# Patient Record
Sex: MALE | Race: WHITE | Employment: OTHER | URBAN - METROPOLITAN AREA
[De-identification: names, ages, dates, MRNs, and addresses within clinical notes are randomized per-mention and may not be internally consistent; named-entity substitution may affect disease eponyms.]

---

## 2017-01-13 DIAGNOSIS — C43.9 MALIGNANT MELANOMA OF SKIN (H): ICD-10-CM

## 2017-01-13 DIAGNOSIS — C22.1 CHOLANGIOCARCINOMA (H): ICD-10-CM

## 2017-01-13 LAB
ALBUMIN SERPL-MCNC: 4 G/DL (ref 3.4–5)
ALP SERPL-CCNC: 95 U/L (ref 40–150)
ALT SERPL W P-5'-P-CCNC: 40 U/L (ref 0–70)
ANION GAP SERPL CALCULATED.3IONS-SCNC: 6 MMOL/L (ref 3–14)
AST SERPL W P-5'-P-CCNC: 27 U/L (ref 0–45)
BASOPHILS # BLD AUTO: 0 10E9/L (ref 0–0.2)
BASOPHILS NFR BLD AUTO: 0.7 %
BILIRUB SERPL-MCNC: 0.8 MG/DL (ref 0.2–1.3)
BUN SERPL-MCNC: 16 MG/DL (ref 7–30)
CALCIUM SERPL-MCNC: 8.9 MG/DL (ref 8.5–10.1)
CHLORIDE SERPL-SCNC: 104 MMOL/L (ref 94–109)
CO2 SERPL-SCNC: 28 MMOL/L (ref 20–32)
CREAT SERPL-MCNC: 0.89 MG/DL (ref 0.66–1.25)
DIFFERENTIAL METHOD BLD: NORMAL
EOSINOPHIL # BLD AUTO: 0.2 10E9/L (ref 0–0.7)
EOSINOPHIL NFR BLD AUTO: 2.7 %
ERYTHROCYTE [DISTWIDTH] IN BLOOD BY AUTOMATED COUNT: 12.7 % (ref 10–15)
GFR SERPL CREATININE-BSD FRML MDRD: 87 ML/MIN/1.7M2
GLUCOSE SERPL-MCNC: 101 MG/DL (ref 70–99)
HCT VFR BLD AUTO: 43.1 % (ref 40–53)
HGB BLD-MCNC: 14.4 G/DL (ref 13.3–17.7)
IMM GRANULOCYTES # BLD: 0 10E9/L (ref 0–0.4)
IMM GRANULOCYTES NFR BLD: 0.4 %
LYMPHOCYTES # BLD AUTO: 1.9 10E9/L (ref 0.8–5.3)
LYMPHOCYTES NFR BLD AUTO: 33 %
MCH RBC QN AUTO: 29.4 PG (ref 26.5–33)
MCHC RBC AUTO-ENTMCNC: 33.4 G/DL (ref 31.5–36.5)
MCV RBC AUTO: 88 FL (ref 78–100)
MONOCYTES # BLD AUTO: 0.8 10E9/L (ref 0–1.3)
MONOCYTES NFR BLD AUTO: 13.8 %
NEUTROPHILS # BLD AUTO: 2.8 10E9/L (ref 1.6–8.3)
NEUTROPHILS NFR BLD AUTO: 49.4 %
NRBC # BLD AUTO: 0 10*3/UL
NRBC BLD AUTO-RTO: 0 /100
PLATELET # BLD AUTO: 183 10E9/L (ref 150–450)
POTASSIUM SERPL-SCNC: 5.4 MMOL/L (ref 3.4–5.3)
PROT SERPL-MCNC: 7.5 G/DL (ref 6.8–8.8)
RBC # BLD AUTO: 4.89 10E12/L (ref 4.4–5.9)
SODIUM SERPL-SCNC: 139 MMOL/L (ref 133–144)
WBC # BLD AUTO: 5.6 10E9/L (ref 4–11)

## 2017-01-14 LAB — CANCER AG19-9 SERPL-ACNC: 34

## 2017-01-16 ENCOUNTER — ONCOLOGY VISIT (OUTPATIENT)
Dept: ONCOLOGY | Facility: CLINIC | Age: 62
End: 2017-01-16
Attending: INTERNAL MEDICINE
Payer: COMMERCIAL

## 2017-01-16 VITALS
TEMPERATURE: 96.2 F | WEIGHT: 224.8 LBS | DIASTOLIC BLOOD PRESSURE: 84 MMHG | SYSTOLIC BLOOD PRESSURE: 146 MMHG | OXYGEN SATURATION: 98 % | HEIGHT: 71 IN | HEART RATE: 46 BPM | BODY MASS INDEX: 31.47 KG/M2 | RESPIRATION RATE: 12 BRPM

## 2017-01-16 DIAGNOSIS — C22.1 CHOLANGIOCARCINOMA (H): Primary | ICD-10-CM

## 2017-01-16 DIAGNOSIS — C43.9 MALIGNANT MELANOMA OF SKIN (H): ICD-10-CM

## 2017-01-16 PROCEDURE — 99212 OFFICE O/P EST SF 10 MIN: CPT

## 2017-01-16 PROCEDURE — 99213 OFFICE O/P EST LOW 20 MIN: CPT | Mod: ZP | Performed by: INTERNAL MEDICINE

## 2017-01-16 RX ORDER — VALACYCLOVIR HYDROCHLORIDE 1 G/1
TABLET, FILM COATED ORAL
COMMUNITY
Start: 2015-12-30 | End: 2017-01-16

## 2017-01-16 RX ORDER — METOPROLOL TARTRATE 50 MG
150 TABLET ORAL 2 TIMES DAILY
COMMUNITY
Start: 2006-10-03 | End: 2019-06-24

## 2017-01-16 ASSESSMENT — PAIN SCALES - GENERAL: PAINLEVEL: NO PAIN (0)

## 2017-01-16 NOTE — Clinical Note
1/16/2017      RE: Girish Chauhan  [address] [facility] [address]       HISTORY OF PRESENT ILLNESS:  Girish Chauhan is here today in followup of resected cholangiocarcinoma.  He is now about 9 months out from a left hepatectomy and is here for planned surveillance for recurrence.  He tells me since his last visit he has been doing great.  His appetite is excellent.  His energy level is good.  He is working full-time and does not really have any limitations in his activities.  A 10-point complete review of systems which is documented on the health questionnaire is completely negative.  He denies other new medical problems since our last visit.       PHYSICAL EXAMINATION:   GENERAL:  Mr. Chauhan is alert.  He appears well.  He appears his stated age.   VITAL SIGNS:  His vitals are noted in the chart and are unremarkable.   HEENT:  He has no icterus.  He has no visible lesion in the oropharynx.   NECK:  He has no palpable adenopathy in the neck or supraclavicular spaces.   LUNGS:  His lungs are clear to auscultation without dullness to percussion.   HEART:  His heart rate and rhythm are regular with a grade 2-3/6 systolic ejection murmur.   ABDOMEN:  His abdomen is soft and nontender without palpable mass or organomegaly.   EXTREMITIES:  He has no peripheral edema.  There is no tenderness in the calves or thighs.   NEUROLOGIC:  Speech is fluent.  Cranial nerves are grossly intact.  He has an unremarkable gait and station.       RESULTS:  I reviewed with the patient and his wife his CT scan and lab work.  He has normal electrolytes, renal function, liver enzymes and blood counts.  His CT scan shows no evidence of recurrence.      ASSESSMENT:  Resected cholangiocarcinoma.  The patient is currently free of evidence of disease.  I would like to see him back in about 3 months with repeat CT scan and lab work.       Naresh De Los Santos MD

## 2017-01-16 NOTE — PROGRESS NOTES
HISTORY OF PRESENT ILLNESS:  Girish Chauhan is here today in followup of resected cholangiocarcinoma.  He is now about 9 months out from a left hepatectomy and is here for planned surveillance for recurrence.  He tells me since his last visit he has been doing great.  His appetite is excellent.  His energy level is good.  He is working full-time and does not really have any limitations in his activities.  A 10-point complete review of systems which is documented on the health questionnaire is completely negative.  He denies other new medical problems since our last visit.       PHYSICAL EXAMINATION:   GENERAL:  Mr. Chauhan is alert.  He appears well.  He appears his stated age.   VITAL SIGNS:  His vitals are noted in the chart and are unremarkable.   HEENT:  He has no icterus.  He has no visible lesion in the oropharynx.   NECK:  He has no palpable adenopathy in the neck or supraclavicular spaces.   LUNGS:  His lungs are clear to auscultation without dullness to percussion.   HEART:  His heart rate and rhythm are regular with a grade 2-3/6 systolic ejection murmur.   ABDOMEN:  His abdomen is soft and nontender without palpable mass or organomegaly.   EXTREMITIES:  He has no peripheral edema.  There is no tenderness in the calves or thighs.   NEUROLOGIC:  Speech is fluent.  Cranial nerves are grossly intact.  He has an unremarkable gait and station.       RESULTS:  I reviewed with the patient and his wife his CT scan and lab work.  He has normal electrolytes, renal function, liver enzymes and blood counts.  His CT scan shows no evidence of recurrence.      ASSESSMENT:  Resected cholangiocarcinoma.  The patient is currently free of evidence of disease.  I would like to see him back in about 3 months with repeat CT scan and lab work.

## 2017-01-16 NOTE — Clinical Note
1/16/2017       RE: Girish Chauhan  [address] [facility] [address]     Dear Colleague,    Thank you for referring your patient, Girish Chauhan, to the [facility]. Please see a copy of my visit note below.    No notes on file    Again, thank you for allowing me to participate in the care of your patient.      Sincerely,    Naresh De Los Santos MD

## 2017-01-16 NOTE — MR AVS SNAPSHOT
After Visit Summary   1/16/2017    Girish Chauhan    MRN: [id number]           Patient Information     Date Of Birth          1955        Visit Information        Provider Department      1/16/2017 9:45 AM Naresh De Los Santos MD [facility]        Today's Diagnoses     Cholangiocarcinoma (H)    -  1     Malignant melanoma of skin (H)            Follow-ups after your visit        Follow-up notes from your care team     Return in about 3 months (around 4/16/2017) for NP Visit with CT and labs.      Your next 10 appointments already scheduled     Apr 07, 2017  8:15 AM   [facility] with  [facility]   [facility] ([facility])    [address] [place] [id number] [phone]            Apr 07, 2017  8:40 AM   (Arrive by 8:25 AM)   CT CHEST ABDOMEN PELVIS W/O & W CONTRAST with [address] [place] [address] [address] [place] [facility] ([facility])    [address] [place] [id number] [phone]           Please bring any scans or X-rays taken at other hospitals, if similar tests were done. Also bring a list of your medicines, including vitamins, minerals and over-the-counter drugs. It is safest to leave personal items at home.  Be sure to tell your doctor:   If you have any allergies.   If there s any chance you are pregnant.   If you are breastfeeding.   If you have any special needs.  You may have contrast for this exam. To prepare:   Do not eat or drink for 2 hours before your exam. If you need to take medicine, you may take it with small sips of water. (We may ask you to take liquid medicine as well.)   The day before your exam, drink extra fluids at least six 8-ounce glasses (unless your doctor tells you to restrict your fluids).  Patients over 70 or patients with diabetes or kidney problems:   If you haven t had a blood test (creatinine test) within the last 30  days, go to your clinic or Diagnostic Imaging Department for this test.  If you have diabetes:   If your kidney function is normal, continue taking your metformin (Avandamet, Glucophage, Glucovance, Metaglip) on the day of your exam.   If your kidney function is abnormal, wait 48 hours before restarting this medicine.  You will have oral contrast for this exam:   You will drink the contrast at home. Get this from your clinic or Diagnostic Imaging Department. Please follow the directions given.  Please wear loose clothing, such as a sweat suit or jogging clothes. Avoid snaps, zippers and other metal. We may ask you to undress and put on a hospital gown.  If you have any questions, please call the Imaging Department where you will have your exam.            Apr 10, 2017  8:40 AM   (Arrive by 8:25 AM)   Return Visit with YO Jasso CNP   [facility] ([facility])    [address] [place] [id number] [phone]              Future tests that were ordered for you today     Open Future Orders        Priority Expected Expires Ordered    CT Chest Abdomen Pelvis w/o & w Contrast Routine 4/16/2017 5/31/2017 1/16/2017    Comprehensive metabolic panel Routine 4/16/2017 5/31/2017 1/16/2017    CBC with platelets differential Routine 4/16/2017 5/31/2017 1/16/2017    Cancer antigen 19-9 Routine 4/16/2017 5/31/2017 1/16/2017            Who to contact     If you have questions or need follow up information about today's clinic visit or your schedule please contact [facility] directly at [phone].  Normal or non-critical lab and imaging results will be communicated to you by MyChart, letter or phone within 4 business days after the clinic has received the results. If you do not hear from us within 7 days, please contact the clinic through MyChart or phone. If you have a critical or abnormal lab result, we will notify you by phone  "as soon as possible.  Submit refill requests through [employer] or call your pharmacy and they will forward the refill request to us. Please allow 3 business days for your refill to be completed.          Additional Information About Your Visit        [employer] Information     [employer] gives you secure access to your electronic health record. If you see a primary care provider, you can also send messages to your care team and make appointments. If you have questions, please call your primary care clinic.  If you do not have a primary care provider, please call [phone] and they will assist you.        Care EveryWhere ID     This is your Care EveryWhere ID. This could be used by other organizations to access your [facility] medical records  [id number]        Your Vitals Were     Pulse Temperature Respirations Height BMI (Body Mass Index) Pulse Oximetry    46 96.2  F (35.7  C) (Tympanic) 12 1.803 m (5' 11\") 31.37 kg/m2 98%       Blood Pressure from Last 3 Encounters:   01/16/17 146/84   09/27/16 152/72   04/22/16 107/75    Weight from Last 3 Encounters:   01/16/17 101.969 kg (224 lb 12.8 oz)   09/27/16 98.793 kg (217 lb 12.8 oz)   04/22/16 84.278 kg (185 lb 12.8 oz)               Primary Care Provider Office Phone # Fax #    Jim Kaplan [phone]       [facility] MARGIE [address] [place] [facility] [address]        Thank you!     Thank you for choosing [facility]  for your care. Our goal is always to provide you with excellent care. Hearing back from our patients is one way we can continue to improve our services. Please take a few minutes to complete the written survey that you may receive in the mail after your visit with us. Thank you!             Your Updated Medication List - Protect others around you: Learn how to safely use, store and throw away your medicines at www.disposemymeds.org.          This list is accurate as of: 1/16/17 11:08 AM.  Always use " your most recent med list.                   Brand Name Dispense Instructions for use    aspirin 81 MG tablet          atorvastatin 40 MG tablet    LIPITOR     TAKE 1 TABLET BY MOUTH DAILY       COLCRYS PO      Take 0.6 mg by mouth as needed for moderate pain       metoprolol 50 MG tablet    LOPRESSOR     50 mg 2 times daily       multivitamin, therapeutic with minerals Tabs tablet     30 tablet    Take 1 tablet by mouth daily       NITROSTAT SL      Place 0.4 mg under the tongue every 5 minutes as needed for chest pain (Carries medication - has never used)       OMEGA-3 FISH OIL PO      Take 1,000 mg by mouth daily

## 2017-01-16 NOTE — NURSING NOTE
"Girish Chauhan is a 61 year old male who presents for:  Chief Complaint   Patient presents with     Oncology Clinic Visit     choliangiocarcinoma- 3 month follow up        Initial Vitals:  /84 mmHg  Pulse 46  Temp(Src) 96.2  F (35.7  C) (Tympanic)  Resp 12  Ht 1.803 m (5' 11\")  Wt 101.969 kg (224 lb 12.8 oz)  BMI 31.37 kg/m2  SpO2 98% Estimated body mass index is 31.37 kg/(m^2) as calculated from the following:    Height as of this encounter: 1.803 m (5' 11\").    Weight as of this encounter: 101.969 kg (224 lb 12.8 oz).. Body surface area is 2.26 meters squared. BP completed using cuff size: large  No Pain (0) No LMP for male patient. Allergies and medications reviewed.     Medications: Medication refills not needed today.  Pharmacy name entered into [employer]: [facility] [address] [place] [address] [phone] AT [facility]    Comments: per patient, medication list is correct    7 minutes for nursing intake (face to face time)   Lily Delcid CMA          "

## 2017-04-07 DIAGNOSIS — C43.9 MALIGNANT MELANOMA OF SKIN (H): ICD-10-CM

## 2017-04-07 DIAGNOSIS — C22.1 CHOLANGIOCARCINOMA (H): ICD-10-CM

## 2017-04-07 LAB
ALBUMIN SERPL-MCNC: 3.9 G/DL (ref 3.4–5)
ALP SERPL-CCNC: 106 U/L (ref 40–150)
ALT SERPL W P-5'-P-CCNC: 39 U/L (ref 0–70)
ANION GAP SERPL CALCULATED.3IONS-SCNC: 5 MMOL/L (ref 3–14)
AST SERPL W P-5'-P-CCNC: 26 U/L (ref 0–45)
BASOPHILS # BLD AUTO: 0 10E9/L (ref 0–0.2)
BASOPHILS NFR BLD AUTO: 0.5 %
BILIRUB SERPL-MCNC: 0.5 MG/DL (ref 0.2–1.3)
BUN SERPL-MCNC: 24 MG/DL (ref 7–30)
CALCIUM SERPL-MCNC: 8.9 MG/DL (ref 8.5–10.1)
CHLORIDE SERPL-SCNC: 106 MMOL/L (ref 94–109)
CO2 SERPL-SCNC: 30 MMOL/L (ref 20–32)
CREAT SERPL-MCNC: 0.97 MG/DL (ref 0.66–1.25)
DIFFERENTIAL METHOD BLD: NORMAL
EOSINOPHIL # BLD AUTO: 0.2 10E9/L (ref 0–0.7)
EOSINOPHIL NFR BLD AUTO: 3 %
ERYTHROCYTE [DISTWIDTH] IN BLOOD BY AUTOMATED COUNT: 12.4 % (ref 10–15)
GFR SERPL CREATININE-BSD FRML MDRD: 79 ML/MIN/1.7M2
GLUCOSE SERPL-MCNC: 110 MG/DL (ref 70–99)
HCT VFR BLD AUTO: 46.6 % (ref 40–53)
HGB BLD-MCNC: 15.2 G/DL (ref 13.3–17.7)
IMM GRANULOCYTES # BLD: 0 10E9/L (ref 0–0.4)
IMM GRANULOCYTES NFR BLD: 0.5 %
LYMPHOCYTES # BLD AUTO: 2.2 10E9/L (ref 0.8–5.3)
LYMPHOCYTES NFR BLD AUTO: 36 %
MCH RBC QN AUTO: 29 PG (ref 26.5–33)
MCHC RBC AUTO-ENTMCNC: 32.6 G/DL (ref 31.5–36.5)
MCV RBC AUTO: 89 FL (ref 78–100)
MONOCYTES # BLD AUTO: 0.5 10E9/L (ref 0–1.3)
MONOCYTES NFR BLD AUTO: 8.7 %
NEUTROPHILS # BLD AUTO: 3.1 10E9/L (ref 1.6–8.3)
NEUTROPHILS NFR BLD AUTO: 51.3 %
NRBC # BLD AUTO: 0 10*3/UL
NRBC BLD AUTO-RTO: 0 /100
PLATELET # BLD AUTO: 216 10E9/L (ref 150–450)
POTASSIUM SERPL-SCNC: 4.9 MMOL/L (ref 3.4–5.3)
PROT SERPL-MCNC: 7.7 G/DL (ref 6.8–8.8)
RBC # BLD AUTO: 5.25 10E12/L (ref 4.4–5.9)
SODIUM SERPL-SCNC: 141 MMOL/L (ref 133–144)
WBC # BLD AUTO: 6 10E9/L (ref 4–11)

## 2017-04-07 PROCEDURE — 86301 IMMUNOASSAY TUMOR CA 19-9: CPT | Performed by: INTERNAL MEDICINE

## 2017-04-07 PROCEDURE — 85025 COMPLETE CBC W/AUTO DIFF WBC: CPT | Performed by: INTERNAL MEDICINE

## 2017-04-07 PROCEDURE — 80053 COMPREHEN METABOLIC PANEL: CPT | Performed by: INTERNAL MEDICINE

## 2017-04-08 LAB — CANCER AG19-9 SERPL-ACNC: 32

## 2017-04-10 ENCOUNTER — ONCOLOGY VISIT (OUTPATIENT)
Dept: ONCOLOGY | Facility: CLINIC | Age: 62
End: 2017-04-10
Attending: NURSE PRACTITIONER
Payer: COMMERCIAL

## 2017-04-10 VITALS
TEMPERATURE: 96.7 F | RESPIRATION RATE: 16 BRPM | DIASTOLIC BLOOD PRESSURE: 88 MMHG | WEIGHT: 227.3 LBS | BODY MASS INDEX: 31.7 KG/M2 | OXYGEN SATURATION: 98 % | HEART RATE: 64 BPM | SYSTOLIC BLOOD PRESSURE: 134 MMHG

## 2017-04-10 DIAGNOSIS — C22.1 CHOLANGIOCARCINOMA (H): Primary | ICD-10-CM

## 2017-04-10 PROCEDURE — 99214 OFFICE O/P EST MOD 30 MIN: CPT | Mod: ZP | Performed by: NURSE PRACTITIONER

## 2017-04-10 PROCEDURE — 99212 OFFICE O/P EST SF 10 MIN: CPT | Mod: ZF

## 2017-04-10 ASSESSMENT — PAIN SCALES - GENERAL: PAINLEVEL: NO PAIN (0)

## 2017-04-10 NOTE — LETTER
4/10/2017       RE: Girish Chauhan  [address] [facility] [address]     Dear Colleague,    Thank you for referring your patient, Girish Chauhan, to the [facility]. Please see a copy of my visit note below.    Girish Chauhan is here today to follow up resected cholangiocarcinoma.  He had a successful left hepatectomy for resection of his stage PADMINI cancer in March 2016.    Interval history:    Girish has been feeling well. Appetite and energy are good. Bowels regular. No changes to urination. No abdominal pain, bloating or nausea. Has a history of a-fib and recently has noted increased palpitations and exertional dyspnea. Has occasional dizziness with position changes. No headaches/vision changes. No bone aches/pains. No new skin rashes, lumps or bumps. Is followed closely by dermatology, last was seen in Dec.    Patient Active Problem List   Diagnosis     Cholangiocarcinoma (H)     Aortic root dilatation (H)     Disorder of aorta (H)     Chronic coronary artery disease     Gout     Pure hypercholesterolemia     Malignant melanoma of skin (H)     Paroxysmal atrial fibrillation (H)         Current Outpatient Prescriptions   Medication Sig Dispense Refill     metoprolol (LOPRESSOR) 50 MG tablet 50 mg 2 times daily       aspirin 81 MG tablet        atorvastatin (LIPITOR) 40 MG tablet TAKE 1 TABLET BY MOUTH DAILY       multivitamin, therapeutic with minerals (MULTI-VITAMIN) TABS Take 1 tablet by mouth daily 30 tablet 0     Omega-3 Fatty Acids (OMEGA-3 FISH OIL PO) Take 1,000 mg by mouth daily        Nitroglycerin (NITROSTAT SL) Place 0.4 mg under the tongue every 5 minutes as needed for chest pain (Carries medication - has never used)        Colchicine (COLCRYS PO) Take 0.6 mg by mouth as needed for moderate pain       Exam:  Alert, appears well. Blood pressure 134/88, pulse 64, temperature 96.7  F (35.9  C), temperature source Oral, resp. rate 16, weight 103.1 kg (227 lb 4.8 oz), SpO2 98  %.  Oropharynx is moist, no focal lesion. No icterus. Neck supple and without adenopathy. Lungs:CTA. Heart:RRR, no murmur or rub. Abdomen: soft, non-tender, BS active. No masses or organomegaly. RUQ incision without hernia or mass. Extremities: warm, no edema. Speech clear. CN wnl. Gait/station wnl. No nodes palpable in the neck, axilla or supraclavicular areas.    Imaging/labs:  EXAMINATION: CT CHEST/ABDOMEN/PELVIS W CONTRAST, 4/7/2017 8:55 AM     TECHNIQUE: Helical CT images from the thoracic inlet through the  symphysis pubis were obtained with contrast. Contrast dose: Isovue  370 135cc     COMPARISON: CT dated 1/13/2017     HISTORY: Intrahepatic bile duct carcinoma, Malignant melanoma of skin,  unspecified     FINDINGS:     Chest: Stable small nonenlarged mediastinal lymph nodes. No  mediastinal, hilar or axillary lymphadenopathy. Heart is not enlarged.  Great vessels are normal in caliber. Central tracheobronchial tree is  patent. There is no pleural or pericardial effusion. No pneumothorax.  Visualized thyroid gland appears unremarkable. Stents in the left  anterior descending artery.     Stable few scattered 2 to 3 mm nodules in the lungs. For example  stable 2 mm nodule in the right middle lobe (image 81, series 10).  Stable 3 mm nodule along the left major fissure (image 77, series 10).  No new suspicious pulmonary nodules.     Abdomen and pelvis: Status post partial resection of the liver. No  focal liver lesions identified. No evidence of residual tumor. No  intrahepatic biliary ductal dilation. Spleen, pancreas, both adrenal  glands have a normal appearance. Gallbladder is surgically absent.  Large exophytic parapelvic cyst is again identified measuring 5.6 x  8.1 cm, unchanged since prior study. The cyst abuts the right renal  pelvis. Mild right renal pelvocaliectasis is noted, unchanged. Left  kidney has a normal appearance. No focal mass or hydronephrosis.  Urinary bladder is well distended without  wall thickening. Mild median  lobe hypertrophy of the prostate gland.  Colonic diverticulosis without diverticulitis. No focal bowel wall  thickening or bowel obstruction.  No lymphadenopathy within the abdomen or pelvis. No free fluid.  Atherosclerotic calcification of the abdominal aorta without aneurysm.     Bones and soft tissues: Old right posterior thoracic rib fractures are  noted. Multilevel mild degenerative changes are noted in the spine. No  suspicious osseous lesions.         IMPRESSION: In this patient with history of cholangiocarcinoma,   1. Postsurgical changes of left lobe and caudate liver resection. No  new liver lesion.  2. No evidence of metastatic disease of the chest, abdomen or pelvis.  3. Stable large exophytic right renal parapelvic cyst with mild right  pelvocaliectasis.  4. Stable scattered sub-4 mm pulmonary nodules, most of which likely  represent intrafissural lymph nodes.     I have personally reviewed the examination and initial interpretation  and I agree with the findings.     DAGO BEAL  Results for DAGO MCGARRY (MRN [id number]) as of 4/10/2017 08:23   Ref. Range 4/7/2017 08:21   Sodium Latest Ref Range: 133 - 144 mmol/L 141   Potassium Latest Ref Range: 3.4 - 5.3 mmol/L 4.9   Chloride Latest Ref Range: 94 - 109 mmol/L 106   Carbon Dioxide Latest Ref Range: 20 - 32 mmol/L 30   Urea Nitrogen Latest Ref Range: 7 - 30 mg/dL 24   Creatinine Latest Ref Range: 0.66 - 1.25 mg/dL 0.97   GFR Estimate Latest Ref Range: >60 mL/min/1.7m2 79   GFR Estimate If Black Latest Ref Range: >60 mL/min/1.7m2 >90...   Calcium Latest Ref Range: 8.5 - 10.1 mg/dL 8.9   Anion Gap Latest Ref Range: 3 - 14 mmol/L 5   Albumin Latest Ref Range: 3.4 - 5.0 g/dL 3.9   Protein Total Latest Ref Range: 6.8 - 8.8 g/dL 7.7   Bilirubin Total Latest Ref Range: 0.2 - 1.3 mg/dL 0.5   Alkaline Phosphatase Latest Ref Range: 40 - 150 U/L 106   ALT Latest Ref Range: 0 - 70 U/L 39   AST Latest Ref Range: 0 - 45 U/L 26    Cancer Antigen 19-9 Unknown 32   Glucose Latest Ref Range: 70 - 99 mg/dL 110 (H)   WBC Latest Ref Range: 4.0 - 11.0 10e9/L 6.0   Hemoglobin Latest Ref Range: 13.3 - 17.7 g/dL 15.2   Hematocrit Latest Ref Range: 40.0 - 53.0 % 46.6   Platelet Count Latest Ref Range: 150 - 450 10e9/L 216   RBC Count Latest Ref Range: 4.4 - 5.9 10e12/L 5.25   MCV Latest Ref Range: 78 - 100 fl 89   MCH Latest Ref Range: 26.5 - 33.0 pg 29.0   MCHC Latest Ref Range: 31.5 - 36.5 g/dL 32.6   RDW Latest Ref Range: 10.0 - 15.0 % 12.4   Diff Method Unknown Automated Method   % Neutrophils Latest Units: % 51.3   % Lymphocytes Latest Units: % 36.0   % Monocytes Latest Units: % 8.7   % Eosinophils Latest Units: % 3.0   % Basophils Latest Units: % 0.5   % Immature Granulocytes Latest Units: % 0.5   Nucleated RBCs Latest Ref Range: 0 /100 0   Absolute Neutrophil Latest Ref Range: 1.6 - 8.3 10e9/L 3.1   Absolute Lymphocytes Latest Ref Range: 0.8 - 5.3 10e9/L 2.2   Absolute Monocytes Latest Ref Range: 0.0 - 1.3 10e9/L 0.5   Absolute Eosinophils Latest Ref Range: 0.0 - 0.7 10e9/L 0.2   Absolute Basophils Latest Ref Range: 0.0 - 0.2 10e9/L 0.0   Abs Immature Granulocytes Latest Ref Range: 0 - 0.4 10e9/L 0.0   Absolute Nucleated RBC Unknown 0.0       Impression/plan:  1. Resected cholangiocarcinoma, no evidence of recurrence. He is now a year out from resection and doing well. Will continue every 3 month surveillance visits with a CT and labs including tumor marker for the next year, then extend our visits to every 6 months if doing well.  -Return in 3 months to see Dr. De Los Santos, CT and labs prior to visit    2. Resected melanoma/SCC: follows with dermatology every 6 months, next in Jordyn    3. Cards: A-fib and aortic aneurysm  -Has a message out to cardiology to discuss management of both of these issues.  -on Metoprolol and ASA. Will be discussing anticoagulation with them.    Answers for HPI/ROS submitted by the patient on 3/27/2017   General Symptoms:  No  Skin Symptoms: No  HENT Symptoms: No  EYE SYMPTOMS: No  HEART SYMPTOMS: No  LUNG SYMPTOMS: No  INTESTINAL SYMPTOMS: No  URINARY SYMPTOMS: No  REPRODUCTIVE SYMPTOMS: No  SKELETAL SYMPTOMS: No  BLOOD SYMPTOMS: No  NERVOUS SYSTEM SYMPTOMS: No  MENTAL HEALTH SYMPTOMS: No      Again, thank you for allowing me to participate in the care of your patient.      Sincerely,    YO Grubbs CNP

## 2017-04-10 NOTE — MR AVS SNAPSHOT
After Visit Summary   4/10/2017    Girish Chauhan    MRN: [id number]           Patient Information     Date Of Birth          1955        Visit Information        Provider Department      4/10/2017 8:40 AM Jennifer Jalloh APRN [facility]        Today's Diagnoses     Cholangiocarcinoma (H)    -  1       Follow-ups after your visit        Your next 10 appointments already scheduled     Jul 07, 2017  7:30 AM CDT   LAB with  LAB   [facility] ([facility])    [address] [place] [id number] [phone]           Patient must bring picture ID.  Patient should be prepared to give a urine specimen  Please do not eat 10-12 hours before your appointment if you are coming in fasting for labs on lipids, cholesterol, or glucose (sugar).  Pregnant women should follow their Care Team instructions. Water with medications is okay. Do not drink coffee or other fluids.   If you have concerns about taking  your medications, please ask at office or if scheduling via FFWD, send a message by clicking on Secure Messaging, Message Your Care Team.            Jul 07, 2017  8:00 AM CDT   (Arrive by 7:45 AM)   CT CHEST/ABDOMEN/PELVIS W CONTRAST with UCCT2   [facility] [place] [facility] ([facility])    [address] [place] [id number] [phone]           Please bring any scans or X-rays taken at other hospitals, if similar tests were done. Also bring a list of your medicines, including vitamins, minerals and over-the-counter drugs. It is safest to leave personal items at home.  Be sure to tell your doctor:   If you have any allergies.   If there s any chance you are pregnant.   If you are breastfeeding.   If you have any special needs.  You may have contrast for this exam. To prepare:   Do not eat or drink for 2 hours before your exam. If you need to take medicine, you may take it with  small sips of water. (We may ask you to take liquid medicine as well.)   The day before your exam, drink extra fluids at least six 8-ounce glasses (unless your doctor tells you to restrict your fluids).  Patients over 70 or patients with diabetes or kidney problems:   If you haven t had a blood test (creatinine test) within the last 30 days, go to your clinic or Diagnostic Imaging Department for this test.  If you have diabetes:   If your kidney function is normal, continue taking your metformin (Avandamet, Glucophage, Glucovance, Metaglip) on the day of your exam.   If your kidney function is abnormal, wait 48 hours before restarting this medicine.  You will have oral contrast for this exam:   You will drink the contrast at home. Get this from your clinic or Diagnostic Imaging Department. Please follow the directions given.  Please wear loose clothing, such as a sweat suit or jogging clothes. Avoid snaps, zippers and other metal. We may ask you to undress and put on a hospital gown.  If you have any questions, please call the Imaging Department where you will have your exam.            Jul 10, 2017  8:15 AM CDT   (Arrive by 8:00 AM)   Return Visit with Naresh De Los Santos MD   [facility] ([facility])    [address] [place] [id number] [phone]              Future tests that were ordered for you today     Open Future Orders        Priority Expected Expires Ordered    Cancer antigen 19-9 Routine 7/10/2017 10/10/2017 4/10/2017    CT Chest/Abdomen/Pelvis w Contrast Routine 7/10/2017 10/10/2017 4/10/2017    CBC with platelets differential Routine 7/10/2017 10/10/2017 4/10/2017    Comprehensive metabolic panel Routine 7/10/2017 10/10/2017 4/10/2017            Who to contact     If you have questions or need follow up information about today's clinic visit or your schedule please contact [facility] directly at  [phone].  Normal or non-critical lab and imaging results will be communicated to you by MyChart, letter or phone within 4 business days after the clinic has received the results. If you do not hear from us within 7 days, please contact the clinic through [employer] or phone. If you have a critical or abnormal lab result, we will notify you by phone as soon as possible.  Submit refill requests through [employer] or call your pharmacy and they will forward the refill request to us. Please allow 3 business days for your refill to be completed.          Additional Information About Your Visit        [employer] Information     [employer] gives you secure access to your electronic health record. If you see a primary care provider, you can also send messages to your care team and make appointments. If you have questions, please call your primary care clinic.  If you do not have a primary care provider, please call [phone] and they will assist you.        Care EveryWhere ID     This is your Care EveryWhere ID. This could be used by other organizations to access your [facility] medical records  [id number]        Your Vitals Were     Pulse Temperature Respirations Pulse Oximetry BMI (Body Mass Index)       64 96.7  F (35.9  C) (Oral) 16 98% 31.7 kg/m2        Blood Pressure from Last 3 Encounters:   04/10/17 134/88   01/16/17 146/84   09/27/16 152/72    Weight from Last 3 Encounters:   04/10/17 103.1 kg (227 lb 4.8 oz)   01/16/17 102 kg (224 lb 12.8 oz)   09/27/16 98.8 kg (217 lb 12.8 oz)               Primary Care Provider Office Phone # Fax #    Jim Kaplan [phone]       [facility] [address]   [place] [facility] [address]        Thank you!     Thank you for choosing [facility]  for your care. Our goal is always to provide you with excellent care. Hearing back from our patients is one way we can continue to improve our services. Please take a few minutes to  complete the written survey that you may receive in the mail after your visit with us. Thank you!             Your Updated Medication List - Protect others around you: Learn how to safely use, store and throw away your medicines at www.disposemymeds.org.          This list is accurate as of: 4/10/17  9:10 AM.  Always use your most recent med list.                   Brand Name Dispense Instructions for use    aspirin 81 MG tablet          atorvastatin 40 MG tablet    LIPITOR     TAKE 1 TABLET BY MOUTH DAILY       COLCRYS PO      Take 0.6 mg by mouth as needed for moderate pain       metoprolol 50 MG tablet    LOPRESSOR     50 mg 2 times daily       multivitamin, therapeutic with minerals Tabs tablet     30 tablet    Take 1 tablet by mouth daily       NITROSTAT SL      Place 0.4 mg under the tongue every 5 minutes as needed for chest pain (Carries medication - has never used)       OMEGA-3 FISH OIL PO      Take 1,000 mg by mouth daily

## 2017-04-10 NOTE — NURSING NOTE
"Girish Chauhan is a 62 year old male who presents for:  Chief Complaint   Patient presents with     Oncology Clinic Visit     Hilar Cholangiocarcinoma        Initial Vitals:  /88  Pulse 64  Temp 96.7  F (35.9  C) (Oral)  Resp 16  Wt 103.1 kg (227 lb 4.8 oz)  SpO2 98%  BMI 31.7 kg/m2 Estimated body mass index is 31.7 kg/(m^2) as calculated from the following:    Height as of 1/16/17: 1.803 m (5' 11\").    Weight as of this encounter: 103.1 kg (227 lb 4.8 oz).. Body surface area is 2.27 meters squared. BP completed using cuff size: large  No Pain (0) No LMP for male patient. Allergies and medications reviewed.     Medications: Medication refills not needed today.  Pharmacy name entered into [facility]: [facility] [address] [place] [address] [place] AT [facility] OF [facility]    Comments: Patient is not having any pain today.     6 minutes for nursing intake (face to face time)   Tatum Frye CMA       "

## 2017-04-10 NOTE — PROGRESS NOTES
Girish Chauhan is here today to follow up resected cholangiocarcinoma.  He had a successful left hepatectomy for resection of his stage PADMINI cancer in March 2016.    Interval history:    Girish has been feeling well. Appetite and energy are good. Bowels regular. No changes to urination. No abdominal pain, bloating or nausea. Has a history of a-fib and recently has noted increased palpitations and exertional dyspnea. Has occasional dizziness with position changes. No headaches/vision changes. No bone aches/pains. No new skin rashes, lumps or bumps. Is followed closely by dermatology, last was seen in Dec.    Patient Active Problem List   Diagnosis     Cholangiocarcinoma (H)     Aortic root dilatation (H)     Disorder of aorta (H)     Chronic coronary artery disease     Gout     Pure hypercholesterolemia     Malignant melanoma of skin (H)     Paroxysmal atrial fibrillation (H)         Current Outpatient Prescriptions   Medication Sig Dispense Refill     metoprolol (LOPRESSOR) 50 MG tablet 50 mg 2 times daily       aspirin 81 MG tablet        atorvastatin (LIPITOR) 40 MG tablet TAKE 1 TABLET BY MOUTH DAILY       multivitamin, therapeutic with minerals (MULTI-VITAMIN) TABS Take 1 tablet by mouth daily 30 tablet 0     Omega-3 Fatty Acids (OMEGA-3 FISH OIL PO) Take 1,000 mg by mouth daily        Nitroglycerin (NITROSTAT SL) Place 0.4 mg under the tongue every 5 minutes as needed for chest pain (Carries medication - has never used)        Colchicine (COLCRYS PO) Take 0.6 mg by mouth as needed for moderate pain       Exam:  Alert, appears well. Blood pressure 134/88, pulse 64, temperature 96.7  F (35.9  C), temperature source Oral, resp. rate 16, weight 103.1 kg (227 lb 4.8 oz), SpO2 98 %.  Oropharynx is moist, no focal lesion. No icterus. Neck supple and without adenopathy. Lungs:CTA. Heart:RRR, no murmur or rub. Abdomen: soft, non-tender, BS active. No masses or organomegaly. RUQ incision without hernia or mass.  Extremities: warm, no edema. Speech clear. CN wnl. Gait/station wnl. No nodes palpable in the neck, axilla or supraclavicular areas.    Imaging/labs:  EXAMINATION: CT CHEST/ABDOMEN/PELVIS W CONTRAST, 4/7/2017 8:55 AM     TECHNIQUE: Helical CT images from the thoracic inlet through the  symphysis pubis were obtained with contrast. Contrast dose: Isovue  370 135cc     COMPARISON: CT dated 1/13/2017     HISTORY: Intrahepatic bile duct carcinoma, Malignant melanoma of skin,  unspecified     FINDINGS:     Chest: Stable small nonenlarged mediastinal lymph nodes. No  mediastinal, hilar or axillary lymphadenopathy. Heart is not enlarged.  Great vessels are normal in caliber. Central tracheobronchial tree is  patent. There is no pleural or pericardial effusion. No pneumothorax.  Visualized thyroid gland appears unremarkable. Stents in the left  anterior descending artery.     Stable few scattered 2 to 3 mm nodules in the lungs. For example  stable 2 mm nodule in the right middle lobe (image 81, series 10).  Stable 3 mm nodule along the left major fissure (image 77, series 10).  No new suspicious pulmonary nodules.     Abdomen and pelvis: Status post partial resection of the liver. No  focal liver lesions identified. No evidence of residual tumor. No  intrahepatic biliary ductal dilation. Spleen, pancreas, both adrenal  glands have a normal appearance. Gallbladder is surgically absent.  Large exophytic parapelvic cyst is again identified measuring 5.6 x  8.1 cm, unchanged since prior study. The cyst abuts the right renal  pelvis. Mild right renal pelvocaliectasis is noted, unchanged. Left  kidney has a normal appearance. No focal mass or hydronephrosis.  Urinary bladder is well distended without wall thickening. Mild median  lobe hypertrophy of the prostate gland.  Colonic diverticulosis without diverticulitis. No focal bowel wall  thickening or bowel obstruction.  No lymphadenopathy within the abdomen or pelvis. No free  fluid.  Atherosclerotic calcification of the abdominal aorta without aneurysm.     Bones and soft tissues: Old right posterior thoracic rib fractures are  noted. Multilevel mild degenerative changes are noted in the spine. No  suspicious osseous lesions.         IMPRESSION: In this patient with history of cholangiocarcinoma,   1. Postsurgical changes of left lobe and caudate liver resection. No  new liver lesion.  2. No evidence of metastatic disease of the chest, abdomen or pelvis.  3. Stable large exophytic right renal parapelvic cyst with mild right  pelvocaliectasis.  4. Stable scattered sub-4 mm pulmonary nodules, most of which likely  represent intrafissural lymph nodes.     I have personally reviewed the examination and initial interpretation  and I agree with the findings.     DAGO BEAL  Results for DAGO MCGARRY (MRN [id number]) as of 4/10/2017 08:23   Ref. Range 4/7/2017 08:21   Sodium Latest Ref Range: 133 - 144 mmol/L 141   Potassium Latest Ref Range: 3.4 - 5.3 mmol/L 4.9   Chloride Latest Ref Range: 94 - 109 mmol/L 106   Carbon Dioxide Latest Ref Range: 20 - 32 mmol/L 30   Urea Nitrogen Latest Ref Range: 7 - 30 mg/dL 24   Creatinine Latest Ref Range: 0.66 - 1.25 mg/dL 0.97   GFR Estimate Latest Ref Range: >60 mL/min/1.7m2 79   GFR Estimate If Black Latest Ref Range: >60 mL/min/1.7m2 >90...   Calcium Latest Ref Range: 8.5 - 10.1 mg/dL 8.9   Anion Gap Latest Ref Range: 3 - 14 mmol/L 5   Albumin Latest Ref Range: 3.4 - 5.0 g/dL 3.9   Protein Total Latest Ref Range: 6.8 - 8.8 g/dL 7.7   Bilirubin Total Latest Ref Range: 0.2 - 1.3 mg/dL 0.5   Alkaline Phosphatase Latest Ref Range: 40 - 150 U/L 106   ALT Latest Ref Range: 0 - 70 U/L 39   AST Latest Ref Range: 0 - 45 U/L 26   Cancer Antigen 19-9 Unknown 32   Glucose Latest Ref Range: 70 - 99 mg/dL 110 (H)   WBC Latest Ref Range: 4.0 - 11.0 10e9/L 6.0   Hemoglobin Latest Ref Range: 13.3 - 17.7 g/dL 15.2   Hematocrit Latest Ref Range: 40.0 - 53.0 % 46.6    Platelet Count Latest Ref Range: 150 - 450 10e9/L 216   RBC Count Latest Ref Range: 4.4 - 5.9 10e12/L 5.25   MCV Latest Ref Range: 78 - 100 fl 89   MCH Latest Ref Range: 26.5 - 33.0 pg 29.0   MCHC Latest Ref Range: 31.5 - 36.5 g/dL 32.6   RDW Latest Ref Range: 10.0 - 15.0 % 12.4   Diff Method Unknown Automated Method   % Neutrophils Latest Units: % 51.3   % Lymphocytes Latest Units: % 36.0   % Monocytes Latest Units: % 8.7   % Eosinophils Latest Units: % 3.0   % Basophils Latest Units: % 0.5   % Immature Granulocytes Latest Units: % 0.5   Nucleated RBCs Latest Ref Range: 0 /100 0   Absolute Neutrophil Latest Ref Range: 1.6 - 8.3 10e9/L 3.1   Absolute Lymphocytes Latest Ref Range: 0.8 - 5.3 10e9/L 2.2   Absolute Monocytes Latest Ref Range: 0.0 - 1.3 10e9/L 0.5   Absolute Eosinophils Latest Ref Range: 0.0 - 0.7 10e9/L 0.2   Absolute Basophils Latest Ref Range: 0.0 - 0.2 10e9/L 0.0   Abs Immature Granulocytes Latest Ref Range: 0 - 0.4 10e9/L 0.0   Absolute Nucleated RBC Unknown 0.0       Impression/plan:  1. Resected cholangiocarcinoma, no evidence of recurrence. He is now a year out from resection and doing well. Will continue every 3 month surveillance visits with a CT and labs including tumor marker for the next year, then extend our visits to every 6 months if doing well.  -Return in 3 months to see Dr. De Los Santos, CT and labs prior to visit    2. Resected melanoma/SCC: follows with dermatology every 6 months, next in [place]    3. Cards: A-fib and aortic aneurysm  -Has a message out to cardiology to discuss management of both of these issues.  -on Metoprolol and ASA. Will be discussing anticoagulation with them.    Answers for HPI/ROS submitted by the patient on 3/27/2017   General Symptoms: No  Skin Symptoms: No  HENT Symptoms: No  EYE SYMPTOMS: No  HEART SYMPTOMS: No  LUNG SYMPTOMS: No  INTESTINAL SYMPTOMS: No  URINARY SYMPTOMS: No  REPRODUCTIVE SYMPTOMS: No  SKELETAL SYMPTOMS: No  BLOOD SYMPTOMS: No  NERVOUS SYSTEM  SYMPTOMS: No  MENTAL HEALTH SYMPTOMS: No

## 2017-07-07 DIAGNOSIS — C22.1 CHOLANGIOCARCINOMA (H): ICD-10-CM

## 2017-07-07 LAB
ALBUMIN SERPL-MCNC: 3.7 G/DL (ref 3.4–5)
ALP SERPL-CCNC: 99 U/L (ref 40–150)
ALT SERPL W P-5'-P-CCNC: 40 U/L (ref 0–70)
ANION GAP SERPL CALCULATED.3IONS-SCNC: 5 MMOL/L (ref 3–14)
AST SERPL W P-5'-P-CCNC: 24 U/L (ref 0–45)
BASOPHILS # BLD AUTO: 0 10E9/L (ref 0–0.2)
BASOPHILS NFR BLD AUTO: 0.4 %
BILIRUB SERPL-MCNC: 0.4 MG/DL (ref 0.2–1.3)
BUN SERPL-MCNC: 13 MG/DL (ref 7–30)
CALCIUM SERPL-MCNC: 8.8 MG/DL (ref 8.5–10.1)
CHLORIDE SERPL-SCNC: 109 MMOL/L (ref 94–109)
CO2 SERPL-SCNC: 28 MMOL/L (ref 20–32)
CREAT SERPL-MCNC: 0.82 MG/DL (ref 0.66–1.25)
DIFFERENTIAL METHOD BLD: NORMAL
EOSINOPHIL # BLD AUTO: 0.2 10E9/L (ref 0–0.7)
EOSINOPHIL NFR BLD AUTO: 2.9 %
ERYTHROCYTE [DISTWIDTH] IN BLOOD BY AUTOMATED COUNT: 13 % (ref 10–15)
GFR SERPL CREATININE-BSD FRML MDRD: ABNORMAL ML/MIN/1.7M2
GLUCOSE SERPL-MCNC: 116 MG/DL (ref 70–99)
HCT VFR BLD AUTO: 44.4 % (ref 40–53)
HGB BLD-MCNC: 14.4 G/DL (ref 13.3–17.7)
IMM GRANULOCYTES # BLD: 0 10E9/L (ref 0–0.4)
IMM GRANULOCYTES NFR BLD: 0.2 %
LYMPHOCYTES # BLD AUTO: 1.8 10E9/L (ref 0.8–5.3)
LYMPHOCYTES NFR BLD AUTO: 35 %
MCH RBC QN AUTO: 28.9 PG (ref 26.5–33)
MCHC RBC AUTO-ENTMCNC: 32.4 G/DL (ref 31.5–36.5)
MCV RBC AUTO: 89 FL (ref 78–100)
MONOCYTES # BLD AUTO: 0.7 10E9/L (ref 0–1.3)
MONOCYTES NFR BLD AUTO: 13 %
NEUTROPHILS # BLD AUTO: 2.5 10E9/L (ref 1.6–8.3)
NEUTROPHILS NFR BLD AUTO: 48.5 %
NRBC # BLD AUTO: 0 10*3/UL
NRBC BLD AUTO-RTO: 0 /100
PLATELET # BLD AUTO: 194 10E9/L (ref 150–450)
POTASSIUM SERPL-SCNC: 4.9 MMOL/L (ref 3.4–5.3)
PROT SERPL-MCNC: 7.3 G/DL (ref 6.8–8.8)
RBC # BLD AUTO: 4.99 10E12/L (ref 4.4–5.9)
SODIUM SERPL-SCNC: 142 MMOL/L (ref 133–144)
WBC # BLD AUTO: 5.1 10E9/L (ref 4–11)

## 2017-07-08 LAB — CANCER AG19-9 SERPL-ACNC: 33

## 2017-07-10 ENCOUNTER — ONCOLOGY VISIT (OUTPATIENT)
Dept: ONCOLOGY | Facility: CLINIC | Age: 62
End: 2017-07-10
Attending: INTERNAL MEDICINE

## 2017-07-10 VITALS
WEIGHT: 228.5 LBS | BODY MASS INDEX: 31.99 KG/M2 | DIASTOLIC BLOOD PRESSURE: 78 MMHG | HEART RATE: 52 BPM | SYSTOLIC BLOOD PRESSURE: 131 MMHG | HEIGHT: 71 IN | OXYGEN SATURATION: 95 % | RESPIRATION RATE: 16 BRPM | TEMPERATURE: 97.2 F

## 2017-07-10 DIAGNOSIS — N32.89 BLADDER MASS: ICD-10-CM

## 2017-07-10 DIAGNOSIS — C22.1 CHOLANGIOCARCINOMA (H): Primary | ICD-10-CM

## 2017-07-10 PROCEDURE — 99214 OFFICE O/P EST MOD 30 MIN: CPT | Mod: ZP | Performed by: INTERNAL MEDICINE

## 2017-07-10 PROCEDURE — 99212 OFFICE O/P EST SF 10 MIN: CPT | Mod: ZF

## 2017-07-10 ASSESSMENT — ENCOUNTER SYMPTOMS
WEIGHT GAIN: 0
RESPIRATORY PAIN: 0
COUGH: 0
NECK MASS: 0
POLYPHAGIA: 0
CONSTIPATION: 0
EXTREMITY NUMBNESS: 0
INCREASED ENERGY: 0
DECREASED CONCENTRATION: 0
ORTHOPNEA: 0
DYSPNEA ON EXERTION: 0
MUSCLE CRAMPS: 0
DISTURBANCES IN COORDINATION: 0
INSOMNIA: 0
DECREASED APPETITE: 0
LOSS OF CONSCIOUSNESS: 0
EXERCISE INTOLERANCE: 0
HOARSE VOICE: 0
SKIN CHANGES: 0
FLANK PAIN: 0
FATIGUE: 0
DOUBLE VISION: 0
ARTHRALGIAS: 0
NUMBNESS: 0
LEG SWELLING: 0
DIFFICULTY URINATING: 0
SORE THROAT: 0
MEMORY LOSS: 0
TREMORS: 0
SHORTNESS OF BREATH: 0
JOINT SWELLING: 0
NECK PAIN: 0
SNORES LOUDLY: 0
BLOOD IN STOOL: 0
FEVER: 0
EYE REDNESS: 0
DIZZINESS: 0
TROUBLE SWALLOWING: 0
WEAKNESS: 0
DEPRESSION: 0
MYALGIAS: 0
PANIC: 0
SWOLLEN GLANDS: 0
COUGH DISTURBING SLEEP: 0
PALPITATIONS: 0
TASTE DISTURBANCE: 0
DIARRHEA: 0
EYE IRRITATION: 0
SYNCOPE: 0
BLOATING: 0
LIGHT-HEADEDNESS: 0
SPEECH CHANGE: 0
JAUNDICE: 0
RECTAL PAIN: 0
POOR WOUND HEALING: 0
DYSURIA: 0
BOWEL INCONTINENCE: 0
STIFFNESS: 0
SPUTUM PRODUCTION: 0
RECTAL BLEEDING: 0
MUSCLE WEAKNESS: 0
WEIGHT LOSS: 0
HYPERTENSION: 0
POSTURAL DYSPNEA: 0
NAUSEA: 0
SMELL DISTURBANCE: 0
SLEEP DISTURBANCES DUE TO BREATHING: 0
EYE WATERING: 0
EYE PAIN: 0
CLAUDICATION: 0
VOMITING: 0
ABDOMINAL PAIN: 0
SINUS PAIN: 0
HEARTBURN: 0
POLYDIPSIA: 0
WHEEZING: 0
HEMATURIA: 0
CHILLS: 0
SEIZURES: 0
BRUISES/BLEEDS EASILY: 0
HYPOTENSION: 0
HEADACHES: 0
NIGHT SWEATS: 0
HEMOPTYSIS: 0
ALTERED TEMPERATURE REGULATION: 0
BACK PAIN: 0
TACHYCARDIA: 0
LEG PAIN: 0
NAIL CHANGES: 0
PARALYSIS: 0
NERVOUS/ANXIOUS: 0
SINUS CONGESTION: 0
TINGLING: 0
HALLUCINATIONS: 0

## 2017-07-10 ASSESSMENT — PAIN SCALES - GENERAL: PAINLEVEL: NO PAIN (0)

## 2017-07-10 NOTE — PROGRESS NOTES
Girish Chauhan is here today in follow-up of clinical carcinoma.    Mr. Chauhan had resection of stage IV a disease in March 2016 with a left hepatectomy. He is here today for planned follow-up for surveillance of recurrence. He tells me since his last visit he has been feeling well with good appetite and energy. He's had no unusual pains. He's had no fevers chills or sweats. He's noted no adenopathy. He denies any urinary tract symptoms.    Review of Systems     Constitutional:  Negative for fever, chills, weight loss, weight gain, fatigue, decreased appetite, night sweats, recent stressors, height gain, height loss, post-operative complications, incisional pain, hallucinations, increased energy, hyperactivity and confused.   HENT:  Negative for ear pain, hearing loss, tinnitus, nosebleeds, trouble swallowing, hoarse voice, mouth sores, sore throat, ear discharge, tooth pain, gum tenderness, taste disturbance, smell disturbance, hearing aid, bleeding gums, dry mouth, sinus pain, sinus congestion and neck mass.    Eyes:  Negative for double vision, pain, redness, eye pain, decreased vision, eye watering, eye bulging, eye dryness, flashing lights, spots, floaters, strabismus, tunnel vision, jaundice and eye irritation.   Respiratory:   Negative for cough, hemoptysis, sputum production, shortness of breath, wheezing, sleep disturbances due to breathing, snores loudly, respiratory pain, dyspnea on exertion, cough disturbing sleep and postural dyspnea.    Cardiovascular:  Negative for chest pain, dyspnea on exertion, palpitations, orthopnea, claudication, leg swelling, fingers/toes turn blue, hypertension, hypotension, syncope, history of heart murmur, chest pain on exertion, chest pain at rest, pacemaker, few scattered varicosities, leg pain, sleep disturbances due to breathing, tachycardia, light-headedness, exercise intolerance and edema.   Gastrointestinal:  Negative for heartburn, nausea, vomiting, abdominal pain,  diarrhea, constipation, blood in stool, melena, rectal pain, bloating, hemorrhoids, bowel incontinence, jaundice, rectal bleeding, coffee ground emesis and change in stool.   Genitourinary:  Negative for bladder incontinence, dysuria, urgency, hematuria, flank pain, difficulty urinating, nocturia, voiding less frequently, scrotal pain, ulcerations, penile discharge, male genitourinary complaint and reduced libido.   Musculoskeletal:  Negative for myalgias, back pain, joint swelling, arthralgias, stiffness, muscle cramps, neck pain, bone pain, muscle weakness and fracture.   Skin:  Negative for nail changes, itching, poor wound healing, rash, hair changes, skin changes, acne, warts, poor wound healing, scarring, flaky skin, Raynaud's phenomenon, sensitivity to sunlight and skin thickening.   Neurological:  Negative for dizziness, tingling, tremors, speech change, seizures, loss of consciousness, weakness, light-headedness, numbness, headaches, disturbances in coordination, extremity numbness, memory loss, difficulty walking and paralysis.   Endo/Heme:  Negative for anemia, swollen glands and bruises/bleeds easily.   Psychiatric/Behavioral:  Negative for depression, hallucinations, memory loss, decreased concentration, mood swings and panic attacks.    Endocrine:  Negative for altered temperature regulation, polyphagia, polydipsia, unwanted hair growth and change in facial hair.    On physical exam today Mr. Chauhan has unremarkable vital signs and appears well.  He has no icterus and no visible lesion in the oropharynx.  His lungs are clear to auscultation without dullness to percussion.  His heart rate and rhythm are regular without audible murmur or gallop.  His abdomen is soft and nontender without palpable mass organomegaly or demonstrable ascites.  He has no peripheral edema. There's no cyanosis or clubbing of his digits.   His speech is fluent his cranial nerves are grossly intact and he is in unremarkable gait  and station.    I reviewed with Mr. Chauhan and his wife his CT scan and lab work he has normal electrolytes renal function liver enzymes blood counts and CEA-19 9 level. His CT scan shows no evidence of recurrence of his disease with changes of his left hepatectomy. An incidental finding on the CT is an area of nodular enhancement in the superior posterior bladder as well as in the right seminal vesicle.    Assessment/plan plan.    #1 resected stage IV a cholangiocarcinoma the patient is now a little bit over a year out without evidence of recurrence we will follow-up with him in another 3 months with repeat CT scan and labs.    #2 incidental bladder mass on CT scan. I reviewed this with the patient and his wife we will make a referral to urology within the next couple of weeks for further evaluation.     #3 history of melanoma.

## 2017-07-10 NOTE — LETTER
7/10/2017      RE: Girish Chauhan  [address] [facility] [address]       Girish Chauhan is here today in follow-up of clinical carcinoma.    Mr. Chauhan had resection of stage IV a disease in March 2016 with a left hepatectomy. He is here today for planned follow-up for surveillance of recurrence. He tells me since his last visit he has been feeling well with good appetite and energy. He's had no unusual pains. He's had no fevers chills or sweats. He's noted no adenopathy. He denies any urinary tract symptoms.    Review of Systems     Constitutional:  Negative for fever, chills, weight loss, weight gain, fatigue, decreased appetite, night sweats, recent stressors, height gain, height loss, post-operative complications, incisional pain, hallucinations, increased energy, hyperactivity and confused.   HENT:  Negative for ear pain, hearing loss, tinnitus, nosebleeds, trouble swallowing, hoarse voice, mouth sores, sore throat, ear discharge, tooth pain, gum tenderness, taste disturbance, smell disturbance, hearing aid, bleeding gums, dry mouth, sinus pain, sinus congestion and neck mass.    Eyes:  Negative for double vision, pain, redness, eye pain, decreased vision, eye watering, eye bulging, eye dryness, flashing lights, spots, floaters, strabismus, tunnel vision, jaundice and eye irritation.   Respiratory:   Negative for cough, hemoptysis, sputum production, shortness of breath, wheezing, sleep disturbances due to breathing, snores loudly, respiratory pain, dyspnea on exertion, cough disturbing sleep and postural dyspnea.    Cardiovascular:  Negative for chest pain, dyspnea on exertion, palpitations, orthopnea, claudication, leg swelling, fingers/toes turn blue, hypertension, hypotension, syncope, history of heart murmur, chest pain on exertion, chest pain at rest, pacemaker, few scattered varicosities, leg pain, sleep disturbances due to breathing, tachycardia, light-headedness, exercise intolerance and  edema.   Gastrointestinal:  Negative for heartburn, nausea, vomiting, abdominal pain, diarrhea, constipation, blood in stool, melena, rectal pain, bloating, hemorrhoids, bowel incontinence, jaundice, rectal bleeding, coffee ground emesis and change in stool.   Genitourinary:  Negative for bladder incontinence, dysuria, urgency, hematuria, flank pain, difficulty urinating, nocturia, voiding less frequently, scrotal pain, ulcerations, penile discharge, male genitourinary complaint and reduced libido.   Musculoskeletal:  Negative for myalgias, back pain, joint swelling, arthralgias, stiffness, muscle cramps, neck pain, bone pain, muscle weakness and fracture.   Skin:  Negative for nail changes, itching, poor wound healing, rash, hair changes, skin changes, acne, warts, poor wound healing, scarring, flaky skin, Raynaud's phenomenon, sensitivity to sunlight and skin thickening.   Neurological:  Negative for dizziness, tingling, tremors, speech change, seizures, loss of consciousness, weakness, light-headedness, numbness, headaches, disturbances in coordination, extremity numbness, memory loss, difficulty walking and paralysis.   Endo/Heme:  Negative for anemia, swollen glands and bruises/bleeds easily.   Psychiatric/Behavioral:  Negative for depression, hallucinations, memory loss, decreased concentration, mood swings and panic attacks.    Endocrine:  Negative for altered temperature regulation, polyphagia, polydipsia, unwanted hair growth and change in facial hair.    On physical exam today Mr. Chauhan has unremarkable vital signs and appears well.  He has no icterus and no visible lesion in the oropharynx.  His lungs are clear to auscultation without dullness to percussion.  His heart rate and rhythm are regular without audible murmur or gallop.  His abdomen is soft and nontender without palpable mass organomegaly or demonstrable ascites.  He has no peripheral edema. There's no cyanosis or clubbing of his digits.   His  speech is fluent his cranial nerves are grossly intact and he is in unremarkable gait and station.    I reviewed with Mr. Chauhan and his wife his CT scan and lab work he has normal electrolytes renal function liver enzymes blood counts and CEA-19 9 level. His CT scan shows no evidence of recurrence of his disease with changes of his left hepatectomy. An incidental finding on the CT is an area of nodular enhancement in the superior posterior bladder as well as in the right seminal vesicle.    Assessment/plan plan.    #1 resected stage IV a cholangiocarcinoma the patient is now a little bit over a year out without evidence of recurrence we will follow-up with him in another 3 months with repeat CT scan and labs.    #2 incidental bladder mass on CT scan. I reviewed this with the patient and his wife we will make a referral to urology within the next couple of weeks for further evaluation.     #3 history of melanoma.    Naresh De Los Santos MD

## 2017-07-10 NOTE — MR AVS SNAPSHOT
After Visit Summary   7/10/2017    Girish Chauhan    MRN: [id number]           Patient Information     Date Of Birth          1955        Visit Information        Provider Department      7/10/2017 8:15 AM Naresh De Los Santos MD [facility]        Today's Diagnoses     Cholangiocarcinoma (H)    -  1    Bladder mass           Follow-ups after your visit        Additional Services     UROLOGY ADULT REFERRAL       Your provider has referred you to: [facility]: [place] [facility] [place] [phone]   [email or web address]    Please be aware that coverage of these services is subject to the terms and limitations of your health insurance plan.  Call member services at your health plan with any benefit or coverage questions.      Please bring the following with you to your appointment:    (1) Any X-Rays, CTs or MRIs which have been performed.  Contact the facility where they were done to arrange for  prior to your scheduled appointment.    (2) List of current medications  (3) This referral request   (4) Any documents/labs given to you for this referral                  Follow-up notes from your care team     Return in about 3 months (around 10/10/2017) for NP Visit with CT and labs.      Your next 10 appointments already scheduled     Jul 26, 2017  1:40 PM CDT   (Arrive by 1:25 PM)   New Patient Visit with Lyric Frey MD   [facility] ([facility])    [address] [place] [address]  4th [facility] [address] [phone]            Oct 06, 2017  7:30 AM CDT   LAB with  LAB   [facility] ([facility])    [address] [place] [id number] [phone]           Patient must bring picture ID.  Patient should be prepared to give a urine specimen  Please do  not eat 10-12 hours before your appointment if you are coming in fasting for labs on lipids, cholesterol, or glucose (sugar).  Pregnant women should follow their Care Team instructions. Water with medications is okay. Do not drink coffee or other fluids.   If you have concerns about taking  your medications, please ask at office or if scheduling via ActX, send a message by clicking on Secure Messaging, Message Your Care Team.            Oct 06, 2017  8:00 AM CDT   (Arrive by 7:45 AM)   CT CHEST ABDOMEN PELVIS W/O & W CONTRAST with UCCT1   [facility] ([facility])    [address] [facility]  1st Floor  [facility] [address] [phone]           Please bring any scans or X-rays taken at other hospitals, if similar tests were done. Also bring a list of your medicines, including vitamins, minerals and over-the-counter drugs. It is safest to leave personal items at home.  Be sure to tell your doctor:   If you have any allergies.   If there s any chance you are pregnant.   If you are breastfeeding.   If you have any special needs.  You may have contrast for this exam. To prepare:   Do not eat or drink for 2 hours before your exam. If you need to take medicine, you may take it with small sips of water. (We may ask you to take liquid medicine as well.)   The day before your exam, drink extra fluids at least six 8-ounce glasses (unless your doctor tells you to restrict your fluids).  Patients over 70 or patients with diabetes or kidney problems:   If you haven t had a blood test (creatinine test) within the last 30 days, go to your clinic or Diagnostic Imaging Department for this test.  If you have diabetes:   If your kidney function is normal, continue taking your metformin (Avandamet, Glucophage, Glucovance, Metaglip) on the day of your exam.   If your kidney function is abnormal, wait 48 hours before restarting this medicine.  You will have oral contrast for this exam:   You  will drink the contrast at home. Get this from your clinic or Diagnostic Imaging Department. Please follow the directions given.  Please wear loose clothing, such as a sweat suit or jogging clothes. Avoid snaps, zippers and other metal. We may ask you to undress and put on a hospital gown.  If you have any questions, please call the Imaging Department where you will have your exam.            Oct 10, 2017  8:00 AM CDT   (Arrive by 7:45 AM)   Return Visit with YO Jasso CNP   [facility] ([facility])    [address] [address] [phone]              Future tests that were ordered for you today     Open Future Orders        Priority Expected Expires Ordered    CT Chest Abdomen Pelvis w/o & w Contrast Routine 10/10/2017 11/10/2017 7/10/2017    Comprehensive metabolic panel Routine 10/10/2017 11/10/2017 7/10/2017    CBC with platelets differential Routine 10/10/2017 11/10/2017 7/10/2017    Cancer antigen 19-9 Routine 10/10/2017 11/10/2017 7/10/2017            Who to contact     If you have questions or need follow up information about today's clinic visit or your schedule please contact [facility] directly at [phone].  Normal or non-critical lab and imaging results will be communicated to you by Crow[employer], letter or phone within 4 business days after the clinic has received the results. If you do not hear from us within 7 days, please contact the clinic through [employer] or phone. If you have a critical or abnormal lab result, we will notify you by phone as soon as possible.  Submit refill requests through [employer] or call your pharmacy and they will forward the refill request to us. Please allow 3 business days for your refill to be completed.          Additional Information About Your Visit        [employer] Information     [employer] gives you secure access to your electronic health record. If you see a  "primary care provider, you can also send messages to your care team and make appointments. If you have questions, please call your primary care clinic.  If you do not have a primary care provider, please call [phone] and they will assist you.        Care EveryWhere ID     This is your Care EveryWhere ID. This could be used by other organizations to access your [place] medical records  [id number]        Your Vitals Were     Pulse Temperature Respirations Height Pulse Oximetry BMI (Body Mass Index)    52 97.2  F (36.2  C) (Oral) 16 1.803 m (5' 10.98\") 95% 31.88 kg/m2       Blood Pressure from Last 3 Encounters:   07/10/17 131/78   04/10/17 134/88   01/16/17 146/84    Weight from Last 3 Encounters:   07/10/17 103.6 kg (228 lb 8 oz)   04/10/17 103.1 kg (227 lb 4.8 oz)   01/16/17 102 kg (224 lb 12.8 oz)              We Performed the Following     UROLOGY ADULT REFERRAL        Primary Care Provider Office Phone # Fax #    Jim Kaplan [phone]       [address]   [facility] [address]        Equal Access to Services     MARIUSZ MADRIGAL : Hadii aad ku hadasho Soomaali, waaxda luqadaha, qaybta kaalmada adeegyada, waxay idiin hayaan adeeg khalex labertha ah. So [facility] [phone].    ATENCIÓN: Si habla español, tiene a flores disposición servicios gratuitos de asistencia lingüística. Llame al [phone].    We comply with applicable federal civil rights laws and [place] laws. We do not discriminate on the basis of race, color, national origin, age, disability sex, sexual orientation or gender identity.            Thank you!     Thank you for choosing [facility]  for your care. Our goal is always to provide you with excellent care. Hearing back from our patients is one way we can continue to improve our services. Please take a few minutes to complete the written survey that you may receive in the mail after your visit with us. Thank you!      "        Your Updated Medication List - Protect others around you: Learn how to safely use, store and throw away your medicines at www.disposemymeds.org.          This list is accurate as of: 7/10/17  9:17 AM.  Always use your most recent med list.                   Brand Name Dispense Instructions for use Diagnosis    aspirin 81 MG tablet           atorvastatin 40 MG tablet    LIPITOR     TAKE 1 TABLET BY MOUTH DAILY        COLCRYS PO      Take 0.6 mg by mouth as needed for moderate pain        ELIQUIS PO      Take 5 mg by mouth 2 times daily        metoprolol 50 MG tablet    LOPRESSOR     50 mg 2 times daily        multivitamin, therapeutic with minerals Tabs tablet     30 tablet    Take 1 tablet by mouth daily    Mass of bile duct       NITROSTAT SL      Place 0.4 mg under the tongue every 5 minutes as needed for chest pain (Carries medication - has never used)        OMEGA-3 FISH OIL PO      Take 1,000 mg by mouth daily

## 2017-07-10 NOTE — NURSING NOTE
"Oncology Rooming Note    July 10, 2017 8:23 AM   Girihs Chauhan is a 62 year old male who presents for:    Chief Complaint   Patient presents with     Oncology Clinic Visit     Return visit related to Cholangiocarcinoma     Initial Vitals: /78 (BP Location: Left arm, Patient Position: Sitting, Cuff Size: Adult Large)  Pulse 52  Temp 97.2  F (36.2  C) (Oral)  Resp 16  Ht 1.803 m (5' 10.98\")  Wt 103.6 kg (228 lb 8 oz)  SpO2 95%  BMI 31.88 kg/m2 Estimated body mass index is 31.88 kg/(m^2) as calculated from the following:    Height as of this encounter: 1.803 m (5' 10.98\").    Weight as of this encounter: 103.6 kg (228 lb 8 oz). Body surface area is 2.28 meters squared.  No Pain (0) Comment: Data Unavailable   No LMP for male patient.  Allergies reviewed: Yes  Medications reviewed: Yes    Medications: Medication refills not needed today.  Pharmacy name entered into [employer] labs: [facility] [address] [place] [address] [address] AT [facility]    Clinical concerns: No new concerns.     10 minutes for nursing intake (face to face time)     Ashwini Bafrield LPN            "

## 2017-07-13 ENCOUNTER — TRANSFERRED RECORDS (OUTPATIENT)
Dept: HEALTH INFORMATION MANAGEMENT | Facility: CLINIC | Age: 62
End: 2017-07-13

## 2017-10-06 DIAGNOSIS — C22.1 CHOLANGIOCARCINOMA (H): ICD-10-CM

## 2017-10-06 LAB
ALBUMIN SERPL-MCNC: 3.8 G/DL (ref 3.4–5)
ALP SERPL-CCNC: 96 U/L (ref 40–150)
ALT SERPL W P-5'-P-CCNC: 34 U/L (ref 0–70)
ANION GAP SERPL CALCULATED.3IONS-SCNC: 4 MMOL/L (ref 3–14)
AST SERPL W P-5'-P-CCNC: 21 U/L (ref 0–45)
BASOPHILS # BLD AUTO: 0 10E9/L (ref 0–0.2)
BASOPHILS NFR BLD AUTO: 0.3 %
BILIRUB SERPL-MCNC: 1 MG/DL (ref 0.2–1.3)
BUN SERPL-MCNC: 16 MG/DL (ref 7–30)
CALCIUM SERPL-MCNC: 8.8 MG/DL (ref 8.5–10.1)
CHLORIDE SERPL-SCNC: 107 MMOL/L (ref 94–109)
CO2 SERPL-SCNC: 28 MMOL/L (ref 20–32)
CREAT SERPL-MCNC: 0.87 MG/DL (ref 0.66–1.25)
DIFFERENTIAL METHOD BLD: NORMAL
EOSINOPHIL # BLD AUTO: 0.1 10E9/L (ref 0–0.7)
EOSINOPHIL NFR BLD AUTO: 1.4 %
ERYTHROCYTE [DISTWIDTH] IN BLOOD BY AUTOMATED COUNT: 13.5 % (ref 10–15)
GFR SERPL CREATININE-BSD FRML MDRD: 89 ML/MIN/1.7M2
GLUCOSE SERPL-MCNC: 121 MG/DL (ref 70–99)
HCT VFR BLD AUTO: 45 % (ref 40–53)
HGB BLD-MCNC: 14.9 G/DL (ref 13.3–17.7)
IMM GRANULOCYTES # BLD: 0 10E9/L (ref 0–0.4)
IMM GRANULOCYTES NFR BLD: 0.2 %
LYMPHOCYTES # BLD AUTO: 1.9 10E9/L (ref 0.8–5.3)
LYMPHOCYTES NFR BLD AUTO: 28.9 %
MCH RBC QN AUTO: 29.5 PG (ref 26.5–33)
MCHC RBC AUTO-ENTMCNC: 33.1 G/DL (ref 31.5–36.5)
MCV RBC AUTO: 89 FL (ref 78–100)
MONOCYTES # BLD AUTO: 0.8 10E9/L (ref 0–1.3)
MONOCYTES NFR BLD AUTO: 11.7 %
NEUTROPHILS # BLD AUTO: 3.7 10E9/L (ref 1.6–8.3)
NEUTROPHILS NFR BLD AUTO: 57.5 %
NRBC # BLD AUTO: 0 10*3/UL
NRBC BLD AUTO-RTO: 0 /100
PLATELET # BLD AUTO: 186 10E9/L (ref 150–450)
POTASSIUM SERPL-SCNC: 4.7 MMOL/L (ref 3.4–5.3)
PROT SERPL-MCNC: 7.6 G/DL (ref 6.8–8.8)
RBC # BLD AUTO: 5.05 10E12/L (ref 4.4–5.9)
SODIUM SERPL-SCNC: 139 MMOL/L (ref 133–144)
WBC # BLD AUTO: 6.4 10E9/L (ref 4–11)

## 2017-10-07 LAB — CANCER AG19-9 SERPL-ACNC: 53 U/ML (ref 0–37)

## 2017-10-10 ENCOUNTER — ONCOLOGY VISIT (OUTPATIENT)
Dept: ONCOLOGY | Facility: CLINIC | Age: 62
End: 2017-10-10
Attending: NURSE PRACTITIONER

## 2017-10-10 VITALS
DIASTOLIC BLOOD PRESSURE: 87 MMHG | OXYGEN SATURATION: 96 % | TEMPERATURE: 97.5 F | RESPIRATION RATE: 18 BRPM | HEART RATE: 83 BPM | BODY MASS INDEX: 31.96 KG/M2 | HEIGHT: 71 IN | SYSTOLIC BLOOD PRESSURE: 129 MMHG | WEIGHT: 228.3 LBS

## 2017-10-10 DIAGNOSIS — C22.1 CHOLANGIOCARCINOMA (H): Primary | ICD-10-CM

## 2017-10-10 DIAGNOSIS — N32.89 BLADDER WALL THICKENING: ICD-10-CM

## 2017-10-10 PROCEDURE — 99212 OFFICE O/P EST SF 10 MIN: CPT | Mod: ZF

## 2017-10-10 PROCEDURE — 99213 OFFICE O/P EST LOW 20 MIN: CPT | Mod: ZP | Performed by: NURSE PRACTITIONER

## 2017-10-10 RX ORDER — VALACYCLOVIR HYDROCHLORIDE 1 G/1
1 TABLET, FILM COATED ORAL 2 TIMES DAILY
COMMUNITY
Start: 2017-10-06 | End: 2017-12-07

## 2017-10-10 ASSESSMENT — PAIN SCALES - GENERAL: PAINLEVEL: NO PAIN (0)

## 2017-10-10 NOTE — PROGRESS NOTES
Reason for Visit: seen in follow-up of resected stage IV cholangiocarcinoma.    Oncology HPI: Girish Chauhan is a 62 year old man who developed RUQ pain in Dec 2015. He had progressive pain and weight loss. He had RUQ US imaging showing steatosis, then MRCP imaging which showed an ill defined mass at the confluence of the right and left hepatic ducts. He underwent resection in March 2016. Margins were negative and no lymph nodes were involved. Perineural and lymphovascular invasion was noted. He opted not to pursue adjuvant chemotherapy and has been observed without recurrence.    Interval history: Girish is here with his wife for routine surveillance today. He has been feeling well. Has intermittent a-fib palpitations, usually if eating too late at night. Had a cardioversion this spring, but again has a-fib. Is on Eliquis and will have f/u with cardiology. Saw urology at [facility] in July and had a cystoscopy that was unrevealing. Plans to have f/u again in a couple weeks. No hematuria, dysuria, frequency, urgency. No cough, mild SOB when having palpitations. No CP. No dizziness. No headaches, vision or hearing changes. Recently had dermatology f/u and was noted to have shingles over the left eyebrow. This is healing now.    Current Outpatient Prescriptions   Medication Sig Dispense Refill     valACYclovir (VALTREX) 1000 mg tablet Take 1 g by mouth 2 times daily       Apixaban (ELIQUIS PO) Take 5 mg by mouth 2 times daily       metoprolol (LOPRESSOR) 50 MG tablet 50 mg 2 times daily       atorvastatin (LIPITOR) 40 MG tablet TAKE 1 TABLET BY MOUTH DAILY       multivitamin, therapeutic with minerals (MULTI-VITAMIN) TABS Take 1 tablet by mouth daily 30 tablet 0     Omega-3 Fatty Acids (OMEGA-3 FISH OIL PO) Take 1,000 mg by mouth daily        Nitroglycerin (NITROSTAT SL) Place 0.4 mg under the tongue every 5 minutes as needed for chest pain (Carries medication - has never used)        Colchicine (COLCRYS PO) Take  "0.6 mg by mouth as needed for moderate pain       aspirin 81 MG tablet           No Known Allergies      Exam: alert, appears well.  Blood pressure 129/87, pulse 83, temperature 97.5  F (36.4  C), temperature source Oral, resp. rate 18, height 1.803 m (5' 10.98\"), weight 103.6 kg (228 lb 4.8 oz), SpO2 96 %.  Wt Readings from Last 4 Encounters:   10/10/17 103.6 kg (228 lb 4.8 oz)   07/10/17 103.6 kg (228 lb 8 oz)   04/10/17 103.1 kg (227 lb 4.8 oz)   01/16/17 102 kg (224 lb 12.8 oz)     Oropharynx is moist, no focal lesion. No icterus. Neck supple and without adenopathy. Lungs:CTA. Heart: irregular. Abdomen: soft, nontender, BS active. No masses or organomegaly. Extremities: warm, no edema. Speech clear. CN wnl. Gait/station wnl. No neck, supraclavicular, axillae nodes.    Labs: Results for DAGO MCGARRY (MRN [id number]) as of 10/10/2017 08:04   Ref. Range 10/6/2017 07:45   Sodium Latest Ref Range: 133 - 144 mmol/L 139   Potassium Latest Ref Range: 3.4 - 5.3 mmol/L 4.7   Chloride Latest Ref Range: 94 - 109 mmol/L 107   Carbon Dioxide Latest Ref Range: 20 - 32 mmol/L 28   Urea Nitrogen Latest Ref Range: 7 - 30 mg/dL 16   Creatinine Latest Ref Range: 0.66 - 1.25 mg/dL 0.87   GFR Estimate Latest Ref Range: >60 mL/min/1.7m2 89   GFR Estimate If Black Latest Ref Range: >60 mL/min/1.7m2 >90   Calcium Latest Ref Range: 8.5 - 10.1 mg/dL 8.8   Anion Gap Latest Ref Range: 3 - 14 mmol/L 4   Albumin Latest Ref Range: 3.4 - 5.0 g/dL 3.8   Protein Total Latest Ref Range: 6.8 - 8.8 g/dL 7.6   Bilirubin Total Latest Ref Range: 0.2 - 1.3 mg/dL 1.0   Alkaline Phosphatase Latest Ref Range: 40 - 150 U/L 96   ALT Latest Ref Range: 0 - 70 U/L 34   AST Latest Ref Range: 0 - 45 U/L 21   Cancer Antigen 19-9 Latest Ref Range: 0 - 37 U/mL 53 (H)   Glucose Latest Ref Range: 70 - 99 mg/dL 121 (H)   WBC Latest Ref Range: 4.0 - 11.0 10e9/L 6.4   Hemoglobin Latest Ref Range: 13.3 - 17.7 g/dL 14.9   Hematocrit Latest Ref Range: 40.0 - 53.0 % 45.0 "   Platelet Count Latest Ref Range: 150 - 450 10e9/L 186   RBC Count Latest Ref Range: 4.4 - 5.9 10e12/L 5.05   MCV Latest Ref Range: 78 - 100 fl 89   MCH Latest Ref Range: 26.5 - 33.0 pg 29.5   MCHC Latest Ref Range: 31.5 - 36.5 g/dL 33.1   RDW Latest Ref Range: 10.0 - 15.0 % 13.5   Diff Method Unknown Automated Method   % Neutrophils Latest Units: % 57.5   % Lymphocytes Latest Units: % 28.9   % Monocytes Latest Units: % 11.7   % Eosinophils Latest Units: % 1.4   % Basophils Latest Units: % 0.3   % Immature Granulocytes Latest Units: % 0.2   Nucleated RBCs Latest Ref Range: 0 /100 0   Absolute Neutrophil Latest Ref Range: 1.6 - 8.3 10e9/L 3.7   Absolute Lymphocytes Latest Ref Range: 0.8 - 5.3 10e9/L 1.9   Absolute Monocytes Latest Ref Range: 0.0 - 1.3 10e9/L 0.8   Absolute Eosinophils Latest Ref Range: 0.0 - 0.7 10e9/L 0.1   Absolute Basophils Latest Ref Range: 0.0 - 0.2 10e9/L 0.0   Abs Immature Granulocytes Latest Ref Range: 0 - 0.4 10e9/L 0.0   Absolute Nucleated RBC Unknown 0.0       Imaging: Examination:  CT CHEST/ABDOMEN/PELVIS W CONTRAST, 10/6/2017 8:31 AM      Comparison: 7/7/2017     History: Intrahepatic bile duct carcinoma     Technique: Volumetric helical acquisition of CT images from the  thoracic inlet to the symphysis pubis after the uncomplicated  administration of Isovue 370 135cc. Late arterial and delayed phase  images are obtained. Coronal and sagittal images and axial MIP images  were reconstructed from the source data.     Findings:  Chest:  The visualized thyroid is unremarkable. Heart size is normal. The  great vessels are normal in caliber and appearance. Coronary  calcifications and/or stent. There is no pericardial effusion. No  mediastinal, hilar, or axillary lymphadenopathy.  The central  tracheobronchial tree is patent. There is no pulmonary consolidation,  pleural effusion, or pneumothorax.      Two perifissural/intrafissural nodules measuring 2 and 3 mm, along the  left major fissure  likely lymph nodes are unchanged from prior.  Similar nodule along the right minor fissure measures less than 4 mm,  stable. No suspicious lung nodules. Unchanged right pericardial  phrenic lymph node, not enlarged by size criteria.     Abdomen/pelvis:  Surgical changes of left hepatectomy with hepaticojejunostomy.  Subcentimeter nonenhancing cyst inferiorly in the right hepatic lobe.  No focal liver lesion or abnormal enhancement.     The bladder is partially distended with increased superior and  posterolateral enhancing thickening from prior. Hyperenhancing mural  based nodularity posteriorly and superiorly in the urinary bladder,  measuring up to 2.7 cm. These have significantly increased in size  since CT 7/7/2017.     Tiny subcapsular presumed splenic cyst or hemangioma, stable from  prior. Otherwise, the spleen, adrenal glands, and pancreas are  unremarkable. Cholecystectomy. Large partially exophytic right renal  cortical and renal sinus cysts measuring up to 6 x 9 cm are stable  from prior. No nephrolithiasis, hydronephrosis, or hydroureter. Normal  caliber large and small bowel. Scattered colonic diverticulosis  without evidence of diverticulitis.  Mild adjacent fat stranding is  unchanged. The prostate is not enlarged. No pelvic masses. Major  abdominal vasculature is patent and within normal limits for caliber.  A few prominent scattered intra-abdominal lymph nodes are not enlarged  by short axis criteria and stable from prior.     Bones:  No acute osseus abnormality or suspicious bony lesion. Stable  degenerative changes in the spine. Prior right rib fractures.  Subcutaneous soft tissue nodule with internal fluid attenuation and no  worrisome enhancement along the superior posterior left chest wall  (series 7, image 50), likely represents a sebaceous cyst or epidermal  inclusion cyst. This is stable from prior.         Impression:   1. When compared to 7/7/2017, there is increase in size of  several  enhancing nodules along the posterior superior urinary bladder wall.  This is now more concerning for possible urothelial malignancy. Review  of the electronic medical record reveals that this finding has been  discussed with the patient and that recommended urologic consultation  is forthcoming.  2. Otherwise, no convincing evidence of locally recurrent or  metastatic cholangiocarcinoma in the chest, abdomen, or pelvis.     [Recommend Follow Up: Increased bladder wall enhancement concerning  for malignancy]     This report will be copied to the [facility] to ensure a  provider acknowledges the finding.      I have personally reviewed the examination and initial interpretation  and I agree with the findings.     JAMA HDEZ MD    Impression/plan:   1. Resected stage IV cholangiocarcinoma  -no evidence of recurrence. The CA 19-9 is rising, but that could be related to the bladder findings as well. Recommended urology f/u as below  -will return in 3 months for ongoing surveillance of the cholangiocarcinoma with a CT CAP and labs.     2. Increasing bladder nodules concerning for a urothelial malignancy  -has been followed by Dr. Nicholson at [facility]. Had a cystoscopy and is planned to be evaluated again in 2 weeks    3. Resected melanoma/SCC  -sees dermatology every 6 months     4. A-fib, s/p cardioversion in May 2017, hx of aortic aneurysm  -a-fib has recurred, now on eliquis. Has ongoing f/u with cardiology.    Answers for HPI/ROS submitted by the patient on 10/4/2017   General Symptoms: No  Skin Symptoms: No  HENT Symptoms: No  EYE SYMPTOMS: No  HEART SYMPTOMS: No  LUNG SYMPTOMS: No  INTESTINAL SYMPTOMS: No  URINARY SYMPTOMS: No  REPRODUCTIVE SYMPTOMS: No  SKELETAL SYMPTOMS: No  BLOOD SYMPTOMS: No  NERVOUS SYSTEM SYMPTOMS: No  MENTAL HEALTH SYMPTOMS: No

## 2017-10-10 NOTE — LETTER
10/10/2017       RE: Girish Chauhan  [address] [id number]     Dear Colleague,    Thank you for referring your patient, Girish Chauhan, to the [facility]. Please see a copy of my visit note below.    Reason for Visit: seen in follow-up of resected stage IV cholangiocarcinoma.    Oncology HPI: Girish Chauhan is a 62 year old man who developed RUQ pain in Dec 2015. He had progressive pain and weight loss. He had RUQ US imaging showing steatosis, then MRCP imaging which showed an ill defined mass at the confluence of the right and left hepatic ducts. He underwent resection in March 2016. Margins were negative and no lymph nodes were involved. Perineural and lymphovascular invasion was noted. He opted not to pursue adjuvant chemotherapy and has been observed without recurrence.    Interval history: Girish is here with his wife for routine surveillance today. He has been feeling well. Has intermittent a-fib palpitations, usually if eating too late at night. Had a cardioversion this spring, but again has a-fib. Is on Eliquis and will have f/u with cardiology. Saw urology at [facility] in July and had a cystoscopy that was unrevealing. Plans to have f/u again in a couple weeks. No hematuria, dysuria, frequency, urgency. No cough, mild SOB when having palpitations. No CP. No dizziness. No headaches, vision or hearing changes. Recently had dermatology f/u and was noted to have shingles over the left eyebrow. This is healing now.    Current Outpatient Prescriptions   Medication Sig Dispense Refill     valACYclovir (VALTREX) 1000 mg tablet Take 1 g by mouth 2 times daily       Apixaban (ELIQUIS PO) Take 5 mg by mouth 2 times daily       metoprolol (LOPRESSOR) 50 MG tablet 50 mg 2 times daily       atorvastatin (LIPITOR) 40 MG tablet TAKE 1 TABLET BY MOUTH DAILY       multivitamin, therapeutic with minerals (MULTI-VITAMIN) TABS Take 1 tablet by mouth daily 30 tablet 0     Omega-3  "Fatty Acids (OMEGA-3 FISH OIL PO) Take 1,000 mg by mouth daily        Nitroglycerin (NITROSTAT SL) Place 0.4 mg under the tongue every 5 minutes as needed for chest pain (Carries medication - has never used)        Colchicine (COLCRYS PO) Take 0.6 mg by mouth as needed for moderate pain       aspirin 81 MG tablet           No Known Allergies      Exam: alert, appears well.  Blood pressure 129/87, pulse 83, temperature 97.5  F (36.4  C), temperature source Oral, resp. rate 18, height 1.803 m (5' 10.98\"), weight 103.6 kg (228 lb 4.8 oz), SpO2 96 %.  Wt Readings from Last 4 Encounters:   10/10/17 103.6 kg (228 lb 4.8 oz)   07/10/17 103.6 kg (228 lb 8 oz)   04/10/17 103.1 kg (227 lb 4.8 oz)   01/16/17 102 kg (224 lb 12.8 oz)     Oropharynx is moist, no focal lesion. No icterus. Neck supple and without adenopathy. Lungs:CTA. Heart: irregular. Abdomen: soft, nontender, BS active. No masses or organomegaly. Extremities: warm, no edema. Speech clear. CN wnl. Gait/station wnl. No neck, supraclavicular, axillae nodes.    Labs: Results for DAGO MCGARRY (MRN [id number]) as of 10/10/2017 08:04   Ref. Range 10/6/2017 07:45   Sodium Latest Ref Range: 133 - 144 mmol/L 139   Potassium Latest Ref Range: 3.4 - 5.3 mmol/L 4.7   Chloride Latest Ref Range: 94 - 109 mmol/L 107   Carbon Dioxide Latest Ref Range: 20 - 32 mmol/L 28   Urea Nitrogen Latest Ref Range: 7 - 30 mg/dL 16   Creatinine Latest Ref Range: 0.66 - 1.25 mg/dL 0.87   GFR Estimate Latest Ref Range: >60 mL/min/1.7m2 89   GFR Estimate If Black Latest Ref Range: >60 mL/min/1.7m2 >90   Calcium Latest Ref Range: 8.5 - 10.1 mg/dL 8.8   Anion Gap Latest Ref Range: 3 - 14 mmol/L 4   Albumin Latest Ref Range: 3.4 - 5.0 g/dL 3.8   Protein Total Latest Ref Range: 6.8 - 8.8 g/dL 7.6   Bilirubin Total Latest Ref Range: 0.2 - 1.3 mg/dL 1.0   Alkaline Phosphatase Latest Ref Range: 40 - 150 U/L 96   ALT Latest Ref Range: 0 - 70 U/L 34   AST Latest Ref Range: 0 - 45 U/L 21   Cancer " Antigen 19-9 Latest Ref Range: 0 - 37 U/mL 53 (H)   Glucose Latest Ref Range: 70 - 99 mg/dL 121 (H)   WBC Latest Ref Range: 4.0 - 11.0 10e9/L 6.4   Hemoglobin Latest Ref Range: 13.3 - 17.7 g/dL 14.9   Hematocrit Latest Ref Range: 40.0 - 53.0 % 45.0   Platelet Count Latest Ref Range: 150 - 450 10e9/L 186   RBC Count Latest Ref Range: 4.4 - 5.9 10e12/L 5.05   MCV Latest Ref Range: 78 - 100 fl 89   MCH Latest Ref Range: 26.5 - 33.0 pg 29.5   MCHC Latest Ref Range: 31.5 - 36.5 g/dL 33.1   RDW Latest Ref Range: 10.0 - 15.0 % 13.5   Diff Method Unknown Automated Method   % Neutrophils Latest Units: % 57.5   % Lymphocytes Latest Units: % 28.9   % Monocytes Latest Units: % 11.7   % Eosinophils Latest Units: % 1.4   % Basophils Latest Units: % 0.3   % Immature Granulocytes Latest Units: % 0.2   Nucleated RBCs Latest Ref Range: 0 /100 0   Absolute Neutrophil Latest Ref Range: 1.6 - 8.3 10e9/L 3.7   Absolute Lymphocytes Latest Ref Range: 0.8 - 5.3 10e9/L 1.9   Absolute Monocytes Latest Ref Range: 0.0 - 1.3 10e9/L 0.8   Absolute Eosinophils Latest Ref Range: 0.0 - 0.7 10e9/L 0.1   Absolute Basophils Latest Ref Range: 0.0 - 0.2 10e9/L 0.0   Abs Immature Granulocytes Latest Ref Range: 0 - 0.4 10e9/L 0.0   Absolute Nucleated RBC Unknown 0.0       Imaging: Examination:  CT CHEST/ABDOMEN/PELVIS W CONTRAST, 10/6/2017 8:31 AM      Comparison: 7/7/2017     History: Intrahepatic bile duct carcinoma     Technique: Volumetric helical acquisition of CT images from the  thoracic inlet to the symphysis pubis after the uncomplicated  administration of Isovue 370 135cc. Late arterial and delayed phase  images are obtained. Coronal and sagittal images and axial MIP images  were reconstructed from the source data.     Findings:  Chest:  The visualized thyroid is unremarkable. Heart size is normal. The  great vessels are normal in caliber and appearance. Coronary  calcifications and/or stent. There is no pericardial effusion. No  mediastinal,  hilar, or axillary lymphadenopathy.  The central  tracheobronchial tree is patent. There is no pulmonary consolidation,  pleural effusion, or pneumothorax.      Two perifissural/intrafissural nodules measuring 2 and 3 mm, along the  left major fissure likely lymph nodes are unchanged from prior.  Similar nodule along the right minor fissure measures less than 4 mm,  stable. No suspicious lung nodules. Unchanged right pericardial  phrenic lymph node, not enlarged by size criteria.     Abdomen/pelvis:  Surgical changes of left hepatectomy with hepaticojejunostomy.  Subcentimeter nonenhancing cyst inferiorly in the right hepatic lobe.  No focal liver lesion or abnormal enhancement.     The bladder is partially distended with increased superior and  posterolateral enhancing thickening from prior. Hyperenhancing mural  based nodularity posteriorly and superiorly in the urinary bladder,  measuring up to 2.7 cm. These have significantly increased in size  since CT 7/7/2017.     Tiny subcapsular presumed splenic cyst or hemangioma, stable from  prior. Otherwise, the spleen, adrenal glands, and pancreas are  unremarkable. Cholecystectomy. Large partially exophytic right renal  cortical and renal sinus cysts measuring up to 6 x 9 cm are stable  from prior. No nephrolithiasis, hydronephrosis, or hydroureter. Normal  caliber large and small bowel. Scattered colonic diverticulosis  without evidence of diverticulitis.  Mild adjacent fat stranding is  unchanged. The prostate is not enlarged. No pelvic masses. Major  abdominal vasculature is patent and within normal limits for caliber.  A few prominent scattered intra-abdominal lymph nodes are not enlarged  by short axis criteria and stable from prior.     Bones:  No acute osseus abnormality or suspicious bony lesion. Stable  degenerative changes in the spine. Prior right rib fractures.  Subcutaneous soft tissue nodule with internal fluid attenuation and no  worrisome enhancement  along the superior posterior left chest wall  (series 7, image 50), likely represents a sebaceous cyst or epidermal  inclusion cyst. This is stable from prior.         Impression:   1. When compared to 7/7/2017, there is increase in size of several  enhancing nodules along the posterior superior urinary bladder wall.  This is now more concerning for possible urothelial malignancy. Review  of the electronic medical record reveals that this finding has been  discussed with the patient and that recommended urologic consultation  is forthcoming.  2. Otherwise, no convincing evidence of locally recurrent or  metastatic cholangiocarcinoma in the chest, abdomen, or pelvis.     [Recommend Follow Up: Increased bladder wall enhancement concerning  for malignancy]     This report will be copied to the [facility] to ensure a  provider acknowledges the finding.      I have personally reviewed the examination and initial interpretation  and I agree with the findings.     JAMA HDZE MD    Impression/plan:   1. Resected stage IV cholangiocarcinoma  -no evidence of recurrence. The CA 19-9 is rising, but that could be related to the bladder findings as well. Recommended urology f/u as below  -will return in 3 months for ongoing surveillance of the cholangiocarcinoma with a CT CAP and labs.     2. Increasing bladder nodules concerning for a urothelial malignancy  -has been followed by Dr. Nicholson at [facility]. Had a cystoscopy and is planned to be evaluated again in 2 weeks    3. Resected melanoma/SCC  -sees dermatology every 6 months     4. A-fib, s/p cardioversion in May 2017, hx of aortic aneurysm  -a-fib has recurred, now on eliquis. Has ongoing f/u with cardiology.      Again, thank you for allowing me to participate in the care of your patient.      Sincerely,    YO Grubbs CNP

## 2017-10-10 NOTE — NURSING NOTE
"Oncology Rooming Note    October 10, 2017 8:08 AM   Girish Chauhan is a 62 year old male who presents for:    Chief Complaint   Patient presents with     Oncology Clinic Visit     Hilar Cholangiocarcinoma, 3 Mo F/U.     Initial Vitals: /87  Pulse 83  Temp 97.5  F (36.4  C) (Oral)  Resp 18  Ht 1.803 m (5' 10.98\")  Wt 103.6 kg (228 lb 4.8 oz)  SpO2 96%  BMI 31.86 kg/m2 Estimated body mass index is 31.86 kg/(m^2) as calculated from the following:    Height as of this encounter: 1.803 m (5' 10.98\").    Weight as of this encounter: 103.6 kg (228 lb 4.8 oz). Body surface area is 2.28 meters squared.  No Pain (0) Comment: Data Unavailable   No LMP for male patient.  Allergies reviewed: Yes  Medications reviewed: Yes    Medications: Medication refills not needed today.  Pharmacy name entered into [employer]: [facility] [address] [place] [address] [id number] AT [facility]    Clinical concerns: None. Jennifer Jalloh was NOT notified.    7 minutes for nursing intake (face to face time)     Nathaly Haines LPN              "

## 2017-11-02 ENCOUNTER — CARE COORDINATION (OUTPATIENT)
Dept: ONCOLOGY | Facility: CLINIC | Age: 62
End: 2017-11-02

## 2017-11-02 NOTE — PROGRESS NOTES
Called and LMVM for Krishna to see when his bladder biopsy at  will be.  Asked that he sign an ALLISON when there so we can have it sent to our pathologists for review.  Asked for a callback.

## 2017-11-06 ENCOUNTER — TRANSFERRED RECORDS (OUTPATIENT)
Dept: HEALTH INFORMATION MANAGEMENT | Facility: CLINIC | Age: 62
End: 2017-11-06

## 2017-11-09 ENCOUNTER — CARE COORDINATION (OUTPATIENT)
Dept: ONCOLOGY | Facility: CLINIC | Age: 62
End: 2017-11-09

## 2017-11-09 NOTE — PROGRESS NOTES
Spoke to Girish who stated that he had his bladder biopsy done Monday 11/6/17 at [facility].  He stated that he signed ALLISON for the pathology to be sent here for review per .  Let him know I would contact [facility] and see when it will be ready.  Per , he would like further work up to rule out peritoneal mets if the bladder doesn't show anything.  Let him know I would be in touch in terms of follow up with a plan of care.

## 2017-11-16 ENCOUNTER — DOCUMENTATION ONLY (OUTPATIENT)
Dept: ONCOLOGY | Facility: CLINIC | Age: 62
End: 2017-11-16

## 2017-11-17 ENCOUNTER — ONCOLOGY VISIT (OUTPATIENT)
Dept: ONCOLOGY | Facility: CLINIC | Age: 62
End: 2017-11-17
Attending: INTERNAL MEDICINE
Payer: COMMERCIAL

## 2017-11-17 VITALS
HEIGHT: 70 IN | TEMPERATURE: 97.4 F | OXYGEN SATURATION: 96 % | RESPIRATION RATE: 16 BRPM | SYSTOLIC BLOOD PRESSURE: 128 MMHG | DIASTOLIC BLOOD PRESSURE: 92 MMHG | HEART RATE: 68 BPM | WEIGHT: 227.9 LBS | BODY MASS INDEX: 32.63 KG/M2

## 2017-11-17 DIAGNOSIS — C22.1 CHOLANGIOCARCINOMA (H): Primary | ICD-10-CM

## 2017-11-17 PROCEDURE — 99212 OFFICE O/P EST SF 10 MIN: CPT | Mod: ZF

## 2017-11-17 PROCEDURE — 99215 OFFICE O/P EST HI 40 MIN: CPT | Mod: ZP | Performed by: INTERNAL MEDICINE

## 2017-11-17 ASSESSMENT — PAIN SCALES - GENERAL: PAINLEVEL: NO PAIN (0)

## 2017-11-17 NOTE — PROGRESS NOTES
Mr. Girish Chauhan is here today in follow-up of cholangiocarcinoma.    Since our last evaluation he was noted to have a mild increase of his tumor markers in the development of what appeared to be tumors within his bladder. He has now had cystoscopy and biopsy which has come back consistent with metastatic cholangiocarcinoma. He tells me he is feeling great at this point with no real symptoms aside from the anxiety associated with his recurrence. He had a modest amount of hematuria following his bladder biopsies but that is resolved and he doesn't have any other urinary tract symptoms at the moment.    He appears quite well, but I did not formally otherwise examine him today.    I reviewed with the patient and his wife his recent imaging studies and lab work. Other than this small amount of tumor visible within his bladder there is nothing else apparent on his CT scan. His CA-19-9 level has gone up just barely above 50. The rest of his lab work is unremarkable.    Assessment/plan adenocarcinoma within the bladder probably representing recurrence of his cholangiocarcinoma. 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 I'm waiting for his outside biopsy specimens to be sent here for direct comparison to his prior cholangiocarcinoma to confirm his best were able to this in fact represents the same tumor. I explained to him in this setting that this would be considered noncurable disease. I reviewed that standard treatment would be with gemcitabine plus cisplatin, which is usually associated with relatively mild toxicities and would be expected to provide modest survival benefit and a delay the onset of symptoms from his cancer. If we think he truly has only peritoneal disease and might be a consideration to pursue HIPEC and I will be reviewing that at our next tumor conference. We discussed what research studies might be available, and we don't have anything  available here at other institutions might have something available which would generally be the above chemotherapy regimen plus an experimental agent. I explained that the overall strategy would be to treat him for a couple of months and his lungs have good disease control continue with every 2 month evaluations for response. Second line therapy might be appropriate depending on the circumstance, although usually in that setting we will be looking exclusively at research studies.    As he thought he would probably be pursuing the gemcitabine and cisplatin I went over the details of the treatment regimen expected toxicities and rationale for therapy. He first is understanding and would like to proceed with that in a couple weeks once he gets through the Thanksgiving holiday. By then we will had a chance to have his pathology reviewed to confirm the diagnosis and reviewed his case at tumor conference to decide about HIPEC.    He doesn't have any symptomatic management needs at this point, and felt like he was coping quite well. His wife is quite tearful throughout the discussion today did not feel the need for additional support.    Total visit time today was greater than 40 minutes spent on the above discussion.

## 2017-11-17 NOTE — NURSING NOTE
"Oncology Rooming Note    November 17, 2017 10:46 AM   Girish Chauhan is a 62 year old male who presents for:    Chief Complaint   Patient presents with     Oncology Clinic Visit     Return: Cholangiocarcinoma     Initial Vitals: BP (!) 128/92  Pulse 68  Temp 97.4  F (36.3  C) (Oral)  Resp 16  Ht 1.778 m (5' 10\")  Wt 103.4 kg (227 lb 14.4 oz)  SpO2 96%  BMI 32.7 kg/m2 Estimated body mass index is 32.7 kg/(m^2) as calculated from the following:    Height as of this encounter: 1.778 m (5' 10\").    Weight as of this encounter: 103.4 kg (227 lb 14.4 oz). Body surface area is 2.26 meters squared.  No Pain (0) Comment: Data Unavailable   No LMP for male patient.  Allergies reviewed: Yes  Medications reviewed: Yes    Medications: Medication refills not needed today.  Pharm[facility] entered into [facility] [address] [place] [address] [facility] AT [facility]    Clinical concerns: Pt. received flu shot on 10/04/2017.       5 minutes for nursing intake (face to face time)     BRYANT Ferreira      "

## 2017-11-17 NOTE — LETTER
11/17/2017      RE: Girish Chauhan  [address] [facility] [address]       Mr. Girish Chauhan is here today in follow-up of cholangiocarcinoma.    Since our last evaluation he was noted to have a mild increase of his tumor markers in the development of what appeared to be tumors within his bladder. He has now had cystoscopy and biopsy which has come back consistent with metastatic cholangiocarcinoma. He tells me he is feeling great at this point with no real symptoms aside from the anxiety associated with his recurrence. He had a modest amount of hematuria following his bladder biopsies but that is resolved and he doesn't have any other urinary tract symptoms at the moment.    He appears quite well, but I did not formally otherwise examine him today.    I reviewed with the patient and his wife his recent imaging studies and lab work. Other than this small amount of tumor visible within his bladder there is nothing else apparent on his CT scan. His CA-19-9 level has gone up just barely above 50. The rest of his lab work is unremarkable.    Assessment/plan adenocarcinoma within the bladder probably representing recurrence of his cholangiocarcinoma. 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 I'm waiting for his outside biopsy specimens to be sent here for direct comparison to his prior cholangiocarcinoma to confirm his best were able to this in fact represents the same tumor. I explained to him in this setting that this would be considered noncurable disease. I reviewed that standard treatment would be with gemcitabine plus cisplatin, which is usually associated with relatively mild toxicities and would be expected to provide modest survival benefit and a delay the onset of symptoms from his cancer. If we think he truly has only peritoneal disease and might be a consideration to pursue HIPEC and I will be reviewing that at our next tumor conference. We  discussed what research studies might be available, and we don't have anything available here at other institutions might have something available which would generally be the above chemotherapy regimen plus an experimental agent. I explained that the overall strategy would be to treat him for a couple of months and his lungs have good disease control continue with every 2 month evaluations for response. Second line therapy might be appropriate depending on the circumstance, although usually in that setting we will be looking exclusively at research studies.    As he thought he would probably be pursuing the gemcitabine and cisplatin I went over the details of the treatment regimen expected toxicities and rationale for therapy. He first is understanding and would like to proceed with that in a couple weeks once he gets through the Thanksgiving holiday. By then we will had a chance to have his pathology reviewed to confirm the diagnosis and reviewed his case at tumor conference to decide about HIPEC.    He doesn't have any symptomatic management needs at this point, and felt like he was coping quite well. His wife is quite tearful throughout the discussion today did not feel the need for additional support.    Total visit time today was greater than 40 minutes spent on the above discussion    Naresh De Los Santos MD

## 2017-11-17 NOTE — MR AVS SNAPSHOT
After Visit Summary   11/17/2017    Girish Chauhan    MRN: [id number]           Patient Information     Date Of Birth          1955        Visit Information        Provider Department      11/17/2017 10:30 AM Naresh De Los Santos MD [facility]        Today's Diagnoses     Cholangiocarcinoma (H)    -  1       Follow-ups after your visit        Follow-up notes from your care team     Return in about 3 weeks (around 12/5/2017) for NP Visit with CT and labs.      Your next 10 appointments already scheduled     Dec 11, 2017  7:20 AM CST   (Arrive by 7:05 AM)   CT CHEST/ABDOMEN/PELVIS W CONTRAST with UCCT1   [facility] [place] [facility] ([facility])    [address]  1st Floor  [facility] [address] [phone]           Please bring any scans or X-rays taken at other hospitals, if similar tests were done. Also bring a list of your medicines, including vitamins, minerals and over-the-counter drugs. It is safest to leave personal items at home.  Be sure to tell your doctor:   If you have any allergies.   If there s any chance you are pregnant.   If you are breastfeeding.   If you have any special needs.  You may have contrast for this exam. To prepare:   Do not eat or drink for 2 hours before your exam. If you need to take medicine, you may take it with small sips of water. (We may ask you to take liquid medicine as well.)   The day before your exam, drink extra fluids at least six 8-ounce glasses (unless your doctor tells you to restrict your fluids).  Patients over 70 or patients with diabetes or kidney problems:   If you haven t had a blood test (creatinine test) within the last 30 days, go to your clinic or Diagnostic Imaging Department for this test.  If you have diabetes:   If your kidney function is normal, continue taking your metformin (Avandamet, Glucophage, Glucovance, Metaglip) on the day of your exam.   If your kidney function is  abnormal, wait 48 hours before restarting this medicine.  You will have oral contrast for this exam:   You will drink the contrast at home. Get this from your clinic or Diagnostic Imaging Department. Please follow the directions given.  Please wear loose clothing, such as a sweat suit or jogging clothes. Avoid snaps, zippers and other metal. We may ask you to undress and put on a hospital gown.  If you have any questions, please call the Imaging Department where you will have your exam.            Dec 13, 2017  6:30 AM CST   LAB with  LAB   [facility])    [address] [place] [address]   [phone]           Please do not eat 10-12 hours before your appointment if you are coming in fasting for labs on lipids, cholesterol, or glucose (sugar). This does not apply to pregnant women. Water, hot tea and black coffee (with nothing added) are okay. Do not drink other fluids, diet soda or chew gum.            Dec 13, 2017  7:00 AM CST   (Arrive by 6:45 AM)   Return Visit with TRUDY Velazco   [facility] ([facility])    [address] [place] [address]   [phone]            Dec 13, 2017  8:00 AM CST   Infusion 360 with UC ONCOLOGY INFUSION, UC 15 ATC   [facility] ([facility])    [address] [place] [address]   [phone]            Dec 20, 2017  6:30 AM CST   [facility] Lab Draw with  [facility] LAB DRAW   [facility] Lab Draw ([facility])    [address] [place] [address]   [phone]            Dec 20, 2017  7:00 AM CST   Infusion 360 with UC ONCOLOGY INFUSION, UC 11 ATC   [facility])    [address] [place] [address]   [phone]            Jan 03, 2018   6:30 AM CST   [facility] Lab Draw with  [facility] LAB DRAW   [facility] Lab Draw ([facility])    [address]  2nd [facility] [id number] [phone]            Jan 03, 2018  7:00 AM CST   Infusion 360 with UC ONCOLOGY INFUSION   [facility] ([facility])    [address]  [address] [facility] [id number] [phone]              Future tests that were ordered for you today     Open Future Orders        Priority Expected Expires Ordered    CT Chest Abdomen Pelvis w/o & w Contrast Routine 1/29/2018 3/17/2018 11/17/2017    Comprehensive metabolic panel Routine 1/29/2018 3/17/2018 11/17/2017    CBC with platelets differential Routine 1/29/2018 3/17/2018 11/17/2017    Cancer antigen 19-9 Routine 1/29/2018 3/17/2018 11/17/2017    CT Chest Abdomen Pelvis w/o & w Contrast Routine 12/4/2017 12/29/2017 11/17/2017    Comprehensive metabolic panel Routine 12/4/2017 12/29/2017 11/17/2017    CBC with platelets differential Routine 12/4/2017 12/29/2017 11/17/2017    Cancer antigen 19-9 Routine 12/4/2017 12/29/2017 11/17/2017            Who to contact     If you have questions or need follow up information about today's clinic visit or your schedule please contact [facility] directly at [phone].  Normal or non-critical lab and imaging results will be communicated to you by MyChart, letter or phone within 4 business days after the clinic has received the results. If you do not hear from us within 7 days, please contact the clinic through [employer] or phone. If you have a critical or abnormal lab result, we will notify you by phone as soon as possible.  Submit refill requests through [email or web address] or call your pharmacy and they will forward the refill request to us. Please allow 3 business days for your refill to be completed.          Additional Information About Your Visit        [employer] Information      "[employer] gives you secure access to your electronic health record. If you see a primary care provider, you can also send messages to your care team and make appointments. If you have questions, please call your primary care clinic.  If you do not have a primary care provider, please call [phone] and they will assist you.        Care EveryWhere ID     This is your Care EveryWhere ID. This could be used by other organizations to access your [place] medical records  [id number]        Your Vitals Were     Pulse Temperature Respirations Height Pulse Oximetry BMI (Body Mass Index)    68 97.4  F (36.3  C) (Oral) 16 1.778 m (5' 10\") 96% 32.7 kg/m2       Blood Pressure from Last 3 Encounters:   11/17/17 (!) 128/92   10/10/17 129/87   07/10/17 131/78    Weight from Last 3 Encounters:   11/17/17 103.4 kg (227 lb 14.4 oz)   10/10/17 103.6 kg (228 lb 4.8 oz)   07/10/17 103.6 kg (228 lb 8 oz)               Primary Care Provider Office Phone # Fax #    Jim Kaplan [phone]       [address]   [facility] [address]        Equal Access to Services     MARIUSZ MADRIGAL AH: Hadii adina ku hadasho Soomaali, waaxda luqadaha, qaybta kaalmada adeegyada, waxay idiin hayaylan frida gross. So [facility] [phone].    ATENCIÓN: Si habla español, tiene a flores disposición servicios gratuitos de asistencia lingüística. ame al [phone].    We comply with applicable federal civil rights laws and [place] laws. We do not discriminate on the basis of race, color, national origin, age, disability, sex, sexual orientation, or gender identity.            Thank you!     Thank you for choosing [facility]  for your care. Our goal is always to provide you with excellent care. Hearing back from our patients is one way we can continue to improve our services. Please take a few minutes to complete the written survey that you may receive in the mail after your visit with " us. Thank you!             Your Updated Medication List - Protect others around you: Learn how to safely use, store and throw away your medicines at www.disposemymeds.org.          This list is accurate as of: 11/17/17  2:55 PM.  Always use your most recent med list.                   Brand Name Dispense Instructions for use Diagnosis    aspirin 81 MG tablet           atorvastatin 40 MG tablet    LIPITOR     TAKE 1 TABLET BY MOUTH DAILY        COLCRYS PO      Take 0.6 mg by mouth as needed for moderate pain        ELIQUIS PO      Take 5 mg by mouth 2 times daily        metoprolol 50 MG tablet    LOPRESSOR     50 mg 2 times daily        multivitamin, therapeutic with minerals Tabs tablet     30 tablet    Take 1 tablet by mouth daily    Mass of bile duct       NITROSTAT SL      Place 0.4 mg under the tongue every 5 minutes as needed for chest pain (Carries medication - has never used)        OMEGA-3 FISH OIL PO      Take 1,000 mg by mouth daily        valACYclovir 1000 mg tablet    VALTREX     Take 1 g by mouth 2 times daily

## 2017-11-22 ENCOUNTER — CARE COORDINATION (OUTPATIENT)
Dept: ONCOLOGY | Facility: CLINIC | Age: 62
End: 2017-11-22

## 2017-11-22 NOTE — PROGRESS NOTES
Spoke to Krishna who stated that he would like his information from the [facility] sent to [place].  Let him know that the paper records will be faxed, the pathology and scans will be sent.  Let him know that he will need to request the same information from [facility].

## 2017-11-24 LAB — COPATH REPORT: NORMAL

## 2017-11-24 PROCEDURE — 00000346 ZZHCL STATISTIC REVIEW OUTSIDE SLIDES TC 88321: Performed by: INTERNAL MEDICINE

## 2017-12-01 DIAGNOSIS — C22.1 CHOLANGIOCARCINOMA (H): Primary | ICD-10-CM

## 2017-12-07 ENCOUNTER — ANESTHESIA EVENT (OUTPATIENT)
Dept: SURGERY | Facility: AMBULATORY SURGERY CENTER | Age: 62
End: 2017-12-07

## 2017-12-08 ENCOUNTER — HOSPITAL ENCOUNTER (OUTPATIENT)
Facility: AMBULATORY SURGERY CENTER | Age: 62
End: 2017-12-08
Attending: PHYSICIAN ASSISTANT

## 2017-12-08 ENCOUNTER — ANESTHESIA (OUTPATIENT)
Dept: SURGERY | Facility: AMBULATORY SURGERY CENTER | Age: 62
End: 2017-12-08

## 2017-12-08 ENCOUNTER — SURGERY (OUTPATIENT)
Age: 62
End: 2017-12-08

## 2017-12-08 VITALS
DIASTOLIC BLOOD PRESSURE: 70 MMHG | BODY MASS INDEX: 32.5 KG/M2 | WEIGHT: 227 LBS | RESPIRATION RATE: 16 BRPM | SYSTOLIC BLOOD PRESSURE: 100 MMHG | HEIGHT: 70 IN | OXYGEN SATURATION: 98 % | TEMPERATURE: 97.1 F

## 2017-12-08 LAB — INR PPP: 1.18 (ref 0.86–1.14)

## 2017-12-08 RX ORDER — SODIUM CHLORIDE, SODIUM LACTATE, POTASSIUM CHLORIDE, CALCIUM CHLORIDE 600; 310; 30; 20 MG/100ML; MG/100ML; MG/100ML; MG/100ML
INJECTION, SOLUTION INTRAVENOUS CONTINUOUS
Status: DISCONTINUED | OUTPATIENT
Start: 2017-12-08 | End: 2017-12-09 | Stop reason: HOSPADM

## 2017-12-08 RX ORDER — FENTANYL CITRATE 50 UG/ML
25-50 INJECTION, SOLUTION INTRAMUSCULAR; INTRAVENOUS
Status: DISCONTINUED | OUTPATIENT
Start: 2017-12-08 | End: 2017-12-09 | Stop reason: HOSPADM

## 2017-12-08 RX ORDER — HEPARIN SODIUM,PORCINE 10 UNIT/ML
5 VIAL (ML) INTRAVENOUS EVERY 24 HOURS
Status: DISCONTINUED | OUTPATIENT
Start: 2017-12-08 | End: 2017-12-09 | Stop reason: HOSPADM

## 2017-12-08 RX ORDER — SODIUM CHLORIDE, SODIUM LACTATE, POTASSIUM CHLORIDE, CALCIUM CHLORIDE 600; 310; 30; 20 MG/100ML; MG/100ML; MG/100ML; MG/100ML
INJECTION, SOLUTION INTRAVENOUS CONTINUOUS PRN
Status: DISCONTINUED | OUTPATIENT
Start: 2017-12-08 | End: 2017-12-08

## 2017-12-08 RX ORDER — HEPARIN SODIUM (PORCINE) LOCK FLUSH IV SOLN 100 UNIT/ML 100 UNIT/ML
5 SOLUTION INTRAVENOUS
Status: DISCONTINUED | OUTPATIENT
Start: 2017-12-08 | End: 2017-12-09 | Stop reason: HOSPADM

## 2017-12-08 RX ORDER — PROPOFOL 10 MG/ML
INJECTION, EMULSION INTRAVENOUS CONTINUOUS PRN
Status: DISCONTINUED | OUTPATIENT
Start: 2017-12-08 | End: 2017-12-08

## 2017-12-08 RX ORDER — LIDOCAINE 40 MG/G
CREAM TOPICAL
Status: DISCONTINUED | OUTPATIENT
Start: 2017-12-08 | End: 2017-12-09 | Stop reason: HOSPADM

## 2017-12-08 RX ORDER — ONDANSETRON 4 MG/1
4 TABLET, ORALLY DISINTEGRATING ORAL EVERY 30 MIN PRN
Status: DISCONTINUED | OUTPATIENT
Start: 2017-12-08 | End: 2017-12-09 | Stop reason: HOSPADM

## 2017-12-08 RX ORDER — NALOXONE HYDROCHLORIDE 0.4 MG/ML
.1-.4 INJECTION, SOLUTION INTRAMUSCULAR; INTRAVENOUS; SUBCUTANEOUS
Status: DISCONTINUED | OUTPATIENT
Start: 2017-12-08 | End: 2017-12-09 | Stop reason: HOSPADM

## 2017-12-08 RX ORDER — ONDANSETRON 2 MG/ML
4 INJECTION INTRAMUSCULAR; INTRAVENOUS EVERY 30 MIN PRN
Status: DISCONTINUED | OUTPATIENT
Start: 2017-12-08 | End: 2017-12-09 | Stop reason: HOSPADM

## 2017-12-08 RX ORDER — LIDOCAINE HYDROCHLORIDE 20 MG/ML
INJECTION, SOLUTION INFILTRATION; PERINEURAL PRN
Status: DISCONTINUED | OUTPATIENT
Start: 2017-12-08 | End: 2017-12-08

## 2017-12-08 RX ORDER — GABAPENTIN 300 MG/1
300 CAPSULE ORAL ONCE
Status: COMPLETED | OUTPATIENT
Start: 2017-12-08 | End: 2017-12-08

## 2017-12-08 RX ORDER — ACETAMINOPHEN 325 MG/1
975 TABLET ORAL ONCE
Status: COMPLETED | OUTPATIENT
Start: 2017-12-08 | End: 2017-12-08

## 2017-12-08 RX ORDER — MEPERIDINE HYDROCHLORIDE 25 MG/ML
12.5 INJECTION INTRAMUSCULAR; INTRAVENOUS; SUBCUTANEOUS
Status: DISCONTINUED | OUTPATIENT
Start: 2017-12-08 | End: 2017-12-09 | Stop reason: HOSPADM

## 2017-12-08 RX ORDER — SODIUM CHLORIDE 9 MG/ML
INJECTION, SOLUTION INTRAVENOUS CONTINUOUS
Status: DISCONTINUED | OUTPATIENT
Start: 2017-12-08 | End: 2017-12-09 | Stop reason: HOSPADM

## 2017-12-08 RX ADMIN — Medication 20 ML: at 08:28

## 2017-12-08 RX ADMIN — LIDOCAINE HYDROCHLORIDE 60 MG: 20 INJECTION, SOLUTION INFILTRATION; PERINEURAL at 07:52

## 2017-12-08 RX ADMIN — SODIUM CHLORIDE, SODIUM LACTATE, POTASSIUM CHLORIDE, CALCIUM CHLORIDE: 600; 310; 30; 20 INJECTION, SOLUTION INTRAVENOUS at 07:50

## 2017-12-08 RX ADMIN — GABAPENTIN 300 MG: 300 CAPSULE ORAL at 06:55

## 2017-12-08 RX ADMIN — ACETAMINOPHEN 975 MG: 325 TABLET ORAL at 06:55

## 2017-12-08 RX ADMIN — PROPOFOL 150 MCG/KG/MIN: 10 INJECTION, EMULSION INTRAVENOUS at 07:52

## 2017-12-08 NOTE — IP AVS SNAPSHOT
MRN:[id number]                      After Visit Summary   12/8/2017    Girish Chauhan    MRN: [id number]           Thank you!     Thank you for choosing [facility] for your care. Our goal is always to provide you with excellent care. Hearing back from our patients is one way we can continue to improve our services. Please take a few minutes to complete the written survey that you may receive in the mail after you visit with us. Thank you!        Patient Information     Date Of Birth          1955        About your hospital stay     You were admitted on:  December 8, 2017 You last received care in the:  [facility]    You were discharged on:  December 8, 2017       Who to Call     For medical emergencies, please call 911.  For non-urgent questions about your medical care, please call your primary care provider or clinic, [phone]  For questions related to your surgery, please call your surgery clinic        Attending Provider     Provider Leanne Gonsales PA-C Physician Assistant       Primary Care Provider Office Phone # Fax #    Jim Kaplan [phone]      Your next 10 appointments already scheduled     Dec 11, 2017  7:20 AM CST   (Arrive by 7:05 AM)   CT CHEST/ABDOMEN/PELVIS W CONTRAST with UCCT1   [facility] ([facility])    [address] [address] [phone]           Please bring any scans or X-rays taken at other hospitals, if similar tests were done. Also bring a list of your medicines, including vitamins, minerals and over-the-counter drugs. It is safest to leave personal items at home.  Be sure to tell your doctor:   If you have any allergies.   If there s any chance you are pregnant.   If you are breastfeeding.   If you have any special needs.  You may have contrast for this exam. To prepare:   Do not eat or drink for 2 hours before your  exam. If you need to take medicine, you may take it with small sips of water. (We may ask you to take liquid medicine as well.)   The day before your exam, drink extra fluids at least six 8-ounce glasses (unless your doctor tells you to restrict your fluids).  Patients over 70 or patients with diabetes or kidney problems:   If you haven t had a blood test (creatinine test) within the last 30 days, go to your clinic or Diagnostic Imaging Department for this test.  If you have diabetes:   If your kidney function is normal, continue taking your metformin (Avandamet, Glucophage, Glucovance, Metaglip) on the day of your exam.   If your kidney function is abnormal, wait 48 hours before restarting this medicine.  You will have oral contrast for this exam:   You will drink the contrast at home. Get this from your clinic or Diagnostic Imaging Department. Please follow the directions given.  Please wear loose clothing, such as a sweat suit or jogging clothes. Avoid snaps, zippers and other metal. We may ask you to undress and put on a hospital gown.  If you have any questions, please call the Imaging Department where you will have your exam.            Dec 13, 2017  6:30 AM CST   LAB with  LAB   [facility] ([facility])    [address] [place] [address] [phone]           Please do not eat 10-12 hours before your appointment if you are coming in fasting for labs on lipids, cholesterol, or glucose (sugar). This does not apply to pregnant women. Water, hot tea and black coffee (with nothing added) are okay. Do not drink other fluids, diet soda or chew gum.            Dec 13, 2017  7:00 AM CST   (Arrive by 6:45 AM)   Return Visit with TRUDY Velazco   [facility] ([facility])    [address] [place] [address] [phone]            Dec 13, 2017  8:00 AM CST   Infusion 360 with   ONCOLOGY INFUSION, UC 15 ATC   [facility] ([facility])    [address] [address]   [phone]            Dec 20, 2017  6:30 AM CST   [facility] Lab Draw with UC MASONIC LAB DRAW   [facility] ([facility])    [address] [address]   [phone]            Dec 20, 2017  7:00 AM CST   Infusion 360 with UC ONCOLOGY INFUSION, UC 11 ATC   [facility] ([facility])    [address] [address]   [phone]            Jan 03, 2018  6:30 AM CST   [facility] Lab Draw with UC MASONIC LAB DRAW   [facility] ([facility])    [address] [place] [address]   [phone]            Jan 03, 2018  7:00 AM CST   Infusion 360 with UC ONCOLOGY INFUSION   [facility] ([facility])    [address] [place] [address]   [phone]              Further instructions from your care team         A collaboration between [facility] and [facility]  Experts in minimally invasive, targeted treatments performed using imaging guidance    Venous Access Device,  Port Catheter or Tunneled Central Line Placement    Today you had a procedure today to install a venous access device; either a tunneled central vein catheter or a subcutaneous port catheter.    One of our Radiology PAs performed this procedure for you today:  ? Thompson Rene PA-C  ? COLLEEN Fernandes PA-C  ? Harvey Quintero PA-C  ? Leanne De La Rosa PA-C   ? Med Moreno PA-C    After you go home:  - Drink plenty of fluids.  Generally 6-8 (8 ounce) glasses a day is recommended.  - Resume your regular diet unless otherwise ordered by a medical provider.  - Keep any applied  tape/gauze dressings clean and dry.  Change tape/gauze dressings if they get wet or soiled.  - You may shower the following day after procedure, however cover and protect from moisture any tape/gauze dressings.  You may let water hit and run over dried skin glue, but do not scrub.  Pat the area dry after showering.  - Port placement incisions are closed with absorbable suture, meaning they do not need to be removed at a later date, and a topical skin adhesive (skin glue).  This glue will wear off in 7-14 days.  Do not remove before this time.  If 14 days have passed and residual glue is present, you may gently remove it.  - Do not apply gels, lotions, or ointments to the glue site for the first 10 days as this may cause the glue to prematurely soften and fail.  - Do not perform strenuous activities or lift greater than 10 pounds for the next three days.  - If there is bleeding or oozing from the procedure site, apply firm pressure to the area for 5-10 minutes.  If the bleeding continues seek medical advice at the numbers below.  - Mild procedure site discomfort can be treated with an ice pack and over-the-counter pain relievers.        For 24 hours after any sedation used:  - Relax and take it easy.  No strenuous activities.  - Do not drive or operate machines at home or at work.  - No alcohol consumption.  - Do not make any important or legal decisions.    Call our Interventional Radiology (IR) service if:  - If you start bleeding from the procedure site.  If you do start to bleed from the site, lie down and hold some pressure on the site.  Our radiology provider can help you decide if you need to return to the hospital.  - If you have new or worsening pain related to the procedure.  - If you have concerning swelling at the procedure site.  - If you develop persistent nausea or vomiting.  - If you develop hives or a rash or any unexplained itching.  - If you have a fever of greater than 100.5  F and chills in the  "first 5 days after procedure.  - Any other concerns related to your procedure.      [facility]  Interventional Radiology (IR)  [address] [facility]  2nd Floor, [facility] [place] [address]    Contact Number:  [phone]  (IR control desk)  - Monday - Friday 8:00 am - 4:30 pm    After hours for urgent concerns:  [phone]  - After 4:30 pm Monday - Friday, Weekends and Holidays.   - Ask for Interventional Radiology on-call.  Someone is available 24 hours a day.  - [facility] toll free number:  [phone]        Pending Results     Date and Time Order Name Status Description    12/8/2017 0659 IR CHEST PORT PLACEMENT > 5 YRS OF AGE In process             Admission Information     Date & Time Provider Department Dept. Phone    12/8/2017 Leanne De La Rosa PA-C [facility] [phone]      Your Vitals Were     Blood Pressure Temperature Respirations Height Weight Pulse Oximetry    117/86 97.6  F (36.4  C) (Oral) 16 1.778 m (5' 10\") 103 kg (227 lb) 96%    BMI (Body Mass Index)                   32.57 kg/m2           [email or web address] Information     [email or web address] gives you secure access to your electronic health record. If you see a primary care provider, you can also send messages to your care team and make appointments. If you have questions, please call your primary care clinic.  If you do not have a primary care provider, please call [phone] and they will assist you.      [email or web address] is an electronic gateway that provides easy, online access to your medical records. With [email or web address], you can request a clinic appointment, read your test results, renew a prescription or communicate with your care team.     To access your existing account, please contact your [facility] or call [phone] for assistance.        Care EveryWhere ID     This is your Care EveryWhere ID. This could be used by other organizations to access your [facility] medical " records  [id number]        Equal Access to Services     [facility] HospitalKHANG : Hadii adina stearns juan david Sokarina, waaxda luqadaha, qaybta kasofiaadenike galiciamauraadenike, ankit silversinanissa gross. So [facility] [phone].    ATENCIÓN: Si habla español, tiene a flores disposición servicios gratuitos de asistencia lingüística. Rudyame al [phone].    We comply with applicable federal civil rights laws and [place] laws. We do not discriminate on the basis of race, color, national origin, age, disability, sex, sexual orientation, or gender identity.               Review of your medicines      UNREVIEWED medicines. Ask your doctor about these medicines        Dose / Directions    atorvastatin 40 MG tablet   Commonly known as:  LIPITOR        TAKE 1 TABLET BY MOUTH DAILY   Refills:  0       COLCRYS PO        Dose:  0.6 mg   Take 0.6 mg by mouth as needed for moderate pain   Refills:  0       ELIQUIS PO        Dose:  5 mg   Take 5 mg by mouth 2 times daily   Refills:  0       metoprolol 50 MG tablet   Commonly known as:  LOPRESSOR        Dose:  50 mg   50 mg 2 times daily   Refills:  0       multivitamin, therapeutic with minerals Tabs tablet   Used for:  Mass of bile duct        Dose:  1 tablet   Take 1 tablet by mouth daily   Quantity:  30 tablet   Refills:  0       NITROSTAT SL        Dose:  0.4 mg   Place 0.4 mg under the tongue every 5 minutes as needed for chest pain (Carries medication - has never used)   Refills:  0       OMEGA-3 FISH OIL PO        Dose:  1000 mg   Take 1,000 mg by mouth daily   Refills:  0                Protect others around you: Learn how to safely use, store and throw away your medicines at www.disposemymeds.org.             Medication List: This is a list of all your medications and when to take them. Check marks below indicate your daily home schedule. Keep this list as a reference.      Medications           Morning Afternoon Evening Bedtime As Needed    atorvastatin 40 MG tablet   Commonly known as:   LIPITOR   TAKE 1 TABLET BY MOUTH DAILY                                COLCRYS PO   Take 0.6 mg by mouth as needed for moderate pain                                ELIQUIS PO   Take 5 mg by mouth 2 times daily                                metoprolol 50 MG tablet   Commonly known as:  LOPRESSOR   50 mg 2 times daily                                multivitamin, therapeutic with minerals Tabs tablet   Take 1 tablet by mouth daily                                NITROSTAT SL   Place 0.4 mg under the tongue every 5 minutes as needed for chest pain (Carries medication - has never used)                                OMEGA-3 FISH OIL PO   Take 1,000 mg by mouth daily

## 2017-12-08 NOTE — ANESTHESIA POSTPROCEDURE EVALUATION
Patient: Girish Chauhan    Procedure(s):  Place single lumen venous chest port - right - Wound Class: I-Clean    Diagnosis:Crangeal Carcinoma  Diagnosis Additional Information: No value filed.    Anesthesia Type:  MAC    Note:  Anesthesia Post Evaluation    Patient location during evaluation: Phase 2  Patient participation: Able to fully participate in evaluation  Level of consciousness: awake and alert  Pain management: adequate  Airway patency: patent  Cardiovascular status: acceptable and hemodynamically stable  Respiratory status: acceptable  Hydration status: acceptable  PONV: none     Anesthetic complications: None          Last vitals:  Vitals:    12/08/17 0633 12/08/17 0848   BP: 117/86 107/73   Resp: 16 14   Temp: 36.4  C (97.6  F) 36.2  C (97.1  F)   SpO2: 96% 97%         Electronically Signed By: Juan King MD  December 8, 2017  9:07 AM

## 2017-12-08 NOTE — ANESTHESIA CARE TRANSFER NOTE
Patient: Girish Chauhan    Procedure(s):  Place single lumen venous chest port - right - Wound Class: I-Clean    Diagnosis: Crangeal Carcinoma  Diagnosis Additional Information: No value filed.    Anesthesia Type:   MAC     Note:  Airway :Room Air  Patient transferred to:Phase II  Comments: Patient awake and breathing spont. VSS. No complaints of pain or nausea. Report to RNHandoff Report: Identifed the Patient, Identified the Reponsible Provider, Reviewed the pertinent medical history, Discussed the surgical course, Reviewed Intra-OP anesthesia mangement and issues during anesthesia, Set expectations for post-procedure period and Allowed opportunity for questions and acknowledgement of understanding      Vitals: (Last set prior to Anesthesia Care Transfer)    CRNA VITALS  12/8/2017 0815 - 12/8/2017 0850      12/8/2017             Pulse: (!)  45    SpO2: 97 %    Resp Rate (observed): (!)  1    Resp Rate (set): 10                Electronically Signed By: YO Saul CRNA  December 8, 2017  8:50 AM

## 2017-12-08 NOTE — ANESTHESIA PREPROCEDURE EVALUATION
Anesthesia Evaluation     . Pt has had prior anesthetic. Type: General    No history of anesthetic complications          ROS/MED HX    ENT/Pulmonary:  - neg pulmonary ROS     Neurologic:  - neg neurologic ROS     Cardiovascular:     (+) hypertension--CAD, --stent,2011 4 Drug Eluting Stent .. : . . . :. . Previous cardiac testing date:results:date: results:ECG reviewed date:03/08/2016 results:Atrial fibrillation with RVR. date: results:          METS/Exercise Tolerance:  >4 METS   Hematologic:  - neg hematologic  ROS       Musculoskeletal:  - neg musculoskeletal ROS       GI/Hepatic:     (+) hepatitis liver disease,       Renal/Genitourinary:  - ROS Renal section negative       Endo:     (+) Obesity, .      Psychiatric:  - neg psychiatric ROS       Infectious Disease:  - neg infectious disease ROS       Malignancy:   (+) Malignancy History of Other  Other CA Active status post Surgery Diagnosis of cholangiocarcinoma status post open extended left hepatectomy in March 2016.          Other:    - neg other ROS                 Physical Exam  Normal systems: pulmonary and dental    Airway   Mallampati: II  TM distance: >3 FB  Neck ROM: full    Dental     Cardiovascular   Rhythm and rate: regular and normal      Pulmonary                        Lab / Radiology Results:   Reviewed current labs when avail, see EMR for details.      BMP:  Recent Labs   Lab Test  10/06/17   0745   NA  139   POTASSIUM  4.7   CHLORIDE  107   CO2  28   BUN  16   CR  0.87   GLC  121*   GARY  8.8       LFTs:   Recent Labs   Lab Test  10/06/17   0745   PROTTOTAL  7.6   ALBUMIN  3.8   BILITOTAL  1.0   ALKPHOS  96   AST  21   ALT  34       CBC:   Recent Labs   Lab Test  10/06/17   0745   WBC  6.4   HGB  14.9   PLT  186       Coags:  Recent Labs   Lab Test  03/15/16   0631   03/08/16 2055   INR  1.25*   < >  1.58*   PTT   --    --   33   FIBR   --    --   174*    < > = values in this interval not displayed.       Blood Bank:  Lab Results    Component Value Date    ABO O 03/15/2016    RH  Pos 03/15/2016    AS Neg 03/15/2016       Studies:  See EMR for current studies, reviewed when available.      Anesthesia Plan      History & Physical Review  History and physical reviewed and following examination; no interval change.    ASA Status:  3 .    NPO Status:  > 2 hours (Water at 05:45.  )    Plan for MAC with Intravenous induction. Maintenance will be TIVA.  Reason for MAC:  Deep or markedly invasive procedure (G8)  PONV prophylaxis:  Ondansetron (or other 5HT-3)       Postoperative Care  Postoperative pain management:  Multi-modal analgesia.      Consents  Anesthetic plan, risks, benefits and alternatives discussed with:  Patient.  Use of blood products discussed: No .   .      Juan King MD  Anesthesiologist  6:46 AM  December 8, 2017                        .

## 2017-12-08 NOTE — DISCHARGE INSTRUCTIONS
A collaboration between [facility] and [facility]  Experts in minimally invasive, targeted treatments performed using imaging guidance    Venous Access Device,  Port Catheter or Tunneled Central Line Placement    Today you had a procedure today to install a venous access device; either a tunneled central vein catheter or a subcutaneous port catheter.    One of our Radiology PAs performed this procedure for you today:  ? Thompson Rene PA-C  ? COLLEEN Fernandes PA-C  ? Harvey Quintero PA-C  ? Leanne De La Rosa PA-C   ? Med Moreno PA-C    After you go home:  - Drink plenty of fluids.  Generally 6-8 (8 ounce) glasses a day is recommended.  - Resume your regular diet unless otherwise ordered by a medical provider.  - Keep any applied tape/gauze dressings clean and dry.  Change tape/gauze dressings if they get wet or soiled.  - You may shower the following day after procedure, however cover and protect from moisture any tape/gauze dressings.  You may let water hit and run over dried skin glue, but do not scrub.  Pat the area dry after showering.  - Port placement incisions are closed with absorbable suture, meaning they do not need to be removed at a later date, and a topical skin adhesive (skin glue).  This glue will wear off in 7-14 days.  Do not remove before this time.  If 14 days have passed and residual glue is present, you may gently remove it.  - Do not apply gels, lotions, or ointments to the glue site for the first 10 days as this may cause the glue to prematurely soften and fail.  - Do not perform strenuous activities or lift greater than 10 pounds for the next three days.  - If there is bleeding or oozing from the procedure site, apply firm pressure to the area for 5-10 minutes.  If the bleeding continues seek medical advice at the numbers below.  - Mild procedure site discomfort can be treated with an ice pack and over-the-counter pain  relievers.        For 24 hours after any sedation used:  - Relax and take it easy.  No strenuous activities.  - Do not drive or operate machines at home or at work.  - No alcohol consumption.  - Do not make any important or legal decisions.    Call our Interventional Radiology (IR) service if:  - If you start bleeding from the procedure site.  If you do start to bleed from the site, lie down and hold some pressure on the site.  Our radiology provider can help you decide if you need to return to the hospital.  - If you have new or worsening pain related to the procedure.  - If you have concerning swelling at the procedure site.  - If you develop persistent nausea or vomiting.  - If you develop hives or a rash or any unexplained itching.  - If you have a fever of greater than 100.5  F and chills in the first 5 days after procedure.  - Any other concerns related to your procedure.      [facility]  Interventional Radiology (IR)  [address] [place] [address]    Contact Number:  [phone]  (IR control desk)  - Monday - Friday 8:00 am - 4:30 pm    After hours for urgent concerns:  [phone]  - After 4:30 pm Monday - Friday, Weekends and Holidays.   - Ask for Interventional Radiology on-call.  Someone is available 24 hours a day.  - [facility] toll free number:  [phone]

## 2017-12-08 NOTE — IP AVS SNAPSHOT
[facility]    [address] [place] [address]    Phone:  [phone]    Fax:  [phone]                                       After Visit Summary   12/8/2017    Girish Chauhan    MRN: [id number]           After Visit Summary Signature Page     I have received my discharge instructions, and my questions have been answered. I have discussed any challenges I see with this plan with the nurse or doctor.    ..........................................................................................................................................  Patient/Patient Representative Signature      ..........................................................................................................................................  Patient Representative Print Name and Relationship to Patient    ..................................................               ................................................  Date                                            Time    ..........................................................................................................................................  Reviewed by Signature/Title    ...................................................              ..............................................  Date                                                            Time

## 2017-12-10 RX ORDER — ALBUTEROL SULFATE 90 UG/1
1-2 AEROSOL, METERED RESPIRATORY (INHALATION)
Status: CANCELLED
Start: 2017-12-13

## 2017-12-10 RX ORDER — LORAZEPAM 2 MG/ML
0.5 INJECTION INTRAMUSCULAR EVERY 4 HOURS PRN
Status: CANCELLED
Start: 2017-12-13

## 2017-12-10 RX ORDER — DIPHENHYDRAMINE HYDROCHLORIDE 50 MG/ML
50 INJECTION INTRAMUSCULAR; INTRAVENOUS
Status: CANCELLED
Start: 2017-12-13

## 2017-12-10 RX ORDER — SODIUM CHLORIDE 9 MG/ML
1000 INJECTION, SOLUTION INTRAVENOUS CONTINUOUS PRN
Status: CANCELLED
Start: 2017-12-13

## 2017-12-10 RX ORDER — LORAZEPAM 2 MG/ML
0.5 INJECTION INTRAMUSCULAR EVERY 4 HOURS PRN
Status: CANCELLED
Start: 2017-12-20

## 2017-12-10 RX ORDER — ALBUTEROL SULFATE 0.83 MG/ML
2.5 SOLUTION RESPIRATORY (INHALATION)
Status: CANCELLED | OUTPATIENT
Start: 2017-12-13

## 2017-12-10 RX ORDER — ALBUTEROL SULFATE 0.83 MG/ML
2.5 SOLUTION RESPIRATORY (INHALATION)
Status: CANCELLED | OUTPATIENT
Start: 2017-12-20

## 2017-12-10 RX ORDER — EPINEPHRINE 1 MG/ML
0.3 INJECTION, SOLUTION, CONCENTRATE INTRAVENOUS EVERY 5 MIN PRN
Status: CANCELLED | OUTPATIENT
Start: 2017-12-20

## 2017-12-10 RX ORDER — DIPHENHYDRAMINE HYDROCHLORIDE 50 MG/ML
50 INJECTION INTRAMUSCULAR; INTRAVENOUS
Status: CANCELLED
Start: 2017-12-20

## 2017-12-10 RX ORDER — METHYLPREDNISOLONE SODIUM SUCCINATE 125 MG/2ML
125 INJECTION, POWDER, LYOPHILIZED, FOR SOLUTION INTRAMUSCULAR; INTRAVENOUS
Status: CANCELLED
Start: 2017-12-20

## 2017-12-10 RX ORDER — EPINEPHRINE 1 MG/ML
0.3 INJECTION, SOLUTION, CONCENTRATE INTRAVENOUS EVERY 5 MIN PRN
Status: CANCELLED | OUTPATIENT
Start: 2017-12-13

## 2017-12-10 RX ORDER — MEPERIDINE HYDROCHLORIDE 25 MG/ML
25 INJECTION INTRAMUSCULAR; INTRAVENOUS; SUBCUTANEOUS EVERY 30 MIN PRN
Status: CANCELLED | OUTPATIENT
Start: 2017-12-20

## 2017-12-10 RX ORDER — MEPERIDINE HYDROCHLORIDE 25 MG/ML
25 INJECTION INTRAMUSCULAR; INTRAVENOUS; SUBCUTANEOUS EVERY 30 MIN PRN
Status: CANCELLED | OUTPATIENT
Start: 2017-12-13

## 2017-12-10 RX ORDER — ALBUTEROL SULFATE 90 UG/1
1-2 AEROSOL, METERED RESPIRATORY (INHALATION)
Status: CANCELLED
Start: 2017-12-20

## 2017-12-10 RX ORDER — EPINEPHRINE 0.3 MG/.3ML
0.3 INJECTION SUBCUTANEOUS EVERY 5 MIN PRN
Status: CANCELLED | OUTPATIENT
Start: 2017-12-20

## 2017-12-10 RX ORDER — SODIUM CHLORIDE 9 MG/ML
1000 INJECTION, SOLUTION INTRAVENOUS CONTINUOUS PRN
Status: CANCELLED
Start: 2017-12-20

## 2017-12-10 RX ORDER — EPINEPHRINE 0.3 MG/.3ML
0.3 INJECTION SUBCUTANEOUS EVERY 5 MIN PRN
Status: CANCELLED | OUTPATIENT
Start: 2017-12-13

## 2017-12-10 RX ORDER — METHYLPREDNISOLONE SODIUM SUCCINATE 125 MG/2ML
125 INJECTION, POWDER, LYOPHILIZED, FOR SOLUTION INTRAMUSCULAR; INTRAVENOUS
Status: CANCELLED
Start: 2017-12-13

## 2017-12-12 RX ORDER — PROCHLORPERAZINE MALEATE 10 MG
10 TABLET ORAL EVERY 6 HOURS PRN
Qty: 30 TABLET | Refills: 5 | Status: SHIPPED | OUTPATIENT
Start: 2017-12-12 | End: 2018-11-12

## 2017-12-12 RX ORDER — LORAZEPAM 0.5 MG/1
0.5 TABLET ORAL EVERY 4 HOURS PRN
Qty: 30 TABLET | Refills: 5 | Status: SHIPPED | OUTPATIENT
Start: 2017-12-12 | End: 2018-11-12

## 2017-12-12 RX ORDER — ONDANSETRON 8 MG/1
8 TABLET, FILM COATED ORAL EVERY 8 HOURS PRN
Qty: 10 TABLET | Refills: 5 | Status: SHIPPED | OUTPATIENT
Start: 2017-12-12 | End: 2018-11-12

## 2017-12-13 ENCOUNTER — APPOINTMENT (OUTPATIENT)
Dept: LAB | Facility: CLINIC | Age: 62
End: 2017-12-13
Attending: INTERNAL MEDICINE
Payer: COMMERCIAL

## 2017-12-13 ENCOUNTER — ONCOLOGY VISIT (OUTPATIENT)
Dept: ONCOLOGY | Facility: CLINIC | Age: 62
End: 2017-12-13
Attending: INTERNAL MEDICINE
Payer: COMMERCIAL

## 2017-12-13 ENCOUNTER — INFUSION THERAPY VISIT (OUTPATIENT)
Dept: ONCOLOGY | Facility: CLINIC | Age: 62
End: 2017-12-13
Attending: PHYSICIAN ASSISTANT
Payer: COMMERCIAL

## 2017-12-13 VITALS
BODY MASS INDEX: 32.9 KG/M2 | HEIGHT: 70 IN | HEART RATE: 64 BPM | RESPIRATION RATE: 16 BRPM | DIASTOLIC BLOOD PRESSURE: 74 MMHG | WEIGHT: 229.8 LBS | TEMPERATURE: 97.6 F | SYSTOLIC BLOOD PRESSURE: 114 MMHG | OXYGEN SATURATION: 100 %

## 2017-12-13 DIAGNOSIS — C22.1 CHOLANGIOCARCINOMA (H): Primary | ICD-10-CM

## 2017-12-13 LAB
ALBUMIN SERPL-MCNC: 3.8 G/DL (ref 3.4–5)
ALP SERPL-CCNC: 98 U/L (ref 40–150)
ALT SERPL W P-5'-P-CCNC: 34 U/L (ref 0–70)
ANION GAP SERPL CALCULATED.3IONS-SCNC: 8 MMOL/L (ref 3–14)
AST SERPL W P-5'-P-CCNC: 22 U/L (ref 0–45)
BASOPHILS # BLD AUTO: 0 10E9/L (ref 0–0.2)
BASOPHILS NFR BLD AUTO: 0.4 %
BILIRUB SERPL-MCNC: 1 MG/DL (ref 0.2–1.3)
BUN SERPL-MCNC: 19 MG/DL (ref 7–30)
CALCIUM SERPL-MCNC: 8.5 MG/DL (ref 8.5–10.1)
CHLORIDE SERPL-SCNC: 107 MMOL/L (ref 94–109)
CO2 SERPL-SCNC: 25 MMOL/L (ref 20–32)
CREAT SERPL-MCNC: 0.95 MG/DL (ref 0.66–1.25)
DIFFERENTIAL METHOD BLD: NORMAL
EOSINOPHIL # BLD AUTO: 0.2 10E9/L (ref 0–0.7)
EOSINOPHIL NFR BLD AUTO: 2.3 %
ERYTHROCYTE [DISTWIDTH] IN BLOOD BY AUTOMATED COUNT: 12.8 % (ref 10–15)
GFR SERPL CREATININE-BSD FRML MDRD: 81 ML/MIN/1.7M2
GLUCOSE SERPL-MCNC: 103 MG/DL (ref 70–99)
HCT VFR BLD AUTO: 43.9 % (ref 40–53)
HGB BLD-MCNC: 14.4 G/DL (ref 13.3–17.7)
IMM GRANULOCYTES # BLD: 0 10E9/L (ref 0–0.4)
IMM GRANULOCYTES NFR BLD: 0.1 %
LYMPHOCYTES # BLD AUTO: 1.8 10E9/L (ref 0.8–5.3)
LYMPHOCYTES NFR BLD AUTO: 25.3 %
MAGNESIUM SERPL-MCNC: 2.2 MG/DL (ref 1.6–2.3)
MCH RBC QN AUTO: 29.8 PG (ref 26.5–33)
MCHC RBC AUTO-ENTMCNC: 32.8 G/DL (ref 31.5–36.5)
MCV RBC AUTO: 91 FL (ref 78–100)
MONOCYTES # BLD AUTO: 0.8 10E9/L (ref 0–1.3)
MONOCYTES NFR BLD AUTO: 11.3 %
NEUTROPHILS # BLD AUTO: 4.2 10E9/L (ref 1.6–8.3)
NEUTROPHILS NFR BLD AUTO: 60.6 %
NRBC # BLD AUTO: 0 10*3/UL
NRBC BLD AUTO-RTO: 0 /100
PLATELET # BLD AUTO: 198 10E9/L (ref 150–450)
POTASSIUM SERPL-SCNC: 4.2 MMOL/L (ref 3.4–5.3)
PROT SERPL-MCNC: 7.3 G/DL (ref 6.8–8.8)
RBC # BLD AUTO: 4.84 10E12/L (ref 4.4–5.9)
SODIUM SERPL-SCNC: 140 MMOL/L (ref 133–144)
WBC # BLD AUTO: 6.9 10E9/L (ref 4–11)

## 2017-12-13 PROCEDURE — 25000128 H RX IP 250 OP 636: Mod: ZF | Performed by: PHYSICIAN ASSISTANT

## 2017-12-13 PROCEDURE — 96413 CHEMO IV INFUSION 1 HR: CPT

## 2017-12-13 PROCEDURE — 86301 IMMUNOASSAY TUMOR CA 19-9: CPT | Performed by: INTERNAL MEDICINE

## 2017-12-13 PROCEDURE — 99214 OFFICE O/P EST MOD 30 MIN: CPT | Mod: ZP | Performed by: PHYSICIAN ASSISTANT

## 2017-12-13 PROCEDURE — 80053 COMPREHEN METABOLIC PANEL: CPT | Performed by: INTERNAL MEDICINE

## 2017-12-13 PROCEDURE — 96375 TX/PRO/DX INJ NEW DRUG ADDON: CPT

## 2017-12-13 PROCEDURE — 96367 TX/PROPH/DG ADDL SEQ IV INF: CPT

## 2017-12-13 PROCEDURE — 83735 ASSAY OF MAGNESIUM: CPT | Performed by: INTERNAL MEDICINE

## 2017-12-13 PROCEDURE — 99213 OFFICE O/P EST LOW 20 MIN: CPT | Mod: ZF

## 2017-12-13 PROCEDURE — 96411 CHEMO IV PUSH ADDL DRUG: CPT

## 2017-12-13 PROCEDURE — 85025 COMPLETE CBC W/AUTO DIFF WBC: CPT | Performed by: INTERNAL MEDICINE

## 2017-12-13 PROCEDURE — 25000128 H RX IP 250 OP 636: Mod: ZF | Performed by: INTERNAL MEDICINE

## 2017-12-13 RX ORDER — EPINEPHRINE 0.3 MG/.3ML
INJECTION SUBCUTANEOUS
Status: DISCONTINUED
Start: 2017-12-13 | End: 2017-12-13 | Stop reason: WASHOUT

## 2017-12-13 RX ORDER — HEPARIN SODIUM (PORCINE) LOCK FLUSH IV SOLN 100 UNIT/ML 100 UNIT/ML
5 SOLUTION INTRAVENOUS
Status: COMPLETED | OUTPATIENT
Start: 2017-12-13 | End: 2017-12-13

## 2017-12-13 RX ORDER — HEPARIN SODIUM (PORCINE) LOCK FLUSH IV SOLN 100 UNIT/ML 100 UNIT/ML
5 SOLUTION INTRAVENOUS ONCE
Status: COMPLETED | OUTPATIENT
Start: 2017-12-13 | End: 2017-12-13

## 2017-12-13 RX ORDER — ONDANSETRON 2 MG/ML
8 INJECTION INTRAMUSCULAR; INTRAVENOUS ONCE
Status: COMPLETED | OUTPATIENT
Start: 2017-12-13 | End: 2017-12-13

## 2017-12-13 RX ADMIN — MAGNESIUM SULFATE HEPTAHYDRATE: 500 INJECTION, SOLUTION INTRAMUSCULAR; INTRAVENOUS at 08:28

## 2017-12-13 RX ADMIN — GEMCITABINE 2200 MG: 38 INJECTION, SOLUTION INTRAVENOUS at 09:57

## 2017-12-13 RX ADMIN — ONDANSETRON 8 MG: 2 INJECTION INTRAMUSCULAR; INTRAVENOUS at 08:17

## 2017-12-13 RX ADMIN — SODIUM CHLORIDE 250 ML: 9 INJECTION, SOLUTION INTRAVENOUS at 08:14

## 2017-12-13 RX ADMIN — SODIUM CHLORIDE, PRESERVATIVE FREE 5 ML: 5 INJECTION INTRAVENOUS at 11:04

## 2017-12-13 RX ADMIN — DEXAMETHASONE SODIUM PHOSPHATE: 10 INJECTION, SOLUTION INTRAMUSCULAR; INTRAVENOUS at 08:14

## 2017-12-13 RX ADMIN — CISPLATIN 60 MG: 1 INJECTION, SOLUTION INTRAVENOUS at 08:55

## 2017-12-13 RX ADMIN — SODIUM CHLORIDE, PRESERVATIVE FREE 5 ML: 5 INJECTION INTRAVENOUS at 06:39

## 2017-12-13 ASSESSMENT — PAIN SCALES - GENERAL: PAINLEVEL: NO PAIN (0)

## 2017-12-13 NOTE — MR AVS SNAPSHOT
After Visit Summary   12/13/2017    Girish Chauhan    MRN: [id number]           Patient Information     Date Of Birth          1955        Visit Information        Provider Department      12/13/2017 7:00 AM Leigh Vega PA [facility]        Today's Diagnoses     Cholangiocarcinoma (H)    -  1       Follow-ups after your visit        Your next 10 appointments already scheduled     Dec 20, 2017  6:30 AM CST   [facility] with [facility] DRAW   [facility] ([facility])    [address] [address]   [phone]            Dec 20, 2017  7:00 AM CST   Infusion 360 with UC ONCOLOGY INFUSION, UC 11 ATC   [facility] ([facility])    [address] [place] [address]   [phone]            Jan 03, 2018  6:30 AM CST   [facility] Draw with [facility] DRAW   [facility])    [address] [address]   [phone]            Jan 03, 2018  7:00 AM CST   (Arrive by 6:45 AM)   Return Visit with Julia Smith PA-C   [facility] ([facility])    [address] [place] [address]   [phone]            Jan 03, 2018  7:30 AM CST   Infusion 360 with UC ONCOLOGY INFUSION, UC 13 ATC   [facility] ([facility])    [address] [address]   [phone]            Bernabe 10, 2018  6:30 AM CST   [facility] Draw with [facility] DRAW   [facility] ([facility])    [address] [address]   [phone]            Bernabe 10, 2018  7:00 AM CST   Infusion 360 with UC ONCOLOGY INFUSION, UC 11 ATC   [facility] (  "[facility])    [address] [facility]  2nd Floor  [facility] [address] [phone]              Who to contact     If you have questions or need follow up information about today's clinic visit or your schedule please contact [facility] directly at [phone].  Normal or non-critical lab and imaging results will be communicated to you by MyChart, letter or phone within 4 business days after the clinic has received the results. If you do not hear from us within 7 days, please contact the clinic through [employer] or phone. If you have a critical or abnormal lab result, we will notify you by phone as soon as possible.  Submit refill requests through [employer] or call your pharmacy and they will forward the refill request to us. Please allow 3 business days for your refill to be completed.          Additional Information About Your Visit        [employer] Information     [employer] gives you secure access to your electronic health record. If you see a primary care provider, you can also send messages to your care team and make appointments. If you have questions, please call your primary care clinic.  If you do not have a primary care provider, please call [phone] and they will assist you.        Care EveryWhere ID     This is your Care EveryWhere ID. This could be used by other organizations to access your [place] medical records  [id number]        Your Vitals Were     Pulse Temperature Respirations Height Pulse Oximetry BMI (Body Mass Index)    64 97.6  F (36.4  C) (Oral) 16 1.778 m (5' 10\") 100% 32.97 kg/m2       Blood Pressure from Last 3 Encounters:   12/13/17 114/74   12/08/17 100/70   11/17/17 (!) 128/92    Weight from Last 3 Encounters:   12/13/17 104.2 kg (229 lb 12.8 oz)   12/08/17 103 kg (227 lb)   11/17/17 103.4 kg (227 lb 14.4 oz)              We Performed the Following     Cancer antigen 19-9          Today's Medication Changes          These changes are " accurate as of: 12/13/17 11:59 PM.  If you have any questions, ask your nurse or doctor.               Start taking these medicines.        Dose/Directions    LORazepam 0.5 MG tablet   Commonly known as:  ATIVAN   Used for:  Cholangiocarcinoma (H)        Dose:  0.5 mg   Take 1 tablet (0.5 mg) by mouth every 4 hours as needed (Anxiety, Nausea/Vomiting or Sleep)   Quantity:  30 tablet   Refills:  5       ondansetron 8 MG tablet   Commonly known as:  ZOFRAN   Used for:  Cholangiocarcinoma (H)        Dose:  8 mg   Take 1 tablet (8 mg) by mouth every 8 hours as needed (Nausea/Vomiting)   Quantity:  10 tablet   Refills:  5       prochlorperazine 10 MG tablet   Commonly known as:  COMPAZINE   Used for:  Cholangiocarcinoma (H)        Dose:  10 mg   Take 1 tablet (10 mg) by mouth every 6 hours as needed (Nausea/Vomiting)   Quantity:  30 tablet   Refills:  5            Where to get your medicines      These medications were sent to [address] [place] [address] [address] [place] [id number]    Hours:  TRANSPLANT PHONE NUMBER [phone] Phone:  [phone]     ondansetron 8 MG tablet    prochlorperazine 10 MG tablet         Some of these will need a paper prescription and others can be bought over the counter.  Ask your nurse if you have questions.     Bring a paper prescription for each of these medications     LORazepam 0.5 MG tablet                Primary Care Provider Office Phone # Fax #    Jim Kaplan [phone]       [address]   [facility] [address]        Equal Access to Services     [facility] AH: Hadii aad ku hadashnaina Sokarina, waaxda luqadaha, qaybta kaalmada adeegyada, ankit gross. So [facility] [phone].    ATENCIÓN: Si habla español, tiene a flores disposición servicios gratuitos de asistencia lingüística. Llame al [phone].    We comply with  applicable federal civil rights laws and [place] laws. We do not discriminate on the basis of race, color, national origin, age, disability, sex, sexual orientation, or gender identity.            Thank you!     Thank you for choosing [facility]  for your care. Our goal is always to provide you with excellent care. Hearing back from our patients is one way we can continue to improve our services. Please take a few minutes to complete the written survey that you may receive in the mail after your visit with us. Thank you!             Your Updated Medication List - Protect others around you: Learn how to safely use, store and throw away your medicines at www.disposemymeds.org.          This list is accurate as of: 12/13/17 11:59 PM.  Always use your most recent med list.                   Brand Name Dispense Instructions for use Diagnosis    atorvastatin 40 MG tablet    LIPITOR     TAKE 1 TABLET BY MOUTH DAILY        COLCRYS PO      Take 0.6 mg by mouth as needed for moderate pain        ELIQUIS PO      Take 5 mg by mouth 2 times daily        LORazepam 0.5 MG tablet    ATIVAN    30 tablet    Take 1 tablet (0.5 mg) by mouth every 4 hours as needed (Anxiety, Nausea/Vomiting or Sleep)    Cholangiocarcinoma (H)       metoprolol 50 MG tablet    LOPRESSOR     50 mg 2 times daily        multivitamin, therapeutic with minerals Tabs tablet     30 tablet    Take 1 tablet by mouth daily    Mass of bile duct       NITROSTAT SL      Place 0.4 mg under the tongue every 5 minutes as needed for chest pain (Carries medication - has never used)        OMEGA-3 FISH OIL PO      Take 1,000 mg by mouth daily        ondansetron 8 MG tablet    ZOFRAN    10 tablet    Take 1 tablet (8 mg) by mouth every 8 hours as needed (Nausea/Vomiting)    Cholangiocarcinoma (H)       prochlorperazine 10 MG tablet    COMPAZINE    30 tablet    Take 1 tablet (10 mg) by mouth every 6 hours as needed (Nausea/Vomiting)    Cholangiocarcinoma  (H)

## 2017-12-13 NOTE — NURSING NOTE
"Chief Complaint   Patient presents with     Port Draw     port accessed and labs drawn by rn.  vs taken.     Port accessed with 20g 3/4\" gripper needle, labs drawn, port flushed with saline and heparin, vitals checked, pt checked in for next appointment.  Carey Larson RN    "

## 2017-12-13 NOTE — PROGRESS NOTES
Infusion Nursing Note:  Girish Chauhan presents today for D12 C1 Cisplatin, Gemzar.    Patient seen by provider today: Yes: TRUDY Nicholson    Intravenous Access:  Implanted Port.    Treatment Conditions:  Lab Results   Component Value Date    HGB 14.4 12/13/2017     Lab Results   Component Value Date    WBC 6.9 12/13/2017      Lab Results   Component Value Date    ANEU 4.2 12/13/2017     Lab Results   Component Value Date     12/13/2017      Lab Results   Component Value Date     12/13/2017                   Lab Results   Component Value Date    POTASSIUM 4.2 12/13/2017           Lab Results   Component Value Date    MAG 2.2 12/13/2017            Lab Results   Component Value Date    CR 0.95 12/13/2017                   Lab Results   Component Value Date    GARY 8.5 12/13/2017                Lab Results   Component Value Date    BILITOTAL 1.0 12/13/2017           Lab Results   Component Value Date    ALBUMIN 3.8 12/13/2017                    Lab Results   Component Value Date    ALT 34 12/13/2017           Lab Results   Component Value Date    AST 22 12/13/2017     Results reviewed, labs MET treatment parameters, ok to proceed with treatment.    Note: Patient is new to the infusion room today and is receiving Cisplatin and Gemzar for the first time.  Patient oriented to infusion room, location of bathrooms and nutrition stations, and call light.  Received written chemotherapy information and new patient folder previously.  Verbally reviewed chemotherapy teaching, side effects, take-home medications, and follow-up schedule with patient. Patient instructed to call [facility] triage RN line with any questions or if patient experiences a temperature >100.5, shaking chills, uncontrolled nausea/vomiting/diarrhea, dizziness, shortness of breath, bleeding not relieved with pressure, or with any other concerns.  Instructed patient to call [facility] RN triage after hours line after hours.    Post  Infusion Assessment:  Patient tolerated infusion without incident.  Blood return noted pre and post infusion.  Site patent and intact, free from redness, edema or discomfort.  No evidence of extravasations.  Access discontinued per protocol.    Discharge Plan:   Prescription refills given for ativan, compazine, and zofran.  Discharge instructions reviewed with: Patient and Family.  Patient and/or family verbalized understanding of discharge instructions and all questions answered.  Copy of AVS reviewed with patient and/or family.  Patient will return 12/20/17 for next appointment.  Patient discharged in stable condition accompanied by: self and wife.  Departure Mode: Ambulatory.    Ericka Ferrara RN

## 2017-12-13 NOTE — LETTER
"12/13/2017      RE: Girish Chauhan  [address] [facility] [address]       Reason for Visit: seen in follow-up of resected stage IV cholangiocarcinoma.    Oncology HPI: Girish Chauhan is a 62 year old man who developed RUQ pain in Dec 2015. He had progressive pain and weight loss. He had RUQ US imaging showing steatosis, then MRCP imaging which showed an ill defined mass at the confluence of the right and left hepatic ducts. He underwent resection in March 2016. Margins were negative and no lymph nodes were involved. Perineural and lymphovascular invasion was noted. He opted not to pursue adjuvant chemotherapy and had been observed without recurrence. CA 19-9 marker heydi from 33 on 7/7/17 to 53 on 10/6/17. Patient also noted to have increasing bladder nodules concerning for a urothelial malignancy on imaging. He then had cystoscopy and biopsy on 11/6/17 at [facility] which has come back consistent with metastatic cholangiocarcinoma. Pathology reviewed slides and felt morphologically similar to patient's known cholangiocarcinoma. Patient presents today for C1D1 Gemcitabine + Cisplatin.    Interval history: Girish is here alone, although wife is on the way. He has been feeling well. No recent colds/flu or other infections. He has not noticed any episodes of Afib recently. He has had 4 cardioversions. Is on Eliquis and will follows with a cardiologist at [facility]. He had some hematuria for about 1 week after the bladder biopsy, but none since then. Occasionally he feels a \"twinge\" in the bladder area, but no dysuria, frequency or urgency. No cough, mild SOB when having palpitations. No CP. No dizziness. No headaches, vision or hearing changes. Occasionally he has episodes of mild tinnitus in both ears but no hearing loss. He has history of melanoma and sees dermatologist every 6 months for surveillance. He has history of gout affecting his left big toe and left lateral side of foot. He has about 1-2 episodes per " "year, but only 1 this year. Usually occurs in spring or fall. He takes colchicine and episodes last about 1 day on this medication. He drinks about 4-6 pints beer/week. He estimates he drinks about 64 oz of water daily. Denies any smoking.    Denies fever, chills, nausea, vomiting, abdominal pain, diarrhea, constipation, rash, edema.    Current Outpatient Prescriptions   Medication Sig Dispense Refill     LORazepam (ATIVAN) 0.5 MG tablet Take 1 tablet (0.5 mg) by mouth every 4 hours as needed (Anxiety, Nausea/Vomiting or Sleep) 30 tablet 5     prochlorperazine (COMPAZINE) 10 MG tablet Take 1 tablet (10 mg) by mouth every 6 hours as needed (Nausea/Vomiting) 30 tablet 5     ondansetron (ZOFRAN) 8 MG tablet Take 1 tablet (8 mg) by mouth every 8 hours as needed (Nausea/Vomiting) 10 tablet 5     Apixaban (ELIQUIS PO) Take 5 mg by mouth 2 times daily       metoprolol (LOPRESSOR) 50 MG tablet 50 mg 2 times daily       atorvastatin (LIPITOR) 40 MG tablet TAKE 1 TABLET BY MOUTH DAILY       multivitamin, therapeutic with minerals (MULTI-VITAMIN) TABS Take 1 tablet by mouth daily 30 tablet 0     Omega-3 Fatty Acids (OMEGA-3 FISH OIL PO) Take 1,000 mg by mouth daily        Nitroglycerin (NITROSTAT SL) Place 0.4 mg under the tongue every 5 minutes as needed for chest pain (Carries medication - has never used)        Colchicine (COLCRYS PO) Take 0.6 mg by mouth as needed for moderate pain          No Known Allergies      Exam: alert, appears well.  Blood pressure 114/74, pulse 64, temperature 97.6  F (36.4  C), temperature source Oral, resp. rate 16, height 1.778 m (5' 10\"), weight 104.2 kg (229 lb 12.8 oz), SpO2 100 %.  Wt Readings from Last 4 Encounters:   12/08/17 103 kg (227 lb)   11/17/17 103.4 kg (227 lb 14.4 oz)   10/10/17 103.6 kg (228 lb 4.8 oz)   07/10/17 103.6 kg (228 lb 8 oz)     Gen: Alert, cooperative, pleasant, in NAD  HEENT: Sclera anicteric, PERRL, EOMI. MMM, no lesions or thrush  Neck: supple and without " adenopathy.   Lungs:CTA.   Heart: RRR  Abdomen: soft, nontender, BS active. No masses or organomegaly.   Extremities: warm, no edema.   Neuro: Speech clear. CN wnl. Gait/station wnl. Grossly non-focal    Labs:    12/13/2017 06:48   Sodium 140   Potassium 4.2   Chloride 107   Carbon Dioxide 25   Urea Nitrogen 19   Creatinine 0.95   GFR Estimate 81   GFR Estimate If Black >90   Calcium 8.5   Anion Gap 8   Magnesium 2.2   Albumin 3.8   Protein Total 7.3   Bilirubin Total 1.0   Alkaline Phosphatase 98   ALT 34   AST 22   Glucose 103 (H)   WBC 6.9   Hemoglobin 14.4   Hematocrit 43.9   Platelet Count 198   RBC Count 4.84   MCV 91   MCH 29.8   MCHC 32.8   RDW 12.8   Diff Method Automated Method   % Neutrophils 60.6   % Lymphocytes 25.3   % Monocytes 11.3   % Eosinophils 2.3   % Basophils 0.4   % Immature Granulocytes 0.1   Nucleated RBCs 0   Absolute Neutrophil 4.2   Absolute Lymphocytes 1.8   Absolute Monocytes 0.8   Absolute Eosinophils 0.2   Absolute Basophils 0.0   Abs Immature Granulocytes 0.0   Absolute Nucleated RBC 0.0       Imaging:   CT CAP 12/12/17:  IMPRESSION:   1. Stable to mildly increased size of enhancing lesion in the  posterior urinary bladder wall, biopsy-proven metastatic  cholangiocarcinoma.   2. Stable changes of left hepatectomy without evidence of recurrence.  3. No lymphadenopathy in the chest, abdomen, or pelvis.   4. Scattered sub-5 mm pulmonary nodules unchanged since 2/27/2016.  5. Stable large parapelvic right renal cyst with associated lower pole  caliectasis.     I have personally reviewed the examination and initial interpretation  and I agree with the findings.     GRAZYNA EASON MD    Impression/plan:   1. Resected stage IV cholangiocarcinoma with recurrence in bladder. Port placed 12/8. CT CAP on 12/12/17 with stable to mildly increased size of enhancing lesion in the posterior urinary bladder wall. CA 19-9 53 on 10/6/17. Labs reviewed. Proceed with C1D1 Gemcitabine + Cisplatin.  --CA  19-9 pending  --Scheduled for D8 on 12/20 with labs, infusion  --Will request [facility] visit prior to C2 on 1/3/18 with already scheduled labs, infusion  --CT CAP scheduled for 1/26 and [facility] on 1/29    2. Resected melanoma/SCC  -sees dermatology every 6 months     4. A-fib, s/p cardioversion in May 2017, hx of aortic aneurysm  -a-fib has recurred, now on eliquis. Has ongoing f/u with cardiology.    5. Gout: No symptoms currently. Followed by PCP. Colchicine prn    Wen Rodriguez PA-C    The patient was seen in conjunction with Wen Rodriguez PA-C who served as a scribe for today's visit. I have reviewed and edited the above note, and agree with the above findings and plan.      Leigh Vega PA-C    [facility] [address] [address]  [phone]            [facility] [address] [address]  [phone]

## 2017-12-13 NOTE — NURSING NOTE
"Oncology Rooming Note    December 13, 2017 6:57 AM   Girish Chauhan is a 62 year old male who presents for:    Chief Complaint   Patient presents with     Port Draw     port accessed and labs drawn by rn.  vs taken.     Oncology Clinic Visit     Return visit related to [facility] Cholangiocarcinoma     Initial Vitals: /74 (BP Location: Right arm, Patient Position: Sitting, Cuff Size: Adult Regular)  Pulse 64  Temp 97.6  F (36.4  C) (Oral)  Resp 16  Ht 1.778 m (5' 10\")  Wt 104.2 kg (229 lb 12.8 oz)  SpO2 100%  BMI 32.97 kg/m2 Estimated body mass index is 32.97 kg/(m^2) as calculated from the following:    Height as of this encounter: 1.778 m (5' 10\").    Weight as of this encounter: 104.2 kg (229 lb 12.8 oz). Body surface area is 2.27 meters squared.  No Pain (0) Comment: Data Unavailable   No LMP for male patient.  Allergies reviewed: Yes  Medications reviewed: Yes    Medications: Medication refills not needed today.  Pharmacy name entered into [employer]: [facility] [address] [place] [address] [address] AT [facility]    Clinical concerns: No new concerns. Provider was notified.    10 minutes for nursing intake (face to face time)     Ashwini Barfield LPN            "

## 2017-12-13 NOTE — PATIENT INSTRUCTIONS
Contact Numbers  [facility] Nurse Triage: [phone]  After Hours Nurse Line:  [phone]    Please call the [facility] Triage line if you experience a temperature greater than or equal to 100.5, shaking chills, have uncontrolled nausea, vomiting and/or diarrhea, dizziness, shortness of breath, chest pain, bleeding, unexplained bruising, or if you have any other new/concerning symptoms, questions or concerns.     If it is after hours, weekends, or holidays, please call either the after hours nurse line listed above.    If you are having any concerning symptoms or wish to speak to a provider before your next infusion visit, please call your care coordinator or triage to notify them so we can adequately serve you.     If you need a refill on a narcotic prescription or other medication, please call triage before your infusion appointment.         December 2017 Sunday Monday Tuesday Wednesday Thursday Friday Saturday                            1     2       3     4     5     6     7     8     Outpatient Visit    6:22 AM   [facility]     IR CHEST PORT PLACEMENT >5 YRS    6:30 AM   (75 min.)   [id number]   [facility]     INSERT PORT VASCULAR ACCESS    8:00 AM   Drew Powell PA-C    OR 9       10     11     CT CHEST/ABDOMEN/PELVIS W    7:05 AM   (20 min.)   UCCT1   [facility] CT 12     13     [facility] DRAW    6:30 AM   (15 min.)   [facility]   [facility] Draw     P RETURN    6:45 AM   (50 min.)   Leigh Vega PA   [facility] ONC INFUSION 360    8:00 AM   (360 min.)    ONCOLOGY INFUSION   [facility] 14     15     16       17     18     19     20     [facility] DRAW    6:30 AM   (15 min.)    [facility] DRAW   [facility] Draw     [facility] ONC INFUSION 360    7:00 AM   (360 min.)    ONCOLOGY INFUSION   [facility] 21     22      23       24     25     26     27     28     29     30       31                                              January 2018 Sunday Monday Tuesday Wednesday Thursday Friday Saturday        1     2     3     [facility]    6:30 AM   (15 min.)    [facility]     [facility] RETURN    6:45 AM   (50 min.)   Julia Smith PA-C   [facility]     [facility] ONC INFUSION 360    7:00 AM   (360 min.)   [facility] ONCOLOGY INFUSION   [facility] 4     5     6       7     8     9     10     [facility] DRAW    6:30 AM   (15 min.)    [facility] DRAW   [facility] Lab Draw     [facility] ONC INFUSION 360    7:00 AM   (360 min.)    ONCOLOGY INFUSION   [facility] 11     12     13       14     15     16     17     18     19     20       21     22     23     24     [facility] DRAW    6:30 AM   (15 min.)    [facility] DRAW   [facility] Lab Draw     [facility] ONC INFUSION 360    7:00 AM   (360 min.)    ONCOLOGY INFUSION   [facility] 25     26     LAB    7:30 AM   (15 min.)    LAB   [facility]     CT CHEST ABDOMEN PELVIS WWO    7:45 AM   (20 min.)   UCCT2   [facility] CT 27       28     29     [facility] RETURN    3:45 PM   (30 min.)   Naresh De Los Santos MD   [facility] 30     31     [facility] ONC INFUSION 360    7:00 AM   (360 min.)    ONCOLOGY INFUSION   [facility]                             Lab Results:  Recent Results (from the past 12 hour(s))   CBC with platelets differential    Collection Time: 12/13/17  6:48 AM   Result Value Ref Range    WBC 6.9 4.0 - 11.0 10e9/L    RBC Count 4.84 4.4 - 5.9 10e12/L    Hemoglobin 14.4 13.3 - 17.7 g/dL    Hematocrit 43.9 40.0 - 53.0 %    MCV 91 78 - 100 fl    MCH 29.8 26.5 - 33.0 pg    MCHC 32.8 31.5 - 36.5 g/dL    RDW 12.8 10.0 - 15.0 %    Platelet Count 198 150 - 450 10e9/L    Diff Method Automated Method     % Neutrophils  60.6 %    % Lymphocytes 25.3 %    % Monocytes 11.3 %    % Eosinophils 2.3 %    % Basophils 0.4 %    % Immature Granulocytes 0.1 %    Nucleated RBCs 0 0 /100    Absolute Neutrophil 4.2 1.6 - 8.3 10e9/L    Absolute Lymphocytes 1.8 0.8 - 5.3 10e9/L    Absolute Monocytes 0.8 0.0 - 1.3 10e9/L    Absolute Eosinophils 0.2 0.0 - 0.7 10e9/L    Absolute Basophils 0.0 0.0 - 0.2 10e9/L    Abs Immature Granulocytes 0.0 0 - 0.4 10e9/L    Absolute Nucleated RBC 0.0    Comprehensive metabolic panel    Collection Time: 12/13/17  6:48 AM   Result Value Ref Range    Sodium 140 133 - 144 mmol/L    Potassium 4.2 3.4 - 5.3 mmol/L    Chloride 107 94 - 109 mmol/L    Carbon Dioxide 25 20 - 32 mmol/L    Anion Gap 8 3 - 14 mmol/L    Glucose 103 (H) 70 - 99 mg/dL    Urea Nitrogen 19 7 - 30 mg/dL    Creatinine 0.95 0.66 - 1.25 mg/dL    GFR Estimate 81 >60 mL/min/1.7m2    GFR Estimate If Black >90 >60 mL/min/1.7m2    Calcium 8.5 8.5 - 10.1 mg/dL    Bilirubin Total 1.0 0.2 - 1.3 mg/dL    Albumin 3.8 3.4 - 5.0 g/dL    Protein Total 7.3 6.8 - 8.8 g/dL    Alkaline Phosphatase 98 40 - 150 U/L    ALT 34 0 - 70 U/L    AST 22 0 - 45 U/L   Magnesium    Collection Time: 12/13/17  6:48 AM   Result Value Ref Range    Magnesium 2.2 1.6 - 2.3 mg/dL

## 2017-12-13 NOTE — PROGRESS NOTES
"Reason for Visit: seen in follow-up of resected stage IV cholangiocarcinoma.    Oncology HPI: Girish Chauhan is a 62 year old man who developed RUQ pain in Dec 2015. He had progressive pain and weight loss. He had RUQ US imaging showing steatosis, then MRCP imaging which showed an ill defined mass at the confluence of the right and left hepatic ducts. He underwent resection in March 2016. Margins were negative and no lymph nodes were involved. Perineural and lymphovascular invasion was noted. He opted not to pursue adjuvant chemotherapy and had been observed without recurrence. CA 19-9 marker heydi from 33 on 7/7/17 to 53 on 10/6/17. Patient also noted to have increasing bladder nodules concerning for a urothelial malignancy on imaging. He then had cystoscopy and biopsy on 11/6/17 at [facility] which has come back consistent with metastatic cholangiocarcinoma. Pathology reviewed slides and felt morphologically similar to patient's known cholangiocarcinoma. Patient presents today for C1D1 Gemcitabine + Cisplatin.    Interval history: Girish is here alone, although wife is on the way. He has been feeling well. No recent colds/flu or other infections. He has not noticed any episodes of Afib recently. He has had 4 cardioversions. Is on Eliquis and will follows with a cardiologist at [facility]. He had some hematuria for about 1 week after the bladder biopsy, but none since then. Occasionally he feels a \"twinge\" in the bladder area, but no dysuria, frequency or urgency. No cough, mild SOB when having palpitations. No CP. No dizziness. No headaches, vision or hearing changes. Occasionally he has episodes of mild tinnitus in both ears but no hearing loss. He has history of melanoma and sees dermatologist every 6 months for surveillance. He has history of gout affecting his left big toe and left lateral side of foot. He has about 1-2 episodes per year, but only 1 this year. Usually occurs in spring or fall. He takes colchicine and " "episodes last about 1 day on this medication. He drinks about 4-6 pints beer/week. He estimates he drinks about 64 oz of water daily. Denies any smoking.    Denies fever, chills, nausea, vomiting, abdominal pain, diarrhea, constipation, rash, edema.    Current Outpatient Prescriptions   Medication Sig Dispense Refill     LORazepam (ATIVAN) 0.5 MG tablet Take 1 tablet (0.5 mg) by mouth every 4 hours as needed (Anxiety, Nausea/Vomiting or Sleep) 30 tablet 5     prochlorperazine (COMPAZINE) 10 MG tablet Take 1 tablet (10 mg) by mouth every 6 hours as needed (Nausea/Vomiting) 30 tablet 5     ondansetron (ZOFRAN) 8 MG tablet Take 1 tablet (8 mg) by mouth every 8 hours as needed (Nausea/Vomiting) 10 tablet 5     Apixaban (ELIQUIS PO) Take 5 mg by mouth 2 times daily       metoprolol (LOPRESSOR) 50 MG tablet 50 mg 2 times daily       atorvastatin (LIPITOR) 40 MG tablet TAKE 1 TABLET BY MOUTH DAILY       multivitamin, therapeutic with minerals (MULTI-VITAMIN) TABS Take 1 tablet by mouth daily 30 tablet 0     Omega-3 Fatty Acids (OMEGA-3 FISH OIL PO) Take 1,000 mg by mouth daily        Nitroglycerin (NITROSTAT SL) Place 0.4 mg under the tongue every 5 minutes as needed for chest pain (Carries medication - has never used)        Colchicine (COLCRYS PO) Take 0.6 mg by mouth as needed for moderate pain          No Known Allergies      Exam: alert, appears well.  Blood pressure 114/74, pulse 64, temperature 97.6  F (36.4  C), temperature source Oral, resp. rate 16, height 1.778 m (5' 10\"), weight 104.2 kg (229 lb 12.8 oz), SpO2 100 %.  Wt Readings from Last 4 Encounters:   12/08/17 103 kg (227 lb)   11/17/17 103.4 kg (227 lb 14.4 oz)   10/10/17 103.6 kg (228 lb 4.8 oz)   07/10/17 103.6 kg (228 lb 8 oz)     Gen: Alert, cooperative, pleasant, in NAD  HEENT: Sclera anicteric, PERRL, EOMI. MMM, no lesions or thrush  Neck: supple and without adenopathy.   Lungs:CTA.   Heart: RRR  Abdomen: soft, nontender, BS active. No masses or " organomegaly.   Extremities: warm, no edema.   Neuro: Speech clear. CN wnl. Gait/station wnl. Grossly non-focal    Labs:    12/13/2017 06:48   Sodium 140   Potassium 4.2   Chloride 107   Carbon Dioxide 25   Urea Nitrogen 19   Creatinine 0.95   GFR Estimate 81   GFR Estimate If Black >90   Calcium 8.5   Anion Gap 8   Magnesium 2.2   Albumin 3.8   Protein Total 7.3   Bilirubin Total 1.0   Alkaline Phosphatase 98   ALT 34   AST 22   Glucose 103 (H)   WBC 6.9   Hemoglobin 14.4   Hematocrit 43.9   Platelet Count 198   RBC Count 4.84   MCV 91   MCH 29.8   MCHC 32.8   RDW 12.8   Diff Method Automated Method   % Neutrophils 60.6   % Lymphocytes 25.3   % Monocytes 11.3   % Eosinophils 2.3   % Basophils 0.4   % Immature Granulocytes 0.1   Nucleated RBCs 0   Absolute Neutrophil 4.2   Absolute Lymphocytes 1.8   Absolute Monocytes 0.8   Absolute Eosinophils 0.2   Absolute Basophils 0.0   Abs Immature Granulocytes 0.0   Absolute Nucleated RBC 0.0       Imaging:   CT CAP 12/12/17:  IMPRESSION:   1. Stable to mildly increased size of enhancing lesion in the  posterior urinary bladder wall, biopsy-proven metastatic  cholangiocarcinoma.   2. Stable changes of left hepatectomy without evidence of recurrence.  3. No lymphadenopathy in the chest, abdomen, or pelvis.   4. Scattered sub-5 mm pulmonary nodules unchanged since 2/27/2016.  5. Stable large parapelvic right renal cyst with associated lower pole  caliectasis.     I have personally reviewed the examination and initial interpretation  and I agree with the findings.     GRAZYNA EASON MD    Impression/plan:   1. Resected stage IV cholangiocarcinoma with recurrence in bladder. Port placed 12/8. CT CAP on 12/12/17 with stable to mildly increased size of enhancing lesion in the posterior urinary bladder wall. CA 19-9 53 on 10/6/17. Labs reviewed. Proceed with C1D1 Gemcitabine + Cisplatin.  --CA 19-9 pending  --Scheduled for D8 on 12/20 with labs, infusion  --Will request [facility] visit  prior to C2 on 1/3/18 with already scheduled labs, infusion  --CT CAP scheduled for 1/26 and [facility] on 1/29    2. Resected melanoma/SCC  -sees dermatology every 6 months     4. A-fib, s/p cardioversion in May 2017, hx of aortic aneurysm  -a-fib has recurred, now on eliquis. Has ongoing f/u with cardiology.    5. Gout: No symptoms currently. Followed by PCP. Colchicine prn    Wen Rodriguez PA-C    The patient was seen in conjunction with Wen Rodriguez PA-C who served as a scribe for today's visit. I have reviewed and edited the above note, and agree with the above findings and plan.      Leigh Vega PA-C    [facility]  [address] [place] [id number]  [phone]            [facility] [address] [address]  [phone]

## 2017-12-13 NOTE — MR AVS SNAPSHOT
After Visit Summary   12/13/2017    Girish Chauhan    MRN: [id number]           Patient Information     Date Of Birth          1955        Visit Information        Provider Department      12/13/2017 8:00 AM  15 ATC;  ONCOLOGY INFUSION [facility]        Today's Diagnoses     Cholangiocarcinoma (H)    -  1      Care Instructions    Contact Numbers  [facility] Emergency Nurse Triage: [phone]  After Hours Nurse Line:  [phone]    Please call the [facility] Triage line if you experience a temperature greater than or equal to 100.5, shaking chills, have uncontrolled nausea, vomiting and/or diarrhea, dizziness, shortness of breath, chest pain, bleeding, unexplained bruising, or if you have any other new/concerning symptoms, questions or concerns.     If it is after hours, weekends, or holidays, please call either the after hours nurse line listed above.    If you are having any concerning symptoms or wish to speak to a provider before your next infusion visit, please call your care coordinator or triage to notify them so we can adequately serve you.     If you need a refill on a narcotic prescription or other medication, please call triage before your infusion appointment.         December 2017 Sunday Monday Tuesday Wednesday Thursday Friday Saturday                            1     2       3     4     5     6     7     8     Outpatient Visit    6:22 AM   [facility]     IR CHEST PORT PLACEMENT >5 YRS    6:30 AM   (75 min.)   ASCCARM6   [facility] Imaging     INSERT PORT VASCULAR ACCESS    8:00 AM   Drew Powell PA-C    OR 9       10     11     CT CHEST/ABDOMEN/PELVIS W    7:05 AM   (20 min.)   UCCT1   [facility] CT 12     13     [facility]    6:30 AM   (15 min.)   [facility]   [facility] Draw     UMP RETURN    6:45 AM   (50 min.)   Leigh Vega PA   [facility]  [facility]     UMP ONC INFUSION 360    8:00 AM   (360 min.)    ONCOLOGY INFUSION   [facility] 14     15     16       17     18     19     20     [facility] DRAW    6:30 AM   (15 min.)    [facility] LAB DRAW   [facility] Mason[facility] Lab Draw     [facility] ONC INFUSION 360    7:00 AM   (360 min.)    ONCOLOGY INFUSION   [facility] 21     22     23       24     25     26     27     28     29     30       31 January 2018 Sunday Monday Tuesday Wednesday Thursday Friday Saturday        1     2     3     [facility] DRAW    6:30 AM   (15 min.)    [facility] LAB DRAW   [facility] Lab Draw     UMP RETURN    6:45 AM   (50 min.)   Julia Smith PA-C   [facility]     [facility] ONC INFUSION 360    7:00 AM   (360 min.)    ONCOLOGY INFUSION   [facility] 4     5     6       7     8     9     10     [facility] DRAW    6:30 AM   (15 min.)    [facility] LAB DRAW   [facility] Lab Draw     [facility] ONC INFUSION 360    7:00 AM   (360 min.)    ONCOLOGY INFUSION   [facility] 11     12     13       14     15     16     17     18     19     20       21     22     23     24     [facility] DRAW    6:30 AM   (15 min.)    [facility] LAB DRAW   [facility] Lab Draw     UMP ONC INFUSION 360    7:00 AM   (360 min.)    ONCOLOGY INFUSION   [facility] 25     26     LAB    7:30 AM   (15 min.)    LAB   [facility] Lab     CT CHEST ABDOMEN PELVIS WWO    7:45 AM   (20 min.)   UCCT2   [facility] CT 27       28     29     UMP RETURN    3:45 PM   (30 min.)   Naresh De Los Santos MD   [facility] 30     31     [facility] ONC INFUSION 360    7:00 AM   (360 min.)    ONCOLOGY INFUSION   [facility]                             Lab Results:  Recent Results (from the past 12 hour(s))   CBC with platelets differential    Collection  Time: 12/13/17  6:48 AM   Result Value Ref Range    WBC 6.9 4.0 - 11.0 10e9/L    RBC Count 4.84 4.4 - 5.9 10e12/L    Hemoglobin 14.4 13.3 - 17.7 g/dL    Hematocrit 43.9 40.0 - 53.0 %    MCV 91 78 - 100 fl    MCH 29.8 26.5 - 33.0 pg    MCHC 32.8 31.5 - 36.5 g/dL    RDW 12.8 10.0 - 15.0 %    Platelet Count 198 150 - 450 10e9/L    Diff Method Automated Method     % Neutrophils 60.6 %    % Lymphocytes 25.3 %    % Monocytes 11.3 %    % Eosinophils 2.3 %    % Basophils 0.4 %    % Immature Granulocytes 0.1 %    Nucleated RBCs 0 0 /100    Absolute Neutrophil 4.2 1.6 - 8.3 10e9/L    Absolute Lymphocytes 1.8 0.8 - 5.3 10e9/L    Absolute Monocytes 0.8 0.0 - 1.3 10e9/L    Absolute Eosinophils 0.2 0.0 - 0.7 10e9/L    Absolute Basophils 0.0 0.0 - 0.2 10e9/L    Abs Immature Granulocytes 0.0 0 - 0.4 10e9/L    Absolute Nucleated RBC 0.0    Comprehensive metabolic panel    Collection Time: 12/13/17  6:48 AM   Result Value Ref Range    Sodium 140 133 - 144 mmol/L    Potassium 4.2 3.4 - 5.3 mmol/L    Chloride 107 94 - 109 mmol/L    Carbon Dioxide 25 20 - 32 mmol/L    Anion Gap 8 3 - 14 mmol/L    Glucose 103 (H) 70 - 99 mg/dL    Urea Nitrogen 19 7 - 30 mg/dL    Creatinine 0.95 0.66 - 1.25 mg/dL    GFR Estimate 81 >60 mL/min/1.7m2    GFR Estimate If Black >90 >60 mL/min/1.7m2    Calcium 8.5 8.5 - 10.1 mg/dL    Bilirubin Total 1.0 0.2 - 1.3 mg/dL    Albumin 3.8 3.4 - 5.0 g/dL    Protein Total 7.3 6.8 - 8.8 g/dL    Alkaline Phosphatase 98 40 - 150 U/L    ALT 34 0 - 70 U/L    AST 22 0 - 45 U/L   Magnesium    Collection Time: 12/13/17  6:48 AM   Result Value Ref Range    Magnesium 2.2 1.6 - 2.3 mg/dL               Follow-ups after your visit        Your next 10 appointments already scheduled     Dec 20, 2017  6:30 AM CST   [facility] Lab Draw with  [facility] LAB DRAW   [facility] Lab Draw ([facility] [place])    [address] [address]   [phone]            Dec 20, 2017  7:00 AM CST    Infusion 360 with UC ONCOLOGY INFUSION, UC 11 ATC   [facility] ([facility])    [address] [facility]  [address] [facility] [address]   [phone]            Jan 03, 2018  6:30 AM CST   Masonic Lab Draw with UC MASONIC LAB DRAW   [facility] Masonic Lab Draw ([facility])    [address] [address]   [phone]            Jan 03, 2018  7:00 AM CST   Infusion 360 with UC ONCOLOGY INFUSION, UC 11 ATC   [facility] ([facility])    [address] [facility]  [address] [facility] [address]   [phone]            Jan 03, 2018  7:00 AM CST   (Arrive by 6:45 AM)   Return Visit with Julia Smith PA-C   [facility] ([facility])    [address] [place] [id number] [phone]            Bernabe 10, 2018  6:30 AM CST   Masonic Lab Draw with UC MASONIC LAB DRAW   [facility] Draw ([facility])    [address] [address] [phone]            Bernabe 10, 2018  7:00 AM CST   Infusion 360 with UC ONCOLOGY INFUSION, UC 11 ATC   [facility] ([facility])    [address] [address] [phone]              Who to contact     If you have questions or need follow up information about today's clinic visit or your schedule please contact [facility] directly at [phone].  Normal or non-critical lab and imaging results will be communicated to you by MyChart, letter or phone within 4 business days after the clinic has received the results. If you do not hear from us within 7 days, please contact the clinic through MyChart or phone. If you have a critical or abnormal lab result, we will notify you by phone as soon as possible.  Submit refill  requests through [employer] or call your pharmacy and they will forward the refill request to us. Please allow 3 business days for your refill to be completed.          Additional Information About Your Visit        [employer] Information     [employer] gives you secure access to your electronic health record. If you see a primary care provider, you can also send messages to your care team and make appointments. If you have questions, please call your primary care clinic.  If you do not have a primary care provider, please call [phone] and they will assist you.        Care EveryWhere ID     This is your Care EveryWhere ID. This could be used by other organizations to access your [place] medical records  [id number]         Blood Pressure from Last 3 Encounters:   12/13/17 114/74   12/08/17 100/70   11/17/17 (!) 128/92    Weight from Last 3 Encounters:   12/13/17 104.2 kg (229 lb 12.8 oz)   12/08/17 103 kg (227 lb)   11/17/17 103.4 kg (227 lb 14.4 oz)              We Performed the Following     Cancer antigen 19-9     CBC with platelets differential     Comprehensive metabolic panel     Magnesium          Today's Medication Changes          These changes are accurate as of: 12/13/17  8:25 AM.  If you have any questions, ask your nurse or doctor.               Start taking these medicines.        Dose/Directions    LORazepam 0.5 MG tablet   Commonly known as:  ATIVAN   Used for:  Cholangiocarcinoma (H)        Dose:  0.5 mg   Take 1 tablet (0.5 mg) by mouth every 4 hours as needed (Anxiety, Nausea/Vomiting or Sleep)   Quantity:  30 tablet   Refills:  5       ondansetron 8 MG tablet   Commonly known as:  ZOFRAN   Used for:  Cholangiocarcinoma (H)        Dose:  8 mg   Take 1 tablet (8 mg) by mouth every 8 hours as needed (Nausea/Vomiting)   Quantity:  10 tablet   Refills:  5       prochlorperazine 10 MG tablet   Commonly known as:  COMPAZINE   Used for:  Cholangiocarcinoma (H)        Dose:  10 mg   Take 1 tablet (10 mg) by  mouth every 6 hours as needed (Nausea/Vomiting)   Quantity:  30 tablet   Refills:  5            Where to get your medicines      These medications were sent to [place], [place] - [address] [facility] [address]  [address] [facility] [address], [facility] [address]    Hours:  TRANSPLANT PHONE NUMBER [phone] Phone:  [phone]     ondansetron 8 MG tablet    prochlorperazine 10 MG tablet         Some of these will need a paper prescription and others can be bought over the counter.  Ask your nurse if you have questions.     Bring a paper prescription for each of these medications     LORazepam 0.5 MG tablet                Primary Care Provider Office Phone # Fax #    Jim Kaplan [phone]       [address]   [facility] [address]        Equal Access to Services     MARIUSZ MADRIGAL : Hadii adina stearns hadasho Soomaali, waaxda luqadaha, qaybta kaalmada adeegyada, ankit yuan . So [facility] [phone].    ATENCIÓN: Si habla español, tiene a flores disposición servicios gratuitos de asistencia lingüística. Angela al [phone].    We comply with applicable federal civil rights laws and [place] laws. We do not discriminate on the basis of race, color, national origin, age, disability, sex, sexual orientation, or gender identity.            Thank you!     Thank you for choosing [facility]  for your care. Our goal is always to provide you with excellent care. Hearing back from our patients is one way we can continue to improve our services. Please take a few minutes to complete the written survey that you may receive in the mail after your visit with us. Thank you!             Your Updated Medication List - Protect others around you: Learn how to safely use, store and throw away your medicines at www.disposemymeds.org.          This list is accurate as of: 12/13/17  8:25 AM.  Always use your most  recent med list.                   Brand Name Dispense Instructions for use Diagnosis    atorvastatin 40 MG tablet    LIPITOR     TAKE 1 TABLET BY MOUTH DAILY        COLCRYS PO      Take 0.6 mg by mouth as needed for moderate pain        ELIQUIS PO      Take 5 mg by mouth 2 times daily        LORazepam 0.5 MG tablet    ATIVAN    30 tablet    Take 1 tablet (0.5 mg) by mouth every 4 hours as needed (Anxiety, Nausea/Vomiting or Sleep)    Cholangiocarcinoma (H)       metoprolol 50 MG tablet    LOPRESSOR     50 mg 2 times daily        multivitamin, therapeutic with minerals Tabs tablet     30 tablet    Take 1 tablet by mouth daily    Mass of bile duct       NITROSTAT SL      Place 0.4 mg under the tongue every 5 minutes as needed for chest pain (Carries medication - has never used)        OMEGA-3 FISH OIL PO      Take 1,000 mg by mouth daily        ondansetron 8 MG tablet    ZOFRAN    10 tablet    Take 1 tablet (8 mg) by mouth every 8 hours as needed (Nausea/Vomiting)    Cholangiocarcinoma (H)       prochlorperazine 10 MG tablet    COMPAZINE    30 tablet    Take 1 tablet (10 mg) by mouth every 6 hours as needed (Nausea/Vomiting)    Cholangiocarcinoma (H)

## 2017-12-14 LAB — CANCER AG19-9 SERPL-ACNC: 69 U/ML (ref 0–37)

## 2017-12-16 ENCOUNTER — NURSE TRIAGE (OUTPATIENT)
Dept: NURSING | Facility: CLINIC | Age: 62
End: 2017-12-16

## 2017-12-16 ENCOUNTER — TELEPHONE (OUTPATIENT)
Dept: OTHER | Facility: CLINIC | Age: 62
End: 2017-12-16

## 2017-12-16 NOTE — TELEPHONE ENCOUNTER
Regarding: Report symptoms  ----- Message from Toya Marni sent at 12/16/2017 12:15 PM CST -----  Reason for call:  Other   Patient called regarding (reason for call): call back  Additional comments: Per patient wanted to report symptoms. Per patient had a gout flair up. Per patient does not currently have the got flair up.    Phone number to reach patient:  Cell number on file:  Telephone Information:  Mobile          [phone]      Best Time:  anytime    Can we leave a detailed message on this number?  YES

## 2017-12-16 NOTE — TELEPHONE ENCOUNTER
"Patient states that he is a new chemo Patient.  States that he was woken up around midnight last night with severe pain, redness and swelling in the left little toe, \" I know it was gout.\"  Patient went back to sleep and the symptoms resolved.   Has no pain in the toe, no redness, no swelling.   Denies fever.    Patient has Mitigare on hand, prescribed by his [facility] PCP, Jim Kaplan, patient wants to know if he can take that medication if he needs it, while he's on chemo.    Will page the on call oncologist to call the Patient regarding his medication question.     Patient's primary oncologist is Dr. De Los Santos at [facility]. Dr Contreras is on call for this clinic, he was paged via the page  at 1:13pm to call the Patient directly at [phone].  Patient was advised to call back if he has not heard from the provider within 20 minutes.    Reason for Disposition    Caller has URGENT medication question about med that PCP prescribed and triager unable to answer question    Protocols used: MEDICATION QUESTION CALL-ADULT-    "

## 2017-12-16 NOTE — TELEPHONE ENCOUNTER
Pt called to discuss gout flare last night that has resolved spontaneously. He has just started chemo with [place]/Cisplatin, first dose 12/13/17. He has a prescription to take Colchicine with gout flares that occur a couple times a year. He wants to know if it would be safe to take given recent chemotherapy.     Discussed this with Pharmacist. Additional of Colchicine may exacerbate GI side effects but if he is tolerating well then there should be no toxicities. He will only use as needed should gout flare recur.    I recommended that he let Dr. De Los Santos know if he notices more frequent gout attacks (is not on allopurinol) while on chemo.      Ricardo Contreras MD   Hematology / Oncology Fellow  P: [phone]      CC: Dr. De Los Santos

## 2017-12-20 ENCOUNTER — APPOINTMENT (OUTPATIENT)
Dept: LAB | Facility: CLINIC | Age: 62
End: 2017-12-20
Attending: INTERNAL MEDICINE
Payer: COMMERCIAL

## 2017-12-20 ENCOUNTER — INFUSION THERAPY VISIT (OUTPATIENT)
Dept: ONCOLOGY | Facility: CLINIC | Age: 62
End: 2017-12-20
Attending: INTERNAL MEDICINE
Payer: COMMERCIAL

## 2017-12-20 VITALS
DIASTOLIC BLOOD PRESSURE: 82 MMHG | TEMPERATURE: 97.7 F | WEIGHT: 224 LBS | OXYGEN SATURATION: 100 % | SYSTOLIC BLOOD PRESSURE: 116 MMHG | HEART RATE: 76 BPM | BODY MASS INDEX: 32.14 KG/M2 | RESPIRATION RATE: 18 BRPM

## 2017-12-20 DIAGNOSIS — C22.1 CHOLANGIOCARCINOMA (H): Primary | ICD-10-CM

## 2017-12-20 LAB
ALBUMIN SERPL-MCNC: 3.5 G/DL (ref 3.4–5)
ALP SERPL-CCNC: 82 U/L (ref 40–150)
ALT SERPL W P-5'-P-CCNC: 89 U/L (ref 0–70)
ANION GAP SERPL CALCULATED.3IONS-SCNC: 7 MMOL/L (ref 3–14)
AST SERPL W P-5'-P-CCNC: 33 U/L (ref 0–45)
BASOPHILS # BLD AUTO: 0 10E9/L (ref 0–0.2)
BASOPHILS NFR BLD AUTO: 0.6 %
BILIRUB SERPL-MCNC: 0.6 MG/DL (ref 0.2–1.3)
BUN SERPL-MCNC: 17 MG/DL (ref 7–30)
CALCIUM SERPL-MCNC: 8.2 MG/DL (ref 8.5–10.1)
CHLORIDE SERPL-SCNC: 108 MMOL/L (ref 94–109)
CO2 SERPL-SCNC: 26 MMOL/L (ref 20–32)
CREAT SERPL-MCNC: 0.83 MG/DL (ref 0.66–1.25)
DIFFERENTIAL METHOD BLD: ABNORMAL
EOSINOPHIL # BLD AUTO: 0.1 10E9/L (ref 0–0.7)
EOSINOPHIL NFR BLD AUTO: 1.5 %
ERYTHROCYTE [DISTWIDTH] IN BLOOD BY AUTOMATED COUNT: 12 % (ref 10–15)
GFR SERPL CREATININE-BSD FRML MDRD: >90 ML/MIN/1.7M2
GLUCOSE SERPL-MCNC: 96 MG/DL (ref 70–99)
HCT VFR BLD AUTO: 40 % (ref 40–53)
HGB BLD-MCNC: 13.2 G/DL (ref 13.3–17.7)
IMM GRANULOCYTES # BLD: 0 10E9/L (ref 0–0.4)
IMM GRANULOCYTES NFR BLD: 0 %
LYMPHOCYTES # BLD AUTO: 1.7 10E9/L (ref 0.8–5.3)
LYMPHOCYTES NFR BLD AUTO: 49.4 %
MAGNESIUM SERPL-MCNC: 2.1 MG/DL (ref 1.6–2.3)
MCH RBC QN AUTO: 29.5 PG (ref 26.5–33)
MCHC RBC AUTO-ENTMCNC: 33 G/DL (ref 31.5–36.5)
MCV RBC AUTO: 90 FL (ref 78–100)
MONOCYTES # BLD AUTO: 0.2 10E9/L (ref 0–1.3)
MONOCYTES NFR BLD AUTO: 6.7 %
NEUTROPHILS # BLD AUTO: 1.4 10E9/L (ref 1.6–8.3)
NEUTROPHILS NFR BLD AUTO: 41.8 %
NRBC # BLD AUTO: 0 10*3/UL
NRBC BLD AUTO-RTO: 0 /100
PLATELET # BLD AUTO: 145 10E9/L (ref 150–450)
POTASSIUM SERPL-SCNC: 3.8 MMOL/L (ref 3.4–5.3)
PROT SERPL-MCNC: 6.9 G/DL (ref 6.8–8.8)
RBC # BLD AUTO: 4.47 10E12/L (ref 4.4–5.9)
SODIUM SERPL-SCNC: 141 MMOL/L (ref 133–144)
WBC # BLD AUTO: 3.4 10E9/L (ref 4–11)

## 2017-12-20 PROCEDURE — 85025 COMPLETE CBC W/AUTO DIFF WBC: CPT | Performed by: INTERNAL MEDICINE

## 2017-12-20 PROCEDURE — 80053 COMPREHEN METABOLIC PANEL: CPT | Performed by: INTERNAL MEDICINE

## 2017-12-20 PROCEDURE — 96375 TX/PRO/DX INJ NEW DRUG ADDON: CPT

## 2017-12-20 PROCEDURE — 96413 CHEMO IV INFUSION 1 HR: CPT

## 2017-12-20 PROCEDURE — 25000128 H RX IP 250 OP 636: Mod: ZF | Performed by: INTERNAL MEDICINE

## 2017-12-20 PROCEDURE — 83735 ASSAY OF MAGNESIUM: CPT | Performed by: INTERNAL MEDICINE

## 2017-12-20 PROCEDURE — 96367 TX/PROPH/DG ADDL SEQ IV INF: CPT

## 2017-12-20 PROCEDURE — 96417 CHEMO IV INFUS EACH ADDL SEQ: CPT

## 2017-12-20 RX ORDER — HEPARIN SODIUM (PORCINE) LOCK FLUSH IV SOLN 100 UNIT/ML 100 UNIT/ML
5 SOLUTION INTRAVENOUS EVERY 8 HOURS PRN
Status: DISCONTINUED | OUTPATIENT
Start: 2017-12-20 | End: 2017-12-20 | Stop reason: HOSPADM

## 2017-12-20 RX ORDER — COLCHICINE 0.6 MG/1
0.6 CAPSULE ORAL 3 TIMES DAILY PRN
Status: ON HOLD | COMMUNITY
End: 2019-07-30

## 2017-12-20 RX ORDER — ONDANSETRON 2 MG/ML
8 INJECTION INTRAMUSCULAR; INTRAVENOUS ONCE
Status: COMPLETED | OUTPATIENT
Start: 2017-12-20 | End: 2017-12-20

## 2017-12-20 RX ADMIN — MAGNESIUM SULFATE HEPTAHYDRATE: 500 INJECTION, SOLUTION INTRAMUSCULAR; INTRAVENOUS at 07:55

## 2017-12-20 RX ADMIN — SODIUM CHLORIDE, PRESERVATIVE FREE 5 ML: 5 INJECTION INTRAVENOUS at 09:55

## 2017-12-20 RX ADMIN — DEXAMETHASONE SODIUM PHOSPHATE: 10 INJECTION, SOLUTION INTRAMUSCULAR; INTRAVENOUS at 07:31

## 2017-12-20 RX ADMIN — GEMCITABINE 2200 MG: 38 INJECTION, SOLUTION INTRAVENOUS at 09:18

## 2017-12-20 RX ADMIN — SODIUM CHLORIDE, PRESERVATIVE FREE 5 ML: 5 INJECTION INTRAVENOUS at 06:45

## 2017-12-20 RX ADMIN — SODIUM CHLORIDE 250 ML: 9 INJECTION, SOLUTION INTRAVENOUS at 07:31

## 2017-12-20 RX ADMIN — ONDANSETRON 8 MG: 2 INJECTION INTRAMUSCULAR; INTRAVENOUS at 07:42

## 2017-12-20 RX ADMIN — CISPLATIN 60 MG: 1 INJECTION, SOLUTION INTRAVENOUS at 08:16

## 2017-12-20 ASSESSMENT — PAIN SCALES - GENERAL: PAINLEVEL: NO PAIN (0)

## 2017-12-20 NOTE — PATIENT INSTRUCTIONS
Oncology: Preventing Infections  Chemotherapy can make your body less able to fight off infection. This happens because treatment reduces the number of white blood cells. White blood cells fight infection in your body. To help prevent infections, follow the tips below.     Scrub your hands with soap and warm water for at least 15 seconds.   Know your emigdio  The emigdio is the time during your chemotherapy cycle when you have the fewest white blood cells. The length of your emigdio and when it occurs depend on the medicines you are taking. Each medicine has its own emigdio. Talk with your doctor or nurse about your emigdio period. Then take extra precautions to prevent infection at that time.  Protect yourself    Keep your hands clean. To reduce your risk of infection, bathe every day and wash your hands often throughout the day. For best results, lather them with soap for at least 15 seconds. Wash your hands before eating, after spending time in public places, and after using the bathroom.    Stay away from some foods. Limit your risk. Don t eat uncooked or undercooked meat or fish. You may also be told not to eat raw vegetables or thin-skinned fruits during your emigdio.    Reduce your risk for illness. During this time your body is less able to fight off colds, measles, and other illnesses. Stay away from anyone who has a fever or an infection. Also stay away from large crowds during your emigdio.    Wear gloves. Make it harder for infection to enter your body. Wear gloves when you work around germs and dirt. Have someone else clean a pet s tank, cage, or litter box.    Try not to accidentally cut yourself. Protect your feet from injury and germs by not walking barefoot.  How medicines can help    Prevent and treat infection. Antibiotics work in this way by attacking and killing the germs that cause infection.    Trigger new cell growth. These medicines cause your body to make new white blood cells. Neupogen is an example  of such a medicine.  Talk with your doctor about the best medicines for you. In some cases, your doctor may tell you to not take acetaminophen or NSAIDs for pain relief because these medicines can  mask  a fever if you have neutropenia. Talk with your doctor about medicines for pain control if you need it during your emigdio period.  When to seek medical advice  Contact your doctor right away if you have any of the following:    Fever of 100.4 F (38 C) or higher, or as directed by your healthcare provider    Burning when you urinate    Severe coughing or sore throat    Shortness of breath, sweating, or chills    Pain, especially near an open wound or catheter site   Date Last Reviewed: 1/3/2016    4447-9523 [employer]. [address] [place] [address], [place] [address]. All rights reserved. This information is not intended as a substitute for professional medical care. Always follow your healthcare professional's instructions.    Contact Numbers  [facility]: [phone]    After Hours:  [phone]  Triage: [phone]    Please call the [facility] Triage line if you experience a temperature greater than or equal to 100.5, shaking chills, have uncontrolled nausea, vomiting and/or diarrhea, dizziness, shortness of breath, chest pain, bleeding, unexplained bruising, or if you have any other new/concerning symptoms, questions or concerns.     If it is after hours, weekends, or holidays, please call the main hospital  at  [phone] and ask to speak to the Oncology doctor on call.     If you are having any concerning symptoms or wish to speak to a provider before your next infusion visit, please call your care coordinator or triage to notify them so we can adequately serve you.     If you need a refill on a narcotic prescription or other medication, please call triage before your infusion appointment.         December 2017 Sunday Monday Tuesday Wednesday Thursday Friday Saturday                             1     2       3     4     5     6     7     8     Outpatient Visit    6:22 AM   [facility]     IR CHEST PORT PLACEMENT >5 YRS    6:30 AM   (75 min.)   UCASCCARM6   [facility] ASC Imaging     INSERT PORT VASCULAR ACCESS    8:00 AM   Drew Powell PA-C    OR 9       10     11     CT CHEST/ABDOMEN/PELVIS W    7:05 AM   (20 min.)   UCCT1   [facility] CT 12     13     [facility] DRAW    6:30 AM   (15 min.)    MASONIC LAB DRAW   [facility] Mason[facility] Lab Draw     UMP RETURN    6:45 AM   (50 min.)   Leigh Vega PA   [facility]     UMP ONC INFUSION 360    8:00 AM   (360 min.)    ONCOLOGY INFUSION   [facility] 14     15     16       17     18     19     20     [facility] LAB DRAW    6:30 AM   (15 min.)    MASONIC LAB DRAW   [facility] Mason[facility] Lab Draw     P ONC INFUSION 360    7:00 AM   (360 min.)    ONCOLOGY INFUSION   [facility] 21     22     23       24     25     26     27     28     29     30       31 January 2018 Sunday Monday Tuesday Wednesday Thursday Friday Saturday        1     2     3     [facility] DRAW    6:30 AM   (15 min.)    M[facility]ONIC LAB DRAW   [facility] Mason[facility] Lab Draw     UMP RETURN    6:45 AM   (50 min.)   [facility]    7:30 AM   (360 min.)    ONCOLOGY INFUSION   [facility] 4     5     6       7     8     9     10     [facility] DRAW    6:30 AM   (15 min.)    MASONIC LAB DRAW   [facility] Mason[facility] Lab Draw     UMP ONC INFUSION 360    7:00 AM   (360 min.)    ONCOLOGY INFUSION   [facility] 11     12     13       14     15     16     17     18     19     20       21     22     23     24     [facility] DRAW    6:30 AM   (15 min.)    MASONIC LAB DRAW   [facility] Masonic Lab Draw     UMP  ONC INFUSION 360    7:00 AM   (360 min.)    ONCOLOGY INFUSION   [facility] 25     26     LAB    7:30 AM   (15 min.)    LAB   [facility] Lab     CT CHEST ABDOMEN PELVIS WWO    7:45 AM   (20 min.)   UCCT2   [facility] CT 27       28     29     UMP RETURN    3:45 PM   (30 min.)   Naresh De Los Santos MD   [facility] 30     31     UMP ONC INFUSION 360    7:00 AM   (360 min.)    ONCOLOGY INFUSION   [facility]                            Recent Results (from the past 24 hour(s))   CBC with platelets differential    Collection Time: 12/20/17  6:51 AM   Result Value Ref Range    WBC 3.4 (L) 4.0 - 11.0 10e9/L    RBC Count 4.47 4.4 - 5.9 10e12/L    Hemoglobin 13.2 (L) 13.3 - 17.7 g/dL    Hematocrit 40.0 40.0 - 53.0 %    MCV 90 78 - 100 fl    MCH 29.5 26.5 - 33.0 pg    MCHC 33.0 31.5 - 36.5 g/dL    RDW 12.0 10.0 - 15.0 %    Platelet Count 145 (L) 150 - 450 10e9/L    Diff Method Automated Method     % Neutrophils 41.8 %    % Lymphocytes 49.4 %    % Monocytes 6.7 %    % Eosinophils 1.5 %    % Basophils 0.6 %    % Immature Granulocytes 0.0 %    Nucleated RBCs 0 0 /100    Absolute Neutrophil 1.4 (L) 1.6 - 8.3 10e9/L    Absolute Lymphocytes 1.7 0.8 - 5.3 10e9/L    Absolute Monocytes 0.2 0.0 - 1.3 10e9/L    Absolute Eosinophils 0.1 0.0 - 0.7 10e9/L    Absolute Basophils 0.0 0.0 - 0.2 10e9/L    Abs Immature Granulocytes 0.0 0 - 0.4 10e9/L    Absolute Nucleated RBC 0.0    Comprehensive metabolic panel    Collection Time: 12/20/17  6:51 AM   Result Value Ref Range    Sodium 141 133 - 144 mmol/L    Potassium 3.8 3.4 - 5.3 mmol/L    Chloride 108 94 - 109 mmol/L    Carbon Dioxide 26 20 - 32 mmol/L    Anion Gap 7 3 - 14 mmol/L    Glucose 96 70 - 99 mg/dL    Urea Nitrogen 17 7 - 30 mg/dL    Creatinine 0.83 0.66 - 1.25 mg/dL    GFR Estimate >90 >60 mL/min/1.7m2    GFR Estimate If Black >90 >60 mL/min/1.7m2    Calcium 8.2 (L) 8.5 - 10.1 mg/dL    Bilirubin Total 0.6  0.2 - 1.3 mg/dL    Albumin 3.5 3.4 - 5.0 g/dL    Protein Total 6.9 6.8 - 8.8 g/dL    Alkaline Phosphatase 82 40 - 150 U/L    ALT 89 (H) 0 - 70 U/L    AST 33 0 - 45 U/L   Magnesium    Collection Time: 12/20/17  6:51 AM   Result Value Ref Range    Magnesium 2.1 1.6 - 2.3 mg/dL

## 2017-12-20 NOTE — NURSING NOTE
Chief Complaint   Patient presents with     Port Draw     Labs drawn via port by RN. Line flushed and hep locked. VS taken.     Lilliam Jackson RN

## 2017-12-20 NOTE — PROGRESS NOTES
Infusion Nursing Note:  Girish Chauhan presents today for C1D8 Cisplatin- Gemzar.    Patient seen by provider today: No    Treatment Conditions:  Lab Results   Component Value Date    HGB 13.2 12/20/2017     Lab Results   Component Value Date    WBC 3.4 12/20/2017      Lab Results   Component Value Date    ANEU 1.4 12/20/2017     Lab Results   Component Value Date     12/20/2017      Lab Results   Component Value Date     12/20/2017                   Lab Results   Component Value Date    POTASSIUM 3.8 12/20/2017           Lab Results   Component Value Date    MAG 2.1 12/20/2017            Lab Results   Component Value Date    CR 0.83 12/20/2017                   Lab Results   Component Value Date    GARY 8.2 12/20/2017                Lab Results   Component Value Date    BILITOTAL 0.6 12/20/2017           Lab Results   Component Value Date    ALBUMIN 3.5 12/20/2017                    Lab Results   Component Value Date    ALT 89 12/20/2017           Lab Results   Component Value Date    AST 33 12/20/2017     Results reviewed, labs MET treatment parameters, ok to proceed with treatment.        Intravenous Access:  Implanted Port.  Access dc'd at time of discharge.      Note:   Results reviewed, copy given to patient.  Proceed with treatment.    Copy of AVS given to patient. + Blood return from PORT pre and post infusion.  Tolerated infusion without incident. No Prescriptions filled today.   D/C in care of self.  Pt will return 1/3 for next appointment.       Akiko Nicole RN

## 2017-12-20 NOTE — MR AVS SNAPSHOT
After Visit Summary   12/20/2017    Girish Chauhan    MRN: [id number]           Patient Information     Date Of Birth          1955        Visit Information        Provider Department      12/20/2017 7:00 AM  11 ATC;  ONCOLOGY [facility]        Today's Diagnoses     Cholangiocarcinoma (H)    -  1      Care Instructions      Oncology: Preventing Infections  Chemotherapy can make your body less able to fight off infection. This happens because treatment reduces the number of white blood cells. White blood cells fight infection in your body. To help prevent infections, follow the tips below.     Scrub your hands with soap and warm water for at least 15 seconds.   Know your emigdio  The emigdio is the time during your chemotherapy cycle when you have the fewest white blood cells. The length of your emigdio and when it occurs depend on the medicines you are taking. Each medicine has its own emigdio. Talk with your doctor or nurse about your emigdio period. Then take extra precautions to prevent infection at that time.  Protect yourself    Keep your hands clean. To reduce your risk of infection, bathe every day and wash your hands often throughout the day. For best results, lather them with soap for at least 15 seconds. Wash your hands before eating, after spending time in public places, and after using the bathroom.    Stay away from some foods. Limit your risk. Don t eat uncooked or undercooked meat or fish. You may also be told not to eat raw vegetables or thin-skinned fruits during your emigdio.    Reduce your risk for illness. During this time your body is less able to fight off colds, measles, and other illnesses. Stay away from anyone who has a fever or an infection. Also stay away from large crowds during your emigdio.    Wear gloves. Make it harder for infection to enter your body. Wear gloves when you work around germs and dirt. Have someone else clean a pet s tank, cage,  or litter box.    Try not to accidentally cut yourself. Protect your feet from injury and germs by not walking barefoot.  How medicines can help    Prevent and treat infection. Antibiotics work in this way by attacking and killing the germs that cause infection.    Trigger new cell growth. These medicines cause your body to make new white blood cells. Neupogen is an example of such a medicine.  Talk with your doctor about the best medicines for you. In some cases, your doctor may tell you to not take acetaminophen or NSAIDs for pain relief because these medicines can  mask  a fever if you have neutropenia. Talk with your doctor about medicines for pain control if you need it during your emigdio period.  When to seek medical advice  Contact your doctor right away if you have any of the following:    Fever of 100.4 F (38 C) or higher, or as directed by your healthcare provider    Burning when you urinate    Severe coughing or sore throat    Shortness of breath, sweating, or chills    Pain, especially near an open wound or catheter site   Date Last Reviewed: 1/3/2016    6318-6654 [employer]. [address] [place] [address]. All rights reserved. This information is not intended as a substitute for professional medical care. Always follow your healthcare professional's instructions.    Contact Numbers  [facility]: [phone]    After Hours:  [phone]  Triage: [phone]    Please call the [facility] Triage line if you experience a temperature greater than or equal to 100.5, shaking chills, have uncontrolled nausea, vomiting and/or diarrhea, dizziness, shortness of breath, chest pain, bleeding, unexplained bruising, or if you have any other new/concerning symptoms, questions or concerns.     If it is after hours, weekends, or holidays, please call the main hospital  at  [phone] and ask to speak to the Oncology doctor on call.     If you are having any concerning symptoms  or wish to speak to a provider before your next infusion visit, please call your care coordinator or triage to notify them so we can adequately serve you.     If you need a refill on a narcotic prescription or other medication, please call triage before your infusion appointment.         December 2017 Sunday Monday Tuesday Wednesday Thursday Friday Saturday                            1     2       3     4     5     6     7     8     Outpatient Visit    6:22 AM   [facility]     IR CHEST PORT PLACEMENT >5 YRS    6:30 AM   (75 min.)   [id number]   [facility]     INSERT PORT VASCULAR ACCESS    8:00 AM   Drew Powell PA-C    OR 9       10     11     CT CHEST/ABDOMEN/PELVIS W    7:05 AM   (20 min.)   UCCT1   [facility] CT 12     13     [facility] DRAW    6:30 AM   (15 min.)    [facility] LAB DRAW   [facility] Lab Draw     [facility] RETURN    6:45 AM   (50 min.)   Leigh Vega [facility]    8:00 AM   (360 min.)    ONCOLOGY INFUSION   [facility] 14     15     16       17     18     19     20     [facility] DRAW    6:30 AM   (15 min.)    [facility] LAB DRAW   [facility] Draw     P ONC INFUSION 360    7:00 AM   (360 min.)    ONCOLOGY INFUSION   [facility] 21     22     23       24     25     26     27     28     29     30       31 January 2018 Sunday Monday Tuesday Wednesday Thursday Friday Saturday        1     2     3     [facility] DRAW    6:30 AM   (15 min.)    [facility] LAB DRAW   [facility] Draw     [facility] RETURN    6:45 AM   (50 min.)   uJlia Smith PA-C   [facility]     [facility] ONC INFUSION 360    7:30 AM   (360 min.)    ONCOLOGY INFUSION   [facility] 4     5     6       7     8     9     10     [facility] DRAW    6:30  AM   (15 min.)   [facility] DRAW   [facility] Lab Draw     [facility] ONC INFUSION 360    7:00 AM   (360 min.)    ONCOLOGY INFUSION   [facility] 11     12     13       14     15     16     17     18     19     20       21     22     23     24     [facility] DRAW    6:30 AM   (15 min.)    [facility] LAB DRAW   [facility] Lab Draw     [facility] ONC INFUSION 360    7:00 AM   (360 min.)    ONCOLOGY INFUSION   [facility] 25     26     LAB    7:30 AM   (15 min.)    LAB   [facility]     CT CHEST ABDOMEN PELVIS WWO    7:45 AM   (20 min.)   UCCT2   [facility] CT 27       28     29     [facility] RETURN    3:45 PM   (30 min.)   Naresh De Los Santos MD   [facility] 30     31     [facility] ONC INFUSION 360    7:00 AM   (360 min.)    ONCOLOGY INFUSION   [facility]                            Recent Results (from the past 24 hour(s))   CBC with platelets differential    Collection Time: 12/20/17  6:51 AM   Result Value Ref Range    WBC 3.4 (L) 4.0 - 11.0 10e9/L    RBC Count 4.47 4.4 - 5.9 10e12/L    Hemoglobin 13.2 (L) 13.3 - 17.7 g/dL    Hematocrit 40.0 40.0 - 53.0 %    MCV 90 78 - 100 fl    MCH 29.5 26.5 - 33.0 pg    MCHC 33.0 31.5 - 36.5 g/dL    RDW 12.0 10.0 - 15.0 %    Platelet Count 145 (L) 150 - 450 10e9/L    Diff Method Automated Method     % Neutrophils 41.8 %    % Lymphocytes 49.4 %    % Monocytes 6.7 %    % Eosinophils 1.5 %    % Basophils 0.6 %    % Immature Granulocytes 0.0 %    Nucleated RBCs 0 0 /100    Absolute Neutrophil 1.4 (L) 1.6 - 8.3 10e9/L    Absolute Lymphocytes 1.7 0.8 - 5.3 10e9/L    Absolute Monocytes 0.2 0.0 - 1.3 10e9/L    Absolute Eosinophils 0.1 0.0 - 0.7 10e9/L    Absolute Basophils 0.0 0.0 - 0.2 10e9/L    Abs Immature Granulocytes 0.0 0 - 0.4 10e9/L    Absolute Nucleated RBC 0.0    Comprehensive metabolic panel    Collection Time: 12/20/17  6:51 AM   Result Value Ref Range    Sodium 141 133 - 144  mmol/L    Potassium 3.8 3.4 - 5.3 mmol/L    Chloride 108 94 - 109 mmol/L    Carbon Dioxide 26 20 - 32 mmol/L    Anion Gap 7 3 - 14 mmol/L    Glucose 96 70 - 99 mg/dL    Urea Nitrogen 17 7 - 30 mg/dL    Creatinine 0.83 0.66 - 1.25 mg/dL    GFR Estimate >90 >60 mL/min/1.7m2    GFR Estimate If Black >90 >60 mL/min/1.7m2    Calcium 8.2 (L) 8.5 - 10.1 mg/dL    Bilirubin Total 0.6 0.2 - 1.3 mg/dL    Albumin 3.5 3.4 - 5.0 g/dL    Protein Total 6.9 6.8 - 8.8 g/dL    Alkaline Phosphatase 82 40 - 150 U/L    ALT 89 (H) 0 - 70 U/L    AST 33 0 - 45 U/L   Magnesium    Collection Time: 12/20/17  6:51 AM   Result Value Ref Range    Magnesium 2.1 1.6 - 2.3 mg/dL                     Follow-ups after your visit        Your next 10 appointments already scheduled     Jan 03, 2018  6:30 AM CST   [facility] Lab Draw with [facility] DRAW   [facility]    [address] [place] [address] [phone]            Jan 03, 2018  7:00 AM CST   (Arrive by 6:45 AM)   Return Visit with Julia Smith PA-C   [facility] ([facility])    [address]  [address] [facility] [address]   [phone]            Jan 03, 2018  7:30 AM CST   Infusion 360 with UC ONCOLOGY INFUSION, UC 13 ATC   [facility] ([facility])    [address]  2nd Floor  [facility] [address]   [phone]            Bernabe 10, 2018  6:30 AM CST   Masonic Lab Draw with [facility] LAB DRAW   [facility]    [address] [place] [address]  [address] [facility] [address]   [phone]            Bernabe 10, 2018  7:00 AM CST   Infusion 360 with UC ONCOLOGY INFUSION, UC 11 ATC   [facility] ([facility])    [address]  2nd Floor  [facility] [address]   [phone]            Jan 24, 2018  6:30  AM CST   Masonic Lab Draw with  MASONIC LAB DRAW   [facility] ([facility])    [address]  2nd Floor  [facility] [address] [phone]            Jan 24, 2018  7:00 AM CST   Infusion 360 with UC ONCOLOGY INFUSION, UC 11 ATC   [facility] ([facility])    [address]  [address] [facility] [address] [phone]              Who to contact     If you have questions or need follow up information about today's clinic visit or your schedule please contact [facility] directly at [phone].  Normal or non-critical lab and imaging results will be communicated to you by [facility], letter or phone within 4 business days after the clinic has received the results. If you do not hear from us within 7 days, please contact the clinic through [employer] or phone. If you have a critical or abnormal lab result, we will notify you by phone as soon as possible.  Submit refill requests through [employer] or call your pharmacy and they will forward the refill request to us. Please allow 3 business days for your refill to be completed.          Additional Information About Your Visit        [facility] Information     [employer] gives you secure access to your electronic health record. If you see a primary care provider, you can also send messages to your care team and make appointments. If you have questions, please call your primary care clinic.  If you do not have a primary care provider, please call [phone] and they will assist you.        Care EveryWhere ID     This is your Care EveryWhere ID. This could be used by other organizations to access your [place] medical records  [id number]        Your Vitals Were     Pulse Temperature Respirations Pulse Oximetry BMI (Body Mass Index)       76 97.7  F (36.5  C) (Oral) 18 100% 32.14 kg/m2        Blood Pressure from Last 3 Encounters:   12/20/17 116/82    12/13/17 114/74   12/08/17 100/70    Weight from Last 3 Encounters:   12/20/17 101.6 kg (224 lb)   12/13/17 104.2 kg (229 lb 12.8 oz)   12/08/17 103 kg (227 lb)              We Performed the Following     CBC with platelets differential     Comprehensive metabolic panel     Magnesium        Primary Care Provider Office Phone # Fax #    Jim Kaplan [phone]       [address]   [facility] [address]        Equal Access to Services     [facility]: Hadii aad ku hadasho Soomaali, waaxda luqadaha, qaybta kaalmada adeegyada, waxalla yuan . So [facility] [phone].    ATENCIÓN: Si habla español, tiene a flores disposición servicios gratuitos de asistencia lingüística. [facility] [phone].    We comply with applicable federal civil rights laws and [place] laws. We do not discriminate on the basis of race, color, national origin, age, disability, sex, sexual orientation, or gender identity.            Thank you!     Thank you for choosing [facility]  for your care. Our goal is always to provide you with excellent care. Hearing back from our patients is one way we can continue to improve our services. Please take a few minutes to complete the written survey that you may receive in the mail after your visit with us. Thank you!             Your Updated Medication List - Protect others around you: Learn how to safely use, store and throw away your medicines at www.disposemymeds.org.          This list is accurate as of: 12/20/17  7:49 AM.  Always use your most recent med list.                   Brand Name Dispense Instructions for use Diagnosis    atorvastatin 40 MG tablet    LIPITOR     TAKE 1 TABLET BY MOUTH DAILY        ELIQUIS PO      Take 5 mg by mouth 2 times daily        LORazepam 0.5 MG tablet    ATIVAN    30 tablet    Take 1 tablet (0.5 mg) by mouth every 4 hours as needed (Anxiety, Nausea/Vomiting or Sleep)     Cholangiocarcinoma (H)       metoprolol 50 MG tablet    LOPRESSOR     50 mg 2 times daily        MITIGARE 0.6 MG Caps   Generic drug:  Colchicine      Take 0.6 mg by mouth 3 times daily as needed        multivitamin, therapeutic with minerals Tabs tablet     30 tablet    Take 1 tablet by mouth daily    Mass of bile duct       NITROSTAT SL      Place 0.4 mg under the tongue every 5 minutes as needed for chest pain (Carries medication - has never used)        OMEGA-3 FISH OIL PO      Take 1,000 mg by mouth daily        ondansetron 8 MG tablet    ZOFRAN    10 tablet    Take 1 tablet (8 mg) by mouth every 8 hours as needed (Nausea/Vomiting)    Cholangiocarcinoma (H)       prochlorperazine 10 MG tablet    COMPAZINE    30 tablet    Take 1 tablet (10 mg) by mouth every 6 hours as needed (Nausea/Vomiting)    Cholangiocarcinoma (H)

## 2018-01-03 ENCOUNTER — INFUSION THERAPY VISIT (OUTPATIENT)
Dept: ONCOLOGY | Facility: CLINIC | Age: 63
End: 2018-01-03
Attending: INTERNAL MEDICINE
Payer: COMMERCIAL

## 2018-01-03 ENCOUNTER — APPOINTMENT (OUTPATIENT)
Dept: LAB | Facility: CLINIC | Age: 63
End: 2018-01-03
Attending: INTERNAL MEDICINE
Payer: COMMERCIAL

## 2018-01-03 VITALS
WEIGHT: 229.7 LBS | OXYGEN SATURATION: 97 % | SYSTOLIC BLOOD PRESSURE: 114 MMHG | DIASTOLIC BLOOD PRESSURE: 77 MMHG | RESPIRATION RATE: 16 BRPM | HEIGHT: 70 IN | TEMPERATURE: 97.8 F | HEART RATE: 80 BPM | BODY MASS INDEX: 32.88 KG/M2

## 2018-01-03 DIAGNOSIS — C22.1 CHOLANGIOCARCINOMA (H): Primary | ICD-10-CM

## 2018-01-03 LAB
ALBUMIN SERPL-MCNC: 3.5 G/DL (ref 3.4–5)
ALP SERPL-CCNC: 76 U/L (ref 40–150)
ALT SERPL W P-5'-P-CCNC: 37 U/L (ref 0–70)
ANION GAP SERPL CALCULATED.3IONS-SCNC: 6 MMOL/L (ref 3–14)
AST SERPL W P-5'-P-CCNC: 20 U/L (ref 0–45)
BASOPHILS # BLD AUTO: 0 10E9/L (ref 0–0.2)
BASOPHILS NFR BLD AUTO: 0.3 %
BILIRUB SERPL-MCNC: 0.4 MG/DL (ref 0.2–1.3)
BUN SERPL-MCNC: 12 MG/DL (ref 7–30)
CALCIUM SERPL-MCNC: 8.1 MG/DL (ref 8.5–10.1)
CHLORIDE SERPL-SCNC: 110 MMOL/L (ref 94–109)
CO2 SERPL-SCNC: 26 MMOL/L (ref 20–32)
CREAT SERPL-MCNC: 0.8 MG/DL (ref 0.66–1.25)
DIFFERENTIAL METHOD BLD: ABNORMAL
EOSINOPHIL # BLD AUTO: 0 10E9/L (ref 0–0.7)
EOSINOPHIL NFR BLD AUTO: 1.2 %
ERYTHROCYTE [DISTWIDTH] IN BLOOD BY AUTOMATED COUNT: 13.2 % (ref 10–15)
GFR SERPL CREATININE-BSD FRML MDRD: >90 ML/MIN/1.7M2
GLUCOSE SERPL-MCNC: 116 MG/DL (ref 70–99)
HCT VFR BLD AUTO: 37.3 % (ref 40–53)
HGB BLD-MCNC: 12.3 G/DL (ref 13.3–17.7)
IMM GRANULOCYTES # BLD: 0 10E9/L (ref 0–0.4)
IMM GRANULOCYTES NFR BLD: 0.3 %
LYMPHOCYTES # BLD AUTO: 1.6 10E9/L (ref 0.8–5.3)
LYMPHOCYTES NFR BLD AUTO: 48.8 %
MAGNESIUM SERPL-MCNC: 2.1 MG/DL (ref 1.6–2.3)
MCH RBC QN AUTO: 30.4 PG (ref 26.5–33)
MCHC RBC AUTO-ENTMCNC: 33 G/DL (ref 31.5–36.5)
MCV RBC AUTO: 92 FL (ref 78–100)
MONOCYTES # BLD AUTO: 0.5 10E9/L (ref 0–1.3)
MONOCYTES NFR BLD AUTO: 15.1 %
NEUTROPHILS # BLD AUTO: 1.1 10E9/L (ref 1.6–8.3)
NEUTROPHILS NFR BLD AUTO: 34.3 %
NRBC # BLD AUTO: 0 10*3/UL
NRBC BLD AUTO-RTO: 0 /100
PLATELET # BLD AUTO: 177 10E9/L (ref 150–450)
POTASSIUM SERPL-SCNC: 4.2 MMOL/L (ref 3.4–5.3)
PROT SERPL-MCNC: 6.8 G/DL (ref 6.8–8.8)
RBC # BLD AUTO: 4.04 10E12/L (ref 4.4–5.9)
SODIUM SERPL-SCNC: 143 MMOL/L (ref 133–144)
WBC # BLD AUTO: 3.2 10E9/L (ref 4–11)

## 2018-01-03 PROCEDURE — 83735 ASSAY OF MAGNESIUM: CPT | Performed by: INTERNAL MEDICINE

## 2018-01-03 PROCEDURE — 25000128 H RX IP 250 OP 636: Mod: ZF | Performed by: INTERNAL MEDICINE

## 2018-01-03 PROCEDURE — 80053 COMPREHEN METABOLIC PANEL: CPT | Performed by: INTERNAL MEDICINE

## 2018-01-03 PROCEDURE — 25000128 H RX IP 250 OP 636: Mod: ZF | Performed by: PHYSICIAN ASSISTANT

## 2018-01-03 PROCEDURE — 96417 CHEMO IV INFUS EACH ADDL SEQ: CPT

## 2018-01-03 PROCEDURE — 96413 CHEMO IV INFUSION 1 HR: CPT

## 2018-01-03 PROCEDURE — 96375 TX/PRO/DX INJ NEW DRUG ADDON: CPT

## 2018-01-03 PROCEDURE — 85025 COMPLETE CBC W/AUTO DIFF WBC: CPT | Performed by: INTERNAL MEDICINE

## 2018-01-03 PROCEDURE — 96367 TX/PROPH/DG ADDL SEQ IV INF: CPT

## 2018-01-03 PROCEDURE — 99214 OFFICE O/P EST MOD 30 MIN: CPT | Mod: ZP | Performed by: PHYSICIAN ASSISTANT

## 2018-01-03 PROCEDURE — G0463 HOSPITAL OUTPT CLINIC VISIT: HCPCS | Mod: ZF

## 2018-01-03 RX ORDER — HEPARIN SODIUM (PORCINE) LOCK FLUSH IV SOLN 100 UNIT/ML 100 UNIT/ML
500 SOLUTION INTRAVENOUS ONCE
Status: COMPLETED | OUTPATIENT
Start: 2018-01-03 | End: 2018-01-03

## 2018-01-03 RX ORDER — MEPERIDINE HYDROCHLORIDE 25 MG/ML
25 INJECTION INTRAMUSCULAR; INTRAVENOUS; SUBCUTANEOUS EVERY 30 MIN PRN
Status: CANCELLED | OUTPATIENT
Start: 2018-01-03

## 2018-01-03 RX ORDER — ALBUTEROL SULFATE 90 UG/1
1-2 AEROSOL, METERED RESPIRATORY (INHALATION)
Status: CANCELLED
Start: 2018-01-10

## 2018-01-03 RX ORDER — EPINEPHRINE 1 MG/ML
0.3 INJECTION, SOLUTION, CONCENTRATE INTRAVENOUS EVERY 5 MIN PRN
Status: CANCELLED | OUTPATIENT
Start: 2018-01-03

## 2018-01-03 RX ORDER — DIPHENHYDRAMINE HYDROCHLORIDE 50 MG/ML
50 INJECTION INTRAMUSCULAR; INTRAVENOUS
Status: CANCELLED
Start: 2018-01-10

## 2018-01-03 RX ORDER — ALBUTEROL SULFATE 0.83 MG/ML
2.5 SOLUTION RESPIRATORY (INHALATION)
Status: CANCELLED | OUTPATIENT
Start: 2018-01-10

## 2018-01-03 RX ORDER — HEPARIN SODIUM (PORCINE) LOCK FLUSH IV SOLN 100 UNIT/ML 100 UNIT/ML
5 SOLUTION INTRAVENOUS
Status: COMPLETED | OUTPATIENT
Start: 2018-01-03 | End: 2018-01-03

## 2018-01-03 RX ORDER — SODIUM CHLORIDE 9 MG/ML
1000 INJECTION, SOLUTION INTRAVENOUS CONTINUOUS PRN
Status: CANCELLED
Start: 2018-01-10

## 2018-01-03 RX ORDER — METHYLPREDNISOLONE SODIUM SUCCINATE 125 MG/2ML
125 INJECTION, POWDER, LYOPHILIZED, FOR SOLUTION INTRAMUSCULAR; INTRAVENOUS
Status: CANCELLED
Start: 2018-01-03

## 2018-01-03 RX ORDER — EPINEPHRINE 0.3 MG/.3ML
0.3 INJECTION SUBCUTANEOUS EVERY 5 MIN PRN
Status: CANCELLED | OUTPATIENT
Start: 2018-01-03

## 2018-01-03 RX ORDER — SODIUM CHLORIDE 9 MG/ML
1000 INJECTION, SOLUTION INTRAVENOUS CONTINUOUS PRN
Status: CANCELLED
Start: 2018-01-03

## 2018-01-03 RX ORDER — ALBUTEROL SULFATE 90 UG/1
1-2 AEROSOL, METERED RESPIRATORY (INHALATION)
Status: CANCELLED
Start: 2018-01-03

## 2018-01-03 RX ORDER — EPINEPHRINE 0.3 MG/.3ML
0.3 INJECTION SUBCUTANEOUS EVERY 5 MIN PRN
Status: CANCELLED | OUTPATIENT
Start: 2018-01-10

## 2018-01-03 RX ORDER — ALBUTEROL SULFATE 0.83 MG/ML
2.5 SOLUTION RESPIRATORY (INHALATION)
Status: CANCELLED | OUTPATIENT
Start: 2018-01-03

## 2018-01-03 RX ORDER — METHYLPREDNISOLONE SODIUM SUCCINATE 125 MG/2ML
125 INJECTION, POWDER, LYOPHILIZED, FOR SOLUTION INTRAMUSCULAR; INTRAVENOUS
Status: CANCELLED
Start: 2018-01-10

## 2018-01-03 RX ORDER — ONDANSETRON 2 MG/ML
8 INJECTION INTRAMUSCULAR; INTRAVENOUS ONCE
Status: COMPLETED | OUTPATIENT
Start: 2018-01-03 | End: 2018-01-03

## 2018-01-03 RX ORDER — MEPERIDINE HYDROCHLORIDE 25 MG/ML
25 INJECTION INTRAMUSCULAR; INTRAVENOUS; SUBCUTANEOUS EVERY 30 MIN PRN
Status: CANCELLED | OUTPATIENT
Start: 2018-01-10

## 2018-01-03 RX ORDER — LORAZEPAM 2 MG/ML
0.5 INJECTION INTRAMUSCULAR EVERY 4 HOURS PRN
Status: CANCELLED
Start: 2018-01-10

## 2018-01-03 RX ORDER — LORAZEPAM 2 MG/ML
0.5 INJECTION INTRAMUSCULAR EVERY 4 HOURS PRN
Status: CANCELLED
Start: 2018-01-03

## 2018-01-03 RX ORDER — DIPHENHYDRAMINE HYDROCHLORIDE 50 MG/ML
50 INJECTION INTRAMUSCULAR; INTRAVENOUS
Status: CANCELLED
Start: 2018-01-03

## 2018-01-03 RX ORDER — EPINEPHRINE 1 MG/ML
0.3 INJECTION, SOLUTION, CONCENTRATE INTRAVENOUS EVERY 5 MIN PRN
Status: CANCELLED | OUTPATIENT
Start: 2018-01-10

## 2018-01-03 RX ADMIN — GEMCITABINE 2200 MG: 38 INJECTION, SOLUTION INTRAVENOUS at 09:44

## 2018-01-03 RX ADMIN — DEXAMETHASONE SODIUM PHOSPHATE: 10 INJECTION, SOLUTION INTRAMUSCULAR; INTRAVENOUS at 08:00

## 2018-01-03 RX ADMIN — CISPLATIN 60 MG: 1 INJECTION, SOLUTION INTRAVENOUS at 08:40

## 2018-01-03 RX ADMIN — SODIUM CHLORIDE, PRESERVATIVE FREE 5 ML: 5 INJECTION INTRAVENOUS at 06:33

## 2018-01-03 RX ADMIN — SODIUM CHLORIDE, PRESERVATIVE FREE 500 UNITS: 5 INJECTION INTRAVENOUS at 10:19

## 2018-01-03 RX ADMIN — SODIUM CHLORIDE 250 ML: 9 INJECTION, SOLUTION INTRAVENOUS at 07:43

## 2018-01-03 RX ADMIN — ONDANSETRON 8 MG: 2 INJECTION INTRAMUSCULAR; INTRAVENOUS at 07:59

## 2018-01-03 RX ADMIN — MAGNESIUM SULFATE HEPTAHYDRATE: 500 INJECTION, SOLUTION INTRAMUSCULAR; INTRAVENOUS at 08:05

## 2018-01-03 ASSESSMENT — PAIN SCALES - GENERAL: PAINLEVEL: NO PAIN (0)

## 2018-01-03 NOTE — PROGRESS NOTES
Infusion Nursing Note:  Girish Chauhan presents today for Cycle 2 Day 1 Cisplatin, Gemzar.    Patient seen by provider today: Yes: TRUDY Clarke    Intravenous Access:  Implanted Port.    Treatment Conditions:  Lab Results   Component Value Date    HGB 12.3 01/03/2018     Lab Results   Component Value Date    WBC 3.2 01/03/2018      Lab Results   Component Value Date    ANEU 1.1 01/03/2018     Lab Results   Component Value Date     01/03/2018      Lab Results   Component Value Date     01/03/2018                   Lab Results   Component Value Date    POTASSIUM 4.2 01/03/2018           Lab Results   Component Value Date    MAG 2.1 01/03/2018            Lab Results   Component Value Date    CR 0.80 01/03/2018                   Lab Results   Component Value Date    GARY 8.1 01/03/2018                Lab Results   Component Value Date    BILITOTAL 0.4 01/03/2018           Lab Results   Component Value Date    ALBUMIN 3.5 01/03/2018                    Lab Results   Component Value Date    ALT 37 01/03/2018           Lab Results   Component Value Date    AST 20 01/03/2018     Results reviewed, labs MET treatment parameters, ok to proceed with treatment.    Post Infusion Assessment:  Patient tolerated infusion without incident.  Blood return noted pre and post infusion.  Adequate urine output noted pre, during and post infusion.  Port flushed, heparin locked and discontinued intact.    Discharge Plan:   Patient declined prescription refills.  Copy of AVS reviewed with patient and/or family.  Patient will return 1/10 for next appointment.  Patient discharged in stable condition accompanied by: self.  Departure Mode: Ambulatory.    Mily Ennis RN

## 2018-01-03 NOTE — NURSING NOTE
"Oncology Rooming Note    January 3, 2018 6:59 AM   Girish Chauhan is a 62 year old male who presents for:    Chief Complaint   Patient presents with     Port Draw     labs drawn from port by rn.  vs taken     Oncology Clinic Visit     return patient visit for pre-infusion follow up related to cisplat/gemzar      Initial Vitals: /77 (BP Location: Right arm, Patient Position: Sitting, Cuff Size: Adult Large)  Pulse 80  Temp 97.8  F (36.6  C) (Oral)  Resp 16  Ht 1.778 m (5' 10\")  Wt 104.2 kg (229 lb 11.2 oz)  SpO2 97%  BMI 32.96 kg/m2 Estimated body mass index is 32.96 kg/(m^2) as calculated from the following:    Height as of this encounter: 1.778 m (5' 10\").    Weight as of this encounter: 104.2 kg (229 lb 11.2 oz). Body surface area is 2.27 meters squared.  No Pain (0) Comment: Data Unavailable   No LMP for male patient.  Allergies reviewed: Yes  Medications reviewed: Yes    Medications: Medication refills not needed today.  Ph[facility] into [employer] [address] [place] [address] [address] AT [facility]    Clinical concerns: no concerns aubrey was notified.    6 minutes for nursing intake (face to face time)     Lucy Carroll CMA              "

## 2018-01-03 NOTE — Clinical Note
"1/3/2018       RE: Girish Chauhan  [address] [facility] [address]     Dear Colleague,    Thank you for referring your patient, Girish Chauhan, to the [facility]. Please see a copy of my visit note below.    Reason for Visit: seen in follow-up of resected stage IV cholangiocarcinoma.    Oncology HPI: Girish Chauhan is a 62 year old man who developed RUQ pain in Dec 2015. He had progressive pain and weight loss. He had RUQ US imaging showing steatosis, then MRCP imaging which showed an ill defined mass at the confluence of the right and left hepatic ducts. He underwent resection in March 2016. Margins were negative and no lymph nodes were involved. Perineural and lymphovascular invasion was noted. He opted not to pursue adjuvant chemotherapy and had been observed without recurrence. CA 19-9 marker heydi from 33 on 7/7/17 to 53 on 10/6/17. Patient also noted to have increasing bladder nodules concerning for a urothelial malignancy on imaging. He then had cystoscopy and biopsy on 11/6/17 at [facility] which has come back consistent with metastatic cholangiocarcinoma. Pathology reviewed slides and felt morphologically similar to patient's known cholangiocarcinoma. Patient presents today for C1D1 Gemcitabine + Cisplatin.    Interval history: Girish is here alone, although wife is on the way. He has been feeling well. No recent colds/flu or other infections. He has not noticed any episodes of Afib recently. He has had 4 cardioversions. Is on Eliquis and will follows with a cardiologist at [facility]. He had some hematuria for about 1 week after the bladder biopsy, but none since then. Occasionally he feels a \"twinge\" in the bladder area, but no dysuria, frequency or urgency. No cough, mild SOB when having palpitations. No CP. No dizziness. No headaches, vision or hearing changes. Occasionally he has episodes of mild tinnitus in both ears but no hearing loss. He has history of melanoma and sees " dermatologist every 6 months for surveillance. He has history of gout affecting his left big toe and left lateral side of foot. He has about 1-2 episodes per year, but only 1 this year. Usually occurs in spring or fall. He takes colchicine and episodes last about 1 day on this medication. He drinks about 4-6 pints beer/week. He estimates he drinks about 64 oz of water daily. Denies any smoking.    Denies fever, chills, nausea, vomiting, abdominal pain, diarrhea, constipation, rash, edema.    Current Outpatient Prescriptions   Medication Sig Dispense Refill     Colchicine (MITIGARE) 0.6 MG CAPS Take 0.6 mg by mouth 3 times daily as needed       LORazepam (ATIVAN) 0.5 MG tablet Take 1 tablet (0.5 mg) by mouth every 4 hours as needed (Anxiety, Nausea/Vomiting or Sleep) 30 tablet 5     prochlorperazine (COMPAZINE) 10 MG tablet Take 1 tablet (10 mg) by mouth every 6 hours as needed (Nausea/Vomiting) 30 tablet 5     ondansetron (ZOFRAN) 8 MG tablet Take 1 tablet (8 mg) by mouth every 8 hours as needed (Nausea/Vomiting) 10 tablet 5     Apixaban (ELIQUIS PO) Take 5 mg by mouth 2 times daily       metoprolol (LOPRESSOR) 50 MG tablet 50 mg 2 times daily       atorvastatin (LIPITOR) 40 MG tablet TAKE 1 TABLET BY MOUTH DAILY       multivitamin, therapeutic with minerals (MULTI-VITAMIN) TABS Take 1 tablet by mouth daily 30 tablet 0     Omega-3 Fatty Acids (OMEGA-3 FISH OIL PO) Take 1,000 mg by mouth daily        Nitroglycerin (NITROSTAT SL) Place 0.4 mg under the tongue every 5 minutes as needed for chest pain (Carries medication - has never used)           No Known Allergies      Exam: alert, appears well.  Blood pressure 114/77, pulse 80, temperature 97.8  F (36.6  C), temperature source Oral, resp. rate 16, weight 104.2 kg (229 lb 11.2 oz), SpO2 97 %.  Wt Readings from Last 4 Encounters:   01/03/18 104.2 kg (229 lb 11.2 oz)   12/20/17 101.6 kg (224 lb)   12/13/17 104.2 kg (229 lb 12.8 oz)   12/08/17 103 kg (227 lb)     Gen:  Alert, cooperative, pleasant, in NAD  HEENT: Sclera anicteric, PERRL, EOMI. MMM, no lesions or thrush  Neck: supple and without adenopathy.   Lungs:CTA.   Heart: RRR  Abdomen: soft, nontender, BS active. No masses or organomegaly.   Extremities: warm, no edema.   Neuro: Speech clear. CN wnl. Gait/station wnl. Grossly non-focal    Labs:       Imaging:   CT CAP 12/12/17:  IMPRESSION:   1. Stable to mildly increased size of enhancing lesion in the  posterior urinary bladder wall, biopsy-proven metastatic  cholangiocarcinoma.   2. Stable changes of left hepatectomy without evidence of recurrence.  3. No lymphadenopathy in the chest, abdomen, or pelvis.   4. Scattered sub-5 mm pulmonary nodules unchanged since 2/27/2016.  5. Stable large parapelvic right renal cyst with associated lower pole  caliectasis.     I have personally reviewed the examination and initial interpretation  and I agree with the findings.     GRAZYNA EASON MD    Impression/plan:   1. Resected stage IV cholangiocarcinoma with recurrence in bladder. Port placed 12/8. CT CAP on 12/12/17 with stable to mildly increased size of enhancing lesion in the posterior urinary bladder wall. CA 19-9 53 on 10/6/17. Labs reviewed. Proceed with C1D1 Gemcitabine + Cisplatin.  --CA 19-9 pending  --Scheduled for D8 on 12/20 with labs, infusion  --Will request [facility] visit prior to C2 on 1/3/18 with already scheduled labs, infusion  --CT CAP scheduled for 1/26 and Greeno on 1/29    2. Resected melanoma/SCC  -sees dermatology every 6 months--Due in May    4. A-fib, s/p cardioversion in May 2017, hx of aortic aneurysm  -a-fib has recurred, now on eliquis. Has ongoing f/u with cardiology.    5. Gout: No symptoms currently. Followed by PCP. Colchicine prn    Wen Rodriguez PA-C        Answers for HPI/ROS submitted by the patient on 12/27/2017   General Symptoms: No  Skin Symptoms: No  HENT Symptoms: No  EYE SYMPTOMS: No  HEART SYMPTOMS: No  LUNG SYMPTOMS: No  INTESTINAL  SYMPTOMS: No  URINARY SYMPTOMS: No  REPRODUCTIVE SYMPTOMS: No  SKELETAL SYMPTOMS: No  BLOOD SYMPTOMS: No  NERVOUS SYSTEM SYMPTOMS: No  MENTAL HEALTH SYMPTOMS: No      Again, thank you for allowing me to participate in the care of your patient.      Sincerely,    Julia Smith PA-C

## 2018-01-03 NOTE — MR AVS SNAPSHOT
After Visit Summary   1/3/2018    Girish Chauhan    MRN: [id number]           Patient Information     Date Of Birth          1955        Visit Information        Provider Department      1/3/2018 7:30 AM UC 13 ATC; UC ONCOLOGY INFUSION [facility]        Today's Diagnoses     Cholangiocarcinoma (H)    -  1       Follow-ups after your visit        Your next 10 appointments already scheduled     Bernabe 10, 2018  6:30 AM CST   [facility] Lab Draw with UC MASONIC LAB DRAW   [facility] Lab Draw ([facility])    [address] [place] [address]  [address] [facility] [address]   [phone]            Bernabe 10, 2018  7:00 AM CST   Infusion 360 with UC ONCOLOGY INFUSION, UC 11 ATC   [facility] ([facility])    [address] [place] [address]   [phone]            Jan 24, 2018  6:30 AM CST   M[facility]ic Lab Draw with UC MASONIC LAB DRAW   [facility] Lab Draw ([facility])    [address] [place] [address]   [phone]            Jan 24, 2018  7:00 AM CST   Infusion 360 with UC ONCOLOGY INFUSION, UC 11 ATC   [facility] ([facility])    [address] [place] [address]   [phone]            Jan 26, 2018  7:30 AM CST   LAB with UC LAB   [facility] Lab [facility])    [address] [place] [address]   [phone]           Please do not eat 10-12 hours before your appointment if you are coming in fasting for labs on lipids, cholesterol, or glucose (sugar). This does not apply to pregnant women. Water, hot tea and black coffee (with nothing added) are okay. Do not drink other fluids, diet soda or chew gum.            Jan 26, 2018  8:00 AM CST   (Arrive by 7:45 AM)   CT CHEST ABDOMEN PELVIS W/O & W CONTRAST with UCCT2   M  [facility] CT ([facility])    [address] [facility]  [address] [facility] [address] [phone]           Please bring any scans or X-rays taken at other hospitals, if similar tests were done. Also bring a list of your medicines, including vitamins, minerals and over-the-counter drugs. It is safest to leave personal items at home.  Be sure to tell your doctor:   If you have any allergies.   If there s any chance you are pregnant.   If you are breastfeeding.   If you have any special needs.  You may have contrast for this exam. To prepare:   Do not eat or drink for 2 hours before your exam. If you need to take medicine, you may take it with small sips of water. (We may ask you to take liquid medicine as well.)   The day before your exam, drink extra fluids at least six 8-ounce glasses (unless your doctor tells you to restrict your fluids).  Patients over 70 or patients with diabetes or kidney problems:   If you haven t had a blood test (creatinine test) within the last 30 days, go to your clinic or Diagnostic Imaging Department for this test.  If you have diabetes:   If your kidney function is normal, continue taking your metformin (Avandamet, Glucophage, Glucovance, Metaglip) on the day of your exam.   If your kidney function is abnormal, wait 48 hours before restarting this medicine.  You will have oral contrast for this exam:   You will drink the contrast at home. Get this from your clinic or Diagnostic Imaging Department. Please follow the directions given.  Please wear loose clothing, such as a sweat suit or jogging clothes. Avoid snaps, zippers and other metal. We may ask you to undress and put on a hospital gown.  If you have any questions, please call the Imaging Department where you will have your exam.            Jan 29, 2018  4:00 PM CST   (Arrive by 3:45 PM)   Return Visit with Naresh De Los Santos MD   [facility] ([facility])    [address] [facility]  2nd Floor  [facility] [id number] [phone]            Jan 31, 2018  7:00 AM CST   Infusion 360 with UC ONCOLOGY INFUSION   [facility] ([facility])    [address]  2nd Floor  [facility] [id number] [phone]              Who to contact     If you have questions or need follow up information about today's clinic visit or your schedule please contact [facility] directly at [phone].  Normal or non-critical lab and imaging results will be communicated to you by [employer] Inchart, letter or phone within 4 business days after the clinic has received the results. If you do not hear from us within 7 days, please contact the clinic through imbookin (Pogby)t or phone. If you have a critical or abnormal lab result, we will notify you by phone as soon as possible.  Submit refill requests through [employer] or call your pharmacy and they will forward the refill request to us. Please allow 3 business days for your refill to be completed.          Additional Information About Your Visit        [employer] Inchart Information     [employer] gives you secure access to your electronic health record. If you see a primary care provider, you can also send messages to your care team and make appointments. If you have questions, please call your primary care clinic.  If you do not have a primary care provider, please call [phone] and they will assist you.        Care EveryWhere ID     This is your Care EveryWhere ID. This could be used by other organizations to access your [place] medical records  [id number]         Blood Pressure from Last 3 Encounters:   01/03/18 114/77   12/20/17 116/82   12/13/17 114/74    Weight from Last 3 Encounters:   01/03/18 104.2 kg (229 lb 11.2 oz)   12/20/17 101.6 kg (224 lb)   12/13/17 104.2 kg (229 lb 12.8 oz)              We Performed the Following     CBC with platelets differential     Comprehensive  metabolic panel     Magnesium        Primary Care Provider Office Phone # Fax #    Jim Kaplan [phone]       [address]   [facility] [address]        Equal Access to Services     MARIUSZ MADRIGAL : Robyn adina stearns meryo Soomaali, waaxda luqadaha, qaybta kaalmada adeegyada, ankit weinbergn eduardaevan srivastava kwabena gross. So [facility] [phone].    ATENCIÓN: Si habla español, tiene a flores disposición servicios gratuitos de asistencia lingüística. Llame al [phone].    We comply with applicable federal civil rights laws and [place] laws. We do not discriminate on the basis of race, color, national origin, age, disability, sex, sexual orientation, or gender identity.            Thank you!     Thank you for choosing [facility]  for your care. Our goal is always to provide you with excellent care. Hearing back from our patients is one way we can continue to improve our services. Please take a few minutes to complete the written survey that you may receive in the mail after your visit with us. Thank you!             Your Updated Medication List - Protect others around you: Learn how to safely use, store and throw away your medicines at www.disposemymeds.org.          This list is accurate as of: 1/3/18 10:42 AM.  Always use your most recent med list.                   Brand Name Dispense Instructions for use Diagnosis    atorvastatin 40 MG tablet    LIPITOR     TAKE 1 TABLET BY MOUTH DAILY        ELIQUIS PO      Take 5 mg by mouth 2 times daily        LORazepam 0.5 MG tablet    ATIVAN    30 tablet    Take 1 tablet (0.5 mg) by mouth every 4 hours as needed (Anxiety, Nausea/Vomiting or Sleep)    Cholangiocarcinoma (H)       metoprolol 50 MG tablet    LOPRESSOR     50 mg 2 times daily        MITIGARE 0.6 MG Caps   Generic drug:  Colchicine      Take 0.6 mg by mouth 3 times daily as needed        multivitamin, therapeutic with minerals Tabs tablet      30 tablet    Take 1 tablet by mouth daily    Mass of bile duct       NITROSTAT SL      Place 0.4 mg under the tongue every 5 minutes as needed for chest pain (Carries medication - has never used)        OMEGA-3 FISH OIL PO      Take 1,000 mg by mouth daily        ondansetron 8 MG tablet    ZOFRAN    10 tablet    Take 1 tablet (8 mg) by mouth every 8 hours as needed (Nausea/Vomiting)    Cholangiocarcinoma (H)       prochlorperazine 10 MG tablet    COMPAZINE    30 tablet    Take 1 tablet (10 mg) by mouth every 6 hours as needed (Nausea/Vomiting)    Cholangiocarcinoma (H)

## 2018-01-03 NOTE — PROGRESS NOTES
Oncology/Hematology Visit Note  Bernabe 3, 2018    Reason for Visit: seen in follow-up of resected stage IV cholangiocarcinoma.    Oncology HPI: Girish Chauhan is a 62 year old man who developed RUQ pain in Dec 2015. He had progressive pain and weight loss. He had RUQ US imaging showing steatosis, then MRCP imaging which showed an ill defined mass at the confluence of the right and left hepatic ducts. He underwent resection in March 2016. Margins were negative and no lymph nodes were involved. Perineural and lymphovascular invasion was noted. He opted not to pursue adjuvant chemotherapy and had been observed without recurrence. CA 19-9 marker heydi from 33 on 7/7/17 to 53 on 10/6/17. Patient also noted to have increasing bladder nodules concerning for a urothelial malignancy on imaging. He then had cystoscopy and biopsy on 11/6/17 at [facility] which has come back consistent with metastatic cholangiocarcinoma. Pathology reviewed slides and felt morphologically similar to patient's known cholangiocarcinoma. He started on treatment with cisplatin and Gemzar on 12/13/17. Patient presents today for C2D1 Gemcitabine + Cisplatin.    Interval history: Girish is here alone. He states chemo has been going well. He noticed some mild fatigue. Appetite has been good. Occasionally feels some mild palpitations in the morning, but otherwise denies any dizziness, chest pain, SOB. No episodes of gout. Girish is very interested in clinical trials and has been researching on clinicaltrials.gov.    Denies fever, chills, nausea, vomiting, abdominal pain, diarrhea, constipation, tinnitus, hearing loss, peripheral neuropathy, rash, edema.    Current Outpatient Prescriptions   Medication Sig Dispense Refill     Colchicine (MITIGARE) 0.6 MG CAPS Take 0.6 mg by mouth 3 times daily as needed       LORazepam (ATIVAN) 0.5 MG tablet Take 1 tablet (0.5 mg) by mouth every 4 hours as needed (Anxiety, Nausea/Vomiting or Sleep) 30 tablet 5      prochlorperazine (COMPAZINE) 10 MG tablet Take 1 tablet (10 mg) by mouth every 6 hours as needed (Nausea/Vomiting) 30 tablet 5     ondansetron (ZOFRAN) 8 MG tablet Take 1 tablet (8 mg) by mouth every 8 hours as needed (Nausea/Vomiting) 10 tablet 5     Apixaban (ELIQUIS PO) Take 5 mg by mouth 2 times daily       metoprolol (LOPRESSOR) 50 MG tablet 50 mg 2 times daily       atorvastatin (LIPITOR) 40 MG tablet TAKE 1 TABLET BY MOUTH DAILY       multivitamin, therapeutic with minerals (MULTI-VITAMIN) TABS Take 1 tablet by mouth daily 30 tablet 0     Omega-3 Fatty Acids (OMEGA-3 FISH OIL PO) Take 1,000 mg by mouth daily        Nitroglycerin (NITROSTAT SL) Place 0.4 mg under the tongue every 5 minutes as needed for chest pain (Carries medication - has never used)           No Known Allergies      Exam: alert, appears well.  Blood pressure 114/77, pulse 80, temperature 97.8  F (36.6  C), temperature source Oral, resp. rate 16, weight 104.2 kg (229 lb 11.2 oz), SpO2 97 %.  Wt Readings from Last 4 Encounters:   01/03/18 104.2 kg (229 lb 11.2 oz)   12/20/17 101.6 kg (224 lb)   12/13/17 104.2 kg (229 lb 12.8 oz)   12/08/17 103 kg (227 lb)   Gen: Alert, cooperative, pleasant, in NAD  HEENT: Sclera anicteric, PERRL, EOMI. MMM, no lesions or thrush  Neck: supple and without adenopathy.   Lungs:CTAB   Heart: RRR, no m/r/g  Abdomen: soft, nontender, nondistended, BS active.    Extremities: warm, no edema.   Neuro: Speech clear. CN wnl. Gait/station wnl. Grossly non-focal    Labs:    1/3/2018 06:42   Sodium 143   Potassium 4.2   Chloride 110 (H)   Carbon Dioxide 26   Urea Nitrogen 12   Creatinine 0.80   GFR Estimate >90   GFR Estimate If Black >90   Calcium 8.1 (L)   Anion Gap 6   Magnesium 2.1   Albumin 3.5   Protein Total 6.8   Bilirubin Total 0.4   Alkaline Phosphatase 76   ALT 37   AST 20   Glucose 116 (H)   WBC 3.2 (L)   Hemoglobin 12.3 (L)   Hematocrit 37.3 (L)   Platelet Count 177   RBC Count 4.04 (L)   MCV 92   MCH 30.4    MCHC 33.0   RDW 13.2   Diff Method Automated Method   % Neutrophils 34.3   % Lymphocytes 48.8   % Monocytes 15.1   % Eosinophils 1.2   % Basophils 0.3   % Immature Granulocytes 0.3   Nucleated RBCs 0   Absolute Neutrophil 1.1 (L)   Absolute Lymphocytes 1.6   Absolute Monocytes 0.5   Absolute Eosinophils 0.0   Absolute Basophils 0.0   Abs Immature Granulocytes 0.0   Absolute Nucleated RBC 0.0     Impression/plan:   1. Resected stage IV cholangiocarcinoma with recurrence in bladder. Port placed 12/8. CT CAP on 12/12/17 with stable to mildly increased size of enhancing lesion in the posterior urinary bladder wall. CA 19-9 69 on 10/6/17. Labs reviewed. ANC 1.1 today, but will proceed. Day 8 may need to be deferred, pending counts. Proceed with C2D1 Gemcitabine + Cisplatin.   --Palliative care services offered and explained to patients. He declined at this time.  --Discussed his interest in clinical trials.   --Discussed precautions for neutropenic fever, and that he should call if temp >100.4  --CA 19-9 ordered for 1/10  --Scheduled for D8 on 1/10 with labs, infusion. Scheduled for C3D1 on 1/24/18 and C3D8 on 1/31  --CT CAP scheduled for 1/26 and [facility] on 1/29    2. Resected melanoma/SCC  -sees dermatology every 6 months--Due in May    4. A-fib, s/p cardioversion in May 2017, hx of aortic aneurysm  -a-fib has recurred, now on eliquis, as well as metoprolol. Has ongoing f/u with cardiology.    5. Gout: No symptoms currently. Followed by PCP. Colchicine prn    Wen Rodriguez PA-C    Answers for HPI/ROS submitted by the patient on 12/27/2017   General Symptoms: No  Skin Symptoms: No  HENT Symptoms: No  EYE SYMPTOMS: No  HEART SYMPTOMS: No  LUNG SYMPTOMS: No  INTESTINAL SYMPTOMS: No  URINARY SYMPTOMS: No  REPRODUCTIVE SYMPTOMS: No  SKELETAL SYMPTOMS: No  BLOOD SYMPTOMS: No  NERVOUS SYSTEM SYMPTOMS: No  MENTAL HEALTH SYMPTOMS: No    The patient was seen in conjunction with Wen Rodriguez PA-C who served as a scribe for  today's visit. I have reviewed and edited the note and agree with the above findings and plan.  Julia Smith PA-C

## 2018-01-03 NOTE — MR AVS SNAPSHOT
After Visit Summary   1/3/2018    Girish Chauhan    MRN: [id number]           Patient Information     Date Of Birth          1955        Visit Information        Provider Department      1/3/2018 7:00 AM Julia Smith PA-C [facility]        Today's Diagnoses     Cholangiocarcinoma (H)    -  1       Follow-ups after your visit        Your next 10 appointments already scheduled     Bernabe 10, 2018  6:30 AM CST   [facility] Lab Draw with [facility] LAB DRAW   [facility] ([facility])    [address]  [address]  [facility] [address]   [phone]            Bernabe 10, 2018  7:00 AM CST   Infusion 360 with UC ONCOLOGY INFUSION, UC 11 ATC   [facility] ([facility])    [address] [place] [address]  [address]  [facility] [address]   [phone]            Jan 24, 2018  6:30 AM CST   [facility] Lab Draw with [facility] LAB DRAW   [facility] ([facility])    [address] [place] [address]  [address]  [facility] [address]   [phone]            Jan 24, 2018  7:00 AM CST   Infusion 360 with UC ONCOLOGY INFUSION, UC 11 ATC   [facility] ([facility])    [address]  [address]  [facility] [address]   [phone]            Jan 26, 2018  7:30 AM CST   LAB with UC LAB   [facility])    [address] [place] [address]  1st Floor  [facility] [address]   [phone]           Please do not eat 10-12 hours before your appointment if you are coming in fasting for labs on lipids, cholesterol, or glucose (sugar). This does not apply to pregnant women. Water, hot tea and black coffee (with nothing added) are okay. Do not drink other fluids, diet soda or chew gum.            Jan 26, 2018  8:00 AM CST   (Arrive by 7:45 AM)   CT CHEST ABDOMEN PELVIS W/O & W CONTRAST with UCCT2   [facility] CT ([facility])    [address]  [address] [facility] [address] [phone]           Please bring any scans or X-rays taken at other hospitals, if similar tests were done. Also bring a list of your medicines, including vitamins, minerals and over-the-counter drugs. It is safest to leave personal items at home.  Be sure to tell your doctor:   If you have any allergies.   If there s any chance you are pregnant.   If you are breastfeeding.   If you have any special needs.  You may have contrast for this exam. To prepare:   Do not eat or drink for 2 hours before your exam. If you need to take medicine, you may take it with small sips of water. (We may ask you to take liquid medicine as well.)   The day before your exam, drink extra fluids at least six 8-ounce glasses (unless your doctor tells you to restrict your fluids).  Patients over 70 or patients with diabetes or kidney problems:   If you haven t had a blood test (creatinine test) within the last 30 days, go to your clinic or Diagnostic Imaging Department for this test.  If you have diabetes:   If your kidney function is normal, continue taking your metformin (Avandamet, Glucophage, Glucovance, Metaglip) on the day of your exam.   If your kidney function is abnormal, wait 48 hours before restarting this medicine.  You will have oral contrast for this exam:   You will drink the contrast at home. Get this from your clinic or Diagnostic Imaging Department. Please follow the directions given.  Please wear loose clothing, such as a sweat suit or jogging clothes. Avoid snaps, zippers and other metal. We may ask you to undress and put on a hospital gown.  If you have any questions, please call the Imaging Department where you will have your exam.            Jan 29, 2018  4:00 PM CST   (Arrive by 3:45 PM)   Return Visit with Naresh De Los Santos MD   [facility] ([facility]  "[place])    [address]  [address]  [facility] [address] [phone]            Jan 31, 2018  7:00 AM CST   Infusion 360 with UC ONCOLOGY INFUSION   [facility] ([facility] [place])    [address]  [address]  [facility] [id number] [phone]              Who to contact     If you have questions or need follow up information about today's clinic visit or your schedule please contact [facility] directly at [phone].  Normal or non-critical lab and imaging results will be communicated to you by disco volantehart, letter or phone within 4 business days after the clinic has received the results. If you do not hear from us within 7 days, please contact the clinic through [employer] or phone. If you have a critical or abnormal lab result, we will notify you by phone as soon as possible.  Submit refill requests through [employer] or call your pharmacy and they will forward the refill request to us. Please allow 3 business days for your refill to be completed.          Additional Information About Your Visit        disco volantehart Information     [employer] gives you secure access to your electronic health record. If you see a primary care provider, you can also send messages to your care team and make appointments. If you have questions, please call your primary care clinic.  If you do not have a primary care provider, please call [phone] and they will assist you.        Care EveryWhere ID     This is your Care EveryWhere ID. This could be used by other organizations to access your [facility] medical records  [id number]        Your Vitals Were     Pulse Temperature Respirations Height Pulse Oximetry BMI (Body Mass Index)    80 97.8  F (36.6  C) (Oral) 16 1.778 m (5' 10\") 97% 32.96 kg/m2       Blood Pressure from Last 3 Encounters:   01/03/18 114/77   12/20/17 116/82   12/13/17 114/74    Weight from Last 3 Encounters:   01/03/18 104.2 kg (229 lb 11.2 oz) "   12/20/17 101.6 kg (224 lb)   12/13/17 104.2 kg (229 lb 12.8 oz)              Today, you had the following     No orders found for display       Primary Care Provider Office Phone # Fax #    Jim Kaplan [phone]       [address]   [facility] [address]        Equal Access to Services     [facility] Center[facility] : Hadii aad ku hadasho Soomaali, waaxda luqadaha, qaybta kaalmada adeegyada, waxay idiin hayaan adeeg kharash la'aan ah. So [facility] [phone].    ATENCIÓN: Si habla espchrissy, tiene a flores disposición servicios gratuitos de asistencia lingüística. Llame al [phone].    We comply with applicable federal civil rights laws and [place] laws. We do not discriminate on the basis of race, color, national origin, age, disability, sex, sexual orientation, or gender identity.            Thank you!     Thank you for choosing [facility]  for your care. Our goal is always to provide you with excellent care. Hearing back from our patients is one way we can continue to improve our services. Please take a few minutes to complete the written survey that you may receive in the mail after your visit with us. Thank you!             Your Updated Medication List - Protect others around you: Learn how to safely use, store and throw away your medicines at www.disposemymeds.org.          This list is accurate as of: 1/3/18 11:59 PM.  Always use your most recent med list.                   Brand Name Dispense Instructions for use Diagnosis    atorvastatin 40 MG tablet    LIPITOR     TAKE 1 TABLET BY MOUTH DAILY        ELIQUIS PO      Take 5 mg by mouth 2 times daily        LORazepam 0.5 MG tablet    ATIVAN    30 tablet    Take 1 tablet (0.5 mg) by mouth every 4 hours as needed (Anxiety, Nausea/Vomiting or Sleep)    Cholangiocarcinoma (H)       metoprolol 50 MG tablet    LOPRESSOR     50 mg 2 times daily        MITIGARE 0.6 MG Caps   Generic drug:  Colchicine       Take 0.6 mg by mouth 3 times daily as needed        multivitamin, therapeutic with minerals Tabs tablet     30 tablet    Take 1 tablet by mouth daily    Mass of bile duct       NITROSTAT SL      Place 0.4 mg under the tongue every 5 minutes as needed for chest pain (Carries medication - has never used)        OMEGA-3 FISH OIL PO      Take 1,000 mg by mouth daily        ondansetron 8 MG tablet    ZOFRAN    10 tablet    Take 1 tablet (8 mg) by mouth every 8 hours as needed (Nausea/Vomiting)    Cholangiocarcinoma (H)       prochlorperazine 10 MG tablet    COMPAZINE    30 tablet    Take 1 tablet (10 mg) by mouth every 6 hours as needed (Nausea/Vomiting)    Cholangiocarcinoma (H)

## 2018-01-03 NOTE — NURSING NOTE
"Chief Complaint   Patient presents with     Port Draw     labs drawn from port by rn.  vs taken     Port accessed with 20 gauge 3/4\" gripper needle and labs drawn by rn.  Port flushed with NS and heparin.  Pt tolerated well.  VS taken.  Pt checked in for next appt.  Lashawn Gill RN      "

## 2018-01-03 NOTE — LETTER
1/3/2018      RE: Girish Chauhan  [address] [address]       Oncology/Hematology Visit Note  Bernabe 3, 2018    Reason for Visit: seen in follow-up of resected stage IV cholangiocarcinoma.    Oncology HPI: Girish Chauhan is a 62 year old man who developed RUQ pain in Dec 2015. He had progressive pain and weight loss. He had RUQ US imaging showing steatosis, then MRCP imaging which showed an ill defined mass at the confluence of the right and left hepatic ducts. He underwent resection in March 2016. Margins were negative and no lymph nodes were involved. Perineural and lymphovascular invasion was noted. He opted not to pursue adjuvant chemotherapy and had been observed without recurrence. CA 19-9 marker heydi from 33 on 7/7/17 to 53 on 10/6/17. Patient also noted to have increasing bladder nodules concerning for a urothelial malignancy on imaging. He then had cystoscopy and biopsy on 11/6/17 at [facility] which has come back consistent with metastatic cholangiocarcinoma. Pathology reviewed slides and felt morphologically similar to patient's known cholangiocarcinoma. He started on treatment with cisplatin and Gemzar on 12/13/17. Patient presents today for C2D1 Gemcitabine + Cisplatin.    Interval history: Girish is here alone. He states chemo has been going well. He noticed some mild fatigue. Appetite has been good. Occasionally feels some mild palpitations in the morning, but otherwise denies any dizziness, chest pain, SOB. No episodes of gout. Girish is very interested in clinical trials and has been researching on clinicaltrials.gov.    Denies fever, chills, nausea, vomiting, abdominal pain, diarrhea, constipation, tinnitus, hearing loss, peripheral neuropathy, rash, edema.    Current Outpatient Prescriptions   Medication Sig Dispense Refill     Colchicine (MITIGARE) 0.6 MG CAPS Take 0.6 mg by mouth 3 times daily as needed       LORazepam (ATIVAN) 0.5 MG tablet Take 1 tablet (0.5 mg) by mouth every 4  hours as needed (Anxiety, Nausea/Vomiting or Sleep) 30 tablet 5     prochlorperazine (COMPAZINE) 10 MG tablet Take 1 tablet (10 mg) by mouth every 6 hours as needed (Nausea/Vomiting) 30 tablet 5     ondansetron (ZOFRAN) 8 MG tablet Take 1 tablet (8 mg) by mouth every 8 hours as needed (Nausea/Vomiting) 10 tablet 5     Apixaban (ELIQUIS PO) Take 5 mg by mouth 2 times daily       metoprolol (LOPRESSOR) 50 MG tablet 50 mg 2 times daily       atorvastatin (LIPITOR) 40 MG tablet TAKE 1 TABLET BY MOUTH DAILY       multivitamin, therapeutic with minerals (MULTI-VITAMIN) TABS Take 1 tablet by mouth daily 30 tablet 0     Omega-3 Fatty Acids (OMEGA-3 FISH OIL PO) Take 1,000 mg by mouth daily        Nitroglycerin (NITROSTAT SL) Place 0.4 mg under the tongue every 5 minutes as needed for chest pain (Carries medication - has never used)           No Known Allergies      Exam: alert, appears well.  Blood pressure 114/77, pulse 80, temperature 97.8  F (36.6  C), temperature source Oral, resp. rate 16, weight 104.2 kg (229 lb 11.2 oz), SpO2 97 %.  Wt Readings from Last 4 Encounters:   01/03/18 104.2 kg (229 lb 11.2 oz)   12/20/17 101.6 kg (224 lb)   12/13/17 104.2 kg (229 lb 12.8 oz)   12/08/17 103 kg (227 lb)   Gen: Alert, cooperative, pleasant, in NAD  HEENT: Sclera anicteric, PERRL, EOMI. MMM, no lesions or thrush  Neck: supple and without adenopathy.   Lungs:CTAB   Heart: RRR, no m/r/g  Abdomen: soft, nontender, nondistended, BS active.    Extremities: warm, no edema.   Neuro: Speech clear. CN wnl. Gait/station wnl. Grossly non-focal    Labs:    1/3/2018 06:42   Sodium 143   Potassium 4.2   Chloride 110 (H)   Carbon Dioxide 26   Urea Nitrogen 12   Creatinine 0.80   GFR Estimate >90   GFR Estimate If Black >90   Calcium 8.1 (L)   Anion Gap 6   Magnesium 2.1   Albumin 3.5   Protein Total 6.8   Bilirubin Total 0.4   Alkaline Phosphatase 76   ALT 37   AST 20   Glucose 116 (H)   WBC 3.2 (L)   Hemoglobin 12.3 (L)   Hematocrit 37.3  (L)   Platelet Count 177   RBC Count 4.04 (L)   MCV 92   MCH 30.4   MCHC 33.0   RDW 13.2   Diff Method Automated Method   % Neutrophils 34.3   % Lymphocytes 48.8   % Monocytes 15.1   % Eosinophils 1.2   % Basophils 0.3   % Immature Granulocytes 0.3   Nucleated RBCs 0   Absolute Neutrophil 1.1 (L)   Absolute Lymphocytes 1.6   Absolute Monocytes 0.5   Absolute Eosinophils 0.0   Absolute Basophils 0.0   Abs Immature Granulocytes 0.0   Absolute Nucleated RBC 0.0     Impression/plan:   1. Resected stage IV cholangiocarcinoma with recurrence in bladder. Port placed 12/8. CT CAP on 12/12/17 with stable to mildly increased size of enhancing lesion in the posterior urinary bladder wall. CA 19-9 69 on 10/6/17. Labs reviewed. ANC 1.1 today, but will proceed. Day 8 may need to be deferred, pending counts. Proceed with C2D1 Gemcitabine + Cisplatin.   --Palliative care services offered and explained to patients. He declined at this time.  --Discussed his interest in clinical trials.   --Discussed precautions for neutropenic fever, and that he should call if temp >100.4  --CA 19-9 ordered for 1/10  --Scheduled for D8 on 1/10 with labs, infusion. Scheduled for C3D1 on 1/24/18 and C3D8 on 1/31  --CT CAP scheduled for 1/26 and [facility] on 1/29    2. Resected melanoma/SCC  -sees dermatology every 6 months--Due in May    4. A-fib, s/p cardioversion in May 2017, hx of aortic aneurysm  -a-fib has recurred, now on eliquis, as well as metoprolol. Has ongoing f/u with cardiology.    5. Gout: No symptoms currently. Followed by PCP. Colchicine prn    Wen Rodriguez PA-C          The patient was seen in conjunction with Wen Rodriguez PA-C who served as a scribe for today's visit. I have reviewed and edited the note and agree with the above findings and plan.  BUD Clarke PA-C

## 2018-01-10 ENCOUNTER — APPOINTMENT (OUTPATIENT)
Dept: LAB | Facility: CLINIC | Age: 63
End: 2018-01-10
Attending: INTERNAL MEDICINE
Payer: COMMERCIAL

## 2018-01-10 ENCOUNTER — INFUSION THERAPY VISIT (OUTPATIENT)
Dept: ONCOLOGY | Facility: CLINIC | Age: 63
End: 2018-01-10
Attending: INTERNAL MEDICINE
Payer: COMMERCIAL

## 2018-01-10 VITALS
RESPIRATION RATE: 16 BRPM | BODY MASS INDEX: 32.26 KG/M2 | TEMPERATURE: 97.9 F | DIASTOLIC BLOOD PRESSURE: 76 MMHG | OXYGEN SATURATION: 95 % | SYSTOLIC BLOOD PRESSURE: 110 MMHG | HEART RATE: 78 BPM | WEIGHT: 224.8 LBS

## 2018-01-10 DIAGNOSIS — C22.1 CHOLANGIOCARCINOMA (H): Primary | ICD-10-CM

## 2018-01-10 LAB
ALBUMIN SERPL-MCNC: 3.6 G/DL (ref 3.4–5)
ALP SERPL-CCNC: 78 U/L (ref 40–150)
ALT SERPL W P-5'-P-CCNC: 50 U/L (ref 0–70)
ANION GAP SERPL CALCULATED.3IONS-SCNC: 10 MMOL/L (ref 3–14)
AST SERPL W P-5'-P-CCNC: 24 U/L (ref 0–45)
BASOPHILS # BLD AUTO: 0 10E9/L (ref 0–0.2)
BASOPHILS NFR BLD AUTO: 0.6 %
BILIRUB SERPL-MCNC: 0.5 MG/DL (ref 0.2–1.3)
BUN SERPL-MCNC: 20 MG/DL (ref 7–30)
CALCIUM SERPL-MCNC: 8.5 MG/DL (ref 8.5–10.1)
CHLORIDE SERPL-SCNC: 106 MMOL/L (ref 94–109)
CO2 SERPL-SCNC: 22 MMOL/L (ref 20–32)
CREAT SERPL-MCNC: 0.88 MG/DL (ref 0.66–1.25)
DIFFERENTIAL METHOD BLD: ABNORMAL
EOSINOPHIL # BLD AUTO: 0 10E9/L (ref 0–0.7)
EOSINOPHIL NFR BLD AUTO: 0.3 %
ERYTHROCYTE [DISTWIDTH] IN BLOOD BY AUTOMATED COUNT: 13 % (ref 10–15)
GFR SERPL CREATININE-BSD FRML MDRD: 88 ML/MIN/1.7M2
GLUCOSE SERPL-MCNC: 162 MG/DL (ref 70–99)
HCT VFR BLD AUTO: 37.6 % (ref 40–53)
HGB BLD-MCNC: 12.8 G/DL (ref 13.3–17.7)
IMM GRANULOCYTES # BLD: 0.2 10E9/L (ref 0–0.4)
IMM GRANULOCYTES NFR BLD: 5 %
LYMPHOCYTES # BLD AUTO: 2.1 10E9/L (ref 0.8–5.3)
LYMPHOCYTES NFR BLD AUTO: 62.3 %
MAGNESIUM SERPL-MCNC: 1.9 MG/DL (ref 1.6–2.3)
MCH RBC QN AUTO: 31 PG (ref 26.5–33)
MCHC RBC AUTO-ENTMCNC: 34 G/DL (ref 31.5–36.5)
MCV RBC AUTO: 91 FL (ref 78–100)
MONOCYTES # BLD AUTO: 0.3 10E9/L (ref 0–1.3)
MONOCYTES NFR BLD AUTO: 8.6 %
NEUTROPHILS # BLD AUTO: 0.8 10E9/L (ref 1.6–8.3)
NEUTROPHILS NFR BLD AUTO: 23.2 %
NRBC # BLD AUTO: 0.1 10*3/UL
NRBC BLD AUTO-RTO: 2 /100
PLATELET # BLD AUTO: 356 10E9/L (ref 150–450)
POTASSIUM SERPL-SCNC: 4.2 MMOL/L (ref 3.4–5.3)
PROT SERPL-MCNC: 7.1 G/DL (ref 6.8–8.8)
RBC # BLD AUTO: 4.13 10E12/L (ref 4.4–5.9)
SODIUM SERPL-SCNC: 139 MMOL/L (ref 133–144)
WBC # BLD AUTO: 3.4 10E9/L (ref 4–11)

## 2018-01-10 PROCEDURE — 25000128 H RX IP 250 OP 636: Mod: ZF | Performed by: INTERNAL MEDICINE

## 2018-01-10 PROCEDURE — 86301 IMMUNOASSAY TUMOR CA 19-9: CPT | Performed by: PHYSICIAN ASSISTANT

## 2018-01-10 PROCEDURE — 83735 ASSAY OF MAGNESIUM: CPT | Performed by: PHYSICIAN ASSISTANT

## 2018-01-10 PROCEDURE — 85025 COMPLETE CBC W/AUTO DIFF WBC: CPT | Performed by: PHYSICIAN ASSISTANT

## 2018-01-10 PROCEDURE — G0463 HOSPITAL OUTPT CLINIC VISIT: HCPCS

## 2018-01-10 PROCEDURE — 36591 DRAW BLOOD OFF VENOUS DEVICE: CPT

## 2018-01-10 PROCEDURE — 80053 COMPREHEN METABOLIC PANEL: CPT | Performed by: PHYSICIAN ASSISTANT

## 2018-01-10 RX ORDER — HEPARIN SODIUM (PORCINE) LOCK FLUSH IV SOLN 100 UNIT/ML 100 UNIT/ML
5 SOLUTION INTRAVENOUS EVERY 8 HOURS
Status: DISCONTINUED | OUTPATIENT
Start: 2018-01-10 | End: 2018-01-10 | Stop reason: HOSPADM

## 2018-01-10 RX ADMIN — SODIUM CHLORIDE, PRESERVATIVE FREE 5 ML: 5 INJECTION INTRAVENOUS at 06:26

## 2018-01-10 ASSESSMENT — PAIN SCALES - GENERAL: PAINLEVEL: NO PAIN (0)

## 2018-01-10 NOTE — PATIENT INSTRUCTIONS
Contact Numbers    [facility] Main Line: [phone]  [facility] Triage:  [phone]    Call triage with chills and/or temperature greater than or equal to 100.5, uncontrolled nausea/vomiting, diarrhea, constipation, dizziness, shortness of breath, chest pain, bleeding, unexplained bruising, or any new/concerning symptoms, questions/concerns.     If you are having any concerning symptoms or wish to speak to a provider before your next infusion visit, please call your care coordinator or triage to notify them so we can adequately serve you.       After Hours: [phone]    If after hours, weekends, or holidays, call main hospital  and ask for Oncology doctor on call.           January 2018 Sunday Monday Tuesday Wednesday Thursday Friday Saturday        1     2     3     [facility] DRAW    6:30 AM   (15 min.)    M[facility]IC LAB DRAW   [facility] Lab Draw     [facility] RETURN    6:45 AM   (50 min.)   Julia Smith PA-C   [facility]     [facility] ONC INFUSION 360    7:30 AM   (360 min.)    ONCOLOGY INFUSION   [facility] 4     5     6       7     8     9     10     [facility] LAB DRAW    6:30 AM   (15 min.)    M[facility] LAB DRAW   [facility] Lab Draw     [facility] ONC INFUSION 360    7:00 AM   (360 min.)    ONCOLOGY INFUSION   [facility] 11     12     13       14     15     16     17     [facility] LAB DRAW    6:30 AM   (15 min.)    MAS[facility] LAB DRAW   [facility] Lab Draw     [facility] ONC INFUSION 360    7:00 AM   (360 min.)    ONCOLOGY INFUSION   [facility] 18     19     20       21     22     23     24     25     26     LAB    7:30 AM   (15 min.)    LAB   [facility] Lab     CT CHEST ABDOMEN PELVIS WWO    7:45 AM   (20 min.)   UCCT2   [facility] Imaging [place] CT 27       28     29     [facility] RETURN    3:45 PM   (30 min.)   Naresh De Los Santos MD   [facility] 30     31     [facility] ONC INFUSION 360    7:00 AM    (360 min.)    ONCOLOGY INFUSION   [facility]                           February 2018 Sunday Monday Tuesday Wednesday Thursday Friday Saturday                       1     2     3       4     5     6     7     8     9     10       11     12     13     14     15     16     17       18     19     20     21     22     23  Happy Birthday!     24       25     26     27     28                                Recent Results (from the past 24 hour(s))   CBC with platelets differential    Collection Time: 01/10/18  6:35 AM   Result Value Ref Range    WBC 3.4 (L) 4.0 - 11.0 10e9/L    RBC Count 4.13 (L) 4.4 - 5.9 10e12/L    Hemoglobin 12.8 (L) 13.3 - 17.7 g/dL    Hematocrit 37.6 (L) 40.0 - 53.0 %    MCV 91 78 - 100 fl    MCH 31.0 26.5 - 33.0 pg    MCHC 34.0 31.5 - 36.5 g/dL    RDW 13.0 10.0 - 15.0 %    Platelet Count 356 150 - 450 10e9/L    Diff Method Automated Method     % Neutrophils 23.2 %    % Lymphocytes 62.3 %    % Monocytes 8.6 %    % Eosinophils 0.3 %    % Basophils 0.6 %    % Immature Granulocytes 5.0 %    Nucleated RBCs 2 (H) 0 /100    Absolute Neutrophil 0.8 (L) 1.6 - 8.3 10e9/L    Absolute Lymphocytes 2.1 0.8 - 5.3 10e9/L    Absolute Monocytes 0.3 0.0 - 1.3 10e9/L    Absolute Eosinophils 0.0 0.0 - 0.7 10e9/L    Absolute Basophils 0.0 0.0 - 0.2 10e9/L    Abs Immature Granulocytes 0.2 0 - 0.4 10e9/L    Absolute Nucleated RBC 0.1    Comprehensive metabolic panel    Collection Time: 01/10/18  6:35 AM   Result Value Ref Range    Sodium 139 133 - 144 mmol/L    Potassium 4.2 3.4 - 5.3 mmol/L    Chloride 106 94 - 109 mmol/L    Carbon Dioxide 22 20 - 32 mmol/L    Anion Gap 10 3 - 14 mmol/L    Glucose 162 (H) 70 - 99 mg/dL    Urea Nitrogen 20 7 - 30 mg/dL    Creatinine 0.88 0.66 - 1.25 mg/dL    GFR Estimate 88 >60 mL/min/1.7m2    GFR Estimate If Black >90 >60 mL/min/1.7m2    Calcium 8.5 8.5 - 10.1 mg/dL    Bilirubin Total 0.5 0.2 - 1.3 mg/dL    Albumin 3.6 3.4 - 5.0 g/dL    Protein Total 7.1 6.8 - 8.8  g/dL    Alkaline Phosphatase 78 40 - 150 U/L    ALT 50 0 - 70 U/L    AST 24 0 - 45 U/L   Magnesium    Collection Time: 01/10/18  6:35 AM   Result Value Ref Range    Magnesium 1.9 1.6 - 2.3 mg/dL

## 2018-01-10 NOTE — PROGRESS NOTES
Infusion Nursing Note:  Girish Chauhan presents today for Cycle 2 day 8 Cisplatin, Gemzar.    Patient seen by provider today: No   present during visit today: Not Applicable.    Note: Pts ANC below treatment requirements. Dr. De Los Santos paged at 0706, awaiting call back.  TORB 1/10/18 0737 Dr. De Los Santos/Anneliese Estrada RN:  Hold chemo today r/t anc 0.8. Defer treatment one week.    Intravenous Access:  Implanted Port.    Treatment Conditions:  Lab Results   Component Value Date    HGB 12.8 01/10/2018     Lab Results   Component Value Date    WBC 3.4 01/10/2018      Lab Results   Component Value Date    ANEU 0.8 01/10/2018     Lab Results   Component Value Date     01/10/2018      Lab Results   Component Value Date     01/10/2018                   Lab Results   Component Value Date    POTASSIUM 4.2 01/10/2018           Lab Results   Component Value Date    MAG 1.9 01/10/2018            Lab Results   Component Value Date    CR 0.88 01/10/2018                   Lab Results   Component Value Date    GARY 8.5 01/10/2018                Lab Results   Component Value Date    BILITOTAL 0.5 01/10/2018           Lab Results   Component Value Date    ALBUMIN 3.6 01/10/2018                    Lab Results   Component Value Date    ALT 50 01/10/2018           Lab Results   Component Value Date    AST 24 01/10/2018     Results reviewed, labs did NOT meet treatment parameters: ANC 0.8.    Pt educated on neutropenic precautions and states understanding of information.    Post Infusion Assessment:  Access discontinued per protocol.    Discharge Plan:   Patient declined prescription refills.  Discharge instructions reviewed with: Patient.  AVS to patient via [employer].  Patient will return 1/17/18 for next appointment- Schedulers and care coordinators aware of change. Pt will watch [facility] for new appointment times.   Patient discharged in stable condition accompanied by: self.  Departure Mode: Ambulatory.    ANNELIESE  PEDRITO VILLATORO

## 2018-01-10 NOTE — MR AVS SNAPSHOT
After Visit Summary   1/10/2018    Girish Chauhan    MRN: [id number]           Patient Information     Date Of Birth          1955        Visit Information        Provider Department      1/10/2018 7:00 AM  11 ATC;  ONCOLOGY INFUSION [facility]        Today's Diagnoses     Cholangiocarcinoma (H)    -  1      Care Instructions    Contact Numbers    [facility] Main Line: [phone]  [facility] Triage:  [phone]    Call triage with chills and/or temperature greater than or equal to 100.5, uncontrolled nausea/vomiting, diarrhea, constipation, dizziness, shortness of breath, chest pain, bleeding, unexplained bruising, or any new/concerning symptoms, questions/concerns.     If you are having any concerning symptoms or wish to speak to a provider before your next infusion visit, please call your care coordinator or triage to notify them so we can adequately serve you.       After Hours: [phone]    If after hours, weekends, or holidays, call main hospital  and ask for Oncology doctor on call.           January 2018 Sunday Monday Tuesday Wednesday Thursday Friday Saturday        1     2     3     [facility] D[facility]    6:30 AM   (15 min.)    [facility] DRAW   [facility] Lab Draw     [facility] RETURN    6:45 AM   (50 min.)   Julia Smith PA-C   [facility]     [facility] ONC INFUSION 360    7:30 AM   (360 min.)    ONCOLOGY INFUSION   [facility] 4     5     6       7     8     9     10     [facility] DRAW    6:30 AM   (15 min.)    [facility] DRAW   [facility] Lab Draw     [facility] ONC INFUSION 360    7:00 AM   (360 min.)    ONCOLOGY INFUSION   [facility] 11     12     13       14     15     16     17     [facility] DRAW    6:30 AM   (15 min.)    [facility] DRAW   [facility] Draw     [facility] ONC INFUSION 360    7:00 AM   (360 min.)    ONCOLOGY INFUSION   [facility]  18     19     20       21     22     23     24     25     26     LAB    7:30 AM   (15 min.)    LAB   [facility] Lab     CT CHEST ABDOMEN PELVIS WWO    7:45 AM   (20 min.)   UCCT2   [facility] Imaging Center CT 27       28     29     [facility] RETURN    3:45 PM   (30 min.)   Naresh De Los Santos MD   [facility] 30     31     [facility] ONC INFUSION 360    7:00 AM   (360 min.)    ONCOLOGY INFUSION   [facility]                           February 2018 Sunday Monday Tuesday Wednesday Thursday Friday Saturday                       1     2     3       4     5     6     7     8     9     10       11     12     13     14     15     16     17       18     19     20     21     22     23  Happy Birthday!     24       25     26     27     28                                Recent Results (from the past 24 hour(s))   CBC with platelets differential    Collection Time: 01/10/18  6:35 AM   Result Value Ref Range    WBC 3.4 (L) 4.0 - 11.0 10e9/L    RBC Count 4.13 (L) 4.4 - 5.9 10e12/L    Hemoglobin 12.8 (L) 13.3 - 17.7 g/dL    Hematocrit 37.6 (L) 40.0 - 53.0 %    MCV 91 78 - 100 fl    MCH 31.0 26.5 - 33.0 pg    MCHC 34.0 31.5 - 36.5 g/dL    RDW 13.0 10.0 - 15.0 %    Platelet Count 356 150 - 450 10e9/L    Diff Method Automated Method     % Neutrophils 23.2 %    % Lymphocytes 62.3 %    % Monocytes 8.6 %    % Eosinophils 0.3 %    % Basophils 0.6 %    % Immature Granulocytes 5.0 %    Nucleated RBCs 2 (H) 0 /100    Absolute Neutrophil 0.8 (L) 1.6 - 8.3 10e9/L    Absolute Lymphocytes 2.1 0.8 - 5.3 10e9/L    Absolute Monocytes 0.3 0.0 - 1.3 10e9/L    Absolute Eosinophils 0.0 0.0 - 0.7 10e9/L    Absolute Basophils 0.0 0.0 - 0.2 10e9/L    Abs Immature Granulocytes 0.2 0 - 0.4 10e9/L    Absolute Nucleated RBC 0.1    Comprehensive metabolic panel    Collection Time: 01/10/18  6:35 AM   Result Value Ref Range    Sodium 139 133 - 144 mmol/L    Potassium 4.2 3.4 - 5.3 mmol/L    Chloride 106 94 - 109 mmol/L    Carbon  Dioxide 22 20 - 32 mmol/L    Anion Gap 10 3 - 14 mmol/L    Glucose 162 (H) 70 - 99 mg/dL    Urea Nitrogen 20 7 - 30 mg/dL    Creatinine 0.88 0.66 - 1.25 mg/dL    GFR Estimate 88 >60 mL/min/1.7m2    GFR Estimate If Black >90 >60 mL/min/1.7m2    Calcium 8.5 8.5 - 10.1 mg/dL    Bilirubin Total 0.5 0.2 - 1.3 mg/dL    Albumin 3.6 3.4 - 5.0 g/dL    Protein Total 7.1 6.8 - 8.8 g/dL    Alkaline Phosphatase 78 40 - 150 U/L    ALT 50 0 - 70 U/L    AST 24 0 - 45 U/L   Magnesium    Collection Time: 01/10/18  6:35 AM   Result Value Ref Range    Magnesium 1.9 1.6 - 2.3 mg/dL                 Follow-ups after your visit        Your next 10 appointments already scheduled     Jan 17, 2018  6:30 AM CST   [facility] Draw with [facility]   [facility] ([facility])    [address] [place] [address]  [address] [place] [id number] [phone]            Jan 17, 2018  7:00 AM CST   Infusion 360 with  ONCOLOGY INFUSION, UC 10 ATC   [facility] ([facility])    [address] [place] [address]  [address] [place] [id number] [phone]            Jan 26, 2018  7:30 AM CST   LAB with  LAB   [facility])    [address] [place] [address]  1st Floor  [facility] [id number] [phone]           Please do not eat 10-12 hours before your appointment if you are coming in fasting for labs on lipids, cholesterol, or glucose (sugar). This does not apply to pregnant women. Water, hot tea and black coffee (with nothing added) are okay. Do not drink other fluids, diet soda or chew gum.            Jan 26, 2018  8:00 AM CST   (Arrive by 7:45 AM)   CT CHEST ABDOMEN PELVIS W/O & W CONTRAST with UCCT2   [facility] CT ([facility])    [address] [place] [address]  1st Floor  [facility] [id number] [phone]           Please bring any scans or X-rays taken at other hospitals, if similar tests were  done. Also bring a list of your medicines, including vitamins, minerals and over-the-counter drugs. It is safest to leave personal items at home.  Be sure to tell your doctor:   If you have any allergies.   If there s any chance you are pregnant.   If you are breastfeeding.   If you have any special needs.  You may have contrast for this exam. To prepare:   Do not eat or drink for 2 hours before your exam. If you need to take medicine, you may take it with small sips of water. (We may ask you to take liquid medicine as well.)   The day before your exam, drink extra fluids at least six 8-ounce glasses (unless your doctor tells you to restrict your fluids).  Patients over 70 or patients with diabetes or kidney problems:   If you haven t had a blood test (creatinine test) within the last 30 days, go to your clinic or Diagnostic Imaging Department for this test.  If you have diabetes:   If your kidney function is normal, continue taking your metformin (Avandamet, Glucophage, Glucovance, Metaglip) on the day of your exam.   If your kidney function is abnormal, wait 48 hours before restarting this medicine.  You will have oral contrast for this exam:   You will drink the contrast at home. Get this from your clinic or Diagnostic Imaging Department. Please follow the directions given.  Please wear loose clothing, such as a sweat suit or jogging clothes. Avoid snaps, zippers and other metal. We may ask you to undress and put on a hospital gown.  If you have any questions, please call the Imaging Department where you will have your exam.            Jan 29, 2018  4:00 PM CST   (Arrive by 3:45 PM)   Return Visit with Naresh De Los Santos MD   [facility] ([facility])    [address]  [address]  [facility] [address] [phone]            Jan 31, 2018  7:00 AM CST   Infusion 360 with UC ONCOLOGY INFUSION, UC 10 ATC   [facility] ([facility])    [address]  [address]  [facility] [address] [phone]              Who to contact     If you have questions or need follow up information about today's clinic visit or your schedule please contact [facility] directly at [phone].  Normal or non-critical lab and imaging results will be communicated to you by MyChart, letter or phone within 4 business days after the clinic has received the results. If you do not hear from us within 7 days, please contact the clinic through [employer] or phone. If you have a critical or abnormal lab result, we will notify you by phone as soon as possible.  Submit refill requests through [employer] or call your pharmacy and they will forward the refill request to us. Please allow 3 business days for your refill to be completed.          Additional Information About Your Visit        MyChart Information     [employer] gives you secure access to your electronic health record. If you see a primary care provider, you can also send messages to your care team and make appointments. If you have questions, please call your primary care clinic.  If you do not have a primary care provider, please call [phone] and they will assist you.        Care EveryWhere ID     This is your Care EveryWhere ID. This could be used by other organizations to access your [place] medical records  [id number]        Your Vitals Were     Pulse Temperature Respirations Pulse Oximetry BMI (Body Mass Index)       78 97.9  F (36.6  C) 16 95% 32.26 kg/m2        Blood Pressure from Last 3 Encounters:   01/10/18 110/76   01/03/18 114/77   12/20/17 116/82    Weight from Last 3 Encounters:   01/10/18 102 kg (224 lb 12.8 oz)   01/03/18 104.2 kg (229 lb 11.2 oz)   12/20/17 101.6 kg (224 lb)              We Performed the Following     Cancer antigen 19-9     CBC with platelets differential     Comprehensive metabolic panel     Magnesium        Primary Care Provider  Office Phone # Fax #    Jim Kaplan [phone]       [facility] [address]   [facility] [address]        Equal Access to Services     MARIUSZ MADRIGAL : Hadii aad ku hadyessicao Sogabrielali, waaxda luqadaha, qaybta kaalmada adeericada, ankit weinbergcarmen lernerevan srivastava kwabena gross. So [facility] [phone].    ATENCIÓN: Si habla español, tiene a flores disposición servicios gratuitos de asistencia lingüística. [facility] [phone].    We comply with applicable federal civil rights laws and [place] laws. We do not discriminate on the basis of race, color, national origin, age, disability, sex, sexual orientation, or gender identity.            Thank you!     Thank you for choosing [facility]  for your care. Our goal is always to provide you with excellent care. Hearing back from our patients is one way we can continue to improve our services. Please take a few minutes to complete the written survey that you may receive in the mail after your visit with us. Thank you!             Your Updated Medication List - Protect others around you: Learn how to safely use, store and throw away your medicines at www.disposemymeds.org.          This list is accurate as of: 1/10/18  8:02 AM.  Always use your most recent med list.                   Brand Name Dispense Instructions for use Diagnosis    atorvastatin 40 MG tablet    LIPITOR     TAKE 1 TABLET BY MOUTH DAILY        ELIQUIS PO      Take 5 mg by mouth 2 times daily        LORazepam 0.5 MG tablet    ATIVAN    30 tablet    Take 1 tablet (0.5 mg) by mouth every 4 hours as needed (Anxiety, Nausea/Vomiting or Sleep)    Cholangiocarcinoma (H)       metoprolol 50 MG tablet    LOPRESSOR     50 mg 2 times daily        MITIGARE 0.6 MG Caps   Generic drug:  Colchicine      Take 0.6 mg by mouth 3 times daily as needed        multivitamin, therapeutic with minerals Tabs tablet     30 tablet    Take 1 tablet by mouth daily    Mass of bile  duct       NITROSTAT SL      Place 0.4 mg under the tongue every 5 minutes as needed for chest pain (Carries medication - has never used)        OMEGA-3 FISH OIL PO      Take 1,000 mg by mouth daily        ondansetron 8 MG tablet    ZOFRAN    10 tablet    Take 1 tablet (8 mg) by mouth every 8 hours as needed (Nausea/Vomiting)    Cholangiocarcinoma (H)       prochlorperazine 10 MG tablet    COMPAZINE    30 tablet    Take 1 tablet (10 mg) by mouth every 6 hours as needed (Nausea/Vomiting)    Cholangiocarcinoma (H)

## 2018-01-11 LAB — CANCER AG19-9 SERPL-ACNC: 72 U/ML (ref 0–37)

## 2018-01-16 RX ORDER — METHYLPREDNISOLONE SODIUM SUCCINATE 125 MG/2ML
125 INJECTION, POWDER, LYOPHILIZED, FOR SOLUTION INTRAMUSCULAR; INTRAVENOUS
Status: CANCELLED
Start: 2018-01-17

## 2018-01-16 RX ORDER — MEPERIDINE HYDROCHLORIDE 25 MG/ML
25 INJECTION INTRAMUSCULAR; INTRAVENOUS; SUBCUTANEOUS EVERY 30 MIN PRN
Status: CANCELLED | OUTPATIENT
Start: 2018-01-17

## 2018-01-16 RX ORDER — DIPHENHYDRAMINE HYDROCHLORIDE 50 MG/ML
50 INJECTION INTRAMUSCULAR; INTRAVENOUS
Status: CANCELLED
Start: 2018-01-17

## 2018-01-16 RX ORDER — EPINEPHRINE 0.3 MG/.3ML
0.3 INJECTION SUBCUTANEOUS EVERY 5 MIN PRN
Status: CANCELLED | OUTPATIENT
Start: 2018-01-17

## 2018-01-16 RX ORDER — ONDANSETRON 2 MG/ML
8 INJECTION INTRAMUSCULAR; INTRAVENOUS ONCE
Status: CANCELLED
Start: 2018-01-17 | End: 2018-01-17

## 2018-01-16 RX ORDER — ALBUTEROL SULFATE 0.83 MG/ML
2.5 SOLUTION RESPIRATORY (INHALATION)
Status: CANCELLED | OUTPATIENT
Start: 2018-01-17

## 2018-01-16 RX ORDER — SODIUM CHLORIDE 9 MG/ML
1000 INJECTION, SOLUTION INTRAVENOUS CONTINUOUS PRN
Status: CANCELLED
Start: 2018-01-17

## 2018-01-16 RX ORDER — ALBUTEROL SULFATE 90 UG/1
1-2 AEROSOL, METERED RESPIRATORY (INHALATION)
Status: CANCELLED
Start: 2018-01-17

## 2018-01-16 RX ORDER — LORAZEPAM 2 MG/ML
0.5 INJECTION INTRAMUSCULAR EVERY 4 HOURS PRN
Status: CANCELLED
Start: 2018-01-17

## 2018-01-16 RX ORDER — EPINEPHRINE 1 MG/ML
0.3 INJECTION, SOLUTION, CONCENTRATE INTRAVENOUS EVERY 5 MIN PRN
Status: CANCELLED | OUTPATIENT
Start: 2018-01-17

## 2018-01-17 ENCOUNTER — INFUSION THERAPY VISIT (OUTPATIENT)
Dept: ONCOLOGY | Facility: CLINIC | Age: 63
End: 2018-01-17
Attending: INTERNAL MEDICINE
Payer: COMMERCIAL

## 2018-01-17 VITALS
TEMPERATURE: 97.6 F | WEIGHT: 227.1 LBS | BODY MASS INDEX: 32.59 KG/M2 | RESPIRATION RATE: 18 BRPM | HEART RATE: 64 BPM | OXYGEN SATURATION: 97 % | SYSTOLIC BLOOD PRESSURE: 113 MMHG | DIASTOLIC BLOOD PRESSURE: 73 MMHG

## 2018-01-17 DIAGNOSIS — C22.1 CHOLANGIOCARCINOMA (H): Primary | ICD-10-CM

## 2018-01-17 LAB
ALBUMIN SERPL-MCNC: 3.8 G/DL (ref 3.4–5)
ALP SERPL-CCNC: 69 U/L (ref 40–150)
ALT SERPL W P-5'-P-CCNC: 35 U/L (ref 0–70)
ANION GAP SERPL CALCULATED.3IONS-SCNC: 8 MMOL/L (ref 3–14)
AST SERPL W P-5'-P-CCNC: 21 U/L (ref 0–45)
BASOPHILS # BLD AUTO: 0 10E9/L (ref 0–0.2)
BASOPHILS NFR BLD AUTO: 0.3 %
BILIRUB SERPL-MCNC: 0.6 MG/DL (ref 0.2–1.3)
BUN SERPL-MCNC: 20 MG/DL (ref 7–30)
CALCIUM SERPL-MCNC: 8.3 MG/DL (ref 8.5–10.1)
CHLORIDE SERPL-SCNC: 108 MMOL/L (ref 94–109)
CO2 SERPL-SCNC: 25 MMOL/L (ref 20–32)
CREAT SERPL-MCNC: 0.91 MG/DL (ref 0.66–1.25)
DIFFERENTIAL METHOD BLD: ABNORMAL
EOSINOPHIL # BLD AUTO: 0 10E9/L (ref 0–0.7)
EOSINOPHIL NFR BLD AUTO: 0.6 %
ERYTHROCYTE [DISTWIDTH] IN BLOOD BY AUTOMATED COUNT: 14.6 % (ref 10–15)
GFR SERPL CREATININE-BSD FRML MDRD: 85 ML/MIN/1.7M2
GLUCOSE SERPL-MCNC: 100 MG/DL (ref 70–99)
HCT VFR BLD AUTO: 39.3 % (ref 40–53)
HGB BLD-MCNC: 13.2 G/DL (ref 13.3–17.7)
IMM GRANULOCYTES # BLD: 0 10E9/L (ref 0–0.4)
IMM GRANULOCYTES NFR BLD: 0.2 %
LYMPHOCYTES # BLD AUTO: 2.1 10E9/L (ref 0.8–5.3)
LYMPHOCYTES NFR BLD AUTO: 33.6 %
MAGNESIUM SERPL-MCNC: 2.2 MG/DL (ref 1.6–2.3)
MCH RBC QN AUTO: 30.9 PG (ref 26.5–33)
MCHC RBC AUTO-ENTMCNC: 33.6 G/DL (ref 31.5–36.5)
MCV RBC AUTO: 92 FL (ref 78–100)
MONOCYTES # BLD AUTO: 0.9 10E9/L (ref 0–1.3)
MONOCYTES NFR BLD AUTO: 14.2 %
NEUTROPHILS # BLD AUTO: 3.2 10E9/L (ref 1.6–8.3)
NEUTROPHILS NFR BLD AUTO: 51.1 %
NRBC # BLD AUTO: 0 10*3/UL
NRBC BLD AUTO-RTO: 0 /100
PLATELET # BLD AUTO: 168 10E9/L (ref 150–450)
POTASSIUM SERPL-SCNC: 4.5 MMOL/L (ref 3.4–5.3)
PROT SERPL-MCNC: 7 G/DL (ref 6.8–8.8)
RBC # BLD AUTO: 4.27 10E12/L (ref 4.4–5.9)
SODIUM SERPL-SCNC: 141 MMOL/L (ref 133–144)
WBC # BLD AUTO: 6.2 10E9/L (ref 4–11)

## 2018-01-17 PROCEDURE — 83735 ASSAY OF MAGNESIUM: CPT | Performed by: INTERNAL MEDICINE

## 2018-01-17 PROCEDURE — 96367 TX/PROPH/DG ADDL SEQ IV INF: CPT

## 2018-01-17 PROCEDURE — 96375 TX/PRO/DX INJ NEW DRUG ADDON: CPT

## 2018-01-17 PROCEDURE — 96413 CHEMO IV INFUSION 1 HR: CPT

## 2018-01-17 PROCEDURE — 25000128 H RX IP 250 OP 636: Mod: ZF | Performed by: INTERNAL MEDICINE

## 2018-01-17 PROCEDURE — 80053 COMPREHEN METABOLIC PANEL: CPT | Performed by: INTERNAL MEDICINE

## 2018-01-17 PROCEDURE — 96417 CHEMO IV INFUS EACH ADDL SEQ: CPT

## 2018-01-17 PROCEDURE — 85025 COMPLETE CBC W/AUTO DIFF WBC: CPT | Performed by: INTERNAL MEDICINE

## 2018-01-17 RX ORDER — HEPARIN SODIUM (PORCINE) LOCK FLUSH IV SOLN 100 UNIT/ML 100 UNIT/ML
5 SOLUTION INTRAVENOUS EVERY 8 HOURS PRN
Status: DISCONTINUED | OUTPATIENT
Start: 2018-01-17 | End: 2018-01-17 | Stop reason: HOSPADM

## 2018-01-17 RX ORDER — ONDANSETRON 2 MG/ML
8 INJECTION INTRAMUSCULAR; INTRAVENOUS ONCE
Status: COMPLETED | OUTPATIENT
Start: 2018-01-17 | End: 2018-01-17

## 2018-01-17 RX ADMIN — CISPLATIN 60 MG: 1 INJECTION, SOLUTION INTRAVENOUS at 08:41

## 2018-01-17 RX ADMIN — ONDANSETRON 8 MG: 2 INJECTION INTRAMUSCULAR; INTRAVENOUS at 07:27

## 2018-01-17 RX ADMIN — MAGNESIUM SULFATE HEPTAHYDRATE: 500 INJECTION, SOLUTION INTRAMUSCULAR; INTRAVENOUS at 07:33

## 2018-01-17 RX ADMIN — DEXAMETHASONE SODIUM PHOSPHATE: 10 INJECTION, SOLUTION INTRAMUSCULAR; INTRAVENOUS at 07:29

## 2018-01-17 RX ADMIN — SODIUM CHLORIDE, PRESERVATIVE FREE 5 ML: 5 INJECTION INTRAVENOUS at 06:34

## 2018-01-17 RX ADMIN — SODIUM CHLORIDE, PRESERVATIVE FREE 5 ML: 5 INJECTION INTRAVENOUS at 10:23

## 2018-01-17 RX ADMIN — SODIUM CHLORIDE 250 ML: 9 INJECTION, SOLUTION INTRAVENOUS at 07:27

## 2018-01-17 RX ADMIN — GEMCITABINE 2200 MG: 38 INJECTION, SOLUTION INTRAVENOUS at 09:46

## 2018-01-17 ASSESSMENT — PAIN SCALES - GENERAL: PAINLEVEL: NO PAIN (0)

## 2018-01-17 NOTE — MR AVS SNAPSHOT
After Visit Summary   1/17/2018    Girish Chauhan    MRN: [id number]           Patient Information     Date Of Birth          1955        Visit Information        Provider Department      1/17/2018 7:00 AM UC 10 ATC;  ONCOLOGY INFUSION [facility]        Today's Diagnoses     Cholangiocarcinoma (H)    -  1      Care Instructions    Contact Numbers    [facility] [place]: [phone]  [facility] [place]:  [phone]    Call triage with chills and/or temperature greater than or equal to 100.5, uncontrolled nausea/vomiting, diarrhea, constipation, dizziness, shortness of breath, chest pain, bleeding, unexplained bruising, or any new/concerning symptoms, questions/concerns.     If you are having any concerning symptoms or wish to speak to a provider before your next infusion visit, please call your care coordinator or triage to notify them so we can adequately serve you.       After Hours: [phone]    If after hours, weekends, or holidays, call main hospital  and ask for Oncology doctor on call.           January 2018 Sunday Monday Tuesday Wednesday Thursday Friday Saturday        1     2     3     [facility] DRAW    6:30 AM   (15 min.)    [facility] LAB DRAW   [facility] Lab Draw     [facility] RETURN    6:45 AM   (50 min.)   Julia Smith PA-C   [facility]     [facility] ONC INFUSION 360    7:30 AM   (360 min.)    ONCOLOGY INFUSION   [facility] 4     5     6       7     8     9     10     [facility] DRAW    6:30 AM   (15 min.)    [facility]   [facility]     [facility] ONC INFUSION 360    7:00 AM   (360 min.)    ONCOLOGY INFUSION   [facility] 11     12     13       14     15     16     17     [facility] DRAW    6:30 AM   (15 min.)    [facility] LAB DRAW   [facility] Lab Draw     [facility] ONC INFUSION 360    7:00 AM   (360 min.)    ONCOLOGY INFUSION   [facility]  18     19     20       21     22     23     24     25     26     LAB    7:30 AM   (15 min.)    LAB   [facility]     CT CHEST ABDOMEN PELVIS WWO    7:45 AM   (20 min.)   UCCT2   [facility] CT 27       28     29     [facility] RETURN    3:45 PM   (30 min.)   Naresh De Los Santos MD   [facility] 30     31     [facility] LAB DRAW    6:30 AM   (15 min.)    [facility] LAB DRAW   [facility] Lab Draw     P ONC INFUSION 360    7:00 AM   (360 min.)    ONCOLOGY INFUSION   [facility]                           February 2018 Sunday Monday Tuesday Wednesday Thursday Friday Saturday                       1     2     3       4     5     6     7     8     9     10       11     12     13     14     15     16     17       18     19     20     21     22     23  Happy Birthday!     24       25     26     27     28                                Recent Results (from the past 24 hour(s))   CBC with platelets differential    Collection Time: 01/17/18  6:42 AM   Result Value Ref Range    WBC 6.2 4.0 - 11.0 10e9/L    RBC Count 4.27 (L) 4.4 - 5.9 10e12/L    Hemoglobin 13.2 (L) 13.3 - 17.7 g/dL    Hematocrit 39.3 (L) 40.0 - 53.0 %    MCV 92 78 - 100 fl    MCH 30.9 26.5 - 33.0 pg    MCHC 33.6 31.5 - 36.5 g/dL    RDW 14.6 10.0 - 15.0 %    Platelet Count 168 150 - 450 10e9/L    Diff Method Automated Method     % Neutrophils 51.1 %    % Lymphocytes 33.6 %    % Monocytes 14.2 %    % Eosinophils 0.6 %    % Basophils 0.3 %    % Immature Granulocytes 0.2 %    Nucleated RBCs 0 0 /100    Absolute Neutrophil 3.2 1.6 - 8.3 10e9/L    Absolute Lymphocytes 2.1 0.8 - 5.3 10e9/L    Absolute Monocytes 0.9 0.0 - 1.3 10e9/L    Absolute Eosinophils 0.0 0.0 - 0.7 10e9/L    Absolute Basophils 0.0 0.0 - 0.2 10e9/L    Abs Immature Granulocytes 0.0 0 - 0.4 10e9/L    Absolute Nucleated RBC 0.0    Comprehensive metabolic panel    Collection Time: 01/17/18  6:42 AM   Result Value Ref Range    Sodium 141 133 -  144 mmol/L    Potassium 4.5 3.4 - 5.3 mmol/L    Chloride 108 94 - 109 mmol/L    Carbon Dioxide 25 20 - 32 mmol/L    Anion Gap 8 3 - 14 mmol/L    Glucose 100 (H) 70 - 99 mg/dL    Urea Nitrogen 20 7 - 30 mg/dL    Creatinine 0.91 0.66 - 1.25 mg/dL    GFR Estimate 85 >60 mL/min/1.7m2    GFR Estimate If Black >90 >60 mL/min/1.7m2    Calcium 8.3 (L) 8.5 - 10.1 mg/dL    Bilirubin Total 0.6 0.2 - 1.3 mg/dL    Albumin 3.8 3.4 - 5.0 g/dL    Protein Total 7.0 6.8 - 8.8 g/dL    Alkaline Phosphatase 69 40 - 150 U/L    ALT 35 0 - 70 U/L    AST 21 0 - 45 U/L   Magnesium    Collection Time: 01/17/18  6:42 AM   Result Value Ref Range    Magnesium 2.2 1.6 - 2.3 mg/dL               Follow-ups after your visit        Your next 10 appointments already scheduled     Jan 26, 2018  7:30 AM CST   LAB with  LAB   [facility] ([facility])    [address] [place] [id number] [phone]           Please do not eat 10-12 hours before your appointment if you are coming in fasting for labs on lipids, cholesterol, or glucose (sugar). This does not apply to pregnant women. Water, hot tea and black coffee (with nothing added) are okay. Do not drink other fluids, diet soda or chew gum.            Jan 26, 2018  8:00 AM CST   (Arrive by 7:45 AM)   CT CHEST ABDOMEN PELVIS W/O & W CONTRAST with CT[address] [place] [address] Imaging Center [address] ([facility])    [address] [place] [id number] [phone]           Please bring any scans or X-rays taken at other hospitals, if similar tests were done. Also bring a list of your medicines, including vitamins, minerals and over-the-counter drugs. It is safest to leave personal items at home.  Be sure to tell your doctor:   If you have any allergies.   If there s any chance you are pregnant.   If you are breastfeeding.   If you have any special needs.  You may have contrast for this exam. To prepare:   Do not eat or  drink for 2 hours before your exam. If you need to take medicine, you may take it with small sips of water. (We may ask you to take liquid medicine as well.)   The day before your exam, drink extra fluids at least six 8-ounce glasses (unless your doctor tells you to restrict your fluids).  Patients over 70 or patients with diabetes or kidney problems:   If you haven t had a blood test (creatinine test) within the last 30 days, go to your clinic or Diagnostic Imaging Department for this test.  If you have diabetes:   If your kidney function is normal, continue taking your metformin (Avandamet, Glucophage, Glucovance, Metaglip) on the day of your exam.   If your kidney function is abnormal, wait 48 hours before restarting this medicine.  You will have oral contrast for this exam:   You will drink the contrast at home. Get this from your clinic or Diagnostic Imaging Department. Please follow the directions given.  Please wear loose clothing, such as a sweat suit or jogging clothes. Avoid snaps, zippers and other metal. We may ask you to undress and put on a hospital gown.  If you have any questions, please call the Imaging Department where you will have your exam.            Jan 29, 2018  4:00 PM CST   (Arrive by 3:45 PM)   Return Visit with Naresh De Los Santos MD   [facility])    [address] [place] [address]  [address] [place] [id number] [phone]            Jan 31, 2018  6:30 AM CST   [facility] Lab Draw with  [facility] LAB DRAW   [facility] Lab Draw ([facility])    [address] [place] [address]  [address] [place] [id number] [phone]            Jan 31, 2018  7:00 AM CST   Infusion 360 with  ONCOLOGY INFUSION, UC 10 ATC   [facility] ([facility])    [address] [place] [address]  [address] [place] [id number] [phone]              Who to contact     If you have questions  or need follow up information about today's clinic visit or your schedule please contact [facility] directly at [phone].  Normal or non-critical lab and imaging results will be communicated to you by MyChart, letter or phone within 4 business days after the clinic has received the results. If you do not hear from us within 7 days, please contact the clinic through [employer] or phone. If you have a critical or abnormal lab result, we will notify you by phone as soon as possible.  Submit refill requests through [employer] or call your pharmacy and they will forward the refill request to us. Please allow 3 business days for your refill to be completed.          Additional Information About Your Visit        [email or web address] Information     [employer] gives you secure access to your electronic health record. If you see a primary care provider, you can also send messages to your care team and make appointments. If you have questions, please call your primary care clinic.  If you do not have a primary care provider, please call [phone] and they will assist you.        Care EveryWhere ID     This is your Care EveryWhere ID. This could be used by other organizations to access your [facility] medical records  [id number]        Your Vitals Were     Pulse Temperature Respirations Pulse Oximetry BMI (Body Mass Index)       64 97.6  F (36.4  C) (Oral) 18 97% 32.59 kg/m2        Blood Pressure from Last 3 Encounters:   01/17/18 113/73   01/10/18 110/76   01/03/18 114/77    Weight from Last 3 Encounters:   01/17/18 103 kg (227 lb 1.6 oz)   01/10/18 102 kg (224 lb 12.8 oz)   01/03/18 104.2 kg (229 lb 11.2 oz)              We Performed the Following     CBC with platelets differential     Comprehensive metabolic panel     Magnesium        Primary Care Provider Office Phone # Fax #    Jim Kaplan [phone]       [facility] [place] [address] [place] [address]        Equal  Access to Services     [facility] : Hadii aad ku hadyessicanaina Kingaali, wayinkada luqcarey, qagavino kasofiaankit luna. So [facility] [phone].    ATENCIÓN: Si habla daniel, tiene a flores disposición servicios gratuitos de asistencia lingüística. Llame al [phone].    We comply with applicable federal civil rights laws and [place] laws. We do not discriminate on the basis of race, color, national origin, age, disability, sex, sexual orientation, or gender identity.            Thank you!     Thank you for choosing [facility]  for your care. Our goal is always to provide you with excellent care. Hearing back from our patients is one way we can continue to improve our services. Please take a few minutes to complete the written survey that you may receive in the mail after your visit with us. Thank you!             Your Updated Medication List - Protect others around you: Learn how to safely use, store and throw away your medicines at www.disposemymeds.org.          This list is accurate as of: 1/17/18  9:07 AM.  Always use your most recent med list.                   Brand Name Dispense Instructions for use Diagnosis    atorvastatin 40 MG tablet    LIPITOR     TAKE 1 TABLET BY MOUTH DAILY        ELIQUIS PO      Take 5 mg by mouth 2 times daily        LORazepam 0.5 MG tablet    ATIVAN    30 tablet    Take 1 tablet (0.5 mg) by mouth every 4 hours as needed (Anxiety, Nausea/Vomiting or Sleep)    Cholangiocarcinoma (H)       metoprolol tartrate 50 MG tablet    LOPRESSOR     50 mg 2 times daily        MITIGARE 0.6 MG Caps   Generic drug:  Colchicine      Take 0.6 mg by mouth 3 times daily as needed        multivitamin, therapeutic with minerals Tabs tablet     30 tablet    Take 1 tablet by mouth daily    Mass of bile duct       NITROSTAT SL      Place 0.4 mg under the tongue every 5 minutes as needed for chest pain (Carries medication - has never used)        OMEGA-3  FISH OIL PO      Take 1,000 mg by mouth daily        ondansetron 8 MG tablet    ZOFRAN    10 tablet    Take 1 tablet (8 mg) by mouth every 8 hours as needed (Nausea/Vomiting)    Cholangiocarcinoma (H)       prochlorperazine 10 MG tablet    COMPAZINE    30 tablet    Take 1 tablet (10 mg) by mouth every 6 hours as needed (Nausea/Vomiting)    Cholangiocarcinoma (H)

## 2018-01-17 NOTE — PATIENT INSTRUCTIONS
Contact Numbers    [facility] [place]: [phone]  [facility] Triage:  [phone]    Call triage with chills and/or temperature greater than or equal to 100.5, uncontrolled nausea/vomiting, diarrhea, constipation, dizziness, shortness of breath, chest pain, bleeding, unexplained bruising, or any new/concerning symptoms, questions/concerns.     If you are having any concerning symptoms or wish to speak to a provider before your next infusion visit, please call your care coordinator or triage to notify them so we can adequately serve you.       After Hours: [phone]    If after hours, weekends, or holidays, call main hospital  and ask for Oncology doctor on call.           January 2018 Sunday Monday Tuesday Wednesday Thursday Friday Saturday        1     2     3     [facility] DRAW    6:30 AM   (15 min.)    [facility] LAB DRAW   [facility] Lab Draw     [facility] RETURN    6:45 AM   (50 min.)   Julia Smith PA-C   [facility]     [facility] ONC INFUSION 360    7:30 AM   (360 min.)    ONCOLOGY INFUSION   [facility] 4     5     6       7     8     9     10     [facility] DRAW    6:30 AM   (15 min.)    [facility] LAB DRAW   [facility] Lab Draw     [facility] ONC INFUSION 360    7:00 AM   (360 min.)    ONCOLOGY INFUSION   [facility] 11     12     13       14     15     16     17     [facility] DRAW    6:30 AM   (15 min.)    [facility] LAB DRAW   [facility] Lab Draw     [facility] ONC INFUSION 360    7:00 AM   (360 min.)    ONCOLOGY INFUSION   [facility] 18     19     20       21     22     23     24     25     26     LAB    7:30 AM   (15 min.)    LAB   [facility] Lab     CT CHEST ABDOMEN PELVIS WWO    7:45 AM   (20 min.)   UCCT2   [facility] Imaging Center CT 27       28     29     [facility] RETURN    3:45 PM   (30 min.)   Naresh De Los Santos MD   [facility] 30     31     [facility] LAB DRAW    6:30 AM    (15 min.)   [facility] LAB DRAW   [facility] Lab Draw     [facility] ONC INFUSION 360    7:00 AM   (360 min.)    ONCOLOGY INFUSION   [facility]                           February 2018 Sunday Monday Tuesday Wednesday Thursday Friday Saturday                       1     2     3       4     5     6     7     8     9     10       11     12     13     14     15     16     17       18     19     20     21     22     23  Happy Birthday!     24       25     26     27     28                                Recent Results (from the past 24 hour(s))   CBC with platelets differential    Collection Time: 01/17/18  6:42 AM   Result Value Ref Range    WBC 6.2 4.0 - 11.0 10e9/L    RBC Count 4.27 (L) 4.4 - 5.9 10e12/L    Hemoglobin 13.2 (L) 13.3 - 17.7 g/dL    Hematocrit 39.3 (L) 40.0 - 53.0 %    MCV 92 78 - 100 fl    MCH 30.9 26.5 - 33.0 pg    MCHC 33.6 31.5 - 36.5 g/dL    RDW 14.6 10.0 - 15.0 %    Platelet Count 168 150 - 450 10e9/L    Diff Method Automated Method     % Neutrophils 51.1 %    % Lymphocytes 33.6 %    % Monocytes 14.2 %    % Eosinophils 0.6 %    % Basophils 0.3 %    % Immature Granulocytes 0.2 %    Nucleated RBCs 0 0 /100    Absolute Neutrophil 3.2 1.6 - 8.3 10e9/L    Absolute Lymphocytes 2.1 0.8 - 5.3 10e9/L    Absolute Monocytes 0.9 0.0 - 1.3 10e9/L    Absolute Eosinophils 0.0 0.0 - 0.7 10e9/L    Absolute Basophils 0.0 0.0 - 0.2 10e9/L    Abs Immature Granulocytes 0.0 0 - 0.4 10e9/L    Absolute Nucleated RBC 0.0    Comprehensive metabolic panel    Collection Time: 01/17/18  6:42 AM   Result Value Ref Range    Sodium 141 133 - 144 mmol/L    Potassium 4.5 3.4 - 5.3 mmol/L    Chloride 108 94 - 109 mmol/L    Carbon Dioxide 25 20 - 32 mmol/L    Anion Gap 8 3 - 14 mmol/L    Glucose 100 (H) 70 - 99 mg/dL    Urea Nitrogen 20 7 - 30 mg/dL    Creatinine 0.91 0.66 - 1.25 mg/dL    GFR Estimate 85 >60 mL/min/1.7m2    GFR Estimate If Black >90 >60 mL/min/1.7m2    Calcium 8.3 (L) 8.5 - 10.1 mg/dL     Bilirubin Total 0.6 0.2 - 1.3 mg/dL    Albumin 3.8 3.4 - 5.0 g/dL    Protein Total 7.0 6.8 - 8.8 g/dL    Alkaline Phosphatase 69 40 - 150 U/L    ALT 35 0 - 70 U/L    AST 21 0 - 45 U/L   Magnesium    Collection Time: 01/17/18  6:42 AM   Result Value Ref Range    Magnesium 2.2 1.6 - 2.3 mg/dL

## 2018-01-17 NOTE — PROGRESS NOTES
Infusion Nursing Note:    Patient presents today for Delayed Cycle 2 Day 8 Cisplatin, Gemzar.     Lab Results   Component Value Date    HGB 13.2 01/17/2018     Lab Results   Component Value Date    WBC 6.2 01/17/2018      Lab Results   Component Value Date    ANEU 3.2 01/17/2018     Lab Results   Component Value Date     01/17/2018      Lab Results   Component Value Date     01/17/2018                   Lab Results   Component Value Date    POTASSIUM 4.5 01/17/2018           Lab Results   Component Value Date    MAG 2.2 01/17/2018            Lab Results   Component Value Date    CR 0.91 01/17/2018                   Lab Results   Component Value Date    GARY 8.3 01/17/2018                Lab Results   Component Value Date    BILITOTAL 0.6 01/17/2018           Lab Results   Component Value Date    ALBUMIN 3.8 01/17/2018                    Lab Results   Component Value Date    ALT 35 01/17/2018           Lab Results   Component Value Date    AST 21 01/17/2018     Results reviewed, labs MET treatment parameters, ok to proceed with treatment.        Note: Pt reports feeling well this week. Denies fevers.     1/17/18 0845 TORB: Ok to keep CT scan and provider appts as scheduled. Next cycle to start 1/31. Dr. Magali FOSTER/Genesis Cedeno RN    Intravenous Access:  Implanted Port.    Post Infusion Assessment:  Patient tolerated infusion without incident. Voiding pre during and post cisplatin.   Blood return noted pre and post infusion.  Access discontinued per protocol.    Discharge Plan:   Patient declined prescription refills.  Copy of AVS reviewed with patient and/or family.  Patient will return 1/31 for next infusion appointment.  Patient discharged in stable condition accompanied by: self.  Departure Mode: Ambulatory.    Actual decadron stop time 0735, MAR cannot be edited.

## 2018-01-26 ENCOUNTER — RADIANT APPOINTMENT (OUTPATIENT)
Dept: CT IMAGING | Facility: CLINIC | Age: 63
End: 2018-01-26
Attending: INTERNAL MEDICINE
Payer: COMMERCIAL

## 2018-01-26 DIAGNOSIS — C22.1 CHOLANGIOCARCINOMA (H): ICD-10-CM

## 2018-01-26 LAB
ALBUMIN SERPL-MCNC: 3.2 G/DL (ref 3.4–5)
ALP SERPL-CCNC: 62 U/L (ref 40–150)
ALT SERPL W P-5'-P-CCNC: 36 U/L (ref 0–70)
ANION GAP SERPL CALCULATED.3IONS-SCNC: 7 MMOL/L (ref 3–14)
AST SERPL W P-5'-P-CCNC: 27 U/L (ref 0–45)
BASOPHILS # BLD AUTO: 0 10E9/L (ref 0–0.2)
BASOPHILS NFR BLD AUTO: 0.4 %
BILIRUB SERPL-MCNC: 0.5 MG/DL (ref 0.2–1.3)
BUN SERPL-MCNC: 15 MG/DL (ref 7–30)
CALCIUM SERPL-MCNC: 7.8 MG/DL (ref 8.5–10.1)
CHLORIDE SERPL-SCNC: 106 MMOL/L (ref 94–109)
CO2 SERPL-SCNC: 25 MMOL/L (ref 20–32)
CREAT SERPL-MCNC: 0.92 MG/DL (ref 0.66–1.25)
DIFFERENTIAL METHOD BLD: ABNORMAL
EOSINOPHIL # BLD AUTO: 0.1 10E9/L (ref 0–0.7)
EOSINOPHIL NFR BLD AUTO: 1.4 %
ERYTHROCYTE [DISTWIDTH] IN BLOOD BY AUTOMATED COUNT: 14.3 % (ref 10–15)
GFR SERPL CREATININE-BSD FRML MDRD: 83 ML/MIN/1.7M2
GLUCOSE SERPL-MCNC: 94 MG/DL (ref 70–99)
HCT VFR BLD AUTO: 33.8 % (ref 40–53)
HGB BLD-MCNC: 11.3 G/DL (ref 13.3–17.7)
IMM GRANULOCYTES # BLD: 0.1 10E9/L (ref 0–0.4)
IMM GRANULOCYTES NFR BLD: 2.3 %
LYMPHOCYTES # BLD AUTO: 2 10E9/L (ref 0.8–5.3)
LYMPHOCYTES NFR BLD AUTO: 37.7 %
MCH RBC QN AUTO: 31 PG (ref 26.5–33)
MCHC RBC AUTO-ENTMCNC: 33.4 G/DL (ref 31.5–36.5)
MCV RBC AUTO: 93 FL (ref 78–100)
MONOCYTES # BLD AUTO: 0.7 10E9/L (ref 0–1.3)
MONOCYTES NFR BLD AUTO: 12.6 %
NEUTROPHILS # BLD AUTO: 2.4 10E9/L (ref 1.6–8.3)
NEUTROPHILS NFR BLD AUTO: 45.6 %
NRBC # BLD AUTO: 0.1 10*3/UL
NRBC BLD AUTO-RTO: 1 /100
PLATELET # BLD AUTO: 104 10E9/L (ref 150–450)
POTASSIUM SERPL-SCNC: 3.9 MMOL/L (ref 3.4–5.3)
PROT SERPL-MCNC: 6.1 G/DL (ref 6.8–8.8)
RBC # BLD AUTO: 3.64 10E12/L (ref 4.4–5.9)
SODIUM SERPL-SCNC: 137 MMOL/L (ref 133–144)
WBC # BLD AUTO: 5.2 10E9/L (ref 4–11)

## 2018-01-26 RX ORDER — HEPARIN SODIUM (PORCINE) LOCK FLUSH IV SOLN 100 UNIT/ML 100 UNIT/ML
5 SOLUTION INTRAVENOUS ONCE
Status: COMPLETED | OUTPATIENT
Start: 2018-01-26 | End: 2018-01-26

## 2018-01-26 RX ORDER — IOPAMIDOL 755 MG/ML
135 INJECTION, SOLUTION INTRAVASCULAR ONCE
Status: COMPLETED | OUTPATIENT
Start: 2018-01-26 | End: 2018-01-26

## 2018-01-26 RX ADMIN — IOPAMIDOL 135 ML: 755 INJECTION, SOLUTION INTRAVASCULAR at 07:53

## 2018-01-26 RX ADMIN — HEPARIN SODIUM (PORCINE) LOCK FLUSH IV SOLN 100 UNIT/ML 5 ML: 100 SOLUTION at 08:12

## 2018-01-26 NOTE — DISCHARGE INSTRUCTIONS

## 2018-01-27 LAB — CANCER AG19-9 SERPL-ACNC: 48 U/ML (ref 0–37)

## 2018-01-29 ENCOUNTER — ONCOLOGY VISIT (OUTPATIENT)
Dept: ONCOLOGY | Facility: CLINIC | Age: 63
End: 2018-01-29
Attending: INTERNAL MEDICINE
Payer: COMMERCIAL

## 2018-01-29 VITALS
TEMPERATURE: 94.1 F | OXYGEN SATURATION: 96 % | HEIGHT: 70 IN | DIASTOLIC BLOOD PRESSURE: 91 MMHG | SYSTOLIC BLOOD PRESSURE: 140 MMHG | HEART RATE: 98 BPM | RESPIRATION RATE: 18 BRPM | BODY MASS INDEX: 32.5 KG/M2 | WEIGHT: 227 LBS

## 2018-01-29 DIAGNOSIS — C22.1 CHOLANGIOCARCINOMA (H): Primary | ICD-10-CM

## 2018-01-29 PROCEDURE — 99215 OFFICE O/P EST HI 40 MIN: CPT | Mod: ZP | Performed by: INTERNAL MEDICINE

## 2018-01-29 PROCEDURE — G0463 HOSPITAL OUTPT CLINIC VISIT: HCPCS | Mod: ZF

## 2018-01-29 RX ORDER — ONDANSETRON 2 MG/ML
8 INJECTION INTRAMUSCULAR; INTRAVENOUS ONCE
Status: CANCELLED
Start: 2018-02-14 | End: 2018-02-07

## 2018-01-29 RX ORDER — LORAZEPAM 2 MG/ML
0.5 INJECTION INTRAMUSCULAR EVERY 4 HOURS PRN
Status: CANCELLED
Start: 2018-02-14

## 2018-01-29 RX ORDER — ALBUTEROL SULFATE 90 UG/1
1-2 AEROSOL, METERED RESPIRATORY (INHALATION)
Status: CANCELLED
Start: 2018-01-31

## 2018-01-29 RX ORDER — EPINEPHRINE 1 MG/ML
0.3 INJECTION, SOLUTION, CONCENTRATE INTRAVENOUS EVERY 5 MIN PRN
Status: CANCELLED | OUTPATIENT
Start: 2018-01-31

## 2018-01-29 RX ORDER — ALBUTEROL SULFATE 0.83 MG/ML
2.5 SOLUTION RESPIRATORY (INHALATION)
Status: CANCELLED | OUTPATIENT
Start: 2018-02-14

## 2018-01-29 RX ORDER — EPINEPHRINE 0.3 MG/.3ML
0.3 INJECTION SUBCUTANEOUS EVERY 5 MIN PRN
Status: CANCELLED | OUTPATIENT
Start: 2018-02-14

## 2018-01-29 RX ORDER — DIPHENHYDRAMINE HYDROCHLORIDE 50 MG/ML
50 INJECTION INTRAMUSCULAR; INTRAVENOUS
Status: CANCELLED
Start: 2018-02-14

## 2018-01-29 RX ORDER — EPINEPHRINE 1 MG/ML
0.3 INJECTION, SOLUTION, CONCENTRATE INTRAVENOUS EVERY 5 MIN PRN
Status: CANCELLED | OUTPATIENT
Start: 2018-02-14

## 2018-01-29 RX ORDER — MEPERIDINE HYDROCHLORIDE 25 MG/ML
25 INJECTION INTRAMUSCULAR; INTRAVENOUS; SUBCUTANEOUS EVERY 30 MIN PRN
Status: CANCELLED | OUTPATIENT
Start: 2018-01-31

## 2018-01-29 RX ORDER — ALBUTEROL SULFATE 0.83 MG/ML
2.5 SOLUTION RESPIRATORY (INHALATION)
Status: CANCELLED | OUTPATIENT
Start: 2018-01-31

## 2018-01-29 RX ORDER — EPINEPHRINE 0.3 MG/.3ML
0.3 INJECTION SUBCUTANEOUS EVERY 5 MIN PRN
Status: CANCELLED | OUTPATIENT
Start: 2018-01-31

## 2018-01-29 RX ORDER — ALBUTEROL SULFATE 90 UG/1
1-2 AEROSOL, METERED RESPIRATORY (INHALATION)
Status: CANCELLED
Start: 2018-02-14

## 2018-01-29 RX ORDER — METHYLPREDNISOLONE SODIUM SUCCINATE 125 MG/2ML
125 INJECTION, POWDER, LYOPHILIZED, FOR SOLUTION INTRAMUSCULAR; INTRAVENOUS
Status: CANCELLED
Start: 2018-02-14

## 2018-01-29 RX ORDER — SODIUM CHLORIDE 9 MG/ML
1000 INJECTION, SOLUTION INTRAVENOUS CONTINUOUS PRN
Status: CANCELLED
Start: 2018-02-14

## 2018-01-29 RX ORDER — METHYLPREDNISOLONE SODIUM SUCCINATE 125 MG/2ML
125 INJECTION, POWDER, LYOPHILIZED, FOR SOLUTION INTRAMUSCULAR; INTRAVENOUS
Status: CANCELLED
Start: 2018-01-31

## 2018-01-29 RX ORDER — SODIUM CHLORIDE 9 MG/ML
1000 INJECTION, SOLUTION INTRAVENOUS CONTINUOUS PRN
Status: CANCELLED
Start: 2018-01-31

## 2018-01-29 RX ORDER — LORAZEPAM 2 MG/ML
0.5 INJECTION INTRAMUSCULAR EVERY 4 HOURS PRN
Status: CANCELLED
Start: 2018-01-31

## 2018-01-29 RX ORDER — ONDANSETRON 2 MG/ML
8 INJECTION INTRAMUSCULAR; INTRAVENOUS ONCE
Status: CANCELLED
Start: 2018-01-31 | End: 2018-01-31

## 2018-01-29 RX ORDER — MEPERIDINE HYDROCHLORIDE 25 MG/ML
25 INJECTION INTRAMUSCULAR; INTRAVENOUS; SUBCUTANEOUS EVERY 30 MIN PRN
Status: CANCELLED | OUTPATIENT
Start: 2018-02-14

## 2018-01-29 RX ORDER — DIPHENHYDRAMINE HYDROCHLORIDE 50 MG/ML
50 INJECTION INTRAMUSCULAR; INTRAVENOUS
Status: CANCELLED
Start: 2018-01-31

## 2018-01-29 ASSESSMENT — PAIN SCALES - GENERAL: PAINLEVEL: NO PAIN (0)

## 2018-01-29 NOTE — PROGRESS NOTES
Girish Chauhan is here today in follow-up of metastatic cholangiocarcinoma.    He had resected disease, which recurred in the bladder wall. He is now had 2 cycles of gemcitabine plus cisplatin and is here for his first response assessment. He is tolerated chemotherapy quite well. He has a few days of malaise/fatigue immediately following his chemotherapy. He required a dose delay this last cycle due to neutropenia. He is been eating well and maintaining his weight. The remainder of a complete review of systems is documented on the patient questionnaire and is otherwise negative.    On physical exam Mr. Chauhan appears well and his vital signs are unremarkable.  He has no scleral icterus and no visible lesion in the oropharynx.  He has no palpable adenopathy in the neck or supraclavicular spaces.  His lungs are clear to auscultation at almost percussion.  His heart rate and rhythm are regular without audible murmur or gallop in his jugular venous pressure appears normal.  His abdomen is soft and nontender without palpable mass, organomegaly or ascites.  He has no peripheral edema and no tenderness in his calves or thighs.  His speech is fluent and his cranial nerves are grossly intact. His gait and station are unremarkable.    Review the patient and his wife his lab work and CT scan. He has normal electrolytes, renal function, liver enzymes and blood counts. His CT scan shows the tumor in his posterior bladder wall to be stable. His CA-19-9 level is come down a little bit to 48.    Assessment/plan: Metastatic cholangiocarcinoma with good tolerance of treatment, and improving tumor marker and radiographically stable disease. We'll continue on with the same therapy but I will change his schedule to every other week because of his cytopenias. We'll plan on seeing him back for another response assessment after 2 more months of therapy.

## 2018-01-29 NOTE — PATIENT INSTRUCTIONS
Preventive Care:     Colorectal Cancer Screening: During our visit today, we discussed that it is recommended you receive colorectal cancer screening. Please call or make an appointment with your primary care provider to discuss this. You may also call the [facility] scheduling line ([phone]) to set up a colonoscopy appointment. Ashwini Barfield LPN

## 2018-01-29 NOTE — NURSING NOTE
"Oncology Rooming Note    January 29, 2018 3:34 PM   Girish Chauhan is a 62 year old male who presents for:    Chief Complaint   Patient presents with     Oncology Clinic Visit     Return visit related to Cholangiocarcinoma     Initial Vitals: BP (!) 140/91  Pulse 98  Resp 18  Ht 1.778 m (5' 10\")  Wt 103 kg (227 lb)  SpO2 96%  BMI 32.57 kg/m2 Estimated body mass index is 32.57 kg/(m^2) as calculated from the following:    Height as of this encounter: 1.778 m (5' 10\").    Weight as of this encounter: 103 kg (227 lb). Body surface area is 2.26 meters squared.  No Pain (0) Comment: Data Unavailable   No LMP for male patient.  Allergies reviewed: Yes  Medications reviewed: Yes    Medications: Medication refills not needed today.  Pharm[facility] into [employer] [address] [place] [address] [facility]    Clinical concerns: No new concerns. Provider was notified.    10 minutes for nursing intake (face to face time)     Ashwini Barfield LPN            "

## 2018-01-29 NOTE — MR AVS SNAPSHOT
After Visit Summary   1/29/2018    Girish Chauhan    MRN: [id number]           Patient Information     Date Of Birth          1955        Visit Information        Provider Department      1/29/2018 4:00 PM Naresh De Los Santos MD [facility]        Today's Diagnoses     Cholangiocarcinoma (H)    -  1      Care Instructions    Preventive Care:     Colorectal Cancer Screening: During our visit today, we discussed that it is recommended you receive colorectal cancer screening. Please call or make an appointment with your primary care provider to discuss this. You may also call the [facility] scheduling line ([phone]) to set up a colonoscopy appointment. Ashwini Barfield LPN             Follow-ups after your visit        Follow-up notes from your care team     Return in about 8 weeks (around 3/26/2018) for MD visit with CT and labs.      Your next 10 appointments already scheduled     Jan 31, 2018  6:30 AM CST   Masonic Lab Draw with UC MASONIC LAB DRAW   [facility])    [address] [place] [address]  [address]  [employer] [address]   [phone]            Jan 31, 2018  7:00 AM CST   Infusion 360 with UC ONCOLOGY INFUSION, UC 10 ATC   [facility] ([facility])    [address]  [address]  [employer] [address]   [phone]            Feb 07, 2018  8:30 AM CST   Masonic Lab Draw with UC MASONIC LAB DRAW   [facility] ([facility])    [address] [place] [address]  [address]  [employer] [place] [address]   [phone]            Feb 07, 2018  9:00 AM CST   Infusion 360 with UC ONCOLOGY INFUSION, UC 26 ATC   [facility] ([facility])    [address] [place] [address]  [address]  [employer] [place] [address]   [phone]              Future tests that were ordered for you today     Open Future Orders        Priority Expected  "Expires Ordered    CT Chest Abdomen Pelvis w/o & w Contrast Routine 3/26/2018 5/29/2018 1/29/2018    CBC with platelets differential Routine 3/26/2018 5/29/2018 1/29/2018    Comprehensive metabolic panel Routine 3/26/2018 5/29/2018 1/29/2018    Cancer antigen 19-9 Routine 3/26/2018 5/29/2018 1/29/2018            Who to contact     If you have questions or need follow up information about today's clinic visit or your schedule please contact [facility] directly at [phone].  Normal or non-critical lab and imaging results will be communicated to you by Extra Lifehart, letter or phone within 4 business days after the clinic has received the results. If you do not hear from us within 7 days, please contact the clinic through [employer] or phone. If you have a critical or abnormal lab result, we will notify you by phone as soon as possible.  Submit refill requests through en-Gauge or call your pharmacy and they will forward the refill request to us. Please allow 3 business days for your refill to be completed.          Additional Information About Your Visit        Extra Lifehart Information     en-Gauge gives you secure access to your electronic health record. If you see a primary care provider, you can also send messages to your care team and make appointments. If you have questions, please call your primary care clinic.  If you do not have a primary care provider, please call [phone] and they will assist you.        Care EveryWhere ID     This is your Care EveryWhere ID. This could be used by other organizations to access your [place] medical records  [id number]        Your Vitals Were     Pulse Temperature Respirations Height Pulse Oximetry BMI (Body Mass Index)    98 94.1  F (34.5  C) (Tympanic) 18 1.778 m (5' 10\") 96% 32.57 kg/m2       Blood Pressure from Last 3 Encounters:   01/29/18 (!) 140/91   01/17/18 113/73   01/10/18 110/76    Weight from Last 3 Encounters:   01/29/18 103 kg (227 lb)   01/17/18 " 103 kg (227 lb 1.6 oz)   01/10/18 102 kg (224 lb 12.8 oz)               Primary Care Provider Office Phone # Fax #    Jim Kaplan [phone]       [address]   [facility] [address]        Equal Access to Services     MARIUSZ MADRIGAL : Hadii aad ku hadasho Soomaali, waaxda luqadaha, qaybta kaalmada adeegyada, waxay idiin hayaan adeeg kharash la'aan ah. So [facility] [phone].    ATENCIÓN: Si habla español, tiene a flores disposición servicios gratuitos de asistencia lingüística. Llame al [phone].    We comply with applicable federal civil rights laws and [place] laws. We do not discriminate on the basis of race, color, national origin, age, disability, sex, sexual orientation, or gender identity.            Thank you!     Thank you for choosing [facility]  for your care. Our goal is always to provide you with excellent care. Hearing back from our patients is one way we can continue to improve our services. Please take a few minutes to complete the written survey that you may receive in the mail after your visit with us. Thank you!             Your Updated Medication List - Protect others around you: Learn how to safely use, store and throw away your medicines at www.disposemymeds.org.          This list is accurate as of 1/29/18  4:08 PM.  Always use your most recent med list.                   Brand Name Dispense Instructions for use Diagnosis    atorvastatin 40 MG tablet    LIPITOR     TAKE 1 TABLET BY MOUTH DAILY        ELIQUIS PO      Take 5 mg by mouth 2 times daily        LORazepam 0.5 MG tablet    ATIVAN    30 tablet    Take 1 tablet (0.5 mg) by mouth every 4 hours as needed (Anxiety, Nausea/Vomiting or Sleep)    Cholangiocarcinoma (H)       metoprolol tartrate 50 MG tablet    LOPRESSOR     50 mg 2 times daily        MITIGARE 0.6 MG Caps   Generic drug:  Colchicine      Take 0.6 mg by mouth 3 times daily as needed         multivitamin, therapeutic with minerals Tabs tablet     30 tablet    Take 1 tablet by mouth daily    Mass of bile duct       NITROSTAT SL      Place 0.4 mg under the tongue every 5 minutes as needed for chest pain (Carries medication - has never used)        OMEGA-3 FISH OIL PO      Take 1,000 mg by mouth daily        ondansetron 8 MG tablet    ZOFRAN    10 tablet    Take 1 tablet (8 mg) by mouth every 8 hours as needed (Nausea/Vomiting)    Cholangiocarcinoma (H)       prochlorperazine 10 MG tablet    COMPAZINE    30 tablet    Take 1 tablet (10 mg) by mouth every 6 hours as needed (Nausea/Vomiting)    Cholangiocarcinoma (H)

## 2018-01-29 NOTE — LETTER
1/29/2018      RE: Girish Chauhan  [address] [address]       Girish Chauhan is here today in follow-up of metastatic cholangiocarcinoma.    He had resected disease, which recurred in the bladder wall. He is now had 2 cycles of gemcitabine plus cisplatin and is here for his first response assessment. He is tolerated chemotherapy quite well. He has a few days of malaise/fatigue immediately following his chemotherapy. He required a dose delay this last cycle due to neutropenia. He is been eating well and maintaining his weight. The remainder of a complete review of systems is documented on the patient questionnaire and is otherwise negative.    On physical exam Mr. Chauhan appears well and his vital signs are unremarkable.  He has no scleral icterus and no visible lesion in the oropharynx.  He has no palpable adenopathy in the neck or supraclavicular spaces.  His lungs are clear to auscultation at almost percussion.  His heart rate and rhythm are regular without audible murmur or gallop in his jugular venous pressure appears normal.  His abdomen is soft and nontender without palpable mass, organomegaly or ascites.  He has no peripheral edema and no tenderness in his calves or thighs.  His speech is fluent and his cranial nerves are grossly intact. His gait and station are unremarkable.    Review the patient and his wife his lab work and CT scan. He has normal electrolytes, renal function, liver enzymes and blood counts. His CT scan shows the tumor in his posterior bladder wall to be stable. His CA-19-9 level is come down a little bit to 48.    Assessment/plan: Metastatic cholangiocarcinoma with good tolerance of treatment, and improving tumor marker and radiographically stable disease. We'll continue on with the same therapy but I will change his schedule to every other week because of his cytopenias. We'll plan on seeing him back for another response assessment after 2 more months of  therapy.    Naresh De Los Santos MD

## 2018-01-31 ENCOUNTER — INFUSION THERAPY VISIT (OUTPATIENT)
Dept: ONCOLOGY | Facility: CLINIC | Age: 63
End: 2018-01-31
Attending: INTERNAL MEDICINE
Payer: COMMERCIAL

## 2018-01-31 VITALS
OXYGEN SATURATION: 100 % | TEMPERATURE: 97.6 F | BODY MASS INDEX: 32.87 KG/M2 | SYSTOLIC BLOOD PRESSURE: 121 MMHG | DIASTOLIC BLOOD PRESSURE: 83 MMHG | WEIGHT: 229.1 LBS | HEART RATE: 92 BPM | RESPIRATION RATE: 18 BRPM

## 2018-01-31 DIAGNOSIS — C22.1 CHOLANGIOCARCINOMA (H): Primary | ICD-10-CM

## 2018-01-31 LAB
ALBUMIN SERPL-MCNC: 3.6 G/DL (ref 3.4–5)
ALP SERPL-CCNC: 68 U/L (ref 40–150)
ALT SERPL W P-5'-P-CCNC: 34 U/L (ref 0–70)
ANION GAP SERPL CALCULATED.3IONS-SCNC: 6 MMOL/L (ref 3–14)
AST SERPL W P-5'-P-CCNC: 21 U/L (ref 0–45)
BASOPHILS # BLD AUTO: 0 10E9/L (ref 0–0.2)
BASOPHILS NFR BLD AUTO: 0.5 %
BILIRUB SERPL-MCNC: 0.7 MG/DL (ref 0.2–1.3)
BUN SERPL-MCNC: 18 MG/DL (ref 7–30)
CALCIUM SERPL-MCNC: 8.3 MG/DL (ref 8.5–10.1)
CHLORIDE SERPL-SCNC: 108 MMOL/L (ref 94–109)
CO2 SERPL-SCNC: 26 MMOL/L (ref 20–32)
CREAT SERPL-MCNC: 0.91 MG/DL (ref 0.66–1.25)
DIFFERENTIAL METHOD BLD: ABNORMAL
EOSINOPHIL # BLD AUTO: 0.1 10E9/L (ref 0–0.7)
EOSINOPHIL NFR BLD AUTO: 1.8 %
ERYTHROCYTE [DISTWIDTH] IN BLOOD BY AUTOMATED COUNT: 15.8 % (ref 10–15)
GFR SERPL CREATININE-BSD FRML MDRD: 84 ML/MIN/1.7M2
GLUCOSE SERPL-MCNC: 99 MG/DL (ref 70–99)
HCT VFR BLD AUTO: 38 % (ref 40–53)
HGB BLD-MCNC: 12.8 G/DL (ref 13.3–17.7)
IMM GRANULOCYTES # BLD: 0 10E9/L (ref 0–0.4)
IMM GRANULOCYTES NFR BLD: 0.2 %
LYMPHOCYTES # BLD AUTO: 2.1 10E9/L (ref 0.8–5.3)
LYMPHOCYTES NFR BLD AUTO: 33.1 %
MAGNESIUM SERPL-MCNC: 2 MG/DL (ref 1.6–2.3)
MCH RBC QN AUTO: 31.2 PG (ref 26.5–33)
MCHC RBC AUTO-ENTMCNC: 33.7 G/DL (ref 31.5–36.5)
MCV RBC AUTO: 93 FL (ref 78–100)
MONOCYTES # BLD AUTO: 0.9 10E9/L (ref 0–1.3)
MONOCYTES NFR BLD AUTO: 14.4 %
NEUTROPHILS # BLD AUTO: 3.1 10E9/L (ref 1.6–8.3)
NEUTROPHILS NFR BLD AUTO: 50 %
NRBC # BLD AUTO: 0 10*3/UL
NRBC BLD AUTO-RTO: 0 /100
PLATELET # BLD AUTO: 85 10E9/L (ref 150–450)
POTASSIUM SERPL-SCNC: 4.3 MMOL/L (ref 3.4–5.3)
PROT SERPL-MCNC: 6.9 G/DL (ref 6.8–8.8)
RBC # BLD AUTO: 4.1 10E12/L (ref 4.4–5.9)
SODIUM SERPL-SCNC: 141 MMOL/L (ref 133–144)
WBC # BLD AUTO: 6.2 10E9/L (ref 4–11)

## 2018-01-31 PROCEDURE — 96375 TX/PRO/DX INJ NEW DRUG ADDON: CPT

## 2018-01-31 PROCEDURE — 83735 ASSAY OF MAGNESIUM: CPT | Performed by: INTERNAL MEDICINE

## 2018-01-31 PROCEDURE — 96413 CHEMO IV INFUSION 1 HR: CPT

## 2018-01-31 PROCEDURE — 96367 TX/PROPH/DG ADDL SEQ IV INF: CPT

## 2018-01-31 PROCEDURE — 80053 COMPREHEN METABOLIC PANEL: CPT | Performed by: INTERNAL MEDICINE

## 2018-01-31 PROCEDURE — 85025 COMPLETE CBC W/AUTO DIFF WBC: CPT | Performed by: INTERNAL MEDICINE

## 2018-01-31 PROCEDURE — 25000128 H RX IP 250 OP 636: Mod: ZF | Performed by: INTERNAL MEDICINE

## 2018-01-31 PROCEDURE — 96417 CHEMO IV INFUS EACH ADDL SEQ: CPT

## 2018-01-31 RX ORDER — HEPARIN SODIUM (PORCINE) LOCK FLUSH IV SOLN 100 UNIT/ML 100 UNIT/ML
5 SOLUTION INTRAVENOUS EVERY 8 HOURS
Status: DISCONTINUED | OUTPATIENT
Start: 2018-01-31 | End: 2018-01-31 | Stop reason: HOSPADM

## 2018-01-31 RX ORDER — HEPARIN SODIUM (PORCINE) LOCK FLUSH IV SOLN 100 UNIT/ML 100 UNIT/ML
5 SOLUTION INTRAVENOUS EVERY 8 HOURS PRN
Status: DISCONTINUED | OUTPATIENT
Start: 2018-01-31 | End: 2018-01-31 | Stop reason: HOSPADM

## 2018-01-31 RX ORDER — ONDANSETRON 2 MG/ML
8 INJECTION INTRAMUSCULAR; INTRAVENOUS ONCE
Status: COMPLETED | OUTPATIENT
Start: 2018-01-31 | End: 2018-01-31

## 2018-01-31 RX ADMIN — DEXAMETHASONE SODIUM PHOSPHATE: 10 INJECTION, SOLUTION INTRAMUSCULAR; INTRAVENOUS at 07:28

## 2018-01-31 RX ADMIN — SODIUM CHLORIDE 250 ML: 9 INJECTION, SOLUTION INTRAVENOUS at 07:15

## 2018-01-31 RX ADMIN — SODIUM CHLORIDE, PRESERVATIVE FREE 5 ML: 5 INJECTION INTRAVENOUS at 06:37

## 2018-01-31 RX ADMIN — MAGNESIUM SULFATE HEPTAHYDRATE: 500 INJECTION, SOLUTION INTRAMUSCULAR; INTRAVENOUS at 07:34

## 2018-01-31 RX ADMIN — CISPLATIN 60 MG: 1 INJECTION, SOLUTION INTRAVENOUS at 08:43

## 2018-01-31 RX ADMIN — SODIUM CHLORIDE, PRESERVATIVE FREE 5 ML: 5 INJECTION INTRAVENOUS at 10:17

## 2018-01-31 RX ADMIN — ONDANSETRON 8 MG: 2 INJECTION INTRAMUSCULAR; INTRAVENOUS at 07:15

## 2018-01-31 RX ADMIN — GEMCITABINE 2200 MG: 38 INJECTION, SOLUTION INTRAVENOUS at 09:46

## 2018-01-31 ASSESSMENT — PAIN SCALES - GENERAL: PAINLEVEL: NO PAIN (0)

## 2018-01-31 NOTE — PROGRESS NOTES
Infusion Nursing Note:  Girish Chauhan presents today for cycle 3 day 1 cisplatin and gemzar.    Patient seen by provider today: No  Patient saw Dr De Los Santos on 1/29 and denies any changes   present during visit today: Not Applicable.    Note: N/A.    Intravenous Access:  Implanted Port.    Treatment Conditions:  Lab Results   Component Value Date    HGB 12.8 01/31/2018     Lab Results   Component Value Date    WBC 6.2 01/31/2018      Lab Results   Component Value Date    ANEU 3.1 01/31/2018     Lab Results   Component Value Date    PLT 85 01/31/2018      Lab Results   Component Value Date     01/31/2018                   Lab Results   Component Value Date    POTASSIUM 4.3 01/31/2018           Lab Results   Component Value Date    MAG 2.0 01/31/2018            Lab Results   Component Value Date    CR 0.91 01/31/2018                   Lab Results   Component Value Date    GARY 8.3 01/31/2018                Lab Results   Component Value Date    BILITOTAL 0.7 01/31/2018           Lab Results   Component Value Date    ALBUMIN 3.6 01/31/2018                    Lab Results   Component Value Date    ALT 34 01/31/2018           Lab Results   Component Value Date    AST 21 01/31/2018       Results reviewed, labs MET treatment parameters, ok to proceed with treatment.      Post Infusion Assessment:  Patient tolerated infusion without incident. Patient voided prior to starting cisplatin  Blood return noted pre and post infusion.  Site patent and intact, free from redness, edema or discomfort.  No evidence of extravasations.  Access discontinued per protocol.    Discharge Plan:   Patient declined prescription refills.  Discharge instructions reviewed with: Patient.  Patient and/or family verbalized understanding of discharge instructions and all questions answered.  Copy of AVS reviewed with patient and/or family.  Patient will return 2/14 for next appointment.  Per Dr De Los Santos's last note patient is now doing  chemotherapy on days 1 and 15.   Patient discharged in stable condition accompanied by: self.  Departure Mode: Ambulatory.  Face to Face time: 0.    Mily Louie RN

## 2018-01-31 NOTE — MR AVS SNAPSHOT
After Visit Summary   1/31/2018    Girish Chauhan    MRN: [id number]           Patient Information     Date Of Birth          1955        Visit Information        Provider Department      1/31/2018 7:00 AM UC 10 ATC;  ONCOLOGY INFUSION [facility]        Today's Diagnoses     Cholangiocarcinoma (H)    -  1      Care Instructions    Contact Numbers    [facility] Main Line: [phone]  [facility] Triage:  [phone]    Call triage with chills and/or temperature greater than or equal to 100.5, uncontrolled nausea/vomiting, diarrhea, constipation, dizziness, shortness of breath, chest pain, bleeding, unexplained bruising, or any new/concerning symptoms, questions/concerns.     If you are having any concerning symptoms or wish to speak to a provider before your next infusion visit, please call your care coordinator or triage to notify them so we can adequately serve you.       After Hours: [phone]    If after hours, weekends, or holidays, call main hospital  and ask for Oncology doctor on call.           January 2018 Sunday Monday Tuesday Wednesday Thursday Friday Saturday        1     2     3     [facility] MASONIC LAB DRAW    6:30 AM   (15 min.)    MASONIC LAB DRAW   [facility] Lab Draw     [facility] RETURN    6:45 AM   (50 min.)   Julia Smith PA-C   [facility]     [facility] ONC INFUSION 360    7:30 AM   (360 min.)    ONCOLOGY INFUSION   [facility] 4     5     6       7     8     9     10     [facility] MASONIC LAB DRAW    6:30 AM   (15 min.)    [facility] LAB [facility]RAW   [facility]     [facility] ONC INFUSION 360    7:00 AM   (360 min.)    ONCOLOGY INFUSION   [facility] 11     12     13       14     15     16     17     [facility] MASONIC LAB DRAW    6:30 AM   (15 min.)    MASONIC LAB DRAW   [facility] Masonic Lab Draw     [facility] ONC INFUSION 360    7:00 AM   (360 min.)    ONCOLOGY INFUSION   [facility]  18     19     20       21     22     23     24     25     26     LAB    7:30 AM   (15 min.)    LAB   [facility] Lab     CT CHEST ABDOMEN PELVIS WWO    7:45 AM   (20 min.)   UCCT2   [facility] [place] [facility] 27       28     29     [facility] RETURN    3:45 PM   (30 min.)   Naresh De Los Santos MD   [facility] 30     31     [facility] MASONIC LAB DRAW    6:30 AM   (15 min.)    MASONIC LAB DRAW   [facility] Masonic Lab Draw     [facility] ONC INFUSION 360    7:00 AM   (360 min.)    ONCOLOGY INFUSION   [facility]                           February 2018 Sunday Monday Tuesday Wednesday Thursday Friday Saturday                       1     2     3       4     5     6     7     8     9     10       11     12     13     14     [facility] MASONIC LAB DRAW    7:30 AM   (15 min.)    MASONIC LAB DRAW   [facility] Masonic Lab Draw     [facility] ONC INFUSION 360    8:00 AM   (360 min.)    ONCOLOGY INFUSION   [facility] 15     16     17       18     19     20     21     22     23  Happy Birthday!     24       25     26     27     28     [facility] MASONIC LAB DRAW    6:30 AM   (15 min.)    MASONIC LAB DRAW   [facility] Masonic Lab Draw     [facility] ONC INFUSION 360    7:00 AM   (360 min.)    ONCOLOGY INFUSION   [facility]                            Recent Results (from the past 24 hour(s))   CBC with platelets differential    Collection Time: 01/31/18  6:42 AM   Result Value Ref Range    WBC 6.2 4.0 - 11.0 10e9/L    RBC Count 4.10 (L) 4.4 - 5.9 10e12/L    Hemoglobin 12.8 (L) 13.3 - 17.7 g/dL    Hematocrit 38.0 (L) 40.0 - 53.0 %    MCV 93 78 - 100 fl    MCH 31.2 26.5 - 33.0 pg    MCHC 33.7 31.5 - 36.5 g/dL    RDW 15.8 (H) 10.0 - 15.0 %    Platelet Count 85 (L) 150 - 450 10e9/L    Diff Method Automated Method     % Neutrophils 50.0 %    % Lymphocytes 33.1 %    % Monocytes 14.4 %    % Eosinophils 1.8 %    % Basophils 0.5 %    % Immature Granulocytes 0.2 %    Nucleated RBCs 0 0 /100     Absolute Neutrophil 3.1 1.6 - 8.3 10e9/L    Absolute Lymphocytes 2.1 0.8 - 5.3 10e9/L    Absolute Monocytes 0.9 0.0 - 1.3 10e9/L    Absolute Eosinophils 0.1 0.0 - 0.7 10e9/L    Absolute Basophils 0.0 0.0 - 0.2 10e9/L    Abs Immature Granulocytes 0.0 0 - 0.4 10e9/L    Absolute Nucleated RBC 0.0    Comprehensive metabolic panel    Collection Time: 01/31/18  6:42 AM   Result Value Ref Range    Sodium 141 133 - 144 mmol/L    Potassium 4.3 3.4 - 5.3 mmol/L    Chloride 108 94 - 109 mmol/L    Carbon Dioxide 26 20 - 32 mmol/L    Anion Gap 6 3 - 14 mmol/L    Glucose 99 70 - 99 mg/dL    Urea Nitrogen 18 7 - 30 mg/dL    Creatinine 0.91 0.66 - 1.25 mg/dL    GFR Estimate 84 >60 mL/min/1.7m2    GFR Estimate If Black >90 >60 mL/min/1.7m2    Calcium 8.3 (L) 8.5 - 10.1 mg/dL    Bilirubin Total 0.7 0.2 - 1.3 mg/dL    Albumin 3.6 3.4 - 5.0 g/dL    Protein Total 6.9 6.8 - 8.8 g/dL    Alkaline Phosphatase 68 40 - 150 U/L    ALT 34 0 - 70 U/L    AST 21 0 - 45 U/L   Magnesium    Collection Time: 01/31/18  6:42 AM   Result Value Ref Range    Magnesium 2.0 1.6 - 2.3 mg/dL                 Follow-ups after your visit        Your next 10 appointments already scheduled     Feb 14, 2018  7:30 AM CST   Masonic Lab Draw with  MASONIC LAB DRAW   [facility]    [address] [place] [address]  [address]  [facility] [id number] [phone]            Feb 14, 2018  8:00 AM CST   Infusion 360 with UC ONCOLOGY INFUSION, UC 14 ATC   [facility] ([facility])    [address] [place] [address] Se  [address]  [facility] [id number] [phone]            Feb 28, 2018  6:30 AM CST   Masonic Lab Draw with  MASONIC LAB DRAW   [facility] Lab Draw ([facility])    [address]  [address]  [facility] [address]   [phone]            Feb 28, 2018  7:00 AM CST   Infusion 360 with UC ONCOLOGY INFUSION, UC 11 ATC   [facility] Clinic ([facility])    [address]  [address]  [facility] [address]   [phone]            Mar 14, 2018  6:30 AM CDT   Masonic Lab Draw with UC MASONIC LAB DRAW   [facility] Lab Draw ([facility])    [address]  [address]  [facility] [address]   [phone]            Mar 14, 2018  7:00 AM CDT   Infusion 360 with UC ONCOLOGY INFUSION, UC 10 ATC   [facility] ([facility])    [address] [place] [address]  [address]  [facility] [address]   [phone]            Mar 23, 2018  6:30 AM CDT   Masonic Lab Draw with UC MASONIC LAB DRAW   [facility]    [address] [place] [address]  [address]  [facility] [address]   [phone]              Who to contact     If you have questions or need follow up information about today's clinic visit or your schedule please contact [facility] directly at [phone].  Normal or non-critical lab and imaging results will be communicated to you by Picfairhart, letter or phone within 4 business days after the clinic has received the results. If you do not hear from us within 7 days, please contact the clinic through [employer] or phone. If you have a critical or abnormal lab result, we will notify you by phone as soon as possible.  Submit refill requests through [employer] or call your pharmacy and they will forward the refill request to us. Please allow 3 business days for your refill to be completed.          Additional Information About Your Visit        Picfairhart Information     [employer] gives you secure access to your electronic health record. If you see a primary care provider, you can also send messages to your care team and make appointments. If you have questions, please call your primary care clinic.  If you do not have a primary care provider, please call [phone] and they will assist  you.        Care EveryWhere ID     This is your Care EveryWhere ID. This could be used by other organizations to access your [place] medical records  [id number]        Your Vitals Were     Pulse Temperature Respirations Pulse Oximetry BMI (Body Mass Index)       92 97.6  F (36.4  C) (Oral) 18 100% 32.87 kg/m2        Blood Pressure from Last 3 Encounters:   01/31/18 121/83   01/29/18 (!) 140/91   01/17/18 113/73    Weight from Last 3 Encounters:   01/31/18 103.9 kg (229 lb 1.6 oz)   01/29/18 103 kg (227 lb)   01/17/18 103 kg (227 lb 1.6 oz)              We Performed the Following     CBC with platelets differential     Comprehensive metabolic panel     Magnesium        Primary Care Provider Office Phone # Fax #    Jim Kaplan [phone]       [address]   [facility] [address]        Equal Access to Services     ALICIA MADRIGAL : Hadii aad ku hadasho Soomaali, waaxda luqadaha, qaybta kaalmada adeegyada, waxay rodríguezin hayaylan frida yuan . So [facility] [phone].    ATENCIÓN: Si josefinala daniel, tiene a flores disposición servicios gratuitos de asistencia lingüística. Llame al [phone].    We comply with applicable federal civil rights laws and [place] laws. We do not discriminate on the basis of race, color, national origin, age, disability, sex, sexual orientation, or gender identity.            Thank you!     Thank you for choosing [facility]  for your care. Our goal is always to provide you with excellent care. Hearing back from our patients is one way we can continue to improve our services. Please take a few minutes to complete the written survey that you may receive in the mail after your visit with us. Thank you!             Your Updated Medication List - Protect others around you: Learn how to safely use, store and throw away your medicines at www.disposemymeds.org.          This list is accurate as of 1/31/18 10:03 AM.   Always use your most recent med list.                   Brand Name Dispense Instructions for use Diagnosis    atorvastatin 40 MG tablet    LIPITOR     TAKE 1 TABLET BY MOUTH DAILY        ELIQUIS PO      Take 5 mg by mouth 2 times daily        LORazepam 0.5 MG tablet    ATIVAN    30 tablet    Take 1 tablet (0.5 mg) by mouth every 4 hours as needed (Anxiety, Nausea/Vomiting or Sleep)    Cholangiocarcinoma (H)       metoprolol tartrate 50 MG tablet    LOPRESSOR     50 mg 2 times daily        MITIGARE 0.6 MG Caps   Generic drug:  Colchicine      Take 0.6 mg by mouth 3 times daily as needed        multivitamin, therapeutic with minerals Tabs tablet     30 tablet    Take 1 tablet by mouth daily    Mass of bile duct       NITROSTAT SL      Place 0.4 mg under the tongue every 5 minutes as needed for chest pain (Carries medication - has never used)        OMEGA-3 FISH OIL PO      Take 1,000 mg by mouth daily        ondansetron 8 MG tablet    ZOFRAN    10 tablet    Take 1 tablet (8 mg) by mouth every 8 hours as needed (Nausea/Vomiting)    Cholangiocarcinoma (H)       prochlorperazine 10 MG tablet    COMPAZINE    30 tablet    Take 1 tablet (10 mg) by mouth every 6 hours as needed (Nausea/Vomiting)    Cholangiocarcinoma (H)

## 2018-01-31 NOTE — PATIENT INSTRUCTIONS
Contact Numbers    [facility] Main Line: [phone]  [facility] Triage:  [phone]    Call triage with chills and/or temperature greater than or equal to 100.5, uncontrolled nausea/vomiting, diarrhea, constipation, dizziness, shortness of breath, chest pain, bleeding, unexplained bruising, or any new/concerning symptoms, questions/concerns.     If you are having any concerning symptoms or wish to speak to a provider before your next infusion visit, please call your care coordinator or triage to notify them so we can adequately serve you.       After Hours: [phone]    If after hours, weekends, or holidays, call main hospital  and ask for Oncology doctor on call.           January 2018 Sunday Monday Tuesday Wednesday Thursday Friday Saturday        1     2     3     [facility] LAB DRAW    6:30 AM   (15 min.)    MASONIC LAB DRAW   [facility] Lab Draw     UMP RETURN    6:45 AM   (50 min.)   Julia Smith PA-C   [facility]     UMP ONC INFUSION 360    7:30 AM   (360 min.)    ONCOLOGY INFUSION   [facility] 4     5     6       7     8     9     10     [facility] LAB DRAW    6:30 AM   (15 min.)    MASONIC LAB DRAW   [facility] Masonic Lab Draw     UMP ONC INFUSION 360    7:00 AM   (360 min.)    ONCOLOGY INFUSION   [facility] 11     12     13       14     15     16     17     [facility] LAB DRAW    6:30 AM   (15 min.)    MASONIC LAB DRAW   [facility] Masonic Lab Draw     UMP ONC INFUSION 360    7:00 AM   (360 min.)    ONCOLOGY INFUSION   [facility] 18     19     20       21     22     23     24     25     26     LAB    7:30 AM   (15 min.)    LAB   [facility] Lab     CT CHEST ABDOMEN PELVIS WWO    7:45 AM   (20 min.)   UCCT2   [facility] CT 27       28     29     UMP RETURN    3:45 PM   (30 min.)   Naresh De Los Santos MD   [facility] 30     31     [facility] LAB DRAW    6:30 AM    (15 min.)    MASONIC LAB DRAW   [facility] Masonic Lab Draw     UMP ONC INFUSION 360    7:00 AM   (360 min.)    ONCOLOGY INFUSION   [facility]                           February 2018 Sunday Monday Tuesday Wednesday Thursday Friday Saturday                       1     2     3       4     5     6     7     8     9     10       11     12     13     14     [facility] LAB DRAW    7:30 AM   (15 min.)    MASONIC LAB DRAW   [facility] Masonic Lab Draw     UMP ONC INFUSION 360    8:00 AM   (360 min.)    ONCOLOGY INFUSION   [facility] 15     16     17       18     19     20     21     22     23  Happy Birthday!     24       25     26     27     28     [facility] LAB DRAW    6:30 AM   (15 min.)    [facility] LAB [facility]RAW   [facility]     [facility] ONC INFUSION 360    7:00 AM   (360 min.)    ONCOLOGY INFUSION   [facility]                            Recent Results (from the past 24 hour(s))   CBC with platelets differential    Collection Time: 01/31/18  6:42 AM   Result Value Ref Range    WBC 6.2 4.0 - 11.0 10e9/L    RBC Count 4.10 (L) 4.4 - 5.9 10e12/L    Hemoglobin 12.8 (L) 13.3 - 17.7 g/dL    Hematocrit 38.0 (L) 40.0 - 53.0 %    MCV 93 78 - 100 fl    MCH 31.2 26.5 - 33.0 pg    MCHC 33.7 31.5 - 36.5 g/dL    RDW 15.8 (H) 10.0 - 15.0 %    Platelet Count 85 (L) 150 - 450 10e9/L    Diff Method Automated Method     % Neutrophils 50.0 %    % Lymphocytes 33.1 %    % Monocytes 14.4 %    % Eosinophils 1.8 %    % Basophils 0.5 %    % Immature Granulocytes 0.2 %    Nucleated RBCs 0 0 /100    Absolute Neutrophil 3.1 1.6 - 8.3 10e9/L    Absolute Lymphocytes 2.1 0.8 - 5.3 10e9/L    Absolute Monocytes 0.9 0.0 - 1.3 10e9/L    Absolute Eosinophils 0.1 0.0 - 0.7 10e9/L    Absolute Basophils 0.0 0.0 - 0.2 10e9/L    Abs Immature Granulocytes 0.0 0 - 0.4 10e9/L    Absolute Nucleated RBC 0.0    Comprehensive metabolic panel    Collection Time: 01/31/18  6:42 AM   Result Value  Ref Range    Sodium 141 133 - 144 mmol/L    Potassium 4.3 3.4 - 5.3 mmol/L    Chloride 108 94 - 109 mmol/L    Carbon Dioxide 26 20 - 32 mmol/L    Anion Gap 6 3 - 14 mmol/L    Glucose 99 70 - 99 mg/dL    Urea Nitrogen 18 7 - 30 mg/dL    Creatinine 0.91 0.66 - 1.25 mg/dL    GFR Estimate 84 >60 mL/min/1.7m2    GFR Estimate If Black >90 >60 mL/min/1.7m2    Calcium 8.3 (L) 8.5 - 10.1 mg/dL    Bilirubin Total 0.7 0.2 - 1.3 mg/dL    Albumin 3.6 3.4 - 5.0 g/dL    Protein Total 6.9 6.8 - 8.8 g/dL    Alkaline Phosphatase 68 40 - 150 U/L    ALT 34 0 - 70 U/L    AST 21 0 - 45 U/L   Magnesium    Collection Time: 01/31/18  6:42 AM   Result Value Ref Range    Magnesium 2.0 1.6 - 2.3 mg/dL

## 2018-02-14 ENCOUNTER — INFUSION THERAPY VISIT (OUTPATIENT)
Dept: ONCOLOGY | Facility: CLINIC | Age: 63
End: 2018-02-14
Attending: INTERNAL MEDICINE
Payer: COMMERCIAL

## 2018-02-14 ENCOUNTER — APPOINTMENT (OUTPATIENT)
Dept: LAB | Facility: CLINIC | Age: 63
End: 2018-02-14
Attending: INTERNAL MEDICINE
Payer: COMMERCIAL

## 2018-02-14 VITALS
TEMPERATURE: 97.7 F | BODY MASS INDEX: 33.15 KG/M2 | HEART RATE: 98 BPM | SYSTOLIC BLOOD PRESSURE: 128 MMHG | RESPIRATION RATE: 16 BRPM | WEIGHT: 231 LBS | DIASTOLIC BLOOD PRESSURE: 84 MMHG | OXYGEN SATURATION: 98 %

## 2018-02-14 DIAGNOSIS — C22.1 CHOLANGIOCARCINOMA (H): Primary | ICD-10-CM

## 2018-02-14 LAB
ALBUMIN SERPL-MCNC: 3.6 G/DL (ref 3.4–5)
ALP SERPL-CCNC: 71 U/L (ref 40–150)
ALT SERPL W P-5'-P-CCNC: 30 U/L (ref 0–70)
ANION GAP SERPL CALCULATED.3IONS-SCNC: 5 MMOL/L (ref 3–14)
AST SERPL W P-5'-P-CCNC: 17 U/L (ref 0–45)
BASOPHILS # BLD AUTO: 0 10E9/L (ref 0–0.2)
BASOPHILS NFR BLD AUTO: 0.4 %
BILIRUB SERPL-MCNC: 0.6 MG/DL (ref 0.2–1.3)
BUN SERPL-MCNC: 14 MG/DL (ref 7–30)
CALCIUM SERPL-MCNC: 8.4 MG/DL (ref 8.5–10.1)
CHLORIDE SERPL-SCNC: 109 MMOL/L (ref 94–109)
CO2 SERPL-SCNC: 25 MMOL/L (ref 20–32)
CREAT SERPL-MCNC: 0.84 MG/DL (ref 0.66–1.25)
DIFFERENTIAL METHOD BLD: ABNORMAL
EOSINOPHIL # BLD AUTO: 0.1 10E9/L (ref 0–0.7)
EOSINOPHIL NFR BLD AUTO: 1.7 %
ERYTHROCYTE [DISTWIDTH] IN BLOOD BY AUTOMATED COUNT: 17.2 % (ref 10–15)
GFR SERPL CREATININE-BSD FRML MDRD: >90 ML/MIN/1.7M2
GLUCOSE SERPL-MCNC: 95 MG/DL (ref 70–99)
HCT VFR BLD AUTO: 35.7 % (ref 40–53)
HGB BLD-MCNC: 12 G/DL (ref 13.3–17.7)
IMM GRANULOCYTES # BLD: 0 10E9/L (ref 0–0.4)
IMM GRANULOCYTES NFR BLD: 0.2 %
LYMPHOCYTES # BLD AUTO: 1.3 10E9/L (ref 0.8–5.3)
LYMPHOCYTES NFR BLD AUTO: 28.1 %
MAGNESIUM SERPL-MCNC: 1.8 MG/DL (ref 1.6–2.3)
MCH RBC QN AUTO: 31.7 PG (ref 26.5–33)
MCHC RBC AUTO-ENTMCNC: 33.6 G/DL (ref 31.5–36.5)
MCV RBC AUTO: 94 FL (ref 78–100)
MONOCYTES # BLD AUTO: 0.8 10E9/L (ref 0–1.3)
MONOCYTES NFR BLD AUTO: 16 %
NEUTROPHILS # BLD AUTO: 2.5 10E9/L (ref 1.6–8.3)
NEUTROPHILS NFR BLD AUTO: 53.6 %
NRBC # BLD AUTO: 0 10*3/UL
NRBC BLD AUTO-RTO: 0 /100
PLATELET # BLD AUTO: 102 10E9/L (ref 150–450)
POTASSIUM SERPL-SCNC: 4.3 MMOL/L (ref 3.4–5.3)
PROT SERPL-MCNC: 6.7 G/DL (ref 6.8–8.8)
RBC # BLD AUTO: 3.78 10E12/L (ref 4.4–5.9)
SODIUM SERPL-SCNC: 140 MMOL/L (ref 133–144)
WBC # BLD AUTO: 4.7 10E9/L (ref 4–11)

## 2018-02-14 PROCEDURE — 96413 CHEMO IV INFUSION 1 HR: CPT

## 2018-02-14 PROCEDURE — 96375 TX/PRO/DX INJ NEW DRUG ADDON: CPT

## 2018-02-14 PROCEDURE — 80053 COMPREHEN METABOLIC PANEL: CPT | Performed by: INTERNAL MEDICINE

## 2018-02-14 PROCEDURE — 83735 ASSAY OF MAGNESIUM: CPT | Performed by: INTERNAL MEDICINE

## 2018-02-14 PROCEDURE — 96417 CHEMO IV INFUS EACH ADDL SEQ: CPT

## 2018-02-14 PROCEDURE — 85025 COMPLETE CBC W/AUTO DIFF WBC: CPT | Performed by: INTERNAL MEDICINE

## 2018-02-14 PROCEDURE — 25000128 H RX IP 250 OP 636: Mod: ZF | Performed by: INTERNAL MEDICINE

## 2018-02-14 PROCEDURE — 96367 TX/PROPH/DG ADDL SEQ IV INF: CPT

## 2018-02-14 RX ORDER — ONDANSETRON 2 MG/ML
8 INJECTION INTRAMUSCULAR; INTRAVENOUS ONCE
Status: COMPLETED | OUTPATIENT
Start: 2018-02-14 | End: 2018-02-14

## 2018-02-14 RX ORDER — HEPARIN SODIUM (PORCINE) LOCK FLUSH IV SOLN 100 UNIT/ML 100 UNIT/ML
5 SOLUTION INTRAVENOUS
Status: COMPLETED | OUTPATIENT
Start: 2018-02-14 | End: 2018-02-14

## 2018-02-14 RX ORDER — HEPARIN SODIUM (PORCINE) LOCK FLUSH IV SOLN 100 UNIT/ML 100 UNIT/ML
5 SOLUTION INTRAVENOUS EVERY 8 HOURS
Status: DISCONTINUED | OUTPATIENT
Start: 2018-02-14 | End: 2018-02-14 | Stop reason: HOSPADM

## 2018-02-14 RX ADMIN — DEXAMETHASONE SODIUM PHOSPHATE: 10 INJECTION, SOLUTION INTRAMUSCULAR; INTRAVENOUS at 08:28

## 2018-02-14 RX ADMIN — MAGNESIUM SULFATE HEPTAHYDRATE: 500 INJECTION, SOLUTION INTRAMUSCULAR; INTRAVENOUS at 08:31

## 2018-02-14 RX ADMIN — GEMCITABINE 2200 MG: 38 INJECTION, SOLUTION INTRAVENOUS at 09:42

## 2018-02-14 RX ADMIN — SODIUM CHLORIDE 250 ML: 9 INJECTION, SOLUTION INTRAVENOUS at 08:20

## 2018-02-14 RX ADMIN — ONDANSETRON 8 MG: 2 INJECTION INTRAMUSCULAR; INTRAVENOUS at 08:20

## 2018-02-14 RX ADMIN — SODIUM CHLORIDE, PRESERVATIVE FREE 5 ML: 5 INJECTION INTRAVENOUS at 11:22

## 2018-02-14 RX ADMIN — SODIUM CHLORIDE, PRESERVATIVE FREE 5 ML: 5 INJECTION INTRAVENOUS at 07:29

## 2018-02-14 RX ADMIN — CISPLATIN 60 MG: 1 INJECTION, SOLUTION INTRAVENOUS at 08:41

## 2018-02-14 ASSESSMENT — PAIN SCALES - GENERAL: PAINLEVEL: NO PAIN (0)

## 2018-02-14 NOTE — PATIENT INSTRUCTIONS
[facility] Main Line: [phone]    Call triage nurse with chills and/or temperature greater than or equal to 100.4, uncontrolled nausea/vomiting, diarrhea, constipation, dizziness, shortness of breath, chest pain, bleeding, unexplained bruising, or any new/concerning symptoms, questions/concerns.   If you are having any concerning symptoms or wish to speak to a provider before your next infusion visit, please call your care coordinator or triage to notify them so we can adequately serve you.   Triage Nurse Line: [phone]    If after hours, weekends, or holidays, call main hospital  and ask for Oncology doctor on call @ [phone]               February 2018 Sunday Monday Tuesday Wednesday Thursday Friday Saturday                       1     2     3       4     5     6     7     8     9     10       11     12     13     14     [facility] LAB DRAW    7:30 AM   (15 min.)    MASONIC LAB DRAW   [facility] Lab Draw     UMP ONC INFUSION 360    8:00 AM   (360 min.)    ONCOLOGY INFUSION   [facility] 15     16     17       18     19     20     21     22     23  Happy Birthday!     24       25     26     27     28     [facility] LAB DRAW    6:30 AM   (15 min.)    MASONIC LAB DRAW   [facility] Lab Draw     UMP ONC INFUSION 360    7:00 AM   (360 min.)    ONCOLOGY INFUSION   [facility]                           March 2018 Sunday Monday Tuesday Wednesday Thursday Friday Saturday                       1     2     3       4     5     6     7     8     9     10       11     12     13     14     [facility] LAB DRAW    6:30 AM   (15 min.)    [facility] LAB DRAW   [facility]     [facility] ONC INFUSION 360    7:00 AM   (360 min.)    ONCOLOGY INFUSION   [facility] 15     16     17       18     19     20     21     22     23     [facility] LAB DRAW    6:30 AM   (15 min.)    MASONIC LAB DRAW   [facility]  Lab Draw     CT CHEST ABDOMEN PELVIS WWO    6:45 AM   (20 min.)   UCCT1   [facility] CT 24       25     26     P RETURN    9:00 AM   (30 min.)   Naresh De Los Santos MD   [facility] 27     28     [facility] LAB DRAW    6:30 AM   (15 min.)    [facility]   [facility] Lab Draw     [facility] ONC INFUSION 360    7:00 AM   (360 min.)    ONCOLOGY INFUSION   [facility] 29     30     31                  Lab Results:  Recent Results (from the past 12 hour(s))   CBC with platelets differential    Collection Time: 02/14/18  7:36 AM   Result Value Ref Range    WBC 4.7 4.0 - 11.0 10e9/L    RBC Count 3.78 (L) 4.4 - 5.9 10e12/L    Hemoglobin 12.0 (L) 13.3 - 17.7 g/dL    Hematocrit 35.7 (L) 40.0 - 53.0 %    MCV 94 78 - 100 fl    MCH 31.7 26.5 - 33.0 pg    MCHC 33.6 31.5 - 36.5 g/dL    RDW 17.2 (H) 10.0 - 15.0 %    Platelet Count 102 (L) 150 - 450 10e9/L    Diff Method Automated Method     % Neutrophils 53.6 %    % Lymphocytes 28.1 %    % Monocytes 16.0 %    % Eosinophils 1.7 %    % Basophils 0.4 %    % Immature Granulocytes 0.2 %    Nucleated RBCs 0 0 /100    Absolute Neutrophil 2.5 1.6 - 8.3 10e9/L    Absolute Lymphocytes 1.3 0.8 - 5.3 10e9/L    Absolute Monocytes 0.8 0.0 - 1.3 10e9/L    Absolute Eosinophils 0.1 0.0 - 0.7 10e9/L    Absolute Basophils 0.0 0.0 - 0.2 10e9/L    Abs Immature Granulocytes 0.0 0 - 0.4 10e9/L    Absolute Nucleated RBC 0.0    Comprehensive metabolic panel    Collection Time: 02/14/18  7:36 AM   Result Value Ref Range    Sodium 140 133 - 144 mmol/L    Potassium 4.3 3.4 - 5.3 mmol/L    Chloride 109 94 - 109 mmol/L    Carbon Dioxide 25 20 - 32 mmol/L    Anion Gap 5 3 - 14 mmol/L    Glucose 95 70 - 99 mg/dL    Urea Nitrogen 14 7 - 30 mg/dL    Creatinine 0.84 0.66 - 1.25 mg/dL    GFR Estimate >90 >60 mL/min/1.7m2    GFR Estimate If Black >90 >60 mL/min/1.7m2    Calcium 8.4 (L) 8.5 - 10.1 mg/dL    Bilirubin Total 0.6 0.2 - 1.3 mg/dL    Albumin  3.6 3.4 - 5.0 g/dL    Protein Total 6.7 (L) 6.8 - 8.8 g/dL    Alkaline Phosphatase 71 40 - 150 U/L    ALT 30 0 - 70 U/L    AST 17 0 - 45 U/L   Magnesium    Collection Time: 02/14/18  7:36 AM   Result Value Ref Range    Magnesium 1.8 1.6 - 2.3 mg/dL

## 2018-02-14 NOTE — MR AVS SNAPSHOT
After Visit Summary   2/14/2018    Girish Chauhan    MRN: [id number]           Patient Information     Date Of Birth          1955        Visit Information        Provider Department      2/14/2018 8:00 AM  14 ATC;  ONCOLOGY INFUSION [facility]        Today's Diagnoses     Cholangiocarcinoma (H)    -  1      [facility] Main Line: [phone]    Call triage nurse with chills and/or temperature greater than or equal to 100.4, uncontrolled nausea/vomiting, diarrhea, constipation, dizziness, shortness of breath, chest pain, bleeding, unexplained bruising, or any new/concerning symptoms, questions/concerns.   If you are having any concerning symptoms or wish to speak to a provider before your next infusion visit, please call your care coordinator or triage to notify them so we can adequately serve you.   Triage Nurse Line: [phone]    If after hours, weekends, or holidays, call main hospital  and ask for Oncology doctor on call @ [phone]               February 2018 Sunday Monday Tuesday Wednesday Thursday Friday Saturday                       1     2     3       4     5     6     7     8     9     10       11     12     13     14     [facility] LAB DRAW    7:30 AM   (15 min.)    MASONIC LAB DRAW   [facility] Lab Draw     UMP ONC INFUSION 360    8:00 AM   (360 min.)    ONCOLOGY INFUSION   [facility] 15     16     17       18     19     20     21     22     23  Happy Birthday!     24       25     26     27     28     [facility] LAB DRAW    6:30 AM   (15 min.)    MASONIC LAB DRAW   [facility] Lab Draw     UMP ONC INFUSION 360    7:00 AM   (360 min.)    ONCOLOGY INFUSION   [facility]                           March 2018 Sunday Monday Tuesday Wednesday Thursday Friday Saturday                       1     2     3       4     5     6     7     8     9     10        11     12     13     14     [facility] LAB DRAW    6:30 AM   (15 min.)    MASONIC LAB DRAW   [facility] Mason[facility] Lab Draw     UMP ONC INFUSION 360    7:00 AM   (360 min.)    ONCOLOGY INFUSION   [facility] 15     16     17       18     19     20     21     22     23     [facility] LAB DRAW    6:30 AM   (15 min.)    MASONIC LAB DRAW   [facility] Lab Draw     CT CHEST ABDOMEN PELVIS WWO    6:45 AM   (20 min.)   UCCT1   [facility] CT 24       25     26     UMP RETURN    9:00 AM   (30 min.)   Naresh De Los Santos MD   [facility] 27     28     [facility] LAB DRAW    6:30 AM   (15 min.)    MASONIC LAB DRAW   [facility] Lab Draw     UMP ONC INFUSION 360    7:00 AM   (360 min.)    ONCOLOGY INFUSION   [facility] 29     30     31                  Lab Results:  Recent Results (from the past 12 hour(s))   CBC with platelets differential    Collection Time: 02/14/18  7:36 AM   Result Value Ref Range    WBC 4.7 4.0 - 11.0 10e9/L    RBC Count 3.78 (L) 4.4 - 5.9 10e12/L    Hemoglobin 12.0 (L) 13.3 - 17.7 g/dL    Hematocrit 35.7 (L) 40.0 - 53.0 %    MCV 94 78 - 100 fl    MCH 31.7 26.5 - 33.0 pg    MCHC 33.6 31.5 - 36.5 g/dL    RDW 17.2 (H) 10.0 - 15.0 %    Platelet Count 102 (L) 150 - 450 10e9/L    Diff Method Automated Method     % Neutrophils 53.6 %    % Lymphocytes 28.1 %    % Monocytes 16.0 %    % Eosinophils 1.7 %    % Basophils 0.4 %    % Immature Granulocytes 0.2 %    Nucleated RBCs 0 0 /100    Absolute Neutrophil 2.5 1.6 - 8.3 10e9/L    Absolute Lymphocytes 1.3 0.8 - 5.3 10e9/L    Absolute Monocytes 0.8 0.0 - 1.3 10e9/L    Absolute Eosinophils 0.1 0.0 - 0.7 10e9/L    Absolute Basophils 0.0 0.0 - 0.2 10e9/L    Abs Immature Granulocytes 0.0 0 - 0.4 10e9/L    Absolute Nucleated RBC 0.0    Comprehensive metabolic panel    Collection Time: 02/14/18  7:36 AM   Result Value Ref Range    Sodium 140 133 - 144 mmol/L    Potassium 4.3 3.4  - 5.3 mmol/L    Chloride 109 94 - 109 mmol/L    Carbon Dioxide 25 20 - 32 mmol/L    Anion Gap 5 3 - 14 mmol/L    Glucose 95 70 - 99 mg/dL    Urea Nitrogen 14 7 - 30 mg/dL    Creatinine 0.84 0.66 - 1.25 mg/dL    GFR Estimate >90 >60 mL/min/1.7m2    GFR Estimate If Black >90 >60 mL/min/1.7m2    Calcium 8.4 (L) 8.5 - 10.1 mg/dL    Bilirubin Total 0.6 0.2 - 1.3 mg/dL    Albumin 3.6 3.4 - 5.0 g/dL    Protein Total 6.7 (L) 6.8 - 8.8 g/dL    Alkaline Phosphatase 71 40 - 150 U/L    ALT 30 0 - 70 U/L    AST 17 0 - 45 U/L   Magnesium    Collection Time: 02/14/18  7:36 AM   Result Value Ref Range    Magnesium 1.8 1.6 - 2.3 mg/dL             Follow-ups after your visit        Your next 10 appointments already scheduled     Feb 28, 2018  6:30 AM CST   Masonic Lab Draw with UC MASONIC LAB DRAW   [facility] Masonic Lab Draw ([facility])    [address] [place] [address]  [address]  [facility] [address]   [phone]            Feb 28, 2018  7:00 AM CST   Infusion 360 with UC ONCOLOGY INFUSION, UC 11 ATC   [facility] ([facility])    [address] [place] [address]  [address]  [facility] [address]   [phone]            Mar 14, 2018  6:30 AM CDT   Masonic Lab Draw with UC MASONIC LAB DRAW   [facility] Mason[facility] Lab Draw ([facility])    [address] [place] [address]  [address]  [facility] [address]   [phone]            Mar 14, 2018  7:00 AM CDT   Infusion 360 with UC ONCOLOGY INFUSION, UC 10 ATC   [facility] ([facility])    [address] [place] [address]  [address]  [facility] [address]   [phone]            Mar 23, 2018  6:30 AM CDT   Masonic Lab Draw with UC MASONIC LAB DRAW   [facility] Masonic Lab Draw ([facility])    [address] [place] [address]  [address]  [facility] [address]   [phone]            Mar 23, 2018  7:00 AM CDT   (Arrive by 6:45 AM)   CT CHEST ABDOMEN PELVIS W/O & W  CONTRAST with UCCT1   [facility] [place] [facility] ([facility])    [address]  1st Floor  [facility] [id number] [phone]           Please bring any scans or X-rays taken at other hospitals, if similar tests were done. Also bring a list of your medicines, including vitamins, minerals and over-the-counter drugs. It is safest to leave personal items at home.  Be sure to tell your doctor:   If you have any allergies.   If there s any chance you are pregnant.   If you are breastfeeding.  You may have contrast for this exam. To prepare:   Do not eat or drink for 2 hours before your exam. If you need to take medicine, you may take it with small sips of water. (We may ask you to take liquid medicine as well.)   The day before your exam, drink extra fluids at least six 8-ounce glasses (unless your doctor tells you to restrict your fluids).   You will be given instructions on how to drink the contrast.  Patients over 70 or patients with diabetes or kidney problems:   If you haven t had a blood test (creatinine test) within the last 30 days, the Cardiologist/Radiologist may require you to get this test prior to your exam.  If you have diabetes:   Continue to take your metformin medication on the day of your exam  Please wear loose clothing, such as a sweat suit or jogging clothes. Avoid snaps, zippers and other metal. We may ask you to undress and put on a hospital gown.  If you have any questions, please call the Imaging Department where you will have your exam.            Mar 26, 2018  9:15 AM CDT   (Arrive by 9:00 AM)   Return Visit with Naresh De Los Santos MD   [facility] ([facility])    [address] [place] [address]  [address]  [facility] [address] [phone]            Mar 28, 2018  6:30 AM CDT   Masonic Lab Draw with  MASONIC LAB DRAW   [facility] Lab Draw ([facility])    [address] [place] [address]  [address]  [address]  [facility] [address] [phone]              Who to contact     If you have questions or need follow up information about today's clinic visit or your schedule please contact [facility] directly at [phone].  Normal or non-critical lab and imaging results will be communicated to you by MyChart, letter or phone within 4 business days after the clinic has received the results. If you do not hear from us within 7 days, please contact the clinic through [employer] or phone. If you have a critical or abnormal lab result, we will notify you by phone as soon as possible.  Submit refill requests through [employer] or call your pharmacy and they will forward the refill request to us. Please allow 3 business days for your refill to be completed.          Additional Information About Your Visit        [employer] Information     [employer] gives you secure access to your electronic health record. If you see a primary care provider, you can also send messages to your care team and make appointments. If you have questions, please call your primary care clinic.  If you do not have a primary care provider, please call [phone] and they will assist you.        Care EveryWhere ID     This is your Care EveryWhere ID. This could be used by other organizations to access your [place] medical records  [id number]        Your Vitals Were     Pulse Temperature Respirations Pulse Oximetry BMI (Body Mass Index)       98 97.7  F (36.5  C) (Oral) 16 98% 33.15 kg/m2        Blood Pressure from Last 3 Encounters:   02/14/18 128/84   01/31/18 121/83   01/29/18 (!) 140/91    Weight from Last 3 Encounters:   02/14/18 104.8 kg (231 lb)   01/31/18 103.9 kg (229 lb 1.6 oz)   01/29/18 103 kg (227 lb)              We Performed the Following     CBC with platelets differential     Comprehensive metabolic panel     Magnesium        Primary Care Provider Office Phone # Fax #    Jim Kaplan [phone]  [phone]       [address]   [facility] [address]        Equal Access to Services     MARIUSZ MADRIGAL : Hadii aad ku hadyessicanaina Kingaali, wayinkada luqjyotiha, qakerita kaalmada rubina, ankit rodríguezin hayaacarmen lernerevan srivastava kwabena gross. So [facility] [place] [phone].    ATENCIÓN: Si habla español, tiene a flores disposición servicios gratuitos de asistencia lingüística. Angela al [phone].    We comply with applicable federal civil rights laws and [place] laws. We do not discriminate on the basis of race, color, national origin, age, disability, sex, sexual orientation, or gender identity.            Thank you!     Thank you for choosing [facility]  for your care. Our goal is always to provide you with excellent care. Hearing back from our patients is one way we can continue to improve our services. Please take a few minutes to complete the written survey that you may receive in the mail after your visit with us. Thank you!             Your Updated Medication List - Protect others around you: Learn how to safely use, store and throw away your medicines at www.disposemymeds.org.          This list is accurate as of 2/14/18 11:52 AM.  Always use your most recent med list.                   Brand Name Dispense Instructions for use Diagnosis    atorvastatin 40 MG tablet    LIPITOR     TAKE 1 TABLET BY MOUTH DAILY        ELIQUIS PO      Take 5 mg by mouth 2 times daily        LORazepam 0.5 MG tablet    ATIVAN    30 tablet    Take 1 tablet (0.5 mg) by mouth every 4 hours as needed (Anxiety, Nausea/Vomiting or Sleep)    Cholangiocarcinoma (H)       metoprolol tartrate 50 MG tablet    LOPRESSOR     50 mg 2 times daily        MITIGARE 0.6 MG Caps   Generic drug:  Colchicine      Take 0.6 mg by mouth 3 times daily as needed        multivitamin, therapeutic with minerals Tabs tablet     30 tablet    Take 1 tablet by mouth daily    Mass of bile duct       NITROSTAT SL      Place 0.4 mg under the  tongue every 5 minutes as needed for chest pain (Carries medication - has never used)        OMEGA-3 FISH OIL PO      Take 1,000 mg by mouth daily        ondansetron 8 MG tablet    ZOFRAN    10 tablet    Take 1 tablet (8 mg) by mouth every 8 hours as needed (Nausea/Vomiting)    Cholangiocarcinoma (H)       prochlorperazine 10 MG tablet    COMPAZINE    30 tablet    Take 1 tablet (10 mg) by mouth every 6 hours as needed (Nausea/Vomiting)    Cholangiocarcinoma (H)

## 2018-02-14 NOTE — PROGRESS NOTES
Infusion Nursing Note:  Girish Chauhan presents today for Cycle 3 Day 15 Cisplat, [place].    Patient seen by provider today: No   present during visit today: Not Applicable.    Note: Has no new complaints.  See doc flow sheet for assessment.    Intravenous Access:  Implanted Port.    Treatment Conditions:  Lab Results   Component Value Date    HGB 12.0 02/14/2018     Lab Results   Component Value Date    WBC 4.7 02/14/2018      Lab Results   Component Value Date    ANEU 2.5 02/14/2018     Lab Results   Component Value Date     02/14/2018      Lab Results   Component Value Date     02/14/2018                   Lab Results   Component Value Date    POTASSIUM 4.3 02/14/2018           Lab Results   Component Value Date    MAG 1.8 02/14/2018            Lab Results   Component Value Date    CR 0.84 02/14/2018                   Lab Results   Component Value Date    GARY 8.4 02/14/2018                Lab Results   Component Value Date    BILITOTAL 0.6 02/14/2018           Lab Results   Component Value Date    ALBUMIN 3.6 02/14/2018                    Lab Results   Component Value Date    ALT 30 02/14/2018           Lab Results   Component Value Date    AST 17 02/14/2018       Results reviewed, labs MET treatment parameters, ok to proceed with treatment.    Up urinating pre/post Cisplat.      Post Infusion Assessment:  Patient tolerated infusion without incident.  Blood return noted pre and post infusion.  Site patent and intact, free from redness, edema or discomfort.  No evidence of extravasations.  Access discontinued per protocol.    Discharge Plan:   Patient declined prescription refills.  AVS to patient via [employer].  Patient will return 2/28/18 labs, chemo for next appointment.   Patient discharged in stable condition accompanied by: self.  Departure Mode: Ambulatory.  Face to Face time: 0.    Lily Hummel RN

## 2018-02-25 RX ORDER — LORAZEPAM 2 MG/ML
0.5 INJECTION INTRAMUSCULAR EVERY 4 HOURS PRN
Status: CANCELLED
Start: 2018-03-14

## 2018-02-25 RX ORDER — EPINEPHRINE 0.3 MG/.3ML
0.3 INJECTION SUBCUTANEOUS EVERY 5 MIN PRN
Status: CANCELLED | OUTPATIENT
Start: 2018-03-14

## 2018-02-25 RX ORDER — ONDANSETRON 2 MG/ML
8 INJECTION INTRAMUSCULAR; INTRAVENOUS ONCE
Status: CANCELLED
Start: 2018-02-28 | End: 2018-02-28

## 2018-02-25 RX ORDER — EPINEPHRINE 0.3 MG/.3ML
0.3 INJECTION SUBCUTANEOUS EVERY 5 MIN PRN
Status: CANCELLED | OUTPATIENT
Start: 2018-02-28

## 2018-02-25 RX ORDER — DIPHENHYDRAMINE HYDROCHLORIDE 50 MG/ML
50 INJECTION INTRAMUSCULAR; INTRAVENOUS
Status: CANCELLED
Start: 2018-02-28

## 2018-02-25 RX ORDER — ALBUTEROL SULFATE 0.83 MG/ML
2.5 SOLUTION RESPIRATORY (INHALATION)
Status: CANCELLED | OUTPATIENT
Start: 2018-02-28

## 2018-02-25 RX ORDER — EPINEPHRINE 1 MG/ML
0.3 INJECTION, SOLUTION, CONCENTRATE INTRAVENOUS EVERY 5 MIN PRN
Status: CANCELLED | OUTPATIENT
Start: 2018-03-14

## 2018-02-25 RX ORDER — ALBUTEROL SULFATE 90 UG/1
1-2 AEROSOL, METERED RESPIRATORY (INHALATION)
Status: CANCELLED
Start: 2018-02-28

## 2018-02-25 RX ORDER — ALBUTEROL SULFATE 0.83 MG/ML
2.5 SOLUTION RESPIRATORY (INHALATION)
Status: CANCELLED | OUTPATIENT
Start: 2018-03-14

## 2018-02-25 RX ORDER — LORAZEPAM 2 MG/ML
0.5 INJECTION INTRAMUSCULAR EVERY 4 HOURS PRN
Status: CANCELLED
Start: 2018-02-28

## 2018-02-25 RX ORDER — ALBUTEROL SULFATE 90 UG/1
1-2 AEROSOL, METERED RESPIRATORY (INHALATION)
Status: CANCELLED
Start: 2018-03-14

## 2018-02-25 RX ORDER — MEPERIDINE HYDROCHLORIDE 25 MG/ML
25 INJECTION INTRAMUSCULAR; INTRAVENOUS; SUBCUTANEOUS EVERY 30 MIN PRN
Status: CANCELLED | OUTPATIENT
Start: 2018-03-14

## 2018-02-25 RX ORDER — HEPARIN SODIUM (PORCINE) LOCK FLUSH IV SOLN 100 UNIT/ML 100 UNIT/ML
5 SOLUTION INTRAVENOUS EVERY 8 HOURS
Status: CANCELLED
Start: 2018-02-28

## 2018-02-25 RX ORDER — DIPHENHYDRAMINE HYDROCHLORIDE 50 MG/ML
50 INJECTION INTRAMUSCULAR; INTRAVENOUS
Status: CANCELLED
Start: 2018-03-14

## 2018-02-25 RX ORDER — HEPARIN SODIUM (PORCINE) LOCK FLUSH IV SOLN 100 UNIT/ML 100 UNIT/ML
5 SOLUTION INTRAVENOUS EVERY 8 HOURS
Status: CANCELLED
Start: 2018-03-14

## 2018-02-25 RX ORDER — SODIUM CHLORIDE 9 MG/ML
1000 INJECTION, SOLUTION INTRAVENOUS CONTINUOUS PRN
Status: CANCELLED
Start: 2018-02-28

## 2018-02-25 RX ORDER — MEPERIDINE HYDROCHLORIDE 25 MG/ML
25 INJECTION INTRAMUSCULAR; INTRAVENOUS; SUBCUTANEOUS EVERY 30 MIN PRN
Status: CANCELLED | OUTPATIENT
Start: 2018-02-28

## 2018-02-25 RX ORDER — SODIUM CHLORIDE 9 MG/ML
1000 INJECTION, SOLUTION INTRAVENOUS CONTINUOUS PRN
Status: CANCELLED
Start: 2018-03-14

## 2018-02-25 RX ORDER — EPINEPHRINE 1 MG/ML
0.3 INJECTION, SOLUTION, CONCENTRATE INTRAVENOUS EVERY 5 MIN PRN
Status: CANCELLED | OUTPATIENT
Start: 2018-02-28

## 2018-02-25 RX ORDER — METHYLPREDNISOLONE SODIUM SUCCINATE 125 MG/2ML
125 INJECTION, POWDER, LYOPHILIZED, FOR SOLUTION INTRAMUSCULAR; INTRAVENOUS
Status: CANCELLED
Start: 2018-02-28

## 2018-02-25 RX ORDER — ONDANSETRON 2 MG/ML
8 INJECTION INTRAMUSCULAR; INTRAVENOUS ONCE
Status: CANCELLED
Start: 2018-03-14 | End: 2018-03-14

## 2018-02-25 RX ORDER — METHYLPREDNISOLONE SODIUM SUCCINATE 125 MG/2ML
125 INJECTION, POWDER, LYOPHILIZED, FOR SOLUTION INTRAMUSCULAR; INTRAVENOUS
Status: CANCELLED
Start: 2018-03-14

## 2018-02-28 ENCOUNTER — APPOINTMENT (OUTPATIENT)
Dept: LAB | Facility: CLINIC | Age: 63
End: 2018-02-28
Attending: INTERNAL MEDICINE
Payer: COMMERCIAL

## 2018-02-28 ENCOUNTER — INFUSION THERAPY VISIT (OUTPATIENT)
Dept: ONCOLOGY | Facility: CLINIC | Age: 63
End: 2018-02-28
Attending: INTERNAL MEDICINE
Payer: COMMERCIAL

## 2018-02-28 VITALS
HEART RATE: 93 BPM | BODY MASS INDEX: 33.16 KG/M2 | DIASTOLIC BLOOD PRESSURE: 82 MMHG | OXYGEN SATURATION: 96 % | WEIGHT: 231.1 LBS | SYSTOLIC BLOOD PRESSURE: 131 MMHG | RESPIRATION RATE: 18 BRPM | TEMPERATURE: 98.3 F

## 2018-02-28 DIAGNOSIS — C22.1 CHOLANGIOCARCINOMA (H): Primary | ICD-10-CM

## 2018-02-28 LAB
ALBUMIN SERPL-MCNC: 3.3 G/DL (ref 3.4–5)
ALP SERPL-CCNC: 66 U/L (ref 40–150)
ALT SERPL W P-5'-P-CCNC: 28 U/L (ref 0–70)
ANION GAP SERPL CALCULATED.3IONS-SCNC: 9 MMOL/L (ref 3–14)
AST SERPL W P-5'-P-CCNC: 17 U/L (ref 0–45)
BASOPHILS # BLD AUTO: 0 10E9/L (ref 0–0.2)
BASOPHILS NFR BLD AUTO: 0.4 %
BILIRUB SERPL-MCNC: 0.5 MG/DL (ref 0.2–1.3)
BUN SERPL-MCNC: 13 MG/DL (ref 7–30)
CALCIUM SERPL-MCNC: 8.1 MG/DL (ref 8.5–10.1)
CHLORIDE SERPL-SCNC: 113 MMOL/L (ref 94–109)
CO2 SERPL-SCNC: 24 MMOL/L (ref 20–32)
CREAT SERPL-MCNC: 1 MG/DL (ref 0.66–1.25)
DIFFERENTIAL METHOD BLD: ABNORMAL
EOSINOPHIL # BLD AUTO: 0 10E9/L (ref 0–0.7)
EOSINOPHIL NFR BLD AUTO: 0.8 %
ERYTHROCYTE [DISTWIDTH] IN BLOOD BY AUTOMATED COUNT: 18.6 % (ref 10–15)
GFR SERPL CREATININE-BSD FRML MDRD: 76 ML/MIN/1.7M2
GLUCOSE SERPL-MCNC: 109 MG/DL (ref 70–99)
HCT VFR BLD AUTO: 33.2 % (ref 40–53)
HGB BLD-MCNC: 11.1 G/DL (ref 13.3–17.7)
IMM GRANULOCYTES # BLD: 0 10E9/L (ref 0–0.4)
IMM GRANULOCYTES NFR BLD: 0.2 %
LYMPHOCYTES # BLD AUTO: 1.2 10E9/L (ref 0.8–5.3)
LYMPHOCYTES NFR BLD AUTO: 23.7 %
MAGNESIUM SERPL-MCNC: 1.8 MG/DL (ref 1.6–2.3)
MCH RBC QN AUTO: 32.6 PG (ref 26.5–33)
MCHC RBC AUTO-ENTMCNC: 33.4 G/DL (ref 31.5–36.5)
MCV RBC AUTO: 98 FL (ref 78–100)
MONOCYTES # BLD AUTO: 0.7 10E9/L (ref 0–1.3)
MONOCYTES NFR BLD AUTO: 14 %
NEUTROPHILS # BLD AUTO: 3 10E9/L (ref 1.6–8.3)
NEUTROPHILS NFR BLD AUTO: 60.9 %
NRBC # BLD AUTO: 0 10*3/UL
NRBC BLD AUTO-RTO: 0 /100
PLATELET # BLD AUTO: 92 10E9/L (ref 150–450)
POTASSIUM SERPL-SCNC: 4.1 MMOL/L (ref 3.4–5.3)
PROT SERPL-MCNC: 6 G/DL (ref 6.8–8.8)
RBC # BLD AUTO: 3.4 10E12/L (ref 4.4–5.9)
SODIUM SERPL-SCNC: 146 MMOL/L (ref 133–144)
WBC # BLD AUTO: 4.9 10E9/L (ref 4–11)

## 2018-02-28 PROCEDURE — 85025 COMPLETE CBC W/AUTO DIFF WBC: CPT | Performed by: INTERNAL MEDICINE

## 2018-02-28 PROCEDURE — 96367 TX/PROPH/DG ADDL SEQ IV INF: CPT

## 2018-02-28 PROCEDURE — 83735 ASSAY OF MAGNESIUM: CPT | Performed by: INTERNAL MEDICINE

## 2018-02-28 PROCEDURE — 96375 TX/PRO/DX INJ NEW DRUG ADDON: CPT

## 2018-02-28 PROCEDURE — 25000128 H RX IP 250 OP 636: Mod: ZF | Performed by: INTERNAL MEDICINE

## 2018-02-28 PROCEDURE — 96417 CHEMO IV INFUS EACH ADDL SEQ: CPT

## 2018-02-28 PROCEDURE — 96413 CHEMO IV INFUSION 1 HR: CPT

## 2018-02-28 PROCEDURE — 80053 COMPREHEN METABOLIC PANEL: CPT | Performed by: INTERNAL MEDICINE

## 2018-02-28 RX ORDER — HEPARIN SODIUM (PORCINE) LOCK FLUSH IV SOLN 100 UNIT/ML 100 UNIT/ML
5 SOLUTION INTRAVENOUS EVERY 8 HOURS
Status: DISCONTINUED | OUTPATIENT
Start: 2018-02-28 | End: 2018-02-28 | Stop reason: HOSPADM

## 2018-02-28 RX ORDER — ONDANSETRON 2 MG/ML
8 INJECTION INTRAMUSCULAR; INTRAVENOUS ONCE
Status: COMPLETED | OUTPATIENT
Start: 2018-02-28 | End: 2018-02-28

## 2018-02-28 RX ADMIN — MAGNESIUM SULFATE HEPTAHYDRATE: 500 INJECTION, SOLUTION INTRAMUSCULAR; INTRAVENOUS at 07:45

## 2018-02-28 RX ADMIN — GEMCITABINE 2200 MG: 38 INJECTION, SOLUTION INTRAVENOUS at 09:26

## 2018-02-28 RX ADMIN — DEXAMETHASONE SODIUM PHOSPHATE: 10 INJECTION, SOLUTION INTRAMUSCULAR; INTRAVENOUS at 07:36

## 2018-02-28 RX ADMIN — CISPLATIN 60 MG: 1 INJECTION, SOLUTION INTRAVENOUS at 08:22

## 2018-02-28 RX ADMIN — ONDANSETRON 8 MG: 2 INJECTION INTRAMUSCULAR; INTRAVENOUS at 07:42

## 2018-02-28 RX ADMIN — SODIUM CHLORIDE 250 ML: 9 INJECTION, SOLUTION INTRAVENOUS at 07:34

## 2018-02-28 RX ADMIN — SODIUM CHLORIDE, PRESERVATIVE FREE 5 ML: 5 INJECTION INTRAVENOUS at 10:00

## 2018-02-28 ASSESSMENT — PAIN SCALES - GENERAL: PAINLEVEL: NO PAIN (0)

## 2018-02-28 NOTE — PATIENT INSTRUCTIONS
Contact Numbers  [facility]: [phone]  Triage: [phone] (Monday-Friday 8-4:30)  After Hours:  [phone]    Please call the [facility] Triage line if you experience a temperature greater than or equal to 100.4, shaking chills, have uncontrolled nausea, vomiting and/or diarrhea, dizziness, shortness of breath, chest pain, bleeding, unexplained bruising, or if you have any other new/concerning symptoms, questions or concerns.     If it is after hours, weekends, or holidays, please call [phone] to speak to someone regarding your questions or concerns.    If you are having any concerning symptoms or wish to speak to a provider before your next infusion visit, please call your care coordinator or triage to notify them so we can adequately serve you.     If you need a refill on a narcotic prescription or other medication, please call triage before your infusion appointment.             February 2018 Sunday Monday Tuesday Wednesday Thursday Friday Saturday                       1     2     3       4     5     6     7     8     9     10       11     12     13     14     [facility]    7:30 AM   (15 min.)   [facility]   [facility] Lab Draw     [facility] ONC INFUSION 360    8:00 AM   (360 min.)    ONCOLOGY INFUSION   [facility] 15     16     17       18     19     20     21     22     23  Happy Birthday!     24       25     26     27     28     [facility]    6:30 AM   (15 min.)    [facility]   [facility]     [facility] ONC INFUSION 360    7:00 AM   (360 min.)    ONCOLOGY INFUSION   [facility]                           March 2018 Sunday Monday Tuesday Wednesday Thursday Friday Saturday                       1     2     3       4     5     6     7     8     9     10       11     12     13     14     [facility]    6:30 AM   (15 min.)    [facility] LAB DRAW   [facility] Lab Draw     [facility] ONC INFUSION 360     7:00 AM   (360 min.)    ONCOLOGY INFUSION   [facility] 15     16     17       18     19     20     21     22     23     [facility] LAB DRAW    6:30 AM   (15 min.)   [facility] DRAW   [facility] Lab Draw     CT CHEST ABDOMEN PELVIS WWO    6:45 AM   (20 min.)   UCCT1   [facility] CT 24       25     26     [facility] RETURN    9:00 AM   (30 min.)   Naresh De Los Santos MD   [facility] 27     28     [facility] LAB DRAW    6:30 AM   (15 min.)   [facility]   [facility] Lab Draw     [facility] ONC INFUSION 360    7:00 AM   (360 min.)    ONCOLOGY INFUSION   [facility] 29     30     31                  Lab Results:  Recent Results (from the past 12 hour(s))   CBC with platelets differential    Collection Time: 02/28/18  6:42 AM   Result Value Ref Range    WBC 4.9 4.0 - 11.0 10e9/L    RBC Count 3.40 (L) 4.4 - 5.9 10e12/L    Hemoglobin 11.1 (L) 13.3 - 17.7 g/dL    Hematocrit 33.2 (L) 40.0 - 53.0 %    MCV 98 78 - 100 fl    MCH 32.6 26.5 - 33.0 pg    MCHC 33.4 31.5 - 36.5 g/dL    RDW 18.6 (H) 10.0 - 15.0 %    Platelet Count 92 (L) 150 - 450 10e9/L    Diff Method Automated Method     % Neutrophils 60.9 %    % Lymphocytes 23.7 %    % Monocytes 14.0 %    % Eosinophils 0.8 %    % Basophils 0.4 %    % Immature Granulocytes 0.2 %    Nucleated RBCs 0 0 /100    Absolute Neutrophil 3.0 1.6 - 8.3 10e9/L    Absolute Lymphocytes 1.2 0.8 - 5.3 10e9/L    Absolute Monocytes 0.7 0.0 - 1.3 10e9/L    Absolute Eosinophils 0.0 0.0 - 0.7 10e9/L    Absolute Basophils 0.0 0.0 - 0.2 10e9/L    Abs Immature Granulocytes 0.0 0 - 0.4 10e9/L    Absolute Nucleated RBC 0.0    Comprehensive metabolic panel    Collection Time: 02/28/18  6:42 AM   Result Value Ref Range    Sodium 146 (H) 133 - 144 mmol/L    Potassium 4.1 3.4 - 5.3 mmol/L    Chloride 113 (H) 94 - 109 mmol/L    Carbon Dioxide 24 20 - 32 mmol/L    Anion Gap 9 3 - 14 mmol/L    Glucose 109 (H) 70 - 99 mg/dL     Urea Nitrogen 13 7 - 30 mg/dL    Creatinine 1.00 0.66 - 1.25 mg/dL    GFR Estimate 76 >60 mL/min/1.7m2    GFR Estimate If Black >90 >60 mL/min/1.7m2    Calcium 8.1 (L) 8.5 - 10.1 mg/dL    Bilirubin Total 0.5 0.2 - 1.3 mg/dL    Albumin 3.3 (L) 3.4 - 5.0 g/dL    Protein Total 6.0 (L) 6.8 - 8.8 g/dL    Alkaline Phosphatase 66 40 - 150 U/L    ALT 28 0 - 70 U/L    AST 17 0 - 45 U/L   Magnesium    Collection Time: 02/28/18  6:42 AM   Result Value Ref Range    Magnesium 1.8 1.6 - 2.3 mg/dL

## 2018-02-28 NOTE — MR AVS SNAPSHOT
After Visit Summary   2/28/2018    Girish Chauhan    MRN: [id number]           Patient Information     Date Of Birth          1955        Visit Information        Provider Department      2/28/2018 7:00 AM  11 ATC;  ONCOLOGY INFUSION [facility]        Today's Diagnoses     Cholangiocarcinoma (H)    -  1      Care Instructions    Contact Numbers  [facility]: [phone]  Triage: [phone] (Monday-Friday 8-4:30)  After Hours:  [phone]    Please call the [facility] Triage line if you experience a temperature greater than or equal to 100.4, shaking chills, have uncontrolled nausea, vomiting and/or diarrhea, dizziness, shortness of breath, chest pain, bleeding, unexplained bruising, or if you have any other new/concerning symptoms, questions or concerns.     If it is after hours, weekends, or holidays, please call [phone] to speak to someone regarding your questions or concerns.    If you are having any concerning symptoms or wish to speak to a provider before your next infusion visit, please call your care coordinator or triage to notify them so we can adequately serve you.     If you need a refill on a narcotic prescription or other medication, please call triage before your infusion appointment.             February 2018 Sunday Monday Tuesday Wednesday Thursday Friday Saturday                       1     2     3       4     5     6     7     8     9     10       11     12     13     14     [facility] DRAW    7:30 AM   (15 min.)    [facility] LAB DRAW   [facility] Lab Draw     [facility] ONC INFUSION 360    8:00 AM   (360 min.)    ONCOLOGY INFUSION   [facility] 15     16     17       18     19     20     21     22     23  Happy Birthday!     24       25     26     27     28     [facility] DRAW    6:30 AM   (15 min.)    [facility]   [facility]     [facility] ONC INFUSION 360    7:00 AM   (360 min.)    ONCOLOGY  INFUSION   [facility]                           March 2018 Sunday Monday Tuesday Wednesday Thursday Friday Saturday                       1     2     3       4     5     6     7     8     9     10       11     12     13     14     [facility]    6:30 AM   (15 min.)    [facility] LAB DRAW   [facility] Lab Draw     [facility] ONC INFUSION 360    7:00 AM   (360 min.)    ONCOLOGY INFUSION   [facility] 15     16     17       18     19     20     21     22     23     [facility]    6:30 AM   (15 min.)    [facility]   [facility]     CT CHEST ABDOMEN PELVIS WWO    6:45 AM   (20 min.)   UCCT1   [facility] CT 24       25     26     [facility] RETURN    9:00 AM   (30 min.)   Naresh De Los Santos MD   [facility] 27     28     [facility] DRAW    6:30 AM   (15 min.)    [facility] LAB DRAW   [facility] Lab Draw     [facility] ONC INFUSION 360    7:00 AM   (360 min.)    ONCOLOGY INFUSION   [facility] 29     30     31                  Lab Results:  Recent Results (from the past 12 hour(s))   CBC with platelets differential    Collection Time: 02/28/18  6:42 AM   Result Value Ref Range    WBC 4.9 4.0 - 11.0 10e9/L    RBC Count 3.40 (L) 4.4 - 5.9 10e12/L    Hemoglobin 11.1 (L) 13.3 - 17.7 g/dL    Hematocrit 33.2 (L) 40.0 - 53.0 %    MCV 98 78 - 100 fl    MCH 32.6 26.5 - 33.0 pg    MCHC 33.4 31.5 - 36.5 g/dL    RDW 18.6 (H) 10.0 - 15.0 %    Platelet Count 92 (L) 150 - 450 10e9/L    Diff Method Automated Method     % Neutrophils 60.9 %    % Lymphocytes 23.7 %    % Monocytes 14.0 %    % Eosinophils 0.8 %    % Basophils 0.4 %    % Immature Granulocytes 0.2 %    Nucleated RBCs 0 0 /100    Absolute Neutrophil 3.0 1.6 - 8.3 10e9/L    Absolute Lymphocytes 1.2 0.8 - 5.3 10e9/L    Absolute Monocytes 0.7 0.0 - 1.3 10e9/L    Absolute Eosinophils 0.0 0.0 - 0.7 10e9/L    Absolute Basophils 0.0 0.0 - 0.2 10e9/L     Abs Immature Granulocytes 0.0 0 - 0.4 10e9/L    Absolute Nucleated RBC 0.0    Comprehensive metabolic panel    Collection Time: 02/28/18  6:42 AM   Result Value Ref Range    Sodium 146 (H) 133 - 144 mmol/L    Potassium 4.1 3.4 - 5.3 mmol/L    Chloride 113 (H) 94 - 109 mmol/L    Carbon Dioxide 24 20 - 32 mmol/L    Anion Gap 9 3 - 14 mmol/L    Glucose 109 (H) 70 - 99 mg/dL    Urea Nitrogen 13 7 - 30 mg/dL    Creatinine 1.00 0.66 - 1.25 mg/dL    GFR Estimate 76 >60 mL/min/1.7m2    GFR Estimate If Black >90 >60 mL/min/1.7m2    Calcium 8.1 (L) 8.5 - 10.1 mg/dL    Bilirubin Total 0.5 0.2 - 1.3 mg/dL    Albumin 3.3 (L) 3.4 - 5.0 g/dL    Protein Total 6.0 (L) 6.8 - 8.8 g/dL    Alkaline Phosphatase 66 40 - 150 U/L    ALT 28 0 - 70 U/L    AST 17 0 - 45 U/L   Magnesium    Collection Time: 02/28/18  6:42 AM   Result Value Ref Range    Magnesium 1.8 1.6 - 2.3 mg/dL              Follow-ups after your visit        Your next 10 appointments already scheduled     Mar 14, 2018  6:30 AM CDT   [facility] Lab Draw with  [facility] LAB DRAW   [facility] ([facility])    [address] [place] [address]  [address]  [facility] [address]   [phone]            Mar 14, 2018  7:00 AM CDT   Infusion 360 with  ONCOLOGY INFUSION, UC 10 ATC   [facility] ([facility])    [address] [place] [address]  [address]  [facility] [address]   [phone]            Mar 23, 2018  6:30 AM CDT   [facility] Lab Draw with  MASONIC LAB DRAW   [facility] Mason[facility] Lab Draw ([facility])    [address] [place] [address]  [address]  [facility] [address]   [phone]            Mar 23, 2018  7:00 AM CDT   (Arrive by 6:45 AM)   CT CHEST ABDOMEN PELVIS W/O & W CONTRAST with UCCT1   [facility] CT ([facility])    [address]  1st Floor  [facility] [address] [phone]           Please bring any scans or X-rays taken at other  [facility], if similar tests were done. Also bring a list of your medicines, including vitamins, minerals and over-the-counter drugs. It is safest to leave personal items at home.  Be sure to tell your doctor:   If you have any allergies.   If there s any chance you are pregnant.   If you are breastfeeding.  You may have contrast for this exam. To prepare:   Do not eat or drink for 2 hours before your exam. If you need to take medicine, you may take it with small sips of water. (We may ask you to take liquid medicine as well.)   The day before your exam, drink extra fluids at least six 8-ounce glasses (unless your doctor tells you to restrict your fluids).   You will be given instructions on how to drink the contrast.  Patients over 70 or patients with diabetes or kidney problems:   If you haven t had a blood test (creatinine test) within the last 30 days, the Cardiologist/Radiologist may require you to get this test prior to your exam.  If you have diabetes:   Continue to take your metformin medication on the day of your exam  Please wear loose clothing, such as a sweat suit or jogging clothes. Avoid snaps, zippers and other metal. We may ask you to undress and put on a hospital gown.  If you have any questions, please call the Imaging Department where you will have your exam.            Mar 26, 2018  9:15 AM CDT   (Arrive by 9:00 AM)   Return Visit with Naresh De Los Santos MD   [facility] ([facility])    [address] [place] [address]  [address]  [facility] [address]   [phone]            Mar 28, 2018  6:30 AM CDT   [facility] Lab Draw with [facility] LAB DRAW   [facility] Lab Draw ([facility])    [address] [place] [address]  [address]  [facility] [address]   [phone]            Mar 28, 2018  7:00 AM CDT   Infusion 360 with  ONCOLOGY INFUSION, UC 10 ATC   [facility] ([facility])    [address] [place] [address]  [address]  [address]  [facility] [address] [phone]              Who to contact     If you have questions or need follow up information about today's clinic visit or your schedule please contact [facility] directly at [phone].  Normal or non-critical lab and imaging results will be communicated to you by MyChart, letter or phone within 4 business days after the clinic has received the results. If you do not hear from us within 7 days, please contact the clinic through [employer]Bomb[employer] or phone. If you have a critical or abnormal lab result, we will notify you by phone as soon as possible.  Submit refill requests through [employer] or call your pharmacy and they will forward the refill request to us. Please allow 3 business days for your refill to be completed.          Additional Information About Your Visit        Bomb[employer]hart Information     [employer] gives you secure access to your electronic health record. If you see a primary care provider, you can also send messages to your care team and make appointments. If you have questions, please call your primary care clinic.  If you do not have a primary care provider, please call [phone] and they will assist you.        Care EveryWhere ID     This is your Care EveryWhere ID. This could be used by other organizations to access your [place] medical records  [id number]        Your Vitals Were     Pulse Temperature Respirations Pulse Oximetry BMI (Body Mass Index)       93 98.3  F (36.8  C) (Oral) 18 96% 33.16 kg/m2        Blood Pressure from Last 3 Encounters:   02/28/18 131/82   02/14/18 128/84   01/31/18 121/83    Weight from Last 3 Encounters:   02/28/18 104.8 kg (231 lb 1.6 oz)   02/14/18 104.8 kg (231 lb)   01/31/18 103.9 kg (229 lb 1.6 oz)              We Performed the Following     CBC with platelets differential     Comprehensive metabolic panel     Magnesium        Primary Care Provider Office Phone # Fax #    Jim Kaplan MD  [phone]       [facility] [address]   [facility] [address]        Equal Access to Services     MARIUSZ MADRIGAL : Hadii aad ku hadyessicanaina Brizuela, charleeadenike backjyotiha, lb kasofiada rubina, ankit rodríguezin hayaan eduardaevan srivastava kwabena gross. So [facility] [phone].    ATENCIÓN: Si habla español, tiene a flores disposición servicios gratuitos de asistencia lingüística. Llame al [phone].    We comply with applicable federal civil rights laws and [place] laws. We do not discriminate on the basis of race, color, national origin, age, disability, sex, sexual orientation, or gender identity.            Thank you!     Thank you for choosing [facility]  for your care. Our goal is always to provide you with excellent care. Hearing back from our patients is one way we can continue to improve our services. Please take a few minutes to complete the written survey that you may receive in the mail after your visit with us. Thank you!             Your Updated Medication List - Protect others around you: Learn how to safely use, store and throw away your medicines at www.disposemymeds.org.          This list is accurate as of 2/28/18 10:08 AM.  Always use your most recent med list.                   Brand Name Dispense Instructions for use Diagnosis    atorvastatin 40 MG tablet    LIPITOR     TAKE 1 TABLET BY MOUTH DAILY        ELIQUIS PO      Take 5 mg by mouth 2 times daily        LORazepam 0.5 MG tablet    ATIVAN    30 tablet    Take 1 tablet (0.5 mg) by mouth every 4 hours as needed (Anxiety, Nausea/Vomiting or Sleep)    Cholangiocarcinoma (H)       metoprolol tartrate 50 MG tablet    LOPRESSOR     50 mg 2 times daily        MITIGARE 0.6 MG Caps   Generic drug:  Colchicine      Take 0.6 mg by mouth 3 times daily as needed        multivitamin, therapeutic with minerals Tabs tablet     30 tablet    Take 1 tablet by mouth daily    Mass of bile duct       NITROSTAT SL      Place 0.4  mg under the tongue every 5 minutes as needed for chest pain (Carries medication - has never used)        OMEGA-3 FISH OIL PO      Take 1,000 mg by mouth daily        ondansetron 8 MG tablet    ZOFRAN    10 tablet    Take 1 tablet (8 mg) by mouth every 8 hours as needed (Nausea/Vomiting)    Cholangiocarcinoma (H)       prochlorperazine 10 MG tablet    COMPAZINE    30 tablet    Take 1 tablet (10 mg) by mouth every 6 hours as needed (Nausea/Vomiting)    Cholangiocarcinoma (H)

## 2018-02-28 NOTE — PROGRESS NOTES
Infusion Nursing Note:  Girish Chauhan presents today for Day 1 Cycle 4 Cisplatin and Gemzar.    Patient seen by provider today: No   present during visit today: Not Applicable.    Note: Patient recently returned from vacation in [place].  He is feeling well today.    Intravenous Access:  Implanted Port.    Treatment Conditions:  Lab Results   Component Value Date    HGB 11.1 02/28/2018     Lab Results   Component Value Date    WBC 4.9 02/28/2018      Lab Results   Component Value Date    ANEU 3.0 02/28/2018     Lab Results   Component Value Date    PLT 92 02/28/2018      Lab Results   Component Value Date     02/28/2018                   Lab Results   Component Value Date    POTASSIUM 4.1 02/28/2018           Lab Results   Component Value Date    MAG 1.8 02/28/2018            Lab Results   Component Value Date    CR 1.00 02/28/2018                   Lab Results   Component Value Date    GARY 8.1 02/28/2018                Lab Results   Component Value Date    BILITOTAL 0.5 02/28/2018           Lab Results   Component Value Date    ALBUMIN 3.3 02/28/2018                    Lab Results   Component Value Date    ALT 28 02/28/2018           Lab Results   Component Value Date    AST 17 02/28/2018       Results reviewed, labs MET treatment parameters, ok to proceed with treatment.      Post Infusion Assessment:  Patient tolerated infusion without incident.  Blood return noted pre and post infusion.  Site patent and intact, free from redness, edema or discomfort.  No evidence of extravasations.  Access discontinued per protocol.  Patient voided throughout infusion.    Discharge Plan:   Patient declined prescription refills.  Discharge instructions reviewed with: Patient.  Patient and/or family verbalized understanding of discharge instructions and all questions answered.  AVS to patient via In[employer] Sent[employer].  Patient will return 3/14/18 for next appointment.   Patient discharged in stable condition accompanied by:  self.  Departure Mode: Ambulatory.    Sarah Mcgee RN

## 2018-03-14 ENCOUNTER — APPOINTMENT (OUTPATIENT)
Dept: LAB | Facility: CLINIC | Age: 63
End: 2018-03-14
Attending: INTERNAL MEDICINE
Payer: COMMERCIAL

## 2018-03-14 ENCOUNTER — INFUSION THERAPY VISIT (OUTPATIENT)
Dept: ONCOLOGY | Facility: CLINIC | Age: 63
End: 2018-03-14
Attending: INTERNAL MEDICINE
Payer: COMMERCIAL

## 2018-03-14 VITALS
SYSTOLIC BLOOD PRESSURE: 109 MMHG | RESPIRATION RATE: 20 BRPM | TEMPERATURE: 97.5 F | OXYGEN SATURATION: 95 % | WEIGHT: 228.7 LBS | DIASTOLIC BLOOD PRESSURE: 68 MMHG | BODY MASS INDEX: 32.82 KG/M2 | HEART RATE: 95 BPM

## 2018-03-14 DIAGNOSIS — C22.1 CHOLANGIOCARCINOMA (H): Primary | ICD-10-CM

## 2018-03-14 LAB
ALBUMIN SERPL-MCNC: 3.6 G/DL (ref 3.4–5)
ALP SERPL-CCNC: 64 U/L (ref 40–150)
ALT SERPL W P-5'-P-CCNC: 30 U/L (ref 0–70)
ANION GAP SERPL CALCULATED.3IONS-SCNC: 8 MMOL/L (ref 3–14)
AST SERPL W P-5'-P-CCNC: 18 U/L (ref 0–45)
BASOPHILS # BLD AUTO: 0 10E9/L (ref 0–0.2)
BASOPHILS NFR BLD AUTO: 0.4 %
BILIRUB SERPL-MCNC: 0.6 MG/DL (ref 0.2–1.3)
BUN SERPL-MCNC: 16 MG/DL (ref 7–30)
CALCIUM SERPL-MCNC: 8.6 MG/DL (ref 8.5–10.1)
CHLORIDE SERPL-SCNC: 109 MMOL/L (ref 94–109)
CO2 SERPL-SCNC: 24 MMOL/L (ref 20–32)
CREAT SERPL-MCNC: 0.87 MG/DL (ref 0.66–1.25)
DIFFERENTIAL METHOD BLD: ABNORMAL
EOSINOPHIL # BLD AUTO: 0.1 10E9/L (ref 0–0.7)
EOSINOPHIL NFR BLD AUTO: 1.5 %
ERYTHROCYTE [DISTWIDTH] IN BLOOD BY AUTOMATED COUNT: 18.8 % (ref 10–15)
GFR SERPL CREATININE-BSD FRML MDRD: 89 ML/MIN/1.7M2
GLUCOSE SERPL-MCNC: 117 MG/DL (ref 70–99)
HCT VFR BLD AUTO: 34 % (ref 40–53)
HGB BLD-MCNC: 11.3 G/DL (ref 13.3–17.7)
IMM GRANULOCYTES # BLD: 0 10E9/L (ref 0–0.4)
IMM GRANULOCYTES NFR BLD: 0.2 %
LYMPHOCYTES # BLD AUTO: 1.6 10E9/L (ref 0.8–5.3)
LYMPHOCYTES NFR BLD AUTO: 34.2 %
MAGNESIUM SERPL-MCNC: 1.8 MG/DL (ref 1.6–2.3)
MCH RBC QN AUTO: 33.1 PG (ref 26.5–33)
MCHC RBC AUTO-ENTMCNC: 33.2 G/DL (ref 31.5–36.5)
MCV RBC AUTO: 100 FL (ref 78–100)
MONOCYTES # BLD AUTO: 0.7 10E9/L (ref 0–1.3)
MONOCYTES NFR BLD AUTO: 15.5 %
NEUTROPHILS # BLD AUTO: 2.2 10E9/L (ref 1.6–8.3)
NEUTROPHILS NFR BLD AUTO: 48.2 %
NRBC # BLD AUTO: 0 10*3/UL
NRBC BLD AUTO-RTO: 0 /100
PLATELET # BLD AUTO: 101 10E9/L (ref 150–450)
POTASSIUM SERPL-SCNC: 4.7 MMOL/L (ref 3.4–5.3)
PROT SERPL-MCNC: 6.5 G/DL (ref 6.8–8.8)
RBC # BLD AUTO: 3.41 10E12/L (ref 4.4–5.9)
SODIUM SERPL-SCNC: 140 MMOL/L (ref 133–144)
WBC # BLD AUTO: 4.6 10E9/L (ref 4–11)

## 2018-03-14 PROCEDURE — 96413 CHEMO IV INFUSION 1 HR: CPT

## 2018-03-14 PROCEDURE — 83735 ASSAY OF MAGNESIUM: CPT | Performed by: INTERNAL MEDICINE

## 2018-03-14 PROCEDURE — 80053 COMPREHEN METABOLIC PANEL: CPT | Performed by: INTERNAL MEDICINE

## 2018-03-14 PROCEDURE — 96367 TX/PROPH/DG ADDL SEQ IV INF: CPT

## 2018-03-14 PROCEDURE — 96375 TX/PRO/DX INJ NEW DRUG ADDON: CPT

## 2018-03-14 PROCEDURE — 96417 CHEMO IV INFUS EACH ADDL SEQ: CPT

## 2018-03-14 PROCEDURE — 85025 COMPLETE CBC W/AUTO DIFF WBC: CPT | Performed by: INTERNAL MEDICINE

## 2018-03-14 PROCEDURE — 96366 THER/PROPH/DIAG IV INF ADDON: CPT

## 2018-03-14 PROCEDURE — 25000128 H RX IP 250 OP 636: Mod: ZF | Performed by: INTERNAL MEDICINE

## 2018-03-14 RX ORDER — ONDANSETRON 2 MG/ML
8 INJECTION INTRAMUSCULAR; INTRAVENOUS ONCE
Status: COMPLETED | OUTPATIENT
Start: 2018-03-14 | End: 2018-03-14

## 2018-03-14 RX ORDER — HEPARIN SODIUM (PORCINE) LOCK FLUSH IV SOLN 100 UNIT/ML 100 UNIT/ML
5 SOLUTION INTRAVENOUS EVERY 8 HOURS
Status: DISCONTINUED | OUTPATIENT
Start: 2018-03-14 | End: 2018-03-14 | Stop reason: HOSPADM

## 2018-03-14 RX ADMIN — DEXAMETHASONE SODIUM PHOSPHATE: 10 INJECTION, SOLUTION INTRAMUSCULAR; INTRAVENOUS at 08:24

## 2018-03-14 RX ADMIN — GEMCITABINE 2200 MG: 38 INJECTION, SOLUTION INTRAVENOUS at 09:55

## 2018-03-14 RX ADMIN — ONDANSETRON 8 MG: 2 INJECTION INTRAMUSCULAR; INTRAVENOUS at 08:21

## 2018-03-14 RX ADMIN — SODIUM CHLORIDE, PRESERVATIVE FREE 5 ML: 5 INJECTION INTRAVENOUS at 06:40

## 2018-03-14 RX ADMIN — MAGNESIUM SULFATE HEPTAHYDRATE: 500 INJECTION, SOLUTION INTRAMUSCULAR; INTRAVENOUS at 07:36

## 2018-03-14 RX ADMIN — SODIUM CHLORIDE, PRESERVATIVE FREE 5 ML: 5 INJECTION INTRAVENOUS at 10:49

## 2018-03-14 RX ADMIN — CISPLATIN 60 MG: 1 INJECTION, SOLUTION INTRAVENOUS at 08:54

## 2018-03-14 ASSESSMENT — PAIN SCALES - GENERAL: PAINLEVEL: NO PAIN (0)

## 2018-03-14 NOTE — PROGRESS NOTES
Infusion Nursing Note:  Girish Chauhan presents today for cycle 4, day 15 Cisplatin and Gemzar.    Patient seen by provider today: No   present during visit today: Not Applicable.    Note: Patient arrives to infusion center with complaints of mild fatigue and weakness. Denies complaints of chest pain, palpitations, dyspnea, nausea, constipation, diarrhea, numbness and tingling, and mouth sores.    Intravenous Access:  Implanted Port.    Treatment Conditions:  Lab Results   Component Value Date    HGB 11.3 03/14/2018     Lab Results   Component Value Date    WBC 4.6 03/14/2018      Lab Results   Component Value Date    ANEU 2.2 03/14/2018     Lab Results   Component Value Date     03/14/2018      Lab Results   Component Value Date     03/14/2018                   Lab Results   Component Value Date    POTASSIUM 4.7 03/14/2018           Lab Results   Component Value Date    MAG 1.8 03/14/2018            Lab Results   Component Value Date    CR 0.87 03/14/2018                   Lab Results   Component Value Date    GARY 8.6 03/14/2018                Lab Results   Component Value Date    BILITOTAL 0.6 03/14/2018           Lab Results   Component Value Date    ALBUMIN 3.6 03/14/2018                    Lab Results   Component Value Date    ALT 30 03/14/2018           Lab Results   Component Value Date    AST 18 03/14/2018     Results reviewed, labs MET treatment parameters, ok to proceed with treatment.    Post Infusion Assessment:  Patient tolerated infusion without incident.  Blood return noted pre and post infusion.  Site patent and intact, free from redness, edema or discomfort.  No evidence of extravasations.  Access discontinued per protocol.    Discharge Plan:   Patient declined prescription refills.  Discharge instructions reviewed with: Patient.  Patient and/or family verbalized understanding of discharge instructions and all questions answered.  Copy of AVS reviewed with patient and/or  family.  Patient will return 3/28/2018 for next appointment.  Patient discharged in stable condition accompanied by: self.  Departure Mode: Ambulatory.    Justus Morel RN

## 2018-03-14 NOTE — PATIENT INSTRUCTIONS
Contact Numbers    [facility] Main Line: [phone]  [facility] Triage:  [phone]    Call triage with chills and/or temperature greater than or equal to 100.5, uncontrolled nausea/vomiting, diarrhea, constipation, dizziness, shortness of breath, chest pain, bleeding, unexplained bruising, or any new/concerning symptoms, questions/concerns.     If you are having any concerning symptoms or wish to speak to a provider before your next infusion visit, please call your care coordinator or triage to notify them so we can adequately serve you.       After Hours: [phone]    If after hours, weekends, or holidays, call main hospital  and ask for Oncology doctor on call.           March 2018 Sunday Monday Tuesday Wednesday Thursday Friday Saturday                       1     2     3       4     5     6     7     8     9     10       11     12     13     14     [facility]    6:30 AM   (15 min.)    [facility]   [facility]     [facility] ONC INFUSION 360    7:00 AM   (360 min.)    ONCOLOGY INFUSION   [facility] 15     16     17       18     19     20     21     22     23     [facility]    6:30 AM   (15 min.)    [facility]   [facility]     CT CHEST ABDOMEN PELVIS WWO    6:45 AM   (20 min.)   UCCT1   [facility] CT 24       25     26     [facility] RETURN    9:00 AM   (30 min.)   Naresh De Los Santos MD   [facility] 27     28     [facility]    6:30 AM   (15 min.)    [facility] DRAW   [facility] Draw     [facility] ONC INFUSION 360    7:00 AM   (360 min.)    ONCOLOGY INFUSION   [facility] 29     30     31 April 2018 Sunday Monday Tuesday Wednesday Thursday Friday Saturday   1     2     3     4     5     6     7       8     9     10     11     12     13     14       15     16     17     18     19     20     21       22     23     24     25     26     27     28        29     30                                           Lab Results:  Recent Results (from the past 12 hour(s))   CBC with platelets differential    Collection Time: 03/14/18  6:42 AM   Result Value Ref Range    WBC 4.6 4.0 - 11.0 10e9/L    RBC Count 3.41 (L) 4.4 - 5.9 10e12/L    Hemoglobin 11.3 (L) 13.3 - 17.7 g/dL    Hematocrit 34.0 (L) 40.0 - 53.0 %     78 - 100 fl    MCH 33.1 (H) 26.5 - 33.0 pg    MCHC 33.2 31.5 - 36.5 g/dL    RDW 18.8 (H) 10.0 - 15.0 %    Platelet Count 101 (L) 150 - 450 10e9/L    Diff Method Automated Method     % Neutrophils 48.2 %    % Lymphocytes 34.2 %    % Monocytes 15.5 %    % Eosinophils 1.5 %    % Basophils 0.4 %    % Immature Granulocytes 0.2 %    Nucleated RBCs 0 0 /100    Absolute Neutrophil 2.2 1.6 - 8.3 10e9/L    Absolute Lymphocytes 1.6 0.8 - 5.3 10e9/L    Absolute Monocytes 0.7 0.0 - 1.3 10e9/L    Absolute Eosinophils 0.1 0.0 - 0.7 10e9/L    Absolute Basophils 0.0 0.0 - 0.2 10e9/L    Abs Immature Granulocytes 0.0 0 - 0.4 10e9/L    Absolute Nucleated RBC 0.0    Comprehensive metabolic panel    Collection Time: 03/14/18  6:42 AM   Result Value Ref Range    Sodium 140 133 - 144 mmol/L    Potassium 4.7 3.4 - 5.3 mmol/L    Chloride 109 94 - 109 mmol/L    Carbon Dioxide 24 20 - 32 mmol/L    Anion Gap 8 3 - 14 mmol/L    Glucose 117 (H) 70 - 99 mg/dL    Urea Nitrogen 16 7 - 30 mg/dL    Creatinine 0.87 0.66 - 1.25 mg/dL    GFR Estimate 89 >60 mL/min/1.7m2    GFR Estimate If Black >90 >60 mL/min/1.7m2    Calcium 8.6 8.5 - 10.1 mg/dL    Bilirubin Total 0.6 0.2 - 1.3 mg/dL    Albumin 3.6 3.4 - 5.0 g/dL    Protein Total 6.5 (L) 6.8 - 8.8 g/dL    Alkaline Phosphatase 64 40 - 150 U/L    ALT 30 0 - 70 U/L    AST 18 0 - 45 U/L   Magnesium    Collection Time: 03/14/18  6:42 AM   Result Value Ref Range    Magnesium 1.8 1.6 - 2.3 mg/dL

## 2018-03-14 NOTE — MR AVS SNAPSHOT
After Visit Summary   3/14/2018    Girish Chauhan    MRN: [id number]           Patient Information     Date Of Birth          1955        Visit Information        Provider Department      3/14/2018 7:00 AM UC 10 ATC;  ONCOLOGY INFUSION [facility]        Today's Diagnoses     Cholangiocarcinoma (H)    -  1      Care Instructions    Contact Numbers    [facility] [place] Main Line: [phone]  [facility] Triage:  [phone]    Call triage with chills and/or temperature greater than or equal to 100.5, uncontrolled nausea/vomiting, diarrhea, constipation, dizziness, shortness of breath, chest pain, bleeding, unexplained bruising, or any new/concerning symptoms, questions/concerns.     If you are having any concerning symptoms or wish to speak to a provider before your next infusion visit, please call your care coordinator or triage to notify them so we can adequately serve you.       After Hours: [phone]    If after hours, weekends, or holidays, call main hospital  and ask for Oncology doctor on call.           March 2018 Sunday Monday Tuesday Wednesday Thursday Friday Saturday                       1     2     3       4     5     6     7     8     9     10       11     12     13     14     [facility]    6:30 AM   (15 min.)    [facility] DRAW   [facility] Lab Draw     P ONC INFUSION 360    7:00 AM   (360 min.)    ONCOLOGY INFUSION   [facility] 15     16     17       18     19     20     21     22     23     [facility]    6:30 AM   (15 min.)    [facility] DRAW   [facility] Lab Draw     CT CHEST ABDOMEN PELVIS WWO    6:45 AM   (20 min.)   UCCT1   [facility] CT 24       25     26     [facility] RETURN    9:00 AM   (30 min.)   Naresh De Los Santos MD   [facility] 27     28     [facility]    6:30 AM   (15 min.)    [facility] DRAW   [facility] Draw     [facility] ONC INFUSION 360     7:00 AM   (360 min.)    ONCOLOGY [facility] 29 30 31 April 2018 Sunday Monday Tuesday Wednesday Thursday Friday Saturday   1     2     3     4     5     6     7       8     9     10     11     12     13     14       15     16     17     18     19     20     21       22     23     24     25     26     27     28       29     30                                           Lab Results:  Recent Results (from the past 12 hour(s))   CBC with platelets differential    Collection Time: 03/14/18  6:42 AM   Result Value Ref Range    WBC 4.6 4.0 - 11.0 10e9/L    RBC Count 3.41 (L) 4.4 - 5.9 10e12/L    Hemoglobin 11.3 (L) 13.3 - 17.7 g/dL    Hematocrit 34.0 (L) 40.0 - 53.0 %     78 - 100 fl    MCH 33.1 (H) 26.5 - 33.0 pg    MCHC 33.2 31.5 - 36.5 g/dL    RDW 18.8 (H) 10.0 - 15.0 %    Platelet Count 101 (L) 150 - 450 10e9/L    Diff Method Automated Method     % Neutrophils 48.2 %    % Lymphocytes 34.2 %    % Monocytes 15.5 %    % Eosinophils 1.5 %    % Basophils 0.4 %    % Immature Granulocytes 0.2 %    Nucleated RBCs 0 0 /100    Absolute Neutrophil 2.2 1.6 - 8.3 10e9/L    Absolute Lymphocytes 1.6 0.8 - 5.3 10e9/L    Absolute Monocytes 0.7 0.0 - 1.3 10e9/L    Absolute Eosinophils 0.1 0.0 - 0.7 10e9/L    Absolute Basophils 0.0 0.0 - 0.2 10e9/L    Abs Immature Granulocytes 0.0 0 - 0.4 10e9/L    Absolute Nucleated RBC 0.0    Comprehensive metabolic panel    Collection Time: 03/14/18  6:42 AM   Result Value Ref Range    Sodium 140 133 - 144 mmol/L    Potassium 4.7 3.4 - 5.3 mmol/L    Chloride 109 94 - 109 mmol/L    Carbon Dioxide 24 20 - 32 mmol/L    Anion Gap 8 3 - 14 mmol/L    Glucose 117 (H) 70 - 99 mg/dL    Urea Nitrogen 16 7 - 30 mg/dL    Creatinine 0.87 0.66 - 1.25 mg/dL    GFR Estimate 89 >60 mL/min/1.7m2    GFR Estimate If Black >90 >60 mL/min/1.7m2    Calcium 8.6 8.5 - 10.1 mg/dL    Bilirubin Total 0.6 0.2 - 1.3 mg/dL    Albumin 3.6 3.4 - 5.0 g/dL    Protein Total  6.5 (L) 6.8 - 8.8 g/dL    Alkaline Phosphatase 64 40 - 150 U/L    ALT 30 0 - 70 U/L    AST 18 0 - 45 U/L   Magnesium    Collection Time: 03/14/18  6:42 AM   Result Value Ref Range    Magnesium 1.8 1.6 - 2.3 mg/dL               Follow-ups after your visit        Your next 10 appointments already scheduled     Mar 23, 2018  6:30 AM CDT   [facility] Draw with  [facility] DRAW   [facility] Draw ([facility])    [address]  [address]  [place] [facility] [id number] [phone]            Mar 23, 2018  7:00 AM CDT   (Arrive by 6:45 AM)   CT CHEST ABDOMEN PELVIS W/O & W CONTRAST with UCCT1   [facility] CT ([facility])    [address]  1st Floor  [place] [id number] [phone]           Please bring any scans or X-rays taken at other hospitals, if similar tests were done. Also bring a list of your medicines, including vitamins, minerals and over-the-counter drugs. It is safest to leave personal items at home.  Be sure to tell your doctor:   If you have any allergies.   If there s any chance you are pregnant.   If you are breastfeeding.  You may have contrast for this exam. To prepare:   Do not eat or drink for 2 hours before your exam. If you need to take medicine, you may take it with small sips of water. (We may ask you to take liquid medicine as well.)   The day before your exam, drink extra fluids at least six 8-ounce glasses (unless your doctor tells you to restrict your fluids).   You will be given instructions on how to drink the contrast.  Patients over 70 or patients with diabetes or kidney problems:   If you haven t had a blood test (creatinine test) within the last 30 days, the Cardiologist/Radiologist may require you to get this test prior to your exam.  If you have diabetes:   Continue to take your metformin medication on the day of your exam  Please wear loose clothing, such as a sweat suit or jogging clothes.  Avoid snaps, zippers and other metal. We may ask you to undress and put on a hospital gown.  If you have any questions, please call the Imaging Department where you will have your exam.            Mar 26, 2018  9:15 AM CDT   (Arrive by 9:00 AM)   Return Visit with Naresh De Los Santos MD   [facility] ([facility])    [address]  [address]  [facility] [address] [phone]            Mar 28, 2018  6:30 AM CDT   [facility] Lab Draw with  [facility] LAB DRAW   [facility] Lab Draw ([facility])    [address]  [address]  [facility] [address] [phone]            Mar 28, 2018  7:00 AM CDT   Infusion 360 with  ONCOLOGY INFUSION, UC 10 ATC   [facility] ([facility])    [address] [place] [address]  [address]  [facility] [id number] [phone]              Who to contact     If you have questions or need follow up information about today's clinic visit or your schedule please contact [facility] directly at [phone].  Normal or non-critical lab and imaging results will be communicated to you by MyChart, letter or phone within 4 business days after the clinic has received the results. If you do not hear from us within 7 days, please contact the clinic through MyChart or phone. If you have a critical or abnormal lab result, we will notify you by phone as soon as possible.  Submit refill requests through [employer] or call your pharmacy and they will forward the refill request to us. Please allow 3 business days for your refill to be completed.          Additional Information About Your Visit        [employer] Information     [employer] gives you secure access to your electronic health record. If you see a primary care provider, you can also send messages to your care team and make appointments. If you have questions, please call your primary care clinic.  If  you do not have a primary care provider, please call [phone] and they will assist you.        Care EveryWhere ID     This is your Care EveryWhere ID. This could be used by other organizations to access your [facility] medical records  [id number]        Your Vitals Were     Pulse Temperature Respirations Pulse Oximetry BMI (Body Mass Index)       95 97.5  F (36.4  C) (Oral) 20 95% 32.82 kg/m2        Blood Pressure from Last 3 Encounters:   03/14/18 109/68   02/28/18 131/82   02/14/18 128/84    Weight from Last 3 Encounters:   03/14/18 103.7 kg (228 lb 11.2 oz)   02/28/18 104.8 kg (231 lb 1.6 oz)   02/14/18 104.8 kg (231 lb)              We Performed the Following     CBC with platelets differential     Comprehensive metabolic panel     Magnesium        Primary Care Provider Office Phone # Fax #    Jim Kaplan [phone]       [address]   [facility] [address]        Equal Access to Services     [facility]: Hadii aad ku hadasho Soomaali, waaxda luqadaha, qaybta kaalmada adeegyaadenike, ankit yuan . So [facility] [phone].    ATENCIÓN: Si habla español, tiene a flores disposición servicios gratuitos de asistencia lingüística. Llame al [phone].    We comply with applicable federal civil rights laws and [place] laws. We do not discriminate on the basis of race, color, national origin, age, disability, sex, sexual orientation, or gender identity.            Thank you!     Thank you for choosing [facility]  for your care. Our goal is always to provide you with excellent care. Hearing back from our patients is one way we can continue to improve our services. Please take a few minutes to complete the written survey that you may receive in the mail after your visit with us. Thank you!             Your Updated Medication List - Protect others around you: Learn how to safely use, store and throw away your medicines  at www.disposemymeds.org.          This list is accurate as of 3/14/18  7:31 AM.  Always use your most recent med list.                   Brand Name Dispense Instructions for use Diagnosis    atorvastatin 40 MG tablet    LIPITOR     TAKE 1 TABLET BY MOUTH DAILY        ELIQUIS PO      Take 5 mg by mouth 2 times daily        LORazepam 0.5 MG tablet    ATIVAN    30 tablet    Take 1 tablet (0.5 mg) by mouth every 4 hours as needed (Anxiety, Nausea/Vomiting or Sleep)    Cholangiocarcinoma (H)       metoprolol tartrate 50 MG tablet    LOPRESSOR     50 mg 2 times daily        MITIGARE 0.6 MG Caps   Generic drug:  Colchicine      Take 0.6 mg by mouth 3 times daily as needed        multivitamin, therapeutic with minerals Tabs tablet     30 tablet    Take 1 tablet by mouth daily    Mass of bile duct       NITROSTAT SL      Place 0.4 mg under the tongue every 5 minutes as needed for chest pain (Carries medication - has never used)        OMEGA-3 FISH OIL PO      Take 1,000 mg by mouth daily        ondansetron 8 MG tablet    ZOFRAN    10 tablet    Take 1 tablet (8 mg) by mouth every 8 hours as needed (Nausea/Vomiting)    Cholangiocarcinoma (H)       prochlorperazine 10 MG tablet    COMPAZINE    30 tablet    Take 1 tablet (10 mg) by mouth every 6 hours as needed (Nausea/Vomiting)    Cholangiocarcinoma (H)

## 2018-03-23 ENCOUNTER — RADIANT APPOINTMENT (OUTPATIENT)
Dept: CT IMAGING | Facility: CLINIC | Age: 63
End: 2018-03-23
Attending: INTERNAL MEDICINE
Payer: COMMERCIAL

## 2018-03-23 DIAGNOSIS — C22.1 CHOLANGIOCARCINOMA (H): ICD-10-CM

## 2018-03-23 LAB
ALBUMIN SERPL-MCNC: 3.7 G/DL (ref 3.4–5)
ALP SERPL-CCNC: 66 U/L (ref 40–150)
ALT SERPL W P-5'-P-CCNC: 36 U/L (ref 0–70)
ANION GAP SERPL CALCULATED.3IONS-SCNC: 7 MMOL/L (ref 3–14)
AST SERPL W P-5'-P-CCNC: 24 U/L (ref 0–45)
BASOPHILS # BLD AUTO: 0 10E9/L (ref 0–0.2)
BASOPHILS NFR BLD AUTO: 0.5 %
BILIRUB SERPL-MCNC: 0.5 MG/DL (ref 0.2–1.3)
BUN SERPL-MCNC: 20 MG/DL (ref 7–30)
CALCIUM SERPL-MCNC: 9.1 MG/DL (ref 8.5–10.1)
CHLORIDE SERPL-SCNC: 109 MMOL/L (ref 94–109)
CO2 SERPL-SCNC: 25 MMOL/L (ref 20–32)
CREAT SERPL-MCNC: 0.88 MG/DL (ref 0.66–1.25)
DIFFERENTIAL METHOD BLD: ABNORMAL
EOSINOPHIL # BLD AUTO: 0.1 10E9/L (ref 0–0.7)
EOSINOPHIL NFR BLD AUTO: 1.6 %
ERYTHROCYTE [DISTWIDTH] IN BLOOD BY AUTOMATED COUNT: 17.2 % (ref 10–15)
GFR SERPL CREATININE-BSD FRML MDRD: 89 ML/MIN/1.7M2
GLUCOSE SERPL-MCNC: 115 MG/DL (ref 70–99)
HCT VFR BLD AUTO: 32.9 % (ref 40–53)
HGB BLD-MCNC: 10.9 G/DL (ref 13.3–17.7)
IMM GRANULOCYTES # BLD: 0.1 10E9/L (ref 0–0.4)
IMM GRANULOCYTES NFR BLD: 1.3 %
LYMPHOCYTES # BLD AUTO: 1.6 10E9/L (ref 0.8–5.3)
LYMPHOCYTES NFR BLD AUTO: 40.1 %
MCH RBC QN AUTO: 32.6 PG (ref 26.5–33)
MCHC RBC AUTO-ENTMCNC: 33.1 G/DL (ref 31.5–36.5)
MCV RBC AUTO: 99 FL (ref 78–100)
MONOCYTES # BLD AUTO: 0.5 10E9/L (ref 0–1.3)
MONOCYTES NFR BLD AUTO: 13.4 %
NEUTROPHILS # BLD AUTO: 1.7 10E9/L (ref 1.6–8.3)
NEUTROPHILS NFR BLD AUTO: 43.1 %
NRBC # BLD AUTO: 0.1 10*3/UL
NRBC BLD AUTO-RTO: 3 /100
PLATELET # BLD AUTO: 156 10E9/L (ref 150–450)
POTASSIUM SERPL-SCNC: 4.1 MMOL/L (ref 3.4–5.3)
PROT SERPL-MCNC: 6.5 G/DL (ref 6.8–8.8)
RBC # BLD AUTO: 3.34 10E12/L (ref 4.4–5.9)
SODIUM SERPL-SCNC: 141 MMOL/L (ref 133–144)
WBC # BLD AUTO: 3.9 10E9/L (ref 4–11)

## 2018-03-23 PROCEDURE — 25000128 H RX IP 250 OP 636: Performed by: INTERNAL MEDICINE

## 2018-03-23 PROCEDURE — 85025 COMPLETE CBC W/AUTO DIFF WBC: CPT | Performed by: INTERNAL MEDICINE

## 2018-03-23 PROCEDURE — 86301 IMMUNOASSAY TUMOR CA 19-9: CPT | Performed by: INTERNAL MEDICINE

## 2018-03-23 PROCEDURE — 80053 COMPREHEN METABOLIC PANEL: CPT | Performed by: INTERNAL MEDICINE

## 2018-03-23 RX ORDER — HEPARIN SODIUM (PORCINE) LOCK FLUSH IV SOLN 100 UNIT/ML 100 UNIT/ML
500 SOLUTION INTRAVENOUS ONCE
Status: COMPLETED | OUTPATIENT
Start: 2018-03-23 | End: 2018-03-23

## 2018-03-23 RX ORDER — HEPARIN SODIUM (PORCINE) LOCK FLUSH IV SOLN 100 UNIT/ML 100 UNIT/ML
5 SOLUTION INTRAVENOUS
Status: COMPLETED | OUTPATIENT
Start: 2018-03-23 | End: 2018-03-23

## 2018-03-23 RX ORDER — IOPAMIDOL 755 MG/ML
135 INJECTION, SOLUTION INTRAVASCULAR ONCE
Status: COMPLETED | OUTPATIENT
Start: 2018-03-23 | End: 2018-03-23

## 2018-03-23 RX ADMIN — HEPARIN SODIUM (PORCINE) LOCK FLUSH IV SOLN 100 UNIT/ML 500 UNITS: 100 SOLUTION at 07:48

## 2018-03-23 RX ADMIN — SODIUM CHLORIDE, PRESERVATIVE FREE 5 ML: 5 INJECTION INTRAVENOUS at 06:36

## 2018-03-23 RX ADMIN — IOPAMIDOL 135 ML: 755 INJECTION, SOLUTION INTRAVASCULAR at 07:13

## 2018-03-23 NOTE — NURSING NOTE
"Chief Complaint   Patient presents with     Lab Only     labs drawn from port by rn.       Port accessed with 20 gauge 3/4\" Power needle and labs drawn by rn.  Port flushed with NS and heparin.  Pt tolerated well.   Lashawn Gill RN      "

## 2018-03-23 NOTE — DISCHARGE INSTRUCTIONS

## 2018-03-24 LAB — CANCER AG19-9 SERPL-ACNC: 29 U/ML (ref 0–37)

## 2018-03-26 ENCOUNTER — ONCOLOGY VISIT (OUTPATIENT)
Dept: ONCOLOGY | Facility: CLINIC | Age: 63
End: 2018-03-26
Attending: INTERNAL MEDICINE
Payer: COMMERCIAL

## 2018-03-26 VITALS
WEIGHT: 228 LBS | HEIGHT: 70 IN | RESPIRATION RATE: 16 BRPM | SYSTOLIC BLOOD PRESSURE: 113 MMHG | HEART RATE: 70 BPM | BODY MASS INDEX: 32.64 KG/M2 | TEMPERATURE: 97.5 F | DIASTOLIC BLOOD PRESSURE: 77 MMHG | OXYGEN SATURATION: 99 %

## 2018-03-26 DIAGNOSIS — C22.1 CHOLANGIOCARCINOMA (H): Primary | ICD-10-CM

## 2018-03-26 PROCEDURE — G0463 HOSPITAL OUTPT CLINIC VISIT: HCPCS | Mod: ZF

## 2018-03-26 PROCEDURE — 99215 OFFICE O/P EST HI 40 MIN: CPT | Mod: ZP | Performed by: INTERNAL MEDICINE

## 2018-03-26 RX ORDER — ALBUTEROL SULFATE 90 UG/1
1-2 AEROSOL, METERED RESPIRATORY (INHALATION)
Status: CANCELLED
Start: 2018-03-28

## 2018-03-26 RX ORDER — METHYLPREDNISOLONE SODIUM SUCCINATE 125 MG/2ML
125 INJECTION, POWDER, LYOPHILIZED, FOR SOLUTION INTRAMUSCULAR; INTRAVENOUS
Status: CANCELLED
Start: 2018-03-28

## 2018-03-26 RX ORDER — METHYLPREDNISOLONE SODIUM SUCCINATE 125 MG/2ML
125 INJECTION, POWDER, LYOPHILIZED, FOR SOLUTION INTRAMUSCULAR; INTRAVENOUS
Status: CANCELLED
Start: 2018-04-11

## 2018-03-26 RX ORDER — ONDANSETRON 2 MG/ML
8 INJECTION INTRAMUSCULAR; INTRAVENOUS ONCE
Status: CANCELLED
Start: 2018-03-28 | End: 2018-03-28

## 2018-03-26 RX ORDER — EPINEPHRINE 1 MG/ML
0.3 INJECTION, SOLUTION, CONCENTRATE INTRAVENOUS EVERY 5 MIN PRN
Status: CANCELLED | OUTPATIENT
Start: 2018-03-28

## 2018-03-26 RX ORDER — ONDANSETRON 2 MG/ML
8 INJECTION INTRAMUSCULAR; INTRAVENOUS ONCE
Status: CANCELLED
Start: 2018-04-11 | End: 2018-04-11

## 2018-03-26 RX ORDER — HEPARIN SODIUM (PORCINE) LOCK FLUSH IV SOLN 100 UNIT/ML 100 UNIT/ML
5 SOLUTION INTRAVENOUS EVERY 8 HOURS
Status: CANCELLED
Start: 2018-04-11

## 2018-03-26 RX ORDER — GEMCITABINE HYDROCHLORIDE 1 G/1
INJECTION, POWDER, LYOPHILIZED, FOR SOLUTION INTRAVENOUS
COMMUNITY
Start: 2018-02-19 | End: 2018-10-15 | Stop reason: SINTOL

## 2018-03-26 RX ORDER — MEPERIDINE HYDROCHLORIDE 25 MG/ML
25 INJECTION INTRAMUSCULAR; INTRAVENOUS; SUBCUTANEOUS EVERY 30 MIN PRN
Status: CANCELLED | OUTPATIENT
Start: 2018-03-28

## 2018-03-26 RX ORDER — EPINEPHRINE 0.3 MG/.3ML
0.3 INJECTION SUBCUTANEOUS EVERY 5 MIN PRN
Status: CANCELLED | OUTPATIENT
Start: 2018-04-11

## 2018-03-26 RX ORDER — ALBUTEROL SULFATE 0.83 MG/ML
2.5 SOLUTION RESPIRATORY (INHALATION)
Status: CANCELLED | OUTPATIENT
Start: 2018-04-11

## 2018-03-26 RX ORDER — ALBUTEROL SULFATE 0.83 MG/ML
2.5 SOLUTION RESPIRATORY (INHALATION)
Status: CANCELLED | OUTPATIENT
Start: 2018-03-28

## 2018-03-26 RX ORDER — HEPARIN SODIUM (PORCINE) LOCK FLUSH IV SOLN 100 UNIT/ML 100 UNIT/ML
5 SOLUTION INTRAVENOUS EVERY 8 HOURS
Status: CANCELLED
Start: 2018-03-28

## 2018-03-26 RX ORDER — SODIUM CHLORIDE 9 MG/ML
1000 INJECTION, SOLUTION INTRAVENOUS CONTINUOUS PRN
Status: CANCELLED
Start: 2018-04-11

## 2018-03-26 RX ORDER — DIPHENHYDRAMINE HYDROCHLORIDE 50 MG/ML
50 INJECTION INTRAMUSCULAR; INTRAVENOUS
Status: CANCELLED
Start: 2018-03-28

## 2018-03-26 RX ORDER — SODIUM CHLORIDE 9 MG/ML
1000 INJECTION, SOLUTION INTRAVENOUS CONTINUOUS PRN
Status: CANCELLED
Start: 2018-03-28

## 2018-03-26 RX ORDER — LORAZEPAM 2 MG/ML
0.5 INJECTION INTRAMUSCULAR EVERY 4 HOURS PRN
Status: CANCELLED
Start: 2018-03-28

## 2018-03-26 RX ORDER — MEPERIDINE HYDROCHLORIDE 25 MG/ML
25 INJECTION INTRAMUSCULAR; INTRAVENOUS; SUBCUTANEOUS EVERY 30 MIN PRN
Status: CANCELLED | OUTPATIENT
Start: 2018-04-11

## 2018-03-26 RX ORDER — DIPHENHYDRAMINE HYDROCHLORIDE 50 MG/ML
50 INJECTION INTRAMUSCULAR; INTRAVENOUS
Status: CANCELLED
Start: 2018-04-11

## 2018-03-26 RX ORDER — CISPLATIN 1 MG/ML
INJECTION INTRAVENOUS
COMMUNITY
Start: 2018-02-19 | End: 2018-06-18

## 2018-03-26 RX ORDER — EPINEPHRINE 0.3 MG/.3ML
0.3 INJECTION SUBCUTANEOUS EVERY 5 MIN PRN
Status: CANCELLED | OUTPATIENT
Start: 2018-03-28

## 2018-03-26 RX ORDER — LORAZEPAM 2 MG/ML
0.5 INJECTION INTRAMUSCULAR EVERY 4 HOURS PRN
Status: CANCELLED
Start: 2018-04-11

## 2018-03-26 RX ORDER — ALBUTEROL SULFATE 90 UG/1
1-2 AEROSOL, METERED RESPIRATORY (INHALATION)
Status: CANCELLED
Start: 2018-04-11

## 2018-03-26 RX ORDER — EPINEPHRINE 1 MG/ML
0.3 INJECTION, SOLUTION, CONCENTRATE INTRAVENOUS EVERY 5 MIN PRN
Status: CANCELLED | OUTPATIENT
Start: 2018-04-11

## 2018-03-26 ASSESSMENT — PAIN SCALES - GENERAL: PAINLEVEL: NO PAIN (0)

## 2018-03-26 NOTE — PROGRESS NOTES
Girish Chauhan is here today in follow-up of metastatic cholangiocarcinoma.    Mr. Chauhan has disease that recurred in the pelvis growing into the bladder wall and is here today for a planned response assessment. He is currently on active treatment with gemcitabine and cisplatin. We've had to change his schedule to every other week to accommodate cytopenias, but otherwise chemotherapy has been going quite well for him. He is eating well and has a good energy level. He has very minor malaise after his chemotherapy days and otherwise is unaware of any significant toxicity. He's not had any peripheral numbness or tingling and notices no change in his hearing. A complete review of systems as documented patient questionnaire and is negative.    On exam today Mr. Chauhan appears robustly healthy. His vital signs are noted in the chart and are unremarkable.  He has no scleral icterus and no visible lesion in the oropharynx.  He has no palpable adenopathy in his neck or supra jennifer spaces.  His lungs are clear to auscultation without dullness to percussion.  His heart rate and rhythm are regular with a grade 1 to 2/6 systolic ejection murmur. Jugular venous pressure appears normal.  His abdomen is soft and nontender without palpable mass, organomegaly or ascites.  He has no peripheral edema and no tenderness or thighs. He has no cyanosis or clubbing of his digits.  His speech is fluent and his cranial nerves are grossly intact. His gait and station are unremarkable.    Reviewed with patient and his wife his CT scan and lab work. On his CT scan there is no change in the mass in the wall of his bladder. No new sites of disease are apparent.his CA-19-9 level has improved now down to the normal range at 29. He has normal echo lites, renal function, liver tests, and modest cytopenias consistent with his chemotherapy.    Assessment/plan: Metastatic cholangiocarcinoma with radiographically stable disease and in improving tumor  marker. Patient maintains a normal performance status and is having relatively little toxicity from his chemotherapy. We will continue on with the same dose and schedule and reevaluate his disease status again in another 3 months.   He had questions about the end point for his chemotherapy and I reviewed with him there is not a clear standard in this setting. My expectation would be that we feel like he's achieved a plateau we will drop the platinum and then have a discussion about the pros and cons of maintenance gemcitabine versus discontinuing therapy until we see evidence of progression.

## 2018-03-26 NOTE — MR AVS SNAPSHOT
After Visit Summary   3/26/2018    Girish Chauhan    MRN: [id number]           Patient Information     Date Of Birth          1955        Visit Information        Provider Department      3/26/2018 9:15 AM Naresh De Los Santos MD [facility]        Today's Diagnoses     Cholangiocarcinoma (H)    -  1       Follow-ups after your visit        Follow-up notes from your care team     Return in about 3 months (around 6/18/2018) for MD visit with CT and labs.      Your next 10 appointments already scheduled     Mar 28, 2018  6:30 AM CDT   M[facility] Lab Draw with UC MASONIC LAB DRAW   [facility]    [address]  [address]  [facility] [address]   [phone]            Mar 28, 2018  7:00 AM CDT   Infusion 360 with UC ONCOLOGY INFUSION, UC 10 ATC   [facility] ([facility])    [address] [place] [address]  [address]  [facility] [address]   [phone]            Apr 11, 2018  6:30 AM CDT   Masonic Lab Draw with UC MASONIC LAB DRAW   [facility]    [address]  [address]  [place] [address]   [phone]            Apr 11, 2018  7:00 AM CDT   Infusion 360 with UC ONCOLOGY INFUSION, UC 12 ATC   [facility] ([facility])    [address]  [address]  [place] [facility] [place] [address]   [phone]            Apr 25, 2018  6:30 AM CDT   Masonic Lab Draw with UC MASONIC LAB DRAW   [facility] Lab Draw ([facility])    [address] [place] [address] [address]  [address]  [facility] [address]   [phone]            Apr 25, 2018  7:00 AM CDT   Infusion 360 with UC ONCOLOGY INFUSION, UC 10 ATC   [facility] ([facility])    [address]  [address]  [facility] [address]   [phone]            May 09, 2018   6:30 AM [facility]   Masonic Lab Draw with  MASONIC LAB DRAW   [facility] Lab Draw ([facility])    [address]  [address]  [facility] [address] [phone]              Future tests that were ordered for you today     Open Future Orders        Priority Expected Expires Ordered    CT Chest Abdomen Pelvis w/o & w Contrast Routine 6/18/2018 7/26/2018 3/26/2018    Comprehensive metabolic panel Routine 6/18/2018 7/26/2018 3/26/2018    CBC with platelets differential Routine 6/18/2018 7/26/2018 3/26/2018    Cancer antigen 19-9 Routine 6/18/2018 7/26/2018 3/26/2018            Who to contact     If you have questions or need follow up information about today's clinic visit or your schedule please contact [facility] directly at [phone].  Normal or non-critical lab and imaging results will be communicated to you by MyChart, letter or phone within 4 business days after the clinic has received the results. If you do not hear from us within 7 days, please contact the clinic through Courserat or phone. If you have a critical or abnormal lab result, we will notify you by phone as soon as possible.  Submit refill requests through [employer] or call your pharmacy and they will forward the refill request to us. Please allow 3 business days for your refill to be completed.          Additional Information About Your Visit        Lybratehart Information     [employer] gives you secure access to your electronic health record. If you see a primary care provider, you can also send messages to your care team and make appointments. If you have questions, please call your primary care clinic.  If you do not have a primary care provider, please call [phone] and they will assist you.        Care EveryWhere ID     This is your Care EveryWhere ID. This could be used by other organizations to access your [place] medical records  [id number]        Your Vitals Were     Pulse Temperature  "Respirations Height Pulse Oximetry BMI (Body Mass Index)    70 97.5  F (36.4  C) (Oral) 16 1.778 m (5' 10\") 99% 32.71 kg/m2       Blood Pressure from Last 3 Encounters:   03/26/18 113/77   03/14/18 109/68   02/28/18 131/82    Weight from Last 3 Encounters:   03/26/18 103.4 kg (228 lb)   03/14/18 103.7 kg (228 lb 11.2 oz)   02/28/18 104.8 kg (231 lb 1.6 oz)               Primary Care Provider Office Phone # Fax #    Jim Kaplan [phone]       [address]   [facility] [address]        Equal Access to Services     MARIUSZ MADRIGAL : Hadii aad ku hadashnaina Sokarina, waaxda luqadaha, qaybta kaalmada adeegyada, ankit yuan . So [facility] [phone].    ATENCIÓN: Si habla español, tiene a flores disposición servicios gratuitos de asistencia lingüística. Angela al [phone].    We comply with applicable federal civil rights laws and [place] laws. We do not discriminate on the basis of race, color, national origin, age, disability, sex, sexual orientation, or gender identity.            Thank you!     Thank you for choosing [facility]  for your care. Our goal is always to provide you with excellent care. Hearing back from our patients is one way we can continue to improve our services. Please take a few minutes to complete the written survey that you may receive in the mail after your visit with us. Thank you!             Your Updated Medication List - Protect others around you: Learn how to safely use, store and throw away your medicines at www.disposemymeds.org.          This list is accurate as of 3/26/18 10:47 AM.  Always use your most recent med list.                   Brand Name Dispense Instructions for use Diagnosis    atorvastatin 40 MG tablet    LIPITOR     TAKE 1 TABLET BY MOUTH DAILY        CISplatin 100 MG/100ML Soln injection CHEMO    PLATINOL          ELIQUIS PO      Take 5 mg by mouth 2 times daily        " gemcitabine 1 GM injection    GEMZAR          LORazepam 0.5 MG tablet    ATIVAN    30 tablet    Take 1 tablet (0.5 mg) by mouth every 4 hours as needed (Anxiety, Nausea/Vomiting or Sleep)    Cholangiocarcinoma (H)       metoprolol tartrate 50 MG tablet    LOPRESSOR     50 mg 2 times daily        MITIGARE 0.6 MG Caps   Generic drug:  Colchicine      Take 0.6 mg by mouth 3 times daily as needed        multivitamin, therapeutic with minerals Tabs tablet     30 tablet    Take 1 tablet by mouth daily    Mass of bile duct       NITROSTAT SL      Place 0.4 mg under the tongue every 5 minutes as needed for chest pain (Carries medication - has never used)        OMEGA-3 FISH OIL PO      Take 1,000 mg by mouth daily        ondansetron 8 MG tablet    ZOFRAN    10 tablet    Take 1 tablet (8 mg) by mouth every 8 hours as needed (Nausea/Vomiting)    Cholangiocarcinoma (H)       prochlorperazine 10 MG tablet    COMPAZINE    30 tablet    Take 1 tablet (10 mg) by mouth every 6 hours as needed (Nausea/Vomiting)    Cholangiocarcinoma (H)

## 2018-03-26 NOTE — NURSING NOTE
"Oncology Rooming Note    March 26, 2018 9:04 AM   Girish Chauhan is a 63 year old male who presents for:    Chief Complaint   Patient presents with     Oncology Clinic Visit     Return:Cholangiocarcinoma     Initial Vitals: Ht 1.778 m (5' 10\") Estimated body mass index is 32.82 kg/(m^2) as calculated from the following:    Height as of 1/29/18: 1.778 m (5' 10\").    Weight as of 3/14/18: 103.7 kg (228 lb 11.2 oz). There is no height or weight on file to calculate BSA.  No Pain (0) Comment: Data Unavailable   No LMP for male patient.  Allergies reviewed: Yes  Medications reviewed: Yes    Medications: Medication refills not needed today.  Pharmacy name entered into [employer]: [facility] [address] [place] [address] [address] AT [facility]    Clinical concerns: No New Concerns    5 minutes for nursing intake (face to face time)     BRYANT Ferreira      "

## 2018-03-26 NOTE — LETTER
3/26/2018      RE: Girish Chauhan  [address] [address]       Girish Chauhan is here today in follow-up of metastatic cholangiocarcinoma.    Mr. Chauhan has disease that recurred in the pelvis growing into the bladder wall and is here today for a planned response assessment. He is currently on active treatment with gemcitabine and cisplatin. We've had to change his schedule to every other week to accommodate cytopenias, but otherwise chemotherapy has been going quite well for him. He is eating well and has a good energy level. He has very minor malaise after his chemotherapy days and otherwise is unaware of any significant toxicity. He's not had any peripheral numbness or tingling and notices no change in his hearing. A complete review of systems as documented patient questionnaire and is negative.    On exam today Mr. Chauhan appears robustly healthy. His vital signs are noted in the chart and are unremarkable.  He has no scleral icterus and no visible lesion in the oropharynx.  He has no palpable adenopathy in his neck or supra jennifer spaces.  His lungs are clear to auscultation without dullness to percussion.  His heart rate and rhythm are regular with a grade 1 to 2/6 systolic ejection murmur. Jugular venous pressure appears normal.  His abdomen is soft and nontender without palpable mass, organomegaly or ascites.  He has no peripheral edema and no tenderness or thighs. He has no cyanosis or clubbing of his digits.  His speech is fluent and his cranial nerves are grossly intact. His gait and station are unremarkable.    Reviewed with patient and his wife his CT scan and lab work. On his CT scan there is no change in the mass in the wall of his bladder. No new sites of disease are apparent.his CA-19-9 level has improved now down to the normal range at 29. He has normal echo lites, renal function, liver tests, and modest cytopenias consistent with his chemotherapy.    Assessment/plan: Metastatic  cholangiocarcinoma with radiographically stable disease and in improving tumor marker. Patient maintains a normal performance status and is having relatively little toxicity from his chemotherapy. We will continue on with the same dose and schedule and reevaluate his disease status again in another 3 months.   He had questions about the end point for his chemotherapy and I reviewed with him there is not a clear standard in this setting. My expectation would be that we feel like he's achieved a plateau we will drop the platinum and then have a discussion about the pros and cons of maintenance gemcitabine versus discontinuing therapy until we see evidence of progression.    Naresh De Los Santos MD

## 2018-03-28 ENCOUNTER — APPOINTMENT (OUTPATIENT)
Dept: LAB | Facility: CLINIC | Age: 63
End: 2018-03-28
Attending: INTERNAL MEDICINE
Payer: COMMERCIAL

## 2018-03-28 ENCOUNTER — INFUSION THERAPY VISIT (OUTPATIENT)
Dept: ONCOLOGY | Facility: CLINIC | Age: 63
End: 2018-03-28
Attending: INTERNAL MEDICINE
Payer: COMMERCIAL

## 2018-03-28 VITALS
BODY MASS INDEX: 32.67 KG/M2 | SYSTOLIC BLOOD PRESSURE: 101 MMHG | HEART RATE: 97 BPM | OXYGEN SATURATION: 95 % | WEIGHT: 227.7 LBS | TEMPERATURE: 97.6 F | RESPIRATION RATE: 16 BRPM | DIASTOLIC BLOOD PRESSURE: 70 MMHG

## 2018-03-28 DIAGNOSIS — C22.1 CHOLANGIOCARCINOMA (H): Primary | ICD-10-CM

## 2018-03-28 LAB
ALBUMIN SERPL-MCNC: 3.5 G/DL (ref 3.4–5)
ALP SERPL-CCNC: 61 U/L (ref 40–150)
ALT SERPL W P-5'-P-CCNC: 30 U/L (ref 0–70)
ANION GAP SERPL CALCULATED.3IONS-SCNC: 8 MMOL/L (ref 3–14)
AST SERPL W P-5'-P-CCNC: 23 U/L (ref 0–45)
BASOPHILS # BLD AUTO: 0 10E9/L (ref 0–0.2)
BASOPHILS NFR BLD AUTO: 0.4 %
BILIRUB SERPL-MCNC: 0.6 MG/DL (ref 0.2–1.3)
BUN SERPL-MCNC: 17 MG/DL (ref 7–30)
CALCIUM SERPL-MCNC: 8.4 MG/DL (ref 8.5–10.1)
CHLORIDE SERPL-SCNC: 109 MMOL/L (ref 94–109)
CO2 SERPL-SCNC: 23 MMOL/L (ref 20–32)
CREAT SERPL-MCNC: 0.95 MG/DL (ref 0.66–1.25)
DIFFERENTIAL METHOD BLD: ABNORMAL
EOSINOPHIL # BLD AUTO: 0.1 10E9/L (ref 0–0.7)
EOSINOPHIL NFR BLD AUTO: 1.6 %
ERYTHROCYTE [DISTWIDTH] IN BLOOD BY AUTOMATED COUNT: 18.6 % (ref 10–15)
GFR SERPL CREATININE-BSD FRML MDRD: 80 ML/MIN/1.7M2
GLUCOSE SERPL-MCNC: 143 MG/DL (ref 70–99)
HCT VFR BLD AUTO: 33.1 % (ref 40–53)
HGB BLD-MCNC: 11.1 G/DL (ref 13.3–17.7)
IMM GRANULOCYTES # BLD: 0 10E9/L (ref 0–0.4)
IMM GRANULOCYTES NFR BLD: 0.2 %
LYMPHOCYTES # BLD AUTO: 1.5 10E9/L (ref 0.8–5.3)
LYMPHOCYTES NFR BLD AUTO: 32.2 %
MAGNESIUM SERPL-MCNC: 1.8 MG/DL (ref 1.6–2.3)
MCH RBC QN AUTO: 33.3 PG (ref 26.5–33)
MCHC RBC AUTO-ENTMCNC: 33.5 G/DL (ref 31.5–36.5)
MCV RBC AUTO: 99 FL (ref 78–100)
MONOCYTES # BLD AUTO: 0.7 10E9/L (ref 0–1.3)
MONOCYTES NFR BLD AUTO: 15.5 %
NEUTROPHILS # BLD AUTO: 2.3 10E9/L (ref 1.6–8.3)
NEUTROPHILS NFR BLD AUTO: 50.1 %
NRBC # BLD AUTO: 0 10*3/UL
NRBC BLD AUTO-RTO: 0 /100
PLATELET # BLD AUTO: 89 10E9/L (ref 150–450)
POTASSIUM SERPL-SCNC: 4.4 MMOL/L (ref 3.4–5.3)
PROT SERPL-MCNC: 6.3 G/DL (ref 6.8–8.8)
RBC # BLD AUTO: 3.33 10E12/L (ref 4.4–5.9)
SODIUM SERPL-SCNC: 141 MMOL/L (ref 133–144)
WBC # BLD AUTO: 4.5 10E9/L (ref 4–11)

## 2018-03-28 PROCEDURE — 96413 CHEMO IV INFUSION 1 HR: CPT

## 2018-03-28 PROCEDURE — 96375 TX/PRO/DX INJ NEW DRUG ADDON: CPT

## 2018-03-28 PROCEDURE — 96417 CHEMO IV INFUS EACH ADDL SEQ: CPT

## 2018-03-28 PROCEDURE — 85025 COMPLETE CBC W/AUTO DIFF WBC: CPT | Performed by: INTERNAL MEDICINE

## 2018-03-28 PROCEDURE — 96367 TX/PROPH/DG ADDL SEQ IV INF: CPT

## 2018-03-28 PROCEDURE — 80053 COMPREHEN METABOLIC PANEL: CPT | Performed by: INTERNAL MEDICINE

## 2018-03-28 PROCEDURE — 83735 ASSAY OF MAGNESIUM: CPT | Performed by: INTERNAL MEDICINE

## 2018-03-28 PROCEDURE — 25000128 H RX IP 250 OP 636: Mod: ZF | Performed by: INTERNAL MEDICINE

## 2018-03-28 RX ORDER — HEPARIN SODIUM (PORCINE) LOCK FLUSH IV SOLN 100 UNIT/ML 100 UNIT/ML
5 SOLUTION INTRAVENOUS EVERY 8 HOURS
Status: DISCONTINUED | OUTPATIENT
Start: 2018-03-28 | End: 2018-03-28 | Stop reason: HOSPADM

## 2018-03-28 RX ORDER — ONDANSETRON 2 MG/ML
8 INJECTION INTRAMUSCULAR; INTRAVENOUS ONCE
Status: COMPLETED | OUTPATIENT
Start: 2018-03-28 | End: 2018-03-28

## 2018-03-28 RX ADMIN — CISPLATIN 60 MG: 1 INJECTION, SOLUTION INTRAVENOUS at 07:48

## 2018-03-28 RX ADMIN — SODIUM CHLORIDE 250 ML: 9 INJECTION, SOLUTION INTRAVENOUS at 07:23

## 2018-03-28 RX ADMIN — SODIUM CHLORIDE, PRESERVATIVE FREE 5 ML: 5 INJECTION INTRAVENOUS at 09:43

## 2018-03-28 RX ADMIN — GEMCITABINE 2200 MG: 38 INJECTION, SOLUTION INTRAVENOUS at 08:50

## 2018-03-28 RX ADMIN — DEXAMETHASONE SODIUM PHOSPHATE: 10 INJECTION, SOLUTION INTRAMUSCULAR; INTRAVENOUS at 07:23

## 2018-03-28 RX ADMIN — SODIUM CHLORIDE, PRESERVATIVE FREE 5 ML: 5 INJECTION INTRAVENOUS at 06:28

## 2018-03-28 RX ADMIN — ONDANSETRON 8 MG: 2 INJECTION INTRAMUSCULAR; INTRAVENOUS at 07:28

## 2018-03-28 RX ADMIN — MAGNESIUM SULFATE HEPTAHYDRATE: 500 INJECTION, SOLUTION INTRAMUSCULAR; INTRAVENOUS at 07:36

## 2018-03-28 ASSESSMENT — PAIN SCALES - GENERAL: PAINLEVEL: NO PAIN (0)

## 2018-03-28 NOTE — PROGRESS NOTES
Infusion Nursing Note:  Girish Chauhan presents today for Cycle 5, day 1 Cisplatin and Gemzar.    Patient seen by provider today: No    Note: Patient presents to clinic today feeling well with no questions.  Pt denies any changes to allergies or medications.    Intravenous Access:  Implanted Port.    Treatment Conditions:  Lab Results   Component Value Date    HGB 11.1 03/28/2018     Lab Results   Component Value Date    WBC 4.5 03/28/2018      Lab Results   Component Value Date    ANEU 2.3 03/28/2018     Lab Results   Component Value Date    PLT 89 03/28/2018      Lab Results   Component Value Date     03/28/2018                   Lab Results   Component Value Date    POTASSIUM 4.4 03/28/2018           Lab Results   Component Value Date    MAG 1.8 03/28/2018            Lab Results   Component Value Date    CR 0.95 03/28/2018                   Lab Results   Component Value Date    GARY 8.4 03/28/2018                Lab Results   Component Value Date    BILITOTAL 0.6 03/28/2018           Lab Results   Component Value Date    ALBUMIN 3.5 03/28/2018                    Lab Results   Component Value Date    ALT 30 03/28/2018           Lab Results   Component Value Date    AST 23 03/28/2018     Results reviewed, labs MET treatment parameters, ok to proceed with treatment.    Post Infusion Assessment:  Patient tolerated infusion without incident.  Blood return noted pre and post infusion.  Site patent and intact, free from redness, edema or discomfort.  No evidence of extravasations.  Access discontinued per protocol.  Patient voided prior to/throughout and after Cisplatin appropriately.    Discharge Plan:   Patient declined prescription refills.  Discharge instructions reviewed with: Patient.  Patient and/or family verbalized understanding of discharge instructions and all questions answered.  AVS to patient via [employer].  Patient will return 4/11/2018 for next appointment.   Departure Mode: Ambulatory.    Milagro  PEDRITO Malki

## 2018-03-28 NOTE — MR AVS SNAPSHOT
After Visit Summary   3/28/2018    Girish Chauhan    MRN: [id number]           Patient Information     Date Of Birth          1955        Visit Information        Provider Department      3/28/2018 7:00 AM UC 10 ATC;  ONCOLOGY INFUSION [facility]        Today's Diagnoses     Cholangiocarcinoma (H)    -  1      Care Instructions    Contact Numbers    [facility] Main Line: [phone]  [facility] Triage:  [phone]    Call triage with chills and/or temperature greater than or equal to 100.5, uncontrolled nausea/vomiting, diarrhea, constipation, dizziness, shortness of breath, chest pain, bleeding, unexplained bruising, or any new/concerning symptoms, questions/concerns.     If you are having any concerning symptoms or wish to speak to a provider before your next infusion visit, please call your care coordinator or triage to notify them so we can adequately serve you.       After Hours: [phone]    If after hours, weekends, or holidays, call main hospital  and ask for Oncology doctor on call.         March 2018 Sunday Monday Tuesday Wednesday Thursday Friday Saturday                       1     2     3       4     5     6     7     8     9     10       11     12     13     14     [facility]    6:30 AM   (15 min.)    [facility] DRAW   [facility] Lab Draw     P ONC INFUSION 360    7:00 AM   (360 min.)    ONCOLOGY INFUSION   [facility] 15     16     17       18     19     20     21     22     23     [facility]    6:30 AM   (15 min.)    [facility] DRAW   [facility] Lab Draw     CT CHEST ABDOMEN PELVIS WWO    6:45 AM   (20 min.)   UCCT1   [facility] CT 24       25     26     [facility] RETURN    9:00 AM   (30 min.)   Naresh De Los Santos MD   [facility] 27     28     [facility] D[facility]    6:30 AM   (15 min.)    [facility] LAB DRAW   [facility] Lab Draw     [facility] ONC INFUSION 360     7:00 AM   (360 min.)    ONCOLOGY INFUSION   [facility] 29 30 31 April 2018 Sunday Monday Tuesday Wednesday Thursday Friday Saturday   1     2     3     4     5     6     7       8     9     10     11     [facility] LAB DRAW    6:30 AM   (15 min.)    [facility] LAB DRAW   [facility] Lab Draw     [facility] ONC INFUSION 360    7:00 AM   (360 min.)    ONCOLOGY INFUSION   [facility] 12     13     14       15     16     17     18     19     20     21       22     23     24     25     [facility] DRAW    6:30 AM   (15 min.)    [facility] LAB DRAW   [facility] Lab Draw     [facility] ONC INFUSION 360    7:00 AM   (360 min.)    ONCOLOGY INFUSION   [facility] 26     27     28       29     30                                           Lab Results:  Recent Results (from the past 12 hour(s))   CBC with platelets differential    Collection Time: 03/28/18  6:35 AM   Result Value Ref Range    WBC 4.5 4.0 - 11.0 10e9/L    RBC Count 3.33 (L) 4.4 - 5.9 10e12/L    Hemoglobin 11.1 (L) 13.3 - 17.7 g/dL    Hematocrit 33.1 (L) 40.0 - 53.0 %    MCV 99 78 - 100 fl    MCH 33.3 (H) 26.5 - 33.0 pg    MCHC 33.5 31.5 - 36.5 g/dL    RDW 18.6 (H) 10.0 - 15.0 %    Platelet Count 89 (L) 150 - 450 10e9/L    Diff Method Automated Method     % Neutrophils 50.1 %    % Lymphocytes 32.2 %    % Monocytes 15.5 %    % Eosinophils 1.6 %    % Basophils 0.4 %    % Immature Granulocytes 0.2 %    Nucleated RBCs 0 0 /100    Absolute Neutrophil 2.3 1.6 - 8.3 10e9/L    Absolute Lymphocytes 1.5 0.8 - 5.3 10e9/L    Absolute Monocytes 0.7 0.0 - 1.3 10e9/L    Absolute Eosinophils 0.1 0.0 - 0.7 10e9/L    Absolute Basophils 0.0 0.0 - 0.2 10e9/L    Abs Immature Granulocytes 0.0 0 - 0.4 10e9/L    Absolute Nucleated RBC 0.0    Comprehensive metabolic panel    Collection Time: 03/28/18  6:35 AM   Result Value Ref Range    Sodium 141 133 - 144 mmol/L    Potassium 4.4 3.4 - 5.3 mmol/L     Chloride 109 94 - 109 mmol/L    Carbon Dioxide 23 20 - 32 mmol/L    Anion Gap 8 3 - 14 mmol/L    Glucose 143 (H) 70 - 99 mg/dL    Urea Nitrogen 17 7 - 30 mg/dL    Creatinine 0.95 0.66 - 1.25 mg/dL    GFR Estimate 80 >60 mL/min/1.7m2    GFR Estimate If Black >90 >60 mL/min/1.7m2    Calcium 8.4 (L) 8.5 - 10.1 mg/dL    Bilirubin Total 0.6 0.2 - 1.3 mg/dL    Albumin 3.5 3.4 - 5.0 g/dL    Protein Total 6.3 (L) 6.8 - 8.8 g/dL    Alkaline Phosphatase 61 40 - 150 U/L    ALT 30 0 - 70 U/L    AST 23 0 - 45 U/L   Magnesium    Collection Time: 03/28/18  6:35 AM   Result Value Ref Range    Magnesium 1.8 1.6 - 2.3 mg/dL               Follow-ups after your visit        Your next 10 appointments already scheduled     Apr 11, 2018  6:30 AM CDT   [facility] Draw with [facility] DRAW   [facility] Lab Draw ([facility])    [address] [place] [address]  [address]  [facility] [address]   [phone]            Apr 11, 2018  7:00 AM CDT   Infusion 360 with UC ONCOLOGY INFUSION, UC 12 ATC   [facility] ([facility])    [address] [place] [address]  [address]  [facility] [address]   [phone]            Apr 25, 2018  6:30 AM CDT   [facility] Draw with [facility] DRAW   [facility] Draw ([facility])    [address] [place] [address]  [address]  [facility] [address]   [phone]            Apr 25, 2018  7:00 AM CDT   Infusion 360 with UC ONCOLOGY INFUSION, UC 10 ATC   [facility] ([facility])    [address] [place] [address]  [address]  [facility] [address]   [phone]            May 09, 2018  6:30 AM CDT   [facility] Draw with [facility] DRAW   [facility] Draw ([facility])    [address] [place] [address]  [address]  [facility] [address]   [phone]            May 09, 2018  7:00 AM CDT   Infusion 360 with  ONCOLOGY INFUSION,  10 ATC   [facility]  [facility] ([facility])    [address]  [address]  [facility] [id number] [phone]            May 23, 2018  6:30 AM CDT   [facility] Lab Draw with [facility] LAB DRAW   [facility] Lab Draw ([facility])    [address]  [address]  [facility] [id number] [phone]              Who to contact     If you have questions or need follow up information about today's clinic visit or your schedule please contact [facility] directly at [phone].  Normal or non-critical lab and imaging results will be communicated to you by [employer], letter or phone within 4 business days after the clinic has received the results. If you do not hear from us within 7 days, please contact the clinic through [employer]ape[employer] or phone. If you have a critical or abnormal lab result, we will notify you by phone as soon as possible.  Submit refill requests through [employer] or call your pharmacy and they will forward the refill request to us. Please allow 3 business days for your refill to be completed.          Additional Information About Your Visit        [facility] Information     [employer] gives you secure access to your electronic health record. If you see a primary care provider, you can also send messages to your care team and make appointments. If you have questions, please call your primary care clinic.  If you do not have a primary care provider, please call [phone] and they will assist you.        Care EveryWhere ID     This is your Care EveryWhere ID. This could be used by other organizations to access your [facility] medical records  [id number]        Your Vitals Were     Pulse Temperature Respirations Pulse Oximetry BMI (Body Mass Index)       97 97.6  F (36.4  C) 16 95% 32.67 kg/m2        Blood Pressure from Last 3 Encounters:   03/28/18 101/70   03/26/18 113/77   03/14/18 109/68    Weight from Last 3 Encounters:   03/28/18  103.3 kg (227 lb 11.2 oz)   03/26/18 103.4 kg (228 lb)   03/14/18 103.7 kg (228 lb 11.2 oz)              We Performed the Following     CBC with platelets differential     Comprehensive metabolic panel     Magnesium        Primary Care Provider Office Phone # Fax #    Jim Kaplan [phone]       [facility] [address]   [facility] [address]        Equal Access to Services     MARIUSZ MADRIGAL : Hadii aad ku hadasho Soomaali, waaxda luqadaha, qaybta kaalmada adeegyada, waxay idiin hayaan adeeg kharash la'aylan . So [facility] [phone].    ATENCIÓN: Si habla español, tiene a flores disposición servicios gratuitos de asistencia lingüística. [facility] [phone].    We comply with applicable federal civil rights laws and [place] laws. We do not discriminate on the basis of race, color, national origin, age, disability, sex, sexual orientation, or gender identity.            Thank you!     Thank you for choosing [facility]  for your care. Our goal is always to provide you with excellent care. Hearing back from our patients is one way we can continue to improve our services. Please take a few minutes to complete the written survey that you may receive in the mail after your visit with us. Thank you!             Your Updated Medication List - Protect others around you: Learn how to safely use, store and throw away your medicines at www.disposemymeds.org.          This list is accurate as of 3/28/18 11:13 AM.  Always use your most recent med list.                   Brand Name Dispense Instructions for use Diagnosis    atorvastatin 40 MG tablet    LIPITOR     TAKE 1 TABLET BY MOUTH DAILY        CISplatin 100 MG/100ML Soln injection CHEMO    PLATINOL          ELIQUIS PO      Take 5 mg by mouth 2 times daily        gemcitabine 1 GM injection    GEMZAR          LORazepam 0.5 MG tablet    ATIVAN    30 tablet    Take 1 tablet (0.5 mg) by mouth every 4 hours as needed  (Anxiety, Nausea/Vomiting or Sleep)    Cholangiocarcinoma (H)       metoprolol tartrate 50 MG tablet    LOPRESSOR     50 mg 2 times daily        MITIGARE 0.6 MG Caps   Generic drug:  Colchicine      Take 0.6 mg by mouth 3 times daily as needed        multivitamin, therapeutic with minerals Tabs tablet     30 tablet    Take 1 tablet by mouth daily    Mass of bile duct       NITROSTAT SL      Place 0.4 mg under the tongue every 5 minutes as needed for chest pain (Carries medication - has never used)        OMEGA-3 FISH OIL PO      Take 1,000 mg by mouth daily        ondansetron 8 MG tablet    ZOFRAN    10 tablet    Take 1 tablet (8 mg) by mouth every 8 hours as needed (Nausea/Vomiting)    Cholangiocarcinoma (H)       prochlorperazine 10 MG tablet    COMPAZINE    30 tablet    Take 1 tablet (10 mg) by mouth every 6 hours as needed (Nausea/Vomiting)    Cholangiocarcinoma (H)

## 2018-03-28 NOTE — PATIENT INSTRUCTIONS
Contact Numbers    [facility] Main Line: [phone]  [facility] Triage:  [phone]    Call triage with chills and/or temperature greater than or equal to 100.5, uncontrolled nausea/vomiting, diarrhea, constipation, dizziness, shortness of breath, chest pain, bleeding, unexplained bruising, or any new/concerning symptoms, questions/concerns.     If you are having any concerning symptoms or wish to speak to a provider before your next infusion visit, please call your care coordinator or triage to notify them so we can adequately serve you.       After Hours: [phone]    If after hours, weekends, or holidays, call main hospital  and ask for Oncology doctor on call.         March 2018 Sunday Monday Tuesday Wednesday Thursday Friday Saturday                       1     2     3       4     5     6     7     8     9     10       11     12     13     14     [facility] D[facility]    6:30 AM   (15 min.)    [facility] LAB DRAW   [facility] Lab Draw     [facility] ONC INFUSION 360    7:00 AM   (360 min.)    ONCOLOGY INFUSION   [facility] 15     16     17       18     19     20     21     22     23     [facility] DRAW    6:30 AM   (15 min.)    [facility] LAB DRAW   [facility] Lab Draw     CT CHEST ABDOMEN PELVIS WWO    6:45 AM   (20 min.)   UCCT1   [facility] CT 24       25     26     [facility] RETURN    9:00 AM   (30 min.)   Naresh De Los Santos MD   [facility] 27     28     [facility] DRAW    6:30 AM   (15 min.)    [facility] LAB DRAW   [facility] Lab Draw     [facility] ONC INFUSION 360    7:00 AM   (360 min.)    ONCOLOGY INFUSION   [facility] 29 30 31 April 2018 Sunday Monday Tuesday Wednesday Thursday Friday Saturday   1     2     3     4     5     6     7       8     9     10     11     [facility] D[facility]    6:30 AM   (15 min.)    [facility] LAB DRAW   [facility] Lab Draw     [facility] ONC INFUSION 360    7:00  AM   (360 min.)    ONCOLOGY INFUSION   [facility] 12     13     14       15     16     17     18     19     20     21       22     23     24     25     [facility] LAB DRAW    6:30 AM   (15 min.)   [facility] LAB DRAW   [facility] Lab Draw     [facility] ONC INFUSION 360    7:00 AM   (360 min.)    ONCOLOGY INFUSION   [facility] 26     27     28       29     30                                           Lab Results:  Recent Results (from the past 12 hour(s))   CBC with platelets differential    Collection Time: 03/28/18  6:35 AM   Result Value Ref Range    WBC 4.5 4.0 - 11.0 10e9/L    RBC Count 3.33 (L) 4.4 - 5.9 10e12/L    Hemoglobin 11.1 (L) 13.3 - 17.7 g/dL    Hematocrit 33.1 (L) 40.0 - 53.0 %    MCV 99 78 - 100 fl    MCH 33.3 (H) 26.5 - 33.0 pg    MCHC 33.5 31.5 - 36.5 g/dL    RDW 18.6 (H) 10.0 - 15.0 %    Platelet Count 89 (L) 150 - 450 10e9/L    Diff Method Automated Method     % Neutrophils 50.1 %    % Lymphocytes 32.2 %    % Monocytes 15.5 %    % Eosinophils 1.6 %    % Basophils 0.4 %    % Immature Granulocytes 0.2 %    Nucleated RBCs 0 0 /100    Absolute Neutrophil 2.3 1.6 - 8.3 10e9/L    Absolute Lymphocytes 1.5 0.8 - 5.3 10e9/L    Absolute Monocytes 0.7 0.0 - 1.3 10e9/L    Absolute Eosinophils 0.1 0.0 - 0.7 10e9/L    Absolute Basophils 0.0 0.0 - 0.2 10e9/L    Abs Immature Granulocytes 0.0 0 - 0.4 10e9/L    Absolute Nucleated RBC 0.0    Comprehensive metabolic panel    Collection Time: 03/28/18  6:35 AM   Result Value Ref Range    Sodium 141 133 - 144 mmol/L    Potassium 4.4 3.4 - 5.3 mmol/L    Chloride 109 94 - 109 mmol/L    Carbon Dioxide 23 20 - 32 mmol/L    Anion Gap 8 3 - 14 mmol/L    Glucose 143 (H) 70 - 99 mg/dL    Urea Nitrogen 17 7 - 30 mg/dL    Creatinine 0.95 0.66 - 1.25 mg/dL    GFR Estimate 80 >60 mL/min/1.7m2    GFR Estimate If Black >90 >60 mL/min/1.7m2    Calcium 8.4 (L) 8.5 - 10.1 mg/dL    Bilirubin Total 0.6 0.2 - 1.3 mg/dL    Albumin 3.5 3.4 - 5.0  g/dL    Protein Total 6.3 (L) 6.8 - 8.8 g/dL    Alkaline Phosphatase 61 40 - 150 U/L    ALT 30 0 - 70 U/L    AST 23 0 - 45 U/L   Magnesium    Collection Time: 03/28/18  6:35 AM   Result Value Ref Range    Magnesium 1.8 1.6 - 2.3 mg/dL

## 2018-04-11 ENCOUNTER — APPOINTMENT (OUTPATIENT)
Dept: LAB | Facility: CLINIC | Age: 63
End: 2018-04-11
Attending: INTERNAL MEDICINE
Payer: COMMERCIAL

## 2018-04-11 ENCOUNTER — INFUSION THERAPY VISIT (OUTPATIENT)
Dept: ONCOLOGY | Facility: CLINIC | Age: 63
End: 2018-04-11
Attending: INTERNAL MEDICINE
Payer: COMMERCIAL

## 2018-04-11 VITALS
SYSTOLIC BLOOD PRESSURE: 106 MMHG | DIASTOLIC BLOOD PRESSURE: 65 MMHG | OXYGEN SATURATION: 95 % | BODY MASS INDEX: 32.14 KG/M2 | WEIGHT: 224 LBS | TEMPERATURE: 97.9 F | HEART RATE: 57 BPM | RESPIRATION RATE: 18 BRPM

## 2018-04-11 DIAGNOSIS — C22.1 CHOLANGIOCARCINOMA (H): Primary | ICD-10-CM

## 2018-04-11 LAB
ALBUMIN SERPL-MCNC: 3.6 G/DL (ref 3.4–5)
ALP SERPL-CCNC: 64 U/L (ref 40–150)
ALT SERPL W P-5'-P-CCNC: 29 U/L (ref 0–70)
ANION GAP SERPL CALCULATED.3IONS-SCNC: 6 MMOL/L (ref 3–14)
AST SERPL W P-5'-P-CCNC: 17 U/L (ref 0–45)
BASOPHILS # BLD AUTO: 0 10E9/L (ref 0–0.2)
BASOPHILS NFR BLD AUTO: 0.4 %
BILIRUB SERPL-MCNC: 0.6 MG/DL (ref 0.2–1.3)
BUN SERPL-MCNC: 23 MG/DL (ref 7–30)
CALCIUM SERPL-MCNC: 8.2 MG/DL (ref 8.5–10.1)
CHLORIDE SERPL-SCNC: 109 MMOL/L (ref 94–109)
CO2 SERPL-SCNC: 25 MMOL/L (ref 20–32)
CREAT SERPL-MCNC: 1.04 MG/DL (ref 0.66–1.25)
DIFFERENTIAL METHOD BLD: ABNORMAL
EOSINOPHIL # BLD AUTO: 0.1 10E9/L (ref 0–0.7)
EOSINOPHIL NFR BLD AUTO: 2 %
ERYTHROCYTE [DISTWIDTH] IN BLOOD BY AUTOMATED COUNT: 16.8 % (ref 10–15)
GFR SERPL CREATININE-BSD FRML MDRD: 73 ML/MIN/1.7M2
GLUCOSE SERPL-MCNC: 114 MG/DL (ref 70–99)
HCT VFR BLD AUTO: 32 % (ref 40–53)
HGB BLD-MCNC: 10.9 G/DL (ref 13.3–17.7)
IMM GRANULOCYTES # BLD: 0 10E9/L (ref 0–0.4)
IMM GRANULOCYTES NFR BLD: 0.2 %
LYMPHOCYTES # BLD AUTO: 1.6 10E9/L (ref 0.8–5.3)
LYMPHOCYTES NFR BLD AUTO: 34.9 %
MAGNESIUM SERPL-MCNC: 1.6 MG/DL (ref 1.6–2.3)
MCH RBC QN AUTO: 34.5 PG (ref 26.5–33)
MCHC RBC AUTO-ENTMCNC: 34.1 G/DL (ref 31.5–36.5)
MCV RBC AUTO: 101 FL (ref 78–100)
MONOCYTES # BLD AUTO: 0.9 10E9/L (ref 0–1.3)
MONOCYTES NFR BLD AUTO: 18.5 %
NEUTROPHILS # BLD AUTO: 2 10E9/L (ref 1.6–8.3)
NEUTROPHILS NFR BLD AUTO: 44 %
NRBC # BLD AUTO: 0 10*3/UL
NRBC BLD AUTO-RTO: 0 /100
PLATELET # BLD AUTO: 104 10E9/L (ref 150–450)
POTASSIUM SERPL-SCNC: 4.3 MMOL/L (ref 3.4–5.3)
PROT SERPL-MCNC: 6.4 G/DL (ref 6.8–8.8)
RBC # BLD AUTO: 3.16 10E12/L (ref 4.4–5.9)
SODIUM SERPL-SCNC: 140 MMOL/L (ref 133–144)
WBC # BLD AUTO: 4.6 10E9/L (ref 4–11)

## 2018-04-11 PROCEDURE — 83735 ASSAY OF MAGNESIUM: CPT | Performed by: INTERNAL MEDICINE

## 2018-04-11 PROCEDURE — 25000128 H RX IP 250 OP 636: Mod: ZF | Performed by: INTERNAL MEDICINE

## 2018-04-11 PROCEDURE — 96417 CHEMO IV INFUS EACH ADDL SEQ: CPT

## 2018-04-11 PROCEDURE — 85025 COMPLETE CBC W/AUTO DIFF WBC: CPT | Performed by: INTERNAL MEDICINE

## 2018-04-11 PROCEDURE — 96413 CHEMO IV INFUSION 1 HR: CPT

## 2018-04-11 PROCEDURE — 96367 TX/PROPH/DG ADDL SEQ IV INF: CPT

## 2018-04-11 PROCEDURE — 96375 TX/PRO/DX INJ NEW DRUG ADDON: CPT

## 2018-04-11 PROCEDURE — 80053 COMPREHEN METABOLIC PANEL: CPT | Performed by: INTERNAL MEDICINE

## 2018-04-11 RX ORDER — HEPARIN SODIUM (PORCINE) LOCK FLUSH IV SOLN 100 UNIT/ML 100 UNIT/ML
5 SOLUTION INTRAVENOUS DAILY PRN
Status: DISCONTINUED | OUTPATIENT
Start: 2018-04-11 | End: 2018-04-11 | Stop reason: HOSPADM

## 2018-04-11 RX ORDER — HEPARIN SODIUM (PORCINE) LOCK FLUSH IV SOLN 100 UNIT/ML 100 UNIT/ML
5 SOLUTION INTRAVENOUS EVERY 8 HOURS
Status: DISCONTINUED | OUTPATIENT
Start: 2018-04-11 | End: 2018-04-11 | Stop reason: HOSPADM

## 2018-04-11 RX ORDER — ONDANSETRON 2 MG/ML
8 INJECTION INTRAMUSCULAR; INTRAVENOUS ONCE
Status: COMPLETED | OUTPATIENT
Start: 2018-04-11 | End: 2018-04-11

## 2018-04-11 RX ADMIN — DEXAMETHASONE SODIUM PHOSPHATE: 10 INJECTION, SOLUTION INTRAMUSCULAR; INTRAVENOUS at 07:32

## 2018-04-11 RX ADMIN — MAGNESIUM SULFATE HEPTAHYDRATE: 500 INJECTION, SOLUTION INTRAMUSCULAR; INTRAVENOUS at 07:41

## 2018-04-11 RX ADMIN — SODIUM CHLORIDE, PRESERVATIVE FREE 5 ML: 5 INJECTION INTRAVENOUS at 06:53

## 2018-04-11 RX ADMIN — GEMCITABINE 2200 MG: 38 INJECTION, SOLUTION INTRAVENOUS at 09:35

## 2018-04-11 RX ADMIN — SODIUM CHLORIDE 250 ML: 9 INJECTION, SOLUTION INTRAVENOUS at 07:19

## 2018-04-11 RX ADMIN — ONDANSETRON 8 MG: 2 INJECTION INTRAMUSCULAR; INTRAVENOUS at 07:32

## 2018-04-11 RX ADMIN — CISPLATIN 60 MG: 1 INJECTION, SOLUTION INTRAVENOUS at 08:32

## 2018-04-11 RX ADMIN — SODIUM CHLORIDE, PRESERVATIVE FREE 5 ML: 5 INJECTION INTRAVENOUS at 10:06

## 2018-04-11 ASSESSMENT — PAIN SCALES - GENERAL: PAINLEVEL: NO PAIN (0)

## 2018-04-11 NOTE — NURSING NOTE
Port accessed by RN, pt tolerated well. Labs collected and sent, line flushed with NS and heparin. Vitals taken and pt checked in for next appt.   Ana Luisa Plaza

## 2018-04-11 NOTE — MR AVS SNAPSHOT
After Visit Summary   4/11/2018    Girish Chauhan    MRN: [id number]           Patient Information     Date Of Birth          1955        Visit Information        Provider Department      4/11/2018 7:00 AM  12 ATC;  ONCOLOGY INFUSION [facility]        Today's Diagnoses     Cholangiocarcinoma (H)    -  1      Care Instructions    Contact Numbers    [facility] [place] [phone]  [facility] [place]:  [phone]    Call triage with chills and/or temperature greater than or equal to 100.5, uncontrolled nausea/vomiting, diarrhea, constipation, dizziness, shortness of breath, chest pain, bleeding, unexplained bruising, or any new/concerning symptoms, questions/concerns.     If you are having any concerning symptoms or wish to speak to a provider before your next infusion visit, please call your care coordinator or triage to notify them so we can adequately serve you.       After Hours: [phone]    If after hours, weekends, or holidays, call main hospital  and ask for Oncology doctor on call.         April 2018 Sunday Monday Tuesday Wednesday Thursday Friday Saturday   1     2     3     4     5     6     7       8     9     10     11     [facility]    6:30 AM   (15 min.)    [facility]   [facility] Lab Draw     [facility] ONC INFUSION 360    7:00 AM   (360 min.)    ONCOLOGY INFUSION   [facility] 12     13     14       15     16     17     18     19     20     21       22     23     24     25     [facility]    6:30 AM   (15 min.)    [facility]   [facility] Lab Draw     [facility] ON[facility] INFUSION 360    7:00 AM   (360 min.)    ONCOLOGY INFUSION   [facility] 26     27     28       29     30                                         May 2018   Anurag Monday Tuesday Wednesday Thursday Friday Saturday             1     2     3     4     5       6     7     8     9     [facility]    6:30 AM   (15  min.)   [facility] LAB DRAW   [facility] Lab Draw     [facility] ONC INFUSION 360    7:00 AM   (360 min.)    ONCOLOGY INFUSION   [facility] 10     11     12       13     14     15     16     17     18     19       20     21     22     23     [facility] LAB DRAW    6:30 AM   (15 min.)   [facility]   [facility] Lab Draw     [facility] ONC INFUSION 360    7:00 AM   (360 min.)    ONCOLOGY INFUSION   [facility] 24     25     26       27     28     29     30     31                            Lab Results:  Recent Results (from the past 12 hour(s))   CBC with platelets differential    Collection Time: 04/11/18  6:56 AM   Result Value Ref Range    WBC 4.6 4.0 - 11.0 10e9/L    RBC Count 3.16 (L) 4.4 - 5.9 10e12/L    Hemoglobin 10.9 (L) 13.3 - 17.7 g/dL    Hematocrit 32.0 (L) 40.0 - 53.0 %     (H) 78 - 100 fl    MCH 34.5 (H) 26.5 - 33.0 pg    MCHC 34.1 31.5 - 36.5 g/dL    RDW 16.8 (H) 10.0 - 15.0 %    Platelet Count 104 (L) 150 - 450 10e9/L    Diff Method Automated Method     % Neutrophils 44.0 %    % Lymphocytes 34.9 %    % Monocytes 18.5 %    % Eosinophils 2.0 %    % Basophils 0.4 %    % Immature Granulocytes 0.2 %    Nucleated RBCs 0 0 /100    Absolute Neutrophil 2.0 1.6 - 8.3 10e9/L    Absolute Lymphocytes 1.6 0.8 - 5.3 10e9/L    Absolute Monocytes 0.9 0.0 - 1.3 10e9/L    Absolute Eosinophils 0.1 0.0 - 0.7 10e9/L    Absolute Basophils 0.0 0.0 - 0.2 10e9/L    Abs Immature Granulocytes 0.0 0 - 0.4 10e9/L    Absolute Nucleated RBC 0.0    Comprehensive metabolic panel    Collection Time: 04/11/18  6:56 AM   Result Value Ref Range    Sodium 140 133 - 144 mmol/L    Potassium 4.3 3.4 - 5.3 mmol/L    Chloride 109 94 - 109 mmol/L    Carbon Dioxide 25 20 - 32 mmol/L    Anion Gap 6 3 - 14 mmol/L    Glucose 114 (H) 70 - 99 mg/dL    Urea Nitrogen 23 7 - 30 mg/dL    Creatinine 1.04 0.66 - 1.25 mg/dL    GFR Estimate 73 >60 mL/min/1.7m2    GFR Estimate If Black 88 >60 mL/min/1.7m2     Calcium 8.2 (L) 8.5 - 10.1 mg/dL    Bilirubin Total 0.6 0.2 - 1.3 mg/dL    Albumin 3.6 3.4 - 5.0 g/dL    Protein Total 6.4 (L) 6.8 - 8.8 g/dL    Alkaline Phosphatase 64 40 - 150 U/L    ALT 29 0 - 70 U/L    AST 17 0 - 45 U/L   Magnesium    Collection Time: 04/11/18  6:56 AM   Result Value Ref Range    Magnesium 1.6 1.6 - 2.3 mg/dL               Follow-ups after your visit        Your next 10 appointments already scheduled     Apr 25, 2018  6:30 AM CDT   [facility] with [facility]   [facility] ([facility])    [address]  [address]  [place] [address]   [phone]            Apr 25, 2018  7:00 AM CDT   Infusion 360 with [facility] ONCOLOGY INFUSION, UC 10 ATC   [facility] ([facility])    [address]  [address]  [place] [address]   [phone]            May 09, 2018  6:30 AM CDT   [facility]   [facility] ([facility])    [address]  [address]  [place] [address]   [phone]            May 09, 2018  7:00 AM CDT   Infusion 360 with UC ONCOLOGY INFUSION, UC 10 ATC   [facility] ([facility])    [address]  [address]  [place] [address]   [phone]            May 23, 2018  6:30 AM CDT   [facility] with [facility]   [facility] ([facility])    [address]  [address]  [place] [address]   [phone]            May 23, 2018  7:00 AM CDT   Infusion 360 with UC ONCOLOGY INFUSION, UC 11 ATC   [facility] ([facility])    [address]  [address]  [place] [address]   [phone]            Jun 06, 2018  6:30 AM CDT   [facility]   [facility] ([facility]    [address]  [address]  [place] [address] [phone]              Who to contact     If you have questions or need follow up information about today's clinic visit or your schedule please contact [facility] directly at [phone].  Normal or non-critical lab and imaging results will be communicated to you by MyChart, letter or phone within 4 business days after the clinic has received the results. If you do not hear from us within 7 days, please contact the clinic through MyChart or phone. If you have a critical or abnormal lab result, we will notify you by phone as soon as possible.  Submit refill requests through "[employer] or call your pharmacy and they will forward the refill request to us. Please allow 3 business days for your refill to be completed.          Additional Information About Your Visit        MyChart Information     "[employer] gives you secure access to your electronic health record. If you see a primary care provider, you can also send messages to your care team and make appointments. If you have questions, please call your primary care clinic.  If you do not have a primary care provider, please call [phone] and they will assist you.        Care EveryWhere ID     This is your Care EveryWhere ID. This could be used by other organizations to access your [place] medical records  [id number]        Your Vitals Were     Pulse Temperature Respirations Pulse Oximetry BMI (Body Mass Index)       57 97.9  F (36.6  C) (Oral) 18 95% 32.14 kg/m2        Blood Pressure from Last 3 Encounters:   04/11/18 106/65   03/28/18 101/70   03/26/18 113/77    Weight from Last 3 Encounters:   04/11/18 101.6 kg (224 lb)   03/28/18 103.3 kg (227 lb 11.2 oz)   03/26/18 103.4 kg (228 lb)              We Performed the Following     CBC with platelets differential     Comprehensive metabolic panel     Magnesium        Primary Care Provider Office Phone # Fax #    Jim CHAMBERLAIN  MD Eusebio [phone]       [facility] [address]   [place] [address]        Equal Access to Services     MARIUSZ MADRIGAL : Robyn Brizuela, charleeadenike buregr, lb kasofiada eduardakeith, waxalla rodríguezin hayaacarmen lernerevan nadeemalex kwabena gross. So [facility] [phone].    ATENCIÓN: Si habla español, tiene a flores disposición servicios gratuitos de asistencia lingüística. Llame al [phone].    We comply with applicable federal civil rights laws and [place] laws. We do not discriminate on the basis of race, color, national origin, age, disability, sex, sexual orientation, or gender identity.            Thank you!     Thank you for choosing [facility]  for your care. Our goal is always to provide you with excellent care. Hearing back from our patients is one way we can continue to improve our services. Please take a few minutes to complete the written survey that you may receive in the mail after your visit with us. Thank you!             Your Updated Medication List - Protect others around you: Learn how to safely use, store and throw away your medicines at www.disposemymeds.org.          This list is accurate as of 4/11/18 10:18 AM.  Always use your most recent med list.                   Brand Name Dispense Instructions for use Diagnosis    atorvastatin 40 MG tablet    LIPITOR     TAKE 1 TABLET BY MOUTH DAILY        CISplatin 100 MG/100ML Soln injection CHEMO    PLATINOL          ELIQUIS PO      Take 5 mg by mouth 2 times daily        gemcitabine 1 GM injection    GEMZAR          LORazepam 0.5 MG tablet    ATIVAN    30 tablet    Take 1 tablet (0.5 mg) by mouth every 4 hours as needed (Anxiety, Nausea/Vomiting or Sleep)    Cholangiocarcinoma (H)       metoprolol tartrate 50 MG tablet    LOPRESSOR     50 mg 2 times daily        MITIGARE 0.6 MG Caps   Generic drug:  Colchicine      Take 0.6 mg by mouth 3 times daily as needed        multivitamin, therapeutic with  minerals Tabs tablet     30 tablet    Take 1 tablet by mouth daily    Mass of bile duct       NITROSTAT SL      Place 0.4 mg under the tongue every 5 minutes as needed for chest pain (Carries medication - has never used)        OMEGA-3 FISH OIL PO      Take 1,000 mg by mouth daily        ondansetron 8 MG tablet    ZOFRAN    10 tablet    Take 1 tablet (8 mg) by mouth every 8 hours as needed (Nausea/Vomiting)    Cholangiocarcinoma (H)       prochlorperazine 10 MG tablet    COMPAZINE    30 tablet    Take 1 tablet (10 mg) by mouth every 6 hours as needed (Nausea/Vomiting)    Cholangiocarcinoma (H)

## 2018-04-11 NOTE — PROGRESS NOTES
Infusion Nursing Note:  Girish Chauhan presents today for Day 15 Cycle 5 Cisplatin/Gemzar.    Patient seen by provider today: No   present during visit today: Not Applicable.    Note: Patient states he feels well overall with no new complaints.    Intravenous Access:  Implanted Port.    Treatment Conditions:  Lab Results   Component Value Date    HGB 10.9 04/11/2018     Lab Results   Component Value Date    WBC 4.6 04/11/2018      Lab Results   Component Value Date    ANEU 2.0 04/11/2018     Lab Results   Component Value Date     04/11/2018      Lab Results   Component Value Date     04/11/2018                   Lab Results   Component Value Date    POTASSIUM 4.3 04/11/2018           Lab Results   Component Value Date    MAG 1.6 04/11/2018            Lab Results   Component Value Date    CR 1.04 04/11/2018                   Lab Results   Component Value Date    GARY 8.2 04/11/2018                Lab Results   Component Value Date    BILITOTAL 0.6 04/11/2018           Lab Results   Component Value Date    ALBUMIN 3.6 04/11/2018                    Lab Results   Component Value Date    ALT 29 04/11/2018           Lab Results   Component Value Date    AST 17 04/11/2018       Results reviewed, labs MET treatment parameters, ok to proceed with treatment.      Post Infusion Assessment:  Patient tolerated infusion without incident.  Blood return noted pre and post infusion.  Site patent and intact, free from redness, edema or discomfort.  No evidence of extravasations.  Access discontinued per protocol.    Discharge Plan:   Patient declined prescription refills.  Patient was able to void before, during, and after Cisplatin infusion.  Discharge instructions reviewed with: Patient.  Patient and/or family verbalized understanding of discharge instructions and all questions answered.  AVS to patient via [employer].  Patient will return 4/25/18 for next appointment.   Patient discharged in stable condition  accompanied by: self.  Departure Mode: Ambulatory.  Face to Face time: 0 minutes.    Lowell Robert RN

## 2018-04-11 NOTE — PATIENT INSTRUCTIONS
Contact Numbers    [facility] [place] [phone]  [facility] [place]:  [phone]    Call triage with chills and/or temperature greater than or equal to 100.5, uncontrolled nausea/vomiting, diarrhea, constipation, dizziness, shortness of breath, chest pain, bleeding, unexplained bruising, or any new/concerning symptoms, questions/concerns.     If you are having any concerning symptoms or wish to speak to a provider before your next infusion visit, please call your care coordinator or triage to notify them so we can adequately serve you.       After Hours: [phone]    If after hours, weekends, or holidays, call main hospital  and ask for Oncology doctor on call.         April 2018 Sunday Monday Tuesday Wednesday Thursday Friday Saturday   1     2     3     4     5     6     7       8     9     10     11     [facility]    6:30 AM   (15 min.)    [facility] DRAW   [facility] Draw     [facility] ONC INFUSION 360    7:00 AM   (360 min.)    ONCOLOGY INFUSION   [facility] 12     13     14       15     16     17     18     19     20     21       22     23     24     25     [facility]    6:30 AM   (15 min.)    [facility] DRAW   [facility] Draw     [facility] ONC INFUSION 360    7:00 AM   (360 min.)    ONCOLOGY INFUSION   [facility] 26     27     28       29     30                                         May 2018   Anurag Monday Tuesday Wednesday Thursday Friday Saturday             1     2     3     4     5       6     7     8     9     [facility]    6:30 AM   (15 min.)    [facility] DRAW   [facility] Draw     [facility] ONC INFUSION 360    7:00 AM   (360 min.)    ONCOLOGY INFUSION   [facility] 10     11     12       13     14     15     16     17     18     19       20     21     22     23     [facility]    6:30 AM   (15 min.)    [facility] DRAW   [facility] Draw     [facility] INFUSION  360    7:00 AM   (360 min.)    ONCOLOGY [facility] 24     25     26       27     28     29     30     31                            Lab Results:  Recent Results (from the past 12 hour(s))   CBC with platelets differential    Collection Time: 04/11/18  6:56 AM   Result Value Ref Range    WBC 4.6 4.0 - 11.0 10e9/L    RBC Count 3.16 (L) 4.4 - 5.9 10e12/L    Hemoglobin 10.9 (L) 13.3 - 17.7 g/dL    Hematocrit 32.0 (L) 40.0 - 53.0 %     (H) 78 - 100 fl    MCH 34.5 (H) 26.5 - 33.0 pg    MCHC 34.1 31.5 - 36.5 g/dL    RDW 16.8 (H) 10.0 - 15.0 %    Platelet Count 104 (L) 150 - 450 10e9/L    Diff Method Automated Method     % Neutrophils 44.0 %    % Lymphocytes 34.9 %    % Monocytes 18.5 %    % Eosinophils 2.0 %    % Basophils 0.4 %    % Immature Granulocytes 0.2 %    Nucleated RBCs 0 0 /100    Absolute Neutrophil 2.0 1.6 - 8.3 10e9/L    Absolute Lymphocytes 1.6 0.8 - 5.3 10e9/L    Absolute Monocytes 0.9 0.0 - 1.3 10e9/L    Absolute Eosinophils 0.1 0.0 - 0.7 10e9/L    Absolute Basophils 0.0 0.0 - 0.2 10e9/L    Abs Immature Granulocytes 0.0 0 - 0.4 10e9/L    Absolute Nucleated RBC 0.0    Comprehensive metabolic panel    Collection Time: 04/11/18  6:56 AM   Result Value Ref Range    Sodium 140 133 - 144 mmol/L    Potassium 4.3 3.4 - 5.3 mmol/L    Chloride 109 94 - 109 mmol/L    Carbon Dioxide 25 20 - 32 mmol/L    Anion Gap 6 3 - 14 mmol/L    Glucose 114 (H) 70 - 99 mg/dL    Urea Nitrogen 23 7 - 30 mg/dL    Creatinine 1.04 0.66 - 1.25 mg/dL    GFR Estimate 73 >60 mL/min/1.7m2    GFR Estimate If Black 88 >60 mL/min/1.7m2    Calcium 8.2 (L) 8.5 - 10.1 mg/dL    Bilirubin Total 0.6 0.2 - 1.3 mg/dL    Albumin 3.6 3.4 - 5.0 g/dL    Protein Total 6.4 (L) 6.8 - 8.8 g/dL    Alkaline Phosphatase 64 40 - 150 U/L    ALT 29 0 - 70 U/L    AST 17 0 - 45 U/L   Magnesium    Collection Time: 04/11/18  6:56 AM   Result Value Ref Range    Magnesium 1.6 1.6 - 2.3 mg/dL

## 2018-04-24 RX ORDER — ALBUTEROL SULFATE 90 UG/1
1-2 AEROSOL, METERED RESPIRATORY (INHALATION)
Status: CANCELLED
Start: 2018-04-25

## 2018-04-24 RX ORDER — LORAZEPAM 2 MG/ML
0.5 INJECTION INTRAMUSCULAR EVERY 4 HOURS PRN
Status: CANCELLED
Start: 2018-05-09

## 2018-04-24 RX ORDER — MEPERIDINE HYDROCHLORIDE 25 MG/ML
25 INJECTION INTRAMUSCULAR; INTRAVENOUS; SUBCUTANEOUS EVERY 30 MIN PRN
Status: CANCELLED | OUTPATIENT
Start: 2018-05-09

## 2018-04-24 RX ORDER — LORAZEPAM 2 MG/ML
0.5 INJECTION INTRAMUSCULAR EVERY 4 HOURS PRN
Status: CANCELLED
Start: 2018-04-25

## 2018-04-24 RX ORDER — SODIUM CHLORIDE 9 MG/ML
1000 INJECTION, SOLUTION INTRAVENOUS CONTINUOUS PRN
Status: CANCELLED
Start: 2018-04-25

## 2018-04-24 RX ORDER — EPINEPHRINE 1 MG/ML
0.3 INJECTION, SOLUTION, CONCENTRATE INTRAVENOUS EVERY 5 MIN PRN
Status: CANCELLED | OUTPATIENT
Start: 2018-05-09

## 2018-04-24 RX ORDER — ALBUTEROL SULFATE 90 UG/1
1-2 AEROSOL, METERED RESPIRATORY (INHALATION)
Status: CANCELLED
Start: 2018-05-09

## 2018-04-24 RX ORDER — HEPARIN SODIUM (PORCINE) LOCK FLUSH IV SOLN 100 UNIT/ML 100 UNIT/ML
5 SOLUTION INTRAVENOUS EVERY 8 HOURS
Status: CANCELLED
Start: 2018-04-25

## 2018-04-24 RX ORDER — EPINEPHRINE 0.3 MG/.3ML
0.3 INJECTION SUBCUTANEOUS EVERY 5 MIN PRN
Status: CANCELLED | OUTPATIENT
Start: 2018-04-25

## 2018-04-24 RX ORDER — ALBUTEROL SULFATE 0.83 MG/ML
2.5 SOLUTION RESPIRATORY (INHALATION)
Status: CANCELLED | OUTPATIENT
Start: 2018-04-25

## 2018-04-24 RX ORDER — HEPARIN SODIUM (PORCINE) LOCK FLUSH IV SOLN 100 UNIT/ML 100 UNIT/ML
5 SOLUTION INTRAVENOUS EVERY 8 HOURS
Status: CANCELLED
Start: 2018-05-09

## 2018-04-24 RX ORDER — ALBUTEROL SULFATE 0.83 MG/ML
2.5 SOLUTION RESPIRATORY (INHALATION)
Status: CANCELLED | OUTPATIENT
Start: 2018-05-09

## 2018-04-24 RX ORDER — METHYLPREDNISOLONE SODIUM SUCCINATE 125 MG/2ML
125 INJECTION, POWDER, LYOPHILIZED, FOR SOLUTION INTRAMUSCULAR; INTRAVENOUS
Status: CANCELLED
Start: 2018-04-25

## 2018-04-24 RX ORDER — EPINEPHRINE 1 MG/ML
0.3 INJECTION, SOLUTION, CONCENTRATE INTRAVENOUS EVERY 5 MIN PRN
Status: CANCELLED | OUTPATIENT
Start: 2018-04-25

## 2018-04-24 RX ORDER — EPINEPHRINE 0.3 MG/.3ML
0.3 INJECTION SUBCUTANEOUS EVERY 5 MIN PRN
Status: CANCELLED | OUTPATIENT
Start: 2018-05-09

## 2018-04-24 RX ORDER — METHYLPREDNISOLONE SODIUM SUCCINATE 125 MG/2ML
125 INJECTION, POWDER, LYOPHILIZED, FOR SOLUTION INTRAMUSCULAR; INTRAVENOUS
Status: CANCELLED
Start: 2018-05-09

## 2018-04-24 RX ORDER — SODIUM CHLORIDE 9 MG/ML
1000 INJECTION, SOLUTION INTRAVENOUS CONTINUOUS PRN
Status: CANCELLED
Start: 2018-05-09

## 2018-04-24 RX ORDER — MEPERIDINE HYDROCHLORIDE 25 MG/ML
25 INJECTION INTRAMUSCULAR; INTRAVENOUS; SUBCUTANEOUS EVERY 30 MIN PRN
Status: CANCELLED | OUTPATIENT
Start: 2018-04-25

## 2018-04-24 RX ORDER — DIPHENHYDRAMINE HYDROCHLORIDE 50 MG/ML
50 INJECTION INTRAMUSCULAR; INTRAVENOUS
Status: CANCELLED
Start: 2018-04-25

## 2018-04-24 RX ORDER — DIPHENHYDRAMINE HYDROCHLORIDE 50 MG/ML
50 INJECTION INTRAMUSCULAR; INTRAVENOUS
Status: CANCELLED
Start: 2018-05-09

## 2018-04-25 ENCOUNTER — INFUSION THERAPY VISIT (OUTPATIENT)
Dept: ONCOLOGY | Facility: CLINIC | Age: 63
End: 2018-04-25
Attending: INTERNAL MEDICINE
Payer: COMMERCIAL

## 2018-04-25 ENCOUNTER — APPOINTMENT (OUTPATIENT)
Dept: LAB | Facility: CLINIC | Age: 63
End: 2018-04-25
Attending: INTERNAL MEDICINE
Payer: COMMERCIAL

## 2018-04-25 VITALS
BODY MASS INDEX: 32.33 KG/M2 | DIASTOLIC BLOOD PRESSURE: 77 MMHG | HEART RATE: 66 BPM | OXYGEN SATURATION: 98 % | WEIGHT: 225.3 LBS | TEMPERATURE: 97.9 F | RESPIRATION RATE: 16 BRPM | SYSTOLIC BLOOD PRESSURE: 116 MMHG

## 2018-04-25 DIAGNOSIS — C22.1 CHOLANGIOCARCINOMA (H): Primary | ICD-10-CM

## 2018-04-25 LAB
ALBUMIN SERPL-MCNC: 3.7 G/DL (ref 3.4–5)
ALP SERPL-CCNC: 62 U/L (ref 40–150)
ALT SERPL W P-5'-P-CCNC: 28 U/L (ref 0–70)
ANION GAP SERPL CALCULATED.3IONS-SCNC: 9 MMOL/L (ref 3–14)
AST SERPL W P-5'-P-CCNC: 21 U/L (ref 0–45)
BASOPHILS # BLD AUTO: 0 10E9/L (ref 0–0.2)
BASOPHILS NFR BLD AUTO: 0.2 %
BILIRUB SERPL-MCNC: 0.7 MG/DL (ref 0.2–1.3)
BUN SERPL-MCNC: 16 MG/DL (ref 7–30)
CALCIUM SERPL-MCNC: 8.2 MG/DL (ref 8.5–10.1)
CHLORIDE SERPL-SCNC: 110 MMOL/L (ref 94–109)
CO2 SERPL-SCNC: 22 MMOL/L (ref 20–32)
CREAT SERPL-MCNC: 1.07 MG/DL (ref 0.66–1.25)
DIFFERENTIAL METHOD BLD: ABNORMAL
EOSINOPHIL # BLD AUTO: 0.1 10E9/L (ref 0–0.7)
EOSINOPHIL NFR BLD AUTO: 1.4 %
ERYTHROCYTE [DISTWIDTH] IN BLOOD BY AUTOMATED COUNT: 16.3 % (ref 10–15)
GFR SERPL CREATININE-BSD FRML MDRD: 70 ML/MIN/1.7M2
GLUCOSE SERPL-MCNC: 105 MG/DL (ref 70–99)
HCT VFR BLD AUTO: 31.5 % (ref 40–53)
HGB BLD-MCNC: 10.5 G/DL (ref 13.3–17.7)
IMM GRANULOCYTES # BLD: 0 10E9/L (ref 0–0.4)
IMM GRANULOCYTES NFR BLD: 0.2 %
LYMPHOCYTES # BLD AUTO: 1.3 10E9/L (ref 0.8–5.3)
LYMPHOCYTES NFR BLD AUTO: 20.1 %
MAGNESIUM SERPL-MCNC: 1.8 MG/DL (ref 1.6–2.3)
MCH RBC QN AUTO: 34.3 PG (ref 26.5–33)
MCHC RBC AUTO-ENTMCNC: 33.3 G/DL (ref 31.5–36.5)
MCV RBC AUTO: 103 FL (ref 78–100)
MONOCYTES # BLD AUTO: 1.1 10E9/L (ref 0–1.3)
MONOCYTES NFR BLD AUTO: 17.5 %
NEUTROPHILS # BLD AUTO: 3.8 10E9/L (ref 1.6–8.3)
NEUTROPHILS NFR BLD AUTO: 60.6 %
NRBC # BLD AUTO: 0 10*3/UL
NRBC BLD AUTO-RTO: 0 /100
PLATELET # BLD AUTO: 112 10E9/L (ref 150–450)
POTASSIUM SERPL-SCNC: 4.3 MMOL/L (ref 3.4–5.3)
PROT SERPL-MCNC: 6.3 G/DL (ref 6.8–8.8)
RBC # BLD AUTO: 3.06 10E12/L (ref 4.4–5.9)
SODIUM SERPL-SCNC: 141 MMOL/L (ref 133–144)
WBC # BLD AUTO: 6.2 10E9/L (ref 4–11)

## 2018-04-25 PROCEDURE — 83735 ASSAY OF MAGNESIUM: CPT | Performed by: INTERNAL MEDICINE

## 2018-04-25 PROCEDURE — 25000128 H RX IP 250 OP 636: Mod: ZF | Performed by: INTERNAL MEDICINE

## 2018-04-25 PROCEDURE — 80053 COMPREHEN METABOLIC PANEL: CPT | Performed by: INTERNAL MEDICINE

## 2018-04-25 PROCEDURE — 96413 CHEMO IV INFUSION 1 HR: CPT

## 2018-04-25 PROCEDURE — 96367 TX/PROPH/DG ADDL SEQ IV INF: CPT

## 2018-04-25 PROCEDURE — 85025 COMPLETE CBC W/AUTO DIFF WBC: CPT | Performed by: INTERNAL MEDICINE

## 2018-04-25 PROCEDURE — 96417 CHEMO IV INFUS EACH ADDL SEQ: CPT

## 2018-04-25 RX ORDER — HEPARIN SODIUM (PORCINE) LOCK FLUSH IV SOLN 100 UNIT/ML 100 UNIT/ML
5 SOLUTION INTRAVENOUS EVERY 8 HOURS
Status: DISCONTINUED | OUTPATIENT
Start: 2018-04-25 | End: 2018-04-25 | Stop reason: HOSPADM

## 2018-04-25 RX ADMIN — DEXAMETHASONE SODIUM PHOSPHATE: 10 INJECTION, SOLUTION INTRAMUSCULAR; INTRAVENOUS at 08:32

## 2018-04-25 RX ADMIN — MAGNESIUM SULFATE HEPTAHYDRATE: 500 INJECTION, SOLUTION INTRAMUSCULAR; INTRAVENOUS at 07:37

## 2018-04-25 RX ADMIN — CISPLATIN 60 MG: 1 INJECTION, SOLUTION INTRAVENOUS at 09:03

## 2018-04-25 RX ADMIN — GEMCITABINE 2200 MG: 38 INJECTION, SOLUTION INTRAVENOUS at 10:06

## 2018-04-25 RX ADMIN — Medication 5 ML: at 10:42

## 2018-04-25 RX ADMIN — SODIUM CHLORIDE 250 ML: 9 INJECTION, SOLUTION INTRAVENOUS at 08:32

## 2018-04-25 ASSESSMENT — PAIN SCALES - GENERAL: PAINLEVEL: EXTREME PAIN (8)

## 2018-04-25 NOTE — PATIENT INSTRUCTIONS
Contact Numbers    [facility] [place]: [phone]  [facility] [place]:  [phone]    Call triage with chills and/or temperature greater than or equal to 100.5, uncontrolled nausea/vomiting, diarrhea, constipation, dizziness, shortness of breath, chest pain, bleeding, unexplained bruising, or any new/concerning symptoms, questions/concerns.     If you are having any concerning symptoms or wish to speak to a provider before your next infusion visit, please call your care coordinator or triage to notify them so we can adequately serve you.       After Hours: [phone]    If after hours, weekends, or holidays, call main hospital  and ask for Oncology doctor on call.         April 2018 Sunday Monday Tuesday Wednesday Thursday Friday Saturday   1     2     3     4     5     6     7       8     9     10     11     [facility]    6:30 AM   (15 min.)    [facility] LAB DRAW   [facility] Draw     [facility] ON[facility] INFUSION 360    7:00 AM   (360 min.)    ONCOLOGY INFUSION   [facility] 12     13     14       15     16     17     18     19     20     21       22     23     24     25     [facility]    6:30 AM   (15 min.)    [facility] DRAW   [facility] Draw     [facility] ON[facility] INFUSION 360    7:00 AM   (360 min.)    ONCOLOGY INFUSION   [facility] 26     27     28       29     30                                         May 2018   Anurag Monday Tuesday Wednesday Thursday Friday Saturday             1     2     3     4     5       6     7     8     9     [facility]    6:30 AM   (15 min.)    [facility] DRAW   [facility] Draw     [facility] INFUSION 360    7:00 AM   (360 min.)    ONCOLOGY INFUSION   [facility] 10     11     12       13     14     15     16     17     18     19       20     21     22     23     [facility]    6:30 AM   (15 min.)    [facility] DRAW   [facility] Draw     [facility] INFUSION  360    7:00 AM   (360 min.)    ONCOLOGY [facility] 24     25     26       27     28     29     30     31                            Lab Results:  Recent Results (from the past 12 hour(s))   CBC with platelets differential    Collection Time: 04/25/18  6:41 AM   Result Value Ref Range    WBC 6.2 4.0 - 11.0 10e9/L    RBC Count 3.06 (L) 4.4 - 5.9 10e12/L    Hemoglobin 10.5 (L) 13.3 - 17.7 g/dL    Hematocrit 31.5 (L) 40.0 - 53.0 %     (H) 78 - 100 fl    MCH 34.3 (H) 26.5 - 33.0 pg    MCHC 33.3 31.5 - 36.5 g/dL    RDW 16.3 (H) 10.0 - 15.0 %    Platelet Count 112 (L) 150 - 450 10e9/L    Diff Method Automated Method     % Neutrophils 60.6 %    % Lymphocytes 20.1 %    % Monocytes 17.5 %    % Eosinophils 1.4 %    % Basophils 0.2 %    % Immature Granulocytes 0.2 %    Nucleated RBCs 0 0 /100    Absolute Neutrophil 3.8 1.6 - 8.3 10e9/L    Absolute Lymphocytes 1.3 0.8 - 5.3 10e9/L    Absolute Monocytes 1.1 0.0 - 1.3 10e9/L    Absolute Eosinophils 0.1 0.0 - 0.7 10e9/L    Absolute Basophils 0.0 0.0 - 0.2 10e9/L    Abs Immature Granulocytes 0.0 0 - 0.4 10e9/L    Absolute Nucleated RBC 0.0

## 2018-04-25 NOTE — PROGRESS NOTES
Infusion Nursing Note:  Girish Chauhan presents today for Day 1 Cycle 6 Cisplatin, Gemzar.    Patient seen by provider today: No   present during visit today: Not Applicable.    Note: States he is feeling well overall; however, last evening he reports having a gout flare-up in his left big toe; taking Mitigare. Reported 8/10 pain in the lab; reported 6/10 pain in infusion. No other complaints.    Intravenous Access:  Implanted Port.    Treatment Conditions:  4/25/2018  Platelets: 112; ANC: 3.8; Creatinine: 1.1   Results reviewed are within treatment parameters - OK to proceed with treatment      Post Infusion Assessment:  Patient was able to void before, during, and after Cisplatin infusion.  Patient tolerated infusion without incident.  Blood return noted pre and post infusion.  Site patent and intact, free from redness, edema or discomfort.  No evidence of extravasations.  Access discontinued per protocol.    Discharge Plan:   Patient declined prescription refills.  Discharge instructions reviewed with: Patient.  Patient and/or family verbalized understanding of discharge instructions and all questions answered.  AVS to patient via [employer].  Patient will return 5/9/18 for next appointment.   Patient discharged in stable condition accompanied by: self.  Departure Mode: Ambulatory.  Face to Face time: 0 minutes.    Lowell Robert RN

## 2018-04-25 NOTE — MR AVS SNAPSHOT
After Visit Summary   4/25/2018    Girish Chauhan    MRN: [id number]           Patient Information     Date Of Birth          1955        Visit Information        Provider Department      4/25/2018 7:00 AM [place] 10 ATC;  ONCOLOGY INFUSION [facility]        Today's Diagnoses     Cholangiocarcinoma (H)    -  1      Care Instructions    Contact Numbers    [facility] Main Line: [phone]  [facility] Triage:  [phone]    Call triage with chills and/or temperature greater than or equal to 100.5, uncontrolled nausea/vomiting, diarrhea, constipation, dizziness, shortness of breath, chest pain, bleeding, unexplained bruising, or any new/concerning symptoms, questions/concerns.     If you are having any concerning symptoms or wish to speak to a provider before your next infusion visit, please call your care coordinator or triage to notify them so we can adequately serve you.       After Hours: [phone]    If after hours, weekends, or holidays, call main hospital  and ask for Oncology doctor on call.         April 2018 Sunday Monday Tuesday Wednesday Thursday Friday Saturday   1     2     3     4     5     6     7       8     9     10     11     [facility]    6:30 AM   (15 min.)    [facility]   [facility] Lab Draw     [facility] ONC INFUSION 360    7:00 AM   (360 min.)    ONCOLOGY INFUSION   [facility] 12     13     14       15     16     17     18     19     20     21       22     23     24     25     [facility]    6:30 AM   (15 min.)    [facility]   [facility] Lab Draw     [facility] ONC INFUSION 360    7:00 AM   (360 min.)    ONCOLOGY INFUSION   [facility] 26     27     28       29     30                                         May 2018   Anurag Monday Tuesday Wednesday Thursday Friday Saturday             1     2     3     4     5       6     7     8     9     [facility]    6:30 AM   (15  min.)    [facility] DRAW   [facility] Draw     [facility] ONC INFUSION 360    7:00 AM   (360 min.)    ONCOLOGY INFUSION   [facility] 10     11     12       13     14     15     16     17     18     19       20     21     22     23     [facility] DRAW    6:30 AM   (15 min.)    [facility] DRAW   [facility] Lab Draw     [facility] ONC INFUSION 360    7:00 AM   (360 min.)    ONCOLOGY INFUSION   [facility] 24     25     26       27     28     29     30     31                            Lab Results:  Recent Results (from the past 12 hour(s))   CBC with platelets differential    Collection Time: 04/25/18  6:41 AM   Result Value Ref Range    WBC 6.2 4.0 - 11.0 10e9/L    RBC Count 3.06 (L) 4.4 - 5.9 10e12/L    Hemoglobin 10.5 (L) 13.3 - 17.7 g/dL    Hematocrit 31.5 (L) 40.0 - 53.0 %     (H) 78 - 100 fl    MCH 34.3 (H) 26.5 - 33.0 pg    MCHC 33.3 31.5 - 36.5 g/dL    RDW 16.3 (H) 10.0 - 15.0 %    Platelet Count 112 (L) 150 - 450 10e9/L    Diff Method Automated Method     % Neutrophils 60.6 %    % Lymphocytes 20.1 %    % Monocytes 17.5 %    % Eosinophils 1.4 %    % Basophils 0.2 %    % Immature Granulocytes 0.2 %    Nucleated RBCs 0 0 /100    Absolute Neutrophil 3.8 1.6 - 8.3 10e9/L    Absolute Lymphocytes 1.3 0.8 - 5.3 10e9/L    Absolute Monocytes 1.1 0.0 - 1.3 10e9/L    Absolute Eosinophils 0.1 0.0 - 0.7 10e9/L    Absolute Basophils 0.0 0.0 - 0.2 10e9/L    Abs Immature Granulocytes 0.0 0 - 0.4 10e9/L    Absolute Nucleated RBC 0.0                Follow-ups after your visit        Your next 10 appointments already scheduled     May 09, 2018  6:30 AM [facility]   [facility] Draw with  [facility] DRAW   [facility] Draw ([facility])    [address] [address] [phone]            May 09, 2018  7:00 AM CDT   Infusion 360 with UC ONCOLOGY INFUSION,  10 ATC   [facility] ([facility]  [facility])    [address] [facility]  [address]  [facility] [address]   [phone]            May 23, 2018  6:30 AM CDT   [facility] Draw with [facility] DRAW   [facility] Draw ([facility])    [address] [place] [address]  [address]  [facility] [address]   [phone]            May 23, 2018  7:00 AM CDT   Infusion 360 with UC ONCOLOGY INFUSION, UC 11 ATC   [facility] ([facility])    [address] [place] [address]  [address]  [facility] [address]   [phone]            Jun 06, 2018  6:30 AM CDT   [facility] Draw with [facility] DRAW   [facility] ([facility])    [address] [place] [address]  [address]  [facility] [address]   [phone]            Jun 06, 2018  7:00 AM CDT   Infusion 360 with UC ONCOLOGY INFUSION, UC 10 ATC   [facility] ([facility])    [address] [place] [address]  [address]  [facility] [address]   [phone]            Abhilash 15, 2018  6:30 AM CDT   [facility] Draw with [facility] DRAW   [facility] Draw ([facility])    [address] [place] [address]  [address]  [facility] [address]   [phone]              Who to contact     If you have questions or need follow up information about today's clinic visit or your schedule please contact [facility] directly at [phone].  Normal or non-critical lab and imaging results will be communicated to you by MyChart, letter or phone within 4 business days after the clinic has received the results. If you do not hear from us within 7 days, please contact the clinic through MyChart or phone. If you have a critical or abnormal lab result, we will notify you by phone as soon as possible.  Submit refill requests through [employer] or call your pharmacy and they will forward the refill request to us. Please allow 3  business days for your refill to be completed.          Additional Information About Your Visit        MyChart Information     [employer] gives you secure access to your electronic health record. If you see a primary care provider, you can also send messages to your care team and make appointments. If you have questions, please call your primary care clinic.  If you do not have a primary care provider, please call [phone] and they will assist you.        Care EveryWhere ID     This is your Care EveryWhere ID. This could be used by other organizations to access your [facility] medical records  [id number]        Your Vitals Were     Pulse Temperature Respirations Pulse Oximetry BMI (Body Mass Index)       66 97.9  F (36.6  C) (Oral) 16 98% 32.33 kg/m2        Blood Pressure from Last 3 Encounters:   04/25/18 116/77   04/11/18 106/65   03/28/18 101/70    Weight from Last 3 Encounters:   04/25/18 102.2 kg (225 lb 4.8 oz)   04/11/18 101.6 kg (224 lb)   03/28/18 103.3 kg (227 lb 11.2 oz)              We Performed the Following     CBC with platelets differential     Comprehensive metabolic panel     Magnesium        Primary Care Provider Office Phone # Fax #    Jim Kaplan [phone]       [address]   [facility] [address]        Equal Access to Services     MARIUSZ MADRIGAL : Hadii aad ku hadasho Soomaali, waaxda luqadaha, qaybta kaalmada adeegyada, ankit gross. So [facility] [phone].    ATENCIÓN: Si habla español, tiene a flores disposición servicios gratuitos de asistencia lingüística. Llame al [phone].    We comply with applicable federal civil rights laws and [place] laws. We do not discriminate on the basis of race, color, national origin, age, disability, sex, sexual orientation, or gender identity.            Thank you!     Thank you for choosing [facility]  for your care. Our goal is always to provide you  with excellent care. Hearing back from our patients is one way we can continue to improve our services. Please take a few minutes to complete the written survey that you may receive in the mail after your visit with us. Thank you!             Your Updated Medication List - Protect others around you: Learn how to safely use, store and throw away your medicines at www.disposemymeds.org.          This list is accurate as of 4/25/18 11:26 AM.  Always use your most recent med list.                   Brand Name Dispense Instructions for use Diagnosis    atorvastatin 40 MG tablet    LIPITOR     TAKE 1 TABLET BY MOUTH DAILY        CISplatin 100 MG/100ML Soln injection CHEMO    PLATINOL          ELIQUIS PO      Take 5 mg by mouth 2 times daily        gemcitabine 1 GM injection    GEMZAR          LORazepam 0.5 MG tablet    ATIVAN    30 tablet    Take 1 tablet (0.5 mg) by mouth every 4 hours as needed (Anxiety, Nausea/Vomiting or Sleep)    Cholangiocarcinoma (H)       metoprolol tartrate 50 MG tablet    LOPRESSOR     50 mg 2 times daily        MITIGARE 0.6 MG Caps   Generic drug:  Colchicine      Take 0.6 mg by mouth 3 times daily as needed        multivitamin, therapeutic with minerals Tabs tablet     30 tablet    Take 1 tablet by mouth daily    Mass of bile duct       NITROSTAT SL      Place 0.4 mg under the tongue every 5 minutes as needed for chest pain (Carries medication - has never used)        OMEGA-3 FISH OIL PO      Take 1,000 mg by mouth daily        ondansetron 8 MG tablet    ZOFRAN    10 tablet    Take 1 tablet (8 mg) by mouth every 8 hours as needed (Nausea/Vomiting)    Cholangiocarcinoma (H)       prochlorperazine 10 MG tablet    COMPAZINE    30 tablet    Take 1 tablet (10 mg) by mouth every 6 hours as needed (Nausea/Vomiting)    Cholangiocarcinoma (H)

## 2018-04-26 ENCOUNTER — MYC MEDICAL ADVICE (OUTPATIENT)
Dept: ONCOLOGY | Facility: CLINIC | Age: 63
End: 2018-04-26

## 2018-05-09 ENCOUNTER — INFUSION THERAPY VISIT (OUTPATIENT)
Dept: ONCOLOGY | Facility: CLINIC | Age: 63
End: 2018-05-09
Attending: INTERNAL MEDICINE
Payer: COMMERCIAL

## 2018-05-09 VITALS
HEART RATE: 83 BPM | SYSTOLIC BLOOD PRESSURE: 110 MMHG | TEMPERATURE: 98.2 F | WEIGHT: 224 LBS | BODY MASS INDEX: 32.14 KG/M2 | RESPIRATION RATE: 18 BRPM | OXYGEN SATURATION: 98 % | DIASTOLIC BLOOD PRESSURE: 81 MMHG

## 2018-05-09 DIAGNOSIS — C22.1 CHOLANGIOCARCINOMA (H): Primary | ICD-10-CM

## 2018-05-09 LAB
ALBUMIN SERPL-MCNC: 3.6 G/DL (ref 3.4–5)
ALP SERPL-CCNC: 61 U/L (ref 40–150)
ALT SERPL W P-5'-P-CCNC: 32 U/L (ref 0–70)
ANION GAP SERPL CALCULATED.3IONS-SCNC: 9 MMOL/L (ref 3–14)
AST SERPL W P-5'-P-CCNC: 24 U/L (ref 0–45)
BASOPHILS # BLD AUTO: 0 10E9/L (ref 0–0.2)
BASOPHILS NFR BLD AUTO: 0.3 %
BILIRUB SERPL-MCNC: 0.8 MG/DL (ref 0.2–1.3)
BUN SERPL-MCNC: 26 MG/DL (ref 7–30)
CALCIUM SERPL-MCNC: 8.5 MG/DL (ref 8.5–10.1)
CHLORIDE SERPL-SCNC: 110 MMOL/L (ref 94–109)
CO2 SERPL-SCNC: 22 MMOL/L (ref 20–32)
CREAT SERPL-MCNC: 1.16 MG/DL (ref 0.66–1.25)
DIFFERENTIAL METHOD BLD: ABNORMAL
EOSINOPHIL # BLD AUTO: 0.1 10E9/L (ref 0–0.7)
EOSINOPHIL NFR BLD AUTO: 1.3 %
ERYTHROCYTE [DISTWIDTH] IN BLOOD BY AUTOMATED COUNT: 16.2 % (ref 10–15)
GFR SERPL CREATININE-BSD FRML MDRD: 64 ML/MIN/1.7M2
GLUCOSE SERPL-MCNC: 111 MG/DL (ref 70–99)
HCT VFR BLD AUTO: 31 % (ref 40–53)
HGB BLD-MCNC: 10.5 G/DL (ref 13.3–17.7)
IMM GRANULOCYTES # BLD: 0 10E9/L (ref 0–0.4)
IMM GRANULOCYTES NFR BLD: 0.3 %
LYMPHOCYTES # BLD AUTO: 1.2 10E9/L (ref 0.8–5.3)
LYMPHOCYTES NFR BLD AUTO: 29.8 %
MAGNESIUM SERPL-MCNC: 1.6 MG/DL (ref 1.6–2.3)
MCH RBC QN AUTO: 35 PG (ref 26.5–33)
MCHC RBC AUTO-ENTMCNC: 33.9 G/DL (ref 31.5–36.5)
MCV RBC AUTO: 103 FL (ref 78–100)
MONOCYTES # BLD AUTO: 0.8 10E9/L (ref 0–1.3)
MONOCYTES NFR BLD AUTO: 19.8 %
NEUTROPHILS # BLD AUTO: 2 10E9/L (ref 1.6–8.3)
NEUTROPHILS NFR BLD AUTO: 48.5 %
NRBC # BLD AUTO: 0 10*3/UL
NRBC BLD AUTO-RTO: 0 /100
PLATELET # BLD AUTO: 98 10E9/L (ref 150–450)
POTASSIUM SERPL-SCNC: 4.4 MMOL/L (ref 3.4–5.3)
PROT SERPL-MCNC: 6.3 G/DL (ref 6.8–8.8)
RBC # BLD AUTO: 3 10E12/L (ref 4.4–5.9)
SODIUM SERPL-SCNC: 141 MMOL/L (ref 133–144)
WBC # BLD AUTO: 4 10E9/L (ref 4–11)

## 2018-05-09 PROCEDURE — 83735 ASSAY OF MAGNESIUM: CPT | Performed by: INTERNAL MEDICINE

## 2018-05-09 PROCEDURE — 80053 COMPREHEN METABOLIC PANEL: CPT | Performed by: INTERNAL MEDICINE

## 2018-05-09 PROCEDURE — 85025 COMPLETE CBC W/AUTO DIFF WBC: CPT | Performed by: INTERNAL MEDICINE

## 2018-05-09 PROCEDURE — 25000128 H RX IP 250 OP 636: Mod: ZF | Performed by: INTERNAL MEDICINE

## 2018-05-09 PROCEDURE — 96367 TX/PROPH/DG ADDL SEQ IV INF: CPT

## 2018-05-09 PROCEDURE — 96375 TX/PRO/DX INJ NEW DRUG ADDON: CPT

## 2018-05-09 PROCEDURE — 96413 CHEMO IV INFUSION 1 HR: CPT

## 2018-05-09 PROCEDURE — 96417 CHEMO IV INFUS EACH ADDL SEQ: CPT

## 2018-05-09 RX ORDER — HEPARIN SODIUM (PORCINE) LOCK FLUSH IV SOLN 100 UNIT/ML 100 UNIT/ML
5 SOLUTION INTRAVENOUS EVERY 8 HOURS PRN
Status: DISCONTINUED | OUTPATIENT
Start: 2018-05-09 | End: 2018-05-09 | Stop reason: HOSPADM

## 2018-05-09 RX ORDER — HEPARIN SODIUM (PORCINE) LOCK FLUSH IV SOLN 100 UNIT/ML 100 UNIT/ML
5 SOLUTION INTRAVENOUS EVERY 8 HOURS
Status: DISCONTINUED | OUTPATIENT
Start: 2018-05-09 | End: 2018-05-09 | Stop reason: HOSPADM

## 2018-05-09 RX ADMIN — GEMCITABINE 2200 MG: 38 INJECTION, SOLUTION INTRAVENOUS at 10:15

## 2018-05-09 RX ADMIN — CISPLATIN 60 MG: 1 INJECTION, SOLUTION INTRAVENOUS at 09:12

## 2018-05-09 RX ADMIN — SODIUM CHLORIDE 250 ML: 9 INJECTION, SOLUTION INTRAVENOUS at 07:36

## 2018-05-09 RX ADMIN — MAGNESIUM SULFATE HEPTAHYDRATE: 500 INJECTION, SOLUTION INTRAMUSCULAR; INTRAVENOUS at 07:52

## 2018-05-09 RX ADMIN — Medication 5 ML: at 10:48

## 2018-05-09 RX ADMIN — DEXAMETHASONE SODIUM PHOSPHATE: 10 INJECTION, SOLUTION INTRAMUSCULAR; INTRAVENOUS at 07:36

## 2018-05-09 RX ADMIN — Medication 5 ML: at 06:30

## 2018-05-09 ASSESSMENT — PAIN SCALES - GENERAL: PAINLEVEL: NO PAIN (0)

## 2018-05-09 NOTE — PROGRESS NOTES
Infusion Nursing Note:    Patient presents today for Cycle 6 Day 15 Cisplatin, Gemzar.      Lab Results   Component Value Date    HGB 10.5 05/09/2018     Lab Results   Component Value Date    WBC 4.0 05/09/2018      Lab Results   Component Value Date    ANEU 2.0 05/09/2018     Lab Results   Component Value Date    PLT 98 05/09/2018      Lab Results   Component Value Date     05/09/2018                   Lab Results   Component Value Date    POTASSIUM 4.4 05/09/2018           Lab Results   Component Value Date    MAG 1.6 05/09/2018            Lab Results   Component Value Date    CR 1.16 05/09/2018                   Lab Results   Component Value Date    GARY 8.5 05/09/2018                Lab Results   Component Value Date    BILITOTAL 0.8 05/09/2018           Lab Results   Component Value Date    ALBUMIN 3.6 05/09/2018                    Lab Results   Component Value Date    ALT 32 05/09/2018           Lab Results   Component Value Date    AST 24 05/09/2018       Results reviewed, labs MET treatment parameters, ok to proceed with treatment.    Note: Pt reports some taste changes since last cycle, other than that he's been feeling well and denies complaints.     Intravenous Access:  Implanted Port.    Post Infusion Assessment:  Patient tolerated infusion without incident.  Voiding pre during and post cisplatin.   Blood return noted pre and post infusion.  Access discontinued per protocol.    Discharge Plan:   Patient declined prescription refills.  AVS to patient via [employer].  Patient will return 5/23 for next appointment.   Patient discharged in stable condition accompanied by: self.  Departure Mode: Ambulatory.

## 2018-05-09 NOTE — MR AVS SNAPSHOT
After Visit Summary   5/9/2018    Girish Chauhan    MRN: [id number]           Patient Information     Date Of Birth          1955        Visit Information        Provider Department      5/9/2018 7:00 AM  10 ATC;  ONCOLOGY INFUSION [facility]        Today's Diagnoses     Cholangiocarcinoma (H)    -  1      Care Instructions    Contact Numbers    [facility] Main Line: [phone]  [facility] Triage:  [phone]    Call triage with chills and/or temperature greater than or equal to 100.5, uncontrolled nausea/vomiting, diarrhea, constipation, dizziness, shortness of breath, chest pain, bleeding, unexplained bruising, or any new/concerning symptoms, questions/concerns.     If you are having any concerning symptoms or wish to speak to a provider before your next infusion visit, please call your care coordinator or triage to notify them so we can adequately serve you.       After Hours: [phone]    If after hours, weekends, or holidays, call main hospital  and ask for Oncology doctor on call.           May 2018   Anurag Monday Tuesday Wednesday Thursday Friday Saturday             1     2     3     4     5       6     7     8     9     [facility]    6:30 AM   (15 min.)    [facility] LAB DRAW   [facility] Draw     [facility] ONC INFUSION 360    7:00 AM   (360 min.)    ONCOLOGY INFUSION   [facility] 10     11     12       13     14     15     16     17     18     19       20     21     22     23     [facility] DRAW    6:30 AM   (15 min.)    [facility] DRAW   [facility] Draw     [facility] ONC INFUSION 360    7:00 AM   (360 min.)    ONCOLOGY INFUSION   [facility] 24     25     26       27     28     29     30     31 June 2018 Sunday Monday Tuesday Wednesday Thursday Friday Saturday                            1     2       3     4     5     6     [facility]    6:30 AM   (15  min.)    [facility] DRAW   [facility]     [facility] ONC INFUSION 360    7:00 AM   (360 min.)    ONCOLOGY INFUSION   [facility] 7     8     9       10     11     12     13     14     15     [facility] DRAW    6:30 AM   (15 min.)    [facility] LAB DRAW   [facility] Lab Draw     CT CHEST ABDOMEN PELVIS WWO    7:00 AM   (20 min.)   UCCT1   [facility] CT 16       17     18     UMP RETURN    7:00 AM   (30 min.)   Naresh De Los Santos MD   [facility] 19     20     [facility] DRAW    6:30 AM   (15 min.)    [facility] LAB DRAW   [facility] Lab Draw     [facility] ONC INFUSION 360    7:00 AM   (360 min.)    ONCOLOGY INFUSION   [facility] 21     22     23       24     25     26     27     28     29     30                 Recent Results (from the past 24 hour(s))   CBC with platelets differential    Collection Time: 05/09/18  6:36 AM   Result Value Ref Range    WBC 4.0 4.0 - 11.0 10e9/L    RBC Count 3.00 (L) 4.4 - 5.9 10e12/L    Hemoglobin 10.5 (L) 13.3 - 17.7 g/dL    Hematocrit 31.0 (L) 40.0 - 53.0 %     (H) 78 - 100 fl    MCH 35.0 (H) 26.5 - 33.0 pg    MCHC 33.9 31.5 - 36.5 g/dL    RDW 16.2 (H) 10.0 - 15.0 %    Platelet Count 98 (L) 150 - 450 10e9/L    Diff Method Automated Method     % Neutrophils 48.5 %    % Lymphocytes 29.8 %    % Monocytes 19.8 %    % Eosinophils 1.3 %    % Basophils 0.3 %    % Immature Granulocytes 0.3 %    Nucleated RBCs 0 0 /100    Absolute Neutrophil 2.0 1.6 - 8.3 10e9/L    Absolute Lymphocytes 1.2 0.8 - 5.3 10e9/L    Absolute Monocytes 0.8 0.0 - 1.3 10e9/L    Absolute Eosinophils 0.1 0.0 - 0.7 10e9/L    Absolute Basophils 0.0 0.0 - 0.2 10e9/L    Abs Immature Granulocytes 0.0 0 - 0.4 10e9/L    Absolute Nucleated RBC 0.0    Comprehensive metabolic panel    Collection Time: 05/09/18  6:36 AM   Result Value Ref Range    Sodium 141 133 - 144 mmol/L    Potassium 4.4 3.4 - 5.3 mmol/L    Chloride  110 (H) 94 - 109 mmol/L    Carbon Dioxide 22 20 - 32 mmol/L    Anion Gap 9 3 - 14 mmol/L    Glucose 111 (H) 70 - 99 mg/dL    Urea Nitrogen 26 7 - 30 mg/dL    Creatinine 1.16 0.66 - 1.25 mg/dL    GFR Estimate 64 >60 mL/min/1.7m2    GFR Estimate If Black 77 >60 mL/min/1.7m2    Calcium 8.5 8.5 - 10.1 mg/dL    Bilirubin Total 0.8 0.2 - 1.3 mg/dL    Albumin 3.6 3.4 - 5.0 g/dL    Protein Total 6.3 (L) 6.8 - 8.8 g/dL    Alkaline Phosphatase 61 40 - 150 U/L    ALT 32 0 - 70 U/L    AST 24 0 - 45 U/L   Magnesium    Collection Time: 05/09/18  6:36 AM   Result Value Ref Range    Magnesium 1.6 1.6 - 2.3 mg/dL               Follow-ups after your visit        Your next 10 appointments already scheduled     May 23, 2018  6:30 AM CDT   [facility] Draw with [facility] DRAW   [facility] Draw ([facility])    [address] [place] [address]  [address]  [facility] [address]   [phone]            May 23, 2018  7:00 AM CDT   Infusion 360 with UC ONCOLOGY INFUSION, UC 11 ATC   [facility] ([facility])    [address] [place] [address]  [address]  [facility] [address]   [phone]            Jun 06, 2018  6:30 AM CDT   [facility] Draw with [facility] DRAW   [facility] Draw ([facility])    [address] [place] [address]  [address]  [facility] [address]   [phone]            Jun 06, 2018  7:00 AM CDT   Infusion 360 with UC ONCOLOGY INFUSION, UC 10 ATC   [facility] ([facility])    [address] [place] [address]  [address]  [facility] [address]   [phone]            Abhilash 15, 2018  6:30 AM CDT   [facility] Lab Draw with [facility] LAB DRAW   [facility] Draw ([facility])    [address] [place] [address]  [address]  [facility] [address]   [phone]            Abhilash 15, 2018  7:00 AM CDT   CT CHEST ABDOMEN PELVIS W/O & W CONTRAST with UCCT1   [facility] Imaging  Center CT ([facility])    [address]  1st Floor  [facility] [id number] [phone]           Please bring any scans or X-rays taken at other hospitals, if similar tests were done. Also bring a list of your medicines, including vitamins, minerals and over-the-counter drugs. It is safest to leave personal items at home.  Be sure to tell your doctor:   If you have any allergies.   If there s any chance you are pregnant.   If you are breastfeeding.  How to prepare:   Do not eat or drink for 2 hours before your exam. If you need to take medicine, you may take it with small sips of water. (We may ask you to take liquid medicine as well.)   Please wear loose clothing, such as a sweat suit or jogging clothes. Avoid snaps, zippers and other metal. We may ask you to undress and put on a hospital gown.  Please arrive 30 minutes early for your CT. Once in the department you might be asked to drink water 15-20 minutes prior to your exam.  If indicated you may be asked to drink an oral contrast in advance of your CT.  If this is the case, the imaging team will let you know or be in contact with you prior to your appointment  Patients over 70 or patients with diabetes or kidney problems:   If you haven t had a blood test (creatinine test) within the last 30 days, the Cardiologist/Radiologist may require you to get this test prior to your exam.  If you have diabetes:   Continue to take your metformin medication on the day of your exam  If you have any questions, please call the Imaging Department where you will have your exam.            Jun 18, 2018  7:15 AM CDT   (Arrive by 7:00 AM)   Return Visit with Naresh De Los Santos MD   [facility] ([facility])    [address]  [address]  [facility] [id number] [phone]            Jun 20, 2018  6:30 AM CDT   [facility] Lab Draw with  BOO LAB DRAW   [facility] Draw ([facility])    [address]  [address]  [facility] [address] [phone]              Who to contact     If you have questions or need follow up information about today's clinic visit or your schedule please contact [facility] directly at [phone].  Normal or non-critical lab and imaging results will be communicated to you by MyChart, letter or phone within 4 business days after the clinic has received the results. If you do not hear from us within 7 days, please contact the clinic through [employer] or phone. If you have a critical or abnormal lab result, we will notify you by phone as soon as possible.  Submit refill requests through Forter or call your pharmacy and they will forward the refill request to us. Please allow 3 business days for your refill to be completed.          Additional Information About Your Visit        [employer] Information     [employer]ter gives you secure access to your electronic health record. If you see a primary care provider, you can also send messages to your care team and make appointments. If you have questions, please call your primary care clinic.  If you do not have a primary care provider, please call [phone] and they will assist you.        Care EveryWhere ID     This is your Care EveryWhere ID. This could be used by other organizations to access your [facility] medical records  [id number]        Your Vitals Were     Pulse Temperature Respirations Pulse Oximetry BMI (Body Mass Index)       83 98.2  F (36.8  C) (Oral) 18 98% 32.14 kg/m2        Blood Pressure from Last 3 Encounters:   05/09/18 110/81   04/25/18 116/77   04/11/18 106/65    Weight from Last 3 Encounters:   05/09/18 101.6 kg (224 lb)   04/25/18 102.2 kg (225 lb 4.8 oz)   04/11/18 101.6 kg (224 lb)              We Performed the Following     CBC with platelets differential     Comprehensive metabolic panel     Magnesium        Primary Care Provider Office  Phone # Fax #    Jim Kaplan [phone]       [facility] [address]   [facility] [address]        Equal Access to Services     MARIUSZ MADRIGAL : Hadii aad ku hadyessicao Sogabrielali, waaxda luqadaha, qaybta kaalmada adekeith, ankit weinbergcarmen lernerevan srivastava kwabena gross. So [facility] [phone].    ATENCIÓN: Si habla español, tiene a flores disposición servicios gratuitos de asistencia lingüística. [facility] [phone].    We comply with applicable federal civil rights laws and [place] laws. We do not discriminate on the basis of race, color, national origin, age, disability, sex, sexual orientation, or gender identity.            Thank you!     Thank you for choosing [facility]  for your care. Our goal is always to provide you with excellent care. Hearing back from our patients is one way we can continue to improve our services. Please take a few minutes to complete the written survey that you may receive in the mail after your visit with us. Thank you!             Your Updated Medication List - Protect others around you: Learn how to safely use, store and throw away your medicines at www.disposemymeds.org.          This list is accurate as of 5/9/18  8:38 AM.  Always use your most recent med list.                   Brand Name Dispense Instructions for use Diagnosis    atorvastatin 40 MG tablet    LIPITOR     TAKE 1 TABLET BY MOUTH DAILY        CISplatin 100 MG/100ML Soln injection CHEMO    PLATINOL          ELIQUIS PO      Take 5 mg by mouth 2 times daily        gemcitabine 1 GM injection    GEMZAR          LORazepam 0.5 MG tablet    ATIVAN    30 tablet    Take 1 tablet (0.5 mg) by mouth every 4 hours as needed (Anxiety, Nausea/Vomiting or Sleep)    Cholangiocarcinoma (H)       metoprolol tartrate 50 MG tablet    LOPRESSOR     50 mg 2 times daily        MITIGARE 0.6 MG Caps   Generic drug:  Colchicine      Take 0.6 mg by mouth 3 times daily as needed         multivitamin, therapeutic with minerals Tabs tablet     30 tablet    Take 1 tablet by mouth daily    Mass of bile duct       NITROSTAT SL      Place 0.4 mg under the tongue every 5 minutes as needed for chest pain (Carries medication - has never used)        OMEGA-3 FISH OIL PO      Take 1,000 mg by mouth daily        ondansetron 8 MG tablet    ZOFRAN    10 tablet    Take 1 tablet (8 mg) by mouth every 8 hours as needed (Nausea/Vomiting)    Cholangiocarcinoma (H)       prochlorperazine 10 MG tablet    COMPAZINE    30 tablet    Take 1 tablet (10 mg) by mouth every 6 hours as needed (Nausea/Vomiting)    Cholangiocarcinoma (H)

## 2018-05-09 NOTE — PATIENT INSTRUCTIONS
Contact Numbers    [facility] [place]: [phone]  [facility] [place]:  [phone]    Call triage with chills and/or temperature greater than or equal to 100.5, uncontrolled nausea/vomiting, diarrhea, constipation, dizziness, shortness of breath, chest pain, bleeding, unexplained bruising, or any new/concerning symptoms, questions/concerns.     If you are having any concerning symptoms or wish to speak to a provider before your next infusion visit, please call your care coordinator or triage to notify them so we can adequately serve you.       After Hours: [phone]    If after hours, weekends, or holidays, call main hospital  and ask for Oncology doctor on call.           May 2018   Anurag Monday Tuesday Wednesday Thursday Friday Saturday             1     2     3     4     5       6     7     8     9     [facility] D[facility]    6:30 AM   (15 min.)    [facility] LAB DRAW   [facility] Draw     UMP ONC INFUSION 360    7:00 AM   (360 min.)    ONCOLOGY INFUSION   [facility] 10     11     12       13     14     15     16     17     18     19       20     21     22     23     [facility] DRAW    6:30 AM   (15 min.)    [facility] LAB DRAW   [facility] Draw     [facility] ONC INFUSION 360    7:00 AM   (360 min.)    ONCOLOGY INFUSION   [facility] 24     25     26       27     28     29     30     31 June 2018 Sunday Monday Tuesday Wednesday Thursday Friday Saturday                            1     2       3     4     5     6     [facility] DRAW    6:30 AM   (15 min.)    [facility] LAB DRAW   [facility] Draw     [facility] ONC INFUSION 360    7:00 AM   (360 min.)    ONCOLOGY INFUSION   [facility] 7     8     9       10     11     12     13     14     15     [facility] DRAW    6:30 AM   (15 min.)    [facility] LAB DRAW   [facility] Draw     CT CHEST ABDOMEN PELVIS WWO    7:00 AM   (20 min.)    UCCT1   [facility] CT 16       17     18     P RETURN    7:00 AM   (30 min.)   Naresh De Los Santos MD   [facility] 19     20     [facility] DRAW    6:30 AM   (15 min.)    M[facility] LAB DRAW   [facility] Lab Draw     [facility] ONC INFUSION 360    7:00 AM   (360 min.)    ONCOLOGY INFUSION   [facility] 21     22     23       24     25     26     27     28     29     30                 Recent Results (from the past 24 hour(s))   CBC with platelets differential    Collection Time: 05/09/18  6:36 AM   Result Value Ref Range    WBC 4.0 4.0 - 11.0 10e9/L    RBC Count 3.00 (L) 4.4 - 5.9 10e12/L    Hemoglobin 10.5 (L) 13.3 - 17.7 g/dL    Hematocrit 31.0 (L) 40.0 - 53.0 %     (H) 78 - 100 fl    MCH 35.0 (H) 26.5 - 33.0 pg    MCHC 33.9 31.5 - 36.5 g/dL    RDW 16.2 (H) 10.0 - 15.0 %    Platelet Count 98 (L) 150 - 450 10e9/L    Diff Method Automated Method     % Neutrophils 48.5 %    % Lymphocytes 29.8 %    % Monocytes 19.8 %    % Eosinophils 1.3 %    % Basophils 0.3 %    % Immature Granulocytes 0.3 %    Nucleated RBCs 0 0 /100    Absolute Neutrophil 2.0 1.6 - 8.3 10e9/L    Absolute Lymphocytes 1.2 0.8 - 5.3 10e9/L    Absolute Monocytes 0.8 0.0 - 1.3 10e9/L    Absolute Eosinophils 0.1 0.0 - 0.7 10e9/L    Absolute Basophils 0.0 0.0 - 0.2 10e9/L    Abs Immature Granulocytes 0.0 0 - 0.4 10e9/L    Absolute Nucleated RBC 0.0    Comprehensive metabolic panel    Collection Time: 05/09/18  6:36 AM   Result Value Ref Range    Sodium 141 133 - 144 mmol/L    Potassium 4.4 3.4 - 5.3 mmol/L    Chloride 110 (H) 94 - 109 mmol/L    Carbon Dioxide 22 20 - 32 mmol/L    Anion Gap 9 3 - 14 mmol/L    Glucose 111 (H) 70 - 99 mg/dL    Urea Nitrogen 26 7 - 30 mg/dL    Creatinine 1.16 0.66 - 1.25 mg/dL    GFR Estimate 64 >60 mL/min/1.7m2    GFR Estimate If Black 77 >60 mL/min/1.7m2    Calcium 8.5 8.5 - 10.1 mg/dL    Bilirubin Total 0.8 0.2 - 1.3 mg/dL    Albumin 3.6 3.4 - 5.0 g/dL     Protein Total 6.3 (L) 6.8 - 8.8 g/dL    Alkaline Phosphatase 61 40 - 150 U/L    ALT 32 0 - 70 U/L    AST 24 0 - 45 U/L   Magnesium    Collection Time: 05/09/18  6:36 AM   Result Value Ref Range    Magnesium 1.6 1.6 - 2.3 mg/dL

## 2018-05-10 ENCOUNTER — TELEPHONE (OUTPATIENT)
Dept: ONCOLOGY | Facility: CLINIC | Age: 63
End: 2018-05-10

## 2018-05-10 NOTE — TELEPHONE ENCOUNTER
[facility] Telephone Triage Note    Assessment: Patient called in to triage reporting the following symptoms: fever of 100.5, cough  slightly productive, spasmodic and nausea.  Received  Cycle 6 Day 15 Cisplatin, Gemzar yesterday.  Denied any fevers/ cough/ nasal congestion yesterday.  Today reports starting around 11 am noted temperature to begin rising from 98.4 to max temp taken at 2:30 pm of 100.5.  Noted to be nauseated with a few dry heaves, however had not taken any anti-emetics today.  Also noted to have nasal congestion and a cough that was noted today.  Denies diarrhea/ SOB/ Chest pain    Recommendations: Discussed with Jennifer Jalloh.      Follow-Up: Patient voiced understanding of advice and/or instructions given per Jennifer Jalloh.  Should not be Neutropenic with infusion just yesterday.  OK to watch over night, ok to use Tylenol and Mucinex, continue to monitor temp, call or report to ED if continues to rise with the addition of Tylenol.  Increase oral fluid intake.  Utilize Anti emetics such as ondansetron.  Call if worse.  If not better or worse tomorrow call triage.

## 2018-05-21 RX ORDER — METHYLPREDNISOLONE SODIUM SUCCINATE 125 MG/2ML
125 INJECTION, POWDER, LYOPHILIZED, FOR SOLUTION INTRAMUSCULAR; INTRAVENOUS
Status: CANCELLED
Start: 2018-06-06

## 2018-05-21 RX ORDER — ALBUTEROL SULFATE 90 UG/1
1-2 AEROSOL, METERED RESPIRATORY (INHALATION)
Status: CANCELLED
Start: 2018-06-06

## 2018-05-21 RX ORDER — EPINEPHRINE 0.3 MG/.3ML
0.3 INJECTION SUBCUTANEOUS EVERY 5 MIN PRN
Status: CANCELLED | OUTPATIENT
Start: 2018-05-23

## 2018-05-21 RX ORDER — ALBUTEROL SULFATE 90 UG/1
1-2 AEROSOL, METERED RESPIRATORY (INHALATION)
Status: CANCELLED
Start: 2018-05-23

## 2018-05-21 RX ORDER — ALBUTEROL SULFATE 0.83 MG/ML
2.5 SOLUTION RESPIRATORY (INHALATION)
Status: CANCELLED | OUTPATIENT
Start: 2018-06-06

## 2018-05-21 RX ORDER — MEPERIDINE HYDROCHLORIDE 25 MG/ML
25 INJECTION INTRAMUSCULAR; INTRAVENOUS; SUBCUTANEOUS EVERY 30 MIN PRN
Status: CANCELLED | OUTPATIENT
Start: 2018-06-06

## 2018-05-21 RX ORDER — EPINEPHRINE 0.3 MG/.3ML
0.3 INJECTION SUBCUTANEOUS EVERY 5 MIN PRN
Status: CANCELLED | OUTPATIENT
Start: 2018-06-06

## 2018-05-21 RX ORDER — MEPERIDINE HYDROCHLORIDE 25 MG/ML
25 INJECTION INTRAMUSCULAR; INTRAVENOUS; SUBCUTANEOUS EVERY 30 MIN PRN
Status: CANCELLED | OUTPATIENT
Start: 2018-05-23

## 2018-05-21 RX ORDER — LORAZEPAM 2 MG/ML
0.5 INJECTION INTRAMUSCULAR EVERY 4 HOURS PRN
Status: CANCELLED
Start: 2018-05-23

## 2018-05-21 RX ORDER — HEPARIN SODIUM (PORCINE) LOCK FLUSH IV SOLN 100 UNIT/ML 100 UNIT/ML
5 SOLUTION INTRAVENOUS EVERY 8 HOURS
Status: CANCELLED
Start: 2018-05-23

## 2018-05-21 RX ORDER — SODIUM CHLORIDE 9 MG/ML
1000 INJECTION, SOLUTION INTRAVENOUS CONTINUOUS PRN
Status: CANCELLED
Start: 2018-05-23

## 2018-05-21 RX ORDER — EPINEPHRINE 1 MG/ML
0.3 INJECTION, SOLUTION INTRAMUSCULAR; SUBCUTANEOUS EVERY 5 MIN PRN
Status: CANCELLED | OUTPATIENT
Start: 2018-05-23

## 2018-05-21 RX ORDER — LORAZEPAM 2 MG/ML
0.5 INJECTION INTRAMUSCULAR EVERY 4 HOURS PRN
Status: CANCELLED
Start: 2018-06-06

## 2018-05-21 RX ORDER — HEPARIN SODIUM (PORCINE) LOCK FLUSH IV SOLN 100 UNIT/ML 100 UNIT/ML
5 SOLUTION INTRAVENOUS EVERY 8 HOURS
Status: CANCELLED
Start: 2018-06-06

## 2018-05-21 RX ORDER — METHYLPREDNISOLONE SODIUM SUCCINATE 125 MG/2ML
125 INJECTION, POWDER, LYOPHILIZED, FOR SOLUTION INTRAMUSCULAR; INTRAVENOUS
Status: CANCELLED
Start: 2018-05-23

## 2018-05-21 RX ORDER — SODIUM CHLORIDE 9 MG/ML
1000 INJECTION, SOLUTION INTRAVENOUS CONTINUOUS PRN
Status: CANCELLED
Start: 2018-06-06

## 2018-05-21 RX ORDER — ALBUTEROL SULFATE 0.83 MG/ML
2.5 SOLUTION RESPIRATORY (INHALATION)
Status: CANCELLED | OUTPATIENT
Start: 2018-05-23

## 2018-05-21 RX ORDER — DIPHENHYDRAMINE HYDROCHLORIDE 50 MG/ML
50 INJECTION INTRAMUSCULAR; INTRAVENOUS
Status: CANCELLED
Start: 2018-05-23

## 2018-05-21 RX ORDER — EPINEPHRINE 1 MG/ML
0.3 INJECTION, SOLUTION INTRAMUSCULAR; SUBCUTANEOUS EVERY 5 MIN PRN
Status: CANCELLED | OUTPATIENT
Start: 2018-06-06

## 2018-05-21 RX ORDER — DIPHENHYDRAMINE HYDROCHLORIDE 50 MG/ML
50 INJECTION INTRAMUSCULAR; INTRAVENOUS
Status: CANCELLED
Start: 2018-06-06

## 2018-05-23 ENCOUNTER — INFUSION THERAPY VISIT (OUTPATIENT)
Dept: ONCOLOGY | Facility: CLINIC | Age: 63
End: 2018-05-23
Attending: INTERNAL MEDICINE
Payer: COMMERCIAL

## 2018-05-23 VITALS
RESPIRATION RATE: 16 BRPM | HEART RATE: 73 BPM | OXYGEN SATURATION: 99 % | BODY MASS INDEX: 30.94 KG/M2 | SYSTOLIC BLOOD PRESSURE: 108 MMHG | WEIGHT: 215.6 LBS | TEMPERATURE: 98.1 F | DIASTOLIC BLOOD PRESSURE: 75 MMHG

## 2018-05-23 DIAGNOSIS — C22.1 CHOLANGIOCARCINOMA (H): Primary | ICD-10-CM

## 2018-05-23 LAB
ALBUMIN SERPL-MCNC: 3.7 G/DL (ref 3.4–5)
ALP SERPL-CCNC: 61 U/L (ref 40–150)
ALT SERPL W P-5'-P-CCNC: 30 U/L (ref 0–70)
ANION GAP SERPL CALCULATED.3IONS-SCNC: 6 MMOL/L (ref 3–14)
AST SERPL W P-5'-P-CCNC: 22 U/L (ref 0–45)
BASOPHILS # BLD AUTO: 0 10E9/L (ref 0–0.2)
BASOPHILS NFR BLD AUTO: 0.2 %
BILIRUB SERPL-MCNC: 0.8 MG/DL (ref 0.2–1.3)
BUN SERPL-MCNC: 23 MG/DL (ref 7–30)
CALCIUM SERPL-MCNC: 8.4 MG/DL (ref 8.5–10.1)
CHLORIDE SERPL-SCNC: 112 MMOL/L (ref 94–109)
CO2 SERPL-SCNC: 23 MMOL/L (ref 20–32)
CREAT SERPL-MCNC: 1.35 MG/DL (ref 0.66–1.25)
DIFFERENTIAL METHOD BLD: ABNORMAL
EOSINOPHIL # BLD AUTO: 0 10E9/L (ref 0–0.7)
EOSINOPHIL NFR BLD AUTO: 0.9 %
ERYTHROCYTE [DISTWIDTH] IN BLOOD BY AUTOMATED COUNT: 15.9 % (ref 10–15)
GFR SERPL CREATININE-BSD FRML MDRD: 54 ML/MIN/1.7M2
GLUCOSE SERPL-MCNC: 99 MG/DL (ref 70–99)
HCT VFR BLD AUTO: 31.3 % (ref 40–53)
HGB BLD-MCNC: 10.4 G/DL (ref 13.3–17.7)
IMM GRANULOCYTES # BLD: 0 10E9/L (ref 0–0.4)
IMM GRANULOCYTES NFR BLD: 0.2 %
LYMPHOCYTES # BLD AUTO: 1.4 10E9/L (ref 0.8–5.3)
LYMPHOCYTES NFR BLD AUTO: 33.4 %
MAGNESIUM SERPL-MCNC: 1.4 MG/DL (ref 1.6–2.3)
MCH RBC QN AUTO: 34.6 PG (ref 26.5–33)
MCHC RBC AUTO-ENTMCNC: 33.2 G/DL (ref 31.5–36.5)
MCV RBC AUTO: 104 FL (ref 78–100)
MONOCYTES # BLD AUTO: 0.9 10E9/L (ref 0–1.3)
MONOCYTES NFR BLD AUTO: 21.8 %
NEUTROPHILS # BLD AUTO: 1.8 10E9/L (ref 1.6–8.3)
NEUTROPHILS NFR BLD AUTO: 43.5 %
NRBC # BLD AUTO: 0 10*3/UL
NRBC BLD AUTO-RTO: 0 /100
PLATELET # BLD AUTO: 122 10E9/L (ref 150–450)
PLATELET # BLD EST: ABNORMAL 10*3/UL
POTASSIUM SERPL-SCNC: 4.4 MMOL/L (ref 3.4–5.3)
PROT SERPL-MCNC: 6.2 G/DL (ref 6.8–8.8)
RBC # BLD AUTO: 3.01 10E12/L (ref 4.4–5.9)
SODIUM SERPL-SCNC: 140 MMOL/L (ref 133–144)
WBC # BLD AUTO: 4.2 10E9/L (ref 4–11)

## 2018-05-23 PROCEDURE — 96375 TX/PRO/DX INJ NEW DRUG ADDON: CPT

## 2018-05-23 PROCEDURE — 85025 COMPLETE CBC W/AUTO DIFF WBC: CPT | Performed by: INTERNAL MEDICINE

## 2018-05-23 PROCEDURE — 96413 CHEMO IV INFUSION 1 HR: CPT

## 2018-05-23 PROCEDURE — 96417 CHEMO IV INFUS EACH ADDL SEQ: CPT

## 2018-05-23 PROCEDURE — 25000128 H RX IP 250 OP 636: Mod: ZF | Performed by: INTERNAL MEDICINE

## 2018-05-23 PROCEDURE — 96367 TX/PROPH/DG ADDL SEQ IV INF: CPT

## 2018-05-23 PROCEDURE — 83735 ASSAY OF MAGNESIUM: CPT | Performed by: INTERNAL MEDICINE

## 2018-05-23 PROCEDURE — 80053 COMPREHEN METABOLIC PANEL: CPT | Performed by: INTERNAL MEDICINE

## 2018-05-23 RX ORDER — HEPARIN SODIUM (PORCINE) LOCK FLUSH IV SOLN 100 UNIT/ML 100 UNIT/ML
5 SOLUTION INTRAVENOUS
Status: COMPLETED | OUTPATIENT
Start: 2018-05-23 | End: 2018-05-23

## 2018-05-23 RX ORDER — HEPARIN SODIUM (PORCINE) LOCK FLUSH IV SOLN 100 UNIT/ML 100 UNIT/ML
5 SOLUTION INTRAVENOUS EVERY 8 HOURS
Status: DISCONTINUED | OUTPATIENT
Start: 2018-05-23 | End: 2018-05-23 | Stop reason: HOSPADM

## 2018-05-23 RX ADMIN — SODIUM CHLORIDE 250 ML: 9 INJECTION, SOLUTION INTRAVENOUS at 07:28

## 2018-05-23 RX ADMIN — MAGNESIUM SULFATE HEPTAHYDRATE: 500 INJECTION, SOLUTION INTRAMUSCULAR; INTRAVENOUS at 07:48

## 2018-05-23 RX ADMIN — GEMCITABINE 2200 MG: 38 INJECTION, SOLUTION INTRAVENOUS at 10:13

## 2018-05-23 RX ADMIN — SODIUM CHLORIDE, PRESERVATIVE FREE 5 ML: 5 INJECTION INTRAVENOUS at 06:31

## 2018-05-23 RX ADMIN — Medication 5 ML: at 10:44

## 2018-05-23 RX ADMIN — CISPLATIN 60 MG: 1 INJECTION, SOLUTION INTRAVENOUS at 09:13

## 2018-05-23 RX ADMIN — DEXAMETHASONE SODIUM PHOSPHATE: 10 INJECTION, SOLUTION INTRAMUSCULAR; INTRAVENOUS at 07:30

## 2018-05-23 ASSESSMENT — PAIN SCALES - GENERAL: PAINLEVEL: NO PAIN (0)

## 2018-05-23 NOTE — PROGRESS NOTES
Infusion Nursing Note:  Girish Chauhan presents today for Day 1 Cycle 7 Cisplatin and Gemzar    Patient seen by provider today: No   present during visit today: Not Applicable.    Note: N/A.    Intravenous Access:  Implanted Port.    Treatment Conditions:  Lab Results   Component Value Date    HGB 10.4 05/23/2018     Lab Results   Component Value Date    WBC 4.2 05/23/2018      Lab Results   Component Value Date    ANEU 1.8 05/23/2018     Lab Results   Component Value Date     05/23/2018      Lab Results   Component Value Date     05/23/2018                   Lab Results   Component Value Date    POTASSIUM 4.4 05/23/2018           Lab Results   Component Value Date    MAG 1.4 05/23/2018            Lab Results   Component Value Date    CR 1.35 05/23/2018                   Lab Results   Component Value Date    GARY 8.4 05/23/2018                Lab Results   Component Value Date    BILITOTAL 0.8 05/23/2018           Lab Results   Component Value Date    ALBUMIN 3.7 05/23/2018                    Lab Results   Component Value Date    ALT 30 05/23/2018           Lab Results   Component Value Date    AST 22 05/23/2018       Results reviewed, labs MET treatment parameters, ok to proceed with treatment.  Cr 1.35 and continues to rise but is within parameters. Mg is 1.4 and will receive 2gm Mag in IVF today.  Dr De Los Santos called and informed of these lab values-no new orders   Pt educated on drinking 2-3 quarts of fluids per day.  TORRITESH De Los Santos / Paradise Merchant RN: Proceed with treatment today as ordered, no extra Mg required      Post Infusion Assessment:  Pt able to void before, during and after Cisplatin  Patient tolerated infusion without incident.  Blood return noted pre and post infusion.  Site patent and intact, free from redness, edema or discomfort.  No evidence of extravasations.  Access discontinued per protocol.    Discharge Plan:   Patient declined prescription refills.  Discharge instructions  reviewed with: Patient.  Patient and/or family verbalized understanding of discharge instructions and all questions answered.  Copy of AVS reviewed with patient and/or family.  Patient will return 6/6 for next appointment.  Patient discharged in stable condition accompanied by: self.  Departure Mode: Ambulatory.    Ana Luisa Merchant RN

## 2018-05-23 NOTE — MR AVS SNAPSHOT
After Visit Summary   5/23/2018    Girish Chauhan    MRN: [id number]           Patient Information     Date Of Birth          1955        Visit Information        Provider Department      5/23/2018 7:00 AM  11 ATC;  ONCOLOGY INFUSION [facility]        Today's Diagnoses     Cholangiocarcinoma (H)    -  1      Care Instructions    Contact Numbers    [facility] Main Line: [phone]  [facility] Triage and after hours / weekends / holidays:  [phone]      Please call the triage or after hours line if you experience a temperature greater than or equal to 100.5, shaking chills, have uncontrolled nausea, vomiting and/or diarrhea, dizziness, shortness of breath, chest pain, bleeding, unexplained bruising, or if you have any other new/concerning symptoms, questions or concerns.      If you are having any concerning symptoms or wish to speak to a provider before your next infusion visit, please call your care coordinator or triage to notify them so we can adequately serve you.     If you need a refill on a narcotic prescription or other medication, please call before your infusion appointment.                 May 2018   Anurag Monday Tuesday Wednesday Thursday Friday Saturday             1     2     3     4     5       6     7     8     9     [facility]    6:30 AM   (15 min.)    MASON[facility] LAB DRAW   [facility] Lab Draw     UM ONC INFUSION 360    7:00 AM   (360 min.)    ONCOLOGY INFUSION   [facility] 10     11     12       13     14     15     16     17     18     19       20     21     22     23     [facility]    6:30 AM   (15 min.)    [facility] LAB DRAW   [facility] Lab Draw     [facility] ONC INFUSION 360    7:00 AM   (360 min.)    ONCOLOGY INFUSION   [facility] 24     25     26       27     28     29     30     31 June 2018 Sunday Monday Tuesday Wednesday Thursday Friday Saturday                             1     2       3     4     5     6     [facility] DRAW    6:30 AM   (15 min.)    [facility] LAB DRAW   [facility] Mason[facility] Lab Draw     [facility] ONC INFUSION 360    7:00 AM   (360 min.)    ONCOLOGY INFUSION   [facility] 7     8     9       10     11     12     13     14     15     [facility] DRAW    6:30 AM   (15 min.)    [facility] LAB DRAW   [facility] Lab Draw     CT CHEST ABDOMEN PELVIS WWO    7:00 AM   (20 min.)   UCCT1   [facility] CT 16       17     18     [facility] RETURN    7:00 AM   (30 min.)   Naresh De Los Santos MD   [facility] 19     20     [facility] DRAW    6:30 AM   (15 min.)    [facility] LAB DRAW   [facility] Lab Draw     [facility] ONC INFUSION 360    7:00 AM   (360 min.)    ONCOLOGY INFUSION   [facility] 21     22     23       24     25     26     27     28     29     30                  Lab Results:  Recent Results (from the past 12 hour(s))   CBC with platelets differential    Collection Time: 05/23/18  6:36 AM   Result Value Ref Range    WBC 4.2 4.0 - 11.0 10e9/L    RBC Count 3.01 (L) 4.4 - 5.9 10e12/L    Hemoglobin 10.4 (L) 13.3 - 17.7 g/dL    Hematocrit 31.3 (L) 40.0 - 53.0 %     (H) 78 - 100 fl    MCH 34.6 (H) 26.5 - 33.0 pg    MCHC 33.2 31.5 - 36.5 g/dL    RDW 15.9 (H) 10.0 - 15.0 %    Platelet Count 122 (L) 150 - 450 10e9/L    Diff Method Automated Method     % Neutrophils 43.5 %    % Lymphocytes 33.4 %    % Monocytes 21.8 %    % Eosinophils 0.9 %    % Basophils 0.2 %    % Immature Granulocytes 0.2 %    Nucleated RBCs 0 0 /100    Absolute Neutrophil 1.8 1.6 - 8.3 10e9/L    Absolute Lymphocytes 1.4 0.8 - 5.3 10e9/L    Absolute Monocytes 0.9 0.0 - 1.3 10e9/L    Absolute Eosinophils 0.0 0.0 - 0.7 10e9/L    Absolute Basophils 0.0 0.0 - 0.2 10e9/L    Abs Immature Granulocytes 0.0 0 - 0.4 10e9/L    Absolute Nucleated RBC 0.0     Platelet Estimate Confirming automated cell count     Comprehensive metabolic panel    Collection Time: 05/23/18  6:36 AM   Result Value Ref Range    Sodium 140 133 - 144 mmol/L    Potassium 4.4 3.4 - 5.3 mmol/L    Chloride 112 (H) 94 - 109 mmol/L    Carbon Dioxide 23 20 - 32 mmol/L    Anion Gap 6 3 - 14 mmol/L    Glucose 99 70 - 99 mg/dL    Urea Nitrogen 23 7 - 30 mg/dL    Creatinine 1.35 (H) 0.66 - 1.25 mg/dL    GFR Estimate 54 (L) >60 mL/min/1.7m2    GFR Estimate If Black 65 >60 mL/min/1.7m2    Calcium 8.4 (L) 8.5 - 10.1 mg/dL    Bilirubin Total 0.8 0.2 - 1.3 mg/dL    Albumin 3.7 3.4 - 5.0 g/dL    Protein Total 6.2 (L) 6.8 - 8.8 g/dL    Alkaline Phosphatase 61 40 - 150 U/L    ALT 30 0 - 70 U/L    AST 22 0 - 45 U/L   Magnesium    Collection Time: 05/23/18  6:36 AM   Result Value Ref Range    Magnesium 1.4 (L) 1.6 - 2.3 mg/dL             Follow-ups after your visit        Your next 10 appointments already scheduled     Jun 06, 2018  6:30 AM CDT   [facility] Lab Draw with  [facility] LAB DRAW   [facility])    [address] [place] [address]  [address]  [facility] [id number] [phone]            Jun 06, 2018  7:00 AM CDT   Infusion 360 with  ONCOLOGY INFUSION, UC 10 ATC   [facility] ([facility])    [address]  [address]  [facility] [address] [phone]            Abhilash 15, 2018  6:30 AM CDT   Masonic Lab Draw with  MASONIC LAB DRAW   [facility] Mason[facility] Lab Draw ([facility])    [address] [place] [address]  [address]  [facility] [address] [phone]            Abhilash 15, 2018  7:00 AM CDT   CT CHEST ABDOMEN PELVIS W/O & W CONTRAST with UCCT1   [facility] [place] [facility] ([facility])    [address]  1st Floor  [facility] [address] [phone]           Please bring any scans or X-rays taken at other hospitals, if similar tests were done. Also bring a list of your medicines, including vitamins,  minerals and over-the-counter drugs. It is safest to leave personal items at home.  Be sure to tell your doctor:   If you have any allergies.   If there s any chance you are pregnant.   If you are breastfeeding.  How to prepare:   Do not eat or drink for 2 hours before your exam. If you need to take medicine, you may take it with small sips of water. (We may ask you to take liquid medicine as well.)   Please wear loose clothing, such as a sweat suit or jogging clothes. Avoid snaps, zippers and other metal. We may ask you to undress and put on a hospital gown.  Please arrive 30 minutes early for your CT. Once in the department you might be asked to drink water 15-20 minutes prior to your exam.  If indicated you may be asked to drink an oral contrast in advance of your CT.  If this is the case, the imaging team will let you know or be in contact with you prior to your appointment  Patients over 70 or patients with diabetes or kidney problems:   If you haven t had a blood test (creatinine test) within the last 30 days, the Cardiologist/Radiologist may require you to get this test prior to your exam.  If you have diabetes:   Continue to take your metformin medication on the day of your exam  If you have any questions, please call the Imaging Department where you will have your exam.            Jun 18, 2018  7:15 AM CDT   (Arrive by 7:00 AM)   Return Visit with Naresh De Los Santos MD   [facility] ([facility])    [address] [place] [address]  [address]  [employer] [address]   [phone]            Jun 20, 2018  6:30 AM CDT   Masonic Lab Draw with UC MASONIC LAB DRAW   [facility] Lab Draw ([facility])    [address] [place] [address]  [address]  [employer] [address]   [phone]            Jun 20, 2018  7:00 AM CDT   Infusion 360 with  ONCOLOGY INFUSION, UC 10 ATC   [facility] ([facility])    [address]  [address]  [facility] [id number] [phone]            Jul 05, 2018  6:30 AM CDT   Masonic Lab Draw with  MASONIC LAB DRAW   [facility] Lab Draw ([facility])    [address]  [address]  [facility] [id number] [phone]              Who to contact     If you have questions or need follow up information about today's clinic visit or your schedule please contact [facility] directly at [phone].  Normal or non-critical lab and imaging results will be communicated to you by [employer], letter or phone within 4 business days after the clinic has received the results. If you do not hear from us within 7 days, please contact the clinic through [employer] or phone. If you have a critical or abnormal lab result, we will notify you by phone as soon as possible.  Submit refill requests through [employer] or call your pharmacy and they will forward the refill request to us. Please allow 3 business days for your refill to be completed.          Additional Information About Your Visit        [employer] Information     [employer] gives you secure access to your electronic health record. If you see a primary care provider, you can also send messages to your care team and make appointments. If you have questions, please call your primary care clinic.  If you do not have a primary care provider, please call [phone] and they will assist you.        Care EveryWhere ID     This is your Care EveryWhere ID. This could be used by other organizations to access your [facility] medical records  [id number]        Your Vitals Were     Pulse Temperature Respirations Pulse Oximetry BMI (Body Mass Index)       73 98.1  F (36.7  C) (Oral) 16 99% 30.94 kg/m2        Blood Pressure from Last 3 Encounters:   05/23/18 108/75   05/09/18 110/81   04/25/18 116/77    Weight from Last 3 Encounters:   05/23/18 97.8 kg (215 lb 9.6 oz)   05/09/18 101.6 kg (224 lb)   04/25/18  102.2 kg (225 lb 4.8 oz)              We Performed the Following     CBC with platelets differential     Comprehensive metabolic panel     Magnesium        Primary Care Provider Office Phone # Fax #    Jim Kaplan [phone]       [address]   [place] [facility] [address]        Equal Access to Services     [facility]: Hadii aad ku hadasho Soomaali, waaxda luqadaha, qaybta kaalmada adeegyada, waxay idiin hayaan adeeg kharash la'aan . So [facility] [phone].    ATENCIÓN: Si habla español, tiene a flores disposición servicios gratuitos de asistencia lingüística. Llame al [phone].    We comply with applicable federal civil rights laws and [place] laws. We do not discriminate on the basis of race, color, national origin, age, disability, sex, sexual orientation, or gender identity.            Thank you!     Thank you for choosing [facility]  for your care. Our goal is always to provide you with excellent care. Hearing back from our patients is one way we can continue to improve our services. Please take a few minutes to complete the written survey that you may receive in the mail after your visit with us. Thank you!             Your Updated Medication List - Protect others around you: Learn how to safely use, store and throw away your medicines at www.disposemymeds.org.          This list is accurate as of 5/23/18  9:26 AM.  Always use your most recent med list.                   Brand Name Dispense Instructions for use Diagnosis    atorvastatin 40 MG tablet    LIPITOR     TAKE 1 TABLET BY MOUTH DAILY        CISplatin 100 MG/100ML Soln injection CHEMO    PLATINOL          ELIQUIS PO      Take 5 mg by mouth 2 times daily        gemcitabine 1 GM injection    GEMZAR          LORazepam 0.5 MG tablet    ATIVAN    30 tablet    Take 1 tablet (0.5 mg) by mouth every 4 hours as needed (Anxiety, Nausea/Vomiting or Sleep)    Cholangiocarcinoma (H)        metoprolol tartrate 50 MG tablet    LOPRESSOR     50 mg 2 times daily        MITIGARE 0.6 MG tablet   Generic drug:  colchicine      Take 0.6 mg by mouth 3 times daily as needed        multivitamin, therapeutic with minerals Tabs tablet     30 tablet    Take 1 tablet by mouth daily    Mass of bile duct       NITROSTAT SL      Place 0.4 mg under the tongue every 5 minutes as needed for chest pain (Carries medication - has never used)        OMEGA-3 FISH OIL PO      Take 1,000 mg by mouth daily        ondansetron 8 MG tablet    ZOFRAN    10 tablet    Take 1 tablet (8 mg) by mouth every 8 hours as needed (Nausea/Vomiting)    Cholangiocarcinoma (H)       prochlorperazine 10 MG tablet    COMPAZINE    30 tablet    Take 1 tablet (10 mg) by mouth every 6 hours as needed (Nausea/Vomiting)    Cholangiocarcinoma (H)

## 2018-05-23 NOTE — PATIENT INSTRUCTIONS
Contact Numbers    [facility] Main Line: [phone]  [facility] Triage and after hours / weekends / holidays:  [phone]      Please call the triage or after hours line if you experience a temperature greater than or equal to 100.5, shaking chills, have uncontrolled nausea, vomiting and/or diarrhea, dizziness, shortness of breath, chest pain, bleeding, unexplained bruising, or if you have any other new/concerning symptoms, questions or concerns.      If you are having any concerning symptoms or wish to speak to a provider before your next infusion visit, please call your care coordinator or triage to notify them so we can adequately serve you.     If you need a refill on a narcotic prescription or other medication, please call before your infusion appointment.                 May 2018   Anurag Monday Tuesday Wednesday Thursday Friday Saturday             1     2     3     4     5       6     7     8     9     [facility]    6:30 AM   (15 min.)    [facility]     [facility] ONC INFUSION 360    7:00 AM   (360 min.)    ONCOLOGY INFUSION   [facility] 10     11     12       13     14     15     16     17     18     19       20     21     22     23     [facility] DRAW    6:30 AM   (15 min.)    MASONIC LAB DRAW   [facility] Mason[facility] Lab Draw     [facility] ONC INFUSION 360    7:00 AM   (360 min.)    ONCOLOGY INFUSION   [facility] 24     25     26       27     28     29     30     31                          June 2018 Sunday Monday Tuesday Wednesday Thursday Friday Saturday                            1     2       3     4     5     6     [facility] DRAW    6:30 AM   (15 min.)    MASONIC LAB DRAW   [facility] Masonic Lab Draw     UMP ONC INFUSION 360    7:00 AM   (360 min.)    ONCOLOGY INFUSION   [facility] 7     8     9       10     11     12     13     14     15     [facility] DRAW    6:30 AM   (15 min.)    MASONIC LAB  DRAW   [facility] Lab Draw     CT CHEST ABDOMEN PELVIS WWO    7:00 AM   (20 min.)   UCCT1   [facility] CT 16       17     18     [facility] RETURN    7:00 AM   (30 min.)   Naresh De Los Santos MD   [facility] 19     20     [facility]ON[facility] LAB DRAW    6:30 AM   (15 min.)   [facility] LAB DRAW   [facility] Lab Draw     UM ONC INFUSION 360    7:00 AM   (360 min.)    ONCOLOGY INFUSION   [facility] 21     22     23       24     25     26     27     28     29     30                  Lab Results:  Recent Results (from the past 12 hour(s))   CBC with platelets differential    Collection Time: 05/23/18  6:36 AM   Result Value Ref Range    WBC 4.2 4.0 - 11.0 10e9/L    RBC Count 3.01 (L) 4.4 - 5.9 10e12/L    Hemoglobin 10.4 (L) 13.3 - 17.7 g/dL    Hematocrit 31.3 (L) 40.0 - 53.0 %     (H) 78 - 100 fl    MCH 34.6 (H) 26.5 - 33.0 pg    MCHC 33.2 31.5 - 36.5 g/dL    RDW 15.9 (H) 10.0 - 15.0 %    Platelet Count 122 (L) 150 - 450 10e9/L    Diff Method Automated Method     % Neutrophils 43.5 %    % Lymphocytes 33.4 %    % Monocytes 21.8 %    % Eosinophils 0.9 %    % Basophils 0.2 %    % Immature Granulocytes 0.2 %    Nucleated RBCs 0 0 /100    Absolute Neutrophil 1.8 1.6 - 8.3 10e9/L    Absolute Lymphocytes 1.4 0.8 - 5.3 10e9/L    Absolute Monocytes 0.9 0.0 - 1.3 10e9/L    Absolute Eosinophils 0.0 0.0 - 0.7 10e9/L    Absolute Basophils 0.0 0.0 - 0.2 10e9/L    Abs Immature Granulocytes 0.0 0 - 0.4 10e9/L    Absolute Nucleated RBC 0.0     Platelet Estimate Confirming automated cell count    Comprehensive metabolic panel    Collection Time: 05/23/18  6:36 AM   Result Value Ref Range    Sodium 140 133 - 144 mmol/L    Potassium 4.4 3.4 - 5.3 mmol/L    Chloride 112 (H) 94 - 109 mmol/L    Carbon Dioxide 23 20 - 32 mmol/L    Anion Gap 6 3 - 14 mmol/L    Glucose 99 70 - 99 mg/dL    Urea Nitrogen 23 7 - 30 mg/dL    Creatinine 1.35 (H) 0.66 - 1.25 mg/dL    GFR Estimate 54 (L)  >60 mL/min/1.7m2    GFR Estimate If Black 65 >60 mL/min/1.7m2    Calcium 8.4 (L) 8.5 - 10.1 mg/dL    Bilirubin Total 0.8 0.2 - 1.3 mg/dL    Albumin 3.7 3.4 - 5.0 g/dL    Protein Total 6.2 (L) 6.8 - 8.8 g/dL    Alkaline Phosphatase 61 40 - 150 U/L    ALT 30 0 - 70 U/L    AST 22 0 - 45 U/L   Magnesium    Collection Time: 05/23/18  6:36 AM   Result Value Ref Range    Magnesium 1.4 (L) 1.6 - 2.3 mg/dL

## 2018-06-06 ENCOUNTER — INFUSION THERAPY VISIT (OUTPATIENT)
Dept: ONCOLOGY | Facility: CLINIC | Age: 63
End: 2018-06-06
Attending: INTERNAL MEDICINE
Payer: COMMERCIAL

## 2018-06-06 ENCOUNTER — APPOINTMENT (OUTPATIENT)
Dept: LAB | Facility: CLINIC | Age: 63
End: 2018-06-06
Attending: INTERNAL MEDICINE
Payer: COMMERCIAL

## 2018-06-06 VITALS
RESPIRATION RATE: 18 BRPM | TEMPERATURE: 98.5 F | HEART RATE: 84 BPM | OXYGEN SATURATION: 95 % | DIASTOLIC BLOOD PRESSURE: 87 MMHG | SYSTOLIC BLOOD PRESSURE: 131 MMHG | BODY MASS INDEX: 31.14 KG/M2 | WEIGHT: 217 LBS

## 2018-06-06 DIAGNOSIS — C22.1 CHOLANGIOCARCINOMA (H): Primary | ICD-10-CM

## 2018-06-06 LAB
ALBUMIN SERPL-MCNC: 3.4 G/DL (ref 3.4–5)
ALP SERPL-CCNC: 63 U/L (ref 40–150)
ALT SERPL W P-5'-P-CCNC: 25 U/L (ref 0–70)
ANION GAP SERPL CALCULATED.3IONS-SCNC: 9 MMOL/L (ref 3–14)
AST SERPL W P-5'-P-CCNC: 19 U/L (ref 0–45)
BASOPHILS # BLD AUTO: 0 10E9/L (ref 0–0.2)
BASOPHILS NFR BLD AUTO: 0.3 %
BILIRUB SERPL-MCNC: 1 MG/DL (ref 0.2–1.3)
BUN SERPL-MCNC: 14 MG/DL (ref 7–30)
CALCIUM SERPL-MCNC: 7.8 MG/DL (ref 8.5–10.1)
CHLORIDE SERPL-SCNC: 108 MMOL/L (ref 94–109)
CO2 SERPL-SCNC: 22 MMOL/L (ref 20–32)
CREAT SERPL-MCNC: 1.46 MG/DL (ref 0.66–1.25)
DIFFERENTIAL METHOD BLD: ABNORMAL
EOSINOPHIL # BLD AUTO: 0.1 10E9/L (ref 0–0.7)
EOSINOPHIL NFR BLD AUTO: 1.9 %
ERYTHROCYTE [DISTWIDTH] IN BLOOD BY AUTOMATED COUNT: 15.7 % (ref 10–15)
GFR SERPL CREATININE-BSD FRML MDRD: 49 ML/MIN/1.7M2
GLUCOSE SERPL-MCNC: 98 MG/DL (ref 70–99)
HCT VFR BLD AUTO: 27.3 % (ref 40–53)
HGB BLD-MCNC: 9.3 G/DL (ref 13.3–17.7)
IMM GRANULOCYTES # BLD: 0 10E9/L (ref 0–0.4)
IMM GRANULOCYTES NFR BLD: 0 %
LYMPHOCYTES # BLD AUTO: 1.2 10E9/L (ref 0.8–5.3)
LYMPHOCYTES NFR BLD AUTO: 33.1 %
MAGNESIUM SERPL-MCNC: 1.1 MG/DL (ref 1.6–2.3)
MCH RBC QN AUTO: 34.8 PG (ref 26.5–33)
MCHC RBC AUTO-ENTMCNC: 34.1 G/DL (ref 31.5–36.5)
MCV RBC AUTO: 102 FL (ref 78–100)
MONOCYTES # BLD AUTO: 0.9 10E9/L (ref 0–1.3)
MONOCYTES NFR BLD AUTO: 24 %
NEUTROPHILS # BLD AUTO: 1.5 10E9/L (ref 1.6–8.3)
NEUTROPHILS NFR BLD AUTO: 40.7 %
NRBC # BLD AUTO: 0 10*3/UL
NRBC BLD AUTO-RTO: 0 /100
PLATELET # BLD AUTO: 76 10E9/L (ref 150–450)
PLATELET # BLD EST: ABNORMAL 10*3/UL
POTASSIUM SERPL-SCNC: 4.2 MMOL/L (ref 3.4–5.3)
PROT SERPL-MCNC: 6 G/DL (ref 6.8–8.8)
RBC # BLD AUTO: 2.67 10E12/L (ref 4.4–5.9)
SODIUM SERPL-SCNC: 140 MMOL/L (ref 133–144)
WBC # BLD AUTO: 3.6 10E9/L (ref 4–11)

## 2018-06-06 PROCEDURE — 25000128 H RX IP 250 OP 636: Mod: ZF | Performed by: INTERNAL MEDICINE

## 2018-06-06 PROCEDURE — 83735 ASSAY OF MAGNESIUM: CPT | Performed by: INTERNAL MEDICINE

## 2018-06-06 PROCEDURE — 96417 CHEMO IV INFUS EACH ADDL SEQ: CPT

## 2018-06-06 PROCEDURE — 96375 TX/PRO/DX INJ NEW DRUG ADDON: CPT

## 2018-06-06 PROCEDURE — 80053 COMPREHEN METABOLIC PANEL: CPT | Performed by: INTERNAL MEDICINE

## 2018-06-06 PROCEDURE — 96413 CHEMO IV INFUSION 1 HR: CPT

## 2018-06-06 PROCEDURE — 96367 TX/PROPH/DG ADDL SEQ IV INF: CPT

## 2018-06-06 PROCEDURE — 96366 THER/PROPH/DIAG IV INF ADDON: CPT

## 2018-06-06 PROCEDURE — 85025 COMPLETE CBC W/AUTO DIFF WBC: CPT | Performed by: INTERNAL MEDICINE

## 2018-06-06 PROCEDURE — G0463 HOSPITAL OUTPT CLINIC VISIT: HCPCS | Mod: 25

## 2018-06-06 RX ORDER — HEPARIN SODIUM (PORCINE) LOCK FLUSH IV SOLN 100 UNIT/ML 100 UNIT/ML
5 SOLUTION INTRAVENOUS EVERY 8 HOURS PRN
Status: DISCONTINUED | OUTPATIENT
Start: 2018-06-06 | End: 2018-06-06 | Stop reason: HOSPADM

## 2018-06-06 RX ORDER — HEPARIN SODIUM (PORCINE) LOCK FLUSH IV SOLN 100 UNIT/ML 100 UNIT/ML
5 SOLUTION INTRAVENOUS EVERY 8 HOURS
Status: DISCONTINUED | OUTPATIENT
Start: 2018-06-06 | End: 2018-06-06 | Stop reason: HOSPADM

## 2018-06-06 RX ADMIN — SODIUM CHLORIDE, PRESERVATIVE FREE 5 ML: 5 INJECTION INTRAVENOUS at 11:14

## 2018-06-06 RX ADMIN — GEMCITABINE 2200 MG: 38 INJECTION, SOLUTION INTRAVENOUS at 10:34

## 2018-06-06 RX ADMIN — MAGNESIUM SULFATE HEPTAHYDRATE: 500 INJECTION, SOLUTION INTRAMUSCULAR; INTRAVENOUS at 07:34

## 2018-06-06 RX ADMIN — DEXAMETHASONE SODIUM PHOSPHATE: 10 INJECTION, SOLUTION INTRAMUSCULAR; INTRAVENOUS at 07:15

## 2018-06-06 RX ADMIN — CISPLATIN 60 MG: 1 INJECTION, SOLUTION INTRAVENOUS at 09:34

## 2018-06-06 RX ADMIN — SODIUM CHLORIDE 250 ML: 9 INJECTION, SOLUTION INTRAVENOUS at 07:15

## 2018-06-06 RX ADMIN — SODIUM CHLORIDE, PRESERVATIVE FREE 5 ML: 5 INJECTION INTRAVENOUS at 06:37

## 2018-06-06 RX ADMIN — MAGNESIUM SULFATE HEPTAHYDRATE: 500 INJECTION, SOLUTION INTRAMUSCULAR; INTRAVENOUS at 08:40

## 2018-06-06 ASSESSMENT — PAIN SCALES - GENERAL: PAINLEVEL: NO PAIN (0)

## 2018-06-06 NOTE — PROGRESS NOTES
Infusion Nursing Note:  Girish Chauhan presents today for cycle 7, day 15 cisplatin, gemzar.    Patient seen by provider today: No   present during visit today: Not Applicable.    Note: Patient reports feeling okay since last infusion.  On 2 separate occasions in the past 2 weeks he has had low grade fevers as high as 99.5 associated with 2 different bouts of diarrhea (2-3 episodes per bout).  He denies any shaking chills, abdominal pain, or nausea associated with these episodes. He didn't need to take any medications and the diarrhea resolved on it's own.  He has had some taste changes which have affected his appetite but he is forcing himself to eat and drink fluids.  Dr De Los Santos updated on symptoms and today's lab results including increasing creatinine and decreased magnesium     6/6/2018 0730 TORB Naresh De Los Santos MD/Mily Louie RN  -ok to proceed with chemo today  -give patient an extra 1 liter NS with 2 grams magnesium sulfate IV (total of 4 grams magnesium sulfate IV today for magnesium level 1.1)  -encourage patient to push oral fluids at home     Intravenous Access:  Implanted Port.    Treatment Conditions:  Lab Results   Component Value Date    HGB 9.3 06/06/2018     Lab Results   Component Value Date    WBC 3.6 06/06/2018      Lab Results   Component Value Date    ANEU 1.5 06/06/2018     Lab Results   Component Value Date    PLT 76 06/06/2018      Lab Results   Component Value Date     06/06/2018                   Lab Results   Component Value Date    POTASSIUM 4.2 06/06/2018           Lab Results   Component Value Date    MAG 1.1 06/06/2018            Lab Results   Component Value Date    CR 1.46 06/06/2018                   Lab Results   Component Value Date    GARY 7.8 06/06/2018                Lab Results   Component Value Date    BILITOTAL 1.0 06/06/2018           Lab Results   Component Value Date    ALBUMIN 3.4 06/06/2018                    Lab Results   Component Value Date    ALT 25  06/06/2018           Lab Results   Component Value Date    AST 19 06/06/2018       Results reviewed, labs MET treatment parameters, ok to proceed with treatment.      Post Infusion Assessment:  Patient tolerated infusion without incident.  Patient voided prior to starting cisplatin infusion   Blood return noted pre and post infusion.  Site patent and intact, free from redness, edema or discomfort.  No evidence of extravasations.  Access discontinued per protocol.    Discharge Plan:   Patient declined prescription refills.  Discharge instructions reviewed with: Patient.  Patient and/or family verbalized understanding of discharge instructions and all questions answered.  Patient verbalized understanding to push oral fluids  Copy of AVS given by Nan.  Patient will return 6/20 for next appointment, 6/18 for MD appoitment  Patient discharged in stable condition accompanied by: self.  Departure Mode: Ambulatory.  Face to Face time: 3 minutes.    Mily Louie RN

## 2018-06-06 NOTE — PATIENT INSTRUCTIONS
Contact Numbers    [facility] Main Line: [phone]  [facility] Triage and after hours / weekends / holidays:  [phone]      Please call the triage or after hours line if you experience a temperature greater than or equal to 100.5, shaking chills, have uncontrolled nausea, vomiting and/or diarrhea, dizziness, shortness of breath, chest pain, bleeding, unexplained bruising, or if you have any other new/concerning symptoms, questions or concerns.      If you are having any concerning symptoms or wish to speak to a provider before your next infusion visit, please call your care coordinator or triage to notify them so we can adequately serve you.     If you need a refill on a narcotic prescription or other medication, please call before your infusion appointment.             June 2018 Sunday Monday Tuesday Wednesday Thursday Friday Saturday                            1     2       3     4     5     6     [facility]    6:30 AM   (15 min.)    [facility] DRAW   [facility] Lab Draw     [facility] ONC INFUSION 360    7:00 AM   (360 min.)    ONCOLOGY INFUSION   [facility] 7     8     9       10     11     12     13     14     15     [facility]    6:30 AM   (15 min.)    [facility]   [facility]     CT CHEST ABDOMEN PELVIS WWO    7:00 AM   (20 min.)   CT1   [facility] CT 16       17     18     [facility] RETURN    7:00 AM   (30 min.)   Naresh De Los Santos MD   [facility] 19     20     [facility] D[facility]    6:30 AM   (15 min.)    [facility] DRAW   [facility] Draw     [facility] ONC INFUSION 360    7:00 AM   (360 min.)    ONCOLOGY INFUSION   [facility] 21     22     23       24     25     26     27     28     29     30                July 2018 Sunday Monday Tuesday Wednesday Thursday Friday Saturday   1     2     3     4     5     [facility]    6:30 AM   (15 min.)    [facility] DRAW   [facility]  [facility] Lab Draw     [facility] ONC INFUSION 360    7:00 AM   (360 min.)    ONCOLOGY INFUSION   [facility] 6     7       8     9     10     11     12     13     14       15     16     17     18     19     20     21       22     23     24     25     26     27     28       29     30     31                                     Recent Results (from the past 24 hour(s))   CBC with platelets differential    Collection Time: 06/06/18  6:43 AM   Result Value Ref Range    WBC 3.6 (L) 4.0 - 11.0 10e9/L    RBC Count 2.67 (L) 4.4 - 5.9 10e12/L    Hemoglobin 9.3 (L) 13.3 - 17.7 g/dL    Hematocrit 27.3 (L) 40.0 - 53.0 %     (H) 78 - 100 fl    MCH 34.8 (H) 26.5 - 33.0 pg    MCHC 34.1 31.5 - 36.5 g/dL    RDW 15.7 (H) 10.0 - 15.0 %    Platelet Count 76 (L) 150 - 450 10e9/L    Diff Method Automated Method     % Neutrophils 40.7 %    % Lymphocytes 33.1 %    % Monocytes 24.0 %    % Eosinophils 1.9 %    % Basophils 0.3 %    % Immature Granulocytes 0.0 %    Nucleated RBCs 0 0 /100    Absolute Neutrophil 1.5 (L) 1.6 - 8.3 10e9/L    Absolute Lymphocytes 1.2 0.8 - 5.3 10e9/L    Absolute Monocytes 0.9 0.0 - 1.3 10e9/L    Absolute Eosinophils 0.1 0.0 - 0.7 10e9/L    Absolute Basophils 0.0 0.0 - 0.2 10e9/L    Abs Immature Granulocytes 0.0 0 - 0.4 10e9/L    Absolute Nucleated RBC 0.0     Platelet Estimate Confirming automated cell count    Comprehensive metabolic panel    Collection Time: 06/06/18  6:43 AM   Result Value Ref Range    Sodium 140 133 - 144 mmol/L    Potassium 4.2 3.4 - 5.3 mmol/L    Chloride 108 94 - 109 mmol/L    Carbon Dioxide 22 20 - 32 mmol/L    Anion Gap 9 3 - 14 mmol/L    Glucose 98 70 - 99 mg/dL    Urea Nitrogen 14 7 - 30 mg/dL    Creatinine 1.46 (H) 0.66 - 1.25 mg/dL    GFR Estimate 49 (L) >60 mL/min/1.7m2    GFR Estimate If Black 59 (L) >60 mL/min/1.7m2    Calcium 7.8 (L) 8.5 - 10.1 mg/dL    Bilirubin Total 1.0 0.2 - 1.3 mg/dL    Albumin 3.4 3.4 - 5.0 g/dL    Protein Total 6.0 (L) 6.8 - 8.8 g/dL     Alkaline Phosphatase 63 40 - 150 U/L    ALT 25 0 - 70 U/L    AST 19 0 - 45 U/L   Magnesium    Collection Time: 06/06/18  6:43 AM   Result Value Ref Range    Magnesium 1.1 (L) 1.6 - 2.3 mg/dL

## 2018-06-06 NOTE — MR AVS SNAPSHOT
After Visit Summary   6/6/2018    Girish Chauhan    MRN: [id number]           Patient Information     Date Of Birth          1955        Visit Information        Provider Department      6/6/2018 7:00 AM UC 10 ATC;  ONCOLOGY INFUSION [facility]        Today's Diagnoses     Cholangiocarcinoma (H)    -  1      Care Instructions    Contact Numbers    [facility] Main Line: [phone]  [facility] Triage and after hours / weekends / holidays:  [phone]      Please call the triage or after hours line if you experience a temperature greater than or equal to 100.5, shaking chills, have uncontrolled nausea, vomiting and/or diarrhea, dizziness, shortness of breath, chest pain, bleeding, unexplained bruising, or if you have any other new/concerning symptoms, questions or concerns.      If you are having any concerning symptoms or wish to speak to a provider before your next infusion visit, please call your care coordinator or triage to notify them so we can adequately serve you.     If you need a refill on a narcotic prescription or other medication, please call before your infusion appointment.             June 2018 Sunday Monday Tuesday Wednesday Thursday Friday Saturday                            1     2       3     4     5     6     [facility]    6:30 AM   (15 min.)    [facility] DRAW   [facility] Lab Draw     P ONC INFUSION 360    7:00 AM   (360 min.)    ONCOLOGY INFUSION   [facility] 7     8     9       10     11     12     13     14     15     [facility] DRAW    6:30 AM   (15 min.)    [facility] DRAW   [facility] Lab Draw     CT CHEST ABDOMEN PELVIS WWO    7:00 AM   (20 min.)   UCCT1   [facility] [place] CT 16       17     18     [facility] RETURN    7:00 AM   (30 min.)   Naresh De Los Santos MD   [facility] 19     20     [facility] DRAW    6:30 AM   (15 min.)    [facility] DRAW   [facility] Lab  Draw     [facility] ONC INFUSION 360    7:00 AM   (360 min.)    ONCOLOGY INFUSION   [facility] 21     22     23       24     25     26     27     28     29     30 July 2018 Sunday Monday Tuesday Wednesday Thursday Friday Saturday   1     2     3     4     5     [facility]    6:30 AM   (15 min.)    [facility] DRAW   [facility] Lab Draw     [facility] ONC INFUSION 360    7:00 AM   (360 min.)    ONCOLOGY INFUSION   [facility] 6     7       8     9     10     11     12     13     14       15     16     17     18     19     20     21       22     23     24     25     26     27     28       29     30     31                                     Recent Results (from the past 24 hour(s))   CBC with platelets differential    Collection Time: 06/06/18  6:43 AM   Result Value Ref Range    WBC 3.6 (L) 4.0 - 11.0 10e9/L    RBC Count 2.67 (L) 4.4 - 5.9 10e12/L    Hemoglobin 9.3 (L) 13.3 - 17.7 g/dL    Hematocrit 27.3 (L) 40.0 - 53.0 %     (H) 78 - 100 fl    MCH 34.8 (H) 26.5 - 33.0 pg    MCHC 34.1 31.5 - 36.5 g/dL    RDW 15.7 (H) 10.0 - 15.0 %    Platelet Count 76 (L) 150 - 450 10e9/L    Diff Method Automated Method     % Neutrophils 40.7 %    % Lymphocytes 33.1 %    % Monocytes 24.0 %    % Eosinophils 1.9 %    % Basophils 0.3 %    % Immature Granulocytes 0.0 %    Nucleated RBCs 0 0 /100    Absolute Neutrophil 1.5 (L) 1.6 - 8.3 10e9/L    Absolute Lymphocytes 1.2 0.8 - 5.3 10e9/L    Absolute Monocytes 0.9 0.0 - 1.3 10e9/L    Absolute Eosinophils 0.1 0.0 - 0.7 10e9/L    Absolute Basophils 0.0 0.0 - 0.2 10e9/L    Abs Immature Granulocytes 0.0 0 - 0.4 10e9/L    Absolute Nucleated RBC 0.0     Platelet Estimate Confirming automated cell count    Comprehensive metabolic panel    Collection Time: 06/06/18  6:43 AM   Result Value Ref Range    Sodium 140 133 - 144 mmol/L    Potassium 4.2 3.4 - 5.3 mmol/L    Chloride 108 94 - 109 mmol/L    Carbon Dioxide 22 20 - 32  mmol/L    Anion Gap 9 3 - 14 mmol/L    Glucose 98 70 - 99 mg/dL    Urea Nitrogen 14 7 - 30 mg/dL    Creatinine 1.46 (H) 0.66 - 1.25 mg/dL    GFR Estimate 49 (L) >60 mL/min/1.7m2    GFR Estimate If Black 59 (L) >60 mL/min/1.7m2    Calcium 7.8 (L) 8.5 - 10.1 mg/dL    Bilirubin Total 1.0 0.2 - 1.3 mg/dL    Albumin 3.4 3.4 - 5.0 g/dL    Protein Total 6.0 (L) 6.8 - 8.8 g/dL    Alkaline Phosphatase 63 40 - 150 U/L    ALT 25 0 - 70 U/L    AST 19 0 - 45 U/L   Magnesium    Collection Time: 06/06/18  6:43 AM   Result Value Ref Range    Magnesium 1.1 (L) 1.6 - 2.3 mg/dL                 Follow-ups after your visit        Your next 10 appointments already scheduled     Abhilash 15, 2018  6:30 AM CDT   [facility] Lab Draw with  [facility] DRAW   [facility] Draw ([facility] [place])    [address]  [address]  [facility] [id number] [phone]            Abhilash 15, 2018  7:00 AM CDT   CT CHEST ABDOMEN PELVIS W/O & W CONTRAST with CT1   [facility] [place] CT ([facility] [place])    [address]  1st Floor  [facility] [id number] [phone]           Please bring any scans or X-rays taken at other hospitals, if similar tests were done. Also bring a list of your medicines, including vitamins, minerals and over-the-counter drugs. It is safest to leave personal items at home.  Be sure to tell your doctor:   If you have any allergies.   If there s any chance you are pregnant.   If you are breastfeeding.  How to prepare:   Do not eat or drink for 2 hours before your exam. If you need to take medicine, you may take it with small sips of water. (We may ask you to take liquid medicine as well.)   Please wear loose clothing, such as a sweat suit or jogging clothes. Avoid snaps, zippers and other metal. We may ask you to undress and put on a hospital gown.  Please arrive 30 minutes early for your CT. Once in the department you might be asked to drink water 15-20 minutes  prior to your exam.  If indicated you may be asked to drink an oral contrast in advance of your CT.  If this is the case, the imaging team will let you know or be in contact with you prior to your appointment  Patients over 70 or patients with diabetes or kidney problems:   If you haven t had a blood test (creatinine test) within the last 30 days, the Cardiologist/Radiologist may require you to get this test prior to your exam.  If you have diabetes:   Continue to take your metformin medication on the day of your exam  If you have any questions, please call the Imaging Department where you will have your exam.            Jun 18, 2018  7:15 AM CDT   (Arrive by 7:00 AM)   Return Visit with Naresh De Los Santos MD   [facility] ([facility])    [address] [place] [address]  [address]  [facility] [address]   [phone]            Jun 20, 2018  6:30 AM CDT   [facility] Draw with [facility]   [facility])    [address] [place] [address]  [address]  [facility] [address]   [phone]            Jun 20, 2018  7:00 AM CDT   Infusion 360 with UC ONCOLOGY INFUSION, UC 10 ATC   [facility] ([facility])    [address] [place] [address]  [address]  [facility] [address]   [phone]            Jul 05, 2018  6:30 AM CDT   [facility] Draw with [facility] DRAW   [facility])    [address] [place] [address]  [address]  [facility] [address]   [phone]            Jul 05, 2018  7:00 AM CDT   Infusion 360 with UC ONCOLOGY INFUSION, UC 10 ATC   [facility] ([facility])    [address] [place] [address]  [address]  [facility] [address]   [phone]              Who to contact     If you have questions or need follow up information about today's clinic visit or your schedule please contact [facility] directly at [phone].  Normal or non-critical lab and imaging results will be communicated to you by MyChart, letter or phone within 4 business days after the clinic has received the results. If you do not hear from us within 7 days, please contact the clinic through [employer] or phone. If you have a critical or abnormal lab result, we will notify you by phone as soon as possible.  Submit refill requests through [employer] or call your pharmacy and they will forward the refill request to us. Please allow 3 business days for your refill to be completed.          Additional Information About Your Visit        [employer] Information     [employer] gives you secure access to your electronic health record. If you see a primary care provider, you can also send messages to your care team and make appointments. If you have questions, please call your primary care clinic.  If you do not have a primary care provider, please call [phone] and they will assist you.        Care EveryWhere ID     This is your Care EveryWhere ID. This could be used by other organizations to access your [place] medical records  [id number]        Your Vitals Were     Pulse Temperature Respirations Pulse Oximetry BMI (Body Mass Index)       84 98.5  F (36.9  C) (Oral) 18 95% 31.14 kg/m2        Blood Pressure from Last 3 Encounters:   06/06/18 131/87   05/23/18 108/75   05/09/18 110/81    Weight from Last 3 Encounters:   06/06/18 98.4 kg (217 lb)   05/23/18 97.8 kg (215 lb 9.6 oz)   05/09/18 101.6 kg (224 lb)              We Performed the Following     CBC with platelets differential     Comprehensive metabolic panel     Magnesium        Primary Care Provider Office Phone # Fax #    Jim Kaplan [phone]       [address] [place] [facility] [address]        Equal Access to Services     MARIUSZ MADRIGAL : Robyn Brizuela, nancy burger, lb cespedes,  ankit weinbergcarmen alvarez'aan ah. So [facility] [phone].    ATENCIÓN: Si josefinala daniel, tiene a flores disposición servicios gratuitos de asistencia lingüística. Angela chung [phone].    We comply with applicable federal civil rights laws and [place] laws. We do not discriminate on the basis of race, color, national origin, age, disability, sex, sexual orientation, or gender identity.            Thank you!     Thank you for choosing [facility] CLIN[facility]  for your care. Our goal is always to provide you with excellent care. Hearing back from our patients is one way we can continue to improve our services. Please take a few minutes to complete the written survey that you may receive in the mail after your visit with us. Thank you!             Your Updated Medication List - Protect others around you: Learn how to safely use, store and throw away your medicines at www.disposemymeds.org.          This list is accurate as of 6/6/18 11:44 AM.  Always use your most recent med list.                   Brand Name Dispense Instructions for use Diagnosis    atorvastatin 40 MG tablet    LIPITOR     TAKE 1 TABLET BY MOUTH DAILY        CISplatin 100 MG/100ML Soln injection CHEMO    PLATINOL          ELIQUIS PO      Take 5 mg by mouth 2 times daily        gemcitabine 1 GM injection    GEMZAR          LORazepam 0.5 MG tablet    ATIVAN    30 tablet    Take 1 tablet (0.5 mg) by mouth every 4 hours as needed (Anxiety, Nausea/Vomiting or Sleep)    Cholangiocarcinoma (H)       metoprolol tartrate 50 MG tablet    LOPRESSOR     50 mg 2 times daily        MITIGARE 0.6 MG tablet   Generic drug:  colchicine      Take 0.6 mg by mouth 3 times daily as needed        multivitamin, therapeutic with minerals Tabs tablet     30 tablet    Take 1 tablet by mouth daily    Mass of bile duct       NITROSTAT SL      Place 0.4 mg under the tongue every 5 minutes as needed for chest pain (Carries medication - has never used)        OMEGA-3  FISH OIL PO      Take 1,000 mg by mouth daily        ondansetron 8 MG tablet    ZOFRAN    10 tablet    Take 1 tablet (8 mg) by mouth every 8 hours as needed (Nausea/Vomiting)    Cholangiocarcinoma (H)       prochlorperazine 10 MG tablet    COMPAZINE    30 tablet    Take 1 tablet (10 mg) by mouth every 6 hours as needed (Nausea/Vomiting)    Cholangiocarcinoma (H)

## 2018-06-15 ENCOUNTER — RADIANT APPOINTMENT (OUTPATIENT)
Dept: CT IMAGING | Facility: CLINIC | Age: 63
End: 2018-06-15
Attending: INTERNAL MEDICINE
Payer: COMMERCIAL

## 2018-06-15 DIAGNOSIS — C22.1 CHOLANGIOCARCINOMA (H): ICD-10-CM

## 2018-06-15 LAB
ALBUMIN SERPL-MCNC: 3.6 G/DL (ref 3.4–5)
ALP SERPL-CCNC: 66 U/L (ref 40–150)
ALT SERPL W P-5'-P-CCNC: 32 U/L (ref 0–70)
ANION GAP SERPL CALCULATED.3IONS-SCNC: 10 MMOL/L (ref 3–14)
AST SERPL W P-5'-P-CCNC: 28 U/L (ref 0–45)
BASOPHILS # BLD AUTO: 0 10E9/L (ref 0–0.2)
BASOPHILS NFR BLD AUTO: 0.3 %
BILIRUB SERPL-MCNC: 1.2 MG/DL (ref 0.2–1.3)
BUN SERPL-MCNC: 21 MG/DL (ref 7–30)
CALCIUM SERPL-MCNC: 7.6 MG/DL (ref 8.5–10.1)
CHLORIDE SERPL-SCNC: 102 MMOL/L (ref 94–109)
CO2 SERPL-SCNC: 23 MMOL/L (ref 20–32)
CREAT SERPL-MCNC: 1.42 MG/DL (ref 0.66–1.25)
DIFFERENTIAL METHOD BLD: ABNORMAL
EOSINOPHIL # BLD AUTO: 0.1 10E9/L (ref 0–0.7)
EOSINOPHIL NFR BLD AUTO: 2.3 %
ERYTHROCYTE [DISTWIDTH] IN BLOOD BY AUTOMATED COUNT: 14.6 % (ref 10–15)
GFR SERPL CREATININE-BSD FRML MDRD: 51 ML/MIN/1.7M2
GLUCOSE SERPL-MCNC: 98 MG/DL (ref 70–99)
HCT VFR BLD AUTO: 25.1 % (ref 40–53)
HGB BLD-MCNC: 8.7 G/DL (ref 13.3–17.7)
IMM GRANULOCYTES # BLD: 0.1 10E9/L (ref 0–0.4)
IMM GRANULOCYTES NFR BLD: 1.3 %
LYMPHOCYTES # BLD AUTO: 1.5 10E9/L (ref 0.8–5.3)
LYMPHOCYTES NFR BLD AUTO: 38.9 %
MCH RBC QN AUTO: 34.7 PG (ref 26.5–33)
MCHC RBC AUTO-ENTMCNC: 34.7 G/DL (ref 31.5–36.5)
MCV RBC AUTO: 100 FL (ref 78–100)
MONOCYTES # BLD AUTO: 0.4 10E9/L (ref 0–1.3)
MONOCYTES NFR BLD AUTO: 11.3 %
NEUTROPHILS # BLD AUTO: 1.8 10E9/L (ref 1.6–8.3)
NEUTROPHILS NFR BLD AUTO: 45.9 %
NRBC # BLD AUTO: 0.1 10*3/UL
NRBC BLD AUTO-RTO: 2 /100
PLATELET # BLD AUTO: 71 10E9/L (ref 150–450)
POTASSIUM SERPL-SCNC: 3.9 MMOL/L (ref 3.4–5.3)
PROT SERPL-MCNC: 6.1 G/DL (ref 6.8–8.8)
RBC # BLD AUTO: 2.51 10E12/L (ref 4.4–5.9)
SODIUM SERPL-SCNC: 135 MMOL/L (ref 133–144)
WBC # BLD AUTO: 3.9 10E9/L (ref 4–11)

## 2018-06-15 PROCEDURE — 80053 COMPREHEN METABOLIC PANEL: CPT | Performed by: INTERNAL MEDICINE

## 2018-06-15 PROCEDURE — 25000128 H RX IP 250 OP 636: Performed by: INTERNAL MEDICINE

## 2018-06-15 PROCEDURE — 85025 COMPLETE CBC W/AUTO DIFF WBC: CPT | Performed by: INTERNAL MEDICINE

## 2018-06-15 PROCEDURE — 86301 IMMUNOASSAY TUMOR CA 19-9: CPT | Performed by: INTERNAL MEDICINE

## 2018-06-15 RX ORDER — HEPARIN SODIUM (PORCINE) LOCK FLUSH IV SOLN 100 UNIT/ML 100 UNIT/ML
500 SOLUTION INTRAVENOUS DAILY PRN
Status: DISCONTINUED | OUTPATIENT
Start: 2018-06-15 | End: 2018-06-23 | Stop reason: HOSPADM

## 2018-06-15 RX ORDER — HEPARIN SODIUM (PORCINE) LOCK FLUSH IV SOLN 100 UNIT/ML 100 UNIT/ML
5 SOLUTION INTRAVENOUS ONCE
Status: COMPLETED | OUTPATIENT
Start: 2018-06-15 | End: 2018-06-15

## 2018-06-15 RX ORDER — IOPAMIDOL 755 MG/ML
132 INJECTION, SOLUTION INTRAVASCULAR ONCE
Status: COMPLETED | OUTPATIENT
Start: 2018-06-15 | End: 2018-06-15

## 2018-06-15 RX ADMIN — HEPARIN SODIUM (PORCINE) LOCK FLUSH IV SOLN 100 UNIT/ML 5 ML: 100 SOLUTION at 07:41

## 2018-06-15 RX ADMIN — SODIUM CHLORIDE, PRESERVATIVE FREE 500 UNITS: 5 INJECTION INTRAVENOUS at 06:46

## 2018-06-15 RX ADMIN — IOPAMIDOL 132 ML: 755 INJECTION, SOLUTION INTRAVASCULAR at 07:12

## 2018-06-15 NOTE — NURSING NOTE
Chief Complaint   Patient presents with     Port Draw       Port accessed.  Labs collected from port.  Line flushed with NS and Heparin.  Pt tolerated procedure.      Monserrat Hernandez RN

## 2018-06-15 NOTE — DISCHARGE INSTRUCTIONS

## 2018-06-16 LAB — CANCER AG19-9 SERPL-ACNC: 32 U/ML (ref 0–37)

## 2018-06-18 ENCOUNTER — ONCOLOGY VISIT (OUTPATIENT)
Dept: ONCOLOGY | Facility: CLINIC | Age: 63
End: 2018-06-18
Attending: INTERNAL MEDICINE
Payer: COMMERCIAL

## 2018-06-18 VITALS
RESPIRATION RATE: 18 BRPM | SYSTOLIC BLOOD PRESSURE: 130 MMHG | DIASTOLIC BLOOD PRESSURE: 93 MMHG | HEART RATE: 88 BPM | WEIGHT: 210.98 LBS | OXYGEN SATURATION: 98 % | TEMPERATURE: 97.5 F | BODY MASS INDEX: 30.27 KG/M2

## 2018-06-18 DIAGNOSIS — C22.1 CHOLANGIOCARCINOMA (H): Primary | ICD-10-CM

## 2018-06-18 DIAGNOSIS — I48.0 PAROXYSMAL ATRIAL FIBRILLATION (H): ICD-10-CM

## 2018-06-18 DIAGNOSIS — T45.1X5A ANEMIA ASSOCIATED WITH CHEMOTHERAPY: ICD-10-CM

## 2018-06-18 DIAGNOSIS — D64.81 ANEMIA ASSOCIATED WITH CHEMOTHERAPY: ICD-10-CM

## 2018-06-18 DIAGNOSIS — C79.11 SECONDARY MALIGNANT NEOPLASM OF BLADDER (H): ICD-10-CM

## 2018-06-18 PROCEDURE — 99215 OFFICE O/P EST HI 40 MIN: CPT | Mod: ZP | Performed by: INTERNAL MEDICINE

## 2018-06-18 PROCEDURE — G0463 HOSPITAL OUTPT CLINIC VISIT: HCPCS | Mod: ZF

## 2018-06-18 RX ORDER — SODIUM CHLORIDE 9 MG/ML
1000 INJECTION, SOLUTION INTRAVENOUS CONTINUOUS PRN
Status: CANCELLED
Start: 2018-07-05

## 2018-06-18 RX ORDER — HEPARIN SODIUM (PORCINE) LOCK FLUSH IV SOLN 100 UNIT/ML 100 UNIT/ML
5 SOLUTION INTRAVENOUS EVERY 8 HOURS
Status: CANCELLED
Start: 2018-06-20

## 2018-06-18 RX ORDER — LORAZEPAM 2 MG/ML
0.5 INJECTION INTRAMUSCULAR EVERY 4 HOURS PRN
Status: CANCELLED
Start: 2018-06-20

## 2018-06-18 RX ORDER — HEPARIN SODIUM (PORCINE) LOCK FLUSH IV SOLN 100 UNIT/ML 100 UNIT/ML
5 SOLUTION INTRAVENOUS EVERY 8 HOURS
Status: CANCELLED
Start: 2018-07-05

## 2018-06-18 RX ORDER — LORAZEPAM 2 MG/ML
0.5 INJECTION INTRAMUSCULAR EVERY 4 HOURS PRN
Status: CANCELLED
Start: 2018-07-05

## 2018-06-18 RX ORDER — EPINEPHRINE 1 MG/ML
0.3 INJECTION, SOLUTION INTRAMUSCULAR; SUBCUTANEOUS EVERY 5 MIN PRN
Status: CANCELLED | OUTPATIENT
Start: 2018-07-05

## 2018-06-18 RX ORDER — EPINEPHRINE 0.3 MG/.3ML
0.3 INJECTION SUBCUTANEOUS EVERY 5 MIN PRN
Status: CANCELLED | OUTPATIENT
Start: 2018-07-05

## 2018-06-18 RX ORDER — ALBUTEROL SULFATE 0.83 MG/ML
2.5 SOLUTION RESPIRATORY (INHALATION)
Status: CANCELLED | OUTPATIENT
Start: 2018-06-20

## 2018-06-18 RX ORDER — ALBUTEROL SULFATE 90 UG/1
1-2 AEROSOL, METERED RESPIRATORY (INHALATION)
Status: CANCELLED
Start: 2018-07-05

## 2018-06-18 RX ORDER — MEPERIDINE HYDROCHLORIDE 25 MG/ML
25 INJECTION INTRAMUSCULAR; INTRAVENOUS; SUBCUTANEOUS EVERY 30 MIN PRN
Status: CANCELLED | OUTPATIENT
Start: 2018-06-20

## 2018-06-18 RX ORDER — METHYLPREDNISOLONE SODIUM SUCCINATE 125 MG/2ML
125 INJECTION, POWDER, LYOPHILIZED, FOR SOLUTION INTRAMUSCULAR; INTRAVENOUS
Status: CANCELLED
Start: 2018-06-20

## 2018-06-18 RX ORDER — EPINEPHRINE 1 MG/ML
0.3 INJECTION, SOLUTION INTRAMUSCULAR; SUBCUTANEOUS EVERY 5 MIN PRN
Status: CANCELLED | OUTPATIENT
Start: 2018-06-20

## 2018-06-18 RX ORDER — EPINEPHRINE 0.3 MG/.3ML
0.3 INJECTION SUBCUTANEOUS EVERY 5 MIN PRN
Status: CANCELLED | OUTPATIENT
Start: 2018-06-20

## 2018-06-18 RX ORDER — ALBUTEROL SULFATE 90 UG/1
1-2 AEROSOL, METERED RESPIRATORY (INHALATION)
Status: CANCELLED
Start: 2018-06-20

## 2018-06-18 RX ORDER — DIPHENHYDRAMINE HYDROCHLORIDE 50 MG/ML
50 INJECTION INTRAMUSCULAR; INTRAVENOUS
Status: CANCELLED
Start: 2018-07-05

## 2018-06-18 RX ORDER — MEPERIDINE HYDROCHLORIDE 25 MG/ML
25 INJECTION INTRAMUSCULAR; INTRAVENOUS; SUBCUTANEOUS EVERY 30 MIN PRN
Status: CANCELLED | OUTPATIENT
Start: 2018-07-05

## 2018-06-18 RX ORDER — METHYLPREDNISOLONE SODIUM SUCCINATE 125 MG/2ML
125 INJECTION, POWDER, LYOPHILIZED, FOR SOLUTION INTRAMUSCULAR; INTRAVENOUS
Status: CANCELLED
Start: 2018-07-05

## 2018-06-18 RX ORDER — ALBUTEROL SULFATE 0.83 MG/ML
2.5 SOLUTION RESPIRATORY (INHALATION)
Status: CANCELLED | OUTPATIENT
Start: 2018-07-05

## 2018-06-18 RX ORDER — DIPHENHYDRAMINE HYDROCHLORIDE 50 MG/ML
50 INJECTION INTRAMUSCULAR; INTRAVENOUS
Status: CANCELLED
Start: 2018-06-20

## 2018-06-18 RX ORDER — SODIUM CHLORIDE 9 MG/ML
1000 INJECTION, SOLUTION INTRAVENOUS CONTINUOUS PRN
Status: CANCELLED
Start: 2018-06-20

## 2018-06-18 ASSESSMENT — PAIN SCALES - GENERAL: PAINLEVEL: NO PAIN (0)

## 2018-06-18 NOTE — MR AVS SNAPSHOT
After Visit Summary   6/18/2018    Girish Chauhan    MRN: [id number]           Patient Information     Date Of Birth          1955        Visit Information        Provider Department      6/18/2018 7:15 AM Naresh De Los Santos MD [facility]        Today's Diagnoses     Cholangiocarcinoma (H)    -  1    Paroxysmal atrial fibrillation (H)           Follow-ups after your visit        Follow-up notes from your care team     Return in about 1 month (around 7/18/2018) for MD visit with CT and labs.      Your next 10 appointments already scheduled     Jun 20, 2018  6:30 AM CDT   Masonic Lab Draw with UC MASONIC LAB DRAW   [facility] Masonic Lab Draw ([facility])    [address]  [address]  [place] [address]   [phone]            Jun 20, 2018  7:00 AM CDT   Infusion 60 with UC ONCOLOGY INFUSION, UC 10 ATC   [facility] ([facility])    [address]  [address]  [place] [address]   [phone]            Jul 05, 2018  6:30 AM CDT   Masonic Lab Draw with UC MASONIC LAB DRAW   [facility] Masonic Lab Draw ([facility])    [address]  [address]  [place] [address]   [phone]            Jul 05, 2018  7:00 AM CDT   Infusion 62 with UC ONCOLOGY INFUSION, UC 10 ATC   [facility] ([facility])    [address]  [address]  [place] [address]   [phone]            Jul 12, 2018  6:30 AM CDT   Masonic Lab Draw with UC MASONIC LAB DRAW   [facility] Masonic Lab Draw ([facility])    [address]  [address]  [place] [address]   [phone]            Jul 12, 2018  7:40 AM CDT   (Arrive by 7:10 AM)   CT CHEST ABDOMEN PELVIS W/O & W CONTRAST with UCCT2   [facility] Imaging [place] [facility] ([facility])    [address] [place] [address]  Floor  [facility] [id number] [phone]           Please bring any scans or X-rays taken at other hospitals, if similar tests were done. Also bring a list of your medicines, including vitamins, minerals and over-the-counter drugs. It is safest to leave personal items at home.  Be sure to tell your doctor:   If you have any allergies.   If there s any chance you are pregnant.   If you are breastfeeding.  How to prepare:   Do not eat or drink for 2 hours before your exam. If you need to take medicine, you may take it with small sips of water. (We may ask you to take liquid medicine as well.)   Please wear loose clothing, such as a sweat suit or jogging clothes. Avoid snaps, zippers and other metal. We may ask you to undress and put on a hospital gown.  Please arrive 30 minutes early for your CT. Once in the department you might be asked to drink water 15-20 minutes prior to your exam.  If indicated you may be asked to drink an oral contrast in advance of your CT.  If this is the case, the imaging team will let you know or be in contact with you prior to your appointment  Patients over 70 or patients with diabetes or kidney problems:   If you haven t had a blood test (creatinine test) within the last 30 days, the Cardiologist/Radiologist may require you to get this test prior to your exam.  If you have diabetes:   Continue to take your metformin medication on the day of your exam  If you have any questions, please call the Imaging Department where you will have your exam.            Jul 16, 2018  7:15 AM CDT   (Arrive by 7:00 AM)   Return Visit with Naresh De Los Santos MD   [facility] ([facility])    [address]  [address]  [facility] [id number] [phone]              Future tests that were ordered for you today     Open Future Orders        Priority Expected Expires Ordered    CT Chest Abdomen Pelvis w/o & w Contrast Routine 7/16/2018 8/18/2018  6/18/2018    Comprehensive metabolic panel Routine 7/16/2018 8/18/2018 6/18/2018    CBC with platelets differential Routine 7/16/2018 8/18/2018 6/18/2018    Cancer antigen 19-9 Routine 7/16/2018 8/18/2018 6/18/2018            Who to contact     If you have questions or need follow up information about today's clinic visit or your schedule please contact [facility] directly at [phone].  Normal or non-critical lab and imaging results will be communicated to you by Viewsyhart, letter or phone within 4 business days after the clinic has received the results. If you do not hear from us within 7 days, please contact the clinic through ContextWebt or phone. If you have a critical or abnormal lab result, we will notify you by phone as soon as possible.  Submit refill requests through Automated Insights or call your pharmacy and they will forward the refill request to us. Please allow 3 business days for your refill to be completed.          Additional Information About Your Visit        Viewsyhart Information     [employer] Insights gives you secure access to your electronic health record. If you see a primary care provider, you can also send messages to your care team and make appointments. If you have questions, please call your primary care clinic.  If you do not have a primary care provider, please call [phone] and they will assist you.        Care EveryWhere ID     This is your Care EveryWhere ID. This could be used by other organizations to access your [facility] medical records  [id number]        Your Vitals Were     Pulse Temperature Respirations Pulse Oximetry BMI (Body Mass Index)       88 97.5  F (36.4  C) (Oral) 18 98% 30.27 kg/m2        Blood Pressure from Last 3 Encounters:   06/18/18 (!) 130/93   06/06/18 131/87   05/23/18 108/75    Weight from Last 3 Encounters:   06/18/18 95.7 kg (210 lb 15.7 oz)   06/06/18 98.4 kg (217 lb)   05/23/18 97.8 kg (215 lb 9.6 oz)               Primary Care Provider Office Phone #  Fax #    Jim Kaplan [phone]       [facility] [address]   [facility] [address]        Equal Access to Services     MARIUSZ MADRIGAL : Robyn stearns meryo Sokarina, waaxda luqadaha, qaybta kaalmada adekeith, ankit srivastava laJo Annjewel gross. So [facility] [phone].    ATENCIÓN: Si habla español, tiene a flores disposición servicios gratuitos de asistencia lingüística. Llame al [phone].    We comply with applicable federal civil rights laws and [place] laws. We do not discriminate on the basis of race, color, national origin, age, disability, sex, sexual orientation, or gender identity.            Thank you!     Thank you for choosing [facility]  for your care. Our goal is always to provide you with excellent care. Hearing back from our patients is one way we can continue to improve our services. Please take a few minutes to complete the written survey that you may receive in the mail after your visit with us. Thank you!             Your Updated Medication List - Protect others around you: Learn how to safely use, store and throw away your medicines at www.disposemymeds.org.          This list is accurate as of 6/18/18  8:13 AM.  Always use your most recent med list.                   Brand Name Dispense Instructions for use Diagnosis    atorvastatin 40 MG tablet    LIPITOR     TAKE 1 TABLET BY MOUTH DAILY        CISplatin 100 MG/100ML Soln injection CHEMO    PLATINOL          ELIQUIS PO      Take 5 mg by mouth 2 times daily        gemcitabine 1 GM injection    GEMZAR          LORazepam 0.5 MG tablet    ATIVAN    30 tablet    Take 1 tablet (0.5 mg) by mouth every 4 hours as needed (Anxiety, Nausea/Vomiting or Sleep)    Cholangiocarcinoma (H)       metoprolol tartrate 50 MG tablet    LOPRESSOR     50 mg 2 times daily        MITIGARE 0.6 MG tablet   Generic drug:  colchicine      Take 0.6 mg by mouth 3 times daily as needed         multivitamin, therapeutic with minerals Tabs tablet     30 tablet    Take 1 tablet by mouth daily    Mass of bile duct       NITROSTAT SL      Place 0.4 mg under the tongue every 5 minutes as needed for chest pain (Carries medication - has never used)        OMEGA-3 FISH OIL PO      Take 1,000 mg by mouth daily        ondansetron 8 MG tablet    ZOFRAN    10 tablet    Take 1 tablet (8 mg) by mouth every 8 hours as needed (Nausea/Vomiting)    Cholangiocarcinoma (H)       prochlorperazine 10 MG tablet    COMPAZINE    30 tablet    Take 1 tablet (10 mg) by mouth every 6 hours as needed (Nausea/Vomiting)    Cholangiocarcinoma (H)

## 2018-06-18 NOTE — LETTER
6/18/2018      RE: Girish Chauhan  [address] [address]       Girish Chauhan is here today in follow-up of metastatic cholangiocarcinoma.    He had stage IV disease originally resected a couple years ago and then recurred within his bladder wall. He has been on treatment now for several months with gemcitabine and cisplatin and is here for a planned response assessment. At our last evaluation he had good disease control with relatively little toxicity other than some cytopenias which have required schedule adjustment. He tells me that since I saw him last his overall condition as declined a little bit--he notes that he is more fatigued and has reduced his activity level as a consequence. He still gets out on walks quite a bit and is able to do his yard work and the like, but has to stop and take breaks frequently. He notes he's had some episodes of palpitations which seem a little bit different than his usual A. fib. When he has these he notes he is a little bit short of breath with exertion. He never has chest pain or rest dyspnea. He's had no problems with edema. He notes that his sense of taste has gotten progressively worse and he is now finding it very difficult to eat a lot of things because of the altered taste. His appetite is still good, but he is losing weight. He had one episode of a moderate fever without any other associated symptoms which resolved spontaneously. The remainder of his 10 point complete review of systems is otherwise negative.    On physical exam Mr. Chauhan is pale but otherwise well-appearing. His vital signs are relatively unremarkable with mild hypertension.  He has no scleral icterus and no visible lesion in the oropharynx.  He has no adenopathy palpable in the neck or supraclavicular spaces.  His lungs are clear to auscultation without dullness to percussion.  His heart rate and rhythm are irregular with a grade 2/6 systolic ejection murmur. His jugular venous  pressure appears normal.  His abdomen is soft and nontender without palpable mass, organomegaly or ascites.  He has no peripheral edema and no tenderness in the calves or thighs. He has no cyanosis or clubbing of his digits.  His speech is fluent and his cranial nerves are grossly intact. His gait and station are unremarkable.    I reviewed with the patient and his wife his CT scan and lab work. His CA-19-9 level is stable. His CT shows some mild, but definite, increase in his mediastinal lymph nodes but none are very big yet. The known site of tumor in his bladder wall that is difficult to assess accurately but does not appear grossly changed. I see no other sites of new disease. His electrolytes are normal but his creatinine has worsened a little bit. His liver enzymes are minimally abnormal. His blood counts are notable for his hemoglobin being down to 8.7.    Assessment/plan: Metastatic cholangiocarcinoma. His tumor marker is stable, his CT scan has equivocal evidence of progression, and his overall clinical condition has worsened a little bit. Some of his symptom progression may be related to his chemotherapy, certainly the altered taste and associated weight loss. His fatigue may be partially due to his gradually worsening anemia. I don't think this is of primary cardiac origin, but will keep that possibility in the back of our minds. However, all this may also indicate disease progression. We had a long talk about that uncertainty, and I don't think trying to get a biopsy or doing a PET scan is going to be clinically helpful in that regard. I recommended we do one more cycle of treatment and then reevaluate his disease status to see if it does not become more clear-cut. We are going to drop the platinum from his treatment regimen which should reduce the toxicity (his worsening renal function and altered taste.) I will add erythropoietin to his treatment regimen as well to help with the anemia.    Naresh  Aaron De Los Santos MD

## 2018-06-18 NOTE — PROGRESS NOTES
Girish Chauhan is here today in follow-up of metastatic cholangiocarcinoma.    He had stage IV disease originally resected a couple years ago and then recurred within his bladder wall. He has been on treatment now for several months with gemcitabine and cisplatin and is here for a planned response assessment. At our last evaluation he had good disease control with relatively little toxicity other than some cytopenias which have required schedule adjustment. He tells me that since I saw him last his overall condition as declined a little bit--he notes that he is more fatigued and has reduced his activity level as a consequence. He still gets out on walks quite a bit and is able to do his yard work and the like, but has to stop and take breaks frequently. He notes he's had some episodes of palpitations which seem a little bit different than his usual A. fib. When he has these he notes he is a little bit short of breath with exertion. He never has chest pain or rest dyspnea. He's had no problems with edema. He notes that his sense of taste has gotten progressively worse and he is now finding it very difficult to eat a lot of things because of the altered taste. His appetite is still good, but he is losing weight. He had one episode of a moderate fever without any other associated symptoms which resolved spontaneously. The remainder of his 10 point complete review of systems is otherwise negative.    On physical exam Mr. Chauhan is pale but otherwise well-appearing. His vital signs are relatively unremarkable with mild hypertension.  He has no scleral icterus and no visible lesion in the oropharynx.  He has no adenopathy palpable in the neck or supraclavicular spaces.  His lungs are clear to auscultation without dullness to percussion.  His heart rate and rhythm are irregular with a grade 2/6 systolic ejection murmur. His jugular venous pressure appears normal.  His abdomen is soft and nontender without palpable mass,  organomegaly or ascites.  He has no peripheral edema and no tenderness in the calves or thighs. He has no cyanosis or clubbing of his digits.  His speech is fluent and his cranial nerves are grossly intact. His gait and station are unremarkable.    I reviewed with the patient and his wife his CT scan and lab work. His CA-19-9 level is stable. His CT shows some mild, but definite, increase in his mediastinal lymph nodes but none are very big yet. The known site of tumor in his bladder wall that is difficult to assess accurately but does not appear grossly changed. I see no other sites of new disease. His electrolytes are normal but his creatinine has worsened a little bit. His liver enzymes are minimally abnormal. His blood counts are notable for his hemoglobin being down to 8.7.    Assessment/plan: Metastatic cholangiocarcinoma. His tumor marker is stable, his CT scan has equivocal evidence of progression, and his overall clinical condition has worsened a little bit. Some of his symptom progression may be related to his chemotherapy, certainly the altered taste and associated weight loss. His fatigue may be partially due to his gradually worsening anemia. I don't think this is of primary cardiac origin, but will keep that possibility in the back of our minds. However, all this may also indicate disease progression. We had a long talk about that uncertainty, and I don't think trying to get a biopsy or doing a PET scan is going to be clinically helpful in that regard. I recommended we do one more cycle of treatment and then reevaluate his disease status to see if it does not become more clear-cut. We are going to drop the platinum from his treatment regimen which should reduce the toxicity (his worsening renal function and altered taste.) I will add erythropoietin to his treatment regimen as well to help with the anemia.

## 2018-06-18 NOTE — NURSING NOTE
"Oncology Rooming Note    June 18, 2018 7:27 AM   Girish Chauhan is a 63 year old male who presents for:    Chief Complaint   Patient presents with     Oncology Clinic Visit     [facility] , CT, Lab results      Initial Vitals: BP (!) 130/93  Pulse 88  Temp 97.5  F (36.4  C) (Oral)  Resp 18  Wt 95.7 kg (210 lb 15.7 oz)  SpO2 98%  BMI 30.27 kg/m2 Estimated body mass index is 30.27 kg/(m^2) as calculated from the following:    Height as of 3/26/18: 1.778 m (5' 10\").    Weight as of this encounter: 95.7 kg (210 lb 15.7 oz). Body surface area is 2.17 meters squared.  No Pain (0) Comment: Data Unavailable   No LMP for male patient.  Allergies reviewed: Yes  Medications reviewed: Yes    Medications: Medication refills not needed today.  Pharmacy name entered into [facility]: [facility] [address] [place] [address] [address] AT [facility]    Clinical concerns: results  Magali  was notified.    10 minutes for nursing intake (face to face time)     Jessica Reese MA              "

## 2018-06-20 ENCOUNTER — APPOINTMENT (OUTPATIENT)
Dept: LAB | Facility: CLINIC | Age: 63
End: 2018-06-20
Attending: INTERNAL MEDICINE
Payer: COMMERCIAL

## 2018-06-20 ENCOUNTER — INFUSION THERAPY VISIT (OUTPATIENT)
Dept: ONCOLOGY | Facility: CLINIC | Age: 63
End: 2018-06-20
Attending: INTERNAL MEDICINE
Payer: COMMERCIAL

## 2018-06-20 VITALS
RESPIRATION RATE: 16 BRPM | WEIGHT: 215.4 LBS | SYSTOLIC BLOOD PRESSURE: 120 MMHG | OXYGEN SATURATION: 95 % | BODY MASS INDEX: 30.91 KG/M2 | HEART RATE: 87 BPM | TEMPERATURE: 98.3 F | DIASTOLIC BLOOD PRESSURE: 82 MMHG

## 2018-06-20 DIAGNOSIS — C22.1 CHOLANGIOCARCINOMA (H): Primary | ICD-10-CM

## 2018-06-20 LAB
ALBUMIN SERPL-MCNC: 3.6 G/DL (ref 3.4–5)
ALP SERPL-CCNC: 62 U/L (ref 40–150)
ALT SERPL W P-5'-P-CCNC: 26 U/L (ref 0–70)
ANION GAP SERPL CALCULATED.3IONS-SCNC: 8 MMOL/L (ref 3–14)
ANISOCYTOSIS BLD QL SMEAR: SLIGHT
AST SERPL W P-5'-P-CCNC: 22 U/L (ref 0–45)
BASO STIPL BLD QL SMEAR: PRESENT
BASOPHILS # BLD AUTO: 0 10E9/L (ref 0–0.2)
BASOPHILS NFR BLD AUTO: 0 %
BILIRUB SERPL-MCNC: 1 MG/DL (ref 0.2–1.3)
BUN SERPL-MCNC: 20 MG/DL (ref 7–30)
CALCIUM SERPL-MCNC: 7.5 MG/DL (ref 8.5–10.1)
CHLORIDE SERPL-SCNC: 105 MMOL/L (ref 94–109)
CO2 SERPL-SCNC: 23 MMOL/L (ref 20–32)
CREAT SERPL-MCNC: 1.44 MG/DL (ref 0.66–1.25)
DIFFERENTIAL METHOD BLD: ABNORMAL
EOSINOPHIL # BLD AUTO: 0 10E9/L (ref 0–0.7)
EOSINOPHIL NFR BLD AUTO: 0.9 %
ERYTHROCYTE [DISTWIDTH] IN BLOOD BY AUTOMATED COUNT: 17.2 % (ref 10–15)
GFR SERPL CREATININE-BSD FRML MDRD: 50 ML/MIN/1.7M2
GLUCOSE SERPL-MCNC: 102 MG/DL (ref 70–99)
HCT VFR BLD AUTO: 24.3 % (ref 40–53)
HGB BLD-MCNC: 8.5 G/DL (ref 13.3–17.7)
LYMPHOCYTES # BLD AUTO: 1.2 10E9/L (ref 0.8–5.3)
LYMPHOCYTES NFR BLD AUTO: 29.2 %
MACROCYTES BLD QL SMEAR: PRESENT
MAGNESIUM SERPL-MCNC: 1.1 MG/DL (ref 1.6–2.3)
MCH RBC QN AUTO: 35.9 PG (ref 26.5–33)
MCHC RBC AUTO-ENTMCNC: 35 G/DL (ref 31.5–36.5)
MCV RBC AUTO: 103 FL (ref 78–100)
MONOCYTES # BLD AUTO: 0.5 10E9/L (ref 0–1.3)
MONOCYTES NFR BLD AUTO: 13.3 %
MYELOCYTES # BLD: 0 10E9/L
MYELOCYTES NFR BLD MANUAL: 0.9 %
NEUTROPHILS # BLD AUTO: 2.2 10E9/L (ref 1.6–8.3)
NEUTROPHILS NFR BLD AUTO: 55.7 %
PLATELET # BLD AUTO: 101 10E9/L (ref 150–450)
PLATELET # BLD EST: ABNORMAL 10*3/UL
POIKILOCYTOSIS BLD QL SMEAR: SLIGHT
POLYCHROMASIA BLD QL SMEAR: ABNORMAL
POTASSIUM SERPL-SCNC: 4.2 MMOL/L (ref 3.4–5.3)
PROT SERPL-MCNC: 5.8 G/DL (ref 6.8–8.8)
RBC # BLD AUTO: 2.37 10E12/L (ref 4.4–5.9)
SODIUM SERPL-SCNC: 136 MMOL/L (ref 133–144)
WBC # BLD AUTO: 4 10E9/L (ref 4–11)

## 2018-06-20 PROCEDURE — 80053 COMPREHEN METABOLIC PANEL: CPT | Performed by: INTERNAL MEDICINE

## 2018-06-20 PROCEDURE — 85025 COMPLETE CBC W/AUTO DIFF WBC: CPT | Performed by: INTERNAL MEDICINE

## 2018-06-20 PROCEDURE — 96413 CHEMO IV INFUSION 1 HR: CPT

## 2018-06-20 PROCEDURE — 83735 ASSAY OF MAGNESIUM: CPT | Performed by: INTERNAL MEDICINE

## 2018-06-20 PROCEDURE — 25000128 H RX IP 250 OP 636: Mod: ZF | Performed by: INTERNAL MEDICINE

## 2018-06-20 PROCEDURE — 96375 TX/PRO/DX INJ NEW DRUG ADDON: CPT

## 2018-06-20 PROCEDURE — 96372 THER/PROPH/DIAG INJ SC/IM: CPT | Mod: 59

## 2018-06-20 RX ORDER — HEPARIN SODIUM (PORCINE) LOCK FLUSH IV SOLN 100 UNIT/ML 100 UNIT/ML
5 SOLUTION INTRAVENOUS ONCE
Status: COMPLETED | OUTPATIENT
Start: 2018-06-20 | End: 2018-06-20

## 2018-06-20 RX ORDER — HEPARIN SODIUM (PORCINE) LOCK FLUSH IV SOLN 100 UNIT/ML 100 UNIT/ML
5 SOLUTION INTRAVENOUS EVERY 8 HOURS
Status: DISCONTINUED | OUTPATIENT
Start: 2018-06-20 | End: 2018-06-20 | Stop reason: HOSPADM

## 2018-06-20 RX ADMIN — DEXAMETHASONE SODIUM PHOSPHATE: 10 INJECTION, SOLUTION INTRAMUSCULAR; INTRAVENOUS at 07:47

## 2018-06-20 RX ADMIN — SODIUM CHLORIDE, PRESERVATIVE FREE 5 ML: 5 INJECTION INTRAVENOUS at 08:51

## 2018-06-20 RX ADMIN — DARBEPOETIN ALFA 300 MCG: 300 INJECTION, SOLUTION INTRAVENOUS; SUBCUTANEOUS at 08:26

## 2018-06-20 RX ADMIN — GEMCITABINE 2200 MG: 38 INJECTION, SOLUTION INTRAVENOUS at 08:18

## 2018-06-20 RX ADMIN — SODIUM CHLORIDE, PRESERVATIVE FREE 5 ML: 5 INJECTION INTRAVENOUS at 06:45

## 2018-06-20 RX ADMIN — SODIUM CHLORIDE 250 ML: 9 INJECTION, SOLUTION INTRAVENOUS at 07:47

## 2018-06-20 ASSESSMENT — PAIN SCALES - GENERAL: PAINLEVEL: NO PAIN (0)

## 2018-06-20 NOTE — MR AVS SNAPSHOT
After Visit Summary   6/20/2018    Girish Chauhan    MRN: [id number]           Patient Information     Date Of Birth          1955        Visit Information        Provider Department      6/20/2018 7:00 AM UC 10 ATC; UC ONCOLOGY INFUSION [facility]        Today's Diagnoses     Cholangiocarcinoma (H)    -  1       Follow-ups after your visit        Your next 10 appointments already scheduled     Jul 05, 2018  6:30 AM CDT   Masonic Lab Draw with UC MASONIC LAB DRAW   [facility] ([facility])    [address]  [address]  [facility] [address]   [phone]            Jul 05, 2018  7:00 AM CDT   Infusion 60 with UC ONCOLOGY INFUSION, UC 10 ATC   [facility] ([facility])    [address]  [address]  [facility] [address]   [phone]            Jul 12, 2018  6:30 AM CDT   Masonic Lab Draw with UC MASONIC LAB DRAW   [facility] ([facility])    [address]  [address]  [facility] [address]   [phone]            Jul 12, 2018  7:40 AM CDT   (Arrive by 7:10 AM)   CT CHEST ABDOMEN PELVIS W/O & W CONTRAST with UCCT2   [facility] [place] [facility] ([facility])    [address]  1st Floor  [facility] [address]   [phone]           Please bring any scans or X-rays taken at other hospitals, if similar tests were done. Also bring a list of your medicines, including vitamins, minerals and over-the-counter drugs. It is safest to leave personal items at home.  Be sure to tell your doctor:   If you have any allergies.   If there s any chance you are pregnant.   If you are breastfeeding.  How to prepare:   Do not eat or drink for 2 hours before your exam. If you need to take medicine, you may take it with small sips of water. (We may ask you to take liquid medicine as well.)   Please wear loose  clothing, such as a sweat suit or jogging clothes. Avoid snaps, zippers and other metal. We may ask you to undress and put on a hospital gown.  Please arrive 30 minutes early for your CT. Once in the department you might be asked to drink water 15-20 minutes prior to your exam.  If indicated you may be asked to drink an oral contrast in advance of your CT.  If this is the case, the imaging team will let you know or be in contact with you prior to your appointment  Patients over 70 or patients with diabetes or kidney problems:   If you haven t had a blood test (creatinine test) within the last 30 days, the Cardiologist/Radiologist may require you to get this test prior to your exam.  If you have diabetes:   Continue to take your metformin medication on the day of your exam  If you have any questions, please call the Imaging Department where you will have your exam.            Jul 16, 2018  7:15 AM CDT   (Arrive by 7:00 AM)   Return Visit with Naresh De Los Santos MD   [facility] ([facility])    [address] [place] [address]  [address] [place] [address] [phone]              Who to contact     If you have questions or need follow up information about today's clinic visit or your schedule please contact [facility] directly at [phone].  Normal or non-critical lab and imaging results will be communicated to you by MyChart, letter or phone within 4 business days after the clinic has received the results. If you do not hear from us within 7 days, please contact the clinic through MyChart or phone. If you have a critical or abnormal lab result, we will notify you by phone as soon as possible.  Submit refill requests through [employer] or call your pharmacy and they will forward the refill request to us. Please allow 3 business days for your refill to be completed.          Additional Information About Your Visit        MyChart Information     [employer]  gives you secure access to your electronic health record. If you see a primary care provider, you can also send messages to your care team and make appointments. If you have questions, please call your primary care clinic.  If you do not have a primary care provider, please call [phone] and they will assist you.        Care EveryWhere ID     This is your Care EveryWhere ID. This could be used by other organizations to access your [place] medical records  [id number]        Your Vitals Were     Pulse Temperature Respirations Pulse Oximetry BMI (Body Mass Index)       87 98.3  F (36.8  C) (Oral) 16 95% 30.91 kg/m2        Blood Pressure from Last 3 Encounters:   06/20/18 120/82   06/18/18 (!) 130/93   06/06/18 131/87    Weight from Last 3 Encounters:   06/20/18 97.7 kg (215 lb 6.4 oz)   06/18/18 95.7 kg (210 lb 15.7 oz)   06/06/18 98.4 kg (217 lb)              We Performed the Following     CBC with platelets differential     Comprehensive metabolic panel     Magnesium        Primary Care Provider Office Phone # Fax #    Jim Kaplan [phone]       [address]   [facility] [address]        Equal Access to Services     MARIUSZ MADRIGAL : Hadii aad ku hadasho Soomaali, waaxda luqadaha, qaybta kaalmada adeegyada, waxay idiin hayjewel gross. So [facility] [phone].    ATENCIÓN: Si habla español, tiene a flores disposición servicios gratuitos de asistencia lingüística. Llame al [phone].    We comply with applicable federal civil rights laws and [place] laws. We do not discriminate on the basis of race, color, national origin, age, disability, sex, sexual orientation, or gender identity.            Thank you!     Thank you for choosing [facility]  for your care. Our goal is always to provide you with excellent care. Hearing back from our patients is one way we can continue to improve our services. Please take a few minutes to  complete the written survey that you may receive in the mail after your visit with us. Thank you!             Your Updated Medication List - Protect others around you: Learn how to safely use, store and throw away your medicines at www.disposemymeds.org.          This list is accurate as of 6/20/18  9:07 AM.  Always use your most recent med list.                   Brand Name Dispense Instructions for use Diagnosis    atorvastatin 40 MG tablet    LIPITOR     TAKE 1 TABLET BY MOUTH DAILY        ELIQUIS PO      Take 5 mg by mouth 2 times daily        gemcitabine 1 GM injection    GEMZAR          LORazepam 0.5 MG tablet    ATIVAN    30 tablet    Take 1 tablet (0.5 mg) by mouth every 4 hours as needed (Anxiety, Nausea/Vomiting or Sleep)    Cholangiocarcinoma (H)       metoprolol tartrate 50 MG tablet    LOPRESSOR     50 mg 2 times daily        MITIGARE 0.6 MG tablet   Generic drug:  colchicine      Take 0.6 mg by mouth 3 times daily as needed        multivitamin, therapeutic with minerals Tabs tablet     30 tablet    Take 1 tablet by mouth daily    Mass of bile duct       NITROSTAT SL      Place 0.4 mg under the tongue every 5 minutes as needed for chest pain (Carries medication - has never used)        OMEGA-3 FISH OIL PO      Take 1,000 mg by mouth daily        ondansetron 8 MG tablet    ZOFRAN    10 tablet    Take 1 tablet (8 mg) by mouth every 8 hours as needed (Nausea/Vomiting)    Cholangiocarcinoma (H)       prochlorperazine 10 MG tablet    COMPAZINE    30 tablet    Take 1 tablet (10 mg) by mouth every 6 hours as needed (Nausea/Vomiting)    Cholangiocarcinoma (H)

## 2018-06-20 NOTE — PROGRESS NOTES
Infusion Nursing Note:  Girish Chauhan presents today for D1 C8 [place].    Patient seen by provider today: No  Was seen in clinic by  on 6/18/18    Intravenous Access:  Implanted Port.    Treatment Conditions:  Lab Results   Component Value Date    HGB 8.5 06/20/2018     Lab Results   Component Value Date    WBC 4.0 06/20/2018      Lab Results   Component Value Date    ANEU 2.2 06/20/2018     Lab Results   Component Value Date     06/20/2018      Lab Results   Component Value Date     06/20/2018                   Lab Results   Component Value Date    POTASSIUM 4.2 06/20/2018           Lab Results   Component Value Date    MAG 1.1 06/20/2018            Lab Results   Component Value Date    CR 1.44 06/20/2018                   Lab Results   Component Value Date    GARY 7.5 06/20/2018                Lab Results   Component Value Date    BILITOTAL 1.0 06/20/2018           Lab Results   Component Value Date    ALBUMIN 3.6 06/20/2018                    Lab Results   Component Value Date    ALT 26 06/20/2018           Lab Results   Component Value Date    AST 22 06/20/2018       Results reviewed, labs MET treatment parameters, ok to proceed with treatment.    Note: Patient reports no new issues or concerns since his clinic appt with Dr. De Los Santos on 6/18/18.    Post Infusion Assessment:  Patient tolerated infusion without incident.  Patient tolerated injection without incident.  Blood return noted pre and post infusion.  Site patent and intact, free from redness, edema or discomfort.  No evidence of extravasations.  Access discontinued per protocol.    Discharge Plan:   Patient declined prescription refills.  Discharge instructions reviewed with: Patient.  Patient and/or family verbalized understanding of discharge instructions and all questions answered.  AVS to patient via [employer].  Patient will return 7/5/18 for next appointment.   Patient discharged in stable condition accompanied by:  self.  Departure Mode: Ambulatory.    Ericka Ferrara RN

## 2018-06-20 NOTE — NURSING NOTE
"Chief Complaint   Patient presents with     Port Draw     Labs drawn from port by RN. Line flushed with saline and heparin. Vs taken and pt checked in for appt     Port accessed with 20g 3/4\" gripper needle by RN, labs collected, line flushed with saline and heparin.  Vitals taken. Pt checked in for appointment(s).    Melody Snell RN  "

## 2018-07-05 ENCOUNTER — APPOINTMENT (OUTPATIENT)
Dept: LAB | Facility: CLINIC | Age: 63
End: 2018-07-05
Attending: INTERNAL MEDICINE
Payer: COMMERCIAL

## 2018-07-05 ENCOUNTER — INFUSION THERAPY VISIT (OUTPATIENT)
Dept: ONCOLOGY | Facility: CLINIC | Age: 63
End: 2018-07-05
Attending: INTERNAL MEDICINE
Payer: COMMERCIAL

## 2018-07-05 VITALS
TEMPERATURE: 98 F | HEART RATE: 81 BPM | OXYGEN SATURATION: 96 % | WEIGHT: 210 LBS | BODY MASS INDEX: 30.13 KG/M2 | SYSTOLIC BLOOD PRESSURE: 125 MMHG | RESPIRATION RATE: 16 BRPM | DIASTOLIC BLOOD PRESSURE: 86 MMHG

## 2018-07-05 DIAGNOSIS — C22.1 CHOLANGIOCARCINOMA (H): ICD-10-CM

## 2018-07-05 DIAGNOSIS — D64.81 ANEMIA ASSOCIATED WITH CHEMOTHERAPY: Primary | ICD-10-CM

## 2018-07-05 DIAGNOSIS — T45.1X5A ANEMIA ASSOCIATED WITH CHEMOTHERAPY: Primary | ICD-10-CM

## 2018-07-05 LAB
ALBUMIN SERPL-MCNC: 3.5 G/DL (ref 3.4–5)
ALP SERPL-CCNC: 70 U/L (ref 40–150)
ALT SERPL W P-5'-P-CCNC: 22 U/L (ref 0–70)
ANION GAP SERPL CALCULATED.3IONS-SCNC: 8 MMOL/L (ref 3–14)
ANISOCYTOSIS BLD QL SMEAR: SLIGHT
AST SERPL W P-5'-P-CCNC: 23 U/L (ref 0–45)
BASOPHILS # BLD AUTO: 0 10E9/L (ref 0–0.2)
BASOPHILS NFR BLD AUTO: 0.9 %
BILIRUB SERPL-MCNC: 1.1 MG/DL (ref 0.2–1.3)
BUN SERPL-MCNC: 25 MG/DL (ref 7–30)
CALCIUM SERPL-MCNC: 8.4 MG/DL (ref 8.5–10.1)
CHLORIDE SERPL-SCNC: 110 MMOL/L (ref 94–109)
CO2 SERPL-SCNC: 22 MMOL/L (ref 20–32)
CREAT SERPL-MCNC: 1.42 MG/DL (ref 0.66–1.25)
DACRYOCYTES BLD QL SMEAR: SLIGHT
DIFFERENTIAL METHOD BLD: ABNORMAL
EOSINOPHIL # BLD AUTO: 0 10E9/L (ref 0–0.7)
EOSINOPHIL NFR BLD AUTO: 0.9 %
ERYTHROCYTE [DISTWIDTH] IN BLOOD BY AUTOMATED COUNT: 17.7 % (ref 10–15)
GFR SERPL CREATININE-BSD FRML MDRD: 51 ML/MIN/1.7M2
GLUCOSE SERPL-MCNC: 103 MG/DL (ref 70–99)
HCT VFR BLD AUTO: 27.6 % (ref 40–53)
HGB BLD-MCNC: 9.2 G/DL (ref 13.3–17.7)
LYMPHOCYTES # BLD AUTO: 1 10E9/L (ref 0.8–5.3)
LYMPHOCYTES NFR BLD AUTO: 18.3 %
MACROCYTES BLD QL SMEAR: PRESENT
MAGNESIUM SERPL-MCNC: 1.4 MG/DL (ref 1.6–2.3)
MCH RBC QN AUTO: 35.4 PG (ref 26.5–33)
MCHC RBC AUTO-ENTMCNC: 33.3 G/DL (ref 31.5–36.5)
MCV RBC AUTO: 106 FL (ref 78–100)
MONOCYTES # BLD AUTO: 0.5 10E9/L (ref 0–1.3)
MONOCYTES NFR BLD AUTO: 10.4 %
NEUTROPHILS # BLD AUTO: 3.6 10E9/L (ref 1.6–8.3)
NEUTROPHILS NFR BLD AUTO: 69.5 %
NRBC # BLD AUTO: 0 10*3/UL
NRBC BLD AUTO-RTO: 1 /100
OVALOCYTES BLD QL SMEAR: SLIGHT
PLATELET # BLD AUTO: 134 10E9/L (ref 150–450)
PLATELET # BLD EST: ABNORMAL 10*3/UL
POTASSIUM SERPL-SCNC: 4.6 MMOL/L (ref 3.4–5.3)
PROT SERPL-MCNC: 6 G/DL (ref 6.8–8.8)
RBC # BLD AUTO: 2.6 10E12/L (ref 4.4–5.9)
SODIUM SERPL-SCNC: 140 MMOL/L (ref 133–144)
WBC # BLD AUTO: 5.2 10E9/L (ref 4–11)

## 2018-07-05 PROCEDURE — 85025 COMPLETE CBC W/AUTO DIFF WBC: CPT | Performed by: INTERNAL MEDICINE

## 2018-07-05 PROCEDURE — 25000128 H RX IP 250 OP 636: Mod: ZF | Performed by: INTERNAL MEDICINE

## 2018-07-05 PROCEDURE — 96375 TX/PRO/DX INJ NEW DRUG ADDON: CPT

## 2018-07-05 PROCEDURE — 80053 COMPREHEN METABOLIC PANEL: CPT | Performed by: INTERNAL MEDICINE

## 2018-07-05 PROCEDURE — 96372 THER/PROPH/DIAG INJ SC/IM: CPT | Mod: 59

## 2018-07-05 PROCEDURE — 83735 ASSAY OF MAGNESIUM: CPT | Performed by: INTERNAL MEDICINE

## 2018-07-05 PROCEDURE — 25000128 H RX IP 250 OP 636: Mod: ZF | Performed by: PHYSICIAN ASSISTANT

## 2018-07-05 PROCEDURE — 96413 CHEMO IV INFUSION 1 HR: CPT

## 2018-07-05 PROCEDURE — 96361 HYDRATE IV INFUSION ADD-ON: CPT

## 2018-07-05 PROCEDURE — 96367 TX/PROPH/DG ADDL SEQ IV INF: CPT

## 2018-07-05 RX ORDER — HEPARIN SODIUM (PORCINE) LOCK FLUSH IV SOLN 100 UNIT/ML 100 UNIT/ML
5 SOLUTION INTRAVENOUS EVERY 8 HOURS
Status: DISCONTINUED | OUTPATIENT
Start: 2018-07-05 | End: 2018-07-05 | Stop reason: HOSPADM

## 2018-07-05 RX ORDER — MAGNESIUM SULFATE HEPTAHYDRATE 40 MG/ML
2 INJECTION, SOLUTION INTRAVENOUS ONCE
Status: COMPLETED | OUTPATIENT
Start: 2018-07-05 | End: 2018-07-05

## 2018-07-05 RX ADMIN — GEMCITABINE 2200 MG: 38 INJECTION INTRAVENOUS at 08:13

## 2018-07-05 RX ADMIN — SODIUM CHLORIDE, PRESERVATIVE FREE 5 ML: 5 INJECTION INTRAVENOUS at 06:48

## 2018-07-05 RX ADMIN — DEXAMETHASONE SODIUM PHOSPHATE: 10 INJECTION, SOLUTION INTRAMUSCULAR; INTRAVENOUS at 07:38

## 2018-07-05 RX ADMIN — SODIUM CHLORIDE 1000 ML: 9 INJECTION, SOLUTION INTRAVENOUS at 08:50

## 2018-07-05 RX ADMIN — SODIUM CHLORIDE 250 ML: 9 INJECTION, SOLUTION INTRAVENOUS at 07:31

## 2018-07-05 RX ADMIN — MAGNESIUM SULFATE HEPTAHYDRATE 2 G: 40 INJECTION, SOLUTION INTRAVENOUS at 08:50

## 2018-07-05 RX ADMIN — SODIUM CHLORIDE, PRESERVATIVE FREE 5 ML: 5 INJECTION INTRAVENOUS at 10:00

## 2018-07-05 RX ADMIN — DARBEPOETIN ALFA 300 MCG: 300 INJECTION, SOLUTION INTRAVENOUS; SUBCUTANEOUS at 07:32

## 2018-07-05 ASSESSMENT — PAIN SCALES - GENERAL: PAINLEVEL: NO PAIN (0)

## 2018-07-05 NOTE — MR AVS SNAPSHOT
After Visit Summary   7/5/2018    Girish Chauhan    MRN: [id number]           Patient Information     Date Of Birth          1955        Visit Information        Provider Department      7/5/2018 7:00 AM  10 ATC;  ONCOLOGY INFUSION [facility]        Today's Diagnoses     Anemia associated with chemotherapy    -  1    Cholangiocarcinoma (H)           Follow-ups after your visit        Your next 10 appointments already scheduled     Jul 12, 2018  6:30 AM CDT   Masonic Lab Draw with UC MASONIC LAB DRAW   [facility] ([facility])    [address]  [address]  [facility] [id number] [phone]            Jul 12, 2018  7:40 AM CDT   (Arrive by 7:10 AM)   CT CHEST ABDOMEN PELVIS W/O & W CONTRAST with UCCT2   [facility] CT ([facility])    [address]  1st Floor  [facility] [address] [phone]           Please bring any scans or X-rays taken at other hospitals, if similar tests were done. Also bring a list of your medicines, including vitamins, minerals and over-the-counter drugs. It is safest to leave personal items at home.  Be sure to tell your doctor:   If you have any allergies.   If there s any chance you are pregnant.   If you are breastfeeding.  How to prepare:   Do not eat or drink for 2 hours before your exam. If you need to take medicine, you may take it with small sips of water. (We may ask you to take liquid medicine as well.)   Please wear loose clothing, such as a sweat suit or jogging clothes. Avoid snaps, zippers and other metal. We may ask you to undress and put on a hospital gown.  Please arrive 30 minutes early for your CT. Once in the department you might be asked to drink water 15-20 minutes prior to your exam.  If indicated you may be asked to drink an oral contrast in advance of your CT.  If this is the case, the imaging team will let you know or be  in contact with you prior to your appointment  Patients over 70 or patients with diabetes or kidney problems:   If you haven t had a blood test (creatinine test) within the last 30 days, the Cardiologist/Radiologist may require you to get this test prior to your exam.  If you have diabetes:   Continue to take your metformin medication on the day of your exam  If you have any questions, please call the Imaging Department where you will have your exam.            Jul 16, 2018  7:15 AM CDT   (Arrive by 7:00 AM)   Return Visit with Naresh De Los Santos MD   [facility] ([facility])    [address] [place] [address]  [address]  [facility] [id number] [phone]            Jul 16, 2018  8:00 AM CDT   Infusion 60 with UC ONCOLOGY INFUSION, UC 28 ATC   [facility] ([facility])    [address] [place] [address]  [address]  [facility] [address] [phone]              Who to contact     If you have questions or need follow up information about today's clinic visit or your schedule please contact [facility] directly at [phone].  Normal or non-critical lab and imaging results will be communicated to you by MyChart, letter or phone within 4 business days after the clinic has received the results. If you do not hear from us within 7 days, please contact the clinic through MyChart or phone. If you have a critical or abnormal lab result, we will notify you by phone as soon as possible.  Submit refill requests through [employer] or call your pharmacy and they will forward the refill request to us. Please allow 3 business days for your refill to be completed.          Additional Information About Your Visit        Directworkshart Information     [employer] gives you secure access to your electronic health record. If you see a primary care provider, you can also send messages to your care team and make appointments. If you have questions,  please call your primary care clinic.  If you do not have a primary care provider, please call [phone] and they will assist you.        Care EveryWhere ID     This is your Care EveryWhere ID. This could be used by other organizations to access your [place] medical records  [id number]        Your Vitals Were     Pulse Temperature Respirations Pulse Oximetry BMI (Body Mass Index)       81 98  F (36.7  C) 16 96% 30.13 kg/m2        Blood Pressure from Last 3 Encounters:   07/05/18 125/86   06/20/18 120/82   06/18/18 (!) 130/93    Weight from Last 3 Encounters:   07/05/18 95.3 kg (210 lb)   06/20/18 97.7 kg (215 lb 6.4 oz)   06/18/18 95.7 kg (210 lb 15.7 oz)              We Performed the Following     CBC with platelets differential     Comprehensive metabolic panel     Magnesium        Primary Care Provider Office Phone # Fax #    Jim Kaplan [phone]       [address]   [facility] [address]        Equal Access to Services     [facility]: Hadii aad ku hadasho Soomaali, waaxda luqadaha, qaybta kaalmada adekeith, ankit yuan . So [facility] [phone].    ATENCIÓN: Si habla español, tiene a flores disposición servicios gratuitos de asistencia lingüística. [facility] [phone].    We comply with applicable federal civil rights laws and [place] laws. We do not discriminate on the basis of race, color, national origin, age, disability, sex, sexual orientation, or gender identity.            Thank you!     Thank you for choosing [facility]  for your care. Our goal is always to provide you with excellent care. Hearing back from our patients is one way we can continue to improve our services. Please take a few minutes to complete the written survey that you may receive in the mail after your visit with us. Thank you!             Your Updated Medication List - Protect others around you: Learn how to safely use,  store and throw away your medicines at www.disposemymeds.org.          This list is accurate as of 7/5/18 10:26 AM.  Always use your most recent med list.                   Brand Name Dispense Instructions for use Diagnosis    atorvastatin 40 MG tablet    LIPITOR     TAKE 1 TABLET BY MOUTH DAILY        ELIQUIS PO      Take 5 mg by mouth 2 times daily        gemcitabine 1 GM injection    GEMZAR          LORazepam 0.5 MG tablet    ATIVAN    30 tablet    Take 1 tablet (0.5 mg) by mouth every 4 hours as needed (Anxiety, Nausea/Vomiting or Sleep)    Cholangiocarcinoma (H)       metoprolol tartrate 50 MG tablet    LOPRESSOR     50 mg 2 times daily        MITIGARE 0.6 MG capsule   Generic drug:  colchicine      Take 0.6 mg by mouth 3 times daily as needed        multivitamin, therapeutic with minerals Tabs tablet     30 tablet    Take 1 tablet by mouth daily    Mass of bile duct       NITROSTAT SL      Place 0.4 mg under the tongue every 5 minutes as needed for chest pain (Carries medication - has never used)        OMEGA-3 FISH OIL PO      Take 1,000 mg by mouth daily        ondansetron 8 MG tablet    ZOFRAN    10 tablet    Take 1 tablet (8 mg) by mouth every 8 hours as needed (Nausea/Vomiting)    Cholangiocarcinoma (H)       prochlorperazine 10 MG tablet    COMPAZINE    30 tablet    Take 1 tablet (10 mg) by mouth every 6 hours as needed (Nausea/Vomiting)    Cholangiocarcinoma (H)

## 2018-07-05 NOTE — PROGRESS NOTES
"Infusion Nursing Note:  Girish Chauhan presents today for Day 15 Cycle 8 Gemzar/Aranesp.    Patient seen by provider today: No   present during visit today: Not Applicable.    Note: Patient states he feels well overall. Reports feeling more \"\" over the past week. Reports that metallic taste in mouth seems to be slightly improving. Denies any recent fevers or chills. Denies nausea/vomiting, constipation/diarrhea, neuropathy, rashes, or mouth sores. Denies pain today.    Patient states he feels as if he is drinking enough fluids. States he has a 30-ounce water bottle that he consumes 2-3 times per day on top of additional fluids with meals.      Intravenous Access:  Implanted Port.    Treatment Conditions:  Lab Results   Component Value Date    HGB 9.2 07/05/2018     Lab Results   Component Value Date    WBC 5.2 07/05/2018      Lab Results   Component Value Date    ANEU 3.6 07/05/2018     Lab Results   Component Value Date     07/05/2018      Lab Results   Component Value Date     07/05/2018                   Lab Results   Component Value Date    POTASSIUM 4.6 07/05/2018           Lab Results   Component Value Date    MAG 1.4 07/05/2018            Lab Results   Component Value Date    CR 1.42 07/05/2018                   Lab Results   Component Value Date    GARY 8.4 07/05/2018                Lab Results   Component Value Date    BILITOTAL 1.1 07/05/2018           Lab Results   Component Value Date    ALBUMIN 3.5 07/05/2018                    Lab Results   Component Value Date    ALT 22 07/05/2018           Lab Results   Component Value Date    AST 23 07/05/2018       Results reviewed, labs MET treatment parameters, ok to proceed with treatment.    TORB 7/5/2018 8:30 AM TRUDY Aburto/Jonathan Robert RN:  -Mg 1.4; replace per protocol  -Cr 1.42; give additional 1 L NS       Post Infusion Assessment:  Patient tolerated infusion without incident.  Patient tolerated injection without incident " into the subcutaneous tissue of the right arm.  Blood return noted pre and post infusion.  Site patent and intact, free from redness, edema or discomfort.  No evidence of extravasations.  Access discontinued per protocol.    Discharge Plan:   Patient declined prescription refills.  Discharge instructions reviewed with: Patient.  Patient and/or family verbalized understanding of discharge instructions and all questions answered.  AVS to patient via [employer].  Patient will return 7/14 for labs/CT scan and 7/16 for provider appointment with Dr. De Los Santos. No additional infusion appointments scheduled - IB left for infusion scheduling pool.   Patient discharged in stable condition accompanied by: self.  Departure Mode: Ambulatory.  Face to Face time: 0 minutes.    Lowell Robert RN

## 2018-07-12 ENCOUNTER — RADIANT APPOINTMENT (OUTPATIENT)
Dept: CT IMAGING | Facility: CLINIC | Age: 63
End: 2018-07-12
Attending: INTERNAL MEDICINE
Payer: COMMERCIAL

## 2018-07-12 DIAGNOSIS — I48.0 PAROXYSMAL ATRIAL FIBRILLATION (H): ICD-10-CM

## 2018-07-12 DIAGNOSIS — C22.1 CHOLANGIOCARCINOMA (H): ICD-10-CM

## 2018-07-12 LAB
ALBUMIN SERPL-MCNC: 3.4 G/DL (ref 3.4–5)
ALP SERPL-CCNC: 83 U/L (ref 40–150)
ALT SERPL W P-5'-P-CCNC: 36 U/L (ref 0–70)
ANION GAP SERPL CALCULATED.3IONS-SCNC: 9 MMOL/L (ref 3–14)
ANISOCYTOSIS BLD QL SMEAR: SLIGHT
AST SERPL W P-5'-P-CCNC: 46 U/L (ref 0–45)
BASOPHILS # BLD AUTO: 0 10E9/L (ref 0–0.2)
BASOPHILS NFR BLD AUTO: 0.9 %
BILIRUB SERPL-MCNC: 1.7 MG/DL (ref 0.2–1.3)
BUN SERPL-MCNC: 26 MG/DL (ref 7–30)
CALCIUM SERPL-MCNC: 8.3 MG/DL (ref 8.5–10.1)
CHLORIDE SERPL-SCNC: 107 MMOL/L (ref 94–109)
CO2 SERPL-SCNC: 24 MMOL/L (ref 20–32)
CREAT SERPL-MCNC: 1.55 MG/DL (ref 0.66–1.25)
DIFFERENTIAL METHOD BLD: ABNORMAL
EOSINOPHIL # BLD AUTO: 0 10E9/L (ref 0–0.7)
EOSINOPHIL NFR BLD AUTO: 0 %
ERYTHROCYTE [DISTWIDTH] IN BLOOD BY AUTOMATED COUNT: 15.6 % (ref 10–15)
GFR SERPL CREATININE-BSD FRML MDRD: 46 ML/MIN/1.7M2
GLUCOSE SERPL-MCNC: 111 MG/DL (ref 70–99)
HCT VFR BLD AUTO: 26.7 % (ref 40–53)
HGB BLD-MCNC: 9.1 G/DL (ref 13.3–17.7)
LYMPHOCYTES # BLD AUTO: 1 10E9/L (ref 0.8–5.3)
LYMPHOCYTES NFR BLD AUTO: 50.1 %
MACROCYTES BLD QL SMEAR: PRESENT
MCH RBC QN AUTO: 35.7 PG (ref 26.5–33)
MCHC RBC AUTO-ENTMCNC: 34.1 G/DL (ref 31.5–36.5)
MCV RBC AUTO: 105 FL (ref 78–100)
METAMYELOCYTES # BLD: 0 10E9/L
METAMYELOCYTES NFR BLD MANUAL: 0.9 %
MONOCYTES # BLD AUTO: 0.4 10E9/L (ref 0–1.3)
MONOCYTES NFR BLD AUTO: 18.5 %
NEUTROPHILS # BLD AUTO: 0.6 10E9/L (ref 1.6–8.3)
NEUTROPHILS NFR BLD AUTO: 29.6 %
NRBC # BLD AUTO: 0.4 10*3/UL
NRBC BLD AUTO-RTO: 20 /100
PLATELET # BLD AUTO: 123 10E9/L (ref 150–450)
PLATELET # BLD EST: ABNORMAL 10*3/UL
POLYCHROMASIA BLD QL SMEAR: SLIGHT
POTASSIUM SERPL-SCNC: 3.6 MMOL/L (ref 3.4–5.3)
PROT SERPL-MCNC: 6 G/DL (ref 6.8–8.8)
RBC # BLD AUTO: 2.55 10E12/L (ref 4.4–5.9)
SODIUM SERPL-SCNC: 140 MMOL/L (ref 133–144)
WBC # BLD AUTO: 1.9 10E9/L (ref 4–11)

## 2018-07-12 PROCEDURE — 25000128 H RX IP 250 OP 636: Performed by: INTERNAL MEDICINE

## 2018-07-12 PROCEDURE — 85025 COMPLETE CBC W/AUTO DIFF WBC: CPT | Performed by: INTERNAL MEDICINE

## 2018-07-12 PROCEDURE — 80053 COMPREHEN METABOLIC PANEL: CPT | Performed by: INTERNAL MEDICINE

## 2018-07-12 PROCEDURE — 86301 IMMUNOASSAY TUMOR CA 19-9: CPT | Performed by: INTERNAL MEDICINE

## 2018-07-12 RX ORDER — HEPARIN SODIUM (PORCINE) LOCK FLUSH IV SOLN 100 UNIT/ML 100 UNIT/ML
5 SOLUTION INTRAVENOUS ONCE
Status: COMPLETED | OUTPATIENT
Start: 2018-07-12 | End: 2018-07-12

## 2018-07-12 RX ORDER — IOPAMIDOL 755 MG/ML
128 INJECTION, SOLUTION INTRAVASCULAR ONCE
Status: COMPLETED | OUTPATIENT
Start: 2018-07-12 | End: 2018-07-12

## 2018-07-12 RX ADMIN — HEPARIN SODIUM (PORCINE) LOCK FLUSH IV SOLN 100 UNIT/ML 5 ML: 100 SOLUTION at 07:47

## 2018-07-12 RX ADMIN — IOPAMIDOL 128 ML: 755 INJECTION, SOLUTION INTRAVASCULAR at 07:41

## 2018-07-12 RX ADMIN — HEPARIN 5 ML: 100 SYRINGE at 06:38

## 2018-07-12 NOTE — DISCHARGE INSTRUCTIONS

## 2018-07-12 NOTE — NURSING NOTE
Chief Complaint   Patient presents with     Port Draw     labs drawn via port by RN     There were no vitals taken for this visit.     Port accessed by RN. Labs collected and sent. Line flushed with NS & Heparin. Pt tolerated well.    Arielle Burkett RN

## 2018-07-13 LAB — CANCER AG19-9 SERPL-ACNC: 25 U/ML (ref 0–37)

## 2018-07-16 ENCOUNTER — ONCOLOGY VISIT (OUTPATIENT)
Dept: ONCOLOGY | Facility: CLINIC | Age: 63
End: 2018-07-16
Attending: INTERNAL MEDICINE
Payer: COMMERCIAL

## 2018-07-16 VITALS
OXYGEN SATURATION: 93 % | SYSTOLIC BLOOD PRESSURE: 145 MMHG | WEIGHT: 206.6 LBS | DIASTOLIC BLOOD PRESSURE: 94 MMHG | TEMPERATURE: 98.1 F | HEART RATE: 87 BPM | RESPIRATION RATE: 20 BRPM | HEIGHT: 70 IN | BODY MASS INDEX: 29.58 KG/M2

## 2018-07-16 DIAGNOSIS — C22.1 CHOLANGIOCARCINOMA (H): Primary | ICD-10-CM

## 2018-07-16 DIAGNOSIS — C79.11 SECONDARY MALIGNANT NEOPLASM OF BLADDER (H): ICD-10-CM

## 2018-07-16 DIAGNOSIS — T45.1X5A ANEMIA ASSOCIATED WITH CHEMOTHERAPY: Primary | ICD-10-CM

## 2018-07-16 DIAGNOSIS — D64.81 ANEMIA ASSOCIATED WITH CHEMOTHERAPY: Primary | ICD-10-CM

## 2018-07-16 DIAGNOSIS — C22.1 CHOLANGIOCARCINOMA (H): ICD-10-CM

## 2018-07-16 LAB
ALBUMIN SERPL-MCNC: 3.5 G/DL (ref 3.4–5)
ALP SERPL-CCNC: 87 U/L (ref 40–150)
ALT SERPL W P-5'-P-CCNC: 37 U/L (ref 0–70)
ANION GAP SERPL CALCULATED.3IONS-SCNC: 9 MMOL/L (ref 3–14)
AST SERPL W P-5'-P-CCNC: 37 U/L (ref 0–45)
BASOPHILS # BLD AUTO: 0 10E9/L (ref 0–0.2)
BASOPHILS NFR BLD AUTO: 0.3 %
BILIRUB SERPL-MCNC: 1.8 MG/DL (ref 0.2–1.3)
BUN SERPL-MCNC: 24 MG/DL (ref 7–30)
CALCIUM SERPL-MCNC: 8.2 MG/DL (ref 8.5–10.1)
CHLORIDE SERPL-SCNC: 104 MMOL/L (ref 94–109)
CO2 SERPL-SCNC: 24 MMOL/L (ref 20–32)
CREAT SERPL-MCNC: 1.52 MG/DL (ref 0.66–1.25)
DIFFERENTIAL METHOD BLD: ABNORMAL
EOSINOPHIL # BLD AUTO: 0 10E9/L (ref 0–0.7)
EOSINOPHIL NFR BLD AUTO: 1.1 %
ERYTHROCYTE [DISTWIDTH] IN BLOOD BY AUTOMATED COUNT: 16.7 % (ref 10–15)
GFR SERPL CREATININE-BSD FRML MDRD: 47 ML/MIN/1.7M2
GLUCOSE SERPL-MCNC: 104 MG/DL (ref 70–99)
HCT VFR BLD AUTO: 28.1 % (ref 40–53)
HGB BLD-MCNC: 9.3 G/DL (ref 13.3–17.7)
IMM GRANULOCYTES # BLD: 0 10E9/L (ref 0–0.4)
IMM GRANULOCYTES NFR BLD: 0.8 %
LYMPHOCYTES # BLD AUTO: 0.7 10E9/L (ref 0.8–5.3)
LYMPHOCYTES NFR BLD AUTO: 18.2 %
MAGNESIUM SERPL-MCNC: 1.5 MG/DL (ref 1.6–2.3)
MCH RBC QN AUTO: 35.1 PG (ref 26.5–33)
MCHC RBC AUTO-ENTMCNC: 33.1 G/DL (ref 31.5–36.5)
MCV RBC AUTO: 106 FL (ref 78–100)
MONOCYTES # BLD AUTO: 0.8 10E9/L (ref 0–1.3)
MONOCYTES NFR BLD AUTO: 22.3 %
NEUTROPHILS # BLD AUTO: 2.1 10E9/L (ref 1.6–8.3)
NEUTROPHILS NFR BLD AUTO: 57.3 %
NRBC # BLD AUTO: 0 10*3/UL
NRBC BLD AUTO-RTO: 0 /100
PLATELET # BLD AUTO: 86 10E9/L (ref 150–450)
PLATELET # BLD EST: ABNORMAL 10*3/UL
POTASSIUM SERPL-SCNC: 3.8 MMOL/L (ref 3.4–5.3)
PROT SERPL-MCNC: 5.8 G/DL (ref 6.8–8.8)
RBC # BLD AUTO: 2.65 10E12/L (ref 4.4–5.9)
SODIUM SERPL-SCNC: 137 MMOL/L (ref 133–144)
WBC # BLD AUTO: 3.7 10E9/L (ref 4–11)

## 2018-07-16 PROCEDURE — 83735 ASSAY OF MAGNESIUM: CPT | Performed by: INTERNAL MEDICINE

## 2018-07-16 PROCEDURE — 25000128 H RX IP 250 OP 636: Mod: ZF | Performed by: INTERNAL MEDICINE

## 2018-07-16 PROCEDURE — 96413 CHEMO IV INFUSION 1 HR: CPT

## 2018-07-16 PROCEDURE — 99213 OFFICE O/P EST LOW 20 MIN: CPT | Mod: ZP | Performed by: INTERNAL MEDICINE

## 2018-07-16 PROCEDURE — 85025 COMPLETE CBC W/AUTO DIFF WBC: CPT | Performed by: INTERNAL MEDICINE

## 2018-07-16 PROCEDURE — 80053 COMPREHEN METABOLIC PANEL: CPT | Performed by: INTERNAL MEDICINE

## 2018-07-16 PROCEDURE — 96372 THER/PROPH/DIAG INJ SC/IM: CPT | Mod: XS

## 2018-07-16 PROCEDURE — 96375 TX/PRO/DX INJ NEW DRUG ADDON: CPT

## 2018-07-16 PROCEDURE — G0463 HOSPITAL OUTPT CLINIC VISIT: HCPCS | Mod: ZF

## 2018-07-16 RX ORDER — SODIUM CHLORIDE 9 MG/ML
1000 INJECTION, SOLUTION INTRAVENOUS CONTINUOUS PRN
Status: CANCELLED
Start: 2018-08-01

## 2018-07-16 RX ORDER — MEPERIDINE HYDROCHLORIDE 25 MG/ML
25 INJECTION INTRAMUSCULAR; INTRAVENOUS; SUBCUTANEOUS EVERY 30 MIN PRN
Status: CANCELLED | OUTPATIENT
Start: 2018-08-01

## 2018-07-16 RX ORDER — HEPARIN SODIUM (PORCINE) LOCK FLUSH IV SOLN 100 UNIT/ML 100 UNIT/ML
5 SOLUTION INTRAVENOUS EVERY 8 HOURS
Status: DISCONTINUED | OUTPATIENT
Start: 2018-07-16 | End: 2018-07-16 | Stop reason: HOSPADM

## 2018-07-16 RX ORDER — MAGNESIUM SULFATE HEPTAHYDRATE 40 MG/ML
2 INJECTION, SOLUTION INTRAVENOUS ONCE
Status: COMPLETED | OUTPATIENT
Start: 2018-07-16 | End: 2018-07-16

## 2018-07-16 RX ORDER — EPINEPHRINE 1 MG/ML
0.3 INJECTION, SOLUTION INTRAMUSCULAR; SUBCUTANEOUS EVERY 5 MIN PRN
Status: CANCELLED | OUTPATIENT
Start: 2018-08-01

## 2018-07-16 RX ORDER — ALBUTEROL SULFATE 90 UG/1
1-2 AEROSOL, METERED RESPIRATORY (INHALATION)
Status: CANCELLED
Start: 2018-07-16

## 2018-07-16 RX ORDER — ALBUTEROL SULFATE 90 UG/1
1-2 AEROSOL, METERED RESPIRATORY (INHALATION)
Status: CANCELLED
Start: 2018-08-01

## 2018-07-16 RX ORDER — METHYLPREDNISOLONE SODIUM SUCCINATE 125 MG/2ML
125 INJECTION, POWDER, LYOPHILIZED, FOR SOLUTION INTRAMUSCULAR; INTRAVENOUS
Status: CANCELLED
Start: 2018-08-01

## 2018-07-16 RX ORDER — METHYLPREDNISOLONE SODIUM SUCCINATE 125 MG/2ML
125 INJECTION, POWDER, LYOPHILIZED, FOR SOLUTION INTRAMUSCULAR; INTRAVENOUS
Status: CANCELLED
Start: 2018-07-16

## 2018-07-16 RX ORDER — EPINEPHRINE 0.3 MG/.3ML
0.3 INJECTION SUBCUTANEOUS EVERY 5 MIN PRN
Status: CANCELLED | OUTPATIENT
Start: 2018-08-01

## 2018-07-16 RX ORDER — MEPERIDINE HYDROCHLORIDE 25 MG/ML
25 INJECTION INTRAMUSCULAR; INTRAVENOUS; SUBCUTANEOUS EVERY 30 MIN PRN
Status: CANCELLED | OUTPATIENT
Start: 2018-07-16

## 2018-07-16 RX ORDER — LORAZEPAM 2 MG/ML
0.5 INJECTION INTRAMUSCULAR EVERY 4 HOURS PRN
Status: CANCELLED
Start: 2018-07-16

## 2018-07-16 RX ORDER — EPINEPHRINE 1 MG/ML
0.3 INJECTION, SOLUTION INTRAMUSCULAR; SUBCUTANEOUS EVERY 5 MIN PRN
Status: CANCELLED | OUTPATIENT
Start: 2018-07-16

## 2018-07-16 RX ORDER — SODIUM CHLORIDE 9 MG/ML
1000 INJECTION, SOLUTION INTRAVENOUS CONTINUOUS PRN
Status: CANCELLED
Start: 2018-07-16

## 2018-07-16 RX ORDER — HEPARIN SODIUM (PORCINE) LOCK FLUSH IV SOLN 100 UNIT/ML 100 UNIT/ML
5 SOLUTION INTRAVENOUS EVERY 8 HOURS
Status: CANCELLED
Start: 2018-07-16

## 2018-07-16 RX ORDER — ALBUTEROL SULFATE 0.83 MG/ML
2.5 SOLUTION RESPIRATORY (INHALATION)
Status: CANCELLED | OUTPATIENT
Start: 2018-07-16

## 2018-07-16 RX ORDER — LORAZEPAM 2 MG/ML
0.5 INJECTION INTRAMUSCULAR EVERY 4 HOURS PRN
Status: CANCELLED
Start: 2018-08-01

## 2018-07-16 RX ORDER — DIPHENHYDRAMINE HYDROCHLORIDE 50 MG/ML
50 INJECTION INTRAMUSCULAR; INTRAVENOUS
Status: CANCELLED
Start: 2018-07-16

## 2018-07-16 RX ORDER — DIPHENHYDRAMINE HYDROCHLORIDE 50 MG/ML
50 INJECTION INTRAMUSCULAR; INTRAVENOUS
Status: CANCELLED
Start: 2018-08-01

## 2018-07-16 RX ORDER — ALBUTEROL SULFATE 0.83 MG/ML
2.5 SOLUTION RESPIRATORY (INHALATION)
Status: CANCELLED | OUTPATIENT
Start: 2018-08-01

## 2018-07-16 RX ORDER — HEPARIN SODIUM (PORCINE) LOCK FLUSH IV SOLN 100 UNIT/ML 100 UNIT/ML
5 SOLUTION INTRAVENOUS EVERY 8 HOURS
Status: CANCELLED
Start: 2018-08-01

## 2018-07-16 RX ORDER — EPINEPHRINE 0.3 MG/.3ML
0.3 INJECTION SUBCUTANEOUS EVERY 5 MIN PRN
Status: CANCELLED | OUTPATIENT
Start: 2018-07-16

## 2018-07-16 RX ADMIN — SODIUM CHLORIDE, PRESERVATIVE FREE 5 ML: 5 INJECTION INTRAVENOUS at 06:50

## 2018-07-16 RX ADMIN — MAGNESIUM SULFATE HEPTAHYDRATE 2 G: 40 INJECTION, SOLUTION INTRAVENOUS at 08:30

## 2018-07-16 RX ADMIN — DEXAMETHASONE SODIUM PHOSPHATE: 10 INJECTION, SOLUTION INTRAMUSCULAR; INTRAVENOUS at 08:14

## 2018-07-16 RX ADMIN — DARBEPOETIN ALFA 300 MCG: 300 INJECTION, SOLUTION INTRAVENOUS; SUBCUTANEOUS at 08:15

## 2018-07-16 RX ADMIN — SODIUM CHLORIDE, PRESERVATIVE FREE 5 ML: 5 INJECTION INTRAVENOUS at 09:26

## 2018-07-16 RX ADMIN — SODIUM CHLORIDE 250 ML: 9 INJECTION, SOLUTION INTRAVENOUS at 08:14

## 2018-07-16 RX ADMIN — GEMCITABINE HYDROCHLORIDE 2200 MG: 200 INJECTION, SOLUTION INTRAVENOUS at 08:32

## 2018-07-16 ASSESSMENT — PAIN SCALES - GENERAL: PAINLEVEL: NO PAIN (0)

## 2018-07-16 NOTE — LETTER
7/16/2018      RE: Girish Chauhan  [address] [facility] [address]       Girish Chauhan is here today in follow-up of metastatic cholangiocarcinoma.    Mr. Chauhan returns today for a planned response assessment now on single agent gemcitabine. At our last evaluation he had been receiving gemcitabine and cisplatin with equivocal evidence of progression and a slight decline in his overall performance status which seem to be more related to his chemotherapy. For the last couple of months now he's been receiving chemotherapy without the platinum and he thinks his turned a corner in a positive way in terms of how he feels. His most significant problem continues to be his fatigue which limits his activity, but he is still out and [place], [place] and carrying on with most of his desired activities. He notes that he is having more trouble with a cough which is for the most part nonproductive and occurs predominantly when he is recumbent. He's not having any other respiratory symptoms. He notes his heart pounding at times but doesn't really notice any other symptoms associated with that and mostly that's when he is laying down and doesn't have any other sensory inputs. He is not really aware of an irregularity to his heartbeat. The remainder of a 10 point complete review of systems is otherwise unremarkable.    On physical exam Mr. Chauhan appears pale but quite healthy. His vital signs as noted on chart remarkable only for a mild increase in his blood pressure and another couple pounds of weight loss.  He has no scleral icterus and no visible lesion in the oropharynx. He has no adenopathy palpable in the neck or supraclavicular spaces. His port site is unremarkable.  His lungs are clear to auscultation without dullness to percussion.  His heart rate and rhythm are regular without audible murmur or gallop in the jugular venous pressure appears normal.  His abdomen is soft and  nontender without palpable mass or organomegaly.  He has no peripheral edema and no tenderness in his calves or thighs. He has no cyanosis or clubbing of his digits.  His speech is fluent, his cranial nerves are grossly intact and he has an unremarkable gait and station.    I's patient and his wife his lab work and imaging. His CA-19-9 level remains low. He has mild renal failure consistent with prior values. His liver enzymes are unremarkable. He has mild cytopenias as expected with his point his chemotherapy cycle, and his hemoglobin is at 9.1 on his current erythropoietin dose. On his CT scan the known tumor in his bladder wall is barely visible. He has some mildly increased small and scattered adenopathy which is not changed and not particular worrisome.    Assessment/plan: Metastatic cholangiocarcinoma. The patient's performance status is improved a little bit with the drop in the platinum from his chemotherapy regimen and his disease appears stable. We made a plan together today to continue on with single agent gemcitabine on the reduced schedule of every other week. We'll reevaluate his disease status again in another 3 months. I recommended a nonsedating over-the-counter antihistamine for his cough which I suspect is due to postnasal drip. We talked also about reflux precautions.    Naresh De Los Santos MD

## 2018-07-16 NOTE — Clinical Note
7/16/2018       RE: Girish Chauhan  [address] [facility] [address]     Dear Colleague,    Thank you for referring your patient, Girish Chauhan, to the [facility]. Please see a copy of my visit note below.    No notes on file    Again, thank you for allowing me to participate in the care of your patient.      Sincerely,    Naresh De Los Santos MD

## 2018-07-16 NOTE — PROGRESS NOTES
Infusion Nursing Note:  Girish Chauhan presents today for C9D1 Gemzar-Aranesp-Magnesium Replacement.    Patient seen by provider today: Yes: Dr De Los Santos    Treatment Conditions:  Lab Results   Component Value Date    HGB 9.3 07/16/2018     Lab Results   Component Value Date    WBC 3.7 07/16/2018      Lab Results   Component Value Date    ANEU 2.1 07/16/2018     Lab Results   Component Value Date    PLT 86 07/16/2018      Lab Results   Component Value Date     07/16/2018                   Lab Results   Component Value Date    POTASSIUM 3.8 07/16/2018           Lab Results   Component Value Date    MAG 1.5 07/16/2018            Lab Results   Component Value Date    CR 1.52 07/16/2018                   Lab Results   Component Value Date    GARY 8.2 07/16/2018                Lab Results   Component Value Date    BILITOTAL 1.8 07/16/2018           Lab Results   Component Value Date    ALBUMIN 3.5 07/16/2018                    Lab Results   Component Value Date    ALT 37 07/16/2018           Lab Results   Component Value Date    AST 37 07/16/2018       Results reviewed, labs MET treatment parameters, ok to proceed with treatment.    Intravenous Access:  Implanted Port.  Access dc'd at time of discharge.      Note:   TORB 7/16/18@0745 Dr De Los Santos-Akiko Nicole RN  --OK to administer chemotherapy with today's labs  --replace Magnesium per protocol  Results reviewed, copy given to patient.  Proceed with treatment.    Copy of AVS given to patient. + Blood return from PORT pre and post infusion.  Tolerated infusion without incident. No Prescriptions filled today.   D/C in care of self.  Pt will return 7/30 for next appointment.       Akiko Nicole RN

## 2018-07-16 NOTE — MR AVS SNAPSHOT
After Visit Summary   7/16/2018    Girish Chauhan    MRN: [id number]           Patient Information     Date Of Birth          1955        Visit Information        Provider Department      7/16/2018 7:15 AM Naresh De Los Santos MD [facility]        Today's Diagnoses     Cholangiocarcinoma (H)    -  1    Secondary malignant neoplasm of bladder (H)           Follow-ups after your visit        Follow-up notes from your care team     Return in about 3 months (around 10/16/2018) for MD visit with CT and labs.      Your next 10 appointments already scheduled     Jul 16, 2018  8:00 AM CDT   Infusion 60 with UC ONCOLOGY INFUSION, UC 28 ATC   [facility] ([facility])    [address]  [address]  [facility] [address] [phone]              Future tests that were ordered for you today     Open Future Orders        Priority Expected Expires Ordered    CT Chest Abdomen Pelvis w/o & w Contrast Routine 10/16/2018 11/16/2018 7/16/2018    Comprehensive metabolic panel Routine 10/16/2018 11/16/2018 7/16/2018    CBC with platelets differential Routine 10/16/2018 11/16/2018 7/16/2018    Cancer antigen 19-9 Routine 10/16/2018 11/16/2018 7/16/2018            Who to contact     If you have questions or need follow up information about today's clinic visit or your schedule please contact [facility] directly at [phone].  Normal or non-critical lab and imaging results will be communicated to you by MyChart, letter or phone within 4 business days after the clinic has received the results. If you do not hear from us within 7 days, please contact the clinic through MyChart or phone. If you have a critical or abnormal lab result, we will notify you by phone as soon as possible.  Submit refill requests through [employer] or call your pharmacy and they will forward the refill request to us. Please allow 3 business days for  "your refill to be completed.          Additional Information About Your Visit        MyChart Information     [facility] gives you secure access to your electronic health record. If you see a primary care provider, you can also send messages to your care team and make appointments. If you have questions, please call your primary care clinic.  If you do not have a primary care provider, please call [phone] and they will assist you.        Care EveryWhere ID     This is your Care EveryWhere ID. This could be used by other organizations to access your [place] medical records  [id number]        Your Vitals Were     Pulse Temperature Respirations Height Pulse Oximetry BMI (Body Mass Index)    87 98.1  F (36.7  C) (Oral) 20 1.778 m (5' 10\") 93% 29.64 kg/m2       Blood Pressure from Last 3 Encounters:   07/16/18 (!) 145/94   07/05/18 125/86   06/20/18 120/82    Weight from Last 3 Encounters:   07/16/18 93.7 kg (206 lb 9.6 oz)   07/05/18 95.3 kg (210 lb)   06/20/18 97.7 kg (215 lb 6.4 oz)               Primary Care Provider Office Phone # Fax #    Jim Kaplan [phone]       [address]   [facility] [address]        Equal Access to Services     MARIUSZ MADRIGAL : Hadii aad ku hadasho Soomaali, waaxda luqadaha, qaybta kaalmada adeegyada, ankit gross. So [facility] [phone].    ATENCIÓN: Si habla español, tiene a flores disposición servicios gratuitos de asistencia lingüística. Llame al [phone].    We comply with applicable federal civil rights laws and [place] laws. We do not discriminate on the basis of race, color, national origin, age, disability, sex, sexual orientation, or gender identity.            Thank you!     Thank you for choosing [facility]  for your care. Our goal is always to provide you with excellent care. Hearing back from our patients is one way we can continue to improve our services. Please " take a few minutes to complete the written survey that you may receive in the mail after your visit with us. Thank you!             Your Updated Medication List - Protect others around you: Learn how to safely use, store and throw away your medicines at www.disposemymeds.org.          This list is accurate as of 7/16/18  7:52 AM.  Always use your most recent med list.                   Brand Name Dispense Instructions for use Diagnosis    atorvastatin 40 MG tablet    LIPITOR     TAKE 1 TABLET BY MOUTH DAILY        ELIQUIS PO      Take 5 mg by mouth 2 times daily        gemcitabine 1 GM injection    GEMZAR          LORazepam 0.5 MG tablet    ATIVAN    30 tablet    Take 1 tablet (0.5 mg) by mouth every 4 hours as needed (Anxiety, Nausea/Vomiting or Sleep)    Cholangiocarcinoma (H)       metoprolol tartrate 50 MG tablet    LOPRESSOR     50 mg 2 times daily        MITIGARE 0.6 MG capsule   Generic drug:  colchicine      Take 0.6 mg by mouth 3 times daily as needed        multivitamin, therapeutic with minerals Tabs tablet     30 tablet    Take 1 tablet by mouth daily    Mass of bile duct       NITROSTAT SL      Place 0.4 mg under the tongue every 5 minutes as needed for chest pain (Carries medication - has never used)        OMEGA-3 FISH OIL PO      Take 1,000 mg by mouth daily        ondansetron 8 MG tablet    ZOFRAN    10 tablet    Take 1 tablet (8 mg) by mouth every 8 hours as needed (Nausea/Vomiting)    Cholangiocarcinoma (H)       prochlorperazine 10 MG tablet    COMPAZINE    30 tablet    Take 1 tablet (10 mg) by mouth every 6 hours as needed (Nausea/Vomiting)    Cholangiocarcinoma (H)

## 2018-07-16 NOTE — MR AVS SNAPSHOT
After Visit Summary   7/16/2018    Girish Chauhan    MRN: [id number]           Patient Information     Date Of Birth          1955        Visit Information        Provider Department      7/16/2018 8:00 AM UC 28 ATC; UC ONCOLOGY INFUSION [facility]        Today's Diagnoses     Anemia associated with chemotherapy    -  1    Cholangiocarcinoma (H)           Follow-ups after your visit        Your next 10 appointments already scheduled     Jul 30, 2018  6:30 AM CDT   Masonic Lab Draw with UC MASONIC LAB DRAW   [facility]    [address] [place] [address]  [address]  [facility] [address]   [phone]            Jul 30, 2018  7:00 AM CDT   Infusion 60 with UC ONCOLOGY INFUSION, UC 12 ATC   [facility] ([facility])    [address] [place] [address]  [address]  [facility] [address]   [phone]            Aug 13, 2018  6:30 AM CDT   Masonic Lab Draw with UC MASONIC LAB DRAW   [facility] Masonic Lab Draw ([facility])    [address] [place] [address]  [address]  [facility] [address]   [phone]            Aug 13, 2018  7:00 AM CDT   Infusion 60 with UC ONCOLOGY INFUSION, UC 12 ATC   [facility] ([facility])    [address] [place] [address]  [address]  [facility] [address]   [phone]            Aug 27, 2018  6:30 AM CDT   Masonic Lab Draw with UC MASONIC LAB DRAW   [facility] Masonic Lab Draw ([facility])    [address] [place] [address]  [address]  [facility] [address]   [phone]            Aug 27, 2018  7:00 AM CDT   Infusion 60 with UC ONCOLOGY INFUSION, UC 12 ATC   [facility] ([facility])    [address] [place] [address]  [address]  [facility] [address]   [phone]            Sep 10, 2018  6:30 AM CDT   Masonic Lab Draw with UC MASONIC LAB DRAW   [facility]    [address]  [address]  [facility] [address] [phone]              Future tests that were ordered for you today     Open Future Orders        Priority Expected Expires Ordered    CT Chest Abdomen Pelvis w/o & w Contrast Routine 10/16/2018 11/16/2018 7/16/2018    Comprehensive metabolic panel Routine 10/16/2018 11/16/2018 7/16/2018    CBC with platelets differential Routine 10/16/2018 11/16/2018 7/16/2018    Cancer antigen 19-9 Routine 10/16/2018 11/16/2018 7/16/2018            Who to contact     If you have questions or need follow up information about today's clinic visit or your schedule please contact [facility] directly at [phone].  Normal or non-critical lab and imaging results will be communicated to you by [employer], letter or phone within 4 business days after the clinic has received the results. If you do not hear from us within 7 days, please contact the clinic through [employer] or phone. If you have a critical or abnormal lab result, we will notify you by phone as soon as possible.  Submit refill requests through [employer] or call your pharmacy and they will forward the refill request to us. Please allow 3 business days for your refill to be completed.          Additional Information About Your Visit        Model [employer]ar[employer]ocket Tales Information     [employer] gives you secure access to your electronic health record. If you see a primary care provider, you can also send messages to your care team and make appointments. If you have questions, please call your primary care clinic.  If you do not have a primary care provider, please call [phone] and they will assist you.        Care EveryWhere ID     This is your Care EveryWhere ID. This could be used by other organizations to access your [place] medical records  [id number]         Blood Pressure from Last 3 Encounters:   07/16/18 (!) 145/94   07/05/18 125/86   06/20/18 120/82     Weight from Last 3 Encounters:   07/16/18 93.7 kg (206 lb 9.6 oz)   07/05/18 95.3 kg (210 lb)   06/20/18 97.7 kg (215 lb 6.4 oz)              We Performed the Following     CBC with platelets differential     Comprehensive metabolic panel     Magnesium        Primary Care Provider Office Phone # Fax #    Jim Kaplan [phone]       [address]   [facility] [address]        Equal Access to Services     MARIUSZ MADRIGAL : Hadii aad ku hadasho Soomaali, waaxda luqadaha, qaybta kaalmada adeegyada, waxay idiin hayaan adeeg khalex yuan . So [facility] [place] [phone].    ATENCIÓN: Si habla español, tiene a flores disposición servicios gratuitos de asistencia lingüística. [facility] [phone].    We comply with applicable federal civil rights laws and [place] laws. We do not discriminate on the basis of race, color, national origin, age, disability, sex, sexual orientation, or gender identity.            Thank you!     Thank you for choosing [facility]  for your care. Our goal is always to provide you with excellent care. Hearing back from our patients is one way we can continue to improve our services. Please take a few minutes to complete the written survey that you may receive in the mail after your visit with us. Thank you!             Your Updated Medication List - Protect others around you: Learn how to safely use, store and throw away your medicines at www.disposemymeds.org.          This list is accurate as of 7/16/18 10:37 AM.  Always use your most recent med list.                   Brand Name Dispense Instructions for use Diagnosis    atorvastatin 40 MG tablet    LIPITOR     TAKE 1 TABLET BY MOUTH DAILY        ELIQUIS PO      Take 5 mg by mouth 2 times daily        gemcitabine 1 GM injection    GEMZAR          LORazepam 0.5 MG tablet    ATIVAN    30 tablet    Take 1 tablet (0.5 mg) by mouth every 4 hours as needed (Anxiety, Nausea/Vomiting or  Sleep)    Cholangiocarcinoma (H)       metoprolol tartrate 50 MG tablet    LOPRESSOR     50 mg 2 times daily        MITIGARE 0.6 MG capsule   Generic drug:  colchicine      Take 0.6 mg by mouth 3 times daily as needed        multivitamin, therapeutic with minerals Tabs tablet     30 tablet    Take 1 tablet by mouth daily    Mass of bile duct       NITROSTAT SL      Place 0.4 mg under the tongue every 5 minutes as needed for chest pain (Carries medication - has never used)        OMEGA-3 FISH OIL PO      Take 1,000 mg by mouth daily        ondansetron 8 MG tablet    ZOFRAN    10 tablet    Take 1 tablet (8 mg) by mouth every 8 hours as needed (Nausea/Vomiting)    Cholangiocarcinoma (H)       prochlorperazine 10 MG tablet    COMPAZINE    30 tablet    Take 1 tablet (10 mg) by mouth every 6 hours as needed (Nausea/Vomiting)    Cholangiocarcinoma (H)

## 2018-07-16 NOTE — NURSING NOTE
"Oncology Rooming Note    July 16, 2018 6:55 AM   Girish Chauhan is a 63 year old male who presents for:    Chief Complaint   Patient presents with     Port Draw     Port labs collected by RN.      Oncology Clinic Visit     Return visit related to Cholangiocarcinoma     Initial Vitals: BP (!) 145/94 (BP Location: Right arm, Patient Position: Sitting, Cuff Size: Adult Large)  Pulse 87  Temp 98.1  F (36.7  C) (Oral)  Resp 20  Ht 1.778 m (5' 10\")  Wt 93.7 kg (206 lb 9.6 oz)  SpO2 93%  BMI 29.64 kg/m2 Estimated body mass index is 29.64 kg/(m^2) as calculated from the following:    Height as of this encounter: 1.778 m (5' 10\").    Weight as of this encounter: 93.7 kg (206 lb 9.6 oz). Body surface area is 2.15 meters squared.  No Pain (0) Comment: Data Unavailable   No LMP for male patient.  Allergies reviewed: Yes  Medications reviewed: Yes    Medications: Medication refills not needed today.  Pharmacy name entered into [employer]: [facility] [address] [place] [address] [address] AT [facility]    Clinical concerns: No new concerns.Patient roomed at 6.58 AM Provider was notified.    10 minutes for nursing intake (face to face time)     Ashwini Barfield LPN            "

## 2018-07-16 NOTE — PROGRESS NOTES
Girish Chauhan is here today in follow-up of metastatic cholangiocarcinoma.    Mr. Chauhan returns today for a planned response assessment now on single agent gemcitabine. At our last evaluation he had been receiving gemcitabine and cisplatin with equivocal evidence of progression and a slight decline in his overall performance status which seem to be more related to his chemotherapy. For the last couple of months now he's been receiving chemotherapy without the platinum and he thinks his turned a corner in a positive way in terms of how he feels. His most significant problem continues to be his fatigue which limits his activity, but he is still out and [place], [place] and carrying on with most of his desired activities. He notes that he is having more trouble with a cough which is for the most part nonproductive and occurs predominantly when he is recumbent. He's not having any other respiratory symptoms. He notes his heart pounding at times but doesn't really notice any other symptoms associated with that and mostly that's when he is laying down and doesn't have any other sensory inputs. He is not really aware of an irregularity to his heartbeat. The remainder of a 10 point complete review of systems is otherwise unremarkable.    On physical exam Mr. Chauhan appears pale but quite healthy. His vital signs as noted on chart remarkable only for a mild increase in his blood pressure and another couple pounds of weight loss.  He has no scleral icterus and no visible lesion in the oropharynx. He has no adenopathy palpable in the neck or supraclavicular spaces. His port site is unremarkable.  His lungs are clear to auscultation without dullness to percussion.  His heart rate and rhythm are regular without audible murmur or gallop in the jugular venous pressure appears normal.  His abdomen is soft and nontender without palpable mass or organomegaly.  He has no peripheral edema and no  tenderness in his calves or thighs. He has no cyanosis or clubbing of his digits.  His speech is fluent, his cranial nerves are grossly intact and he has an unremarkable gait and station.    I's patient and his wife his lab work and imaging. His CA-19-9 level remains low. He has mild renal failure consistent with prior values. His liver enzymes are unremarkable. He has mild cytopenias as expected with his point his chemotherapy cycle, and his hemoglobin is at 9.1 on his current erythropoietin dose. On his CT scan the known tumor in his bladder wall is barely visible. He has some mildly increased small and scattered adenopathy which is not changed and not particular worrisome.    Assessment/plan: Metastatic cholangiocarcinoma. The patient's performance status is improved a little bit with the drop in the platinum from his chemotherapy regimen and his disease appears stable. We made a plan together today to continue on with single agent gemcitabine on the reduced schedule of every other week. We'll reevaluate his disease status again in another 3 months. I recommended a nonsedating over-the-counter antihistamine for his cough which I suspect is due to postnasal drip. We talked also about reflux precautions.

## 2018-07-30 ENCOUNTER — INFUSION THERAPY VISIT (OUTPATIENT)
Dept: ONCOLOGY | Facility: CLINIC | Age: 63
End: 2018-07-30
Attending: INTERNAL MEDICINE
Payer: COMMERCIAL

## 2018-07-30 VITALS
WEIGHT: 208.6 LBS | SYSTOLIC BLOOD PRESSURE: 151 MMHG | BODY MASS INDEX: 29.93 KG/M2 | TEMPERATURE: 97.6 F | DIASTOLIC BLOOD PRESSURE: 95 MMHG | OXYGEN SATURATION: 96 % | HEART RATE: 56 BPM | RESPIRATION RATE: 16 BRPM

## 2018-07-30 DIAGNOSIS — C22.1 CHOLANGIOCARCINOMA (H): ICD-10-CM

## 2018-07-30 DIAGNOSIS — D64.81 ANEMIA ASSOCIATED WITH CHEMOTHERAPY: Primary | ICD-10-CM

## 2018-07-30 DIAGNOSIS — T45.1X5A ANEMIA ASSOCIATED WITH CHEMOTHERAPY: Primary | ICD-10-CM

## 2018-07-30 LAB
ALBUMIN SERPL-MCNC: 3.3 G/DL (ref 3.4–5)
ALP SERPL-CCNC: 78 U/L (ref 40–150)
ALT SERPL W P-5'-P-CCNC: 31 U/L (ref 0–70)
ANION GAP SERPL CALCULATED.3IONS-SCNC: 10 MMOL/L (ref 3–14)
AST SERPL W P-5'-P-CCNC: 32 U/L (ref 0–45)
BASOPHILS # BLD AUTO: 0 10E9/L (ref 0–0.2)
BASOPHILS NFR BLD AUTO: 0.2 %
BILIRUB SERPL-MCNC: 1.9 MG/DL (ref 0.2–1.3)
BUN SERPL-MCNC: 35 MG/DL (ref 7–30)
CALCIUM SERPL-MCNC: 8.4 MG/DL (ref 8.5–10.1)
CHLORIDE SERPL-SCNC: 106 MMOL/L (ref 94–109)
CO2 SERPL-SCNC: 22 MMOL/L (ref 20–32)
CREAT SERPL-MCNC: 1.59 MG/DL (ref 0.66–1.25)
DIFFERENTIAL METHOD BLD: ABNORMAL
EOSINOPHIL # BLD AUTO: 0.1 10E9/L (ref 0–0.7)
EOSINOPHIL NFR BLD AUTO: 2 %
ERYTHROCYTE [DISTWIDTH] IN BLOOD BY AUTOMATED COUNT: 17.2 % (ref 10–15)
GFR SERPL CREATININE-BSD FRML MDRD: 44 ML/MIN/1.7M2
GLUCOSE SERPL-MCNC: 95 MG/DL (ref 70–99)
HCT VFR BLD AUTO: 27.8 % (ref 40–53)
HGB BLD-MCNC: 9.1 G/DL (ref 13.3–17.7)
IMM GRANULOCYTES # BLD: 0 10E9/L (ref 0–0.4)
IMM GRANULOCYTES NFR BLD: 0.5 %
LYMPHOCYTES # BLD AUTO: 1 10E9/L (ref 0.8–5.3)
LYMPHOCYTES NFR BLD AUTO: 23.4 %
MAGNESIUM SERPL-MCNC: 1.6 MG/DL (ref 1.6–2.3)
MCH RBC QN AUTO: 35.8 PG (ref 26.5–33)
MCHC RBC AUTO-ENTMCNC: 32.7 G/DL (ref 31.5–36.5)
MCV RBC AUTO: 109 FL (ref 78–100)
MONOCYTES # BLD AUTO: 0.9 10E9/L (ref 0–1.3)
MONOCYTES NFR BLD AUTO: 19.5 %
NEUTROPHILS # BLD AUTO: 2.4 10E9/L (ref 1.6–8.3)
NEUTROPHILS NFR BLD AUTO: 54.4 %
NRBC # BLD AUTO: 0 10*3/UL
NRBC BLD AUTO-RTO: 0 /100
PLATELET # BLD AUTO: 103 10E9/L (ref 150–450)
POTASSIUM SERPL-SCNC: 4.1 MMOL/L (ref 3.4–5.3)
PROT SERPL-MCNC: 5.7 G/DL (ref 6.8–8.8)
RBC # BLD AUTO: 2.54 10E12/L (ref 4.4–5.9)
SODIUM SERPL-SCNC: 139 MMOL/L (ref 133–144)
WBC # BLD AUTO: 4.4 10E9/L (ref 4–11)

## 2018-07-30 PROCEDURE — 83735 ASSAY OF MAGNESIUM: CPT | Performed by: INTERNAL MEDICINE

## 2018-07-30 PROCEDURE — 96372 THER/PROPH/DIAG INJ SC/IM: CPT | Mod: 59

## 2018-07-30 PROCEDURE — 25000128 H RX IP 250 OP 636: Mod: EA | Performed by: INTERNAL MEDICINE

## 2018-07-30 PROCEDURE — G0463 HOSPITAL OUTPT CLINIC VISIT: HCPCS | Mod: 25

## 2018-07-30 PROCEDURE — 96413 CHEMO IV INFUSION 1 HR: CPT

## 2018-07-30 PROCEDURE — 96367 TX/PROPH/DG ADDL SEQ IV INF: CPT

## 2018-07-30 PROCEDURE — 25000128 H RX IP 250 OP 636: Mod: ZF | Performed by: INTERNAL MEDICINE

## 2018-07-30 PROCEDURE — 85025 COMPLETE CBC W/AUTO DIFF WBC: CPT | Performed by: INTERNAL MEDICINE

## 2018-07-30 PROCEDURE — 80053 COMPREHEN METABOLIC PANEL: CPT | Performed by: INTERNAL MEDICINE

## 2018-07-30 RX ORDER — HEPARIN SODIUM (PORCINE) LOCK FLUSH IV SOLN 100 UNIT/ML 100 UNIT/ML
5 SOLUTION INTRAVENOUS EVERY 8 HOURS
Status: DISCONTINUED | OUTPATIENT
Start: 2018-07-30 | End: 2018-07-30 | Stop reason: HOSPADM

## 2018-07-30 RX ORDER — HEPARIN SODIUM (PORCINE) LOCK FLUSH IV SOLN 100 UNIT/ML 100 UNIT/ML
5 SOLUTION INTRAVENOUS ONCE
Status: COMPLETED | OUTPATIENT
Start: 2018-07-30 | End: 2018-07-30

## 2018-07-30 RX ADMIN — DARBEPOETIN ALFA 300 MCG: 300 INJECTION, SOLUTION INTRAVENOUS; SUBCUTANEOUS at 08:07

## 2018-07-30 RX ADMIN — SODIUM CHLORIDE, PRESERVATIVE FREE 5 ML: 5 INJECTION INTRAVENOUS at 06:49

## 2018-07-30 RX ADMIN — SODIUM CHLORIDE 250 ML: 9 INJECTION, SOLUTION INTRAVENOUS at 08:05

## 2018-07-30 RX ADMIN — DEXAMETHASONE SODIUM PHOSPHATE: 10 INJECTION, SOLUTION INTRAMUSCULAR; INTRAVENOUS at 08:05

## 2018-07-30 RX ADMIN — GEMCITABINE 2200 MG: 38 INJECTION INTRAVENOUS at 08:27

## 2018-07-30 RX ADMIN — SODIUM CHLORIDE, PRESERVATIVE FREE 5 ML: 5 INJECTION INTRAVENOUS at 09:02

## 2018-07-30 ASSESSMENT — PAIN SCALES - GENERAL
PAINLEVEL: NO PAIN (0)
PAINLEVEL: NO PAIN (0)

## 2018-07-30 NOTE — PATIENT INSTRUCTIONS
Contact Numbers    [facility] Main Line: [phone]    Triage Nurse Line: [phone]      Please call the [facility] Nurse Triage line if you experience a temperature greater than or equal to 100.5, shaking chills, have uncontrolled nausea, vomiting and/or diarrhea, dizziness, shortness of breath, bleeding not relieved with pressure, or if you have any other questions or concerns.     If it is after hours, weekends, or holidays, please call the [phone] and a nurse will be able to triage your call.    If you are having any concerning symptoms or wish to speak to a provider before your next infusion visit, please call your care coordinator or triage them so we can adequately serve you.      If you need to refill your narcotic prescription or other medication, please call triage before your infusion appointment.        July 2018 Sunday Monday Tuesday Wednesday Thursday Friday Saturday   1     2     3     4     5     [facility]    6:30 AM   (15 min.)    [facility]   [facility]     P ONC INFUSION 60    7:00 AM   (60 min.)    ONCOLOGY INFUSION   [facility] 6     7       8     9     10     11     12     [facility]    6:30 AM   (15 min.)    [facility] LAB DRAW   [facility] Lab Draw     CT CHEST ABDOMEN PELVIS WWO    7:10 AM   (20 min.)   UCCT2   [facility] CT 13     14       15     16     [facility]    6:45 AM   (15 min.)    [facility] DRAW   [facility] Lab Draw     [facility] RETURN    7:00 AM   (30 min.)   Naresh De Los Santos MD   [facility]     [facility] ONC INFUSION 60    8:00 AM   (60 min.)    ONCOLOGY INFUSION   [facility] 17     18     19     20     21       22     23     24     25     26     27     28       29     30     [facility]    6:30 AM   (15 min.)   [facility]   [facility] Lab Draw     [facility] ONC INFUSION 60    7:00 AM   (60  min.)    ONCOLOGY INFUSION   [facility] 31 August 2018 Sunday Monday Tuesday Wednesday Thursday Friday Saturday                  1     2     3     4       5     6     7     8     9     10     11       12     13     [facility] LAB DRAW    6:30 AM   (15 min.)    M[facility] LAB DRAW   [facility] Lab Draw     [facility] ONC INFUSION 60    7:00 AM   (60 min.)    ONCOLOGY INFUSION   [facility] 14     15     16     17     18       19     20     21     22     23     24     25       26     27     [facility] LAB DRAW    6:30 AM   (15 min.)    [facility] LAB DRAW   [facility] Lab Draw     [facility] ONC INFUSION 60    7:00 AM   (60 min.)    ONCOLOGY INFUSION   [facility] 28     29     30     31                      Recent Results (from the past 24 hour(s))   CBC with platelets differential    Collection Time: 07/30/18  6:54 AM   Result Value Ref Range    WBC 4.4 4.0 - 11.0 10e9/L    RBC Count 2.54 (L) 4.4 - 5.9 10e12/L    Hemoglobin 9.1 (L) 13.3 - 17.7 g/dL    Hematocrit 27.8 (L) 40.0 - 53.0 %     (H) 78 - 100 fl    MCH 35.8 (H) 26.5 - 33.0 pg    MCHC 32.7 31.5 - 36.5 g/dL    RDW 17.2 (H) 10.0 - 15.0 %    Platelet Count 103 (L) 150 - 450 10e9/L    Diff Method Automated Method     % Neutrophils 54.4 %    % Lymphocytes 23.4 %    % Monocytes 19.5 %    % Eosinophils 2.0 %    % Basophils 0.2 %    % Immature Granulocytes 0.5 %    Nucleated RBCs 0 0 /100    Absolute Neutrophil 2.4 1.6 - 8.3 10e9/L    Absolute Lymphocytes 1.0 0.8 - 5.3 10e9/L    Absolute Monocytes 0.9 0.0 - 1.3 10e9/L    Absolute Eosinophils 0.1 0.0 - 0.7 10e9/L    Absolute Basophils 0.0 0.0 - 0.2 10e9/L    Abs Immature Granulocytes 0.0 0 - 0.4 10e9/L    Absolute Nucleated RBC 0.0    Comprehensive metabolic panel    Collection Time: 07/30/18  6:54 AM   Result Value Ref Range    Sodium 139 133 - 144 mmol/L    Potassium 4.1 3.4 - 5.3 mmol/L    Chloride 106 94 -  109 mmol/L    Carbon Dioxide 22 20 - 32 mmol/L    Anion Gap 10 3 - 14 mmol/L    Glucose 95 70 - 99 mg/dL    Urea Nitrogen 35 (H) 7 - 30 mg/dL    Creatinine 1.59 (H) 0.66 - 1.25 mg/dL    GFR Estimate 44 (L) >60 mL/min/1.7m2    GFR Estimate If Black 54 (L) >60 mL/min/1.7m2    Calcium 8.4 (L) 8.5 - 10.1 mg/dL    Bilirubin Total 1.9 (H) 0.2 - 1.3 mg/dL    Albumin 3.3 (L) 3.4 - 5.0 g/dL    Protein Total 5.7 (L) 6.8 - 8.8 g/dL    Alkaline Phosphatase 78 40 - 150 U/L    ALT 31 0 - 70 U/L    AST 32 0 - 45 U/L   Magnesium    Collection Time: 07/30/18  6:54 AM   Result Value Ref Range    Magnesium 1.6 1.6 - 2.3 mg/dL

## 2018-07-30 NOTE — MR AVS SNAPSHOT
After Visit Summary   7/30/2018    Girish Chauhan    MRN: [id number]           Patient Information     Date Of Birth          1955        Visit Information        Provider Department      7/30/2018 7:00 AM  12 ATC;  ONCOLOGY INFUSION [facility]        Today's Diagnoses     Anemia associated with chemotherapy    -  1    Cholangiocarcinoma (H)          Care Instructions    Contact Numbers    [facility] Main Line: [phone]    Triage Nurse Line: [phone]      Please call the [facility] Nurse Triage line if you experience a temperature greater than or equal to 100.5, shaking chills, have uncontrolled nausea, vomiting and/or diarrhea, dizziness, shortness of breath, bleeding not relieved with pressure, or if you have any other questions or concerns.     If it is after hours, weekends, or holidays, please call the [phone] and a nurse will be able to triage your call.    If you are having any concerning symptoms or wish to speak to a provider before your next infusion visit, please call your care coordinator or triage them so we can adequately serve you.      If you need to refill your narcotic prescription or other medication, please call triage before your infusion appointment.        July 2018 Sunday Monday Tuesday Wednesday Thursday Friday Saturday   1     2     3     4     5     [facility] LAB DRAW    6:30 AM   (15 min.)    MASONIC LAB DRAW   [facility] Lab Draw     [facility] ONC INFUSION 60    7:00 AM   (60 min.)    ONCOLOGY INFUSION   [facility] 6     7       8     9     10     11     12     [facility]    6:30 AM   (15 min.)    [facility]   [facility]     CT CHEST ABDOMEN PELVIS WWO    7:10 AM   (20 min.)   UCCT2   [facility] CT 13     14       15     16     [facility]RAW    6:45 AM   (15 min.)    [facility] LAB DRAW   [facility] Lab Draw     [facility] RETURN    7:00  AM   (30 min.)   Naresh De Los Santos MD   [facility]     UMP ONC INFUSION 60    8:00 AM   (60 min.)    ONCOLOGY INFUSION   [facility] 17     18     19     20     21       22     23     24     25     26     27     28       29     30     [facility] DRAW    6:30 AM   (15 min.)    [facility] LAB DRAW   [facility] Lab Draw     [facility] ONC INFUSION 60    7:00 AM   (60 min.)   UC ONCOLOGY INFUSION   [facility] 31 August 2018 Sunday Monday Tuesday Wednesday Thursday Friday Saturday                  1     2     3     4       5     6     7     8     9     10     11       12     13     [facility] DRAW    6:30 AM   (15 min.)    [facility] LAB DRAW   [facility] Lab Draw     UMP ONC INFUSION 60    7:00 AM   (60 min.)    ONCOLOGY INFUSION   [facility] 14     15     16     17     18       19     20     21     22     23     24     25       26     27     [facility]    6:30 AM   (15 min.)    [facility]   [facility]     [facility] ONC INFUSION 60    7:00 AM   (60 min.)    ONCOLOGY INFUSION   [facility] 28     29     30     31                      Recent Results (from the past 24 hour(s))   CBC with platelets differential    Collection Time: 07/30/18  6:54 AM   Result Value Ref Range    WBC 4.4 4.0 - 11.0 10e9/L    RBC Count 2.54 (L) 4.4 - 5.9 10e12/L    Hemoglobin 9.1 (L) 13.3 - 17.7 g/dL    Hematocrit 27.8 (L) 40.0 - 53.0 %     (H) 78 - 100 fl    MCH 35.8 (H) 26.5 - 33.0 pg    MCHC 32.7 31.5 - 36.5 g/dL    RDW 17.2 (H) 10.0 - 15.0 %    Platelet Count 103 (L) 150 - 450 10e9/L    Diff Method Automated Method     % Neutrophils 54.4 %    % Lymphocytes 23.4 %    % Monocytes 19.5 %    % Eosinophils 2.0 %    % Basophils 0.2 %    % Immature Granulocytes 0.5 %    Nucleated RBCs 0 0 /100    Absolute Neutrophil 2.4 1.6 - 8.3 10e9/L    Absolute  Lymphocytes 1.0 0.8 - 5.3 10e9/L    Absolute Monocytes 0.9 0.0 - 1.3 10e9/L    Absolute Eosinophils 0.1 0.0 - 0.7 10e9/L    Absolute Basophils 0.0 0.0 - 0.2 10e9/L    Abs Immature Granulocytes 0.0 0 - 0.4 10e9/L    Absolute Nucleated RBC 0.0    Comprehensive metabolic panel    Collection Time: 07/30/18  6:54 AM   Result Value Ref Range    Sodium 139 133 - 144 mmol/L    Potassium 4.1 3.4 - 5.3 mmol/L    Chloride 106 94 - 109 mmol/L    Carbon Dioxide 22 20 - 32 mmol/L    Anion Gap 10 3 - 14 mmol/L    Glucose 95 70 - 99 mg/dL    Urea Nitrogen 35 (H) 7 - 30 mg/dL    Creatinine 1.59 (H) 0.66 - 1.25 mg/dL    GFR Estimate 44 (L) >60 mL/min/1.7m2    GFR Estimate If Black 54 (L) >60 mL/min/1.7m2    Calcium 8.4 (L) 8.5 - 10.1 mg/dL    Bilirubin Total 1.9 (H) 0.2 - 1.3 mg/dL    Albumin 3.3 (L) 3.4 - 5.0 g/dL    Protein Total 5.7 (L) 6.8 - 8.8 g/dL    Alkaline Phosphatase 78 40 - 150 U/L    ALT 31 0 - 70 U/L    AST 32 0 - 45 U/L   Magnesium    Collection Time: 07/30/18  6:54 AM   Result Value Ref Range    Magnesium 1.6 1.6 - 2.3 mg/dL                 Follow-ups after your visit        Your next 10 appointments already scheduled     Aug 13, 2018  6:30 AM CDT   [facility] Lab Draw with  M[facility] LAB DRAW   [facility] Lab Draw ([facility])    [address] [place] [address]  [address]  [facility] [id number] [phone]            Aug 13, 2018  7:00 AM CDT   Infusion 60 with UC ONCOLOGY INFUSION, UC 12 ATC   [facility]    [address] [place] [address] [address]  [address]  [facility] [id number] [phone]            Aug 27, 2018  6:30 AM CDT   Masonic Lab Draw with  MASONIC LAB DRAW   [facility] Lab Draw ([facility])    [address]  [address]  [facility] [id number] [phone]            Aug 27, 2018  7:00 AM CDT   Infusion 60 with UC ONCOLOGY INFUSION, UC 12 ATC   [facility] ([facility]  [facility])    [address]  [address]  [facility] [address]   [phone]            Sep 10, 2018  6:30 AM CDT   Masonic Lab Draw with UC MASONIC LAB DRAW   [facility] Lab Draw ([facility])    [address] [place] [address]  [address]  [facility] [address]   [phone]            Sep 10, 2018  7:00 AM CDT   Infusion 60 with UC ONCOLOGY INFUSION, UC 12 ATC   [facility] ([facility])    [address] [place] [address]  [address]  [facility] [address]   [phone]            Sep 24, 2018  6:30 AM CDT   [facility] Lab Draw with UC [facility] LAB DRAW   [facility])    [address] [place] [address]  [address]  [facility] [address]   [phone]              Who to contact     If you have questions or need follow up information about today's clinic visit or your schedule please contact [facility] directly at [phone].  Normal or non-critical lab and imaging results will be communicated to you by [employer], letter or phone within 4 business days after the clinic has received the results. If you do not hear from us within 7 days, please contact the clinic through [employer] or phone. If you have a critical or abnormal lab result, we will notify you by phone as soon as possible.  Submit refill requests through H5 or call your pharmacy and they will forward the refill request to us. Please allow 3 business days for your refill to be completed.          Additional Information About Your Visit        H5 Information     H5 gives you secure access to your electronic health record. If you see a primary care provider, you can also send messages to your care team and make appointments. If you have questions, please call your primary care clinic.  If you do not have a primary care provider, please call [phone] and they will assist you.        Care EveryWhere ID      This is your Care EveryWhere ID. This could be used by other organizations to access your [place] medical records  [id number]        Your Vitals Were     Pulse Temperature Respirations Pulse Oximetry BMI (Body Mass Index)       56 97.6  F (36.4  C) (Oral) 16 96% 29.93 kg/m2        Blood Pressure from Last 3 Encounters:   07/30/18 (!) 151/95   07/16/18 (!) 145/94   07/05/18 125/86    Weight from Last 3 Encounters:   07/30/18 94.6 kg (208 lb 9.6 oz)   07/16/18 93.7 kg (206 lb 9.6 oz)   07/05/18 95.3 kg (210 lb)              We Performed the Following     CBC with platelets differential     Comprehensive metabolic panel     Magnesium        Primary Care Provider Office Phone # Fax #    Jim Kaplan [phone]       [address]   [facility] [address]        Equal Access to Services     ALICIA [facility]portKHANG : Hadii aad ku hadasho Soomaali, waaxda luqadaha, qaybta kaalmada adeegyada, ankit yuan . So [facility] [phone].    ATENCIÓN: Si habla español, tiene a flores disposición servicios gratuitos de asistencia lingüística. Llame al [phone].    We comply with applicable federal civil rights laws and [place] laws. We do not discriminate on the basis of race, color, national origin, age, disability, sex, sexual orientation, or gender identity.            Thank you!     Thank you for choosing [facility]  for your care. Our goal is always to provide you with excellent care. Hearing back from our patients is one way we can continue to improve our services. Please take a few minutes to complete the written survey that you may receive in the mail after your visit with us. Thank you!             Your Updated Medication List - Protect others around you: Learn how to safely use, store and throw away your medicines at www.disposemymeds.org.          This list is accurate as of 7/30/18  9:56 AM.  Always use your most recent med  list.                   Brand Name Dispense Instructions for use Diagnosis    atorvastatin 40 MG tablet    LIPITOR     TAKE 1 TABLET BY MOUTH DAILY        ELIQUIS PO      Take 5 mg by mouth 2 times daily        gemcitabine 1 GM injection    GEMZAR          LORazepam 0.5 MG tablet    ATIVAN    30 tablet    Take 1 tablet (0.5 mg) by mouth every 4 hours as needed (Anxiety, Nausea/Vomiting or Sleep)    Cholangiocarcinoma (H)       metoprolol tartrate 50 MG tablet    LOPRESSOR     50 mg 2 times daily        MITIGARE 0.6 MG capsule   Generic drug:  colchicine      Take 0.6 mg by mouth 3 times daily as needed        multivitamin, therapeutic with minerals Tabs tablet     30 tablet    Take 1 tablet by mouth daily    Mass of bile duct       NITROSTAT SL      Place 0.4 mg under the tongue every 5 minutes as needed for chest pain (Carries medication - has never used)        OMEGA-3 FISH OIL PO      Take 1,000 mg by mouth daily        ondansetron 8 MG tablet    ZOFRAN    10 tablet    Take 1 tablet (8 mg) by mouth every 8 hours as needed (Nausea/Vomiting)    Cholangiocarcinoma (H)       prochlorperazine 10 MG tablet    COMPAZINE    30 tablet    Take 1 tablet (10 mg) by mouth every 6 hours as needed (Nausea/Vomiting)    Cholangiocarcinoma (H)

## 2018-07-30 NOTE — PROGRESS NOTES
Infusion Nursing Note:  Girish Chauhan presents today for C9D15 Gemzar.    Patient seen by provider today: No    Note: Patient arrived to in the infusion center reporting some fatigue and anxiety regarding issues with his home air conditioner and basement.  Patient has an elevated BP with concerns of new onset bilateral ankle swelling.  Patient also reports that he has not taken his daily antihypertensives this morning yet which could be contributing to his elevated blood pressures.  Patient's serum creatinine also remains elevated at 1.59 today.  Dr. De Los Santos paged with patient updates.    TORB: Dr. De Los Santos/Zenobia Britton RN:  - OK to treat  today with creatinine of 1.59, with elevated BP and with new symptom of BLE swelling. Patient should take his blood pressure medication today after he gets home.    Patient encouraged to continue taking his antihypertensives as scheduled.  Patient also reminded to elevate his feet for the swelling and to notify this clinic if swelling increases or is accompanied by redness or pain.  Patient verbalizes understanding.      Intravenous Access:  Implanted Port.      Treatment Conditions:  Lab Results   Component Value Date    HGB 9.1 07/30/2018     Lab Results   Component Value Date    WBC 4.4 07/30/2018      Lab Results   Component Value Date    ANEU 2.4 07/30/2018     Lab Results   Component Value Date     07/30/2018      Lab Results   Component Value Date     07/30/2018                   Lab Results   Component Value Date    POTASSIUM 4.1 07/30/2018           Lab Results   Component Value Date    MAG 1.6 07/30/2018            Lab Results   Component Value Date    CR 1.59 07/30/2018                   Lab Results   Component Value Date    GARY 8.4 07/30/2018                Lab Results   Component Value Date    BILITOTAL 1.9 07/30/2018           Lab Results   Component Value Date    ALBUMIN 3.3 07/30/2018                    Lab Results   Component Value Date    ALT 31  07/30/2018           Lab Results   Component Value Date    AST 32 07/30/2018       Results reviewed, labs MET treatment parameters, ok to proceed with treatment.      Post Infusion Assessment:  Patient tolerated infusion without incident.  Patient tolerated injection without incident.  Blood return noted pre and post infusion.  Site patent and intact, free from redness, edema or discomfort.  No evidence of extravasations.  Access discontinued per protocol.    Discharge Plan:   Patient declined prescription refills.  Discharge instructions reviewed with: Patient.  Patient and/or family verbalized understanding of discharge instructions and all questions answered.  AVS to patient via [employer].  Patient will return 8/13/18 for next appointment.   Patient discharged in stable condition accompanied by: self.  Departure Mode: Ambulatory.    Zenobia Britton RN

## 2018-08-06 RX ORDER — DIPHENHYDRAMINE HYDROCHLORIDE 50 MG/ML
50 INJECTION INTRAMUSCULAR; INTRAVENOUS
Status: CANCELLED
Start: 2018-08-13

## 2018-08-06 RX ORDER — DIPHENHYDRAMINE HYDROCHLORIDE 50 MG/ML
50 INJECTION INTRAMUSCULAR; INTRAVENOUS
Status: CANCELLED
Start: 2018-08-27

## 2018-08-06 RX ORDER — MEPERIDINE HYDROCHLORIDE 25 MG/ML
25 INJECTION INTRAMUSCULAR; INTRAVENOUS; SUBCUTANEOUS EVERY 30 MIN PRN
Status: CANCELLED | OUTPATIENT
Start: 2018-08-13

## 2018-08-06 RX ORDER — EPINEPHRINE 1 MG/ML
0.3 INJECTION, SOLUTION INTRAMUSCULAR; SUBCUTANEOUS EVERY 5 MIN PRN
Status: CANCELLED | OUTPATIENT
Start: 2018-08-13

## 2018-08-06 RX ORDER — MEPERIDINE HYDROCHLORIDE 25 MG/ML
25 INJECTION INTRAMUSCULAR; INTRAVENOUS; SUBCUTANEOUS EVERY 30 MIN PRN
Status: CANCELLED | OUTPATIENT
Start: 2018-08-27

## 2018-08-06 RX ORDER — SODIUM CHLORIDE 9 MG/ML
1000 INJECTION, SOLUTION INTRAVENOUS CONTINUOUS PRN
Status: CANCELLED
Start: 2018-08-27

## 2018-08-06 RX ORDER — EPINEPHRINE 1 MG/ML
0.3 INJECTION, SOLUTION INTRAMUSCULAR; SUBCUTANEOUS EVERY 5 MIN PRN
Status: CANCELLED | OUTPATIENT
Start: 2018-08-27

## 2018-08-06 RX ORDER — ALBUTEROL SULFATE 90 UG/1
1-2 AEROSOL, METERED RESPIRATORY (INHALATION)
Status: CANCELLED
Start: 2018-08-13

## 2018-08-06 RX ORDER — HEPARIN SODIUM (PORCINE) LOCK FLUSH IV SOLN 100 UNIT/ML 100 UNIT/ML
5 SOLUTION INTRAVENOUS EVERY 8 HOURS
Status: CANCELLED
Start: 2018-08-13

## 2018-08-06 RX ORDER — ALBUTEROL SULFATE 90 UG/1
1-2 AEROSOL, METERED RESPIRATORY (INHALATION)
Status: CANCELLED
Start: 2018-08-27

## 2018-08-06 RX ORDER — SODIUM CHLORIDE 9 MG/ML
1000 INJECTION, SOLUTION INTRAVENOUS CONTINUOUS PRN
Status: CANCELLED
Start: 2018-08-13

## 2018-08-06 RX ORDER — LORAZEPAM 2 MG/ML
0.5 INJECTION INTRAMUSCULAR EVERY 4 HOURS PRN
Status: CANCELLED
Start: 2018-08-13

## 2018-08-06 RX ORDER — EPINEPHRINE 0.3 MG/.3ML
0.3 INJECTION SUBCUTANEOUS EVERY 5 MIN PRN
Status: CANCELLED | OUTPATIENT
Start: 2018-08-27

## 2018-08-06 RX ORDER — METHYLPREDNISOLONE SODIUM SUCCINATE 125 MG/2ML
125 INJECTION, POWDER, LYOPHILIZED, FOR SOLUTION INTRAMUSCULAR; INTRAVENOUS
Status: CANCELLED
Start: 2018-08-13

## 2018-08-06 RX ORDER — LORAZEPAM 2 MG/ML
0.5 INJECTION INTRAMUSCULAR EVERY 4 HOURS PRN
Status: CANCELLED
Start: 2018-08-27

## 2018-08-06 RX ORDER — ALBUTEROL SULFATE 0.83 MG/ML
2.5 SOLUTION RESPIRATORY (INHALATION)
Status: CANCELLED | OUTPATIENT
Start: 2018-08-27

## 2018-08-06 RX ORDER — HEPARIN SODIUM (PORCINE) LOCK FLUSH IV SOLN 100 UNIT/ML 100 UNIT/ML
5 SOLUTION INTRAVENOUS EVERY 8 HOURS
Status: CANCELLED
Start: 2018-08-27

## 2018-08-06 RX ORDER — METHYLPREDNISOLONE SODIUM SUCCINATE 125 MG/2ML
125 INJECTION, POWDER, LYOPHILIZED, FOR SOLUTION INTRAMUSCULAR; INTRAVENOUS
Status: CANCELLED
Start: 2018-08-27

## 2018-08-06 RX ORDER — EPINEPHRINE 0.3 MG/.3ML
0.3 INJECTION SUBCUTANEOUS EVERY 5 MIN PRN
Status: CANCELLED | OUTPATIENT
Start: 2018-08-13

## 2018-08-06 RX ORDER — ALBUTEROL SULFATE 0.83 MG/ML
2.5 SOLUTION RESPIRATORY (INHALATION)
Status: CANCELLED | OUTPATIENT
Start: 2018-08-13

## 2018-08-13 ENCOUNTER — INFUSION THERAPY VISIT (OUTPATIENT)
Dept: ONCOLOGY | Facility: CLINIC | Age: 63
End: 2018-08-13
Attending: INTERNAL MEDICINE
Payer: COMMERCIAL

## 2018-08-13 VITALS
TEMPERATURE: 98.1 F | OXYGEN SATURATION: 95 % | WEIGHT: 201 LBS | HEART RATE: 111 BPM | DIASTOLIC BLOOD PRESSURE: 78 MMHG | BODY MASS INDEX: 28.84 KG/M2 | SYSTOLIC BLOOD PRESSURE: 125 MMHG | RESPIRATION RATE: 16 BRPM

## 2018-08-13 DIAGNOSIS — C22.1 CHOLANGIOCARCINOMA (H): ICD-10-CM

## 2018-08-13 DIAGNOSIS — T45.1X5A ANEMIA ASSOCIATED WITH CHEMOTHERAPY: Primary | ICD-10-CM

## 2018-08-13 DIAGNOSIS — D64.81 ANEMIA ASSOCIATED WITH CHEMOTHERAPY: Primary | ICD-10-CM

## 2018-08-13 LAB
ALBUMIN SERPL-MCNC: 3.2 G/DL (ref 3.4–5)
ALP SERPL-CCNC: 82 U/L (ref 40–150)
ALT SERPL W P-5'-P-CCNC: 30 U/L (ref 0–70)
ANION GAP SERPL CALCULATED.3IONS-SCNC: 10 MMOL/L (ref 3–14)
AST SERPL W P-5'-P-CCNC: 31 U/L (ref 0–45)
BASOPHILS # BLD AUTO: 0 10E9/L (ref 0–0.2)
BASOPHILS NFR BLD AUTO: 0.2 %
BILIRUB SERPL-MCNC: 2.1 MG/DL (ref 0.2–1.3)
BUN SERPL-MCNC: 41 MG/DL (ref 7–30)
CALCIUM SERPL-MCNC: 8.3 MG/DL (ref 8.5–10.1)
CHLORIDE SERPL-SCNC: 110 MMOL/L (ref 94–109)
CO2 SERPL-SCNC: 22 MMOL/L (ref 20–32)
CREAT SERPL-MCNC: 1.7 MG/DL (ref 0.66–1.25)
DIFFERENTIAL METHOD BLD: ABNORMAL
EOSINOPHIL # BLD AUTO: 0.1 10E9/L (ref 0–0.7)
EOSINOPHIL NFR BLD AUTO: 1.5 %
ERYTHROCYTE [DISTWIDTH] IN BLOOD BY AUTOMATED COUNT: 19.6 % (ref 10–15)
GFR SERPL CREATININE-BSD FRML MDRD: 41 ML/MIN/1.7M2
GLUCOSE SERPL-MCNC: 104 MG/DL (ref 70–99)
HCT VFR BLD AUTO: 25.8 % (ref 40–53)
HGB BLD-MCNC: 8.3 G/DL (ref 13.3–17.7)
IMM GRANULOCYTES # BLD: 0 10E9/L (ref 0–0.4)
IMM GRANULOCYTES NFR BLD: 0.2 %
LYMPHOCYTES # BLD AUTO: 1 10E9/L (ref 0.8–5.3)
LYMPHOCYTES NFR BLD AUTO: 21.6 %
MAGNESIUM SERPL-MCNC: 1.5 MG/DL (ref 1.6–2.3)
MCH RBC QN AUTO: 35.5 PG (ref 26.5–33)
MCHC RBC AUTO-ENTMCNC: 32.2 G/DL (ref 31.5–36.5)
MCV RBC AUTO: 110 FL (ref 78–100)
MONOCYTES # BLD AUTO: 0.8 10E9/L (ref 0–1.3)
MONOCYTES NFR BLD AUTO: 17.8 %
NEUTROPHILS # BLD AUTO: 2.8 10E9/L (ref 1.6–8.3)
NEUTROPHILS NFR BLD AUTO: 58.7 %
NRBC # BLD AUTO: 0 10*3/UL
NRBC BLD AUTO-RTO: 0 /100
PLATELET # BLD AUTO: 90 10E9/L (ref 150–450)
POTASSIUM SERPL-SCNC: 3.4 MMOL/L (ref 3.4–5.3)
PROT SERPL-MCNC: 5.5 G/DL (ref 6.8–8.8)
RBC # BLD AUTO: 2.34 10E12/L (ref 4.4–5.9)
SODIUM SERPL-SCNC: 142 MMOL/L (ref 133–144)
WBC # BLD AUTO: 4.7 10E9/L (ref 4–11)

## 2018-08-13 PROCEDURE — 80053 COMPREHEN METABOLIC PANEL: CPT | Performed by: INTERNAL MEDICINE

## 2018-08-13 PROCEDURE — 83735 ASSAY OF MAGNESIUM: CPT | Performed by: INTERNAL MEDICINE

## 2018-08-13 PROCEDURE — 96375 TX/PRO/DX INJ NEW DRUG ADDON: CPT

## 2018-08-13 PROCEDURE — 96372 THER/PROPH/DIAG INJ SC/IM: CPT | Mod: 59

## 2018-08-13 PROCEDURE — 25000128 H RX IP 250 OP 636: Mod: ZF | Performed by: INTERNAL MEDICINE

## 2018-08-13 PROCEDURE — 96413 CHEMO IV INFUSION 1 HR: CPT

## 2018-08-13 PROCEDURE — 85025 COMPLETE CBC W/AUTO DIFF WBC: CPT | Performed by: INTERNAL MEDICINE

## 2018-08-13 PROCEDURE — 96361 HYDRATE IV INFUSION ADD-ON: CPT

## 2018-08-13 PROCEDURE — 25000128 H RX IP 250 OP 636: Mod: ZF,EA | Performed by: INTERNAL MEDICINE

## 2018-08-13 RX ORDER — FUROSEMIDE 20 MG
20 TABLET ORAL DAILY
Status: ON HOLD | COMMUNITY
End: 2019-07-30

## 2018-08-13 RX ORDER — HEPARIN SODIUM (PORCINE) LOCK FLUSH IV SOLN 100 UNIT/ML 100 UNIT/ML
5 SOLUTION INTRAVENOUS EVERY 8 HOURS
Status: DISCONTINUED | OUTPATIENT
Start: 2018-08-13 | End: 2018-08-13 | Stop reason: HOSPADM

## 2018-08-13 RX ORDER — MAGNESIUM SULFATE HEPTAHYDRATE 40 MG/ML
2 INJECTION, SOLUTION INTRAVENOUS ONCE
Status: COMPLETED | OUTPATIENT
Start: 2018-08-13 | End: 2018-08-13

## 2018-08-13 RX ADMIN — SODIUM CHLORIDE 250 ML: 9 INJECTION, SOLUTION INTRAVENOUS at 08:21

## 2018-08-13 RX ADMIN — GEMCITABINE 2200 MG: 38 INJECTION INTRAVENOUS at 08:56

## 2018-08-13 RX ADMIN — SODIUM CHLORIDE, PRESERVATIVE FREE 5 ML: 5 INJECTION INTRAVENOUS at 06:45

## 2018-08-13 RX ADMIN — SODIUM CHLORIDE, PRESERVATIVE FREE 5 ML: 5 INJECTION INTRAVENOUS at 10:12

## 2018-08-13 RX ADMIN — MAGNESIUM SULFATE HEPTAHYDRATE 2 G: 40 INJECTION, SOLUTION INTRAVENOUS at 08:55

## 2018-08-13 RX ADMIN — DEXAMETHASONE SODIUM PHOSPHATE: 10 INJECTION, SOLUTION INTRAMUSCULAR; INTRAVENOUS at 08:35

## 2018-08-13 RX ADMIN — DARBEPOETIN ALFA 300 MCG: 300 INJECTION, SOLUTION INTRAVENOUS; SUBCUTANEOUS at 09:00

## 2018-08-13 ASSESSMENT — PAIN SCALES - GENERAL: PAINLEVEL: NO PAIN (0)

## 2018-08-13 NOTE — MR AVS SNAPSHOT
After Visit Summary   8/13/2018    Girish Chauhan    MRN: [id number]           Patient Information     Date Of Birth          1955        Visit Information        Provider Department      8/13/2018 7:00 AM  12 ATC;  ONCOLOGY INFUSION [facility]        Today's Diagnoses     Anemia associated with chemotherapy    -  1    Cholangiocarcinoma (H)          Care Instructions    Contact Numbers    [facility] Main Line: [phone]    Triage Nurse Line: [phone]      Please call the [facility] Nurse Triage line if you experience a temperature greater than or equal to 100.5, shaking chills, have uncontrolled nausea, vomiting and/or diarrhea, dizziness, shortness of breath, bleeding not relieved with pressure, or if you have any other questions or concerns.     If it is after hours, weekends, or holidays, please call the [phone] and a nurse will be able to triage your call.    If you are having any concerning symptoms or wish to speak to a provider before your next infusion visit, please call your care coordinator or triage them so we can adequately serve you.      If you need to refill your narcotic prescription or other medication, please call triage before your infusion appointment.        August 2018 Sunday Monday Tuesday Wednesday Thursday Friday Saturday                  1     2     3     4       5     6     7     8     9     10     11       12     13     [facility] LAB DRAW    6:30 AM   (15 min.)    [facility] LAB DRAW   [facility] Lab Draw     [facility] ONC INFUSION 60    7:00 AM   (60 min.)    ONCOLOGY INFUSION   [facility] 14     15     PE EYE/ ONCOLOGY    8:15 AM   (45 min.)   UUPET1   [facility] PET CT 16     17     18       19     20     21     22     23     24     25       26     27     [facility] DRAW    6:30 AM   (15 min.)    [facility] LAB DRAW   [facility] Lab Draw     [facility] ONC INFUSION 60    7:00 AM    (60 min.)   UC ONCOLOGY INFUSION   [facility] 28     29     30     31 September 2018 Sunday Monday Tuesday Wednesday Thursday Friday Saturday                                 1       2     3     4     5     6     7     8       9     10     [facility] LAB DRAW    6:30 AM   (15 min.)    [facility] LAB DRAW   [facility] Lab Draw     P ONC INFUSION 60    7:00 AM   (60 min.)    ONCOLOGY INFUSION   [facility] 11     12     13     14     15       16     17     18     19     20     21     22       23     24     [facility] LAB DRAW    6:30 AM   (15 min.)    [facility] LAB DRAW   [facility] Lab Draw     [facility] ONC INFUSION 60    7:00 AM   (60 min.)    ONCOLOGY INFUSION   [facility] 25     26     27     28     29       30                                               Recent Results (from the past 24 hour(s))   CBC with platelets differential    Collection Time: 08/13/18  6:52 AM   Result Value Ref Range    WBC 4.7 4.0 - 11.0 10e9/L    RBC Count 2.34 (L) 4.4 - 5.9 10e12/L    Hemoglobin 8.3 (L) 13.3 - 17.7 g/dL    Hematocrit 25.8 (L) 40.0 - 53.0 %     (H) 78 - 100 fl    MCH 35.5 (H) 26.5 - 33.0 pg    MCHC 32.2 31.5 - 36.5 g/dL    RDW 19.6 (H) 10.0 - 15.0 %    Platelet Count 90 (L) 150 - 450 10e9/L    Diff Method Automated Method     % Neutrophils 58.7 %    % Lymphocytes 21.6 %    % Monocytes 17.8 %    % Eosinophils 1.5 %    % Basophils 0.2 %    % Immature Granulocytes 0.2 %    Nucleated RBCs 0 0 /100    Absolute Neutrophil 2.8 1.6 - 8.3 10e9/L    Absolute Lymphocytes 1.0 0.8 - 5.3 10e9/L    Absolute Monocytes 0.8 0.0 - 1.3 10e9/L    Absolute Eosinophils 0.1 0.0 - 0.7 10e9/L    Absolute Basophils 0.0 0.0 - 0.2 10e9/L    Abs Immature Granulocytes 0.0 0 - 0.4 10e9/L    Absolute Nucleated RBC 0.0    Comprehensive metabolic panel    Collection Time: 08/13/18  6:52 AM   Result Value Ref Range    Sodium 142 133 - 144 mmol/L     Potassium 3.4 3.4 - 5.3 mmol/L    Chloride 110 (H) 94 - 109 mmol/L    Carbon Dioxide 22 20 - 32 mmol/L    Anion Gap 10 3 - 14 mmol/L    Glucose 104 (H) 70 - 99 mg/dL    Urea Nitrogen 41 (H) 7 - 30 mg/dL    Creatinine 1.70 (H) 0.66 - 1.25 mg/dL    GFR Estimate 41 (L) >60 mL/min/1.7m2    GFR Estimate If Black 50 (L) >60 mL/min/1.7m2    Calcium 8.3 (L) 8.5 - 10.1 mg/dL    Bilirubin Total 2.1 (H) 0.2 - 1.3 mg/dL    Albumin 3.2 (L) 3.4 - 5.0 g/dL    Protein Total 5.5 (L) 6.8 - 8.8 g/dL    Alkaline Phosphatase 82 40 - 150 U/L    ALT 30 0 - 70 U/L    AST 31 0 - 45 U/L   Magnesium    Collection Time: 08/13/18  6:52 AM   Result Value Ref Range    Magnesium 1.5 (L) 1.6 - 2.3 mg/dL                 Follow-ups after your visit        Your next 10 appointments already scheduled     Aug 15, 2018  8:15 AM CDT   PE NPET ONCOLOGY (EYES TO THIGHS) with UUPET1   [facility] PET CT ([facility], [facility])    [address] [address] [phone]           Tell your doctor:   If there is any chance you may be pregnant or if you are breastfeeding.   If you have problems lying in small spaces (claustrophobia). If you do, your doctor may give you medicine to help you relax. If you have diabetes:   Have your exam early in the morning. Your blood glucose will go up as the day goes by.   Your glucose level must be 180 or less at the start of the exam. Please take any oral diabetic medication you need to ensure this blood glucose level is below 180, but no insulin 4 hours prior to the exam.   If you are taking insulin in the morning take with breakfast by 6 am and schedule procedure between 12 and 2:15 pm.    If you are taking insulin at night take nightly dose, fast overnight, schedule procedure before 10 am.    If you take insulin both morning and night take morning dose by 6am and schedule procedure between 12 and 2:15 pm.  24 hours before your scan: Don t do any heavy exercise.  (No jogging, aerobics or other workouts.) Exercise will make your pictures less accurate. 6 hours before your scan:   Stop all food and liquids (except water).   Do not chew gum or suck on mints.   If you need to take medicine with food, you may take it with a few crackers.  Please call your Imaging Department at your exam site with any questions.            Aug 27, 2018  6:30 AM CDT   [facility] Draw with UC [facility] LAB DRAW   [facility] ([facility])    [address] [place] [address]  [address]  [facility] [address]   [phone]            Aug 27, 2018  7:00 AM CDT   Infusion 60 with UC ONCOLOGY INFUSION, UC 12 ATC   [facility] ([facility])    [address] [place] [address]  [address]  [facility] [address]   [phone]            Sep 10, 2018  6:30 AM CDT   [facility] Lab Draw with UC [facility] LAB DRAW   [facility] Lab Draw ([facility])    [address] [place] [address]  [address]  [facility] [address] [phone]            Sep 10, 2018  7:00 AM CDT   Infusion 60 with UC ONCOLOGY INFUSION, UC 12 ATC   [facility] ([facility])    [address] [place] [address]  [address]  [facility] [address] [phone]            Sep 24, 2018  6:30 AM CDT   [facility] Lab Draw with UC [facility] LAB DRAW   [facility] Lab Draw ([facility])    [address] [place] [address]  [address]  [facility] [address] [phone]            Sep 24, 2018  7:00 AM CDT   Infusion 60 with UC ONCOLOGY INFUSION, UC 12 ATC   [facility] ([facility])    [address] [place] [address]  [address]  [facility] [address] [phone]              Who to contact     If you have questions or need follow up information about today's clinic visit or your schedule please contact [facility] directly at [phone].  Normal or  non-critical lab and imaging results will be communicated to you by MyChart, letter or phone within 4 business days after the clinic has received the results. If you do not hear from us within 7 days, please contact the clinic through [employer] or phone. If you have a critical or abnormal lab result, we will notify you by phone as soon as possible.  Submit refill requests through [employer] or call your pharmacy and they will forward the refill request to us. Please allow 3 business days for your refill to be completed.          Additional Information About Your Visit        [employer] Information     [employer] gives you secure access to your electronic health record. If you see a primary care provider, you can also send messages to your care team and make appointments. If you have questions, please call your primary care clinic.  If you do not have a primary care provider, please call [phone] and they will assist you.        Care EveryWhere ID     This is your Care EveryWhere ID. This could be used by other organizations to access your [facility] medical records  [id number]        Your Vitals Were     Pulse Temperature Respirations Pulse Oximetry BMI (Body Mass Index)       111 98.1  F (36.7  C) 16 95% 28.84 kg/m2        Blood Pressure from Last 3 Encounters:   08/13/18 125/78   07/30/18 (!) 151/95   07/16/18 (!) 145/94    Weight from Last 3 Encounters:   08/13/18 91.2 kg (201 lb)   07/30/18 94.6 kg (208 lb 9.6 oz)   07/16/18 93.7 kg (206 lb 9.6 oz)              We Performed the Following     CBC with platelets differential     Comprehensive metabolic panel     Magnesium        Primary Care Provider Office Phone # Fax #    Jim Kaplan [phone]       [address]   [place] [address]        Equal Access to Services     MARIUSZ MADRIGAL : Robyn Brizuela, nancy burger, lb cespedes, ankit yuan . So [facility]  [phone].    ATENCIÓN: Si gretel sanchez, tiene a flores disposición servicios gratuitos de asistencia lingüística. Angela chung [phone].    We comply with applicable federal civil rights laws and [place] laws. We do not discriminate on the basis of race, color, national origin, age, disability, sex, sexual orientation, or gender identity.            Thank you!     Thank you for choosing [facility]  for your care. Our goal is always to provide you with excellent care. Hearing back from our patients is one way we can continue to improve our services. Please take a few minutes to complete the written survey that you may receive in the mail after your visit with us. Thank you!             Your Updated Medication List - Protect others around you: Learn how to safely use, store and throw away your medicines at www.disposemymeds.org.          This list is accurate as of 8/13/18  1:55 PM.  Always use your most recent med list.                   Brand Name Dispense Instructions for use Diagnosis    atorvastatin 40 MG tablet    LIPITOR     TAKE 1 TABLET BY MOUTH DAILY        ELIQUIS PO      Take 5 mg by mouth 2 times daily        FUROSEMIDE PO      Take 20 mg by mouth        gemcitabine 1 GM injection    GEMZAR          LORazepam 0.5 MG tablet    ATIVAN    30 tablet    Take 1 tablet (0.5 mg) by mouth every 4 hours as needed (Anxiety, Nausea/Vomiting or Sleep)    Cholangiocarcinoma (H)       metoprolol tartrate 50 MG tablet    LOPRESSOR     50 mg 2 times daily        MITIGARE 0.6 MG capsule   Generic drug:  colchicine      Take 0.6 mg by mouth 3 times daily as needed        multivitamin, therapeutic with minerals Tabs tablet     30 tablet    Take 1 tablet by mouth daily    Mass of bile duct       NITROSTAT SL      Place 0.4 mg under the tongue every 5 minutes as needed for chest pain (Carries medication - has never used)        OMEGA-3 FISH OIL PO      Take 1,000 mg by mouth daily        ondansetron 8 MG  tablet    ZOFRAN    10 tablet    Take 1 tablet (8 mg) by mouth every 8 hours as needed (Nausea/Vomiting)    Cholangiocarcinoma (H)       prochlorperazine 10 MG tablet    COMPAZINE    30 tablet    Take 1 tablet (10 mg) by mouth every 6 hours as needed (Nausea/Vomiting)    Cholangiocarcinoma (H)

## 2018-08-13 NOTE — PATIENT INSTRUCTIONS
Contact Numbers    [facility] Main Line: [phone]    Triage Nurse Line: [phone]      Please call the [facility] Nurse Triage line if you experience a temperature greater than or equal to 100.5, shaking chills, have uncontrolled nausea, vomiting and/or diarrhea, dizziness, shortness of breath, bleeding not relieved with pressure, or if you have any other questions or concerns.     If it is after hours, weekends, or holidays, please call the [phone] and a nurse will be able to triage your call.    If you are having any concerning symptoms or wish to speak to a provider before your next infusion visit, please call your care coordinator or triage them so we can adequately serve you.      If you need to refill your narcotic prescription or other medication, please call triage before your infusion appointment.        August 2018 Sunday Monday Tuesday Wednesday Thursday Friday Saturday                  1     2     3     4       5     6     7     8     9     10     11       12     13     [facility]    6:30 AM   (15 min.)    [facility]   [facility]     [facility] ONC INFUSION 60    7:00 AM   (60 min.)    ONCOLOGY INFUSION   [facility] 14     15     PE EYE/ ONCOLOGY    8:15 AM   (45 min.)   UUPET1   [facility] PET CT 16     17     18       19     20     21     22     23     24     25       26     27     [facility] D[facility]    6:30 AM   (15 min.)    [facility]RAW   [facility]     [facility] ONC INFUSION 60    7:00 AM   (60 min.)    ONCOLOGY INFUSION   [facility] 28 29     30     31                     September 2018 Sunday Monday Tuesday Wednesday Thursday Friday Saturday                                 1       2     3     4     5     6     7     8       9     10     [facility] DRAW    6:30 AM   (15 min.)    [facility] LAB DRAW   [facility] Lab Draw     [facility] ONC INFUSION 60    7:00 AM   (60  min.)    ONCOLOGY INFUSION   [facility] 11     12     13     14     15       16     17     18     19     20     21     22       23     24     [facility] LAB DRAW    6:30 AM   (15 min.)   [facility] LAB DRAW   [facility] Lab Draw     [facility] ONC INFUSION 60    7:00 AM   (60 min.)    ONCOLOGY INFUSION   [facility] 25     26     27     28     29       30                                               Recent Results (from the past 24 hour(s))   CBC with platelets differential    Collection Time: 08/13/18  6:52 AM   Result Value Ref Range    WBC 4.7 4.0 - 11.0 10e9/L    RBC Count 2.34 (L) 4.4 - 5.9 10e12/L    Hemoglobin 8.3 (L) 13.3 - 17.7 g/dL    Hematocrit 25.8 (L) 40.0 - 53.0 %     (H) 78 - 100 fl    MCH 35.5 (H) 26.5 - 33.0 pg    MCHC 32.2 31.5 - 36.5 g/dL    RDW 19.6 (H) 10.0 - 15.0 %    Platelet Count 90 (L) 150 - 450 10e9/L    Diff Method Automated Method     % Neutrophils 58.7 %    % Lymphocytes 21.6 %    % Monocytes 17.8 %    % Eosinophils 1.5 %    % Basophils 0.2 %    % Immature Granulocytes 0.2 %    Nucleated RBCs 0 0 /100    Absolute Neutrophil 2.8 1.6 - 8.3 10e9/L    Absolute Lymphocytes 1.0 0.8 - 5.3 10e9/L    Absolute Monocytes 0.8 0.0 - 1.3 10e9/L    Absolute Eosinophils 0.1 0.0 - 0.7 10e9/L    Absolute Basophils 0.0 0.0 - 0.2 10e9/L    Abs Immature Granulocytes 0.0 0 - 0.4 10e9/L    Absolute Nucleated RBC 0.0    Comprehensive metabolic panel    Collection Time: 08/13/18  6:52 AM   Result Value Ref Range    Sodium 142 133 - 144 mmol/L    Potassium 3.4 3.4 - 5.3 mmol/L    Chloride 110 (H) 94 - 109 mmol/L    Carbon Dioxide 22 20 - 32 mmol/L    Anion Gap 10 3 - 14 mmol/L    Glucose 104 (H) 70 - 99 mg/dL    Urea Nitrogen 41 (H) 7 - 30 mg/dL    Creatinine 1.70 (H) 0.66 - 1.25 mg/dL    GFR Estimate 41 (L) >60 mL/min/1.7m2    GFR Estimate If Black 50 (L) >60 mL/min/1.7m2    Calcium 8.3 (L) 8.5 - 10.1 mg/dL    Bilirubin Total 2.1 (H) 0.2 - 1.3 mg/dL    Albumin  3.2 (L) 3.4 - 5.0 g/dL    Protein Total 5.5 (L) 6.8 - 8.8 g/dL    Alkaline Phosphatase 82 40 - 150 U/L    ALT 30 0 - 70 U/L    AST 31 0 - 45 U/L   Magnesium    Collection Time: 08/13/18  6:52 AM   Result Value Ref Range    Magnesium 1.5 (L) 1.6 - 2.3 mg/dL

## 2018-08-13 NOTE — PROGRESS NOTES
Infusion Nursing Note:  Girish Chauhan presents today for C10D1 Gemzar.    Patient seen by provider today: Yes    Note: Patient reports seeing his primary physician last week to discuss this cough and BLE edema.  Per patient, his primary physician started him on 20mg p.o. Lasix every other day.  Patient to be in contact with his primary provider regarding the edema and Lasix.  Patient's magnesium low and creatinine elevated today.  Dr. Beaver paged for orders in Dr. De Los Santos's absence.    TORB: Dr. Beaver/ Zenobia Britton RN:  - Replace magnesium per protocol  - OK to give Gemzar today with elevated creatinine    Patient updated on orders and tolerated Gemzar and Magnesium infusion well.    Intravenous Access:  Implanted Port.      Treatment Conditions:  Lab Results   Component Value Date    HGB 8.3 08/13/2018     Lab Results   Component Value Date    WBC 4.7 08/13/2018      Lab Results   Component Value Date    ANEU 2.8 08/13/2018     Lab Results   Component Value Date    PLT 90 08/13/2018      Lab Results   Component Value Date     08/13/2018                   Lab Results   Component Value Date    POTASSIUM 3.4 08/13/2018           Lab Results   Component Value Date    MAG 1.5 08/13/2018            Lab Results   Component Value Date    CR 1.70 08/13/2018                   Lab Results   Component Value Date    GARY 8.3 08/13/2018                Lab Results   Component Value Date    BILITOTAL 2.1 08/13/2018           Lab Results   Component Value Date    ALBUMIN 3.2 08/13/2018                    Lab Results   Component Value Date    ALT 30 08/13/2018           Lab Results   Component Value Date    AST 31 08/13/2018       Results reviewed, labs MET treatment parameters, ok to proceed with treatment.      Post Infusion Assessment:  Patient tolerated infusion without incident.  Patient tolerated one Aranesp injection to right arm without incident.  Blood return noted pre and post infusion.  Site patent and intact,  free from redness, edema or discomfort.  No evidence of extravasations.  Access discontinued per protocol.    Discharge Plan:   Patient declined prescription refills.  Discharge instructions reviewed with: Patient.  Patient and/or family verbalized understanding of discharge instructions and all questions answered.  AVS to patient via [employer].  Patient will return 8/27/18 for C10D15 Gemzar for next appointment.   Patient discharged in stable condition accompanied by: self.  Departure Mode: Ambulatory.    Zenobia Britton RN

## 2018-08-15 ENCOUNTER — HOSPITAL ENCOUNTER (OUTPATIENT)
Dept: PET IMAGING | Facility: CLINIC | Age: 63
Discharge: HOME | End: 2018-08-15
Attending: INTERNAL MEDICINE | Admitting: INTERNAL MEDICINE
Payer: COMMERCIAL

## 2018-08-15 DIAGNOSIS — C22.1 CHOLANGIOCARCINOMA (H): ICD-10-CM

## 2018-08-15 LAB — GLUCOSE BLDC GLUCOMTR-MCNC: 100 MG/DL (ref 70–99)

## 2018-08-15 PROCEDURE — 78816 PET IMAGE W/CT FULL BODY: CPT | Mod: PI

## 2018-08-15 PROCEDURE — 82962 GLUCOSE BLOOD TEST: CPT

## 2018-08-15 PROCEDURE — 34300033 ZZH RX 343: Performed by: INTERNAL MEDICINE

## 2018-08-15 PROCEDURE — 25000128 H RX IP 250 OP 636: Performed by: INTERNAL MEDICINE

## 2018-08-15 PROCEDURE — A9552 F18 FDG: HCPCS | Performed by: INTERNAL MEDICINE

## 2018-08-15 RX ADMIN — SODIUM CHLORIDE, PRESERVATIVE FREE 500 UNITS: 5 INJECTION INTRAVENOUS at 08:25

## 2018-08-15 RX ADMIN — FLUDEOXYGLUCOSE F-18 12.77 MCI.: 500 INJECTION, SOLUTION INTRAVENOUS at 08:25

## 2018-08-20 ENCOUNTER — TELEPHONE (OUTPATIENT)
Dept: ONCOLOGY | Facility: CLINIC | Age: 63
End: 2018-08-20

## 2018-08-20 NOTE — TELEPHONE ENCOUNTER
[facility] Telephone Triage Note    Assessment: Patient called in to triage reporting the following symptoms: patient reporting an intermittent cough for about 2 months, has been more severe the past 2 days, has triggered him to throw up. Patient has seen his PCP in the past and was treated with lasix which did help. Patient felt it should be reported to our clinic. Writer did recommend that if his PCP had been treating this, that he call them back to discuss but would also inform his cancer team here in clinic about his symptoms.    Action: Writer sent urgent IB to YO Smith CNP, with situation and requested any recommendations for visits if needed.    Recommendations: Discussed with Jennifer.  Patient to be worked into her schedule before his next infusion..    Follow-Up: Message sent to schedulers to add pt on to schedule, message marked as urgent.

## 2018-08-24 ENCOUNTER — CARE COORDINATION (OUTPATIENT)
Dept: ONCOLOGY | Facility: CLINIC | Age: 63
End: 2018-08-24

## 2018-08-24 NOTE — PROGRESS NOTES
"Placed call to patient to follow up on [employer] message. Patient states he is planning to continue with care by Dr. De Los Santos and [facility] team. States he was primarily looking to see what another opinion would be. He has no plans to change MDs at this time. He is hoping that the 2 doctors will talk to \"compare notes.\" In regards to chronic cough, patient has been following with PCP at [facility]. It is felt that the cough is related to ongoing AFib. He had an Echo yesterday and has plans to see his cardiologist next week. Patient states he does not feel as though he needs to be seen by [facility] prior to next chemo. States he does not have any acute problems at this time. Will plan to continue as scheduled. Patient knows to call clinic should any questions or concerns arise.   "

## 2018-08-27 ENCOUNTER — INFUSION THERAPY VISIT (OUTPATIENT)
Dept: ONCOLOGY | Facility: CLINIC | Age: 63
End: 2018-08-27
Attending: INTERNAL MEDICINE
Payer: COMMERCIAL

## 2018-08-27 VITALS
HEART RATE: 98 BPM | SYSTOLIC BLOOD PRESSURE: 127 MMHG | OXYGEN SATURATION: 94 % | DIASTOLIC BLOOD PRESSURE: 91 MMHG | TEMPERATURE: 97.9 F | BODY MASS INDEX: 28.52 KG/M2 | WEIGHT: 198.8 LBS

## 2018-08-27 DIAGNOSIS — T45.1X5A ANEMIA ASSOCIATED WITH CHEMOTHERAPY: Primary | ICD-10-CM

## 2018-08-27 DIAGNOSIS — C22.1 CHOLANGIOCARCINOMA (H): ICD-10-CM

## 2018-08-27 DIAGNOSIS — D64.81 ANEMIA ASSOCIATED WITH CHEMOTHERAPY: Primary | ICD-10-CM

## 2018-08-27 LAB
ABO + RH BLD: NORMAL
ABO + RH BLD: NORMAL
ALBUMIN SERPL-MCNC: 3.1 G/DL (ref 3.4–5)
ALP SERPL-CCNC: 74 U/L (ref 40–150)
ALT SERPL W P-5'-P-CCNC: 32 U/L (ref 0–70)
ANION GAP SERPL CALCULATED.3IONS-SCNC: 8 MMOL/L (ref 3–14)
AST SERPL W P-5'-P-CCNC: 31 U/L (ref 0–45)
BASOPHILS # BLD AUTO: 0 10E9/L (ref 0–0.2)
BASOPHILS NFR BLD AUTO: 0.2 %
BILIRUB SERPL-MCNC: 2.3 MG/DL (ref 0.2–1.3)
BLD GP AB SCN SERPL QL: NORMAL
BLD PROD TYP BPU: NORMAL
BLD PROD TYP BPU: NORMAL
BLD UNIT ID BPU: 0
BLOOD BANK CMNT PATIENT-IMP: NORMAL
BLOOD PRODUCT CODE: NORMAL
BPU ID: NORMAL
BUN SERPL-MCNC: 42 MG/DL (ref 7–30)
CALCIUM SERPL-MCNC: 8.2 MG/DL (ref 8.5–10.1)
CHLORIDE SERPL-SCNC: 113 MMOL/L (ref 94–109)
CO2 SERPL-SCNC: 23 MMOL/L (ref 20–32)
CREAT SERPL-MCNC: 1.81 MG/DL (ref 0.66–1.25)
DIFFERENTIAL METHOD BLD: ABNORMAL
EOSINOPHIL # BLD AUTO: 0 10E9/L (ref 0–0.7)
EOSINOPHIL NFR BLD AUTO: 0.7 %
ERYTHROCYTE [DISTWIDTH] IN BLOOD BY AUTOMATED COUNT: 21.2 % (ref 10–15)
FOLATE SERPL-MCNC: 41.5 NG/ML
GFR SERPL CREATININE-BSD FRML MDRD: 38 ML/MIN/1.7M2
GLUCOSE SERPL-MCNC: 119 MG/DL (ref 70–99)
HCT VFR BLD AUTO: 23.8 % (ref 40–53)
HGB BLD-MCNC: 7.8 G/DL (ref 13.3–17.7)
IMM GRANULOCYTES # BLD: 0 10E9/L (ref 0–0.4)
IMM GRANULOCYTES NFR BLD: 0.4 %
IRON SATN MFR SERPL: 21 % (ref 15–46)
IRON SERPL-MCNC: 56 UG/DL (ref 35–180)
LYMPHOCYTES # BLD AUTO: 0.7 10E9/L (ref 0.8–5.3)
LYMPHOCYTES NFR BLD AUTO: 12.1 %
MAGNESIUM SERPL-MCNC: 1.8 MG/DL (ref 1.6–2.3)
MCH RBC QN AUTO: 37.1 PG (ref 26.5–33)
MCHC RBC AUTO-ENTMCNC: 32.8 G/DL (ref 31.5–36.5)
MCV RBC AUTO: 113 FL (ref 78–100)
MONOCYTES # BLD AUTO: 0.9 10E9/L (ref 0–1.3)
MONOCYTES NFR BLD AUTO: 17.2 %
NEUTROPHILS # BLD AUTO: 3.7 10E9/L (ref 1.6–8.3)
NEUTROPHILS NFR BLD AUTO: 69.4 %
NRBC # BLD AUTO: 0 10*3/UL
NRBC BLD AUTO-RTO: 0 /100
NUM BPU REQUESTED: 1
PLATELET # BLD AUTO: 80 10E9/L (ref 150–450)
POTASSIUM SERPL-SCNC: 3.9 MMOL/L (ref 3.4–5.3)
PROT SERPL-MCNC: 5.5 G/DL (ref 6.8–8.8)
RBC # BLD AUTO: 2.1 10E12/L (ref 4.4–5.9)
RETICS # AUTO: 178.6 10E9/L (ref 25–95)
RETICS/RBC NFR AUTO: 8.7 % (ref 0.5–2)
SODIUM SERPL-SCNC: 144 MMOL/L (ref 133–144)
SPECIMEN EXP DATE BLD: NORMAL
TIBC SERPL-MCNC: 266 UG/DL (ref 240–430)
TRANSFUSION STATUS PATIENT QL: NORMAL
TRANSFUSION STATUS PATIENT QL: NORMAL
VIT B12 SERPL-MCNC: 733 PG/ML (ref 193–986)
WBC # BLD AUTO: 5.4 10E9/L (ref 4–11)

## 2018-08-27 PROCEDURE — 85045 AUTOMATED RETICULOCYTE COUNT: CPT | Performed by: INTERNAL MEDICINE

## 2018-08-27 PROCEDURE — 82607 VITAMIN B-12: CPT | Performed by: INTERNAL MEDICINE

## 2018-08-27 PROCEDURE — 86900 BLOOD TYPING SEROLOGIC ABO: CPT | Performed by: INTERNAL MEDICINE

## 2018-08-27 PROCEDURE — 96372 THER/PROPH/DIAG INJ SC/IM: CPT | Mod: 59

## 2018-08-27 PROCEDURE — 80053 COMPREHEN METABOLIC PANEL: CPT | Performed by: INTERNAL MEDICINE

## 2018-08-27 PROCEDURE — 83540 ASSAY OF IRON: CPT | Performed by: INTERNAL MEDICINE

## 2018-08-27 PROCEDURE — 82746 ASSAY OF FOLIC ACID SERUM: CPT | Performed by: INTERNAL MEDICINE

## 2018-08-27 PROCEDURE — 86850 RBC ANTIBODY SCREEN: CPT | Performed by: INTERNAL MEDICINE

## 2018-08-27 PROCEDURE — 96413 CHEMO IV INFUSION 1 HR: CPT

## 2018-08-27 PROCEDURE — 86901 BLOOD TYPING SEROLOGIC RH(D): CPT | Performed by: INTERNAL MEDICINE

## 2018-08-27 PROCEDURE — 85025 COMPLETE CBC W/AUTO DIFF WBC: CPT | Performed by: INTERNAL MEDICINE

## 2018-08-27 PROCEDURE — 83550 IRON BINDING TEST: CPT | Performed by: INTERNAL MEDICINE

## 2018-08-27 PROCEDURE — 25000128 H RX IP 250 OP 636: Mod: ZF | Performed by: INTERNAL MEDICINE

## 2018-08-27 PROCEDURE — P9016 RBC LEUKOCYTES REDUCED: HCPCS | Performed by: INTERNAL MEDICINE

## 2018-08-27 PROCEDURE — 86923 COMPATIBILITY TEST ELECTRIC: CPT | Performed by: INTERNAL MEDICINE

## 2018-08-27 PROCEDURE — 83735 ASSAY OF MAGNESIUM: CPT | Performed by: INTERNAL MEDICINE

## 2018-08-27 PROCEDURE — 96375 TX/PRO/DX INJ NEW DRUG ADDON: CPT

## 2018-08-27 PROCEDURE — 36430 TRANSFUSION BLD/BLD COMPNT: CPT

## 2018-08-27 RX ORDER — HEPARIN SODIUM (PORCINE) LOCK FLUSH IV SOLN 100 UNIT/ML 100 UNIT/ML
5 SOLUTION INTRAVENOUS EVERY 8 HOURS
Status: DISCONTINUED | OUTPATIENT
Start: 2018-08-27 | End: 2018-08-27 | Stop reason: HOSPADM

## 2018-08-27 RX ORDER — HEPARIN SODIUM (PORCINE) LOCK FLUSH IV SOLN 100 UNIT/ML 100 UNIT/ML
5 SOLUTION INTRAVENOUS ONCE
Status: COMPLETED | OUTPATIENT
Start: 2018-08-27 | End: 2018-08-27

## 2018-08-27 RX ADMIN — GEMCITABINE 2200 MG: 38 INJECTION INTRAVENOUS at 09:14

## 2018-08-27 RX ADMIN — DEXAMETHASONE SODIUM PHOSPHATE: 10 INJECTION, SOLUTION INTRAMUSCULAR; INTRAVENOUS at 08:48

## 2018-08-27 RX ADMIN — DARBEPOETIN ALFA 300 MCG: 300 INJECTION, SOLUTION INTRAVENOUS; SUBCUTANEOUS at 10:27

## 2018-08-27 RX ADMIN — SODIUM CHLORIDE, PRESERVATIVE FREE 5 ML: 5 INJECTION INTRAVENOUS at 06:44

## 2018-08-27 RX ADMIN — SODIUM CHLORIDE 250 ML: 9 INJECTION, SOLUTION INTRAVENOUS at 08:48

## 2018-08-27 RX ADMIN — SODIUM CHLORIDE, PRESERVATIVE FREE 5 ML: 5 INJECTION INTRAVENOUS at 12:41

## 2018-08-27 ASSESSMENT — PAIN SCALES - GENERAL: PAINLEVEL: NO PAIN (0)

## 2018-08-27 NOTE — MR AVS SNAPSHOT
After Visit Summary   8/27/2018    Girish Chauhan    MRN: [id number]           Patient Information     Date Of Birth          1955        Visit Information        Provider Department      8/27/2018 7:00 AM  12 ATC;  ONCOLOGY INFUSION [facility]        Today's Diagnoses     Anemia associated with chemotherapy    -  1    Cholangiocarcinoma (H)          Care Instructions    Contact Numbers    [facility] [place]: [phone]  [facility] T[place] and after hours / weekends / holidays:  [phone]      Please call the triage or after hours line if you experience a temperature greater than or equal to 100.5, shaking chills, have uncontrolled nausea, vomiting and/or diarrhea, dizziness, shortness of breath, chest pain, bleeding, unexplained bruising, or if you have any other new/concerning symptoms, questions or concerns.      If you are having any concerning symptoms or wish to speak to a provider before your next infusion visit, please call your care coordinator or triage to notify them so we can adequately serve you.     If you need a refill on a narcotic prescription or other medication, please call before your infusion appointment.               August 2018 Sunday Monday Tuesday Wednesday Thursday Friday Saturday                  1     2     3     4       5     6     7     8     9     10     11       12     13     [facility] LAB DRAW    6:30 AM   (15 min.)    MASONIC LAB DRAW   [facility] Lab Draw     P ONC INFUSION 60    7:00 AM   (60 min.)    ONCOLOGY INFUSION   [facility] 14     15     [facility]/ ONCOLOGY    8:15 AM   (45 min.)   UUPET1   [facility] PET CT 16     17     18       19     20     21     22     23     24     25       26     27     [facility] LAB DRAW    6:30 AM   (15 min.)    MAS[facility]IC LAB DRAW   [facility] Lab Draw     P ONC INFUSION 60    7:00 AM   (60 min.)    ONCOLOGY INFUSION   [facility] 28      29 30 31 September 2018 Sunday Monday Tuesday Wednesday Thursday Friday Saturday                                 1       2     3     4     5     6     7     8       9     10     [facility] MASONIC LAB DRAW    6:30 AM   (15 min.)    MASONIC LAB DRAW   [facility] Lab Draw     [facility] ONC INFUSION 60    7:00 AM   (60 min.)    ONCOLOGY INFUSION   [facility] 11     12     13     14     15       16     17     18     19     20     21     22       23     24     [facility] MASONIC LAB DRAW    6:30 AM   (15 min.)    MASONIC LAB DRAW   [facility] Lab Draw     [facility] ONC INFUSION 60    7:00 AM   (60 min.)    ONCOLOGY INFUSION   [facility] 25     26     27     28     29       30                                               Recent Results (from the past 24 hour(s))   CBC with platelets differential    Collection Time: 08/27/18  6:51 AM   Result Value Ref Range    WBC 5.4 4.0 - 11.0 10e9/L    RBC Count 2.10 (L) 4.4 - 5.9 10e12/L    Hemoglobin 7.8 (L) 13.3 - 17.7 g/dL    Hematocrit 23.8 (L) 40.0 - 53.0 %     (H) 78 - 100 fl    MCH 37.1 (H) 26.5 - 33.0 pg    MCHC 32.8 31.5 - 36.5 g/dL    RDW 21.2 (H) 10.0 - 15.0 %    Platelet Count 80 (L) 150 - 450 10e9/L    Diff Method Automated Method     % Neutrophils 69.4 %    % Lymphocytes 12.1 %    % Monocytes 17.2 %    % Eosinophils 0.7 %    % Basophils 0.2 %    % Immature Granulocytes 0.4 %    Nucleated RBCs 0 0 /100    Absolute Neutrophil 3.7 1.6 - 8.3 10e9/L    Absolute Lymphocytes 0.7 (L) 0.8 - 5.3 10e9/L    Absolute Monocytes 0.9 0.0 - 1.3 10e9/L    Absolute Eosinophils 0.0 0.0 - 0.7 10e9/L    Absolute Basophils 0.0 0.0 - 0.2 10e9/L    Abs Immature Granulocytes 0.0 0 - 0.4 10e9/L    Absolute Nucleated RBC 0.0    Comprehensive metabolic panel    Collection Time: 08/27/18  6:51 AM   Result Value Ref Range    Sodium 144 133 - 144 mmol/L    Potassium 3.9 3.4 - 5.3 mmol/L    Chloride 113 (H) 94 - 109 mmol/L    Carbon  Dioxide 23 20 - 32 mmol/L    Anion Gap 8 3 - 14 mmol/L    Glucose 119 (H) 70 - 99 mg/dL    Urea Nitrogen 42 (H) 7 - 30 mg/dL    Creatinine 1.81 (H) 0.66 - 1.25 mg/dL    GFR Estimate 38 (L) >60 mL/min/1.7m2    GFR Estimate If Black 46 (L) >60 mL/min/1.7m2    Calcium 8.2 (L) 8.5 - 10.1 mg/dL    Bilirubin Total 2.3 (H) 0.2 - 1.3 mg/dL    Albumin 3.1 (L) 3.4 - 5.0 g/dL    Protein Total 5.5 (L) 6.8 - 8.8 g/dL    Alkaline Phosphatase 74 40 - 150 U/L    ALT 32 0 - 70 U/L    AST 31 0 - 45 U/L   Magnesium    Collection Time: 08/27/18  6:51 AM   Result Value Ref Range    Magnesium 1.8 1.6 - 2.3 mg/dL   ABO/Rh type and screen    Collection Time: 08/27/18  6:51 AM   Result Value Ref Range    Units Ordered 1     ABO O     RH(D) Pos     Antibody Screen Neg     Test Valid Only At          [facility]    Specimen Expires 08/30/2018     Crossmatch Red Blood Cells    Blood component    Collection Time: 08/27/18  6:51 AM   Result Value Ref Range    Unit Number [id number]     Blood Component Type Red Blood Cells Leukocyte Reduced     Division Number 00     Status of Unit Released to care unit 08/27/2018 1043     Blood Product Code [id number]     Unit Status ISS    Vitamin B12    Collection Time: 08/27/18  8:45 AM   Result Value Ref Range    Vitamin B12 733 193 - 986 pg/mL   Iron and iron binding capacity    Collection Time: 08/27/18  8:45 AM   Result Value Ref Range    Iron 56 35 - 180 ug/dL    Iron Binding Cap 266 240 - 430 ug/dL    Iron Saturation Index 21 15 - 46 %   Reticulocyte count    Collection Time: 08/27/18  8:45 AM   Result Value Ref Range    % Retic 8.7 (H) 0.5 - 2.0 %    Absolute Retic 178.6 (H) 25 - 95 10e9/L                 Follow-ups after your visit        Your next 10 appointments already scheduled     Sep 10, 2018  6:30 AM [facility]   [facility] Lab Draw with  gate5 LAB DRAW   [facility] Lab Draw ([facility])    [address]  [address]  [facility] [address]   [phone]            Sep 10, 2018  7:00 AM CDT   Infusion 60 with UC ONCOLOGY INFUSION, UC 12 ATC   [facility] ([facility])    [address]  [address]  [facility] [address]   [phone]            Sep 24, 2018  6:30 AM CDT   [facility] Lab Draw with UC MASONIC LAB DRAW   [facility] Lab Draw ([facility])    [address]  [address]  [facility] [address]   [phone]            Sep 24, 2018  7:00 AM CDT   Infusion 60 with UC ONCOLOGY INFUSION, UC 12 ATC   [facility] ([facility])    [address]  [address]  [facility] [place] [facility] [address]   [phone]            Oct 04, 2018  6:30 AM CDT   [facility] Lab Draw with UC MASONIC LAB DRAW   [facility] Lab Draw ([facility])    [address]  [address]  [facility] [address]   [phone]            Oct 04, 2018  7:40 AM CDT   (Arrive by 7:00 AM)   CT CHEST ABDOMEN PELVIS W/O & W CONTRAST with UCCT1   [facility] [place] [facility] ([facility])    [address] [place] [address]  1st Floor  [facility] [address]   [phone]           Please bring any scans or X-rays taken at other hospitals, if similar tests were done. Also bring a list of your medicines, including vitamins, minerals and over-the-counter drugs. It is safest to leave personal items at home.  Be sure to tell your doctor:   If you have any allergies.   If there s any chance you are pregnant.   If you are breastfeeding.  How to prepare:   Do not eat or drink for 2 hours before your exam. If you need to take medicine, you may take it with small sips of water. (We may ask you to take liquid medicine as well.)   Please wear loose clothing, such as a sweat suit or jogging clothes. Avoid snaps, zippers and other metal. We may ask you to undress and put on a hospital gown.   Please arrive 30 minutes early for your CT. Once in the department you might be asked to drink water 15-20 minutes prior to your exam.  If indicated you may be asked to drink an oral contrast in advance of your CT.  If this is the case, the imaging team will let you know or be in contact with you prior to your appointment  Patients over 70 or patients with diabetes or kidney problems:   If you haven t had a blood test (creatinine test) within the last 30 days, the Cardiologist/Radiologist may require you to get this test prior to your exam.  If you have diabetes:   Continue to take your metformin medication on the day of your exam  If you have any questions, please call the Imaging Department where you will have your exam.            Oct 08, 2018  6:45 AM CDT   [facility] Lab Draw with  CC video LAB DRAW   [facility] Lab Draw ([facility])    [address] [place] [address]  [address]  [facility] [id number] [phone]            Oct 08, 2018  7:15 AM CDT   (Arrive by 7:00 AM)   Return Visit with Naresh De Los Santos MD   [facility] ([facility])    [address] [place] [address]  [address]  [facility] [id number] [phone]              Future tests that were ordered for you today     Open Standing Orders        Priority Remaining Interval Expires Ordered    Transfuse red blood cell unit Routine 99/100 TRANSFUSE 1 UNIT  8/27/2018    Red blood cell prepare order unit Routine 99/100 CONDITIONAL (SPECIFY) BLOOD  8/27/2018            Who to contact     If you have questions or need follow up information about today's clinic visit or your schedule please contact [facility] directly at [phone].  Normal or non-critical lab and imaging results will be communicated to you by MyChart, letter or phone within 4 business days after the clinic has received the results. If you do not hear from us within 7 days, please contact the clinic  through [email or web address] or phone. If you have a critical or abnormal lab result, we will notify you by phone as soon as possible.  Submit refill requests through [email or web address] or call your pharmacy and they will forward the refill request to us. Please allow 3 business days for your refill to be completed.          Additional Information About Your Visit        TraNet[employer]ehar[employer] Information     [email or web address] gives you secure access to your electronic health record. If you see a primary care provider, you can also send messages to your care team and make appointments. If you have questions, please call your primary care clinic.  If you do not have a primary care provider, please call [phone] and they will assist you.        Care EveryWhere ID     This is your Care EveryWhere ID. This could be used by other organizations to access your [place] medical records  [id number]        Your Vitals Were     Pulse Temperature Pulse Oximetry BMI (Body Mass Index)          98 97.9  F (36.6  C) (Oral) 94% 28.52 kg/m2         Blood Pressure from Last 3 Encounters:   08/27/18 (!) 127/91   08/13/18 125/78   07/30/18 (!) 151/95    Weight from Last 3 Encounters:   08/27/18 90.2 kg (198 lb 12.8 oz)   08/13/18 91.2 kg (201 lb)   07/30/18 94.6 kg (208 lb 9.6 oz)              We Performed the Following     ABO/Rh type and screen     Blood component     CBC with platelets differential     Comprehensive metabolic panel     Folate     Iron and iron binding capacity     Magnesium     Reticulocyte count     Transfuse red blood cell unit     Vitamin B12        Primary Care Provider Office Phone # Fax #    Jim Kapaln [phone]       [address]   [facility] [address]        Equal Access to Services     [facility] : Hadii adina Brizuela, nancy burger, qaankit claros. So [facility] [phone].    ATENCIÓN: Si habla español, tiene a flores disposición  servicios gratuitos de asistencia lingüística. Angela chung [phone].    We comply with applicable federal civil rights laws and [place] laws. We do not discriminate on the basis of race, color, national origin, age, disability, sex, sexual orientation, or gender identity.            Thank you!     Thank you for choosing [facility]  for your care. Our goal is always to provide you with excellent care. Hearing back from our patients is one way we can continue to improve our services. Please take a few minutes to complete the written survey that you may receive in the mail after your visit with us. Thank you!             Your Updated Medication List - Protect others around you: Learn how to safely use, store and throw away your medicines at www.disposemymeds.org.          This list is accurate as of 8/27/18  4:49 PM.  Always use your most recent med list.                   Brand Name Dispense Instructions for use Diagnosis    ALLEGRA PO      Take 180 mg by mouth daily        atorvastatin 40 MG tablet    LIPITOR     TAKE 1 TABLET BY MOUTH DAILY        ELIQUIS PO      Take 5 mg by mouth 2 times daily        FUROSEMIDE PO      Take 20 mg by mouth        gemcitabine 1 GM injection    GEMZAR          LORazepam 0.5 MG tablet    ATIVAN    30 tablet    Take 1 tablet (0.5 mg) by mouth every 4 hours as needed (Anxiety, Nausea/Vomiting or Sleep)    Cholangiocarcinoma (H)       metoprolol tartrate 50 MG tablet    LOPRESSOR     50 mg 2 times daily        MITIGARE 0.6 MG capsule   Generic drug:  colchicine      Take 0.6 mg by mouth 3 times daily as needed        multivitamin, therapeutic with minerals Tabs tablet     30 tablet    Take 1 tablet by mouth daily    Mass of bile duct       NITROSTAT SL      Place 0.4 mg under the tongue every 5 minutes as needed for chest pain (Carries medication - has never used)        OMEGA-3 FISH OIL PO      Take 1,000 mg by mouth daily        ondansetron 8 MG tablet    ZOFRAN     10 tablet    Take 1 tablet (8 mg) by mouth every 8 hours as needed (Nausea/Vomiting)    Cholangiocarcinoma (H)       prochlorperazine 10 MG tablet    COMPAZINE    30 tablet    Take 1 tablet (10 mg) by mouth every 6 hours as needed (Nausea/Vomiting)    Cholangiocarcinoma (H)

## 2018-08-27 NOTE — NURSING NOTE
Chief Complaint   Patient presents with     Port Draw     labs drawn via port by RN     /75 (BP Location: Right arm, Patient Position: Chair, Cuff Size: Adult Large)  Pulse 115  Temp 98.3  F (36.8  C) (Oral)  Wt 90.2 kg (198 lb 12.8 oz)  SpO2 94%  BMI 28.52 kg/m2    Vitals taken. Port accessed by RN. Labs collected and sent. Line flushed with NS & Heparin. Pt tolerated well. Pt checked in for next appointment.    Arielle Burkett RN

## 2018-08-27 NOTE — PATIENT INSTRUCTIONS
Contact Numbers    [facility] Main Line: [phone]  [facility] Triage and after hours / weekends / holidays:  [phone]      Please call the triage or after hours line if you experience a temperature greater than or equal to 100.5, shaking chills, have uncontrolled nausea, vomiting and/or diarrhea, dizziness, shortness of breath, chest pain, bleeding, unexplained bruising, or if you have any other new/concerning symptoms, questions or concerns.      If you are having any concerning symptoms or wish to speak to a provider before your next infusion visit, please call your care coordinator or triage to notify them so we can adequately serve you.     If you need a refill on a narcotic prescription or other medication, please call before your infusion appointment.               August 2018 Sunday Monday Tuesday Wednesday Thursday Friday Saturday                  1     2     3     4       5     6     7     8     9     10     11       12     13     [facility] LAB DRAW    6:30 AM   (15 min.)    MASONIC LAB DRAW   [facility] Lab Draw     [facility] ONC INFUSION 60    7:00 AM   (60 min.)    ONCOLOGY INFUSION   [facility] 14     15     PE EYE/ ONCOLOGY    8:15 AM   (45 min.)   UUPET1   [facility] [place] [facility] PET CT 16     17     18       19     20     21     22     23     24     25       26     27     [facility] LAB DRAW    6:30 AM   (15 min.)    [facility]IC LAB DRAW   [facility] Lab Draw     [facility] ONC INFUSION 60    7:00 AM   (60 min.)    ONCOLOGY INFUSION   [facility] 28     29     30     31 September 2018 Sunday Monday Tuesday Wednesday Thursday Friday Saturday                                 1       2     3     4     5     6     7     8       9     10     [facility] LAB DRAW    6:30 AM   (15 min.)    M[facility]ONIC LAB DRAW   [facility] Lab Draw     [facility] ONC INFUSION 60    7:00 AM   (60 min.)    ONCOLOGY INFUSION   [facility] 11     12      13     14     15       16     17     18     19     20     21     22       23     24     [facility] LAB DRAW    6:30 AM   (15 min.)   [facility] LAB DRAW   [facility] Lab Draw     [facility] ONC INFUSION 60    7:00 AM   (60 min.)    ONCOLOGY INFUSION   [facility] 25     26     27     28     29       30                                               Recent Results (from the past 24 hour(s))   CBC with platelets differential    Collection Time: 08/27/18  6:51 AM   Result Value Ref Range    WBC 5.4 4.0 - 11.0 10e9/L    RBC Count 2.10 (L) 4.4 - 5.9 10e12/L    Hemoglobin 7.8 (L) 13.3 - 17.7 g/dL    Hematocrit 23.8 (L) 40.0 - 53.0 %     (H) 78 - 100 fl    MCH 37.1 (H) 26.5 - 33.0 pg    MCHC 32.8 31.5 - 36.5 g/dL    RDW 21.2 (H) 10.0 - 15.0 %    Platelet Count 80 (L) 150 - 450 10e9/L    Diff Method Automated Method     % Neutrophils 69.4 %    % Lymphocytes 12.1 %    % Monocytes 17.2 %    % Eosinophils 0.7 %    % Basophils 0.2 %    % Immature Granulocytes 0.4 %    Nucleated RBCs 0 0 /100    Absolute Neutrophil 3.7 1.6 - 8.3 10e9/L    Absolute Lymphocytes 0.7 (L) 0.8 - 5.3 10e9/L    Absolute Monocytes 0.9 0.0 - 1.3 10e9/L    Absolute Eosinophils 0.0 0.0 - 0.7 10e9/L    Absolute Basophils 0.0 0.0 - 0.2 10e9/L    Abs Immature Granulocytes 0.0 0 - 0.4 10e9/L    Absolute Nucleated RBC 0.0    Comprehensive metabolic panel    Collection Time: 08/27/18  6:51 AM   Result Value Ref Range    Sodium 144 133 - 144 mmol/L    Potassium 3.9 3.4 - 5.3 mmol/L    Chloride 113 (H) 94 - 109 mmol/L    Carbon Dioxide 23 20 - 32 mmol/L    Anion Gap 8 3 - 14 mmol/L    Glucose 119 (H) 70 - 99 mg/dL    Urea Nitrogen 42 (H) 7 - 30 mg/dL    Creatinine 1.81 (H) 0.66 - 1.25 mg/dL    GFR Estimate 38 (L) >60 mL/min/1.7m2    GFR Estimate If Black 46 (L) >60 mL/min/1.7m2    Calcium 8.2 (L) 8.5 - 10.1 mg/dL    Bilirubin Total 2.3 (H) 0.2 - 1.3 mg/dL    Albumin 3.1 (L) 3.4 - 5.0 g/dL    Protein Total 5.5 (L) 6.8 - 8.8 g/dL     Alkaline Phosphatase 74 40 - 150 U/L    ALT 32 0 - 70 U/L    AST 31 0 - 45 U/L   Magnesium    Collection Time: 08/27/18  6:51 AM   Result Value Ref Range    Magnesium 1.8 1.6 - 2.3 mg/dL   ABO/Rh type and screen    Collection Time: 08/27/18  6:51 AM   Result Value Ref Range    Units Ordered 1     ABO O     RH(D) Pos     Antibody Screen Neg     Test Valid Only At          [facility]    Specimen Expires 08/30/2018     Crossmatch Red Blood Cells    Blood component    Collection Time: 08/27/18  6:51 AM   Result Value Ref Range    Unit Number [id number]     Blood Component Type Red Blood Cells Leukocyte Reduced     Division Number 00     Status of Unit Released to care unit 08/27/2018 1043     Blood Product Code [id number]     Unit Status ISS    Vitamin B12    Collection Time: 08/27/18  8:45 AM   Result Value Ref Range    Vitamin B12 733 193 - 986 pg/mL   Iron and iron binding capacity    Collection Time: 08/27/18  8:45 AM   Result Value Ref Range    Iron 56 35 - 180 ug/dL    Iron Binding Cap 266 240 - 430 ug/dL    Iron Saturation Index 21 15 - 46 %   Reticulocyte count    Collection Time: 08/27/18  8:45 AM   Result Value Ref Range    % Retic 8.7 (H) 0.5 - 2.0 %    Absolute Retic 178.6 (H) 25 - 95 10e9/L

## 2018-08-27 NOTE — PROGRESS NOTES
"Infusion Nursing Note:  Girish Chauhan presents today for cycle 10, day 15 gemzar, aranesp, 1 unit PRBC's.    Patient seen by provider today: No   present during visit today: Not Applicable.    Note: Patient continues to complain of a chronic cough, worse with lying flat.  Cough is generally non-productive but occasionally causes him to get dry heaves. Patient has a hx of a-fib.  Heart rate was irregular on arrival to infusion 90s-120s.  Patient has swelling in his bilateral lower extremities which is not new. He is taking lasix every other day.  He had an echocardiogram last week and is scheduled to see his cardiologist later this week.  He denies any dizziness.  He has some occasional heart \"thumping\" but denies palpitations. He has nitroglycerin to take prn for chest pain but has not needed it.  Hgb 7.8 today.  Creatinine elevated to 1.81.  Dr De Los Santos updated on today's labs and the assessment findings above:     [facility]Mily Louie RN  -ok to give chemo today with creatinine 1.81  -transfuse 1 unit PRBC's today for Hgb 7.8  -Dr De Los Santos to enter additional labs to be drawn today for anemia work-up    Intravenous Access:  Implanted Port.    Treatment Conditions:  Lab Results   Component Value Date    HGB 7.8 08/27/2018     Lab Results   Component Value Date    WBC 5.4 08/27/2018      Lab Results   Component Value Date    ANEU 3.7 08/27/2018     Lab Results   Component Value Date    PLT 80 08/27/2018      Lab Results   Component Value Date     08/27/2018                   Lab Results   Component Value Date    POTASSIUM 3.9 08/27/2018           Lab Results   Component Value Date    MAG 1.8 08/27/2018            Lab Results   Component Value Date    CR 1.81 08/27/2018                   Lab Results   Component Value Date    GARY 8.2 08/27/2018                Lab Results   Component Value Date    BILITOTAL 2.3 08/27/2018           Lab Results   Component Value Date    ALBUMIN " 3.1 08/27/2018                    Lab Results   Component Value Date    ALT 32 08/27/2018           Lab Results   Component Value Date    AST 31 08/27/2018       Results reviewed, labs did NOT meet treatment parameters: see TORB above with ok to proceed   Blood transfusion consent signed today.    Post Infusion Assessment:  Patient tolerated infusion without incident.  Patient tolerated injection without incident.  Aranesp given subcutaneously into left arm  Blood return noted pre and post infusion.  Site patent and intact, free from redness, edema or discomfort.  No evidence of extravasations.  Access discontinued per protocol.    Discharge Plan:   Patient declined prescription refills.  Discharge instructions reviewed with: Patient.  Patient and/or family verbalized understanding of discharge instructions and all questions answered.  Copy of AVS reviewed with patient and/or family.  Patient will return 9/10 for next appointment.  Patient discharged in stable condition accompanied by: self.  Departure Mode: Ambulatory.  Face to Face time: 5 minutes.    Mily Louie RN

## 2018-08-27 NOTE — PROGRESS NOTES
I saw Girish Chauhan in the infusion room today while he was here for planned chemotherapy.  He is currently receiving active treatment with gemcitabine for cholangiocarcinoma (he previously also is getting platinum which was dropped because of his renal function) his heart rate is up a little bit from typical with his A. fib and he has follow-up scheduled next week with his cardiologist.  He is also had a little bit more trouble with edema.  His renal function today is slightly worse than it has been.  He reports minimal to no dyspnea and no chest pain.    On exam today he is pale but otherwise appears well.  He has 1+ lower extremity edema.    His labs show his creatinine to be 1.8.  His albumin is a little depressed at 3.1.  His bilirubin is marginally elevated at 2.3.  His hemoglobin is now down to 7.8 with a platelet count of 80,000.    Assessment/plan: Cholangiocarcinoma with progressive treatment associated anemia.  He already is on darbepoetin and will check some additional lab work to be sure there is nothing else going on today.  We will give him 1 unit of blood for his symptomatic anemia.  I reviewed with him the risk and benefit of transfusion.  He will follow-up with cardiology for optimization of his A. fib.  I will make further decisions about how to proceed with a see the results of today's lab work.

## 2018-09-07 ENCOUNTER — CARE COORDINATION (OUTPATIENT)
Dept: ONCOLOGY | Facility: CLINIC | Age: 63
End: 2018-09-07

## 2018-09-07 NOTE — PROGRESS NOTES
Per Dr. De Los Santos cancel chemo treatment for Monday 9/10/18. Treatment will likely need to be changed from Gemzar. Dr. De Los Santos will be calling patient to further discuss. Unable to reach patient. LM with above information. Asked patient to turn call with any questions and/or concerns

## 2018-09-11 DIAGNOSIS — C22.1 CHOLANGIOCARCINOMA (H): Primary | ICD-10-CM

## 2018-09-12 ENCOUNTER — ONCOLOGY VISIT (OUTPATIENT)
Dept: ONCOLOGY | Facility: CLINIC | Age: 63
End: 2018-09-12
Attending: INTERNAL MEDICINE
Payer: COMMERCIAL

## 2018-09-12 ENCOUNTER — APPOINTMENT (OUTPATIENT)
Dept: LAB | Facility: CLINIC | Age: 63
End: 2018-09-12
Attending: INTERNAL MEDICINE
Payer: COMMERCIAL

## 2018-09-12 VITALS
RESPIRATION RATE: 16 BRPM | OXYGEN SATURATION: 93 % | TEMPERATURE: 97.4 F | BODY MASS INDEX: 28.44 KG/M2 | HEART RATE: 78 BPM | DIASTOLIC BLOOD PRESSURE: 84 MMHG | WEIGHT: 198.2 LBS | SYSTOLIC BLOOD PRESSURE: 132 MMHG

## 2018-09-12 DIAGNOSIS — C22.1 CHOLANGIOCARCINOMA (H): ICD-10-CM

## 2018-09-12 DIAGNOSIS — M31.10 THROMBOTIC MICROANGIOPATHY (H): ICD-10-CM

## 2018-09-12 LAB
ALBUMIN SERPL-MCNC: 3 G/DL (ref 3.4–5)
ALP SERPL-CCNC: 73 U/L (ref 40–150)
ALT SERPL W P-5'-P-CCNC: 37 U/L (ref 0–70)
ANION GAP SERPL CALCULATED.3IONS-SCNC: 8 MMOL/L (ref 3–14)
AST SERPL W P-5'-P-CCNC: 35 U/L (ref 0–45)
BASOPHILS # BLD AUTO: 0 10E9/L (ref 0–0.2)
BASOPHILS NFR BLD AUTO: 0.2 %
BILIRUB SERPL-MCNC: 1.7 MG/DL (ref 0.2–1.3)
BUN SERPL-MCNC: 46 MG/DL (ref 7–30)
CALCIUM SERPL-MCNC: 8.1 MG/DL (ref 8.5–10.1)
CHLORIDE SERPL-SCNC: 110 MMOL/L (ref 94–109)
CO2 SERPL-SCNC: 23 MMOL/L (ref 20–32)
CREAT SERPL-MCNC: 2.39 MG/DL (ref 0.66–1.25)
DIFFERENTIAL METHOD BLD: ABNORMAL
EOSINOPHIL # BLD AUTO: 0.1 10E9/L (ref 0–0.7)
EOSINOPHIL NFR BLD AUTO: 1 %
ERYTHROCYTE [DISTWIDTH] IN BLOOD BY AUTOMATED COUNT: 22.6 % (ref 10–15)
GFR SERPL CREATININE-BSD FRML MDRD: 28 ML/MIN/1.7M2
GLUCOSE SERPL-MCNC: 98 MG/DL (ref 70–99)
HCT VFR BLD AUTO: 24.5 % (ref 40–53)
HGB BLD-MCNC: 7.8 G/DL (ref 13.3–17.7)
IMM GRANULOCYTES # BLD: 0 10E9/L (ref 0–0.4)
IMM GRANULOCYTES NFR BLD: 0.2 %
LYMPHOCYTES # BLD AUTO: 0.8 10E9/L (ref 0.8–5.3)
LYMPHOCYTES NFR BLD AUTO: 15 %
MCH RBC QN AUTO: 38.2 PG (ref 26.5–33)
MCHC RBC AUTO-ENTMCNC: 31.8 G/DL (ref 31.5–36.5)
MCV RBC AUTO: 120 FL (ref 78–100)
MONOCYTES # BLD AUTO: 0.9 10E9/L (ref 0–1.3)
MONOCYTES NFR BLD AUTO: 17.1 %
NEUTROPHILS # BLD AUTO: 3.5 10E9/L (ref 1.6–8.3)
NEUTROPHILS NFR BLD AUTO: 66.5 %
NRBC # BLD AUTO: 0 10*3/UL
NRBC BLD AUTO-RTO: 0 /100
PLATELET # BLD AUTO: 137 10E9/L (ref 150–450)
POTASSIUM SERPL-SCNC: 4.4 MMOL/L (ref 3.4–5.3)
PROT SERPL-MCNC: 5.6 G/DL (ref 6.8–8.8)
RBC # BLD AUTO: 2.04 10E12/L (ref 4.4–5.9)
SODIUM SERPL-SCNC: 142 MMOL/L (ref 133–144)
WBC # BLD AUTO: 5.3 10E9/L (ref 4–11)

## 2018-09-12 PROCEDURE — 86301 IMMUNOASSAY TUMOR CA 19-9: CPT | Performed by: INTERNAL MEDICINE

## 2018-09-12 PROCEDURE — 36591 DRAW BLOOD OFF VENOUS DEVICE: CPT

## 2018-09-12 PROCEDURE — 99215 OFFICE O/P EST HI 40 MIN: CPT | Mod: ZP | Performed by: INTERNAL MEDICINE

## 2018-09-12 PROCEDURE — 25000128 H RX IP 250 OP 636: Mod: ZF | Performed by: INTERNAL MEDICINE

## 2018-09-12 PROCEDURE — 85025 COMPLETE CBC W/AUTO DIFF WBC: CPT | Performed by: INTERNAL MEDICINE

## 2018-09-12 PROCEDURE — G0463 HOSPITAL OUTPT CLINIC VISIT: HCPCS | Mod: ZF

## 2018-09-12 PROCEDURE — 80053 COMPREHEN METABOLIC PANEL: CPT | Performed by: INTERNAL MEDICINE

## 2018-09-12 RX ORDER — DIGOXIN 125 MCG
250 TABLET ORAL
COMMUNITY
Start: 2018-09-06 | End: 2019-04-22

## 2018-09-12 RX ORDER — HEPARIN SODIUM (PORCINE) LOCK FLUSH IV SOLN 100 UNIT/ML 100 UNIT/ML
5 SOLUTION INTRAVENOUS ONCE
Status: COMPLETED | OUTPATIENT
Start: 2018-09-12 | End: 2018-09-12

## 2018-09-12 RX ADMIN — Medication 5 ML: at 15:56

## 2018-09-12 ASSESSMENT — PAIN SCALES - GENERAL: PAINLEVEL: NO PAIN (0)

## 2018-09-12 NOTE — LETTER
9/12/2018      RE: Girish Chauhan  [address] [address]       Girish Chauhan comes in today in follow-up of recurrent cholangiocarcinoma.  I asked him to come in today to discuss her plan to discontinue his current chemotherapy so we could talk about next steps.    He originally had a resection more than 2 years ago and then recurred with the only site of disease being in his bladder.  He received treatment with gemcitabine plus cisplatin with a good response but we dropped the platinum a few months ago because of poor tolerance.  More recently he developed worsening anemia in the face of an elevated reticulocyte count and we have now decided to stop his gemcitabine because I believe he has developed TTP.  Over this time he is also had some increased problems with lower extremity edema and some pleural effusions.  He has been seeing his cardiologist and some of this seems may be due to increased cardiac demand with his anemia in the face of long-standing aortic stenosis.  His cardiology evaluation initially suggested the aortic stenosis was severe and the ejection fraction reduced, but a transesophageal echo later showed it not to be severe and the ejection fraction to be 60%.  He tells me presently his breathing is better and his edema is improving.    Assessment/plan: Metastatic cholangiocarcinoma, now with treatment associated TTP.     I can't tell how much of his edema and effusions are due to his cardiac issues and how much due to the capillary leak from his endovascular injury. In either case I expect that to get better with improvement in his TTP. I explained that for other forms of TTP we would often pursue aggressive measures to reverse it (such as plasma exchange) but this setting the disease is less fulminant and doesn't clearly respond to those interventions. Our approach will be to avoid the offending agent and let this resolve which is likely to happen slowly over the next few  weeks to months.    He had a recent PET scan done elsewhere that showed no evidence of disease although its sensitivity for identifying small volume disease in the wall of his bladder would be low.  Our last scan here about 2 months ago showed minimal residual thickening and nodularity in the bladder wall consistent with an excellent response.  I discussed with him that I cannot tell how much disease he has it present but it is quite low-- certainly his tumor markers have normalized and we can see very little on the scan.  I do not think looking in his bladder would necessarily change our plans.  I don't think leaving him untreated for the next couple months will result in a significant change in the course of his disease.    He has had a second opinion suggesting perhaps pursuing radiation to his bladder since that his only site of disease.  We had a very long discussion about the uncertainties of how extensive his disease is, and I explained our presumption would be that he has other sites of metastatic disease that have not been apparent to us and I would not expect aggressive local treatment of his bladder to be curative.  I acknowledged though for them that I cannot be certain of that and if they wanted to pursue radiation or other bladder directed therapies we could consider that.  We had discussed upfront the possibility of taking his bladder out, and I still do not think that would be a good idea as I think the chances of that curing of his disease are almost nil.    My recommendation to him is that we leave him off treatment for the next few months and reevaluate his disease status with a plan to resume therapy with a fluoropyrimidine only once we see evidence of disease progression.    After having had answered all of their questions during this very long discussion they expressed a good understanding and are comfortable with the plan of observation.  We will set him up to come back in about 2 months with  repeat CT and lab work.    Total visit time today was 45 minutes all spent on the above discussion.    Addendum: his labs came back after his visit with an increase in his creatinine to 2.39, but a stable hemoglobin and improved platelet count. I will set him up for weekly labs and if his creatinine worsens much more we may need to consider more direct intervention.        Naresh De Los Santos MD

## 2018-09-12 NOTE — MR AVS SNAPSHOT
After Visit Summary   9/12/2018    Girish Chauhan    MRN: [id number]           Patient Information     Date Of Birth          1955        Visit Information        Provider Department      9/12/2018 4:30 PM Naresh De Los Santos MD [facility]        Today's Diagnoses     Cholangiocarcinoma (H)           Follow-ups after your visit        Follow-up notes from your care team     Return in about 2 months (around 11/12/2018) for MD visit with CT and labs.      Your next 10 appointments already scheduled     Nov 09, 2018  6:15 AM CST   [facility] with  [facility]   [facility] ([facility])    [address]  [address]  [place] [id number] [phone]            Nov 09, 2018  7:00 AM CST   CT CHEST ABDOMEN PELVIS W/O & W CONTRAST with UCCT2   [facility] CT ([facility])    [address]  1st [address]  [place] [id number] [phone]           How do I prepare for my exam? (Food and drink instructions) To prepare: Do not eat or drink for 2 hours before your exam. If you need to take medicine, you may take it with small sips of water. (We may ask you to take liquid medicine as well.)  How do I prepare for my exam? (Other instructions) Please arrive 30 minutes early for your CT.  Once in the department you might be asked to drink water 15-20 minutes prior to your exam.  If indicated you may be asked to drink an oral contrast in advance of your CT.  If this is the case, the imaging team will let you know or be in contact with you prior to your appointment  Patients over 70 or patients with diabetes or kidney problems: If you haven t had a blood test (creatinine test) within the last 30 days, the Cardiologist/Radiologist may require you to get this test prior to your exam.  If you have diabetes:  Continue to take your metformin medication on the day of your exam  What  should I wear: Please wear loose clothing, such as a sweat suit or jogging clothes. Avoid snaps, zippers and other metal. We may ask you to undress and put on a hospital gown.  How long does the exam take: Most scans take less than 20 minutes.  What should I bring: Please bring any scans or X-rays taken at other hospitals, if similar tests were done. Also bring a list of your medicines, including vitamins, minerals and over-the-counter drugs. It is safest to leave personal items at home.  Do I need a : No  is needed.  What do I need to tell my doctor? Be sure to tell your doctor: * If you have any allergies. * If there s any chance you are pregnant. * If you are breastfeeding.  What should I do after the exam: No restrictions, You may resume normal activities.  What is this test: A CT (computed tomography) scan is a series of pictures that allows us to look inside your body. The scanner creates images of the body in cross sections, much like slices of bread. This helps us see any problems more clearly. You may receive contrast (X-ray dye) before or during your scan. You will be asked to drink the contrast.  Who should I call with questions: If you have any questions, please call the Imaging Department where you will have your exam. Directions, parking instructions, and other information is available on our website, [email or web address].            Nov 12, 2018  7:15 AM CST   (Arrive by 7:00 AM)   Return Visit with Naresh De Los Santos MD   [facility] ([facility])    [address] [place] [address]  [address] [place] [id number] [phone]              Who to contact     If you have questions or need follow up information about today's clinic visit or your schedule please contact [facility] directly at [phone].  Normal or non-critical lab and imaging results will be communicated to you by MyChart, letter or phone within 4 business  days after the clinic has received the results. If you do not hear from us within 7 days, please contact the clinic through [employer] or phone. If you have a critical or abnormal lab result, we will notify you by phone as soon as possible.  Submit refill requests through [employer] or call your pharmacy and they will forward the refill request to us. Please allow 3 business days for your refill to be completed.          Additional Information About Your Visit        "[employer] Information     [employer] gives you secure access to your electronic health record. If you see a primary care provider, you can also send messages to your care team and make appointments. If you have questions, please call your primary care clinic.  If you do not have a primary care provider, please call [phone] and they will assist you.        Care EveryWhere ID     This is your Care EveryWhere ID. This could be used by other organizations to access your [place] medical records  [id number]        Your Vitals Were     Pulse Temperature Respirations Pulse Oximetry BMI (Body Mass Index)       78 97.4  F (36.3  C) (Oral) 16 93% 28.44 kg/m2        Blood Pressure from Last 3 Encounters:   09/12/18 132/84   08/27/18 (!) 127/91   08/13/18 125/78    Weight from Last 3 Encounters:   09/12/18 89.9 kg (198 lb 3.2 oz)   08/27/18 90.2 kg (198 lb 12.8 oz)   08/13/18 91.2 kg (201 lb)              We Performed the Following     Cancer antigen 19-9     CBC with platelets differential     Comprehensive metabolic panel        Primary Care Provider Office Phone # Fax #    Jim Kaplan [phone]       [address]   [facility] [address]        Equal Access to Services     MARIUSZ MADRIGAL : Hadii adina fall Sokarina, waaxda luqadaha, qaybta kaalmada adekeith, ankit gross. So [facility] [place] [phone].    ATENCIÓN: Si habla español, tiene a flores disposición servicios gratuitos de  asistencia lingüística. Angela al [phone].    We comply with applicable federal civil rights laws and [place] laws. We do not discriminate on the basis of race, color, national origin, age, disability, sex, sexual orientation, or gender identity.            Thank you!     Thank you for choosing [facility]  for your care. Our goal is always to provide you with excellent care. Hearing back from our patients is one way we can continue to improve our services. Please take a few minutes to complete the written survey that you may receive in the mail after your visit with us. Thank you!             Your Updated Medication List - Protect others around you: Learn how to safely use, store and throw away your medicines at www.disposemymeds.org.          This list is accurate as of 9/12/18  5:19 PM.  Always use your most recent med list.                   Brand Name Dispense Instructions for use Diagnosis    ALLEGRA PO      Take 180 mg by mouth daily        atorvastatin 40 MG tablet    LIPITOR     TAKE 1 TABLET BY MOUTH DAILY        digoxin 125 MCG tablet    LANOXIN     Take 250 mcg by mouth        ELIQUIS PO      Take 5 mg by mouth 2 times daily        FUROSEMIDE PO      Take 20 mg by mouth        gemcitabine 1 GM injection    GEMZAR          LORazepam 0.5 MG tablet    ATIVAN    30 tablet    Take 1 tablet (0.5 mg) by mouth every 4 hours as needed (Anxiety, Nausea/Vomiting or Sleep)    Cholangiocarcinoma (H)       metoprolol tartrate 50 MG tablet    LOPRESSOR     50 mg 2 times daily        MITIGARE 0.6 MG capsule   Generic drug:  colchicine      Take 0.6 mg by mouth 3 times daily as needed        multivitamin, therapeutic with minerals Tabs tablet     30 tablet    Take 1 tablet by mouth daily    Mass of bile duct       NITROSTAT SL      Place 0.4 mg under the tongue every 5 minutes as needed for chest pain (Carries medication - has never used)        OMEGA-3 FISH OIL PO      Take 1,000 mg by mouth  daily        ondansetron 8 MG tablet    ZOFRAN    10 tablet    Take 1 tablet (8 mg) by mouth every 8 hours as needed (Nausea/Vomiting)    Cholangiocarcinoma (H)       prochlorperazine 10 MG tablet    COMPAZINE    30 tablet    Take 1 tablet (10 mg) by mouth every 6 hours as needed (Nausea/Vomiting)    Cholangiocarcinoma (H)

## 2018-09-12 NOTE — PROGRESS NOTES
Girish Chauhan comes in today in follow-up of recurrent cholangiocarcinoma.  I asked him to come in today to discuss her plan to discontinue his current chemotherapy so we could talk about next steps.    He originally had a resection more than 2 years ago and then recurred with the only site of disease being in his bladder.  He received treatment with gemcitabine plus cisplatin with a good response but we dropped the platinum a few months ago because of poor tolerance.  More recently he developed worsening anemia in the face of an elevated reticulocyte count and we have now decided to stop his gemcitabine because I believe he has developed TTP.  Over this time he is also had some increased problems with lower extremity edema and some pleural effusions.  He has been seeing his cardiologist and some of this seems may be due to increased cardiac demand with his anemia in the face of long-standing aortic stenosis.  His cardiology evaluation initially suggested the aortic stenosis was severe and the ejection fraction reduced, but a transesophageal echo later showed it not to be severe and the ejection fraction to be 60%.  He tells me presently his breathing is better and his edema is improving.    Assessment/plan: Metastatic cholangiocarcinoma, now with treatment associated TTP.     I can't tell how much of his edema and effusions are due to his cardiac issues and how much due to the capillary leak from his endovascular injury. In either case I expect that to get better with improvement in his TTP. I explained that for other forms of TTP we would often pursue aggressive measures to reverse it (such as plasma exchange) but this setting the disease is less fulminant and doesn't clearly respond to those interventions. Our approach will be to avoid the offending agent and let this resolve which is likely to happen slowly over the next few weeks to months.    He had a recent PET scan done elsewhere that showed no evidence of  disease although its sensitivity for identifying small volume disease in the wall of his bladder would be low.  Our last scan here about 2 months ago showed minimal residual thickening and nodularity in the bladder wall consistent with an excellent response.  I discussed with him that I cannot tell how much disease he has it present but it is quite low-- certainly his tumor markers have normalized and we can see very little on the scan.  I do not think looking in his bladder would necessarily change our plans.  I don't think leaving him untreated for the next couple months will result in a significant change in the course of his disease.    He has had a second opinion suggesting perhaps pursuing radiation to his bladder since that his only site of disease.  We had a very long discussion about the uncertainties of how extensive his disease is, and I explained our presumption would be that he has other sites of metastatic disease that have not been apparent to us and I would not expect aggressive local treatment of his bladder to be curative.  I acknowledged though for them that I cannot be certain of that and if they wanted to pursue radiation or other bladder directed therapies we could consider that.  We had discussed upfront the possibility of taking his bladder out, and I still do not think that would be a good idea as I think the chances of that curing of his disease are almost nil.    My recommendation to him is that we leave him off treatment for the next few months and reevaluate his disease status with a plan to resume therapy with a fluoropyrimidine only once we see evidence of disease progression.    After having had answered all of their questions during this very long discussion they expressed a good understanding and are comfortable with the plan of observation.  We will set him up to come back in about 2 months with repeat CT and lab work.    Total visit time today was 45 minutes all spent on the above  discussion.    Addendum: his labs came back after his visit with an increase in his creatinine to 2.39, but a stable hemoglobin and improved platelet count. I will set him up for weekly labs and if his creatinine worsens much more we may need to consider more direct intervention.

## 2018-09-12 NOTE — NURSING NOTE
"Chief Complaint   Patient presents with     Port Draw     port accessed and labs drawn by rn.  vs taken.     Port accessed with 20g 3/4\" gripper needle, labs drawn, port flushed with saline and heparin, port de-accessed.  vitals checked.  Carey Larson RN    "

## 2018-09-12 NOTE — NURSING NOTE
"Oncology Rooming Note    September 12, 2018 4:13 PM   Girish Chauhan is a 63 year old male who presents for:    Chief Complaint   Patient presents with     Port Draw     port accessed and labs drawn by rn.  vs taken.     Oncology Clinic Visit     [facility] RETURN- CHOLANGIOCARCINOMA     Initial Vitals: /84 (BP Location: Right arm, Patient Position: Sitting, Cuff Size: Adult Regular)  Pulse 78  Temp 97.4  F (36.3  C) (Oral)  Resp 16  Wt 89.9 kg (198 lb 3.2 oz)  SpO2 93%  BMI 28.44 kg/m2 Estimated body mass index is 28.44 kg/(m^2) as calculated from the following:    Height as of 7/16/18: 1.778 m (5' 10\").    Weight as of this encounter: 89.9 kg (198 lb 3.2 oz). Body surface area is 2.11 meters squared.  No Pain (0) Comment: Data Unavailable   No LMP for male patient.  Allergies reviewed: Yes  Medications reviewed: Yes    Medications: Medication refills not needed today.  Pharmacy name entered into [facility]: [facility] [address] [place] [address] [address] AT [facility]    Clinical concerns: No new concerns. Jimmienaina was NOT notified.    10 minutes for nursing intake (face to face time)     Junior Camacho LPN            "

## 2018-09-13 ENCOUNTER — TELEPHONE (OUTPATIENT)
Dept: ONCOLOGY | Facility: CLINIC | Age: 63
End: 2018-09-13

## 2018-09-13 NOTE — TELEPHONE ENCOUNTER
----- Message from Naresh De Los Santos MD sent at 9/13/2018 10:37 AM CDT -----  His labs after the visit came back with an increase in creatinine. I'd like him to get labs rechecked early next week and then weekly until we see a clear improvement. I put in the orders

## 2018-09-13 NOTE — TELEPHONE ENCOUNTER
Placed call to patient to inform of increased creatinine. Recommended patient drink approx 60oz water/day. Also informed of lab draw early next week and then weekly thereafter. Unable to reach patient. LM detailed message with above info. Asked patient to return call with any questions or concerns. Message sent to scheduling to assist with lab appointments.

## 2018-09-14 LAB — CANCER AG19-9 SERPL-ACNC: 29 U/ML (ref 0–37)

## 2018-09-17 DIAGNOSIS — M31.10 THROMBOTIC MICROANGIOPATHY (H): ICD-10-CM

## 2018-09-17 DIAGNOSIS — C22.1 CHOLANGIOCARCINOMA (H): ICD-10-CM

## 2018-09-17 LAB
ANION GAP SERPL CALCULATED.3IONS-SCNC: 10 MMOL/L (ref 3–14)
BASOPHILS # BLD AUTO: 0 10E9/L (ref 0–0.2)
BASOPHILS NFR BLD AUTO: 0.2 %
BUN SERPL-MCNC: 43 MG/DL (ref 7–30)
CALCIUM SERPL-MCNC: 8.5 MG/DL (ref 8.5–10.1)
CHLORIDE SERPL-SCNC: 110 MMOL/L (ref 94–109)
CO2 SERPL-SCNC: 21 MMOL/L (ref 20–32)
CREAT SERPL-MCNC: 2.03 MG/DL (ref 0.66–1.25)
DIFFERENTIAL METHOD BLD: ABNORMAL
EOSINOPHIL # BLD AUTO: 0.1 10E9/L (ref 0–0.7)
EOSINOPHIL NFR BLD AUTO: 1.5 %
ERYTHROCYTE [DISTWIDTH] IN BLOOD BY AUTOMATED COUNT: 17.5 % (ref 10–15)
GFR SERPL CREATININE-BSD FRML MDRD: 33 ML/MIN/1.7M2
GLUCOSE SERPL-MCNC: 136 MG/DL (ref 70–99)
HCT VFR BLD AUTO: 25.4 % (ref 40–53)
HGB BLD-MCNC: 8.2 G/DL (ref 13.3–17.7)
IMM GRANULOCYTES # BLD: 0 10E9/L (ref 0–0.4)
IMM GRANULOCYTES NFR BLD: 0.2 %
LYMPHOCYTES # BLD AUTO: 0.4 10E9/L (ref 0.8–5.3)
LYMPHOCYTES NFR BLD AUTO: 10.6 %
MCH RBC QN AUTO: 36.3 PG (ref 26.5–33)
MCHC RBC AUTO-ENTMCNC: 32.3 G/DL (ref 31.5–36.5)
MCV RBC AUTO: 112 FL (ref 78–100)
MONOCYTES # BLD AUTO: 0.9 10E9/L (ref 0–1.3)
MONOCYTES NFR BLD AUTO: 21.9 %
NEUTROPHILS # BLD AUTO: 2.7 10E9/L (ref 1.6–8.3)
NEUTROPHILS NFR BLD AUTO: 65.6 %
NRBC # BLD AUTO: 0 10*3/UL
NRBC BLD AUTO-RTO: 0 /100
PLATELET # BLD AUTO: 122 10E9/L (ref 150–450)
POTASSIUM SERPL-SCNC: 4 MMOL/L (ref 3.4–5.3)
RBC # BLD AUTO: 2.26 10E12/L (ref 4.4–5.9)
RETICS # AUTO: 87.7 10E9/L (ref 25–95)
RETICS/RBC NFR AUTO: 3.9 % (ref 0.5–2)
SODIUM SERPL-SCNC: 141 MMOL/L (ref 133–144)
WBC # BLD AUTO: 4.1 10E9/L (ref 4–11)

## 2018-09-17 PROCEDURE — 85025 COMPLETE CBC W/AUTO DIFF WBC: CPT | Performed by: INTERNAL MEDICINE

## 2018-09-17 PROCEDURE — 25000128 H RX IP 250 OP 636: Performed by: INTERNAL MEDICINE

## 2018-09-17 PROCEDURE — 80048 BASIC METABOLIC PNL TOTAL CA: CPT | Performed by: INTERNAL MEDICINE

## 2018-09-17 PROCEDURE — 85045 AUTOMATED RETICULOCYTE COUNT: CPT | Performed by: INTERNAL MEDICINE

## 2018-09-17 RX ORDER — HEPARIN SODIUM (PORCINE) LOCK FLUSH IV SOLN 100 UNIT/ML 100 UNIT/ML
5 SOLUTION INTRAVENOUS EVERY 8 HOURS
Status: DISCONTINUED | OUTPATIENT
Start: 2018-09-17 | End: 2018-09-25 | Stop reason: HOSPADM

## 2018-09-17 RX ADMIN — Medication 5 ML: at 08:47

## 2018-09-24 DIAGNOSIS — M31.10 THROMBOTIC MICROANGIOPATHY (H): ICD-10-CM

## 2018-09-24 DIAGNOSIS — C22.1 CHOLANGIOCARCINOMA (H): ICD-10-CM

## 2018-09-24 LAB
ANION GAP SERPL CALCULATED.3IONS-SCNC: 8 MMOL/L (ref 3–14)
BASOPHILS # BLD AUTO: 0 10E9/L (ref 0–0.2)
BASOPHILS NFR BLD AUTO: 0.2 %
BUN SERPL-MCNC: 54 MG/DL (ref 7–30)
CALCIUM SERPL-MCNC: 8.2 MG/DL (ref 8.5–10.1)
CHLORIDE SERPL-SCNC: 108 MMOL/L (ref 94–109)
CO2 SERPL-SCNC: 23 MMOL/L (ref 20–32)
CREAT SERPL-MCNC: 2.4 MG/DL (ref 0.66–1.25)
DIFFERENTIAL METHOD BLD: ABNORMAL
EOSINOPHIL # BLD AUTO: 0.1 10E9/L (ref 0–0.7)
EOSINOPHIL NFR BLD AUTO: 1.7 %
ERYTHROCYTE [DISTWIDTH] IN BLOOD BY AUTOMATED COUNT: 15.1 % (ref 10–15)
GFR SERPL CREATININE-BSD FRML MDRD: 28 ML/MIN/1.7M2
GLUCOSE SERPL-MCNC: 106 MG/DL (ref 70–99)
HCT VFR BLD AUTO: 24.2 % (ref 40–53)
HGB BLD-MCNC: 7.9 G/DL (ref 13.3–17.7)
IMM GRANULOCYTES # BLD: 0 10E9/L (ref 0–0.4)
IMM GRANULOCYTES NFR BLD: 0.2 %
LYMPHOCYTES # BLD AUTO: 0.6 10E9/L (ref 0.8–5.3)
LYMPHOCYTES NFR BLD AUTO: 11.2 %
MCH RBC QN AUTO: 34.8 PG (ref 26.5–33)
MCHC RBC AUTO-ENTMCNC: 32.6 G/DL (ref 31.5–36.5)
MCV RBC AUTO: 107 FL (ref 78–100)
MONOCYTES # BLD AUTO: 1 10E9/L (ref 0–1.3)
MONOCYTES NFR BLD AUTO: 18.2 %
NEUTROPHILS # BLD AUTO: 3.7 10E9/L (ref 1.6–8.3)
NEUTROPHILS NFR BLD AUTO: 68.5 %
NRBC # BLD AUTO: 0 10*3/UL
NRBC BLD AUTO-RTO: 0 /100
PLATELET # BLD AUTO: 103 10E9/L (ref 150–450)
POTASSIUM SERPL-SCNC: 3.9 MMOL/L (ref 3.4–5.3)
RBC # BLD AUTO: 2.27 10E12/L (ref 4.4–5.9)
RETICS # AUTO: 101.7 10E9/L (ref 25–95)
RETICS/RBC NFR AUTO: 4.5 % (ref 0.5–2)
SODIUM SERPL-SCNC: 139 MMOL/L (ref 133–144)
WBC # BLD AUTO: 5.4 10E9/L (ref 4–11)

## 2018-09-24 PROCEDURE — 80048 BASIC METABOLIC PNL TOTAL CA: CPT | Performed by: INTERNAL MEDICINE

## 2018-09-24 PROCEDURE — 25000128 H RX IP 250 OP 636: Performed by: INTERNAL MEDICINE

## 2018-09-24 PROCEDURE — 85025 COMPLETE CBC W/AUTO DIFF WBC: CPT | Performed by: INTERNAL MEDICINE

## 2018-09-24 PROCEDURE — 85045 AUTOMATED RETICULOCYTE COUNT: CPT | Performed by: INTERNAL MEDICINE

## 2018-09-24 RX ORDER — HEPARIN SODIUM (PORCINE) LOCK FLUSH IV SOLN 100 UNIT/ML 100 UNIT/ML
5 SOLUTION INTRAVENOUS EVERY 8 HOURS
Status: DISCONTINUED | OUTPATIENT
Start: 2018-09-24 | End: 2018-10-02 | Stop reason: HOSPADM

## 2018-09-24 RX ADMIN — Medication 5 ML: at 08:04

## 2018-09-24 NOTE — NURSING NOTE
Chief Complaint   Patient presents with     Port Draw     accessed withGripper needle, heparin locked,

## 2018-10-01 DIAGNOSIS — C22.1 CHOLANGIOCARCINOMA (H): ICD-10-CM

## 2018-10-01 DIAGNOSIS — M31.10 THROMBOTIC MICROANGIOPATHY (H): ICD-10-CM

## 2018-10-01 LAB
ANION GAP SERPL CALCULATED.3IONS-SCNC: 7 MMOL/L (ref 3–14)
BASOPHILS # BLD AUTO: 0 10E9/L (ref 0–0.2)
BASOPHILS NFR BLD AUTO: 0.2 %
BUN SERPL-MCNC: 48 MG/DL (ref 7–30)
CALCIUM SERPL-MCNC: 8.3 MG/DL (ref 8.5–10.1)
CHLORIDE SERPL-SCNC: 109 MMOL/L (ref 94–109)
CO2 SERPL-SCNC: 25 MMOL/L (ref 20–32)
CREAT SERPL-MCNC: 2.04 MG/DL (ref 0.66–1.25)
DIFFERENTIAL METHOD BLD: ABNORMAL
EOSINOPHIL # BLD AUTO: 0.2 10E9/L (ref 0–0.7)
EOSINOPHIL NFR BLD AUTO: 2.9 %
ERYTHROCYTE [DISTWIDTH] IN BLOOD BY AUTOMATED COUNT: 14.3 % (ref 10–15)
GFR SERPL CREATININE-BSD FRML MDRD: 33 ML/MIN/1.7M2
GLUCOSE SERPL-MCNC: 92 MG/DL (ref 70–99)
HCT VFR BLD AUTO: 23.5 % (ref 40–53)
HGB BLD-MCNC: 7.6 G/DL (ref 13.3–17.7)
IMM GRANULOCYTES # BLD: 0 10E9/L (ref 0–0.4)
IMM GRANULOCYTES NFR BLD: 0.2 %
LYMPHOCYTES # BLD AUTO: 0.8 10E9/L (ref 0.8–5.3)
LYMPHOCYTES NFR BLD AUTO: 15.1 %
MCH RBC QN AUTO: 33.5 PG (ref 26.5–33)
MCHC RBC AUTO-ENTMCNC: 32.3 G/DL (ref 31.5–36.5)
MCV RBC AUTO: 104 FL (ref 78–100)
MONOCYTES # BLD AUTO: 1.1 10E9/L (ref 0–1.3)
MONOCYTES NFR BLD AUTO: 18.9 %
NEUTROPHILS # BLD AUTO: 3.5 10E9/L (ref 1.6–8.3)
NEUTROPHILS NFR BLD AUTO: 62.7 %
NRBC # BLD AUTO: 0 10*3/UL
NRBC BLD AUTO-RTO: 0 /100
PLATELET # BLD AUTO: 114 10E9/L (ref 150–450)
POTASSIUM SERPL-SCNC: 4 MMOL/L (ref 3.4–5.3)
RBC # BLD AUTO: 2.27 10E12/L (ref 4.4–5.9)
RETICS # AUTO: 74.2 10E9/L (ref 25–95)
RETICS/RBC NFR AUTO: 3.3 % (ref 0.5–2)
SODIUM SERPL-SCNC: 141 MMOL/L (ref 133–144)
WBC # BLD AUTO: 5.6 10E9/L (ref 4–11)

## 2018-10-01 PROCEDURE — 85025 COMPLETE CBC W/AUTO DIFF WBC: CPT | Performed by: INTERNAL MEDICINE

## 2018-10-01 PROCEDURE — 80048 BASIC METABOLIC PNL TOTAL CA: CPT | Performed by: INTERNAL MEDICINE

## 2018-10-01 PROCEDURE — 25000128 H RX IP 250 OP 636: Performed by: INTERNAL MEDICINE

## 2018-10-01 PROCEDURE — 85045 AUTOMATED RETICULOCYTE COUNT: CPT | Performed by: INTERNAL MEDICINE

## 2018-10-01 RX ORDER — HEPARIN SODIUM (PORCINE) LOCK FLUSH IV SOLN 100 UNIT/ML 100 UNIT/ML
5 SOLUTION INTRAVENOUS ONCE
Status: COMPLETED | OUTPATIENT
Start: 2018-10-01 | End: 2018-10-01

## 2018-10-01 RX ADMIN — Medication 5 ML: at 08:36

## 2018-10-01 NOTE — NURSING NOTE
Chief Complaint   Patient presents with     Labs Only     labs drawn via port by RN     There were no vitals taken for this visit.    Port accessed by RN. Labs collected and sent. Line flushed with NS & Heparin. Pt tolerated well.    Arielle Burkett RN

## 2018-10-08 VITALS
HEART RATE: 62 BPM | RESPIRATION RATE: 18 BRPM | WEIGHT: 188.2 LBS | TEMPERATURE: 97.7 F | DIASTOLIC BLOOD PRESSURE: 98 MMHG | SYSTOLIC BLOOD PRESSURE: 167 MMHG | BODY MASS INDEX: 27 KG/M2 | OXYGEN SATURATION: 94 %

## 2018-10-08 DIAGNOSIS — M31.10 THROMBOTIC MICROANGIOPATHY (H): ICD-10-CM

## 2018-10-08 DIAGNOSIS — C22.1 CHOLANGIOCARCINOMA (H): ICD-10-CM

## 2018-10-08 LAB
ANION GAP SERPL CALCULATED.3IONS-SCNC: 8 MMOL/L (ref 3–14)
BASOPHILS # BLD AUTO: 0 10E9/L (ref 0–0.2)
BASOPHILS NFR BLD AUTO: 0.3 %
BUN SERPL-MCNC: 47 MG/DL (ref 7–30)
CALCIUM SERPL-MCNC: 8.2 MG/DL (ref 8.5–10.1)
CHLORIDE SERPL-SCNC: 108 MMOL/L (ref 94–109)
CO2 SERPL-SCNC: 24 MMOL/L (ref 20–32)
CREAT SERPL-MCNC: 2.06 MG/DL (ref 0.66–1.25)
DIFFERENTIAL METHOD BLD: ABNORMAL
EOSINOPHIL # BLD AUTO: 0.2 10E9/L (ref 0–0.7)
EOSINOPHIL NFR BLD AUTO: 2.9 %
ERYTHROCYTE [DISTWIDTH] IN BLOOD BY AUTOMATED COUNT: 13.7 % (ref 10–15)
GFR SERPL CREATININE-BSD FRML MDRD: 33 ML/MIN/1.7M2
GLUCOSE SERPL-MCNC: 97 MG/DL (ref 70–99)
HCT VFR BLD AUTO: 23.2 % (ref 40–53)
HGB BLD-MCNC: 7.6 G/DL (ref 13.3–17.7)
IMM GRANULOCYTES # BLD: 0 10E9/L (ref 0–0.4)
IMM GRANULOCYTES NFR BLD: 0.3 %
LYMPHOCYTES # BLD AUTO: 1.1 10E9/L (ref 0.8–5.3)
LYMPHOCYTES NFR BLD AUTO: 17.9 %
MCH RBC QN AUTO: 33.2 PG (ref 26.5–33)
MCHC RBC AUTO-ENTMCNC: 32.8 G/DL (ref 31.5–36.5)
MCV RBC AUTO: 101 FL (ref 78–100)
MONOCYTES # BLD AUTO: 0.9 10E9/L (ref 0–1.3)
MONOCYTES NFR BLD AUTO: 14.8 %
NEUTROPHILS # BLD AUTO: 4 10E9/L (ref 1.6–8.3)
NEUTROPHILS NFR BLD AUTO: 63.8 %
NRBC # BLD AUTO: 0 10*3/UL
NRBC BLD AUTO-RTO: 0 /100
PLATELET # BLD AUTO: 121 10E9/L (ref 150–450)
POTASSIUM SERPL-SCNC: 3.9 MMOL/L (ref 3.4–5.3)
RBC # BLD AUTO: 2.29 10E12/L (ref 4.4–5.9)
RETICS # AUTO: 65.3 10E9/L (ref 25–95)
RETICS/RBC NFR AUTO: 2.9 % (ref 0.5–2)
SODIUM SERPL-SCNC: 140 MMOL/L (ref 133–144)
WBC # BLD AUTO: 6.3 10E9/L (ref 4–11)

## 2018-10-08 PROCEDURE — 80048 BASIC METABOLIC PNL TOTAL CA: CPT | Performed by: INTERNAL MEDICINE

## 2018-10-08 PROCEDURE — 25000128 H RX IP 250 OP 636: Performed by: INTERNAL MEDICINE

## 2018-10-08 PROCEDURE — 85045 AUTOMATED RETICULOCYTE COUNT: CPT | Performed by: INTERNAL MEDICINE

## 2018-10-08 PROCEDURE — 85025 COMPLETE CBC W/AUTO DIFF WBC: CPT | Performed by: INTERNAL MEDICINE

## 2018-10-08 RX ORDER — HEPARIN SODIUM (PORCINE) LOCK FLUSH IV SOLN 100 UNIT/ML 100 UNIT/ML
5 SOLUTION INTRAVENOUS EVERY 8 HOURS
Status: DISCONTINUED | OUTPATIENT
Start: 2018-10-08 | End: 2018-10-16 | Stop reason: HOSPADM

## 2018-10-08 RX ADMIN — Medication 5 ML: at 08:15

## 2018-10-08 ASSESSMENT — PAIN SCALES - GENERAL: PAINLEVEL: NO PAIN (0)

## 2018-10-08 NOTE — NURSING NOTE
Chief Complaint   Patient presents with     Port Draw     Labs drawn via PORT by RN. Line flushed with saline and heparin. Deaccessed. Lab appt only.      Edd Rodrigues RN

## 2018-10-11 ENCOUNTER — MYC MEDICAL ADVICE (OUTPATIENT)
Dept: ONCOLOGY | Facility: CLINIC | Age: 63
End: 2018-10-11

## 2018-10-11 NOTE — TELEPHONE ENCOUNTER
Triage contacted patient regarding symptomatic MyChart message. Patient reports increased exhaustion and N/V since 10/8. States he has been taking Compazine for nausea. Denies fever or any other symptoms. States he is able to take in adequate fluids. Also wondering if he can have flu shot.    Writer discussed with Dr. De Los Santos: Recommends patient see NPP with lab appt Monday. Patient scheduled with Nadira Cedeno on Monday. Writer educated patient on multiple antiemetic options and patient will try using multiple antiemetics to help with N/V in the meantime until he is seen in clinic. Patient advised to proceed to ED if symptoms worsen significantly or if he develops a fever over the weekend.    Nathalie Patel RN   [facility] Triage

## 2018-10-15 ENCOUNTER — ONCOLOGY VISIT (OUTPATIENT)
Dept: ONCOLOGY | Facility: CLINIC | Age: 63
End: 2018-10-15
Attending: PHYSICIAN ASSISTANT
Payer: COMMERCIAL

## 2018-10-15 VITALS
OXYGEN SATURATION: 94 % | RESPIRATION RATE: 16 BRPM | HEART RATE: 80 BPM | TEMPERATURE: 97.6 F | DIASTOLIC BLOOD PRESSURE: 87 MMHG | WEIGHT: 188.6 LBS | BODY MASS INDEX: 27.06 KG/M2 | SYSTOLIC BLOOD PRESSURE: 183 MMHG

## 2018-10-15 DIAGNOSIS — T45.1X5A ANEMIA ASSOCIATED WITH CHEMOTHERAPY: ICD-10-CM

## 2018-10-15 DIAGNOSIS — M31.10 THROMBOTIC MICROANGIOPATHY (H): ICD-10-CM

## 2018-10-15 DIAGNOSIS — D64.81 ANEMIA ASSOCIATED WITH CHEMOTHERAPY: ICD-10-CM

## 2018-10-15 DIAGNOSIS — R53.83 FATIGUE, UNSPECIFIED TYPE: ICD-10-CM

## 2018-10-15 DIAGNOSIS — C22.1 CHOLANGIOCARCINOMA (H): Primary | ICD-10-CM

## 2018-10-15 DIAGNOSIS — D64.9 ANEMIA, UNSPECIFIED TYPE: ICD-10-CM

## 2018-10-15 DIAGNOSIS — K21.9 GASTROESOPHAGEAL REFLUX DISEASE, ESOPHAGITIS PRESENCE NOT SPECIFIED: ICD-10-CM

## 2018-10-15 LAB
ABO + RH BLD: NORMAL
ABO + RH BLD: NORMAL
ALBUMIN SERPL-MCNC: 2.9 G/DL (ref 3.4–5)
ALP SERPL-CCNC: 97 U/L (ref 40–150)
ALT SERPL W P-5'-P-CCNC: 21 U/L (ref 0–70)
ANION GAP SERPL CALCULATED.3IONS-SCNC: 9 MMOL/L (ref 3–14)
AST SERPL W P-5'-P-CCNC: 29 U/L (ref 0–45)
BASOPHILS # BLD AUTO: 0 10E9/L (ref 0–0.2)
BASOPHILS NFR BLD AUTO: 0.4 %
BILIRUB DIRECT SERPL-MCNC: 0.3 MG/DL (ref 0–0.2)
BILIRUB SERPL-MCNC: 0.8 MG/DL (ref 0.2–1.3)
BLD GP AB SCN SERPL QL: NORMAL
BLD PROD TYP BPU: NORMAL
BLOOD BANK CMNT PATIENT-IMP: NORMAL
BUN SERPL-MCNC: 52 MG/DL (ref 7–30)
CALCIUM SERPL-MCNC: 7.8 MG/DL (ref 8.5–10.1)
CHLORIDE SERPL-SCNC: 111 MMOL/L (ref 94–109)
CO2 SERPL-SCNC: 23 MMOL/L (ref 20–32)
CREAT SERPL-MCNC: 1.85 MG/DL (ref 0.66–1.25)
DIFFERENTIAL METHOD BLD: ABNORMAL
EOSINOPHIL # BLD AUTO: 0.2 10E9/L (ref 0–0.7)
EOSINOPHIL NFR BLD AUTO: 4.4 %
ERYTHROCYTE [DISTWIDTH] IN BLOOD BY AUTOMATED COUNT: 13.2 % (ref 10–15)
GFR SERPL CREATININE-BSD FRML MDRD: 37 ML/MIN/1.7M2
GLUCOSE SERPL-MCNC: 108 MG/DL (ref 70–99)
HCT VFR BLD AUTO: 24.7 % (ref 40–53)
HGB BLD-MCNC: 7.9 G/DL (ref 13.3–17.7)
IMM GRANULOCYTES # BLD: 0 10E9/L (ref 0–0.4)
IMM GRANULOCYTES NFR BLD: 0.2 %
LYMPHOCYTES # BLD AUTO: 0.8 10E9/L (ref 0.8–5.3)
LYMPHOCYTES NFR BLD AUTO: 14.4 %
MCH RBC QN AUTO: 32.4 PG (ref 26.5–33)
MCHC RBC AUTO-ENTMCNC: 32 G/DL (ref 31.5–36.5)
MCV RBC AUTO: 101 FL (ref 78–100)
MONOCYTES # BLD AUTO: 0.7 10E9/L (ref 0–1.3)
MONOCYTES NFR BLD AUTO: 12.4 %
NEUTROPHILS # BLD AUTO: 3.7 10E9/L (ref 1.6–8.3)
NEUTROPHILS NFR BLD AUTO: 68.2 %
NRBC # BLD AUTO: 0 10*3/UL
NRBC BLD AUTO-RTO: 0 /100
NUM BPU REQUESTED: 1
PLATELET # BLD AUTO: 131 10E9/L (ref 150–450)
POTASSIUM SERPL-SCNC: 3.9 MMOL/L (ref 3.4–5.3)
PROT SERPL-MCNC: 5.4 G/DL (ref 6.8–8.8)
RBC # BLD AUTO: 2.44 10E12/L (ref 4.4–5.9)
RETICS # AUTO: 56.6 10E9/L (ref 25–95)
RETICS/RBC NFR AUTO: 2.3 % (ref 0.5–2)
SODIUM SERPL-SCNC: 143 MMOL/L (ref 133–144)
SPECIMEN EXP DATE BLD: NORMAL
TSH SERPL DL<=0.005 MIU/L-ACNC: 3.88 MU/L (ref 0.4–4)
WBC # BLD AUTO: 5.5 10E9/L (ref 4–11)

## 2018-10-15 PROCEDURE — 25000128 H RX IP 250 OP 636: Mod: ZF | Performed by: PHYSICIAN ASSISTANT

## 2018-10-15 PROCEDURE — G0463 HOSPITAL OUTPT CLINIC VISIT: HCPCS | Mod: ZF

## 2018-10-15 PROCEDURE — 86923 COMPATIBILITY TEST ELECTRIC: CPT | Performed by: INTERNAL MEDICINE

## 2018-10-15 PROCEDURE — 36591 DRAW BLOOD OFF VENOUS DEVICE: CPT

## 2018-10-15 PROCEDURE — 80048 BASIC METABOLIC PNL TOTAL CA: CPT | Performed by: INTERNAL MEDICINE

## 2018-10-15 PROCEDURE — 85025 COMPLETE CBC W/AUTO DIFF WBC: CPT | Performed by: INTERNAL MEDICINE

## 2018-10-15 PROCEDURE — 85045 AUTOMATED RETICULOCYTE COUNT: CPT | Performed by: INTERNAL MEDICINE

## 2018-10-15 PROCEDURE — 86901 BLOOD TYPING SEROLOGIC RH(D): CPT | Performed by: INTERNAL MEDICINE

## 2018-10-15 PROCEDURE — 86850 RBC ANTIBODY SCREEN: CPT | Performed by: INTERNAL MEDICINE

## 2018-10-15 PROCEDURE — 80076 HEPATIC FUNCTION PANEL: CPT | Performed by: PHYSICIAN ASSISTANT

## 2018-10-15 PROCEDURE — 86301 IMMUNOASSAY TUMOR CA 19-9: CPT | Performed by: INTERNAL MEDICINE

## 2018-10-15 PROCEDURE — 84443 ASSAY THYROID STIM HORMONE: CPT | Performed by: INTERNAL MEDICINE

## 2018-10-15 PROCEDURE — 99214 OFFICE O/P EST MOD 30 MIN: CPT | Mod: ZP | Performed by: PHYSICIAN ASSISTANT

## 2018-10-15 PROCEDURE — 86900 BLOOD TYPING SEROLOGIC ABO: CPT | Performed by: INTERNAL MEDICINE

## 2018-10-15 PROCEDURE — 80076 HEPATIC FUNCTION PANEL: CPT | Performed by: INTERNAL MEDICINE

## 2018-10-15 PROCEDURE — 82668 ASSAY OF ERYTHROPOIETIN: CPT | Performed by: INTERNAL MEDICINE

## 2018-10-15 RX ORDER — HEPARIN SODIUM (PORCINE) LOCK FLUSH IV SOLN 100 UNIT/ML 100 UNIT/ML
5 SOLUTION INTRAVENOUS EVERY 8 HOURS
Status: DISCONTINUED | OUTPATIENT
Start: 2018-10-15 | End: 2018-10-15 | Stop reason: HOSPADM

## 2018-10-15 RX ADMIN — Medication 5 ML: at 09:45

## 2018-10-15 ASSESSMENT — PAIN SCALES - GENERAL: PAINLEVEL: NO PAIN (0)

## 2018-10-15 NOTE — LETTER
10/15/2018      RE: Girish Chauhan  [address] [facility] [id number]       Oncology/Hematology Visit Note  Oct 15, 2018    Reason for Visit: resected stage IV cholangiocarcinoma, add on    Oncology HPI: Girish Chauhan is a 62 year old man who developed RUQ pain in Dec 2015. He had progressive pain and weight loss. He had RUQ US imaging showing steatosis, then MRCP imaging which showed an ill defined mass at the confluence of the right and left hepatic ducts. He underwent resection in March 2016. Margins were negative and no lymph nodes were involved. Perineural and lymphovascular invasion was noted. He opted not to pursue adjuvant chemotherapy and had been observed without recurrence. CA 19-9 marker heydi from 33 on 7/7/17 to 53 on 10/6/17. Patient also noted to have increasing bladder nodules concerning for a urothelial malignancy on imaging. He then had cystoscopy and biopsy on 11/6/17 at [facility] which has come back consistent with metastatic cholangiocarcinoma. Pathology reviewed slides and felt morphologically similar to patient's known cholangiocarcinoma. He started on treatment with cisplatin and Gemzar on 12/13/17. Cisplatin was then held on 6/20/18 due to poor tolerance. Gemzar then held on 8/27 due to likelihood of causing TTP. Now on surveillance with stable disease.     He presents today as an add on for fatigue and vomiting.     Interval history:   Girish presents today for increasing fatigue and vomiting. He has been having increasing fatigue since the beginning of the summer. He now feels like he has no energy and that he has not been able to build up any stamina. He states that he will walk 100 ft and become tired and it doesn't matter how persistent he is, he still can only walk 100 ft. He admits to LYNCH though no SOB at rest. He denies CP. Has chronic cough, mainly at night. He will often nap during the day and states he can sleep for about 16 hours a day. He also has been having trouble with  appetite. Food does not taste the same and he has to force himself to eat. He is drinking 1 Ensure a day right now. He drinks a minimum of 60 oz of water a day. He says that his weight has been stable on his home scale. He denies any heartburn.    He has been having vomiting for the past 3 weeks. He does not notice any correlation to foods or eating. He vomits about once a day and it is usually small amounts. He does not have any nausea. He does not have much warning before he vomits. He has been having waxing and waning diarrhea which has been worse in the last month. He notices that his diarrhea is correlated to the foods he eats. He has chronic edema for which he take lasix every other day.     He has been checking his BP at home and it is usually 150-165 systolic with the diastolic in the 90s.    Denies fever, chills, nausea, abdominal pain, constipation, tinnitus, hearing loss, peripheral neuropathy, rash, SOB at rest, CP, change in urination    Current Outpatient Prescriptions   Medication Sig Dispense Refill     Apixaban (ELIQUIS PO) Take 5 mg by mouth 2 times daily       atorvastatin (LIPITOR) 40 MG tablet TAKE 1 TABLET BY MOUTH DAILY       digoxin (LANOXIN) 125 MCG tablet Take 250 mcg by mouth       Fexofenadine HCl (ALLEGRA PO) Take 180 mg by mouth daily       FUROSEMIDE PO Take 20 mg by mouth       metoprolol (LOPRESSOR) 50 MG tablet 50 mg 2 times daily       multivitamin, therapeutic with minerals (MULTI-VITAMIN) TABS Take 1 tablet by mouth daily 30 tablet 0     Nitroglycerin (NITROSTAT SL) Place 0.4 mg under the tongue every 5 minutes as needed for chest pain (Carries medication - has never used)        Omega-3 Fatty Acids (OMEGA-3 FISH OIL PO) Take 1,000 mg by mouth daily        Colchicine (MITIGARE) 0.6 MG CAPS Take 0.6 mg by mouth 3 times daily as needed       gemcitabine (GEMZAR) 1 GM injection        LORazepam (ATIVAN) 0.5 MG tablet Take 1 tablet (0.5 mg) by mouth every 4 hours as needed (Anxiety,  Nausea/Vomiting or Sleep) (Patient not taking: Reported on 7/30/2018) 30 tablet 5     ondansetron (ZOFRAN) 8 MG tablet Take 1 tablet (8 mg) by mouth every 8 hours as needed (Nausea/Vomiting) (Patient not taking: Reported on 6/20/2018) 10 tablet 5     prochlorperazine (COMPAZINE) 10 MG tablet Take 1 tablet (10 mg) by mouth every 6 hours as needed (Nausea/Vomiting) (Patient not taking: Reported on 6/20/2018) 30 tablet 5        No Known Allergies      Exam: alert, appears well.  Blood pressure 183/87, pulse 80, temperature 97.6  F (36.4  C), resp. rate 16, weight 85.5 kg (188 lb 9.6 oz), SpO2 94 %.  Wt Readings from Last 4 Encounters:   10/15/18 85.5 kg (188 lb 9.6 oz)   10/08/18 85.4 kg (188 lb 3.2 oz)   09/12/18 89.9 kg (198 lb 3.2 oz)   08/27/18 90.2 kg (198 lb 12.8 oz)   Gen: Alert, cooperative, pleasant, in NAD.   HEENT: Sclera anicteric, PERRL, EOMI. MMM, no lesions or thrush  Neck: supple and without adenopathy.   Lungs:CTAB   Heart: RRR, no m/r/g  Abdomen: soft, nontender, nondistended, BS active.  Had persistent belching while talking  Extremities: warm, no edema.   Neuro: Speech clear. CN wnl. Gait/station wnl. Grossly non-focal    Labs:    10/15/2018 09:45   Sodium 143   Potassium 3.9   Chloride 111 (H)   Carbon Dioxide 23   Urea Nitrogen 52 (H)   Creatinine 1.85 (H)   GFR Estimate 37 (L)   GFR Estimate If Black 45 (L)   Calcium 7.8 (L)   Anion Gap 9   Albumin 2.9 (L)   Protein Total 5.4 (L)   Bilirubin Total 0.8   Alkaline Phosphatase 97   ALT 21   AST 29   Bilirubin Direct 0.3 (H)   TSH 3.88   Glucose 108 (H)   WBC 5.5   Hemoglobin 7.9 (L)   Hematocrit 24.7 (L)   Platelet Count 131 (L)   RBC Count 2.44 (L)    (H)   MCH 32.4   MCHC 32.0   RDW 13.2   Diff Method Automated Method   % Neutrophils 68.2   % Lymphocytes 14.4   % Monocytes 12.4   % Eosinophils 4.4   % Basophils 0.4   % Immature Granulocytes 0.2   Nucleated RBCs 0   Absolute Neutrophil 3.7   Absolute Lymphocytes 0.8   Absolute Monocytes 0.7    Absolute Eosinophils 0.2   Absolute Basophils 0.0   Abs Immature Granulocytes 0.0   Absolute Nucleated RBC 0.0   % Retic 2.3 (H)   Absolute Retic 56.6       Impression/plan:     Resected stage IV cholangiocarcinoma with recurrence in bladder.  -CT CAP on 7/12/18 showed stable disease. PET on 8/15 showed stable disease with no suspicious FDG uptake.   -gemzar was held on 8/27 due to TTP. Now on survillence due to previously stable disease.   -LFTs normal today.  --Repeat CT CAP scheduled for 11/9 and [facility] on 11/12    Anemia and Thrombocytopenia; likely TTP 2/2 Gemzar  -gemzar last given on 8/27. Has persistent thrombocytopenia and anemia. Have checked iron studies, B12 and folate which were all WNL. Will continue to watch   -will get EPO level today as well as he has had decreasing kidney function since May 18  -transfuse if hgb <8 and plt <10. Hgb is 7.9 today which is fairly stable though he has increased fatigue so will give him 1 unit PRBC tomorrow  -continue weekly labs    Fatigue  -has chronic anemia 2/2 TTP. All other anemia workup neg.  -feels like his fatigue is increasing and has no stamina despite fairly stable hgb. Will check a TSH today to complete his fatigue workup. If normal, most likely due to anemia.     GI  -has been having vomiting for the past 3 weeks without warning. No correlation to intake. Has belching throughout the day and nocturnal coughing. Suspect he has underlying GERD. Will start him on omeprazole 20 mg daily. Instructed him to take 30 mins prior to eating. Can increase dose in the future if needed  -having waxing and waning diarrhea depending on what he eats. He is s/p cholecystectomy. Could consider adding cholestyramine    FEN  -has lost about 10 lbs in the last month according to our scale. He feels his home weight has been more stable. Discussed increasing his intake. Currently having 1 Ensure a day. Should either increase snacking or amount of ensure a day.   -Continue  drinking 64 oz of fluids a day    A-fib, s/p cardioversion in May 2017, hx of aortic aneurysm  -A-fib with RVR- Followed by Cards. Last saw Milagro Zimmerman PAC on 9/18. Controlled on metoprolol and digoxin. Cards ordered 24 hr Holter for monitoring with repeat ECHO to be done on 10/17.   -should call them about his increasing BPs. Today BP was 183/87. At home, BP has been 150-160/90s. Will likely need better control    Resected melanoma/SCC  -sees dermatology yearly. Last seen on 5/16. Several AKs treated with liquid nitrogen.    Ok to get the flu shot. Wants to do this at his clinic.    He knows to call if his symptoms persist or worsen.     Nadira Cedeno PA-C  [facility]  [address], [place] [id number] [phone]    Addendum: TSH normal. Most likely fatigue is due to anemia. Will continue to follow.

## 2018-10-15 NOTE — PROGRESS NOTES
Oncology/Hematology Visit Note  Oct 15, 2018    Reason for Visit: resected stage IV cholangiocarcinoma, add on    Oncology HPI: Girish Chauhan is a 62 year old man who developed RUQ pain in Dec 2015. He had progressive pain and weight loss. He had RUQ US imaging showing steatosis, then MRCP imaging which showed an ill defined mass at the confluence of the right and left hepatic ducts. He underwent resection in March 2016. Margins were negative and no lymph nodes were involved. Perineural and lymphovascular invasion was noted. He opted not to pursue adjuvant chemotherapy and had been observed without recurrence. CA 19-9 marker heydi from 33 on 7/7/17 to 53 on 10/6/17. Patient also noted to have increasing bladder nodules concerning for a urothelial malignancy on imaging. He then had cystoscopy and biopsy on 11/6/17 at [facility] which has come back consistent with metastatic cholangiocarcinoma. Pathology reviewed slides and felt morphologically similar to patient's known cholangiocarcinoma. He started on treatment with cisplatin and Gemzar on 12/13/17. Cisplatin was then held on 6/20/18 due to poor tolerance. Gemzar then held on 8/27 due to likelihood of causing TTP. Now on surveillance with stable disease.     He presents today as an add on for fatigue and vomiting.     Interval history:   Girish presents today for increasing fatigue and vomiting. He has been having increasing fatigue since the beginning of the summer. He now feels like he has no energy and that he has not been able to build up any stamina. He states that he will walk 100 ft and become tired and it doesn't matter how persistent he is, he still can only walk 100 ft. He admits to LYNCH though no SOB at rest. He denies CP. Has chronic cough, mainly at night. He will often nap during the day and states he can sleep for about 16 hours a day. He also has been having trouble with appetite. Food does not taste the same and he has to force himself to eat. He is  drinking 1 Ensure a day right now. He drinks a minimum of 60 oz of water a day. He says that his weight has been stable on his home scale. He denies any heartburn.    He has been having vomiting for the past 3 weeks. He does not notice any correlation to foods or eating. He vomits about once a day and it is usually small amounts. He does not have any nausea. He does not have much warning before he vomits. He has been having waxing and waning diarrhea which has been worse in the last month. He notices that his diarrhea is correlated to the foods he eats. He has chronic edema for which he take lasix every other day.     He has been checking his BP at home and it is usually 150-165 systolic with the diastolic in the 90s.    Denies fever, chills, nausea, abdominal pain, constipation, tinnitus, hearing loss, peripheral neuropathy, rash, SOB at rest, CP, change in urination    Current Outpatient Prescriptions   Medication Sig Dispense Refill     Apixaban (ELIQUIS PO) Take 5 mg by mouth 2 times daily       atorvastatin (LIPITOR) 40 MG tablet TAKE 1 TABLET BY MOUTH DAILY       digoxin (LANOXIN) 125 MCG tablet Take 250 mcg by mouth       Fexofenadine HCl (ALLEGRA PO) Take 180 mg by mouth daily       FUROSEMIDE PO Take 20 mg by mouth       metoprolol (LOPRESSOR) 50 MG tablet 50 mg 2 times daily       multivitamin, therapeutic with minerals (MULTI-VITAMIN) TABS Take 1 tablet by mouth daily 30 tablet 0     Nitroglycerin (NITROSTAT SL) Place 0.4 mg under the tongue every 5 minutes as needed for chest pain (Carries medication - has never used)        Omega-3 Fatty Acids (OMEGA-3 FISH OIL PO) Take 1,000 mg by mouth daily        Colchicine (MITIGARE) 0.6 MG CAPS Take 0.6 mg by mouth 3 times daily as needed       gemcitabine (GEMZAR) 1 GM injection        LORazepam (ATIVAN) 0.5 MG tablet Take 1 tablet (0.5 mg) by mouth every 4 hours as needed (Anxiety, Nausea/Vomiting or Sleep) (Patient not taking: Reported on 7/30/2018) 30 tablet 5      ondansetron (ZOFRAN) 8 MG tablet Take 1 tablet (8 mg) by mouth every 8 hours as needed (Nausea/Vomiting) (Patient not taking: Reported on 6/20/2018) 10 tablet 5     prochlorperazine (COMPAZINE) 10 MG tablet Take 1 tablet (10 mg) by mouth every 6 hours as needed (Nausea/Vomiting) (Patient not taking: Reported on 6/20/2018) 30 tablet 5        No Known Allergies      Exam: alert, appears well.  Blood pressure 183/87, pulse 80, temperature 97.6  F (36.4  C), resp. rate 16, weight 85.5 kg (188 lb 9.6 oz), SpO2 94 %.  Wt Readings from Last 4 Encounters:   10/15/18 85.5 kg (188 lb 9.6 oz)   10/08/18 85.4 kg (188 lb 3.2 oz)   09/12/18 89.9 kg (198 lb 3.2 oz)   08/27/18 90.2 kg (198 lb 12.8 oz)   Gen: Alert, cooperative, pleasant, in NAD.   HEENT: Sclera anicteric, PERRL, EOMI. MMM, no lesions or thrush  Neck: supple and without adenopathy.   Lungs:CTAB   Heart: RRR, no m/r/g  Abdomen: soft, nontender, nondistended, BS active.  Had persistent belching while talking  Extremities: warm, no edema.   Neuro: Speech clear. CN wnl. Gait/station wnl. Grossly non-focal    Labs:    10/15/2018 09:45   Sodium 143   Potassium 3.9   Chloride 111 (H)   Carbon Dioxide 23   Urea Nitrogen 52 (H)   Creatinine 1.85 (H)   GFR Estimate 37 (L)   GFR Estimate If Black 45 (L)   Calcium 7.8 (L)   Anion Gap 9   Albumin 2.9 (L)   Protein Total 5.4 (L)   Bilirubin Total 0.8   Alkaline Phosphatase 97   ALT 21   AST 29   Bilirubin Direct 0.3 (H)   TSH 3.88   Glucose 108 (H)   WBC 5.5   Hemoglobin 7.9 (L)   Hematocrit 24.7 (L)   Platelet Count 131 (L)   RBC Count 2.44 (L)    (H)   MCH 32.4   MCHC 32.0   RDW 13.2   Diff Method Automated Method   % Neutrophils 68.2   % Lymphocytes 14.4   % Monocytes 12.4   % Eosinophils 4.4   % Basophils 0.4   % Immature Granulocytes 0.2   Nucleated RBCs 0   Absolute Neutrophil 3.7   Absolute Lymphocytes 0.8   Absolute Monocytes 0.7   Absolute Eosinophils 0.2   Absolute Basophils 0.0   Abs Immature Granulocytes  0.0   Absolute Nucleated RBC 0.0   % Retic 2.3 (H)   Absolute Retic 56.6       Impression/plan:     Resected stage IV cholangiocarcinoma with recurrence in bladder.  -CT CAP on 7/12/18 showed stable disease. PET on 8/15 showed stable disease with no suspicious FDG uptake.   -gemzar was held on 8/27 due to TTP. Now on survillence due to previously stable disease.   -LFTs normal today.  --Repeat CT CAP scheduled for 11/9 and [facility] on 11/12    Anemia and Thrombocytopenia; likely TTP 2/2 Gemzar  -gemzar last given on 8/27. Has persistent thrombocytopenia and anemia. Have checked iron studies, B12 and folate which were all WNL. Will continue to watch   -will get EPO level today as well as he has had decreasing kidney function since May 18  -transfuse if hgb <8 and plt <10. Hgb is 7.9 today which is fairly stable though he has increased fatigue so will give him 1 unit PRBC tomorrow  -continue weekly labs    Fatigue  -has chronic anemia 2/2 TTP. All other anemia workup neg.  -feels like his fatigue is increasing and has no stamina despite fairly stable hgb. Will check a TSH today to complete his fatigue workup. If normal, most likely due to anemia.     GI  -has been having vomiting for the past 3 weeks without warning. No correlation to intake. Has belching throughout the day and nocturnal coughing. Suspect he has underlying GERD. Will start him on omeprazole 20 mg daily. Instructed him to take 30 mins prior to eating. Can increase dose in the future if needed  -having waxing and waning diarrhea depending on what he eats. He is s/p cholecystectomy. Could consider adding cholestyramine    FEN  -has lost about 10 lbs in the last month according to our scale. He feels his home weight has been more stable. Discussed increasing his intake. Currently having 1 Ensure a day. Should either increase snacking or amount of ensure a day.   -Continue drinking 64 oz of fluids a day    A-fib, s/p cardioversion in May 2017, hx of aortic  aneurysm  -A-fib with RVR- Followed by Cards. Last saw Milagro Zimmerman PAC on 9/18. Controlled on metoprolol and digoxin. Cards ordered 24 hr Holter for monitoring with repeat ECHO to be done on 10/17.   -should call them about his increasing BPs. Today BP was 183/87. At home, BP has been 150-160/90s. Will likely need better control    Resected melanoma/SCC  -sees dermatology yearly. Last seen on 5/16. Several AKs treated with liquid nitrogen.    Ok to get the flu shot. Wants to do this at his clinic.    He knows to call if his symptoms persist or worsen.     Nadira Cedeno PA-C  [facility]  [address] [place] [address] [phone]    Addendum: TSH normal. Most likely fatigue is due to anemia. Will continue to follow.

## 2018-10-15 NOTE — MR AVS SNAPSHOT
After Visit Summary   10/15/2018    Girish Chauhan    MRN: [id number]           Patient Information     Date Of Birth          1955        Visit Information        Provider Department      10/15/2018 9:30 AM Nadira Cedeno PA-C [facility]        Today's Diagnoses     Cholangiocarcinoma (H)    -  1    Thrombotic microangiopathy (H)        Fatigue, unspecified type        Gastroesophageal reflux disease, esophagitis presence not specified        Anemia associated with chemotherapy        Anemia, unspecified type           Follow-ups after your visit        Your next 10 appointments already scheduled     Oct 16, 2018  7:00 AM CDT   Infusion 240 with UC ONCOLOGY INFUSION, UC 11 ATC   [facility] ([facility])    [address]  [address]  [facility] [address]   [phone]            Oct 22, 2018  8:00 AM CDT   Masonic Lab Draw with UC MASONIC LAB DRAW   [facility] Masonic Lab Draw ([facility])    [address]  [address]  [place] [facility] [address]   [phone]            Nov 09, 2018  6:15 AM CST   Masonic Lab Draw with  MASONIC LAB DRAW   [facility] Masonic Lab Draw ([facility])    [address]  [address]  [place] [facility] [address]   [phone]            Nov 09, 2018  7:00 AM CST   CT CHEST ABDOMEN PELVIS W/O & W CONTRAST with UCCT2   [facility] [place] [facility] ([facility])    [address]  1st Floor  [place] [facility] [address]   [phone]           How do I prepare for my exam? (Food and drink instructions) To prepare: Do not eat or drink for 2 hours before your exam. If you need to take medicine, you may take it with small sips of water. (We may ask you to take liquid medicine as well.)  How do I prepare for my exam? (Other instructions) Please arrive 30 minutes early for your CT.  Once in the department you might be  asked to drink water 15-20 minutes prior to your exam.  If indicated you may be asked to drink an oral contrast in advance of your CT.  If this is the case, the imaging team will let you know or be in contact with you prior to your appointment  Patients over 70 or patients with diabetes or kidney problems: If you haven t had a blood test (creatinine test) within the last 30 days, the Cardiologist/Radiologist may require you to get this test prior to your exam.  If you have diabetes:  Continue to take your metformin medication on the day of your exam  What should I wear: Please wear loose clothing, such as a sweat suit or jogging clothes. Avoid snaps, zippers and other metal. We may ask you to undress and put on a hospital gown.  How long does the exam take: Most scans take less than 20 minutes.  What should I bring: Please bring any scans or X-rays taken at other hospitals, if similar tests were done. Also bring a list of your medicines, including vitamins, minerals and over-the-counter drugs. It is safest to leave personal items at home.  Do I need a : No  is needed.  What do I need to tell my doctor? Be sure to tell your doctor: * If you have any allergies. * If there s any chance you are pregnant. * If you are breastfeeding.  What should I do after the exam: No restrictions, You may resume normal activities.  What is this test: A CT (computed tomography) scan is a series of pictures that allows us to look inside your body. The scanner creates images of the body in cross sections, much like slices of bread. This helps us see any problems more clearly. You may receive contrast (X-ray dye) before or during your scan. You will be asked to drink the contrast.  Who should I call with questions: If you have any questions, please call the Imaging Department where you will have your exam. Directions, parking instructions, and other information is available on our website, [email or web address].            Nov  12, 2018  7:15 AM CST   (Arrive by 7:00 AM)   Return Visit with Naresh De Los Santos MD   [facility] [place]    [address]  [address]  [facility] [address] [phone]              Future tests that were ordered for you today     Open Standing Orders        Priority Remaining Interval Expires Ordered    Red blood cell prepare order unit Routine 99/100 CONDITIONAL (SPECIFY) BLOOD  10/15/2018            Who to contact     If you have questions or need follow up information about today's clinic visit or your schedule please contact [facility] directly at [phone].  Normal or non-critical lab and imaging results will be communicated to you by MyChart, letter or phone within 4 business days after the clinic has received the results. If you do not hear from us within 7 days, please contact the clinic through Octonotcot or phone. If you have a critical or abnormal lab result, we will notify you by phone as soon as possible.  Submit refill requests through Taykey or call your pharmacy and they will forward the refill request to us. Please allow 3 business days for your refill to be completed.          Additional Information About Your Visit        [employer] Information     Taykey gives you secure access to your electronic health record. If you see a primary care provider, you can also send messages to your care team and make appointments. If you have questions, please call your primary care clinic.  If you do not have a primary care provider, please call [phone] and they will assist you.        Care EveryWhere ID     This is your Care EveryWhere ID. This could be used by other organizations to access your [place] medical records  [id number]        Your Vitals Were     Pulse Temperature Respirations Pulse Oximetry BMI (Body Mass Index)       80 97.6  F (36.4  C) 16 94% 27.06 kg/m2        Blood Pressure from Last 3 Encounters:    10/15/18 183/87   10/08/18 (!) 167/98   09/12/18 132/84    Weight from Last 3 Encounters:   10/15/18 85.5 kg (188 lb 9.6 oz)   10/08/18 85.4 kg (188 lb 3.2 oz)   09/12/18 89.9 kg (198 lb 3.2 oz)              We Performed the Following     Basic metabolic panel     Cancer antigen 19-9     CBC with platelets differential     Erythropoietin     Hepatic panel     Reticulocyte count     TSH with free T4 reflex          Today's Medication Changes          These changes are accurate as of 10/15/18  4:28 PM.  If you have any questions, ask your nurse or doctor.               Start taking these medicines.        Dose/Directions    omeprazole 20 MG CR capsule   Commonly known as:  priLOSEC   Used for:  Gastroesophageal reflux disease, esophagitis presence not specified   Started by:  Nadira Cedeno PA-C        Dose:  20 mg   Take 1 capsule (20 mg) by mouth daily   Quantity:  30 capsule   Refills:  3         Stop taking these medicines if you haven't already. Please contact your care team if you have questions.     gemcitabine 1 GM injection   Commonly known as:  GEMZAR   Stopped by:  Nadira Cedeno PA-C                Where to get your medicines      These medications were sent to [facility] [address] [place] [address] [address] AT [place] [address] [address] [facility] [address]     Phone:  [phone]     omeprazole 20 MG CR capsule                Primary Care Provider Office Phone # Fax #    Jim Kaplan [phone]       [address]   [facility] [address]        Equal Access to Services     [facility] HospitalKHANG AH: Hadii aad ku hadasho Soomaali, waaxda luqadaha, qaybta kaalmada adeegyada, ankit gross. So [facility] [phone].    ATENCIÓN: Si habla español, tiene a flores disposición servicios gratuitos de asistencia lingüística. Angela chung [phone].    We comply with applicable federal civil rights laws and  [place] laws. We do not discriminate on the basis of race, color, national origin, age, disability, sex, sexual orientation, or gender identity.            Thank you!     Thank you for choosing [facility]  for your care. Our goal is always to provide you with excellent care. Hearing back from our patients is one way we can continue to improve our services. Please take a few minutes to complete the written survey that you may receive in the mail after your visit with us. Thank you!             Your Updated Medication List - Protect others around you: Learn how to safely use, store and throw away your medicines at www.disposemymeds.org.          This list is accurate as of 10/15/18  4:28 PM.  Always use your most recent med list.                   Brand Name Dispense Instructions for use Diagnosis    ALLEGRA PO      Take 180 mg by mouth daily        atorvastatin 40 MG tablet    LIPITOR     TAKE 1 TABLET BY MOUTH DAILY        digoxin 125 MCG tablet    LANOXIN     Take 250 mcg by mouth        ELIQUIS PO      Take 5 mg by mouth 2 times daily        FUROSEMIDE PO      Take 20 mg by mouth        LORazepam 0.5 MG tablet    ATIVAN    30 tablet    Take 1 tablet (0.5 mg) by mouth every 4 hours as needed (Anxiety, Nausea/Vomiting or Sleep)    Cholangiocarcinoma (H)       metoprolol tartrate 50 MG tablet    LOPRESSOR     50 mg 2 times daily        MITIGARE 0.6 MG capsule   Generic drug:  colchicine      Take 0.6 mg by mouth 3 times daily as needed        multivitamin, therapeutic with minerals Tabs tablet     30 tablet    Take 1 tablet by mouth daily    Mass of bile duct       NITROSTAT SL      Place 0.4 mg under the tongue every 5 minutes as needed for chest pain (Carries medication - has never used)        OMEGA-3 FISH OIL PO      Take 1,000 mg by mouth daily        omeprazole 20 MG CR capsule    priLOSEC    30 capsule    Take 1 capsule (20 mg) by mouth daily    Gastroesophageal reflux disease,  esophagitis presence not specified       ondansetron 8 MG tablet    ZOFRAN    10 tablet    Take 1 tablet (8 mg) by mouth every 8 hours as needed (Nausea/Vomiting)    Cholangiocarcinoma (H)       prochlorperazine 10 MG tablet    COMPAZINE    30 tablet    Take 1 tablet (10 mg) by mouth every 6 hours as needed (Nausea/Vomiting)    Cholangiocarcinoma (H)

## 2018-10-15 NOTE — NURSING NOTE
"Oncology Rooming Note    October 15, 2018 10:01 AM   Girish Chauhan is a 63 year old male who presents for:    Chief Complaint   Patient presents with     Port Draw     accessed with Gripper needle, heparin locked, vitals checked     Oncology Clinic Visit     Return; Bladder Cancer     Initial Vitals: /87  Pulse 80  Temp 97.6  F (36.4  C)  Resp 16  Wt 85.5 kg (188 lb 9.6 oz)  SpO2 94%  BMI 27.06 kg/m2 Estimated body mass index is 27.06 kg/(m^2) as calculated from the following:    Height as of 7/16/18: 1.778 m (5' 10\").    Weight as of this encounter: 85.5 kg (188 lb 9.6 oz). Body surface area is 2.05 meters squared.  No Pain (0) Comment: Data Unavailable   No LMP for male patient.  Allergies reviewed: Yes  Medications reviewed: Yes    Medications: Medication refills not needed today.  Pharmacy name entered into [employer]: [facility] [address] [place] [address] [address] AT [facility]    Clinical concerns: Patient has some concerns about his ability to eat/appetite, has had episodes of vomiting, sometimes right after a meal. Also has concerns about his afib, and that his heart seems to beat harder than normal. nilo was notified.    5 minutes for nursing intake (face to face time)     Annamarie Lara MA              "

## 2018-10-16 ENCOUNTER — INFUSION THERAPY VISIT (OUTPATIENT)
Dept: ONCOLOGY | Facility: CLINIC | Age: 63
End: 2018-10-16
Attending: INTERNAL MEDICINE
Payer: COMMERCIAL

## 2018-10-16 VITALS
HEART RATE: 50 BPM | TEMPERATURE: 97.6 F | RESPIRATION RATE: 16 BRPM | SYSTOLIC BLOOD PRESSURE: 180 MMHG | OXYGEN SATURATION: 100 % | DIASTOLIC BLOOD PRESSURE: 82 MMHG

## 2018-10-16 DIAGNOSIS — D64.81 ANEMIA ASSOCIATED WITH CHEMOTHERAPY: Primary | ICD-10-CM

## 2018-10-16 DIAGNOSIS — T45.1X5A ANEMIA ASSOCIATED WITH CHEMOTHERAPY: Primary | ICD-10-CM

## 2018-10-16 LAB
BLD PROD TYP BPU: NORMAL
BLD UNIT ID BPU: 0
BLOOD PRODUCT CODE: NORMAL
BPU ID: NORMAL
TRANSFUSION STATUS PATIENT QL: NORMAL
TRANSFUSION STATUS PATIENT QL: NORMAL

## 2018-10-16 PROCEDURE — 25000128 H RX IP 250 OP 636: Mod: ZF | Performed by: INTERNAL MEDICINE

## 2018-10-16 PROCEDURE — P9016 RBC LEUKOCYTES REDUCED: HCPCS | Performed by: INTERNAL MEDICINE

## 2018-10-16 PROCEDURE — 36430 TRANSFUSION BLD/BLD COMPNT: CPT

## 2018-10-16 RX ORDER — HEPARIN SODIUM (PORCINE) LOCK FLUSH IV SOLN 100 UNIT/ML 100 UNIT/ML
5 SOLUTION INTRAVENOUS ONCE
Status: COMPLETED | OUTPATIENT
Start: 2018-10-16 | End: 2018-10-16

## 2018-10-16 RX ADMIN — Medication 5 ML: at 09:29

## 2018-10-16 ASSESSMENT — PAIN SCALES - GENERAL: PAINLEVEL: NO PAIN (0)

## 2018-10-16 NOTE — PROGRESS NOTES
Infusion Nursing Note:  Girish Chauhan presents today for 1 unit PRBC.    Patient seen by provider today: Seen by Nadira Cedeno PA-C yesterday.    present during visit today: Not Applicable.    Note:   RN reviewed SATNAM Cedeno PAC's note from yesterday; known hypertension acknowledged. Plan to give 1 unit PRBCs for hgb of 7.9 today. Today pt's BP running 175/82- 180/82. Pulse irregular ranging from 50-70 (h/o afib). Krishna took metoprolol this am and denies any new or concerning complaints. He is seeing his cardiologist tomorrow. Discussed with Dr. De Los Santos; okay for patient to discharge to home. Pt has a BP cuff at home. RN advised him to check it if he is feeling unwell or develops headache, vision changes, etc. Pt verbalized understanding of this information.     Intravenous Access:  Implanted Port.    Treatment Conditions:  Lab Results   Component Value Date    HGB 7.9; orders to transfuse 1 unit to keep above 8.0 10/15/2018     Lab Results   Component Value Date    WBC 5.5 10/15/2018      Lab Results   Component Value Date    ANEU 3.7 10/15/2018     Lab Results   Component Value Date     10/15/2018      Blood transfusion consent signed 8/27/18.      Post Infusion Assessment:  Patient tolerated infusion without incident.  Blood return noted pre and post infusion.  No evidence of extravasations.  Access discontinued per protocol.    Discharge Plan:   Copy of AVS reviewed with patient and/or family.  Patient will return 10/22 for next appointment for labs and then early November for lab/scans/Dr. De Los Santos.  Patient discharged in stable condition accompanied by: self.  Departure Mode: Ambulatory.    Mini Pitts RN

## 2018-10-16 NOTE — MR AVS SNAPSHOT
After Visit Summary   10/16/2018    Girish Chauhan    MRN: [id number]           Patient Information     Date Of Birth          1955        Visit Information        Provider Department      10/16/2018 7:00 AM UC 11 ATC; UC ONCOLOGY INFUSION [facility]        Today's Diagnoses     Anemia associated with chemotherapy    -  1       Follow-ups after your visit        Your next 10 appointments already scheduled     Oct 22, 2018  8:00 AM CDT   Masonic Lab Draw with  MASONIC LAB DRAW   [facility] Masonic Lab Draw ([facility])    [address]  [address]  [facility] [address]   [phone]            Nov 09, 2018  6:15 AM CST   Masonic Lab Draw with  MASONIC LAB DRAW   [facility]    [address]  [address]  [facility] [address]   [phone]            Nov 09, 2018  7:00 AM CST   CT CHEST ABDOMEN PELVIS W/O & W CONTRAST with UCCT2   [facility] CT ([facility])    [address]  1st Floor  [facility] [address]   [phone]           How do I prepare for my exam? (Food and drink instructions) To prepare: Do not eat or drink for 2 hours before your exam. If you need to take medicine, you may take it with small sips of water. (We may ask you to take liquid medicine as well.)  How do I prepare for my exam? (Other instructions) Please arrive 30 minutes early for your CT.  Once in the department you might be asked to drink water 15-20 minutes prior to your exam.  If indicated you may be asked to drink an oral contrast in advance of your CT.  If this is the case, the imaging team will let you know or be in contact with you prior to your appointment  Patients over 70 or patients with diabetes or kidney problems: If you haven t had a blood test (creatinine test) within the last 30 days, the Cardiologist/Radiologist may require you to get  this test prior to your exam.  If you have diabetes:  Continue to take your metformin medication on the day of your exam  What should I wear: Please wear loose clothing, such as a sweat suit or jogging clothes. Avoid snaps, zippers and other metal. We may ask you to undress and put on a hospital gown.  How long does the exam take: Most scans take less than 20 minutes.  What should I bring: Please bring any scans or X-rays taken at other hospitals, if similar tests were done. Also bring a list of your medicines, including vitamins, minerals and over-the-counter drugs. It is safest to leave personal items at home.  Do I need a : No  is needed.  What do I need to tell my doctor? Be sure to tell your doctor: * If you have any allergies. * If there s any chance you are pregnant. * If you are breastfeeding.  What should I do after the exam: No restrictions, You may resume normal activities.  What is this test: A CT (computed tomography) scan is a series of pictures that allows us to look inside your body. The scanner creates images of the body in cross sections, much like slices of bread. This helps us see any problems more clearly. You may receive contrast (X-ray dye) before or during your scan. You will be asked to drink the contrast.  Who should I call with questions: If you have any questions, please call the Imaging Department where you will have your exam. Directions, parking instructions, and other information is available on our website, [email or web address].            Nov 12, 2018  7:15 AM CST   (Arrive by 7:00 AM)   Return Visit with Naresh De Los Santos MD   [facility] ([facility]    [address] [place] [address]  [address]  [facility] [address] [phone]              Future tests that were ordered for you today     Open Standing Orders        Priority Remaining Interval Expires Ordered    Transfuse red blood cell unit Routine 99/100 TRANSFUSE 1 UNIT   10/16/2018    Red blood cell prepare order unit Routine 99/100 CONDITIONAL (SPECIFY) BLOOD  10/15/2018            Who to contact     If you have questions or need follow up information about today's clinic visit or your schedule please contact [facility] directly at [phone].  Normal or non-critical lab and imaging results will be communicated to you by MyChart, letter or phone within 4 business days after the clinic has received the results. If you do not hear from us within 7 days, please contact the clinic through [email or web address] or phone. If you have a critical or abnormal lab result, we will notify you by phone as soon as possible.  Submit refill requests through [email or web address] or call your pharmacy and they will forward the refill request to us. Please allow 3 business days for your refill to be completed.          Additional Information About Your Visit        MyChart Information     [email or web address] gives you secure access to your electronic health record. If you see a primary care provider, you can also send messages to your care team and make appointments. If you have questions, please call your primary care clinic.  If you do not have a primary care provider, please call [phone] and they will assist you.        Care EveryWhere ID     This is your Care EveryWhere ID. This could be used by other organizations to access your [place] medical records  [id number]        Your Vitals Were     Pulse Temperature Respirations Pulse Oximetry          50 97.6  F (36.4  C) (Oral) 16 100%         Blood Pressure from Last 3 Encounters:   10/16/18 180/82   10/15/18 183/87   10/08/18 (!) 167/98    Weight from Last 3 Encounters:   10/15/18 85.5 kg (188 lb 9.6 oz)   10/08/18 85.4 kg (188 lb 3.2 oz)   09/12/18 89.9 kg (198 lb 3.2 oz)              We Performed the Following     Transfuse red blood cell unit        Primary Care Provider Office Phone # Fax #    Jim Kaplan [phone]        [facility] [address]   [facility] [place] [address]        Equal Access to Services     MARIUSZ MADRIGAL : Hadii aad ku hadyessicanaina Sokarina, waaxda lupascaleadaha, qaybta kasofiada rubina, ankit silversinanissa gross. So [facility] [phone].    ATENCIÓN: Si habla español, tiene a flores disposición servicios gratuitos de asistencia lingüística. Angela al [phone].    We comply with applicable federal civil rights laws and [place] laws. We do not discriminate on the basis of race, color, national origin, age, disability, sex, sexual orientation, or gender identity.            Thank you!     Thank you for choosing [facility] CLINIC  for your care. Our goal is always to provide you with excellent care. Hearing back from our patients is one way we can continue to improve our services. Please take a few minutes to complete the written survey that you may receive in the mail after your visit with us. Thank you!             Your Updated Medication List - Protect others around you: Learn how to safely use, store and throw away your medicines at www.disposemymeds.org.          This list is accurate as of 10/16/18 11:03 AM.  Always use your most recent med list.                   Brand Name Dispense Instructions for use Diagnosis    ALLEGRA PO      Take 180 mg by mouth daily        atorvastatin 40 MG tablet    LIPITOR     TAKE 1 TABLET BY MOUTH DAILY        digoxin 125 MCG tablet    LANOXIN     Take 250 mcg by mouth        ELIQUIS PO      Take 5 mg by mouth 2 times daily        FUROSEMIDE PO      Take 20 mg by mouth        LORazepam 0.5 MG tablet    ATIVAN    30 tablet    Take 1 tablet (0.5 mg) by mouth every 4 hours as needed (Anxiety, Nausea/Vomiting or Sleep)    Cholangiocarcinoma (H)       metoprolol tartrate 50 MG tablet    LOPRESSOR     50 mg 2 times daily        MITIGARE 0.6 MG capsule   Generic drug:  colchicine      Take 0.6 mg by mouth 3 times daily as needed        multivitamin,  therapeutic with minerals Tabs tablet     30 tablet    Take 1 tablet by mouth daily    Mass of bile duct       NITROSTAT SL      Place 0.4 mg under the tongue every 5 minutes as needed for chest pain (Carries medication - has never used)        OMEGA-3 FISH OIL PO      Take 1,000 mg by mouth daily        omeprazole 20 MG CR capsule    priLOSEC    30 capsule    Take 1 capsule (20 mg) by mouth daily    Gastroesophageal reflux disease, esophagitis presence not specified       ondansetron 8 MG tablet    ZOFRAN    10 tablet    Take 1 tablet (8 mg) by mouth every 8 hours as needed (Nausea/Vomiting)    Cholangiocarcinoma (H)       prochlorperazine 10 MG tablet    COMPAZINE    30 tablet    Take 1 tablet (10 mg) by mouth every 6 hours as needed (Nausea/Vomiting)    Cholangiocarcinoma (H)

## 2018-10-17 LAB
CANCER AG19-9 SERPL-ACNC: 29 U/ML (ref 0–37)
EPO SERPL-ACNC: 29 MU/ML (ref 4–27)

## 2018-10-22 DIAGNOSIS — M31.10 THROMBOTIC MICROANGIOPATHY (H): ICD-10-CM

## 2018-10-22 DIAGNOSIS — C22.1 CHOLANGIOCARCINOMA (H): ICD-10-CM

## 2018-10-22 LAB
ANION GAP SERPL CALCULATED.3IONS-SCNC: 7 MMOL/L (ref 3–14)
BASOPHILS # BLD AUTO: 0 10E9/L (ref 0–0.2)
BASOPHILS NFR BLD AUTO: 0.5 %
BUN SERPL-MCNC: 29 MG/DL (ref 7–30)
CALCIUM SERPL-MCNC: 8.3 MG/DL (ref 8.5–10.1)
CHLORIDE SERPL-SCNC: 112 MMOL/L (ref 94–109)
CO2 SERPL-SCNC: 24 MMOL/L (ref 20–32)
CREAT SERPL-MCNC: 1.64 MG/DL (ref 0.66–1.25)
DIFFERENTIAL METHOD BLD: ABNORMAL
EOSINOPHIL # BLD AUTO: 0.2 10E9/L (ref 0–0.7)
EOSINOPHIL NFR BLD AUTO: 3.5 %
ERYTHROCYTE [DISTWIDTH] IN BLOOD BY AUTOMATED COUNT: 13.2 % (ref 10–15)
GFR SERPL CREATININE-BSD FRML MDRD: 43 ML/MIN/1.7M2
GLUCOSE SERPL-MCNC: 92 MG/DL (ref 70–99)
HCT VFR BLD AUTO: 25.7 % (ref 40–53)
HGB BLD-MCNC: 8.4 G/DL (ref 13.3–17.7)
IMM GRANULOCYTES # BLD: 0 10E9/L (ref 0–0.4)
IMM GRANULOCYTES NFR BLD: 0.3 %
LYMPHOCYTES # BLD AUTO: 0.9 10E9/L (ref 0.8–5.3)
LYMPHOCYTES NFR BLD AUTO: 14.3 %
MCH RBC QN AUTO: 32.2 PG (ref 26.5–33)
MCHC RBC AUTO-ENTMCNC: 32.7 G/DL (ref 31.5–36.5)
MCV RBC AUTO: 99 FL (ref 78–100)
MONOCYTES # BLD AUTO: 0.8 10E9/L (ref 0–1.3)
MONOCYTES NFR BLD AUTO: 13.4 %
NEUTROPHILS # BLD AUTO: 4 10E9/L (ref 1.6–8.3)
NEUTROPHILS NFR BLD AUTO: 68 %
NRBC # BLD AUTO: 0 10*3/UL
NRBC BLD AUTO-RTO: 0 /100
PLATELET # BLD AUTO: 138 10E9/L (ref 150–450)
POTASSIUM SERPL-SCNC: 4.2 MMOL/L (ref 3.4–5.3)
RBC # BLD AUTO: 2.61 10E12/L (ref 4.4–5.9)
RETICS # AUTO: 43.3 10E9/L (ref 25–95)
RETICS/RBC NFR AUTO: 1.7 % (ref 0.5–2)
SODIUM SERPL-SCNC: 143 MMOL/L (ref 133–144)
WBC # BLD AUTO: 6 10E9/L (ref 4–11)

## 2018-10-22 PROCEDURE — 80048 BASIC METABOLIC PNL TOTAL CA: CPT | Performed by: INTERNAL MEDICINE

## 2018-10-22 PROCEDURE — 85025 COMPLETE CBC W/AUTO DIFF WBC: CPT | Performed by: INTERNAL MEDICINE

## 2018-10-22 PROCEDURE — 25000128 H RX IP 250 OP 636: Performed by: INTERNAL MEDICINE

## 2018-10-22 PROCEDURE — 85045 AUTOMATED RETICULOCYTE COUNT: CPT | Performed by: INTERNAL MEDICINE

## 2018-10-22 RX ORDER — HEPARIN SODIUM (PORCINE) LOCK FLUSH IV SOLN 100 UNIT/ML 100 UNIT/ML
5 SOLUTION INTRAVENOUS
Status: COMPLETED | OUTPATIENT
Start: 2018-10-22 | End: 2018-10-22

## 2018-10-22 RX ADMIN — Medication 5 ML: at 08:44

## 2018-10-22 NOTE — NURSING NOTE
"Chief Complaint   Patient presents with     Lab Only     labs drawn from port by rn.      Port accessed with 20 gauge 3/4\" gripper needle and labs drawn by rn.  Port flushed with NS and heparin then de-accessed.  Pt tolerated well.  Lashawn Gill RN\    "

## 2018-11-09 ENCOUNTER — RADIANT APPOINTMENT (OUTPATIENT)
Dept: CT IMAGING | Facility: CLINIC | Age: 63
End: 2018-11-09
Attending: INTERNAL MEDICINE
Payer: COMMERCIAL

## 2018-11-09 DIAGNOSIS — C22.1 CHOLANGIOCARCINOMA (H): ICD-10-CM

## 2018-11-09 DIAGNOSIS — C79.11 SECONDARY MALIGNANT NEOPLASM OF BLADDER (H): ICD-10-CM

## 2018-11-09 DIAGNOSIS — M31.10 THROMBOTIC MICROANGIOPATHY (H): ICD-10-CM

## 2018-11-09 LAB
ANION GAP SERPL CALCULATED.3IONS-SCNC: 8 MMOL/L (ref 3–14)
BASOPHILS # BLD AUTO: 0 10E9/L (ref 0–0.2)
BASOPHILS NFR BLD AUTO: 0.3 %
BUN SERPL-MCNC: 29 MG/DL (ref 7–30)
CALCIUM SERPL-MCNC: 8.2 MG/DL (ref 8.5–10.1)
CHLORIDE SERPL-SCNC: 113 MMOL/L (ref 94–109)
CO2 SERPL-SCNC: 24 MMOL/L (ref 20–32)
CREAT BLD-MCNC: 1.8 MG/DL (ref 0.66–1.25)
CREAT SERPL-MCNC: 1.77 MG/DL (ref 0.66–1.25)
DIFFERENTIAL METHOD BLD: ABNORMAL
EOSINOPHIL # BLD AUTO: 0.2 10E9/L (ref 0–0.7)
EOSINOPHIL NFR BLD AUTO: 3.1 %
ERYTHROCYTE [DISTWIDTH] IN BLOOD BY AUTOMATED COUNT: 13.8 % (ref 10–15)
GFR SERPL CREATININE-BSD FRML MDRD: 39 ML/MIN/1.7M2
GFR SERPL CREATININE-BSD FRML MDRD: 39 ML/MIN/1.7M2
GLUCOSE SERPL-MCNC: 99 MG/DL (ref 70–99)
HCT VFR BLD AUTO: 25.3 % (ref 40–53)
HGB BLD-MCNC: 7.8 G/DL (ref 13.3–17.7)
IMM GRANULOCYTES # BLD: 0 10E9/L (ref 0–0.4)
IMM GRANULOCYTES NFR BLD: 0.2 %
LYMPHOCYTES # BLD AUTO: 1.1 10E9/L (ref 0.8–5.3)
LYMPHOCYTES NFR BLD AUTO: 18.7 %
MCH RBC QN AUTO: 31 PG (ref 26.5–33)
MCHC RBC AUTO-ENTMCNC: 30.8 G/DL (ref 31.5–36.5)
MCV RBC AUTO: 100 FL (ref 78–100)
MONOCYTES # BLD AUTO: 0.8 10E9/L (ref 0–1.3)
MONOCYTES NFR BLD AUTO: 13.6 %
NEUTROPHILS # BLD AUTO: 3.8 10E9/L (ref 1.6–8.3)
NEUTROPHILS NFR BLD AUTO: 64.1 %
NRBC # BLD AUTO: 0 10*3/UL
NRBC BLD AUTO-RTO: 0 /100
PLATELET # BLD AUTO: 135 10E9/L (ref 150–450)
POTASSIUM SERPL-SCNC: 4.1 MMOL/L (ref 3.4–5.3)
RBC # BLD AUTO: 2.52 10E12/L (ref 4.4–5.9)
RETICS # AUTO: 71.1 10E9/L (ref 25–95)
RETICS/RBC NFR AUTO: 2.8 % (ref 0.5–2)
SODIUM SERPL-SCNC: 145 MMOL/L (ref 133–144)
WBC # BLD AUTO: 5.9 10E9/L (ref 4–11)

## 2018-11-09 PROCEDURE — 80048 BASIC METABOLIC PNL TOTAL CA: CPT | Performed by: INTERNAL MEDICINE

## 2018-11-09 PROCEDURE — 25000128 H RX IP 250 OP 636: Performed by: INTERNAL MEDICINE

## 2018-11-09 PROCEDURE — 85045 AUTOMATED RETICULOCYTE COUNT: CPT | Performed by: INTERNAL MEDICINE

## 2018-11-09 PROCEDURE — 85025 COMPLETE CBC W/AUTO DIFF WBC: CPT | Performed by: INTERNAL MEDICINE

## 2018-11-09 PROCEDURE — 86301 IMMUNOASSAY TUMOR CA 19-9: CPT | Performed by: INTERNAL MEDICINE

## 2018-11-09 RX ORDER — HEPARIN SODIUM (PORCINE) LOCK FLUSH IV SOLN 100 UNIT/ML 100 UNIT/ML
5 SOLUTION INTRAVENOUS ONCE
Status: COMPLETED | OUTPATIENT
Start: 2018-11-09 | End: 2018-11-09

## 2018-11-09 RX ORDER — IOPAMIDOL 755 MG/ML
115 INJECTION, SOLUTION INTRAVASCULAR ONCE
Status: COMPLETED | OUTPATIENT
Start: 2018-11-09 | End: 2018-11-09

## 2018-11-09 RX ADMIN — HEPARIN 5 ML: 100 SYRINGE at 06:36

## 2018-11-09 RX ADMIN — IOPAMIDOL 115 ML: 755 INJECTION, SOLUTION INTRAVASCULAR at 07:19

## 2018-11-09 NOTE — DISCHARGE INSTRUCTIONS

## 2018-11-09 NOTE — NURSING NOTE
Chief Complaint   Patient presents with     Port Draw     Labs drawn via port by RN.     Port accessed with 20g flat needle by RN, labs collected, line flushed with saline and heparin.  Vitals taken. Pt checked in for appointment(s).    Esther CHRISTIANSON RN PHN BSN  BMT/Oncology Lab

## 2018-11-10 LAB — CANCER AG19-9 SERPL-ACNC: 36 U/ML (ref 0–37)

## 2018-11-12 ENCOUNTER — ONCOLOGY VISIT (OUTPATIENT)
Dept: ONCOLOGY | Facility: CLINIC | Age: 63
End: 2018-11-12
Attending: INTERNAL MEDICINE
Payer: COMMERCIAL

## 2018-11-12 VITALS
HEIGHT: 70 IN | BODY MASS INDEX: 25.97 KG/M2 | RESPIRATION RATE: 18 BRPM | DIASTOLIC BLOOD PRESSURE: 105 MMHG | WEIGHT: 181.4 LBS | SYSTOLIC BLOOD PRESSURE: 182 MMHG | TEMPERATURE: 95.9 F | OXYGEN SATURATION: 95 % | HEART RATE: 79 BPM

## 2018-11-12 DIAGNOSIS — C79.11 SECONDARY MALIGNANT NEOPLASM OF BLADDER (H): ICD-10-CM

## 2018-11-12 DIAGNOSIS — M31.10 THROMBOTIC MICROANGIOPATHY (H): ICD-10-CM

## 2018-11-12 DIAGNOSIS — T45.1X5A ANEMIA ASSOCIATED WITH CHEMOTHERAPY: ICD-10-CM

## 2018-11-12 DIAGNOSIS — D64.81 ANEMIA ASSOCIATED WITH CHEMOTHERAPY: ICD-10-CM

## 2018-11-12 DIAGNOSIS — C22.1 CHOLANGIOCARCINOMA (H): Primary | ICD-10-CM

## 2018-11-12 PROCEDURE — G0463 HOSPITAL OUTPT CLINIC VISIT: HCPCS | Mod: ZF

## 2018-11-12 PROCEDURE — 99215 OFFICE O/P EST HI 40 MIN: CPT | Mod: ZP | Performed by: INTERNAL MEDICINE

## 2018-11-12 RX ORDER — LOSARTAN POTASSIUM 25 MG/1
25 TABLET ORAL DAILY
COMMUNITY
Start: 2018-10-29 | End: 2019-11-06 | Stop reason: ALTCHOICE

## 2018-11-12 ASSESSMENT — PAIN SCALES - GENERAL: PAINLEVEL: NO PAIN (0)

## 2018-11-12 NOTE — LETTER
11/12/2018      RE: Girish Chauhan  [address] [facility] [address]       Reason for visit: metastatic cholangiocarcinoma, tumor surveillance and follow-up    Subjective: Mr. Chauhan is a 63-year old man who presents with his wife today for planned tumor surveillance of metastatic cholangiocarcinoma. Previously, he had developed RUQ pain in Dec 2015. He had progressive pain and weight loss. He had RUQ US imaging showing steatosis, then MRCP imaging which showed an ill defined mass at the confluence of the right and left hepatic ducts. He underwent resection in March 2016. Margins were negative and no lymph nodes were involved. Perineural and lymphovascular invasion was noted. He opted not to pursue adjuvant chemotherapy and had been observed without recurrence. CA 19-9 marker heydi from 33 on 7/7/17 to 53 on 10/6/17. Patient also noted to have increasing bladder nodules concerning for a urothelial malignancy on imaging. He then had cystoscopy and biopsy on 11/6/17 at [facility] which has come back consistent with metastatic cholangiocarcinoma. Pathology reviewed slides and felt morphologically similar to patient's known cholangiocarcinoma. He started on treatment with cisplatin and Gemzar on 12/13/17. Cisplatin was then held on 6/20/18 due to poor tolerance. Gemzar then held on 8/27 due to likelihood of causing TTP. Now on surveillance with stable disease.     Today, Mr. Chauhan is feeling better than his visit a month before. The RBC infusion has helped his appetite, although his taste of food is still variable. His weight at home is stable. He has no pain. His nausea and vomiting have drastically improved to where he has no symptoms; he attributes this to his increased appetite. His energy level is still low, where he rates it a 3/10 where 10 is where he ideally wants to be. He is able to go grocery shopping and walk for 10 minutes without needing to take a break or sit down. However, he feels that he cannot  retain or improve stamina when he tries to walk around an indoor track. Otherwise, he takes his blood pressure at home a few times a week and has noticed a marked increase over the past couple weeks. He denies headache, angina, or other cardiovascular symptoms.    ROS:  General: Appetite improved but still issues with taste, no weight change, energy level better but not optimal, no fevers  Resp: No cough or sob  Cardio: No angina or palpitations  Neuro: No pain or headache  GI: no nausea or vomiting, no constipation or diarrhea  Otherwise 10-pt ROS within normal limits.    Objective:  Vitals:  /105  Pulse 79  RR 18  Temp 35.5  SpO2 95%  Weight 82.3 kg  Height 1.778 m  BMI 26.08    Exam:  General: Looks older than stated age, alert and attentive  Eyes: No scleral icterus, extraocular muscles intact  Cardio: RRR, no murmur  Resp: Upper lobes clear to auscultation and resonant to percussion, lower left lobe has distant breath sounds upon auscultation and dull to percussion  Lymph: No cervical or supraclavicular adenopathy  Skin: No new lumps or rashes  Neuro: all CN intact, gait normal  Musculoskeletal: 3+ pitting edema bilaterally in legs    Labs:  Elevated Na, Cl, Cr but stable from a month ago. Cr at 1.77. Anemic with Hemoglobin at 7.8 but at same level pre-infusion. Reticulocytes elevated at 2.8%. CA-19-9 within normal limits. Low calcium at 8.2.    Imaging:  Reviewed with patient, cancer remains stable in bladder but a little more pleural fluid bilaterally and fluid in pelvic region.    Assessment/Plan:  Mr. Chauhan is a 63-year old man with stable metastatic cholangiocarcinoma and marked hypertension. He has a little worsening of his kidney function and edema. Due to aortic stenosis, we would like him to visit his cardiologist in coming week to help us better manage blood pressure. We will transfuse 1 unit RBC with a little extra diuretics this week to manage symptoms better. Will restart monthly  darbepoeitin injections for anemia if his blood pressure gets better. He will see NP in one month with labs to monitor symptoms. Will f/u with in 3 months with labs and imaging for routine tumor surveillance.    Scribe Disclosure:   Efrain Malcolm is serving as a scribe; to document services personally performed byNaresh De Los Santos -based on data collection and the provider's statements to me.     Provider Disclosure:  I agree with above History, Review of Systems, Physical exam and Plan.  I have reviewed the content of the documentation and have edited it as needed. I have personally performed the services documented here and the documentation accurately represents those services and the decisions I have made.          Naresh De Los Santos MD

## 2018-11-12 NOTE — MR AVS SNAPSHOT
After Visit Summary   11/12/2018    Girish Chauhan    MRN: [id number]           Patient Information     Date Of Birth          1955        Visit Information        Provider Department      11/12/2018 7:15 AM Naresh De Los Santos MD [facility]        Today's Diagnoses     Cholangiocarcinoma (H)    -  1    Secondary malignant neoplasm of bladder (H)        Thrombotic microangiopathy (H)        Anemia associated with chemotherapy           Follow-ups after your visit        Follow-up notes from your care team     Return in about 3 months (around 2/11/2019) for MD visit with CT and labs.      Your next 10 appointments already scheduled     Nov 14, 2018  6:30 AM CST   M[facility] Lab Draw with [facility] MASONIC LAB DRAW   [facility]    [address] [place] [address]  [address]  [facility] [address]   [phone]            Nov 14, 2018  7:00 AM CST   Infusion 180 with UC ONCOLOGY INFUSION, UC 12 ATC   [facility] ([facility])    [address] [place] [address]  [address]  [facility] [address]   [phone]            Dec 13, 2018  6:30 AM CST   Masonic Lab Draw with  MASONIC LAB DRAW   [facility] Masonic Lab Draw ([facility])    [address] [place] [address]  [address]  [facility] [address]   [phone]            Dec 13, 2018  7:00 AM CST   (Arrive by 6:45 AM)   Return Visit with YO Jasso CNP   [facility] ([facility])    [address] [place] [address]  [address]  [facility] [address]   [phone]            Jan 09, 2019  7:00 AM CST   Nurse Visit with  Oncology Nurse   [facility] ([facility])    [address] [place] [address]  [address]  [facility] [address]   [phone]            Feb 08, 2019  6:30 AM CST   Masonic Lab Draw with UC MASONIC LAB DRAW   [facility] Masonic Lab Draw (  [facility])    [address]  [address]  [facility] [address]   [phone]            Feb 08, 2019  7:00 AM CST   CT CHEST ABDOMEN PELVIS W/O & W CONTRAST with UCCT1   [facility] CT ([facility])    [address]  1st Floor  [facility] [address]   [phone]           How do I prepare for my exam? (Food and drink instructions) To prepare: Do not eat or drink for 2 hours before your exam. If you need to take medicine, you may take it with small sips of water. (We may ask you to take liquid medicine as well.)  How do I prepare for my exam? (Other instructions) Please arrive 30 minutes early for your CT.  Once in the department you might be asked to drink water 15-20 minutes prior to your exam.  If indicated you may be asked to drink an oral contrast in advance of your CT.  If this is the case, the imaging team will let you know or be in contact with you prior to your appointment  Patients over 70 or patients with diabetes or kidney problems: If you haven t had a blood test (creatinine test) within the last 30 days, the Cardiologist/Radiologist may require you to get this test prior to your exam.  If you have diabetes:  Continue to take your metformin medication on the day of your exam  What should I wear: Please wear loose clothing, such as a sweat suit or jogging clothes. Avoid snaps, zippers and other metal. We may ask you to undress and put on a hospital gown.  How long does the exam take: Most scans take less than 20 minutes.  What should I bring: Please bring any scans or X-rays taken at other hospitals, if similar tests were done. Also bring a list of your medicines, including vitamins, minerals and over-the-counter drugs. It is safest to leave personal items at home.  Do I need a : No  is needed.  What do I need to tell my doctor? Be sure to tell your doctor: * If you have any allergies. * If there s any chance  you are pregnant. * If you are breastfeeding.  What should I do after the exam: No restrictions, You may resume normal activities.  What is this test: A CT (computed tomography) scan is a series of pictures that allows us to look inside your body. The scanner creates images of the body in cross sections, much like slices of bread. This helps us see any problems more clearly. You may receive contrast (X-ray dye) before or during your scan. You will be asked to drink the contrast.  Who should I call with questions: If you have any questions, please call the Imaging Department where you will have your exam. Directions, parking instructions, and other information is available on our website, [email or web address].            Feb 11, 2019  7:15 AM CST   (Arrive by 7:00 AM)   Return Visit with Naresh De Los Santos MD   [facility] ([facility])    [address] [place], [place] [address] [phone]              Future tests that were ordered for you today     Open Future Orders        Priority Expected Expires Ordered    CBC with platelets differential Routine 12/10/2018 12/25/2018 11/12/2018    Comprehensive metabolic panel Routine 12/10/2018 12/25/2018 11/12/2018    CT Chest Abdomen Pelvis w/o & w Contrast Routine 2/4/2019 3/12/2019 11/12/2018    Comprehensive metabolic panel Routine 2/4/2019 3/12/2019 11/12/2018    CBC with platelets differential Routine 2/4/2019 3/12/2019 11/12/2018    Cancer antigen 19-9 Routine 2/4/2019 3/12/2019 11/12/2018    Reticulocyte count Routine 2/4/2019 3/12/2019 11/12/2018            Who to contact     If you have questions or need follow up information about today's clinic visit or your schedule please contact [facility] directly at [phone].  Normal or non-critical lab and imaging results will be communicated to you by MyChart, letter or phone within 4 business days after the clinic has received the  "results. If you do not hear from us within 7 days, please contact the clinic through [facility] or phone. If you have a critical or abnormal lab result, we will notify you by phone as soon as possible.  Submit refill requests through Sc[employer] 5 or call your pharmacy and they will forward the refill request to us. Please allow 3 business days for your refill to be completed.          Additional Information About Your Visit        Scope 5 Information     Scope 5 gives you secure access to your electronic health record. If you see a primary care provider, you can also send messages to your care team and make appointments. If you have questions, please call your primary care clinic.  If you do not have a primary care provider, please call [phone] and they will assist you.        Care EveryWhere ID     This is your Care EveryWhere ID. This could be used by other organizations to access your [facility] medical records  [id number]        Your Vitals Were     Pulse Temperature Respirations Height Pulse Oximetry BMI (Body Mass Index)    79 95.9  F (35.5  C) (Tympanic) 18 1.778 m (5' 10\") 95% 26.03 kg/m2       Blood Pressure from Last 3 Encounters:   11/12/18 (!) 182/105   10/16/18 180/82   10/15/18 183/87    Weight from Last 3 Encounters:   11/12/18 82.3 kg (181 lb 6.4 oz)   10/15/18 85.5 kg (188 lb 9.6 oz)   10/08/18 85.4 kg (188 lb 3.2 oz)                 Today's Medication Changes          These changes are accurate as of 11/12/18  8:42 AM.  If you have any questions, ask your nurse or doctor.               Stop taking these medicines if you haven't already. Please contact your care team if you have questions.     LORazepam 0.5 MG tablet   Commonly known as:  ATIVAN   Stopped by:  Naresh De Los Santos MD           ondansetron 8 MG tablet   Commonly known as:  ZOFRAN   Stopped by:  Naresh De Los Santos MD           prochlorperazine 10 MG tablet   Commonly known as:  COMPAZINE   Stopped by:  Naresh De Los Santos MD "                    Primary Care Provider Office Phone # Fax #    Jim Kaplan [phone]       [address]   [facility] [address]        Equal Access to Services     MARIUSZ MADRIGAL : Hadlisset adina stearns juan david Sogabrielali, waaxda luqadaha, qaybta kaalmada adeegyada, ankit srivastava laJo Annjewel gross. So [facility] [phone].    ATENCIÓN: Si habla español, tiene a flores disposición servicios gratuitos de asistencia lingüística. Llame al [phone].    We comply with applicable federal civil rights laws and [place] laws. We do not discriminate on the basis of race, color, national origin, age, disability, sex, sexual orientation, or gender identity.            Thank you!     Thank you for choosing [facility]  for your care. Our goal is always to provide you with excellent care. Hearing back from our patients is one way we can continue to improve our services. Please take a few minutes to complete the written survey that you may receive in the mail after your visit with us. Thank you!             Your Updated Medication List - Protect others around you: Learn how to safely use, store and throw away your medicines at www.disposemymeds.org.          This list is accurate as of 11/12/18  8:42 AM.  Always use your most recent med list.                   Brand Name Dispense Instructions for use Diagnosis    ALLEGRA PO      Take 180 mg by mouth daily        atorvastatin 40 MG tablet    LIPITOR     TAKE 1 TABLET BY MOUTH DAILY        diclofenac 1 % Gel topical gel    VOLTAREN     Apply two grams to the affected area, up to four times daily.        digoxin 125 MCG tablet    LANOXIN     Take 250 mcg by mouth        ELIQUIS PO      Take 5 mg by mouth 2 times daily        FUROSEMIDE PO      Take 20 mg by mouth        losartan 25 MG tablet    COZAAR     Take 25 mg by mouth        metoprolol tartrate 50 MG tablet    LOPRESSOR     50 mg 2 times daily         MITIGARE 0.6 MG capsule   Generic drug:  colchicine      Take 0.6 mg by mouth 3 times daily as needed        multivitamin, therapeutic with minerals Tabs tablet     30 tablet    Take 1 tablet by mouth daily    Mass of bile duct       NITROSTAT SL      Place 0.4 mg under the tongue every 5 minutes as needed for chest pain (Carries medication - has never used)        OMEGA-3 FISH OIL PO      Take 1,000 mg by mouth daily        omeprazole 20 MG CR capsule    priLOSEC    30 capsule    Take 1 capsule (20 mg) by mouth daily    Gastroesophageal reflux disease, esophagitis presence not specified

## 2018-11-12 NOTE — Clinical Note
11/12/2018       RE: Girish Chauhan  [address] [facility] [address]     Dear Colleague,    Thank you for referring your patient, Girish Chauhan, to the [facility]. Please see a copy of my visit note below.    Reason for visit: metastatic cholangiocarcinoma, routine tumor surveillance and follow-up    Subjective: Mr. Chauhan is a 63-year old man who presents with his wife today for routine tumor surveillance of metastatic cholangiocarcinoma. Previously, he had developed RUQ pain in Dec 2015. He had progressive pain and weight loss. He had RUQ US imaging showing steatosis, then MRCP imaging which showed an ill defined mass at the confluence of the right and left hepatic ducts. He underwent resection in March 2016. Margins were negative and no lymph nodes were involved. Perineural and lymphovascular invasion was noted. He opted not to pursue adjuvant chemotherapy and had been observed without recurrence. CA 19-9 marker heydi from 33 on 7/7/17 to 53 on 10/6/17. Patient also noted to have increasing bladder nodules concerning for a urothelial malignancy on imaging. He then had cystoscopy and biopsy on 11/6/17 at [facility] which has come back consistent with metastatic cholangiocarcinoma. Pathology reviewed slides and felt morphologically similar to patient's known cholangiocarcinoma. He started on treatment with cisplatin and Gemzar on 12/13/17. Cisplatin was then held on 6/20/18 due to poor tolerance. Gemzar then held on 8/27 due to likelihood of causing TTP. Now on surveillance with stable disease.     Today, Mr. Chauhan is feeling better than his visit a month before. The RBC infusion has helped his appetite, although his taste of food is still variable. His weight at home is stable. He has no pain. His nausea and vomiting have drastically improved to where he has no symptoms; he attributes this to his increased appetite. His energy level is still low, where he rates it a 3/10 where 10 is  where he ideally wants to be. He is able to go grocery shopping and walk for 10 minutes without needing to take a break or sit down. However, he feels that he cannot retain or improve stamina when he tries to walk around an indoor track. Otherwise, he takes his blood pressure at home a few times a week and has noticed a marked increase over the past couple weeks. He denies headache, angina, or other cardiovascular symptoms.    ROS:  General: Appetite improved but still issues with taste, no weight change, energy level better but not optimal, no fevers  Resp: No cough or sob  Cardio: No angina or palpitations  Neuro: No pain or headache  GI: no nausea or vomiting, no constipation or diarrhea  Otherwise 10-pt ROS within normal limits.    Objective:  Vitals:  /105  Pulse 79  RR 18  Temp 35.5  SpO2 95%  Weight 82.3 kg  Height 1.778 m  BMI 26.08    Exam:  General: Looks older than stated age, alert and attentive  Eyes: No scleral icterus, extraocular muscles intact  Cardio: RRR, no murmur  Resp: Upper lobes clear to auscultation and resonant to percussion, lower left lobe has distant breath sounds upon auscultation and dull to percussion  Lymph: No cervical or supraclavicular adenopathy  Skin: No new lumps or rashes  Neuro: all CN intact, gait normal  Musculoskeletal: 3+ pitting edema bilaterally in legs    Labs:  Elevated Na, Cl, Cr but stable from a month ago. Cr at 1.77. Anemic with Hemoglobin at 7.8 but at same level pre-infusion. Reticulocytes elevated at 2.8%. CA-19-9 within normal limits. Low calcium at 8.2.    Imaging:  Per radiology, cancer remains stable in bladder but more pleural edema bilaterally and fluid in pelvic region.    Assessment/Plan:  Mr. Chauhan is a 63-year old man with stable metastatic cholangiocarcinoma and marked hypertension. This is likely causing him to feel more tired than otherwise and is likely worsening his kidney function and edema. Due to aortic stenosis, recommend visit to  cardiologist in coming week to better manage blood pressure. Will transfuse 1 unit RBC this week to manage symptoms better. Will restart monthly darbepoeitin injections for anemia. Will see NP in one month with labs to monitor symptoms. Will f/u with oncology in 3 months with labs and imaging for routine tumor surveillance.    Again, thank you for allowing me to participate in the care of your patient.      Sincerely,    Naresh De Los Santos MD

## 2018-11-12 NOTE — PROGRESS NOTES
Reason for visit: metastatic cholangiocarcinoma, tumor surveillance and follow-up    Subjective: Mr. Chauhan is a 63-year old man who presents with his wife today for planned tumor surveillance of metastatic cholangiocarcinoma. Previously, he had developed RUQ pain in Dec 2015. He had progressive pain and weight loss. He had RUQ US imaging showing steatosis, then MRCP imaging which showed an ill defined mass at the confluence of the right and left hepatic ducts. He underwent resection in March 2016. Margins were negative and no lymph nodes were involved. Perineural and lymphovascular invasion was noted. He opted not to pursue adjuvant chemotherapy and had been observed without recurrence. CA 19-9 marker heydi from 33 on 7/7/17 to 53 on 10/6/17. Patient also noted to have increasing bladder nodules concerning for a urothelial malignancy on imaging. He then had cystoscopy and biopsy on 11/6/17 at [facility] which has come back consistent with metastatic cholangiocarcinoma. Pathology reviewed slides and felt morphologically similar to patient's known cholangiocarcinoma. He started on treatment with cisplatin and Gemzar on 12/13/17. Cisplatin was then held on 6/20/18 due to poor tolerance. Gemzar then held on 8/27 due to likelihood of causing TTP. Now on surveillance with stable disease.     Today, Mr. Chauhan is feeling better than his visit a month before. The RBC infusion has helped his appetite, although his taste of food is still variable. His weight at home is stable. He has no pain. His nausea and vomiting have drastically improved to where he has no symptoms; he attributes this to his increased appetite. His energy level is still low, where he rates it a 3/10 where 10 is where he ideally wants to be. He is able to go grocery shopping and walk for 10 minutes without needing to take a break or sit down. However, he feels that he cannot retain or improve stamina when he tries to walk around an indoor track. Otherwise, he  takes his blood pressure at home a few times a week and has noticed a marked increase over the past couple weeks. He denies headache, angina, or other cardiovascular symptoms.    ROS:  General: Appetite improved but still issues with taste, no weight change, energy level better but not optimal, no fevers  Resp: No cough or sob  Cardio: No angina or palpitations  Neuro: No pain or headache  GI: no nausea or vomiting, no constipation or diarrhea  Otherwise 10-pt ROS within normal limits.    Objective:  Vitals:  /105  Pulse 79  RR 18  Temp 35.5  SpO2 95%  Weight 82.3 kg  Height 1.778 m  BMI 26.08    Exam:  General: Looks older than stated age, alert and attentive  Eyes: No scleral icterus, extraocular muscles intact  Cardio: RRR, no murmur  Resp: Upper lobes clear to auscultation and resonant to percussion, lower left lobe has distant breath sounds upon auscultation and dull to percussion  Lymph: No cervical or supraclavicular adenopathy  Skin: No new lumps or rashes  Neuro: all CN intact, gait normal  Musculoskeletal: 3+ pitting edema bilaterally in legs    Labs:  Elevated Na, Cl, Cr but stable from a month ago. Cr at 1.77. Anemic with Hemoglobin at 7.8 but at same level pre-infusion. Reticulocytes elevated at 2.8%. CA-19-9 within normal limits. Low calcium at 8.2.    Imaging:  Reviewed with patient, cancer remains stable in bladder but a little more pleural fluid bilaterally and fluid in pelvic region.    Assessment/Plan:  Mr. Chauhan is a 63-year old man with stable metastatic cholangiocarcinoma and marked hypertension. He has a little worsening of his kidney function and edema. Due to aortic stenosis, we would like him to visit his cardiologist in coming week to help us better manage blood pressure. We will transfuse 1 unit RBC with a little extra diuretics this week to manage symptoms better. Will restart monthly darbepoeitin injections for anemia if his blood pressure gets better. He will see NP in one month  with labs to monitor symptoms. Will f/u with in 3 months with labs and imaging for routine tumor surveillance.    Scribe Disclosure:   Efrain Malcolm is serving as a scribe; to document services personally performed byNaresh De Los Santos -based on data collection and the provider's statements to me.     Provider Disclosure:  I agree with above History, Review of Systems, Physical exam and Plan.  I have reviewed the content of the documentation and have edited it as needed. I have personally performed the services documented here and the documentation accurately represents those services and the decisions I have made.

## 2018-11-14 ENCOUNTER — APPOINTMENT (OUTPATIENT)
Dept: LAB | Facility: CLINIC | Age: 63
End: 2018-11-14
Attending: INTERNAL MEDICINE
Payer: COMMERCIAL

## 2018-11-14 ENCOUNTER — INFUSION THERAPY VISIT (OUTPATIENT)
Dept: ONCOLOGY | Facility: CLINIC | Age: 63
End: 2018-11-14
Attending: INTERNAL MEDICINE
Payer: COMMERCIAL

## 2018-11-14 VITALS
OXYGEN SATURATION: 97 % | SYSTOLIC BLOOD PRESSURE: 200 MMHG | TEMPERATURE: 97.5 F | HEART RATE: 75 BPM | WEIGHT: 180.1 LBS | BODY MASS INDEX: 25.84 KG/M2 | RESPIRATION RATE: 18 BRPM | DIASTOLIC BLOOD PRESSURE: 116 MMHG

## 2018-11-14 DIAGNOSIS — C22.1 CHOLANGIOCARCINOMA (H): Primary | ICD-10-CM

## 2018-11-14 DIAGNOSIS — D64.81 ANEMIA ASSOCIATED WITH CHEMOTHERAPY: ICD-10-CM

## 2018-11-14 DIAGNOSIS — C79.11 SECONDARY MALIGNANT NEOPLASM OF BLADDER (H): ICD-10-CM

## 2018-11-14 DIAGNOSIS — T45.1X5A ANEMIA ASSOCIATED WITH CHEMOTHERAPY: ICD-10-CM

## 2018-11-14 LAB
ABO + RH BLD: NORMAL
ABO + RH BLD: NORMAL
ALBUMIN SERPL-MCNC: 3.1 G/DL (ref 3.4–5)
ALP SERPL-CCNC: 81 U/L (ref 40–150)
ALT SERPL W P-5'-P-CCNC: 22 U/L (ref 0–70)
ANION GAP SERPL CALCULATED.3IONS-SCNC: 8 MMOL/L (ref 3–14)
AST SERPL W P-5'-P-CCNC: 22 U/L (ref 0–45)
BASOPHILS # BLD AUTO: 0 10E9/L (ref 0–0.2)
BASOPHILS NFR BLD AUTO: 0.5 %
BILIRUB SERPL-MCNC: 0.8 MG/DL (ref 0.2–1.3)
BLD GP AB SCN SERPL QL: NORMAL
BLD PROD TYP BPU: NORMAL
BLD PROD TYP BPU: NORMAL
BLD UNIT ID BPU: 0
BLOOD BANK CMNT PATIENT-IMP: NORMAL
BLOOD PRODUCT CODE: NORMAL
BPU ID: NORMAL
BUN SERPL-MCNC: 27 MG/DL (ref 7–30)
CALCIUM SERPL-MCNC: 8.4 MG/DL (ref 8.5–10.1)
CHLORIDE SERPL-SCNC: 111 MMOL/L (ref 94–109)
CO2 SERPL-SCNC: 23 MMOL/L (ref 20–32)
CREAT SERPL-MCNC: 1.7 MG/DL (ref 0.66–1.25)
DIFFERENTIAL METHOD BLD: ABNORMAL
EOSINOPHIL # BLD AUTO: 0.2 10E9/L (ref 0–0.7)
EOSINOPHIL NFR BLD AUTO: 2.8 %
ERYTHROCYTE [DISTWIDTH] IN BLOOD BY AUTOMATED COUNT: 13.6 % (ref 10–15)
GFR SERPL CREATININE-BSD FRML MDRD: 41 ML/MIN/1.7M2
GLUCOSE SERPL-MCNC: 104 MG/DL (ref 70–99)
HCT VFR BLD AUTO: 26.5 % (ref 40–53)
HGB BLD-MCNC: 8 G/DL (ref 13.3–17.7)
IMM GRANULOCYTES # BLD: 0 10E9/L (ref 0–0.4)
IMM GRANULOCYTES NFR BLD: 0.2 %
LYMPHOCYTES # BLD AUTO: 1.2 10E9/L (ref 0.8–5.3)
LYMPHOCYTES NFR BLD AUTO: 20.4 %
MCH RBC QN AUTO: 30.1 PG (ref 26.5–33)
MCHC RBC AUTO-ENTMCNC: 30.2 G/DL (ref 31.5–36.5)
MCV RBC AUTO: 100 FL (ref 78–100)
MONOCYTES # BLD AUTO: 0.7 10E9/L (ref 0–1.3)
MONOCYTES NFR BLD AUTO: 12.5 %
NEUTROPHILS # BLD AUTO: 3.7 10E9/L (ref 1.6–8.3)
NEUTROPHILS NFR BLD AUTO: 63.6 %
NRBC # BLD AUTO: 0 10*3/UL
NRBC BLD AUTO-RTO: 0 /100
NUM BPU REQUESTED: 1
PLATELET # BLD AUTO: 147 10E9/L (ref 150–450)
POTASSIUM SERPL-SCNC: 3.9 MMOL/L (ref 3.4–5.3)
PROT SERPL-MCNC: 5.9 G/DL (ref 6.8–8.8)
RBC # BLD AUTO: 2.66 10E12/L (ref 4.4–5.9)
SODIUM SERPL-SCNC: 142 MMOL/L (ref 133–144)
SPECIMEN EXP DATE BLD: NORMAL
TRANSFUSION STATUS PATIENT QL: NORMAL
TRANSFUSION STATUS PATIENT QL: NORMAL
WBC # BLD AUTO: 5.8 10E9/L (ref 4–11)

## 2018-11-14 PROCEDURE — 85025 COMPLETE CBC W/AUTO DIFF WBC: CPT | Performed by: INTERNAL MEDICINE

## 2018-11-14 PROCEDURE — 86901 BLOOD TYPING SEROLOGIC RH(D): CPT | Performed by: INTERNAL MEDICINE

## 2018-11-14 PROCEDURE — 96374 THER/PROPH/DIAG INJ IV PUSH: CPT

## 2018-11-14 PROCEDURE — 86850 RBC ANTIBODY SCREEN: CPT | Performed by: INTERNAL MEDICINE

## 2018-11-14 PROCEDURE — 86923 COMPATIBILITY TEST ELECTRIC: CPT | Performed by: INTERNAL MEDICINE

## 2018-11-14 PROCEDURE — G0463 HOSPITAL OUTPT CLINIC VISIT: HCPCS | Mod: 25

## 2018-11-14 PROCEDURE — 36430 TRANSFUSION BLD/BLD COMPNT: CPT

## 2018-11-14 PROCEDURE — 86900 BLOOD TYPING SEROLOGIC ABO: CPT | Performed by: INTERNAL MEDICINE

## 2018-11-14 PROCEDURE — P9016 RBC LEUKOCYTES REDUCED: HCPCS | Performed by: INTERNAL MEDICINE

## 2018-11-14 PROCEDURE — 80053 COMPREHEN METABOLIC PANEL: CPT | Performed by: INTERNAL MEDICINE

## 2018-11-14 PROCEDURE — 25000128 H RX IP 250 OP 636: Mod: ZF | Performed by: INTERNAL MEDICINE

## 2018-11-14 RX ORDER — HEPARIN SODIUM (PORCINE) LOCK FLUSH IV SOLN 100 UNIT/ML 100 UNIT/ML
500 SOLUTION INTRAVENOUS EVERY 8 HOURS
Status: DISCONTINUED | OUTPATIENT
Start: 2018-11-14 | End: 2018-11-14 | Stop reason: HOSPADM

## 2018-11-14 RX ORDER — HEPARIN SODIUM (PORCINE) LOCK FLUSH IV SOLN 100 UNIT/ML 100 UNIT/ML
5 SOLUTION INTRAVENOUS ONCE
Status: COMPLETED | OUTPATIENT
Start: 2018-11-14 | End: 2018-11-14

## 2018-11-14 RX ORDER — FUROSEMIDE 10 MG/ML
20 INJECTION INTRAMUSCULAR; INTRAVENOUS ONCE
Status: COMPLETED | OUTPATIENT
Start: 2018-11-14 | End: 2018-11-14

## 2018-11-14 RX ADMIN — SODIUM CHLORIDE, PRESERVATIVE FREE 500 UNITS: 5 INJECTION INTRAVENOUS at 12:26

## 2018-11-14 RX ADMIN — FUROSEMIDE 20 MG: 10 INJECTION, SOLUTION INTRAMUSCULAR; INTRAVENOUS at 11:19

## 2018-11-14 RX ADMIN — SODIUM CHLORIDE, PRESERVATIVE FREE 5 ML: 5 INJECTION INTRAVENOUS at 06:46

## 2018-11-14 ASSESSMENT — PAIN SCALES - GENERAL: PAINLEVEL: NO PAIN (0)

## 2018-11-14 NOTE — PATIENT INSTRUCTIONS
Contact numbers:  Triage Main/After hours Line: [phone]    Main (scheduling) line: [phone]    Call with chills and/or temperature greater than or equal to 100.5 and questions or concerns.    If after hours, weekends, or holidays, call main hospital  at  [phone] and ask for Oncology doctor on call.           November 2018 Sunday Monday Tuesday Wednesday Thursday Friday Saturday                       1     2     3       4     5     6     7     8     9     [facility]    6:15 AM   (15 min.)    [facility] LAB DRAW   [facility] Lab Draw     CT CHEST ABDOMEN PELVIS WWO    7:00 AM   (20 min.)   UCCT2   [facility] [place] CT 10       11     12     [facility] RETURN    7:00 AM   (30 min.)   Naresh De Los Santos MD   [facility] 13     14     [facility] DRAW    6:30 AM   (15 min.)    [facility] LAB DRAW   [facility] Lab Draw     [facility] ONC INFUSION 180    7:00 AM   (180 min.)   [facility] ONCOLOGY INFUSION   [facility] 15     16     17       18     19     20     21     22     23     24       25     26     27     28     29     30                     December 2018 Sunday Monday Tuesday Wednesday Thursday Friday Saturday                                 1       2     3     4     5     6     7     8       9     10     11     12     13     [facility] D[facility]    6:30 AM   (15 min.)    [facility] DRAW   [facility] Lab Draw     [facility] RETURN    6:45 AM   (50 min.)   Jennifer Jalloh, YO CNP   [facility] 14     15       16     17     18     19     20     21     22       23     24     25     26     27     28     29       30     31                                           Lab Results:  Recent Results (from the past 12 hour(s))   CBC with platelets differential    Collection Time: 11/14/18  6:49 AM   Result Value Ref Range    WBC 5.8 4.0 - 11.0 10e9/L    RBC Count 2.66 (L) 4.4 - 5.9 10e12/L    Hemoglobin 8.0 (L) 13.3 - 17.7 g/dL     Hematocrit 26.5 (L) 40.0 - 53.0 %     78 - 100 fl    MCH 30.1 26.5 - 33.0 pg    MCHC 30.2 (L) 31.5 - 36.5 g/dL    RDW 13.6 10.0 - 15.0 %    Platelet Count 147 (L) 150 - 450 10e9/L    Diff Method Automated Method     % Neutrophils 63.6 %    % Lymphocytes 20.4 %    % Monocytes 12.5 %    % Eosinophils 2.8 %    % Basophils 0.5 %    % Immature Granulocytes 0.2 %    Nucleated RBCs 0 0 /100    Absolute Neutrophil 3.7 1.6 - 8.3 10e9/L    Absolute Lymphocytes 1.2 0.8 - 5.3 10e9/L    Absolute Monocytes 0.7 0.0 - 1.3 10e9/L    Absolute Eosinophils 0.2 0.0 - 0.7 10e9/L    Absolute Basophils 0.0 0.0 - 0.2 10e9/L    Abs Immature Granulocytes 0.0 0 - 0.4 10e9/L    Absolute Nucleated RBC 0.0

## 2018-11-14 NOTE — PROGRESS NOTES
Infusion Nursing Note:  Girish Chauhan presents for 1 unit PRBC, aranesp  Met with Dr. De Los Santos 11/12, no changes since     Note: pt here for possible PRBC transfusion, hgb 8.0 today and orders are to transfuse if hgb LESS than 8. Pt symptomatic (fatigued and increased SOB with exertion) and would like the transfusion today if possible. Pt also scheduled to get aranesp today but BP has been very elevated (168/112 today in lab, provider aware and pt following up with cardiologist).    TORB- Dr. De Los Santos/Felicitas Gusman  --okay to give 1 unit PRBC today for hgb of 8.0  --hold aranesp today with elevated BP    Vital check prior to starting PRBC showed a BP of 210/110, pt asymptomatic. Recheck of /96. Dr. De Los Santos notified.    TORB- Dr. De Los Santos/Felicitas Gusman, 11/14/18 @10:05am  --okay to proceed with PRBC today with elevated BP as long as pt is asymptomatic  --give 20mg IV lasix CHCF through transfusion  --ensure pt sees cardiology this week    Pt tolerated transfusion without issue, reviewed s/s of stroke with pt and that he should go to the emergency room or call 911 with any new headaches, sudden onset of nausea, unusual numbness/tingling/weakness, or changes in vision. Pt verbalized understanding and confirmed he is seeing cardiologist on Friday.    Treatment Conditions:  Lab Results   Component Value Date    HGB 8.0 11/14/2018     Lab Results   Component Value Date    WBC 5.8 11/14/2018      Lab Results   Component Value Date    ANEU 3.7 11/14/2018     Lab Results   Component Value Date     11/14/2018      Lab Results   Component Value Date     11/09/2018                   Lab Results   Component Value Date    POTASSIUM 4.1 11/09/2018           Lab Results   Component Value Date    MAG 1.8 08/27/2018            Lab Results   Component Value Date    CR 1.77 11/09/2018                   Lab Results   Component Value Date    GARY 8.2 11/09/2018                Lab Results   Component Value Date     BILITOTAL 0.8 10/15/2018           Lab Results   Component Value Date    ALBUMIN 2.9 10/15/2018                    Lab Results   Component Value Date    ALT 21 10/15/2018           Lab Results   Component Value Date    AST 29 10/15/2018       Results reviewed, labs MET treatment parameters, ok to proceed with treatment.  Blood transfusion consent signed 8/27/18.    Intravenous Access:  Implanted Port.    Post Infusion Assessment:  Patient tolerated infusion without incident.  Blood return noted pre and post infusion.  No evidence of extravasations.  Access discontinued per protocol.    Discharge Plan:   Patient declined prescription refills.  Discharge instructions reviewed with: Patient.  Patient and/or family verbalized understanding of discharge instructions and all questions answered.  AVS to patient via [employer].  Patient will return 12/13 for next appointment.   Patient discharged in stable condition accompanied by: self.    Face time: 10 minutes    Felicitas Gusman RN

## 2018-11-14 NOTE — MR AVS SNAPSHOT
After Visit Summary   11/14/2018    Girish Chauhan    MRN: [id number]           Patient Information     Date Of Birth          1955        Visit Information        Provider Department      11/14/2018 7:00 AM UC 12 ATC; [facility] ONCOLOGY INFUSION [facility]        Today's Diagnoses     Cholangiocarcinoma (H)    -  1    Secondary malignant neoplasm of bladder (H)        Anemia associated with chemotherapy          Care Instructions    Contact numbers:  Triage Main/After hours Line: [phone]    Main (scheduling) line: [phone]    Call with chills and/or temperature greater than or equal to 100.5 and questions or concerns.    If after hours, weekends, or holidays, call main hospital  at  [phone] and ask for Oncology doctor on call.           November 2018 Sunday Monday Tuesday Wednesday Thursday Friday Saturday                       1     2     3       4     5     6     7     8     9     [facility]    6:15 AM   (15 min.)    [facility] LAB DRAW   [facility] Lab Draw     CT CHEST ABDOMEN PELVIS WWO    7:00 AM   (20 min.)   UCCT2   [facility] CT 10       11     12     [facility] RETURN    7:00 AM   (30 min.)   Naresh De Los Santos MD   [facility] 13     14     [facility] DRAW    6:30 AM   (15 min.)    [facility]RAW   [facility]     [facility] ONC INFUSION 180    7:00 AM   (180 min.)    ONCOLOGY INFUSION   [facility] 15     16     17       18     19     20     21     22     23     24       25     26     27     28     29     30 December 2018 Sunday Monday Tuesday Wednesday Thursday Friday Saturday                                 1       2     3     4     5     6     7     8       9     10     11     12     13     [facility] DRAW    6:30 AM   (15 min.)    [facility]RAW   [facility]     [facility] RETURN    6:45 AM   (50 min.)   Jennifer Jalloh,  YO MCFADDEN   [facility] 14     15       16     17     18     19     20     21     22       23     24     25     26     27     28     29       30     31                                           Lab Results:  Recent Results (from the past 12 hour(s))   CBC with platelets differential    Collection Time: 11/14/18  6:49 AM   Result Value Ref Range    WBC 5.8 4.0 - 11.0 10e9/L    RBC Count 2.66 (L) 4.4 - 5.9 10e12/L    Hemoglobin 8.0 (L) 13.3 - 17.7 g/dL    Hematocrit 26.5 (L) 40.0 - 53.0 %     78 - 100 fl    MCH 30.1 26.5 - 33.0 pg    MCHC 30.2 (L) 31.5 - 36.5 g/dL    RDW 13.6 10.0 - 15.0 %    Platelet Count 147 (L) 150 - 450 10e9/L    Diff Method Automated Method     % Neutrophils 63.6 %    % Lymphocytes 20.4 %    % Monocytes 12.5 %    % Eosinophils 2.8 %    % Basophils 0.5 %    % Immature Granulocytes 0.2 %    Nucleated RBCs 0 0 /100    Absolute Neutrophil 3.7 1.6 - 8.3 10e9/L    Absolute Lymphocytes 1.2 0.8 - 5.3 10e9/L    Absolute Monocytes 0.7 0.0 - 1.3 10e9/L    Absolute Eosinophils 0.2 0.0 - 0.7 10e9/L    Absolute Basophils 0.0 0.0 - 0.2 10e9/L    Abs Immature Granulocytes 0.0 0 - 0.4 10e9/L    Absolute Nucleated RBC 0.0                Follow-ups after your visit        Your next 10 appointments already scheduled     Dec 13, 2018  6:30 AM CST   [facility] Draw with [facility] LAB DRAW   [facility] Lab Draw ([facility])    [address] [place] [address]  [address]  [facility] [address] [phone]            Dec 13, 2018  7:00 AM CST   (Arrive by 6:45 AM)   Return Visit with YO Jasso CNP   [facility] ([facility])    [address] [place] [address] Se  [address]  [facility] [id number] [phone]            Jan 09, 2019  7:00 AM CST   Nurse Visit with  Oncology Nurse   [facility] ([facility])    [address]  [address]  [facility] [address]    [phone]            Feb 08, 2019  6:30 AM CST   [facility] Lab Draw with  [facility] LAB DRAW   [facility] Lab Draw ([facility])    [address]  [address]  [facility] [address]   [phone]            Feb 08, 2019  7:00 AM CST   CT CHEST ABDOMEN PELVIS W/O & W CONTRAST with UCCT1   [facility] CT ([facility])    [address]  [address]  [facility] [address] [phone]           How do I prepare for my exam? (Food and drink instructions) To prepare: Do not eat or drink for 2 hours before your exam. If you need to take medicine, you may take it with small sips of water. (We may ask you to take liquid medicine as well.)  How do I prepare for my exam? (Other instructions) Please arrive 30 minutes early for your CT.  Once in the department you might be asked to drink water 15-20 minutes prior to your exam.  If indicated you may be asked to drink an oral contrast in advance of your CT.  If this is the case, the imaging team will let you know or be in contact with you prior to your appointment  Patients over 70 or patients with diabetes or kidney problems: If you haven t had a blood test (creatinine test) within the last 30 days, the Cardiologist/Radiologist may require you to get this test prior to your exam.  If you have diabetes:  Continue to take your metformin medication on the day of your exam  What should I wear: Please wear loose clothing, such as a sweat suit or jogging clothes. Avoid snaps, zippers and other metal. We may ask you to undress and put on a hospital gown.  How long does the exam take: Most scans take less than 20 minutes.  What should I bring: Please bring any scans or X-rays taken at other hospitals, if similar tests were done. Also bring a list of your medicines, including vitamins, minerals and over-the-counter drugs. It is safest to leave personal items at home.  Do I need a : No  is  needed.  What do I need to tell my doctor? Be sure to tell your doctor: * If you have any allergies. * If there s any chance you are pregnant. * If you are breastfeeding.  What should I do after the exam: No restrictions, You may resume normal activities.  What is this test: A CT (computed tomography) scan is a series of pictures that allows us to look inside your body. The scanner creates images of the body in cross sections, much like slices of bread. This helps us see any problems more clearly. You may receive contrast (X-ray dye) before or during your scan. You will be asked to drink the contrast.  Who should I call with questions: If you have any questions, please call the Imaging Department where you will have your exam. Directions, parking instructions, and other information is available on our website, [email or web address].            Feb 11, 2019  7:15 AM CST   (Arrive by 7:00 AM)   Return Visit with Naresh De Los Santos MD   [facility] ([facility])    [address] [place] [address]  [address] [place] [address] [phone]              Future tests that were ordered for you today     Open Standing Orders        Priority Remaining Interval Expires Ordered    Transfuse red blood cell unit Routine 99/100 TRANSFUSE 1 UNIT  11/14/2018    Red blood cell prepare order unit Routine 99/100 CONDITIONAL (SPECIFY) BLOOD  11/13/2018            Who to contact     If you have questions or need follow up information about today's clinic visit or your schedule please contact [facility] directly at [phone].  Normal or non-critical lab and imaging results will be communicated to you by MyChart, letter or phone within 4 business days after the clinic has received the results. If you do not hear from us within 7 days, please contact the clinic through MyChart or phone. If you have a critical or abnormal lab result, we will notify you by phone as soon as  possible.  Submit refill requests through [employer] or call your pharmacy and they will forward the refill request to us. Please allow 3 business days for your refill to be completed.          Additional Information About Your Visit        [employer] Information     [employer] gives you secure access to your electronic health record. If you see a primary care provider, you can also send messages to your care team and make appointments. If you have questions, please call your primary care clinic.  If you do not have a primary care provider, please call [phone] and they will assist you.        Care EveryWhere ID     This is your Care EveryWhere ID. This could be used by other organizations to access your [facility] medical records  [id number]        Your Vitals Were     Pulse Temperature Respirations Pulse Oximetry BMI (Body Mass Index)       75 97.5  F (36.4  C) (Oral) 18 97% 25.84 kg/m2        Blood Pressure from Last 3 Encounters:   11/14/18 (!) 200/116   11/12/18 (!) 182/105   10/16/18 180/82    Weight from Last 3 Encounters:   11/14/18 81.7 kg (180 lb 1.6 oz)   11/12/18 82.3 kg (181 lb 6.4 oz)   10/15/18 85.5 kg (188 lb 9.6 oz)              We Performed the Following     ABO/Rh type and screen     Blood component     CBC with platelets differential     Comprehensive metabolic panel     Transfuse red blood cell unit        Primary Care Provider Office Phone # Fax #    Jim Kaplan [phone]       [address]   [facility] [address]        Equal Access to Services     MARIUSZ MADRIGAL : Hadii aad ku hadasho Soomaali, waaxda luqadaha, qaybta kaalmada adeegyada, ankit gross. So [facility] [phone].    ATENCIÓN: Si habla español, tiene a flores disposición servicios gratuitos de asistencia lingüística. Llame al [phone].    We comply with applicable federal civil rights laws and [place] laws. We do not discriminate on the basis of race,  color, national origin, age, disability, sex, sexual orientation, or gender identity.            Thank you!     Thank you for choosing [facility]  for your care. Our goal is always to provide you with excellent care. Hearing back from our patients is one way we can continue to improve our services. Please take a few minutes to complete the written survey that you may receive in the mail after your visit with us. Thank you!             Your Updated Medication List - Protect others around you: Learn how to safely use, store and throw away your medicines at www.disposemymeds.org.          This list is accurate as of 11/14/18 12:58 PM.  Always use your most recent med list.                   Brand Name Dispense Instructions for use Diagnosis    ALLEGRA PO      Take 180 mg by mouth daily        atorvastatin 40 MG tablet    LIPITOR     TAKE 1 TABLET BY MOUTH DAILY        diclofenac 1 % Gel topical gel    VOLTAREN     Apply two grams to the affected area, up to four times daily.        digoxin 125 MCG tablet    LANOXIN     Take 250 mcg by mouth        ELIQUIS PO      Take 5 mg by mouth 2 times daily        FUROSEMIDE PO      Take 20 mg by mouth every other day        losartan 25 MG tablet    COZAAR     Take 25 mg by mouth        metoprolol tartrate 50 MG tablet    LOPRESSOR     50 mg 2 times daily        MITIGARE 0.6 MG capsule   Generic drug:  colchicine      Take 0.6 mg by mouth 3 times daily as needed        multivitamin, therapeutic with minerals Tabs tablet     30 tablet    Take 1 tablet by mouth daily    Mass of bile duct       NITROSTAT SL      Place 0.4 mg under the tongue every 5 minutes as needed for chest pain (Carries medication - has never used)        OMEGA-3 FISH OIL PO      Take 1,000 mg by mouth daily

## 2018-11-14 NOTE — NURSING NOTE
"Chief Complaint   Patient presents with     Port Draw     Labs drawn from port by RN. Line flushed with saline and heparin. Vs taken and pt checked in for appt.     Port accessed with 20g 3/4\" gripper juane by RN, labs collected, line flushed with saline and heparin.  Vitals taken. Pt checked in for appointment(s).    Melody Ferrell RN  "

## 2018-12-11 NOTE — PROGRESS NOTES
Reason for Visit: seen in f/u of metastatic cholangiocarcinoma    Oncology HPI: Krishna Chauhan is a 63 year old man with a history of resected cholangiocarcihnoma in March 2016. Margins were negative and no lymph nodes were involved. Perineural and lymphovascular invasion was noted. He did not have adjuvant chemotherapy. He recurred in the bladder in November 2017. Biopsy was consistent with metastatic cholangiocarcinoma. He was treated with cisplatin/gemzar starting in Dec 2017. Cisplatin was help in June 2018 due to poor tolerance. Gemzar was discontinued in August 2018 due to possible TTP. He has been followed without active treatment with stable disease.    He is followed today for f/u of HTN, CKD, anemia, edema.    Interval history: Krishna has been making slow improvements in his stamina. Is walking on the treadmill about 3 times a day, walks for 3/10-7/10 of a mile before needing to stop. Muscle strength remains low. No chest pain, palpitation, dizziness. Mild orthopnea. Edema in the ankles and feet is lessening. Appetite is not as good as it was once. He has altered taste, but that is improving as he is getting further out from chemotherapy. Bowel/bladder function wnl. No abdominal pain. No bone aches/pains. Has chronic R foot drop post ERCP procedure in the past.    Current Outpatient Medications   Medication Sig Dispense Refill     Apixaban (ELIQUIS PO) Take 5 mg by mouth 2 times daily       atorvastatin (LIPITOR) 40 MG tablet TAKE 1 TABLET BY MOUTH DAILY       FUROSEMIDE PO Take 20 mg by mouth every other day        losartan (COZAAR) 25 MG tablet Take 25 mg by mouth 2 times daily        metoprolol (LOPRESSOR) 50 MG tablet 100 mg 2 times daily        multivitamin, therapeutic with minerals (MULTI-VITAMIN) TABS Take 1 tablet by mouth daily 30 tablet 0     Colchicine (MITIGARE) 0.6 MG CAPS Take 0.6 mg by mouth 3 times daily as needed       diclofenac (VOLTAREN) 1 % GEL topical gel Apply two grams to the affected  "area, up to four times daily.       digoxin (LANOXIN) 125 MCG tablet Take 250 mcg by mouth       Fexofenadine HCl (ALLEGRA PO) Take 180 mg by mouth daily       Nitroglycerin (NITROSTAT SL) Place 0.4 mg under the tongue every 5 minutes as needed for chest pain (Carries medication - has never used)        Omega-3 Fatty Acids (OMEGA-3 FISH OIL PO) Take 1,000 mg by mouth daily           No Known Allergies      Exam: alert, appears chronically ill. Blood pressure (!) 157/97, pulse 111, temperature 97.4  F (36.3  C), temperature source Oral, height 1.778 m (5' 10\"), weight 80.6 kg (177 lb 9.6 oz), SpO2 90 %.  Wt Readings from Last 4 Encounters:   12/13/18 80.6 kg (177 lb 9.6 oz)   11/14/18 81.7 kg (180 lb 1.6 oz)   11/12/18 82.3 kg (181 lb 6.4 oz)   10/15/18 85.5 kg (188 lb 9.6 oz)     Oropharynx is moist, no focal lesion. No icterus. Neck supple and without adenopathy. Lungs:CTA. Heart: irregular, 2/6 murmur consistent with aortic stenosis. Extremities: trace ankle edema bilaterally. No calf tenderness or pain.    Labs: Results for JENNYFER MCGARRY (MRN [id number]) as of 12/13/2018 07:39   Ref. Range 12/13/2018 06:45   Sodium Latest Ref Range: 133 - 144 mmol/L 144   Potassium Latest Ref Range: 3.4 - 5.3 mmol/L 4.0   Chloride Latest Ref Range: 94 - 109 mmol/L 111 (H)   Carbon Dioxide Latest Ref Range: 20 - 32 mmol/L 23   Urea Nitrogen Latest Ref Range: 7 - 30 mg/dL 40 (H)   Creatinine Latest Ref Range: 0.66 - 1.25 mg/dL 1.79 (H)   GFR Estimate Latest Ref Range: >60 mL/min/1.7m2 39 (L)   GFR Estimate If Black Latest Ref Range: >60 mL/min/1.7m2 47 (L)   Calcium Latest Ref Range: 8.5 - 10.1 mg/dL 8.2 (L)   Anion Gap Latest Ref Range: 3 - 14 mmol/L 10   Albumin Latest Ref Range: 3.4 - 5.0 g/dL 3.3 (L)   Protein Total Latest Ref Range: 6.8 - 8.8 g/dL 6.3 (L)   Bilirubin Total Latest Ref Range: 0.2 - 1.3 mg/dL 0.8   Alkaline Phosphatase Latest Ref Range: 40 - 150 U/L 95   ALT Latest Ref Range: 0 - 70 U/L 26   AST Latest Ref Range: " 0 - 45 U/L 16   Glucose Latest Ref Range: 70 - 99 mg/dL 118 (H)   WBC Latest Ref Range: 4.0 - 11.0 10e9/L 7.2   Hemoglobin Latest Ref Range: 13.3 - 17.7 g/dL 9.4 (L)   Hematocrit Latest Ref Range: 40.0 - 53.0 % 29.4 (L)   Platelet Count Latest Ref Range: 150 - 450 10e9/L 152   RBC Count Latest Ref Range: 4.4 - 5.9 10e12/L 3.05 (L)   MCV Latest Ref Range: 78 - 100 fl 96   MCH Latest Ref Range: 26.5 - 33.0 pg 30.8   MCHC Latest Ref Range: 31.5 - 36.5 g/dL 32.0   RDW Latest Ref Range: 10.0 - 15.0 % 13.6   Diff Method Unknown Automated Method   % Neutrophils Latest Units: % 70.5   % Lymphocytes Latest Units: % 15.0   % Monocytes Latest Units: % 11.5   % Eosinophils Latest Units: % 2.6   % Basophils Latest Units: % 0.3   % Immature Granulocytes Latest Units: % 0.1   Nucleated RBCs Latest Ref Range: 0 /100 0   Absolute Neutrophil Latest Ref Range: 1.6 - 8.3 10e9/L 5.1   Absolute Lymphocytes Latest Ref Range: 0.8 - 5.3 10e9/L 1.1   Absolute Monocytes Latest Ref Range: 0.0 - 1.3 10e9/L 0.8   Absolute Eosinophils Latest Ref Range: 0.0 - 0.7 10e9/L 0.2   Absolute Basophils Latest Ref Range: 0.0 - 0.2 10e9/L 0.0   Abs Immature Granulocytes Latest Ref Range: 0 - 0.4 10e9/L 0.0   Absolute Nucleated RBC Unknown 0.0       Imaging: n/a    Impression/plan:   1. Metastatic cholangiocarcinoma-had recurrence to the bladder, treated with cisplatin and gemzar. Cisplatin held in June 2018 due to poor tolerance. Gemzar stopped on 8/27/18 due to TTP  -on staging scan from last visit of 11/12/18 disease was stable. Still recovering from side effects of treatment. Plan to follow on surveillance, next visit in Feb 2. Cards: had f/u with cardiology on 12/6/18  # Heart failure/cardiomyopathy-lasix increased to 20 mg daily x 3 days, then decrease to every other day  # A. Fib with RVR--on metoprolol, increased to 100 mg bid and digoxin  # HTN-cozaar 25 mg bid   #Bicuspid aortic valve with aortic stenosis  -will f/u with cardiology in 2  weeks    3. Anemia-darbepoetin 300 mcg for hgb <10  -last given on 8/27/18  -hgb 9.4 today, improving. Due to ongoing HTN, will hold on dabepoetin    4. CKD: Cr within recent baseline values

## 2018-12-13 ENCOUNTER — APPOINTMENT (OUTPATIENT)
Dept: LAB | Facility: CLINIC | Age: 63
End: 2018-12-13
Attending: NURSE PRACTITIONER
Payer: COMMERCIAL

## 2018-12-13 ENCOUNTER — ONCOLOGY VISIT (OUTPATIENT)
Dept: ONCOLOGY | Facility: CLINIC | Age: 63
End: 2018-12-13
Attending: NURSE PRACTITIONER
Payer: COMMERCIAL

## 2018-12-13 VITALS
WEIGHT: 177.6 LBS | OXYGEN SATURATION: 90 % | DIASTOLIC BLOOD PRESSURE: 97 MMHG | BODY MASS INDEX: 25.43 KG/M2 | TEMPERATURE: 97.4 F | HEART RATE: 111 BPM | SYSTOLIC BLOOD PRESSURE: 157 MMHG | HEIGHT: 70 IN

## 2018-12-13 DIAGNOSIS — C22.1 CHOLANGIOCARCINOMA (H): ICD-10-CM

## 2018-12-13 DIAGNOSIS — D64.81 ANEMIA ASSOCIATED WITH CHEMOTHERAPY: ICD-10-CM

## 2018-12-13 DIAGNOSIS — T45.1X5A ANEMIA ASSOCIATED WITH CHEMOTHERAPY: ICD-10-CM

## 2018-12-13 DIAGNOSIS — M31.10 THROMBOTIC MICROANGIOPATHY (H): ICD-10-CM

## 2018-12-13 LAB
ALBUMIN SERPL-MCNC: 3.3 G/DL (ref 3.4–5)
ALP SERPL-CCNC: 95 U/L (ref 40–150)
ALT SERPL W P-5'-P-CCNC: 26 U/L (ref 0–70)
ANION GAP SERPL CALCULATED.3IONS-SCNC: 10 MMOL/L (ref 3–14)
AST SERPL W P-5'-P-CCNC: 16 U/L (ref 0–45)
BASOPHILS # BLD AUTO: 0 10E9/L (ref 0–0.2)
BASOPHILS NFR BLD AUTO: 0.3 %
BILIRUB SERPL-MCNC: 0.8 MG/DL (ref 0.2–1.3)
BUN SERPL-MCNC: 40 MG/DL (ref 7–30)
CALCIUM SERPL-MCNC: 8.2 MG/DL (ref 8.5–10.1)
CHLORIDE SERPL-SCNC: 111 MMOL/L (ref 94–109)
CO2 SERPL-SCNC: 23 MMOL/L (ref 20–32)
CREAT SERPL-MCNC: 1.79 MG/DL (ref 0.66–1.25)
DIFFERENTIAL METHOD BLD: ABNORMAL
EOSINOPHIL # BLD AUTO: 0.2 10E9/L (ref 0–0.7)
EOSINOPHIL NFR BLD AUTO: 2.6 %
ERYTHROCYTE [DISTWIDTH] IN BLOOD BY AUTOMATED COUNT: 13.6 % (ref 10–15)
GFR SERPL CREATININE-BSD FRML MDRD: 39 ML/MIN/1.7M2
GLUCOSE SERPL-MCNC: 118 MG/DL (ref 70–99)
HCT VFR BLD AUTO: 29.4 % (ref 40–53)
HGB BLD-MCNC: 9.4 G/DL (ref 13.3–17.7)
IMM GRANULOCYTES # BLD: 0 10E9/L (ref 0–0.4)
IMM GRANULOCYTES NFR BLD: 0.1 %
LYMPHOCYTES # BLD AUTO: 1.1 10E9/L (ref 0.8–5.3)
LYMPHOCYTES NFR BLD AUTO: 15 %
MCH RBC QN AUTO: 30.8 PG (ref 26.5–33)
MCHC RBC AUTO-ENTMCNC: 32 G/DL (ref 31.5–36.5)
MCV RBC AUTO: 96 FL (ref 78–100)
MONOCYTES # BLD AUTO: 0.8 10E9/L (ref 0–1.3)
MONOCYTES NFR BLD AUTO: 11.5 %
NEUTROPHILS # BLD AUTO: 5.1 10E9/L (ref 1.6–8.3)
NEUTROPHILS NFR BLD AUTO: 70.5 %
NRBC # BLD AUTO: 0 10*3/UL
NRBC BLD AUTO-RTO: 0 /100
PLATELET # BLD AUTO: 152 10E9/L (ref 150–450)
POTASSIUM SERPL-SCNC: 4 MMOL/L (ref 3.4–5.3)
PROT SERPL-MCNC: 6.3 G/DL (ref 6.8–8.8)
RBC # BLD AUTO: 3.05 10E12/L (ref 4.4–5.9)
SODIUM SERPL-SCNC: 144 MMOL/L (ref 133–144)
WBC # BLD AUTO: 7.2 10E9/L (ref 4–11)

## 2018-12-13 PROCEDURE — 80053 COMPREHEN METABOLIC PANEL: CPT | Performed by: INTERNAL MEDICINE

## 2018-12-13 PROCEDURE — 99214 OFFICE O/P EST MOD 30 MIN: CPT | Mod: ZP | Performed by: NURSE PRACTITIONER

## 2018-12-13 PROCEDURE — 85025 COMPLETE CBC W/AUTO DIFF WBC: CPT | Performed by: INTERNAL MEDICINE

## 2018-12-13 PROCEDURE — 36591 DRAW BLOOD OFF VENOUS DEVICE: CPT

## 2018-12-13 PROCEDURE — G0463 HOSPITAL OUTPT CLINIC VISIT: HCPCS | Mod: ZF

## 2018-12-13 PROCEDURE — 25000128 H RX IP 250 OP 636: Mod: ZF | Performed by: NURSE PRACTITIONER

## 2018-12-13 RX ORDER — HEPARIN SODIUM (PORCINE) LOCK FLUSH IV SOLN 100 UNIT/ML 100 UNIT/ML
5 SOLUTION INTRAVENOUS ONCE
Status: COMPLETED | OUTPATIENT
Start: 2018-12-13 | End: 2018-12-13

## 2018-12-13 RX ADMIN — SODIUM CHLORIDE, PRESERVATIVE FREE 5 ML: 5 INJECTION INTRAVENOUS at 06:37

## 2018-12-13 ASSESSMENT — PAIN SCALES - GENERAL: PAINLEVEL: NO PAIN (0)

## 2018-12-13 ASSESSMENT — MIFFLIN-ST. JEOR: SCORE: 1616.84

## 2018-12-13 NOTE — LETTER
12/13/2018       RE: Girish Chuahan  [address] [facility] [id number]     Dear Colleague,    Thank you for referring your patient, Girish Chauhan, to the [facility]. Please see a copy of my visit note below.    Reason for Visit: seen in f/u of metastatic cholangiocarcinoma    Oncology HPI: Krishna Chauhan is a 63 year old man with a history of resected cholangiocarcihnoma in March 2016. Margins were negative and no lymph nodes were involved. Perineural and lymphovascular invasion was noted. He did not have adjuvant chemotherapy. He recurred in the bladder in November 2017. Biopsy was consistent with metastatic cholangiocarcinoma. He was treated with cisplatin/gemzar starting in Dec 2017. Cisplatin was help in June 2018 due to poor tolerance. Gemzar was discontinued in August 2018 due to possible TTP. He has been followed without active treatment with stable disease.    He is followed today for f/u of HTN, CKD, anemia, edema.    Interval history: Krishna has been making slow improvements in his stamina. Is walking on the treadmill about 3 times a day, walks for 3/10-7/10 of a mile before needing to stop. Muscle strength remains low. No chest pain, palpitation, dizziness. Mild orthopnea. Edema in the ankles and feet is lessening. Appetite is not as good as it was once. He has altered taste, but that is improving as he is getting further out from chemotherapy. Bowel/bladder function wnl. No abdominal pain. No bone aches/pains. Has chronic R foot drop post ERCP procedure in the past.    Current Outpatient Medications   Medication Sig Dispense Refill     Apixaban (ELIQUIS PO) Take 5 mg by mouth 2 times daily       atorvastatin (LIPITOR) 40 MG tablet TAKE 1 TABLET BY MOUTH DAILY       FUROSEMIDE PO Take 20 mg by mouth every other day        losartan (COZAAR) 25 MG tablet Take 25 mg by mouth 2 times daily        metoprolol (LOPRESSOR) 50 MG tablet 100 mg 2 times daily        multivitamin, therapeutic  "with minerals (MULTI-VITAMIN) TABS Take 1 tablet by mouth daily 30 tablet 0     Colchicine (MITIGARE) 0.6 MG CAPS Take 0.6 mg by mouth 3 times daily as needed       diclofenac (VOLTAREN) 1 % GEL topical gel Apply two grams to the affected area, up to four times daily.       digoxin (LANOXIN) 125 MCG tablet Take 250 mcg by mouth       Fexofenadine HCl (ALLEGRA PO) Take 180 mg by mouth daily       Nitroglycerin (NITROSTAT SL) Place 0.4 mg under the tongue every 5 minutes as needed for chest pain (Carries medication - has never used)        Omega-3 Fatty Acids (OMEGA-3 FISH OIL PO) Take 1,000 mg by mouth daily           No Known Allergies      Exam: alert, appears chronically ill. Blood pressure (!) 157/97, pulse 111, temperature 97.4  F (36.3  C), temperature source Oral, height 1.778 m (5' 10\"), weight 80.6 kg (177 lb 9.6 oz), SpO2 90 %.  Wt Readings from Last 4 Encounters:   12/13/18 80.6 kg (177 lb 9.6 oz)   11/14/18 81.7 kg (180 lb 1.6 oz)   11/12/18 82.3 kg (181 lb 6.4 oz)   10/15/18 85.5 kg (188 lb 9.6 oz)     Oropharynx is moist, no focal lesion. No icterus. Neck supple and without adenopathy. Lungs:CTA. Heart: irregular, 2/6 murmur consistent with aortic stenosis. Extremities: trace ankle edema bilaterally. No calf tenderness or pain.    Labs: Results for JENNYFER MCGARRY (MRN [id number]) as of 12/13/2018 07:39   Ref. Range 12/13/2018 06:45   Sodium Latest Ref Range: 133 - 144 mmol/L 144   Potassium Latest Ref Range: 3.4 - 5.3 mmol/L 4.0   Chloride Latest Ref Range: 94 - 109 mmol/L 111 (H)   Carbon Dioxide Latest Ref Range: 20 - 32 mmol/L 23   Urea Nitrogen Latest Ref Range: 7 - 30 mg/dL 40 (H)   Creatinine Latest Ref Range: 0.66 - 1.25 mg/dL 1.79 (H)   GFR Estimate Latest Ref Range: >60 mL/min/1.7m2 39 (L)   GFR Estimate If Black Latest Ref Range: >60 mL/min/1.7m2 47 (L)   Calcium Latest Ref Range: 8.5 - 10.1 mg/dL 8.2 (L)   Anion Gap Latest Ref Range: 3 - 14 mmol/L 10   Albumin Latest Ref Range: 3.4 - 5.0 g/dL " 3.3 (L)   Protein Total Latest Ref Range: 6.8 - 8.8 g/dL 6.3 (L)   Bilirubin Total Latest Ref Range: 0.2 - 1.3 mg/dL 0.8   Alkaline Phosphatase Latest Ref Range: 40 - 150 U/L 95   ALT Latest Ref Range: 0 - 70 U/L 26   AST Latest Ref Range: 0 - 45 U/L 16   Glucose Latest Ref Range: 70 - 99 mg/dL 118 (H)   WBC Latest Ref Range: 4.0 - 11.0 10e9/L 7.2   Hemoglobin Latest Ref Range: 13.3 - 17.7 g/dL 9.4 (L)   Hematocrit Latest Ref Range: 40.0 - 53.0 % 29.4 (L)   Platelet Count Latest Ref Range: 150 - 450 10e9/L 152   RBC Count Latest Ref Range: 4.4 - 5.9 10e12/L 3.05 (L)   MCV Latest Ref Range: 78 - 100 fl 96   MCH Latest Ref Range: 26.5 - 33.0 pg 30.8   MCHC Latest Ref Range: 31.5 - 36.5 g/dL 32.0   RDW Latest Ref Range: 10.0 - 15.0 % 13.6   Diff Method Unknown Automated Method   % Neutrophils Latest Units: % 70.5   % Lymphocytes Latest Units: % 15.0   % Monocytes Latest Units: % 11.5   % Eosinophils Latest Units: % 2.6   % Basophils Latest Units: % 0.3   % Immature Granulocytes Latest Units: % 0.1   Nucleated RBCs Latest Ref Range: 0 /100 0   Absolute Neutrophil Latest Ref Range: 1.6 - 8.3 10e9/L 5.1   Absolute Lymphocytes Latest Ref Range: 0.8 - 5.3 10e9/L 1.1   Absolute Monocytes Latest Ref Range: 0.0 - 1.3 10e9/L 0.8   Absolute Eosinophils Latest Ref Range: 0.0 - 0.7 10e9/L 0.2   Absolute Basophils Latest Ref Range: 0.0 - 0.2 10e9/L 0.0   Abs Immature Granulocytes Latest Ref Range: 0 - 0.4 10e9/L 0.0   Absolute Nucleated RBC Unknown 0.0       Imaging: n/a    Impression/plan:   1. Metastatic cholangiocarcinoma-had recurrence to the bladder, treated with cisplatin and gemzar. Cisplatin held in June 2018 due to poor tolerance. Gemzar stopped on 8/27/18 due to TTP  -on staging scan from last visit of 11/12/18 disease was stable. Still recovering from side effects of treatment. Plan to follow on surveillance, next visit in Feb 2. Cards: had f/u with cardiology on 12/6/18  # Heart failure/cardiomyopathy-lasix  increased to 20 mg daily x 3 days, then decrease to every other day  # A. Fib with RVR--on metoprolol, increased to 100 mg bid and digoxin  # HTN-cozaar 25 mg bid   #Bicuspid aortic valve with aortic stenosis  -will f/u with cardiology in 2 weeks    3. Anemia-darbepoetin 300 mcg for hgb <10  -last given on 8/27/18  -hgb 9.4 today, improving. Due to ongoing HTN, will hold on dabepoetin    4. CKD: Cr within recent baseline values      Again, thank you for allowing me to participate in the care of your patient.      Sincerely,    YO Grubbs CNP

## 2018-12-13 NOTE — NURSING NOTE
Chief Complaint   Patient presents with     Port Draw     labs drawn via port by RN     BP (!) 157/97 (BP Location: Left arm, Patient Position: Chair, Cuff Size: Adult Regular)   Pulse 111   Temp 97.4  F (36.3  C) (Oral)   Wt 80.6 kg (177 lb 9.6 oz)   SpO2 90%   BMI 25.48 kg/m      Vitals taken. Port accessed by RN in lab. Labs collected and sent. Line flushed with NS & Heparin. Pt tolerated well. Pt checked in for next appointment.    Arielle Burkett, RN

## 2018-12-13 NOTE — NURSING NOTE
"Oncology Rooming Note    December 13, 2018 6:59 AM   Girish Chauhan is a 63 year old male who presents for:    Chief Complaint   Patient presents with     Port Draw     labs drawn via port by RN     Oncology Clinic Visit     F/U Cholsngiocarcinoma     Initial Vitals: BP (!) 157/97 (BP Location: Left arm, Patient Position: Chair, Cuff Size: Adult Regular)   Pulse 111   Temp 97.4  F (36.3  C) (Oral)   Ht 1.778 m (5' 10\")   Wt 80.6 kg (177 lb 9.6 oz)   SpO2 90%   BMI 25.48 kg/m   Estimated body mass index is 25.48 kg/m  as calculated from the following:    Height as of this encounter: 1.778 m (5' 10\").    Weight as of this encounter: 80.6 kg (177 lb 9.6 oz). Body surface area is 2 meters squared.  No Pain (0) Comment: Data Unavailable   No LMP for male patient.  Allergies reviewed: Yes  Medications reviewed: Yes    Medications: Medication refills not needed today.  Pharmacy name entered into [employer]: [facility] [address] [place] [address] [id number] AT [facility]    Clinical concerns: None, Jennifer was NOT notified.    10 minutes for nursing intake (face to face time)     NIA YING LPN            "

## 2019-01-09 ENCOUNTER — ALLIED HEALTH/NURSE VISIT (OUTPATIENT)
Dept: ONCOLOGY | Facility: CLINIC | Age: 64
End: 2019-01-09
Attending: INTERNAL MEDICINE
Payer: COMMERCIAL

## 2019-01-09 ENCOUNTER — APPOINTMENT (OUTPATIENT)
Dept: LAB | Facility: CLINIC | Age: 64
End: 2019-01-09
Attending: INTERNAL MEDICINE
Payer: COMMERCIAL

## 2019-01-09 VITALS
RESPIRATION RATE: 16 BRPM | TEMPERATURE: 95.2 F | DIASTOLIC BLOOD PRESSURE: 84 MMHG | BODY MASS INDEX: 25.91 KG/M2 | WEIGHT: 180.6 LBS | OXYGEN SATURATION: 98 % | SYSTOLIC BLOOD PRESSURE: 113 MMHG | HEART RATE: 94 BPM

## 2019-01-09 DIAGNOSIS — M31.10 THROMBOTIC MICROANGIOPATHY (H): Primary | ICD-10-CM

## 2019-01-09 DIAGNOSIS — C79.11 SECONDARY MALIGNANT NEOPLASM OF BLADDER (H): ICD-10-CM

## 2019-01-09 DIAGNOSIS — D64.81 ANEMIA ASSOCIATED WITH CHEMOTHERAPY: ICD-10-CM

## 2019-01-09 DIAGNOSIS — T45.1X5A ANEMIA ASSOCIATED WITH CHEMOTHERAPY: ICD-10-CM

## 2019-01-09 LAB
HCT VFR BLD AUTO: 30.3 % (ref 40–53)
HGB BLD-MCNC: 9.5 G/DL (ref 13.3–17.7)

## 2019-01-09 PROCEDURE — 25000128 H RX IP 250 OP 636: Mod: ZF | Performed by: INTERNAL MEDICINE

## 2019-01-09 PROCEDURE — 96372 THER/PROPH/DIAG INJ SC/IM: CPT

## 2019-01-09 PROCEDURE — 85018 HEMOGLOBIN: CPT | Performed by: INTERNAL MEDICINE

## 2019-01-09 PROCEDURE — 85014 HEMATOCRIT: CPT | Performed by: INTERNAL MEDICINE

## 2019-01-09 PROCEDURE — 36591 DRAW BLOOD OFF VENOUS DEVICE: CPT

## 2019-01-09 RX ORDER — HEPARIN SODIUM (PORCINE) LOCK FLUSH IV SOLN 100 UNIT/ML 100 UNIT/ML
5 SOLUTION INTRAVENOUS
Status: COMPLETED | OUTPATIENT
Start: 2019-01-09 | End: 2019-01-09

## 2019-01-09 RX ADMIN — HEPARIN 5 ML: 100 SYRINGE at 09:01

## 2019-01-09 RX ADMIN — DARBEPOETIN ALFA 300 MCG: 300 INJECTION, SOLUTION INTRAVENOUS; SUBCUTANEOUS at 09:40

## 2019-01-09 ASSESSMENT — PAIN SCALES - GENERAL: PAINLEVEL: NO PAIN (0)

## 2019-01-09 NOTE — NURSING NOTE
"Chief Complaint   Patient presents with     Port Draw     labs drawn from port by rn.  vs taken     Port accessed with 20 gauge 3/4\" gripper needle and labs drawn by rn.  Port flushed with NS and heparin then de-accessed.  Pt tolerated well.  VS taken.  Pt checked in for next appt.    Lashawn Gill RN      "

## 2019-01-09 NOTE — PROGRESS NOTES
Chief Complaint   Patient presents with     Port Draw     labs drawn from port by rn.  vs taken     Imm/Inj     patient with Anemia associated with chemotherapy - here for an Aranesp injection     Patient arrived to clinic for an Aranesp injection today.  Patient declined to speak with an RN today.   Results reviewed, labs MET treatment parameters, ok to proceed with treatment.  Aranesp injection given to abdominal tissue without incident.  Copy of AVS reviewed with patient and/or family.  Patient will return in February for next appointment.

## 2019-02-04 ENCOUNTER — OFFICE VISIT - HEALTHEAST (OUTPATIENT)
Dept: FAMILY MEDICINE | Facility: CLINIC | Age: 64
End: 2019-02-04

## 2019-02-04 DIAGNOSIS — C22.1 CHOLANGIOCARCINOMA (H): ICD-10-CM

## 2019-02-04 DIAGNOSIS — Z12.11 SCREEN FOR COLON CANCER: ICD-10-CM

## 2019-02-04 DIAGNOSIS — E78.2 MIXED HYPERLIPIDEMIA: ICD-10-CM

## 2019-02-04 DIAGNOSIS — M70.22 OLECRANON BURSITIS OF LEFT ELBOW: ICD-10-CM

## 2019-02-04 DIAGNOSIS — Z12.5 SCREENING FOR PROSTATE CANCER: ICD-10-CM

## 2019-02-04 DIAGNOSIS — I42.9 CARDIOMYOPATHY, UNSPECIFIED TYPE (H): ICD-10-CM

## 2019-02-04 DIAGNOSIS — N18.30 CKD (CHRONIC KIDNEY DISEASE) STAGE 3, GFR 30-59 ML/MIN (H): ICD-10-CM

## 2019-02-04 DIAGNOSIS — D64.9 ANEMIA, UNSPECIFIED TYPE: ICD-10-CM

## 2019-02-04 DIAGNOSIS — Z00.01 ENCOUNTER FOR ROUTINE ADULT HEALTH EXAMINATION WITH ABNORMAL FINDINGS: ICD-10-CM

## 2019-02-04 DIAGNOSIS — I35.0 NONRHEUMATIC AORTIC VALVE STENOSIS: ICD-10-CM

## 2019-02-04 DIAGNOSIS — I10 ESSENTIAL HYPERTENSION: ICD-10-CM

## 2019-02-04 DIAGNOSIS — Q23.81 BICUSPID AORTIC VALVE: ICD-10-CM

## 2019-02-04 ASSESSMENT — MIFFLIN-ST. JEOR: SCORE: 1602.86

## 2019-02-08 ENCOUNTER — ANCILLARY PROCEDURE (OUTPATIENT)
Dept: CT IMAGING | Facility: CLINIC | Age: 64
End: 2019-02-08
Attending: INTERNAL MEDICINE
Payer: COMMERCIAL

## 2019-02-08 DIAGNOSIS — C22.1 CHOLANGIOCARCINOMA (H): ICD-10-CM

## 2019-02-08 DIAGNOSIS — T45.1X5A ANEMIA ASSOCIATED WITH CHEMOTHERAPY: ICD-10-CM

## 2019-02-08 DIAGNOSIS — M31.10 THROMBOTIC MICROANGIOPATHY (H): ICD-10-CM

## 2019-02-08 DIAGNOSIS — D64.81 ANEMIA ASSOCIATED WITH CHEMOTHERAPY: ICD-10-CM

## 2019-02-08 DIAGNOSIS — C79.11 SECONDARY MALIGNANT NEOPLASM OF BLADDER (H): ICD-10-CM

## 2019-02-08 LAB
ALBUMIN SERPL-MCNC: 3.5 G/DL (ref 3.4–5)
ALP SERPL-CCNC: 94 U/L (ref 40–150)
ALT SERPL W P-5'-P-CCNC: 30 U/L (ref 0–70)
ANION GAP SERPL CALCULATED.3IONS-SCNC: 9 MMOL/L (ref 3–14)
AST SERPL W P-5'-P-CCNC: 25 U/L (ref 0–45)
BASOPHILS # BLD AUTO: 0 10E9/L (ref 0–0.2)
BASOPHILS NFR BLD AUTO: 0.4 %
BILIRUB SERPL-MCNC: 1.1 MG/DL (ref 0.2–1.3)
BUN SERPL-MCNC: 41 MG/DL (ref 7–30)
CALCIUM SERPL-MCNC: 8.5 MG/DL (ref 8.5–10.1)
CHLORIDE SERPL-SCNC: 112 MMOL/L (ref 94–109)
CO2 SERPL-SCNC: 22 MMOL/L (ref 20–32)
CREAT SERPL-MCNC: 1.72 MG/DL (ref 0.66–1.25)
DIFFERENTIAL METHOD BLD: ABNORMAL
EOSINOPHIL # BLD AUTO: 0.1 10E9/L (ref 0–0.7)
EOSINOPHIL NFR BLD AUTO: 1.5 %
ERYTHROCYTE [DISTWIDTH] IN BLOOD BY AUTOMATED COUNT: 15.4 % (ref 10–15)
GFR SERPL CREATININE-BSD FRML MDRD: 42 ML/MIN/{1.73_M2}
GLUCOSE SERPL-MCNC: 94 MG/DL (ref 70–99)
HCT VFR BLD AUTO: 34.9 % (ref 40–53)
HGB BLD-MCNC: 10.6 G/DL (ref 13.3–17.7)
IMM GRANULOCYTES # BLD: 0 10E9/L (ref 0–0.4)
IMM GRANULOCYTES NFR BLD: 0.2 %
LYMPHOCYTES # BLD AUTO: 1.1 10E9/L (ref 0.8–5.3)
LYMPHOCYTES NFR BLD AUTO: 20.9 %
MCH RBC QN AUTO: 29 PG (ref 26.5–33)
MCHC RBC AUTO-ENTMCNC: 30.4 G/DL (ref 31.5–36.5)
MCV RBC AUTO: 96 FL (ref 78–100)
MONOCYTES # BLD AUTO: 0.6 10E9/L (ref 0–1.3)
MONOCYTES NFR BLD AUTO: 11 %
NEUTROPHILS # BLD AUTO: 3.5 10E9/L (ref 1.6–8.3)
NEUTROPHILS NFR BLD AUTO: 66 %
NRBC # BLD AUTO: 0 10*3/UL
NRBC BLD AUTO-RTO: 0 /100
PLATELET # BLD AUTO: 147 10E9/L (ref 150–450)
POTASSIUM SERPL-SCNC: 4.1 MMOL/L (ref 3.4–5.3)
PROT SERPL-MCNC: 6.2 G/DL (ref 6.8–8.8)
RBC # BLD AUTO: 3.65 10E12/L (ref 4.4–5.9)
RETICS # AUTO: 19 10E9/L (ref 25–95)
RETICS/RBC NFR AUTO: 0.5 % (ref 0.5–2)
SODIUM SERPL-SCNC: 143 MMOL/L (ref 133–144)
WBC # BLD AUTO: 5.4 10E9/L (ref 4–11)

## 2019-02-08 PROCEDURE — 25000128 H RX IP 250 OP 636: Performed by: INTERNAL MEDICINE

## 2019-02-08 PROCEDURE — 85045 AUTOMATED RETICULOCYTE COUNT: CPT | Performed by: INTERNAL MEDICINE

## 2019-02-08 PROCEDURE — 80053 COMPREHEN METABOLIC PANEL: CPT | Performed by: INTERNAL MEDICINE

## 2019-02-08 PROCEDURE — 86301 IMMUNOASSAY TUMOR CA 19-9: CPT | Performed by: INTERNAL MEDICINE

## 2019-02-08 PROCEDURE — 85025 COMPLETE CBC W/AUTO DIFF WBC: CPT | Performed by: INTERNAL MEDICINE

## 2019-02-08 RX ORDER — IOPAMIDOL 755 MG/ML
111 INJECTION, SOLUTION INTRAVASCULAR ONCE
Status: COMPLETED | OUTPATIENT
Start: 2019-02-08 | End: 2019-02-08

## 2019-02-08 RX ORDER — HEPARIN SODIUM (PORCINE) LOCK FLUSH IV SOLN 100 UNIT/ML 100 UNIT/ML
5 SOLUTION INTRAVENOUS ONCE
Status: COMPLETED | OUTPATIENT
Start: 2019-02-08 | End: 2019-02-08

## 2019-02-08 RX ORDER — IOPAMIDOL 755 MG/ML
111 INJECTION, SOLUTION INTRAVASCULAR ONCE
Status: DISCONTINUED | OUTPATIENT
Start: 2019-02-08 | End: 2019-02-08 | Stop reason: CLARIF

## 2019-02-08 RX ADMIN — HEPARIN 5 ML: 100 SYRINGE at 06:43

## 2019-02-08 RX ADMIN — HEPARIN SODIUM (PORCINE) LOCK FLUSH IV SOLN 100 UNIT/ML 5 ML: 100 SOLUTION at 07:37

## 2019-02-08 RX ADMIN — IOPAMIDOL 111 ML: 755 INJECTION, SOLUTION INTRAVASCULAR at 07:28

## 2019-02-08 NOTE — DISCHARGE INSTRUCTIONS

## 2019-02-08 NOTE — NURSING NOTE
"Chief Complaint   Patient presents with     Port Draw     lab only appointment.  port accessed and labs drawn by rn.     Port accessed by RN in lab with 20g 3/4\" power needle, labs drawn, port flushed with saline and heparin.  Carey Larson RN    "

## 2019-02-09 LAB — CANCER AG19-9 SERPL-ACNC: 47 U/ML (ref 0–37)

## 2019-02-11 ENCOUNTER — ONCOLOGY VISIT (OUTPATIENT)
Dept: ONCOLOGY | Facility: CLINIC | Age: 64
End: 2019-02-11
Attending: INTERNAL MEDICINE
Payer: COMMERCIAL

## 2019-02-11 VITALS
SYSTOLIC BLOOD PRESSURE: 144 MMHG | DIASTOLIC BLOOD PRESSURE: 106 MMHG | RESPIRATION RATE: 16 BRPM | OXYGEN SATURATION: 95 % | WEIGHT: 177.56 LBS | TEMPERATURE: 94.6 F | HEIGHT: 70 IN | HEART RATE: 84 BPM | BODY MASS INDEX: 25.42 KG/M2

## 2019-02-11 DIAGNOSIS — D64.81 ANEMIA ASSOCIATED WITH CHEMOTHERAPY: ICD-10-CM

## 2019-02-11 DIAGNOSIS — C22.1 CHOLANGIOCARCINOMA (H): Primary | ICD-10-CM

## 2019-02-11 DIAGNOSIS — T45.1X5A ANEMIA ASSOCIATED WITH CHEMOTHERAPY: ICD-10-CM

## 2019-02-11 DIAGNOSIS — C79.11 SECONDARY MALIGNANT NEOPLASM OF BLADDER (H): ICD-10-CM

## 2019-02-11 PROCEDURE — G0463 HOSPITAL OUTPT CLINIC VISIT: HCPCS | Mod: ZF

## 2019-02-11 PROCEDURE — 99215 OFFICE O/P EST HI 40 MIN: CPT | Mod: ZP | Performed by: INTERNAL MEDICINE

## 2019-02-11 ASSESSMENT — MIFFLIN-ST. JEOR: SCORE: 1616.67

## 2019-02-11 ASSESSMENT — PAIN SCALES - GENERAL: PAINLEVEL: NO PAIN (0)

## 2019-02-11 NOTE — PROGRESS NOTES
Girish Chauhan is here today in follow-up of metastatic cholangiocarcinoma.    He originally had resection of an intrahepatic cholangiocarcinoma and eventually recurred with his sole site of recurrence being within the bladder wall as was documented on biopsy.  He was started on treatment about a year ago with gemcitabine and cisplatin with a good response both of the bladder wall thickening and of his CA-19-9 level.  We dropped the cisplatin due to some problems with renal function and then more recently dropped the gemcitabine due to drug associated TTP.  He has now been off treatment for several months and is here for an ongoing assessment of his disease status.  He says his appetite is improving and he believes he has gained a few pounds.  A lot of foods still do not taste right to him.  He still is fatigued and has dyspnea on exertion but that has been gradually improving.  He has chronic edema which he believes is unchanged.  He has had no chest pains or palpitations.  He has had no bleeding.  He checks his blood pressures at home and they generally run with diastolics of about 90.  The remainder of a 10 point complete review of systems is otherwise negative.  He has not had recent follow-up with his cardiologist as his insurance has forced him to change his healthcare system and he still waiting for follow-up appointments to be scheduled for that.    On physical exam Mr. Chauhan appears chronically ill and older than his stated age.  His vital signs are notable for hypertension.  His weight is stable over the last couple of months.    He has no scleral icterus and no visible lesion in the oropharynx.  He has no adenopathy palpable in his neck or supraclavicular spaces.  I cannot palpate his thyroid.  His Port-A-Cath site is unremarkable.  His lungs are clear to auscultation with dullness about a quarter of the way up at the bases.  His heart rate and rhythm are regular.  He has a course systolic murmur.  His  jugular venous pressure is modestly elevated and he has pathologic hepatojugular reflux.  His abdomen is soft and nontender without palpable mass, organomegaly or demonstrable ascites.  He has 2+ pitting edema to just below the knees bilaterally.  There is no tenderness in his calves or thighs.  His speech is fluent and his cranial nerves are grossly intact.  His gait and station are unremarkable.    I reviewed his CT scan with him and his wife.  Radiology reports no evidence of disease progression however to my review he has a modestly enlarging periaortic lymph nodes in the upper abdomen and I think we are starting to see some more thickening again in the bladder wall.  He continues to have significant pleural effusions.    His lab work shows a CA-19-9 level to have gone up just a little bit at 49.  His chronic renal failure is stable with a creatinine of 1.7 and his electrolytes are unremarkable.  His liver enzymes are unremarkable.  His hemoglobin is now over 10.6.    Assessment/plan:  1.  Metastatic cholangiocarcinoma.  He probably is starting to show some early signs of disease progression off treatment, but not enough that given his comorbidities I want to push early to start active therapy.  We will plan on reevaluating his disease status in less than 2 months and at that point if he has ongoing and more clear-cut evidence of progression we will probably start therapy with a fluoropyrimidine.  We talked a little bit today about the long-term prognosis and the uncertainty of how long he will respond to second line treatment and the fact that eventually that will also fail.  We discussed that after that we be looking at either research studies or a focus on symptomatic care.  2.  Anemia, multifactorial--TTP, chronic renal failure, marrow suppression.  As we get further out from his chemotherapy his hemoglobin has improved.  I do not think we need ongoing erythropoietin at this point and we will put that on  hold for now.  3.  Aortic stenosis and congestive heart failure.  He seems volume overloaded to me today. I think the very high blood pressure value today is related to his anxiety over the visit as he is checking this regularly at home and it is not quite so poorly controlled when he is at home. I think he would benefit from some diuresis and better control of his blood pressure but I am a little reluctant to do that myself in the face of his renal failure and aortic stenosis and I encouraged him to make sure he follows up sooner rather than later with cardiology.

## 2019-02-11 NOTE — LETTER
2/11/2019    RE: Girish Chauhan  [address] [facility] [address]     Girish Chauhan is here today in follow-up of metastatic cholangiocarcinoma.    He originally had resection of an intrahepatic cholangiocarcinoma and eventually recurred with his sole site of recurrence being within the bladder wall as was documented on biopsy.  He was started on treatment about a year ago with gemcitabine and cisplatin with a good response both of the bladder wall thickening and of his CA-19-9 level.  We dropped the cisplatin due to some problems with renal function and then more recently dropped the gemcitabine due to drug associated TTP.  He has now been off treatment for several months and is here for an ongoing assessment of his disease status.  He says his appetite is improving and he believes he has gained a few pounds.  A lot of foods still do not taste right to him.  He still is fatigued and has dyspnea on exertion but that has been gradually improving.  He has chronic edema which he believes is unchanged.  He has had no chest pains or palpitations.  He has had no bleeding.  He checks his blood pressures at home and they generally run with diastolics of about 90.  The remainder of a 10 point complete review of systems is otherwise negative.  He has not had recent follow-up with his cardiologist as his insurance has forced him to change his healthcare system and he still waiting for follow-up appointments to be scheduled for that.    On physical exam Mr. Chauhan appears chronically ill and older than his stated age.  His vital signs are notable for hypertension.  His weight is stable over the last couple of months.    He has no scleral icterus and no visible lesion in the oropharynx.  He has no adenopathy palpable in his neck or supraclavicular spaces.  I cannot palpate his thyroid.  His Port-A-Cath site is unremarkable.  His lungs are clear to auscultation with dullness about a quarter of the way up at the  bases.  His heart rate and rhythm are regular.  He has a course systolic murmur.  His jugular venous pressure is modestly elevated and he has pathologic hepatojugular reflux.  His abdomen is soft and nontender without palpable mass, organomegaly or demonstrable ascites.  He has 2+ pitting edema to just below the knees bilaterally.  There is no tenderness in his calves or thighs.  His speech is fluent and his cranial nerves are grossly intact.  His gait and station are unremarkable.    I reviewed his CT scan with him and his wife.  Radiology reports no evidence of disease progression however to my review he has a modestly enlarging periaortic lymph nodes in the upper abdomen and I think we are starting to see some more thickening again in the bladder wall.  He continues to have significant pleural effusions.    His lab work shows a CA-19-9 level to have gone up just a little bit at 49.  His chronic renal failure is stable with a creatinine of 1.7 and his electrolytes are unremarkable.  His liver enzymes are unremarkable.  His hemoglobin is now over 10.6.    Assessment/plan:  1.  Metastatic cholangiocarcinoma.  He probably is starting to show some early signs of disease progression off treatment, but not enough that given his comorbidities I want to push early to start active therapy.  We will plan on reevaluating his disease status in less than 2 months and at that point if he has ongoing and more clear-cut evidence of progression we will probably start therapy with a fluoropyrimidine.  We talked a little bit today about the long-term prognosis and the uncertainty of how long he will respond to second line treatment and the fact that eventually that will also fail.  We discussed that after that we be looking at either research studies or a focus on symptomatic care.  2.  Anemia, multifactorial--TTP, chronic renal failure, marrow suppression.  As we get further out from his chemotherapy his hemoglobin has improved.  I  do not think we need ongoing erythropoietin at this point and we will put that on hold for now.  3.  Aortic stenosis and congestive heart failure.  He seems volume overloaded to me today. I think the very high blood pressure value today is related to his anxiety over the visit as he is checking this regularly at home and it is not quite so poorly controlled when he is at home. I think he would benefit from some diuresis and better control of his blood pressure but I am a little reluctant to do that myself in the face of his renal failure and aortic stenosis and I encouraged him to make sure he follows up sooner rather than later with cardiology.      Naresh De Los Santos MD

## 2019-02-15 ENCOUNTER — AMBULATORY - HEALTHEAST (OUTPATIENT)
Dept: LAB | Facility: CLINIC | Age: 64
End: 2019-02-15

## 2019-02-15 DIAGNOSIS — E78.2 MIXED HYPERLIPIDEMIA: ICD-10-CM

## 2019-02-15 DIAGNOSIS — Z12.5 SCREENING FOR PROSTATE CANCER: ICD-10-CM

## 2019-02-15 LAB
ALBUMIN SERPL-MCNC: 3.8 G/DL (ref 3.5–5)
ALP SERPL-CCNC: 100 U/L (ref 45–120)
ALT SERPL W P-5'-P-CCNC: 21 U/L (ref 0–45)
ANION GAP SERPL CALCULATED.3IONS-SCNC: 9 MMOL/L (ref 5–18)
AST SERPL W P-5'-P-CCNC: 21 U/L (ref 0–40)
BILIRUB SERPL-MCNC: 1.3 MG/DL (ref 0–1)
BUN SERPL-MCNC: 40 MG/DL (ref 8–22)
CALCIUM SERPL-MCNC: 9.2 MG/DL (ref 8.5–10.5)
CHLORIDE BLD-SCNC: 113 MMOL/L (ref 98–107)
CHOLEST SERPL-MCNC: 99 MG/DL
CO2 SERPL-SCNC: 21 MMOL/L (ref 22–31)
CREAT SERPL-MCNC: 1.7 MG/DL (ref 0.7–1.3)
FASTING STATUS PATIENT QL REPORTED: YES
GFR SERPL CREATININE-BSD FRML MDRD: 41 ML/MIN/1.73M2
GLUCOSE BLD-MCNC: 92 MG/DL (ref 70–125)
HDLC SERPL-MCNC: 40 MG/DL
LDLC SERPL CALC-MCNC: 49 MG/DL
POTASSIUM BLD-SCNC: 4.8 MMOL/L (ref 3.5–5)
PROT SERPL-MCNC: 6.1 G/DL (ref 6–8)
PSA SERPL-MCNC: 0.4 NG/ML (ref 0–4.5)
SODIUM SERPL-SCNC: 143 MMOL/L (ref 136–145)
TRIGL SERPL-MCNC: 48 MG/DL

## 2019-02-18 ENCOUNTER — OFFICE VISIT - HEALTHEAST (OUTPATIENT)
Dept: CARDIOLOGY | Facility: CLINIC | Age: 64
End: 2019-02-18

## 2019-02-18 DIAGNOSIS — I50.33 ACUTE ON CHRONIC DIASTOLIC CONGESTIVE HEART FAILURE (H): ICD-10-CM

## 2019-02-18 DIAGNOSIS — I35.0 NONRHEUMATIC AORTIC VALVE STENOSIS: ICD-10-CM

## 2019-02-18 DIAGNOSIS — I10 ESSENTIAL HYPERTENSION: ICD-10-CM

## 2019-02-18 LAB
ANION GAP SERPL CALCULATED.3IONS-SCNC: 9 MMOL/L (ref 5–18)
BNP SERPL-MCNC: 3601 PG/ML (ref 0–53)
BUN SERPL-MCNC: 37 MG/DL (ref 8–22)
CALCIUM SERPL-MCNC: 9.8 MG/DL (ref 8.5–10.5)
CHLORIDE BLD-SCNC: 113 MMOL/L (ref 98–107)
CO2 SERPL-SCNC: 21 MMOL/L (ref 22–31)
CREAT SERPL-MCNC: 1.56 MG/DL (ref 0.7–1.3)
GFR SERPL CREATININE-BSD FRML MDRD: 45 ML/MIN/1.73M2
GLUCOSE BLD-MCNC: 105 MG/DL (ref 70–125)
POTASSIUM BLD-SCNC: 4.8 MMOL/L (ref 3.5–5)
SODIUM SERPL-SCNC: 143 MMOL/L (ref 136–145)

## 2019-02-18 ASSESSMENT — MIFFLIN-ST. JEOR: SCORE: 1598.78

## 2019-02-19 ENCOUNTER — COMMUNICATION - HEALTHEAST (OUTPATIENT)
Dept: CARDIOLOGY | Facility: CLINIC | Age: 64
End: 2019-02-19

## 2019-02-19 DIAGNOSIS — I50.9 CHF (CONGESTIVE HEART FAILURE) (H): ICD-10-CM

## 2019-02-25 ENCOUNTER — OFFICE VISIT - HEALTHEAST (OUTPATIENT)
Dept: CARDIOLOGY | Facility: CLINIC | Age: 64
End: 2019-02-25

## 2019-02-25 ENCOUNTER — AMBULATORY - HEALTHEAST (OUTPATIENT)
Dept: CARDIOLOGY | Facility: CLINIC | Age: 64
End: 2019-02-25

## 2019-02-25 DIAGNOSIS — I50.9 CHF (CONGESTIVE HEART FAILURE) (H): ICD-10-CM

## 2019-02-25 DIAGNOSIS — I10 ESSENTIAL HYPERTENSION: ICD-10-CM

## 2019-02-25 DIAGNOSIS — I42.9 CARDIOMYOPATHY, UNSPECIFIED TYPE (H): ICD-10-CM

## 2019-02-25 DIAGNOSIS — I50.30 HEART FAILURE WITH PRESERVED EJECTION FRACTION (H): ICD-10-CM

## 2019-02-25 LAB
ANION GAP SERPL CALCULATED.3IONS-SCNC: 9 MMOL/L (ref 5–18)
BUN SERPL-MCNC: 44 MG/DL (ref 8–22)
CALCIUM SERPL-MCNC: 9.7 MG/DL (ref 8.5–10.5)
CHLORIDE BLD-SCNC: 107 MMOL/L (ref 98–107)
CO2 SERPL-SCNC: 25 MMOL/L (ref 22–31)
CREAT SERPL-MCNC: 1.65 MG/DL (ref 0.7–1.3)
GFR SERPL CREATININE-BSD FRML MDRD: 42 ML/MIN/1.73M2
GLUCOSE BLD-MCNC: 83 MG/DL (ref 70–125)
MAGNESIUM SERPL-MCNC: 2.1 MG/DL (ref 1.8–2.6)
POTASSIUM BLD-SCNC: 5 MMOL/L (ref 3.5–5)
SODIUM SERPL-SCNC: 141 MMOL/L (ref 136–145)

## 2019-02-25 ASSESSMENT — MIFFLIN-ST. JEOR: SCORE: 1518.04

## 2019-02-26 ENCOUNTER — COMMUNICATION - HEALTHEAST (OUTPATIENT)
Dept: CARDIOLOGY | Facility: CLINIC | Age: 64
End: 2019-02-26

## 2019-02-26 DIAGNOSIS — I50.9 CHF (CONGESTIVE HEART FAILURE) (H): ICD-10-CM

## 2019-03-13 ENCOUNTER — RECORDS - HEALTHEAST (OUTPATIENT)
Dept: ADMINISTRATIVE | Facility: OTHER | Age: 64
End: 2019-03-13

## 2019-03-20 ENCOUNTER — OFFICE VISIT - HEALTHEAST (OUTPATIENT)
Dept: CARDIOLOGY | Facility: CLINIC | Age: 64
End: 2019-03-20

## 2019-03-20 DIAGNOSIS — I42.9 CARDIOMYOPATHY, UNSPECIFIED TYPE (H): ICD-10-CM

## 2019-03-20 DIAGNOSIS — I10 ESSENTIAL HYPERTENSION: ICD-10-CM

## 2019-03-20 DIAGNOSIS — I50.30 HEART FAILURE WITH PRESERVED EJECTION FRACTION (H): ICD-10-CM

## 2019-03-20 ASSESSMENT — MIFFLIN-ST. JEOR: SCORE: 1563.39

## 2019-04-19 ENCOUNTER — ANCILLARY PROCEDURE (OUTPATIENT)
Dept: CT IMAGING | Facility: CLINIC | Age: 64
End: 2019-04-19
Attending: INTERNAL MEDICINE
Payer: COMMERCIAL

## 2019-04-19 DIAGNOSIS — C79.11 SECONDARY MALIGNANT NEOPLASM OF BLADDER (H): ICD-10-CM

## 2019-04-19 DIAGNOSIS — T45.1X5A ANEMIA ASSOCIATED WITH CHEMOTHERAPY: ICD-10-CM

## 2019-04-19 DIAGNOSIS — D64.81 ANEMIA ASSOCIATED WITH CHEMOTHERAPY: ICD-10-CM

## 2019-04-19 DIAGNOSIS — C22.1 CHOLANGIOCARCINOMA (H): ICD-10-CM

## 2019-04-19 LAB
ALBUMIN SERPL-MCNC: 3.8 G/DL (ref 3.4–5)
ALP SERPL-CCNC: 90 U/L (ref 40–150)
ALT SERPL W P-5'-P-CCNC: 27 U/L (ref 0–70)
ANION GAP SERPL CALCULATED.3IONS-SCNC: 6 MMOL/L (ref 3–14)
AST SERPL W P-5'-P-CCNC: 19 U/L (ref 0–45)
BASOPHILS # BLD AUTO: 0 10E9/L (ref 0–0.2)
BASOPHILS NFR BLD AUTO: 0.4 %
BILIRUB SERPL-MCNC: 0.6 MG/DL (ref 0.2–1.3)
BUN SERPL-MCNC: 50 MG/DL (ref 7–30)
CALCIUM SERPL-MCNC: 9 MG/DL (ref 8.5–10.1)
CHLORIDE SERPL-SCNC: 110 MMOL/L (ref 94–109)
CO2 SERPL-SCNC: 24 MMOL/L (ref 20–32)
CREAT SERPL-MCNC: 1.56 MG/DL (ref 0.66–1.25)
DIFFERENTIAL METHOD BLD: ABNORMAL
EOSINOPHIL # BLD AUTO: 0.1 10E9/L (ref 0–0.7)
EOSINOPHIL NFR BLD AUTO: 2.1 %
ERYTHROCYTE [DISTWIDTH] IN BLOOD BY AUTOMATED COUNT: 15.6 % (ref 10–15)
GFR SERPL CREATININE-BSD FRML MDRD: 47 ML/MIN/{1.73_M2}
GLUCOSE SERPL-MCNC: 99 MG/DL (ref 70–99)
HCT VFR BLD AUTO: 32.7 % (ref 40–53)
HGB BLD-MCNC: 10.6 G/DL (ref 13.3–17.7)
IMM GRANULOCYTES # BLD: 0 10E9/L (ref 0–0.4)
IMM GRANULOCYTES NFR BLD: 0.2 %
LYMPHOCYTES # BLD AUTO: 1.5 10E9/L (ref 0.8–5.3)
LYMPHOCYTES NFR BLD AUTO: 25.4 %
MCH RBC QN AUTO: 29.9 PG (ref 26.5–33)
MCHC RBC AUTO-ENTMCNC: 32.4 G/DL (ref 31.5–36.5)
MCV RBC AUTO: 92 FL (ref 78–100)
MONOCYTES # BLD AUTO: 0.9 10E9/L (ref 0–1.3)
MONOCYTES NFR BLD AUTO: 15.6 %
NEUTROPHILS # BLD AUTO: 3.2 10E9/L (ref 1.6–8.3)
NEUTROPHILS NFR BLD AUTO: 56.3 %
NRBC # BLD AUTO: 0 10*3/UL
NRBC BLD AUTO-RTO: 0 /100
PLATELET # BLD AUTO: 146 10E9/L (ref 150–450)
POTASSIUM SERPL-SCNC: 4.5 MMOL/L (ref 3.4–5.3)
PROT SERPL-MCNC: 6.8 G/DL (ref 6.8–8.8)
RBC # BLD AUTO: 3.55 10E12/L (ref 4.4–5.9)
RETICS # AUTO: 43 10E9/L (ref 25–95)
RETICS/RBC NFR AUTO: 1.2 % (ref 0.5–2)
SODIUM SERPL-SCNC: 141 MMOL/L (ref 133–144)
WBC # BLD AUTO: 5.7 10E9/L (ref 4–11)

## 2019-04-19 PROCEDURE — 85025 COMPLETE CBC W/AUTO DIFF WBC: CPT | Performed by: INTERNAL MEDICINE

## 2019-04-19 PROCEDURE — 25000128 H RX IP 250 OP 636: Performed by: INTERNAL MEDICINE

## 2019-04-19 PROCEDURE — 86301 IMMUNOASSAY TUMOR CA 19-9: CPT | Performed by: INTERNAL MEDICINE

## 2019-04-19 PROCEDURE — 85045 AUTOMATED RETICULOCYTE COUNT: CPT | Performed by: INTERNAL MEDICINE

## 2019-04-19 PROCEDURE — 80053 COMPREHEN METABOLIC PANEL: CPT | Performed by: INTERNAL MEDICINE

## 2019-04-19 RX ORDER — HEPARIN SODIUM (PORCINE) LOCK FLUSH IV SOLN 100 UNIT/ML 100 UNIT/ML
5 SOLUTION INTRAVENOUS ONCE
Status: COMPLETED | OUTPATIENT
Start: 2019-04-19 | End: 2019-04-19

## 2019-04-19 RX ORDER — IOPAMIDOL 755 MG/ML
108 INJECTION, SOLUTION INTRAVASCULAR ONCE
Status: COMPLETED | OUTPATIENT
Start: 2019-04-19 | End: 2019-04-19

## 2019-04-19 RX ORDER — HEPARIN SODIUM (PORCINE) LOCK FLUSH IV SOLN 100 UNIT/ML 100 UNIT/ML
500 SOLUTION INTRAVENOUS ONCE
Status: COMPLETED | OUTPATIENT
Start: 2019-04-19 | End: 2019-04-19

## 2019-04-19 RX ADMIN — IOPAMIDOL 108 ML: 755 INJECTION, SOLUTION INTRAVASCULAR at 07:27

## 2019-04-19 RX ADMIN — HEPARIN SODIUM (PORCINE) LOCK FLUSH IV SOLN 100 UNIT/ML 500 UNITS: 100 SOLUTION at 08:03

## 2019-04-19 RX ADMIN — HEPARIN 5 ML: 100 SYRINGE at 06:29

## 2019-04-19 NOTE — NURSING NOTE
"Chief Complaint   Patient presents with     Port Draw     port accessed and labs drawn by rn.      Port accessed by RN in lab with 20g 3/4\" power needle, labs drawn, port flushed with saline and heparin.    "

## 2019-04-19 NOTE — DISCHARGE INSTRUCTIONS

## 2019-04-20 LAB — CANCER AG19-9 SERPL-ACNC: 126 U/ML (ref 0–37)

## 2019-04-22 ENCOUNTER — ONCOLOGY VISIT (OUTPATIENT)
Dept: ONCOLOGY | Facility: CLINIC | Age: 64
End: 2019-04-22
Attending: INTERNAL MEDICINE
Payer: COMMERCIAL

## 2019-04-22 ENCOUNTER — ONCOLOGY VISIT (OUTPATIENT)
Dept: ONCOLOGY | Facility: CLINIC | Age: 64
End: 2019-04-22

## 2019-04-22 VITALS
BODY MASS INDEX: 25.55 KG/M2 | OXYGEN SATURATION: 99 % | TEMPERATURE: 97.5 F | HEART RATE: 73 BPM | DIASTOLIC BLOOD PRESSURE: 82 MMHG | WEIGHT: 178.1 LBS | SYSTOLIC BLOOD PRESSURE: 123 MMHG

## 2019-04-22 DIAGNOSIS — T45.1X5A ANEMIA ASSOCIATED WITH CHEMOTHERAPY: ICD-10-CM

## 2019-04-22 DIAGNOSIS — C22.1 CHOLANGIOCARCINOMA (H): Primary | ICD-10-CM

## 2019-04-22 DIAGNOSIS — C78.6 PERITONEAL CARCINOMATOSIS (H): ICD-10-CM

## 2019-04-22 DIAGNOSIS — C79.11 SECONDARY MALIGNANT NEOPLASM OF BLADDER (H): ICD-10-CM

## 2019-04-22 DIAGNOSIS — D64.81 ANEMIA ASSOCIATED WITH CHEMOTHERAPY: ICD-10-CM

## 2019-04-22 PROCEDURE — 99215 OFFICE O/P EST HI 40 MIN: CPT | Mod: ZP | Performed by: INTERNAL MEDICINE

## 2019-04-22 PROCEDURE — G0463 HOSPITAL OUTPT CLINIC VISIT: HCPCS | Mod: ZF

## 2019-04-22 ASSESSMENT — PAIN SCALES - GENERAL: PAINLEVEL: NO PAIN (0)

## 2019-04-22 NOTE — LETTER
"4/22/2019       RE: Girish Chauhan  [address] [facility] [address]     Dear Colleague,    Thank you for referring your patient, Girish Chauhan, to the [facility]. Please see a copy of my visit note below.    Oral Chemotherapy Monitoring Program    Primary Oncologist: Dr Naresh De Los Santos  Primary Oncology Clinic: [facility]  Cancer Diagnosis: Cholangiocarcinoma    Drug: Xeloda 1500mg ([id number]) PO BID for 14 days on and 7 days off  Start Date: as soon as available  Dose is appropriate for patients: BSA and Renal Function   Expected duration of therapy: Until disease progression or unacceptable toxicity    Drug Interaction Assessment: There were no significant drug interactions identified upon review of medication list.    Lab Monitoring Plan: CBC/CMP Q3 weeks.     Subjective/Objective:  Girish Chauhan is a 64 year old male seen in clinic for an initial visit for oral chemotherapy education.      ORAL CHEMOTHERAPY 4/22/2019   Drug Name Xeloda (Capecitabine)   Current Dosage 1500mg   Current Schedule BID   Cycle Details 2 weeks on 1 week off     Vitals:  BP:   BP Readings from Last 1 Encounters:   04/22/19 123/82     Wt Readings from Last 1 Encounters:   04/22/19 80.8 kg (178 lb 1.6 oz)     Estimated body surface area is 2 meters squared as calculated from the following:    Height as of 2/11/19: 1.778 m (5' 10\").    Weight as of an earlier encounter on 4/22/19: 80.8 kg (178 lb 1.6 oz).      Labs:  _  Result Component Current Result Ref Range   Sodium 141 (4/19/2019) 133 - 144 mmol/L     _  Result Component Current Result Ref Range   Potassium 4.5 (4/19/2019) 3.4 - 5.3 mmol/L     _  Result Component Current Result Ref Range   Calcium 9.0 (4/19/2019) 8.5 - 10.1 mg/dL     No results found for Mag within last 30 days.     No results found for Phos within last 30 days.     _  Result Component Current Result Ref Range   Albumin 3.8 (4/19/2019) 3.4 - 5.0 g/dL     _  Result Component " Current Result Ref Range   Urea Nitrogen 50 (H) (4/19/2019) 7 - 30 mg/dL     _  Result Component Current Result Ref Range   Creatinine 1.56 (H) (4/19/2019) 0.66 - 1.25 mg/dL       _  Result Component Current Result Ref Range   AST 19 (4/19/2019) 0 - 45 U/L     _  Result Component Current Result Ref Range   ALT 27 (4/19/2019) 0 - 70 U/L     _  Result Component Current Result Ref Range   Bilirubin Total 0.6 (4/19/2019) 0.2 - 1.3 mg/dL       _  Result Component Current Result Ref Range   WBC 5.7 (4/19/2019) 4.0 - 11.0 10e9/L     _  Result Component Current Result Ref Range   Hemoglobin 10.6 (L) (4/19/2019) 13.3 - 17.7 g/dL     _  Result Component Current Result Ref Range   Platelet Count 146 (L) (4/19/2019) 150 - 450 10e9/L     _  Result Component Current Result Ref Range   Absolute Neutrophil 3.2 (4/19/2019) 1.6 - 8.3 10e9/L       Assessment:  Patient is appropriate to start therapy.    Plan:  Basic chemotherapy teaching was reviewed with the patient including indication, start date of therapy, dose, administration, adverse effects, missed doses, food and drug interactions, monitoring, side effect management, office contact information, and safe handling. Written materials were provided and all questions answered.    Follow-Up:  Will call patient about 1 week after starting the medication.    Polly West, Aylin, BCPS, BCOP  Oncology Clinical Pharmacy Specialist  [facility]/ [facility]  [phone]

## 2019-04-22 NOTE — PROGRESS NOTES
Girish Chauhan is here today in follow-up of metastatic cholangiocarcinoma.    He has disease that recurred after his original resection in the wall of his urinary bladder. He had an excellent response, but then had to discontinue his prior therapy, stopping cisplatin because of renal dysfunction and then subsequently stopping gemcitabine because of TTP.  He has been off treatment for several months now and at our last visit had a minor rise in his tumor marker without clear radiographic evidence of disease progression.  He returns today for another assessment of his disease status.    When I saw him last he was volume overloaded related to his chronic heart failure, atrial fibrillation, and aortic stenosis. He has since seen cardiology who diuresed him 17 pounds with resolution of a lot of his symptoms.    At present he is feeling better than he has in quite a while and with the improvement in the weather he is now getting outside and getting more regular exercise.  He has gained a few pounds in the last few weeks which he attributes to improved appetite and oral intake.  At present he has no pain.  He has no respiratory symptoms.  He has had no problems with edema.  The remainder of a 10 point review of systems is otherwise unremarkable.  He denies any other new medical problems since her last visit.    On physical exam he appears quite well and his vital signs are unremarkable.  He has no scleral icterus and no visible lesion in the oropharynx.  No adenopathy is palpable in his neck or supraclavicular spaces.  His thyroid is unremarkable.  His port site is unremarkable.  His lungs are clear to auscultation without dullness to percussion.  His heart rate and rhythm are regular with a course systolic murmur.  His jugular venous pressure appears normal.  His abdomen is soft and nontender without demonstrable ascites or palpable masses or organomegaly.  He has no peripheral edema and no tenderness in his calves or  thighs.  He has no cyanosis or clubbing of his digits.  His speech is fluent and his cranial nerves are grossly intact.  His gait and station are unremarkable.    I reviewed with the patient and his wife his lab work and CT scan.  His chronic renal failure is stable and his electrolytes are unremarkable.  His liver tests are unremarkable.  He has mild normocytic anemia with a hemoglobin of 10.9 consistent with prior values and otherwise unremarkable blood counts.  His tumor marker has risen a little bit now up to about 150.  His CT scan demonstrates growth of a previously unrecognized nodule within the abdominal cavity anterior to his liver just below the diaphragm.  I do not see any clear progression of his bladder wall thickening.  He has a small groundglass nodule in his lung that is probably infectious in origin.    Assessment/plan: Progressive metastatic cholangiocarcinoma in a patient with a performance status of 0.  We had a long discussion today about how to proceed. The evidence of his disease progression is clear-cut at this point, but the volume of disease is quite small and he has no symptoms related to it.  We talked about whether we should start active treatment at this point or not and I recommended we go ahead with therapy, an assessment with which the patient and hs wife agreed.  I do not believe we can go back to gemcitabine as he is likely to rapidly recur with the TTP.  I recommended starting with a fluoropyrimidine.  We talked about whether or not adding oxaliplatin would make sense, but I think given his low volume disease we can start with capecitabine alone and only at the oxaliplatin if he progresses on that.  I reviewed with him the expected toxicities--primarily mucocutaneous and low blood counts and how we would manage those.  I will plan on using a 20% dose reduction because of his minor renal dysfunction, but if he has no toxicity at reduced dose we may go back up to a higher dose after  his first cycle.  He has a good understanding of this is not curative intent therapy.    He had no other symptomatic or psychosocial needs today.  He will continue to follow-up with his cardiologist regarding his heart disease.  I encouraged him in increasing his exercise tolerance and maintaining a healthy diet.

## 2019-04-22 NOTE — PROGRESS NOTES
"Oral Chemotherapy Monitoring Program    Primary Oncologist: Dr Naresh De Los Santos  Primary Oncology Clinic: [facility]  Cancer Diagnosis: Cholangiocarcinoma    Drug: Xeloda 1500mg ([id number]) PO BID for 14 days on and 7 days off  Start Date: as soon as available  Dose is appropriate for patients: BSA and Renal Function   Expected duration of therapy: Until disease progression or unacceptable toxicity    Drug Interaction Assessment: There were no significant drug interactions identified upon review of medication list.    Lab Monitoring Plan: CBC/CMP Q3 weeks.     Subjective/Objective:  Girish Chauhan is a 64 year old male seen in clinic for an initial visit for oral chemotherapy education.      ORAL CHEMOTHERAPY 4/22/2019   Drug Name Xeloda (Capecitabine)   Current Dosage 1500mg   Current Schedule BID   Cycle Details 2 weeks on 1 week off     Vitals:  BP:   BP Readings from Last 1 Encounters:   04/22/19 123/82     Wt Readings from Last 1 Encounters:   04/22/19 80.8 kg (178 lb 1.6 oz)     Estimated body surface area is 2 meters squared as calculated from the following:    Height as of 2/11/19: 1.778 m (5' 10\").    Weight as of an earlier encounter on 4/22/19: 80.8 kg (178 lb 1.6 oz).      Labs:  _  Result Component Current Result Ref Range   Sodium 141 (4/19/2019) 133 - 144 mmol/L     _  Result Component Current Result Ref Range   Potassium 4.5 (4/19/2019) 3.4 - 5.3 mmol/L     _  Result Component Current Result Ref Range   Calcium 9.0 (4/19/2019) 8.5 - 10.1 mg/dL     No results found for Mag within last 30 days.     No results found for Phos within last 30 days.     _  Result Component Current Result Ref Range   Albumin 3.8 (4/19/2019) 3.4 - 5.0 g/dL     _  Result Component Current Result Ref Range   Urea Nitrogen 50 (H) (4/19/2019) 7 - 30 mg/dL     _  Result Component Current Result Ref Range   Creatinine 1.56 (H) (4/19/2019) 0.66 - 1.25 mg/dL       _  Result Component Current Result Ref Range   AST 19 " (4/19/2019) 0 - 45 U/L     _  Result Component Current Result Ref Range   ALT 27 (4/19/2019) 0 - 70 U/L     _  Result Component Current Result Ref Range   Bilirubin Total 0.6 (4/19/2019) 0.2 - 1.3 mg/dL       _  Result Component Current Result Ref Range   WBC 5.7 (4/19/2019) 4.0 - 11.0 10e9/L     _  Result Component Current Result Ref Range   Hemoglobin 10.6 (L) (4/19/2019) 13.3 - 17.7 g/dL     _  Result Component Current Result Ref Range   Platelet Count 146 (L) (4/19/2019) 150 - 450 10e9/L     _  Result Component Current Result Ref Range   Absolute Neutrophil 3.2 (4/19/2019) 1.6 - 8.3 10e9/L       Assessment:  Patient is appropriate to start therapy.    Plan:  Basic chemotherapy teaching was reviewed with the patient including indication, start date of therapy, dose, administration, adverse effects, missed doses, food and drug interactions, monitoring, side effect management, office contact information, and safe handling. Written materials were provided and all questions answered.    Follow-Up:  Will call patient about 1 week after starting the medication.    Polly eWst, PharmD, BCPS, BCOP  Oncology Clinical Pharmacy Specialist  [facility]/ [facility]  [phone]

## 2019-04-22 NOTE — NURSING NOTE
"Oncology Rooming Note    April 22, 2019 7:10 AM   Girish Chauhan is a 64 year old male who presents for:    Chief Complaint   Patient presents with     Oncology Clinic Visit     Return; Hilar Cholangiocarcinoma     Initial Vitals: /82   Pulse 73   Temp 97.5  F (36.4  C) (Oral)   Wt 80.8 kg (178 lb 1.6 oz)   SpO2 99%   BMI 25.55 kg/m   Estimated body mass index is 25.55 kg/m  as calculated from the following:    Height as of 2/11/19: 1.778 m (5' 10\").    Weight as of this encounter: 80.8 kg (178 lb 1.6 oz). Body surface area is 2 meters squared.  No Pain (0) Comment: Data Unavailable   No LMP for male patient.  Allergies reviewed: Yes  Medications reviewed: Yes    Medications: Medication refills not needed today.  Pharmacy name entered into "[employer]": [facility] [address] [place] [address] [address] [address] AT [facility]    Clinical concerns: Here for test results. No other concerns. Magali was notified.      Annamarie Lara CMA              "

## 2019-04-22 NOTE — LETTER
4/22/2019      RE: Girish Chauhan  [address] [facility] [address]       Girish Chauhan is here today in follow-up of metastatic cholangiocarcinoma.    He has disease that recurred after his original resection in the wall of his urinary bladder. He had an excellent response, but then had to discontinue his prior therapy, stopping cisplatin because of renal dysfunction and then subsequently stopping gemcitabine because of TTP.  He has been off treatment for several months now and at our last visit had a minor rise in his tumor marker without clear radiographic evidence of disease progression.  He returns today for another assessment of his disease status.    When I saw him last he was volume overloaded related to his chronic heart failure, atrial fibrillation, and aortic stenosis. He has since seen cardiology who diuresed him 17 pounds with resolution of a lot of his symptoms.    At present he is feeling better than he has in quite a while and with the improvement in the weather he is now getting outside and getting more regular exercise.  He has gained a few pounds in the last few weeks which he attributes to improved appetite and oral intake.  At present he has no pain.  He has no respiratory symptoms.  He has had no problems with edema.  The remainder of a 10 point review of systems is otherwise unremarkable.  He denies any other new medical problems since her last visit.    On physical exam he appears quite well and his vital signs are unremarkable.  He has no scleral icterus and no visible lesion in the oropharynx.  No adenopathy is palpable in his neck or supraclavicular spaces.  His thyroid is unremarkable.  His port site is unremarkable.  His lungs are clear to auscultation without dullness to percussion.  His heart rate and rhythm are regular with a course systolic murmur.  His jugular venous pressure appears normal.  His abdomen is soft and nontender without demonstrable ascites or palpable masses  or organomegaly.  He has no peripheral edema and no tenderness in his calves or thighs.  He has no cyanosis or clubbing of his digits.  His speech is fluent and his cranial nerves are grossly intact.  His gait and station are unremarkable.    I reviewed with the patient and his wife his lab work and CT scan.  His chronic renal failure is stable and his electrolytes are unremarkable.  His liver tests are unremarkable.  He has mild normocytic anemia with a hemoglobin of 10.9 consistent with prior values and otherwise unremarkable blood counts.  His tumor marker has risen a little bit now up to about 150.  His CT scan demonstrates growth of a previously unrecognized nodule within the abdominal cavity anterior to his liver just below the diaphragm.  I do not see any clear progression of his bladder wall thickening.  He has a small groundglass nodule in his lung that is probably infectious in origin.    Assessment/plan: Progressive metastatic cholangiocarcinoma in a patient with a performance status of 0.  We had a long discussion today about how to proceed. The evidence of his disease progression is clear-cut at this point, but the volume of disease is quite small and he has no symptoms related to it.  We talked about whether we should start active treatment at this point or not and I recommended we go ahead with therapy, an assessment with which the patient and hs wife agreed.  I do not believe we can go back to gemcitabine as he is likely to rapidly recur with the TTP.  I recommended starting with a fluoropyrimidine.  We talked about whether or not adding oxaliplatin would make sense, but I think given his low volume disease we can start with capecitabine alone and only at the oxaliplatin if he progresses on that.  I reviewed with him the expected toxicities--primarily mucocutaneous and low blood counts and how we would manage those.  I will plan on using a 20% dose reduction because of his minor renal dysfunction,  but if he has no toxicity at reduced dose we may go back up to a higher dose after his first cycle.  He has a good understanding of this is not curative intent therapy.    He had no other symptomatic or psychosocial needs today.  He will continue to follow-up with his cardiologist regarding his heart disease.  I encouraged him in increasing his exercise tolerance and maintaining a healthy diet.    Naresh De Los Santos MD

## 2019-04-23 ENCOUNTER — TELEPHONE (OUTPATIENT)
Dept: PHARMACY | Facility: CLINIC | Age: 64
End: 2019-04-23

## 2019-04-23 DIAGNOSIS — T45.1X5A ANEMIA ASSOCIATED WITH CHEMOTHERAPY: Primary | ICD-10-CM

## 2019-04-23 DIAGNOSIS — C22.1 CHOLANGIOCARCINOMA (H): ICD-10-CM

## 2019-04-23 DIAGNOSIS — D64.81 ANEMIA ASSOCIATED WITH CHEMOTHERAPY: Primary | ICD-10-CM

## 2019-04-23 RX ORDER — CAPECITABINE 500 MG/1
800 TABLET, FILM COATED ORAL 2 TIMES DAILY
Qty: 84 TABLET | Refills: 0 | Status: ON HOLD | OUTPATIENT
Start: 2019-04-23 | End: 2019-06-09

## 2019-04-23 NOTE — TELEPHONE ENCOUNTER
PA Initiation    Medication: capecitabine - Pending  Insurance Company: [employer] - Phone [phone] Fax [phone]  Pharmacy Filling the Rx: [facility] - [place], [place] - [address]  Filling Pharmacy Phone: [phone]  Filling Pharmacy Fax: [phone]  Start Date: 4/23/2019

## 2019-04-23 NOTE — TELEPHONE ENCOUNTER
Prior Authorization Approval    Authorization Effective Date: 4/23/2019  Authorization Expiration Date: 4/23/2020  Medication: capecitabine - approved  Approved Dose/Quantity: 500mg 84/21 days  Reference #: [id number]   Insurance Company: [employer] - Phone [phone] Fax [phone]  Expected CoPay:       CoPay Card Available: No    Foundation Assistance Needed: NA  Which Pharmacy is filling the prescription (Not needed for infusion/clinic administered): [facility] - [place], [place] - [address] [place] [address]  Pharmacy Notified: Yes  Patient Notified: Yes

## 2019-04-29 ENCOUNTER — TELEPHONE (OUTPATIENT)
Dept: ONCOLOGY | Facility: CLINIC | Age: 64
End: 2019-04-29

## 2019-04-29 NOTE — ORAL ONC MGMT
Oral Chemotherapy Monitoring Program     Received call from patient in follow up of Xeloda therapy. Krishna said he plans to start Xeloda tomorrow. All of his questions were answered to his stated satisfaction. He thanked me for the call and care.     Left message to please call back in follow-up of therapy. No patient or drug names were mentioned.     Girish Campbell PharmD  [facility]  [phone]  April 29, 2019

## 2019-05-03 ENCOUNTER — OFFICE VISIT - HEALTHEAST (OUTPATIENT)
Dept: CARDIOLOGY | Facility: CLINIC | Age: 64
End: 2019-05-03

## 2019-05-03 ENCOUNTER — RECORDS - HEALTHEAST (OUTPATIENT)
Dept: ADMINISTRATIVE | Facility: OTHER | Age: 64
End: 2019-05-03

## 2019-05-03 DIAGNOSIS — Q23.81 BICUSPID AORTIC VALVE: ICD-10-CM

## 2019-05-03 DIAGNOSIS — I35.0 NONRHEUMATIC AORTIC VALVE STENOSIS: ICD-10-CM

## 2019-05-03 ASSESSMENT — MIFFLIN-ST. JEOR: SCORE: 1593

## 2019-05-06 ENCOUNTER — RECORDS - HEALTHEAST (OUTPATIENT)
Dept: ADMINISTRATIVE | Facility: OTHER | Age: 64
End: 2019-05-06

## 2019-05-07 ENCOUNTER — RECORDS - HEALTHEAST (OUTPATIENT)
Dept: ADMINISTRATIVE | Facility: OTHER | Age: 64
End: 2019-05-07

## 2019-05-08 ENCOUNTER — COMMUNICATION - HEALTHEAST (OUTPATIENT)
Dept: CARDIOLOGY | Facility: CLINIC | Age: 64
End: 2019-05-08

## 2019-05-10 ENCOUNTER — TELEPHONE (OUTPATIENT)
Dept: ONCOLOGY | Facility: CLINIC | Age: 64
End: 2019-05-10

## 2019-05-10 NOTE — ORAL ONC MGMT
Oral Chemotherapy Monitoring Program     Primary Oncologist: Dr Naresh De Los Santos  Primary Oncology Clinic: [facility]  Cancer Diagnosis: Cholangiocarcinoma     Drug: Xeloda 1500mg ([id number]) PO BID for 14 days on and 7 days off  Start Date: 4/30/2019  Dose is appropriate for patients: BSA and Renal Function     Expected duration of therapy: Until disease progression or unacceptable toxicity     Drug Interaction Assessment: There were no significant drug interactions identified upon review of medication list.    Drugs checked include: Apixaban -AtorvaSTATin -Capecitabine -Colchicine -Diclofenac (Topical) -Fexofenadine -Furosemide -Losartan -Metoprolol -Multivitamins/Minerals -Nitroglycerin -Omega-3 Fatty Acids       Lab Monitoring Plan: CBC/CMP Q3 weeks.     Subjective/Objective:  Girish Chauhan is a 64 year old male contacted by phone for a follow-up visit for oral chemotherapy.  Krishna said he had a heart attack and was treated with angioplasty and discharged to home. The heart attack happened at around 7:30 PM on 5/3/2019. Despite this he has continued to take Xeloda as prescribed and has not missed any doses since he started. He denied any AE. He specifically denied any nausea, HFS, mouth sores or constipation/diarrhea. He thanked me for the call and care.    ORAL CHEMOTHERAPY 4/22/2019 5/10/2019   Drug Name Xeloda (Capecitabine) Xeloda (Capecitabine)   Current Dosage 1500mg 1500mg   Current Schedule BID BID   Cycle Details 2 weeks on 1 week off 2 weeks on 1 week off   Start Date of Last Cycle - 4/30/2019   Doses missed in last 2 weeks - 0   Adherence Assessment - Adherent   Adverse Effects - No AE identified during assessment   Any new drug interactions? - No       Last PHQ-2 Score on record:   PHQ-2 ( 1999 [employer]) 4/22/2016 4/18/2016   Q1: Little interest or pleasure in doing things 0 0   Q2: Feeling down, depressed or hopeless 0 0   PHQ-2 Score 0 0       Vitals:  BP:   BP Readings from Last 1 Encounters:  "  04/22/19 123/82     Wt Readings from Last 1 Encounters:   04/22/19 80.8 kg (178 lb 1.6 oz)     Estimated body surface area is 2 meters squared as calculated from the following:    Height as of 2/11/19: 1.778 m (5' 10\").    Weight as of 4/22/19: 80.8 kg (178 lb 1.6 oz).    Labs:  _  Result Component Current Result Ref Range   Sodium 141 (4/19/2019) 133 - 144 mmol/L     _  Result Component Current Result Ref Range   Potassium 4.5 (4/19/2019) 3.4 - 5.3 mmol/L     _  Result Component Current Result Ref Range   Calcium 9.0 (4/19/2019) 8.5 - 10.1 mg/dL     No results found for Mag within last 30 days.     No results found for Phos within last 30 days.     _  Result Component Current Result Ref Range   Albumin 3.8 (4/19/2019) 3.4 - 5.0 g/dL     _  Result Component Current Result Ref Range   Urea Nitrogen 50 (H) (4/19/2019) 7 - 30 mg/dL     _  Result Component Current Result Ref Range   Creatinine 1.56 (H) (4/19/2019) 0.66 - 1.25 mg/dL       _  Result Component Current Result Ref Range   AST 19 (4/19/2019) 0 - 45 U/L     _  Result Component Current Result Ref Range   ALT 27 (4/19/2019) 0 - 70 U/L     _  Result Component Current Result Ref Range   Bilirubin Total 0.6 (4/19/2019) 0.2 - 1.3 mg/dL       _  Result Component Current Result Ref Range   WBC 5.7 (4/19/2019) 4.0 - 11.0 10e9/L     _  Result Component Current Result Ref Range   Hemoglobin 10.6 (L) (4/19/2019) 13.3 - 17.7 g/dL     _  Result Component Current Result Ref Range   Platelet Count 146 (L) (4/19/2019) 150 - 450 10e9/L     _  Result Component Current Result Ref Range   Absolute Neutrophil 3.2 (4/19/2019) 1.6 - 8.3 10e9/L     Assessment:  Despite the heart attack, Krishna appears to be doing well in his first couple of weeks on Xeloda.    Plan:  Hold Xeloda till next office visit per Jennifer Jalloh. Follow up visit is scheduled for 5/23/2019. Krishna expressed understanding and agreement with this plan. He will call with any interim questions or concerns. He will have " about 7 doses of Xeloda remaining due to stopping Xeloda today.    Follow-Up:  Pending next office visit.    Girish CaD  [facility]  [phone]  May 10, 2019

## 2019-05-14 ENCOUNTER — OFFICE VISIT - HEALTHEAST (OUTPATIENT)
Dept: FAMILY MEDICINE | Facility: CLINIC | Age: 64
End: 2019-05-14

## 2019-05-14 ENCOUNTER — RECORDS - HEALTHEAST (OUTPATIENT)
Dept: GENERAL RADIOLOGY | Facility: CLINIC | Age: 64
End: 2019-05-14

## 2019-05-14 DIAGNOSIS — M1A.9XX1 CHRONIC GOUT WITH TOPHUS, UNSPECIFIED CAUSE, UNSPECIFIED SITE: ICD-10-CM

## 2019-05-14 DIAGNOSIS — Q23.81 BICUSPID AORTIC VALVE: ICD-10-CM

## 2019-05-14 DIAGNOSIS — C22.1 CHOLANGIOCARCINOMA (H): ICD-10-CM

## 2019-05-14 DIAGNOSIS — Z09 HOSPITAL DISCHARGE FOLLOW-UP: ICD-10-CM

## 2019-05-14 DIAGNOSIS — I10 ESSENTIAL HYPERTENSION: ICD-10-CM

## 2019-05-14 DIAGNOSIS — N18.30 CKD (CHRONIC KIDNEY DISEASE) STAGE 3, GFR 30-59 ML/MIN (H): ICD-10-CM

## 2019-05-14 DIAGNOSIS — I42.9 CARDIOMYOPATHY, UNSPECIFIED TYPE (H): ICD-10-CM

## 2019-05-14 DIAGNOSIS — I21.4 NSTEMI (NON-ST ELEVATED MYOCARDIAL INFARCTION) (H): ICD-10-CM

## 2019-05-14 DIAGNOSIS — I35.0 NONRHEUMATIC AORTIC VALVE STENOSIS: ICD-10-CM

## 2019-05-14 DIAGNOSIS — R21 RASH AND NONSPECIFIC SKIN ERUPTION: ICD-10-CM

## 2019-05-14 DIAGNOSIS — M1A.9XX1 CHRONIC GOUT, UNSPECIFIED, WITH TOPHUS (TOPHI): ICD-10-CM

## 2019-05-14 LAB — URATE SERPL-MCNC: 11.9 MG/DL (ref 3–8)

## 2019-05-14 ASSESSMENT — MIFFLIN-ST. JEOR: SCORE: 1546.1

## 2019-05-15 ENCOUNTER — AMBULATORY - HEALTHEAST (OUTPATIENT)
Dept: FAMILY MEDICINE | Facility: CLINIC | Age: 64
End: 2019-05-15

## 2019-05-15 ENCOUNTER — COMMUNICATION - HEALTHEAST (OUTPATIENT)
Dept: FAMILY MEDICINE | Facility: CLINIC | Age: 64
End: 2019-05-15

## 2019-05-15 DIAGNOSIS — M10.9 ACUTE GOUT, UNSPECIFIED CAUSE, UNSPECIFIED SITE: ICD-10-CM

## 2019-05-17 ENCOUNTER — HOSPITAL ENCOUNTER (OUTPATIENT)
Dept: CARDIOLOGY | Facility: HOSPITAL | Age: 64
Discharge: HOME | End: 2019-05-17
Attending: INTERNAL MEDICINE

## 2019-05-17 DIAGNOSIS — I35.0 NONRHEUMATIC AORTIC VALVE STENOSIS: ICD-10-CM

## 2019-05-17 DIAGNOSIS — Q23.81 BICUSPID AORTIC VALVE: ICD-10-CM

## 2019-05-17 ASSESSMENT — MIFFLIN-ST. JEOR: SCORE: 1544.69

## 2019-05-20 LAB
AORTIC ROOT: 4.3 CM
AORTIC VALVE MEAN VELOCITY: 187 CM/S
AV DIMENSIONLESS INDEX VTI: 0.3
AV MEAN GRADIENT: 15 MMHG
AV PEAK GRADIENT: 21.9 MMHG
AV VALVE AREA: 1.2 CM2
AV VELOCITY RATIO: 0.3
BSA FOR ECHO PROCEDURE: 1.92 M2
CV BLOOD PRESSURE: ABNORMAL MMHG
CV ECHO HEIGHT: 70 IN
CV ECHO WEIGHT: 165 LBS
DOP CALC AO PEAK VEL: 234 CM/S
DOP CALC AO VTI: 45.2 CM
DOP CALC LVOT AREA: 3.8 CM2
DOP CALC LVOT DIAMETER: 2.2 CM
DOP CALC LVOT PEAK VEL: 64.3 CM/S
DOP CALC LVOT STROKE VOLUME: 55.5 CM3
DOP CALCLVOT PEAK VEL VTI: 14.6 CM
ECHO EJECTION FRACTION ESTIMATED: 35 %
EJECTION FRACTION: 40 % (ref 55–75)
FRACTIONAL SHORTENING: 25.6 % (ref 28–44)
INTERVENTRICULAR SEPTUM IN END DIASTOLE: 1.32 CM (ref 0.6–1)
IVS/PW RATIO: 1
LA AREA 1: 21 CM2
LA AREA 2: 24 CM2
LEFT ATRIUM LENGTH: 5.9 CM
LEFT ATRIUM SIZE: 5.3 CM
LEFT ATRIUM VOLUME INDEX: 37.8 ML/M2
LEFT ATRIUM VOLUME: 72.6 ML
LEFT VENTRICLE CARDIAC INDEX: 2.6 L/MIN/M2
LEFT VENTRICLE CARDIAC OUTPUT: 5 L/MIN
LEFT VENTRICLE DIASTOLIC VOLUME INDEX: 79.7 CM3/M2 (ref 34–74)
LEFT VENTRICLE DIASTOLIC VOLUME: 153 CM3 (ref 62–150)
LEFT VENTRICLE HEART RATE: 90 BPM
LEFT VENTRICLE MASS INDEX: 129.6 G/M2
LEFT VENTRICLE SYSTOLIC VOLUME INDEX: 47.9 CM3/M2 (ref 11–31)
LEFT VENTRICLE SYSTOLIC VOLUME: 92 CM3 (ref 21–61)
LEFT VENTRICULAR INTERNAL DIMENSION IN DIASTOLE: 4.77 CM (ref 4.2–5.8)
LEFT VENTRICULAR INTERNAL DIMENSION IN SYSTOLE: 3.55 CM (ref 2.5–4)
LEFT VENTRICULAR MASS: 248.8 G
LEFT VENTRICULAR OUTFLOW TRACT MEAN GRADIENT: 1 MMHG
LEFT VENTRICULAR OUTFLOW TRACT MEAN VELOCITY: 51.6 CM/S
LEFT VENTRICULAR OUTFLOW TRACT PEAK GRADIENT: 2 MMHG
LEFT VENTRICULAR POSTERIOR WALL IN END DIASTOLE: 1.32 CM (ref 0.6–1)
LV STROKE VOLUME INDEX: 28.9 ML/M2
MV AVERAGE E/E' RATIO: 13.2 CM/S
MV DECELERATION TIME: 151 MS
MV E'TISSUE VEL-LAT: 6.67 CM/S
MV E'TISSUE VEL-MED: 6.29 CM/S
MV LATERAL E/E' RATIO: 12.8
MV MEDIAL E/E' RATIO: 13.6
MV PEAK E VELOCITY: 85.4 CM/S
NUC REST DIASTOLIC VOLUME INDEX: 2640 LBS
NUC REST SYSTOLIC VOLUME INDEX: 70 IN
PR MAX PG: 7 MMHG
PR PEAK VELOCITY: 129 CM/S
TRICUSPID REGURGITATION PEAK PRESSURE GRADIENT: 20.3 MMHG
TRICUSPID VALVE ANULAR PLANE SYSTOLIC EXCURSION: 1.8 CM
TRICUSPID VALVE PEAK REGURGITANT VELOCITY: 225 CM/S

## 2019-05-22 NOTE — PROGRESS NOTES
Reason for Visit: seen in follow-up of metastatic cholangiocarcinoma    Oncology HPI: Girish Chauhan is a 64 year old man with a history of CAD, HTN,  atrial fibrillation on anticoagulation with Eliquis, aortic stenosis and chronic diastolic heart failure and CKD,. He has a history of resected cholangiocarcihnoma in March 2016. Margins were negative and no lymph nodes were involved. Perineural and lymphovascular invasion was noted. He did not have adjuvant chemotherapy. He recurred in the bladder in November 2017. Biopsy was consistent with metastatic cholangiocarcinoma. He was treated with cisplatin/gemzar starting in Dec 2017. Cisplatin was help in June 2018 due to poor tolerance. Gemzar was discontinued in August 2018 due to possible TTP. He was off of treatment for several months, but in April 2019 was found to have disease progression in the abdominal cavity anterior to the liver, just below the diaphragm. He was initiated on capecitabine 1500 mg bid x 14 days of a 21 day cycle, starting therapy on 4/30/19.    He presented to the ED with chest pain and was sent to [facility]. His labs were positive for elevated troponins. He was admitted under cardiology and had an angiogram on 5/6/19. The C showed 99% obstruction with unsuccessful PCI of the OM2. There was moderate residual disease. He had developed A fib with RVR and his metorpolol dose was incrased to 150 mg bid and losartan dose was decreased. Plavix was started and he continued on Eliquis. There were plans for return of elective PCI in 2-4 weeks.     Interval history: Girish has been more fatigued in the past few weeks. Prior to starting xeloda, had been increasing activity and walking. Now can only walk a few hundred feet before stopping to rest. Denies recurrent chest pain. No shortness of breath or palpitation, just feels globally weak and tired. Requires rest with light house work. He stopped the xeloda on day 8 of the 14 day cycle as requested  "by us due to concerns regarding possibly association with coronary vasospasm. He notes a few days later the skin on his face an lower lip blistered and peeled. No mouth sores. No palmar/plantar sores or tenderness. Has noted some scabbing on the back of the hands. Blistering resolved over 3-4 days. Describes it looking like a sunburn. No N/V, diarrhea. No abdominal pain, bloating. No headache, vision change. No edema. Has noted increased pain in the L great toe, feels he is getting gout. Started on allopurinol a few days ago, started colchicine this morning.    Current Outpatient Medications   Medication Sig Dispense Refill     Apixaban (ELIQUIS PO) Take 5 mg by mouth 2 times daily       atorvastatin (LIPITOR) 40 MG tablet TAKE 1 TABLET BY MOUTH DAILY       capecitabine (XELODA) 500 MG tablet CHEMO Take 3 tablets (1,500 mg) by mouth 2 times daily for 14 days Days 1 through 14, then off for 7 days. Take within 30 mins after meal. 84 tablet 0     Colchicine (MITIGARE) 0.6 MG CAPS Take 0.6 mg by mouth 3 times daily as needed       diclofenac (VOLTAREN) 1 % GEL topical gel Apply two grams to the affected area, up to four times daily.       Fexofenadine HCl (ALLEGRA PO) Take 180 mg by mouth daily       FUROSEMIDE PO Take 40 mg by mouth daily        losartan (COZAAR) 25 MG tablet Take 25 mg by mouth 2 times daily        metoprolol (LOPRESSOR) 50 MG tablet 75 mg 2 times daily        multivitamin, therapeutic with minerals (MULTI-VITAMIN) TABS Take 1 tablet by mouth daily 30 tablet 0     Nitroglycerin (NITROSTAT SL) Place 0.4 mg under the tongue every 5 minutes as needed for chest pain (Carries medication - has never used)        Omega-3 Fatty Acids (OMEGA-3 FISH OIL PO) Take 1,000 mg by mouth daily           No Known Allergies      Exam: alert,appears well. Blood pressure 106/65, pulse 85, temperature 97.3  F (36.3  C), temperature source Oral, resp. rate 16, height 1.778 m (5' 10\"), weight 79.5 kg (175 lb 3.2 oz), SpO2 98 " %.  Wt Readings from Last 4 Encounters:   05/23/19 79.5 kg (175 lb 3.2 oz)   04/22/19 80.8 kg (178 lb 1.6 oz)   02/11/19 80.5 kg (177 lb 9 oz)   01/09/19 81.9 kg (180 lb 9.6 oz)   Oropharynx is moist and without lesion. Neck supple and without adenopathy. Lungs:CTA. Heart: irregular rhythm, moderate иван Abdomen: soft, nontender, BS active, no masses or organomegaly.  Extremities: warm, no edema. Speech is clear. CN wnl. Gait/station wnl. Skin: dry on the face, some flaking/scabbing on the back of the hands.       Labs: Results for JENNYFER MCGARRY (MRN [id number]) as of 5/23/2019 08:51   Ref. Range 5/23/2019 06:42   Sodium Latest Ref Range: 133 - 144 mmol/L 136   Potassium Latest Ref Range: 3.4 - 5.3 mmol/L 4.4   Chloride Latest Ref Range: 94 - 109 mmol/L 104   Carbon Dioxide Latest Ref Range: 20 - 32 mmol/L 25   Urea Nitrogen Latest Ref Range: 7 - 30 mg/dL 53 (H)   Creatinine Latest Ref Range: 0.66 - 1.25 mg/dL 1.76 (H)   GFR Estimate Latest Ref Range: >60 mL/min/1.73_m2 40 (L)   GFR Estimate If Black Latest Ref Range: >60 mL/min/1.73_m2 47 (L)   Calcium Latest Ref Range: 8.5 - 10.1 mg/dL 9.0   Anion Gap Latest Ref Range: 3 - 14 mmol/L 7   Albumin Latest Ref Range: 3.4 - 5.0 g/dL 3.6   Protein Total Latest Ref Range: 6.8 - 8.8 g/dL 6.8   Bilirubin Total Latest Ref Range: 0.2 - 1.3 mg/dL 0.6   Alkaline Phosphatase Latest Ref Range: 40 - 150 U/L 104   ALT Latest Ref Range: 0 - 70 U/L 36   AST Latest Ref Range: 0 - 45 U/L 26   Glucose Latest Ref Range: 70 - 99 mg/dL 125 (H)   WBC Latest Ref Range: 4.0 - 11.0 10e9/L 9.8   Hemoglobin Latest Ref Range: 13.3 - 17.7 g/dL 9.8 (L)   Hematocrit Latest Ref Range: 40.0 - 53.0 % 30.0 (L)   Platelet Count Latest Ref Range: 150 - 450 10e9/L 170   RBC Count Latest Ref Range: 4.4 - 5.9 10e12/L 3.14 (L)   MCV Latest Ref Range: 78 - 100 fl 96   MCH Latest Ref Range: 26.5 - 33.0 pg 31.2   MCHC Latest Ref Range: 31.5 - 36.5 g/dL 32.7   RDW Latest Ref Range: 10.0 - 15.0 % 15.7 (H)   Diff  Method Unknown Automated Method   % Neutrophils Latest Units: % 70.7   % Lymphocytes Latest Units: % 12.8   % Monocytes Latest Units: % 14.9   % Eosinophils Latest Units: % 1.0   % Basophils Latest Units: % 0.2   % Immature Granulocytes Latest Units: % 0.4   Nucleated RBCs Latest Ref Range: 0 /100 0   Absolute Neutrophil Latest Ref Range: 1.6 - 8.3 10e9/L 6.9   Absolute Lymphocytes Latest Ref Range: 0.8 - 5.3 10e9/L 1.3   Absolute Monocytes Latest Ref Range: 0.0 - 1.3 10e9/L 1.5 (H)   Absolute Eosinophils Latest Ref Range: 0.0 - 0.7 10e9/L 0.1   Absolute Basophils Latest Ref Range: 0.0 - 0.2 10e9/L 0.0   Abs Immature Granulocytes Latest Ref Range: 0 - 0.4 10e9/L 0.0   Absolute Nucleated RBC Unknown 0.0   % Retic Latest Ref Range: 0.5 - 2.0 % 1.2   Absolute Retic Latest Ref Range: 25 - 95 10e9/L 36.1       Imaging: n/a    Impression/plan:   1. Metastatic cholangiocarcinoma to the bladder, recent growth in the abdomen anterior to the liver. Previously on cisplatin and gemzar, discontinued due to intolerance (cisplatin) and TTP (gemcitabine)  -initiated on capecitabine on 4/30/19. He completed 8 of 14 days of the next cycle in spite of admission for NSTEMI  -he developed skin blistering and peeling a few days after stopping, but that resolved fairly quickly. Appears to be consistent with the cutaneous/photosensitivity reaction that can occur in previous sun damaged skin  -reviewed potential side effect of MI with patient and Dr. De Los Santos. Fluoropyrimidines can cause vasospasm. Unclear if this would have been the cause for the angina/NSTEMI.  -will need to be cautious about the cutaneous side effects again when we resume cycle 2. Would not dose adjust at this time, but discussed a need to stop the cycle if he develops blistering on the skin again.   -Will discuss timing of resumption after I review the cardiology plan with Dr. Rose    2. CAD with recent NSTEMI with unsuccessful stenting and moderate residual  obstruction, HTN, atrial fibrillation with recent RVR, chronic diastolic heart dysfunction (LVEF on 5/6/19 angiogram = EF 31%)  -discussed concerns of cardiotoxicity with fluoropyrimidines, potentially causing vasospasm. Called  his cardiologist, Dr. Rose to discuss a plan    Addendum: Discussed with Dr. Mendez-Cardiology will plan on attempting PCI in 2 weeks. Will hold xeloda through that time. Cardiology to contact the patient regarding the plan for stenting and will contact us after the stenting to discuss future plans for xeloda. TW    60 minutes spent in counseling and coordination of care. TW

## 2019-05-23 ENCOUNTER — APPOINTMENT (OUTPATIENT)
Dept: LAB | Facility: CLINIC | Age: 64
End: 2019-05-23
Attending: INTERNAL MEDICINE
Payer: COMMERCIAL

## 2019-05-23 ENCOUNTER — COMMUNICATION - HEALTHEAST (OUTPATIENT)
Dept: CARDIOLOGY | Facility: CLINIC | Age: 64
End: 2019-05-23

## 2019-05-23 ENCOUNTER — ONCOLOGY VISIT (OUTPATIENT)
Dept: ONCOLOGY | Facility: CLINIC | Age: 64
End: 2019-05-23
Attending: NURSE PRACTITIONER
Payer: COMMERCIAL

## 2019-05-23 VITALS
DIASTOLIC BLOOD PRESSURE: 65 MMHG | TEMPERATURE: 97.3 F | SYSTOLIC BLOOD PRESSURE: 106 MMHG | HEIGHT: 70 IN | HEART RATE: 85 BPM | OXYGEN SATURATION: 98 % | BODY MASS INDEX: 25.08 KG/M2 | RESPIRATION RATE: 16 BRPM | WEIGHT: 175.2 LBS

## 2019-05-23 DIAGNOSIS — C22.1 CHOLANGIOCARCINOMA (H): ICD-10-CM

## 2019-05-23 DIAGNOSIS — M31.10 THROMBOTIC MICROANGIOPATHY (H): ICD-10-CM

## 2019-05-23 DIAGNOSIS — R93.1 ABNORMAL CORONARY ANGIOGRAM: ICD-10-CM

## 2019-05-23 LAB
ALBUMIN SERPL-MCNC: 3.6 G/DL (ref 3.4–5)
ALP SERPL-CCNC: 104 U/L (ref 40–150)
ALT SERPL W P-5'-P-CCNC: 36 U/L (ref 0–70)
ANION GAP SERPL CALCULATED.3IONS-SCNC: 7 MMOL/L (ref 3–14)
AST SERPL W P-5'-P-CCNC: 26 U/L (ref 0–45)
BASOPHILS # BLD AUTO: 0 10E9/L (ref 0–0.2)
BASOPHILS NFR BLD AUTO: 0.2 %
BILIRUB SERPL-MCNC: 0.6 MG/DL (ref 0.2–1.3)
BUN SERPL-MCNC: 53 MG/DL (ref 7–30)
CALCIUM SERPL-MCNC: 9 MG/DL (ref 8.5–10.1)
CHLORIDE SERPL-SCNC: 104 MMOL/L (ref 94–109)
CO2 SERPL-SCNC: 25 MMOL/L (ref 20–32)
CREAT SERPL-MCNC: 1.76 MG/DL (ref 0.66–1.25)
DIFFERENTIAL METHOD BLD: ABNORMAL
EOSINOPHIL # BLD AUTO: 0.1 10E9/L (ref 0–0.7)
EOSINOPHIL NFR BLD AUTO: 1 %
ERYTHROCYTE [DISTWIDTH] IN BLOOD BY AUTOMATED COUNT: 15.7 % (ref 10–15)
GFR SERPL CREATININE-BSD FRML MDRD: 40 ML/MIN/{1.73_M2}
GLUCOSE SERPL-MCNC: 125 MG/DL (ref 70–99)
HCT VFR BLD AUTO: 30 % (ref 40–53)
HGB BLD-MCNC: 9.8 G/DL (ref 13.3–17.7)
IMM GRANULOCYTES # BLD: 0 10E9/L (ref 0–0.4)
IMM GRANULOCYTES NFR BLD: 0.4 %
LYMPHOCYTES # BLD AUTO: 1.3 10E9/L (ref 0.8–5.3)
LYMPHOCYTES NFR BLD AUTO: 12.8 %
MCH RBC QN AUTO: 31.2 PG (ref 26.5–33)
MCHC RBC AUTO-ENTMCNC: 32.7 G/DL (ref 31.5–36.5)
MCV RBC AUTO: 96 FL (ref 78–100)
MONOCYTES # BLD AUTO: 1.5 10E9/L (ref 0–1.3)
MONOCYTES NFR BLD AUTO: 14.9 %
NEUTROPHILS # BLD AUTO: 6.9 10E9/L (ref 1.6–8.3)
NEUTROPHILS NFR BLD AUTO: 70.7 %
NRBC # BLD AUTO: 0 10*3/UL
NRBC BLD AUTO-RTO: 0 /100
PLATELET # BLD AUTO: 170 10E9/L (ref 150–450)
POTASSIUM SERPL-SCNC: 4.4 MMOL/L (ref 3.4–5.3)
PROT SERPL-MCNC: 6.8 G/DL (ref 6.8–8.8)
RBC # BLD AUTO: 3.14 10E12/L (ref 4.4–5.9)
RETICS # AUTO: 36.1 10E9/L (ref 25–95)
RETICS/RBC NFR AUTO: 1.2 % (ref 0.5–2)
SODIUM SERPL-SCNC: 136 MMOL/L (ref 133–144)
WBC # BLD AUTO: 9.8 10E9/L (ref 4–11)

## 2019-05-23 PROCEDURE — 99215 OFFICE O/P EST HI 40 MIN: CPT | Mod: ZP | Performed by: NURSE PRACTITIONER

## 2019-05-23 PROCEDURE — G0463 HOSPITAL OUTPT CLINIC VISIT: HCPCS | Mod: ZF

## 2019-05-23 PROCEDURE — 85025 COMPLETE CBC W/AUTO DIFF WBC: CPT | Performed by: INTERNAL MEDICINE

## 2019-05-23 PROCEDURE — 25000128 H RX IP 250 OP 636: Mod: ZF | Performed by: NURSE PRACTITIONER

## 2019-05-23 PROCEDURE — 36591 DRAW BLOOD OFF VENOUS DEVICE: CPT

## 2019-05-23 PROCEDURE — 85045 AUTOMATED RETICULOCYTE COUNT: CPT | Performed by: INTERNAL MEDICINE

## 2019-05-23 PROCEDURE — 80053 COMPREHEN METABOLIC PANEL: CPT | Performed by: INTERNAL MEDICINE

## 2019-05-23 RX ORDER — CLOPIDOGREL BISULFATE 75 MG/1
75 TABLET ORAL DAILY
Status: ON HOLD | COMMUNITY
End: 2019-07-30

## 2019-05-23 RX ORDER — HEPARIN SODIUM (PORCINE) LOCK FLUSH IV SOLN 100 UNIT/ML 100 UNIT/ML
5 SOLUTION INTRAVENOUS DAILY PRN
Status: DISCONTINUED | OUTPATIENT
Start: 2019-05-23 | End: 2019-05-23 | Stop reason: HOSPADM

## 2019-05-23 RX ADMIN — HEPARIN 5 ML: 100 SYRINGE at 06:36

## 2019-05-23 ASSESSMENT — MIFFLIN-ST. JEOR: SCORE: 1600.95

## 2019-05-23 ASSESSMENT — PAIN SCALES - GENERAL: PAINLEVEL: EXTREME PAIN (8)

## 2019-05-23 NOTE — LETTER
5/23/2019       RE: Girish Chauhan  [address] [facility] [id number]     Dear Colleague,    Thank you for referring your patient, Girish Chauhan, to the [facility]. Please see a copy of my visit note below.    Reason for Visit: seen in follow-up of metastatic cholangiocarcinoma    Oncology HPI: Girish Chauhan is a 64 year old man with a history of CAD, HTN,  atrial fibrillation on anticoagulation with Eliquis, aortic stenosis and chronic diastolic heart failure and CKD,. He has a history of resected cholangiocarcihnoma in March 2016. Margins were negative and no lymph nodes were involved. Perineural and lymphovascular invasion was noted. He did not have adjuvant chemotherapy. He recurred in the bladder in November 2017. Biopsy was consistent with metastatic cholangiocarcinoma. He was treated with cisplatin/gemzar starting in Dec 2017. Cisplatin was help in June 2018 due to poor tolerance. Gemzar was discontinued in August 2018 due to possible TTP. He was off of treatment for several months, but in April 2019 was found to have disease progression in the abdominal cavity anterior to the liver, just below the diaphragm. He was initiated on capecitabine 1500 mg bid x 14 days of a 21 day cycle, starting therapy on 4/30/19.    He presented to the ED with chest pain and was sent to [facility]. His labs were positive for elevated troponins. He was admitted under cardiology and had an angiogram on 5/6/19. [facility] showed 99% obstruction with unsuccessful PCI of the OM2. There was moderate residual disease. He had developed A fib with RVR and his metorpolol dose was incrased to 150 mg bid and losartan dose was decreased. Plavix was started and he continued on Eliquis. There were plans for return of elective PCI in 2-4 weeks.     Interval history: Girish has been more fatigued in the past few weeks. Prior to starting xeloda, had been increasing activity and walking. Now can only walk a few  hundred feet before stopping to rest. Denies recurrent chest pain. No shortness of breath or palpitation, just feels globally weak and tired. Requires rest with light house work. He stopped the xeloda on day 8 of the 14 day cycle as requested by us due to concerns regarding possibly association with coronary vasospasm. He notes a few days later the skin on his face an lower lip blistered and peeled. No mouth sores. No palmar/plantar sores or tenderness. Has noted some scabbing on the back of the hands. Blistering resolved over 3-4 days. Describes it looking like a sunburn. No N/V, diarrhea. No abdominal pain, bloating. No headache, vision change. No edema. Has noted increased pain in the L great toe, feels he is getting gout. Started on allopurinol a few days ago, started colchicine this morning.    Current Outpatient Medications   Medication Sig Dispense Refill     Apixaban (ELIQUIS PO) Take 5 mg by mouth 2 times daily       atorvastatin (LIPITOR) 40 MG tablet TAKE 1 TABLET BY MOUTH DAILY       capecitabine (XELODA) 500 MG tablet CHEMO Take 3 tablets (1,500 mg) by mouth 2 times daily for 14 days Days 1 through 14, then off for 7 days. Take within 30 mins after meal. 84 tablet 0     Colchicine (MITIGARE) 0.6 MG CAPS Take 0.6 mg by mouth 3 times daily as needed       diclofenac (VOLTAREN) 1 % GEL topical gel Apply two grams to the affected area, up to four times daily.       Fexofenadine HCl (ALLEGRA PO) Take 180 mg by mouth daily       FUROSEMIDE PO Take 40 mg by mouth daily        losartan (COZAAR) 25 MG tablet Take 25 mg by mouth 2 times daily        metoprolol (LOPRESSOR) 50 MG tablet 75 mg 2 times daily        multivitamin, therapeutic with minerals (MULTI-VITAMIN) TABS Take 1 tablet by mouth daily 30 tablet 0     Nitroglycerin (NITROSTAT SL) Place 0.4 mg under the tongue every 5 minutes as needed for chest pain (Carries medication - has never used)        Omega-3 Fatty Acids (OMEGA-3 FISH OIL PO) Take 1,000 mg  "by mouth daily           No Known Allergies      Exam: alert,appears well. Blood pressure 106/65, pulse 85, temperature 97.3  F (36.3  C), temperature source Oral, resp. rate 16, height 1.778 m (5' 10\"), weight 79.5 kg (175 lb 3.2 oz), SpO2 98 %.  Wt Readings from Last 4 Encounters:   05/23/19 79.5 kg (175 lb 3.2 oz)   04/22/19 80.8 kg (178 lb 1.6 oz)   02/11/19 80.5 kg (177 lb 9 oz)   01/09/19 81.9 kg (180 lb 9.6 oz)   Oropharynx is moist and without lesion. Neck supple and without adenopathy. Lungs:CTA. Heart: irregular rhythm, moderate иван Abdomen: soft, nontender, BS active, no masses or organomegaly.  Extremities: warm, no edema. Speech is clear. CN wnl. Gait/station wnl. Skin: dry on the face, some flaking/scabbing on the back of the hands.       Labs: Results for JENNYFER MCGARRY (MRN [id number]) as of 5/23/2019 08:51   Ref. Range 5/23/2019 06:42   Sodium Latest Ref Range: 133 - 144 mmol/L 136   Potassium Latest Ref Range: 3.4 - 5.3 mmol/L 4.4   Chloride Latest Ref Range: 94 - 109 mmol/L 104   Carbon Dioxide Latest Ref Range: 20 - 32 mmol/L 25   Urea Nitrogen Latest Ref Range: 7 - 30 mg/dL 53 (H)   Creatinine Latest Ref Range: 0.66 - 1.25 mg/dL 1.76 (H)   GFR Estimate Latest Ref Range: >60 mL/min/1.73_m2 40 (L)   GFR Estimate If Black Latest Ref Range: >60 mL/min/1.73_m2 47 (L)   Calcium Latest Ref Range: 8.5 - 10.1 mg/dL 9.0   Anion Gap Latest Ref Range: 3 - 14 mmol/L 7   Albumin Latest Ref Range: 3.4 - 5.0 g/dL 3.6   Protein Total Latest Ref Range: 6.8 - 8.8 g/dL 6.8   Bilirubin Total Latest Ref Range: 0.2 - 1.3 mg/dL 0.6   Alkaline Phosphatase Latest Ref Range: 40 - 150 U/L 104   ALT Latest Ref Range: 0 - 70 U/L 36   AST Latest Ref Range: 0 - 45 U/L 26   Glucose Latest Ref Range: 70 - 99 mg/dL 125 (H)   WBC Latest Ref Range: 4.0 - 11.0 10e9/L 9.8   Hemoglobin Latest Ref Range: 13.3 - 17.7 g/dL 9.8 (L)   Hematocrit Latest Ref Range: 40.0 - 53.0 % 30.0 (L)   Platelet Count Latest Ref Range: 150 - 450 10e9/L 170 "   RBC Count Latest Ref Range: 4.4 - 5.9 10e12/L 3.14 (L)   MCV Latest Ref Range: 78 - 100 fl 96   MCH Latest Ref Range: 26.5 - 33.0 pg 31.2   MCHC Latest Ref Range: 31.5 - 36.5 g/dL 32.7   RDW Latest Ref Range: 10.0 - 15.0 % 15.7 (H)   Diff Method Unknown Automated Method   % Neutrophils Latest Units: % 70.7   % Lymphocytes Latest Units: % 12.8   % Monocytes Latest Units: % 14.9   % Eosinophils Latest Units: % 1.0   % Basophils Latest Units: % 0.2   % Immature Granulocytes Latest Units: % 0.4   Nucleated RBCs Latest Ref Range: 0 /100 0   Absolute Neutrophil Latest Ref Range: 1.6 - 8.3 10e9/L 6.9   Absolute Lymphocytes Latest Ref Range: 0.8 - 5.3 10e9/L 1.3   Absolute Monocytes Latest Ref Range: 0.0 - 1.3 10e9/L 1.5 (H)   Absolute Eosinophils Latest Ref Range: 0.0 - 0.7 10e9/L 0.1   Absolute Basophils Latest Ref Range: 0.0 - 0.2 10e9/L 0.0   Abs Immature Granulocytes Latest Ref Range: 0 - 0.4 10e9/L 0.0   Absolute Nucleated RBC Unknown 0.0   % Retic Latest Ref Range: 0.5 - 2.0 % 1.2   Absolute Retic Latest Ref Range: 25 - 95 10e9/L 36.1       Imaging: n/a    Impression/plan:   1. Metastatic cholangiocarcinoma to the bladder, recent growth in the abdomen anterior to the liver. Previously on cisplatin and gemzar, discontinued due to intolerance (cisplatin) and TTP (gemcitabine)  -initiated on capecitabine on 4/30/19. He completed 8 of 14 days of the next cycle in spite of admission for NSTEMI  -he developed skin blistering and peeling a few days after stopping, but that resolved fairly quickly. Appears to be consistent with the cutaneous/photosensitivity reaction that can occur in previous sun damaged skin  -reviewed potential side effect of MI with patient and Dr. De Los Santos. Fluoropyrimidines can cause vasospasm. Unclear if this would have been the cause for the angina/NSTEMI.  -will need to be cautious about the cutaneous side effects again when we resume cycle 2. Would not dose adjust at this time, but discussed a need  to stop the cycle if he develops blistering on the skin again.   -Will discuss timing of resumption after I review the cardiology plan with Dr. Rose    2. CAD with recent NSTEMI with unsuccessful stenting and moderate residual obstruction, HTN, atrial fibrillation with recent RVR, chronic diastolic heart dysfunction (LVEF on 5/6/19 angiogram = EF 31%)  -discussed concerns of cardiotoxicity with fluoropyrimidines, potentially causing vasospasm. Called  his cardiologist, Dr. Rose to discuss a plan    Addendum: Discussed with Dr. Mendez-Cardiology will plan on attempting PCI in 2 weeks. Will hold xeloda through that time. Cardiology to contact the patient regarding the plan for stenting and will contact us after the stenting to discuss future plans for xeloda. TW    60 minutes spent in counseling and coordination of care. TW    Again, thank you for allowing me to participate in the care of your patient.      Sincerely,    YO Grubbs CNP

## 2019-05-23 NOTE — NURSING NOTE
"Oncology Rooming Note    May 23, 2019 7:32 AM   Girish Chauhan is a 64 year old male who presents for:    Chief Complaint   Patient presents with     Port Draw     Labs drawn via port by RN in lab. VS taken.      Oncology Clinic Visit     RETURN VISIT HILAR CHOLANGIOCARCINOMA 3 WEEK FOLLOW UP      Initial Vitals: /65 (BP Location: Right arm, Patient Position: Sitting, Cuff Size: Adult Regular)   Pulse 85   Temp 97.3  F (36.3  C) (Oral)   Resp 16   Ht 1.778 m (5' 10\")   Wt 79.5 kg (175 lb 3.2 oz)   SpO2 98%   BMI 25.14 kg/m   Estimated body mass index is 25.14 kg/m  as calculated from the following:    Height as of this encounter: 1.778 m (5' 10\").    Weight as of this encounter: 79.5 kg (175 lb 3.2 oz). Body surface area is 1.98 meters squared.  Extreme Pain (8) Comment: Data Unavailable   No LMP for male patient.  Allergies reviewed: Yes  Medications reviewed: Yes    Medications: Medication refills not needed today.  Pharmacy name entered into [employer]Late[employer]:    [facility] [address] [place] [address] - [address] [facility] [place] - [address] [facility] [facility]  [facility] - [place], [place] - [address]    Clinical concerns: No new concerns today  Jennifer Jalloh was notified.      Francisca Joya              "

## 2019-05-23 NOTE — NURSING NOTE
Chief Complaint   Patient presents with     Port Draw     Labs drawn via port by RN in lab. VS taken.      Labs drawn via Port accessed using 20g gripper needle. Line flushed and Heparin locked. Vital signs taken. Checked into next appointment.       Deana George RN

## 2019-05-24 ENCOUNTER — TELEPHONE (OUTPATIENT)
Dept: ONCOLOGY | Facility: CLINIC | Age: 64
End: 2019-05-24

## 2019-05-24 NOTE — TELEPHONE ENCOUNTER
----- Message from YO Jasso CNP sent at 5/23/2019  2:22 PM CDT -----  Regarding: holding xeloda  Hi all,    I talked with Dr. Hurley ([facility]'s cardiologist) about his recent admission for angina and concerns regarding xeloda. He is going to plan on attempting additional stenting in 2 weeks and will call the patient to discuss that plan.    We will hold xeloda until after stenting. Cardiology will contact us when he has had the intervention and then we will decide on the xeloda.    Hardik,  Could you let him know that plan?    Thanks-  Jennifer

## 2019-05-24 NOTE — TELEPHONE ENCOUNTER
Placed call to patient to review plan after Jennifer Jalloh CNP talked with Dr Hurley. Informed patient patient Jennifer did discuss with cardiologist and he will be contacting patient to discuss additional stenting. Instructed patient to continue holding Xeloda until after stenting and then decision will be made on Xeloda. Patient states he is currently hold and will continue to do so. He states he has not yet heard from cardiology office and will await more info from them. He was appreciative of call and had no further questions or concerns at this time.

## 2019-05-30 ENCOUNTER — COMMUNICATION - HEALTHEAST (OUTPATIENT)
Dept: SCHEDULING | Facility: CLINIC | Age: 64
End: 2019-05-30

## 2019-06-02 ENCOUNTER — COMMUNICATION - HEALTHEAST (OUTPATIENT)
Dept: CARDIOLOGY | Facility: CLINIC | Age: 64
End: 2019-06-02

## 2019-06-03 ENCOUNTER — COMMUNICATION - HEALTHEAST (OUTPATIENT)
Dept: FAMILY MEDICINE | Facility: CLINIC | Age: 64
End: 2019-06-03

## 2019-06-03 DIAGNOSIS — I10 ESSENTIAL HYPERTENSION: ICD-10-CM

## 2019-06-03 DIAGNOSIS — I21.4 NSTEMI (NON-ST ELEVATED MYOCARDIAL INFARCTION) (H): ICD-10-CM

## 2019-06-04 ENCOUNTER — COMMUNICATION - HEALTHEAST (OUTPATIENT)
Dept: CARE COORDINATION | Facility: CLINIC | Age: 64
End: 2019-06-04

## 2019-06-05 ENCOUNTER — AMBULATORY - HEALTHEAST (OUTPATIENT)
Dept: CARE COORDINATION | Facility: HOSPITAL | Age: 64
End: 2019-06-05

## 2019-06-05 ENCOUNTER — HOSPITAL ENCOUNTER (INPATIENT)
Facility: CLINIC | Age: 64
LOS: 4 days | Discharge: HOME | DRG: 393 | End: 2019-06-09
Attending: INTERNAL MEDICINE | Admitting: INTERNAL MEDICINE
Payer: COMMERCIAL

## 2019-06-05 ENCOUNTER — TRANSFERRED RECORDS (OUTPATIENT)
Dept: HEALTH INFORMATION MANAGEMENT | Facility: CLINIC | Age: 64
End: 2019-06-05

## 2019-06-05 DIAGNOSIS — C22.1 CHOLANGIOCARCINOMA (H): Primary | ICD-10-CM

## 2019-06-05 DIAGNOSIS — K56.609 SBO (SMALL BOWEL OBSTRUCTION) (H): ICD-10-CM

## 2019-06-05 LAB
ABO + RH BLD: NORMAL
ABO + RH BLD: NORMAL
ALBUMIN SERPL-MCNC: 2.9 G/DL (ref 3.4–5)
ALP SERPL-CCNC: 504 U/L (ref 40–150)
ALT SERPL W P-5'-P-CCNC: 135 U/L (ref 0–70)
ALT SERPL-CCNC: 188 U/L (ref 0–45)
ANION GAP SERPL CALCULATED.3IONS-SCNC: 6 MMOL/L (ref 3–14)
AST SERPL W P-5'-P-CCNC: 119 U/L (ref 0–45)
AST SERPL-CCNC: 191 U/L (ref 0–40)
BASOPHILS # BLD AUTO: 0 10E9/L (ref 0–0.2)
BASOPHILS NFR BLD AUTO: 0.2 %
BILIRUB SERPL-MCNC: 2 MG/DL (ref 0.2–1.3)
BLD GP AB SCN SERPL QL: NORMAL
BLOOD BANK CMNT PATIENT-IMP: NORMAL
BUN SERPL-MCNC: 31 MG/DL (ref 7–30)
CALCIUM SERPL-MCNC: 8.6 MG/DL (ref 8.5–10.1)
CHLORIDE SERPL-SCNC: 108 MMOL/L (ref 94–109)
CO2 SERPL-SCNC: 25 MMOL/L (ref 20–32)
CREAT SERPL-MCNC: 1.71 MG/DL (ref 0.66–1.25)
CREAT SERPL-MCNC: 2.28 MG/DL (ref 0.7–1.3)
DIFFERENTIAL METHOD BLD: ABNORMAL
EOSINOPHIL # BLD AUTO: 0 10E9/L (ref 0–0.7)
EOSINOPHIL NFR BLD AUTO: 0.5 %
ERYTHROCYTE [DISTWIDTH] IN BLOOD BY AUTOMATED COUNT: 14.6 % (ref 10–15)
GFR SERPL CREATININE-BSD FRML MDRD: 29 ML/MIN/1.73M2
GFR SERPL CREATININE-BSD FRML MDRD: 42 ML/MIN/{1.73_M2}
GLUCOSE SERPL-MCNC: 128 MG/DL (ref 70–125)
GLUCOSE SERPL-MCNC: 80 MG/DL (ref 70–99)
HCT VFR BLD AUTO: 30.2 % (ref 40–53)
HGB BLD-MCNC: 9.9 G/DL (ref 13.3–17.7)
IMM GRANULOCYTES # BLD: 0 10E9/L (ref 0–0.4)
IMM GRANULOCYTES NFR BLD: 0.2 %
INR PPP: 1.65 (ref 0.9–1.1)
LYMPHOCYTES # BLD AUTO: 0.7 10E9/L (ref 0.8–5.3)
LYMPHOCYTES NFR BLD AUTO: 7.8 %
MAGNESIUM SERPL-MCNC: 2.3 MG/DL (ref 1.6–2.3)
MCH RBC QN AUTO: 30.7 PG (ref 26.5–33)
MCHC RBC AUTO-ENTMCNC: 32.8 G/DL (ref 31.5–36.5)
MCV RBC AUTO: 94 FL (ref 78–100)
MONOCYTES # BLD AUTO: 1.4 10E9/L (ref 0–1.3)
MONOCYTES NFR BLD AUTO: 16.3 %
NEUTROPHILS # BLD AUTO: 6.6 10E9/L (ref 1.6–8.3)
NEUTROPHILS NFR BLD AUTO: 75 %
NRBC # BLD AUTO: 0 10*3/UL
NRBC BLD AUTO-RTO: 0 /100
PHOSPHATE SERPL-MCNC: 3.7 MG/DL (ref 2.5–4.5)
PLATELET # BLD AUTO: 162 10E9/L (ref 150–450)
POTASSIUM SERPL-SCNC: 3.8 MMOL/L (ref 3.4–5.3)
POTASSIUM SERPL-SCNC: 4.8 MMOL/L (ref 3.5–5)
PROT SERPL-MCNC: 6.3 G/DL (ref 6.8–8.8)
RBC # BLD AUTO: 3.22 10E12/L (ref 4.4–5.9)
SODIUM SERPL-SCNC: 139 MMOL/L (ref 133–144)
SPECIMEN EXP DATE BLD: NORMAL
WBC # BLD AUTO: 8.8 10E9/L (ref 4–11)

## 2019-06-05 PROCEDURE — 84100 ASSAY OF PHOSPHORUS: CPT | Performed by: INTERNAL MEDICINE

## 2019-06-05 PROCEDURE — 86850 RBC ANTIBODY SCREEN: CPT | Performed by: INTERNAL MEDICINE

## 2019-06-05 PROCEDURE — 12000012 ZZH R&B MS OVERFLOW UMMC

## 2019-06-05 PROCEDURE — 80053 COMPREHEN METABOLIC PANEL: CPT | Performed by: INTERNAL MEDICINE

## 2019-06-05 PROCEDURE — 25000128 H RX IP 250 OP 636: Performed by: INTERNAL MEDICINE

## 2019-06-05 PROCEDURE — 25800030 ZZH RX IP 258 OP 636: Performed by: INTERNAL MEDICINE

## 2019-06-05 PROCEDURE — 86900 BLOOD TYPING SEROLOGIC ABO: CPT | Performed by: INTERNAL MEDICINE

## 2019-06-05 PROCEDURE — 85025 COMPLETE CBC W/AUTO DIFF WBC: CPT | Performed by: INTERNAL MEDICINE

## 2019-06-05 PROCEDURE — 25000132 ZZH RX MED GY IP 250 OP 250 PS 637: Performed by: INTERNAL MEDICINE

## 2019-06-05 PROCEDURE — 86901 BLOOD TYPING SEROLOGIC RH(D): CPT | Performed by: INTERNAL MEDICINE

## 2019-06-05 PROCEDURE — 83735 ASSAY OF MAGNESIUM: CPT | Performed by: INTERNAL MEDICINE

## 2019-06-05 PROCEDURE — 99223 1ST HOSP IP/OBS HIGH 75: CPT | Mod: AI | Performed by: INTERNAL MEDICINE

## 2019-06-05 RX ORDER — PROCHLORPERAZINE MALEATE 5 MG
5-10 TABLET ORAL EVERY 6 HOURS PRN
Status: DISCONTINUED | OUTPATIENT
Start: 2019-06-05 | End: 2019-06-09 | Stop reason: HOSPADM

## 2019-06-05 RX ORDER — ONDANSETRON 2 MG/ML
8 INJECTION INTRAMUSCULAR; INTRAVENOUS EVERY 8 HOURS PRN
Status: DISCONTINUED | OUTPATIENT
Start: 2019-06-05 | End: 2019-06-09 | Stop reason: HOSPADM

## 2019-06-05 RX ORDER — POTASSIUM CL/LIDO/0.9 % NACL 10MEQ/0.1L
10 INTRAVENOUS SOLUTION, PIGGYBACK (ML) INTRAVENOUS
Status: DISCONTINUED | OUTPATIENT
Start: 2019-06-05 | End: 2019-06-09 | Stop reason: HOSPADM

## 2019-06-05 RX ORDER — ONDANSETRON 8 MG/1
8 TABLET, ORALLY DISINTEGRATING ORAL EVERY 8 HOURS PRN
Status: DISCONTINUED | OUTPATIENT
Start: 2019-06-05 | End: 2019-06-09 | Stop reason: HOSPADM

## 2019-06-05 RX ORDER — SODIUM CHLORIDE 9 MG/ML
INJECTION, SOLUTION INTRAVENOUS CONTINUOUS
Status: DISCONTINUED | OUTPATIENT
Start: 2019-06-05 | End: 2019-06-08

## 2019-06-05 RX ORDER — POTASSIUM CHLORIDE 29.8 MG/ML
20 INJECTION INTRAVENOUS
Status: DISCONTINUED | OUTPATIENT
Start: 2019-06-05 | End: 2019-06-09 | Stop reason: HOSPADM

## 2019-06-05 RX ORDER — POTASSIUM CHLORIDE 7.45 MG/ML
10 INJECTION INTRAVENOUS
Status: DISCONTINUED | OUTPATIENT
Start: 2019-06-05 | End: 2019-06-09 | Stop reason: HOSPADM

## 2019-06-05 RX ORDER — PIPERACILLIN SODIUM, TAZOBACTAM SODIUM 2; .25 G/10ML; G/10ML
2.25 INJECTION, POWDER, LYOPHILIZED, FOR SOLUTION INTRAVENOUS EVERY 6 HOURS
Status: DISCONTINUED | OUTPATIENT
Start: 2019-06-05 | End: 2019-06-09

## 2019-06-05 RX ORDER — CHLORAL HYDRATE 500 MG
1000 CAPSULE ORAL DAILY
Status: DISCONTINUED | OUTPATIENT
Start: 2019-06-05 | End: 2019-06-05

## 2019-06-05 RX ORDER — LOSARTAN POTASSIUM 25 MG/1
25 TABLET ORAL DAILY
Status: DISCONTINUED | OUTPATIENT
Start: 2019-06-05 | End: 2019-06-05

## 2019-06-05 RX ORDER — POTASSIUM CHLORIDE 1.5 G/1.58G
20-40 POWDER, FOR SOLUTION ORAL
Status: DISCONTINUED | OUTPATIENT
Start: 2019-06-05 | End: 2019-06-09 | Stop reason: HOSPADM

## 2019-06-05 RX ORDER — HYDRALAZINE HYDROCHLORIDE 20 MG/ML
10 INJECTION INTRAMUSCULAR; INTRAVENOUS EVERY 6 HOURS PRN
Status: DISCONTINUED | OUTPATIENT
Start: 2019-06-05 | End: 2019-06-09 | Stop reason: HOSPADM

## 2019-06-05 RX ORDER — PANTOPRAZOLE SODIUM 40 MG/1
40 TABLET, DELAYED RELEASE ORAL
Status: DISCONTINUED | OUTPATIENT
Start: 2019-06-06 | End: 2019-06-09 | Stop reason: HOSPADM

## 2019-06-05 RX ORDER — ONDANSETRON 8 MG/1
8 TABLET, FILM COATED ORAL EVERY 8 HOURS PRN
Status: DISCONTINUED | OUTPATIENT
Start: 2019-06-05 | End: 2019-06-05

## 2019-06-05 RX ORDER — MULTIPLE VITAMINS W/ MINERALS TAB 9MG-400MCG
1 TAB ORAL DAILY
Status: DISCONTINUED | OUTPATIENT
Start: 2019-06-05 | End: 2019-06-09 | Stop reason: HOSPADM

## 2019-06-05 RX ORDER — NITROGLYCERIN 0.4 MG/1
0.4 TABLET SUBLINGUAL EVERY 5 MIN PRN
Status: DISCONTINUED | OUTPATIENT
Start: 2019-06-05 | End: 2019-06-05

## 2019-06-05 RX ORDER — MAGNESIUM SULFATE HEPTAHYDRATE 40 MG/ML
4 INJECTION, SOLUTION INTRAVENOUS EVERY 4 HOURS PRN
Status: DISCONTINUED | OUTPATIENT
Start: 2019-06-05 | End: 2019-06-09 | Stop reason: HOSPADM

## 2019-06-05 RX ORDER — POTASSIUM CHLORIDE 750 MG/1
20-40 TABLET, EXTENDED RELEASE ORAL
Status: DISCONTINUED | OUTPATIENT
Start: 2019-06-05 | End: 2019-06-09 | Stop reason: HOSPADM

## 2019-06-05 RX ORDER — ACETAMINOPHEN 325 MG/1
650 TABLET ORAL EVERY 4 HOURS PRN
Status: DISCONTINUED | OUTPATIENT
Start: 2019-06-05 | End: 2019-06-09 | Stop reason: HOSPADM

## 2019-06-05 RX ADMIN — MULTIPLE VITAMINS W/ MINERALS TAB 1 TABLET: TAB at 20:37

## 2019-06-05 RX ADMIN — METOPROLOL TARTRATE 150 MG: 100 TABLET, FILM COATED ORAL at 20:38

## 2019-06-05 RX ADMIN — PIPERACILLIN SODIUM,TAZOBACTAM SODIUM 2.25 G: 2; .25 INJECTION, POWDER, FOR SOLUTION INTRAVENOUS at 20:36

## 2019-06-05 RX ADMIN — SODIUM CHLORIDE: 9 INJECTION, SOLUTION INTRAVENOUS at 20:36

## 2019-06-05 ASSESSMENT — ACTIVITIES OF DAILY LIVING (ADL): ADLS_ACUITY_SCORE: 12

## 2019-06-05 NOTE — H&P
[facility], [place]    History & Physical  Hematology / Oncology     Date of Admission:  06/05/2019  Date of Service (when I saw the patient):  06/05/2019    Assessment & Plan   Girish Chauhan is a 64 year old male with CAD, HTN, HLD, permanent atrial fibrillation, bicuspid aortic valve with aortic stenosis, chronic diastolic heart failure, NSTEMI (05/2019), CKD, and metastatic cholangiocarcinoma on Xeloda who transferred from OSH with SBO with possible involvement of draining choledochojejunostomy loop, fevers, and A fib with RVR.    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 CT of abd revealed single dilated fluid-filled loop of bowel in RUQ near prior partial hepatectomy surrounded by multiple surgical loops with concern that it may be involving a potential draining choledochojejunostomy loop. Alk phos and transaminases are elevated compared to 5/30 which is consistent with obstructive picture.   - Consult Surg Onc / Dr. Pinedo for consideration of surgical intervention.  - NPO  - IVF  - Zofran, Compazine prn    # Fever  Reports fevers at home this morning up to 102.5. UA, CXR, and BC taken at OSH followed by Zosyn. Also received 30ml/kg bolus prior to transfer. Concern for cholangitis or possible infection caused by SBO/ biliary obstruction.   - Continue Zosyn  - Culture if spikes    # Metastatic cholangiocarcinoma  Initially treated with surgical resection including partial liver resection in March 2016. Margins were negative and no lymph nodes were involved. Perineural and lymphovascular invasion was noted. He did not have adjuvant chemotherapy. He recurred in the bladder in November 2017. Biopsy was consistent with metastatic cholangiocarcinoma. He was treated with cisplatin/gemzar starting in Dec 2017. Cisplatin was help in June 2018 due to poor tolerance. Gemzar was discontinued in August  2018 due to possible TTP. He was off of treatment for several months, but in April 2019 was found to have disease progression in the abdominal cavity anterior to the liver, just below the diaphragm. He was initiated on capecitabine on 4/30/19. He completed 8 of 14 days of the next cycle in spite of admission for NSTEMI. Xeloda currently on hold until after stenting can be completed.  - Continue to hold Xeloda    # Atrial fibrillation with RVR  # HTN  In Afib with RVR at OSH. BPs also low at OSH ED with improvement following fluid bolus.  - Telemetry   - Continue PTA metoprolol  - Hold PTA losartan, Lasix for now with soft pressures and also since patient got IV contrast today for CT scan.     # h/o NSTEMI  # CAD  # HLD  Recent admission at OSH 5/3-5/7 for chest pain, found to have elevated troponins, NSTEMI. He was admitted under cardiology and had an angiogram on 5/6/19. The LHC showed 99% obstruction with unsuccessful PCI of the OM2. There was moderate residual disease. He had developed A fib with RVR and his metorpolol dose was incrased to 150 mg bid and losartan dose was decreased. Plavix was started and he continued on Eliquis. Planning for additional stenting in a couple weeks with Dr. Hurley.  - Hold PTA lipitor, apixaban, clopidogrel    # CKD stage 3  Cr elevated to 2.3 with baseline of ~1.5.   - Avoid nephrotoxic meds  - Trend labs     # GERD  - Continue PTA pantoprazole, TUMS    FEN:  - ml/hr  -High lyte replacement per protocol  -NPO    Prophy/Misc:  -GI: Continue PTA pantoprazole daily.   -DVT: hold anticoagulation with possible future procedure    Disposition: Inpatient admission to solid oncology service    Holli Fragoso PA-C  Hematology/Oncology  Pager: [phone]    Code Status : Full Code    Primary Care Physician   Jim Kaplan    History of Present Illness   History obtained from chart and discussed with the patient.    Girish Chauhan is a 64 year old male with CAD, HTN, HLD,  "permanent atrial fibrillation, bicuspid aortic valve with aortic stenosis, chronic diastolic heart failure, NSTEMI (05/2019), CKD, and metastatic cholangiocarcinoma on Xeloda who transferred from OSH with SBO with possible involvement of draining choledochojejunostomy loop, fevers, and A fib with RVR.     Patient was hospitalized last Thursday with similar symptoms and was told he has \"gastritis\". Since discharge, he has been having nightly fevers and abdominal pain almost on a daily basis. He describes his pain to be in the center of the abdomen which would radiate to RUQ and RLQ's and occasionally towards the left priscila-abdomen. Pain would generally go away with tums/zofran and he would be ok during the day time. However, last night, his pains did not improve to tums/zofran and he had persistent fevers 101-102. He had some emesis. He decided to call 911 and was taken to [facility] and then transferred here. His pain is currently resolved. He is able to pass gas. Last BM was 2 days ago. No dysuria or diarrhea.       Past Medical History    Past Medical History:   Diagnosis Date     Aortic stenosis due to bicuspid aortic valve      Atrial fibrillation (H) 2006    hx of paroxysmal atrial fibrillation since approximately 2006. S/p cardioversion in 2013 with recurrent atrial fibrillation s/p repeat cardioversion 9/29/14.     Basal cell carcinoma 1/2016    right shoulder and right posterior calf s/p electrodessication and curretage 1/2016.     CAD (coronary artery disease) 12/2011    s/p angiogram 12/2011 s/p percutaneous intervention on the LAD with multiple drug-eluting stents complicated by a small perforation in the diagonal branch which sealed spontaneously.  Patient with significant Circumflex stenosis which is being treated conservatively.     Gout     affects left foot 2-3 times per year     Hyperlipidemia      Hypertension      Liver disease      Melanoma (H) 9/2015    back s/p resection 9/2015     Squamous cell " carcinoma     nose s/p excision       Past Surgical History   Past Surgical History:   Procedure Laterality Date     ------------OTHER-------------      multiple skin cancer resections     CARDIOVERSION  2013, 9/2014     ENDOSCOPIC RETROGRADE CHOLANGIOPANCREATOGRAM N/A 2/26/2016    Procedure: COMBINED ENDOSCOPIC RETROGRADE CHOLANGIOPANCREATOGRAPHY, PLACE TUBE/STENT;  Surgeon: Rafa Andrade MD;  Location: [facility] OR     ENDOSCOPIC RETROGRADE CHOLANGIOPANCREATOGRAPHY  2/2014     EXPLORE COMMON BILE DUCT N/A 3/8/2016    Procedure: EXPLORE COMMON BILE DUCT;  Surgeon: Jose Eduardo Pinedo MD;  Location: [facility] OR     HEPATECTOMY PARTIAL Left 3/8/2016    Procedure: HEPATECTOMY PARTIAL;  Surgeon: Jose Eduardo Pinedo MD;  Location: [facility] OR     INSERT PORT VASCULAR ACCESS Right 12/8/2017    Procedure: INSERT PORT VASCULAR ACCESS;  Place single lumen venous chest port - right;  Surgeon: Drew Powell PA-C;  Location: [facility] OR     SOFT TISSUE SURGERY       STENT  12/2011    LAD with multiple SAM       Prior to Admission Medications   Cannot display prior to admission medications because the patient has not been admitted in this contact.     Allergies   No Known Allergies    Social History   Social History     Socioeconomic History     Marital status:      Spouse name: Not on file     Number of children: 1     Years of education: Not on file     Highest education level: Not on file   Occupational History     Employer: [employer]   Social Needs     Financial resource strain: Not on file     Food insecurity:     Worry: Not on file     Inability: Not on file     Transportation needs:     Medical: Not on file     Non-medical: Not on file   Tobacco Use     Smoking status: Never Smoker     Smokeless tobacco: Never Used   Substance and Sexual Activity     Alcohol use: No     Alcohol/week: 0.0 oz     Drug use: No     Sexual activity: Not on file   Lifestyle     Physical activity:     Days per week: Not on file     Minutes per  session: Not on file     Stress: Not on file   Relationships     Social connections:     Talks on phone: Not on file     Gets together: Not on file     Attends Evangelical service: Not on file     Active member of club or organization: Not on file     Attends meetings of clubs or organizations: Not on file     Relationship status: Not on file     Intimate partner violence:     Fear of current or ex partner: Not on file     Emotionally abused: Not on file     Physically abused: Not on file     Forced sexual activity: Not on file   Other Topics Concern     Not on file   Social History Narrative    Works for [employer] with [employer].  Lives in [place].   with one grown daughter.  No tobacco, rare Etoh, no drug use.       Family History   Family History   Problem Relation Age of Onset     Skin Cancer Mother      Other - See Comments Father         father passed away in his 60s intraoperatively with an unknown bile duct obstruction       Review of Systems   A 10 point ROS is negative unless otherwise noted above in the HPI.    Physical Exam   Vital Signs with Ranges  BP: ()/()   Arterial Line BP: ()/()   0 lbs 0 oz    Constitutional: Pleasant and cooperative male. Awake, alert, NAD.  HEENT: NC/AT, EOMI, sclera clear, conjunctiva normal, OP with MMM  Respiratory: No increased work of breathing, CTAB, no crackles or wheezing.  Cardiovascular: RRR, no murmur noted. No peripheral edema.  GI: Normal bowel sounds, soft, non-distended and non-tender.  Skin: Warm, dry, well-perfused. No bruising, bleeding, rashes, or lesions on limited exam.  Neurologic: A&O. Answers questions appropriately. Moves all extremities spontaneously.  Neuropsychiatric: Calm, appropriate affect      Recent Labs  CBC No lab results found in last 7 days.    CMP No lab results found in last 7 days.    LFTs: No lab results found in last 7 days.    Coagulation Studies: No lab results found in last 7 days.

## 2019-06-06 LAB
ALBUMIN SERPL-MCNC: 2.9 G/DL (ref 3.4–5)
ALP SERPL-CCNC: 545 U/L (ref 40–150)
ALT SERPL W P-5'-P-CCNC: 135 U/L (ref 0–70)
ANION GAP SERPL CALCULATED.3IONS-SCNC: 10 MMOL/L (ref 3–14)
AST SERPL W P-5'-P-CCNC: 115 U/L (ref 0–45)
BASOPHILS # BLD AUTO: 0 10E9/L (ref 0–0.2)
BASOPHILS NFR BLD AUTO: 0.1 %
BILIRUB SERPL-MCNC: 2.9 MG/DL (ref 0.2–1.3)
BUN SERPL-MCNC: 30 MG/DL (ref 7–30)
CALCIUM SERPL-MCNC: 8.7 MG/DL (ref 8.5–10.1)
CHLORIDE SERPL-SCNC: 108 MMOL/L (ref 94–109)
CO2 SERPL-SCNC: 21 MMOL/L (ref 20–32)
CREAT SERPL-MCNC: 1.62 MG/DL (ref 0.66–1.25)
DIFFERENTIAL METHOD BLD: ABNORMAL
EOSINOPHIL # BLD AUTO: 0.1 10E9/L (ref 0–0.7)
EOSINOPHIL NFR BLD AUTO: 1.7 %
ERYTHROCYTE [DISTWIDTH] IN BLOOD BY AUTOMATED COUNT: 14.5 % (ref 10–15)
GFR SERPL CREATININE-BSD FRML MDRD: 45 ML/MIN/{1.73_M2}
GLUCOSE SERPL-MCNC: 83 MG/DL (ref 70–99)
HCT VFR BLD AUTO: 32.6 % (ref 40–53)
HGB BLD-MCNC: 10.3 G/DL (ref 13.3–17.7)
IMM GRANULOCYTES # BLD: 0 10E9/L (ref 0–0.4)
IMM GRANULOCYTES NFR BLD: 0.2 %
INR PPP: 1.64 (ref 0.86–1.14)
LYMPHOCYTES # BLD AUTO: 0.7 10E9/L (ref 0.8–5.3)
LYMPHOCYTES NFR BLD AUTO: 8.6 %
MAGNESIUM SERPL-MCNC: 2.4 MG/DL (ref 1.6–2.3)
MCH RBC QN AUTO: 30.4 PG (ref 26.5–33)
MCHC RBC AUTO-ENTMCNC: 31.6 G/DL (ref 31.5–36.5)
MCV RBC AUTO: 96 FL (ref 78–100)
MONOCYTES # BLD AUTO: 1.2 10E9/L (ref 0–1.3)
MONOCYTES NFR BLD AUTO: 13.8 %
NEUTROPHILS # BLD AUTO: 6.3 10E9/L (ref 1.6–8.3)
NEUTROPHILS NFR BLD AUTO: 75.6 %
NRBC # BLD AUTO: 0 10*3/UL
NRBC BLD AUTO-RTO: 0 /100
PLATELET # BLD AUTO: 149 10E9/L (ref 150–450)
POTASSIUM SERPL-SCNC: 4.1 MMOL/L (ref 3.4–5.3)
PROT SERPL-MCNC: 6.5 G/DL (ref 6.8–8.8)
RBC # BLD AUTO: 3.39 10E12/L (ref 4.4–5.9)
SODIUM SERPL-SCNC: 138 MMOL/L (ref 133–144)
WBC # BLD AUTO: 8.4 10E9/L (ref 4–11)

## 2019-06-06 PROCEDURE — 25800030 ZZH RX IP 258 OP 636: Performed by: INTERNAL MEDICINE

## 2019-06-06 PROCEDURE — 25000132 ZZH RX MED GY IP 250 OP 250 PS 637: Performed by: PHYSICIAN ASSISTANT

## 2019-06-06 PROCEDURE — 99233 SBSQ HOSP IP/OBS HIGH 50: CPT | Performed by: INTERNAL MEDICINE

## 2019-06-06 PROCEDURE — 83735 ASSAY OF MAGNESIUM: CPT | Performed by: PHYSICIAN ASSISTANT

## 2019-06-06 PROCEDURE — 25000128 H RX IP 250 OP 636: Performed by: INTERNAL MEDICINE

## 2019-06-06 PROCEDURE — 12000012 ZZH R&B MS OVERFLOW UMMC

## 2019-06-06 PROCEDURE — 80053 COMPREHEN METABOLIC PANEL: CPT | Performed by: INTERNAL MEDICINE

## 2019-06-06 PROCEDURE — 83735 ASSAY OF MAGNESIUM: CPT | Performed by: INTERNAL MEDICINE

## 2019-06-06 PROCEDURE — 85610 PROTHROMBIN TIME: CPT | Performed by: INTERNAL MEDICINE

## 2019-06-06 PROCEDURE — 85025 COMPLETE CBC W/AUTO DIFF WBC: CPT | Performed by: INTERNAL MEDICINE

## 2019-06-06 PROCEDURE — 25000132 ZZH RX MED GY IP 250 OP 250 PS 637: Performed by: INTERNAL MEDICINE

## 2019-06-06 RX ORDER — LIDOCAINE 40 MG/G
CREAM TOPICAL
Status: DISCONTINUED | OUTPATIENT
Start: 2019-06-06 | End: 2019-06-09 | Stop reason: HOSPADM

## 2019-06-06 RX ORDER — HEPARIN SODIUM,PORCINE 10 UNIT/ML
5-10 VIAL (ML) INTRAVENOUS EVERY 24 HOURS
Status: DISCONTINUED | OUTPATIENT
Start: 2019-06-06 | End: 2019-06-09 | Stop reason: HOSPADM

## 2019-06-06 RX ORDER — HEPARIN SODIUM,PORCINE 10 UNIT/ML
5-10 VIAL (ML) INTRAVENOUS
Status: DISCONTINUED | OUTPATIENT
Start: 2019-06-06 | End: 2019-06-09 | Stop reason: HOSPADM

## 2019-06-06 RX ORDER — CALCIUM CARBONATE 500 MG/1
1-3 TABLET, CHEWABLE ORAL PRN
COMMUNITY

## 2019-06-06 RX ORDER — LOSARTAN POTASSIUM 25 MG/1
25 TABLET ORAL DAILY
Status: DISCONTINUED | OUTPATIENT
Start: 2019-06-06 | End: 2019-06-09 | Stop reason: HOSPADM

## 2019-06-06 RX ORDER — ALLOPURINOL 100 MG/1
100 TABLET ORAL DAILY
Status: DISCONTINUED | OUTPATIENT
Start: 2019-06-06 | End: 2019-06-09 | Stop reason: HOSPADM

## 2019-06-06 RX ORDER — FUROSEMIDE 40 MG
40 TABLET ORAL DAILY
Status: DISCONTINUED | OUTPATIENT
Start: 2019-06-06 | End: 2019-06-09 | Stop reason: HOSPADM

## 2019-06-06 RX ORDER — HEPARIN SODIUM (PORCINE) LOCK FLUSH IV SOLN 100 UNIT/ML 100 UNIT/ML
5 SOLUTION INTRAVENOUS
Status: DISCONTINUED | OUTPATIENT
Start: 2019-06-06 | End: 2019-06-09 | Stop reason: HOSPADM

## 2019-06-06 RX ADMIN — PIPERACILLIN SODIUM,TAZOBACTAM SODIUM 2.25 G: 2; .25 INJECTION, POWDER, FOR SOLUTION INTRAVENOUS at 06:18

## 2019-06-06 RX ADMIN — METOPROLOL TARTRATE 150 MG: 100 TABLET, FILM COATED ORAL at 08:55

## 2019-06-06 RX ADMIN — ALLOPURINOL 100 MG: 100 TABLET ORAL at 21:40

## 2019-06-06 RX ADMIN — FUROSEMIDE 40 MG: 40 TABLET ORAL at 10:26

## 2019-06-06 RX ADMIN — LOSARTAN POTASSIUM 25 MG: 25 TABLET ORAL at 10:26

## 2019-06-06 RX ADMIN — METOPROLOL TARTRATE 150 MG: 100 TABLET, FILM COATED ORAL at 21:40

## 2019-06-06 RX ADMIN — PANTOPRAZOLE SODIUM 40 MG: 40 TABLET, DELAYED RELEASE ORAL at 08:54

## 2019-06-06 RX ADMIN — SODIUM CHLORIDE: 9 INJECTION, SOLUTION INTRAVENOUS at 19:20

## 2019-06-06 RX ADMIN — PIPERACILLIN SODIUM,TAZOBACTAM SODIUM 2.25 G: 2; .25 INJECTION, POWDER, FOR SOLUTION INTRAVENOUS at 19:17

## 2019-06-06 RX ADMIN — MULTIPLE VITAMINS W/ MINERALS TAB 1 TABLET: TAB at 08:55

## 2019-06-06 RX ADMIN — PIPERACILLIN SODIUM,TAZOBACTAM SODIUM 2.25 G: 2; .25 INJECTION, POWDER, FOR SOLUTION INTRAVENOUS at 13:08

## 2019-06-06 RX ADMIN — POTASSIUM CHLORIDE 20 MEQ: 400 INJECTION, SOLUTION INTRAVENOUS at 01:05

## 2019-06-06 RX ADMIN — PIPERACILLIN SODIUM,TAZOBACTAM SODIUM 2.25 G: 2; .25 INJECTION, POWDER, FOR SOLUTION INTRAVENOUS at 00:00

## 2019-06-06 ASSESSMENT — ACTIVITIES OF DAILY LIVING (ADL)
ADLS_ACUITY_SCORE: 15
ADLS_ACUITY_SCORE: 14
ADLS_ACUITY_SCORE: 15
ADLS_ACUITY_SCORE: 14

## 2019-06-06 NOTE — PROGRESS NOTES
CLINICAL NUTRITION SERVICES - ASSESSMENT NOTE     Nutrition Prescription    RECOMMENDATIONS FOR MDs/PROVIDERS TO ORDER:  Given history of Carol-en-Y hepaticojejunostomy consider supplementing the following micronutrients:    Multivitamin/minerals: adult dose 2 times daily    Iron: 45-60 mg elemental daily (18-36 mg daily if low risk) - may partly or fully be covered in multivitamin     Calcium Citrate containing vitamin D: 500 mg 3 times daily or 600 mg 2 times daily    Diet Advancement vs Nutrition support in next 2-3 days    Malnutrition Status:    Severe malnutrition in the context of acute illness    Recommendations already ordered by Registered Dietitian (RD):  None with NPO    Future/Additional Recommendations:  Monitor for diet advancement - start supplements (chocolate boost plus BID, additional PRN) once compliant with diet    If unable to advance diet d/t SBO and TPN is indicated rec:  --Use dosing weight 74 kg  --Begin CPN, goal volume minimal per PharmD with initial 165g Dex daily (561kcal, GIR1.5), 90g AA daily (360 kcal), and 250 ml 20% IV lipids 5x/week.  Micro/Rx: infuvite + MTE 5 (daily vs twice/wk pending LFTs/bili)  --ONLY once pt receives ~100% of initial continuous PN volume with K+/Mg++/Phos WNL, advance PN dex by 85 g every 1-2 days (pending lytes/Glu and Pharm D/RD discretion) to initial goal of 335g Dex (1139 kcal) to increase provisions to 1856 kcals (25 kcal/kg/day), 1.2 g PRO/kg/day, GIR 3.1 with 19% kcals from Fat.  --Will need to monitor for refeeding (K, Mg, phos)  -Given baseline cardiac issues, rec to check baseline TG prior to starting lipids - if TPN to be needed longterm would consider SMOF lipid d/t higher omega3 concentration when compared to intralipid         REASON FOR ASSESSMENT  Girish Chauhan is a/an 64 year old male assessed by the dietitian for Admission Nutrition Risk Screen for reduced oral intake over the last month    PMH significant for:  CAD, HTN, HLD, permanent  "atrial fibrillation, bicuspid aortic valve with aortic stenosis, chronic diastolic heart failure, NSTEMI (05/2019), CKD3, and metastatic cholangiocarcinoma on Xeloda   --Carol-en-Y hepaticojejunostomy on 3/16/16  -- transferred from OSH with SBO with possible involvement of draining choledochojejunostomy loop  -surg-onc consulted for possible surgical intervention    NUTRITION HISTORY  Pt recently seen by RD at [facility] 5/4/19 - at this time he had reported following a heart healthy diet x15 years with help from spouse and declined formal education at that time.    Visited with pt this morning who reports that he was eating fine up until ~1 week ago when starting having GI issues. Last po intake was Tuesday night (~2 days ago) and for the few days preceding this was only lightly eating some food (boiled chicken, oatmeal with berries) d/t GI issues. Has been eating a heart healthy diet d/t heart attack 1 month ago per his report. Prior to the last month of events he states he was eating better and maintaining weight. He notes part of weight loss over past year is related to cardiologist increasing lasix dose to get water weight off (2/2019). Previously lost >30lbs when he was in cisplatin therapy >1 year ago.  He likes chocolate ensure and drinks this at home - interested in doing boost in house once able. Also does 2 chewable MVI daily at home.    CURRENT NUTRITION ORDERS  Diet: NPO  Intake/Tolerance: N/A    LABS  Labs reviewed -- CKD3 - baseline Cr 1.5  Lytes WNL aside from Cr 1.62H  Albumin 2.9 L - likely 2/2 illness/inflammation  Bili/LFTs elevated - bili 2.9, alk phos 545, ,   Euglycemia    Iron studies and B12 levels in 8/2018 were WNL    MEDICATIONS  Medications reviewed  thera-vit-M  IVF: NS @ 100 mL/hr  PRN- KCL replacement    ANTHROPOMETRICS  Height:   Ht Readings from Last 2 Encounters:   05/23/19 1.778 m (5' 10\")   02/11/19 1.778 m (5' 10\")     Most Recent Weight: 74.4 kg (164 lb) "    IBW: 75.5 kg  BMI: 23.5 -- Normal BMI  Weight History: Ongoing weight loss. 20.6% wt loss over past 11 months - in part r/t fluid loss w/ diuretics. Question if admit wt is pt stated. Pt wt has been more stable over past 6 months (up until last ~1 week)  Wt Readings from Last 15 Encounters:   06/05/19 74.4 kg (164 lb)   05/23/19 79.5 kg (175 lb 3.2 oz)   04/22/19 80.8 kg (178 lb 1.6 oz)   02/11/19 80.5 kg (177 lb 9 oz)   01/09/19 81.9 kg (180 lb 9.6 oz)   12/13/18 80.6 kg (177 lb 9.6 oz)   11/14/18 81.7 kg (180 lb 1.6 oz)   11/12/18 82.3 kg (181 lb 6.4 oz)   10/15/18 85.5 kg (188 lb 9.6 oz)   10/08/18 85.4 kg (188 lb 3.2 oz)   09/12/18 89.9 kg (198 lb 3.2 oz)   08/27/18 90.2 kg (198 lb 12.8 oz)   08/13/18 91.2 kg (201 lb)   07/30/18 94.6 kg (208 lb 9.6 oz)   07/16/18 93.7 kg (206 lb 9.6 oz)     Dosing Weight: 74 kg (actual - admit 6/5)    ASSESSED NUTRITION NEEDS  Estimated Energy Needs: 9793-5506 kcals/day (30 - 35 kcals/kg )  Justification: hypercatabolism  -25 kcal/kg if TPN: 1850 kcal/day  Estimated Protein Needs:  grams protein/day (1.2 - 1.5+ grams of pro/kg)  Justification: Repletion, monitor w/ CKD3  Estimated Fluid Needs:  (1 mL/kcal)   Justification: Maintenance and Per provider pending fluid status    PHYSICAL FINDINGS  See malnutrition section below.    MALNUTRITION  % Intake: </= 50% for >/= 5 days (severe)  % Weight Loss: > 20% in 1 year (severe) - however r/t fluid losses; If admit wt correct then 6.4% wt loss in last 2 weeks  Subcutaneous Fat Loss: Facial region:  Mild and Upper arm:  Mild  Muscle Loss: Temporal:  Moderate, Thoracic region (clavicle, acromium bone, deltoid, trapezius, pectoral):  Mild, Upper arm (bicep, tricep):  Mild and Upper leg (quadricep, hamstring):  Mild  Fluid Accumulation/Edema: None noted  Malnutrition Diagnosis: Severe malnutrition in the context of acute illness    NUTRITION DIAGNOSIS  Inadequate oral intake related to NPO for concern of SBO as evidenced by not  meeting estimated energy needs      INTERVENTIONS  Implementation  Nutrition Education: Provided education on RD role and nutrition POC   Medical food supplement therapy - discussed ability to start once/if diet advanced  Parenteral Nutrition/IV Fluids - recs above should PN become POC w/ SBO    Goals  Diet adv v nutrition support within 2-3 days.     Monitoring/Evaluation  Progress toward goals will be monitored and evaluated per protocol.    Jayleen Duff MS, RD, , LD.  5C/BMT Pager:[phone]

## 2019-06-06 NOTE — PROGRESS NOTES
[facility], [place]    Hematology / Oncology Progress Note    Date of Admission:   Date of Service (when I saw the patient): 06/06/2019     Assessment & Plan     Girish Chauhan is a 64 year old male with CAD, HTN, HLD, permanent atrial fibrillation, bicuspid aortic valve with aortic stenosis, chronic diastolic heart failure, NSTEMI (05/2019), CKD, and metastatic cholangiocarcinoma on Xeloda who presented from an OSH with SBO with possible involvement of draining choledochojejunostomy loop, subjective fevers, and A fib with RVR.     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 CT of abd revealed single dilated fluid-filled loop of bowel in RUQ near prior partial hepatectomy surrounded by multiple surgical loops with concern that it may be involving a potential draining choledochojejunostomy loop. Alk phos and transaminases are elevated compared to 5/30 which is consistent with obstructive picture.   - Consulted Surg Onc for possible intervention; do not feel carol limb is resectable at this time due to high risk surgical candidate and peritoneal nodule invading into diaphragm and small bowel. No indication for NG tube at this time.   - Consulted GI for possible endoscopic intervention/stent placement for decompression.   - Continue to trend LFTs   - He remains NPO  - IVF NS @ 100/hr   - Zofran, Compazine prn      Results for JENNYFER CHAUHAN (MRN [id number]) as of 6/6/2019 13:35   Ref. Range 5/23/2019 06:42 6/5/2019 19:29 6/6/2019 03:59   Bilirubin Total Latest Ref Range: 0.2 - 1.3 mg/dL 0.6 2.0 (H) 2.9 (H)   Alkaline Phosphatase Latest Ref Range: 40 - 150 U/L 104 504 (H) 545 (H)   ALT Latest Ref Range: 0 - 70 U/L 36 135 (H) 135 (H)   AST Latest Ref Range: 0 - 45 U/L 26 119 (H) 115 (H)       # Subjective Fevers  Reports fevers at home this morning up to 102.5. UA, CXR, and BC taken at OSH negative. Given 1  dose of Zosyn prior to transfer to [facility]. Concern for cholangitis or possible infection caused by SBO/ biliary obstruction.   - Continue Zosyn (6/6-present)   - Repeat BCx if febrile, thus far he has been afebrile inpatient      # Metastatic cholangiocarcinoma  Initially treated with surgical resection including partial liver resection in March 2016. Margins were negative and no lymph nodes were involved. Perineural and lymphovascular invasion was noted. He did not have adjuvant chemotherapy. He recurred in the bladder in November 2017. Biopsy was consistent with metastatic cholangiocarcinoma. He was treated with cisplatin/gemzar starting in Dec 2017. Cisplatin was help in June 2018 due to poor tolerance. Gemzar was discontinued in August 2018 due to possible TTP. He was off of treatment for several months, but in April 2019 was found to have disease progression in the abdominal cavity anterior to the liver, just below the diaphragm. He was initiated on capecitabine on 4/30/19. He completed 8 of 14 days of the next cycle in spite of admission for NSTEMI. Xeloda currently on hold until after stenting can be completed.  - Continue to hold Xeloda (last dose 5/7/19)      # Atrial fibrillation with RVR  # HTN  In Afib with RVR at OSH. BPs also low at OSH ED with improvement following fluid bolus.  - Telemetry   - Continue PTA metoprolol  - Restart PTA losartan and Lasix as blood pressures have improved since admission.      # h/o NSTEMI  # CAD  # HLD  Recent admission at OSH 5/3-5/7 for chest pain, found to have elevated troponins, NSTEMI. He was admitted under cardiology and had an angiogram on 5/6/19. The C showed 99% obstruction with unsuccessful PCI of the OM2. There was moderate residual disease. He had developed A fib with RVR and his metorpolol dose was incrased to 150 mg bid and losartan dose was decreased. Plavix was started and he continued on Eliquis. Planning for additional stenting in a couple weeks with  Dr. Hurley.  - Hold PTA lipitor, apixaban, clopidogrel     # CKD stage 3  Cr elevated to 2.3 with baseline of ~1.5.   - Avoid nephrotoxic meds  - Cr improved today, 1.62 and close to baseline      # GERD  - Continue PTA pantoprazole, TUMS     FEN:  -  ml/hr  - High lyte replacement per protocol  - NPO     Prophy/Misc:  - GI: Continue PTA pantoprazole daily.   - DVT: hold anticoagulation with possible future procedure     Disposition: He will remain inpatient another 1-2 days pending possible surgical interventions for bowel obstruction.      Patient discuss with staff attending, Dr. Lopez.     Shannon Lopes PA-C   Hematology/Oncology  Pager: [phone]       Interval History     Mr. Chauhan reports to be doing well this morning. No acute events overnight. He remains afebrile while inpatient. Occasional rhinorrhea. Feels that his abdomen is more form than usual but improved periumbilical pain today and no nausea or vomiting. He is passing flatus. He is not needing to take anything for pain. Denies fevers or chills, chest pain, SOB, cough, palpitations, headaches, or dysuria. His last BM was Monday evening and his last meal was Tuesday afternoon. He remains NPO.     Physical Exam   Temp: 97.6  F (36.4  C) Temp src: Oral BP: 136/84   Heart Rate: 85 Resp: 16 SpO2: 100 % O2 Device: None (Room air)    Vitals:    06/05/19 1900 06/06/19 0827   Weight: 74.4 kg (164 lb) 74.7 kg (164 lb 10.9 oz)     Vital Signs with Ranges  Temp:  [97.6  F (36.4  C)-99.1  F (37.3  C)] 97.6  F (36.4  C)  Heart Rate:  [83-90] 85  Resp:  [16-18] 16  BP: (136-151)/(84-99) 136/84  SpO2:  [96 %-100 %] 100 %  I/O last 3 completed shifts:  In: 1050 [I.V.:1050]  Out: 650 [Urine:650]    Constitutional: Pleasant male seen laying in bed. No apparent distress, and appears stated age.  Eyes: Lids and lashes normal, mild scleral icterus   ENT: Normocephalic, without obvious abnormality, atraumatic, oral pharynx with moist mucus membranes,  tonsils without erythema or exudates, gums normal and good dentition.   Respiratory: No increased work of breathing, good air exchange, clear to auscultation bilaterally, no crackles or wheezing.  Cardiovascular: Regular rate, irregular rhythm.   GI: Hypoactive bowel sounds, soft. No tenderness on palpation.  Lymph/Hematologic: No cervical lymphadenopathy and no supraclavicular lymphadenopathy.  Skin: No bruising or bleeding, normal skin color, texture, turgor, no redness, warmth, or swelling, no rashes, no lesions, no jaundice.  Extremities: There is no redness, warmth, or swelling of the joints. No lower extremity edema. No cyanosis.  Neurologic: Awake, alert, oriented to name, place and time.    Vascular access: R port c/d/i     Medications     - MEDICATION INSTRUCTIONS -       sodium chloride 100 mL/hr at 06/05/19 2036       furosemide  40 mg Oral Daily     heparin  5 mL Intracatheter Q28 Days     heparin lock flush  5-10 mL Intracatheter Q24H     losartan  25 mg Oral Daily     metoprolol tartrate  150 mg Oral BID     multivitamin w/minerals  1 tablet Oral Daily     pantoprazole  40 mg Oral QAM AC     piperacillin-tazobactam  2.25 g Intravenous Q6H     sodium chloride (PF)  10 mL Intracatheter Q8H       Data   Results for orders placed or performed during the hospital encounter of 06/05/19 (from the past 24 hour(s))   Surgical Oncology Adult IP Consult: Patient to be seen: Routine within 24 hrs; Call back #: [phone] or regular day team; SBO; biliary obstruction; hx of Carol-en-Y hepaticojejunostomy; Consultant may enter orders: Yes; Requesting provider? Hospitalis...    Narrative    Cassie Ken MD     6/6/2019 11:48 AM  Surgical Oncology inpatient consult note:     CC: bowel obstruction    HPI: Patient is a 65 yo male with history of extrahepatic   cholangiocarcinoma at the hilum. He underwent extended left   hepatectomy, with bile duct resection, and RNY   hepaticojejunostomy with 3 bilioenteric anastomoses in  3/2016.   FInal pathology showed well differentiated adenocarcinoma with   negative margins, and no positive lymph nodes (T4N0). He did not   receive adjuvant chemotherapy or radiation therapy. He then   underwent a transurethral resection of a bladder lesion, which   was positive for adenocarcinoma, and consistent with metastatic   cholangiocarcinoma. He then had cis/gemzar but was not able to   tolerate this regimen. In 4/2019, he was found to have disease   progression with peritoneal nodules in the right hemidiaphragm   and anterior to the remnant liver. He has been maintained on   Xeloda, and his last dose was 5/7/2019.     He reports that since last Thursday (5 days ago), he has had   intermittent abdominal pain, waxing and waning nausea and emesis.   His last BM was 4 days ago. He went to the ED [facility] at [facility] where a CT was obtained and showed a dilated Carol limb,   along with elevated LFT's. He reports that had been passing   flatus throughout this past week. However, he did note that his   urine was more dark than usual. His last BM was 4 days ago. He   also had high fevers to 102F at home, but has been afebrile since   the ED admission. He is currently on Zosyn and has WBC of 8.   Currently, he denies nausea and emesis.     For the rest of his medical history, he has heart failure with   last ECHO showing LVEF 35%, atrial fibrillation for which he   takes Eliquis, recent NSTEMI in 5/6/2019 for which PCI could not   be performed and currently managed with Plavix, and metoprolol.   He reports no chest pain or SOB at this time.     PMH: CHF, bicuspic aortic valve, metastatic cholangiocarcinoma,   afib, HTN, CKD.   PSH: as above  Soc: no prior smoker. Prior heavy EtOH use  Fam: no h/o GI malignancy.     ROS: performed    BP (!) 144/99 (BP Location: Left arm)   Temp 98.4  F (36.9  C)   (Oral)   Resp 16   Wt 74.7 kg (164 lb 10.9 oz)   SpO2 96%     BMI 23.63 kg/m     Mild scleral icterus.    NAD  NLB  abd soft, nondistended; prior incision well healed. Minimal   abdominal tenderness.     Labs:   Cr 1.6  Tbili 2.9 (from 2.0)  AP: 545 (from 500)  WBC 8.4    CT from outside hospital reviewed with radiology: The carol limb   from the staple line to the area with peritoneal nodule appears   to be dilated. Distal limb and remainder of small bowel appear to   be decompressed. + gas in colon. There is mild intra-hepatic   biliary dilatation.     A/P: 64M with metastatic cholangiocarcinoma to the peritoneum,   now presents with small bowel obstruction of the Carol limb   possibly due to adhesions vs. Peritoneal metastasis.   - Due to underlying cardiac problems, he is a high risk surgical   candidate. In addition, given prior complex biliary-enteric   reconstruction as well as peritoneal nodule which could be   invading into diaphragm and small bowel, the carol limb is not   resectable at this time.  - recommend consult to GI for possible endoscopic intervention   and possible stent placement   - If GI unable to decompression obstruction, and LFT's continue   to increase, then likely will require biliary decompression with   PTC drain by Intervention Radiology.   - recommend continue NPO and trend LFT's.   - Surg onc will follow peripherally for clinic progress.     Discussed and seen with staff.   Cassie Ken MD     CBC with platelets differential   Result Value Ref Range    WBC 8.8 4.0 - 11.0 10e9/L    RBC Count 3.22 (L) 4.4 - 5.9 10e12/L    Hemoglobin 9.9 (L) 13.3 - 17.7 g/dL    Hematocrit 30.2 (L) 40.0 - 53.0 %    MCV 94 78 - 100 fl    MCH 30.7 26.5 - 33.0 pg    MCHC 32.8 31.5 - 36.5 g/dL    RDW 14.6 10.0 - 15.0 %    Platelet Count 162 150 - 450 10e9/L    Diff Method Automated Method     % Neutrophils 75.0 %    % Lymphocytes 7.8 %    % Monocytes 16.3 %    % Eosinophils 0.5 %    % Basophils 0.2 %    % Immature Granulocytes 0.2 %    Nucleated RBCs 0 0 /100    Absolute Neutrophil 6.6 1.6 - 8.3 10e9/L     Absolute Lymphocytes 0.7 (L) 0.8 - 5.3 10e9/L    Absolute Monocytes 1.4 (H) 0.0 - 1.3 10e9/L    Absolute Eosinophils 0.0 0.0 - 0.7 10e9/L    Absolute Basophils 0.0 0.0 - 0.2 10e9/L    Abs Immature Granulocytes 0.0 0 - 0.4 10e9/L    Absolute Nucleated RBC 0.0    Comprehensive metabolic panel   Result Value Ref Range    Sodium 139 133 - 144 mmol/L    Potassium 3.8 3.4 - 5.3 mmol/L    Chloride 108 94 - 109 mmol/L    Carbon Dioxide 25 20 - 32 mmol/L    Anion Gap 6 3 - 14 mmol/L    Glucose 80 70 - 99 mg/dL    Urea Nitrogen 31 (H) 7 - 30 mg/dL    Creatinine 1.71 (H) 0.66 - 1.25 mg/dL    GFR Estimate 42 (L) >60 mL/min/[1.73_m2]    GFR Estimate If Black 49 (L) >60 mL/min/[1.73_m2]    Calcium 8.6 8.5 - 10.1 mg/dL    Bilirubin Total 2.0 (H) 0.2 - 1.3 mg/dL    Albumin 2.9 (L) 3.4 - 5.0 g/dL    Protein Total 6.3 (L) 6.8 - 8.8 g/dL    Alkaline Phosphatase 504 (H) 40 - 150 U/L     (H) 0 - 70 U/L     (H) 0 - 45 U/L   ABO/Rh type and screen (PCU Collect)   Result Value Ref Range    ABO O     RH(D) Pos     Antibody Screen Neg     Test Valid Only At          [facility]    Specimen Expires 06/08/2019    Magnesium   Result Value Ref Range    Magnesium 2.3 1.6 - 2.3 mg/dL   Phosphorus   Result Value Ref Range    Phosphorus 3.7 2.5 - 4.5 mg/dL   CBC with platelets differential   Result Value Ref Range    WBC 8.4 4.0 - 11.0 10e9/L    RBC Count 3.39 (L) 4.4 - 5.9 10e12/L    Hemoglobin 10.3 (L) 13.3 - 17.7 g/dL    Hematocrit 32.6 (L) 40.0 - 53.0 %    MCV 96 78 - 100 fl    MCH 30.4 26.5 - 33.0 pg    MCHC 31.6 31.5 - 36.5 g/dL    RDW 14.5 10.0 - 15.0 %    Platelet Count 149 (L) 150 - 450 10e9/L    Diff Method Automated Method     % Neutrophils 75.6 %    % Lymphocytes 8.6 %    % Monocytes 13.8 %    % Eosinophils 1.7 %    % Basophils 0.1 %    % Immature Granulocytes 0.2 %    Nucleated RBCs 0 0 /100    Absolute Neutrophil 6.3 1.6 - 8.3 10e9/L    Absolute Lymphocytes 0.7 (L) 0.8 - 5.3  10e9/L    Absolute Monocytes 1.2 0.0 - 1.3 10e9/L    Absolute Eosinophils 0.1 0.0 - 0.7 10e9/L    Absolute Basophils 0.0 0.0 - 0.2 10e9/L    Abs Immature Granulocytes 0.0 0 - 0.4 10e9/L    Absolute Nucleated RBC 0.0    Comprehensive metabolic panel   Result Value Ref Range    Sodium 138 133 - 144 mmol/L    Potassium 4.1 3.4 - 5.3 mmol/L    Chloride 108 94 - 109 mmol/L    Carbon Dioxide 21 20 - 32 mmol/L    Anion Gap 10 3 - 14 mmol/L    Glucose 83 70 - 99 mg/dL    Urea Nitrogen 30 7 - 30 mg/dL    Creatinine 1.62 (H) 0.66 - 1.25 mg/dL    GFR Estimate 45 (L) >60 mL/min/[1.73_m2]    GFR Estimate If Black 52 (L) >60 mL/min/[1.73_m2]    Calcium 8.7 8.5 - 10.1 mg/dL    Bilirubin Total 2.9 (H) 0.2 - 1.3 mg/dL    Albumin 2.9 (L) 3.4 - 5.0 g/dL    Protein Total 6.5 (L) 6.8 - 8.8 g/dL    Alkaline Phosphatase 545 (H) 40 - 150 U/L     (H) 0 - 70 U/L     (H) 0 - 45 U/L   INR   Result Value Ref Range    INR 1.64 (H) 0.86 - 1.14   Magnesium   Result Value Ref Range    Magnesium 2.4 (H) 1.6 - 2.3 mg/dL

## 2019-06-06 NOTE — CONSULTS
GASTROENTEROLOGY CONSULTATION      Date of Admission:  6/5/2019          ASSESSMENT AND RECOMMENDATIONS:   Assessment:  64 year old male with a history of metastatic cholangiocarcinoma s/p L hepatectomy, bile duct resection and RNY with hepatojejunostomy x3 anastamoses presenting with abdominal pain and imaging consistent with an SBO.  Also has transaminitis with elevated and uptrending bilirubin and imaging concerning that the SBO is involving the hepaticojejunostomy causing a functional biliary obstruction.  He is chronically anticoagulated for AF and recent MI on Eliquis and plavix, not continued on admission but now only 2 days off AC/AP therapy.  Imaging difficult to interpret and operative note from 2016 does not address the specific anatomy of the jenny limb length, will need to follow-up with Dr. Pinedo regarding the altered anatomy and options for treatment but possible options include passing a stent across the obstruction to allow drainage or possible Axios gastroduodenostomy to the obstructed limb lumen.    Recommendations  - NPO for bowel decompression and possible procedure 6/7  - NGT would not decompress this limb so no role  - careful IVF given heart failure, fluid management as per primary team  - continue to hold AC/AP in preparation for possible procedure  - Pan and nausea control as per primary team  - daily LFTs    Gastroenterology follow up recommendations: pending resolution of SBO    Thank you for involving us in this patient's care. Please do not hesitate to contact the GI service with any questions or concerns.     Pt care plan discussed with Dr. Worley, GI staff physician.    Daniel Bentley   Resident Physician, PGY-2    -------------------------------------------------------------------------------------------------------------------          Chief Complaint:   We were asked by Shannon Maravilla of Heme/Onc to evaluate this patient with SBO for possible endoscopic intervention for  SBO.    History is obtained from the patient and the medical record.          History of Present Illness:     The patient is very medically complex with a history of CKD s3, CHF (last EF 35%), MI (first in 2011 s/p 4 SAM placement) cholangiocarcinoma starting in 2016 now s/p bile duct resection and hepaticojejunostomy in March 2016.  He was treated surgically at first but had recurrence in the bladder in 2017. Has been treated unsuccesfully with Gemzar and and cisplatin.  April of 2019 showed disease progression with small volume tumor anterior to the liver thought assessed as clear disease progression and any chemo therapy would not be with curative intent.  He was started on Xeloda, 10 days later he developed chest pain and had troponin elevation to 0.2, cor angio showed 99% stenosis of the OM2.      He reports that abdominal pain first started 7 days PTA with bloating.  He had intermittent bloating type RUQ pain with several bouts of nausea and vomiting.  He continued to have bowel movements and pass gas.  Given his recent MI and concern that his abdominal pain was an anginal equivalent he presented to the [facility] where cardiac workup was negative but CT findings as below concerning for SBO involving the duodenum with a functional biliary obstruction. His last dose of Eliquis was 6/4/2019.     On my assessment he is pain free, without nausea or vomiting.  Last BM was the night prior but was not large.  He has not been nauseous and is not vomiting.  He denies any chest pain, shortness of breath or urinary symptoms.               Past Medical History:   Reviewed and edited as appropriate  Past Medical History:   Diagnosis Date     Aortic stenosis due to bicuspid aortic valve      Atrial fibrillation (H) 2006    hx of paroxysmal atrial fibrillation since approximately 2006. S/p cardioversion in 2013 with recurrent atrial fibrillation s/p repeat cardioversion 9/29/14.     Basal cell carcinoma 1/2016    right  shoulder and right posterior calf s/p electrodessication and curretage 1/2016.     CAD (coronary artery disease) 12/2011    s/p angiogram 12/2011 s/p percutaneous intervention on the LAD with multiple drug-eluting stents complicated by a small perforation in the diagonal branch which sealed spontaneously.  Patient with significant Circumflex stenosis which is being treated conservatively.     Gout     affects left foot 2-3 times per year     Hyperlipidemia      Hypertension      Liver disease      Melanoma (H) 9/2015    back s/p resection 9/2015     Squamous cell carcinoma     nose s/p excision            Past Surgical History:   Reviewed and edited as appropriate   Past Surgical History:   Procedure Laterality Date     ------------OTHER-------------      multiple skin cancer resections     CARDIOVERSION  2013, 9/2014     ENDOSCOPIC RETROGRADE CHOLANGIOPANCREATOGRAM N/A 2/26/2016    Procedure: COMBINED ENDOSCOPIC RETROGRADE CHOLANGIOPANCREATOGRAPHY, PLACE TUBE/STENT;  Surgeon: Rafa Andrade MD;  Location: [facility] OR     ENDOSCOPIC RETROGRADE CHOLANGIOPANCREATOGRAPHY  2/2014     EXPLORE COMMON BILE DUCT N/A 3/8/2016    Procedure: EXPLORE COMMON BILE DUCT;  Surgeon: Jose Eduardo Pinedo MD;  Location: [facility] OR     HEPATECTOMY PARTIAL Left 3/8/2016    Procedure: HEPATECTOMY PARTIAL;  Surgeon: Jose Eduardo Pinedo MD;  Location: [facility] OR     INSERT PORT VASCULAR ACCESS Right 12/8/2017    Procedure: INSERT PORT VASCULAR ACCESS;  Place single lumen venous chest port - right;  Surgeon: Drew Powell PA-C;  Location: [facility] OR     SOFT TISSUE SURGERY       STENT  12/2011    LAD with multiple SAM            Previous Endoscopy:   No results found for this or any previous visit.         Social History:   Reviewed and edited as appropriate  Social History     Socioeconomic History     Marital status:      Spouse name: Not on file     Number of children: 1     Years of education: Not on file     Highest education  level: Not on file   Occupational History     Employer: [employer]   Social Needs     Financial resource strain: Not on file     Food insecurity:     Worry: Not on file     Inability: Not on file     Transportation needs:     Medical: Not on file     Non-medical: Not on file   Tobacco Use     Smoking status: Never Smoker     Smokeless tobacco: Never Used   Substance and Sexual Activity     Alcohol use: No     Alcohol/week: 0.0 oz     Drug use: No     Sexual activity: Not on file   Lifestyle     Physical activity:     Days per week: Not on file     Minutes per session: Not on file     Stress: Not on file   Relationships     Social connections:     Talks on phone: Not on file     Gets together: Not on file     Attends Baptism service: Not on file     Active member of club or organization: Not on file     Attends meetings of clubs or organizations: Not on file     Relationship status: Not on file     Intimate partner violence:     Fear of current or ex partner: Not on file     Emotionally abused: Not on file     Physically abused: Not on file     Forced sexual activity: Not on file   Other Topics Concern     Not on file   Social History Narrative    Works for [employer] with [employer].  Lives in [place].   with one grown daughter.  No tobacco, rare Etoh, no drug use.            Family History:   Reviewed and edited as appropriate  No known history of gastrointestinal/liver disease or  gastrointestinal malignancies       Allergies:   Reviewed and edited as appropriate   No Known Allergies         Medications:     Current Facility-Administered Medications   Medication     acetaminophen (TYLENOL) tablet 650 mg     furosemide (LASIX) tablet 40 mg     heparin 100 UNIT/ML injection 5 mL     heparin lock flush 10 UNIT/ML injection 5-10 mL     heparin lock flush 10 UNIT/ML injection 5-10 mL     hydrALAZINE (APRESOLINE) injection 10 mg     lidocaine (LMX4) cream     lidocaine 1 % 0.1-1 mL     losartan (COZAAR)  tablet 25 mg     magnesium sulfate 2 g in NS intermittent infusion ([facility]ar[facility] or [facility] Cmpd)     magnesium sulfate 4 g in 100 mL sterile water (premade)     Medication Instruction     metoprolol tartrate (LOPRESSOR) tablet 150 mg     multivitamin w/minerals (THERA-VIT-M) tablet 1 tablet     ondansetron (ZOFRAN) injection 8 mg    Or     ondansetron (ZOFRAN-ODT) ODT tab 8 mg     pantoprazole (PROTONIX) EC tablet 40 mg     piperacillin-tazobactam (ZOSYN) 2.25 g vial to attach to  ml bag     potassium chloride (KLOR-CON) Packet 20-40 mEq     potassium chloride 10 mEq in 100 mL intermittent infusion with 10 mg lidocaine     potassium chloride 10 mEq in 100 mL sterile water intermittent infusion (premix)     potassium chloride 20 mEq in 50 mL intermittent infusion     potassium chloride ER (K-DUR/KLOR-CON M) CR tablet 20-40 mEq     potassium phosphate 10 mmol in D5W 250 mL intermittent infusion     potassium phosphate 15 mmol in D5W 250 mL intermittent infusion     potassium phosphate 20 mmol in D5W 250 mL intermittent infusion     potassium phosphate 20 mmol in D5W 500 mL intermittent infusion     potassium phosphate 25 mmol in D5W 500 mL intermittent infusion     prochlorperazine (COMPAZINE) tablet 5-10 mg    Or     prochlorperazine (COMPAZINE) injection 5-10 mg     sodium chloride (PF) 0.9% PF flush 10 mL     sodium chloride (PF) 0.9% PF flush 10-20 mL     sodium chloride (PF) 0.9% PF flush 10-20 mL     sodium chloride 0.9% infusion             Review of Systems:   A complete review of systems was performed and is negative except as noted in the HPI           Physical Exam:   /84 (BP Location: Left arm)   Temp 97.6  F (36.4  C) (Oral)   Resp 16   Wt 74.7 kg (164 lb 10.9 oz)   SpO2 100%   BMI 23.63 kg/m    Wt:   Wt Readings from Last 2 Encounters:   06/06/19 74.7 kg (164 lb 10.9 oz)   05/23/19 79.5 kg (175 lb 3.2 oz)      Constitutional: cooperative, pleasant, not dyspneic/diaphoretic, no acute  distress  Eyes: Sclera slightly yellowed  Ears/nose/mouth/throat: Normal oropharynx without ulcers or exudate, mucus membranes moist, hearing intact  Neck: supple, thyroid normal size  CV: No edema  Respiratory: Unlabored breathing  Lymph: No axillary, submandibular, supraclavicular or inguinal lymphadenopathy  Abd:  Nondistended, +bs, no hepatosplenomegaly, nontender, no peritoneal signs  Skin: warm, perfused, no jaundice  Neuro: AAO x 3, No asterixis  Psych: Normal affect  MSK: No gross deformities         Data:   Labs and imaging below were independently reviewed and interpreted    MELD-Na score: 20 at 6/6/2019  3:59 AM  MELD score: 20 at 6/6/2019  3:59 AM  Calculated from:  Serum Creatinine: 1.62 mg/dL at 6/6/2019  3:59 AM  Serum Sodium: 138 mmol/L (Rounded to 137 mmol/L) at 6/6/2019  3:59 AM  Total Bilirubin: 2.9 mg/dL at 6/6/2019  3:59 AM  INR(ratio): 1.64 at 6/6/2019  3:59 AM  Age: 64 years    BMP  Recent Labs   Lab 06/06/19  0359 06/05/19 1929    139   POTASSIUM 4.1 3.8   CHLORIDE 108 108   GARY 8.7 8.6   CO2 21 25   BUN 30 31*   CR 1.62* 1.71*   GLC 83 80     CBC  Recent Labs   Lab 06/06/19 0359 06/05/19 1929   WBC 8.4 8.8   RBC 3.39* 3.22*   HGB 10.3* 9.9*   HCT 32.6* 30.2*   MCV 96 94   MCH 30.4 30.7   MCHC 31.6 32.8   RDW 14.5 14.6   * 162     INR  Recent Labs   Lab 06/06/19 0359   INR 1.64*     LFTs  Recent Labs   Lab 06/06/19 0359 06/05/19 1929   ALKPHOS 545* 504*   * 119*   * 135*   BILITOTAL 2.9* 2.0*   PROTTOTAL 6.5* 6.3*   ALBUMIN 2.9* 2.9*      PANCNo lab results found in last 7 days.    Imaging:  CT abdomen 6/5/19  CONCLUSION:   1.  Single dilated fluid-filled loop of bowel in the right upper quadrant region prior partial hepatectomy surrounded by multiple surgical loops. Uncertain if this is involving a potential draining choledochojejunostomy loop. But this dilated loop   appears obstructed and could relate to patient's symptoms.  2.  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

## 2019-06-06 NOTE — PLAN OF CARE
BP (!) 147/95 (BP Location: Left arm)   Temp 98  F (36.7  C) (Oral)   Resp 18   Wt 74.4 kg (164 lb)   SpO2 100%   BMI 23.53 kg/m       VSS, slightly hypertensive. On tele, A fib. No complaint of pain or n/v/d. Use of urinal overnight, adequate uo. Potassium replaced. Pt has possible surgery scheduled for today, waiting for MD consult. Currently NPO, with ice chips or sips of water with meds. Labs drawn from [facility], no replacements needed this AM. Stand by assist, calls appropriately. Fluid running at 100 mL/hr. Peripheral line saline locked in R AC. Continue with POC.     Problem: Adult Inpatient Plan of Care  Goal: Plan of Care Review  6/6/2019 0525 by Fabian Bowman, RN  Outcome: No Change     Problem: Adult Inpatient Plan of Care  Goal: Patient-Specific Goal (Individualization)  6/6/2019 0525 by Fabian Bowman, RN  Outcome: No Change     Problem: Adult Inpatient Plan of Care  Goal: Absence of Hospital-Acquired Illness or Injury  6/6/2019 0525 by Fabian Bowman, RN  Outcome: No Change     Problem: Adult Inpatient Plan of Care  Goal: Optimal Comfort and Wellbeing  6/6/2019 0525 by Fabian Bowman, RN  Outcome: No Change     Problem: Infection  Goal: Infection Symptom Resolution  6/6/2019 0525 by Fabian Bowman, RN  Outcome: No Change     Problem: Fluid Deficit (Intestinal Obstruction)  Goal: Fluid Balance  6/6/2019 0525 by Fabian Bowman, RN  Outcome: No Change     Problem: Pain (Intestinal Obstruction)  Goal: Acceptable Pain Control  6/6/2019 0525 by Fabian Bowman, RN  Outcome: Improving

## 2019-06-06 NOTE — PLAN OF CARE
Pt admitted with small bowel obstruction and fevers.  Pt was afebrile.  Bowel sounds are positive, but hypoactive and pt is passing gas. Pt  has a little discomfort in RUQ; declined pain meds.   Accessed his PAC - sent off labs.  Need potassium replaced - bag was ordered.  Pt is NPO except ice chips and water with meds.  Possible surgery tomorrow - pending oncology MD consult. IV fluids at 100cc/hr.   Problem: Adult Inpatient Plan of Care  Goal: Plan of Care Review  Outcome: No Change  Goal: Patient-Specific Goal (Individualization)  Outcome: No Change  Goal: Absence of Hospital-Acquired Illness or Injury  Outcome: No Change  Goal: Optimal Comfort and Wellbeing  Outcome: No Change  Goal: Readiness for Transition of Care  Outcome: No Change  Goal: Rounds/Family Conference  Outcome: No Change     Problem: Infection  Goal: Infection Symptom Resolution  Outcome: No Change     Problem: Fluid Deficit (Intestinal Obstruction)  Goal: Fluid Balance  Outcome: No Change     Problem: Pain (Intestinal Obstruction)  Goal: Acceptable Pain Control  Outcome: No Change

## 2019-06-06 NOTE — CONSULTS
Surgical Oncology inpatient consult note:     CC: bowel obstruction    HPI: Patient is a 65 yo male with history of extrahepatic cholangiocarcinoma at the hilum. He underwent extended left hepatectomy, with bile duct resection, and RNY hepaticojejunostomy with 3 bilioenteric anastomoses in 3/2016. FInal pathology showed well differentiated adenocarcinoma with negative margins, and no positive lymph nodes (T4N0). He did not receive adjuvant chemotherapy or radiation therapy. He then underwent a transurethral resection of a bladder lesion, which was positive for adenocarcinoma, and consistent with metastatic cholangiocarcinoma. He then had cis/gemzar but was not able to tolerate this regimen. In 4/2019, he was found to have disease progression with peritoneal nodules in the right hemidiaphragm and anterior to the remnant liver. He has been maintained on Xeloda, and his last dose was 5/7/2019.     He reports that since last Thursday (5 days ago), he has had intermittent abdominal pain, waxing and waning nausea and emesis. His last BM was 4 days ago. He went to the ED yesterday at [facility] where a CT was obtained and showed a dilated Carol limb, along with elevated LFT's. He reports that had been passing flatus throughout this past week. However, he did note that his urine was more dark than usual. His last BM was 4 days ago. He also had high fevers to 102F at home, but has been afebrile since the ED admission. He is currently on Zosyn and has WBC of 8. Currently, he denies nausea and emesis.     For the rest of his medical history, he has heart failure with last ECHO showing LVEF 35%, atrial fibrillation for which he takes Eliquis, recent NSTEMI in 5/6/2019 for which PCI could not be performed and currently managed with Plavix, and metoprolol. He reports no chest pain or SOB at this time.     PMH: CHF, bicuspic aortic valve, metastatic cholangiocarcinoma, afib, HTN, CKD.   PSH: as above  Soc: no prior smoker. Prior  heavy EtOH use  Fam: no h/o GI malignancy.     ROS: performed    BP (!) 144/99 (BP Location: Left arm)   Temp 98.4  F (36.9  C) (Oral)   Resp 16   Wt 74.7 kg (164 lb 10.9 oz)   SpO2 96%   BMI 23.63 kg/m     Mild scleral icterus.   NAD  NLB  abd soft, nondistended; prior incision well healed. Minimal abdominal tenderness.     Labs:   Cr 1.6  Tbili 2.9 (from 2.0)  AP: 545 (from 500)  WBC 8.4    CT from outside hospital reviewed with radiology: The carol limb from the staple line to the area with peritoneal nodule appears to be dilated. Distal limb and remainder of small bowel appear to be decompressed. + gas in colon. There is mild intra-hepatic biliary dilatation.     A/P: 64M with metastatic cholangiocarcinoma to the peritoneum, now presents with small bowel obstruction of the Carol limb possibly due to adhesions vs. Peritoneal metastasis.   - Due to underlying cardiac problems, he is a high risk surgical candidate. In addition, given prior complex biliary-enteric reconstruction as well as peritoneal nodule which could be invading into diaphragm and small bowel, the carol limb is not resectable at this time.  - recommend consult to GI for possible endoscopic intervention and possible stent placement   - If GI unable to decompression obstruction, and LFT's continue to increase, then likely will require biliary decompression with PTC drain by Intervention Radiology.   - recommend continue NPO and trend LFT's.   - Surg onc will follow peripherally for clinic progress.     Discussed and seen with staff.   Cassie Ken MD

## 2019-06-07 ENCOUNTER — APPOINTMENT (OUTPATIENT)
Dept: GENERAL RADIOLOGY | Facility: CLINIC | Age: 64
DRG: 393 | End: 2019-06-07
Attending: INTERNAL MEDICINE
Payer: COMMERCIAL

## 2019-06-07 ENCOUNTER — ANESTHESIA EVENT (OUTPATIENT)
Dept: SURGERY | Facility: CLINIC | Age: 64
DRG: 393 | End: 2019-06-07
Payer: COMMERCIAL

## 2019-06-07 ENCOUNTER — ANESTHESIA (OUTPATIENT)
Dept: SURGERY | Facility: CLINIC | Age: 64
DRG: 393 | End: 2019-06-07
Payer: COMMERCIAL

## 2019-06-07 LAB
ALBUMIN SERPL-MCNC: 2.9 G/DL (ref 3.4–5)
ALP SERPL-CCNC: 553 U/L (ref 40–150)
ALT SERPL W P-5'-P-CCNC: 104 U/L (ref 0–70)
ANION GAP SERPL CALCULATED.3IONS-SCNC: 10 MMOL/L (ref 3–14)
AST SERPL W P-5'-P-CCNC: 80 U/L (ref 0–45)
BASOPHILS # BLD AUTO: 0 10E9/L (ref 0–0.2)
BASOPHILS NFR BLD AUTO: 0.3 %
BILIRUB SERPL-MCNC: 2.3 MG/DL (ref 0.2–1.3)
BUN SERPL-MCNC: 34 MG/DL (ref 7–30)
CALCIUM SERPL-MCNC: 8.8 MG/DL (ref 8.5–10.1)
CHLORIDE SERPL-SCNC: 109 MMOL/L (ref 94–109)
CO2 SERPL-SCNC: 22 MMOL/L (ref 20–32)
CREAT SERPL-MCNC: 1.76 MG/DL (ref 0.66–1.25)
DIFFERENTIAL METHOD BLD: ABNORMAL
EOSINOPHIL # BLD AUTO: 0.1 10E9/L (ref 0–0.7)
EOSINOPHIL NFR BLD AUTO: 1.8 %
ERYTHROCYTE [DISTWIDTH] IN BLOOD BY AUTOMATED COUNT: 14.6 % (ref 10–15)
GFR SERPL CREATININE-BSD FRML MDRD: 40 ML/MIN/{1.73_M2}
GLUCOSE BLDC GLUCOMTR-MCNC: 83 MG/DL (ref 70–99)
GLUCOSE SERPL-MCNC: 73 MG/DL (ref 70–99)
HCT VFR BLD AUTO: 30.2 % (ref 40–53)
HGB BLD-MCNC: 9.7 G/DL (ref 13.3–17.7)
IMM GRANULOCYTES # BLD: 0 10E9/L (ref 0–0.4)
IMM GRANULOCYTES NFR BLD: 0.4 %
LYMPHOCYTES # BLD AUTO: 0.9 10E9/L (ref 0.8–5.3)
LYMPHOCYTES NFR BLD AUTO: 11.2 %
MAGNESIUM SERPL-MCNC: 2.3 MG/DL (ref 1.6–2.3)
MCH RBC QN AUTO: 30.1 PG (ref 26.5–33)
MCHC RBC AUTO-ENTMCNC: 32.1 G/DL (ref 31.5–36.5)
MCV RBC AUTO: 94 FL (ref 78–100)
MONOCYTES # BLD AUTO: 1.1 10E9/L (ref 0–1.3)
MONOCYTES NFR BLD AUTO: 14.4 %
NEUTROPHILS # BLD AUTO: 5.5 10E9/L (ref 1.6–8.3)
NEUTROPHILS NFR BLD AUTO: 71.9 %
NRBC # BLD AUTO: 0 10*3/UL
NRBC BLD AUTO-RTO: 0 /100
PLATELET # BLD AUTO: 173 10E9/L (ref 150–450)
POTASSIUM SERPL-SCNC: 3.9 MMOL/L (ref 3.4–5.3)
PROT SERPL-MCNC: 6.1 G/DL (ref 6.8–8.8)
RBC # BLD AUTO: 3.22 10E12/L (ref 4.4–5.9)
SMALL BOWEL ENTEROSCOPY: NORMAL
SODIUM SERPL-SCNC: 140 MMOL/L (ref 133–144)
WBC # BLD AUTO: 7.6 10E9/L (ref 4–11)

## 2019-06-07 PROCEDURE — 40000170 ZZH STATISTIC PRE-PROCEDURE ASSESSMENT II: Performed by: INTERNAL MEDICINE

## 2019-06-07 PROCEDURE — 25000128 H RX IP 250 OP 636: Performed by: INTERNAL MEDICINE

## 2019-06-07 PROCEDURE — 25000128 H RX IP 250 OP 636: Performed by: NURSE ANESTHETIST, CERTIFIED REGISTERED

## 2019-06-07 PROCEDURE — 85025 COMPLETE CBC W/AUTO DIFF WBC: CPT | Performed by: PHYSICIAN ASSISTANT

## 2019-06-07 PROCEDURE — 25000132 ZZH RX MED GY IP 250 OP 250 PS 637: Performed by: INTERNAL MEDICINE

## 2019-06-07 PROCEDURE — 25000125 ZZHC RX 250: Performed by: NURSE ANESTHETIST, CERTIFIED REGISTERED

## 2019-06-07 PROCEDURE — 87040 BLOOD CULTURE FOR BACTERIA: CPT | Performed by: INTERNAL MEDICINE

## 2019-06-07 PROCEDURE — 36000059 ZZH SURGERY LEVEL 3 EA 15 ADDTL MIN UMMC: Performed by: INTERNAL MEDICINE

## 2019-06-07 PROCEDURE — 80053 COMPREHEN METABOLIC PANEL: CPT | Performed by: PHYSICIAN ASSISTANT

## 2019-06-07 PROCEDURE — 83735 ASSAY OF MAGNESIUM: CPT | Performed by: PHYSICIAN ASSISTANT

## 2019-06-07 PROCEDURE — 99233 SBSQ HOSP IP/OBS HIGH 50: CPT | Performed by: INTERNAL MEDICINE

## 2019-06-07 PROCEDURE — C1769 GUIDE WIRE: HCPCS | Performed by: INTERNAL MEDICINE

## 2019-06-07 PROCEDURE — C1876 STENT, NON-COA/NON-COV W/DEL: HCPCS | Performed by: INTERNAL MEDICINE

## 2019-06-07 PROCEDURE — 25500064 ZZH RX 255 OP 636: Performed by: INTERNAL MEDICINE

## 2019-06-07 PROCEDURE — 00000146 ZZHCL STATISTIC GLUCOSE BY METER IP

## 2019-06-07 PROCEDURE — 25000132 ZZH RX MED GY IP 250 OP 250 PS 637: Performed by: PHYSICIAN ASSISTANT

## 2019-06-07 PROCEDURE — 25000125 ZZHC RX 250: Performed by: INTERNAL MEDICINE

## 2019-06-07 PROCEDURE — 25000566 ZZH SEVOFLURANE, EA 15 MIN: Performed by: INTERNAL MEDICINE

## 2019-06-07 PROCEDURE — 71000014 ZZH RECOVERY PHASE 1 LEVEL 2 FIRST HR: Performed by: INTERNAL MEDICINE

## 2019-06-07 PROCEDURE — 25800030 ZZH RX IP 258 OP 636: Performed by: NURSE ANESTHETIST, CERTIFIED REGISTERED

## 2019-06-07 PROCEDURE — 25000128 H RX IP 250 OP 636: Performed by: PHYSICIAN ASSISTANT

## 2019-06-07 PROCEDURE — 37000008 ZZH ANESTHESIA TECHNICAL FEE, 1ST 30 MIN: Performed by: INTERNAL MEDICINE

## 2019-06-07 PROCEDURE — 27210794 ZZH OR GENERAL SUPPLY STERILE: Performed by: INTERNAL MEDICINE

## 2019-06-07 PROCEDURE — 36000061 ZZH SURGERY LEVEL 3 W FLUORO 1ST 30 MIN - UMMC: Performed by: INTERNAL MEDICINE

## 2019-06-07 PROCEDURE — 25800030 ZZH RX IP 258 OP 636: Performed by: INTERNAL MEDICINE

## 2019-06-07 PROCEDURE — 0F998ZZ DRAINAGE OF COMMON BILE DUCT, VIA NATURAL OR ARTIFICIAL OPENING ENDOSCOPIC: ICD-10-PCS | Performed by: INTERNAL MEDICINE

## 2019-06-07 PROCEDURE — 40000277 XR SURGERY CARM FLUORO LESS THAN 5 MIN W STILLS: Mod: TC

## 2019-06-07 PROCEDURE — 0F798DZ DILATION OF COMMON BILE DUCT WITH INTRALUMINAL DEVICE, VIA NATURAL OR ARTIFICIAL OPENING ENDOSCOPIC: ICD-10-PCS | Performed by: INTERNAL MEDICINE

## 2019-06-07 PROCEDURE — 40000275 ZZH STATISTIC RCP TIME EA 10 MIN

## 2019-06-07 PROCEDURE — 12000012 ZZH R&B MS OVERFLOW UMMC

## 2019-06-07 PROCEDURE — 40000014 ZZH STATISTIC ARTERIAL MONITORING DAILY

## 2019-06-07 PROCEDURE — 37000009 ZZH ANESTHESIA TECHNICAL FEE, EACH ADDTL 15 MIN: Performed by: INTERNAL MEDICINE

## 2019-06-07 DEVICE — STENT ESU AXIOS W/DEL SYS 10MMX10MM 10.8FR 138CM M00553640
Type: IMPLANTABLE DEVICE | Status: NON-FUNCTIONAL
Removed: 2019-07-17

## 2019-06-07 RX ORDER — HYDROMORPHONE HYDROCHLORIDE 1 MG/ML
.3-.5 INJECTION, SOLUTION INTRAMUSCULAR; INTRAVENOUS; SUBCUTANEOUS EVERY 10 MIN PRN
Status: DISCONTINUED | OUTPATIENT
Start: 2019-06-07 | End: 2019-06-07 | Stop reason: HOSPADM

## 2019-06-07 RX ORDER — NALOXONE HYDROCHLORIDE 0.4 MG/ML
.1-.4 INJECTION, SOLUTION INTRAMUSCULAR; INTRAVENOUS; SUBCUTANEOUS
Status: DISCONTINUED | OUTPATIENT
Start: 2019-06-07 | End: 2019-06-07 | Stop reason: HOSPADM

## 2019-06-07 RX ORDER — LIDOCAINE HYDROCHLORIDE 20 MG/ML
INJECTION, SOLUTION INFILTRATION; PERINEURAL PRN
Status: DISCONTINUED | OUTPATIENT
Start: 2019-06-07 | End: 2019-06-07

## 2019-06-07 RX ORDER — DIMENHYDRINATE 50 MG/ML
25 INJECTION, SOLUTION INTRAMUSCULAR; INTRAVENOUS
Status: DISCONTINUED | OUTPATIENT
Start: 2019-06-07 | End: 2019-06-07 | Stop reason: HOSPADM

## 2019-06-07 RX ORDER — SENNOSIDES 8.6 MG
8.6 TABLET ORAL 2 TIMES DAILY PRN
Status: DISCONTINUED | OUTPATIENT
Start: 2019-06-07 | End: 2019-06-09 | Stop reason: HOSPADM

## 2019-06-07 RX ORDER — SODIUM CHLORIDE, SODIUM LACTATE, POTASSIUM CHLORIDE, CALCIUM CHLORIDE 600; 310; 30; 20 MG/100ML; MG/100ML; MG/100ML; MG/100ML
INJECTION, SOLUTION INTRAVENOUS CONTINUOUS PRN
Status: DISCONTINUED | OUTPATIENT
Start: 2019-06-07 | End: 2019-06-07

## 2019-06-07 RX ORDER — FENTANYL CITRATE 50 UG/ML
25-50 INJECTION, SOLUTION INTRAMUSCULAR; INTRAVENOUS
Status: DISCONTINUED | OUTPATIENT
Start: 2019-06-07 | End: 2019-06-07 | Stop reason: HOSPADM

## 2019-06-07 RX ORDER — FENTANYL CITRATE 50 UG/ML
INJECTION, SOLUTION INTRAMUSCULAR; INTRAVENOUS PRN
Status: DISCONTINUED | OUTPATIENT
Start: 2019-06-07 | End: 2019-06-07

## 2019-06-07 RX ORDER — ONDANSETRON 4 MG/1
4 TABLET, ORALLY DISINTEGRATING ORAL EVERY 30 MIN PRN
Status: DISCONTINUED | OUTPATIENT
Start: 2019-06-07 | End: 2019-06-07 | Stop reason: HOSPADM

## 2019-06-07 RX ORDER — SODIUM CHLORIDE, SODIUM LACTATE, POTASSIUM CHLORIDE, CALCIUM CHLORIDE 600; 310; 30; 20 MG/100ML; MG/100ML; MG/100ML; MG/100ML
INJECTION, SOLUTION INTRAVENOUS CONTINUOUS
Status: DISCONTINUED | OUTPATIENT
Start: 2019-06-07 | End: 2019-06-07 | Stop reason: HOSPADM

## 2019-06-07 RX ORDER — FENTANYL CITRATE 50 UG/ML
25-50 INJECTION, SOLUTION INTRAMUSCULAR; INTRAVENOUS
Status: ACTIVE | OUTPATIENT
Start: 2019-06-07 | End: 2019-06-07

## 2019-06-07 RX ORDER — MEPERIDINE HYDROCHLORIDE 25 MG/ML
12.5 INJECTION INTRAMUSCULAR; INTRAVENOUS; SUBCUTANEOUS
Status: DISCONTINUED | OUTPATIENT
Start: 2019-06-07 | End: 2019-06-07 | Stop reason: HOSPADM

## 2019-06-07 RX ORDER — COLCHICINE 0.6 MG/1
0.6 TABLET ORAL 3 TIMES DAILY PRN
Status: DISCONTINUED | OUTPATIENT
Start: 2019-06-07 | End: 2019-06-08

## 2019-06-07 RX ORDER — LIDOCAINE 40 MG/G
CREAM TOPICAL
Status: DISCONTINUED | OUTPATIENT
Start: 2019-06-07 | End: 2019-06-07 | Stop reason: HOSPADM

## 2019-06-07 RX ORDER — LABETALOL HYDROCHLORIDE 5 MG/ML
INJECTION, SOLUTION INTRAVENOUS PRN
Status: DISCONTINUED | OUTPATIENT
Start: 2019-06-07 | End: 2019-06-07

## 2019-06-07 RX ORDER — COLCHICINE 0.6 MG/1
0.6 TABLET ORAL ONCE
Status: COMPLETED | OUTPATIENT
Start: 2019-06-07 | End: 2019-06-07

## 2019-06-07 RX ORDER — ESMOLOL HYDROCHLORIDE 10 MG/ML
INJECTION INTRAVENOUS PRN
Status: DISCONTINUED | OUTPATIENT
Start: 2019-06-07 | End: 2019-06-07

## 2019-06-07 RX ORDER — ALBUTEROL SULFATE 0.83 MG/ML
2.5 SOLUTION RESPIRATORY (INHALATION) EVERY 4 HOURS PRN
Status: DISCONTINUED | OUTPATIENT
Start: 2019-06-07 | End: 2019-06-07 | Stop reason: HOSPADM

## 2019-06-07 RX ORDER — ETOMIDATE 2 MG/ML
INJECTION INTRAVENOUS PRN
Status: DISCONTINUED | OUTPATIENT
Start: 2019-06-07 | End: 2019-06-07

## 2019-06-07 RX ORDER — ONDANSETRON 2 MG/ML
4 INJECTION INTRAMUSCULAR; INTRAVENOUS EVERY 30 MIN PRN
Status: DISCONTINUED | OUTPATIENT
Start: 2019-06-07 | End: 2019-06-07 | Stop reason: HOSPADM

## 2019-06-07 RX ORDER — IOPAMIDOL 510 MG/ML
INJECTION, SOLUTION INTRAVASCULAR PRN
Status: DISCONTINUED | OUTPATIENT
Start: 2019-06-07 | End: 2019-06-07 | Stop reason: HOSPADM

## 2019-06-07 RX ADMIN — ETOMIDATE 10 MG: 2 INJECTION INTRAVENOUS at 10:15

## 2019-06-07 RX ADMIN — PANTOPRAZOLE SODIUM 40 MG: 40 TABLET, DELAYED RELEASE ORAL at 08:40

## 2019-06-07 RX ADMIN — PHENYLEPHRINE HYDROCHLORIDE 100 MCG: 10 INJECTION INTRAVENOUS at 10:51

## 2019-06-07 RX ADMIN — ACETAMINOPHEN 650 MG: 325 TABLET, FILM COATED ORAL at 16:37

## 2019-06-07 RX ADMIN — METOPROLOL TARTRATE 150 MG: 100 TABLET, FILM COATED ORAL at 20:09

## 2019-06-07 RX ADMIN — FENTANYL CITRATE 50 MCG: 50 INJECTION, SOLUTION INTRAMUSCULAR; INTRAVENOUS at 09:56

## 2019-06-07 RX ADMIN — PHENYLEPHRINE HYDROCHLORIDE 100 MCG: 10 INJECTION INTRAVENOUS at 10:54

## 2019-06-07 RX ADMIN — LOSARTAN POTASSIUM 25 MG: 25 TABLET ORAL at 08:41

## 2019-06-07 RX ADMIN — LABETALOL HYDROCHLORIDE 5 MG: 5 INJECTION INTRAVENOUS at 10:52

## 2019-06-07 RX ADMIN — PIPERACILLIN SODIUM,TAZOBACTAM SODIUM 2.25 G: 2; .25 INJECTION, POWDER, FOR SOLUTION INTRAVENOUS at 00:00

## 2019-06-07 RX ADMIN — PIPERACILLIN SODIUM,TAZOBACTAM SODIUM 2.25 G: 2; .25 INJECTION, POWDER, FOR SOLUTION INTRAVENOUS at 18:53

## 2019-06-07 RX ADMIN — FUROSEMIDE 40 MG: 40 TABLET ORAL at 08:41

## 2019-06-07 RX ADMIN — LIDOCAINE HYDROCHLORIDE 80 MG: 20 INJECTION, SOLUTION INFILTRATION; PERINEURAL at 10:15

## 2019-06-07 RX ADMIN — Medication 50 MG: at 10:15

## 2019-06-07 RX ADMIN — PHENYLEPHRINE HYDROCHLORIDE 0.3 MCG/KG/MIN: 10 INJECTION INTRAVENOUS at 10:23

## 2019-06-07 RX ADMIN — ESMOLOL HYDROCHLORIDE 30 MG: 10 INJECTION, SOLUTION INTRAVENOUS at 10:29

## 2019-06-07 RX ADMIN — MIDAZOLAM 2 MG: 1 INJECTION INTRAMUSCULAR; INTRAVENOUS at 09:43

## 2019-06-07 RX ADMIN — PIPERACILLIN SODIUM,TAZOBACTAM SODIUM 2.25 G: 2; .25 INJECTION, POWDER, FOR SOLUTION INTRAVENOUS at 08:35

## 2019-06-07 RX ADMIN — COLCHICINE 0.6 MG: 0.6 TABLET, FILM COATED ORAL at 05:30

## 2019-06-07 RX ADMIN — METOPROLOL TARTRATE 150 MG: 100 TABLET, FILM COATED ORAL at 08:40

## 2019-06-07 RX ADMIN — LABETALOL HYDROCHLORIDE 5 MG: 5 INJECTION INTRAVENOUS at 10:49

## 2019-06-07 RX ADMIN — COLCHICINE 0.6 MG: 0.6 TABLET, FILM COATED ORAL at 16:37

## 2019-06-07 RX ADMIN — ALLOPURINOL 100 MG: 100 TABLET ORAL at 20:09

## 2019-06-07 RX ADMIN — COLCHICINE 0.6 MG: 0.6 TABLET, FILM COATED ORAL at 21:36

## 2019-06-07 RX ADMIN — SODIUM CHLORIDE 500 ML: 9 INJECTION, SOLUTION INTRAVENOUS at 13:37

## 2019-06-07 RX ADMIN — SENNOSIDES 8.6 MG: 8.6 TABLET, FILM COATED ORAL at 18:57

## 2019-06-07 RX ADMIN — SODIUM CHLORIDE, POTASSIUM CHLORIDE, SODIUM LACTATE AND CALCIUM CHLORIDE: 600; 310; 30; 20 INJECTION, SOLUTION INTRAVENOUS at 09:41

## 2019-06-07 RX ADMIN — FENTANYL CITRATE 50 MCG: 50 INJECTION, SOLUTION INTRAMUSCULAR; INTRAVENOUS at 10:02

## 2019-06-07 RX ADMIN — ROCURONIUM BROMIDE 30 MG: 10 INJECTION INTRAVENOUS at 10:21

## 2019-06-07 RX ADMIN — PIPERACILLIN SODIUM,TAZOBACTAM SODIUM 2.25 G: 2; .25 INJECTION, POWDER, FOR SOLUTION INTRAVENOUS at 13:40

## 2019-06-07 RX ADMIN — SODIUM CHLORIDE: 9 INJECTION, SOLUTION INTRAVENOUS at 04:33

## 2019-06-07 RX ADMIN — ESMOLOL HYDROCHLORIDE 30 MG: 10 INJECTION, SOLUTION INTRAVENOUS at 10:43

## 2019-06-07 RX ADMIN — LABETALOL HYDROCHLORIDE 5 MG: 5 INJECTION INTRAVENOUS at 10:46

## 2019-06-07 ASSESSMENT — ACTIVITIES OF DAILY LIVING (ADL)
ADLS_ACUITY_SCORE: 12
ADLS_ACUITY_SCORE: 14
ADLS_ACUITY_SCORE: 12

## 2019-06-07 ASSESSMENT — ENCOUNTER SYMPTOMS: DYSRHYTHMIAS: 1

## 2019-06-07 NOTE — PLAN OF CARE
Afebrile. VSS. Hypertensive but not meeting hydralazine parameters. On tele - in afib with HR in 80-90s. HR goes up to 150s with activity. NPO for possible procedure today. NS running at 75 ml/hr. Pt had an episode of gout flare up overnight. Received Colchicine x1 which he takes at home for these episodes. Pt request 1 -2 more doses to help with the gout flare. Will need 20 meQ K. Continue to monitor.     Problem: Adult Inpatient Plan of Care  Goal: Plan of Care Review  6/7/2019 0702 by Adri Godwin, RN  Outcome: No Change     Problem: Adult Inpatient Plan of Care  Goal: Optimal Comfort and Wellbeing  Outcome: No Change     Problem: Fluid Deficit (Intestinal Obstruction)  Goal: Fluid Balance  6/7/2019 0702 by Adri Godwin, RN  Outcome: No Change     Problem: Pain (Intestinal Obstruction)  Goal: Acceptable Pain Control  6/7/2019 0702 by Adri Godwin, RN  Outcome: No Change

## 2019-06-07 NOTE — PROGRESS NOTES
[facility], [place]    Hematology / Oncology Progress Note    Date of Admission: 6/5/19   Date of Service (when I saw the patient): 06/07/2019     Assessment & Plan     Girish Chauhan is a 64 year old male with CAD, HTN, HLD, permanent atrial fibrillation, bicuspid aortic valve with aortic stenosis, chronic diastolic heart failure, NSTEMI (05/2019), CKD, and metastatic cholangiocarcinoma on Xeloda who presented from an OSH with SBO with possible involvement of draining choledochojejunostomy loop, subjective fevers, and A fib with RVR. GI consulted, s/p small bowel enteroscopy with stenting on 6/7 for obstructed biliary limb.      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 CT of abd revealed single dilated fluid-filled loop of bowel in RUQ near prior partial hepatectomy surrounded by multiple surgical loops with concern that it may be involving a potential draining choledochojejunostomy loop. Alk phos and transaminases are elevated compared to 5/30 which is consistent with obstructive picture.   - Consulted Surg Onc for possible intervention; do not feel carol limb is resectable at this time due to high risk surgical candidate and peritoneal nodule invading into diaphragm and small bowel. No indication for NG tube at this time.   - Consulted GI for possible endoscopic intervention/stent placement for decompression. 6/7 Dr. Rodriguez performed small bowel enteroscopy with successful stenting and transluminal drainage that yielded immediate bile/stone content drainage. Following surgery patient with soft pressures and in A fib with RVR (tachy to 150s). Evaluated in the PACU with normalized pressures 120s/80s and tachy to 120s. Given 500 ml NS bolus with improved HRs. Patient denies chest pain, SOB or palpitations. Lungs CTAB, good UOP. GI reccs to continue to HOLD anticoagulants for 3 days post op  (re  start 6/10). Consider cardiology consult if he remains tachy or symptomatic. Monitor.   - Plan to repeat endoscopy with stent exchange (to plastics) in 4-6 wks. Will need this scheduled prior to discharge.   - Continue to trend LFTs, should improve post stent placement   - Clear Liquid diet, advance as tolerated.   - IVF NS @ 100/hr, given 500cc bolus post op   - Zofran, Compazine prn      Results for JENNYFER MCGARRY (MRN [id number]) as of 6/7/2019 14:01   Ref. Range 5/23/2019 06:42 6/5/2019 19:29 6/6/2019 03:59 6/7/2019 04:30   Bilirubin Total Latest Ref Range: 0.2 - 1.3 mg/dL 0.6 2.0 (H) 2.9 (H) 2.3 (H)   Alkaline Phosphatase Latest Ref Range: 40 - 150 U/L 104 504 (H) 545 (H) 553 (H)   ALT Latest Ref Range: 0 - 70 U/L 36 135 (H) 135 (H) 104 (H)   AST Latest Ref Range: 0 - 45 U/L 26 119 (H) 115 (H) 80 (H)       # Subjective Fevers  Reports fevers at home this morning up to 102.5. UA, CXR, and BC taken at OSH negative. Given 1 dose of Zosyn prior to transfer to [facility]. Concern for cholangitis or possible infection caused by SBO/ biliary obstruction.   - Continue Zosyn (6/6-present)   - Repeat BCx if febrile, thus far he has been afebrile inpatient      # Metastatic cholangiocarcinoma  Initially treated with surgical resection including partial liver resection in March 2016. Margins were negative and no lymph nodes were involved. Perineural and lymphovascular invasion was noted. He did not have adjuvant chemotherapy. He recurred in the bladder in November 2017. Biopsy was consistent with metastatic cholangiocarcinoma. He was treated with cisplatin/gemzar starting in Dec 2017. Cisplatin was help in June 2018 due to poor tolerance. Gemzar was discontinued in August 2018 due to possible TTP. He was off of treatment for several months, but in April 2019 was found to have disease progression in the abdominal cavity anterior to the liver, just below the diaphragm. He was initiated on capecitabine on 4/30/19. He completed 8 of  14 days of the next cycle in spite of admission for NSTEMI. Xeloda currently on hold until after stenting can be completed.  - Continue to hold Xeloda (last dose 5/7/19)      # Atrial fibrillation with RVR  # HTN  In Afib with RVR at OSH. BPs also low at OSH ED with improvement following fluid bolus.  - Telemetry   - Continue PTA metoprolol, consider cards consult if remains tachy   - Continue PTA losartan and Lasix      # h/o NSTEMI  # CAD  # HLD  Recent admission at OSH 5/3-5/7 for chest pain, found to have elevated troponins, NSTEMI. He was admitted under cardiology and had an angiogram on 5/6/19. The LHC showed 99% obstruction with unsuccessful PCI of the OM2. There was moderate residual disease. He had developed A fib with RVR and his metorpolol dose was incrased to 150 mg bid and losartan dose was decreased. Plavix was started and he continued on Eliquis. Planning for additional stenting in a couple weeks with Dr. Hurley.  - Hold PTA lipitor, apixaban, clopidogrel. Plan to hold anticoagulants for 3 days post op (ok to re start 6/10). Monitor.       # CKD stage 3  Cr elevated to 2.3 with baseline of ~1.5.   - Avoid nephrotoxic meds  - Cr improved today, 1.74 and close to baseline      # GERD  - Continue PTA pantoprazole, TUMS     # Gout, acute on chronic   - 6/6 had a gout flare up of his right foot. Painful and erythematous R dorsum of foot on exam. Will re start PTA Colchicine and Allopurinol.     FEN:  -  ml/hr, careful fluids with hx of heart failure   - High lyte replacement per protocol  - Clear liquid diet, advance as tolerated       Prophy/Misc:  - GI: Continue PTA pantoprazole daily.   - DVT: hold anticoagulation with possible future procedure     Disposition: Expect that he will discharge to home in 1-2 days if he remains stable.      Patient discuss with staff attending, Dr. Lopez.     Shannon Lopes PA-C   Hematology/Oncology  Pager: [phone]       Interval History     Mr. Chauhan was  "seen post op today back on the floor with his wife at bedside. He reports to be feeling well and denies abdominal pain. He believes the surgery went well and has been passing gas and urinating without difficulty. He reports to have had 2 hard BMs yesterday. He also has noticed increased pain consistent with his long hx of gout on his right foot. \"It feels like there is glass inside my toes.\" He asked to be re started on PTA Allopurinol and Colchicine. He is not needing to take anything for pain. Denies fevers or chills, chest pain, SOB, cough, palpitations, headaches, or dysuria. His last meal was Tuesday afternoon. He remains NPO after surgery but is hungry.     Physical Exam   Temp: 98.9  F (37.2  C) Temp src: Axillary BP: 131/83 Pulse: 92 Heart Rate: 118 Resp: 18 SpO2: 98 % O2 Device: None (Room air) Oxygen Delivery: 3 LPM  Vitals:    06/05/19 1900 06/06/19 0827 06/07/19 0825   Weight: 74.4 kg (164 lb) 74.7 kg (164 lb 10.9 oz) 75 kg (165 lb 5.5 oz)     Vital Signs with Ranges  Temp:  [97.5  F (36.4  C)-98.9  F (37.2  C)] 98.9  F (37.2  C)  Pulse:  [] 92  Heart Rate:  [] 118  Resp:  [11-18] 18  BP: ()/() 131/83  SpO2:  [83 %-100 %] 98 %  I/O last 3 completed shifts:  In: 2320 [P.O.:50; I.V.:2270]  Out: 2800 [Urine:2800]    Constitutional: Pleasant male seen laying in bed. No apparent distress, and appears stated age.  Eyes: Lids and lashes normal, mild scleral icterus   ENT: Normocephalic, without obvious abnormality, atraumatic, oral pharynx with moist mucus membranes, tonsils without erythema or exudates, gums normal and good dentition.   Respiratory: No increased work of breathing, good air exchange, clear to auscultation bilaterally, faint crackles at left lung base, no wheezing.  Cardiovascular: Regular rate, irregular rhythm. Peripheral pulses 2+  GI: +BS, soft. No tenderness on palpation.  Skin: No bruising or bleeding, normal skin color, texture, turgor, no redness, warmth, or " swelling, no rashes, no lesions, no jaundice.  Extremities: R dorsum of foot with faint erythema, 2nd and 5th distal phalanges mildly tender to palpation. Trace edema.   Neurologic: Awake, alert, oriented to name, place and time.    Vascular access: R port c/d/i     Medications     - MEDICATION INSTRUCTIONS -       sodium chloride 75 mL/hr at 06/07/19 0433       sodium chloride 0.9%  500 mL Intravenous Once     allopurinol  100 mg Oral Daily     furosemide  40 mg Oral Daily     heparin  5 mL Intracatheter Q28 Days     heparin lock flush  5-10 mL Intracatheter Q24H     losartan  25 mg Oral Daily     metoprolol tartrate  150 mg Oral BID     multivitamin w/minerals  1 tablet Oral Daily     pantoprazole  40 mg Oral QAM AC     piperacillin-tazobactam  2.25 g Intravenous Q6H     sodium chloride (PF)  10 mL Intracatheter Q8H       Data   Results for orders placed or performed during the hospital encounter of 06/05/19 (from the past 24 hour(s))   CBC with platelets differential   Result Value Ref Range    WBC 7.6 4.0 - 11.0 10e9/L    RBC Count 3.22 (L) 4.4 - 5.9 10e12/L    Hemoglobin 9.7 (L) 13.3 - 17.7 g/dL    Hematocrit 30.2 (L) 40.0 - 53.0 %    MCV 94 78 - 100 fl    MCH 30.1 26.5 - 33.0 pg    MCHC 32.1 31.5 - 36.5 g/dL    RDW 14.6 10.0 - 15.0 %    Platelet Count 173 150 - 450 10e9/L    Diff Method Automated Method     % Neutrophils 71.9 %    % Lymphocytes 11.2 %    % Monocytes 14.4 %    % Eosinophils 1.8 %    % Basophils 0.3 %    % Immature Granulocytes 0.4 %    Nucleated RBCs 0 0 /100    Absolute Neutrophil 5.5 1.6 - 8.3 10e9/L    Absolute Lymphocytes 0.9 0.8 - 5.3 10e9/L    Absolute Monocytes 1.1 0.0 - 1.3 10e9/L    Absolute Eosinophils 0.1 0.0 - 0.7 10e9/L    Absolute Basophils 0.0 0.0 - 0.2 10e9/L    Abs Immature Granulocytes 0.0 0 - 0.4 10e9/L    Absolute Nucleated RBC 0.0    Comprehensive metabolic panel   Result Value Ref Range    Sodium 140 133 - 144 mmol/L    Potassium 3.9 3.4 - 5.3 mmol/L    Chloride 109 94 -  109 mmol/L    Carbon Dioxide 22 20 - 32 mmol/L    Anion Gap 10 3 - 14 mmol/L    Glucose 73 70 - 99 mg/dL    Urea Nitrogen 34 (H) 7 - 30 mg/dL    Creatinine 1.76 (H) 0.66 - 1.25 mg/dL    GFR Estimate 40 (L) >60 mL/min/[1.73_m2]    GFR Estimate If Black 47 (L) >60 mL/min/[1.73_m2]    Calcium 8.8 8.5 - 10.1 mg/dL    Bilirubin Total 2.3 (H) 0.2 - 1.3 mg/dL    Albumin 2.9 (L) 3.4 - 5.0 g/dL    Protein Total 6.1 (L) 6.8 - 8.8 g/dL    Alkaline Phosphatase 553 (H) 40 - 150 U/L     (H) 0 - 70 U/L    AST 80 (H) 0 - 45 U/L   Magnesium   Result Value Ref Range    Magnesium 2.3 1.6 - 2.3 mg/dL   Glucose by meter   Result Value Ref Range    Glucose 83 70 - 99 mg/dL   SMALL BOWEL ENTEROSCOPY   Result Value Ref Range    [address] [place] [address] [phone]     Endoscopy Department  _______________________________________________________________________________  Patient Name: Girish Chauhan           Procedure Date: 6/7/2019 9:53 AM  MRN: [id number]                       Account Number: [id number]  YOB: 1955              Admit Type: Inpatient  Age: 64                               Room: OR  Gender: Male                          Note Status: Finalized  Attending MD: Gabino Rodriguez MD   Total Sedation Time:   _______________________________________________________________________________     Procedure:           Small bowel enteroscopy  Indications:         For therapy of jejunal stenosis  Providers:           Gabino Rodriguez MD  Patient Profile:     Mr Chauhan is a 65yo gentleman with cholangiocarcinoma                        status post left hepatectomy with RYHJ anatomy now with                        evidence of recurrent disease and me tastasis causing                        obstruction of the biliary limb. He has evidence of                        cholangitis/sepsis now complicated by stable atrial fib.                        He proceeds to  endscopy for some fashion of urgent                        decompression.  Referring MD:        Jose Eduardo Pinedo MD  Requesting Provider: Puma Worley MD  Medicines:           General Anesthesia, Antibiotics as scheduled  Complications:       No immediate complications.  _______________________________________________________________________________  Procedure:           Pre-Anesthesia Assessment:                       - Prior to the procedure, a History and Physical was                        performed, and patient medications and allergies were                        reviewed. The patient is competent. The risks and                        benefits of the procedure and the sedation options and                        risks were discussed with the patient. All questions                         were answered and informed consent was obtained. Patient                        identification and proposed procedure were verified by                        the nurse in the pre-procedure area. Mental Status                        Examination: alert and oriented. Airway Examination:                        Mallampati Class II (the uvula but not tonsillar pillars                        visualized). Respiratory Examination: poor air movement.                        CV Examination: irregularly irregular rate and rhythm                        and tachycardia noted. ASA Grade Assessment: III - A                        patient with severe systemic disease. After reviewing                        the risks and benefits, the patient was deemed in                        satisfactory condition to undergo the procedure. The                        anesthesia plan was to use general anesthesia.                        Immediately prior to administration of medications, the                         patient was re-assessed for adequacy to receive                        sedatives. The heart rate, respiratory rate, oxygen                         saturations, blood pressure, adequacy of pulmonary                        ventilation, and response to care were monitored                        throughout the procedure. The physical status of the                        patient was re-assessed after the procedure. After                        obtaining informed consent, the endoscope was passed                        under direct vision. Throughout the procedure, the                        patient's blood pressure, pulse, and oxygen saturations                        were monitored continuously. The pediatric colonoscope                        was introduced through the mouth and advanced to the                        proximal jejunum. The linear echoendoscope was                        introduced through the mouth and advanced to the bulb.                        The small laura wel enteroscopy was accomplished without                        difficulty. The patient tolerated the procedure well.                                                                                   Findings:       The patient was supine throughout and  films showed numerous wires        of the Zoll pads as well as numerous surgical clips. We began with a        pediatric colonoscope in an attempt to reach the downstream aspect of        the biliary limb stenosis for placement of multiple 7F stents. The        proximal, mid and distal esophagus were unremarkable as was the        squamocolumnar line which was found at the gastroesophageal junction        without significant irregularity. The stomach was intact and there was a        distal bulb to the left lateral antrum. There was also a bulge at the        bulb and the endoscope was advanced deep across the duodenum and        proximal jejunum, however the jejunojejunal anastomosis was not reached.        A decision was made ag [facility] utilizing the enteroscope as the working        channel would have severely limited our options for  "drainage and stent        placement. We therefore proceeded with endoscopic transluminal drianage.        A focused endosonographic exam was performed using a curved linear. At        the pylorus and or just within the lip of the pylorus in the bulb the        obstructed limb was demonstrated with mobile hyperechoic components        (stone debris) within a large hypoechoic (fluid) area. While there was a        distance over 1cm between the foregut and mid gut walls in this region        it was recognized this would compress. Using a 19g Flex needle the        obstructed biliary limb was accessed and a long 0.025\" Visiglide wire        curled into the obstructed limb. Over this wire a tract was created with        an Axios catheter and 100W E5 cautery. The tract was then secured with a        [id number] lumen apposing metal Axios stent. Immediately, bile and stone        contents d rained. The ends of the stent were well expanded and seated        and a decision was made against postdilation.                                                                                   Impression:          - Obstructed biliary limb not accessible by push                        enteroscopy and therefore drained by enteroenterostomy                        utilizing electrocautery for tract formation and a 10mm                        Axios stent as described above  Recommendation:      - General ansthesia recovery with return to the floor                        when appropriate                       - Anticipate that pain with improve, with possibility of                        normalization of liver studies                       - Repeat upper endsocopy with stent exchange (to                        plastics) in 4-6 weeks                       - Avoid antithrombotics for three days if feasible due                        to his ongoing cardiac issues                       - Card iology consult if irregular rhythm persists           "             - The findings and recommendations were discussed with                        the patient and their family                                                                                     electronically signed by MEGHAN Rodriguez  _____________________  Gabino Rodriguez MD  6/7/2019 11:23:57 AM  I was physically present for the entire viewing portion of the exam.  __________________________  Signature of teaching physician  [id number] Rodriguez MD  Number of Addenda: 0    Note Initiated On: 6/7/2019 9:53 AM     XR Surgery RAFFI Fluoro L/T 5 Min w Stills    Narrative    This exam was marked as non-reportable because it will not be read by a   radiologist or a [place] non-radiologist provider.

## 2019-06-07 NOTE — ANESTHESIA POSTPROCEDURE EVALUATION
Anesthesia POST Procedure Evaluation    Patient: Girish Chauhan   MRN:     [id number] Gender:   male   Age:    64 year old :      1955        Preoperative Diagnosis: Small Bowel Obstruction   Procedure(s):  ENTEROSCOPY  ENDOSCOPIC ULTRASOUND, with hot axios stent placement   Postop Comments: No value filed.       Anesthesia Type:  General  No value filed.    Reportable Event: NO     PAIN: Uncomplicated   Sign Out status: Comfortable, Well controlled pain     PONV: No PONV   Sign Out status:  No Nausea or Vomiting     Neuro/Psych: Uneventful perioperative course   Sign Out Status: Preoperative baseline; Age appropriate mentation     Airway/Resp.: Uneventful perioperative course   Sign Out Status: Non labored breathing, age appropriate RR; Resp. Status within EXPECTED Parameters     CV: Uneventful perioperative course   Sign Out status: Appropriate BP and perfusion indices; Appropriate HR/Rhythm     Disposition:   Sign Out in:  PACU  Disposition:  Phase II; Home  Recovery Course: Uneventful  Follow-Up: Not required           Last Anesthesia Record Vitals:  CRNA VITALS  2019 1047 - 2019 1146      2019             Resp Rate (observed):  3  (Abnormal)           Last PACU Vitals:  Vitals Value Taken Time   /87 2019 11:40 AM   Temp     Pulse 152 2019 11:40 AM   Resp     SpO2 99 % 2019 11:45 AM   Temp src     NIBP     Pulse     SpO2     Resp     Temp     Ht Rate     Temp 2     Vitals shown include unvalidated device data.      Electronically Signed By: Elida aBrr MD, 2019, 11:46 AM

## 2019-06-07 NOTE — PLAN OF CARE
Problem: Adult Inpatient Plan of Care  Goal: Plan of Care Review  Outcome: No Change     Problem: Adult Inpatient Plan of Care  Goal: Patient-Specific Goal (Individualization)  Outcome: No Change     Problem: Infection  Goal: Infection Symptom Resolution  Outcome: No Change     Problem: Fluid Deficit (Intestinal Obstruction)  Goal: Fluid Balance  Outcome: No Change     Problem: Pain (Intestinal Obstruction)  Goal: Acceptable Pain Control  Outcome: No Change   Patient has no complaints of pain or nausea today.  He continues to be NPO.  His BP is elevated, metoprolol given.  He has been independent today with no complaints.

## 2019-06-07 NOTE — ANESTHESIA CARE TRANSFER NOTE
"Patient: Girish Chauhan    Procedure(s):  ENTEROSCOPY  ENDOSCOPIC ULTRASOUND, with hot axios stent placement    Diagnosis: Small Bowel Obstruction  Diagnosis Additional Information: No value filed.    Anesthesia Type:   No value filed.     Note:  Airway :Face Mask  Patient transferred to:PACU  Comments: Pt extubated without problem. Suctioned> To PACU. Report given. HR continues to be 120-170. BP stable now. GI MD called to bedside. Ordered \"Primary consult\" for further orders..Handoff Report: Identifed the Patient, Identified the Reponsible Provider, Reviewed the pertinent medical history, Discussed the surgical course, Reviewed Intra-OP anesthesia mangement and issues during anesthesia, Set expectations for post-procedure period and Allowed opportunity for questions and acknowledgement of understanding      Vitals: (Last set prior to Anesthesia Care Transfer)    CRNA VITALS  6/7/2019 1047 - 6/7/2019 1136      6/7/2019             Resp Rate (observed):  3  (Abnormal)                 Electronically Signed By: YO Galaviz CRNA  June 7, 2019  11:36 AM  "

## 2019-06-07 NOTE — OP NOTE
WES 06/07/2019  9:53 AM [facility], [address] [place] [address] [phone]     Endoscopy Department   _______________________________________________________________________________   Patient Name: Girish Chauhan           Procedure Date: 6/7/2019 9:53 AM   MRN: [id number]                       Account Number: [id number]   YOB: 1955              Admit Type: Inpatient   Age: 64                               Room: OR   Gender: Male                          Note Status: Finalized   Attending MD: Gabino Rodriguez MD   Total Sedation Time:   _______________________________________________________________________________       Procedure:           Small bowel enteroscopy   Indications:         For therapy of jejunal stenosis   Providers:           Gabino Rodriguez MD   Patient Profile:     Mr Chauhan is a 65yo gentleman with cholangiocarcinoma                        status post left hepatectomy with RYHJ anatomy now with                        evidence of recurrent disease and metastasis causing                        obstruction of the biliary limb. He has evidence of                        cholangitis/sepsis now complicated by stable atrial fib.                        He proceeds to endscopy for some fashion of urgent                        decompression.   Referring MD:        Jose Eduardo Pinedo MD   Requesting Provider: Puma Worley MD   Medicines:           General Anesthesia, Antibiotics as scheduled   Complications:       No immediate complications.   _______________________________________________________________________________   Procedure:           Pre-Anesthesia Assessment:                        - Prior to the procedure, a History and Physical was                        performed, and patient medications and allergies were                        reviewed. The patient is competent. The risks and                        benefits of the procedure  and the sedation options and                        risks were discussed with the patient. All questions                        were answered and informed consent was obtained. Patient                        identification and proposed procedure were verified by                        the nurse in the pre-procedure area. Mental Status                        Examination: alert and oriented. Airway Examination:                        Mallampati Class II (the uvula but not tonsillar pillars                        visualized). Respiratory Examination: poor air movement.                        CV Examination: irregularly irregular rate and rhythm                        and tachycardia noted. ASA Grade Assessment: III - A                        patient with severe systemic disease. After reviewing                        the risks and benefits, the patient was deemed in                        satisfactory condition to undergo the procedure. The                        anesthesia plan was to use general anesthesia.                        Immediately prior to administration of medications, the                        patient was re-assessed for adequacy to receive                        sedatives. The heart rate, respiratory rate, oxygen                        saturations, blood pressure, adequacy of pulmonary                        ventilation, and response to care were monitored                        throughout the procedure. The physical status of the                        patient was re-assessed after the procedure. After                        obtaining informed consent, the endoscope was passed                        under direct vision. Throughout the procedure, the                        patient's blood pressure, pulse, and oxygen saturations                        were monitored continuously. The pediatric colonoscope                        was introduced through the mouth and advanced to the                        " proximal jejunum. The linear echoendoscope was                        introduced through the mouth and advanced to the bulb.                        The small bowel enteroscopy was accomplished without                        difficulty. The patient tolerated the procedure well.                                                                                     Findings:        The patient was supine throughout and  films showed numerous wires        of the Zoll pads as well as numerous surgical clips. We began with a        pediatric colonoscope in an attempt to reach the downstream aspect of        the biliary limb stenosis for placement of multiple 7F stents. The        proximal, mid and distal esophagus were unremarkable as was the        squamocolumnar line which was found at the gastroesophageal junction        without significant irregularity. The stomach was intact and there was a        distal bulb to the left lateral antrum. There was also a bulge at the        bulb and the endoscope was advanced deep across the duodenum and        proximal jejunum, however the jejunojejunal anastomosis was not reached.        A decision was made against utilizing the enteroscope as the working        channel would have severely limited our options for drainage and stent        placement. We therefore proceeded with endoscopic transluminal drianage.        A focused endosonographic exam was performed using a curved linear. At        the pylorus and or just within the lip of the pylorus in the bulb the        obstructed limb was demonstrated with mobile hyperechoic components        (stone debris) within a large hypoechoic (fluid) area. While there was a        distance over 1cm between the foregut and mid gut walls in this region        it was recognized this would compress. Using a 19g Flex needle the        obstructed biliary limb was accessed and a long 0.025\" Visiglide wire        curled into the obstructed limb. " Over this wire a tract was created with        an Axios catheter and 100W E5 cautery. The tract was then secured with a        [id number] lumen apposing metal Axios stent. Immediately, bile and stone        contents drained. The ends of the stent were well expanded and seated        and a decision was made against postdilation.                                                                                     Impression:          - Obstructed biliary limb not accessible by push                        enteroscopy and therefore drained by enteroenterostomy                        utilizing electrocautery for tract formation and a 10mm                        Axios stent as described above   Recommendation:      - General ansthesia recovery with return to the floor                        when appropriate                        - Anticipate that pain with improve, with possibility of                        normalization of liver studies                        - Repeat upper endsocopy with stent exchange (to                        plastics) in 4-6 weeks                        - Avoid antithrombotics for three days if feasible due                        to his ongoing cardiac issues                        - Cardiology consult if irregular rhythm persists                        - The findings and recommendations were discussed with                        the patient and their family                                                                                       electronically signed by MEGHAN Rodriguez

## 2019-06-07 NOTE — PLAN OF CARE
Pt went endoscopic ultrasound stent placement, tolerated well, no pain was expressed post placement, continue to have R side gout pain, voided well diet advanced to clear liquid diet, continue to have afibe, HR between 160 to 110, controled with oral metoprolo, recommended to see cardiologist

## 2019-06-07 NOTE — ANESTHESIA PREPROCEDURE EVALUATION
Anesthesia Pre-Procedure Evaluation    Patient: Girish Chauhan   MRN:     [id number] Gender:   male   Age:    64 year old :      1955        Preoperative Diagnosis: Small Bowel Obstruction   Procedure(s):  ENTEROSCOPY  ENDOSCOPIC ULTRASOUND,     Past Medical History:   Diagnosis Date     Aortic stenosis due to bicuspid aortic valve      Atrial fibrillation (H)     hx of paroxysmal atrial fibrillation since approximately . S/p cardioversion in  with recurrent atrial fibrillation s/p repeat cardioversion 14.     Basal cell carcinoma 2016    right shoulder and right posterior calf s/p electrodessication and curretage 2016.     CAD (coronary artery disease) 2011    s/p angiogram 2011 s/p percutaneous intervention on the LAD with multiple drug-eluting stents complicated by a small perforation in the diagonal branch which sealed spontaneously.  Patient with significant Circumflex stenosis which is being treated conservatively.     Gout     affects left foot 2-3 times per year     Hyperlipidemia      Hypertension      Liver disease      Melanoma (H) 2015    back s/p resection 2015     Squamous cell carcinoma     nose s/p excision      Past Surgical History:   Procedure Laterality Date     ------------OTHER-------------      multiple skin cancer resections     CARDIOVERSION  , 2014     ENDOSCOPIC RETROGRADE CHOLANGIOPANCREATOGRAM N/A 2016    Procedure: COMBINED ENDOSCOPIC RETROGRADE CHOLANGIOPANCREATOGRAPHY, PLACE TUBE/STENT;  Surgeon: Rafa Andrade MD;  Location: [facility] OR     ENDOSCOPIC RETROGRADE CHOLANGIOPANCREATOGRAPHY  2014     EXPLORE COMMON BILE DUCT N/A 3/8/2016    Procedure: EXPLORE COMMON BILE DUCT;  Surgeon: Jose Eduardo Pinedo MD;  Location: [facility] OR     HEPATECTOMY PARTIAL Left 3/8/2016    Procedure: HEPATECTOMY PARTIAL;  Surgeon: Jose Eduardo Pinedo MD;  Location: [facility] OR     INSERT PORT VASCULAR ACCESS Right 2017    Procedure: INSERT PORT VASCULAR  ACCESS;  Place single lumen venous chest port - right;  Surgeon: Drew Powell PA-C;  Location: [facility] OR     SOFT TISSUE SURGERY       STENT  12/2011    LAD with multiple SAM          Anesthesia Evaluation     . Pt has had prior anesthetic. Type: General    No history of anesthetic complications          ROS/MED HX    ENT/Pulmonary:  - neg pulmonary ROS     Neurologic:  - neg neurologic ROS     Cardiovascular: Comment: Totally asx w/ HR below 120.  Feels racing at higher rate but has not had SOB or dizziness since MI    angiogram on 5/6/19. The LHC showed 99% obstruction with unsuccessful PCI of the OM2. There was moderate residual disease. He had developed A fib with RVR and his metorpolol dose was incrased to 150 mg bid and losartan dose was decreased. Plavix was started and he continued on Eliquis. Planning for additional stenting in a couple weeks         (+) hypertension--CAD, -past MI (5/3/19),-stent,2011  LAD  4 Drug Eluting Stent .. Taking blood thinners : . CHF . . :. dysrhythmias (mult cardioversions for RVR) a-fib and a-flutter, valvular problems/murmurs type: AS . Previous cardiac testing Echodate:5/20/19results:  Mild concentric left ventricular hypertrophy. The estimated left   ventricular ejection fraction is 35%.    Normal right ventricular size and systolic function.    Probable fusion of the right and non-coronary leaflets. Moderate aortic   stenosis. Gradient suggests mild stenosis but visual impression is   moderate stenosis.    The aortic root is mildly dilated. No evidence of coarctation.    Left atrial volume is moderately increased.date: results:ECG reviewed date:6/5/19 results:Atrial fibrillation with rapid ventricular response 133  ST & T wave abnormality, consider inferolateral ischemia  Abnormal ECG  When compared with ECG of 30-MAY-2019 23:01,  Vent. rate has increased BY  59 BPM  ST now depressed in Inferior leads  T wave inversion now evident in Inferior leads  T wave  inversion now evident in Anterior leadsCath date: 5/6/19 results:5/6/19. The LHC showed 99% obstruction with unsuccessful PCI of the OM2. There was moderate residual disease.           METS/Exercise Tolerance:     Hematologic:     (+) Anemia, -      Musculoskeletal:         GI/Hepatic: Comment: SBO    3/8/16: Open Extended Left Hepatectomy And Radical Bile Duct Resection With Intra-Op Ultrasound, cholecystectomy, [id number]-[id number] hepaticojejunostomy         Renal/Genitourinary:     (+) chronic renal disease, type: CRI,       Endo:  - neg endo ROS       Psychiatric:  - neg psychiatric ROS       Infectious Disease:  - neg infectious disease ROS       Malignancy:   (+) Malignancy   Cholangiocarcinoma w/ carcinomatoiss  Bladder ca  melanoma        Other:                         PHYSICAL EXAM:   Mental Status/Neuro: A/A/O   Airway: Facies: Beard (downing apple ? anterior AW)  Mallampati: I  Mouth/Opening: Full  TM distance: > 6 cm  Neck ROM: Full   Respiratory: Auscultation: CTAB     Resp. Rate: Normal     Resp. Effort: Normal      CV: Rhythm: Irregular  Rate: Tachy  Heart: Murmur   Comments:      Dental: Normal                  Lab Results   Component Value Date    WBC 7.6 06/07/2019    HGB 9.7 (L) 06/07/2019    HCT 30.2 (L) 06/07/2019     06/07/2019     06/07/2019    POTASSIUM 3.9 06/07/2019    CHLORIDE 109 06/07/2019    CO2 22 06/07/2019    BUN 34 (H) 06/07/2019    CR 1.76 (H) 06/07/2019    GLC 73 06/07/2019    GARY 8.8 06/07/2019    PHOS 3.7 06/05/2019    MAG 2.3 06/07/2019    ALBUMIN 2.9 (L) 06/07/2019    PROTTOTAL 6.1 (L) 06/07/2019     (H) 06/07/2019    AST 80 (H) 06/07/2019    ALKPHOS 553 (H) 06/07/2019    BILITOTAL 2.3 (H) 06/07/2019    LIPASE 174 02/26/2016    AMYLASE 57 02/26/2016    PTT 33 03/08/2016    INR 1.64 (H) 06/06/2019    FIBR 174 (L) 03/08/2016    TSH 3.88 10/15/2018       Preop Vitals  BP Readings from Last 3 Encounters:   06/07/19 (!) 157/96   05/23/19 106/65   04/22/19 123/82    Pulse  "Readings from Last 3 Encounters:   05/23/19 85   04/22/19 73   02/11/19 84      Resp Readings from Last 3 Encounters:   06/07/19 16   05/23/19 16   02/11/19 16    SpO2 Readings from Last 3 Encounters:   06/07/19 99%   05/23/19 98%   04/22/19 99%      Temp Readings from Last 1 Encounters:   06/07/19 37.1  C (98.7  F) (Oral)    Ht Readings from Last 1 Encounters:   05/23/19 1.778 m (5' 10\")      Wt Readings from Last 1 Encounters:   06/06/19 74.7 kg (164 lb 10.9 oz)    Estimated body mass index is 23.63 kg/m  as calculated from the following:    Height as of 5/23/19: 1.778 m (5' 10\").    Weight as of this encounter: 74.7 kg (164 lb 10.9 oz).     LDA:  Peripheral IV 06/05/19 Right Upper forearm (Active)   Site Assessment WDL 6/6/2019  9:00 PM   Line Status Saline locked 6/6/2019  9:00 PM   Phlebitis Scale 0-->no symptoms 6/6/2019  9:00 PM   Infiltration Scale 0 6/6/2019  9:00 PM   Infiltration Site Treatment Method  None 6/6/2019  9:00 PM   Extravasation? No 6/6/2019  9:00 PM   IV Site Rotation Due Date 06/12/19 6/6/2019  8:00 AM   Number of days: 2       Port A Cath Single 12/08/17 Right Chest wall (Active)   Access Date 06/05/19 6/6/2019  9:00 PM   Access Attempts 2 6/5/2019  7:59 PM   Gauge/Length Noncoring 90 degree bend;20 gauge 6/5/2019  7:59 PM   Site Assessment WDL 6/6/2019  9:00 PM   Line Status Infusing 6/6/2019  9:00 PM   Extravasation? No 6/6/2019  9:00 PM   Dressing Intervention Transparent 6/6/2019  8:00 AM   Dressing change due 06/12/19 6/6/2019  9:00 PM   Needle Change Due 06/12/19 6/6/2019  9:00 PM   Line Necessity Yes, meets criteria 6/6/2019 12:00 AM   De-Access Date 05/23/19 5/23/2019  6:00 AM   Date to be Reflushed 06/23/19 5/23/2019  6:00 AM   Number of days: 546       Intrathecal/Epidural Catheter 03/08/16 (Active)   Number of days: 1186       Closed/Suction Drain 1 Left Abdomen (Active)   Number of days: 1186       Closed/Suction Drain 2 Left Abdomen (Active)   Number of days: 1186       Biliary " Drain (Active)   Number of days: 1186       Biliary Stent 8 Albanian (Active)   Number of days: 1186            Assessment:   ASA SCORE: 4 emergent     NPO Status: Increased aspiration risk   Documentation: Deferred     Tobacco Use:  NO Active use of Tobacco/UNKNOWN Tobacco use status     Plan:   Anes. Type:  General   Pre-Induction: Midazolam IV     Drips/Meds-Preparation: Phenylephrine; NTG   Induction:  IV (RSI)   Airway: Oral ETT   Access/Monitoring: PIV; A-Line   Maintenance: Balanced   Emergence: Procedure Site   Logistics: Observation/Admission     Postop Pain/Sedation Strategy:  Standard-Options: Opioids PRN     PONV Management:  Adult Risk Factors:, Non-Smoker, Postop Opioids  Prevention: Ondansetron     CONSENT: Direct conversation   Plan and risks discussed with: Patient          Comments for Plan/Consent:  Possible ICU admission Possible to remain intubated    difib kee Barr MD

## 2019-06-08 LAB
ALBUMIN SERPL-MCNC: 2.5 G/DL (ref 3.4–5)
ALP SERPL-CCNC: 535 U/L (ref 40–150)
ALT SERPL W P-5'-P-CCNC: 89 U/L (ref 0–70)
ANION GAP SERPL CALCULATED.3IONS-SCNC: 9 MMOL/L (ref 3–14)
AST SERPL W P-5'-P-CCNC: 70 U/L (ref 0–45)
BASOPHILS # BLD AUTO: 0 10E9/L (ref 0–0.2)
BASOPHILS NFR BLD AUTO: 0.1 %
BILIRUB SERPL-MCNC: 1.7 MG/DL (ref 0.2–1.3)
BUN SERPL-MCNC: 27 MG/DL (ref 7–30)
CALCIUM SERPL-MCNC: 8.2 MG/DL (ref 8.5–10.1)
CHLORIDE SERPL-SCNC: 107 MMOL/L (ref 94–109)
CO2 SERPL-SCNC: 24 MMOL/L (ref 20–32)
CREAT SERPL-MCNC: 1.77 MG/DL (ref 0.66–1.25)
DIFFERENTIAL METHOD BLD: ABNORMAL
EOSINOPHIL # BLD AUTO: 0.2 10E9/L (ref 0–0.7)
EOSINOPHIL NFR BLD AUTO: 3.2 %
ERYTHROCYTE [DISTWIDTH] IN BLOOD BY AUTOMATED COUNT: 14.6 % (ref 10–15)
GFR SERPL CREATININE-BSD FRML MDRD: 40 ML/MIN/{1.73_M2}
GLUCOSE SERPL-MCNC: 95 MG/DL (ref 70–99)
HCT VFR BLD AUTO: 28.9 % (ref 40–53)
HGB BLD-MCNC: 9.1 G/DL (ref 13.3–17.7)
IMM GRANULOCYTES # BLD: 0 10E9/L (ref 0–0.4)
IMM GRANULOCYTES NFR BLD: 0.3 %
LYMPHOCYTES # BLD AUTO: 0.9 10E9/L (ref 0.8–5.3)
LYMPHOCYTES NFR BLD AUTO: 11.5 %
MAGNESIUM SERPL-MCNC: 1.8 MG/DL (ref 1.6–2.3)
MCH RBC QN AUTO: 30.1 PG (ref 26.5–33)
MCHC RBC AUTO-ENTMCNC: 31.5 G/DL (ref 31.5–36.5)
MCV RBC AUTO: 96 FL (ref 78–100)
MONOCYTES # BLD AUTO: 1.1 10E9/L (ref 0–1.3)
MONOCYTES NFR BLD AUTO: 14.3 %
NEUTROPHILS # BLD AUTO: 5.2 10E9/L (ref 1.6–8.3)
NEUTROPHILS NFR BLD AUTO: 70.6 %
NRBC # BLD AUTO: 0 10*3/UL
NRBC BLD AUTO-RTO: 0 /100
PLATELET # BLD AUTO: 168 10E9/L (ref 150–450)
POTASSIUM SERPL-SCNC: 3.3 MMOL/L (ref 3.4–5.3)
POTASSIUM SERPL-SCNC: 3.7 MMOL/L (ref 3.4–5.3)
PROT SERPL-MCNC: 6.1 G/DL (ref 6.8–8.8)
RBC # BLD AUTO: 3.02 10E12/L (ref 4.4–5.9)
SODIUM SERPL-SCNC: 140 MMOL/L (ref 133–144)
WBC # BLD AUTO: 7.4 10E9/L (ref 4–11)

## 2019-06-08 PROCEDURE — 99232 SBSQ HOSP IP/OBS MODERATE 35: CPT | Mod: GC | Performed by: INTERNAL MEDICINE

## 2019-06-08 PROCEDURE — 99233 SBSQ HOSP IP/OBS HIGH 50: CPT | Mod: GC | Performed by: INTERNAL MEDICINE

## 2019-06-08 PROCEDURE — 84132 ASSAY OF SERUM POTASSIUM: CPT | Performed by: INTERNAL MEDICINE

## 2019-06-08 PROCEDURE — 25000132 ZZH RX MED GY IP 250 OP 250 PS 637: Performed by: INTERNAL MEDICINE

## 2019-06-08 PROCEDURE — 85025 COMPLETE CBC W/AUTO DIFF WBC: CPT | Performed by: INTERNAL MEDICINE

## 2019-06-08 PROCEDURE — 12000012 ZZH R&B MS OVERFLOW UMMC

## 2019-06-08 PROCEDURE — 25000128 H RX IP 250 OP 636: Performed by: INTERNAL MEDICINE

## 2019-06-08 PROCEDURE — 80053 COMPREHEN METABOLIC PANEL: CPT | Performed by: INTERNAL MEDICINE

## 2019-06-08 PROCEDURE — 83735 ASSAY OF MAGNESIUM: CPT | Performed by: INTERNAL MEDICINE

## 2019-06-08 PROCEDURE — 25000125 ZZHC RX 250: Performed by: INTERNAL MEDICINE

## 2019-06-08 PROCEDURE — 25000132 ZZH RX MED GY IP 250 OP 250 PS 637: Performed by: PHYSICIAN ASSISTANT

## 2019-06-08 RX ORDER — COLCHICINE 0.6 MG/1
0.6 TABLET ORAL 2 TIMES DAILY PRN
Status: DISCONTINUED | OUTPATIENT
Start: 2019-06-09 | End: 2019-06-09 | Stop reason: HOSPADM

## 2019-06-08 RX ADMIN — LOSARTAN POTASSIUM 25 MG: 25 TABLET ORAL at 08:54

## 2019-06-08 RX ADMIN — ALLOPURINOL 100 MG: 100 TABLET ORAL at 20:10

## 2019-06-08 RX ADMIN — METOPROLOL TARTRATE 150 MG: 100 TABLET, FILM COATED ORAL at 20:10

## 2019-06-08 RX ADMIN — MULTIPLE VITAMINS W/ MINERALS TAB 1 TABLET: TAB at 08:54

## 2019-06-08 RX ADMIN — FUROSEMIDE 40 MG: 40 TABLET ORAL at 08:54

## 2019-06-08 RX ADMIN — DICLOFENAC 4 G: 10 GEL TOPICAL at 06:28

## 2019-06-08 RX ADMIN — POTASSIUM CHLORIDE 20 MEQ: 400 INJECTION, SOLUTION INTRAVENOUS at 14:18

## 2019-06-08 RX ADMIN — PIPERACILLIN SODIUM,TAZOBACTAM SODIUM 2.25 G: 2; .25 INJECTION, POWDER, FOR SOLUTION INTRAVENOUS at 00:17

## 2019-06-08 RX ADMIN — POTASSIUM CHLORIDE 20 MEQ: 400 INJECTION, SOLUTION INTRAVENOUS at 05:18

## 2019-06-08 RX ADMIN — PIPERACILLIN SODIUM,TAZOBACTAM SODIUM 2.25 G: 2; .25 INJECTION, POWDER, FOR SOLUTION INTRAVENOUS at 07:54

## 2019-06-08 RX ADMIN — POTASSIUM CHLORIDE 20 MEQ: 400 INJECTION, SOLUTION INTRAVENOUS at 05:20

## 2019-06-08 RX ADMIN — DICLOFENAC 4 G: 10 GEL TOPICAL at 21:00

## 2019-06-08 RX ADMIN — PANTOPRAZOLE SODIUM 40 MG: 40 TABLET, DELAYED RELEASE ORAL at 08:54

## 2019-06-08 RX ADMIN — COLCHICINE 0.6 MG: 0.6 TABLET, FILM COATED ORAL at 13:28

## 2019-06-08 RX ADMIN — METOPROLOL TARTRATE 150 MG: 100 TABLET, FILM COATED ORAL at 08:53

## 2019-06-08 RX ADMIN — PIPERACILLIN SODIUM,TAZOBACTAM SODIUM 2.25 G: 2; .25 INJECTION, POWDER, FOR SOLUTION INTRAVENOUS at 18:33

## 2019-06-08 RX ADMIN — PIPERACILLIN SODIUM,TAZOBACTAM SODIUM 2.25 G: 2; .25 INJECTION, POWDER, FOR SOLUTION INTRAVENOUS at 13:28

## 2019-06-08 RX ADMIN — COLCHICINE 0.6 MG: 0.6 TABLET, FILM COATED ORAL at 08:53

## 2019-06-08 RX ADMIN — Medication 2 G: at 09:46

## 2019-06-08 ASSESSMENT — ACTIVITIES OF DAILY LIVING (ADL)
ADLS_ACUITY_SCORE: 12

## 2019-06-08 NOTE — PLAN OF CARE
Status improved advanced diet to regular and fair intake without any nausea or pain, voided well, R side gout pain was improved however still painful to walk, HR continue to be between 100 to 160, MD ordered referral to cariology, continue to monitor

## 2019-06-08 NOTE — CONSULTS
CARDIOLOGY CONSULT HISTORY AND PHYSICAL     Reason for consult: AFib with RVR  Requesting team: Hematology/Oncology    HPI:  Mr. Girish Chauhan is a 64-year-old male with a background history of coronary artery disease status post PCI, atrial fibrillation (paroxysmal), and metastatic cholangio-carcinoma who was admitted to the oncology service on Jordyn 3 for management of small bowel obstruction related to metastatic glandular carcinoma.      On this occasion, Mr. Chauhan was initially hospitalized about 10 days ago with symptoms of fevers and a draining colojejunostomy loop.  He was initially told that there was gastritis but continued to have nightly fevers and abdominal pain daily after his discharge.  Thus he came was admitted to the H/O service for on June 5.  At that time, he was having persistent fevers to 101-102.      Since admission, he has been noted to have ongoing abdominal pain and low-grade liver enzyme elevation suggestive of obstruction of his biliary tree.  Mr. Chauhan ultimately had this area drained by enteroenterostomy.  Mr. Chauhan was noted to have hemodynamically stable AF with RVR (130s) afterwards so he was referred to Cardiology for medical optimization.     At the time of the consultation, Mr. Chauhan noted that his normal HR is between  and he is permanently in AF.  He does not perceive any form of discomfort until it is >130.  He notes an absence of dyspnea, chest pain, lightheadedness/dizziness, presyncope, syncope, or chest pain.  He did not know that he was having RVR earlier today.     PAST MEDICAL HISTORY:  - Atrial fibrillation (paroxysmal)   - Status post cardioversion in 2013 and 2014   - Chronically anticoagulated with apixaban  - Coronary artery disease   - Status post PCI to LAD in December 2011 and has known circumflex stenosis  - Dyslipidemia  - Hypertension  - Aortic stenosis due to bicuspid aortic valve   - No known associated aortopathy      PAST SURGICAL HISTORY:  -  ERCP  - Vascular port placement     FAMILY HISTORY:  No premature CAD    SOCIAL HISTORY:   - Never smoker  - No regular alcohol intake    HOME MEDICATIONS:  Home cardiac meds: Apixaban 5 mg BID, atorvastatin 40 mg at bedtime, clopidogrel 75 mg daily, furosemide 40 mg daily, losartan 25 mg daily, metoprolol tartrate 150 mg twice daily.  Prior to Admission medications    Medication Sig Start Date End Date Taking? Authorizing Provider   calcium carbonate (TUMS) 500 MG chewable tablet Take 1 chew tab by mouth 4 times daily as needed for heartburn   Yes Unknown, Entered By History   Apixaban (ELIQUIS PO) Take 5 mg by mouth 2 times daily    Reported, Patient   atorvastatin (LIPITOR) 40 MG tablet TAKE 1 TABLET BY at bedtime 7/27/15   Reported, Patient   capecitabine (XELODA) 500 MG tablet CHEMO Take 3 tablets (1,500 mg) by mouth 2 times daily for 14 days Days 1 through 14, then off for 7 days. Take within 30 mins after meal. 4/23/19 5/7/19  Naresh De Los Santos MD   Clopidogrel Bisulfate (PLAVIX PO) Take 75 mg by mouth daily     Reported, Patient   Colchicine (MITIGARE) 0.6 MG CAPS Take 0.6 mg by mouth 3 times daily as needed    Reported, Patient   diclofenac (VOLTAREN) 1 % GEL topical gel Apply two grams to the affected area, up to four times daily. 11/7/18   Reported, Patient   Fexofenadine HCl (ALLEGRA PO) Take 180 mg by mouth daily    Reported, Patient   FUROSEMIDE PO Take 40 mg by mouth daily     Reported, Patient   losartan (COZAAR) 25 MG tablet Take 25 mg by mouth daily  10/29/18 10/29/19  Reported, Patient   metoprolol (LOPRESSOR) 50 MG tablet 150 mg 2 times daily  10/3/06   Reported, Patient   multivitamin, therapeutic with minerals (MULTI-VITAMIN) TABS Take 1 tablet by mouth daily 2/28/16   Floyd Quintero MD   Nitroglycerin (NITROSTAT SL) Place 0.4 mg under the tongue every 5 minutes as needed for chest pain (Carries medication - has never used)     Reported, Patient   Omega-3 Fatty Acids (OMEGA-3 FISH OIL  PO) Take 1,000 mg by mouth daily     Reported, Patient       VITAL SIGNS:  Temp: 98.2  F (36.8  C) Temp src: Oral BP: (!) 154/100 Pulse: 99 Heart Rate: 98 Resp: 16 SpO2: 97 % O2 Device: None (Room air) Oxygen Delivery: 3 LPM    168 lbs 3.38 oz    Intake/Output Summary (Last 24 hours) at 6/8/2019 1133  Last data filed at 6/8/2019 1000  Gross per 24 hour   Intake 2925 ml   Output 4275 ml   Net -1350 ml     PHYSICAL EXAM:  Gen: Looks well  HEENT: MMM, no carotid bruts  Resp: No signs of resp distress, CTAB  CVS:  No JVD, pulse irregular, no thrills/heaves, S1+S2 without added sounds or murmurs  Abdo: ND, S, NT, quiet BS  Extremities:  Warm, well-perfused, no edema, good DP/PT/popliteals  Neuro: GCS 15/15     Labs:   Recent Labs   Lab Test 06/08/19  0428   HGB 9.1*   HCT 28.9*   WBC 7.4   MCV 96   MCH 30.1   MCHC 31.5   RDW 14.6         POTASSIUM 3.3*   CHLORIDE 107   CO2 24   BUN 27   CR 1.77*   GLC 95   GARY 8.2*   ALBUMIN 2.5*   BILITOTAL 1.7*   ALKPHOS 535*   AST 70*   ALT 89*       EKG from March 2016: AF with RVR rate approx 120     CXR 06/05: Port.  No acute findings.     TTE ([facility] 2019):    Mild concentric left ventricular hypertrophy. The estimated left   ventricular ejection fraction is 35%.    Normal right ventricular size and systolic function.    Probable fusion of the right and non-coronary leaflets. Moderate aortic   stenosis. Gradient suggests mild stenosis but visual impression is   moderate stenosis.    The aortic root is mildly dilated. No evidence of coarctation.    Left atrial volume is moderately increased.    No previous study for comparison.      ASSESSMENT/PLAN:  Mr. Krishna Chauhan is a 64-year old male with a background history of permanent atrial fibrillation and metastatic cholangiocarcinoma who was admitted for management of SBO.  Cardiology was consulted for management of AF with RVR after he had an interventional GI procedure to relieve his SBO.     - AFib with RVR in the  setting of recent percutaneous procedure    - Continue metoprolol tartrate 150 mg BID given that normal HR is  and he is asmyptomatic and hemodynamically stable   - Rhythm control strategies such as anti-arrhythmic drugs and DCCV can be discussed if there is hemodynamically unstable or symptomatic with AFib when he returns to baseline ambulation   - Avoidance of adrenergic stimulation would prevent stimulation of AFib    - Resumption of apixaban when safe from a hematologic/GI standpoint    Staffed with Dr. Trent.     Jorge Reid   Cardiology Fellow  Pager [phone]

## 2019-06-08 NOTE — PLAN OF CARE
/90 (BP Location: Left arm, Cuff Size: Adult Regular)   Pulse 81   Temp 98.5  F (36.9  C) (Oral)   Resp 18   Wt 75 kg (165 lb 5.5 oz)   SpO2 98%   BMI 23.72 kg/m    Patient slept well through the night  Complained of some muscle and throat soreness, he figures from the surgery yesterday. Declined any medication for the soreness  Tolerating the clear diet  Potassium is being replaced and will need a recheck, mag will need to be replaced.  Continue with current POC  Problem: Adult Inpatient Plan of Care  Goal: Plan of Care Review  6/8/2019 0523 by Sandra Pa RN  Outcome: No Change     Problem: Adult Inpatient Plan of Care  Goal: Absence of Hospital-Acquired Illness or Injury  6/8/2019 0523 by Sandra Pa RN  Outcome: No Change     Problem: Adult Inpatient Plan of Care  Goal: Patient-Specific Goal (Individualization)  6/8/2019 0523 by Sandra Pa RN  Outcome: Improving     Problem: Adult Inpatient Plan of Care  Goal: Optimal Comfort and Wellbeing  6/8/2019 0523 by Sandra Pa RN  Outcome: Improving     Problem: Adult Inpatient Plan of Care  Goal: Readiness for Transition of Care  6/8/2019 0523 by Sandra Pa RN  Outcome: Improving     Problem: Infection  Goal: Infection Symptom Resolution  6/8/2019 0523 by Sandra Pa RN  Outcome: Improving     Problem: Fluid Deficit (Intestinal Obstruction)  Goal: Fluid Balance  6/8/2019 0523 by Sandra Pa RN  Outcome: Improving     Problem: Pain (Intestinal Obstruction)  Goal: Acceptable Pain Control  6/8/2019 0523 by Sandra Pa RN  Outcome: Improving

## 2019-06-08 NOTE — PROGRESS NOTES
[facility], [place]    Hematology / Oncology Progress Note    Date of Admission: 6/5/19   Date of Service (when I saw the patient): 06/08/2019     Assessment & Plan     Girish Chahuan is a 64 year old male with CAD, HTN, HLD, permanent atrial fibrillation, bicuspid aortic valve with aortic stenosis, chronic diastolic heart failure, NSTEMI (05/2019), CKD, and metastatic cholangiocarcinoma on Xeloda who presented from an OSH with SBO with possible involvement of draining choledochojejunostomy loop, subjective fevers, and A fib with RVR. GI consulted, s/p small bowel enteroscopy with stenting on 6/7 for obstructed biliary limb.     Plan for 6/8:  Bilirubin coming down. No abdominal pain, tolerating clears. HR in 100s occasionally up to 140. Asymptomatic, normotensive. Had R foot gout attack yesterday. Tmax 100.5. No other symptoms.   -- GI- procedure helping with reduced bilirubin. D/w GI, ok to advance diet to general today. Monitor labs in am and assess for tolerating of oral intake.   -- Cards- HR still elevated. Normotensive, asymptomatic. On metoprolol 150 mg BID. Consulted cardiology for optimization.  -- Low grade temp 100.5. Could be post procedure vs gout. On empiric zosyn for possible cholangitis. Blood culture done. Continue zosyn today and colchicine for gout.   Overall, if tolerating diet with no further fever and HR well controlled, may be able to discharge home tomorrow.      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 CT of abd revealed single dilated fluid-filled loop of bowel in RUQ near prior partial hepatectomy surrounded by multiple surgical loops with concern that it may be involving a potential draining choledochojejunostomy loop. Alk phos and transaminases are elevated compared to 5/30 which is consistent with obstructive picture.   - Consulted Surg Onc for possible intervention;  do not feel jenny limb is resectable at this time due to high risk surgical candidate and peritoneal nodule invading into diaphragm and small bowel. No indication for NG tube at this time.   - Consulted GI for possible endoscopic intervention/stent placement for decompression. 6/7 Dr. Rodriguez performed small bowel enteroscopy with successful stenting and transluminal drainage that yielded immediate bile/stone content drainage. Following surgery patient with soft pressures and in A fib with RVR (tachy to 150s). Evaluated in the PACU with normalized pressures 120s/80s and tachy to 120s. Given 500 ml NS bolus with improved HRs. Patient denies chest pain, SOB or palpitations. Lungs CTAB, good UOP. GI reccs to continue to HOLD anticoagulants for 3 days post op  (re start 6/10). Consider cardiology consult if he remains tachy or symptomatic. Monitor.   - Plan to repeat endoscopy with stent exchange (to plastics) in 4-6 wks. GI will schedule this.   - Continue to trend LFTs, should improve post stent placement   - Clear Liquid diet, advance as tolerated.   - stop IVF.  - Zofran, Compazine prn      # Subjective Fevers  Reports fevers at home this morning up to 102.5. UA, CXR, and BC taken at OSH negative. Given 1 dose of Zosyn prior to transfer to [facility]. Concern for cholangitis or possible infection caused by SBO/ biliary obstruction.   - Continue Zosyn (6/6-present).  - Repeat BCx if febrile.      # Metastatic cholangiocarcinoma  Initially treated with surgical resection including partial liver resection in March 2016. Margins were negative and no lymph nodes were involved. Perineural and lymphovascular invasion was noted. He did not have adjuvant chemotherapy. He recurred in the bladder in November 2017. Biopsy was consistent with metastatic cholangiocarcinoma. He was treated with cisplatin/gemzar starting in Dec 2017. Cisplatin was help in June 2018 due to poor tolerance. Gemzar was discontinued in August 2018 due to  possible TTP. He was off of treatment for several months, but in April 2019 was found to have disease progression in the abdominal cavity anterior to the liver, just below the diaphragm. He was initiated on capecitabine on 4/30/19. He completed 8 of 14 days of the next cycle in spite of admission for NSTEMI. Xeloda currently on hold until after stenting can be completed.  - Continue to hold Xeloda (last dose 5/7/19)      # Atrial fibrillation with RVR  # HTN  In Afib with RVR at OSH. BPs also low at OSH ED with improvement following fluid bolus.  - Telemetry   - Continue PTA metoprolol, cards consulted today.   - Continue PTA losartan and Lasix      # h/o NSTEMI  # CAD  # HLD  Recent admission at OSH 5/3-5/7 for chest pain, found to have elevated troponins, NSTEMI. He was admitted under cardiology and had an angiogram on 5/6/19. The LHC showed 99% obstruction with unsuccessful PCI of the OM2. There was moderate residual disease. He had developed A fib with RVR and his metorpolol dose was incrased to 150 mg bid and losartan dose was decreased. Plavix was started and he continued on Eliquis. Planning for additional stenting in a couple weeks with Dr. Hurley.  - Hold PTA lipitor, apixaban, clopidogrel. Plan to hold anticoagulants for 3 days post op (ok to re start 6/10). Monitor.       # CKD stage 3  Cr elevated to 2.3 with baseline of ~1.5.   - Avoid nephrotoxic meds  - Cr baseline     # GERD  - Continue PTA pantoprazole, TUMS     # Gout, acute on chronic   - 6/6 had a gout flare up of his right foot. Painful and erythematous R dorsum of foot on exam. Restarted PTA colchicine, allopurinol.     FEN:  - stop IVF.  - High lyte replacement per protocol  - Clear liquid diet, advance as tolerated       Prophy/Misc:  - GI: Continue PTA pantoprazole daily.   - DVT: hold anticoagulation per GI recs.      Disposition: Expect that he will discharge to home possibly tomorrow.      Patient discuss with staff attending,   Jessica.   Matias Jeronimo  [facility] On[facility] Fellow.   [phone]    Interval History   Bilirubin coming down. No abdominal pain, tolerating clears. HR in 100s occasionally up to 140. Asymptomatic, normotensive. Had R foot gout attack yesterday. Tmax 100.5. No other symptoms.   Denies CP, SOB, palpitations, dizziness, nausea, vomiting. Passing flatus.     Physical Exam   Temp: 98.1  F (36.7  C) Temp src: Oral BP: (!) 143/93 Pulse: 99 Heart Rate: 98 Resp: 16 SpO2: 90 % O2 Device: None (Room air)    Vitals:    06/06/19 0827 06/07/19 0825 06/08/19 0849   Weight: 74.7 kg (164 lb 10.9 oz) 75 kg (165 lb 5.5 oz) 76.3 kg (168 lb 3.4 oz)     Vital Signs with Ranges  Temp:  [97.7  F (36.5  C)-100.5  F (38.1  C)] 98.1  F (36.7  C)  Pulse:  [] 99  Heart Rate:  [81-98] 98  Resp:  [16-18] 16  BP: (120-154)/() 143/93  SpO2:  [90 %-100 %] 90 %  I/O last 3 completed shifts:  In: 2820 [P.O.:1080; I.V.:1740]  Out: 4225 [Urine:4225]    Constitutional: Pleasant male seen laying in bed. No apparent distress, and appears stated age.  Eyes: Lids and lashes normal, mild scleral icterus   ENT: Normocephalic, without obvious abnormality, atraumatic, oral pharynx with moist mucus membranes, tonsils without erythema or exudates, gums normal and good dentition.   Respiratory: No increased work of breathing, good air exchange, clear to auscultation bilaterally, faint crackles at left lung base, no wheezing.  Cardiovascular: Regular rate, irregular rhythm. Peripheral pulses 2+  GI: +BS, soft. No tenderness on palpation.  Skin: No bruising or bleeding, normal skin color, texture, turgor, no redness, warmth, or swelling, no rashes, no lesions, no jaundice.  Extremities: R dorsum of foot with faint erythema, 2nd and 5th distal phalanges mildly tender to palpation. Trace edema.   Neurologic: Awake, alert, oriented to name, place and time.    Vascular access: R port c/d/i     Medications     - MEDICATION INSTRUCTIONS -       sodium chloride 75  mL/hr at 06/07/19 0433       allopurinol  100 mg Oral Daily     furosemide  40 mg Oral Daily     heparin  5 mL Intracatheter Q28 Days     heparin lock flush  5-10 mL Intracatheter Q24H     losartan  25 mg Oral Daily     metoprolol tartrate  150 mg Oral BID     multivitamin w/minerals  1 tablet Oral Daily     pantoprazole  40 mg Oral QAM AC     piperacillin-tazobactam  2.25 g Intravenous Q6H     sodium chloride (PF)  10 mL Intracatheter Q8H       Data   Results for orders placed or performed during the hospital encounter of 06/05/19 (from the past 24 hour(s))   Blood culture   Result Value Ref Range    Specimen Description Blood Portacath     Special Requests Received in aerobic bottle only     Culture Micro No growth after 10 hours    CBC with platelets differential   Result Value Ref Range    WBC 7.4 4.0 - 11.0 10e9/L    RBC Count 3.02 (L) 4.4 - 5.9 10e12/L    Hemoglobin 9.1 (L) 13.3 - 17.7 g/dL    Hematocrit 28.9 (L) 40.0 - 53.0 %    MCV 96 78 - 100 fl    MCH 30.1 26.5 - 33.0 pg    MCHC 31.5 31.5 - 36.5 g/dL    RDW 14.6 10.0 - 15.0 %    Platelet Count 168 150 - 450 10e9/L    Diff Method Automated Method     % Neutrophils 70.6 %    % Lymphocytes 11.5 %    % Monocytes 14.3 %    % Eosinophils 3.2 %    % Basophils 0.1 %    % Immature Granulocytes 0.3 %    Nucleated RBCs 0 0 /100    Absolute Neutrophil 5.2 1.6 - 8.3 10e9/L    Absolute Lymphocytes 0.9 0.8 - 5.3 10e9/L    Absolute Monocytes 1.1 0.0 - 1.3 10e9/L    Absolute Eosinophils 0.2 0.0 - 0.7 10e9/L    Absolute Basophils 0.0 0.0 - 0.2 10e9/L    Abs Immature Granulocytes 0.0 0 - 0.4 10e9/L    Absolute Nucleated RBC 0.0    Comprehensive metabolic panel   Result Value Ref Range    Sodium 140 133 - 144 mmol/L    Potassium 3.3 (L) 3.4 - 5.3 mmol/L    Chloride 107 94 - 109 mmol/L    Carbon Dioxide 24 20 - 32 mmol/L    Anion Gap 9 3 - 14 mmol/L    Glucose 95 70 - 99 mg/dL    Urea Nitrogen 27 7 - 30 mg/dL    Creatinine 1.77 (H) 0.66 - 1.25 mg/dL    GFR Estimate 40 (L) >60  mL/min/[1.73_m2]    GFR Estimate If Black 47 (L) >60 mL/min/[1.73_m2]    Calcium 8.2 (L) 8.5 - 10.1 mg/dL    Bilirubin Total 1.7 (H) 0.2 - 1.3 mg/dL    Albumin 2.5 (L) 3.4 - 5.0 g/dL    Protein Total 6.1 (L) 6.8 - 8.8 g/dL    Alkaline Phosphatase 535 (H) 40 - 150 U/L    ALT 89 (H) 0 - 70 U/L    AST 70 (H) 0 - 45 U/L   Magnesium   Result Value Ref Range    Magnesium 1.8 1.6 - 2.3 mg/dL   Potassium   Result Value Ref Range    Potassium 3.7 3.4 - 5.3 mmol/L

## 2019-06-09 VITALS
OXYGEN SATURATION: 99 % | HEART RATE: 92 BPM | RESPIRATION RATE: 16 BRPM | TEMPERATURE: 97.8 F | SYSTOLIC BLOOD PRESSURE: 137 MMHG | BODY MASS INDEX: 24.14 KG/M2 | DIASTOLIC BLOOD PRESSURE: 100 MMHG | WEIGHT: 168.21 LBS

## 2019-06-09 LAB
ALBUMIN SERPL-MCNC: 2.6 G/DL (ref 3.4–5)
ALP SERPL-CCNC: 537 U/L (ref 40–150)
ALT SERPL W P-5'-P-CCNC: 82 U/L (ref 0–70)
ANION GAP SERPL CALCULATED.3IONS-SCNC: 6 MMOL/L (ref 3–14)
AST SERPL W P-5'-P-CCNC: 61 U/L (ref 0–45)
BASOPHILS # BLD AUTO: 0 10E9/L (ref 0–0.2)
BASOPHILS NFR BLD AUTO: 0.3 %
BILIRUB SERPL-MCNC: 1.2 MG/DL (ref 0.2–1.3)
BUN SERPL-MCNC: 28 MG/DL (ref 7–30)
CALCIUM SERPL-MCNC: 8.5 MG/DL (ref 8.5–10.1)
CHLORIDE SERPL-SCNC: 106 MMOL/L (ref 94–109)
CO2 SERPL-SCNC: 27 MMOL/L (ref 20–32)
CREAT SERPL-MCNC: 1.69 MG/DL (ref 0.66–1.25)
DIFFERENTIAL METHOD BLD: ABNORMAL
EOSINOPHIL # BLD AUTO: 0.4 10E9/L (ref 0–0.7)
EOSINOPHIL NFR BLD AUTO: 4.4 %
ERYTHROCYTE [DISTWIDTH] IN BLOOD BY AUTOMATED COUNT: 14.4 % (ref 10–15)
GFR SERPL CREATININE-BSD FRML MDRD: 43 ML/MIN/{1.73_M2}
GLUCOSE SERPL-MCNC: 92 MG/DL (ref 70–99)
HCT VFR BLD AUTO: 30.2 % (ref 40–53)
HGB BLD-MCNC: 9.6 G/DL (ref 13.3–17.7)
IMM GRANULOCYTES # BLD: 0 10E9/L (ref 0–0.4)
IMM GRANULOCYTES NFR BLD: 0.3 %
LYMPHOCYTES # BLD AUTO: 1.3 10E9/L (ref 0.8–5.3)
LYMPHOCYTES NFR BLD AUTO: 15.8 %
MAGNESIUM SERPL-MCNC: 2 MG/DL (ref 1.6–2.3)
MCH RBC QN AUTO: 30.3 PG (ref 26.5–33)
MCHC RBC AUTO-ENTMCNC: 31.8 G/DL (ref 31.5–36.5)
MCV RBC AUTO: 95 FL (ref 78–100)
MONOCYTES # BLD AUTO: 1 10E9/L (ref 0–1.3)
MONOCYTES NFR BLD AUTO: 12.8 %
NEUTROPHILS # BLD AUTO: 5.2 10E9/L (ref 1.6–8.3)
NEUTROPHILS NFR BLD AUTO: 66.4 %
NRBC # BLD AUTO: 0 10*3/UL
NRBC BLD AUTO-RTO: 0 /100
PLATELET # BLD AUTO: 206 10E9/L (ref 150–450)
POTASSIUM SERPL-SCNC: 3.4 MMOL/L (ref 3.4–5.3)
PROT SERPL-MCNC: 6.4 G/DL (ref 6.8–8.8)
RBC # BLD AUTO: 3.17 10E12/L (ref 4.4–5.9)
SODIUM SERPL-SCNC: 139 MMOL/L (ref 133–144)
WBC # BLD AUTO: 7.9 10E9/L (ref 4–11)

## 2019-06-09 PROCEDURE — 25000132 ZZH RX MED GY IP 250 OP 250 PS 637: Performed by: INTERNAL MEDICINE

## 2019-06-09 PROCEDURE — 25000125 ZZHC RX 250: Performed by: INTERNAL MEDICINE

## 2019-06-09 PROCEDURE — 85025 COMPLETE CBC W/AUTO DIFF WBC: CPT | Performed by: INTERNAL MEDICINE

## 2019-06-09 PROCEDURE — 83735 ASSAY OF MAGNESIUM: CPT | Performed by: INTERNAL MEDICINE

## 2019-06-09 PROCEDURE — 25000128 H RX IP 250 OP 636: Performed by: INTERNAL MEDICINE

## 2019-06-09 PROCEDURE — 80053 COMPREHEN METABOLIC PANEL: CPT | Performed by: INTERNAL MEDICINE

## 2019-06-09 PROCEDURE — 99238 HOSP IP/OBS DSCHRG MGMT 30/<: CPT | Mod: GC | Performed by: INTERNAL MEDICINE

## 2019-06-09 RX ORDER — LEVOFLOXACIN 250 MG/1
500 TABLET, FILM COATED ORAL ONCE
Status: DISCONTINUED | OUTPATIENT
Start: 2019-06-09 | End: 2019-06-09 | Stop reason: HOSPADM

## 2019-06-09 RX ORDER — LEVOFLOXACIN 250 MG/1
250 TABLET, FILM COATED ORAL DAILY
Qty: 4 TABLET | Refills: 0 | Status: SHIPPED | OUTPATIENT
Start: 2019-06-10 | End: 2019-06-24

## 2019-06-09 RX ADMIN — MULTIPLE VITAMINS W/ MINERALS TAB 1 TABLET: TAB at 09:41

## 2019-06-09 RX ADMIN — PIPERACILLIN SODIUM,TAZOBACTAM SODIUM 2.25 G: 2; .25 INJECTION, POWDER, FOR SOLUTION INTRAVENOUS at 07:09

## 2019-06-09 RX ADMIN — POTASSIUM CHLORIDE 20 MEQ: 400 INJECTION, SOLUTION INTRAVENOUS at 05:46

## 2019-06-09 RX ADMIN — LOSARTAN POTASSIUM 25 MG: 25 TABLET ORAL at 09:41

## 2019-06-09 RX ADMIN — FUROSEMIDE 40 MG: 40 TABLET ORAL at 09:41

## 2019-06-09 RX ADMIN — PANTOPRAZOLE SODIUM 40 MG: 40 TABLET, DELAYED RELEASE ORAL at 09:41

## 2019-06-09 RX ADMIN — PIPERACILLIN SODIUM,TAZOBACTAM SODIUM 2.25 G: 2; .25 INJECTION, POWDER, FOR SOLUTION INTRAVENOUS at 00:53

## 2019-06-09 RX ADMIN — METOPROLOL TARTRATE 150 MG: 100 TABLET, FILM COATED ORAL at 09:40

## 2019-06-09 RX ADMIN — Medication 2 G: at 04:31

## 2019-06-09 ASSESSMENT — ACTIVITIES OF DAILY LIVING (ADL)
ADLS_ACUITY_SCORE: 12

## 2019-06-09 NOTE — PLAN OF CARE
Blood pressure (!) 143/94, pulse 110, temperature 97.7  F (36.5  C), temperature source Axillary, resp. rate 15, weight 76.3 kg (168 lb 3.4 oz), SpO2 97 %.  Pt's BP was high but did not warrant treatment due to parameter set for pt. Pt continue to be in A-fib. HR goes to 160s when pt tries to get out of bed. C/O gout pain right foot and Voltaren cream applied with some relief. Pt is A/O x 4. Low potassium and magnesium levels this morning and he got 2 g of magnesium sulfate and 20 mEq of potassium chloride. Continue to monitor pt and continue with plan of care.   Problem: Adult Inpatient Plan of Care  Goal: Plan of Care Review  6/9/2019 0718 by Rupinder Berry RN  Outcome: No Change  6/8/2019 1720 by Emely Santos RN  Outcome: Improving     Problem: Adult Inpatient Plan of Care  Goal: Patient-Specific Goal (Individualization)  6/9/2019 0718 by Rupinder Berry RN  Outcome: No Change  6/8/2019 1720 by Emely Santos RN  Outcome: Improving     Problem: Adult Inpatient Plan of Care  Goal: Absence of Hospital-Acquired Illness or Injury  6/9/2019 0718 by Rupinder Berry RN  Outcome: No Change  6/8/2019 1720 by Emely Santos RN  Outcome: Improving     Problem: Infection  Goal: Infection Symptom Resolution  6/9/2019 0718 by Rupinder Berry RN  Outcome: No Change  6/8/2019 1720 by Emely Santos RN  Outcome: Improving

## 2019-06-09 NOTE — PLAN OF CARE
Status improved, good oral intake, voided and had stool without problem, R gout pain is under the controled,discharge to home with family, reviewed discharge medication and appointment and expressed understanding the information

## 2019-06-09 NOTE — DISCHARGE SUMMARY
[facility] Discharge Summary    Girish Chauhan MRN# [id number]   Age: 64 year old YOB: 1955     Date of Admission:  6/5/2019  Date of Discharge::  No discharge date for patient encounter.  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  Possible cholangitis.  Metastatic cholangiocarcinoma.   Afib with RVR.  Gout flare.           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  Possible cholangitis.  Metastatic cholangiocarcinoma.   Afib with RVR.  Gout flare.             Procedures:   Endoscopic entero-duodenal axios stent to relieve obstruction of biliary loop.           Medications Prior to Admission:     Medications Prior to Admission   Medication Sig Dispense Refill Last Dose     calcium carbonate (TUMS) 500 MG chewable tablet Take 1 chew tab by mouth 4 times daily as needed for heartburn        Apixaban (ELIQUIS PO) Take 5 mg by mouth 2 times daily   Unknown at Unknown time     Clopidogrel Bisulfate (PLAVIX PO) Take 75 mg by mouth daily    Unknown at Unknown time     Colchicine (MITIGARE) 0.6 MG CAPS Take 0.6 mg by mouth 3 times daily as needed   Unknown at Unknown time     diclofenac (VOLTAREN) 1 % GEL topical gel Apply two grams to the affected area, up to four times daily.   Unknown at Unknown time     Fexofenadine HCl (ALLEGRA PO) Take 180 mg by mouth daily   Unknown at Unknown time     FUROSEMIDE PO Take 40 mg by mouth daily    Unknown at Unknown time     losartan (COZAAR) 25 MG tablet Take 25 mg by mouth daily    Unknown at Unknown time     metoprolol (LOPRESSOR) 50 MG tablet 150 mg 2 times daily    Unknown at Unknown time     multivitamin, therapeutic with minerals (MULTI-VITAMIN) TABS  Take 1 tablet by mouth daily 30 tablet 0 Unknown at Unknown time     Nitroglycerin (NITROSTAT SL) Place 0.4 mg under the tongue every 5 minutes as needed for chest pain (Carries medication - has never used)    Unknown at Unknown time     Omega-3 Fatty Acids (OMEGA-3 FISH OIL PO) Take 1,000 mg by mouth daily    Unknown at Unknown time     [DISCONTINUED] atorvastatin (LIPITOR) 40 MG tablet TAKE 1 TABLET BY at bedtime   Unknown at Unknown time     [DISCONTINUED] capecitabine (XELODA) 500 MG tablet CHEMO Take 3 tablets (1,500 mg) by mouth 2 times daily for 14 days Days 1 through 14, then off for 7 days. Take within 30 mins after meal. 84 tablet 0              Discharge Medications:     Current Discharge Medication List      START taking these medications    Details   levofloxacin (LEVAQUIN) 250 MG tablet Take 1 tablet (250 mg) by mouth daily for 4 days  Qty: 4 tablet, Refills: 0    Associated Diagnoses: Cholangiocarcinoma (H)         CONTINUE these medications which have NOT CHANGED    Details   calcium carbonate (TUMS) 500 MG chewable tablet Take 1 chew tab by mouth 4 times daily as needed for heartburn      Apixaban (ELIQUIS PO) Take 5 mg by mouth 2 times daily      Clopidogrel Bisulfate (PLAVIX PO) Take 75 mg by mouth daily       Colchicine (MITIGARE) 0.6 MG CAPS Take 0.6 mg by mouth 3 times daily as needed      diclofenac (VOLTAREN) 1 % GEL topical gel Apply two grams to the affected area, up to four times daily.      Fexofenadine HCl (ALLEGRA PO) Take 180 mg by mouth daily      FUROSEMIDE PO Take 40 mg by mouth daily       losartan (COZAAR) 25 MG tablet Take 25 mg by mouth daily       metoprolol (LOPRESSOR) 50 MG tablet 150 mg 2 times daily       multivitamin, therapeutic with minerals (MULTI-VITAMIN) TABS Take 1 tablet by mouth daily  Qty: 30 tablet, Refills: 0    Associated Diagnoses: Mass of bile duct      Nitroglycerin (NITROSTAT SL) Place 0.4 mg under the tongue every 5 minutes as needed for chest pain  (Carries medication - has never used)       Omega-3 Fatty Acids (OMEGA-3 FISH OIL PO) Take 1,000 mg by mouth daily          STOP taking these medications       atorvastatin (LIPITOR) 40 MG tablet Comments:   Reason for Stopping:         capecitabine (XELODA) 500 MG tablet CHEMO Comments:   Reason for Stopping:                     Consultations:   Consultation during this admission received from gastroenterology and cardiology.           Brief History of Illness:   Girish Chauhan is a 64 year old male with CAD, HTN, HLD, permanent atrial fibrillation, bicuspid aortic valve with aortic stenosis, chronic diastolic heart failure, NSTEMI (05/2019), CKD, and metastatic cholangiocarcinoma on Xeloda who presented from an OSH with SBO with possible involvement of draining choledochojejunostomy loop, subjective fevers, and A fib with RVR. GI consulted, s/p small bowel enteroscopy with axios stenting on 6/7 for obstructed biliary limb.             Hospital Course:       # Biliary obstruction  # h/o Carol-en-Y hepaticojejunostomy on 3/16/16  Presented to OSH ED with abdominal pain and fevers. CT of abd revealed single dilated fluid-filled loop of bowel in RUQ near prior partial hepatectomy surrounded by multiple surgical loops with concern that it may be involving a potential draining choledochojejunostomy loop. Alk phos and transaminases are elevated compared to 5/30 which is consistent with obstructive picture.   - Consulted Surg Onc for possible intervention; do not feel carol limb is resectable at this time due to high risk surgical candidate and peritoneal nodule invading into diaphragm and small bowel. No indication for NG tube at this time.   - Consulted GI for possible endoscopic intervention/stent placement for decompression. 6/7 Dr. Rodriguez performed small bowel enteroscopy with successful stenting and transluminal drainage that yielded immediate bile/stone content drainage. Following surgery patient with soft  pressures and in A fib with RVR (tachy to 150s). Evaluated in the PACU with normalized pressures 120s/80s and tachy to 120s. Given 500 ml NS bolus with improved HRs. Patient denies chest pain, SOB or palpitations. Lungs CTAB, good UOP. GI reccs to continue to HOLD anticoagulants for 3 days post op  (re start 6/10).    - Plan to repeat endoscopy with stent exchange (to plastics) in 4-6 wks. GI will schedule this.   - LFTs continuing to improve post procedure.   - Tolerating regular diet today without issues.   - Zofran, Compazine prn      # Subjective Fevers  Reports fevers at home this morning up to 102.5. UA, CXR, and BC taken at OSH negative. Given 1 dose of Zosyn prior to transfer to [facility]. Concern for cholangitis or possible infection caused by SBO/ biliary obstruction.   - Continue Zosyn (6/6-6/9). Changed to levaquin at discharge to complete a total of 7 day course of antibiotics.   - BCx 6/7 negative.      # Metastatic cholangiocarcinoma  Initially treated with surgical resection including partial liver resection in March 2016. Margins were negative and no lymph nodes were involved. Perineural and lymphovascular invasion was noted. He did not have adjuvant chemotherapy. He recurred in the bladder in November 2017. Biopsy was consistent with metastatic cholangiocarcinoma. He was treated with cisplatin/gemzar starting in Dec 2017. Cisplatin was help in June 2018 due to poor tolerance. Gemzar was discontinued in August 2018 due to possible TTP. He was off of treatment for several months, but in April 2019 was found to have disease progression in the abdominal cavity anterior to the liver, just below the diaphragm. He was initiated on capecitabine on 4/30/19. He completed 8 of 14 days of the next cycle in spite of admission for NSTEMI. Xeloda currently on hold until after stenting can be completed.  - Continue to hold Xeloda (last dose 5/7/19)      # Atrial fibrillation with RVR  # HTN  In Afib with RVR at OSH.  BPs also low at OSH ED with improvement following fluid bolus.  - Telemetry   - Continue PTA metoprolol, cards consulted and advised no changes. HR is  at rest and asymptomatic.   - Continue PTA losartan and Lasix      # h/o NSTEMI  # CAD  # HLD  Recent admission at OSH 5/3-5/7 for chest pain, found to have elevated troponins, NSTEMI. He was admitted under cardiology and had an angiogram on 5/6/19. The LHC showed 99% obstruction with unsuccessful PCI of the OM2. There was moderate residual disease. He had developed A fib with RVR and his metorpolol dose was incrased to 150 mg bid and losartan dose was decreased. Plavix was started and he continued on Eliquis. Planning for additional stenting in a couple weeks with Dr. Hurley.  - Hold PTA lipitor, apixaban, clopidogrel. Plan to hold anticoagulants for 3 days post op (ok to re start 6/10). Advised to restart apixaban and plavix on 6/10 following discharge.      # CKD stage 3  Cr elevated to 2.3 with baseline of ~1.5.   - Avoid nephrotoxic meds  - Cr baseline     # GERD  - Continue PTA pantoprazole, TUMS     # Gout, acute on chronic   - 6/6 had a gout flare up of his right foot. Painful and erythematous R dorsum of foot on exam. Restarted PTA colchicine PRN for gout attacks. He says it usually takes 3-5 days for it to get better. On day of discharge, there is some improvement in erythema and swelling.  He can be started on allopurinol following resolution of gout attack.      FEN:  -Tolerating regular diet.     Prophy/Misc:  - GI: Continue PTA pantoprazole daily.        Interval History     Bilirubin normal. No abdominal pain, tolerating regular diet. Ate beef roast last night. HR in 100s. Asymptomatic, normotensive. R foot pain and redness improving. No other symptoms.   Denies CP, SOB, palpitations, dizziness, nausea, vomiting. Passing flatus.           Physical Exam     /84 (BP Location: Left arm)   Pulse 62   Temp 97.7  F (36.5  C) (Axillary)    Resp 15   Wt 76.3 kg (168 lb 3.4 oz)   SpO2 98%   BMI 24.14 kg/m    Constitutional: Pleasant male seen laying in bed. No apparent distress, and appears stated age.  Eyes: Lids and lashes normal.  ENT: Normocephalic, without obvious abnormality, atraumatic, oral pharynx with moist mucus membranes, tonsils without erythema or exudates, gums normal and good dentition.   Respiratory: No increased work of breathing, good air exchange, clear to auscultation bilaterally, faint crackles at left lung base, no wheezing.  Cardiovascular: Regular rate, irregular rhythm. Peripheral pulses 2+  GI: +BS, soft. No tenderness on palpation.  Skin: No bruising or bleeding, normal skin color, texture, turgor, no redness, warmth, or swelling, no rashes, no lesions, no jaundice.  Extremities: R dorsum of foot with faint erythema, 2nd and 5th distal phalanges mildly tender to palpation. Trace edema.   Neurologic: Awake, alert, oriented to name, place and time.                    Discharge Instructions and Follow-Up:   Discharge diet: Regular   Discharge activity: Activity as tolerated   Discharge follow-up: F/u with Jennifer Jalloh or [facility] in 2-3 days for post hospital f/u. Request sent to schedulers.  GI will arrange for follow up in 4-6 weeks for stent exchange to plastic.  He has scheduled cardiology follow up locally for angiogram on 6/17, advised to keep it.           Discharge Disposition:   Discharged to home      Attestation:  -    Matias Jeronimo MD

## 2019-06-10 ENCOUNTER — COMMUNICATION - HEALTHEAST (OUTPATIENT)
Dept: CARDIOLOGY | Facility: CLINIC | Age: 64
End: 2019-06-10

## 2019-06-11 ENCOUNTER — TELEPHONE (OUTPATIENT)
Dept: GASTROENTEROLOGY | Facility: CLINIC | Age: 64
End: 2019-06-11

## 2019-06-11 ENCOUNTER — SURGERY - HEALTHEAST (OUTPATIENT)
Dept: CARDIOLOGY | Facility: CLINIC | Age: 64
End: 2019-06-11

## 2019-06-11 DIAGNOSIS — K56.699: Primary | ICD-10-CM

## 2019-06-11 DIAGNOSIS — K56.609 SBO (SMALL BOWEL OBSTRUCTION) (H): ICD-10-CM

## 2019-06-11 LAB
ALBUMIN SERPL-MCNC: 3.1 G/DL (ref 3.4–5)
ALP SERPL-CCNC: 729 U/L (ref 40–150)
ALT SERPL W P-5'-P-CCNC: 161 U/L (ref 0–70)
ANION GAP SERPL CALCULATED.3IONS-SCNC: 7 MMOL/L (ref 3–14)
AST SERPL W P-5'-P-CCNC: 198 U/L (ref 0–45)
BASOPHILS # BLD AUTO: 0 10E9/L (ref 0–0.2)
BASOPHILS NFR BLD AUTO: 0.4 %
BILIRUB SERPL-MCNC: 0.8 MG/DL (ref 0.2–1.3)
BUN SERPL-MCNC: 30 MG/DL (ref 7–30)
CALCIUM SERPL-MCNC: 9 MG/DL (ref 8.5–10.1)
CHLORIDE SERPL-SCNC: 106 MMOL/L (ref 94–109)
CO2 SERPL-SCNC: 24 MMOL/L (ref 20–32)
CREAT SERPL-MCNC: 1.53 MG/DL (ref 0.66–1.25)
DIFFERENTIAL METHOD BLD: ABNORMAL
EOSINOPHIL # BLD AUTO: 0.2 10E9/L (ref 0–0.7)
EOSINOPHIL NFR BLD AUTO: 2.9 %
ERYTHROCYTE [DISTWIDTH] IN BLOOD BY AUTOMATED COUNT: 14.5 % (ref 10–15)
GFR SERPL CREATININE-BSD FRML MDRD: 48 ML/MIN/{1.73_M2}
GLUCOSE SERPL-MCNC: 89 MG/DL (ref 70–99)
HCT VFR BLD AUTO: 34.6 % (ref 40–53)
HGB BLD-MCNC: 11.2 G/DL (ref 13.3–17.7)
IMM GRANULOCYTES # BLD: 0.1 10E9/L (ref 0–0.4)
IMM GRANULOCYTES NFR BLD: 0.7 %
LYMPHOCYTES # BLD AUTO: 1.6 10E9/L (ref 0.8–5.3)
LYMPHOCYTES NFR BLD AUTO: 20.7 %
MCH RBC QN AUTO: 31.1 PG (ref 26.5–33)
MCHC RBC AUTO-ENTMCNC: 32.4 G/DL (ref 31.5–36.5)
MCV RBC AUTO: 96 FL (ref 78–100)
MONOCYTES # BLD AUTO: 1 10E9/L (ref 0–1.3)
MONOCYTES NFR BLD AUTO: 13.2 %
NEUTROPHILS # BLD AUTO: 4.8 10E9/L (ref 1.6–8.3)
NEUTROPHILS NFR BLD AUTO: 62.1 %
NRBC # BLD AUTO: 0 10*3/UL
NRBC BLD AUTO-RTO: 0 /100
PLATELET # BLD AUTO: 353 10E9/L (ref 150–450)
PLATELET # BLD EST: ABNORMAL 10*3/UL
POTASSIUM SERPL-SCNC: 3.6 MMOL/L (ref 3.4–5.3)
PROT SERPL-MCNC: 7.3 G/DL (ref 6.8–8.8)
RBC # BLD AUTO: 3.6 10E12/L (ref 4.4–5.9)
SODIUM SERPL-SCNC: 137 MMOL/L (ref 133–144)
WBC # BLD AUTO: 7.7 10E9/L (ref 4–11)

## 2019-06-11 PROCEDURE — 80053 COMPREHEN METABOLIC PANEL: CPT | Performed by: INTERNAL MEDICINE

## 2019-06-11 PROCEDURE — 85025 COMPLETE CBC W/AUTO DIFF WBC: CPT | Performed by: INTERNAL MEDICINE

## 2019-06-11 RX ORDER — HEPARIN SODIUM (PORCINE) LOCK FLUSH IV SOLN 100 UNIT/ML 100 UNIT/ML
5 SOLUTION INTRAVENOUS EVERY 8 HOURS
Status: DISCONTINUED | OUTPATIENT
Start: 2019-06-11 | End: 2019-06-19 | Stop reason: HOSPADM

## 2019-06-11 NOTE — TELEPHONE ENCOUNTER
Recevied message to organize the following: Could you please assist in scheduling:     Procedure: EGD with small bowel enteroscopy   Physician: Jennifer   Timin-6 weeks   Dx: jejunal stenosis     Comments:   OR, general     Thanks,   Sarah Lopez PA-C     Order placed.     Left message advising of the above recommendations and that he should take 2 17 gram packets of Miralax prior.     PRABHJOT Jain Dr., Dr. Rodriguez, & Dr. Duncan  [facility]  [phone]

## 2019-06-12 ENCOUNTER — APPOINTMENT (OUTPATIENT)
Dept: LAB | Facility: CLINIC | Age: 64
End: 2019-06-12
Attending: PHYSICIAN ASSISTANT
Payer: COMMERCIAL

## 2019-06-12 ENCOUNTER — ONCOLOGY VISIT (OUTPATIENT)
Dept: ONCOLOGY | Facility: CLINIC | Age: 64
End: 2019-06-12
Attending: PHYSICIAN ASSISTANT
Payer: COMMERCIAL

## 2019-06-12 VITALS
SYSTOLIC BLOOD PRESSURE: 122 MMHG | BODY MASS INDEX: 23.19 KG/M2 | WEIGHT: 161.6 LBS | HEART RATE: 70 BPM | TEMPERATURE: 97.4 F | DIASTOLIC BLOOD PRESSURE: 88 MMHG | OXYGEN SATURATION: 100 %

## 2019-06-12 DIAGNOSIS — C22.1 CHOLANGIOCARCINOMA (H): Primary | ICD-10-CM

## 2019-06-12 LAB
ALBUMIN SERPL-MCNC: 3.2 G/DL (ref 3.4–5)
ALP SERPL-CCNC: 728 U/L (ref 40–150)
ALT SERPL W P-5'-P-CCNC: 150 U/L (ref 0–70)
ANION GAP SERPL CALCULATED.3IONS-SCNC: 8 MMOL/L (ref 3–14)
AST SERPL W P-5'-P-CCNC: 149 U/L (ref 0–45)
BILIRUB SERPL-MCNC: 0.6 MG/DL (ref 0.2–1.3)
BUN SERPL-MCNC: 35 MG/DL (ref 7–30)
CALCIUM SERPL-MCNC: 8.8 MG/DL (ref 8.5–10.1)
CHLORIDE SERPL-SCNC: 105 MMOL/L (ref 94–109)
CO2 SERPL-SCNC: 24 MMOL/L (ref 20–32)
CREAT SERPL-MCNC: 1.61 MG/DL (ref 0.66–1.25)
GFR SERPL CREATININE-BSD FRML MDRD: 45 ML/MIN/{1.73_M2}
GLUCOSE SERPL-MCNC: 88 MG/DL (ref 70–99)
POTASSIUM SERPL-SCNC: 3.8 MMOL/L (ref 3.4–5.3)
PROT SERPL-MCNC: 7.5 G/DL (ref 6.8–8.8)
SODIUM SERPL-SCNC: 137 MMOL/L (ref 133–144)

## 2019-06-12 PROCEDURE — 80053 COMPREHEN METABOLIC PANEL: CPT | Performed by: PHYSICIAN ASSISTANT

## 2019-06-12 PROCEDURE — 25000128 H RX IP 250 OP 636: Mod: ZF | Performed by: PHYSICIAN ASSISTANT

## 2019-06-12 PROCEDURE — 99215 OFFICE O/P EST HI 40 MIN: CPT | Mod: ZP | Performed by: PHYSICIAN ASSISTANT

## 2019-06-12 PROCEDURE — 36591 DRAW BLOOD OFF VENOUS DEVICE: CPT

## 2019-06-12 PROCEDURE — G0463 HOSPITAL OUTPT CLINIC VISIT: HCPCS

## 2019-06-12 RX ORDER — HEPARIN SODIUM (PORCINE) LOCK FLUSH IV SOLN 100 UNIT/ML 100 UNIT/ML
5 SOLUTION INTRAVENOUS ONCE
Status: COMPLETED | OUTPATIENT
Start: 2019-06-12 | End: 2019-06-12

## 2019-06-12 RX ADMIN — HEPARIN 5 ML: 100 SYRINGE at 16:15

## 2019-06-12 ASSESSMENT — PAIN SCALES - GENERAL: PAINLEVEL: MODERATE PAIN (4)

## 2019-06-12 NOTE — NURSING NOTE
"Oncology Rooming Note    June 12, 2019 3:33 PM   Girish Chauhan is a 64 year old male who presents for:    Chief Complaint   Patient presents with     Blood Draw     No labs drawn today; Labs drawn 06/11/2019; vitals & checked in for next appointment.     Oncology Clinic Visit     Mayte Cholangiocarcinoma      Initial Vitals: /88 (BP Location: Left arm, Patient Position: Chair, Cuff Size: Adult Regular)   Pulse 70   Temp 97.4  F (36.3  C) (Oral)   Wt 73.3 kg (161 lb 9.6 oz)   SpO2 100%   BMI 23.19 kg/m   Estimated body mass index is 23.19 kg/m  as calculated from the following:    Height as of 5/23/19: 1.778 m (5' 10\").    Weight as of this encounter: 73.3 kg (161 lb 9.6 oz). Body surface area is 1.9 meters squared.  Moderate Pain (4) Comment: Data Unavailable   No LMP for male patient.  Allergies reviewed: Yes  Medications reviewed: Yes    Medications: Medication refills not needed today.  Pharmacy name entered into Uptake:    [facility] [address] [place] [address] - [address] AT [facility] [place] - [place]UDY SIMONS - [address] [facility] - [place], [place] [address]    Clinical concerns: no  Tatsumi  was notified.      Jessica Reese MA              "

## 2019-06-12 NOTE — PROGRESS NOTES
Oncology/Hematology Visit Note  Jun 12, 2019    Reason for Visit: follow up of metastatic cholangiocarcinoma s/p hospital discharge    History of Present Illness: Girish Chauhan is a 64 year old male witha history of CAD, HTN,  atrial fibrillation on anticoagulation with Eliquis, aortic stenosis and chronic diastolic heart failure and CKD,. He has a history of resected cholangiocarcihnoma in March 2016. Margins were negative and no lymph nodes were involved. Perineural and lymphovascular invasion was noted. He did not have adjuvant chemotherapy. He recurred in the bladder in November 2017. Biopsy was consistent with metastatic cholangiocarcinoma. He was treated with cisplatin/gemzar starting in Dec 2017. Cisplatin was help in June 2018 due to poor tolerance. Gemzar was discontinued in August 2018 due to possible TTP. He was off of treatment for several months, but in April 2019 was found to have disease progression in the abdominal cavity anterior to the liver, just below the diaphragm. He was initiated on capecitabine 1500 mg bid x 14 days of a 21 day cycle, starting therapy on 4/30/19.     He presented to the ED with chest pain and was sent to [facility]. His labs were positive for elevated troponins. He was admitted under cardiology and had an angiogram on 5/6/19. The LHC showed 99% obstruction with unsuccessful PCI of the OM2. There was moderate residual disease. He had developed A fib with RVR and his metorpolol dose was incrased to 150 mg bid and losartan dose was decreased. Plavix was started and he continued on Eliquis. There were plans for return of elective PCI in 2-4 weeks.     He was then transferred to [facility] on 6/5/19 after presenting to OSH with SBO with possible involvement of draining choledochojejunostomy loop, subjective fevers, and A fib with RVR. GI consulted, s/p small bowel enteroscopy with axios stenting on 6/7 for obstructed biliary limb. Discharged on 6/9/19.      Interval History:  Krishna  is here with daughter Damaris. Prior to hospitalization, he was having fevers and abdominal pain, waking up in drenching sweats. He was evaluated in ED and thought to have gastritis. Returned to ED several days later where CT was done and found to have a bowel obstruction and transferred to [facility].    Since discharge he is feeling well overall. No problems with appetite, nausea. Had diarrhea for day at discharge but he thinks this was due to stool softener for constipation prior to leaving hospital. Denies any fever, chills. He is taking the levaquin he was discharge with.      Continues with gout in right ankle/lateral side of foot. This has improved since discharge. He has not taken any colchicine since Monday. He resumed allopurinol.    He restarted apixaban and plavix. He has an angio next Monday at [facility], so will stop apixaban tomorrow. He was told to stay on plavix. Capecitabine on hold. Denies any chest pain. Has some mild dyspnea with exertion and a little lightheaded at times but feels this is related to deconditioning after hospital stay and procedure. He usually is not symptomatic from afib unless his HR is above 130, but today his HR is in 70s-80s.    Review of Systems:  Patient denies any of the following except if noted above: urinary concerns, rashes or skin lesions, bleeding or bruising issues.    Current Outpatient Medications   Medication Sig Dispense Refill     Apixaban (ELIQUIS PO) Take 5 mg by mouth 2 times daily       calcium carbonate (TUMS) 500 MG chewable tablet Take 1 chew tab by mouth 4 times daily as needed for heartburn       Clopidogrel Bisulfate (PLAVIX PO) Take 75 mg by mouth daily        Colchicine (MITIGARE) 0.6 MG CAPS Take 0.6 mg by mouth 3 times daily as needed       diclofenac (VOLTAREN) 1 % GEL topical gel Apply two grams to the affected area, up to four times daily.       Fexofenadine HCl (ALLEGRA PO) Take 180 mg by mouth daily       FUROSEMIDE PO Take 40 mg by mouth daily         levofloxacin (LEVAQUIN) 250 MG tablet Take 1 tablet (250 mg) by mouth daily for 4 days 4 tablet 0     losartan (COZAAR) 25 MG tablet Take 25 mg by mouth daily        metoprolol (LOPRESSOR) 50 MG tablet 150 mg 2 times daily        multivitamin, therapeutic with minerals (MULTI-VITAMIN) TABS Take 1 tablet by mouth daily 30 tablet 0     Nitroglycerin (NITROSTAT SL) Place 0.4 mg under the tongue every 5 minutes as needed for chest pain (Carries medication - has never used)        Omega-3 Fatty Acids (OMEGA-3 FISH OIL PO) Take 1,000 mg by mouth daily          Physical Examination:  General: The patient is a pleasant male in no acute distress.  /88 (BP Location: Left arm, Patient Position: Chair, Cuff Size: Adult Regular)   Pulse 70   Temp 97.4  F (36.3  C) (Oral)   Wt 73.3 kg (161 lb 9.6 oz)   SpO2 100%   BMI 23.19 kg/m    Wt Readings from Last 10 Encounters:   06/12/19 73.3 kg (161 lb 9.6 oz)   06/08/19 76.3 kg (168 lb 3.4 oz)   05/23/19 79.5 kg (175 lb 3.2 oz)   04/22/19 80.8 kg (178 lb 1.6 oz)   02/11/19 80.5 kg (177 lb 9 oz)   01/09/19 81.9 kg (180 lb 9.6 oz)   12/13/18 80.6 kg (177 lb 9.6 oz)   11/14/18 81.7 kg (180 lb 1.6 oz)   11/12/18 82.3 kg (181 lb 6.4 oz)   10/15/18 85.5 kg (188 lb 9.6 oz)     HEENT: EOMI, PERRL. Sclerae are anicteric. Oral mucosa is pink and moist with no lesions or thrush.   Lymph: Neck is supple with no lymphadenopathy in the cervical or supraclavicular areas.   Heart: Irregular rate and rhythm.   Lungs: Clear to auscultation bilaterally.   Abdomen: Bowel sounds present, soft, nontender with no palpable hepatomegaly or masses.   Extremities: No lower extremity edema noted bilaterally. He declined an exam of his right foot.  Neuro: Cranial nerves II through XII are grossly intact.  Skin: No rashes, petechiae, or bruising noted on exposed skin.    Laboratory Data:  Results for orders placed or performed in visit on 06/12/19 (from the past 24 hour(s))   Comprehensive metabolic  panel   Result Value Ref Range    Sodium 137 133 - 144 mmol/L    Potassium 3.8 3.4 - 5.3 mmol/L    Chloride 105 94 - 109 mmol/L    Carbon Dioxide 24 20 - 32 mmol/L    Anion Gap 8 3 - 14 mmol/L    Glucose 88 70 - 99 mg/dL    Urea Nitrogen 35 (H) 7 - 30 mg/dL    Creatinine 1.61 (H) 0.66 - 1.25 mg/dL    GFR Estimate 45 (L) >60 mL/min/[1.73_m2]    GFR Estimate If Black 52 (L) >60 mL/min/[1.73_m2]    Calcium 8.8 8.5 - 10.1 mg/dL    Bilirubin Total 0.6 0.2 - 1.3 mg/dL    Albumin 3.2 (L) 3.4 - 5.0 g/dL    Protein Total 7.5 6.8 - 8.8 g/dL    Alkaline Phosphatase 728 (H) 40 - 150 U/L     (H) 0 - 70 U/L     (H) 0 - 45 U/L         Assessment and Plan:  # Biliary obstruction  # h/o Carol-en-Y hepaticojejunostomy on 3/16/16  Presented to OSH ED with abdominal pain and fevers. CT of abd revealed single dilated fluid-filled loop of bowel in RUQ near prior partial hepatectomy surrounded by multiple surgical loops with concern that it may be involving a potential draining choledochojejunostomy loop. Alk phos and transaminases are elevated compared to 5/30 which is consistent with obstructive picture.   - Consulted Surg Onc for possible intervention; do not feel carol limb is resectable at this time due to high risk surgical candidate and peritoneal nodule invading into diaphragm and small bowel. No indication for NG tube at this time.   - Consulted GI for possible endoscopic intervention/stent placement for decompression. 6/7 Dr. Rodriguez performed small bowel enteroscopy with successful stenting and transluminal drainage that yielded immediate bile/stone content drainage. Following surgery patient with soft pressures and in A fib with RVR (tachy to 150s). Evaluated in the PACU with normalized pressures 120s/80s and tachy to 120s. Given 500 ml NS bolus with improved HRs. GI reccs to continue to HOLD anticoagulants for 3 days post op  (re start 6/10).    - Plan to repeat endoscopy with stent exchange (to plastics) in 4-6  wks. GI will schedule this. Not yet scheduled but patient has spoken to Dr. Rodriguez's coordinator  - Tbili 0.8 yesterday, but Alk Phos, ALT/AST trending up on 6/11. Alk phos stable today. ALT/AST trending down. Unclear etiology. Reviewed medications. No tylenol or alcohol use. CT A/P on 6/5 at [facility] with no report of changes in liver. Continue to monitor. Next labs 6/21  - Tolerating regular diet without issues.      # Subjective Fevers  Reports fevers at home this morning up to 102.5. UA, CXR, and BC taken at OSH negative. Given 1 dose of Zosyn prior to transfer to [facility]. Concern for cholangitis or possible infection caused by SBO/ biliary obstruction.   - Continue Zosyn (6/6-6/9). Changed to levaquin at discharge to complete a total of 7 day course of antibiotics. Denies any recurrence of fevers.   - BCx 6/7 negative.      # Metastatic cholangiocarcinoma  Initially treated with surgical resection including partial liver resection in March 2016. Margins were negative and no lymph nodes were involved. Perineural and lymphovascular invasion was noted. He did not have adjuvant chemotherapy. He recurred in the bladder in November 2017. Biopsy was consistent with metastatic cholangiocarcinoma. He was treated with cisplatin/gemzar starting in Dec 2017. Cisplatin was help in June 2018 due to poor tolerance. Gemzar was discontinued in August 2018 due to possible TTP. He was off of treatment for several months, but in April 2019 was found to have disease progression in the abdominal cavity anterior to the liver, just below the diaphragm. He was initiated on capecitabine on 4/30/19. He completed 8 of 14 days of the next cycle in spite of admission for NSTEMI. Xeloda currently on hold until after stenting can be completed.  - Continue to hold Xeloda (last dose 5/7/19)   --CT CAP scheduled for 6/21 and follow up with Dr. De Los Santos on 6/24     # Atrial fibrillation with RVR  # HTN  In Afib with RVR at OSH. BPs also low at OSH  ED with improvement following fluid bolus.  - Telemetry   - Continue metoprolol, cards consulted and advised no changes. HR is 70 today at check in and asymptomatic.   - Continue losartan and Lasix      # h/o NSTEMI  # CAD  # HLD  Recent admission at OSH 5/3-5/7 for chest pain, found to have elevated troponins, NSTEMI. He was admitted under cardiology and had an angiogram on 5/6/19. The LHC showed 99% obstruction with unsuccessful PCI of the OM2. There was moderate residual disease. He had developed A fib with RVR and his metorpolol dose was incrased to 150 mg bid and losartan dose was decreased. Plavix was started and he continued on Eliquis. Planning for additional stenting in a couple weeks with Dr. Hurley.  - Hold PTA lipitor, apixaban, clopidogrel. Plan to hold anticoagulants for 3 days post op (ok to re start 6/10). He restarted apixaban and plavix on 6/10 following discharge. Plan for PCI on Monday, 6/17 per patient. He will hold apixaban starting tomorrow and continue Plavix per cardiology instructions     # CKD stage 3  Cr elevated to 2.3 with baseline of ~1.5.   - Avoid nephrotoxic meds  - Cr baseline     # GERD  - Continue PTA pantoprazole, TUMS     # Gout, acute on chronic   - 6/6 had a gout flare up of his right foot. Painful and erythematous R dorsum of foot on exam. Restarted PTA colchicine PRN for gout attacks. He says it usually takes 3-5 days for it to get better. On day of discharge, there is some improvement in erythema and swelling.  He restarted allopurinol after discharge. As symptoms improving, ok to keep taking.     Wen Rodriguez PA-C  [facility]  [address] [place] [address] [phone]

## 2019-06-12 NOTE — NURSING NOTE
Chief Complaint   Patient presents with     Blood Draw     No labs drawn today; Labs drawn 06/11/2019; vitals & checked in for next appointment.     Oncology Clinic Visit     Mayte Cholangiocarcinoma      Port Draw     labs & JIC tube drawn via port by RN     /88 (BP Location: Left arm, Patient Position: Chair, Cuff Size: Adult Regular)   Pulse 70   Temp 97.4  F (36.3  C) (Oral)   Wt 73.3 kg (161 lb 9.6 oz)   SpO2 100%   BMI 23.19 kg/m      Port accessed by RN in lab. Labs collected and sent. Line flushed with NS & Heparin. Pt tolerated well.     Arielle Burkett, RN

## 2019-06-12 NOTE — LETTER
6/12/2019      RE: Girish Chauhan  [address] [id number]       Oncology/Hematology Visit Note  Jun 12, 2019    Reason for Visit: follow up of metastatic cholangiocarcinoma s/p hospital discharge    History of Present Illness: Girish Chauhan is a 64 year old male witha history of CAD, HTN,  atrial fibrillation on anticoagulation with Eliquis, aortic stenosis and chronic diastolic heart failure and CKD,. He has a history of resected cholangiocarcihnoma in March 2016. Margins were negative and no lymph nodes were involved. Perineural and lymphovascular invasion was noted. He did not have adjuvant chemotherapy. He recurred in the bladder in November 2017. Biopsy was consistent with metastatic cholangiocarcinoma. He was treated with cisplatin/gemzar starting in Dec 2017. Cisplatin was help in June 2018 due to poor tolerance. Gemzar was discontinued in August 2018 due to possible TTP. He was off of treatment for several months, but in April 2019 was found to have disease progression in the abdominal cavity anterior to the liver, just below the diaphragm. He was initiated on capecitabine 1500 mg bid x 14 days of a 21 day cycle, starting therapy on 4/30/19.     He presented to the ED with chest pain and was sent to [facility]. His labs were positive for elevated troponins. He was admitted under cardiology and had an angiogram on 5/6/19. The C showed 99% obstruction with unsuccessful PCI of the OM2. There was moderate residual disease. He had developed A fib with RVR and his metorpolol dose was incrased to 150 mg bid and losartan dose was decreased. Plavix was started and he continued on Eliquis. There were plans for return of elective PCI in 2-4 weeks.     He was then transferred to [facility] on 6/5/19 after presenting to OSH with SBO with possible involvement of draining choledochojejunostomy loop, subjective fevers, and A fib with RVR. GI consulted, s/p small bowel enteroscopy with axios stenting  on 6/7 for obstructed biliary limb. Discharged on 6/9/19.      Interval History:  Krishna is here with daughter Damaris. Prior to hospitalization, he was having fevers and abdominal pain, waking up in drenching sweats. He was evaluated in ED and thought to have gastritis. Returned to ED several days later where CT was done and found to have a bowel obstruction and transferred to [facility].    Since discharge he is feeling well overall. No problems with appetite, nausea. Had diarrhea for day at discharge but he thinks this was due to stool softener for constipation prior to leaving hospital. Denies any fever, chills. He is taking the levaquin he was discharge with.      Continues with gout in right ankle/lateral side of foot. This has improved since discharge. He has not taken any colchicine since Monday. He resumed allopurinol.    He restarted apixaban and plavix. He has an angio next Monday at [facility], so will stop apixaban tomorrow. He was told to stay on plavix. Capecitabine on hold. Denies any chest pain. Has some mild dyspnea with exertion and a little lightheaded at times but feels this is related to deconditioning after hospital stay and procedure. He usually is not symptomatic from afib unless his HR is above 130, but today his HR is in 70s-80s.    Review of Systems:  Patient denies any of the following except if noted above: urinary concerns, rashes or skin lesions, bleeding or bruising issues.    Current Outpatient Medications   Medication Sig Dispense Refill     Apixaban (ELIQUIS PO) Take 5 mg by mouth 2 times daily       calcium carbonate (TUMS) 500 MG chewable tablet Take 1 chew tab by mouth 4 times daily as needed for heartburn       Clopidogrel Bisulfate (PLAVIX PO) Take 75 mg by mouth daily        Colchicine (MITIGARE) 0.6 MG CAPS Take 0.6 mg by mouth 3 times daily as needed       diclofenac (VOLTAREN) 1 % GEL topical gel Apply two grams to the affected area, up to four times daily.       Fexofenadine HCl  (ALLEGRA PO) Take 180 mg by mouth daily       FUROSEMIDE PO Take 40 mg by mouth daily        levofloxacin (LEVAQUIN) 250 MG tablet Take 1 tablet (250 mg) by mouth daily for 4 days 4 tablet 0     losartan (COZAAR) 25 MG tablet Take 25 mg by mouth daily        metoprolol (LOPRESSOR) 50 MG tablet 150 mg 2 times daily        multivitamin, therapeutic with minerals (MULTI-VITAMIN) TABS Take 1 tablet by mouth daily 30 tablet 0     Nitroglycerin (NITROSTAT SL) Place 0.4 mg under the tongue every 5 minutes as needed for chest pain (Carries medication - has never used)        Omega-3 Fatty Acids (OMEGA-3 FISH OIL PO) Take 1,000 mg by mouth daily          Physical Examination:  General: The patient is a pleasant male in no acute distress.  /88 (BP Location: Left arm, Patient Position: Chair, Cuff Size: Adult Regular)   Pulse 70   Temp 97.4  F (36.3  C) (Oral)   Wt 73.3 kg (161 lb 9.6 oz)   SpO2 100%   BMI 23.19 kg/m     Wt Readings from Last 10 Encounters:   06/12/19 73.3 kg (161 lb 9.6 oz)   06/08/19 76.3 kg (168 lb 3.4 oz)   05/23/19 79.5 kg (175 lb 3.2 oz)   04/22/19 80.8 kg (178 lb 1.6 oz)   02/11/19 80.5 kg (177 lb 9 oz)   01/09/19 81.9 kg (180 lb 9.6 oz)   12/13/18 80.6 kg (177 lb 9.6 oz)   11/14/18 81.7 kg (180 lb 1.6 oz)   11/12/18 82.3 kg (181 lb 6.4 oz)   10/15/18 85.5 kg (188 lb 9.6 oz)     HEENT: EOMI, PERRL. Sclerae are anicteric. Oral mucosa is pink and moist with no lesions or thrush.   Lymph: Neck is supple with no lymphadenopathy in the cervical or supraclavicular areas.   Heart: Irregular rate and rhythm.   Lungs: Clear to auscultation bilaterally.   Abdomen: Bowel sounds present, soft, nontender with no palpable hepatomegaly or masses.   Extremities: No lower extremity edema noted bilaterally. He declined an exam of his right foot.  Neuro: Cranial nerves II through XII are grossly intact.  Skin: No rashes, petechiae, or bruising noted on exposed skin.    Laboratory Data:  Results for orders  placed or performed in visit on 06/12/19 (from the past 24 hour(s))   Comprehensive metabolic panel   Result Value Ref Range    Sodium 137 133 - 144 mmol/L    Potassium 3.8 3.4 - 5.3 mmol/L    Chloride 105 94 - 109 mmol/L    Carbon Dioxide 24 20 - 32 mmol/L    Anion Gap 8 3 - 14 mmol/L    Glucose 88 70 - 99 mg/dL    Urea Nitrogen 35 (H) 7 - 30 mg/dL    Creatinine 1.61 (H) 0.66 - 1.25 mg/dL    GFR Estimate 45 (L) >60 mL/min/[1.73_m2]    GFR Estimate If Black 52 (L) >60 mL/min/[1.73_m2]    Calcium 8.8 8.5 - 10.1 mg/dL    Bilirubin Total 0.6 0.2 - 1.3 mg/dL    Albumin 3.2 (L) 3.4 - 5.0 g/dL    Protein Total 7.5 6.8 - 8.8 g/dL    Alkaline Phosphatase 728 (H) 40 - 150 U/L     (H) 0 - 70 U/L     (H) 0 - 45 U/L         Assessment and Plan:  # Biliary obstruction  # h/o Carol-en-Y hepaticojejunostomy on 3/16/16  Presented to OSH ED with abdominal pain and fevers. CT of abd revealed single dilated fluid-filled loop of bowel in RUQ near prior partial hepatectomy surrounded by multiple surgical loops with concern that it may be involving a potential draining choledochojejunostomy loop. Alk phos and transaminases are elevated compared to 5/30 which is consistent with obstructive picture.   - Consulted Surg Onc for possible intervention; do not feel carol limb is resectable at this time due to high risk surgical candidate and peritoneal nodule invading into diaphragm and small bowel. No indication for NG tube at this time.   - Consulted GI for possible endoscopic intervention/stent placement for decompression. 6/7 Dr. Rodriguez performed small bowel enteroscopy with successful stenting and transluminal drainage that yielded immediate bile/stone content drainage. Following surgery patient with soft pressures and in A fib with RVR (tachy to 150s). Evaluated in the PACU with normalized pressures 120s/80s and tachy to 120s. Given 500 ml NS bolus with improved HRs. GI reccs to continue to HOLD anticoagulants for 3 days post  op  (re start 6/10).    - Plan to repeat endoscopy with stent exchange (to plastics) in 4-6 wks. GI will schedule this. Not yet scheduled but patient has spoken to Dr. Rodriguez's coordinator  -  Tbili 0.8 yesterday, but Alk Phos, ALT/AST trending up on 6/11. Alk phos stable today. ALT/AST trending down. Unclear etiology. Reviewed medications. No tylenol or alcohol use. CT A/[facility] on 6/[facility] at [facility] with no report of changes in liver. Continue to monitor. Next labs 6/21  - Tolerating regular diet without issues.      # Subjective Fevers  Reports fevers at home this morning up to 102.5. UA, CXR, and BC taken at OSH negative. Given 1 dose of Zosyn prior to transfer to [facility]. Concern for cholangitis or possible infection caused by SBO/ biliary obstruction.   - Continue Zosyn (6/6-6/9). Changed to levaquin at discharge to complete a total of 7 day course of antibiotics. Denies any recurrence of fevers.   - BCx 6/7 negative.      # Metastatic cholangiocarcinoma  Initially treated with surgical resection including partial liver resection in March 2016. Margins were negative and no lymph nodes were involved. Perineural and lymphovascular invasion was noted. He did not have adjuvant chemotherapy. He recurred in the bladder in November 2017. Biopsy was consistent with metastatic cholangiocarcinoma. He was treated with cisplatin/gemzar starting in Dec 2017. Cisplatin was help in June 2018 due to poor tolerance. Gemzar was discontinued in August 2018 due to possible TTP. He was off of treatment for several months, but in April 2019 was found to have disease progression in the abdominal cavity anterior to the liver, just below the diaphragm. He was initiated on capecitabine on 4/30/19. He completed 8 of 14 days of the next cycle in spite of admission for NSTEMI. Xeloda currently on hold until after stenting can be completed.  - Continue to hold Xeloda (last dose 5/7/19)   --CT CAP scheduled for 6/21 and follow up with Dr. De Los Santos on  6/24     # Atrial fibrillation with RVR  # HTN  In Afib with RVR at OSH. BPs also low at OSH ED with improvement following fluid bolus.  - Telemetry   - Continue metoprolol, cards consulted and advised no changes. HR is 70 today at check in and asymptomatic.   - Continue losartan and Lasix      # h/o NSTEMI  # CAD  # HLD  Recent admission at OSH 5/3-5/7 for chest pain, found to have elevated troponins, NSTEMI. He was admitted under cardiology and had an angiogram on 5/6/19. The LHC showed 99% obstruction with unsuccessful PCI of the OM2. There was moderate residual disease. He had developed A fib with RVR and his metorpolol dose was incrased to 150 mg bid and losartan dose was decreased. Plavix was started and he continued on Eliquis. Planning for additional stenting in a couple weeks with Dr. Hurley.  - Hold PTA lipitor, apixaban, clopidogrel. Plan to hold anticoagulants for 3 days post op (ok to re start 6/10). He restarted apixaban and plavix on 6/10 following discharge. Plan for PCI on Monday, 6/17 per patient. He will hold apixaban starting tomorrow and continue Plavix per cardiology instructions     # CKD stage 3  Cr elevated to 2.3 with baseline of ~1.5.   - Avoid nephrotoxic meds  - Cr baseline     # GERD  - Continue PTA pantoprazole, TUMS     # Gout, acute on chronic   - 6/6 had a gout flare up of his right foot. Painful and erythematous R dorsum of foot on exam. Restarted PTA colchicine PRN for gout attacks. He says it usually takes 3-5 days for it to get better. On day of discharge, there is some improvement in erythema and swelling.  He restarted allopurinol after discharge. As symptoms improving, ok to keep taking.     Wen Rodriguez PA-C  [facility]  [address] [place] [address] [phone]

## 2019-06-12 NOTE — NURSING NOTE
Chief Complaint   Patient presents with     Blood Draw     No labs drawn today; Labs drawn 06/11/2019; vitals & checked in for next appointment.     /88 (BP Location: Left arm, Patient Position: Chair, Cuff Size: Adult Regular)   Pulse 70   Temp 97.4  F (36.3  C) (Oral)   Wt 73.3 kg (161 lb 9.6 oz)   SpO2 100%   BMI 23.19 kg/m       Pt tolerated well.   Pt checked in for next appointment.    Arielle Burkett RN

## 2019-06-13 LAB
BACTERIA SPEC CULT: NO GROWTH
Lab: NORMAL
SPECIMEN SOURCE: NORMAL

## 2019-06-14 ENCOUNTER — TELEPHONE (OUTPATIENT)
Dept: GASTROENTEROLOGY | Facility: CLINIC | Age: 64
End: 2019-06-14

## 2019-06-14 DIAGNOSIS — Z01.818 PRE-OP EXAM: Primary | ICD-10-CM

## 2019-06-14 NOTE — TELEPHONE ENCOUNTER
Spoke to patient in regards to scheduled procedure. Informed patient he is scheduled with Dr. Rodriguez on 7/17/19. Informed patient he will need an updated pre-op physical within 30 days of his procedure. Patient stated he would like to have this done on 6/24/19 with PAC clinic because he is seeing Dr. Fung that morning. Informed patient he will need a  and someone to monitor him for 24 hours after the procedure. Informed patient all scheduling details [facility].     6/14/19 1034am

## 2019-06-17 ENCOUNTER — SURGERY - HEALTHEAST (OUTPATIENT)
Dept: CARDIOLOGY | Facility: CLINIC | Age: 64
End: 2019-06-17

## 2019-06-17 ASSESSMENT — MIFFLIN-ST. JEOR: SCORE: 1554.89

## 2019-06-18 ENCOUNTER — COMMUNICATION - HEALTHEAST (OUTPATIENT)
Dept: FAMILY MEDICINE | Facility: CLINIC | Age: 64
End: 2019-06-18

## 2019-06-18 ENCOUNTER — COMMUNICATION - HEALTHEAST (OUTPATIENT)
Dept: CARDIOLOGY | Facility: CLINIC | Age: 64
End: 2019-06-18

## 2019-06-18 DIAGNOSIS — I25.10 CORONARY ARTERY DISEASE INVOLVING NATIVE CORONARY ARTERY OF NATIVE HEART, ANGINA PRESENCE UNSPECIFIED: ICD-10-CM

## 2019-06-21 ENCOUNTER — COMMUNICATION - HEALTHEAST (OUTPATIENT)
Dept: CARDIOLOGY | Facility: CLINIC | Age: 64
End: 2019-06-21

## 2019-06-21 ENCOUNTER — ANCILLARY PROCEDURE (OUTPATIENT)
Dept: CT IMAGING | Facility: CLINIC | Age: 64
End: 2019-06-21
Attending: INTERNAL MEDICINE
Payer: COMMERCIAL

## 2019-06-21 DIAGNOSIS — C78.6 PERITONEAL CARCINOMATOSIS (H): ICD-10-CM

## 2019-06-21 DIAGNOSIS — C22.1 CHOLANGIOCARCINOMA (H): ICD-10-CM

## 2019-06-21 DIAGNOSIS — C79.11 SECONDARY MALIGNANT NEOPLASM OF BLADDER (H): ICD-10-CM

## 2019-06-21 LAB
ALBUMIN SERPL-MCNC: 3.1 G/DL (ref 3.4–5)
ALP SERPL-CCNC: 558 U/L (ref 40–150)
ALT SERPL W P-5'-P-CCNC: 74 U/L (ref 0–70)
ANION GAP SERPL CALCULATED.3IONS-SCNC: 6 MMOL/L (ref 3–14)
AST SERPL W P-5'-P-CCNC: 57 U/L (ref 0–45)
BASOPHILS # BLD AUTO: 0.1 10E9/L (ref 0–0.2)
BASOPHILS NFR BLD AUTO: 0.4 %
BILIRUB SERPL-MCNC: 0.7 MG/DL (ref 0.2–1.3)
BUN SERPL-MCNC: 33 MG/DL (ref 7–30)
CALCIUM SERPL-MCNC: 8.8 MG/DL (ref 8.5–10.1)
CHLORIDE SERPL-SCNC: 102 MMOL/L (ref 94–109)
CO2 SERPL-SCNC: 25 MMOL/L (ref 20–32)
CREAT SERPL-MCNC: 1.66 MG/DL (ref 0.66–1.25)
DIFFERENTIAL METHOD BLD: ABNORMAL
EOSINOPHIL # BLD AUTO: 0.2 10E9/L (ref 0–0.7)
EOSINOPHIL NFR BLD AUTO: 2.1 %
ERYTHROCYTE [DISTWIDTH] IN BLOOD BY AUTOMATED COUNT: 14.3 % (ref 10–15)
GFR SERPL CREATININE-BSD FRML MDRD: 43 ML/MIN/{1.73_M2}
GLUCOSE SERPL-MCNC: 99 MG/DL (ref 70–99)
HCT VFR BLD AUTO: 32.3 % (ref 40–53)
HGB BLD-MCNC: 10.4 G/DL (ref 13.3–17.7)
IMM GRANULOCYTES # BLD: 0 10E9/L (ref 0–0.4)
IMM GRANULOCYTES NFR BLD: 0.3 %
LYMPHOCYTES # BLD AUTO: 2.2 10E9/L (ref 0.8–5.3)
LYMPHOCYTES NFR BLD AUTO: 19.3 %
MCH RBC QN AUTO: 31.1 PG (ref 26.5–33)
MCHC RBC AUTO-ENTMCNC: 32.2 G/DL (ref 31.5–36.5)
MCV RBC AUTO: 97 FL (ref 78–100)
MONOCYTES # BLD AUTO: 1.7 10E9/L (ref 0–1.3)
MONOCYTES NFR BLD AUTO: 15.2 %
NEUTROPHILS # BLD AUTO: 7.1 10E9/L (ref 1.6–8.3)
NEUTROPHILS NFR BLD AUTO: 62.7 %
NRBC # BLD AUTO: 0 10*3/UL
NRBC BLD AUTO-RTO: 0 /100
PLATELET # BLD AUTO: 286 10E9/L (ref 150–450)
POTASSIUM SERPL-SCNC: 3.9 MMOL/L (ref 3.4–5.3)
PROT SERPL-MCNC: 7.3 G/DL (ref 6.8–8.8)
RBC # BLD AUTO: 3.34 10E12/L (ref 4.4–5.9)
SODIUM SERPL-SCNC: 134 MMOL/L (ref 133–144)
WBC # BLD AUTO: 11.4 10E9/L (ref 4–11)

## 2019-06-21 PROCEDURE — 86301 IMMUNOASSAY TUMOR CA 19-9: CPT | Performed by: INTERNAL MEDICINE

## 2019-06-21 PROCEDURE — 25000128 H RX IP 250 OP 636: Performed by: INTERNAL MEDICINE

## 2019-06-21 PROCEDURE — 80053 COMPREHEN METABOLIC PANEL: CPT | Performed by: INTERNAL MEDICINE

## 2019-06-21 PROCEDURE — 85025 COMPLETE CBC W/AUTO DIFF WBC: CPT | Performed by: INTERNAL MEDICINE

## 2019-06-21 RX ORDER — IOPAMIDOL 755 MG/ML
99 INJECTION, SOLUTION INTRAVASCULAR ONCE
Status: COMPLETED | OUTPATIENT
Start: 2019-06-21 | End: 2019-06-21

## 2019-06-21 RX ORDER — HEPARIN SODIUM (PORCINE) LOCK FLUSH IV SOLN 100 UNIT/ML 100 UNIT/ML
5 SOLUTION INTRAVENOUS EVERY 8 HOURS
Status: DISCONTINUED | OUTPATIENT
Start: 2019-06-21 | End: 2019-06-21 | Stop reason: HOSPADM

## 2019-06-21 RX ORDER — HEPARIN SODIUM (PORCINE) LOCK FLUSH IV SOLN 100 UNIT/ML 100 UNIT/ML
500 SOLUTION INTRAVENOUS ONCE
Status: COMPLETED | OUTPATIENT
Start: 2019-06-21 | End: 2019-06-21

## 2019-06-21 RX ADMIN — HEPARIN 5 ML: 100 SYRINGE at 07:03

## 2019-06-21 RX ADMIN — IOPAMIDOL 99 ML: 755 INJECTION, SOLUTION INTRAVASCULAR at 07:45

## 2019-06-21 RX ADMIN — HEPARIN SODIUM (PORCINE) LOCK FLUSH IV SOLN 100 UNIT/ML 500 UNITS: 100 SOLUTION at 07:57

## 2019-06-21 NOTE — DISCHARGE INSTRUCTIONS

## 2019-06-21 NOTE — NURSING NOTE
Chief Complaint   Patient presents with     Lab Only     labs drawn via port by RN in lab, saline and heparin locked

## 2019-06-22 LAB — CANCER AG19-9 SERPL-ACNC: 211 U/ML (ref 0–37)

## 2019-06-24 ENCOUNTER — ANESTHESIA EVENT (OUTPATIENT)
Dept: SURGERY | Facility: CLINIC | Age: 64
End: 2019-06-24

## 2019-06-24 ENCOUNTER — OFFICE VISIT (OUTPATIENT)
Dept: SURGERY | Facility: CLINIC | Age: 64
End: 2019-06-24
Payer: COMMERCIAL

## 2019-06-24 ENCOUNTER — PATIENT OUTREACH (OUTPATIENT)
Dept: GASTROENTEROLOGY | Facility: CLINIC | Age: 64
End: 2019-06-24

## 2019-06-24 ENCOUNTER — ONCOLOGY VISIT (OUTPATIENT)
Dept: ONCOLOGY | Facility: CLINIC | Age: 64
End: 2019-06-24
Attending: INTERNAL MEDICINE
Payer: COMMERCIAL

## 2019-06-24 ENCOUNTER — OFFICE VISIT - HEALTHEAST (OUTPATIENT)
Dept: FAMILY MEDICINE | Facility: CLINIC | Age: 64
End: 2019-06-24

## 2019-06-24 VITALS
OXYGEN SATURATION: 100 % | SYSTOLIC BLOOD PRESSURE: 111 MMHG | HEART RATE: 78 BPM | TEMPERATURE: 97.8 F | HEIGHT: 70 IN | DIASTOLIC BLOOD PRESSURE: 78 MMHG | BODY MASS INDEX: 23.32 KG/M2 | RESPIRATION RATE: 16 BRPM | WEIGHT: 162.92 LBS

## 2019-06-24 VITALS
RESPIRATION RATE: 16 BRPM | TEMPERATURE: 97.8 F | HEIGHT: 70 IN | BODY MASS INDEX: 23.32 KG/M2 | OXYGEN SATURATION: 100 % | DIASTOLIC BLOOD PRESSURE: 78 MMHG | WEIGHT: 162.92 LBS | HEART RATE: 78 BPM | SYSTOLIC BLOOD PRESSURE: 111 MMHG

## 2019-06-24 VITALS
BODY MASS INDEX: 23.34 KG/M2 | OXYGEN SATURATION: 100 % | SYSTOLIC BLOOD PRESSURE: 111 MMHG | WEIGHT: 163 LBS | RESPIRATION RATE: 18 BRPM | TEMPERATURE: 97.4 F | HEART RATE: 83 BPM | HEIGHT: 70 IN | DIASTOLIC BLOOD PRESSURE: 78 MMHG

## 2019-06-24 DIAGNOSIS — Z01.818 PREOP EXAMINATION: Primary | ICD-10-CM

## 2019-06-24 DIAGNOSIS — N18.30 CKD (CHRONIC KIDNEY DISEASE) STAGE 3, GFR 30-59 ML/MIN (H): ICD-10-CM

## 2019-06-24 DIAGNOSIS — C22.1 CHOLANGIOCARCINOMA (H): ICD-10-CM

## 2019-06-24 DIAGNOSIS — D64.81 ANEMIA ASSOCIATED WITH CHEMOTHERAPY: ICD-10-CM

## 2019-06-24 DIAGNOSIS — C22.1 CHOLANGIOCARCINOMA (H): Primary | ICD-10-CM

## 2019-06-24 DIAGNOSIS — M79.671 BILATERAL FOOT PAIN: ICD-10-CM

## 2019-06-24 DIAGNOSIS — C78.6 PERITONEAL CARCINOMATOSIS (H): ICD-10-CM

## 2019-06-24 DIAGNOSIS — C79.11 SECONDARY MALIGNANT NEOPLASM OF BLADDER (H): ICD-10-CM

## 2019-06-24 DIAGNOSIS — M79.621 PAIN OF RIGHT UPPER ARM: ICD-10-CM

## 2019-06-24 DIAGNOSIS — T45.1X5A ANEMIA ASSOCIATED WITH CHEMOTHERAPY: ICD-10-CM

## 2019-06-24 DIAGNOSIS — I48.0 PAROXYSMAL ATRIAL FIBRILLATION (H): ICD-10-CM

## 2019-06-24 DIAGNOSIS — Z87.39 HISTORY OF GOUT: ICD-10-CM

## 2019-06-24 DIAGNOSIS — M79.672 BILATERAL FOOT PAIN: ICD-10-CM

## 2019-06-24 DIAGNOSIS — Z01.818 PRE-OP EXAM: ICD-10-CM

## 2019-06-24 PROBLEM — I42.9 CARDIOMYOPATHY (H): Status: ACTIVE | Noted: 2019-02-04

## 2019-06-24 PROBLEM — I10 ESSENTIAL HYPERTENSION: Status: ACTIVE | Noted: 2018-11-27

## 2019-06-24 PROBLEM — Q23.81 BICUSPID AORTIC VALVE: Status: ACTIVE | Noted: 2019-02-04

## 2019-06-24 PROCEDURE — 99215 OFFICE O/P EST HI 40 MIN: CPT | Mod: ZP | Performed by: INTERNAL MEDICINE

## 2019-06-24 PROCEDURE — G0463 HOSPITAL OUTPT CLINIC VISIT: HCPCS

## 2019-06-24 RX ORDER — ATORVASTATIN CALCIUM 40 MG/1
40 TABLET, FILM COATED ORAL EVERY EVENING
COMMUNITY

## 2019-06-24 RX ORDER — METOPROLOL TARTRATE 50 MG
100 TABLET ORAL 2 TIMES DAILY
COMMUNITY

## 2019-06-24 ASSESSMENT — MIFFLIN-ST. JEOR
SCORE: 1535.05
SCORE: 1545.25
SCORE: 1545.25
SCORE: 1545.61

## 2019-06-24 ASSESSMENT — PAIN SCALES - GENERAL
PAINLEVEL: SEVERE PAIN (7)
PAINLEVEL: SEVERE PAIN (7)

## 2019-06-24 ASSESSMENT — ENCOUNTER SYMPTOMS: DYSRHYTHMIAS: 1

## 2019-06-24 NOTE — LETTER
6/24/2019      RE: Girish Chauhan  [address] [id number]       Girish Chauhan is here today in follow-up of recurrent cholangiocarcinoma.    He has disease that recurred in his bladder (proven on biopsy) and probably within his peritoneum.  He had a long period of control on gemcitabine (originally with cisplatin which was dropped for renal injury a year ago) until he developed TTP several months ago.  He was switched to Xeloda, and a few days after starting that was hospitalized with a myocardial infarction at an outside institution.  He was found to have a critical stenosis which could not immediately be stented due to difficult anatomy, but was eventually stented weeks later.  He continued the Xeloda for several days after his initial presentation with the MI without any ongoing cardiac symptoms or evidence of ongoing ischemia.  He reports having had several days of fever after his coronary stenting last week but that is resolved.  He currently has no ongoing chest pains, dyspnea, palpitations or other cardiopulmonary symptoms.    He has also developed problems with apparent stenosis of his biliary small bowel anastomosis and has a planned repeat procedure in a couple of weeks to try again to restent that.      In the last 2 to 4 weeks he has developed burning pain in his feet which he attributes to gout.  He reports it hurts him to walk but also bothers him at night when he is in bed.  The feet may have been a little bit red.  This started primarily in his toes but seems to be working its way up his ankles now.  He has associated with this a significant degree of numbness as well.  He has no symptoms in his hands and has noted no problems with his hearing.    He has lost a moderate amount of weight over the last month or so which he attributes to having many days related to the above problems when he was kept n.p.o.  He also however reports that he is just not eating as much because he seems  to have less interest.  He does not have any significant symptoms when he does eat.  He is moving his bowels without difficulty.    Remainder of a complete review of systems is otherwise negative.    On physical exam he appears a little bit cachectic.  His weight is down several kilos over the last month.  His vital signs are otherwise unremarkable.  He has no scleral icterus and no visible lesion in the oropharynx.  He has no palpable adenopathy in the neck or supraclavicular spaces.  His thyroid is palpable and unremarkable.  His lungs are clear to auscultation without dullness to percussion.  His heart rate and rhythm are regular with a loud coarse systolic murmur.  His jugular venous pressure appears normal.  His abdomen is soft and nontender without palpable mass organomegaly.  He has no peripheral edema and no tenderness in his calves or thighs.  He has minimal tenderness across his feet and ankles to light touch.  With his legs dependent he has modest vascular congestion but no real erythema or warmth.  There are no clear joint effusions or tenderness localizing to the joints.  His cranial nerves are grossly intact.  His gait and station are relatively unremarkable but he is walking with the aid of a walker because of the pain in his feet.    I reviewed with patient and his wife his CT scan.  The tumor in his bladder is not apparent.  He has postoperative changes in the resection bed and in the peritoneum that may be scarring versus peritoneal carcinoma.  That appears unchanged.  He has mild biliary dilatation improved from prior.  His CA-19-9 is risen further up to about 220. Concurrent with that his alkaline phosphatase is fairly markedly elevated with a bilirubin that was elevated but is now back down to normal since his stenting.  Remainder of his lab work is notable for a marginally elevated white count, stability of his chronic renal insufficiency and otherwise unremarkable    Assessment/plan:  1.   Metastatic cholangiocarcinoma.  At present it is not clear that he has progressive disease.  Certainly radiographically things are not worse but the nature of his disease makes it hard to .  His tumor marker is elevated but this is in the context of biliary obstruction so I am not sure how much of that is a function of his biliary obstruction versus tumor progression.  He is losing weight and his appetite is fallen off, but I am unclear on how much of that could represent effects of his tumor versus his other active medical problems.  Further complicating decision making is his having an MI a few days after starting Xeloda.  This seems related to a fixed coronary lesion. The fact he had no evidence of ongoing ischemia when he continued on the Xeloda after this event (before we found out about it) suggests perhaps this was unrelated to xeloda and just coincidental, but I am reluctant to give him further fluoropyrimidine.  We had a very extensive discussion about this situation and at present given that there is not clear evidence of tumor progression we will defer starting active treatment back up at this point.  In 2 months when we reevaluate, if he does have more clear-cut tumor progression we could consider resuming treatment with fluoropyrimidine but if we are going to do that I would probably use IV therapy and give him his initial dose in the hospital where we could monitor him closely. We talked about what alternative therapies might be available.  These mostly would be research studies and his recent myocardial infarction is likely to disqualify him for anything for at least the next few months.  2.  Progressive lower extremity pain and numbness.  Seems to be much more like neuropathy than the gout to which the patient attributes it.  He has follow-up with his primary care physician later today who can work this up further.  This is way too far out from his cisplatin to blame on that as it has been more  than a year since his exposure and has only developed in the last few weeks.  I cannot elicit a clear precipitating factor for this.  He attributes it to something that happened with his cardiac events but I am not sure how to tie those together.  The only way I could tie this back to his cancer is if this is an unusual paraneoplastic event.  We will see what his primary care physician's evaluation turns up.  3.  Recent myocardial infarction and paroxysmal A. fib.  This all appears well controlled at present.         Naresh De Los Santos MD

## 2019-06-24 NOTE — ORAL ONC MGMT
[facility] Oral Chemotherapy Monitoring Program    Thank you for the opportunity to be a part in the care of this patient's oral chemotherapy. The oncology pharmacy will no longer be following this patient for oral chemotherapy. If there are any questions or the plan changes, feel free to contact us.    Sylvester Potter, LannyD, MS  Hematology/Oncology Clinical Pharmacist  [facility]  [facility]

## 2019-06-24 NOTE — H&P
Pre-Operative H & P     CC:  Preoperative exam to assess for increased cardiopulmonary risk while undergoing surgery and anesthesia.    Date of Encounter: 6/24/2019  Primary Care Physician:  Jenny Burgess MD  Associated Diagnosis: Jejunal stenosis    HPI  Girish Chauhan is a 64 year old male who presents for pre-operative H & P in preparation for Upper GI Endoscopy with small bowel enteroscopy/stent exchange with Dr. Rodriguez on 7/17/2019 at [facility] under general anesthesia.    This is a 64 year old male with history of CAD, HTN,  atrial fibrillation on Eliquis, aortic stenosis and chronic diastolic heart failure and CKD. He has a history of resected cholangiocarcihnoma in March 2016. Recurrence in the bladder November 2017. Biopsy was consistent with metastatic cholangiocarcinoma. He was treated with cisplatin/gemzar starting in Dec 2017. Cisplatin was held in June 2018 due to poor tolerance. Gemzar was discontinued in August 2018 due to possible TTP.  No treatment for several months, but in April 2019 was found to have disease progression in the abdominal cavity anterior to the liver, just below the diaphragm.  Capecitabine 1500 mg bid x 14 days of a 21 day cycle, was started on 4/30/19.     He presented to the ED, [facility] on 5/3/2019 with chest pain. His labs were positive for elevated troponins. He was admitted under cardiology and had an angiogram on 5/6/19. The C showed 99% obstruction with unsuccessful PCI of the OM2. There was moderate residual disease. He had developed A fib with RVR and his metoprolol dose was incrased to 150 mg bid and losartan dose was decreased. Plavix was started and he continued on Eliquis. There were plans for return of elective PCI in 2-4 weeks. He underwent PCI on 6/17/19 with SAM to [facility].     He was transferred to [facility] on 6/5/19 after presenting to OSH with SBO with possible involvement of draining  choledochojejunostomy loop, subjective fevers, and A fib with RVR. GI consulted, s/p small bowel enteroscopy with axios stenting on 6/7 for obstructed biliary limb. Discharged on 6/9/19.  The above procedure is planned.    History is obtained from the patient and electronic medical record.    Past Medical History  Past Medical History:   Diagnosis Date     Aortic stenosis due to bicuspid aortic valve      Atrial fibrillation (H) 2006    hx of paroxysmal atrial fibrillation since approximately 2006. S/p cardioversion in 2013 with recurrent atrial fibrillation s/p repeat cardioversion 9/29/14.     Basal cell carcinoma 1/2016    right shoulder and right posterior calf s/p electrodessication and curretage 1/2016.     CAD (coronary artery disease) 12/2011    s/p angiogram 12/2011 s/p percutaneous intervention on the LAD with multiple drug-eluting stents complicated by a small perforation in the diagonal branch which sealed spontaneously.  Patient with significant Circumflex stenosis which is being treated conservatively.     Gout     affects left foot 2-3 times per year     Hyperlipidemia      Hypertension      Liver disease      Melanoma (H) 9/2015    back s/p resection 9/2015     Squamous cell carcinoma     nose s/p excision       Past Surgical History  Past Surgical History:   Procedure Laterality Date     ------------OTHER-------------      multiple skin cancer resections     CARDIOVERSION  2013, 9/2014     ENDOSCOPIC RETROGRADE CHOLANGIOPANCREATOGRAM N/A 2/26/2016    Procedure: COMBINED ENDOSCOPIC RETROGRADE CHOLANGIOPANCREATOGRAPHY, PLACE TUBE/STENT;  Surgeon: Rafa Andrade MD;  Location: [facility] OR     ENDOSCOPIC RETROGRADE CHOLANGIOPANCREATOGRAPHY  2/2014     ENDOSCOPIC ULTRASOUND UPPER GASTROINTESTINAL TRACT (GI) N/A 6/7/2019    Procedure: ENDOSCOPIC ULTRASOUND, with hot axios stent placement;  Surgeon: Gabino Rodriguez MD;  Location: [facility] OR     ENTEROSCOPY SMALL BOWEL N/A 6/7/2019    Procedure:  ENTEROSCOPY;  Surgeon: Gabino Rodriguez MD;  Location: [facility] OR     EXPLORE COMMON BILE DUCT N/A 3/8/2016    Procedure: EXPLORE COMMON BILE DUCT;  Surgeon: Jose Eduardo Pinedo MD;  Location: [facility] OR     HEPATECTOMY PARTIAL Left 3/8/2016    Procedure: HEPATECTOMY PARTIAL;  Surgeon: Jose Eduardo Pinedo MD;  Location: [facility] OR     INSERT PORT VASCULAR ACCESS Right 12/8/2017    Procedure: INSERT PORT VASCULAR ACCESS;  Place single lumen venous chest port - right;  Surgeon: Drew Powell PA-C;  Location: [facility] OR     SOFT TISSUE SURGERY       STENT  12/2011    LAD with multiple SAM       Hx of Blood transfusions/reactions: yes, with no reported reaction     Hx of abnormal bleeding or anti-platelet use: Pt is on Plavix for SAM placement on 6/17/19.  Will continue.  Will hold Eliquis for 48 hours prior to DOS.    Menstrual history: No LMP for male patient.:     Steroid use in the last year: none    Personal or FH with difficulty with Anesthesia:  none    Prior to Admission Medications  Current Outpatient Medications   Medication Sig Dispense Refill     Apixaban (ELIQUIS PO) Take 5 mg by mouth 2 times daily       atorvastatin (LIPITOR) 40 MG tablet Take 40 mg by mouth daily       calcium carbonate (TUMS) 500 MG chewable tablet Take 1 chew tab by mouth 4 times daily as needed for heartburn       Clopidogrel Bisulfate (PLAVIX PO) Take 75 mg by mouth daily        Colchicine (MITIGARE) 0.6 MG CAPS Take 0.6 mg by mouth 3 times daily as needed       diclofenac (VOLTAREN) 1 % GEL topical gel Apply two grams to the affected area, up to four times daily.       FUROSEMIDE PO Take 40 mg by mouth daily        losartan (COZAAR) 25 MG tablet Take 25 mg by mouth daily        metoprolol tartrate (LOPRESSOR) 100 MG tablet Take 150 mg by mouth 2 times daily       multivitamin, therapeutic with minerals (MULTI-VITAMIN) TABS Take 1 tablet by mouth daily 30 tablet 0     Nitroglycerin (NITROSTAT SL) Place 0.4 mg under the tongue every 5  minutes as needed for chest pain (Carries medication - has never used)          Allergies  No Known Allergies    Social History  Social History     Socioeconomic History     Marital status:      Spouse name: Not on file     Number of children: 1     Years of education: Not on file     Highest education level: Not on file   Occupational History     Employer: [employer]   Social Needs     Financial resource strain: Not on file     Food insecurity:     Worry: Not on file     Inability: Not on file     Transportation needs:     Medical: Not on file     Non-medical: Not on file   Tobacco Use     Smoking status: Never Smoker     Smokeless tobacco: Never Used   Substance and Sexual Activity     Alcohol use: No     Alcohol/week: 0.0 oz     Drug use: No     Sexual activity: Not on file   Lifestyle     Physical activity:     Days per week: Not on file     Minutes per session: Not on file     Stress: Not on file   Relationships     Social connections:     Talks on phone: Not on file     Gets together: Not on file     Attends Rastafari service: Not on file     Active member of club or organization: Not on file     Attends meetings of clubs or organizations: Not on file     Relationship status: Not on file     Intimate partner violence:     Fear of current or ex partner: Not on file     Emotionally abused: Not on file     Physically abused: Not on file     Forced sexual activity: Not on file   Other Topics Concern     Not on file   Social History Narrative    Works for [employer] with [employer] system.  Lives in [place].   with one grown daughter.  No tobacco, rare Etoh, no drug use.       Family History  Family History   Problem Relation Age of Onset     Skin Cancer Mother      Other - See Comments Father         father passed away in his 60s intraoperatively with an unknown bile duct obstruction       ROS/MED HX  The complete review of systems is negative other than noted in the HPI or here.   ENT/Pulmonary:     "  (-) recent URI   Neurologic:  - neg neurologic ROS     Cardiovascular:     (+) Dyslipidemia, hypertension--CAD, -past MI,-stent,6/17/2019  1 Drug Eluting Stent .. Taking blood thinners Pt has received instructions: Instructions Given to patient: continue plavix, hold eliquis 48 hours prior to DOS. . . :. dysrhythmias a-fib, . Previous cardiac testing Echodate:5/20/2019results:date: results: date: results:Cath date: 6/17/2019 results:          METS/Exercise Tolerance:  3 - Able to walk 1-2 blocks without stopping   Hematologic:     (+) Anemia, History of Transfusion no previous transfusion reaction -      Musculoskeletal: Comment: gout        GI/Hepatic:     (+) liver disease,      (-) GERD   Renal/Genitourinary:     (+) chronic renal disease, type: CRI, Pt does not require dialysis, Pt has no history of transplant,       Endo:  - neg endo ROS       Psychiatric:  - neg psychiatric ROS       Infectious Disease:  - neg infectious disease ROS       Malignancy:   (+) Malignancy History of GI  GI CA Active status post,         Other:    (+) no H/O Chronic Pain,           Temp: 97.8  F (36.6  C) Temp src: Oral BP: 111/78 Pulse: 78   Resp: 16 SpO2: 100 %         162 lbs 14.72 oz  5' 10\"   Body mass index is 23.38 kg/m .       Physical Exam  Constitutional: Awake, alert, cooperative, no apparent distress, and appears stated age.  Using a walker for ambulation.  Eyes: Pupils equal, round and reactive to light, extra ocular muscles intact, sclera clear, conjunctiva normal.  HENT: Normocephalic, oral pharynx with moist mucus membranes, good dentition. No goiter appreciated.   Respiratory: Clear to auscultation bilaterally, no crackles or wheezing.  Cardiovascular: Regular rate and irregular rhythm, normal S1 and S2, with murmur throughout.  Carotids +2, no bruits. No edema. Palpable pulses to radial  DP and PT arteries.   GI: Normal bowel sounds, soft, non-distended, non-tender, no masses palpated, no hepatosplenomegaly.  "   Lymph/Hematologic: No cervical lymphadenopathy and no supraclavicular lymphadenopathy.  Genitourinary:  deferred  Skin: Warm and dry.  No rashes at anticipated surgical site.   Musculoskeletal: Full ROM of neck. There is no redness, warmth, or swelling of the joints. Gross motor strength is normal.    Neurologic: Awake, alert, oriented to name, place and time. Cranial nerves II-XII are grossly intact. Gait is normal.   Neuropsychiatric: Calm, cooperative. Normal affect.     Labs: (personally reviewed)  Lab Results   Component Value Date    WBC 11.4 06/21/2019     Lab Results   Component Value Date    RBC 3.34 06/21/2019     Lab Results   Component Value Date    HGB 10.4 06/21/2019     Lab Results   Component Value Date    HCT 32.3 06/21/2019     Lab Results   Component Value Date    MCV 97 06/21/2019     Lab Results   Component Value Date    MCH 31.1 06/21/2019     Lab Results   Component Value Date    MCHC 32.2 06/21/2019     Lab Results   Component Value Date    RDW 14.3 06/21/2019     Lab Results   Component Value Date     06/21/2019     Last Comprehensive Metabolic Panel:  Sodium   Date Value Ref Range Status   06/21/2019 134 133 - 144 mmol/L Final     Potassium   Date Value Ref Range Status   06/21/2019 3.9 3.4 - 5.3 mmol/L Final     Chloride   Date Value Ref Range Status   06/21/2019 102 94 - 109 mmol/L Final     Carbon Dioxide   Date Value Ref Range Status   06/21/2019 25 20 - 32 mmol/L Final     Anion Gap   Date Value Ref Range Status   06/21/2019 6 3 - 14 mmol/L Final     Glucose   Date Value Ref Range Status   06/21/2019 99 70 - 99 mg/dL Final     Urea Nitrogen   Date Value Ref Range Status   06/21/2019 33 (H) 7 - 30 mg/dL Final     Creatinine   Date Value Ref Range Status   06/21/2019 1.66 (H) 0.66 - 1.25 mg/dL Final     GFR Estimate   Date Value Ref Range Status   06/21/2019 43 (L) >60 mL/min/[1.73_m2] Final     Comment:     Non  GFR Calc  Starting 12/18/2018, serum creatinine  based estimated GFR (eGFR) will be   calculated using the Chronic Kidney Disease Epidemiology Collaboration   (CKD-EPI) equation.       Calcium   Date Value Ref Range Status   06/21/2019 8.8 8.5 - 10.1 mg/dL Final     Bilirubin Total   Date Value Ref Range Status   06/21/2019 0.7 0.2 - 1.3 mg/dL Final     Alkaline Phosphatase   Date Value Ref Range Status   06/21/2019 558 (H) 40 - 150 U/L Final     ALT   Date Value Ref Range Status   06/21/2019 74 (H) 0 - 70 U/L Final     AST   Date Value Ref Range Status   06/21/2019 57 (H) 0 - 45 U/L Final         EKG: not indicated  Cardiac echo: 5/20/2019    Mild concentric left ventricular hypertrophy. The estimated left   ventricular ejection fraction is 35%.    Normal right ventricular size and systolic function.    Probable fusion of the right and non-coronary leaflets. Moderate aortic   stenosis. Gradient suggests mild stenosis but visual impression is   moderate stenosis.    The aortic root is mildly dilated. No evidence of coarctation.    Left atrial volume is moderately increased.    No previous study for comparison.    EXAM: CT ABDOMEN PELVIS WO ORAL W IV CONTRAST  LOCATION: [facility]  DATE/TIME: 6/5/2019 9:07 AM  CONCLUSION:   1.  Single dilated fluid-filled loop of bowel in the right upper quadrant region prior partial hepatectomy surrounded by multiple surgical loops. Uncertain if this is involving a potential draining choledochojejunostomy loop. But this dilated loop   appears obstructed and could relate to patient's symptoms.  2.  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  Outside records reviewed from: care everywhere      ASSESSMENT and PLAN  Krishna Chauhan is a 64 year old male scheduled for Upper GI Endoscopy with small bowel enteroscopy on 7/17/2019 by Dr. Rodriguez in treatment of jejunal stenosis (metastatic cholangiocarcinoma).  PAC referral for risk assessment and optimization for anesthesia with  comorbid conditions of hypertension, hyperlipidemia, CAD s/p SAM placement 6/17/19, atrial fibrillation on Eliquis, anemia, and CKD III:    Pre-operative considerations:  1.  Cardiac:  Functional status- METS 3.  Low risk surgery with 0.9% (RCRI #) risk of major adverse cardiac event.   2.  Pulm:  Airway feasible.  MARVIN risk: Intermediate  3.  GI:  Risk of PONV score = 1.  If > 2, anti-emetic intervention recommended.    VTE risk: 3% (age, gender and cancer)    #Cardiac   - Hypertension, will hold losartan DOS and take 20mg (half dose) of Lasix DOS.  - Will take metoprolol DOS.    - Recent PCI with SAM to OM2 6/17/19, on Plavix will continue and will take on DOS.    - Atrial fibrillation with RVR after Chest pain with elevated troponins now on Eliquis, will hold 48 hours prior to DOS.    - Echo 5/20/19 with EF of 35%, normal RV function and moderate Aortic stenosis.  Murmur noted on exam today.  - Denies CP, SOB, LYNCH, palpitations, lightheadedness, syncope    #Pulmonary - never smoker, denies symptoms    #Heme - Anemia with recent Hgb on 6/21/19 of 10.4    #Renal - CKD stage 3, with recent Creatinine of 1.66 on 6/21/2019    #GI - procedure as above    Patient is optimized and is acceptable candidate for the proposed procedure.  No further diagnostic evaluation is needed.         Patient was discussed with Dr Dean.    Evelyn Madison PA-C  Preoperative Assessment Center  [facility]  [facility]  Phone: [phone]  Fax: [phone]

## 2019-06-24 NOTE — Clinical Note
6/24/2019       RE: Girish Chauhan  [address] [facility] [id number]     Dear Colleague,    Thank you for referring your patient, Girish Chauhan, to the [facility]. Please see a copy of my visit note below.    Girish Chauhan is here today in follow-up of recurrent cholangiocarcinoma.    He has disease that recurred in his bladder (proven on biopsy) and probably within his peritoneum.  He had a long period of control on gemcitabine (originally with cisplatin which was dropped for renal injury a year ago) until he developed TTP several months ago.  He was switched to Xeloda, and a few days after starting that was hospitalized with a myocardial infarction at an outside institution.  He was found to have a critical stenosis which could not immediately be stented due to difficult anatomy, but was eventually stented weeks later.  He continued the Xeloda for several days after his initial presentation with the MI without any ongoing cardiac symptoms or evidence of ongoing ischemia.  He reports having had several days of fever after his coronary stenting last week but that is resolved.  He currently has no ongoing chest pains, dyspnea, palpitations or other cardiopulmonary symptoms.    He has also developed problems with apparent stenosis of his biliary small bowel anastomosis and has a planned repeat procedure in a couple of weeks to try again to restent that.      In the last 2 to 4 weeks he has developed burning pain in his feet which he attributes to gout.  He reports it hurts him to walk but also bothers him at night when he is in bed.  The feet may have been a little bit red.  This started primarily in his toes but seems to be working its way up his ankles now.  He has associated with this a significant degree of numbness as well.  He has no symptoms in his hands and is noted no problems with his hearing.    He has lost a moderate amount of weight over the last month or so which he  attributes to having many days related to the above problems when he was kept n.p.o.  He also however reports that he is just not eating as much because he seems to have less interest.  He does not have any significant symptoms when he does eat.  He is moving his bowels without difficulty.    Remainder of a complete review of systems is otherwise negative.    On physical exam he appears a little bit cachectic.  His weight is down several kilos over the last month.  His vital signs are otherwise unremarkable.  He has no scleral icterus and no visible lesion in the oropharynx.  He has no palpable adenopathy in the neck or supraclavicular spaces.  His thyroid is palpable and unremarkable.  His lungs are clear to auscultation without dullness to percussion.  His heart rate and rhythm are regular with a loud coarse systolic murmur.  His jugular venous pressure appears normal.  His abdomen is soft and nontender without palpable mass organomegaly.  He has no peripheral edema and no tenderness in his calves or thighs.  He has minimal tenderness across his feet and ankles to light touch.  With his legs dependent he has modest vascular congestion but no real erythema or warmth.  There are no clear joint effusions or tenderness localizing to the joints.  His cranial nerves are grossly intact.  His gait and station are relatively unremarkable but he is walking with the aid of a walker because of the pain in his feet.    I reviewed with patient and his wife his CT scan.  The tumor in his bladder is not apparent.  He has postoperative changes in the resection bed and in the peritoneum that may be scarring versus peritoneal carcinoma.  That appears unchanged.  He has mild biliary dilatation improved from prior.  His CA-19-9 is risen further up to about 220.Concurrent with that his alkaline phosphatase is fairly markedly elevated with a bilirubin that was elevated but is now back down to normal since his stenting.  Remainder of his  lab work is notable for a marginally elevated white count, stability of his chronic renal insufficiency and otherwise unremarkable    Assessment/plan:  1.  Metastatic cholangiocarcinoma.  At present it is not clear that he has progressive disease.  Certainly radiographically things are not worse but the nature of his disease makes it hard to .  His tumor marker is elevated but this is in the context of biliary obstruction so I am not sure how much of that is a function of his biliary obstruction versus tumor progression.  He is losing weight and his appetite is fallen off, but I am unclear and how much of that could represent effects of his tumor versus his other active medical problems.  Further complicating decision making is is having an MI a few days after starting Xeloda.  This seems related to a fixed coronary lesion in fact he had no evidence of ongoing ischemia when he continued on the Xeloda after this event before we found out about it.  This all suggested perhaps this was unrelated and just coincidental but makes me reluctant to give him further fluoropyrimidine.  We had a very extensive discussion about this and at present given that there is not clear evidence of tumor progression we will defer starting active treatment back up at this point.  In 2 months when we reevaluate if he does have more clear-cut tumor progression we could consider resuming treatment with fluoropyrimidine but if we are going to do that I would probably use IV therapy and give him his initial dose in the hospital or we could monitor him closely. We talked about what alternative therapies might be available.  These mostly would be research studies and his recent myocardial infarction is likely to disqualify him for anything for at least the next few months.  2.  Progressive lower extremity pain and numbness.  Seems to be much more like neuropathy than the gout to which the patient attributes it.  He has follow-up with his  primary care physician later today who can work this up further.  This is way too far out from his cisplatin to blame on that it is been more than a year since his exposure and is only developed in the last few weeks.  I cannot elicit a clear precipitating factor for this.  He attributes it to something that happened with his cardiac events but I am not sure how to tie those together.  The only way could tie this back to his tumors if this is an unusual paraneoplastic event.  We will see what his primary care physician's evaluation turns up.  3.  Recent myocardial infarction and paroxysmal A. fib.  This all appears well controlled at present.         Again, thank you for allowing me to participate in the care of your patient.      Sincerely,    Naresh De Los Santos MD

## 2019-06-24 NOTE — PROGRESS NOTES
Preoperative Assessment Center medication history for June 24, 2019 is complete.    See Epic admission navigator for prior to admission medications.   Operating room staff will still need to confirm medications and last dose information on day of surgery.     Medication history interview sources:  Patient Interview    Changes made to PTA medication list (reason)  Added:   -- atorvastatin per patient     Deleted:   -- fish oil  -- levofloxacin   -- allegra    Changed: None    Additional medication history information (including reliability of information, actions taken by pharmacist):    -- No recent (within 30 days) course of antibiotics  -- No recent (within 30 days) course of steroids  -- No recent (within 30 days) chronic daily medications stopped   -- Patient declines being on any other prescription or over-the-counter medications    Prior to Admission medications    Medication Sig Last Dose Taking? Auth Provider   Apixaban (ELIQUIS PO) Take 5 mg by mouth 2 times daily Taking Yes Reported, Patient   atorvastatin (LIPITOR) 40 MG tablet Take 40 mg by mouth daily Taking Yes Unknown, Entered By History   Clopidogrel Bisulfate (PLAVIX PO) Take 75 mg by mouth daily  Taking Yes Reported, Patient   diclofenac (VOLTAREN) 1 % GEL topical gel Apply two grams to the affected area, up to four times daily. Taking Yes Reported, Patient   FUROSEMIDE PO Take 40 mg by mouth daily  Taking Yes Reported, Patient   losartan (COZAAR) 25 MG tablet Take 25 mg by mouth daily  Taking Yes Reported, Patient   metoprolol tartrate (LOPRESSOR) 100 MG tablet Take 150 mg by mouth 2 times daily Taking Yes Unknown, Entered By History   multivitamin, therapeutic with minerals (MULTI-VITAMIN) TABS Take 1 tablet by mouth daily Taking Yes Floyd Quintero MD   calcium carbonate (TUMS) 500 MG chewable tablet Take 1 chew tab by mouth 4 times daily as needed for heartburn   Unknown, Entered By History   Colchicine (MITIGARE) 0.6 MG CAPS Take 0.6 mg by  mouth 3 times daily as needed Not Taking  Reported, Patient   Nitroglycerin (NITROSTAT SL) Place 0.4 mg under the tongue every 5 minutes as needed for chest pain (Carries medication - has never used)  Not Taking  Reported, Patient         Medication history completed by: Lawson Martinez RPH

## 2019-06-24 NOTE — PHARMACY - PREOPERATIVE ASSESSMENT CENTER
Anticoagulation Note - Preoperative Assessment Center (PAC) Pharmacist     Patient seen and interviewed during time of PAC Clinic appointment June 24, 2019.  The purpose of this note is to document the perioperative anticoagulation plan outlined by the providers caring for Girish Chauhan.     Current Regimen  Anticoagulation Regimen as of June 24, 2019: Apixaban 5mg by mouth twice daily   Indication: afib  Expected Duration of therapy: indefinite  Current medications that may interact with this include: plavix    Perioperative plan  Girish Chauhan is scheduled for upper gastrointestinal endoscopy, small bowel enteroscopy on 7/17/19 with Dr. Rodriguez and the perioperative anticoagulation plan outlined by the PAC clinic is to continue plavix (confirming with Dr. Rodriguez if ok) and holding apixaban 48 hours prior to the procedure.     Resumption of anticoagulation after procedure will be based on surgery team assessment of bleeding risks and complications.  This plan may require re-assessment and modification by his primary team in the perioperative setting depending on patients clinical situation.        Lawson Martinez RPH  June 24, 2019  9:05 AM

## 2019-06-24 NOTE — ANESTHESIA PREPROCEDURE EVALUATION
Anesthesia Pre-Procedure Evaluation    Patient: Girish Chauhan   MRN:     [id number] Gender:   male   Age:    64 year old :      1955        Preoperative Diagnosis: * No surgery found *        Past Medical History:   Diagnosis Date     Aortic stenosis due to bicuspid aortic valve      Atrial fibrillation (H)     hx of paroxysmal atrial fibrillation since approximately . S/p cardioversion in  with recurrent atrial fibrillation s/p repeat cardioversion 14.     Basal cell carcinoma 2016    right shoulder and right posterior calf s/p electrodessication and curretage 2016.     CAD (coronary artery disease) 2011    s/p angiogram 2011 s/p percutaneous intervention on the LAD with multiple drug-eluting stents complicated by a small perforation in the diagonal branch which sealed spontaneously.  Patient with significant Circumflex stenosis which is being treated conservatively.     Gout     affects left foot 2-3 times per year     Hyperlipidemia      Hypertension      Liver disease      Melanoma (H) 2015    back s/p resection 2015     Squamous cell carcinoma     nose s/p excision      Past Surgical History:   Procedure Laterality Date     ------------OTHER-------------      multiple skin cancer resections     CARDIOVERSION  , 2014     ENDOSCOPIC RETROGRADE CHOLANGIOPANCREATOGRAM N/A 2016    Procedure: COMBINED ENDOSCOPIC RETROGRADE CHOLANGIOPANCREATOGRAPHY, PLACE TUBE/STENT;  Surgeon: Rafa Andrade MD;  Location: [facility] OR     ENDOSCOPIC RETROGRADE CHOLANGIOPANCREATOGRAPHY  2014     ENDOSCOPIC ULTRASOUND UPPER GASTROINTESTINAL TRACT (GI) N/A 2019    Procedure: ENDOSCOPIC ULTRASOUND, with hot axios stent placement;  Surgeon: Gabino Rodriguez MD;  Location: [facility] OR     ENTEROSCOPY SMALL BOWEL N/A 2019    Procedure: ENTEROSCOPY;  Surgeon: Gabino Rodriguez MD;  Location: [facility] OR     EXPLORE COMMON BILE DUCT N/A 3/8/2016    Procedure: EXPLORE COMMON BILE  DUCT;  Surgeon: Jose Eduardo Pinedo MD;  Location: [facility] OR     HEPATECTOMY PARTIAL Left 3/8/2016    Procedure: HEPATECTOMY PARTIAL;  Surgeon: Jose Eduardo Pinedo MD;  Location: [facility] OR     INSERT PORT VASCULAR ACCESS Right 12/8/2017    Procedure: INSERT PORT VASCULAR ACCESS;  Place single lumen venous chest port - right;  Surgeon: Drew Powell PA-C;  Location: [facility] OR     SOFT TISSUE SURGERY       STENT  12/2011    LAD with multiple SAM          Anesthesia Evaluation     . Pt has had prior anesthetic. Type: General and MAC    No history of anesthetic complications          ROS/MED HX    ENT/Pulmonary:      (-) recent URI   Neurologic:  - neg neurologic ROS     Cardiovascular:     (+) Dyslipidemia, hypertension--CAD, -past MI,-stent,6/17/2019  1 Drug Eluting Stent .. Taking blood thinners Pt has received instructions: Instructions Given to patient: continue plavix, hold eliquis 48 hours prior to DOS. . . :. dysrhythmias a-fib, . Previous cardiac testing Echodate:5/20/2019results:date: results: date: results:Cath date: 6/17/2019 results:          METS/Exercise Tolerance:  3 - Able to walk 1-2 blocks without stopping   Hematologic:     (+) Anemia, History of Transfusion no previous transfusion reaction -      Musculoskeletal: Comment: gout        GI/Hepatic:     (+) liver disease,      (-) GERD   Renal/Genitourinary:     (+) chronic renal disease, type: CRI, Pt does not require dialysis, Pt has no history of transplant,       Endo:  - neg endo ROS       Psychiatric:  - neg psychiatric ROS       Infectious Disease:  - neg infectious disease ROS       Malignancy:   (+) Malignancy History of GI  GI CA Active status post,         Other:    (+) no H/O Chronic Pain,                       PHYSICAL EXAM:   Mental Status/Neuro: A/A/O   Airway: Facies: Collazo  Mallampati: I  Mouth/Opening: Full  TM distance: > 6 cm  Neck ROM: Full   Respiratory: Auscultation: CTAB     Resp. Rate: Normal     Resp. Effort: Normal      CV:  "Rhythm: Irregular  Rate: Age appropriate  Heart: Murmur  Pulses: Normal   Comments:      Dental:  Dental Comments: Pt has a crown upper left molar                Lab Results   Component Value Date    WBC 11.4 (H) 06/21/2019    HGB 10.4 (L) 06/21/2019    HCT 32.3 (L) 06/21/2019     06/21/2019     06/21/2019    POTASSIUM 3.9 06/21/2019    CHLORIDE 102 06/21/2019    CO2 25 06/21/2019    BUN 33 (H) 06/21/2019    CR 1.66 (H) 06/21/2019    GLC 99 06/21/2019    GARY 8.8 06/21/2019    PHOS 3.7 06/05/2019    MAG 2.0 06/09/2019    ALBUMIN 3.1 (L) 06/21/2019    PROTTOTAL 7.3 06/21/2019    ALT 74 (H) 06/21/2019    AST 57 (H) 06/21/2019    ALKPHOS 558 (H) 06/21/2019    BILITOTAL 0.7 06/21/2019    LIPASE 174 02/26/2016    AMYLASE 57 02/26/2016    PTT 33 03/08/2016    INR 1.64 (H) 06/06/2019    FIBR 174 (L) 03/08/2016    TSH 3.88 10/15/2018       Preop Vitals  BP Readings from Last 3 Encounters:   06/24/19 111/78   06/12/19 122/88   06/09/19 (!) 137/100    Pulse Readings from Last 3 Encounters:   06/24/19 83   06/12/19 70   06/09/19 92      Resp Readings from Last 3 Encounters:   06/24/19 18   06/09/19 16   05/23/19 16    SpO2 Readings from Last 3 Encounters:   06/24/19 100%   06/12/19 100%   06/09/19 99%      Temp Readings from Last 1 Encounters:   06/24/19 97.4  F (36.3  C) (Oral)    Ht Readings from Last 1 Encounters:   06/24/19 1.778 m (5' 10\")      Wt Readings from Last 1 Encounters:   06/24/19 73.9 kg (163 lb)    Estimated body mass index is 23.39 kg/m  as calculated from the following:    Height as of an earlier encounter on 6/24/19: 1.778 m (5' 10\").    Weight as of an earlier encounter on 6/24/19: 73.9 kg (163 lb).     LDA:  Peripheral IV 06/05/19 Right Upper forearm (Active)   Site Assessment WDL 6/9/2019 10:00 AM   Line Status Saline locked 6/9/2019 10:00 AM   Phlebitis Scale 0-->no symptoms 6/9/2019 10:00 AM   Infiltration Scale 0 6/9/2019 10:00 AM   Infiltration Site Treatment Method  None 6/9/2019 10:00 " AM   Extravasation? No 6/9/2019 10:00 AM   IV Site Rotation Due Date 06/12/19 6/8/2019  8:00 AM   Number of days: 19       Port A Cath Single 12/08/17 Right Chest wall (Active)   Access Date 06/21/19 6/21/2019  7:00 AM   Access Attempts 1 6/21/2019  7:00 AM   Gauge/Length Power noncoring 90 degree bend 6/21/2019  7:00 AM   Site Assessment WDL 6/21/2019  7:00 AM   Line Status Blood return noted;Heparin locked;Saline locked 6/21/2019  7:00 AM   Extravasation? No 6/12/2019  4:00 PM   Dressing Intervention Transparent 6/21/2019  7:00 AM   Dressing change due 06/12/19 6/9/2019 10:00 AM   Needle Change Due 06/12/19 6/9/2019 10:00 AM   Line Necessity Yes, meets criteria 6/6/2019 12:00 AM   De-Access Date 06/12/19 6/12/2019  4:00 PM   Date to be Reflushed 07/12/19 6/12/2019  4:00 PM   Number of days: 563       Intrathecal/Epidural Catheter 03/08/16 (Active)   Number of days: 1203       Closed/Suction Drain 1 Left Abdomen (Active)   Number of days: 1203       Closed/Suction Drain 2 Left Abdomen (Active)   Number of days: 1203       Biliary Drain (Active)   Number of days: 1203       Biliary Stent 8 Brazilian (Active)   Number of days: 1203            JZG FV AN PLAN NO PONV RULE         PAC Discussion and Assessment    ASA Classification: 4  Case is suitable for: [place]  Anesthetic techniques and relevant risks discussed: GA  Invasive monitoring and risk discussed: No  Types:   Possibility and Risk of blood transfusion discussed: No  NPO instructions given:   Additional anesthetic preparation and risks discussed:   Needs early admission to pre-op area:   Other:     PAC Resident/NP Anesthesia Assessment:  Krishna Chauhan is a 64 year old male scheduled for Upper GI Endoscopy with small bowel enteroscopy on 7/17/2019 by Dr. Rodriguez in treatment of jejunal stenosis (metastatic cholangiocarcinoma).  PAC referral for risk assessment and optimization for anesthesia with comorbid conditions of hypertension, hyperlipidemia, CAD s/p SAM  placement 6/17/19, atrial fibrillation on Eliquis, anemia, and CKD III:    Pre-operative considerations:  1.  Cardiac:  Functional status- METS 3.  Low risk surgery with 0.9% (RCRI #) risk of major adverse cardiac event.   2.  Pulm:  Airway feasible.  MARVIN risk: Intermediate  3.  GI:  Risk of PONV score = 1.  If > 2, anti-emetic intervention recommended.    VTE risk: 3% (age, gender and cancer)    #Cardiac - Hypertension, will hold losartan DOS and take 20mg (half dose) of Lasix DOS.  Will take metoprolol DOS.  Recent PCI with SAM to OM2 6/17/19, on Plavix will continue.  Atrial fibrillation with RVR after Chest pain with elevated troponins now on Eliquis, will hold 48 hours prior to DOS.    Echo 5/20/19 with EF of 35%, normal RV function and moderate Aortic stenosis.  Murmur noted on exam today.  Denies CP, SOB, LYNCH, palpitations, lightheadedness, syncope    #Pulmonary - never smoker, denies symptoms    #Heme - Anemia with recent Hgb on 6/21/19 of 10.4    #Renal - CKD stage 3, with recent Creatinine of 1.66 on 6/21/2019    Patient is optimized and is acceptable candidate for the proposed procedure.  No further diagnostic evaluation is needed.     Patient discussed with Dr. Dean. See recommendations below.     **For further details of assessment, testing, and physical exam please see H and P completed on same date.          Evelyn Madison PA-C, [facility]        Mid-Level Provider/Resident:   Date:   Time:     Attending Anesthesiologist Anesthesia Assessment:  STAFF:  64 y.o. man with metastatic cholangiocarcinoma disease for stent exchange and small bowel enteroscopy by Dr. Rodriguez using general anesthesia.   Complex History summarized above. Bicuspid Ao valve with moderate stenosis, but most recently had SAM coronary stent to OM2 in mid-June for chest pain with troponin leak.  Has also been in and out of A. Fib as well.  Currently on Eliquis + Plavix.  Patient MUST continue on PLAVIX to maintain integrity of SAM  stent.... Eliquis may be held x 24 - 48 hours for procedure.   MOST RECENT ECHO (from mid-May this year) =   Mild concentric left ventricular hypertrophy. The estimated left   ventricular ejection fraction is 35%.    Normal right ventricular size and systolic function.    Probable fusion of the right and non-coronary leaflets. Moderate aortic   stenosis. Gradient suggests mild stenosis but visual impression is   moderate stenosis.    The aortic root is mildly dilated. No evidence of coarctation.    Left atrial volume is moderately increased.  Instructions given and questions answered.   Final plans by anesthesiology team on DOS.   ---rcp      Reviewed and Signed by PAC Anesthesiologist  Anesthesiologist: krystal  Date: 6/24/2019  Time:   Pass/Fail: Pass  Disposition:     PAC Pharmacist Assessment:        Pharmacist:   Date:   Time:        Evelyn Madison PA-C

## 2019-06-24 NOTE — PROGRESS NOTES
Girish Chauhan is here today in follow-up of recurrent cholangiocarcinoma.    He has disease that recurred in his bladder (proven on biopsy) and probably within his peritoneum.  He had a long period of control on gemcitabine (originally with cisplatin which was dropped for renal injury a year ago) until he developed TTP several months ago.  He was switched to Xeloda, and a few days after starting that was hospitalized with a myocardial infarction at an outside institution.  He was found to have a critical stenosis which could not immediately be stented due to difficult anatomy, but was eventually stented weeks later.  He continued the Xeloda for several days after his initial presentation with the MI without any ongoing cardiac symptoms or evidence of ongoing ischemia.  He reports having had several days of fever after his coronary stenting last week but that is resolved.  He currently has no ongoing chest pains, dyspnea, palpitations or other cardiopulmonary symptoms.    He has also developed problems with apparent stenosis of his biliary small bowel anastomosis and has a planned repeat procedure in a couple of weeks to try again to restent that.      In the last 2 to 4 weeks he has developed burning pain in his feet which he attributes to gout.  He reports it hurts him to walk but also bothers him at night when he is in bed.  The feet may have been a little bit red.  This started primarily in his toes but seems to be working its way up his ankles now.  He has associated with this a significant degree of numbness as well.  He has no symptoms in his hands and has noted no problems with his hearing.    He has lost a moderate amount of weight over the last month or so which he attributes to having many days related to the above problems when he was kept n.p.o.  He also however reports that he is just not eating as much because he seems to have less interest.  He does not have any significant symptoms when he does eat.   He is moving his bowels without difficulty.    Remainder of a complete review of systems is otherwise negative.    On physical exam he appears a little bit cachectic.  His weight is down several kilos over the last month.  His vital signs are otherwise unremarkable.  He has no scleral icterus and no visible lesion in the oropharynx.  He has no palpable adenopathy in the neck or supraclavicular spaces.  His thyroid is palpable and unremarkable.  His lungs are clear to auscultation without dullness to percussion.  His heart rate and rhythm are regular with a loud coarse systolic murmur.  His jugular venous pressure appears normal.  His abdomen is soft and nontender without palpable mass organomegaly.  He has no peripheral edema and no tenderness in his calves or thighs.  He has minimal tenderness across his feet and ankles to light touch.  With his legs dependent he has modest vascular congestion but no real erythema or warmth.  There are no clear joint effusions or tenderness localizing to the joints.  His cranial nerves are grossly intact.  His gait and station are relatively unremarkable but he is walking with the aid of a walker because of the pain in his feet.    I reviewed with patient and his wife his CT scan.  The tumor in his bladder is not apparent.  He has postoperative changes in the resection bed and in the peritoneum that may be scarring versus peritoneal carcinoma.  That appears unchanged.  He has mild biliary dilatation improved from prior.  His CA-19-9 is risen further up to about 220. Concurrent with that his alkaline phosphatase is fairly markedly elevated with a bilirubin that was elevated but is now back down to normal since his stenting.  Remainder of his lab work is notable for a marginally elevated white count, stability of his chronic renal insufficiency and otherwise unremarkable    Assessment/plan:  1.  Metastatic cholangiocarcinoma.  At present it is not clear that he has progressive  disease.  Certainly radiographically things are not worse but the nature of his disease makes it hard to .  His tumor marker is elevated but this is in the context of biliary obstruction so I am not sure how much of that is a function of his biliary obstruction versus tumor progression.  He is losing weight and his appetite is fallen off, but I am unclear on how much of that could represent effects of his tumor versus his other active medical problems.  Further complicating decision making is his having an MI a few days after starting Xeloda.  This seems related to a fixed coronary lesion. The fact he had no evidence of ongoing ischemia when he continued on the Xeloda after this event (before we found out about it) suggests perhaps this was unrelated to xeloda and just coincidental, but I am reluctant to give him further fluoropyrimidine.  We had a very extensive discussion about this situation and at present given that there is not clear evidence of tumor progression we will defer starting active treatment back up at this point.  In 2 months when we reevaluate, if he does have more clear-cut tumor progression we could consider resuming treatment with fluoropyrimidine but if we are going to do that I would probably use IV therapy and give him his initial dose in the hospital where we could monitor him closely. We talked about what alternative therapies might be available.  These mostly would be research studies and his recent myocardial infarction is likely to disqualify him for anything for at least the next few months.  2.  Progressive lower extremity pain and numbness.  Seems to be much more like neuropathy than the gout to which the patient attributes it.  He has follow-up with his primary care physician later today who can work this up further.  This is way too far out from his cisplatin to blame on that as it has been more than a year since his exposure and has only developed in the last few weeks.  I  cannot elicit a clear precipitating factor for this.  He attributes it to something that happened with his cardiac events but I am not sure how to tie those together.  The only way I could tie this back to his cancer is if this is an unusual paraneoplastic event.  We will see what his primary care physician's evaluation turns up.  3.  Recent myocardial infarction and paroxysmal A. fib.  This all appears well controlled at present.

## 2019-06-24 NOTE — PROGRESS NOTES
That sounds reasonable - per Dr. Rodriguez     Previous Messages      ----- Message -----   From: Evelyn Madison PA-C   Sent: 6/24/2019   8:59 AM   To: Gabino Rodriguez MD     Hi Dr. Rodriguez,     I saw Mr. Chauhan in the PAC this morning in anticipation of his UGI, small bowel enteroscopy with stent exchange with you on 7/17.       He is taking Eliquis 5mg bid for Afib, and is now on Plavix for recent SAM placement on 6/17.  Our plan is to continue Plavix and hold Eliquis 48 hours prior to procedure.  Checking in with you to ensure you are comfortable with that.     Thank you for your input,   Evelyn

## 2019-06-24 NOTE — NURSING NOTE
"Oncology Rooming Note    June 24, 2019 7:11 AM   Girish Chauhan is a 64 year old male who presents for:    Chief Complaint   Patient presents with     Oncology Clinic Visit     Patient with Cholangiocarcinoma here for provider visit      Initial Vitals: /78 (BP Location: Left arm, Patient Position: Chair, Cuff Size: Adult Regular)   Pulse 83   Temp 97.4  F (36.3  C) (Oral)   Resp 18   Ht 1.778 m (5' 10\")   Wt 73.9 kg (163 lb)   SpO2 100%   BMI 23.39 kg/m   Estimated body mass index is 23.39 kg/m  as calculated from the following:    Height as of this encounter: 1.778 m (5' 10\").    Weight as of this encounter: 73.9 kg (163 lb). Body surface area is 1.91 meters squared.  Severe Pain (7) Comment: Data Unavailable   No LMP for male patient.  Allergies reviewed: Yes  Medications reviewed: Yes    Medications: Medication refills not needed today.  Pharmacy name entered into [employer]:    [facility] [address] [place] [address] - [address] AT [facility] - [address] [facility] - [place], [place] - [address]    Clinical concerns:       Airam He CMA              "

## 2019-06-24 NOTE — PATIENT INSTRUCTIONS
Preparing for Your Surgery      Name:  Girish Chauhan   MRN:  [id number]   :  1955   Today's Date:  2019     Arriving for surgery:  Surgery date:  19  Arrival time:  7:25 am  Please come to:       [facility] [address]  [address] [place]  [address]    - ? parking is available in front of the hospital      -    Please proceed to [address] on the 3rd floor. [phone]?     - ?If you are in need of directions, wheelchair or escort please stop at the Information Desk in the lobby.  Inform the information person that you are here for surgery; a wheelchair and escort to [address] will be provided.?     What can I eat or drink?  -  You may have solid food or milk products until 8 hours prior to your surgery (1:25 am).  -  You may have water, apple juice or 7up/Sprite until 2 hours prior to your surgery (7:25 am).    Which medicines can I take?  Stop Aspirin, vitamins and supplements one week prior to surgery.  Hold Ibuprofen for 24 hours and/or Naproxen for 48 hours prior to surgery.     -  Do NOT take these medications in the morning, the day of surgery:  Apixaban (Eliquis) hold 48 hours prior to surgery last dose 19            Calcium Carbonate (Tums)        Losartan (Cozaar)    -  Please take these medications the day of surgery:      Furosemide (Lasix) take half the dose the morning of surgery  Metoprolol (Lopressor)      Clopidogrel Bisulfate (Plavix)      Colchicine (Mitigare)    How do I prepare myself?  -  Take two showers: one the night before surgery; and one the morning of surgery.         Use Scrubcare or Hibiclens to wash from neck down.  You may use your own shampoo and conditioner. No other hair products.   -  Do NOT use lotion, powder, deodorant, or antiperspirant the day of your surgery.  -  Do NOT wear any jewelry.  -  Do not bring your own medications to the hospital.  -  Bring your ID and insurance card.    Questions or  Concerns:  -If you have questions or concerns regarding the day of surgery, please call [phone].     -For questions after surgery please call your surgeons office.           AFTER YOUR SURGERY  Breathing exercises   Breathing exercises help you recover faster. Take deep breaths and let the air out slowly. This will:     Help you wake up after surgery.    Help prevent complications like pneumonia.  Preventing complications will help you go home sooner.   We may give you a breathing device (incentive spirometer) to encourage you to breathe deeply.   Nausea and vomiting   You may feel sick to your stomach after surgery; if so, let your nurse know.    Pain control:  After surgery, you may have pain. Our goal is to help you manage your pain. Pain medicine will help you feel comfortable enough to do activities that will help you heal.  These activities may include breathing exercises, walking and physical therapy.   To help your health care team treat your pain we will ask: 1) If you have pain  2) where it is located 3) describe your pain in your words  Methods of pain control include medications given by mouth, vein or by nerve block for some surgeries.  Sequential Compression Device (SCD) or Pneumo Boots:  You may need to wear SCD S on your legs or feet. These are wraps connected to a machine that pumps in air and releases it. The repeated pumping helps prevent blood clots from forming.

## 2019-06-29 PROBLEM — I50.30 HEART FAILURE WITH PRESERVED EJECTION FRACTION (H): Status: ACTIVE | Noted: 2019-02-25

## 2019-06-29 PROBLEM — E78.2 MIXED HYPERLIPIDEMIA: Status: ACTIVE | Noted: 2019-02-04

## 2019-07-02 ENCOUNTER — OFFICE VISIT - HEALTHEAST (OUTPATIENT)
Dept: CARDIOLOGY | Facility: CLINIC | Age: 64
End: 2019-07-02

## 2019-07-02 DIAGNOSIS — I25.10 CORONARY ARTERY DISEASE INVOLVING NATIVE CORONARY ARTERY OF NATIVE HEART, ANGINA PRESENCE UNSPECIFIED: ICD-10-CM

## 2019-07-02 DIAGNOSIS — I50.22 CHRONIC SYSTOLIC CONGESTIVE HEART FAILURE (H): ICD-10-CM

## 2019-07-02 ASSESSMENT — MIFFLIN-ST. JEOR: SCORE: 1526.68

## 2019-07-08 ENCOUNTER — OFFICE VISIT - HEALTHEAST (OUTPATIENT)
Dept: PODIATRY | Facility: CLINIC | Age: 64
End: 2019-07-08

## 2019-07-08 DIAGNOSIS — M21.6X2 PRONATION DEFORMITY OF BOTH FEET: ICD-10-CM

## 2019-07-08 DIAGNOSIS — M21.6X1 PRONATION DEFORMITY OF BOTH FEET: ICD-10-CM

## 2019-07-08 ASSESSMENT — MIFFLIN-ST. JEOR: SCORE: 1526.74

## 2019-07-16 ENCOUNTER — ANESTHESIA EVENT (OUTPATIENT)
Dept: SURGERY | Facility: CLINIC | Age: 64
End: 2019-07-16
Payer: COMMERCIAL

## 2019-07-16 ASSESSMENT — ENCOUNTER SYMPTOMS: DYSRHYTHMIAS: 1

## 2019-07-16 NOTE — ANESTHESIA PREPROCEDURE EVALUATION
Anesthesia Pre-Procedure Evaluation    Patient: Girish Chauhan   MRN:     [id number] Gender:   male   Age:    64 year old :      1955        Preoperative Diagnosis: * No surgery found *        Past Medical History:   Diagnosis Date     Aortic stenosis due to bicuspid aortic valve      Atrial fibrillation (H)     hx of paroxysmal atrial fibrillation since approximately . S/p cardioversion in  with recurrent atrial fibrillation s/p repeat cardioversion 14.     Basal cell carcinoma 2016    right shoulder and right posterior calf s/p electrodessication and curretage 2016.     CAD (coronary artery disease) 2011    s/p angiogram 2011 s/p percutaneous intervention on the LAD with multiple drug-eluting stents complicated by a small perforation in the diagonal branch which sealed spontaneously.  Patient with significant Circumflex stenosis which is being treated conservatively.     Gout     affects left foot 2-3 times per year     Hyperlipidemia      Hypertension      Liver disease      Melanoma (H) 2015    back s/p resection 2015     Squamous cell carcinoma     nose s/p excision      Past Surgical History:   Procedure Laterality Date     ------------OTHER-------------      multiple skin cancer resections     CARDIOVERSION  , 2014     ENDOSCOPIC RETROGRADE CHOLANGIOPANCREATOGRAM N/A 2016    Procedure: COMBINED ENDOSCOPIC RETROGRADE CHOLANGIOPANCREATOGRAPHY, PLACE TUBE/STENT;  Surgeon: Rafa Andrade MD;  Location: [facility] OR     ENDOSCOPIC RETROGRADE CHOLANGIOPANCREATOGRAPHY  2014     ENDOSCOPIC ULTRASOUND UPPER GASTROINTESTINAL TRACT (GI) N/A 2019    Procedure: ENDOSCOPIC ULTRASOUND, with hot axios stent placement;  Surgeon: Gabino Rodriguez MD;  Location: [facility] OR     ENTEROSCOPY SMALL BOWEL N/A 2019    Procedure: ENTEROSCOPY;  Surgeon: Gabino Rodriguez MD;  Location: [facility] OR     EXPLORE COMMON BILE DUCT N/A 3/8/2016    Procedure: EXPLORE COMMON BILE  DUCT;  Surgeon: Jose Eduardo Pinedo MD;  Location: [facility] OR     HEPATECTOMY PARTIAL Left 3/8/2016    Procedure: HEPATECTOMY PARTIAL;  Surgeon: Jose Eduardo Pinedo MD;  Location: [facility] OR     INSERT PORT VASCULAR ACCESS Right 12/8/2017    Procedure: INSERT PORT VASCULAR ACCESS;  Place single lumen venous chest port - right;  Surgeon: Drew Powell PA-C;  Location: [facility] OR     SOFT TISSUE SURGERY       STENT  12/2011    LAD with multiple SAM          Anesthesia Evaluation     . Pt has had prior anesthetic. Type: General and MAC    No history of anesthetic complications          ROS/MED HX    ENT/Pulmonary:      (-) recent URI   Neurologic:  - neg neurologic ROS     Cardiovascular:     (+) Dyslipidemia, hypertension--CAD, -past MI,-stent,6/17/2019  1 Drug Eluting Stent .. Taking blood thinners Pt has received instructions: Instructions Given to patient: continue plavix, hold eliquis 48 hours prior to DOS. . . :. dysrhythmias a-fib, . Previous cardiac testing Echodate:5/20/2019results:date: results: date: results:Cath date: 6/17/2019 results:          METS/Exercise Tolerance:  3 - Able to walk 1-2 blocks without stopping   Hematologic:     (+) Anemia, History of Transfusion no previous transfusion reaction -      Musculoskeletal: Comment: gout        GI/Hepatic:     (+) liver disease,      (-) GERD   Renal/Genitourinary:     (+) chronic renal disease, type: CRI, Pt does not require dialysis, Pt has no history of transplant,       Endo:  - neg endo ROS       Psychiatric:  - neg psychiatric ROS       Infectious Disease:  - neg infectious disease ROS       Malignancy:   (+) Malignancy History of GI  GI CA Active status post,         Other:    (+) no H/O Chronic Pain,                       PHYSICAL EXAM:   Mental Status/Neuro: A/A/O   Airway: Facies: [place]  Mallampati: I  Mouth/Opening: Full  TM distance: > 6 cm  Neck ROM: Full   Respiratory: Auscultation: CTAB     Resp. Rate: Normal     Resp. Effort: Normal      CV:  "Rhythm: Irregular  Rate: Age appropriate  Heart: Murmur  Pulses: Normal   Comments:      Dental:  Dental Comments: Pt has a crown upper left molar                Lab Results   Component Value Date    WBC 11.4 (H) 06/21/2019    HGB 10.4 (L) 06/21/2019    HCT 32.3 (L) 06/21/2019     06/21/2019     06/21/2019    POTASSIUM 3.9 06/21/2019    CHLORIDE 102 06/21/2019    CO2 25 06/21/2019    BUN 33 (H) 06/21/2019    CR 1.66 (H) 06/21/2019    GLC 99 06/21/2019    GARY 8.8 06/21/2019    PHOS 3.7 06/05/2019    MAG 2.0 06/09/2019    ALBUMIN 3.1 (L) 06/21/2019    PROTTOTAL 7.3 06/21/2019    ALT 74 (H) 06/21/2019    AST 57 (H) 06/21/2019    ALKPHOS 558 (H) 06/21/2019    BILITOTAL 0.7 06/21/2019    LIPASE 174 02/26/2016    AMYLASE 57 02/26/2016    PTT 33 03/08/2016    INR 1.64 (H) 06/06/2019    FIBR 174 (L) 03/08/2016    TSH 3.88 10/15/2018       Preop Vitals  BP Readings from Last 3 Encounters:   06/24/19 111/78   06/24/19 111/78   06/24/19 111/78    Pulse Readings from Last 3 Encounters:   06/24/19 78   06/24/19 78   06/24/19 83      Resp Readings from Last 3 Encounters:   06/24/19 16   06/24/19 16   06/24/19 18    SpO2 Readings from Last 3 Encounters:   06/24/19 100%   06/24/19 100%   06/24/19 100%      Temp Readings from Last 1 Encounters:   06/24/19 36.6  C (97.8  F) (Oral)    Ht Readings from Last 1 Encounters:   06/24/19 1.778 m (5' 10\")      Wt Readings from Last 1 Encounters:   06/24/19 73.9 kg (162 lb 14.7 oz)    Estimated body mass index is 23.38 kg/m  as calculated from the following:    Height as of 6/24/19: 1.778 m (5' 10\").    Weight as of 6/24/19: 73.9 kg (162 lb 14.7 oz).     LDA:  Peripheral IV 06/05/19 Right Upper forearm (Active)   Number of days: 41       Port A Cath Single 12/08/17 Right Chest wall (Active)   Number of days: 585       Intrathecal/Epidural Catheter 03/08/16 (Active)   Number of days: 1225       Closed/Suction Drain 1 Left Abdomen (Active)   Number of days: 1225       Closed/Suction " Drain 2 Left Abdomen (Active)   Number of days: 1225       Biliary Drain (Active)   Number of days: 1225       Biliary Stent 8 Martiniquais (Active)   Number of days: 1225            Assessment:   ASA SCORE: 3       Documentation: H&P complete; Preop Testing complete; Consents complete   Proceeding: Proceed without further delay  Tobacco Use:  NO Active use of Tobacco/UNKNOWN Tobacco use status     Plan:   Anes. Type:  General   Pre-Induction: Midazolam IV   Induction:  IV (RSI); Etomidate   Airway: Oral ETT; CMAC/VL   Access/Monitoring: PIV   Maintenance: Balanced   Emergence: Procedure Site   Logistics: Same Day Surgery     Postop Pain/Sedation Strategy:  Standard-Options: Opioids PRN     PONV Management:  Adult Risk Factors:, Non-Smoker, Postop Opioids  Prevention: Ondansetron; Dexamethasone                    PAC Discussion and Assessment    ASA Classification: 4  Case is suitable for: [place]  Anesthetic techniques and relevant risks discussed: GA  Invasive monitoring and risk discussed: No  Types:   Possibility and Risk of blood transfusion discussed: No  NPO instructions given:   Additional anesthetic preparation and risks discussed:   Needs early admission to pre-op area:   Other:     PAC Resident/NP Anesthesia Assessment:  Krishna Chauhan is a 64 year old male scheduled for Upper GI Endoscopy with small bowel enteroscopy on 7/17/2019 by Dr. Rodriguez in treatment of jejunal stenosis (metastatic cholangiocarcinoma).  PAC referral for risk assessment and optimization for anesthesia with comorbid conditions of hypertension, hyperlipidemia, CAD s/p SAM placement 6/17/19, atrial fibrillation on Eliquis, anemia, and CKD III:    Pre-operative considerations:  1.  Cardiac:  Functional status- METS 3.  Low risk surgery with 0.9% (RCRI #) risk of major adverse cardiac event.   2.  Pulm:  Airway feasible.  MARVIN risk: Intermediate  3.  GI:  Risk of PONV score = 1.  If > 2, anti-emetic intervention recommended.    VTE risk: 3%  (age, gender and cancer)    #Cardiac - Hypertension, will hold losartan DOS and take 20mg (half dose) of Lasix DOS.  Will take metoprolol DOS.  Recent PCI with SAM to OM2 6/17/19, on Plavix will continue.  Atrial fibrillation with RVR after Chest pain with elevated troponins now on Eliquis, will hold 48 hours prior to DOS.    Echo 5/20/19 with EF of 35%, normal RV function and moderate Aortic stenosis.  Murmur noted on exam today.  Denies CP, SOB, LYNCH, palpitations, lightheadedness, syncope    #Pulmonary - never smoker, denies symptoms    #Heme - Anemia with recent Hgb on 6/21/19 of 10.4    #Renal - CKD stage 3, with recent Creatinine of 1.66 on 6/21/2019    Patient is optimized and is acceptable candidate for the proposed procedure.  No further diagnostic evaluation is needed.     Patient discussed with Dr. Dean. See recommendations below.     **For further details of assessment, testing, and physical exam please see H and P completed on same date.          Evelyn Madison PA-C, [facility]        Mid-Level Provider/Resident:   Date:   Time:     Attending Anesthesiologist Anesthesia Assessment:  STAFF:  64 y.o. man with metastatic cholangiocarcinoma disease for stent exchange and small bowel enteroscopy by Dr. Rodriguez using general anesthesia.   Complex History summarized above. Bicuspid Ao valve with moderate stenosis, but most recently had SAM coronary stent to OM2 in mid-June for chest pain with troponin leak.  Has also been in and out of A. Fib as well.  Currently on Eliquis + Plavix.  Patient MUST continue on PLAVIX to maintain integrity of SAM stent.... Eliquis may be held x 24 - 48 hours for procedure.   MOST RECENT ECHO (from mid-May this year) =   Mild concentric left ventricular hypertrophy. The estimated left   ventricular ejection fraction is 35%.    Normal right ventricular size and systolic function.    Probable fusion of the right and non-coronary leaflets. Moderate aortic   stenosis. Gradient suggests  mild stenosis but visual impression is   moderate stenosis.    The aortic root is mildly dilated. No evidence of coarctation.    Left atrial volume is moderately increased.  Instructions given and questions answered.   Final plans by anesthesiology team on DOS.   ---rcp      Reviewed and Signed by PAC Anesthesiologist  Anesthesiologist: krystal  Date: 6/24/2019  Time:   Pass/Fail: Pass  Disposition:     PAC Pharmacist Assessment:        Pharmacist:   Date:   Time:        Lakhwinder May MD

## 2019-07-17 ENCOUNTER — RECORDS - HEALTHEAST (OUTPATIENT)
Dept: ADMINISTRATIVE | Facility: OTHER | Age: 64
End: 2019-07-17

## 2019-07-17 ENCOUNTER — HOSPITAL ENCOUNTER (OUTPATIENT)
Facility: CLINIC | Age: 64
Discharge: HOME | End: 2019-07-17
Attending: INTERNAL MEDICINE | Admitting: INTERNAL MEDICINE
Payer: COMMERCIAL

## 2019-07-17 ENCOUNTER — APPOINTMENT (OUTPATIENT)
Dept: GENERAL RADIOLOGY | Facility: CLINIC | Age: 64
End: 2019-07-17
Attending: INTERNAL MEDICINE
Payer: COMMERCIAL

## 2019-07-17 ENCOUNTER — ANESTHESIA (OUTPATIENT)
Dept: SURGERY | Facility: CLINIC | Age: 64
End: 2019-07-17
Payer: COMMERCIAL

## 2019-07-17 VITALS
RESPIRATION RATE: 16 BRPM | DIASTOLIC BLOOD PRESSURE: 87 MMHG | OXYGEN SATURATION: 99 % | SYSTOLIC BLOOD PRESSURE: 127 MMHG | HEIGHT: 70 IN | BODY MASS INDEX: 23.32 KG/M2 | TEMPERATURE: 97.9 F | WEIGHT: 162.92 LBS | HEART RATE: 76 BPM

## 2019-07-17 LAB
ALBUMIN SERPL-MCNC: 3.1 G/DL (ref 3.4–5)
ALP SERPL-CCNC: 306 U/L (ref 40–150)
ALT SERPL W P-5'-P-CCNC: 45 U/L (ref 0–70)
AMYLASE SERPL-CCNC: 84 U/L (ref 30–110)
ANION GAP SERPL CALCULATED.3IONS-SCNC: 7 MMOL/L (ref 3–14)
AST SERPL W P-5'-P-CCNC: 29 U/L (ref 0–45)
BILIRUB SERPL-MCNC: 0.7 MG/DL (ref 0.2–1.3)
BUN SERPL-MCNC: 20 MG/DL (ref 7–30)
CALCIUM SERPL-MCNC: 9.2 MG/DL (ref 8.5–10.1)
CHLORIDE SERPL-SCNC: 107 MMOL/L (ref 94–109)
CO2 SERPL-SCNC: 25 MMOL/L (ref 20–32)
CREAT SERPL-MCNC: 1.43 MG/DL (ref 0.66–1.25)
ERYTHROCYTE [DISTWIDTH] IN BLOOD BY AUTOMATED COUNT: 14 % (ref 10–15)
GFR SERPL CREATININE-BSD FRML MDRD: 52 ML/MIN/{1.73_M2}
GLUCOSE BLDC GLUCOMTR-MCNC: 89 MG/DL (ref 70–99)
GLUCOSE SERPL-MCNC: 99 MG/DL (ref 70–99)
HCT VFR BLD AUTO: 32.4 % (ref 40–53)
HGB BLD-MCNC: 10 G/DL (ref 13.3–17.7)
INR PPP: 1.28 (ref 0.86–1.14)
INTERPRETATION ECG - MUSE: NORMAL
LIPASE SERPL-CCNC: 182 U/L (ref 73–393)
MCH RBC QN AUTO: 30.1 PG (ref 26.5–33)
MCHC RBC AUTO-ENTMCNC: 30.9 G/DL (ref 31.5–36.5)
MCV RBC AUTO: 98 FL (ref 78–100)
PLATELET # BLD AUTO: 172 10E9/L (ref 150–450)
POTASSIUM SERPL-SCNC: 4.8 MMOL/L (ref 3.4–5.3)
PROT SERPL-MCNC: 6.9 G/DL (ref 6.8–8.8)
RBC # BLD AUTO: 3.32 10E12/L (ref 4.4–5.9)
SODIUM SERPL-SCNC: 140 MMOL/L (ref 133–144)
UPPER GI ENDOSCOPY: NORMAL
WBC # BLD AUTO: 7.1 10E9/L (ref 4–11)

## 2019-07-17 PROCEDURE — 27210794 ZZH OR GENERAL SUPPLY STERILE: Performed by: INTERNAL MEDICINE

## 2019-07-17 PROCEDURE — 83690 ASSAY OF LIPASE: CPT | Performed by: INTERNAL MEDICINE

## 2019-07-17 PROCEDURE — 36000051 ZZH SURGERY LEVEL 2 1ST 30 MIN - UMMC: Performed by: INTERNAL MEDICINE

## 2019-07-17 PROCEDURE — 40000170 ZZH STATISTIC PRE-PROCEDURE ASSESSMENT II: Performed by: INTERNAL MEDICINE

## 2019-07-17 PROCEDURE — 25000125 ZZHC RX 250: Performed by: NURSE ANESTHETIST, CERTIFIED REGISTERED

## 2019-07-17 PROCEDURE — 25000565 ZZH ISOFLURANE, EA 15 MIN: Performed by: INTERNAL MEDICINE

## 2019-07-17 PROCEDURE — 93010 ELECTROCARDIOGRAM REPORT: CPT | Mod: 59 | Performed by: INTERNAL MEDICINE

## 2019-07-17 PROCEDURE — 40000065 ZZH STATISTIC EKG NON-CHARGEABLE

## 2019-07-17 PROCEDURE — 25000128 H RX IP 250 OP 636: Performed by: NURSE ANESTHETIST, CERTIFIED REGISTERED

## 2019-07-17 PROCEDURE — 37000009 ZZH ANESTHESIA TECHNICAL FEE, EACH ADDTL 15 MIN: Performed by: INTERNAL MEDICINE

## 2019-07-17 PROCEDURE — 85027 COMPLETE CBC AUTOMATED: CPT | Performed by: INTERNAL MEDICINE

## 2019-07-17 PROCEDURE — 37000008 ZZH ANESTHESIA TECHNICAL FEE, 1ST 30 MIN: Performed by: INTERNAL MEDICINE

## 2019-07-17 PROCEDURE — 85610 PROTHROMBIN TIME: CPT | Performed by: INTERNAL MEDICINE

## 2019-07-17 PROCEDURE — 25000125 ZZHC RX 250: Performed by: INTERNAL MEDICINE

## 2019-07-17 PROCEDURE — C1876 STENT, NON-COA/NON-COV W/DEL: HCPCS | Performed by: INTERNAL MEDICINE

## 2019-07-17 PROCEDURE — 71000012 ZZH RECOVERY PHASE 1 LEVEL 1 FIRST HR: Performed by: INTERNAL MEDICINE

## 2019-07-17 PROCEDURE — 25000128 H RX IP 250 OP 636: Performed by: INTERNAL MEDICINE

## 2019-07-17 PROCEDURE — 36415 COLL VENOUS BLD VENIPUNCTURE: CPT | Performed by: INTERNAL MEDICINE

## 2019-07-17 PROCEDURE — C1769 GUIDE WIRE: HCPCS | Performed by: INTERNAL MEDICINE

## 2019-07-17 PROCEDURE — 82150 ASSAY OF AMYLASE: CPT | Performed by: INTERNAL MEDICINE

## 2019-07-17 PROCEDURE — 40000277 XR SURGERY CARM FLUORO LESS THAN 5 MIN W STILLS: Mod: TC

## 2019-07-17 PROCEDURE — 82962 GLUCOSE BLOOD TEST: CPT

## 2019-07-17 PROCEDURE — 36000053 ZZH SURGERY LEVEL 2 EA 15 ADDTL MIN - UMMC: Performed by: INTERNAL MEDICINE

## 2019-07-17 PROCEDURE — 25800030 ZZH RX IP 258 OP 636: Performed by: NURSE ANESTHETIST, CERTIFIED REGISTERED

## 2019-07-17 PROCEDURE — 71000027 ZZH RECOVERY PHASE 2 EACH 15 MINS: Performed by: INTERNAL MEDICINE

## 2019-07-17 PROCEDURE — 80053 COMPREHEN METABOLIC PANEL: CPT | Performed by: INTERNAL MEDICINE

## 2019-07-17 PROCEDURE — 25800030 ZZH RX IP 258 OP 636: Performed by: ANESTHESIOLOGY

## 2019-07-17 DEVICE — STENT SOLUS BILIARY 10FRX01CM DBL PIGTAIL W/INTRO G26829
Type: IMPLANTABLE DEVICE | Site: STOMACH | Status: NON-FUNCTIONAL
Removed: 2019-07-29

## 2019-07-17 RX ORDER — ONDANSETRON 2 MG/ML
4 INJECTION INTRAMUSCULAR; INTRAVENOUS EVERY 30 MIN PRN
Status: DISCONTINUED | OUTPATIENT
Start: 2019-07-17 | End: 2019-07-22 | Stop reason: HOSPADM

## 2019-07-17 RX ORDER — NALOXONE HYDROCHLORIDE 0.4 MG/ML
.1-.4 INJECTION, SOLUTION INTRAMUSCULAR; INTRAVENOUS; SUBCUTANEOUS
Status: DISCONTINUED | OUTPATIENT
Start: 2019-07-17 | End: 2019-07-18 | Stop reason: HOSPADM

## 2019-07-17 RX ORDER — ONDANSETRON 4 MG/1
4 TABLET, ORALLY DISINTEGRATING ORAL EVERY 30 MIN PRN
Status: DISCONTINUED | OUTPATIENT
Start: 2019-07-17 | End: 2019-07-22 | Stop reason: HOSPADM

## 2019-07-17 RX ORDER — ONDANSETRON 2 MG/ML
INJECTION INTRAMUSCULAR; INTRAVENOUS PRN
Status: DISCONTINUED | OUTPATIENT
Start: 2019-07-17 | End: 2019-07-17

## 2019-07-17 RX ORDER — SODIUM CHLORIDE, SODIUM LACTATE, POTASSIUM CHLORIDE, CALCIUM CHLORIDE 600; 310; 30; 20 MG/100ML; MG/100ML; MG/100ML; MG/100ML
INJECTION, SOLUTION INTRAVENOUS CONTINUOUS
Status: DISCONTINUED | OUTPATIENT
Start: 2019-07-17 | End: 2019-07-17

## 2019-07-17 RX ORDER — FENTANYL CITRATE 50 UG/ML
25-50 INJECTION, SOLUTION INTRAMUSCULAR; INTRAVENOUS
Status: DISCONTINUED | OUTPATIENT
Start: 2019-07-17 | End: 2019-07-17 | Stop reason: HOSPADM

## 2019-07-17 RX ORDER — FENTANYL CITRATE 50 UG/ML
INJECTION, SOLUTION INTRAMUSCULAR; INTRAVENOUS PRN
Status: DISCONTINUED | OUTPATIENT
Start: 2019-07-17 | End: 2019-07-17

## 2019-07-17 RX ORDER — LIDOCAINE 40 MG/G
CREAM TOPICAL
Status: DISCONTINUED | OUTPATIENT
Start: 2019-07-17 | End: 2019-07-22 | Stop reason: HOSPADM

## 2019-07-17 RX ORDER — ONDANSETRON 4 MG/1
4 TABLET, ORALLY DISINTEGRATING ORAL EVERY 6 HOURS PRN
Status: DISCONTINUED | OUTPATIENT
Start: 2019-07-17 | End: 2019-07-22 | Stop reason: HOSPADM

## 2019-07-17 RX ORDER — SODIUM CHLORIDE, SODIUM LACTATE, POTASSIUM CHLORIDE, CALCIUM CHLORIDE 600; 310; 30; 20 MG/100ML; MG/100ML; MG/100ML; MG/100ML
INJECTION, SOLUTION INTRAVENOUS CONTINUOUS
Status: DISCONTINUED | OUTPATIENT
Start: 2019-07-17 | End: 2019-07-22 | Stop reason: HOSPADM

## 2019-07-17 RX ORDER — PROPOFOL 10 MG/ML
INJECTION, EMULSION INTRAVENOUS PRN
Status: DISCONTINUED | OUTPATIENT
Start: 2019-07-17 | End: 2019-07-17

## 2019-07-17 RX ORDER — ONDANSETRON 2 MG/ML
4 INJECTION INTRAMUSCULAR; INTRAVENOUS EVERY 6 HOURS PRN
Status: DISCONTINUED | OUTPATIENT
Start: 2019-07-17 | End: 2019-07-22 | Stop reason: HOSPADM

## 2019-07-17 RX ORDER — FLUMAZENIL 0.1 MG/ML
0.2 INJECTION, SOLUTION INTRAVENOUS
Status: ACTIVE | OUTPATIENT
Start: 2019-07-17 | End: 2019-07-17

## 2019-07-17 RX ORDER — LIDOCAINE 40 MG/G
CREAM TOPICAL
Status: DISCONTINUED | OUTPATIENT
Start: 2019-07-17 | End: 2019-07-17 | Stop reason: HOSPADM

## 2019-07-17 RX ORDER — ALLOPURINOL 100 MG/1
100 TABLET ORAL EVERY EVENING
COMMUNITY

## 2019-07-17 RX ORDER — HEPARIN SODIUM (PORCINE) LOCK FLUSH IV SOLN 100 UNIT/ML 100 UNIT/ML
5 SOLUTION INTRAVENOUS
Status: DISCONTINUED | OUTPATIENT
Start: 2019-07-17 | End: 2019-07-22 | Stop reason: HOSPADM

## 2019-07-17 RX ORDER — SODIUM CHLORIDE, SODIUM LACTATE, POTASSIUM CHLORIDE, CALCIUM CHLORIDE 600; 310; 30; 20 MG/100ML; MG/100ML; MG/100ML; MG/100ML
INJECTION, SOLUTION INTRAVENOUS CONTINUOUS
Status: DISCONTINUED | OUTPATIENT
Start: 2019-07-17 | End: 2019-07-17 | Stop reason: HOSPADM

## 2019-07-17 RX ADMIN — MIDAZOLAM 1 MG: 1 INJECTION INTRAMUSCULAR; INTRAVENOUS at 08:57

## 2019-07-17 RX ADMIN — PROPOFOL 100 MG: 10 INJECTION, EMULSION INTRAVENOUS at 09:46

## 2019-07-17 RX ADMIN — PROPOFOL 60 MG: 10 INJECTION, EMULSION INTRAVENOUS at 09:11

## 2019-07-17 RX ADMIN — PHENYLEPHRINE HYDROCHLORIDE 100 MCG: 10 INJECTION INTRAVENOUS at 09:36

## 2019-07-17 RX ADMIN — HEPARIN SODIUM (PORCINE) LOCK FLUSH IV SOLN 100 UNIT/ML 5 ML: 100 SOLUTION at 12:14

## 2019-07-17 RX ADMIN — ROCURONIUM BROMIDE 40 MG: 10 INJECTION INTRAVENOUS at 09:08

## 2019-07-17 RX ADMIN — SODIUM CHLORIDE, POTASSIUM CHLORIDE, SODIUM LACTATE AND CALCIUM CHLORIDE: 600; 310; 30; 20 INJECTION, SOLUTION INTRAVENOUS at 08:57

## 2019-07-17 RX ADMIN — PHENYLEPHRINE HYDROCHLORIDE 100 MCG: 10 INJECTION INTRAVENOUS at 09:25

## 2019-07-17 RX ADMIN — FENTANYL CITRATE 100 MCG: 50 INJECTION, SOLUTION INTRAMUSCULAR; INTRAVENOUS at 09:08

## 2019-07-17 RX ADMIN — PROPOFOL 90 MG: 10 INJECTION, EMULSION INTRAVENOUS at 09:08

## 2019-07-17 RX ADMIN — FENTANYL CITRATE 50 MCG: 50 INJECTION, SOLUTION INTRAMUSCULAR; INTRAVENOUS at 09:21

## 2019-07-17 RX ADMIN — ONDANSETRON 4 MG: 2 INJECTION INTRAMUSCULAR; INTRAVENOUS at 09:25

## 2019-07-17 RX ADMIN — SUGAMMADEX 150 MG: 100 INJECTION, SOLUTION INTRAVENOUS at 09:48

## 2019-07-17 RX ADMIN — PHENYLEPHRINE HYDROCHLORIDE 150 MCG: 10 INJECTION INTRAVENOUS at 09:23

## 2019-07-17 ASSESSMENT — MIFFLIN-ST. JEOR: SCORE: 1545.25

## 2019-07-17 NOTE — ANESTHESIA CARE TRANSFER NOTE
Patient: Girish Chauhan    Procedure(s):  Upper Gastrointestinal Endoscopy with gastrojejunal stent exchange    Diagnosis: Jejunal Stenosis  Diagnosis Additional Information: No value filed.    Anesthesia Type:   No value filed.     Note:  Airway :Nasal Cannula  Patient transferred to:PACU  Comments: VSS, report to RN Handoff Report: Identifed the Patient, Identified the Reponsible Provider, Reviewed the pertinent medical history, Discussed the surgical course, Reviewed Intra-OP anesthesia mangement and issues during anesthesia, Set expectations for post-procedure period and Allowed opportunity for questions and acknowledgement of understanding      Vitals: (Last set prior to Anesthesia Care Transfer)    CRNA VITALS  7/17/2019 0927 - 7/17/2019 1002      7/17/2019             Pulse:  99    SpO2:  100 %    Resp Rate (observed):  3  (Abnormal)     EKG:  Atrial fibrillation                Electronically Signed By: YO Zafar CRNA  July 17, 2019  10:02 AM

## 2019-07-17 NOTE — BRIEF OP NOTE
[facility] Brief Operative Note    Pre-operative diagnosis: Jejunal Stenosis   Post-operative diagnosis Stent exchange   Procedure: Procedure(s):  Upper Gastrointestinal Endoscopy with gastrojejunal stent exchange   Surgeon: Gabino Rodriguez MD   Assistants(s): Nikolay Jolly MD    Estimated blood loss: None    Specimens: None   Findings:                        -Uncomplicated exchange of the existing well positioned                        Axios with two soft 10F 1cm Solus stents (which may                        remain indefinitely for continued decompression) across                        a mature gastrojejunostomy     Recommendation:                            - General anesthesia recovery with probable discharge home this morning                        - Follow up with your established providers as scheduled                        - No plans for futher advanced endsocopy at this moment                        - The findings and recommendations were discussed with                             the patient and their family                       -Resume all home medications                       -Ice chips now and advance diet slowly as tolerated

## 2019-07-17 NOTE — ANESTHESIA POSTPROCEDURE EVALUATION
Anesthesia POST Procedure Evaluation    Patient: Girish Chauhan   MRN:     [id number] Gender:   male   Age:    64 year old :      1955        Preoperative Diagnosis: Jejunal Stenosis   Procedure(s):  Upper Gastrointestinal Endoscopy with gastrojejunal stent exchange   Postop Comments: No value filed.       Anesthesia Type:  General  No value filed.    JZG FV AN POST EVALUATION    Last Anesthesia Record Vitals:  CRNA VITALS  2019 0927 - 2019 1018      2019             Pulse:  99    SpO2:  100 %    Resp Rate (observed):  3  (Abnormal)     EKG:  Atrial fibrillation          Last PACU Vitals:  Vitals Value Taken Time   /81 2019 10:10 AM   Temp 36.5  C (97.7  F) 2019 10:00 AM   Pulse 92 2019 10:10 AM   Resp 12 2019 10:00 AM   SpO2 100 % 2019 10:16 AM   Temp src     NIBP     Pulse     SpO2     Resp     Temp     Ht Rate     Temp 2     Vitals shown include unvalidated device data.      Electronically Signed By: Victoria Ocasio MD, 2019, 10:18 AM

## 2019-07-23 ENCOUNTER — PATIENT OUTREACH (OUTPATIENT)
Dept: GASTROENTEROLOGY | Facility: CLINIC | Age: 64
End: 2019-07-23

## 2019-07-23 NOTE — PROGRESS NOTES
Spiking high fevers and vomiting post procedure. Reporting fevers on Friday and Sunday nights, up to 101.5. Vomiting on both days, felt similar to symptoms he had prior to procedure.  Fevers came down with tylenol and water.  Monday night ok, no fevers last night. Advancing diet as tolerating.     Wondering if this is expected as these are the same symptoms he had prior to his stent exchange.     Encouraged patient to go to ER with any additional temps spikes.           Jing Juarez RN Care Coordinator

## 2019-07-25 ENCOUNTER — COMMUNICATION - HEALTHEAST (OUTPATIENT)
Dept: ADMINISTRATIVE | Facility: CLINIC | Age: 64
End: 2019-07-25

## 2019-07-25 ENCOUNTER — AMBULATORY - HEALTHEAST (OUTPATIENT)
Dept: CARDIOLOGY | Facility: CLINIC | Age: 64
End: 2019-07-25

## 2019-07-25 DIAGNOSIS — I42.9 CARDIOMYOPATHY (H): ICD-10-CM

## 2019-07-28 ENCOUNTER — APPOINTMENT (OUTPATIENT)
Dept: CT IMAGING | Facility: CLINIC | Age: 64
DRG: 393 | End: 2019-07-28
Attending: EMERGENCY MEDICINE
Payer: COMMERCIAL

## 2019-07-28 ENCOUNTER — RECORDS - HEALTHEAST (OUTPATIENT)
Dept: ADMINISTRATIVE | Facility: OTHER | Age: 64
End: 2019-07-28

## 2019-07-28 ENCOUNTER — HOSPITAL ENCOUNTER (INPATIENT)
Facility: CLINIC | Age: 64
LOS: 2 days | Discharge: HOME | DRG: 393 | End: 2019-07-30
Attending: EMERGENCY MEDICINE | Admitting: INTERNAL MEDICINE
Payer: COMMERCIAL

## 2019-07-28 DIAGNOSIS — R11.2 NAUSEA AND VOMITING, INTRACTABILITY OF VOMITING NOT SPECIFIED, UNSPECIFIED VOMITING TYPE: ICD-10-CM

## 2019-07-28 DIAGNOSIS — D69.6 THROMBOCYTOPENIA (H): ICD-10-CM

## 2019-07-28 DIAGNOSIS — I50.32 CHRONIC HEART FAILURE WITH PRESERVED EJECTION FRACTION (H): ICD-10-CM

## 2019-07-28 DIAGNOSIS — R50.9 FEVER, UNSPECIFIED FEVER CAUSE: ICD-10-CM

## 2019-07-28 DIAGNOSIS — R78.81 BACTEREMIA: Primary | ICD-10-CM

## 2019-07-28 DIAGNOSIS — C78.6 PERITONEAL CARCINOMATOSIS (H): ICD-10-CM

## 2019-07-28 PROBLEM — K83.09 CHOLANGITIS (H): Status: ACTIVE | Noted: 2019-07-28

## 2019-07-28 PROBLEM — I48.21 PERMANENT ATRIAL FIBRILLATION (H): Status: ACTIVE | Noted: 2019-07-28

## 2019-07-28 PROBLEM — M31.10 THROMBOTIC MICROANGIOPATHY (H): Status: ACTIVE | Noted: 2018-09-13

## 2019-07-28 LAB
ALBUMIN SERPL-MCNC: 3 G/DL (ref 3.4–5)
ALBUMIN UR-MCNC: NEGATIVE MG/DL
ALP SERPL-CCNC: 473 U/L (ref 40–150)
ALT SERPL W P-5'-P-CCNC: 68 U/L (ref 0–70)
ANION GAP SERPL CALCULATED.3IONS-SCNC: 6 MMOL/L (ref 3–14)
APPEARANCE UR: CLEAR
APTT PPP: 40 SEC (ref 22–37)
AST SERPL W P-5'-P-CCNC: 71 U/L (ref 0–45)
BASOPHILS # BLD AUTO: 0 10E9/L (ref 0–0.2)
BASOPHILS NFR BLD AUTO: 0.2 %
BILIRUB SERPL-MCNC: 0.8 MG/DL (ref 0.2–1.3)
BILIRUB UR QL STRIP: NEGATIVE
BLD PROD TYP BPU: NORMAL
BUN SERPL-MCNC: 23 MG/DL (ref 7–30)
CALCIUM SERPL-MCNC: 9 MG/DL (ref 8.5–10.1)
CHLORIDE SERPL-SCNC: 101 MMOL/L (ref 94–109)
CO2 SERPL-SCNC: 26 MMOL/L (ref 20–32)
COLOR UR AUTO: ABNORMAL
CREAT BLD-MCNC: 1.6 MG/DL (ref 0.66–1.25)
CREAT SERPL-MCNC: 1.46 MG/DL (ref 0.66–1.25)
DIFFERENTIAL METHOD BLD: ABNORMAL
EOSINOPHIL # BLD AUTO: 0 10E9/L (ref 0–0.7)
EOSINOPHIL # BLD AUTO: 0 10E9/L (ref 0–0.7)
EOSINOPHIL # BLD AUTO: 0.1 10E9/L (ref 0–0.7)
EOSINOPHIL NFR BLD AUTO: 0.2 %
EOSINOPHIL NFR BLD AUTO: 0.4 %
EOSINOPHIL NFR BLD AUTO: 1 %
ERYTHROCYTE [DISTWIDTH] IN BLOOD BY AUTOMATED COUNT: 13.9 % (ref 10–15)
ERYTHROCYTE [DISTWIDTH] IN BLOOD BY AUTOMATED COUNT: 14 % (ref 10–15)
ERYTHROCYTE [DISTWIDTH] IN BLOOD BY AUTOMATED COUNT: 14 % (ref 10–15)
FIBRINOGEN PPP-MCNC: 450 MG/DL (ref 200–420)
GFR SERPL CREATININE-BSD FRML MDRD: 44 ML/MIN/{1.73_M2}
GFR SERPL CREATININE-BSD FRML MDRD: 51 ML/MIN/{1.73_M2}
GLUCOSE SERPL-MCNC: 119 MG/DL (ref 70–99)
GLUCOSE UR STRIP-MCNC: NEGATIVE MG/DL
HCT VFR BLD AUTO: 30.2 % (ref 40–53)
HCT VFR BLD AUTO: 30.4 % (ref 40–53)
HCT VFR BLD AUTO: 32.5 % (ref 40–53)
HGB BLD-MCNC: 10.3 G/DL (ref 13.3–17.7)
HGB BLD-MCNC: 9.3 G/DL (ref 13.3–17.7)
HGB BLD-MCNC: 9.5 G/DL (ref 13.3–17.7)
HGB UR QL STRIP: ABNORMAL
IMM GRANULOCYTES # BLD: 0 10E9/L (ref 0–0.4)
IMM GRANULOCYTES # BLD: 0.1 10E9/L (ref 0–0.4)
IMM GRANULOCYTES # BLD: 0.1 10E9/L (ref 0–0.4)
IMM GRANULOCYTES NFR BLD: 0.4 %
IMM GRANULOCYTES NFR BLD: 0.5 %
IMM GRANULOCYTES NFR BLD: 0.6 %
INR PPP: 1.5 (ref 0.86–1.14)
KETONES UR STRIP-MCNC: NEGATIVE MG/DL
LACTATE BLD-SCNC: 1.2 MMOL/L (ref 0.7–2)
LDH SERPL L TO P-CCNC: 140 U/L (ref 85–227)
LEUKOCYTE ESTERASE UR QL STRIP: NEGATIVE
LIPASE SERPL-CCNC: 204 U/L (ref 73–393)
LYMPHOCYTES # BLD AUTO: 0.5 10E9/L (ref 0.8–5.3)
LYMPHOCYTES # BLD AUTO: 0.6 10E9/L (ref 0.8–5.3)
LYMPHOCYTES # BLD AUTO: 1.1 10E9/L (ref 0.8–5.3)
LYMPHOCYTES NFR BLD AUTO: 10.7 %
LYMPHOCYTES NFR BLD AUTO: 4.2 %
LYMPHOCYTES NFR BLD AUTO: 5.3 %
MCH RBC QN AUTO: 29.1 PG (ref 26.5–33)
MCH RBC QN AUTO: 29.5 PG (ref 26.5–33)
MCH RBC QN AUTO: 29.7 PG (ref 26.5–33)
MCHC RBC AUTO-ENTMCNC: 30.8 G/DL (ref 31.5–36.5)
MCHC RBC AUTO-ENTMCNC: 31.3 G/DL (ref 31.5–36.5)
MCHC RBC AUTO-ENTMCNC: 31.7 G/DL (ref 31.5–36.5)
MCV RBC AUTO: 94 FL (ref 78–100)
MONOCYTES # BLD AUTO: 1.1 10E9/L (ref 0–1.3)
MONOCYTES # BLD AUTO: 1.6 10E9/L (ref 0–1.3)
MONOCYTES # BLD AUTO: 1.7 10E9/L (ref 0–1.3)
MONOCYTES NFR BLD AUTO: 12.9 %
MONOCYTES NFR BLD AUTO: 15.9 %
MONOCYTES NFR BLD AUTO: 9.5 %
NEUTROPHILS # BLD AUTO: 10.5 10E9/L (ref 1.6–8.3)
NEUTROPHILS # BLD AUTO: 7.2 10E9/L (ref 1.6–8.3)
NEUTROPHILS # BLD AUTO: 9.9 10E9/L (ref 1.6–8.3)
NEUTROPHILS NFR BLD AUTO: 72.4 %
NEUTROPHILS NFR BLD AUTO: 81.2 %
NEUTROPHILS NFR BLD AUTO: 84.2 %
NITRATE UR QL: NEGATIVE
NRBC # BLD AUTO: 0 10*3/UL
NRBC BLD AUTO-RTO: 0 /100
NUM BPU REQUESTED: 1
PH UR STRIP: 7 PH (ref 5–7)
PLATELET # BLD AUTO: 20 10E9/L (ref 150–450)
PLATELET # BLD AUTO: 21 10E9/L (ref 150–450)
PLATELET # BLD AUTO: 28 10E9/L (ref 150–450)
POTASSIUM SERPL-SCNC: 4 MMOL/L (ref 3.4–5.3)
PROT SERPL-MCNC: 6.8 G/DL (ref 6.8–8.8)
RBC # BLD AUTO: 3.2 10E12/L (ref 4.4–5.9)
RBC # BLD AUTO: 3.22 10E12/L (ref 4.4–5.9)
RBC # BLD AUTO: 3.47 10E12/L (ref 4.4–5.9)
RBC #/AREA URNS AUTO: 3 /HPF (ref 0–2)
RETICS # AUTO: 70.1 10E9/L (ref 25–95)
RETICS/RBC NFR AUTO: 2.2 % (ref 0.5–2)
SODIUM SERPL-SCNC: 133 MMOL/L (ref 133–144)
SOURCE: ABNORMAL
SP GR UR STRIP: 1.02 (ref 1–1.03)
UROBILINOGEN UR STRIP-MCNC: NORMAL MG/DL (ref 0–2)
WBC # BLD AUTO: 11.7 10E9/L (ref 4–11)
WBC # BLD AUTO: 13 10E9/L (ref 4–11)
WBC # BLD AUTO: 9.9 10E9/L (ref 4–11)
WBC #/AREA URNS AUTO: 1 /HPF (ref 0–5)

## 2019-07-28 PROCEDURE — 85610 PROTHROMBIN TIME: CPT | Performed by: INTERNAL MEDICINE

## 2019-07-28 PROCEDURE — 83010 ASSAY OF HAPTOGLOBIN QUANT: CPT | Performed by: INTERNAL MEDICINE

## 2019-07-28 PROCEDURE — 96361 HYDRATE IV INFUSION ADD-ON: CPT

## 2019-07-28 PROCEDURE — 25000132 ZZH RX MED GY IP 250 OP 250 PS 637: Performed by: INTERNAL MEDICINE

## 2019-07-28 PROCEDURE — 40000611 ZZHCL STATISTIC MORPHOLOGY W/INTERP HEMEPATH TC 85060: Performed by: INTERNAL MEDICINE

## 2019-07-28 PROCEDURE — 99285 EMERGENCY DEPT VISIT HI MDM: CPT | Mod: Z6 | Performed by: EMERGENCY MEDICINE

## 2019-07-28 PROCEDURE — 87800 DETECT AGNT MULT DNA DIREC: CPT | Performed by: EMERGENCY MEDICINE

## 2019-07-28 PROCEDURE — 99223 1ST HOSP IP/OBS HIGH 75: CPT | Mod: AI | Performed by: INTERNAL MEDICINE

## 2019-07-28 PROCEDURE — 83690 ASSAY OF LIPASE: CPT | Performed by: EMERGENCY MEDICINE

## 2019-07-28 PROCEDURE — 96365 THER/PROPH/DIAG IV INF INIT: CPT

## 2019-07-28 PROCEDURE — 87186 SC STD MICRODIL/AGAR DIL: CPT | Performed by: EMERGENCY MEDICINE

## 2019-07-28 PROCEDURE — 25000128 H RX IP 250 OP 636: Performed by: INTERNAL MEDICINE

## 2019-07-28 PROCEDURE — 12000001 ZZH R&B MED SURG/OB UMMC

## 2019-07-28 PROCEDURE — 85397 CLOTTING FUNCT ACTIVITY: CPT | Performed by: INTERNAL MEDICINE

## 2019-07-28 PROCEDURE — 96375 TX/PRO/DX INJ NEW DRUG ADDON: CPT

## 2019-07-28 PROCEDURE — 83615 LACTATE (LD) (LDH) ENZYME: CPT | Performed by: INTERNAL MEDICINE

## 2019-07-28 PROCEDURE — 25000128 H RX IP 250 OP 636: Performed by: EMERGENCY MEDICINE

## 2019-07-28 PROCEDURE — 87077 CULTURE AEROBIC IDENTIFY: CPT | Performed by: EMERGENCY MEDICINE

## 2019-07-28 PROCEDURE — 85730 THROMBOPLASTIN TIME PARTIAL: CPT | Performed by: INTERNAL MEDICINE

## 2019-07-28 PROCEDURE — 99285 EMERGENCY DEPT VISIT HI MDM: CPT | Mod: 25

## 2019-07-28 PROCEDURE — 82565 ASSAY OF CREATININE: CPT

## 2019-07-28 PROCEDURE — 83605 ASSAY OF LACTIC ACID: CPT | Performed by: EMERGENCY MEDICINE

## 2019-07-28 PROCEDURE — 85025 COMPLETE CBC W/AUTO DIFF WBC: CPT | Performed by: EMERGENCY MEDICINE

## 2019-07-28 PROCEDURE — 74177 CT ABD & PELVIS W/CONTRAST: CPT

## 2019-07-28 PROCEDURE — 25000128 H RX IP 250 OP 636: Performed by: STUDENT IN AN ORGANIZED HEALTH CARE EDUCATION/TRAINING PROGRAM

## 2019-07-28 PROCEDURE — 85384 FIBRINOGEN ACTIVITY: CPT | Performed by: INTERNAL MEDICINE

## 2019-07-28 PROCEDURE — 25800030 ZZH RX IP 258 OP 636: Performed by: INTERNAL MEDICINE

## 2019-07-28 PROCEDURE — 85025 COMPLETE CBC W/AUTO DIFF WBC: CPT | Performed by: INTERNAL MEDICINE

## 2019-07-28 PROCEDURE — 85045 AUTOMATED RETICULOCYTE COUNT: CPT | Performed by: INTERNAL MEDICINE

## 2019-07-28 PROCEDURE — 36592 COLLECT BLOOD FROM PICC: CPT | Performed by: INTERNAL MEDICINE

## 2019-07-28 PROCEDURE — 87040 BLOOD CULTURE FOR BACTERIA: CPT | Performed by: EMERGENCY MEDICINE

## 2019-07-28 PROCEDURE — 80053 COMPREHEN METABOLIC PANEL: CPT | Performed by: EMERGENCY MEDICINE

## 2019-07-28 PROCEDURE — 81001 URINALYSIS AUTO W/SCOPE: CPT | Performed by: EMERGENCY MEDICINE

## 2019-07-28 RX ORDER — METOPROLOL TARTRATE 50 MG
150 TABLET ORAL 2 TIMES DAILY
Status: DISCONTINUED | OUTPATIENT
Start: 2019-07-28 | End: 2019-07-30 | Stop reason: HOSPADM

## 2019-07-28 RX ORDER — ONDANSETRON 8 MG/1
8 TABLET, FILM COATED ORAL EVERY 8 HOURS PRN
Status: DISCONTINUED | OUTPATIENT
Start: 2019-07-28 | End: 2019-07-30 | Stop reason: HOSPADM

## 2019-07-28 RX ORDER — SODIUM CHLORIDE 9 MG/ML
INJECTION, SOLUTION INTRAVENOUS CONTINUOUS
Status: DISCONTINUED | OUTPATIENT
Start: 2019-07-28 | End: 2019-07-29

## 2019-07-28 RX ORDER — HEPARIN SODIUM,PORCINE 10 UNIT/ML
5-10 VIAL (ML) INTRAVENOUS
Status: DISCONTINUED | OUTPATIENT
Start: 2019-07-28 | End: 2019-07-30 | Stop reason: HOSPADM

## 2019-07-28 RX ORDER — PIPERACILLIN SODIUM, TAZOBACTAM SODIUM 3; .375 G/15ML; G/15ML
3.38 INJECTION, POWDER, LYOPHILIZED, FOR SOLUTION INTRAVENOUS EVERY 6 HOURS
Status: DISCONTINUED | OUTPATIENT
Start: 2019-07-28 | End: 2019-07-30

## 2019-07-28 RX ORDER — ONDANSETRON 8 MG/1
8 TABLET, ORALLY DISINTEGRATING ORAL EVERY 8 HOURS PRN
Status: DISCONTINUED | OUTPATIENT
Start: 2019-07-28 | End: 2019-07-30 | Stop reason: HOSPADM

## 2019-07-28 RX ORDER — POLYETHYLENE GLYCOL 3350 17 G/17G
17 POWDER, FOR SOLUTION ORAL DAILY
Status: DISCONTINUED | OUTPATIENT
Start: 2019-07-28 | End: 2019-07-30 | Stop reason: HOSPADM

## 2019-07-28 RX ORDER — ALLOPURINOL 100 MG/1
100 TABLET ORAL DAILY
Status: DISCONTINUED | OUTPATIENT
Start: 2019-07-28 | End: 2019-07-30 | Stop reason: HOSPADM

## 2019-07-28 RX ORDER — PANTOPRAZOLE SODIUM 40 MG/1
40 TABLET, DELAYED RELEASE ORAL
Status: DISCONTINUED | OUTPATIENT
Start: 2019-07-28 | End: 2019-07-30 | Stop reason: HOSPADM

## 2019-07-28 RX ORDER — ACETAMINOPHEN 325 MG/1
650 TABLET ORAL EVERY 4 HOURS PRN
Status: DISCONTINUED | OUTPATIENT
Start: 2019-07-28 | End: 2019-07-30 | Stop reason: HOSPADM

## 2019-07-28 RX ORDER — AMOXICILLIN 250 MG
1 CAPSULE ORAL 2 TIMES DAILY
Status: DISCONTINUED | OUTPATIENT
Start: 2019-07-28 | End: 2019-07-30 | Stop reason: HOSPADM

## 2019-07-28 RX ORDER — PROCHLORPERAZINE MALEATE 10 MG
10 TABLET ORAL EVERY 6 HOURS PRN
Status: DISCONTINUED | OUTPATIENT
Start: 2019-07-28 | End: 2019-07-30 | Stop reason: HOSPADM

## 2019-07-28 RX ORDER — LORAZEPAM 0.5 MG/1
.5-1 TABLET ORAL EVERY 6 HOURS PRN
Status: DISCONTINUED | OUTPATIENT
Start: 2019-07-28 | End: 2019-07-30 | Stop reason: HOSPADM

## 2019-07-28 RX ORDER — ONDANSETRON 2 MG/ML
4 INJECTION INTRAMUSCULAR; INTRAVENOUS ONCE
Status: COMPLETED | OUTPATIENT
Start: 2019-07-28 | End: 2019-07-28

## 2019-07-28 RX ORDER — NITROGLYCERIN 0.4 MG/1
0.4 TABLET SUBLINGUAL EVERY 5 MIN PRN
Status: DISCONTINUED | OUTPATIENT
Start: 2019-07-28 | End: 2019-07-30 | Stop reason: HOSPADM

## 2019-07-28 RX ORDER — MAGNESIUM SULFATE HEPTAHYDRATE 40 MG/ML
4 INJECTION, SOLUTION INTRAVENOUS EVERY 4 HOURS PRN
Status: DISCONTINUED | OUTPATIENT
Start: 2019-07-28 | End: 2019-07-30 | Stop reason: HOSPADM

## 2019-07-28 RX ORDER — POTASSIUM CL/LIDO/0.9 % NACL 10MEQ/0.1L
10 INTRAVENOUS SOLUTION, PIGGYBACK (ML) INTRAVENOUS
Status: DISCONTINUED | OUTPATIENT
Start: 2019-07-28 | End: 2019-07-30 | Stop reason: HOSPADM

## 2019-07-28 RX ORDER — IOPAMIDOL 755 MG/ML
99 INJECTION, SOLUTION INTRAVASCULAR ONCE
Status: COMPLETED | OUTPATIENT
Start: 2019-07-28 | End: 2019-07-28

## 2019-07-28 RX ORDER — AMOXICILLIN 250 MG
2 CAPSULE ORAL 2 TIMES DAILY
Status: DISCONTINUED | OUTPATIENT
Start: 2019-07-28 | End: 2019-07-30 | Stop reason: HOSPADM

## 2019-07-28 RX ORDER — ACETAMINOPHEN 500 MG
500 TABLET ORAL EVERY 6 HOURS PRN
COMMUNITY

## 2019-07-28 RX ORDER — POTASSIUM CHLORIDE 750 MG/1
20-40 TABLET, EXTENDED RELEASE ORAL
Status: DISCONTINUED | OUTPATIENT
Start: 2019-07-28 | End: 2019-07-30 | Stop reason: HOSPADM

## 2019-07-28 RX ORDER — LIDOCAINE 40 MG/G
CREAM TOPICAL
Status: DISCONTINUED | OUTPATIENT
Start: 2019-07-28 | End: 2019-07-30 | Stop reason: HOSPADM

## 2019-07-28 RX ORDER — ONDANSETRON 2 MG/ML
8 INJECTION INTRAMUSCULAR; INTRAVENOUS EVERY 8 HOURS PRN
Status: DISCONTINUED | OUTPATIENT
Start: 2019-07-28 | End: 2019-07-30 | Stop reason: HOSPADM

## 2019-07-28 RX ORDER — POTASSIUM CHLORIDE 1.5 G/1.58G
20-40 POWDER, FOR SOLUTION ORAL
Status: DISCONTINUED | OUTPATIENT
Start: 2019-07-28 | End: 2019-07-30 | Stop reason: HOSPADM

## 2019-07-28 RX ORDER — PIPERACILLIN SODIUM, TAZOBACTAM SODIUM 3; .375 G/15ML; G/15ML
3.38 INJECTION, POWDER, LYOPHILIZED, FOR SOLUTION INTRAVENOUS ONCE
Status: COMPLETED | OUTPATIENT
Start: 2019-07-28 | End: 2019-07-28

## 2019-07-28 RX ORDER — HEPARIN SODIUM (PORCINE) LOCK FLUSH IV SOLN 100 UNIT/ML 100 UNIT/ML
5 SOLUTION INTRAVENOUS
Status: DISCONTINUED | OUTPATIENT
Start: 2019-07-28 | End: 2019-07-30 | Stop reason: HOSPADM

## 2019-07-28 RX ORDER — LORAZEPAM 2 MG/ML
.5-1 INJECTION INTRAMUSCULAR EVERY 6 HOURS PRN
Status: DISCONTINUED | OUTPATIENT
Start: 2019-07-28 | End: 2019-07-30 | Stop reason: HOSPADM

## 2019-07-28 RX ORDER — POTASSIUM CHLORIDE 7.45 MG/ML
10 INJECTION INTRAVENOUS
Status: DISCONTINUED | OUTPATIENT
Start: 2019-07-28 | End: 2019-07-30 | Stop reason: HOSPADM

## 2019-07-28 RX ORDER — HEPARIN SODIUM,PORCINE 10 UNIT/ML
5-10 VIAL (ML) INTRAVENOUS EVERY 24 HOURS
Status: DISCONTINUED | OUTPATIENT
Start: 2019-07-28 | End: 2019-07-30 | Stop reason: HOSPADM

## 2019-07-28 RX ORDER — POTASSIUM CHLORIDE 29.8 MG/ML
20 INJECTION INTRAVENOUS
Status: DISCONTINUED | OUTPATIENT
Start: 2019-07-28 | End: 2019-07-30 | Stop reason: HOSPADM

## 2019-07-28 RX ADMIN — SENNOSIDES AND DOCUSATE SODIUM 2 TABLET: 8.6; 5 TABLET ORAL at 19:34

## 2019-07-28 RX ADMIN — IOPAMIDOL 99 ML: 755 INJECTION, SOLUTION INTRAVENOUS at 10:21

## 2019-07-28 RX ADMIN — SODIUM CHLORIDE 1000 ML: 9 INJECTION, SOLUTION INTRAVENOUS at 09:32

## 2019-07-28 RX ADMIN — PIPERACILLIN SODIUM,TAZOBACTAM SODIUM 3.38 G: 3; .375 INJECTION, POWDER, FOR SOLUTION INTRAVENOUS at 23:40

## 2019-07-28 RX ADMIN — PIPERACILLIN SODIUM,TAZOBACTAM SODIUM 3.38 G: 3; .375 INJECTION, POWDER, FOR SOLUTION INTRAVENOUS at 18:40

## 2019-07-28 RX ADMIN — PIPERACILLIN SODIUM AND TAZOBACTAM SODIUM 3.38 G: 3; .375 INJECTION, POWDER, LYOPHILIZED, FOR SOLUTION INTRAVENOUS at 12:31

## 2019-07-28 RX ADMIN — METOPROLOL TARTRATE 150 MG: 50 TABLET ORAL at 19:34

## 2019-07-28 RX ADMIN — SODIUM CHLORIDE: 9 INJECTION, SOLUTION INTRAVENOUS at 15:48

## 2019-07-28 RX ADMIN — ONDANSETRON 4 MG: 2 INJECTION INTRAMUSCULAR; INTRAVENOUS at 09:33

## 2019-07-28 RX ADMIN — ALLOPURINOL 100 MG: 100 TABLET ORAL at 19:34

## 2019-07-28 ASSESSMENT — ENCOUNTER SYMPTOMS
FEVER: 1
SHORTNESS OF BREATH: 0
NAUSEA: 1
ABDOMINAL PAIN: 0
VOMITING: 1
MUSCULOSKELETAL NEGATIVE: 1
DIARRHEA: 0

## 2019-07-28 ASSESSMENT — ACTIVITIES OF DAILY LIVING (ADL)
ADLS_ACUITY_SCORE: 12
ADLS_ACUITY_SCORE: 12

## 2019-07-28 NOTE — H&P
[facility], [place] -- History and Physical -- Hematology / Oncology  Date of Admission:  7/28/2019 -- Date of Service (when I saw the patient): 07/28/19    ASSESSMENT & PLAN  Girish Chauhan is a 64 year old man with recurrent cholangiocarcinoma, CAD, HTN, Afib on apixaban, aortic stenosis, and chronic diastolic heart failure, CKD, and recent gastrojejunal stenting and exchange who presents with fever, nausea, vomiting, and abdominal pain.    Fever/nausea/vomiting  Concern for cholangitis  Leukocytosis  Fever, nausea, vomiting started after a gastrojejunal stent exchange done 7/17. Concern for cholangitis. CT A/P done today shows fluid-filled mild dilation of the jejunal loop at hepatojejunostomy associated with a single remaining gastrojejunostomy stent (2 were placed on 7/17), with remaining stent having migrated distally into the duodenum with only a short segment of its proximal pigtail located within the jejunal loop.  - Started pip-tazo to cover for cholangitis and other intraabdominal infections  - UA negative, follow up blood cultures  - GI consulted for possible gastrojejunal stent replacement  - Antiplatelets and anticoagulants on hold  - Will plan on platelet transfusion tonight in anticipation of possible procedure in AM  - NPO after MN, clear liquids tonight    H/o Recent SBO  H/o Biliary obstruction s/p stent  H/o Carol-en-Y hepaticojejunostomy 3/16/16  Presented to OSH and transferred to [facility] 6/5/19-6/9/19 with SBO with possible involvement of draining choledochojejunostomy loop, subjective fevers, and a fib with RVR. GI consulted, s/p small bowel enteroscopy with axios stenting on 6/7 for obstructed biliary limb. (Carol limb not resectable given high risk surgical candidate and peritoneal nodule invading into diaphragm and small bowel). S/p pip-tazo 6/6-6/9 and followed with levofloxacin for total 7 day course of abx. He underwent uncomplicated small bowel  enteroscopy/stent exchange with Dr. Rodriguez on 7/17.     Thrombocytopenia  Normocytic anemia  He has noted in the last few days some petechiae around his ankles and a easier tendency to bleed. Uncertain etiology of new onset thrombocytopenia. Prior to today, all platelet counts have been mostly in the normal range. Hgb is also decreased but he has been this low before. No recent chemotherapy. No evidence of hypersplenism on CT. Differential includes consumption from MAHA, DIC. Has had a history of TTP in the past (8/18/18) as well - causing anemia (needing frequent blood transfusions) and mild thrombocytopenia and elevated Cr to 2.0, but never needed plasma exchange as it was thought to be related to gemcitabine. Cr is stable near baseline 1.5.  - Transfuse platelets for goal >50k, pending timing of GI procedure as above  - Antiplatelet agents and anticoagulants on hold  - Checked coags at admission - INR 1.5, PTT 40 are mildly prolonged. Fibrinogen however is normal.   - Request peripheral smear review, check reticulocyte count, LDH, haptoglobin. Bilirubin not significantly elevated.     Recurrent metastatic cholangiocarcinoma  Cholangiocarcinoma resected in 3/2016 (including partial liver resection) with negative margins and no LN involvement, but with perineural and lymphovascular invasion. No adjuvant chemotherapy given. Recurred in bladder 11/2017, bx c/w metastatic cholangiocarcinoma, started on cisplatin/gemcitabine 12/2017. Cisplatin held 6/2018 for poor tolerance. Gemcitabine discontinued 8/2018 for possible TTP, then went off treatment. In 4/2019 was found to have disease progression in the abdominal cavity anterior to the liver, just below the diaphragm. Started on capecitabine 4/30/19 (last dose 5/7) but developed an NSTEMI s/p angiogram 5/6/19 (see below) and actually continued on the capecitabine for several days after NSTEMI without any ongoing cardiac symptoms or evidence of ongoing ischemia. On  follow up with Dr. De Los Santos 6/24, disease appeared stable. Decision made with family to hold off on treatment for 2 months and reevaluate. CT A/P on admission does show slightly increased size of a few now mildly enlarged upper abdominal/retroperitoneal lymph nodes and prominent right cardiophrenic lymph nodes. Stable mild peritoneal and omental haziness and stable to slightly increased ill-defined/spiculated soft tissue thickening associated with the jejunum anteromedial to the hepatectomy resection margin extending to the abdominal wall. These findings are concerning for recurrent/metastatic disease. Recommend attention on follow-up.    Recent MI  Coronary artery disease  Hyperlipidemia  Permanent atrial fibrillation  Bicuspid aortic valve and moderate Aortic stenosis  Heart failure with reduced EF  Hypertension  CAD with history of multivessel stenting in 2011. Developed NSTEMI, s/p LHC and angiogram 5/6/19 with 99% obstruction of OM2, unsuccessful PCI. He also developed afib with RVR during this time which necessitated increase of his metoprolol and starting on apixaban. He was also started on ASA/clopidogrel for CAD. Repeat angiogram done 6/17 with SAM to the OM1  - Continue metoprolol  - Hold PTA ASA/clopidogrel in case of GI procedure  - Hold PTA apixaban in case of GI procedure  - Hold PTA losartan for now, BP control with hydralazine PRN  - Hold PTA furosemide (20mg daily) for now  - Hold PTA atorvastatin for now    CKD stage III  Baseline Cr around 1.5. Current Cr 1.6 around baseline.     Gout  Conitnue on allopurinol. Takes colchicine for 3-5 days with flares. Last flare 6/6    GERD  - Continue PTA pantoprazole, Tums    FEN  - IVFs: NS @ 100 ml/hr  - Diet: NPO for now pending GI determination.    PPX  - DVT: mechanical only given thrombocytopenia  - Bowel: senna-colace, miralax prn  - GI: PTA PPI as above    Lines/Drains: Port  Consults: GI  CODE: Full  DISPO: Inpatient > 2nights for work up and treatment  Essential hypertension of possible cholangitis    CHIEF COMPLAINT/REASON FOR ADMIT: fever nausea vomiting    HISTORY OF PRESENT ILLNESS  History obtained from chart review and discussion with the patient.     Girish Chauhan is a 64 year old man with recurrent cholangiocarcinoma, CAD, HTN, Afib on apixaban, aortic stenosis, and chronic diastolic heart failure, CKD, and recent gastrojejunal stenting and exchange who presents with fever, nausea, vomiting, and abdominal pain.    Fever, nausea, vomiting started after a gastrojejunal stent exchange done 7/17. Vomiting was especially bad on Friday 7/19. He has been having fevers at night since then, but then in the last 2 days his temperature curve has been going up. He also notes that he has developed some upper quadrant abdominal pains which are similar to abdominal pains he has had with his multiple GI issues in the past.  CT A/P done today shows fluid-filled mild dilation of the jejunal loop at hepatojejunostomy associated with a single remaining gastrojejunostomy stent (2 were placed on 7/17), with remaining stent having migrated distally into the duodenum with only a short segment of its proximal pigtail located within the jejunal loop.     Currently he is feeling well after the interventions in the ED. He denies lightheadedness, dizziness, confusion, mental status changes. Currently nausea and vomiting are under control and currently without fever. He is asking for a diet. He continues to have some numbness in his feet. Edema under control.    PMH  Past Medical History:   Diagnosis Date     Aortic stenosis due to bicuspid aortic valve      Atrial fibrillation (H) 2006    hx of paroxysmal atrial fibrillation since approximately 2006. S/p cardioversion in 2013 with recurrent atrial fibrillation s/p repeat cardioversion 9/29/14.     Basal cell carcinoma 1/2016    right shoulder and right posterior calf s/p electrodessication and curretage 1/2016.     CAD (coronary artery disease) 12/2011     s/p angiogram 12/2011 s/p percutaneous intervention on the LAD with multiple drug-eluting stents complicated by a small perforation in the diagonal branch which sealed spontaneously.  Patient with significant Circumflex stenosis which is being treated conservatively.     Gout     affects left foot 2-3 times per year     Hyperlipidemia      Hypertension      Liver disease      Melanoma (H) 9/2015    back s/p resection 9/2015     Squamous cell carcinoma     nose s/p excision       PSH  Past Surgical History:   Procedure Laterality Date     ------------OTHER-------------      multiple skin cancer resections     CARDIOVERSION  2013, 9/2014     ENDOSCOPIC RETROGRADE CHOLANGIOPANCREATOGRAM N/A 2/26/2016    Procedure: COMBINED ENDOSCOPIC RETROGRADE CHOLANGIOPANCREATOGRAPHY, PLACE TUBE/STENT;  Surgeon: Rafa Andrade MD;  Location: [facility] OR     ENDOSCOPIC RETROGRADE CHOLANGIOPANCREATOGRAPHY  2/2014     ENDOSCOPIC ULTRASOUND UPPER GASTROINTESTINAL TRACT (GI) N/A 6/7/2019    Procedure: ENDOSCOPIC ULTRASOUND, with hot axios stent placement;  Surgeon: Gabino Rodriguez MD;  Location: [facility] OR     ENTEROSCOPY SMALL BOWEL N/A 6/7/2019    Procedure: ENTEROSCOPY;  Surgeon: Gabino Rodriguez MD;  Location: [facility] OR     EXPLORE COMMON BILE DUCT N/A 3/8/2016    Procedure: EXPLORE COMMON BILE DUCT;  Surgeon: Jose Eduardo Pinedo MD;  Location: [facility] OR     HEPATECTOMY PARTIAL Left 3/8/2016    Procedure: HEPATECTOMY PARTIAL;  Surgeon: Jose Eduardo Pinedo MD;  Location: [facility] OR     INSERT PORT VASCULAR ACCESS Right 12/8/2017    Procedure: INSERT PORT VASCULAR ACCESS;  Place single lumen venous chest port - right;  Surgeon: Drew Powell PA-C;  Location: [facility] OR     SOFT TISSUE SURGERY       STENT  12/2011    LAD with multiple SAM       Prior to Admission MEDICATIONS  Prior to Admission Medications   Prescriptions Last Dose Informant Patient Reported? Taking?   Colchicine (MITIGARE) 0.6 MG CAPS Past Month at PRN  Yes Yes    Sig: Take 0.6 mg by mouth 3 times daily as needed   Nitroglycerin (NITROSTAT SL) More than a month at PRN  Yes No   Sig: Place 0.4 mg under the tongue every 5 minutes as needed for chest pain (Carries medication - has never used)    acetaminophen (TYLENOL) 500 MG tablet 7/28/2019 at 0000  Yes Yes   Sig: Take 500 mg by mouth every 6 hours as needed for fever or pain   allopurinol (ZYLOPRIM) 100 MG tablet 7/27/2019 at 2030  Yes Yes   Sig: Take 100 mg by mouth daily   apixaban ANTICOAGULANT (ELIQUIS) 5 MG tablet 7/27/2019 at 2030  Yes Yes   Sig: Take 5 mg by mouth 2 times daily    atorvastatin (LIPITOR) 40 MG tablet 7/27/2019 at 2030  Yes Yes   Sig: Take 40 mg by mouth daily   calcium carbonate (TUMS) 500 MG chewable tablet 7/27/2019 at 2200  Yes Yes   Sig: Take 1 chew tab by mouth 4 times daily as needed for heartburn   clopidogrel (PLAVIX) 75 MG tablet 7/27/2019 at 0930  Yes Yes   Sig: Take 75 mg by mouth daily    furosemide (LASIX) 20 MG tablet 7/27/2019 at 0930  Yes Yes   Sig: Take 20 mg by mouth daily    losartan (COZAAR) 25 MG tablet 7/27/2019 at 0930  Yes Yes   Sig: Take 25 mg by mouth daily    metoprolol tartrate (LOPRESSOR) 50 MG tablet 7/27/2019 at 2030  Yes Yes   Sig: Take 150 mg by mouth 2 times daily    multivitamin, therapeutic with minerals (MULTI-VITAMIN) TABS 7/27/2019 at 0900  No Yes   Sig: Take 1 tablet by mouth daily      Facility-Administered Medications: None     Allergies   No Known Allergies    Social History  Social History     Socioeconomic History     Marital status:      Spouse name: Not on file     Number of children: 1     Years of education: Not on file     Highest education level: Not on file   Occupational History     Employer: [employer]   Social Needs     Financial resource strain: Not on file     Food insecurity:     Worry: Not on file     Inability: Not on file     Transportation needs:     Medical: Not on file     Non-medical: Not on file   Tobacco Use     Smoking status:  Essential hypertension Essential hypertension Essential hypertension Never Smoker     Smokeless tobacco: Never Used   Substance and Sexual Activity     Alcohol use: No     Alcohol/week: 0.0 oz     Drug use: No     Sexual activity: Not on file   Lifestyle     Physical activity:     Days per week: Not on file     Minutes per session: Not on file     Stress: Not on file   Relationships     Social connections:     Talks on phone: Not on file     Gets together: Not on file     Attends Voodoo service: Not on file     Active member of club or organization: Not on file     Attends meetings of clubs or organizations: Not on file     Relationship status: Not on file     Intimate partner violence:     Fear of current or ex partner: Not on file     Emotionally abused: Not on file     Physically abused: Not on file     Forced sexual activity: Not on file   Other Topics Concern     Not on file   Social History Narrative    Works for [employer] with [employer].  Lives in [place].   with one grown daughter.  No tobacco, rare Etoh, no drug use.       Family History  Family History   Problem Relation Age of Onset     Skin Cancer Mother      Other - See Comments Father         father passed away in his 60s intraoperatively with an unknown bile duct obstruction     - Family history reviewed & no other pertinent oncologic/hematologic malignancy noted    ROS  Comprehensive ROS was performed and was negative unless otherwise noted in above HPI.    Physical Exam  Temp:  [98.9  F (37.2  C)] 98.9  F (37.2  C)  Pulse:  [100-145] 100  Heart Rate:  [] 93  Resp:  [9-19] 19  BP: (105-125)/(63-85) 111/85  SpO2:  [96 %-99 %] 98 %  157 lbs 6.4 oz    Constitutional: Awake, alert, cooperative, in NAD. Lying in bed comfortable  Eyes: PERRL, EOMI, sclera clear, conjunctiva normal.  ENT: Normocephalic, without obvious abnormality, oral pharynx with moist mucus membranes  Respiratory: Non-labored breathing, good air exchange, clear to auscultation bilaterally, no crackles or  wheezing.  Cardiovascular: RRR, no murmur noted.  GI: + bowel sounds, soft, non-distended, non-tender, no masses palpated, no hepatosplenomegaly.  Skin: No concerning lesions or rash on exposed areas. Some scattered petechiae ankles, arms, face.  Musculoskeletal: trace edema tootie LEs.  Neurologic: Awake, alert & oriented x3.  Cranial nerves II-XII are grossly intact.   Psych: appropriate affect    DATA  Results for orders placed or performed during the hospital encounter of 07/28/19 (from the past 24 hour(s))   CBC with platelets differential   Result Value Ref Range    WBC 13.0 (H) 4.0 - 11.0 10e9/L    RBC Count 3.47 (L) 4.4 - 5.9 10e12/L    Hemoglobin 10.3 (L) 13.3 - 17.7 g/dL    Hematocrit 32.5 (L) 40.0 - 53.0 %    MCV 94 78 - 100 fl    MCH 29.7 26.5 - 33.0 pg    MCHC 31.7 31.5 - 36.5 g/dL    RDW 14.0 10.0 - 15.0 %    Platelet Count 28 (LL) 150 - 450 10e9/L    Diff Method Automated Method     % Neutrophils 81.2 %    % Lymphocytes 4.2 %    % Monocytes 12.9 %    % Eosinophils 1.0 %    % Basophils 0.2 %    % Immature Granulocytes 0.5 %    Nucleated RBCs 0 0 /100    Absolute Neutrophil 10.5 (H) 1.6 - 8.3 10e9/L    Absolute Lymphocytes 0.5 (L) 0.8 - 5.3 10e9/L    Absolute Monocytes 1.7 (H) 0.0 - 1.3 10e9/L    Absolute Eosinophils 0.1 0.0 - 0.7 10e9/L    Absolute Basophils 0.0 0.0 - 0.2 10e9/L    Abs Immature Granulocytes 0.1 0 - 0.4 10e9/L    Absolute Nucleated RBC 0.0    Comprehensive metabolic panel   Result Value Ref Range    Sodium 133 133 - 144 mmol/L    Potassium 4.0 3.4 - 5.3 mmol/L    Chloride 101 94 - 109 mmol/L    Carbon Dioxide 26 20 - 32 mmol/L    Anion Gap 6 3 - 14 mmol/L    Glucose 119 (H) 70 - 99 mg/dL    Urea Nitrogen 23 7 - 30 mg/dL    Creatinine 1.46 (H) 0.66 - 1.25 mg/dL    GFR Estimate 51 (L) >60 mL/min/[1.73_m2]    GFR Estimate If Black 59 (L) >60 mL/min/[1.73_m2]    Calcium 9.0 8.5 - 10.1 mg/dL    Bilirubin Total 0.8 0.2 - 1.3 mg/dL    Albumin 3.0 (L) 3.4 - 5.0 g/dL    Protein Total 6.8 6.8 - 8.8  g/dL    Alkaline Phosphatase 473 (H) 40 - 150 U/L    ALT 68 0 - 70 U/L    AST 71 (H) 0 - 45 U/L   Lipase   Result Value Ref Range    Lipase 204 73 - 393 U/L   Lactic acid whole blood   Result Value Ref Range    Lactic Acid 1.2 0.7 - 2.0 mmol/L   Creatinine POCT   Result Value Ref Range    Creatinine 1.6 (H) 0.66 - 1.25 mg/dL    GFR Estimate 44 (L) >60 mL/min/[1.73_m2]    GFR Estimate If Black 53 (L) >60 mL/min/[1.73_m2]   Blood culture   Result Value Ref Range    Specimen Description Blood Portacath     Special Requests Received in aerobic bottle only     Culture Micro No growth after 1 hour    Blood culture   Result Value Ref Range    Specimen Description Blood Right Arm     Special Requests Received in aerobic bottle only     Culture Micro No growth after 1 hour    CT Abdomen Pelvis w Contrast    Narrative    EXAMINATION: CT ABDOMEN PELVIS W CONTRAST, 7/28/2019 10:29 AM    TECHNIQUE:  Helical CT images from the lung bases through the  symphysis pubis were obtained with contrast.  Coronal and sagittal  reformatted images were generated at a workstation for further  assessment.    CONTRAST:  99 ml Isovue 370.    COMPARISON: CT chest abdomen pelvis 6/21/2019    HISTORY: Cholangiocarcinoma, abdominal pain, stent and bowel,    FINDINGS:    Lines and tubes: None.    Lung bases: No consolidation or pleural effusion. Mild subsegmental  bibasilar atelectasis.    Abdomen and pelvis: Postsurgical changes of left hepatectomy and  cholecystectomy. No focal liver lesion. Patent hepatic vasculature.  Mild prominent intrahepatic biliary tree. Carol-en-Y  hepaticojejunostomy. Gastrojejunostomy stent with distal end at the  junction of the second and third portions of the duodenum.  Fluid-filled, mildly dilated jejunal loop at hepatojejunostomy site  measuring 3 x 6.4 cm (series 2, image 31). Stable to minimally  increased soft tissue thickening along the anterior aspect of the  jejunal loop extending to the anterior abdominal wall  anteromedial to  the hepatectomy resection margin (series 2 image 36). A couple  adjacent right cardiophrenic lymph nodes are slightly increased in  size, the larger measuring up to 8 mm (series 5 image 44).    Unremarkable adrenal glands. No renal stone. Subcentimeter cortical  hypodensities in the kidneys are too small to characterize and fibula  renal cyst. Stable right parapelvic cyst which exerts mass effect upon  the renal pelvis with stable mild associated hydronephrosis. No left  hydronephrosis. Unchanged few splenic hypodensities. Homogeneous  pancreatic parenchyma. Main pancreatic duct is not dilated. Mild  peritoneal and omental nodularity and streakiness.    No inguinal lymphadenopathy. Mildly enlarged upper  abdominal/retroperitoneal lymph nodes, slightly increased, for example  measuring 12 mm short axis just below the left renal vein (series 5  image 144), previously 8 mm, and measuring 13 mm anterior to the right  diaphragmatic mando (series 5 image 59), previously 9 mm. No dilated  large bowel loop. Colonic diverticulosis. Normal appendix. Unchanged  soft tissue thickening and tethered appearance along the anterior  aspect of the rectum and posterior aspect of the bladder just above  the seminal vesicles.    Bones and soft tissues: Degenerative changes of the spine. No  aggressive osseous lesion.      Impression    IMPRESSION:   1. Postoperative changes of left hepatectomy and cholecystectomy and  Carol-en-Y hepaticojejunostomy with fluid-filled mild dilation of the  jejunal loop at hepatojejunostomy associated with a single remaining  gastrojejunostomy stent (noting that 2 were placed on 7/17/2019). The  remaining stent appears to have migrated distally into the duodenum  with only a short segment of its proximal pigtail located within the  jejunal loop.  2. Slightly increased size of a few now mildly enlarged upper  abdominal/retroperitoneal lymph nodes and prominent right  cardiophrenic lymph  nodes. Stable mild peritoneal and omental haziness  and stable to slightly increased ill-defined/spiculated soft tissue  thickening associated with the jejunum anteromedial to the hepatectomy  resection margin extending to the abdominal wall. These findings are  concerning for recurrent/metastatic disease. Recommend attention on  follow-up.  3. Stable soft tissue thickening and desmoplastic appearance along the  anterior aspect of the rectum and posterior aspects of the bladder.  4. Right parapelvic cyst with mild associated hydronephrosis,  unchanged.    I have personally reviewed the examination and initial interpretation  and I agree with the findings.    ROSSY CASTRO, DO   CBC with platelets differential   Result Value Ref Range    WBC 11.7 (H) 4.0 - 11.0 10e9/L    RBC Count 3.22 (L) 4.4 - 5.9 10e12/L    Hemoglobin 9.5 (L) 13.3 - 17.7 g/dL    Hematocrit 30.4 (L) 40.0 - 53.0 %    MCV 94 78 - 100 fl    MCH 29.5 26.5 - 33.0 pg    MCHC 31.3 (L) 31.5 - 36.5 g/dL    RDW 14.0 10.0 - 15.0 %    Platelet Count 21 (LL) 150 - 450 10e9/L    Diff Method Automated Method     % Neutrophils 84.2 %    % Lymphocytes 5.3 %    % Monocytes 9.5 %    % Eosinophils 0.2 %    % Basophils 0.2 %    % Immature Granulocytes 0.6 %    Nucleated RBCs 0 0 /100    Absolute Neutrophil 9.9 (H) 1.6 - 8.3 10e9/L    Absolute Lymphocytes 0.6 (L) 0.8 - 5.3 10e9/L    Absolute Monocytes 1.1 0.0 - 1.3 10e9/L    Absolute Eosinophils 0.0 0.0 - 0.7 10e9/L    Absolute Basophils 0.0 0.0 - 0.2 10e9/L    Abs Immature Granulocytes 0.1 0 - 0.4 10e9/L    Absolute Nucleated RBC 0.0        PCP & Hematologist/Oncologist  Dario Koehler MD  Hematology/Oncology  July 28, 2019

## 2019-07-28 NOTE — ED PROVIDER NOTES
"      [place] EMERGENCY DEPARTMENT ([facility])  July 28, 2019    History     Chief Complaint   Patient presents with     Nausea & Vomiting     Fever     HPI  Girish Chauhan is a 64 year old male with a past medical history significant for recent gastrojejunal stent exchange (7/17/2019) cholangiocarcinoma, right port vascular access insert (12/8/2017), LAD with multiple SAM stent (12/2011) , aortic root dilatation, disorder of aorta, NSTEMI, CAD, paroxysmal Afib, heart failure with preserved ejection fraction, cardiomyopathy, bicuspid aortic valve, thrombotic microangiopathy, malignant melanoma of skin, basal cell carcinoma, and peritoneal carcinomatosis who presents to the Emergency Department for evaluation of fevers, nausea and vomiting following a procedure. Patient reports he had a procedure to replace a short term stent with a long term stent last Monday (6 days PTA). He states that since then he has consistently had a temperature over 100.8 and reports a fever of 101.8 this morning. He reports nausea and 3 episodes of vomiting, as well as feeling \"very tired\". Patient states his last bm was yesterday afternoon. He denies any abdominal pain, diarrhea, chest pain or shortness of breath.    I have reviewed the Medications, Allergies, Past Medical and Surgical History, and Social History in the [employer] system.    Past Medical History:   Diagnosis Date     Aortic stenosis due to bicuspid aortic valve      Atrial fibrillation (H) 2006    hx of paroxysmal atrial fibrillation since approximately 2006. S/p cardioversion in 2013 with recurrent atrial fibrillation s/p repeat cardioversion 9/29/14.     Basal cell carcinoma 1/2016    right shoulder and right posterior calf s/p electrodessication and curretage 1/2016.     CAD (coronary artery disease) 12/2011    s/p angiogram 12/2011 s/p percutaneous intervention on the LAD with multiple drug-eluting stents complicated by a small perforation in the diagonal branch " which sealed spontaneously.  Patient with significant Circumflex stenosis which is being treated conservatively.     Gout     affects left foot 2-3 times per year     Hyperlipidemia      Hypertension      Liver disease      Melanoma (H) 9/2015    back s/p resection 9/2015     Squamous cell carcinoma     nose s/p excision       Past Surgical History:   Procedure Laterality Date     ------------OTHER-------------      multiple skin cancer resections     CARDIOVERSION  2013, 9/2014     ENDOSCOPIC RETROGRADE CHOLANGIOPANCREATOGRAM N/A 2/26/2016    Procedure: COMBINED ENDOSCOPIC RETROGRADE CHOLANGIOPANCREATOGRAPHY, PLACE TUBE/STENT;  Surgeon: Rafa Andrade MD;  Location: [facility] OR     ENDOSCOPIC RETROGRADE CHOLANGIOPANCREATOGRAPHY  2/2014     ENDOSCOPIC ULTRASOUND UPPER GASTROINTESTINAL TRACT (GI) N/A 6/7/2019    Procedure: ENDOSCOPIC ULTRASOUND, with hot axios stent placement;  Surgeon: Gabino Rodriguez MD;  Location: [facility] OR     ENTEROSCOPY SMALL BOWEL N/A 6/7/2019    Procedure: ENTEROSCOPY;  Surgeon: Gabino Rodriguez MD;  Location: [facility] OR     EXPLORE COMMON BILE DUCT N/A 3/8/2016    Procedure: EXPLORE COMMON BILE DUCT;  Surgeon: Jose Eduardo Pinedo MD;  Location: [facility] OR     HEPATECTOMY PARTIAL Left 3/8/2016    Procedure: HEPATECTOMY PARTIAL;  Surgeon: Jose Eduardo Pinedo MD;  Location: [facility] OR     INSERT PORT VASCULAR ACCESS Right 12/8/2017    Procedure: INSERT PORT VASCULAR ACCESS;  Place single lumen venous chest port - right;  Surgeon: Drew Powell PA-C;  [facility]     SOFT TISSUE SURGERY       STENT  12/2011    LAD with multiple SAM       Family History   Problem Relation Age of Onset     Skin Cancer Mother      Other - See Comments Father         father passed away in his 60s intraoperatively with an unknown bile duct obstruction       Social History     Tobacco Use     Smoking status: Never Smoker     Smokeless tobacco: Never Used   Substance Use Topics     Alcohol use: No      Alcohol/week: 0.0 oz     No current facility-administered medications for this encounter.      Current Outpatient Medications   Medication     allopurinol (ZYLOPRIM) 100 MG tablet     Apixaban (ELIQUIS PO)     atorvastatin (LIPITOR) 40 MG tablet     calcium carbonate (TUMS) 500 MG chewable tablet     Clopidogrel Bisulfate (PLAVIX PO)     Colchicine (MITIGARE) 0.6 MG CAPS     diclofenac (VOLTAREN) 1 % GEL topical gel     FUROSEMIDE PO     losartan (COZAAR) 25 MG tablet     metoprolol tartrate (LOPRESSOR) 100 MG tablet     multivitamin, therapeutic with minerals (MULTI-VITAMIN) TABS     Nitroglycerin (NITROSTAT SL)      No Known Allergies    Review of Systems   Constitutional: Positive for fever.   Respiratory: Negative for shortness of breath.    Cardiovascular: Negative for chest pain.   Gastrointestinal: Positive for nausea and vomiting ( 3x). Negative for abdominal pain and diarrhea.   Musculoskeletal: Negative.    All other systems reviewed and are negative.      Physical Exam   BP: 105/79  Pulse: 137  Heart Rate: 62  Temp: 98.9  F (37.2  C)  Resp: 18  SpO2: 98 %      Physical Exam  Physical Exam   Constitutional:   well nourished, well developed, resting comfortably   HENT:   Head: Normocephalic and atraumatic.   Eyes: Conjunctivae are slightly pale,  Pupils are equal, round, and reactive to light.    pharynx has no erythema or exudate, mucous membranes are dry, small cold sore with scant bleeding left lower lip  Neck:   no adenopathy, no bony tenderness  Cardiovascular: regular rate and rhythm without murmurs or gallops  Pulmonary/Chest: Clear to auscultation bilaterally, with no wheezes or retractions. No respiratory distress.  GI: Soft with good bowel sounds.  Non-tender, non-distended, with no guarding, no rebound, no peritoneal signs.   Back:  No bony or CVA tenderness   Musculoskeletal:  no edema or clubbing   Skin: Skin is warm and dry. Slightly diaphoretic, No rash noted.   Neurological: alert and  oriented to person, place, and time. Nonfocal exam  Psychiatric:  normal mood and affect.   ED Course        Procedures       9:06 AM  The patient was seen and examined by Dilcia Dennison MD in Room 04.         Critical Care time:  none           Results for orders placed or performed during the hospital encounter of 07/28/19   CT Abdomen Pelvis w Contrast    Narrative    EXAMINATION: CT ABDOMEN PELVIS W CONTRAST, 7/28/2019 10:29 AM    TECHNIQUE:  Helical CT images from the lung bases through the  symphysis pubis were obtained with contrast.  Coronal and sagittal  reformatted images were generated at a workstation for further  assessment.    CONTRAST:  99 ml Isovue 370.    COMPARISON: CT chest abdomen pelvis 6/21/2019    HISTORY: Cholangiocarcinoma, abdominal pain, stent and bowel,    FINDINGS:    Lines and tubes: None.    Lung bases: No consolidation or pleural effusion. Mild subsegmental  bibasilar atelectasis.    Abdomen and pelvis: Postsurgical changes of left hepatectomy and  cholecystectomy. No focal liver lesion. Patent hepatic vasculature.  Mild prominent intrahepatic biliary tree. Carol-en-Y  hepaticojejunostomy. Gastrojejunostomy stent with distal end at the  junction of the second and third portions of the duodenum.  Fluid-filled, mildly dilated jejunal loop at hepatojejunostomy site  measuring 3 x 6.4 cm (series 2, image 31). Stable to minimally  increased soft tissue thickening along the anterior aspect of the  jejunal loop extending to the anterior abdominal wall anteromedial to  the hepatectomy resection margin (series 2 image 36). A couple  adjacent right cardiophrenic lymph nodes are slightly increased in  size, the larger measuring up to 8 mm (series 5 image 44).    Unremarkable adrenal glands. No renal stone. Subcentimeter cortical  hypodensities in the kidneys are too small to characterize and fibula  renal cyst. Stable right parapelvic cyst which exerts mass effect upon  the renal pelvis with  stable mild associated hydronephrosis. No left  hydronephrosis. Unchanged few splenic hypodensities. Homogeneous  pancreatic parenchyma. Main pancreatic duct is not dilated. Mild  peritoneal and omental nodularity and streakiness.    No inguinal lymphadenopathy. Mildly enlarged upper  abdominal/retroperitoneal lymph nodes, slightly increased, for example  measuring 12 mm short axis just below the left renal vein (series 5  image 144), previously 8 mm, and measuring 13 mm anterior to the right  diaphragmatic mando (series 5 image 59), previously 9 mm. No dilated  large bowel loop. Colonic diverticulosis. Normal appendix. Unchanged  soft tissue thickening and tethered appearance along the anterior  aspect of the rectum and posterior aspect of the bladder just above  the seminal vesicles.    Bones and soft tissues: Degenerative changes of the spine. No  aggressive osseous lesion.      Impression    IMPRESSION:   1. Postoperative changes of left hepatectomy and cholecystectomy and  Carol-en-Y hepaticojejunostomy with fluid-filled mild dilation of the  jejunal loop at hepatojejunostomy associated with a single remaining  gastrojejunostomy stent (noting that 2 were placed on 7/17/2019). The  remaining stent appears to have migrated distally into the duodenum  with only a short segment of its proximal pigtail located within the  jejunal loop.  2. Slightly increased size of a few now mildly enlarged upper  abdominal/retroperitoneal lymph nodes and prominent right  cardiophrenic lymph nodes. Stable mild peritoneal and omental haziness  and stable to slightly increased ill-defined/spiculated soft tissue  thickening associated with the jejunum anteromedial to the hepatectomy  resection margin extending to the abdominal wall. These findings are  concerning for recurrent/metastatic disease. Recommend attention on  follow-up.  3. Stable soft tissue thickening and desmoplastic appearance along the  anterior aspect of the rectum and  posterior aspects of the bladder.  4. Right parapelvic cyst with mild associated hydronephrosis,  unchanged.    I have personally reviewed the examination and initial interpretation  and I agree with the findings.    ROSSY CASTRO, DO   CBC with platelets differential   Result Value Ref Range    WBC 13.0 (H) 4.0 - 11.0 10e9/L    RBC Count 3.47 (L) 4.4 - 5.9 10e12/L    Hemoglobin 10.3 (L) 13.3 - 17.7 g/dL    Hematocrit 32.5 (L) 40.0 - 53.0 %    MCV 94 78 - 100 fl    MCH 29.7 26.5 - 33.0 pg    MCHC 31.7 31.5 - 36.5 g/dL    RDW 14.0 10.0 - 15.0 %    Platelet Count 28 (LL) 150 - 450 10e9/L    Diff Method Automated Method     % Neutrophils 81.2 %    % Lymphocytes 4.2 %    % Monocytes 12.9 %    % Eosinophils 1.0 %    % Basophils 0.2 %    % Immature Granulocytes 0.5 %    Nucleated RBCs 0 0 /100    Absolute Neutrophil 10.5 (H) 1.6 - 8.3 10e9/L    Absolute Lymphocytes 0.5 (L) 0.8 - 5.3 10e9/L    Absolute Monocytes 1.7 (H) 0.0 - 1.3 10e9/L    Absolute Eosinophils 0.1 0.0 - 0.7 10e9/L    Absolute Basophils 0.0 0.0 - 0.2 10e9/L    Abs Immature Granulocytes 0.1 0 - 0.4 10e9/L    Absolute Nucleated RBC 0.0    Comprehensive metabolic panel   Result Value Ref Range    Sodium 133 133 - 144 mmol/L    Potassium 4.0 3.4 - 5.3 mmol/L    Chloride 101 94 - 109 mmol/L    Carbon Dioxide 26 20 - 32 mmol/L    Anion Gap 6 3 - 14 mmol/L    Glucose 119 (H) 70 - 99 mg/dL    Urea Nitrogen 23 7 - 30 mg/dL    Creatinine 1.46 (H) 0.66 - 1.25 mg/dL    GFR Estimate 51 (L) >60 mL/min/[1.73_m2]    GFR Estimate If Black 59 (L) >60 mL/min/[1.73_m2]    Calcium 9.0 8.5 - 10.1 mg/dL    Bilirubin Total 0.8 0.2 - 1.3 mg/dL    Albumin 3.0 (L) 3.4 - 5.0 g/dL    Protein Total 6.8 6.8 - 8.8 g/dL    Alkaline Phosphatase 473 (H) 40 - 150 U/L    ALT 68 0 - 70 U/L    AST 71 (H) 0 - 45 U/L   Lipase   Result Value Ref Range    Lipase 204 73 - 393 U/L   Lactic acid whole blood   Result Value Ref Range    Lactic Acid 1.2 0.7 - 2.0 mmol/L   Creatinine POCT   Result Value  Ref Range    Creatinine 1.6 (H) 0.66 - 1.25 mg/dL    GFR Estimate 44 (L) >60 mL/min/[1.73_m2]    GFR Estimate If Black 53 (L) >60 mL/min/[1.73_m2]   CBC with platelets differential   Result Value Ref Range    WBC 11.7 (H) 4.0 - 11.0 10e9/L    RBC Count 3.22 (L) 4.4 - 5.9 10e12/L    Hemoglobin 9.5 (L) 13.3 - 17.7 g/dL    Hematocrit 30.4 (L) 40.0 - 53.0 %    MCV 94 78 - 100 fl    MCH 29.5 26.5 - 33.0 pg    MCHC 31.3 (L) 31.5 - 36.5 g/dL    RDW 14.0 10.0 - 15.0 %    Platelet Count 21 (LL) 150 - 450 10e9/L    Diff Method Automated Method     % Neutrophils 84.2 %    % Lymphocytes 5.3 %    % Monocytes 9.5 %    % Eosinophils 0.2 %    % Basophils 0.2 %    % Immature Granulocytes 0.6 %    Nucleated RBCs 0 0 /100    Absolute Neutrophil 9.9 (H) 1.6 - 8.3 10e9/L    Absolute Lymphocytes 0.6 (L) 0.8 - 5.3 10e9/L    Absolute Monocytes 1.1 0.0 - 1.3 10e9/L    Absolute Eosinophils 0.0 0.0 - 0.7 10e9/L    Absolute Basophils 0.0 0.0 - 0.2 10e9/L    Abs Immature Granulocytes 0.1 0 - 0.4 10e9/L    Absolute Nucleated RBC 0.0       Labs Ordered and Resulted from Time of ED Arrival Up to the Time of Departure from the ED   CBC WITH PLATELETS DIFFERENTIAL - Abnormal; Notable for the following components:       Result Value    WBC 13.0 (*)     RBC Count 3.47 (*)     Hemoglobin 10.3 (*)     Hematocrit 32.5 (*)     Platelet Count 28 (*)     Absolute Neutrophil 10.5 (*)     Absolute Lymphocytes 0.5 (*)     Absolute Monocytes 1.7 (*)     All other components within normal limits   COMPREHENSIVE METABOLIC PANEL - Abnormal; Notable for the following components:    Glucose 119 (*)     Creatinine 1.46 (*)     GFR Estimate 51 (*)     GFR Estimate If Black 59 (*)     Albumin 3.0 (*)     Alkaline Phosphatase 473 (*)     AST 71 (*)     All other components within normal limits   CREATININE POCT - Abnormal; Notable for the following components:    Creatinine 1.6 (*)     GFR Estimate 44 (*)     GFR Estimate If Black 53 (*)     All other components within  normal limits   CBC WITH PLATELETS DIFFERENTIAL - Abnormal; Notable for the following components:    WBC 11.7 (*)     RBC Count 3.22 (*)     Hemoglobin 9.5 (*)     Hematocrit 30.4 (*)     MCHC 31.3 (*)     Platelet Count 21 (*)     Absolute Neutrophil 9.9 (*)     Absolute Lymphocytes 0.6 (*)     All other components within normal limits   LIPASE   LACTIC ACID WHOLE BLOOD   UA MACROSCOPIC WITH REFLEX TO MICRO AND CULTURE   ISTAT CREATININE NURSING POCT   BLOOD CULTURE   BLOOD CULTURE            Assessments & Plan (with Medical Decision Making)       I have reviewed the nursing notes.  Emergency Department course:  The patient was seen and examined at 0906 am.  I treated him with a normal saline bolus IV, and Zofran IV.    Per chart review, on 7/17/2019 patient had an upper gastrointestinal endoscopy with gastrojejunal stent exchange. Patient was discharged to home.  Additional review shows that the patient developed TTP from gemcitabine and was switched to Xeloda.  He had complications including myocardial infarction and then later stenosis of the biliary small bowel anastomosis.    Laboratory studies today show a normal lactate of 1.2.  Comprehensive metabolic panel shows chronic kidney disease, with creatinine of 1.46 and a GFR of 51.  Alkaline phosphatase is elevated at 473 and AST at 71.  Blood cultures x2 have been ordered.  CBC shows leukocytosis, with a WBC of 13.0.  The patient is anemic, with a hemoglobin of 10.3.  He is markedly thrombocytopenic, with a platelet count of 28 (last 172 on 7/17).  I repeated this laboratory study.  Repeat CBC shows a WBC of 11.7, hemoglobin of 9.5 and a platelet count of 21,000.  Lactate is normal at 1.2.    I discussed the possibility of obtaining a CT of the abdomen and pelvis with IV contrast with radiology:  According to radiology, in patients with chronic kidney disease disease, IV contrast is safe if the patient has a GFR greater than 30.  This patient's GFR is 51 and a  contrasted CT would provide me with much more information.    Patient then had an abdominal and pelvic CT with IV contrast.    CT shows: IMPRESSION:   1. Postoperative changes of left hepatectomy and cholecystectomy. No  focal liver lesion.   2. Carol-en-Y hepaticojejunostomy with dilatation of the jejunal loop  at hepatojejunostomy. Gastrojejunostomy stent  3. Borderline prominent retroperitoneal lymph nodes with  peritoneal/omental streakiness.  4. Right parapelvic cyst with mild associated hydronephrosis,  Unchanged.    The patient is a 64-year-old male with cholangiocarcinoma and recent GJ stent exchange who presents with fevers, nausea and vomiting.  He is markedly thrombocytopenic and has leukocytosis.  I did speak with oncology regarding antibiotic recommendations.  She recommends starting IV Zosyn, which was started in the ED.  He will be admitted to the oncology service under the care of Dr. Weaver for further evaluation and treatment.        I have reviewed the findings, diagnosis, plan and need for follow up with the patient.       Medication List      There are no discharge medications for this visit.         Final diagnoses:   Thrombocytopenia (H) - h/o TTP   Fever, unspecified fever cause   Nausea and vomiting, intractability of vomiting not specified, unspecified vomiting type     I, Jonna Draper, am serving as a trained medical scribe to document services personally performed by Dilcia Dennison MD, based on the provider's statements to me.   I, Dilcia Dennison MD, was physically present and have reviewed and verified the accuracy of this note documented by Jonna Draper.  This note was created in part by the use of Dragon voice recognition dictation system. Inadvertent grammatical errors and typographical errors may still exist.  Dilcia Dennison MD    7/28/2019   [facility] EMERGENCY DEPARTMENT     Dilcia Dennison MD  07/28/19 1527

## 2019-07-28 NOTE — LETTER
Transition Communication Hand-off for Care Transitions to Next Level of Care Provider    Name: Girish Chauhan  : 1955  MRN #: [id number]  Primary Care Provider: Dario Ray     Primary Clinic: [facility] [address]  [facility] [address]     Reason for Hospitalization:  Thrombocytopenia (H) [D69.6]  Fever, unspecified fever cause [R50.9]  Nausea and vomiting, intractability of vomiting not specified, unspecified vomiting type [R11.2]  Admit Date/Time: 2019  9:04 AM  Discharge Date: 19  Payor Source: Payor: [employer] / Plan: [employer] / Product Type: HMO /     Girish Chauhan is a 64 year old man with recurrent cholangiocarcinoma, CAD, HTN, Afib on apixaban, aortic stenosis, and chronic diastolic heart failure, CKD, and recent gastrojejunal stenting and exchange who presented  with fever, nausea, vomiting, and abdominal pain.    Discharge Plan: Home with clinic follow-up as recommended.       Follow-up plan:    Future Appointments   Date Time Provider Department Center   2019  7:30 AM [facility]   2019  8:00 AM [facility]   2019  5:00 PM Naresh De Los Santos MD [facility]       Any outstanding tests or procedures:        Referrals     Future Labs/Procedures    CARDIOLOGY EVAL ADULT REFERRAL     Comments:    Preferred location:  [facility] [phone]   [email or web address]    Please be aware that coverage of these services is subject to the terms and limitations of your health insurance plan.  Call member services at your health plan with any benefit or coverage questions.      Please bring the following to your appointment:  Any x-rays, CTs or MRIs which have been performed. Contact the facility where they were done to arrange for  prior to your scheduled appointment.    List of current medications  This  referral request   Any documents/labs given to you for this referral            Rosalie Neal, RN, BSN, PHN  Care Coordinator       AVS/Discharge Summary is the source of truth; this is a helpful guide for improved communication of patient story

## 2019-07-28 NOTE — PHARMACY-ADMISSION MEDICATION HISTORY
Admission medication history interview status for the 7/28/2019 admission is complete. See Epic admission navigator for allergy information, pharmacy, prior to admission medications and immunization status.     Medication history interview sources:  Patient, Reconcile Dispense     Changes made to PTA medication list (reason)  Added:   -Acetaminophen 500 mg tablet: Patient is taking this medication as needed for fever and pain    Deleted:   -Diclofenac 1% gel: Patient is no longer taking this medication    Changed:   -Apixaban PO->Apixaban 5 mg tablets: Take 5 mg by mouth two times daily  -Clopidogrel Bisulfate (Plavix PO)->Clopidogrel 75 mg tablet: Take 75 mg by mouth daily  -Furosemide PO: Take 40 mg by mouth daily-> Furosemide 20 mg: Take 20 mg by mouth daily  -Metoprolol 100 mg tablets: Take 150 mg by mouth two times daily-> Metoprolol 50 mg tablets: Take 150 mg by mouth two times daily    Additional medication history information (including reliability of information, actions taken by pharmacist):  -Patient was a good historian, knew medication names, doses and last administration times.   -Acetaminophen: Patient took a dose yesterday around 1500 and another dose at midnight  -Calcium Carbonate: Took 2 chews around 2200 last night for heartburn  -Patient denies any other prescription medications, over the counter medications, or herbal supplements.   -Pharmacy: [place], [place] [phone]    Prior to Admission medications    Medication Sig Last Dose Taking? Auth Provider   acetaminophen (TYLENOL) 500 MG tablet Take 500 mg by mouth every 6 hours as needed for fever or pain 7/28/2019 at 0000 Yes Unknown, Entered By History   allopurinol (ZYLOPRIM) 100 MG tablet Take 100 mg by mouth daily 7/27/2019 at 2030 Yes Reported, Patient   apixaban ANTICOAGULANT (ELIQUIS) 5 MG tablet Take 5 mg by mouth 2 times daily  7/28/2019 at 2030 Yes Reported, Patient   atorvastatin (LIPITOR) 40 MG tablet Take 40 mg by mouth  daily 7/27/2019 at 2030 Yes Unknown, Entered By History   calcium carbonate (TUMS) 500 MG chewable tablet Take 1 chew tab by mouth 4 times daily as needed for heartburn 7/27/2019 at 2200 Yes Unknown, Entered By History   clopidogrel (PLAVIX) 75 MG tablet Take 75 mg by mouth daily  7/27/2019 at 0930 Yes Reported, Patient   Colchicine (MITIGARE) 0.6 MG CAPS Take 0.6 mg by mouth 3 times daily as needed Past Month at PRN Yes Reported, Patient   furosemide (LASIX) 20 MG tablet Take 20 mg by mouth daily  7/27/2019 at 0930 Yes Reported, Patient   losartan (COZAAR) 25 MG tablet Take 25 mg by mouth daily  7/27/2019 at 0930 Yes Reported, Patient   metoprolol tartrate (LOPRESSOR) 50 MG tablet Take 150 mg by mouth 2 times daily  7/27/2019 at 2030 Yes Unknown, Entered By History   multivitamin, therapeutic with minerals (MULTI-VITAMIN) TABS Take 1 tablet by mouth daily 7/27/2019 at 0900 Yes Floyd Quintero MD   Nitroglycerin (NITROSTAT SL) Place 0.4 mg under the tongue every 5 minutes as needed for chest pain (Carries medication - has never used)  More than a month at PRN  Reported, Patient     Medication history completed by:   Mackenzie Contreras  Student Pharmacist   [facility]  7/28/2019

## 2019-07-28 NOTE — ED NOTES
[facility] [place]   ED Nurse to Floor Handoff     Girish Chauhan is a 64 year old male who speaks English and lives with a spouse,  in a home  They arrived in the ED by car from home    ED Chief Complaint: Nausea & Vomiting and Fever    ED Dx;   Final diagnoses:   Thrombocytopenia (H) - h/o TTP   Fever, unspecified fever cause   Nausea and vomiting, intractability of vomiting not specified, unspecified vomiting type         Needed?: No    Allergies: No Known Allergies.  Past Medical Hx:   Past Medical History:   Diagnosis Date     Aortic stenosis due to bicuspid aortic valve      Atrial fibrillation (H) 2006    hx of paroxysmal atrial fibrillation since approximately 2006. S/p cardioversion in 2013 with recurrent atrial fibrillation s/p repeat cardioversion 9/29/14.     Basal cell carcinoma 1/2016    right shoulder and right posterior calf s/p electrodessication and curretage 1/2016.     CAD (coronary artery disease) 12/2011    s/p angiogram 12/2011 s/p percutaneous intervention on the LAD with multiple drug-eluting stents complicated by a small perforation in the diagonal branch which sealed spontaneously.  Patient with significant Circumflex stenosis which is being treated conservatively.     Gout     affects left foot 2-3 times per year     Hyperlipidemia      Hypertension      Liver disease      Melanoma (H) 9/2015    back s/p resection 9/2015     Squamous cell carcinoma     nose s/p excision      Baseline Mental status: WDL  Current Mental Status changes: at basesline    Infection present or suspected this encounter: no  Sepsis suspected: No  Isolation type: No active isolations     Activity level - Baseline/Home:  Independent  Activity Level - Current:   Independent    Bariatric equipment needed?: No    In the ED these meds were given:   Medications   0.9% sodium chloride BOLUS (0 mLs Intravenous Stopped 7/28/19 1105)   ondansetron (ZOFRAN) injection 4 mg (4 mg  Intravenous Given 7/28/19 0933)   iopamidol (ISOVUE-370) solution 99 mL (99 mLs Intravenous Given 7/28/19 1021)   sodium chloride (PF) 0.9% PF flush 75 mL (75 mLs Intravenous Given 7/28/19 1021)       Drips running?  No    Home pump  Yes    Current LDAs  Port A Cath Single 12/08/17 Right Chest wall (Active)   Access Date 07/28/19 7/28/2019  9:30 AM   Access Attempts 1 7/28/2019  9:30 AM   Gauge/Length Power noncoring 90 degree bend;20 gauge;3/4 inch 7/28/2019  9:30 AM   Site Assessment WDL 7/28/2019  9:30 AM   Line Status Blood return noted;Saline locked 7/28/2019  9:30 AM   Dressing Intervention Chlorhexadine sponge;New dressing 7/28/2019  9:30 AM   Number of days: 597       Intrathecal/Epidural Catheter 03/08/16 (Active)   Number of days: 1237       Closed/Suction Drain 1 Left Abdomen (Active)   Number of days: 1237       Closed/Suction Drain 2 Left Abdomen (Active)   Number of days: 1237       Biliary Drain (Active)   Number of days: 1237       Biliary Stent 8 Trinidadian (Active)   Number of days: 1237       Incision/Surgical Site 03/08/16 Bilateral Abdomen (Active)   Number of days: 1237       Incision/Surgical Site 12/08/17 Right Chest (Active)   Number of days: 597       Labs results:   Labs Ordered and Resulted from Time of ED Arrival Up to the Time of Departure from the ED   CBC WITH PLATELETS DIFFERENTIAL - Abnormal; Notable for the following components:       Result Value    WBC 13.0 (*)     RBC Count 3.47 (*)     Hemoglobin 10.3 (*)     Hematocrit 32.5 (*)     Platelet Count 28 (*)     Absolute Neutrophil 10.5 (*)     Absolute Lymphocytes 0.5 (*)     Absolute Monocytes 1.7 (*)     All other components within normal limits   COMPREHENSIVE METABOLIC PANEL - Abnormal; Notable for the following components:    Glucose 119 (*)     Creatinine 1.46 (*)     GFR Estimate 51 (*)     GFR Estimate If Black 59 (*)     Albumin 3.0 (*)     Alkaline Phosphatase 473 (*)     AST 71 (*)     All other components within normal  limits   CREATININE POCT - Abnormal; Notable for the following components:    Creatinine 1.6 (*)     GFR Estimate 44 (*)     GFR Estimate If Black 53 (*)     All other components within normal limits   CBC WITH PLATELETS DIFFERENTIAL - Abnormal; Notable for the following components:    WBC 11.7 (*)     RBC Count 3.22 (*)     Hemoglobin 9.5 (*)     Hematocrit 30.4 (*)     MCHC 31.3 (*)     Platelet Count 21 (*)     Absolute Neutrophil 9.9 (*)     Absolute Lymphocytes 0.6 (*)     All other components within normal limits   LIPASE   LACTIC ACID WHOLE BLOOD   UA MACROSCOPIC WITH REFLEX TO MICRO AND CULTURE   ISTAT CREATININE NURSING POCT   BLOOD CULTURE   BLOOD CULTURE       Imaging Studies:   Recent Results (from the past 24 hour(s))   CT Abdomen Pelvis w Contrast    Narrative    EXAMINATION: CT ABDOMEN PELVIS W CONTRAST, 7/28/2019 10:29 AM    TECHNIQUE:  Helical CT images from the lung bases through the  symphysis pubis were obtained with contrast.  Coronal and sagittal  reformatted images were generated at a workstation for further  assessment.    CONTRAST:  99 ml Isovue 370.    COMPARISON: CT chest abdomen pelvis 6/21/2019    HISTORY: Cholangiocarcinoma, abdominal pain, stent and bowel,    FINDINGS:    Lines and tubes: None.    Lung bases: No consolidation or pleural effusion. Mild subsegmental  bibasilar atelectasis.    Abdomen and pelvis: Postsurgical changes of left hepatectomy and  cholecystectomy. No focal liver lesion. Patent hepatic vasculature.  Mild prominent intrahepatic biliary tree. Carol-en-Y  hepaticojejunostomy. Gastrojejunostomy stent with distal end at the  junction of the second and third portions of the duodenum.  Fluid-filled, mildly dilated jejunal loop at hepatojejunostomy site  measuring 3 x 6.4 cm (series 2, image 31). Stable to minimally  increased soft tissue thickening along the anterior aspect of the  jejunal loop extending to the anterior abdominal wall anteromedial to  the hepatectomy  resection margin (series 2 image 36). A couple  adjacent right cardiophrenic lymph nodes are slightly increased in  size, the larger measuring up to 8 mm (series 5 image 44).    Unremarkable adrenal glands. No renal stone. Subcentimeter cortical  hypodensities in the kidneys are too small to characterize and fibula  renal cyst. Stable right parapelvic cyst which exerts mass effect upon  the renal pelvis with stable mild associated hydronephrosis. No left  hydronephrosis. Unchanged few splenic hypodensities. Homogeneous  pancreatic parenchyma. Main pancreatic duct is not dilated. Mild  peritoneal and omental nodularity and streakiness.    No inguinal lymphadenopathy. Mildly enlarged upper  abdominal/retroperitoneal lymph nodes, slightly increased, for example  measuring 12 mm short axis just below the left renal vein (series 5  image 144), previously 8 mm, and measuring 13 mm anterior to the right  diaphragmatic mando (series 5 image 59), previously 9 mm. No dilated  large bowel loop. Colonic diverticulosis. Normal appendix. Unchanged  soft tissue thickening and tethered appearance along the anterior  aspect of the rectum and posterior aspect of the bladder just above  the seminal vesicles.    Bones and soft tissues: Degenerative changes of the spine. No  aggressive osseous lesion.      Impression    IMPRESSION:   1. Postoperative changes of left hepatectomy and cholecystectomy and  Carol-en-Y hepaticojejunostomy with fluid-filled mild dilation of the  jejunal loop at hepatojejunostomy associated with a single remaining  gastrojejunostomy stent (noting that 2 were placed on 7/17/2019). The  remaining stent appears to have migrated distally into the duodenum  with only a short segment of its proximal pigtail located within the  jejunal loop.  2. Slightly increased size of a few now mildly enlarged upper  abdominal/retroperitoneal lymph nodes and prominent right  cardiophrenic lymph nodes. Stable mild peritoneal and  omental haziness  and stable to slightly increased ill-defined/spiculated soft tissue  thickening associated with the jejunum anteromedial to the hepatectomy  resection margin extending to the abdominal wall. These findings are  concerning for recurrent/metastatic disease. Recommend attention on  follow-up.  3. Stable soft tissue thickening and desmoplastic appearance along the  anterior aspect of the rectum and posterior aspects of the bladder.  4. Right parapelvic cyst with mild associated hydronephrosis,  unchanged.    I have personally reviewed the examination and initial interpretation  and I agree with the findings.    ROSSY CASTRO, DO       Recent vital signs:   /81   Pulse 105   Temp 98.9  F (37.2  C) (Oral)   Resp 15   SpO2 96%     Cindy Coma Scale Score: 15 (07/28/19 0949)       Cardiac Rhythm: A fib  Pt needs tele? No  Skin/wound Issues: cold sores    Code Status: Full Code    Pain control: good    Nausea control: good    Abnormal labs/tests/findings requiring intervention: TTP, also see CT scan results.     Family present during ED course? Yes   Family Comments/Social Situation comments:   h/o afib and AS (never repaired, simply monitored), in 2011 failed cardiac stress test and had 4 stents placed. Malignant melanoma on shoulder 2015, surgery to remove. Dx with cholangiocarcinoma 2016, removed partial liver, gallbladder, and partial jejunum 2016, started chemo 2017, two rounds of two different meds through fall 2018. Went off chemo for several months, restarted 3rd round chemo different med in april 2019 but May 2019 suffered MI with stent placed. no chemo since. 2nd/permanent Jejunal stent placed on 7/17. n/v fever, chills. no pain. Today suspect TTP which pt has history of.     Tasks needing completion: will give abx, platelet replacement plan pending    Phong Fournier RN  ascom -- [phone] [facility]  [phone] [facility]

## 2019-07-28 NOTE — PLAN OF CARE
Pt admit to 7d today from ER.pt reports feeling tired/fever/nausea/vomiting for past 3-6 days.alert/oriented. Port a cath accessd.up ad vickie.

## 2019-07-28 NOTE — ED TRIAGE NOTES
Pt presents to ER with wife ambulatory. Pt had stent replaced at top of bowel last week. Since the procedure, pt has had intermittent nausea, vomiting, and fever. Fever typically presented in the evening and tylenol would relieve sx.    This AM pt tried calling GI resident but number provided did not work. Symptoms have now gone on for more than 3 days and fever persists so pt decided to come to ER for evaluation. Fever this .8F at home.    In triage, pt VSS stable with no fever. Pt does have Afib.

## 2019-07-29 ENCOUNTER — APPOINTMENT (OUTPATIENT)
Dept: GENERAL RADIOLOGY | Facility: CLINIC | Age: 64
DRG: 393 | End: 2019-07-29
Attending: INTERNAL MEDICINE
Payer: COMMERCIAL

## 2019-07-29 ENCOUNTER — RECORDS - HEALTHEAST (OUTPATIENT)
Dept: ADMINISTRATIVE | Facility: OTHER | Age: 64
End: 2019-07-29

## 2019-07-29 ENCOUNTER — ANESTHESIA EVENT (OUTPATIENT)
Dept: SURGERY | Facility: CLINIC | Age: 64
DRG: 393 | End: 2019-07-29
Payer: COMMERCIAL

## 2019-07-29 ENCOUNTER — ANESTHESIA (OUTPATIENT)
Dept: SURGERY | Facility: CLINIC | Age: 64
DRG: 393 | End: 2019-07-29
Payer: COMMERCIAL

## 2019-07-29 LAB
ABO + RH BLD: ABNORMAL
ABO + RH BLD: ABNORMAL
ALBUMIN SERPL-MCNC: 2.7 G/DL (ref 3.4–5)
ALP SERPL-CCNC: 331 U/L (ref 40–150)
ALT SERPL W P-5'-P-CCNC: 51 U/L (ref 0–70)
AMYLASE SERPL-CCNC: 71 U/L (ref 30–110)
ANION GAP SERPL CALCULATED.3IONS-SCNC: 9 MMOL/L (ref 3–14)
APTT PPP: 39 SEC (ref 22–37)
AST SERPL W P-5'-P-CCNC: 53 U/L (ref 0–45)
BASOPHILS # BLD AUTO: 0 10E9/L (ref 0–0.2)
BASOPHILS NFR BLD AUTO: 0.3 %
BILIRUB DIRECT SERPL-MCNC: 0.4 MG/DL (ref 0–0.2)
BILIRUB SERPL-MCNC: 1.6 MG/DL (ref 0.2–1.3)
BLD GP AB INVEST PLASRBC-IMP: ABNORMAL
BLD GP AB SCN SERPL QL ELUTION: ABNORMAL
BLD GP AB SCN SERPL QL: ABNORMAL
BLD PROD TYP BPU: NORMAL
BLD UNIT ID BPU: 0
BLD UNIT ID BPU: 0
BLOOD BANK CMNT PATIENT-IMP: ABNORMAL
BLOOD PRODUCT CODE: NORMAL
BLOOD PRODUCT CODE: NORMAL
BPU ID: NORMAL
BPU ID: NORMAL
BUN SERPL-MCNC: 19 MG/DL (ref 7–30)
CALCIUM SERPL-MCNC: 8.5 MG/DL (ref 8.5–10.1)
CHLORIDE SERPL-SCNC: 108 MMOL/L (ref 94–109)
CO2 SERPL-SCNC: 25 MMOL/L (ref 20–32)
COPATH REPORT: NORMAL
CREAT SERPL-MCNC: 1.49 MG/DL (ref 0.66–1.25)
DAT POLY-SP REAG RBC QL: ABNORMAL
DIFFERENTIAL METHOD BLD: ABNORMAL
EOSINOPHIL # BLD AUTO: 0.3 10E9/L (ref 0–0.7)
EOSINOPHIL NFR BLD AUTO: 4.8 %
ERYTHROCYTE [DISTWIDTH] IN BLOOD BY AUTOMATED COUNT: 14 % (ref 10–15)
FIBRINOGEN PPP-MCNC: 442 MG/DL (ref 200–420)
GFR SERPL CREATININE-BSD FRML MDRD: 49 ML/MIN/{1.73_M2}
GLUCOSE SERPL-MCNC: 89 MG/DL (ref 70–99)
HAPTOGLOB SERPL-MCNC: 192 MG/DL (ref 35–175)
HCT VFR BLD AUTO: 27.7 % (ref 40–53)
HGB BLD-MCNC: 8.6 G/DL (ref 13.3–17.7)
IMM GRANULOCYTES # BLD: 0 10E9/L (ref 0–0.4)
IMM GRANULOCYTES NFR BLD: 0.4 %
INR PPP: 1.35 (ref 0.86–1.14)
INR PPP: 1.48 (ref 0.86–1.14)
LIPASE SERPL-CCNC: 131 U/L (ref 73–393)
LYMPHOCYTES # BLD AUTO: 0.9 10E9/L (ref 0.8–5.3)
LYMPHOCYTES NFR BLD AUTO: 13.2 %
MAGNESIUM SERPL-MCNC: 2.2 MG/DL (ref 1.6–2.3)
MCH RBC QN AUTO: 29.4 PG (ref 26.5–33)
MCHC RBC AUTO-ENTMCNC: 31 G/DL (ref 31.5–36.5)
MCV RBC AUTO: 95 FL (ref 78–100)
MONOCYTES # BLD AUTO: 1.2 10E9/L (ref 0–1.3)
MONOCYTES NFR BLD AUTO: 17.1 %
NEUTROPHILS # BLD AUTO: 4.4 10E9/L (ref 1.6–8.3)
NEUTROPHILS NFR BLD AUTO: 64.2 %
NRBC # BLD AUTO: 0 10*3/UL
NRBC BLD AUTO-RTO: 0 /100
NUM BPU REQUESTED: 1
NUM BPU REQUESTED: 1
PHOSPHATE SERPL-MCNC: 3.9 MG/DL (ref 2.5–4.5)
PLATELET # BLD AUTO: 24 10E9/L (ref 150–450)
PLATELET # BLD AUTO: 41 10E9/L (ref 150–450)
POTASSIUM SERPL-SCNC: 3.7 MMOL/L (ref 3.4–5.3)
PROT SERPL-MCNC: 5.9 G/DL (ref 6.8–8.8)
RBC # BLD AUTO: 2.93 10E12/L (ref 4.4–5.9)
SODIUM SERPL-SCNC: 142 MMOL/L (ref 133–144)
SPECIMEN EXP DATE BLD: ABNORMAL
TRANSFUSION STATUS PATIENT QL: NORMAL
UPPER GI ENDOSCOPY: NORMAL
WBC # BLD AUTO: 6.8 10E9/L (ref 4–11)

## 2019-07-29 PROCEDURE — 82248 BILIRUBIN DIRECT: CPT | Performed by: INTERNAL MEDICINE

## 2019-07-29 PROCEDURE — 25000125 ZZHC RX 250: Performed by: INTERNAL MEDICINE

## 2019-07-29 PROCEDURE — 25000128 H RX IP 250 OP 636: Performed by: INTERNAL MEDICINE

## 2019-07-29 PROCEDURE — 86901 BLOOD TYPING SEROLOGIC RH(D): CPT | Performed by: INTERNAL MEDICINE

## 2019-07-29 PROCEDURE — 25000565 ZZH ISOFLURANE, EA 15 MIN: Performed by: INTERNAL MEDICINE

## 2019-07-29 PROCEDURE — 86850 RBC ANTIBODY SCREEN: CPT | Performed by: INTERNAL MEDICINE

## 2019-07-29 PROCEDURE — 85384 FIBRINOGEN ACTIVITY: CPT | Performed by: PHYSICIAN ASSISTANT

## 2019-07-29 PROCEDURE — 12000001 ZZH R&B MED SURG/OB UMMC

## 2019-07-29 PROCEDURE — 99233 SBSQ HOSP IP/OBS HIGH 50: CPT | Performed by: INTERNAL MEDICINE

## 2019-07-29 PROCEDURE — 85730 THROMBOPLASTIN TIME PARTIAL: CPT | Performed by: INTERNAL MEDICINE

## 2019-07-29 PROCEDURE — 37000008 ZZH ANESTHESIA TECHNICAL FEE, 1ST 30 MIN: Performed by: INTERNAL MEDICINE

## 2019-07-29 PROCEDURE — 37000009 ZZH ANESTHESIA TECHNICAL FEE, EACH ADDTL 15 MIN: Performed by: INTERNAL MEDICINE

## 2019-07-29 PROCEDURE — 25000125 ZZHC RX 250: Performed by: NURSE ANESTHETIST, CERTIFIED REGISTERED

## 2019-07-29 PROCEDURE — 71000014 ZZH RECOVERY PHASE 1 LEVEL 2 FIRST HR: Performed by: INTERNAL MEDICINE

## 2019-07-29 PROCEDURE — 27210794 ZZH OR GENERAL SUPPLY STERILE: Performed by: INTERNAL MEDICINE

## 2019-07-29 PROCEDURE — 86900 BLOOD TYPING SEROLOGIC ABO: CPT | Performed by: INTERNAL MEDICINE

## 2019-07-29 PROCEDURE — 25500064 ZZH RX 255 OP 636: Performed by: INTERNAL MEDICINE

## 2019-07-29 PROCEDURE — 84100 ASSAY OF PHOSPHORUS: CPT | Performed by: INTERNAL MEDICINE

## 2019-07-29 PROCEDURE — 25000128 H RX IP 250 OP 636: Performed by: NURSE ANESTHETIST, CERTIFIED REGISTERED

## 2019-07-29 PROCEDURE — 40000279 XR SURGERY CARM FLUORO GREATER THAN 5 MIN W STILLS: Mod: TC

## 2019-07-29 PROCEDURE — C1876 STENT, NON-COA/NON-COV W/DEL: HCPCS | Performed by: INTERNAL MEDICINE

## 2019-07-29 PROCEDURE — 36000053 ZZH SURGERY LEVEL 2 EA 15 ADDTL MIN - UMMC: Performed by: INTERNAL MEDICINE

## 2019-07-29 PROCEDURE — 36415 COLL VENOUS BLD VENIPUNCTURE: CPT | Performed by: PHYSICIAN ASSISTANT

## 2019-07-29 PROCEDURE — 86880 COOMBS TEST DIRECT: CPT | Performed by: INTERNAL MEDICINE

## 2019-07-29 PROCEDURE — 85025 COMPLETE CBC W/AUTO DIFF WBC: CPT | Performed by: INTERNAL MEDICINE

## 2019-07-29 PROCEDURE — 25000128 H RX IP 250 OP 636: Performed by: PHYSICIAN ASSISTANT

## 2019-07-29 PROCEDURE — 82150 ASSAY OF AMYLASE: CPT | Performed by: INTERNAL MEDICINE

## 2019-07-29 PROCEDURE — 25000132 ZZH RX MED GY IP 250 OP 250 PS 637: Performed by: INTERNAL MEDICINE

## 2019-07-29 PROCEDURE — 86860 RBC ANTIBODY ELUTION: CPT | Performed by: INTERNAL MEDICINE

## 2019-07-29 PROCEDURE — 25800030 ZZH RX IP 258 OP 636

## 2019-07-29 PROCEDURE — 85610 PROTHROMBIN TIME: CPT | Performed by: INTERNAL MEDICINE

## 2019-07-29 PROCEDURE — P9037 PLATE PHERES LEUKOREDU IRRAD: HCPCS | Performed by: INTERNAL MEDICINE

## 2019-07-29 PROCEDURE — 83735 ASSAY OF MAGNESIUM: CPT | Performed by: INTERNAL MEDICINE

## 2019-07-29 PROCEDURE — 25000128 H RX IP 250 OP 636

## 2019-07-29 PROCEDURE — 36000051 ZZH SURGERY LEVEL 2 1ST 30 MIN - UMMC: Performed by: INTERNAL MEDICINE

## 2019-07-29 PROCEDURE — 85384 FIBRINOGEN ACTIVITY: CPT | Performed by: INTERNAL MEDICINE

## 2019-07-29 PROCEDURE — C1769 GUIDE WIRE: HCPCS | Performed by: INTERNAL MEDICINE

## 2019-07-29 PROCEDURE — 36592 COLLECT BLOOD FROM PICC: CPT | Performed by: INTERNAL MEDICINE

## 2019-07-29 PROCEDURE — 0D7A8DZ DILATION OF JEJUNUM WITH INTRALUMINAL DEVICE, VIA NATURAL OR ARTIFICIAL OPENING ENDOSCOPIC: ICD-10-PCS | Performed by: INTERNAL MEDICINE

## 2019-07-29 PROCEDURE — 25800030 ZZH RX IP 258 OP 636: Performed by: NURSE ANESTHETIST, CERTIFIED REGISTERED

## 2019-07-29 PROCEDURE — 85049 AUTOMATED PLATELET COUNT: CPT | Performed by: ANESTHESIOLOGY

## 2019-07-29 PROCEDURE — 86870 RBC ANTIBODY IDENTIFICATION: CPT | Performed by: INTERNAL MEDICINE

## 2019-07-29 PROCEDURE — 86905 BLOOD TYPING RBC ANTIGENS: CPT | Performed by: INTERNAL MEDICINE

## 2019-07-29 PROCEDURE — 80053 COMPREHEN METABOLIC PANEL: CPT | Performed by: INTERNAL MEDICINE

## 2019-07-29 PROCEDURE — 40000171 ZZH STATISTIC PRE-PROCEDURE ASSESSMENT III: Performed by: INTERNAL MEDICINE

## 2019-07-29 PROCEDURE — 25800030 ZZH RX IP 258 OP 636: Performed by: INTERNAL MEDICINE

## 2019-07-29 PROCEDURE — 83690 ASSAY OF LIPASE: CPT | Performed by: INTERNAL MEDICINE

## 2019-07-29 PROCEDURE — 0DCA8ZZ EXTIRPATION OF MATTER FROM JEJUNUM, VIA NATURAL OR ARTIFICIAL OPENING ENDOSCOPIC: ICD-10-PCS | Performed by: INTERNAL MEDICINE

## 2019-07-29 PROCEDURE — C1726 CATH, BAL DIL, NON-VASCULAR: HCPCS | Performed by: INTERNAL MEDICINE

## 2019-07-29 PROCEDURE — 87040 BLOOD CULTURE FOR BACTERIA: CPT | Performed by: PHYSICIAN ASSISTANT

## 2019-07-29 PROCEDURE — 25000128 H RX IP 250 OP 636: Performed by: ANESTHESIOLOGY

## 2019-07-29 RX ORDER — SODIUM CHLORIDE, SODIUM LACTATE, POTASSIUM CHLORIDE, CALCIUM CHLORIDE 600; 310; 30; 20 MG/100ML; MG/100ML; MG/100ML; MG/100ML
INJECTION, SOLUTION INTRAVENOUS CONTINUOUS
Status: DISCONTINUED | OUTPATIENT
Start: 2019-07-29 | End: 2019-07-29 | Stop reason: HOSPADM

## 2019-07-29 RX ORDER — LABETALOL 20 MG/4 ML (5 MG/ML) INTRAVENOUS SYRINGE
10
Status: COMPLETED | OUTPATIENT
Start: 2019-07-29 | End: 2019-07-29

## 2019-07-29 RX ORDER — PROPOFOL 10 MG/ML
INJECTION, EMULSION INTRAVENOUS PRN
Status: DISCONTINUED | OUTPATIENT
Start: 2019-07-29 | End: 2019-07-29

## 2019-07-29 RX ORDER — SODIUM CHLORIDE 9 MG/ML
INJECTION, SOLUTION INTRAVENOUS CONTINUOUS PRN
Status: DISCONTINUED | OUTPATIENT
Start: 2019-07-29 | End: 2019-07-29

## 2019-07-29 RX ORDER — FENTANYL CITRATE 50 UG/ML
INJECTION, SOLUTION INTRAMUSCULAR; INTRAVENOUS PRN
Status: DISCONTINUED | OUTPATIENT
Start: 2019-07-29 | End: 2019-07-29

## 2019-07-29 RX ORDER — ONDANSETRON 4 MG/1
4 TABLET, ORALLY DISINTEGRATING ORAL EVERY 30 MIN PRN
Status: DISCONTINUED | OUTPATIENT
Start: 2019-07-29 | End: 2019-07-29 | Stop reason: HOSPADM

## 2019-07-29 RX ORDER — ONDANSETRON 2 MG/ML
4 INJECTION INTRAMUSCULAR; INTRAVENOUS EVERY 30 MIN PRN
Status: DISCONTINUED | OUTPATIENT
Start: 2019-07-29 | End: 2019-07-29 | Stop reason: HOSPADM

## 2019-07-29 RX ORDER — ONDANSETRON 2 MG/ML
INJECTION INTRAMUSCULAR; INTRAVENOUS PRN
Status: DISCONTINUED | OUTPATIENT
Start: 2019-07-29 | End: 2019-07-29

## 2019-07-29 RX ORDER — METOPROLOL TARTRATE 1 MG/ML
INJECTION, SOLUTION INTRAVENOUS PRN
Status: DISCONTINUED | OUTPATIENT
Start: 2019-07-29 | End: 2019-07-29

## 2019-07-29 RX ORDER — LABETALOL 20 MG/4 ML (5 MG/ML) INTRAVENOUS SYRINGE
10 EVERY 4 HOURS PRN
Status: DISCONTINUED | OUTPATIENT
Start: 2019-07-29 | End: 2019-07-29

## 2019-07-29 RX ORDER — NALOXONE HYDROCHLORIDE 0.4 MG/ML
.1-.4 INJECTION, SOLUTION INTRAMUSCULAR; INTRAVENOUS; SUBCUTANEOUS
Status: ACTIVE | OUTPATIENT
Start: 2019-07-29 | End: 2019-07-30

## 2019-07-29 RX ORDER — LIDOCAINE HYDROCHLORIDE 20 MG/ML
INJECTION, SOLUTION INFILTRATION; PERINEURAL PRN
Status: DISCONTINUED | OUTPATIENT
Start: 2019-07-29 | End: 2019-07-29

## 2019-07-29 RX ORDER — IOPAMIDOL 510 MG/ML
INJECTION, SOLUTION INTRAVASCULAR PRN
Status: DISCONTINUED | OUTPATIENT
Start: 2019-07-29 | End: 2019-07-29 | Stop reason: HOSPADM

## 2019-07-29 RX ORDER — FENTANYL CITRATE 50 UG/ML
25-50 INJECTION, SOLUTION INTRAMUSCULAR; INTRAVENOUS
Status: DISCONTINUED | OUTPATIENT
Start: 2019-07-29 | End: 2019-07-29 | Stop reason: HOSPADM

## 2019-07-29 RX ORDER — HYDRALAZINE HYDROCHLORIDE 20 MG/ML
10 INJECTION INTRAMUSCULAR; INTRAVENOUS EVERY 6 HOURS PRN
Status: DISCONTINUED | OUTPATIENT
Start: 2019-07-29 | End: 2019-07-30 | Stop reason: HOSPADM

## 2019-07-29 RX ORDER — HYDROMORPHONE HYDROCHLORIDE 1 MG/ML
.3-.5 INJECTION, SOLUTION INTRAMUSCULAR; INTRAVENOUS; SUBCUTANEOUS EVERY 5 MIN PRN
Status: DISCONTINUED | OUTPATIENT
Start: 2019-07-29 | End: 2019-07-29 | Stop reason: HOSPADM

## 2019-07-29 RX ADMIN — SODIUM CHLORIDE: 900 INJECTION INTRAVENOUS at 12:03

## 2019-07-29 RX ADMIN — FENTANYL CITRATE 100 MCG: 50 INJECTION, SOLUTION INTRAMUSCULAR; INTRAVENOUS at 12:05

## 2019-07-29 RX ADMIN — PHENYLEPHRINE HYDROCHLORIDE 100 MCG: 10 INJECTION INTRAVENOUS at 13:05

## 2019-07-29 RX ADMIN — FENTANYL CITRATE 50 MCG: 50 INJECTION, SOLUTION INTRAMUSCULAR; INTRAVENOUS at 12:11

## 2019-07-29 RX ADMIN — METOPROLOL TARTRATE 1 MG: 1 INJECTION, SOLUTION INTRAVENOUS at 13:31

## 2019-07-29 RX ADMIN — PIPERACILLIN SODIUM,TAZOBACTAM SODIUM 3.38 G: 3; .375 INJECTION, POWDER, FOR SOLUTION INTRAVENOUS at 12:03

## 2019-07-29 RX ADMIN — HYDRALAZINE HYDROCHLORIDE 10 MG: 20 INJECTION INTRAMUSCULAR; INTRAVENOUS at 16:17

## 2019-07-29 RX ADMIN — ONDANSETRON 4 MG: 2 INJECTION INTRAMUSCULAR; INTRAVENOUS at 12:12

## 2019-07-29 RX ADMIN — PIPERACILLIN SODIUM,TAZOBACTAM SODIUM 3.38 G: 3; .375 INJECTION, POWDER, FOR SOLUTION INTRAVENOUS at 06:09

## 2019-07-29 RX ADMIN — VANCOMYCIN HYDROCHLORIDE 1500 MG: 10 INJECTION, POWDER, LYOPHILIZED, FOR SOLUTION INTRAVENOUS at 06:54

## 2019-07-29 RX ADMIN — METOPROLOL TARTRATE 1 MG: 1 INJECTION, SOLUTION INTRAVENOUS at 13:22

## 2019-07-29 RX ADMIN — METOPROLOL TARTRATE 1 MG: 1 INJECTION, SOLUTION INTRAVENOUS at 13:36

## 2019-07-29 RX ADMIN — PROPOFOL 60 MG: 10 INJECTION, EMULSION INTRAVENOUS at 12:20

## 2019-07-29 RX ADMIN — SUGAMMADEX 200 MG: 100 INJECTION, SOLUTION INTRAVENOUS at 13:24

## 2019-07-29 RX ADMIN — ROCURONIUM BROMIDE 50 MG: 10 INJECTION INTRAVENOUS at 12:20

## 2019-07-29 RX ADMIN — PHENYLEPHRINE HYDROCHLORIDE 100 MCG: 10 INJECTION INTRAVENOUS at 13:13

## 2019-07-29 RX ADMIN — ALLOPURINOL 100 MG: 100 TABLET ORAL at 19:29

## 2019-07-29 RX ADMIN — SODIUM CHLORIDE: 9 INJECTION, SOLUTION INTRAVENOUS at 14:18

## 2019-07-29 RX ADMIN — METOPROLOL TARTRATE 1 MG: 1 INJECTION, SOLUTION INTRAVENOUS at 13:25

## 2019-07-29 RX ADMIN — SODIUM CHLORIDE: 9 INJECTION, SOLUTION INTRAVENOUS at 06:10

## 2019-07-29 RX ADMIN — METOPROLOL TARTRATE 1 MG: 1 INJECTION, SOLUTION INTRAVENOUS at 13:28

## 2019-07-29 RX ADMIN — LABETALOL 20 MG/4 ML (5 MG/ML) INTRAVENOUS SYRINGE 10 MG: at 14:00

## 2019-07-29 RX ADMIN — LIDOCAINE HYDROCHLORIDE 100 MG: 20 INJECTION, SOLUTION INFILTRATION; PERINEURAL at 12:20

## 2019-07-29 RX ADMIN — PHENYLEPHRINE HYDROCHLORIDE 100 MCG: 10 INJECTION INTRAVENOUS at 12:41

## 2019-07-29 RX ADMIN — SENNOSIDES AND DOCUSATE SODIUM 2 TABLET: 8.6; 5 TABLET ORAL at 08:22

## 2019-07-29 RX ADMIN — PIPERACILLIN SODIUM,TAZOBACTAM SODIUM 3.38 G: 3; .375 INJECTION, POWDER, FOR SOLUTION INTRAVENOUS at 18:42

## 2019-07-29 RX ADMIN — METOPROLOL TARTRATE 150 MG: 50 TABLET ORAL at 19:29

## 2019-07-29 RX ADMIN — METOPROLOL TARTRATE 150 MG: 50 TABLET ORAL at 08:21

## 2019-07-29 ASSESSMENT — ACTIVITIES OF DAILY LIVING (ADL)
ADLS_ACUITY_SCORE: 12

## 2019-07-29 ASSESSMENT — ENCOUNTER SYMPTOMS: DYSRHYTHMIAS: 1

## 2019-07-29 NOTE — OR NURSING
PLTs 40, Dr Degroot aware.  Dr Rodriguez states he is ok to have pt go to surgery with a Plt of 30.

## 2019-07-29 NOTE — OR NURSING
Dr. Rodriguez at bedside, patient okay to have full liquid diet for the rest of today. Will re-eval in morning if can advance to regular diet.

## 2019-07-29 NOTE — OR NURSING
B/P: 139/75, T: 97.6, P: 106, R: 16, Sa02 99% RA    Patient with HR 's discussed with Dr. Degroot ([facility]). Td for transfer back to

## 2019-07-29 NOTE — PROGRESS NOTES
[facility], [place]    Hematology / Oncology Progress Note    Date of Admission: 7/28/2019  Date of Service (when I saw the patient): 07/29/2019     Assessment & Plan   Girish Chauhan is a 64 year old man with recurrent cholangiocarcinoma, CAD, HTN, Afib on apixaban, aortic stenosis, and chronic diastolic heart failure, CKD, and recent gastrojejunal stenting and exchange who presented 7/28 with fever, nausea, vomiting, and abdominal pain.     Today:   - GI consulted for migrate gastrojejunostomy stents management, taken to the OR today s/p ERCP with stent exchange. Full liquid diet started.   - Transfused plts > 20K (given 2 units today). Unknown cause of thrombocytopenia, pending smear, [id number], peripheral PTT and fibrinogen. Haptoglobin elevated at 192 so less likely MAHA. Retic 2.2, T bili elevated at 1.6 and direct 0.4.   - BCx positive for gram + cocci in pairs and chains, continue Vanco and Zosyn while awaiting sensitivities. No hx of VRE.   - Echo pending today to evaluate CHF, consider cards consult tomorrow to help manage CHF and long term anticoagulation   - Hydralazine PRN added     Fever/nausea/vomiting  Concern for ascending cholangitis   Leukocytosis  Bacteremia (Gram positive cocci)  Fever, nausea, vomiting started after a gastrojejunal stent exchange done 7/17. Concern for cholangitis. CT A/P done on admission 7/28 showed fluid-filled mild dilation of the jejunal loop at hepatojejunostomy associated with a single remaining gastrojejunostomy stent (2 were placed on 7/17), with remaining stent having migrated distally into the duodenum with only a short segment of its proximal pigtail located within the jejunal loop. Started on Zosyn on admission for intraabdominal infection.   - Infectious work up: UA negative. UCx pending. BCx positive 7/28 for gram + cocci in pairs and chains, continue Vanco and Zosyn while awaiting sensitivities. No hx of VRE. Daily BCx x2.   - GI  consulted, s/p gastrojejunal stent replacement 7/29 by Dr. Rodriguez. Uncomplicated removal of in situ stent and dilation of  the gastrojejunostomy tract and successful placement of a 15mm Axios lumen apposing stent to maintain the gastrojejunostomy. Per post op note typically we would exchange this metal stent for plastic stents after 2-3m as these stents tend to degrade and may cause tissue trauma over time, however given the clinical course thus far we will hold course with the stent in situ indefinitely. Full liquid diet post procedure, advance tomorrow.   - Antiplatelets and anticoagulants on hold with ongoing new thrombocytopenia.     Antibiotics:     Zosyn ( 7/28- current )               Vanco ( 7/29- current )     H/o Recent SBO  H/o Biliary obstruction s/p stent  H/o Carol-en-Y hepaticojejunostomy 3/16/16  Presented to OSH and transferred to [facility] 6/[facility]19-6/9/19 with SBO with possible involvement of draining choledochojejunostomy loop, subjective fevers, and a fib with RVR. GI consulted, s/p small bowel enteroscopy with axios stenting on 6/7 for obstructed biliary limb. (Carol limb not resectable given high risk surgical candidate and peritoneal nodule invading into diaphragm and small bowel). S/p pip-tazo 6/6-6/9 and followed with levofloxacin for total 7 day course of abx. He underwent uncomplicated small bowel enteroscopy/stent exchange with Dr. Rodriguez on 7/17.   - GI consulted as above noted.      Thrombocytopenia  Normocytic anemia  He has noted in the last few days some petechiae around his ankles and a easier tendency to bleed. Uncertain etiology of new onset thrombocytopenia. Prior to admit, all platelet counts have been mostly in the normal range. Hgb is also decreased but he has been this low before. No recent chemotherapy. No evidence of hypersplenism on CT. Differential includes consumption from MAHA, DIC, TTP. Has had a history of TTP in the past (8/18/18) as well - causing anemia (needing frequent  blood transfusions) and mild thrombocytopenia and elevated Cr to 2.0, but never needed plasma exchange as it was thought to be related to gemcitabine. Cr is stable near baseline 1.5.  - Transfuse platelets for goal >20k.  - Antiplatelet agents and anticoagulants on hold.   - Checked coags at admission - INR 1.5, PTT 40 are mildly prolonged. Fibrinogen however is normal. Coags BID.   - Pending peripheral smear review, reticulocytes 2.2, absolute retic 70, LDH , haptoglobin e;evated 192. Bilirubin elevated 0.8 --> 1.6 (direct 0.4). Pending [id number]. Cr at baseline so less likely TTP. Monitor.      Recurrent metastatic cholangiocarcinoma  Cholangiocarcinoma resected in 3/2016 (including partial liver resection) with negative margins and no LN involvement, but with perineural and lymphovascular invasion. No adjuvant chemotherapy given. Recurred in bladder 11/2017, bx c/w metastatic cholangiocarcinoma, started on cisplatin/gemcitabine 12/2017. Cisplatin held 6/2018 for poor tolerance. Gemcitabine discontinued 8/2018 for possible TTP, then went off treatment. In 4/2019 was found to have disease progression in the abdominal cavity anterior to the liver, just below the diaphragm. Started on capecitabine 4/30/19 (last dose 5/7) but developed an NSTEMI s/p angiogram 5/6/19 (see below) and actually continued on the capecitabine for several days after NSTEMI without any ongoing cardiac symptoms or evidence of ongoing ischemia. On follow up with Dr. De Los Santos 6/24, disease appeared stable. Decision made with family to hold off on treatment for 2 months and reevaluate. CT A/P on admission does show slightly increased size of a few now mildly enlarged upper abdominal/retroperitoneal lymph nodes and prominent right cardiophrenic lymph nodes. Stable mild peritoneal and omental haziness and stable to slightly increased ill-defined/spiculated soft tissue thickening associated with the jejunum anteromedial to the hepatectomy  resection margin extending to the abdominal wall. These findings are concerning for recurrent/metastatic disease.   - Recommend attention on follow-up.     Recent MI  Coronary artery disease  Hyperlipidemia  Permanent atrial fibrillation  Bicuspid aortic valve and moderate Aortic stenosis  Heart failure with reduced EF  Hypertension  CAD with history of multivessel stenting in 2011. Developed NSTEMI, s/p LHC and angiogram 5/6/19 with 99% obstruction of OM2, unsuccessful PCI. He also developed afib with RVR during this time which necessitated increase of his metoprolol and starting on apixaban. He was also started on ASA/clopidogrel for CAD. Repeat angiogram done 6/17 with SAM to the OM1. No anticoagulants taken since 7/27.  - Continue metoprolol.   - Hold PTA ASA/clopidogrel in case of GI procedure. Consider consult with cards for anticoagulation management with recent stent placement.   - Hold PTA apixaban in case of GI procedure  - Hold PTA losartan for now, BP control with hydralazine PRN  - Hold PTA furosemide (20mg daily) for now  - Hold PTA atorvastatin for now  - Repeat Echo today pending.      CKD stage III  Baseline Cr around 1.5. Current Cr 1.6 around baseline.      Gout  Conitnue on allopurinol. Takes colchicine for 3-5 days with flares. Last flare 6/6.     GERD  - Continue PTA pantoprazole, Tums.     FEN  - IVFs: NS @ 100 ml/hr on admission, discontinue with full liquid diet now.  - Diet: Full liquid diet, advance tomorrow.      PPX  - DVT: mechanical only given thrombocytopenia  - Bowel: senna-colace, miralax prn  - GI: PTA PPI as above     Lines/Drains: Port, PT/OT   Consults: GI  CODE: Full  DISPO: Inpatient > 2nights for work up and treatment of bacteremia with possible cholangitis.      Discussed with staff physician, Dr. Weaver.     PILLO HollyC   Hematology/Oncology   Pager: [phone]       Interval History     Krishna reports to be feeling well this AM. No acute events overnight. No  fevers or chills since admission. Denies chest pain, SOB, NV, dysuria, melena or BRBPR. No other signs or symptoms of bleeding but has noticed that he is bruising more easily lately. Gout seems to be under control right now. Chronic neuropathy stable. Does not that he seem to be coughing more with liquid intake but not solids. No recent choking episodes.     Physical Exam   Temp: 97  F (36.1  C) Temp src: Temporal BP: (!) 161/102 Pulse: 112 Heart Rate: 85 Resp: 16 SpO2: 99 % O2 Device: None (Room air) Oxygen Delivery: 3 LPM  Vitals:    07/28/19 1538 07/29/19 0945   Weight: 71.4 kg (157 lb 6.4 oz) 72.3 kg (159 lb 8 oz)     Vital Signs with Ranges  Temp:  [95.7  F (35.4  C)-98.3  F (36.8  C)] 97  F (36.1  C)  Pulse:  [] 112  Heart Rate:  [] 85  Resp:  [14-20] 16  BP: (106-164)/() 161/102  SpO2:  [96 %-100 %] 99 %  I/O last 3 completed shifts:  In: 2374.5 [P.O.:200; I.V.:900; IV Piggyback:1000]  Out: -     Constitutional: Pleasant male seen laying in bed. No apparent distress, and appears stated age.  Eyes: Lids and lashes normal, sclera clear, conjunctiva normal.  ENT: Normocephalic, without obvious abnormality, oral pharynx with moist mucus membranes, tonsils without erythema or exudates, gums normal and good dentition.   Respiratory: No increased work of breathing, good air exchange, clear to auscultation bilaterally, no crackles or wheezing.  Cardiovascular:Tachy irregular rate and rhythm, harsh systolic murmur 4/6  GI: +BS. Soft. No tenderness on palpation.  Skin: Diffuse petechiae and ecchymosis, no lesions, no jaundice.  Extremities: There is no redness, warmth, or swelling of the joints. Trace lower extremity edema. No cyanosis.  Neurologic: Awake, alert, oriented to name, place and time.    Vascular access: R port c/d/i     Medications     - MEDICATION INSTRUCTIONS -       sodium chloride 100 mL/hr at 07/29/19 1418       allopurinol  100 mg Oral Daily     heparin  5 mL Intracatheter Q28 Days      heparin lock flush  5-10 mL Intracatheter Q24H     metoprolol tartrate  150 mg Oral BID     pantoprazole  40 mg Oral QAM AC     piperacillin-tazobactam  3.375 g Intravenous Q6H     polyethylene glycol  17 g Oral Daily     senna-docusate  1 tablet Oral BID    Or     senna-docusate  2 tablet Oral BID     vancomycin (VANCOCIN) IV  1,500 mg Intravenous Q18H       Data   Recent Results (from the past 24 hour(s))   XR Surgery RAFFI G/T 5 Min Fluoro w Stills    Narrative    This exam was marked as non-reportable because it will not be read by a   radiologist or a [place] non-radiologist provider.

## 2019-07-29 NOTE — PHARMACY-VANCOMYCIN DOSING SERVICE
Pharmacy Vancomycin Initial Note  Date of Service 2019  Patient's  1955  64 year old, male    Indication: Bacteremia    Current estimated CrCl = Estimated Creatinine Clearance: 53 mL/min (A) (based on SCr of 1.46 mg/dL (H)).    Creatinine for last 3 days  2019:  9:31 AM Creatinine 1.46 mg/dL    Recent Vancomycin Level(s) for last 3 days  No results found for requested labs within last 72 hours.      Vancomycin IV Administrations (past 72 hours)      No vancomycin orders with administrations in past 72 hours.                Nephrotoxins and other renal medications (From now, onward)    Start     Dose/Rate Route Frequency Ordered Stop    19 0600  vancomycin 1500 mg in 0.9% NaCl 250 ml intermittent infusion 1,500 mg      1,500 mg  over 90 Minutes Intravenous EVERY 18 HOURS 19 0537      19 1830  piperacillin-tazobactam (ZOSYN) 3.375 g vial to attach to  mL bag      3.375 g  over 30 Minutes Intravenous EVERY 6 HOURS 19 1444            Contrast Orders - past 72 hours (72h ago, onward)    Start     Dose/Rate Route Frequency Ordered Stop    19 1017  iopamidol (ISOVUE-370) solution 99 mL      99 mL Intravenous ONCE 19 1016 19 1021                Plan:  1.  Start vancomycin  1500 mg IV q18h.   2.  Goal Trough Level: 15-20 mg/L   3.  Pharmacy will check trough levels as appropriate in 1-3 Days.    4. Serum creatinine levels will be ordered daily for the first week of therapy and at least twice weekly for subsequent weeks.    5. [place] method utilized to dose vancomycin therapy: Method 2    Henrry Jacobson

## 2019-07-29 NOTE — ANESTHESIA CARE TRANSFER NOTE
Patient: Girish Chauhan    Procedure(s):  Esophagoscopy, gastroscopy, duodenoscopy (EGD), with stent exchange and dilation    Diagnosis: Cholangitis  Diagnosis Additional Information: No value filed.    Anesthesia Type:   General     Note:  Airway :Face Mask  Patient transferred to:PACU  Comments: Patient oxygenating and ventilating well on 4LFM.  Patient COREY, following comands, and denies pain.  Report given to RN and VSS.  Handoff Report: Identifed the Patient, Identified the Reponsible Provider, Reviewed the pertinent medical history, Discussed the surgical course, Reviewed Intra-OP anesthesia mangement and issues during anesthesia, Set expectations for post-procedure period and Allowed opportunity for questions and acknowledgement of understanding      Vitals: (Last set prior to Anesthesia Care Transfer)    CRNA VITALS  7/29/2019 1301 - 7/29/2019 1339      7/29/2019             NIBP:  123/74    Pulse:  109    Temp:  36.4  C (97.5  F)    SpO2:  100 %    Resp Rate (observed):  16                Electronically Signed By: YO Richards CRNA  July 29, 2019  1:39 PM

## 2019-07-29 NOTE — PLAN OF CARE
[id number]: AVSS on RA, afebrile. Denies pain, n/v, SOB. Received 1 unit of platelets for level of 20 and in anticipation for possible ERCP today. Platelet count with AM labs 24, will need another unit of platelets. Blood culture from yesterday AM came back positive growing gram positive cocci in pairs and chains, moonlighter notified, vanco now added. Has been NPO since midnight. Continue to monitor.

## 2019-07-29 NOTE — OP NOTE
Upper GI Endoscopy 07/29/2019 11:53 AM [address] [place] [address] [phone]     Endoscopy Department   _______________________________________________________________________________   Patient Name: Girish Chauhan           Procedure Date: 7/29/2019 11:53 AM   MRN: [id number]                       Account Number: [id number]   YOB: 1955              Admit Type: Inpatient   Age: 64                                Gender: Male   Note Status: Finalized                Attending MD: Gabino Rodriguez MD   Pause for the Cause: pause for cause completed Total Sedation Time:   _______________________________________________________________________________       Procedure:           Upper GI endoscopy   Indications:         Stent insertion   Providers:           Gabino Rodriguez MD   Patient Profile:     Mr Chauhan is a 63yo gentleman with cholangiocarcinoma                        status post left hepatectomy with RYHJ anatomyÂ with                        evidence of recurrent disease and metastasis causing                        obstruction of the biliary limb recently managed by                        decompressive gastrojejunostomy with stent placement.                        The original stent was exchanged for two plastic stents,                        however he has had fevers and nausea and imaging                        suggests migration of one stent and partial migration of                        the other. He now proceeds to further management by                        upper endsocopy.   Referring MD:        Dario Ray   Medicines:           General Anesthesia   Complications:       No immediate complications.   _______________________________________________________________________________   Procedure:           Pre-Anesthesia Assessment:                        - Prior to the procedure, a History and Physical was                         performed, and patient medications and allergies were                        reviewed. The patient is competent. The risks and                        benefits of the procedure and the sedation options and                        risks were discussed with the patient. All questions                        were answered and informed consent was obtained. Patient                        identification and proposed procedure were verified by                        the nurse in the pre-procedure area. Mental Status                        Examination: alert and oriented. Airway Examination:                        Mallampati Class II (the uvula but not tonsillar pillars                        visualized). Respiratory Examination: clear to                        auscultation. CV Examination: normal. ASA Grade                        Assessment: II - A patient with mild systemic disease.                        After reviewing the risks and benefits, the patient was                        deemed in satisfactory condition to undergo the                        procedure. The anesthesia plan was to use general                        anesthesia. Immediately prior to administration of                        medications, the patient was re-assessed for adequacy to                        receive sedatives. The heart rate, respiratory rate,                        oxygen saturations, blood pressure, adequacy of                        pulmonary ventilation, and response to care were                        monitored throughout the procedure. The physical status                        of the patient was re-assessed after the procedure.                        After obtaining informed consent, the endoscope was                        passed under direct vision. Throughout the procedure,                        the patient's blood pressure, pulse, and oxygen                        saturations were monitored continuously. The 1t            "             gastrowscope and the duodenoscope were introduced                        through the mouth, and advanced to the second part of                        duodenum. The upper GI endoscopy was accomplished                        without difficulty. The patient tolerated the procedure                        well.                                                                                     Findings:         films demonstrated one in situ Solus stent and surgical clips. The        proximal, mid and distal esophagus were unremarkable with the        squamocolumnar line found at the gastroesophageal junction without        significant irregularity. The stomach was distorted distally with the        remaining Solus found barely across the gastrojejunostomy. The tract was        accessed using a duodenoscope and a Omni catheter allowing passage of a        long 0.025\" Visiglide wire. The tract was dilated to 18mm and the Solus        removed by rat toothed forceps. Using a 1T we recannulated the tract        with the wire and over this wire positioned a 15mm Axios catheter. The        first of two Axios stents was misdeployed due to catheter failure (under        tension of awkward scope position given anatomy) while the second was        deployed with excellent position and expansion of both ends within their        respective lumens. The jejunum was seen across the the lumen of the        stent.                                                                                     Impression:          - Migration of previously placed soft Solus stents, one                        per rectum and the other partially maintaining the                        gastrojejunostomy tract                        - Uncomplicated removal of in situ stent and dilation of                        the gastrojejunostomy tract                        - Successful placement of a 15mm Axios lumen apposing                       "  stent to maintain the gastrojejunostomy   Recommendation:      - General anesthesia recovery with return to the floor                        when appropriate                        - We had a long discussion regarding the use of this                        stent off label for creation and now maintenance of the                        gastrojejunostomy; typically we would exchange this                        metal stent for plastic stents after 2-3m as these                        stents tend to degrade and may cause tissue trauma over                        time, however given the clinical course thus far we will                        hold course with the stent in situ indefinitely                        - Further exploration into the thrombocytopenia is                        deserved                        - The findings and recommendations were discussed with                        the patient and their family                                                                                       electronically signed by MEGHAN Rodriguez

## 2019-07-29 NOTE — ANESTHESIA PREPROCEDURE EVALUATION
Anesthesia Pre-Procedure Evaluation    Patient: Girish Chauhan   MRN:     [id number] Gender:   male   Age:    64 year old :      1955        Preoperative Diagnosis: Cholangitis   Procedure(s):  ENDOSCOPIC RETROGRADE CHOLANGIOPANCREATOGRAPHY WITH STENT EXCHANGE     Past Medical History:   Diagnosis Date     Aortic stenosis due to bicuspid aortic valve      Atrial fibrillation (H) 2006    hx of paroxysmal atrial fibrillation since approximately . S/p cardioversion in  with recurrent atrial fibrillation s/p repeat cardioversion 14.     Basal cell carcinoma 2016    right shoulder and right posterior calf s/p electrodessication and curretage 2016.     CAD (coronary artery disease) 2011    s/p angiogram 2011 s/p percutaneous intervention on the LAD with multiple drug-eluting stents complicated by a small perforation in the diagonal branch which sealed spontaneously.  Patient with significant Circumflex stenosis which is being treated conservatively.     Gout     affects left foot 2-3 times per year     Hyperlipidemia      Hypertension      Liver disease      Melanoma (H) 2015    back s/p resection 2015     Squamous cell carcinoma     nose s/p excision      Past Surgical History:   Procedure Laterality Date     ------------OTHER-------------      multiple skin cancer resections     CARDIOVERSION  , 2014     ENDOSCOPIC RETROGRADE CHOLANGIOPANCREATOGRAM N/A 2016    Procedure: COMBINED ENDOSCOPIC RETROGRADE CHOLANGIOPANCREATOGRAPHY, PLACE TUBE/STENT;  Surgeon: Rafa Andrade MD;  Location: [facility] OR     ENDOSCOPIC RETROGRADE CHOLANGIOPANCREATOGRAPHY  2014     ENDOSCOPIC ULTRASOUND UPPER GASTROINTESTINAL TRACT (GI) N/A 2019    Procedure: ENDOSCOPIC ULTRASOUND, with hot axios stent placement;  Surgeon: Gabino Rodriguez MD;  Location: [facility] OR     ENTEROSCOPY SMALL BOWEL N/A 2019    Procedure: ENTEROSCOPY;  Surgeon: Gabino Rodriguez MD;  Location: [facility]      EXPLORE COMMON BILE DUCT N/A 3/8/2016    Procedure: EXPLORE COMMON BILE DUCT;  Surgeon: Jose Eduardo Pinedo MD;  Location: [facility]     HEPATECTOMY PARTIAL Left 3/8/2016    Procedure: HEPATECTOMY PARTIAL;  Surgeon: Jose Eduardo Pinedo MD;  Location: [facility] OR     INSERT PORT VASCULAR ACCESS Right 12/8/2017    Procedure: INSERT PORT VASCULAR ACCESS;  Place single lumen venous chest port - right;  Surgeon: Drew Powell PA-C;  Location: [facility]     SOFT TISSUE SURGERY       STENT  12/2011    LAD with multiple SAM          Anesthesia Evaluation     . Pt has had prior anesthetic.            ROS/MED HX    ENT/Pulmonary:  - neg pulmonary ROS     Neurologic:  - neg neurologic ROS     Cardiovascular:     (+) Dyslipidemia, hypertension--CAD, -past MI,-stent,. : . CHF . . :. dysrhythmias a-fib, Irregular Heartbeat/Palpitations, valvular problems/murmurs type: AS moderate:.       METS/Exercise Tolerance:     Hematologic:     (+) Other Hematologic Disorder-thrombocytopenia      Musculoskeletal:  - neg musculoskeletal ROS       GI/Hepatic:     (+) GERD liver disease, Other GI/Hepatic cholangiocarcinoma; cholangitis; h/o SBO      Renal/Genitourinary:     (+) chronic renal disease, type: CRI,       Endo:     (+) Other Endocrine Disorder gout.      Psychiatric:  - neg psychiatric ROS       Infectious Disease:         Malignancy:   (+) Malignancy History of Other and Skin  Skin CA Active status post, Other CA cholangiocarcinoma Active status post         Other:                         PHYSICAL EXAM:   Mental Status/Neuro: A/A/O   Airway: Facies: Feasible  Mallampati: I  Mouth/Opening: Full  TM distance: > 6 cm  Neck ROM: Full   Respiratory: Auscultation: CTAB     Resp. Rate: Normal     Resp. Effort: Normal      CV: Rhythm: Irregular; Afib  Rate: Age appropriate  Heart: Normal Sounds  Edema: None   Comments:      Dental: Normal Dentition                LABS:  CBC:   Lab Results   Component Value Date    WBC 6.8 07/29/2019    WBC  "9.9 07/28/2019    HGB 8.6 (L) 07/29/2019    HGB 9.3 (L) 07/28/2019    HCT 27.7 (L) 07/29/2019    HCT 30.2 (L) 07/28/2019    PLT 24 (LL) 07/29/2019    PLT 20 (LL) 07/28/2019     BMP:   Lab Results   Component Value Date     07/29/2019     07/28/2019    POTASSIUM 3.7 07/29/2019    POTASSIUM 4.0 07/28/2019    CHLORIDE 108 07/29/2019    CHLORIDE 101 07/28/2019    CO2 25 07/29/2019    CO2 26 07/28/2019    BUN 19 07/29/2019    BUN 23 07/28/2019    CR 1.49 (H) 07/29/2019    CR 1.46 (H) 07/28/2019    GLC 89 07/29/2019     (H) 07/28/2019     COAGS:   Lab Results   Component Value Date    PTT 40 (H) 07/28/2019    INR 1.48 (H) 07/29/2019    FIBR 450 (H) 07/28/2019     POC:   Lab Results   Component Value Date    BGM 89 07/17/2019     OTHER:   Lab Results   Component Value Date    PH 7.38 03/08/2016    LACT 1.2 07/28/2019    GARY 8.5 07/29/2019    PHOS 3.9 07/29/2019    MAG 2.2 07/29/2019    ALBUMIN 2.7 (L) 07/29/2019    PROTTOTAL 5.9 (L) 07/29/2019    ALT 51 07/29/2019    AST 53 (H) 07/29/2019    ALKPHOS 331 (H) 07/29/2019    BILITOTAL 1.6 (H) 07/29/2019    LIPASE 131 07/29/2019    AMYLASE 71 07/29/2019    TSH 3.88 10/15/2018        Preop Vitals    BP Readings from Last 3 Encounters:   07/29/19 (!) 163/95   07/17/19 127/87   06/24/19 111/78    Pulse Readings from Last 3 Encounters:   07/28/19 75   07/17/19 76   06/24/19 78      Resp Readings from Last 3 Encounters:   07/29/19 18   07/17/19 16   06/24/19 16    SpO2 Readings from Last 3 Encounters:   07/29/19 97%   07/17/19 99%   06/24/19 100%      Temp Readings from Last 1 Encounters:   07/29/19 35.8  C (96.5  F) (Oral)    Ht Readings from Last 1 Encounters:   07/17/19 1.778 m (5' 10\")      Wt Readings from Last 1 Encounters:   07/29/19 72.3 kg (159 lb 8 oz)    Estimated body mass index is 22.89 kg/m  as calculated from the following:    Height as of 7/17/19: 1.778 m (5' 10\").    Weight as of this encounter: 72.3 kg (159 lb 8 oz).     LDA:  Port A Cath Single " 12/08/17 Right Chest wall (Active)   Access Date 07/28/19 7/28/2019 11:52 PM   Access Attempts 1 7/28/2019  9:30 AM   Gauge/Length Power noncoring 90 degree bend;20 gauge;3/4 inch 7/28/2019  9:30 AM   Site Assessment WDL 7/28/2019 11:52 PM   Line Status Infusing 7/28/2019 11:52 PM   Extravasation? No 7/28/2019 11:52 PM   Dressing Intervention Transparent 7/28/2019 11:52 PM   Dressing change due 08/04/19 7/28/2019 11:52 PM   Needle Change Due 08/04/19 7/28/2019 11:52 PM   Number of days: 598       Intrathecal/Epidural Catheter 03/08/16 (Active)   Number of days: 1238       Closed/Suction Drain 1 Left Abdomen (Active)   Number of days: 1238       Closed/Suction Drain 2 Left Abdomen (Active)   Number of days: 1238       Biliary Drain (Active)   Number of days: 1238       Biliary Stent 8 Danish (Active)   Number of days: 1238        Assessment:   ASA SCORE: 3    H&P: History and physical reviewed and following examination; no interval change.   Smoking Status:  Non-Smoker/Unknown   NPO Status: NPO Appropriate     Plan:   Anes. Type:  General   Pre-Medication: None   Induction:  IV (Standard)   Airway: ETT; Oral   Access/Monitoring: PIV   Maintenance: Balanced     Postop Plan:   Postop Pain: Opioids  Postop Sedation/Airway: Not planned     PONV Management:   Adult Risk Factors:, Non-Smoker, Postop Opioids   Prevention: Ondansetron     CONSENT: Direct conversation   Plan and risks discussed with: Patient                      Abelardo Degroot MD

## 2019-07-29 NOTE — PLAN OF CARE
3137-1881: AVSS on RA, afebrile. Denies pain, n/v, SOB. Tolerating clear liquid diet well. Pt will be NPO at midnight for possible ERCP tomorrow, awaiting GI consult. Plan to transfuse platelets at midnight tonight in anticipation for procedure. Continue to monitor.

## 2019-07-29 NOTE — PLAN OF CARE
Max Bp 161/102, OVSS on RA.  Provider ordered PRN labetalol for BP.  Denies pain and nausea.  Up independently before going down for ERCP.  1 Unit of platelets given, no adverse affects.  Continue plan of care.

## 2019-07-29 NOTE — CONSULTS
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 Patient had a biliary limb obstruction with concerns of metastatic disease s/p decompression gastrojejunostomy with an AXIOS stent placement on 6/5/2019 s/p stetn exchange with 2 soft 10F 1 cm Solus stents on 7/17/2019. Patient since then had on/off fevers and chills with intermittent nausea and bilious vomiting with poor PO intake and steady mild perumbilical and RUQ abdominal pain.     At ED on 7/28/2019, WBC of 11.7 with left shift. Hgb 9.5, Platelets 21K,  AST 71, ALT 68 and elevated Alk Phos of 473, TB normal.   Repeated Blood tests today on 7/29/2019 showed after 1 Platelets unit transfusion showed   CT abdomen and pelvis showed one of the gastrojejunostomy stent displaced more distally into the duodenum with fluid-filled mild dilation of the jejunal loop at hepatojejunostomy  CT scan reading per GI team: Both gastrojejunostomy have migrated distally.    - Migrated Gastrojejunostomy stents (#2)   - Recurrent Cholangiocarcinoma with metastatic s/p 2 gastrojejunostomy stents placement on 7/17/19    - Ascending Cholangitis/  ? abscess  -Patient with fever, nausea and vomiting with improving white count since admission and a mild elevation in total bilirubin and high alk phos. Patient continues to be a febrilem normotensive and not tachy/martine arrhythmic.   - Peripheral blood cultures showed G+ Cocci in chains and pairs, pending central blood cultures  - Patient on Zosyn ( 7/28- current )                  On Vanco ( 7/29- current )    - Thrombocytopenia   - Patient is know to have TTP 2/2 Gemcitabine but discontinued on 8/2018  - Now presenting with recurrent thrombocytopenia  - s/p 1 unit of platelets transfusion, Platelets 41K pre-op    Recommendations    - EGD with EUS today  - Continue on antibiotics  - Resume diet as tolerated after prodecure  - Consider work-up for thrombocytopenia, possible HIT?      Gastroenterology follow up recommendations: TBD      Patient care plan discussed with Dr. Rodriguez, GI staff physician. Thank you for involving us in this patient's care. Please do not hesitate to contact the GI service with any questions or concerns.     Sha Jacobson M.D.   IM PGY1  Department of Gastroenterology     -------------------------------------------------------------------------------------------------------------------   History is obtained from the patient and the medical record.          History of Present Illness:     Girish Chauhan is a 64 year old male with a history of recurrent, metastatic cholangiocarcinoma, CAD, HTN, A.fib, TTP, aortic stenosis, diastolic CHF, CKD and recent gastrojejunal stenting and exchange who presents with fever, nasuea, vomiting and abdominal pain. Patient had a gastrojejunostomy stent exchange on 7/17/2019 and then after he was c/o nocturnal fevers that broke down on Tylenol the following mornings. He also reported nausea and bilious vomiting few times. Patient stated that he has a mild abdominal pain that's not worse than what he had before the stent placement. Pain is constant, not radiating, located periumbilical and more to RUQ. No changes on bowel habits, but last BM was on Saturday and patient related it to poor PO intake 2/2 lack of appetite. No hematemesis, no melena or hematochezia.     [facility] on 7/28/2019, Blood tests showed a mildly elevated WBC with a left shift with Hgb around 9.5 and platelets of 21K,  AST 53, ALT 51, Alk Phos 331, and a normal TB  suggesting a cholestatic pattern.     CT abdomen and plevis showed RNYHJ with fluid-filled mild dilation of th jejunal loop at hepatojejunostomy associated with a single remaining gastrojejunostomy stent (noting that 2 were placed on 7/17/2019). The remaining stent appears to have migrated distally into the duodenum  with only a short segment of its proximal pigtail located within the jejunal loop.    Today's blood tests showed a WBC within normal limits and platelets of 41K s/p 1 platelets unit transfusion, Hg 8.6, AST 53, ALT 51, Alk Phos 331, TB 1.6  INR 1.5  Peripheral blood culture from right arm grew Gram positive cocci in pairs and chains, but central blood culture from the portacath with no growth so far. Patient was started on Zosyn and Vanco was added on after the positive peripheral blood culture.             Past Medical History:   Reviewed and edited as appropriate  Past Medical History:   Diagnosis Date     Aortic stenosis due to bicuspid aortic valve      Atrial fibrillation (H) 2006    hx of paroxysmal atrial fibrillation since approximately 2006. S/p cardioversion in 2013 with recurrent atrial fibrillation s/p repeat cardioversion 9/29/14.     Basal cell carcinoma 1/2016    right shoulder and right posterior calf s/p electrodessication and curretage 1/2016.     CAD (coronary artery disease) 12/2011    s/p angiogram 12/2011 s/p percutaneous intervention on the LAD with multiple drug-eluting stents complicated by a small perforation in the diagonal branch which sealed spontaneously.  Patient with significant Circumflex stenosis which is being treated conservatively.     Gout     affects left foot 2-3 times per year     Hyperlipidemia      Hypertension      Liver disease      Melanoma (H) 9/2015    back s/p resection 9/2015     Squamous cell carcinoma     nose s/p excision            Past Surgical History:   Reviewed and edited as appropriate   Past Surgical History:   Procedure Laterality Date      ------------OTHER-------------      multiple skin cancer resections     CARDIOVERSION  2013, 9/2014     ENDOSCOPIC RETROGRADE CHOLANGIOPANCREATOGRAM N/A 2/26/2016    Procedure: COMBINED ENDOSCOPIC RETROGRADE CHOLANGIOPANCREATOGRAPHY, PLACE TUBE/STENT;  Surgeon: Rafa Andrade MD;  Location: [facility] OR     ENDOSCOPIC RETROGRADE CHOLANGIOPANCREATOGRAPHY  2/2014     ENDOSCOPIC ULTRASOUND UPPER GASTROINTESTINAL TRACT (GI) N/A 6/7/2019    Procedure: ENDOSCOPIC ULTRASOUND, with hot axios stent placement;  Surgeon: Gabino Rodriguez MD;  Location: [facility] OR     ENTEROSCOPY SMALL BOWEL N/A 6/7/2019    Procedure: ENTEROSCOPY;  Surgeon: Gabino Rodriguez MD;  Location: [facility] OR     EXPLORE COMMON BILE DUCT N/A 3/8/2016    Procedure: EXPLORE COMMON BILE DUCT;  Surgeon: Jose Eduardo Pinedo MD;  Location: [facility] OR     HEPATECTOMY PARTIAL Left 3/8/2016    Procedure: HEPATECTOMY PARTIAL;  Surgeon: Jose Eduardo Pinedo MD;  Location: [facility] OR     INSERT PORT VASCULAR ACCESS Right 12/8/2017    Procedure: INSERT PORT VASCULAR ACCESS;  Place single lumen venous chest port - right;  Surgeon: Drew Powell PA-C;  Location: [facility] OR     SOFT TISSUE SURGERY       STENT  12/2011    LAD with multiple SAM            Previous Endoscopy:   No results found for this or any previous visit.         Social History:   Reviewed and edited as appropriate  Social History     Socioeconomic History     Marital status:      Spouse name: Not on file     Number of children: 1     Years of education: Not on file     Highest education level: Not on file   Occupational History     Employer: [employer]     Financial resource strain: Not on file     Food insecurity:     Worry: Not on file     Inability: Not on file     Transportation needs:     Medical: Not on file     Non-medical: Not on file   Tobacco Use     Smoking status: Never Smoker     Smokeless tobacco: Never Used   Substance and Sexual Activity     Alcohol use: No      Alcohol/week: 0.0 oz     Drug use: No     Sexual activity: Not on file   Lifestyle     Physical activity:     Days per week: Not on file     Minutes per session: Not on file     Stress: Not on file   Relationships     Social connections:     Talks on phone: Not on file     Gets together: Not on file     Attends Faith service: Not on file     Active member of club or organization: Not on file     Attends meetings of clubs or organizations: Not on file     Relationship status: Not on file     Intimate partner violence:     Fear of current or ex partner: Not on file     Emotionally abused: Not on file     Physically abused: Not on file     Forced sexual activity: Not on file   Other Topics Concern     Not on file   Social History Narrative    Works for [email or web address] with [employer].  Lives in [place].   with one grown daughter.  No tobacco, rare Etoh, no drug use.            Family History:   Reviewed and edited as appropriate  Family History   Problem Relation Age of Onset     Skin Cancer Mother      Other - See Comments Father         father passed away in his 60s intraoperatively with an unknown bile duct obstruction           Allergies:   Reviewed and edited as appropriate   No Known Allergies         Medications:     Current Facility-Administered Medications   Medication     acetaminophen (TYLENOL) tablet 650 mg     allopurinol (ZYLOPRIM) tablet 100 mg     diclofenac (VOLTAREN) 1 % topical gel 2 g     heparin 100 UNIT/ML injection 5 mL     heparin lock flush 10 UNIT/ML injection 5-10 mL     heparin lock flush 10 UNIT/ML injection 5-10 mL     lidocaine (LMX4) cream     lidocaine 1 % 0.1-1 mL     LORazepam (ATIVAN) tablet 0.5-1 mg    Or     LORazepam (ATIVAN) injection 0.5-1 mg     magnesium sulfate 4 g in 100 mL sterile water (premade)     Medication Instruction     metoprolol tartrate (LOPRESSOR) tablet 150 mg     nitroGLYcerin (NITROSTAT) sublingual tablet 0.4 mg     ondansetron (ZOFRAN)  injection 8 mg    Or     ondansetron (ZOFRAN-ODT) ODT tab 8 mg    Or     ondansetron (ZOFRAN) tablet 8 mg     pantoprazole (PROTONIX) EC tablet 40 mg     piperacillin-tazobactam (ZOSYN) 3.375 g vial to attach to  mL bag     polyethylene glycol (MIRALAX/GLYCOLAX) Packet 17 g     potassium chloride (KLOR-CON) Packet 20-40 mEq     potassium chloride 10 mEq in 100 mL intermittent infusion with 10 mg lidocaine     potassium chloride 10 mEq in 100 mL sterile water intermittent infusion (premix)     potassium chloride 20 mEq in 50 mL intermittent infusion     potassium chloride ER (K-DUR/KLOR-CON M) CR tablet 20-40 mEq     potassium phosphate 15 mmol in D5W 250 mL intermittent infusion     potassium phosphate 20 mmol in D5W 250 mL intermittent infusion     potassium phosphate 20 mmol in D5W 500 mL intermittent infusion     potassium phosphate 25 mmol in D5W 500 mL intermittent infusion     prochlorperazine (COMPAZINE) tablet 10 mg    Or     prochlorperazine (COMPAZINE) injection 10 mg     senna-docusate (SENOKOT-S/PERICOLACE) 8.6-50 MG per tablet 1 tablet    Or     senna-docusate (SENOKOT-S/PERICOLACE) 8.6-50 MG per tablet 2 tablet     sodium chloride (PF) 0.9% PF flush 10-20 mL     sodium chloride (PF) 0.9% PF flush 10-20 mL     sodium chloride 0.9% infusion     vancomycin 1500 mg in 0.9% NaCl 250 ml intermittent infusion 1,500 mg             Review of Systems:     A complete review of systems was performed and is negative except as noted in the HPI           Physical Exam:   /88 (BP Location: Left arm)   Pulse 75   Temp 95.7  F (35.4  C) (Oral)   Resp 18   Wt 71.4 kg (157 lb 6.4 oz)   SpO2 97%   BMI 22.58 kg/m    Wt:   Wt Readings from Last 2 Encounters:   07/28/19 71.4 kg (157 lb 6.4 oz)   07/17/19 73.9 kg (162 lb 14.7 oz)      Constitutional: cooperative, pleasant, not dyspneic/diaphoretic, no acute distress  Eyes: Sclera anicteric/injected  Ears/nose/mouth/throat: Normal oropharynx without ulcers or  exudate, mucus membranes moist, hearing intact  Neck: supple.   CV: No edema  Respiratory: Unlabored breathing  Abd: soft Nondistended, +bs in all 4 quadrants, no hepatosplenomegaly, nontender, no peritoneal signs  Skin: warm, perfused, no jaundice  Neuro: AAO x 3, No asterixis  Psych: Normal affect           Data:   Labs and imaging below were independently reviewed and interpreted    Imaging     CT abdomen and pelvis with contrast 7/28/2019    IMPRESSION:   1. Postoperative changes of left hepatectomy and cholecystectomy and  Carol-en-Y hepaticojejunostomy with fluid-filled mild dilation of the  jejunal loop at hepatojejunostomy associated with a single remaining  gastrojejunostomy stent (noting that 2 were placed on 7/17/2019). The  remaining stent appears to have migrated distally into the duodenum  with only a short segment of its proximal pigtail located within the  jejunal loop.  2. Slightly increased size of a few now mildly enlarged upper  abdominal/retroperitoneal lymph nodes and prominent right  cardiophrenic lymph nodes. Stable mild peritoneal and omental haziness  and stable to slightly increased ill-defined/spiculated soft tissue  thickening associated with the jejunum anteromedial to the hepatectomy  resection margin extending to the abdominal wall. These findings are  concerning for recurrent/metastatic disease. Recommend attention on  follow-up.  3. Stable soft tissue thickening and desmoplastic appearance along the  anterior aspect of the rectum and posterior aspects of the bladder.  4. Right parapelvic cyst with mild associated hydronephrosis,  unchanged.

## 2019-07-29 NOTE — ANESTHESIA POSTPROCEDURE EVALUATION
Anesthesia POST Procedure Evaluation    Patient: Girish Chauhan   MRN:     [id number] Gender:   male   Age:    64 year old :      1955        Preoperative Diagnosis: Cholangitis   Procedure(s):  Esophagoscopy, gastroscopy, duodenoscopy (EGD), with stent exchange and dilation   Postop Comments: No value filed.       Anesthesia Type:  Not documented  General    Reportable Event: NO     PAIN: Uncomplicated   Sign Out status: Comfortable, Well controlled pain     PONV: No PONV   Sign Out status:  No Nausea or Vomiting     Neuro/Psych: Uneventful perioperative course   Sign Out Status: Preoperative baseline; Age appropriate mentation     Airway/Resp.: Uneventful perioperative course   Sign Out Status: Non labored breathing, age appropriate RR; Resp. Status within EXPECTED Parameters     CV: Uneventful perioperative course   Sign Out status: Appropriate BP and perfusion indices; Appropriate HR/Rhythm     Disposition:   Sign Out in:  PACU  Disposition:  Phase II; Home  Recovery Course: Uneventful  Follow-Up: Not required           Last Anesthesia Record Vitals:  CRNA VITALS  2019 1301 - 2019 1341      2019             NIBP:  123/74    Pulse:  109    Temp:  36.4  C (97.5  F)    SpO2:  100 %    Resp Rate (observed):  16          Last PACU Vitals:  Vitals Value Taken Time   /74 2019  1:34 PM   Temp     Pulse 112 2019  1:34 PM   Resp     SpO2 100 % 2019  1:40 PM   Temp src     NIBP 123/74 2019  1:37 PM   Pulse 109 2019  1:37 PM   SpO2 100 % 2019  1:37 PM   Resp     Temp 36.4  C (97.5  F) 2019  1:37 PM   Ht Rate     Temp 2     Vitals shown include unvalidated device data.      Electronically Signed By: Abelardo Degroot MD, 2019, 1:41 PM

## 2019-07-30 ENCOUNTER — RECORDS - HEALTHEAST (OUTPATIENT)
Dept: ADMINISTRATIVE | Facility: OTHER | Age: 64
End: 2019-07-30

## 2019-07-30 ENCOUNTER — DOCUMENTATION ONLY (OUTPATIENT)
Facility: CLINIC | Age: 64
End: 2019-07-30

## 2019-07-30 ENCOUNTER — APPOINTMENT (OUTPATIENT)
Dept: CARDIOLOGY | Facility: CLINIC | Age: 64
DRG: 393 | End: 2019-07-30
Attending: INTERNAL MEDICINE
Payer: COMMERCIAL

## 2019-07-30 VITALS
HEART RATE: 92 BPM | OXYGEN SATURATION: 100 % | SYSTOLIC BLOOD PRESSURE: 147 MMHG | BODY MASS INDEX: 23 KG/M2 | DIASTOLIC BLOOD PRESSURE: 92 MMHG | RESPIRATION RATE: 18 BRPM | WEIGHT: 160.3 LBS | TEMPERATURE: 96.6 F

## 2019-07-30 LAB
ALBUMIN SERPL-MCNC: 2.6 G/DL (ref 3.4–5)
ALP SERPL-CCNC: 321 U/L (ref 40–150)
ALT SERPL W P-5'-P-CCNC: 46 U/L (ref 0–70)
ANION GAP SERPL CALCULATED.3IONS-SCNC: 10 MMOL/L (ref 3–14)
APTT PPP: 35 SEC (ref 22–37)
AST SERPL W P-5'-P-CCNC: 45 U/L (ref 0–45)
BASOPHILS # BLD AUTO: 0 10E9/L (ref 0–0.2)
BASOPHILS NFR BLD AUTO: 0.3 %
BILIRUB SERPL-MCNC: 1.1 MG/DL (ref 0.2–1.3)
BLD PROD TYP BPU: NORMAL
BUN SERPL-MCNC: 15 MG/DL (ref 7–30)
CALCIUM SERPL-MCNC: 8.5 MG/DL (ref 8.5–10.1)
CHLORIDE SERPL-SCNC: 111 MMOL/L (ref 94–109)
CO2 SERPL-SCNC: 22 MMOL/L (ref 20–32)
CREAT SERPL-MCNC: 1.45 MG/DL (ref 0.66–1.25)
DIFFERENTIAL METHOD BLD: ABNORMAL
EOSINOPHIL # BLD AUTO: 0.2 10E9/L (ref 0–0.7)
EOSINOPHIL NFR BLD AUTO: 3.5 %
ERYTHROCYTE [DISTWIDTH] IN BLOOD BY AUTOMATED COUNT: 14 % (ref 10–15)
FIBRINOGEN PPP-MCNC: 404 MG/DL (ref 200–420)
GFR SERPL CREATININE-BSD FRML MDRD: 51 ML/MIN/{1.73_M2}
GLUCOSE SERPL-MCNC: 79 MG/DL (ref 70–99)
HCT VFR BLD AUTO: 29.5 % (ref 40–53)
HGB BLD-MCNC: 9 G/DL (ref 13.3–17.7)
IMM GRANULOCYTES # BLD: 0 10E9/L (ref 0–0.4)
IMM GRANULOCYTES NFR BLD: 0.3 %
INR PPP: 1.35 (ref 0.86–1.14)
LYMPHOCYTES # BLD AUTO: 1 10E9/L (ref 0.8–5.3)
LYMPHOCYTES NFR BLD AUTO: 16.1 %
MAGNESIUM SERPL-MCNC: 2.2 MG/DL (ref 1.6–2.3)
MCH RBC QN AUTO: 29.4 PG (ref 26.5–33)
MCHC RBC AUTO-ENTMCNC: 30.5 G/DL (ref 31.5–36.5)
MCV RBC AUTO: 96 FL (ref 78–100)
MONOCYTES # BLD AUTO: 0.8 10E9/L (ref 0–1.3)
MONOCYTES NFR BLD AUTO: 12.6 %
NEUTROPHILS # BLD AUTO: 4.3 10E9/L (ref 1.6–8.3)
NEUTROPHILS NFR BLD AUTO: 67.2 %
NRBC # BLD AUTO: 0 10*3/UL
NRBC BLD AUTO-RTO: 0 /100
NUM BPU REQUESTED: 1
PHOSPHATE SERPL-MCNC: 3.6 MG/DL (ref 2.5–4.5)
PLATELET # BLD AUTO: 28 10E9/L (ref 150–450)
POTASSIUM SERPL-SCNC: 3.7 MMOL/L (ref 3.4–5.3)
PROT SERPL-MCNC: 5.9 G/DL (ref 6.8–8.8)
RBC # BLD AUTO: 3.06 10E12/L (ref 4.4–5.9)
SODIUM SERPL-SCNC: 143 MMOL/L (ref 133–144)
WBC # BLD AUTO: 6.3 10E9/L (ref 4–11)

## 2019-07-30 PROCEDURE — 83735 ASSAY OF MAGNESIUM: CPT | Performed by: INTERNAL MEDICINE

## 2019-07-30 PROCEDURE — 36592 COLLECT BLOOD FROM PICC: CPT | Performed by: INTERNAL MEDICINE

## 2019-07-30 PROCEDURE — 85384 FIBRINOGEN ACTIVITY: CPT | Performed by: INTERNAL MEDICINE

## 2019-07-30 PROCEDURE — 25800030 ZZH RX IP 258 OP 636: Performed by: INTERNAL MEDICINE

## 2019-07-30 PROCEDURE — 87040 BLOOD CULTURE FOR BACTERIA: CPT | Performed by: INTERNAL MEDICINE

## 2019-07-30 PROCEDURE — 93306 TTE W/DOPPLER COMPLETE: CPT

## 2019-07-30 PROCEDURE — 85610 PROTHROMBIN TIME: CPT | Performed by: INTERNAL MEDICINE

## 2019-07-30 PROCEDURE — 25000128 H RX IP 250 OP 636: Performed by: INTERNAL MEDICINE

## 2019-07-30 PROCEDURE — 99239 HOSP IP/OBS DSCHRG MGMT >30: CPT | Performed by: INTERNAL MEDICINE

## 2019-07-30 PROCEDURE — 80053 COMPREHEN METABOLIC PANEL: CPT | Performed by: INTERNAL MEDICINE

## 2019-07-30 PROCEDURE — 85730 THROMBOPLASTIN TIME PARTIAL: CPT | Performed by: INTERNAL MEDICINE

## 2019-07-30 PROCEDURE — 25000132 ZZH RX MED GY IP 250 OP 250 PS 637: Performed by: INTERNAL MEDICINE

## 2019-07-30 PROCEDURE — 84100 ASSAY OF PHOSPHORUS: CPT | Performed by: INTERNAL MEDICINE

## 2019-07-30 PROCEDURE — 93306 TTE W/DOPPLER COMPLETE: CPT | Mod: 26 | Performed by: INTERNAL MEDICINE

## 2019-07-30 PROCEDURE — 85025 COMPLETE CBC W/AUTO DIFF WBC: CPT | Performed by: INTERNAL MEDICINE

## 2019-07-30 PROCEDURE — 25000128 H RX IP 250 OP 636: Performed by: PHYSICIAN ASSISTANT

## 2019-07-30 RX ORDER — AMPICILLIN TRIHYDRATE 500 MG
500 CAPSULE ORAL EVERY 6 HOURS SCHEDULED
Status: DISCONTINUED | OUTPATIENT
Start: 2019-07-30 | End: 2019-07-30

## 2019-07-30 RX ORDER — POLYETHYLENE GLYCOL 3350 17 G/17G
17 POWDER, FOR SOLUTION ORAL DAILY
COMMUNITY
Start: 2019-07-31 | End: 2019-08-08

## 2019-07-30 RX ORDER — LINEZOLID 600 MG/1
600 TABLET, FILM COATED ORAL EVERY 12 HOURS
Qty: 24 TABLET | Refills: 0 | Status: SHIPPED | OUTPATIENT
Start: 2019-07-30 | End: 2019-09-12

## 2019-07-30 RX ORDER — AMOXICILLIN 250 MG
1 CAPSULE ORAL 2 TIMES DAILY
COMMUNITY
Start: 2019-07-30 | End: 2019-08-08

## 2019-07-30 RX ORDER — LINEZOLID 600 MG/1
600 TABLET, FILM COATED ORAL EVERY 12 HOURS SCHEDULED
Status: DISCONTINUED | OUTPATIENT
Start: 2019-07-30 | End: 2019-07-30 | Stop reason: HOSPADM

## 2019-07-30 RX ORDER — PANTOPRAZOLE SODIUM 40 MG/1
40 TABLET, DELAYED RELEASE ORAL
COMMUNITY
Start: 2019-07-31 | End: 2019-08-08

## 2019-07-30 RX ORDER — LOSARTAN POTASSIUM 25 MG/1
25 TABLET ORAL DAILY
Status: DISCONTINUED | OUTPATIENT
Start: 2019-07-30 | End: 2019-07-30 | Stop reason: HOSPADM

## 2019-07-30 RX ADMIN — Medication 5 ML: at 13:50

## 2019-07-30 RX ADMIN — PIPERACILLIN SODIUM,TAZOBACTAM SODIUM 3.38 G: 3; .375 INJECTION, POWDER, FOR SOLUTION INTRAVENOUS at 11:51

## 2019-07-30 RX ADMIN — PIPERACILLIN SODIUM,TAZOBACTAM SODIUM 3.38 G: 3; .375 INJECTION, POWDER, FOR SOLUTION INTRAVENOUS at 07:01

## 2019-07-30 RX ADMIN — METOPROLOL TARTRATE 150 MG: 50 TABLET ORAL at 08:56

## 2019-07-30 RX ADMIN — HYDRALAZINE HYDROCHLORIDE 10 MG: 20 INJECTION INTRAMUSCULAR; INTRAVENOUS at 11:40

## 2019-07-30 RX ADMIN — LOSARTAN POTASSIUM 25 MG: 25 TABLET ORAL at 11:39

## 2019-07-30 RX ADMIN — VANCOMYCIN HYDROCHLORIDE 1500 MG: 10 INJECTION, POWDER, LYOPHILIZED, FOR SOLUTION INTRAVENOUS at 01:12

## 2019-07-30 RX ADMIN — PIPERACILLIN SODIUM,TAZOBACTAM SODIUM 3.38 G: 3; .375 INJECTION, POWDER, FOR SOLUTION INTRAVENOUS at 00:06

## 2019-07-30 ASSESSMENT — ACTIVITIES OF DAILY LIVING (ADL)
ADLS_ACUITY_SCORE: 12

## 2019-07-30 NOTE — PROGRESS NOTES
[facility]  MEDICAL ONCOLOGY PROGRESS NOTE  Jul 31, 2019    CHIEF COMPLAINT: recurrent cholangiocarcinoma    ONCOLOGY HPI:   Girish Chauhan is a 64 year old year old gentleman with cholangiocarcinoma  Which was resected in 3/2016 (including partial liver resection) with negative margins and no LN involvement, but with perineural and lymphovascular invasion. No adjuvant chemotherapy given. Recurred in bladder 11/2017, bx c/w metastatic cholangiocarcinoma, started on cisplatin/gemcitabine 12/2017. Cisplatin held 6/2018 for poor tolerance. Gemcitabine discontinued 8/2018 for possible TTP, then went off treatment. In 4/2019 was found to have disease progression in the abdominal cavity anterior to the liver, just below the diaphragm. Started on capecitabine 4/30/19 (last dose 5/7) but developed an NSTEMI s/p angiogram 5/6/19 (see below) and actually continued on the capecitabine for several days after NSTEMI without any ongoing cardiac symptoms or evidence of ongoing ischemia. On follow up with Dr. De Los Santos 6/24, disease appeared stable. Decision made with family to hold off on treatment for 2 months and reevaluate.     CT A/P on recent admission does show slightly increased size of a few now mildly enlarged upper abdominal/retroperitoneal lymph nodes and prominent right cardiophrenic lymph nodes. Stable mild peritoneal and omental haziness and stable to slightly increased ill-defined/spiculated soft tissue thickening associated with the jejunum anteromedial to the hepatectomy resection margin extending to the abdominal wall. These findings are concerning for recurrent/metastatic disease.     Was just admitted for fever, nausea, vomiting, and abdominal pain. Hospitalization complicated by sepsis and enterococcus casseliflavus bacteremia. Additionally, Krishna was found to have migrated gastrojejunal stent on CT A/P on admission. S/p ERCP with stent exchange 7/29 by Dr. Rodriguez. Discharged on Linezolid for 2 weeks.      INTERVAL HISTORY:   Krishna presents today unaccompanied for hospital followup.     Since being discharged yesterday he has been feeling well.  He denies any fevers or chills.  He denies any further abdominal pain.  He denies having continued nausea or vomiting.  He has been having some loose stool for the past 2 days, though on Sunday he had no stool and was given laxatives.  He admits that it was one stool each day.  He has been drinking adequate water nose sticking with very soft to liquid foods.  He denies any bleeding.  He overall is feeling much better.      ROS: 10 point ROS neg other than the symptoms noted above in the HPI.      Allergies   Allergen Reactions     Blood Transfusion Related (Informational Only) Other (See Comments)     Patient has a history of a clinically significant antibody against RBC antigens.  A delay in compatible RBCs may occur.       Current Outpatient Medications   Medication     acetaminophen (TYLENOL) 500 MG tablet     allopurinol (ZYLOPRIM) 100 MG tablet     atorvastatin (LIPITOR) 40 MG tablet     calcium carbonate (TUMS) 500 MG chewable tablet     linezolid (ZYVOX) 600 MG tablet     losartan (COZAAR) 25 MG tablet     metoprolol tartrate (LOPRESSOR) 50 MG tablet     Nitroglycerin (NITROSTAT SL)     pantoprazole (PROTONIX) 40 MG EC tablet     polyethylene glycol (MIRALAX/GLYCOLAX) packet     senna-docusate (SENOKOT-S/PERICOLACE) 8.6-50 MG tablet     diclofenac (VOLTAREN) 1 % topical gel     Current Facility-Administered Medications   Medication     sodium chloride (PF) 0.9% PF flush 20 mL       EXAM:  /77 (BP Location: Right arm, Patient Position: Sitting, Cuff Size: Adult Regular)   Pulse 101   Temp 97  F (36.1  C) (Oral)   Resp 16   Wt 74 kg (163 lb 1.6 oz)   SpO2 100%   BMI 23.40 kg/m    Wt Readings from Last 4 Encounters:   08/02/19 75.3 kg (166 lb 1.6 oz)   07/31/19 74 kg (163 lb 1.6 oz)   07/30/19 72.7 kg (160 lb 4.8 oz)   07/17/19 73.9 kg (162 lb 14.7 oz)         General: No acute distress  HEENT: EOMI, PERRLA, no scleral icterus, mucus membranes moist and no lesions or thrush  Lymph: Neck is supple and shows no nodes in cervical or supraclavicular   Heart:  Tachycardia, irregularly irregular rhythm, harsh systolic murmur 4/6  Lungs: CTA-B, no wheezes, rhonchi or rales  Abdomen: Normal bowel sounds, soft, no pain to palpation, not distended, no rebound or guarding, no palpable masses  Extremities: Trace pitting edema b/l  Skin: No significant rashes or lesions  Neuro: CN II-XII are intact    LABS:    7/31/2019 11:45   Sodium 140   Potassium 3.5   Chloride 110 (H)   Carbon Dioxide 25   Urea Nitrogen 11   Creatinine 1.52 (H)   GFR Estimate 48 (L)   GFR Estimate If Black 56 (L)   Calcium 8.5   Anion Gap 5   Albumin 2.9 (L)   Protein Total 6.5 (L)   Bilirubin Total 0.6   Alkaline Phosphatase 346 (H)   ALT 43   AST 40   Glucose 102 (H)   WBC 7.1   Hemoglobin 9.9 (L)   Hematocrit 31.5 (L)   Platelet Count 49 (LL)   RBC Count 3.27 (L)   MCV 96   MCH 30.3   MCHC 31.4 (L)   RDW 14.0   Diff Method Automated Method   % Neutrophils 69.9   % Lymphocytes 16.1   % Monocytes 10.2   % Eosinophils 2.8   % Basophils 0.4   % Immature Granulocytes 0.6   Nucleated RBCs 0   Absolute Neutrophil 5.0   Absolute Lymphocytes 1.1   Absolute Monocytes 0.7   Absolute Eosinophils 0.2   Absolute Basophils 0.0   Abs Immature Granulocytes 0.0   Absolute Nucleated RBC 0.0       IMAGING:   CT Abdomen and Pelvis dated 7/28/19  IMPRESSION:   1. Postoperative changes of left hepatectomy and cholecystectomy and  Carol-en-Y hepaticojejunostomy with fluid-filled mild dilation of the  jejunal loop at hepatojejunostomy associated with a single remaining  gastrojejunostomy stent (noting that 2 were placed on 7/17/2019). The  remaining stent appears to have migrated distally into the duodenum  with only a short segment of its proximal pigtail located within the  jejunal loop.  2. Slightly increased size of a few now  mildly enlarged upper  abdominal/retroperitoneal lymph nodes and prominent right  cardiophrenic lymph nodes. Stable mild peritoneal and omental haziness  and stable to slightly increased ill-defined/spiculated soft tissue  thickening associated with the jejunum anteromedial to the hepatectomy  resection margin extending to the abdominal wall. These findings are  concerning for recurrent/metastatic disease. Recommend attention on  follow-up.  3. Stable soft tissue thickening and desmoplastic appearance along the  anterior aspect of the rectum and posterior aspects of the bladder.  4. Right parapelvic cyst with mild associated hydronephrosis,  unchanged.    IMPRESSION/PLAN:    Bacteremia 2/2 Enterococcus Casseliflavus; sepsis on presentation  Cholangitis   -Presented with fever, nausea vomiting.  CT showed 1 of the stents was missing and one migrated distally into the duodenum.  Had an ERCP to replace stents.  Likely the source of bacteremia was due to obstruction of the biliary limb from the stent causing cholangitis  -Started PO Linezolid with a plan to continue for 14 days since last BCx (will completed 8/11)  -currently afebrile and overall better clinically. WBC is now WNL. Will repeat blood culture today    H/o Recent SBO  H/o Biliary obstruction s/p stent  H/o Carol-en-Y hepaticojejunostomy 3/16/16  Presented to OSH and transferred to [facility] 6/5/19-6/9/19 with SBO with possible involvement of draining choledochojejunostomy loop, subjective fevers, and a fib with RVR.   -had stenting on 6/7 for obstructed biliary limb. As above, found 1 stent missing and the other had migrated. Underwent uncomplicated small bowel enteroscopy/stent exchange with Dr. Rodriguez on 7/17.   -should continue to eat liquid to mechanically soft foods and avoid high fiber. Will discuss further progression of diet at next visit     Thrombocytopenia likely 2/2 sepsis   -presented with petechiae around his ankles and a easier tendency to  bleed. Plt found to be 28 on admission  -no recent chemotherapy and no splenomegaly on CT  -Has had a history of TTP in the past (8/18/18) as well - causing anemia and mild thrombocytopenia and elevated Cr to 2.0, but never needed plasma exchange as it was thought to be related to gemcitabine.   -likely thrombocytopenia 2/2 sepsis. Plts are uptrending. Hopefully will not need any further transfusion though will set up possible transfusions for the next week with close follow-up   -We will continue to transfuse platelets for goal >20k.  Anticoagulation currently on hold (see below)    ADDENDUM: Plts continue to uptrend and were 137 on 8/5. Will have him restart Plavix and Eliquis     Recurrent metastatic cholangiocarcinoma  -He was started on capecitabine 4/30/19 (last dose 5/7) but developed an NSTEMI s/p angiogram 5/6/19   -On follow up with Dr. De Los Santos 6/24, disease appeared stable. Decision made with family to hold off on treatment for 2 months and reevaluate.   -CT A/P on admission does show findings concerning for recurrent/metastatic disease.   -he needs to recover and complete abx prior to considering restarting chemotherapy. Also needs to meet with cardiology to give him clearance      Recent MI  Coronary artery disease  Hyperlipidemia  Permanent atrial fibrillation  Bicuspid aortic valve and moderate Aortic stenosis  Heart failure with reduced EF  Hypertension  CAD with history of multivessel stenting in 2011. Developed NSTEMI, s/p LHC and angiogram 5/6/19 with 99% obstruction of OM2, unsuccessful PCI. He also developed afib with RVR during this time which necessitated increase of his metoprolol and starting on apixaban. He was also started on ASA/clopidogrel for CAD. Repeat angiogram done 6/17 with SAM to the OM1. Repeat Echo 7/30 with improved EF 45-50%, mild LV dilation. No anticoagulants taken since 7/27  - Hold ASA/clopidogrel: re start once plts >70K  - Hold apixaban: re start once plts  >100K  -continue metoprolol, losartan and lasix.  -should f/u with cardiology within the next week. He has a cardiologist at OSH. He will call them. We did place referral for cards here if he would prefer, though he is first going to try to his his established MD    GI  Loose stools: Likely due to liquid diet and heavy laxatives inpatient. If it were to worsen and start having N/V and/or abdominal pain, he should call and we would test for C. Diff since he is on Abx and was just discharged    GERD: Continue pantoprazole and Tums prn    Nadira Cedeno PA-C  [facility]  [address] [place] [address] [phone]

## 2019-07-30 NOTE — PROGRESS NOTES
GASTROENTEROLOGY PROGRESS NOTE    Date of Admission: 7/28/2019  Reason for Admission: n/v, fevers      ASSESSMENT:  Girish Chauhan is a 64 year old male with a history of recurrent cholangiocarcinoma s/p left hepatic resection and RYNHJ anatomy in 2016, metastatic to bladder, s/p chemotherapy in 2017 until 6/24/2019 when it was hold due to NSTEMI for 2 months. Patient had a biliary limb obstruction with concerns of metastatic disease s/p decompression gastrojejunostomy with an AXIOS stent placement on 6/5/2019 s/p stent exchange with 2 soft 10F 1 cm Solus stents on 7/17/2019. Patient since then had on/off fevers and chills with intermittent nausea and bilious vomiting with poor PO intake and steady mild perumbilical and RUQ abdominal pain. Found to have mild elevations in LFT and bacteremic. Source of fevers/bacteremia likely related to obstruction of biliary limb (jejunum) causing cholangitis.    S/p upper endoscopy 7/29 with migration of previously placed gastrojejunostomy Solus stents, one partially maintaining the GJ tract - this was removed, the tract dilated and subsequent successful placement of 15mm Axios LAMS to maintain the GJ. Plan to advance diet today, if tolerates full liquids okay to advance to regular diet.    RECOMMENDATIONS:  ADAT  Management/evaluation of thrombocytopenia per primary heme/onc team  OK to resume Plavix and Eliquis from GI perspective (primary team holding 2/2 thrombocytopenia)  Complete course of antibiotics for Enterococcus bacteremia (7-10 days)  Trend H/H and monitor for signs of bleeding  Discussed with primary team      The patient was discussed and plan agreed upon with GI staff, Dr Rodriguez.      Sarah Lopez PA-C   Advanced Endoscopy/Pancreaticobiliary [facility]  [facility]  Pager *[phone]  _______________________________________________________________      Subjective: Nursing notes and 24hr events reviewed. Patient seen and examined at 1000.  Patient reports feeling good, no abdominal pain or N/V. Had normal formed, brown stool this morning. Tolerated full liquids last night (pudding, jello and cream of mushroom soup) without any new symptoms. Feels hungry, wants to advance diet.    ROS:   4 pt ROS negative unless noted in subjective.     Objective:  Blood pressure (!) 159/90, pulse 92, temperature 96.6  F (35.9  C), temperature source Oral, resp. rate 18, weight 72.7 kg (160 lb 4.8 oz), SpO2 100 %.  Gen: Sitting in bed. Appears comfortable  HEENT: NCAT. Conjunctiva clear. Sclera anicteric  CV: RRR, Peripheral perfusion intact  Resp: normal work of breathing  Abd: Soft, NT, ND, no guarding or rebound, +BS  Msk: no gross deformity  Skin:  no jaundice  Ext: warm, well perfused  Neuro: grossly normal  Mental status/Psych: A&O. Asks/answers questions appropriately       PROCEDURES:  EGD 7/29 - Dr. Rodriguez  - Migration of previously placed soft Solus stents, one per rectum and the other partially maintaining the gastrojejunostomy tract   - Uncomplicated removal of in situ stent and dilation of the gastrojejunostomy tract   - Successful placement of a 15mm Axios lumen apposing stent to maintain the gastrojejunostomy     LABS:  BMP  Recent Labs   Lab 07/30/19  0551 07/29/19  0524 07/28/19  0931    142 133   POTASSIUM 3.7 3.7 4.0   CHLORIDE 111* 108 101   GARY 8.5 8.5 9.0   CO2 22 25 26   BUN 15 19 23   CR 1.45* 1.49* 1.46*   GLC 79 89 119*     CBC  Recent Labs   Lab 07/30/19  0551 07/29/19  1107 07/29/19  0524 07/28/19  1743 07/28/19  1126   WBC 6.3  --  6.8 9.9 11.7*   RBC 3.06*  --  2.93* 3.20* 3.22*   HGB 9.0*  --  8.6* 9.3* 9.5*   HCT 29.5*  --  27.7* 30.2* 30.4*   MCV 96  --  95 94 94   MCH 29.4  --  29.4 29.1 29.5   MCHC 30.5*  --  31.0* 30.8* 31.3*   RDW 14.0  --  14.0 13.9 14.0   PLT 28* 41* 24* 20* 21*     INR  Recent Labs   Lab 07/30/19  0551 07/29/19  1627 07/29/19  0524 07/28/19  1743   INR 1.35* 1.35* 1.48* 1.50*     LFTs  Recent Labs   Lab  07/30/19  0551 07/29/19  0524 07/28/19  0931   ALKPHOS 321* 331* 473*   AST 45 53* 71*   ALT 46 51 68   BILITOTAL 1.1 1.6* 0.8   PROTTOTAL 5.9* 5.9* 6.8   ALBUMIN 2.6* 2.7* 3.0*      PANC  Recent Labs   Lab 07/29/19  0524 07/28/19  0931   LIPASE 131 204   AMYLASE 71  --          IMAGING:  EXAMINATION: CT ABDOMEN PELVIS W CONTRAST, 7/28/2019 10:29 AM     TECHNIQUE:  Helical CT images from the lung bases through the  symphysis pubis were obtained with contrast.  Coronal and sagittal  reformatted images were generated at a workstation for further  assessment.     CONTRAST:  99 ml Isovue 370.     COMPARISON: CT chest abdomen pelvis 6/21/2019     HISTORY: Cholangiocarcinoma, abdominal pain, stent and bowel,     FINDINGS:     Lines and tubes: None.     Lung bases: No consolidation or pleural effusion. Mild subsegmental  bibasilar atelectasis.     Abdomen and pelvis: Postsurgical changes of left hepatectomy and  cholecystectomy. No focal liver lesion. Patent hepatic vasculature.  Mild prominent intrahepatic biliary tree. Carol-en-Y  hepaticojejunostomy. Gastrojejunostomy stent with distal end at the  junction of the second and third portions of the duodenum.  Fluid-filled, mildly dilated jejunal loop at hepatojejunostomy site  measuring 3 x 6.4 cm (series 2, image 31). Stable to minimally  increased soft tissue thickening along the anterior aspect of the  jejunal loop extending to the anterior abdominal wall anteromedial to  the hepatectomy resection margin (series 2 image 36). A couple  adjacent right cardiophrenic lymph nodes are slightly increased in  size, the larger measuring up to 8 mm (series 5 image 44).     Unremarkable adrenal glands. No renal stone. Subcentimeter cortical  hypodensities in the kidneys are too small to characterize and fibula  renal cyst. Stable right parapelvic cyst which exerts mass effect upon  the renal pelvis with stable mild associated hydronephrosis. No left  hydronephrosis. Unchanged few  splenic hypodensities. Homogeneous  pancreatic parenchyma. Main pancreatic duct is not dilated. Mild  peritoneal and omental nodularity and streakiness.     No inguinal lymphadenopathy. Mildly enlarged upper  abdominal/retroperitoneal lymph nodes, slightly increased, for example  measuring 12 mm short axis just below the left renal vein (series 5  image 144), previously 8 mm, and measuring 13 mm anterior to the right  diaphragmatic mando (series 5 image 59), previously 9 mm. No dilated  large bowel loop. Colonic diverticulosis. Normal appendix. Unchanged  soft tissue thickening and tethered appearance along the anterior  aspect of the rectum and posterior aspect of the bladder just above  the seminal vesicles.     Bones and soft tissues: Degenerative changes of the spine. No  aggressive osseous lesion.                                                                      IMPRESSION:   1. Postoperative changes of left hepatectomy and cholecystectomy and  Carol-en-Y hepaticojejunostomy with fluid-filled mild dilation of the  jejunal loop at hepatojejunostomy associated with a single remaining  gastrojejunostomy stent (noting that 2 were placed on 7/17/2019). The  remaining stent appears to have migrated distally into the duodenum  with only a short segment of its proximal pigtail located within the  jejunal loop.  2. Slightly increased size of a few now mildly enlarged upper  abdominal/retroperitoneal lymph nodes and prominent right  cardiophrenic lymph nodes. Stable mild peritoneal and omental haziness  and stable to slightly increased ill-defined/spiculated soft tissue  thickening associated with the jejunum anteromedial to the hepatectomy  resection margin extending to the abdominal wall. These findings are  concerning for recurrent/metastatic disease. Recommend attention on  follow-up.  3. Stable soft tissue thickening and desmoplastic appearance along the  anterior aspect of the rectum and posterior aspects of the  bladder.  4. Right parapelvic cyst with mild associated hydronephrosis,  unchanged.

## 2019-07-30 NOTE — DISCHARGE SUMMARY
[facility], [place]    Discharge Summary  Hematology / Oncology    Date of Admission:  7/28/2019  Date of Discharge:  7/30/2019  Discharging Provider: Shannon Lopes  Date of Service (when I saw the patient): 07/30/19    Discharge Diagnoses   Enterococcus Bacteremia   Sepsis (present on admission)   Nausea/Vomiting   Thrombocytopenia 2/2 Sepsis   CAD  Chronic Systolic Heart Failure   Hypertension   CKD Stage III     History of Present Illness    Girish Chauhan is a 64 year old man with recurrent cholangiocarcinoma, CAD, HTN, Afib on apixaban, aortic stenosis, and chronic diastolic heart failure, CKD, and recent gastrojejunal stenting and exchange who presented 7/28 with fever, nausea, vomiting, and abdominal pain. Hospitalization complicated by sepsis and enterococcus casseliflavus bacteremia (BCx positive 7/28), historically resistant to vancomycin. Discharged on 14 day course of Linezolid. Additionally, Krishna was found to have migrated gastrojejunal stent on CT A/P on admission. S/p ERCP with stent exchange 7/29 by Dr. Rodriguez. Able to tolerate advancement of diet prior to discharge with resolved nausea, vomiting and abdominal pain. New thrombocytopenia on admission likely 2/2 sepsis with otherwise negative work up. Plan for labs and as needed plt transfusion tomorrow in clinic and close follow up with [facility] and labs later this week. Additionally, recommended to continue to HOLD PTA Plavix until plts >70K and Apixaban until plts >100K. Reccommended to follow up with cardiology in the next week as he is high risk for cardiac stent thrombosis - just had SAM to OM1 placed in May and supposed to have another [facility]. Referral place for cardiology at discharge. Plan to continue to hold Lasix until seen by cardiology on follow up. Advised to weigh himself daily and check temperature at home. Return if fevers, NV, worsening abdominal pain or bleeding concerns.     Hospital Course   Girish BERGER  Gissel was admitted on 7/28/2019.  The following problems were addressed during his hospitalization:    Fever/nausea/vomiting  Sepsis (present on admission)   Cholangitis   Leukocytosis   Bacteremia (Gram positive cocci)  Fever, nausea, vomiting started after a gastrojejunal stent exchange done 7/17. Concern for cholangitis. CT A/P done on admission 7/28 showed fluid-filled mild dilation of the jejunal loop at hepatojejunostomy associated with a single remaining gastrojejunostomy stent (2 were placed on 7/17), with remaining stent having migrated distally into the duodenum with only a short segment of its proximal pigtail located within the jejunal loop. Started on Zosyn on admission for intraabdominal infection.   - Infectious work up: UA negative. UCx pending. BCx positive 7/28 for enterococcus casseliflavus, awaiting sensitivities. No hx of VRE. Historically enterococcus casseliflavus resistant to vanco. Started on PO Linezolid, plan to continue x 14 days since last positive BCx (last day 8/11). Will repeat labs and BCx in clinic tomorrow.   - GI consulted, s/p gastrojejunal stent replacement 7/29 by Dr. Rodriguez. Uncomplicated removal of in situ stent and dilation of  the gastrojejunostomy tract and successful placement of a 15mm Axios lumen apposing stent to maintain the gastrojejunostomy. Per post op note typically we would exchange this metal stent for plastic stents after 2-3m as these stents tend to degrade and may cause tissue trauma over time, however given the clinical course thus far we will hold course with the stent in situ indefinitely. Full liquid diet post procedure, advanced diet without issue prior to discahrge. Source of fevers/bacteremia likely related to obstruction of biliary limb (jejunum) causing cholangitis.   - Antiplatelets and anticoagulants on hold with ongoing new thrombocytopenia. Plan to continue to HOLD PTA Plavix until plts >70K and Apixaban until plts >100K.      Antibiotics:                 Zosyn ( 7/28- 7/30)               Vanco ( 7/29- 7/30)    PO Linezolid (7/30 - 8/11). 2 week total course since last positive blood culture (7/28).      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 GI consulted, s/p small bowel enteroscopy with axios stenting on 6/7 for obstructed biliary limb. (Carol limb not resectable given high risk surgical candidate and peritoneal nodule invading into diaphragm and small bowel). S/p pip-tazo 6/6-6/9 and followed with levofloxacin for total 7 day course of abx. He underwent uncomplicated small bowel enteroscopy/stent exchange with Dr. Rodriguez on 7/17.   - GI consulted as above noted.      Thrombocytopenia likely 2/2 sepsis   Normocytic anemia  He has noted in the last few days some petechiae around his ankles and a easier tendency to bleed. Uncertain etiology of new onset thrombocytopenia. Prior to admit, all platelet counts have been mostly in the normal range. Hgb is also decreased but he has been this low before. No recent chemotherapy. No evidence of hypersplenism on CT. Differential includes consumption from [facility]HA, DIC, TTP. Has had a history of TTP in the past (8/18/18) as well - causing anemia (needing frequent blood transfusions) and mild thrombocytopenia and elevated Cr to 2.0, but never needed plasma exchange as it was thought to be related to gemcitabine. Cr is stable near baseline 1.5.  - Transfuse platelets for goal >20k.   - Antiplatelet agents and anticoagulants on hold as above. Labs and as needed plt transfusion tomorrow in clinic.   - Checked coags at admission - INR 1.5, PTT 40 are mildly prolonged. Fibrinogen however is normal.   - Peripheral smear with no schistocytes, reticulocytes 2.2, absolute retic 70, LDH , haptoglobin  elevated 192. Bilirubin elevated 0.8 --> 1.6 (direct 0.4). Pending [id number]. Cr at baseline so less likely TTP. Likely all driven by sepsis. Monitor outpatient.       Recurrent metastatic cholangiocarcinoma  Cholangiocarcinoma resected in 3/2016 (including partial liver resection) with negative margins and no LN involvement, but with perineural and lymphovascular invasion. No adjuvant chemotherapy given. Recurred in bladder 11/2017, bx c/w metastatic cholangiocarcinoma, started on cisplatin/gemcitabine 12/2017. Cisplatin held 6/2018 for poor tolerance. Gemcitabine discontinued 8/2018 for possible TTP, then went off treatment. In 4/2019 was found to have disease progression in the abdominal cavity anterior to the liver, just below the diaphragm. Started on capecitabine 4/30/19 (last dose 5/7) but developed an NSTEMI s/p angiogram 5/6/19 (see below) and actually continued on the capecitabine for several days after NSTEMI without any ongoing cardiac symptoms or evidence of ongoing ischemia. On follow up with Dr. De Los Santos 6/24, disease appeared stable. Decision made with family to hold off on treatment for 2 months and reevaluate. CT A/P on admission does show slightly increased size of a few now mildly enlarged upper abdominal/retroperitoneal lymph nodes and prominent right cardiophrenic lymph nodes. Stable mild peritoneal and omental haziness and stable to slightly increased ill-defined/spiculated soft tissue thickening associated with the jejunum anteromedial to the hepatectomy resection margin extending to the abdominal wall. These findings are concerning for recurrent/metastatic disease.   - Recommend attention on follow-up.      Recent MI  Coronary artery disease  Hyperlipidemia  Permanent atrial fibrillation  Bicuspid aortic valve and moderate Aortic stenosis  Heart failure with reduced EF  Hypertension  CAD with history of multivessel stenting in 2011. Developed NSTEMI, s/p LHC and angiogram 5/6/19 with 99%  obstruction of OM2, unsuccessful PCI. He also developed afib with RVR during this time which necessitated increase of his metoprolol and starting on apixaban. He was also started on ASA/clopidogrel for CAD. Repeat angiogram done 6/17 with SAM to the OM1. No anticoagulants taken since 7/27.  - Continue PTA metoprolol.   - Hold PTA ASA/clopidogrel in case of GI procedure. Plan to HOLD, re start once plts >70K  - Hold PTA apixaban in case of GI procedure. Plan to HOLD, re start once plts >100K  - Hold PTA losartan on admission and restarted 7/30, BP control with hydralazine PRN  - Hold PTA furosemide (20mg daily) for now, re start with follow up with cardiology   - Hold PTA atorvastatin for now  - Repeat Echo 7/30 with improved EF 45-50%, mild LV dilation.      CKD stage III  Baseline Cr around 1.5. Current Cr 1.45 around baseline.      Gout  Conitnue on allopurinol. Takes colchicine for 3-5 days with flares (hold with thrombocytopenia). Last flare 6/6.     GERD  - Continue PTA pantoprazole, Tums.     FEN  - IVFs: NS @ 100 ml/hr on admission, discontinue with full liquid diet now.  - Diet: Full liquid diet, advance tomorrow.      PPX  - DVT: mechanical only given thrombocytopenia  - Bowel: senna-colace, miralax prn  - GI: PTA PPI as above     Lines/Drains: Port, PT/OT   Consults: GI  CODE: Full     Discussed with staff physician, Dr. Weaver.      Shannon Lopes PA-C   Hematology/Oncology   Pager: [phone]     Pending Results   These results will be followed up by [facility]   Unresulted Labs Ordered in the Past 30 Days of this Admission     Date and Time Order Name Status Description    7/29/2019 2330 Blood culture Preliminary     7/29/2019 2330 Blood culture Preliminary     7/29/2019 0747 Blood culture Preliminary     7/29/2019 0747 Blood culture Preliminary     7/28/2019 1641 [id number] Activity w Reflex to Inhib Brenda In process     7/28/2019 0940 Blood culture Preliminary     7/28/2019 0940 Blood culture  Preliminary           Code Status   Full Code    Primary Care Physician   Dario Ray    Physical Exam   Temp: 96.6  F (35.9  C) Temp src: Oral BP: (!) 147/92 Pulse: 92 Heart Rate: 92 Resp: 18 SpO2: 100 % O2 Device: None (Room air)    Vitals:    07/28/19 1538 07/29/19 0945 07/30/19 0949   Weight: 71.4 kg (157 lb 6.4 oz) 72.3 kg (159 lb 8 oz) 72.7 kg (160 lb 4.8 oz)     Vital Signs with Ranges  Temp:  [96.3  F (35.7  C)-97.8  F (36.6  C)] 96.6  F (35.9  C)  Pulse:  [92] 92  Heart Rate:  [70-92] 92  Resp:  [18] 18  BP: (131-166)/() 147/92  SpO2:  [96 %-100 %] 100 %  I/O last 3 completed shifts:  In: 1420 [P.O.:930; I.V.:490]  Out: -     Constitutional: Pleasant male seen laying in bed. No apparent distress, and appears stated age.  Eyes: Lids and lashes normal, sclera clear, conjunctiva normal.  ENT: Normocephalic, without obvious abnormality, oral pharynx with moist mucus membranes, tonsils without erythema or exudates, gums normal and good dentition.   Respiratory: No increased work of breathing, good air exchange, clear to auscultation bilaterally, no crackles or wheezing.  Cardiovascular:Tachy irregular rate and rhythm, harsh systolic murmur 4/6  GI: +BS. Soft. No tenderness on palpation.  Skin: Diffuse petechiae and ecchymosis, no lesions, no jaundice.  Extremities: There is no redness, warmth, or swelling of the joints. Trace lower extremity edema. No cyanosis.  Neurologic: Awake, alert, oriented to name, place and time.    Vascular access: R port c/d/i     Discharge Disposition   Discharged to home  Condition at discharge: Stable    Consultations This Hospital Stay   GI PANCREATICOBILIARY ADULT IP CONSULT  MEDICATION HISTORY IP PHARMACY CONSULT  PHARMACY TO DOSE VANCO  PHYSICAL THERAPY ADULT IP CONSULT  OCCUPATIONAL THERAPY ADULT IP CONSULT    Discharge Orders      **CBC with platelets differential FUTURE 2mo    Last Lab Result: Hemoglobin (g/dL)       Date                     Value                  07/30/2019               9.0 (L)          ----------     **Comprehensive metabolic panel FUTURE anytime     CARDIOLOGY EVAL ADULT REFERRAL      Reason for your hospital stay    Bacteremia  Thrombocytopenia     Follow Up and recommended labs and tests    -Follow up with labs and possible transfusion in clinic tomorrow. Follow up with PA in clinic in the next week.   -Follow up with cardiology in the next 1 week to discuss medications for heart failure and blood thinners.     Adult [facility] Follow-up and recommended labs and tests    Follow up with cardiologist , at (location with clinic name or city) [facility] , within 1 week  for hospital follow- up. No follow up labs or test are needed.     Appointments on [place] and/or [facility] (with [facility] or [facility] provider or service). Call [phone] if you haven't heard regarding these appointments within 7 days of discharge.     Activity    Your activity upon discharge: activity as tolerated     When to contact your care team    e[facility] cancer clinic triage line at [phone] for temp greater than or equal to 100.4, uncontrolled nausea/vomiting/diarrhea/constipation, unrelieved pain, bleeding not relieved with pressure, dizziness, chest pain, shortness of breath, loss of consciousness, and any new or concerning symptoms.     IV access    **Ordering Provider MUST call/page Care Coordinator/ to discuss arranging this service**    You are going home with the following vascular access device: Port-a-Cath.     Discharge Instructions    - Plan to re start Clopidogrel once platelets are >70K   - Plan to re start Apixiban once platelets are >100K   - Continue to hold Lasix until follow up with cardiology. Okay to take Losartan and Metoprolol.   - Weigh yourself daily.   - Take Linezolid antibiotics for 14 days total (last day 8/11)     Full Code     Blood culture    Take one from peripheral and one from port.     Diet    Follow this diet upon discharge:  regular     Discharge Medications   Current Discharge Medication List      START taking these medications    Details   linezolid (ZYVOX) 600 MG tablet Take 1 tablet (600 mg) by mouth every 12 hours for 12 days  Qty: 24 tablet, Refills: 0    Associated Diagnoses: Bacteremia      pantoprazole (PROTONIX) 40 MG EC tablet Take 1 tablet (40 mg) by mouth every morning (before breakfast)    Associated Diagnoses: Peritoneal carcinomatosis (H)      polyethylene glycol (MIRALAX/GLYCOLAX) packet Take 17 g by mouth daily    Associated Diagnoses: Peritoneal carcinomatosis (H)      senna-docusate (SENOKOT-S/PERICOLACE) 8.6-50 MG tablet Take 1 tablet by mouth 2 times daily    Associated Diagnoses: Peritoneal carcinomatosis (H)         CONTINUE these medications which have CHANGED    Details   diclofenac (VOLTAREN) 1 % topical gel Apply 2 g topically 4 times daily as needed for moderate pain         CONTINUE these medications which have NOT CHANGED    Details   acetaminophen (TYLENOL) 500 MG tablet Take 500 mg by mouth every 6 hours as needed for fever or pain      allopurinol (ZYLOPRIM) 100 MG tablet Take 100 mg by mouth daily      atorvastatin (LIPITOR) 40 MG tablet Take 40 mg by mouth daily      calcium carbonate (TUMS) 500 MG chewable tablet Take 1 chew tab by mouth 4 times daily as needed for heartburn      losartan (COZAAR) 25 MG tablet Take 25 mg by mouth daily       metoprolol tartrate (LOPRESSOR) 50 MG tablet Take 150 mg by mouth 2 times daily       Nitroglycerin (NITROSTAT SL) Place 0.4 mg under the tongue every 5 minutes as needed for chest pain (Carries medication - has never used)          STOP taking these medications       apixaban ANTICOAGULANT (ELIQUIS) 5 MG tablet Comments:   Reason for Stopping:         clopidogrel (PLAVIX) 75 MG tablet Comments:   Reason for Stopping:         Colchicine (MITIGARE) 0.6 MG CAPS Comments:   Reason for Stopping:         furosemide (LASIX) 20 MG tablet Comments:   Reason for Stopping:          multivitamin, therapeutic with minerals (MULTI-VITAMIN) TABS Comments:   Reason for Stopping:             Allergies   Allergies   Allergen Reactions     Blood Transfusion Related (Informational Only) Other (See Comments)     Patient has a history of a clinically significant antibody against RBC antigens.  A delay in compatible RBCs may occur.     Data   Most Recent 3 CBC's:  Recent Labs   Lab Test 07/30/19  0551 07/29/19  1107 07/29/19  0524 07/28/19  1743   WBC 6.3  --  6.8 9.9   HGB 9.0*  --  8.6* 9.3*   MCV 96  --  95 94   PLT 28* 41* 24* 20*      Most Recent 3 BMP's:  Recent Labs   Lab Test 07/30/19  0551 07/29/19  0524 07/28/19  0931    142 133   POTASSIUM 3.7 3.7 4.0   CHLORIDE 111* 108 101   CO2 22 25 26   BUN 15 19 23   CR 1.45* 1.49* 1.46*   ANIONGAP 10 9 6   GARY 8.5 8.5 9.0   GLC 79 89 119*     Most Recent 2 LFT's:  Recent Labs   Lab Test 07/30/19  0551 07/29/19  0524   AST 45 53*   ALT 46 51   ALKPHOS 321* 331*   BILITOTAL 1.1 1.6*     Most Recent INR's and Anticoagulation Dosing History:  Anticoagulation Dose History     Recent Dosing and Labs Latest Ref Rng & Units 6/5/2019 6/6/2019 7/17/2019 7/28/2019 7/29/2019 7/29/2019 7/30/2019    INR 0.86 - 1.14 1.65(A) 1.64(H) 1.28(H) 1.50(H) 1.48(H) 1.35(H) 1.35(H)        Most Recent 3 Troponin's:No lab results found.  Most Recent Cholesterol Panel:No lab results found.  Most Recent 6 Bacteria Isolates From Any Culture (See EPIC Reports for Culture Details):  Recent Labs   Lab Test 07/30/19  0551 07/30/19  0541 07/29/19  1627 07/29/19  1624 07/28/19  0956 07/28/19  0931   CULT No growth after 8 hours No growth after 8 hours No growth after 18 hours No growth after 18 hours Cultured on the 1st day of incubation:  Enterococcus casseliflavus  This Enterococcus species is considered to be intrinsically resistant to vancomycin due to   the presence of a vanC gene for low-level resistance and does NOT require contact   precautions.  *  Critical  Value/Significant Value, preliminary result only, called to and read back by  Shiva Cody RN at 0255 on 7.29.19 by SS.    Susceptibility testing in progress  (Note)  NEGATIVE for the following: Staphylococcus spp., Staph aureus, Staph  epidermidis, Staph lugdunensis, Streptococcus spp., Strep pneumoniae,  Strep pyogenes, Strep agalactiae, Strep anginosus group, Enterococcus  faecalis, Enterococcus faecium, and Listeria spp. by [employer]  multiplex nucleic acid test. Final identification and antimicrobial  susceptibility testing will be verified by standard methods.     Critical Value/Significant Value called to and read back by Shiva Cody RN at 0543 on 7.29.19 by SS.   No growth after 2 days     Most Recent TSH, T4 and A1c Labs:  Recent Labs   Lab Test 10/15/18  0945   TSH 3.88

## 2019-07-30 NOTE — PLAN OF CARE
[id number]  BP highest 166/100, given 10mg hydralazine and came down to 147/92. Other VSS. A&Ox4. Tolerating reg diet. Pt choosing soft foods easier to digest. Voiding. Had BM. Pt discharging home today. Switched to po abx. Holding plavix and eliquis until plt are up. Pt going to have labs checked tomorrow in clinic. RN reviewed discharge instructions with pt, pt verbalized understanding. Pt wife providing ride home at 5pm.

## 2019-07-30 NOTE — PROGRESS NOTES
[id number] Shift  VSS, afebrile. On room air. Denies pain. No nausea. Continues on IV zosyn. Voiding well, LBM 7/27 but pt does not feel constipated, passing gas. Up ad vickie. Slept between cares. Heart Echo today. Continue POC.

## 2019-07-30 NOTE — PLAN OF CARE
Hypertensive at start of shift, 153/96, 10mg IV hydralazine given x1 w/ good relief. OVSS, sat-ing 99% on RA. Refusing capno, continuous pulse ox on. Denies pain. No nausea, tolerating full liquid diet very well. MIVF d/c'ed. Continues on IV zosyn. BC drawn as ordered. Voiding well, LBM 7/27 but pt does not feel constipated. Up ad vickie. EGD complete earlier today, pt doing very well. Still needs echo. Continue POC.

## 2019-07-30 NOTE — PLAN OF CARE
Pt discharged home from 7D at 1700. Port de-accessed by previous RN. AVS discussed and all questions answered. All belongings left with pt.

## 2019-07-30 NOTE — PROGRESS NOTES
Prior Authorization Approval    linezolid 600mg tabs  Date Initiated: 7/30/2019  Date Completed: 7/30/2019  Prior Auth Type: Clinical                Status: Approved    Effective Date: 07/30/019 - 08/27/2019  Copay: 14.00     [place] Filled: Yes    Insurance: [employer] - Phone [phone] Fax [phone]  ID: [id number]  Case Number: [id number]   Submitted Via: Lita Ma  [facility] Liaison  Ph: [phone] Page: [phone]

## 2019-07-31 ENCOUNTER — RECORDS - HEALTHEAST (OUTPATIENT)
Dept: ADMINISTRATIVE | Facility: OTHER | Age: 64
End: 2019-07-31

## 2019-07-31 ENCOUNTER — INFUSION THERAPY VISIT (OUTPATIENT)
Dept: TRANSPLANT | Facility: CLINIC | Age: 64
End: 2019-07-31
Attending: INTERNAL MEDICINE
Payer: COMMERCIAL

## 2019-07-31 ENCOUNTER — APPOINTMENT (OUTPATIENT)
Dept: LAB | Facility: CLINIC | Age: 64
End: 2019-07-31
Attending: INTERNAL MEDICINE
Payer: COMMERCIAL

## 2019-07-31 ENCOUNTER — ONCOLOGY VISIT (OUTPATIENT)
Dept: ONCOLOGY | Facility: CLINIC | Age: 64
End: 2019-07-31
Attending: PHYSICIAN ASSISTANT
Payer: COMMERCIAL

## 2019-07-31 VITALS
WEIGHT: 163.1 LBS | OXYGEN SATURATION: 100 % | TEMPERATURE: 97 F | SYSTOLIC BLOOD PRESSURE: 118 MMHG | DIASTOLIC BLOOD PRESSURE: 77 MMHG | HEART RATE: 101 BPM | RESPIRATION RATE: 16 BRPM | BODY MASS INDEX: 23.4 KG/M2

## 2019-07-31 DIAGNOSIS — D69.6 THROMBOCYTOPENIA (H): ICD-10-CM

## 2019-07-31 DIAGNOSIS — R78.81 BACTEREMIA: ICD-10-CM

## 2019-07-31 DIAGNOSIS — C22.1 CHOLANGIOCARCINOMA (H): Primary | ICD-10-CM

## 2019-07-31 DIAGNOSIS — D69.6 THROMBOCYTOPENIA (H): Primary | ICD-10-CM

## 2019-07-31 LAB
ALBUMIN SERPL-MCNC: 2.9 G/DL (ref 3.4–5)
ALP SERPL-CCNC: 346 U/L (ref 40–150)
ALT SERPL W P-5'-P-CCNC: 43 U/L (ref 0–70)
ANION GAP SERPL CALCULATED.3IONS-SCNC: 5 MMOL/L (ref 3–14)
AST SERPL W P-5'-P-CCNC: 40 U/L (ref 0–45)
BACTERIA SPEC CULT: ABNORMAL
BASOPHILS # BLD AUTO: 0 10E9/L (ref 0–0.2)
BASOPHILS NFR BLD AUTO: 0.4 %
BILIRUB SERPL-MCNC: 0.6 MG/DL (ref 0.2–1.3)
BLD PROD TYP BPU: NORMAL
BLD UNIT ID BPU: 0
BLOOD PRODUCT CODE: NORMAL
BPU ID: NORMAL
BUN SERPL-MCNC: 11 MG/DL (ref 7–30)
CALCIUM SERPL-MCNC: 8.5 MG/DL (ref 8.5–10.1)
CHLORIDE SERPL-SCNC: 110 MMOL/L (ref 94–109)
CO2 SERPL-SCNC: 25 MMOL/L (ref 20–32)
CREAT SERPL-MCNC: 1.52 MG/DL (ref 0.66–1.25)
DIFFERENTIAL METHOD BLD: ABNORMAL
EOSINOPHIL # BLD AUTO: 0.2 10E9/L (ref 0–0.7)
EOSINOPHIL NFR BLD AUTO: 2.8 %
ERYTHROCYTE [DISTWIDTH] IN BLOOD BY AUTOMATED COUNT: 14 % (ref 10–15)
GFR SERPL CREATININE-BSD FRML MDRD: 48 ML/MIN/{1.73_M2}
GLUCOSE SERPL-MCNC: 102 MG/DL (ref 70–99)
HCT VFR BLD AUTO: 31.5 % (ref 40–53)
HGB BLD-MCNC: 9.9 G/DL (ref 13.3–17.7)
IMM GRANULOCYTES # BLD: 0 10E9/L (ref 0–0.4)
IMM GRANULOCYTES NFR BLD: 0.6 %
LYMPHOCYTES # BLD AUTO: 1.1 10E9/L (ref 0.8–5.3)
LYMPHOCYTES NFR BLD AUTO: 16.1 %
Lab: ABNORMAL
MCH RBC QN AUTO: 30.3 PG (ref 26.5–33)
MCHC RBC AUTO-ENTMCNC: 31.4 G/DL (ref 31.5–36.5)
MCV RBC AUTO: 96 FL (ref 78–100)
MONOCYTES # BLD AUTO: 0.7 10E9/L (ref 0–1.3)
MONOCYTES NFR BLD AUTO: 10.2 %
NEUTROPHILS # BLD AUTO: 5 10E9/L (ref 1.6–8.3)
NEUTROPHILS NFR BLD AUTO: 69.9 %
NRBC # BLD AUTO: 0 10*3/UL
NRBC BLD AUTO-RTO: 0 /100
PLATELET # BLD AUTO: 49 10E9/L (ref 150–450)
POTASSIUM SERPL-SCNC: 3.5 MMOL/L (ref 3.4–5.3)
PROT SERPL-MCNC: 6.5 G/DL (ref 6.8–8.8)
RBC # BLD AUTO: 3.27 10E12/L (ref 4.4–5.9)
SODIUM SERPL-SCNC: 140 MMOL/L (ref 133–144)
SPECIMEN SOURCE: ABNORMAL
TRANSFUSION STATUS PATIENT QL: NORMAL
TRANSFUSION STATUS PATIENT QL: NORMAL
WBC # BLD AUTO: 7.1 10E9/L (ref 4–11)

## 2019-07-31 PROCEDURE — 36591 DRAW BLOOD OFF VENOUS DEVICE: CPT

## 2019-07-31 PROCEDURE — 40000268 ZZH STATISTIC NO CHARGES: Mod: ZF

## 2019-07-31 PROCEDURE — 87040 BLOOD CULTURE FOR BACTERIA: CPT | Performed by: PHYSICIAN ASSISTANT

## 2019-07-31 PROCEDURE — 85025 COMPLETE CBC W/AUTO DIFF WBC: CPT | Performed by: PHYSICIAN ASSISTANT

## 2019-07-31 PROCEDURE — 99215 OFFICE O/P EST HI 40 MIN: CPT | Mod: ZP | Performed by: PHYSICIAN ASSISTANT

## 2019-07-31 PROCEDURE — G0463 HOSPITAL OUTPT CLINIC VISIT: HCPCS | Mod: ZF

## 2019-07-31 PROCEDURE — 25000128 H RX IP 250 OP 636: Mod: ZF | Performed by: PHYSICIAN ASSISTANT

## 2019-07-31 PROCEDURE — 80053 COMPREHEN METABOLIC PANEL: CPT | Performed by: PHYSICIAN ASSISTANT

## 2019-07-31 RX ORDER — HEPARIN SODIUM (PORCINE) LOCK FLUSH IV SOLN 100 UNIT/ML 100 UNIT/ML
5 SOLUTION INTRAVENOUS
Status: COMPLETED | OUTPATIENT
Start: 2019-07-31 | End: 2019-07-31

## 2019-07-31 RX ADMIN — HEPARIN SODIUM (PORCINE) LOCK FLUSH IV SOLN 100 UNIT/ML 5 ML: 100 SOLUTION at 11:37

## 2019-07-31 ASSESSMENT — PAIN SCALES - GENERAL: PAINLEVEL: NO PAIN (0)

## 2019-07-31 NOTE — NURSING NOTE
"Oncology Rooming Note    July 31, 2019 12:16 PM   Girish Chauhan is a 64 year old male who presents for:    Chief Complaint   Patient presents with     Port Draw     labs drawn from port by rn.  vs taken     Oncology Clinic Visit     Cholangiocarcinoma , labs      Initial Vitals: /77 (BP Location: Right arm, Patient Position: Sitting, Cuff Size: Adult Regular)   Pulse 101   Temp 97  F (36.1  C) (Oral)   Resp 16   Wt 74 kg (163 lb 1.6 oz)   SpO2 100%   BMI 23.40 kg/m   Estimated body mass index is 23.4 kg/m  as calculated from the following:    Height as of 7/17/19: 1.778 m (5' 10\").    Weight as of this encounter: 74 kg (163 lb 1.6 oz). Body surface area is 1.91 meters squared.  No Pain (0) Comment: Data Unavailable   No LMP for male patient.  Allergies reviewed: Yes  Medications reviewed: Yes    Medications: Medication refills not needed today.  Pharmacy name entered into [facility]:    [id number] [place], [place] - [address] [facility] - [address] [facility] - [place], [place] - [address]    Clinical concerns: no  [place] was notified.      Jesscia Reese MA              "

## 2019-07-31 NOTE — LETTER
7/31/2019      RE: Girish Chauhan  [address] [facility] [address]       [facility]  MEDICAL ONCOLOGY PROGRESS NOTE  Jul 31, 2019    CHIEF COMPLAINT: recurrent cholangiocarcinoma    ONCOLOGY HPI:   Girish Chauhan is a 64 year old year old gentleman with cholangiocarcinoma  Which was resected in 3/2016 (including partial liver resection) with negative margins and no LN involvement, but with perineural and lymphovascular invasion. No adjuvant chemotherapy given. Recurred in bladder 11/2017, bx c/w metastatic cholangiocarcinoma, started on cisplatin/gemcitabine 12/2017. Cisplatin held 6/2018 for poor tolerance. Gemcitabine discontinued 8/2018 for possible TTP, then went off treatment. In 4/2019 was found to have disease progression in the abdominal cavity anterior to the liver, just below the diaphragm. Started on capecitabine 4/30/19 (last dose 5/7) but developed an NSTEMI s/p angiogram 5/6/19 (see below) and actually continued on the capecitabine for several days after NSTEMI without any ongoing cardiac symptoms or evidence of ongoing ischemia. On follow up with Dr. De Los Santos 6/24, disease appeared stable. Decision made with family to hold off on treatment for 2 months and reevaluate.     CT A/P on recent admission does show slightly increased size of a few now mildly enlarged upper abdominal/retroperitoneal lymph nodes and prominent right cardiophrenic lymph nodes. Stable mild peritoneal and omental haziness and stable to slightly increased ill-defined/spiculated soft tissue thickening associated with the jejunum anteromedial to the hepatectomy resection margin extending to the abdominal wall. These findings are concerning for recurrent/metastatic disease.     Was just admitted for fever, nausea, vomiting, and abdominal pain. Hospitalization complicated by sepsis and enterococcus casseliflavus bacteremia . Additionally, Krishna was found to have migrated gastrojejunal stent on CT A/P on admission.  S/p ERCP with stent exchange 7/29 by Dr. Rodriguez. Discharged on Linezolid for 2 weeks.     INTERVAL HISTORY:   Krishna presents today unaccompanied for hospital followup.     Since being discharged yesterday he has been feeling well.  He denies any fevers or chills.  He denies any further abdominal pain.  He denies having continued nausea or vomiting.  He has been having some loose stool for the past 2 days, though on Sunday he had no stool and was given laxatives.  He admits that it was one stool each day.  He has been drinking adequate water nose sticking with very soft to liquid foods.  He denies any bleeding.  He overall is feeling much better.      ROS: 10 point ROS neg other than the symptoms noted above in the HPI.      Allergies   Allergen Reactions     Blood Transfusion Related (Informational Only) Other (See Comments)     Patient has a history of a clinically significant antibody against RBC antigens.  A delay in compatible RBCs may occur.       Current Outpatient Medications   Medication     acetaminophen (TYLENOL) 500 MG tablet     allopurinol (ZYLOPRIM) 100 MG tablet     atorvastatin (LIPITOR) 40 MG tablet     calcium carbonate (TUMS) 500 MG chewable tablet     linezolid (ZYVOX) 600 MG tablet     losartan (COZAAR) 25 MG tablet     metoprolol tartrate (LOPRESSOR) 50 MG tablet     Nitroglycerin (NITROSTAT SL)     pantoprazole (PROTONIX) 40 MG EC tablet     polyethylene glycol (MIRALAX/GLYCOLAX) packet     senna-docusate (SENOKOT-S/PERICOLACE) 8.6-50 MG tablet     diclofenac (VOLTAREN) 1 % topical gel     Current Facility-Administered Medications   Medication     sodium chloride (PF) 0.9% PF flush 20 mL       EXAM:  /77 (BP Location: Right arm, Patient Position: Sitting, Cuff Size: Adult Regular)   Pulse 101   Temp 97  F (36.1  C) (Oral)   Resp 16   Wt 74 kg (163 lb 1.6 oz)   SpO2 100%   BMI 23.40 kg/m     Wt Readings from Last 4 Encounters:   08/02/19 75.3 kg (166 lb 1.6 oz)   07/31/19 74 kg (163  lb 1.6 oz)   07/30/19 72.7 kg (160 lb 4.8 oz)   07/17/19 73.9 kg (162 lb 14.7 oz)        General: No acute distress  HEENT: EOMI, PERRLA, no scleral icterus, mucus membranes moist and no lesions or thrush  Lymph: Neck is supple and shows no nodes in cervical or supraclavicular   Heart:  Tachycardia, irregularly irregular rhythm, harsh systolic murmur 4/6  Lungs: CTA-B, no wheezes, rhonchi or rales  Abdomen: Normal bowel sounds, soft, no pain to palpation, not distended, no rebound or guarding, no palpable masses  Extremities: Trace pitting edema b/l  Skin: No significant rashes or lesions  Neuro: CN II-XII are intact    LABS:    7/31/2019 11:45   Sodium 140   Potassium 3.5   Chloride 110 (H)   Carbon Dioxide 25   Urea Nitrogen 11   Creatinine 1.52 (H)   GFR Estimate 48 (L)   GFR Estimate If Black 56 (L)   Calcium 8.5   Anion Gap 5   Albumin 2.9 (L)   Protein Total 6.5 (L)   Bilirubin Total 0.6   Alkaline Phosphatase 346 (H)   ALT 43   AST 40   Glucose 102 (H)   WBC 7.1   Hemoglobin 9.9 (L)   Hematocrit 31.5 (L)   Platelet Count 49 (LL)   RBC Count 3.27 (L)   MCV 96   MCH 30.3   MCHC 31.4 (L)   RDW 14.0   Diff Method Automated Method   % Neutrophils 69.9   % Lymphocytes 16.1   % Monocytes 10.2   % Eosinophils 2.8   % Basophils 0.4   % Immature Granulocytes 0.6   Nucleated RBCs 0   Absolute Neutrophil 5.0   Absolute Lymphocytes 1.1   Absolute Monocytes 0.7   Absolute Eosinophils 0.2   Absolute Basophils 0.0   Abs Immature Granulocytes 0.0   Absolute Nucleated RBC 0.0       IMAGING:   CT Abdomen and Pelvis dated 7/28/19  IMPRESSION:   1. Postoperative changes of left hepatectomy and cholecystectomy and  Carol-en-Y hepaticojejunostomy with fluid-filled mild dilation of the  jejunal loop at hepatojejunostomy associated with a single remaining  gastrojejunostomy stent (noting that 2 were placed on 7/17/2019). The  remaining stent appears to have migrated distally into the duodenum  with only a short segment of its proximal  pigtail located within the  jejunal loop.  2. Slightly increased size of a few now mildly enlarged upper  abdominal/retroperitoneal lymph nodes and prominent right  cardiophrenic lymph nodes. Stable mild peritoneal and omental haziness  and stable to slightly increased ill-defined/spiculated soft tissue  thickening associated with the jejunum anteromedial to the hepatectomy  resection margin extending to the abdominal wall. These findings are  concerning for recurrent/metastatic disease. Recommend attention on  follow-up.  3. Stable soft tissue thickening and desmoplastic appearance along the  anterior aspect of the rectum and posterior aspects of the bladder.  4. Right parapelvic cyst with mild associated hydronephrosis,  unchanged.    IMPRESSION/PLAN:    Bacteremia 2/2 Enterococcus Casseliflavus; sepsis on presentation  Cholangitis   -Presented with fever, nausea vomiting.  CT showed 1 of the stents was missing and one migrated distally into the duodenum.  Had an ERCP to replace stents.  Likely the source of bacteremia was due to obstruction of the biliary limb from the stent causing cholangitis  -Started PO Linezolid with a plan to continue for 14 days since last BCx (will completed 8/11)  -currently afebrile and overall better clinically. WBC is now WNL. Will repeat blood culture today    H/o Recent SBO  H/o Biliary obstruction s/p stent  H/o Carol-en-Y hepaticojejunostomy 3/16/16  Presented to OSH and transferred to [facility] 6/5/19-6/9/19 with SBO with possible involvement of draining choledochojejunostomy loop, subjective fevers, and a fib with RVR.   -had stenting on 6/7 for obstructed biliary limb. As above, found 1 stent missing and the other had migrated. Underwent uncomplicated small bowel enteroscopy/stent exchange with Dr. Rodriguez on 7/17.   -should continue to eat liquid to mechanically soft foods and avoid high fiber. Will discuss further progression of diet at next visit     Thrombocytopenia likely 2/2  sepsis   -presented with petechiae around his ankles and a easier tendency to bleed. Plt found to be 28 on admission  -no recent chemotherapy and no splenomegaly on CT  -Has had a history of TTP in the past (8/18/18) as well - causing anemia and mild thrombocytopenia and elevated Cr to 2.0, but never needed plasma exchange as it was thought to be related to gemcitabine.   -likely thrombocytopenia 2/2 sepsis. Plts are uptrending. Hopefully will not need any further transfusion though will set up possible transfusions for the next week with close follow-up   -We will continue to transfuse platelets for goal >20k.  Anticoagulation currently on hold (see below)    ADDENDUM: Plts continue to uptrend and were 137 on 8/5. Will have him restart Plavix and Eliquis     Recurrent metastatic cholangiocarcinoma  -He was started on capecitabine 4/30/19 (last dose 5/7) but developed an NSTEMI s/p angiogram 5/6/19   -On follow up with Dr. De LosS antos 6/24, disease appeared stable. Decision made with family to hold off on treatment for 2 months and reevaluate.   -CT A/P on admission does show findings concerning for recurrent/metastatic disease.   -he needs to recover and complete abx prior to considering restarting chemotherapy. Also needs to meet with cardiology to give him clearance      Recent MI  Coronary artery disease  Hyperlipidemia  Permanent atrial fibrillation  Bicuspid aortic valve and moderate Aortic stenosis  Heart failure with reduced EF  Hypertension  CAD with history of multivessel stenting in 2011. Developed NSTEMI, s/p LHC and angiogram 5/6/19 with 99% obstruction of OM2, unsuccessful PCI. He also developed afib with RVR during this time which necessitated increase of his metoprolol and starting on apixaban. He was also started on ASA/clopidogrel for CAD. Repeat angiogram done 6/17 with SAM to the OM1. Repeat Echo 7/30 with improved EF 45-50%, mild LV dilation. No anticoagulants taken since 7/27  - Hold  ASA/clopidogrel: re start once plts >70K  - Hold apixaban: re start once plts >100K  -continue metoprolol, losartan and lasix.  -should f/u with cardiology within the next week. He has a cardiologist at OSH. He will call them. We did place referral for cards here if he would prefer, though he is first going to try to his his established MD    GI  Loose stools: Likely due to liquid diet and heavy laxatives inpatient. If it were to worsen and start having N/V and/or abdominal pain, he should call and we would test for C. Diff since he is on Abx and was just discharged    GERD: Continue pantoprazole and Tums prn    Nadira Cedeno PA-C  [facility]  [address] [place] [address] [phone]

## 2019-07-31 NOTE — NURSING NOTE
Chief Complaint   Patient presents with     Port Draw     Blood culture from port drawn by RN in lab.      Labs collected via port by RN, line flushed with saline and heparin.  Per Nadira Cedeno, cx only needed from port, no peripheral culture needed.     Esther CHRISTIANSON RN PHN BSN  BMT/Oncology Lab

## 2019-08-01 ENCOUNTER — COMMUNICATION - HEALTHEAST (OUTPATIENT)
Dept: CARDIOLOGY | Facility: CLINIC | Age: 64
End: 2019-08-01

## 2019-08-01 DIAGNOSIS — D69.6 THROMBOCYTOPENIA (H): ICD-10-CM

## 2019-08-01 DIAGNOSIS — D13.4: Primary | ICD-10-CM

## 2019-08-01 LAB
ABO + RH BLD: ABNORMAL
ABO + RH BLD: ABNORMAL
BLD GP AB SCN SERPL QL: ABNORMAL
BLD PROD TYP BPU: ABNORMAL
BLOOD BANK CMNT PATIENT-IMP: ABNORMAL
BLOOD BANK CMNT PATIENT-IMP: ABNORMAL
LAB SCANNED RESULT: ABNORMAL
NUM BPU REQUESTED: 2
SPECIMEN EXP DATE BLD: ABNORMAL

## 2019-08-02 ENCOUNTER — INFUSION THERAPY VISIT (OUTPATIENT)
Dept: ONCOLOGY | Facility: CLINIC | Age: 64
End: 2019-08-02
Attending: INTERNAL MEDICINE
Payer: COMMERCIAL

## 2019-08-02 ENCOUNTER — APPOINTMENT (OUTPATIENT)
Dept: LAB | Facility: CLINIC | Age: 64
End: 2019-08-02
Attending: INTERNAL MEDICINE
Payer: COMMERCIAL

## 2019-08-02 VITALS
HEART RATE: 94 BPM | SYSTOLIC BLOOD PRESSURE: 123 MMHG | OXYGEN SATURATION: 100 % | WEIGHT: 166.1 LBS | RESPIRATION RATE: 16 BRPM | DIASTOLIC BLOOD PRESSURE: 81 MMHG | BODY MASS INDEX: 23.83 KG/M2 | TEMPERATURE: 97.7 F

## 2019-08-02 DIAGNOSIS — D69.6 THROMBOCYTOPENIA (H): ICD-10-CM

## 2019-08-02 DIAGNOSIS — C22.1 CHOLANGIOCARCINOMA (H): ICD-10-CM

## 2019-08-02 DIAGNOSIS — T45.1X5A ANEMIA ASSOCIATED WITH CHEMOTHERAPY: Primary | ICD-10-CM

## 2019-08-02 DIAGNOSIS — D64.81 ANEMIA ASSOCIATED WITH CHEMOTHERAPY: Primary | ICD-10-CM

## 2019-08-02 LAB
BASOPHILS # BLD AUTO: 0 10E9/L (ref 0–0.2)
BASOPHILS NFR BLD AUTO: 0.3 %
BLD PROD TYP BPU: NORMAL
BLD UNIT ID BPU: 0
BLD UNIT ID BPU: 0
BLOOD PRODUCT CODE: NORMAL
BLOOD PRODUCT CODE: NORMAL
BPU ID: NORMAL
BPU ID: NORMAL
DIFFERENTIAL METHOD BLD: ABNORMAL
EOSINOPHIL # BLD AUTO: 0.5 10E9/L (ref 0–0.7)
EOSINOPHIL NFR BLD AUTO: 4.9 %
ERYTHROCYTE [DISTWIDTH] IN BLOOD BY AUTOMATED COUNT: 14.1 % (ref 10–15)
HCT VFR BLD AUTO: 29.5 % (ref 40–53)
HGB BLD-MCNC: 9.4 G/DL (ref 13.3–17.7)
IMM GRANULOCYTES # BLD: 0 10E9/L (ref 0–0.4)
IMM GRANULOCYTES NFR BLD: 0.2 %
LYMPHOCYTES # BLD AUTO: 1.3 10E9/L (ref 0.8–5.3)
LYMPHOCYTES NFR BLD AUTO: 13.2 %
MCH RBC QN AUTO: 30.1 PG (ref 26.5–33)
MCHC RBC AUTO-ENTMCNC: 31.9 G/DL (ref 31.5–36.5)
MCV RBC AUTO: 95 FL (ref 78–100)
MONOCYTES # BLD AUTO: 0.7 10E9/L (ref 0–1.3)
MONOCYTES NFR BLD AUTO: 7.8 %
NEUTROPHILS # BLD AUTO: 6.9 10E9/L (ref 1.6–8.3)
NEUTROPHILS NFR BLD AUTO: 73.6 %
NRBC # BLD AUTO: 0 10*3/UL
NRBC BLD AUTO-RTO: 0 /100
NUM BPU REQUESTED: 1
PLATELET # BLD AUTO: 85 10E9/L (ref 150–450)
PLATELET # BLD EST: ABNORMAL 10*3/UL
RBC # BLD AUTO: 3.12 10E12/L (ref 4.4–5.9)
TRANSFUSION STATUS PATIENT QL: NORMAL
WBC # BLD AUTO: 9.4 10E9/L (ref 4–11)

## 2019-08-02 PROCEDURE — 36591 DRAW BLOOD OFF VENOUS DEVICE: CPT

## 2019-08-02 PROCEDURE — 25000128 H RX IP 250 OP 636: Mod: ZF | Performed by: INTERNAL MEDICINE

## 2019-08-02 PROCEDURE — 85025 COMPLETE CBC W/AUTO DIFF WBC: CPT | Performed by: PHYSICIAN ASSISTANT

## 2019-08-02 RX ORDER — HEPARIN SODIUM (PORCINE) LOCK FLUSH IV SOLN 100 UNIT/ML 100 UNIT/ML
5 SOLUTION INTRAVENOUS ONCE
Status: COMPLETED | OUTPATIENT
Start: 2019-08-02 | End: 2019-08-02

## 2019-08-02 RX ADMIN — HEPARIN 5 ML: 100 SYRINGE at 11:47

## 2019-08-02 ASSESSMENT — PAIN SCALES - GENERAL: PAINLEVEL: NO PAIN (0)

## 2019-08-02 NOTE — PATIENT INSTRUCTIONS
Contact Numbers  [facility]: [phone]    After Hours:  [phone]  Triage: [phone]    Please call the [facility] Triage line if you experience a temperature greater than or equal to 100.5, shaking chills, have uncontrolled nausea, vomiting and/or diarrhea, dizziness, shortness of breath, chest pain, bleeding, unexplained bruising, or if you have any other new/concerning symptoms, questions or concerns.     If it is after hours, weekends, or holidays, please call the main hospital  at  [phone] and ask to speak to the Oncology doctor on call.     If you are having any concerning symptoms or wish to speak to a provider before your next infusion visit, please call your care coordinator or triage to notify them so we can adequately serve you.     If you need a refill on a narcotic prescription or other medication, please call triage before your infusion appointment.         August 2019 Sunday Monday Tuesday Wednesday Thursday Friday Saturday                       1     2    [facility] DRAW  12:00 PM   (15 min.)    [facility] DRAW   [facility] Draw    [facility] ONC INFUSION 240  12:30 PM   (240 min.)    ONCOLOGY INFUSION   [facility] 3       4     5    [facility] DRAW  11:30 AM   (15 min.)    [facility] DRAW   [facility] Lab Draw    [facility] ONC INFUSION 360  12:00 PM   (360 min.)    ONCOLOGY INFUSION   [facility] 6     7     8    [facility] DRAW   6:30 AM   (15 min.)    [facility] DRAW   [facility] Draw    [facility] RETURN   6:45 AM   (50 min.)   Jennifer Jalloh, YO CNP   [facility] 9     10       11     12     13     14     15     16     17       18     19     20     21     22     23    [facility] DRAW   7:30 AM   (15 min.)    [facility] DRAW   [facility] Draw    CT CHEST ABDOMEN PELVIS WWO   8:00 AM   (20 min.)   UCCT2   [facility] CT 24       25      26    [facility] RETURN   4:45 PM   (30 min.)   Naresh De Los Santos MD   [facility] 27     28     29     30 31 September 2019 Sunday Monday Tuesday Wednesday Thursday Friday Saturday   1     2     3     4     5     6     7       8     9     10     11     12     13     14       15     16     17     18     19     20     21       22     23     24     25     26     27     28       29     30                                              Lab Results:  Recent Results (from the past 12 hour(s))   CBC with platelets differential    Collection Time: 08/02/19 11:52 AM   Result Value Ref Range    WBC 9.4 4.0 - 11.0 10e9/L    RBC Count 3.12 (L) 4.4 - 5.9 10e12/L    Hemoglobin 9.4 (L) 13.3 - 17.7 g/dL    Hematocrit 29.5 (L) 40.0 - 53.0 %    MCV 95 78 - 100 fl    MCH 30.1 26.5 - 33.0 pg    MCHC 31.9 31.5 - 36.5 g/dL    RDW 14.1 10.0 - 15.0 %    Platelet Count 85 (L) 150 - 450 10e9/L    Diff Method Automated Method     % Neutrophils 73.6 %    % Lymphocytes 13.2 %    % Monocytes 7.8 %    % Eosinophils 4.9 %    % Basophils 0.3 %    % Immature Granulocytes 0.2 %    Nucleated RBCs 0 0 /100    Absolute Neutrophil 6.9 1.6 - 8.3 10e9/L    Absolute Lymphocytes 1.3 0.8 - 5.3 10e9/L    Absolute Monocytes 0.7 0.0 - 1.3 10e9/L    Absolute Eosinophils 0.5 0.0 - 0.7 10e9/L    Absolute Basophils 0.0 0.0 - 0.2 10e9/L    Abs Immature Granulocytes 0.0 0 - 0.4 10e9/L    Absolute Nucleated RBC 0.0     Platelet Estimate Confirming automated cell count

## 2019-08-02 NOTE — NURSING NOTE
"Chief Complaint   Patient presents with     Port Draw     port accessed and labs drawn by rn.  vital signs taken.     Port accessed by RN in lab with 20g 3/4\" gripper needle, labs drawn, port flushed with saline and heparin, vitals checked, pt checked in for next appointment.  Craey Larson RN    "

## 2019-08-02 NOTE — PROGRESS NOTES
Infusion Nursing Note:  Girish Chauhan presents today for Possible blood transfusion(Not needed).    Patient seen by provider today: No   present during visit today: Not Applicable.    Note: Patient feels well. Denies fever/chills nor any signs of infection. Denies nausea/vomiting nor chest and abdominal discomfort. No new concerns made. Patient was very happy with his results and states no events needs to be reported. Otherwise well.    Intravenous Access:  Implanted Port.    Treatment Conditions:  Lab Results   Component Value Date    HGB 9.4 08/02/2019     Lab Results   Component Value Date    WBC 9.4 08/02/2019      Lab Results   Component Value Date    ANEU 6.9 08/02/2019     Lab Results   Component Value Date    PLT 85 08/02/2019      Lab Results   Component Value Date     07/31/2019                   Lab Results   Component Value Date    POTASSIUM 3.5 07/31/2019           Lab Results   Component Value Date    MAG 2.2 07/30/2019            Lab Results   Component Value Date    CR 1.52 07/31/2019                   Lab Results   Component Value Date    GARY 8.5 07/31/2019                Lab Results   Component Value Date    BILITOTAL 0.6 07/31/2019           Lab Results   Component Value Date    ALBUMIN 2.9 07/31/2019                    Lab Results   Component Value Date    ALT 43 07/31/2019           Lab Results   Component Value Date    AST 40 07/31/2019       Results reviewed, labs did NOT meet treatment parameters: HB <8.      Post Infusion Assessment:  Site patent and intact, free from redness, edema or discomfort.  No evidence of extravasations.  Access discontinued per protocol.       Discharge Plan:   Patient declined prescription refills.  Discharge instructions reviewed with: Patient.  Patient and/or family verbalized understanding of discharge instructions and all questions answered.  AVS to patient via [employer].  Patient will return 8/5/19 for next appointment.   Patient discharged in  stable condition accompanied by: self.  Departure Mode: Ambulatory.  Face to Face time: 5.    MORA JAMISON RN

## 2019-08-03 LAB
BACTERIA SPEC CULT: NO GROWTH
Lab: NORMAL
SPECIMEN SOURCE: NORMAL

## 2019-08-04 LAB
BACTERIA SPEC CULT: NO GROWTH
BACTERIA SPEC CULT: NO GROWTH
Lab: NORMAL
Lab: NORMAL
SPECIMEN SOURCE: NORMAL
SPECIMEN SOURCE: NORMAL

## 2019-08-05 ENCOUNTER — APPOINTMENT (OUTPATIENT)
Dept: LAB | Facility: CLINIC | Age: 64
End: 2019-08-05
Attending: INTERNAL MEDICINE
Payer: COMMERCIAL

## 2019-08-05 ENCOUNTER — CARE COORDINATION (OUTPATIENT)
Dept: CARDIOLOGY | Facility: CLINIC | Age: 64
End: 2019-08-05

## 2019-08-05 ENCOUNTER — INFUSION THERAPY VISIT (OUTPATIENT)
Dept: ONCOLOGY | Facility: CLINIC | Age: 64
End: 2019-08-05
Attending: INTERNAL MEDICINE
Payer: COMMERCIAL

## 2019-08-05 VITALS
DIASTOLIC BLOOD PRESSURE: 79 MMHG | SYSTOLIC BLOOD PRESSURE: 125 MMHG | TEMPERATURE: 97.5 F | OXYGEN SATURATION: 98 % | WEIGHT: 164 LBS | BODY MASS INDEX: 23.53 KG/M2 | RESPIRATION RATE: 18 BRPM | HEART RATE: 75 BPM

## 2019-08-05 DIAGNOSIS — D64.81 ANEMIA ASSOCIATED WITH CHEMOTHERAPY: Primary | ICD-10-CM

## 2019-08-05 DIAGNOSIS — T45.1X5A ANEMIA ASSOCIATED WITH CHEMOTHERAPY: Primary | ICD-10-CM

## 2019-08-05 LAB
ABO + RH BLD: ABNORMAL
ABO + RH BLD: ABNORMAL
BACTERIA SPEC CULT: NO GROWTH
BACTERIA SPEC CULT: NO GROWTH
BASOPHILS # BLD AUTO: 0.1 10E9/L (ref 0–0.2)
BASOPHILS NFR BLD AUTO: 0.6 %
BLD GP AB SCN SERPL QL: ABNORMAL
BLD PROD TYP BPU: NORMAL
BLD UNIT ID BPU: 0
BLOOD BANK CMNT PATIENT-IMP: ABNORMAL
BLOOD BANK CMNT PATIENT-IMP: ABNORMAL
BLOOD PRODUCT CODE: NORMAL
BPU ID: NORMAL
DIFFERENTIAL METHOD BLD: ABNORMAL
EOSINOPHIL # BLD AUTO: 0.5 10E9/L (ref 0–0.7)
EOSINOPHIL NFR BLD AUTO: 5.4 %
ERYTHROCYTE [DISTWIDTH] IN BLOOD BY AUTOMATED COUNT: 14.1 % (ref 10–15)
HCT VFR BLD AUTO: 31 % (ref 40–53)
HGB BLD-MCNC: 9.7 G/DL (ref 13.3–17.7)
IMM GRANULOCYTES # BLD: 0 10E9/L (ref 0–0.4)
IMM GRANULOCYTES NFR BLD: 0.2 %
LYMPHOCYTES # BLD AUTO: 1.3 10E9/L (ref 0.8–5.3)
LYMPHOCYTES NFR BLD AUTO: 15.4 %
Lab: NORMAL
Lab: NORMAL
MCH RBC QN AUTO: 29.9 PG (ref 26.5–33)
MCHC RBC AUTO-ENTMCNC: 31.3 G/DL (ref 31.5–36.5)
MCV RBC AUTO: 96 FL (ref 78–100)
MONOCYTES # BLD AUTO: 0.8 10E9/L (ref 0–1.3)
MONOCYTES NFR BLD AUTO: 9.4 %
NEUTROPHILS # BLD AUTO: 5.7 10E9/L (ref 1.6–8.3)
NEUTROPHILS NFR BLD AUTO: 69 %
NRBC # BLD AUTO: 0 10*3/UL
NRBC BLD AUTO-RTO: 0 /100
PLATELET # BLD AUTO: 137 10E9/L (ref 150–450)
RBC # BLD AUTO: 3.24 10E12/L (ref 4.4–5.9)
SPECIMEN EXP DATE BLD: ABNORMAL
SPECIMEN SOURCE: NORMAL
SPECIMEN SOURCE: NORMAL
TRANSFUSION STATUS PATIENT QL: NORMAL
TRANSFUSION STATUS PATIENT QL: NORMAL
WBC # BLD AUTO: 8.3 10E9/L (ref 4–11)

## 2019-08-05 PROCEDURE — 85025 COMPLETE CBC W/AUTO DIFF WBC: CPT | Performed by: PHYSICIAN ASSISTANT

## 2019-08-05 PROCEDURE — 25000128 H RX IP 250 OP 636: Mod: ZF | Performed by: INTERNAL MEDICINE

## 2019-08-05 PROCEDURE — 36591 DRAW BLOOD OFF VENOUS DEVICE: CPT

## 2019-08-05 PROCEDURE — 86850 RBC ANTIBODY SCREEN: CPT | Performed by: INTERNAL MEDICINE

## 2019-08-05 PROCEDURE — 86900 BLOOD TYPING SEROLOGIC ABO: CPT | Performed by: INTERNAL MEDICINE

## 2019-08-05 PROCEDURE — 86901 BLOOD TYPING SEROLOGIC RH(D): CPT | Performed by: INTERNAL MEDICINE

## 2019-08-05 RX ORDER — HEPARIN SODIUM (PORCINE) LOCK FLUSH IV SOLN 100 UNIT/ML 100 UNIT/ML
5 SOLUTION INTRAVENOUS EVERY 8 HOURS
Status: DISCONTINUED | OUTPATIENT
Start: 2019-08-05 | End: 2019-08-05 | Stop reason: HOSPADM

## 2019-08-05 RX ADMIN — HEPARIN SODIUM (PORCINE) LOCK FLUSH IV SOLN 100 UNIT/ML 5 ML: 100 SOLUTION at 11:54

## 2019-08-05 ASSESSMENT — PAIN SCALES - GENERAL: PAINLEVEL: NO PAIN (0)

## 2019-08-05 NOTE — PROGRESS NOTES
Referral- Heart Failure      SPOC Notes:     August 5, 2019 -     Was inpatient through 7/28-7/30 at [facility]    Discharged with referral to cardiology with diagnosis of Chronic Systolic Heart Failure     Insurance - Entered     Patient Contact - LVM    Sending to AHF Nurse to Triage    WILFREDO Gill [facility]  CORE/Heart Failure   Heart Failure Referral Coordinator

## 2019-08-05 NOTE — PROGRESS NOTES
8/5 -Called and left message for patient to call to discuss scheduling f/u hospital appt.  Patient has CAD, aortic stenosis and referral came for systolic heart failure Patient has mild LV dysfunction with EF 45-50%.  He has been following with a cardiologist in the [facility] system and recently had PCI. I don't feel patient needs to see and advanced HF provider at this point. I will discuss with patient whether he would like to schedule an appt with a cardiologist at the  or if he intends to f/u with previous cardiologist.    8/6 - Review of Oncology note (7/31) state that patient had decided to resume his cardiology care with local cardiologist. Oncology had put in referral to us should patient change his mind.  If patient wishes to seek cardiology care here, he does not need to see a HF provider but rather a general cardiologist.  Will close referral.

## 2019-08-05 NOTE — NURSING NOTE
Chief Complaint   Patient presents with     Port Draw     Labs drawn via PORT by RN in lab. Line flushed with saline and heparin. VS taken.      Edd Rodrigues RN

## 2019-08-05 NOTE — PROGRESS NOTES
Infusion Nursing Note:  Girish Chauhan presents today for Possible blood product (Not Needed).    Patient seen by provider today: No   present during visit today: Not Applicable.    Note: Patient feels well. Denies fever/chills nor any signs of infection. Denies nausea/vomiting nor chest and abdominal discomfort. No new concerns made. Otherwise well.    Instruction given to patient as per provider. Verbalized understanding.    Intravenous Access:  Implanted Port.    Treatment Conditions:  Lab Results   Component Value Date    HGB 9.7 08/05/2019     Lab Results   Component Value Date    WBC 8.3 08/05/2019      Lab Results   Component Value Date    ANEU 5.7 08/05/2019     Lab Results   Component Value Date     08/05/2019      Lab Results   Component Value Date     07/31/2019                   Lab Results   Component Value Date    POTASSIUM 3.5 07/31/2019           Lab Results   Component Value Date    MAG 2.2 07/30/2019            Lab Results   Component Value Date    CR 1.52 07/31/2019                   Lab Results   Component Value Date    GARY 8.5 07/31/2019                Lab Results   Component Value Date    BILITOTAL 0.6 07/31/2019           Lab Results   Component Value Date    ALBUMIN 2.9 07/31/2019                    Lab Results   Component Value Date    ALT 43 07/31/2019           Lab Results   Component Value Date    AST 40 07/31/2019       Results reviewed, labs did NOT meet treatment parameters: HB <8, Plt <20.    TORB: 8/5/19/1235H/ Nadira YATES/Jess Valentino RN/ Platelet 137, please resume PO Eliquist and Plavix.      Post Infusion Assessment:  Site patent and intact, free from redness, edema or discomfort.  No evidence of extravasations.  Access discontinued per protocol.       Discharge Plan:   Patient declined prescription refills.  Discharge instructions reviewed with: Patient.  Patient and/or family verbalized understanding of discharge instructions and all  questions answered.  AVS to patient via [employer]  Patient will return 8/8/19 for next appointment.   Patient discharged in stable condition accompanied by: self.  Departure Mode: Ambulatory.  Face to Face time: 5.    MORA JAMISON RN

## 2019-08-05 NOTE — PATIENT INSTRUCTIONS
Contact Numbers  [facility]: [phone]    After Hours:  [phone]  Triage: [phone]    Please call the [facility] Triage line if you experience a temperature greater than or equal to 100.5, shaking chills, have uncontrolled nausea, vomiting and/or diarrhea, dizziness, shortness of breath, chest pain, bleeding, unexplained bruising, or if you have any other new/concerning symptoms, questions or concerns.     If it is after hours, weekends, or holidays, please call the main hospital  at  [phone] and ask to speak to the Oncology doctor on call.     If you are having any concerning symptoms or wish to speak to a provider before your next infusion visit, please call your care coordinator or triage to notify them so we can adequately serve you.     If you need a refill on a narcotic prescription or other medication, please call triage before your infusion appointment.         August 2019 Sunday Monday Tuesday Wednesday Thursday Friday Saturday                       1     2    [facility]  12:00 PM   (15 min.)    [facility]   [facility] Draw    [facility] ONC INFUSION 240  12:30 PM   (240 min.)    ONCOLOGY INFUSION   [facility] 3       4     5    [facility]  11:30 AM   (15 min.)    [facility]   [facility] Draw    [facility] ONC INFUSION 360  12:00 PM   (360 min.)    ONCOLOGY INFUSION   [facility] 6     7     8    [facility]   6:30 AM   (15 min.)    [facility] D[facility]   [facility] Draw    [facility] RETURN   6:45 AM   (50 min.)   Jennifer Jalloh, YO CNP   [facility] 9     10       11     12     13     14     15     16     17       18     19     20     21     22     23    [facility]   7:30 AM   (15 min.)    [facility] D[facility]   [facility] Draw    CT CHEST ABDOMEN PELVIS WWO   8:00 AM   (20 min.)   UCCT2   [facility] CT 24       25      26    [facility] RETURN   4:45 PM   (30 min.)   Naresh De Los Santos MD   [facility] 27     28     29     30     31 September 2019 Sunday Monday Tuesday Wednesday Thursday Friday Saturday   1     2     3     4     5     6     7       8     9     10     11     12     13     14       15     16     17     18     19     20     21       22     23     24     25     26     27     28       29     30                                              Lab Results:  Recent Results (from the past 12 hour(s))   Blood component    Collection Time: 08/05/19  8:00 AM   Result Value Ref Range    Unit Number [id number]     Blood Component Type PlateletPheresis,LeukoRed Irrad (Part 3)     Division Number 00     Status of Unit Ready for patient 08/05/2019 0740     Blood Product Code [id number]     Unit Status KEENAN    *CBC with platelets differential    Collection Time: 08/05/19 11:57 AM   Result Value Ref Range    WBC 8.3 4.0 - 11.0 10e9/L    RBC Count 3.24 (L) 4.4 - 5.9 10e12/L    Hemoglobin 9.7 (L) 13.3 - 17.7 g/dL    Hematocrit 31.0 (L) 40.0 - 53.0 %    MCV 96 78 - 100 fl    MCH 29.9 26.5 - 33.0 pg    MCHC 31.3 (L) 31.5 - 36.5 g/dL    RDW 14.1 10.0 - 15.0 %    Platelet Count 137 (L) 150 - 450 10e9/L    Diff Method Automated Method     % Neutrophils 69.0 %    % Lymphocytes 15.4 %    % Monocytes 9.4 %    % Eosinophils 5.4 %    % Basophils 0.6 %    % Immature Granulocytes 0.2 %    Nucleated RBCs 0 0 /100    Absolute Neutrophil 5.7 1.6 - 8.3 10e9/L    Absolute Lymphocytes 1.3 0.8 - 5.3 10e9/L    Absolute Monocytes 0.8 0.0 - 1.3 10e9/L    Absolute Eosinophils 0.5 0.0 - 0.7 10e9/L    Absolute Basophils 0.1 0.0 - 0.2 10e9/L    Abs Immature Granulocytes 0.0 0 - 0.4 10e9/L    Absolute Nucleated RBC 0.0

## 2019-08-06 LAB
BACTERIA SPEC CULT: NO GROWTH
Lab: NORMAL
SPECIMEN SOURCE: NORMAL

## 2019-08-07 NOTE — PROGRESS NOTES
Reason for Visit: seen in f/u of recurrent, metastatic cholangiocarcinoma    Oncology HPI:   Girish Chauhan is a 64 year old year old gentleman with cholangiocarcinoma  Which was resected in 3/2016 (including partial liver resection) with negative margins and no LN involvement, but with perineural and lymphovascular invasion. No adjuvant chemotherapy given. Recurred in bladder 11/2017, bx c/w metastatic cholangiocarcinoma, started on cisplatin/gemcitabine 12/2017. Cisplatin held 6/2018 for poor tolerance. Gemcitabine discontinued 8/2018 for possible TTP, then went off treatment. In 4/2019 was found to have disease progression in the abdominal cavity anterior to the liver, just below the diaphragm. Started on capecitabine 4/30/19 (last dose 5/7) but developed an NSTEMI s/p angiogram 5/6/19 (see below) and actually continued on the capecitabine for several days after NSTEMI without any ongoing cardiac symptoms or evidence of ongoing ischemia. On follow up with Dr. De Los Santos 6/24, disease appeared stable. Decision made with family to hold off on treatment for 2 months and reevaluate.     He was then transferred to [facility] on 6/5/19 after presenting to OSH with SBO with possible involvement of draining choledochojejunostomy loop, subjective fevers, and A fib with RVR. GI consulted, s/p small bowel enteroscopy with stenting on 6/7 for obstructed biliary limb. Discharged on 6/9/19.     He was admitted on 7/28/19 with fever, nausea, vomiting, and abdominal pain. Hospitalization complicated by sepsis and enterococcus casseliflavus bacteremia. Additionally, Krishna was found to have migrated gastrojejunal stent on CT A/P on admission. S/p ERCP with stent exchange 7/29 by Dr. Rodriguez. Discharged on Linezolid for 2 weeks.      Interval history: Krishna is feeling well. Starting to have a little more energy. Still fatigues with short walks. Notes he is getting more stable as he starts to exercise more. No fevers/chills. No abdominal pain,  bloating. No N/V, reflux. No headaches, vision changes. No chest pain, cough, sob, wheezing. No bleeding/bruising. No edema. Discussed medications with his cardiologist this week and was resumed on furosemide. Appetite is starting to improve. Still has some vocal hoarseness. This started while in the hospital after endoscopy.    Current Outpatient Medications   Medication Sig Dispense Refill     acetaminophen (TYLENOL) 500 MG tablet Take 500 mg by mouth every 6 hours as needed for fever or pain       allopurinol (ZYLOPRIM) 100 MG tablet Take 100 mg by mouth daily       atorvastatin (LIPITOR) 40 MG tablet Take 40 mg by mouth daily       calcium carbonate (TUMS) 500 MG chewable tablet Take 1 chew tab by mouth 4 times daily as needed for heartburn       diclofenac (VOLTAREN) 1 % topical gel Apply 2 g topically 4 times daily as needed for moderate pain       linezolid (ZYVOX) 600 MG tablet Take 1 tablet (600 mg) by mouth every 12 hours for 12 days 24 tablet 0     losartan (COZAAR) 25 MG tablet Take 25 mg by mouth daily        metoprolol tartrate (LOPRESSOR) 50 MG tablet Take 150 mg by mouth 2 times daily        Nitroglycerin (NITROSTAT SL) Place 0.4 mg under the tongue every 5 minutes as needed for chest pain (Carries medication - has never used)        pantoprazole (PROTONIX) 40 MG EC tablet Take 1 tablet (40 mg) by mouth every morning (before breakfast)       polyethylene glycol (MIRALAX/GLYCOLAX) packet Take 17 g by mouth daily       senna-docusate (SENOKOT-S/PERICOLACE) 8.6-50 MG tablet Take 1 tablet by mouth 2 times daily            Allergies   Allergen Reactions     Blood Transfusion Related (Informational Only) Other (See Comments)     Patient has a history of a clinically significant antibody against RBC antigens.  A delay in compatible RBCs may occur.         Exam: alert ,appears in NAD.   Blood pressure 121/83, pulse 92, temperature 97.5  F (36.4  C), temperature source Oral, resp. rate 16, height 1.778 m (5'  "10\"), weight 75 kg (165 lb 4.8 oz), SpO2 100 %.  Wt Readings from Last 4 Encounters:   08/08/19 75 kg (165 lb 4.8 oz)   08/05/19 74.4 kg (164 lb)   08/02/19 75.3 kg (166 lb 1.6 oz)   07/31/19 74 kg (163 lb 1.6 oz)   Oropharynx is moist and without lesion. Neck supple and without adenopathy. Lungs:CTA. Heart: irregular with loud systolic murmur.  Abdomen: soft, nontender, BS active. Liver margin palpable 2 cm below the RCM.  Extremities: warm, no edema. Speech is clear. CN wnl. Gait/station wnl. Skin: no rashes or bruises on exposed skin. Nodes: n/a      Labs: Results for JENNYFER MCGARRY (MRN [id number]) as of 8/8/2019 09:36   Ref. Range 8/8/2019 06:38   Sodium Latest Ref Range: 133 - 144 mmol/L 139   Potassium Latest Ref Range: 3.4 - 5.3 mmol/L 4.0   Chloride Latest Ref Range: 94 - 109 mmol/L 107   Carbon Dioxide Latest Ref Range: 20 - 32 mmol/L 25   Urea Nitrogen Latest Ref Range: 7 - 30 mg/dL 16   Creatinine Latest Ref Range: 0.66 - 1.25 mg/dL 1.45 (H)   GFR Estimate Latest Ref Range: >60 mL/min/1.73_m2 51 (L)   GFR Estimate If Black Latest Ref Range: >60 mL/min/1.73_m2 59 (L)   Calcium Latest Ref Range: 8.5 - 10.1 mg/dL 8.2 (L)   Anion Gap Latest Ref Range: 3 - 14 mmol/L 7   Albumin Latest Ref Range: 3.4 - 5.0 g/dL 3.0 (L)   Protein Total Latest Ref Range: 6.8 - 8.8 g/dL 6.5 (L)   Bilirubin Total Latest Ref Range: 0.2 - 1.3 mg/dL 0.5   Alkaline Phosphatase Latest Ref Range: 40 - 150 U/L 250 (H)   ALT Latest Ref Range: 0 - 70 U/L 30   AST Latest Ref Range: 0 - 45 U/L 26   Glucose Latest Ref Range: 70 - 99 mg/dL 112 (H)   WBC Latest Ref Range: 4.0 - 11.0 10e9/L 5.4   Hemoglobin Latest Ref Range: 13.3 - 17.7 g/dL 10.0 (L)   Hematocrit Latest Ref Range: 40.0 - 53.0 % 32.1 (L)   Platelet Count Latest Ref Range: 150 - 450 10e9/L 148 (L)   RBC Count Latest Ref Range: 4.4 - 5.9 10e12/L 3.34 (L)   MCV Latest Ref Range: 78 - 100 fl 96   MCH Latest Ref Range: 26.5 - 33.0 pg 29.9   MCHC Latest Ref Range: 31.5 - 36.5 g/dL 31.2 " (L)   RDW Latest Ref Range: 10.0 - 15.0 % 14.0   Diff Method Unknown Automated Method   % Neutrophils Latest Units: % 54.1   % Lymphocytes Latest Units: % 27.2   % Monocytes Latest Units: % 8.7   % Eosinophils Latest Units: % 8.7   % Basophils Latest Units: % 0.9   % Immature Granulocytes Latest Units: % 0.4   Nucleated RBCs Latest Ref Range: 0 /100 0   Absolute Neutrophil Latest Ref Range: 1.6 - 8.3 10e9/L 2.9   Absolute Lymphocytes Latest Ref Range: 0.8 - 5.3 10e9/L 1.5   Absolute Monocytes Latest Ref Range: 0.0 - 1.3 10e9/L 0.5   Absolute Eosinophils Latest Ref Range: 0.0 - 0.7 10e9/L 0.5   Absolute Basophils Latest Ref Range: 0.0 - 0.2 10e9/L 0.1   Abs Immature Granulocytes Latest Ref Range: 0 - 0.4 10e9/L 0.0   Absolute Nucleated RBC Unknown 0.0       Imaging: n/a    Impression/plan:   1. Bacteremia 2/2 cholangitis, Enterococcus Casseliflavus  -s/p ERCP with stent exchange on 7/28/19  -Started PO Linezolid with a plan to continue for 14 days since last BCx (will completed 8/11)  -remains afebrile and clinically improving. BLood cultures remain negative to date   -Per Dr. Rodriguez post procedure note  on 7/28/19: typically we would exchange this metal stent for plastic stents after 2-3m as these stents tend to degrade and may cause tissue trauma over time, however given the clinical course thus far we will hold course with the stent in situ indefinitely    2. Hx of SBO, hospitalized in June 2019, was also found to have an obstructed biliary limb, s/p small bowel enteroscopy and stent exchange  -resolved. Bowels were loose, but starting to develop more form  -eating a regular diet, but avoiding high fiber foods.in high volumes     3. Thrombocytopenia likely 2/2 sepsis   -resolving, resumed plavix/eliquis and on 8/5/19     4. Recurrent metastatic cholangiocarcinoma  -initiated on capecitabine 4/30/19 (last dose 5/7) but developed an NSTEMI s/p angiogram 5/6/19   -had stable disease on f/u with Dr. De Los Santos 6/24.  Scans while in the hospital raised concerns of progression with worsening LAD, but this was in the setting of cholangitis. -is planned to f/u with Dr. De Los Santos on 8/26/19 with restaging scans    5. Cards hx of HLD/HTN, atrial fibrillation, bicuspid aortic valve and moderate aortic stenosis, heart failure with reduced EF, CAD with history of multivessel stenting in 2011.  - Developed NSTEMI, s/p LHC and angiogram 5/6/19 with 99% obstruction of OM2, unsuccessful PCI. He also developed afib with RVR during this time which necessitated increase of his metoprolol and starting on apixaban. He was also started on ASA/clopidogrel for CAD. Repeat angiogram done 6/17 with SAM to the OM1. Repeat Echo 7/30 with improved EF 45-50%, mild LV dilation.   had stopped anticoagulation on 7/27 due to thrombocytopenia.  Resumed plavix, asa and apixiban on 8/5 when platelets were >137K  -continue metoprolol, losartan and lasix.  -follows with Dr. Rose, last seen on 7/2/19, had planned for f/u in September with an echo.     6. Reported hx of GERD, but patient denies. He stopped PPI therapy. No clinical symptoms suggesting reflux/gastris. Will monitor

## 2019-08-08 ENCOUNTER — RECORDS - HEALTHEAST (OUTPATIENT)
Dept: ADMINISTRATIVE | Facility: OTHER | Age: 64
End: 2019-08-08

## 2019-08-08 ENCOUNTER — ONCOLOGY VISIT (OUTPATIENT)
Dept: ONCOLOGY | Facility: CLINIC | Age: 64
End: 2019-08-08
Attending: NURSE PRACTITIONER
Payer: COMMERCIAL

## 2019-08-08 ENCOUNTER — APPOINTMENT (OUTPATIENT)
Dept: LAB | Facility: CLINIC | Age: 64
End: 2019-08-08
Attending: INTERNAL MEDICINE
Payer: COMMERCIAL

## 2019-08-08 VITALS
HEIGHT: 70 IN | WEIGHT: 165.3 LBS | HEART RATE: 92 BPM | TEMPERATURE: 97.5 F | BODY MASS INDEX: 23.66 KG/M2 | SYSTOLIC BLOOD PRESSURE: 121 MMHG | OXYGEN SATURATION: 100 % | DIASTOLIC BLOOD PRESSURE: 83 MMHG | RESPIRATION RATE: 16 BRPM

## 2019-08-08 DIAGNOSIS — D64.81 ANEMIA ASSOCIATED WITH CHEMOTHERAPY: ICD-10-CM

## 2019-08-08 DIAGNOSIS — C22.1 CHOLANGIOCARCINOMA (H): ICD-10-CM

## 2019-08-08 DIAGNOSIS — C79.11 SECONDARY MALIGNANT NEOPLASM OF BLADDER (H): ICD-10-CM

## 2019-08-08 DIAGNOSIS — T45.1X5A ANEMIA ASSOCIATED WITH CHEMOTHERAPY: ICD-10-CM

## 2019-08-08 DIAGNOSIS — N18.30 CKD (CHRONIC KIDNEY DISEASE) STAGE 3, GFR 30-59 ML/MIN (H): ICD-10-CM

## 2019-08-08 LAB
ALBUMIN SERPL-MCNC: 3 G/DL (ref 3.4–5)
ALP SERPL-CCNC: 250 U/L (ref 40–150)
ALT SERPL W P-5'-P-CCNC: 30 U/L (ref 0–70)
ANION GAP SERPL CALCULATED.3IONS-SCNC: 7 MMOL/L (ref 3–14)
AST SERPL W P-5'-P-CCNC: 26 U/L (ref 0–45)
BASOPHILS # BLD AUTO: 0.1 10E9/L (ref 0–0.2)
BASOPHILS NFR BLD AUTO: 0.9 %
BILIRUB SERPL-MCNC: 0.5 MG/DL (ref 0.2–1.3)
BUN SERPL-MCNC: 16 MG/DL (ref 7–30)
CALCIUM SERPL-MCNC: 8.2 MG/DL (ref 8.5–10.1)
CHLORIDE SERPL-SCNC: 107 MMOL/L (ref 94–109)
CO2 SERPL-SCNC: 25 MMOL/L (ref 20–32)
CREAT SERPL-MCNC: 1.45 MG/DL (ref 0.66–1.25)
DIFFERENTIAL METHOD BLD: ABNORMAL
EOSINOPHIL # BLD AUTO: 0.5 10E9/L (ref 0–0.7)
EOSINOPHIL NFR BLD AUTO: 8.7 %
ERYTHROCYTE [DISTWIDTH] IN BLOOD BY AUTOMATED COUNT: 14 % (ref 10–15)
GFR SERPL CREATININE-BSD FRML MDRD: 51 ML/MIN/{1.73_M2}
GLUCOSE SERPL-MCNC: 112 MG/DL (ref 70–99)
HCT VFR BLD AUTO: 32.1 % (ref 40–53)
HGB BLD-MCNC: 10 G/DL (ref 13.3–17.7)
IMM GRANULOCYTES # BLD: 0 10E9/L (ref 0–0.4)
IMM GRANULOCYTES NFR BLD: 0.4 %
LYMPHOCYTES # BLD AUTO: 1.5 10E9/L (ref 0.8–5.3)
LYMPHOCYTES NFR BLD AUTO: 27.2 %
MCH RBC QN AUTO: 29.9 PG (ref 26.5–33)
MCHC RBC AUTO-ENTMCNC: 31.2 G/DL (ref 31.5–36.5)
MCV RBC AUTO: 96 FL (ref 78–100)
MONOCYTES # BLD AUTO: 0.5 10E9/L (ref 0–1.3)
MONOCYTES NFR BLD AUTO: 8.7 %
NEUTROPHILS # BLD AUTO: 2.9 10E9/L (ref 1.6–8.3)
NEUTROPHILS NFR BLD AUTO: 54.1 %
NRBC # BLD AUTO: 0 10*3/UL
NRBC BLD AUTO-RTO: 0 /100
PLATELET # BLD AUTO: 148 10E9/L (ref 150–450)
POTASSIUM SERPL-SCNC: 4 MMOL/L (ref 3.4–5.3)
PROT SERPL-MCNC: 6.5 G/DL (ref 6.8–8.8)
RBC # BLD AUTO: 3.34 10E12/L (ref 4.4–5.9)
SODIUM SERPL-SCNC: 139 MMOL/L (ref 133–144)
WBC # BLD AUTO: 5.4 10E9/L (ref 4–11)

## 2019-08-08 PROCEDURE — 85025 COMPLETE CBC W/AUTO DIFF WBC: CPT | Performed by: INTERNAL MEDICINE

## 2019-08-08 PROCEDURE — 80053 COMPREHEN METABOLIC PANEL: CPT | Performed by: INTERNAL MEDICINE

## 2019-08-08 PROCEDURE — 86301 IMMUNOASSAY TUMOR CA 19-9: CPT | Performed by: INTERNAL MEDICINE

## 2019-08-08 PROCEDURE — 36591 DRAW BLOOD OFF VENOUS DEVICE: CPT

## 2019-08-08 PROCEDURE — 25000128 H RX IP 250 OP 636: Mod: ZF | Performed by: NURSE PRACTITIONER

## 2019-08-08 PROCEDURE — G0463 HOSPITAL OUTPT CLINIC VISIT: HCPCS | Mod: ZF

## 2019-08-08 PROCEDURE — 99214 OFFICE O/P EST MOD 30 MIN: CPT | Mod: ZP | Performed by: NURSE PRACTITIONER

## 2019-08-08 RX ORDER — CLOPIDOGREL BISULFATE 75 MG/1
75 TABLET ORAL EVERY MORNING
Status: ON HOLD | COMMUNITY
End: 2020-07-16

## 2019-08-08 RX ORDER — APIXABAN 5 MG/1
5 TABLET, FILM COATED ORAL 2 TIMES DAILY
COMMUNITY
Start: 2019-08-08

## 2019-08-08 RX ORDER — FUROSEMIDE 20 MG
20 TABLET ORAL EVERY OTHER DAY
COMMUNITY
Start: 2019-08-08

## 2019-08-08 RX ORDER — HEPARIN SODIUM (PORCINE) LOCK FLUSH IV SOLN 100 UNIT/ML 100 UNIT/ML
5 SOLUTION INTRAVENOUS
Status: COMPLETED | OUTPATIENT
Start: 2019-08-08 | End: 2019-08-08

## 2019-08-08 RX ADMIN — HEPARIN SODIUM (PORCINE) LOCK FLUSH IV SOLN 100 UNIT/ML 5 ML: 100 SOLUTION at 06:32

## 2019-08-08 ASSESSMENT — PAIN SCALES - GENERAL: PAINLEVEL: NO PAIN (0)

## 2019-08-08 ASSESSMENT — MIFFLIN-ST. JEOR: SCORE: 1556.05

## 2019-08-08 NOTE — LETTER
8/8/2019       RE: Girish Chauhan  [address] [address]     Dear Colleague,    Thank you for referring your patient, Girish Chauhan, to [facility]. Please see a copy of my visit note below.    Reason for Visit: seen in f/u of recurrent, metastatic cholangiocarcinoma    Oncology HPI:   Griish Chauhan is a 64 year old year old gentleman with cholangiocarcinoma  Which was resected in 3/2016 (including partial liver resection) with negative margins and no LN involvement, but with perineural and lymphovascular invasion. No adjuvant chemotherapy given. Recurred in bladder 11/2017, bx c/w metastatic cholangiocarcinoma, started on cisplatin/gemcitabine 12/2017. Cisplatin held 6/2018 for poor tolerance. Gemcitabine discontinued 8/2018 for possible TTP, then went off treatment. In 4/2019 was found to have disease progression in the abdominal cavity anterior to the liver, just below the diaphragm. Started on capecitabine 4/30/19 (last dose 5/7) but developed an NSTEMI s/p angiogram 5/6/19 (see below) and actually continued on the capecitabine for several days after NSTEMI without any ongoing cardiac symptoms or evidence of ongoing ischemia. On follow up with Dr. De Los Santos 6/24, disease appeared stable. Decision made with family to hold off on treatment for 2 months and reevaluate.     He was then transferred to [facility] on 6/5/19 after presenting to OSH with SBO with possible involvement of draining choledochojejunostomy loop, subjective fevers, and A fib with RVR. GI consulted, s/p small bowel enteroscopy with stenting on 6/7 for obstructed biliary limb. Discharged on 6/9/19.     He was admitted on 7/28/19 with fever, nausea, vomiting, and abdominal pain. Hospitalization complicated by sepsis and enterococcus casseliflavus bacteremia. Additionally, Krishna was found to have migrated gastrojejunal stent on CT A/P on admission. S/p ERCP with stent exchange 7/29 by Dr. Rodriguez. Discharged on  Linezolid for 2 weeks.      Interval history: Krishna is feeling well. Starting to have a little more energy. Still fatigues with short walks. Notes he is getting more stable as he starts to exercise more. No fevers/chills. No abdominal pain, bloating. No N/V, reflux. No headaches, vision changes. No chest pain, cough, sob, wheezing. No bleeding/bruising. No edema. Discussed medications with his cardiologist this week and was resumed on furosemide. Appetite is starting to improve. Still has some vocal hoarseness. This started while in the hospital after endoscopy.    Current Outpatient Medications   Medication Sig Dispense Refill     acetaminophen (TYLENOL) 500 MG tablet Take 500 mg by mouth every 6 hours as needed for fever or pain       allopurinol (ZYLOPRIM) 100 MG tablet Take 100 mg by mouth daily       atorvastatin (LIPITOR) 40 MG tablet Take 40 mg by mouth daily       calcium carbonate (TUMS) 500 MG chewable tablet Take 1 chew tab by mouth 4 times daily as needed for heartburn       diclofenac (VOLTAREN) 1 % topical gel Apply 2 g topically 4 times daily as needed for moderate pain       linezolid (ZYVOX) 600 MG tablet Take 1 tablet (600 mg) by mouth every 12 hours for 12 days 24 tablet 0     losartan (COZAAR) 25 MG tablet Take 25 mg by mouth daily        metoprolol tartrate (LOPRESSOR) 50 MG tablet Take 150 mg by mouth 2 times daily        Nitroglycerin (NITROSTAT SL) Place 0.4 mg under the tongue every 5 minutes as needed for chest pain (Carries medication - has never used)        pantoprazole (PROTONIX) 40 MG EC tablet Take 1 tablet (40 mg) by mouth every morning (before breakfast)       polyethylene glycol (MIRALAX/GLYCOLAX) packet Take 17 g by mouth daily       senna-docusate (SENOKOT-S/PERICOLACE) 8.6-50 MG tablet Take 1 tablet by mouth 2 times daily            Allergies   Allergen Reactions     Blood Transfusion Related (Informational Only) Other (See Comments)     Patient has a history of a clinically  "significant antibody against RBC antigens.  A delay in compatible RBCs may occur.         Exam: alert ,appears in NAD.   Blood pressure 121/83, pulse 92, temperature 97.5  F (36.4  C), temperature source Oral, resp. rate 16, height 1.778 m (5' 10\"), weight 75 kg (165 lb 4.8 oz), SpO2 100 %.  Wt Readings from Last 4 Encounters:   08/08/19 75 kg (165 lb 4.8 oz)   08/05/19 74.4 kg (164 lb)   08/02/19 75.3 kg (166 lb 1.6 oz)   07/31/19 74 kg (163 lb 1.6 oz)   Oropharynx is moist and without lesion. Neck supple and without adenopathy. Lungs:CTA. Heart: irregular with loud systolic murmur.  Abdomen: soft, nontender, BS active. Liver margin palpable 2 cm below the RCM.  Extremities: warm, no edema. Speech is clear. CN wnl. Gait/station wnl. Skin: no rashes or bruises on exposed skin. Nodes: n/a      Labs: Results for JENNYFER MCGARRY (MRN [id number]) as of 8/8/2019 09:36   Ref. Range 8/8/2019 06:38   Sodium Latest Ref Range: 133 - 144 mmol/L 139   Potassium Latest Ref Range: 3.4 - 5.3 mmol/L 4.0   Chloride Latest Ref Range: 94 - 109 mmol/L 107   Carbon Dioxide Latest Ref Range: 20 - 32 mmol/L 25   Urea Nitrogen Latest Ref Range: 7 - 30 mg/dL 16   Creatinine Latest Ref Range: 0.66 - 1.25 mg/dL 1.45 (H)   GFR Estimate Latest Ref Range: >60 mL/min/1.73_m2 51 (L)   GFR Estimate If Black Latest Ref Range: >60 mL/min/1.73_m2 59 (L)   Calcium Latest Ref Range: 8.5 - 10.1 mg/dL 8.2 (L)   Anion Gap Latest Ref Range: 3 - 14 mmol/L 7   Albumin Latest Ref Range: 3.4 - 5.0 g/dL 3.0 (L)   Protein Total Latest Ref Range: 6.8 - 8.8 g/dL 6.5 (L)   Bilirubin Total Latest Ref Range: 0.2 - 1.3 mg/dL 0.5   Alkaline Phosphatase Latest Ref Range: 40 - 150 U/L 250 (H)   ALT Latest Ref Range: 0 - 70 U/L 30   AST Latest Ref Range: 0 - 45 U/L 26   Glucose Latest Ref Range: 70 - 99 mg/dL 112 (H)   WBC Latest Ref Range: 4.0 - 11.0 10e9/L 5.4   Hemoglobin Latest Ref Range: 13.3 - 17.7 g/dL 10.0 (L)   Hematocrit Latest Ref Range: 40.0 - 53.0 % 32.1 (L) "   Platelet Count Latest Ref Range: 150 - 450 10e9/L 148 (L)   RBC Count Latest Ref Range: 4.4 - 5.9 10e12/L 3.34 (L)   MCV Latest Ref Range: 78 - 100 fl 96   MCH Latest Ref Range: 26.5 - 33.0 pg 29.9   MCHC Latest Ref Range: 31.5 - 36.5 g/dL 31.2 (L)   RDW Latest Ref Range: 10.0 - 15.0 % 14.0   Diff Method Unknown Automated Method   % Neutrophils Latest Units: % 54.1   % Lymphocytes Latest Units: % 27.2   % Monocytes Latest Units: % 8.7   % Eosinophils Latest Units: % 8.7   % Basophils Latest Units: % 0.9   % Immature Granulocytes Latest Units: % 0.4   Nucleated RBCs Latest Ref Range: 0 /100 0   Absolute Neutrophil Latest Ref Range: 1.6 - 8.3 10e9/L 2.9   Absolute Lymphocytes Latest Ref Range: 0.8 - 5.3 10e9/L 1.5   Absolute Monocytes Latest Ref Range: 0.0 - 1.3 10e9/L 0.5   Absolute Eosinophils Latest Ref Range: 0.0 - 0.7 10e9/L 0.5   Absolute Basophils Latest Ref Range: 0.0 - 0.2 10e9/L 0.1   Abs Immature Granulocytes Latest Ref Range: 0 - 0.4 10e9/L 0.0   Absolute Nucleated RBC Unknown 0.0       Imaging: n/a    Impression/plan:   1. Bacteremia 2/2 cholangitis, Enterococcus Casseliflavus  -s/p ERCP with stent exchange on 7/28/19  -Started PO Linezolid with a plan to continue for 14 days since last BCx (will completed 8/11)  -remains afebrile and clinically improving. BLood cultures remain negative to date   -Per Dr. Rodriguez post procedure note  on 7/28/19: typically we would exchange this metal stent for plastic stents after 2-3m as these stents tend to degrade and may cause tissue trauma over time, however given the clinical course thus far we will hold course with the stent in situ indefinitely    2. Hx of SBO, hospitalized in June 2019, was also found to have an obstructed biliary limb, s/p small bowel enteroscopy and stent exchange  -resolved. Bowels were loose, but starting to develop more form  -eating a regular diet, but avoiding high fiber foods.in high volumes     3. Thrombocytopenia likely 2/2  sepsis   -resolving, resumed plavix/eliquis and on 8/5/19     4. Recurrent metastatic cholangiocarcinoma  -initiated on capecitabine 4/30/19 (last dose 5/7) but developed an NSTEMI s/p angiogram 5/6/19   -had stable disease on f/u with Dr. De Los Santos 6/24. Scans while in the hospital raised concerns of progression with worsening LAD, but this was in the setting of cholangitis. -is planned to f/u with Dr. De Los Santos on 8/26/19 with restaging scans    5. Cards hx of HLD/HTN, atrial fibrillation, bicuspid aortic valve and moderate aortic stenosis, heart failure with reduced EF, CAD with history of multivessel stenting in 2011.  - Developed NSTEMI, s/p LHC and angiogram 5/6/19 with 99% obstruction of OM2, unsuccessful PCI. He also developed afib with RVR during this time which necessitated increase of his metoprolol and starting on apixaban. He was also started on ASA/clopidogrel for CAD. Repeat angiogram done 6/17 with SAM to the OM1. Repeat Echo 7/30 with improved EF 45-50%, mild LV dilation.   had stopped anticoagulation on 7/27 due to thrombocytopenia.  Resumed plavix, asa and apixiban on 8/5 when platelets were >137K  -continue metoprolol, losartan and lasix.  -follows with Dr. Rose, last seen on 7/2/19, had planned for f/u in September with an echo.     6. Reported hx of GERD, but patient denies. He stopped PPI therapy. No clinical symptoms suggesting reflux/gastris. Will monitor      Again, thank you for allowing me to participate in the care of your patient.      Sincerely,    Jennifer Jalloh, YO CNP

## 2019-08-08 NOTE — NURSING NOTE
"Oncology Rooming Note    August 8, 2019 6:57 AM   Girish Chauhan is a 64 year old male who presents for:    Chief Complaint   Patient presents with     Port Draw     labs drawn from port by rn.  vs taken     Oncology Clinic Visit     RETURN VISIT; [facility] FOLLOW UP      Initial Vitals: /83 (BP Location: Right arm, Patient Position: Sitting, Cuff Size: Adult Regular)   Pulse 92   Temp 97.5  F (36.4  C) (Oral)   Resp 16   Ht 1.778 m (5' 10\")   Wt 75 kg (165 lb 4.8 oz)   SpO2 100%   BMI 23.72 kg/m   Estimated body mass index is 23.72 kg/m  as calculated from the following:    Height as of this encounter: 1.778 m (5' 10\").    Weight as of this encounter: 75 kg (165 lb 4.8 oz). Body surface area is 1.92 meters squared.  No Pain (0) Comment: Data Unavailable   No LMP for male patient.  Allergies reviewed: Yes  Medications reviewed: Yes    Medications: Medication refills not needed today.  Pharmacy name entered into i[employer]:    [id number] [place], [place] - [address] AT [facility] [place] - [place] - [address] [facility] - [place], [place] - [address]    Clinical concerns: No new concerns today  Jennifer Jalloh  was notified.      Francisca Joya              "

## 2019-08-09 LAB — CANCER AG19-9 SERPL-ACNC: 388 U/ML (ref 0–37)

## 2019-08-13 ENCOUNTER — CARE COORDINATION (OUTPATIENT)
Dept: CARDIOLOGY | Facility: CLINIC | Age: 64
End: 2019-08-13

## 2019-08-13 NOTE — PROGRESS NOTES
Referral- Heart Failure      [facility]:     August 13, 2019 -     Patient was inpatient, 7/28 - 7/30. Referral placed for chronic HF with preserved EF.    This is an old referral, has already been followed up on. See 8/5 note.      Insurance - entered    Patient Contact -  LVM    Sending to AHF Nurse to Triage    WILFREDO Gill [facility]  CORE/Heart Failure   Heart Failure Referral Coordinator

## 2019-08-23 ENCOUNTER — ANCILLARY PROCEDURE (OUTPATIENT)
Dept: CT IMAGING | Facility: CLINIC | Age: 64
End: 2019-08-23
Attending: INTERNAL MEDICINE
Payer: COMMERCIAL

## 2019-08-23 DIAGNOSIS — C22.1 CHOLANGIOCARCINOMA (H): ICD-10-CM

## 2019-08-23 DIAGNOSIS — D64.81 ANEMIA ASSOCIATED WITH CHEMOTHERAPY: ICD-10-CM

## 2019-08-23 DIAGNOSIS — C79.11 SECONDARY MALIGNANT NEOPLASM OF BLADDER (H): ICD-10-CM

## 2019-08-23 DIAGNOSIS — N18.30 CKD (CHRONIC KIDNEY DISEASE) STAGE 3, GFR 30-59 ML/MIN (H): ICD-10-CM

## 2019-08-23 DIAGNOSIS — T45.1X5A ANEMIA ASSOCIATED WITH CHEMOTHERAPY: ICD-10-CM

## 2019-08-23 LAB
ALBUMIN SERPL-MCNC: 3.2 G/DL (ref 3.4–5)
ALP SERPL-CCNC: 143 U/L (ref 40–150)
ALT SERPL W P-5'-P-CCNC: 23 U/L (ref 0–70)
ANION GAP SERPL CALCULATED.3IONS-SCNC: 4 MMOL/L (ref 3–14)
AST SERPL W P-5'-P-CCNC: 18 U/L (ref 0–45)
BASOPHILS # BLD AUTO: 0 10E9/L (ref 0–0.2)
BASOPHILS NFR BLD AUTO: 0.2 %
BILIRUB SERPL-MCNC: 0.5 MG/DL (ref 0.2–1.3)
BUN SERPL-MCNC: 24 MG/DL (ref 7–30)
CALCIUM SERPL-MCNC: 8.4 MG/DL (ref 8.5–10.1)
CHLORIDE SERPL-SCNC: 108 MMOL/L (ref 94–109)
CO2 SERPL-SCNC: 26 MMOL/L (ref 20–32)
CREAT SERPL-MCNC: 1.34 MG/DL (ref 0.66–1.25)
DIFFERENTIAL METHOD BLD: ABNORMAL
EOSINOPHIL # BLD AUTO: 0.3 10E9/L (ref 0–0.7)
EOSINOPHIL NFR BLD AUTO: 3.4 %
ERYTHROCYTE [DISTWIDTH] IN BLOOD BY AUTOMATED COUNT: 14.7 % (ref 10–15)
GFR SERPL CREATININE-BSD FRML MDRD: 56 ML/MIN/{1.73_M2}
GLUCOSE SERPL-MCNC: 93 MG/DL (ref 70–99)
HCT VFR BLD AUTO: 27.5 % (ref 40–53)
HGB BLD-MCNC: 8.7 G/DL (ref 13.3–17.7)
IMM GRANULOCYTES # BLD: 0 10E9/L (ref 0–0.4)
IMM GRANULOCYTES NFR BLD: 0.4 %
LYMPHOCYTES # BLD AUTO: 1.7 10E9/L (ref 0.8–5.3)
LYMPHOCYTES NFR BLD AUTO: 20.6 %
MCH RBC QN AUTO: 29.6 PG (ref 26.5–33)
MCHC RBC AUTO-ENTMCNC: 31.6 G/DL (ref 31.5–36.5)
MCV RBC AUTO: 94 FL (ref 78–100)
MONOCYTES # BLD AUTO: 1.4 10E9/L (ref 0–1.3)
MONOCYTES NFR BLD AUTO: 16.1 %
NEUTROPHILS # BLD AUTO: 5 10E9/L (ref 1.6–8.3)
NEUTROPHILS NFR BLD AUTO: 59.3 %
NRBC # BLD AUTO: 0 10*3/UL
NRBC BLD AUTO-RTO: 0 /100
PLATELET # BLD AUTO: 232 10E9/L (ref 150–450)
POTASSIUM SERPL-SCNC: 4.3 MMOL/L (ref 3.4–5.3)
PROT SERPL-MCNC: 6.6 G/DL (ref 6.8–8.8)
RBC # BLD AUTO: 2.94 10E12/L (ref 4.4–5.9)
SODIUM SERPL-SCNC: 137 MMOL/L (ref 133–144)
WBC # BLD AUTO: 8.4 10E9/L (ref 4–11)

## 2019-08-23 PROCEDURE — 36591 DRAW BLOOD OFF VENOUS DEVICE: CPT

## 2019-08-23 PROCEDURE — 25000128 H RX IP 250 OP 636: Performed by: INTERNAL MEDICINE

## 2019-08-23 PROCEDURE — 85025 COMPLETE CBC W/AUTO DIFF WBC: CPT | Performed by: NURSE PRACTITIONER

## 2019-08-23 PROCEDURE — 86301 IMMUNOASSAY TUMOR CA 19-9: CPT | Performed by: NURSE PRACTITIONER

## 2019-08-23 PROCEDURE — 80053 COMPREHEN METABOLIC PANEL: CPT | Performed by: NURSE PRACTITIONER

## 2019-08-23 RX ORDER — HEPARIN SODIUM (PORCINE) LOCK FLUSH IV SOLN 100 UNIT/ML 100 UNIT/ML
5 SOLUTION INTRAVENOUS ONCE
Status: COMPLETED | OUTPATIENT
Start: 2019-08-23 | End: 2019-08-23

## 2019-08-23 RX ORDER — IOPAMIDOL 755 MG/ML
100 INJECTION, SOLUTION INTRAVASCULAR ONCE
Status: COMPLETED | OUTPATIENT
Start: 2019-08-23 | End: 2019-08-23

## 2019-08-23 RX ADMIN — IOPAMIDOL 100 ML: 755 INJECTION, SOLUTION INTRAVASCULAR at 07:49

## 2019-08-23 RX ADMIN — HEPARIN 5 ML: 100 SYRINGE at 06:51

## 2019-08-23 NOTE — DISCHARGE INSTRUCTIONS

## 2019-08-23 NOTE — NURSING NOTE
Chief Complaint   Patient presents with     Port Draw     Labs drawn via port by RN In lab.      Port accessed with 20 gauge flat needle by RN, labs collected, line flushed with saline and heparin.    Victoria Bales, RN

## 2019-08-24 LAB — CANCER AG19-9 SERPL-ACNC: 303 U/ML (ref 0–37)

## 2019-08-26 ENCOUNTER — RECORDS - HEALTHEAST (OUTPATIENT)
Dept: ADMINISTRATIVE | Facility: OTHER | Age: 64
End: 2019-08-26

## 2019-08-26 ENCOUNTER — ONCOLOGY VISIT (OUTPATIENT)
Dept: ONCOLOGY | Facility: CLINIC | Age: 64
End: 2019-08-26
Attending: INTERNAL MEDICINE
Payer: COMMERCIAL

## 2019-08-26 ENCOUNTER — TELEPHONE (OUTPATIENT)
Dept: UROLOGY | Facility: CLINIC | Age: 64
End: 2019-08-26

## 2019-08-26 VITALS
WEIGHT: 163.36 LBS | RESPIRATION RATE: 14 BRPM | DIASTOLIC BLOOD PRESSURE: 84 MMHG | TEMPERATURE: 97.5 F | OXYGEN SATURATION: 100 % | HEART RATE: 80 BPM | BODY MASS INDEX: 23.44 KG/M2 | SYSTOLIC BLOOD PRESSURE: 131 MMHG

## 2019-08-26 DIAGNOSIS — C22.1 CHOLANGIOCARCINOMA (H): Primary | ICD-10-CM

## 2019-08-26 PROCEDURE — G0463 HOSPITAL OUTPT CLINIC VISIT: HCPCS | Mod: ZF

## 2019-08-26 PROCEDURE — 99215 OFFICE O/P EST HI 40 MIN: CPT | Mod: ZP | Performed by: INTERNAL MEDICINE

## 2019-08-26 ASSESSMENT — PAIN SCALES - GENERAL: PAINLEVEL: NO PAIN (0)

## 2019-08-26 NOTE — PROGRESS NOTES
Girish Chauhan is here today in follow-up of metastatic cholangiocarcinoma.    He originally had his disease resected in 2016 and then recurred within the bladder and had a long period of control with cisplatin and gemcitabine although the platinum had to be held after 6 months for poor tolerance. We eventually then discontinued gemcitabine because of development of TTP.  He was off treatment from August 2018 until April of this year.  He had intra-abdominal disease progression and started capecitabine and had an MI early in his course of disease for which he was hospitalized elsewhere. It is not clear that his cardiac symptoms were related to the Xeloda as he continued his xeloda for almost a week after this without ongoing ischemia.  He has since been off of therapy.  This summer his course has been complicated by a bowel obstruction and then cholangitis with enterococcal bacteremia.    On exam to day he appears a little cachectic but otherwise well.     I reviewed with the patient and his wife his CT scan and labs. On his scan he has what is probably progressive disease in the pelvic peritoneum with some obstruction at the right ureter. His retroperitoneal adenopathy is better consistent with reactive nodes from his infection. His ca19-9 is a little lower than a month ago at 300, but higher than 2 months ago.    A/P: Metastatic cholangiocarcinoma, probably with minor progression. We had a long discussion today about how to proceed. I would like to get his right ureter stented. After that is completed and we are further out from all the events of this summer we can re-evaluate his disease status and if clearly progressing consider rechallenging him with 5FU as this is his only remaining standard therapy. I am fairly sure his cardiac event was not due to the xeloda, but can't be sure and so would initiate this with IV therapy in the hospital with cardiac monitoring. The other possibility would be enrollment in a  clinic trial and an FGF inhibitor would be most appealing. There are several open trial going on around the country and we will likely have one open later this year and so we spent some time discussing those possibilities. We will make a final decision after our next evaluation.    Total visit time today was greater then 45 minutes all spent on the above discussion.

## 2019-08-26 NOTE — LETTER
8/26/2019      RE: Girish Chauhan  [address] [address]       Girish Chauhan is here today in follow-up of metastatic cholangiocarcinoma.    He originally had his disease resected in 2016 and then recurred within the bladder and had a long period of control with cisplatin and gemcitabine although the platinum had to be held after 6 months for poor tolerance. We eventually then discontinued gemcitabine because of development of TTP.  He was off treatment from August 2018 until April of this year.  He had intra-abdominal disease progression and started capecitabine and had an MI early in his course of disease for which he was hospitalized elsewhere. It is not clear that his cardiac symptoms were related to the Xeloda as he continued his xeloda for almost a week after this without ongoing ischemia.  He has since been off of therapy.  This summer his course has been complicated by a bowel obstruction and then cholangitis with enterococcal bacteremia.    On exam to day he appears a little cachectic but otherwise well.     I reviewed with the patient and his wife his CT scan and labs. On his scan he has what is probably progressive disease in the pelvic peritoneum with some obstruction at the right ureter. His retroperitoneal adenopathy is better consistent with reactive nodes from his infection. His ca19-9 is a little lower than a month ago at 300, but higher than 2 months ago.    A/P: Metastatic cholangiocarcinoma, probably with minor progression. We had a long discussion today about how to proceed. I would like to get his right ureter stented. After that is completed and we are further out from all the events of this summer we can re-evaluate his disease status and if clearly progressing consider rechallenging him with 5FU as this is his only remaining standard therapy. I am fairly sure his cardiac event was not due to the xeloda, but can't be sure and so would initiate this with IV therapy in the  [facility] with cardiac monitoring. The other possibility would be enrollment in a clinic trial and an FGF inhibitor would be most appealing. There are several open trial going on around the country and we will likely have one open later this year and so we spent some time discussing those possibilities. We will make a final decision after our next evaluation.    Total visit time today was greater then 45 minutes all spent on the above discussion.    Naresh De Los Santos MD

## 2019-08-26 NOTE — NURSING NOTE
"Oncology Rooming Note    August 26, 2019 5:20 PM   Girish Chauhan is a 64 year old male who presents for:    Chief Complaint   Patient presents with     Oncology Clinic Visit     [facility] , CT, Lab results      Initial Vitals: /84   Pulse 80   Temp 97.5  F (36.4  C) (Oral)   Resp 14   Wt 74.1 kg (163 lb 5.8 oz)   SpO2 100%   BMI 23.44 kg/m   Estimated body mass index is 23.44 kg/m  as calculated from the following:    Height as of 8/8/19: 1.778 m (5' 10\").    Weight as of this encounter: 74.1 kg (163 lb 5.8 oz). Body surface area is 1.91 meters squared.  No Pain (0) Comment: Data Unavailable   No LMP for male patient.  Allergies reviewed: Yes  Medications reviewed: Yes    Medications: Medication refills not needed today.  Pharmacy name entered into [employer]:    [id number] [place], [place] - [address] AT [facility] [place], [place] - [address] [facility] - [place], [place] - [address]    Clinical concerns: Results  Magali  was notified.      Jessica Reese MA              "

## 2019-08-26 NOTE — LETTER
8/26/2019       RE: Girish Chauhan  [address] [address]     Dear Colleague,    Thank you for referring your patient, Girish Chauhan, to the [facility]. Please see a copy of my visit note below.    Girish Chauhan is here today in follow-up of metastatic cholangiocarcinoma.    He originally had his disease resected in 2016 and then recurred within the bladder and had a long period of control with cisplatin and gemcitabine although the platinum had to be held after 6 months for poor tolerance eventually then discontinued gemcitabine because of development of TTP.  And was off treatment from August 2018 until April of this year.  He had intra-abdominal disease progression and started capecitabine and had a MI early in his course of disease but it is not clear that his cardiac symptoms were related to the Xeloda.  He has since been off of therapy.  This summer his course has been complicated by a bowel obstruction and then cholangitis with enterococcal bacteremia.    Again, thank you for allowing me to participate in the care of your patient.      Sincerely,    Naresh De Los Santos MD

## 2019-08-26 NOTE — TELEPHONE ENCOUNTER
[facility] Call Center    Phone Message    May a detailed message be left on voicemail: yes    Reason for Call: Other: Pt called in needs to get a stent replaced but stated he doesnt have a stent right now, please call to schedule      Action Taken: Message routed to:  [facility] ([facility] uro

## 2019-08-27 ENCOUNTER — AMBULATORY - HEALTHEAST (OUTPATIENT)
Dept: FAMILY MEDICINE | Facility: CLINIC | Age: 64
End: 2019-08-27

## 2019-08-27 ENCOUNTER — COMMUNICATION - HEALTHEAST (OUTPATIENT)
Dept: FAMILY MEDICINE | Facility: CLINIC | Age: 64
End: 2019-08-27

## 2019-08-27 DIAGNOSIS — C22.1 CHOLANGIOCARCINOMA (H): ICD-10-CM

## 2019-08-27 DIAGNOSIS — N13.5 URETERAL OBSTRUCTION, RIGHT: ICD-10-CM

## 2019-08-27 DIAGNOSIS — C67.9 MALIGNANT NEOPLASM OF URINARY BLADDER, UNSPECIFIED SITE (H): ICD-10-CM

## 2019-08-27 NOTE — TELEPHONE ENCOUNTER
MEDICAL RECORDS REQUEST   [facility]  Urology Clinic  [address] [place] [address]  PHONE: [phone]  Fax: [phone]        FUTURE VISIT INFORMATION                                                   Girish Chauhan, : 1955 scheduled for future visit at [facility]    APPOINTMENT INFORMATION:    Date: 19 8AM     Provider:  Sivan Walker MD     Reason for Visit/Diagnosis: Eval for stents     REFERRAL INFORMATION:    Referring provider:  Naresh De Los Santos     Specialty: MD     Referring providers clinic:  [facility]     Clinic contact number: [phone]    RECORDS REQUESTED FOR VISIT                                                     NOTES  STATUS/DETAILS   OFFICE NOTE from referring provider  yes   OFFICE NOTE from other specialist  no   DISCHARGE SUMMARY from hospital  no   DISCHARGE REPORT from the ER  no   OPERATIVE REPORT  no   MEDICATION LIST  no     PRE-VISIT CHECKLIST      Record collection complete Yes- All recs in epic   Images in  PACS    Appointment appropriately scheduled           (right time/right provider) Yes   MyChart activation Yes   Questionnaire complete If no, please explain: In process      Completed by: Sarah Raphael

## 2019-08-27 NOTE — TELEPHONE ENCOUNTER
Patient should be scheduled to see one of the urologist since he has never been seen in this clinic before.  Sent him to scheduling to schedule an appointment with the first person available.  Patient sees Dr De Los Santos and needs to be evaluated for stents.     Tena Blanca RN   Care Coordinator Urology

## 2019-09-05 PROBLEM — I50.22 CHRONIC SYSTOLIC CONGESTIVE HEART FAILURE (H): Status: ACTIVE | Noted: 2019-07-02

## 2019-09-09 ENCOUNTER — AMBULATORY - HEALTHEAST (OUTPATIENT)
Dept: CARDIAC REHAB | Facility: CLINIC | Age: 64
End: 2019-09-09

## 2019-09-09 ENCOUNTER — PRE VISIT (OUTPATIENT)
Dept: UROLOGY | Facility: CLINIC | Age: 64
End: 2019-09-09

## 2019-09-12 ENCOUNTER — PRE VISIT (OUTPATIENT)
Dept: SURGERY | Facility: CLINIC | Age: 64
End: 2019-09-12

## 2019-09-12 ENCOUNTER — ANESTHESIA EVENT (OUTPATIENT)
Dept: SURGERY | Facility: CLINIC | Age: 64
End: 2019-09-12

## 2019-09-12 ENCOUNTER — TELEPHONE (OUTPATIENT)
Dept: UROLOGY | Facility: CLINIC | Age: 64
End: 2019-09-12

## 2019-09-12 ENCOUNTER — ALLIED HEALTH/NURSE VISIT (OUTPATIENT)
Dept: UROLOGY | Facility: CLINIC | Age: 64
End: 2019-09-12
Payer: COMMERCIAL

## 2019-09-12 ENCOUNTER — OFFICE VISIT (OUTPATIENT)
Dept: SURGERY | Facility: CLINIC | Age: 64
End: 2019-09-12
Payer: COMMERCIAL

## 2019-09-12 ENCOUNTER — RECORDS - HEALTHEAST (OUTPATIENT)
Dept: ADMINISTRATIVE | Facility: OTHER | Age: 64
End: 2019-09-12

## 2019-09-12 ENCOUNTER — OFFICE VISIT (OUTPATIENT)
Dept: UROLOGY | Facility: CLINIC | Age: 64
End: 2019-09-12
Attending: INTERNAL MEDICINE
Payer: COMMERCIAL

## 2019-09-12 ENCOUNTER — PRE VISIT (OUTPATIENT)
Dept: UROLOGY | Facility: CLINIC | Age: 64
End: 2019-09-12

## 2019-09-12 ENCOUNTER — HOSPITAL ENCOUNTER (OUTPATIENT)
Facility: AMBULATORY SURGERY CENTER | Age: 64
End: 2019-09-12
Attending: UROLOGY
Payer: COMMERCIAL

## 2019-09-12 VITALS
DIASTOLIC BLOOD PRESSURE: 86 MMHG | WEIGHT: 161 LBS | HEIGHT: 70 IN | BODY MASS INDEX: 23.05 KG/M2 | SYSTOLIC BLOOD PRESSURE: 126 MMHG | HEART RATE: 71 BPM

## 2019-09-12 VITALS
RESPIRATION RATE: 19 BRPM | SYSTOLIC BLOOD PRESSURE: 126 MMHG | HEART RATE: 91 BPM | HEIGHT: 70 IN | WEIGHT: 171 LBS | BODY MASS INDEX: 24.48 KG/M2 | TEMPERATURE: 97.9 F | DIASTOLIC BLOOD PRESSURE: 86 MMHG | OXYGEN SATURATION: 100 %

## 2019-09-12 DIAGNOSIS — Z01.818 PREOP EXAMINATION: Primary | ICD-10-CM

## 2019-09-12 DIAGNOSIS — N13.30 HYDRONEPHROSIS, UNSPECIFIED HYDRONEPHROSIS TYPE: ICD-10-CM

## 2019-09-12 DIAGNOSIS — C22.1 CHOLANGIOCARCINOMA (H): ICD-10-CM

## 2019-09-12 DIAGNOSIS — N13.30 HYDRONEPHROSIS OF RIGHT KIDNEY: Primary | ICD-10-CM

## 2019-09-12 LAB
APPEARANCE UR: CLEAR
BILIRUB UR QL: ABNORMAL
COLOR UR: YELLOW
GLUCOSE URINE: ABNORMAL MG/DL
HGB UR QL: ABNORMAL
KETONES UR QL: ABNORMAL MG/DL
LEUKOCYTE ESTERASE URINE: ABNORMAL
NITRITE UR QL STRIP: ABNORMAL
PH UR STRIP: 5.5 PH (ref 5–7)
PROTEIN ALBUMIN URINE: ABNORMAL MG/DL
SOURCE: ABNORMAL
SP GR UR STRIP: 1.01 (ref 1–1.03)
UROBILINOGEN UR QL STRIP: 0.2 EU/DL (ref 0.2–1)

## 2019-09-12 RX ORDER — CEFAZOLIN SODIUM 2 G/50ML
2 SOLUTION INTRAVENOUS
Status: CANCELLED | OUTPATIENT
Start: 2019-09-12

## 2019-09-12 RX ORDER — COLCHICINE 0.6 MG/1
0.6 TABLET ORAL 3 TIMES DAILY PRN
COMMUNITY

## 2019-09-12 RX ORDER — CEFAZOLIN SODIUM 1 G/50ML
1 INJECTION, SOLUTION INTRAVENOUS SEE ADMIN INSTRUCTIONS
Status: CANCELLED | OUTPATIENT
Start: 2019-09-12

## 2019-09-12 RX ORDER — MULTIPLE VITAMINS W/ MINERALS TAB 9MG-400MCG
1 TAB ORAL EVERY MORNING
COMMUNITY

## 2019-09-12 ASSESSMENT — ENCOUNTER SYMPTOMS
STIFFNESS: 0
SYNCOPE: 0
RESPIRATORY PAIN: 0
WHEEZING: 0
RECTAL PAIN: 0
SINUS PAIN: 0
SORE THROAT: 0
EYE IRRITATION: 0
FATIGUE: 0
LEG SWELLING: 0
BLOATING: 0
NAIL CHANGES: 0
SPUTUM PRODUCTION: 0
SPEECH CHANGE: 0
DECREASED APPETITE: 0
CLAUDICATION: 0
TASTE DISTURBANCE: 0
SLEEP DISTURBANCES DUE TO BREATHING: 0
DOUBLE VISION: 0
INCREASED ENERGY: 0
HYPERTENSION: 0
HALLUCINATIONS: 0
WEIGHT LOSS: 0
TROUBLE SWALLOWING: 0
LEG PAIN: 0
SMELL DISTURBANCE: 0
EYE PAIN: 0
DISTURBANCES IN COORDINATION: 0
HEMOPTYSIS: 0
SWOLLEN GLANDS: 0
DYSRHYTHMIAS: 1
FLANK PAIN: 0
TACHYCARDIA: 0
POLYPHAGIA: 0
NERVOUS/ANXIOUS: 0
FEVER: 0
BRUISES/BLEEDS EASILY: 0
MEMORY LOSS: 0
DEPRESSION: 0
LIGHT-HEADEDNESS: 0
DIFFICULTY URINATING: 0
CHILLS: 0
DIZZINESS: 0
JAUNDICE: 0
MUSCLE CRAMPS: 0
POLYDIPSIA: 0
COUGH DISTURBING SLEEP: 0
EXERCISE INTOLERANCE: 0
ABDOMINAL PAIN: 0
MUSCLE WEAKNESS: 0
HEARTBURN: 0
LOSS OF CONSCIOUSNESS: 0
PALPITATIONS: 0
HOARSE VOICE: 0
DYSPNEA ON EXERTION: 0
SNORES LOUDLY: 0
CONSTIPATION: 0
MYALGIAS: 0
SHORTNESS OF BREATH: 0
DYSURIA: 0
TINGLING: 0
SKIN CHANGES: 0
DECREASED CONCENTRATION: 0
NECK MASS: 0
NECK PAIN: 0
NAUSEA: 0
EYE WATERING: 0
WEIGHT GAIN: 0
HEMATURIA: 0
JOINT SWELLING: 0
SINUS CONGESTION: 0
INSOMNIA: 0
PARALYSIS: 0
RECTAL BLEEDING: 0
ORTHOPNEA: 0
HYPOTENSION: 0
BACK PAIN: 0
WEAKNESS: 0
PANIC: 0
EYE REDNESS: 0
TREMORS: 0
SEIZURES: 0
ALTERED TEMPERATURE REGULATION: 0
NIGHT SWEATS: 0
ARTHRALGIAS: 0
VOMITING: 0
EXTREMITY NUMBNESS: 0
BOWEL INCONTINENCE: 0
DIARRHEA: 0
COUGH: 0
HEADACHES: 0
BLOOD IN STOOL: 0
POSTURAL DYSPNEA: 0
NUMBNESS: 0
POOR WOUND HEALING: 0

## 2019-09-12 ASSESSMENT — PAIN SCALES - GENERAL
PAINLEVEL: NO PAIN (0)
PAINLEVEL: SEVERE PAIN (7)

## 2019-09-12 ASSESSMENT — MIFFLIN-ST. JEOR
SCORE: 1536.54
SCORE: 1581.9

## 2019-09-12 ASSESSMENT — LIFESTYLE VARIABLES: TOBACCO_USE: 0

## 2019-09-12 NOTE — PROGRESS NOTES
Preoperative Assessment Center medication history for September 12, 2019 is complete.    See Epic admission navigator for prior to admission medications.   Operating room staff will still need to confirm medications and last dose information on day of surgery.     Medication history interview sources:  patient, care everywhere, payer information.     Changes made to PTA medication list (reason)  Added: MVI, colchicine.   Deleted: linezolid - completed course in august for bacterial infection at time of last bowel stent.   Changed: none    Additional medication history information (including reliability of information, actions taken by pharmacist):    -- No recent (within 30 days) course of antibiotics  -- No recent (within 30 days) course of steroids  -- No recent (within 30 days) chronic daily medications stopped   -- Patient declines being on any other prescription or over-the-counter medications    Prior to Admission medications    Medication Sig Last Dose Taking? Auth Provider   acetaminophen (TYLENOL) 500 MG tablet Take 500 mg by mouth every 6 hours as needed for fever or pain Taking Yes Unknown, Entered By History   allopurinol (ZYLOPRIM) 100 MG tablet Take 100 mg by mouth every evening  Taking Yes Reported, Patient   apixaban ANTICOAGULANT (ELIQUIS) 5 MG tablet Take 1 tablet (5 mg) by mouth 2 times daily Taking Yes Jennifer Jalloh APRN CNP   atorvastatin (LIPITOR) 40 MG tablet Take 40 mg by mouth every evening  Taking Yes Unknown, Entered By History   calcium carbonate (TUMS) 500 MG chewable tablet Take 1 chew tab by mouth 4 times daily as needed for heartburn Taking Yes Unknown, Entered By History   clopidogrel (PLAVIX) 75 MG tablet Take 75 mg by mouth every morning  Taking Yes Jennifer Jalloh APRN CNP   colchicine (COLCYRS) 0.6 MG tablet Take 0.6 mg by mouth as needed Taking Yes Unknown, Entered By History   furosemide (LASIX) 20 MG tablet Take 1 tablet (20 mg) by mouth daily Taking Yes Jennifer Jalloh  APRN CNP   losartan (COZAAR) 25 MG tablet Take 25 mg by mouth daily  Taking Yes Reported, Patient   metoprolol tartrate (LOPRESSOR) 50 MG tablet Take 150 mg by mouth 2 times daily  Taking Yes Unknown, Entered By History   multivitamin w/minerals (THERA-VIT-M) tablet Take 1 tablet by mouth daily Taking Yes Unknown, Entered By History   Nitroglycerin (NITROSTAT SL) Place 0.4 mg under the tongue every 5 minutes as needed for chest pain (Carries medication - has never used)  Taking Yes Reported, Patient          Medication history completed by: Jeff Ward, [facility]

## 2019-09-12 NOTE — PROGRESS NOTES
September 12, 2019    Referring Provider: Naresh De Los Santos MD  [address] [address]    Primary Care Provider: Dario Jain    CC: Right hydro due to extrinsic compression; Eval for stent    HPI:  Girish Chauhan is a 64 year old male with recurrent cholangiocarcinoma (with biopsy proven met to bladder in the past), CAD, HTN, Afib on apixaban, aortic stenosis, and chronic diastolic heart failure, CKD, and recent gastrojejunal stenting and exchange with recent admission (7/28-7/30/19) for sepsis. He went home on oral abx and has since completed this course. On most recent oncology visit his CT scan revealed right hydroureter likely due to extrinsic compression from a right pelvic mass, presumably metastatic disease. He is not currently on chemo due to a cardiac event this past spring which may have been a side effect of the chemo (xeloda). It would appear that they are considering further chemo vs. clinical trial. Denies flank pain. Urinating 3-5 times per day. Drinking a lot of water. Denies gross hematuria. No dysuria. Denies fevers/chills, N/V. Occasional right lower quadrant pain (area of GI stent and prior surgery) shortly after eating for a few minutes that spontaneously resolves.    Apixaban for A-fib and plavix for cardiac stent placed in May 2019.    Past Medical History:   Diagnosis Date     Aortic stenosis due to bicuspid aortic valve      Atrial fibrillation (H) 2006    hx of paroxysmal atrial fibrillation since approximately 2006. S/p cardioversion in 2013 with recurrent atrial fibrillation s/p repeat cardioversion 9/29/14.     Basal cell carcinoma 1/2016    right shoulder and right posterior calf s/p electrodessication and curretage 1/2016.     CAD (coronary artery disease) 12/2011    s/p angiogram 12/2011 s/p percutaneous intervention on the LAD with multiple drug-eluting stents complicated by a small perforation in the diagonal branch which sealed  spontaneously.  Patient with significant Circumflex stenosis which is being treated conservatively.     Gout     affects left foot 2-3 times per year     Hyperlipidemia      Hypertension      Liver disease      Melanoma (H) 9/2015    back s/p resection 9/2015     Squamous cell carcinoma     nose s/p excision     Past Surgical History:   Procedure Laterality Date     ------------OTHER-------------      multiple skin cancer resections     CARDIOVERSION  2013, 9/2014     ENDOSCOPIC RETROGRADE CHOLANGIOPANCREATOGRAM N/A 2/26/2016    Procedure: COMBINED ENDOSCOPIC RETROGRADE CHOLANGIOPANCREATOGRAPHY, PLACE TUBE/STENT;  Surgeon: Rafa Andrade MD;  Location: [facility] OR     ENDOSCOPIC RETROGRADE CHOLANGIOPANCREATOGRAPHY  2/2014     ENDOSCOPIC ULTRASOUND UPPER GASTROINTESTINAL TRACT (GI) N/A 6/7/2019    Procedure: ENDOSCOPIC ULTRASOUND, with hot axios stent placement;  Surgeon: Gabino Rodriguez MD;  Location: [facility] OR     ENTEROSCOPY SMALL BOWEL N/A 6/7/2019    Procedure: ENTEROSCOPY;  Surgeon: Gabino Rodriguez MD;  Location: [facility]     ESOPHAGOSCOPY, GASTROSCOPY, DUODENOSCOPY (EGD), DILATATION, COMBINED N/A 7/29/2019    Procedure: Esophagoscopy, gastroscopy, duodenoscopy (EGD), with stent exchange and dilation;  Surgeon: Gabino Rodriguez MD;  Location: [facility]     EXPLORE COMMON BILE DUCT N/A 3/8/2016    Procedure: EXPLORE COMMON BILE DUCT;  Surgeon: Jose Eduardo Pinedo MD;  Location: [facility]     HEPATECTOMY PARTIAL Left 3/8/2016    Procedure: HEPATECTOMY PARTIAL;  Surgeon: Jose Eduardo Pinedo MD;  Location: [facility]     INSERT PORT VASCULAR ACCESS Right 12/8/2017    Procedure: INSERT PORT VASCULAR ACCESS;  Place single lumen venous chest port - right;  Surgeon: Drew Powell PA-C;  Location: [facility]     SOFT TISSUE SURGERY       STENT  12/2011    LAD with multiple SAM     Social History     Socioeconomic History     Marital status:      Spouse name: Not on file     Number of children: 1     Years  of education: Not on file     Highest education level: Not on file   Occupational History     Employer: [employer]   Social Needs     Financial resource strain: Not on file     Food insecurity:     Worry: Not on file     Inability: Not on file     Transportation needs:     Medical: Not on file     Non-medical: Not on file   Tobacco Use     Smoking status: Never Smoker     Smokeless tobacco: Never Used   Substance and Sexual Activity     Alcohol use: No     Alcohol/week: 0.0 oz     Drug use: No     Sexual activity: Not on file   Lifestyle     Physical activity:     Days per week: Not on file     Minutes per session: Not on file     Stress: Not on file   Relationships     Social connections:     Talks on phone: Not on file     Gets together: Not on file     Attends Christianity service: Not on file     Active member of club or organization: Not on file     Attends meetings of clubs or organizations: Not on file     Relationship status: Not on file     Intimate partner violence:     Fear of current or ex partner: Not on file     Emotionally abused: Not on file     Physically abused: Not on file     Forced sexual activity: Not on file   Other Topics Concern     Not on file   Social History Narrative    Works for [employer] with [employer].  Lives in [place].   with one grown daughter.  No tobacco, rare Etoh, no drug use.     Family History   Problem Relation Age of Onset     Skin Cancer Mother      Other - See Comments Father         father passed away in his 60s intraoperatively with an unknown bile duct obstruction     Review of Systems     Constitutional:  Negative for fever, chills, weight loss, weight gain, fatigue, decreased appetite, night sweats, recent stressors, height gain, height loss, post-operative complications, incisional pain, hallucinations, increased energy, hyperactivity and confused.   HENT:  Negative for ear pain, hearing loss, tinnitus, nosebleeds, trouble swallowing, hoarse voice,  mouth sores, sore throat, ear discharge, tooth pain, gum tenderness, taste disturbance, smell disturbance, hearing aid, bleeding gums, dry mouth, sinus pain, sinus congestion and neck mass.    Eyes:  Negative for double vision, pain, redness, eye pain, decreased vision, eye watering, eye bulging, eye dryness, flashing lights, spots, floaters, strabismus, tunnel vision, jaundice and eye irritation.   Respiratory:   Negative for cough, hemoptysis, sputum production, shortness of breath, wheezing, sleep disturbances due to breathing, snores loudly, respiratory pain, dyspnea on exertion, cough disturbing sleep and postural dyspnea.    Cardiovascular:  Negative for chest pain, dyspnea on exertion, palpitations, orthopnea, claudication, leg swelling, fingers/toes turn blue, hypertension, hypotension, syncope, history of heart murmur, chest pain on exertion, chest pain at rest, pacemaker, few scattered varicosities, leg pain, sleep disturbances due to breathing, tachycardia, light-headedness, exercise intolerance and edema.   Gastrointestinal:  Negative for heartburn, nausea, vomiting, abdominal pain, diarrhea, constipation, blood in stool, melena, rectal pain, bloating, hemorrhoids, bowel incontinence, jaundice, rectal bleeding, coffee ground emesis and change in stool.   Genitourinary:  Negative for bladder incontinence, dysuria, urgency, hematuria, flank pain, difficulty urinating, nocturia, voiding less frequently, scrotal pain, ulcerations, penile discharge, male genitourinary complaint and reduced libido.   Musculoskeletal:  Negative for myalgias, back pain, joint swelling, arthralgias, stiffness, muscle cramps, neck pain, bone pain, muscle weakness and fracture.   Skin:  Negative for nail changes, itching, poor wound healing, rash, hair changes, skin changes, acne, warts, poor wound healing, scarring, flaky skin, Raynaud's phenomenon, sensitivity to sunlight and skin thickening.   Neurological:  Negative for  "dizziness, tingling, tremors, speech change, seizures, loss of consciousness, weakness, light-headedness, numbness, headaches, disturbances in coordination, extremity numbness, memory loss, difficulty walking and paralysis.   Endo/Heme:  Negative for anemia, swollen glands and bruises/bleeds easily.   Psychiatric/Behavioral:  Negative for depression, hallucinations, memory loss, decreased concentration, mood swings and panic attacks.    Endocrine:  Negative for altered temperature regulation, polyphagia, polydipsia, unwanted hair growth and change in facial hair.    Allergies   Allergen Reactions     Blood Transfusion Related (Informational Only) Other (See Comments)     Patient has a history of a clinically significant antibody against RBC antigens.  A delay in compatible RBCs may occur.       Current Outpatient Medications   Medication     acetaminophen (TYLENOL) 500 MG tablet     allopurinol (ZYLOPRIM) 100 MG tablet     apixaban ANTICOAGULANT (ELIQUIS) 5 MG tablet     atorvastatin (LIPITOR) 40 MG tablet     calcium carbonate (TUMS) 500 MG chewable tablet     clopidogrel (PLAVIX) 75 MG tablet     furosemide (LASIX) 20 MG tablet     losartan (COZAAR) 25 MG tablet     metoprolol tartrate (LOPRESSOR) 50 MG tablet     Nitroglycerin (NITROSTAT SL)     No current facility-administered medications for this visit.      /86   Pulse 71   Ht 1.778 m (5' 10\")   Wt 73 kg (161 lb)   BMI 23.10 kg/m   No LMP for male patient. Body mass index is 23.1 kg/m .  He is alert and oriented.  He is well groomed, comfortable in no acute distress.  Eyes have anicteric sclerae, moist conjunctivae.  Normal mood and affect.   Normocephalic, atraumatic without masses, lesions, obvious abnormalities.  Non-labored breathing.  Abdomen is soft, non-tender, non-distended, no CVAT, no organomegaly. Skin pale, dry, warm to touch. No lower extremity edema.  Full ROM in extremities.      Urine dip trace blood otherwise negative    A/P: Girish " CELINE Chauhan is a 64 year old F with PMH significant for recurrent cholangiocarcinoma  (with biopsy proven met to bladder), CAD, HTN, Afib (on apixaban), aortic stenosis, chronic diastolic heart failure, CKD, CAD with MI and coronary stent placement (May 2019, on plavix), recent gastrojejunal stenting and exchange with recent admission (7/28-7/30/19) for sepsis who now presents with new pelvic mass and presumably malignant obstruction of right ureteral with associated hydroureteronephrosis that is asymptomatic. Planning for possible additional chemo in near future.    - Plan to schedule attempt at right ureteral stent placement on 9/16/19; patient at increased risk of bleeding with procedure and stent due to prior met to bladder; Discussed need for future stent exchanges  - Back-up plan will be percutaneous nephrostomy tube if stent placement is unsuccessful    Terrell Larsen MD PGY2    Addendum:    The patient was seen and evaluated with the resident.  The plan was formulated in conjunction with me and I agree with the above note with changes made as necessary.    Patient with known metastatic cholangiocarcinoma to the bladder who has increased asymptomatic right sided hydronephrosis.  We discussed options of observation, retrograde ureteral stent in the operating room or antegrade decompression with percutaneous nephrostomy tube with interventional radiology.  We discussed that the concern with observation is decline in renal function or life threatening infection from obstructed urine.  We further discussed that he may not be a good candidate for a retrograde stent if there is recurrence of the metastatic cancer in the bladder.  I discussed that should this be the case we would not plan on resection or stent placement given his anticoagulation.  We discussed that he would have to come off his anticoagulation prior to any biopsy/resection of if ultimately he needed to have a percutaneous nephrostomy tube.   We  discussed that he would need routine changes with either ureteral stent or PNT.    Will plan for cystoscopy, right retrograde pyelogram, right ureteral stent placement on Monday    35 minutes were spent with patient today, >50% in counseling and coordination of care    Sivan Walker MD MPH   of Urology    CC  Patient Care Team:  Dario Jain as PCP - General (Internal Medicine)  Naresh James MD as MD (Oncology)  Lyric Frey MD as MD (Urology)  Kecia Loco, RN as Nurse Coordinator (Oncology)  Sivan Walker MD as MD (Urology)  Tena Blanca, RN as Specialty Care Coordinator (Urology)  NARESH JAMES

## 2019-09-12 NOTE — LETTER
9/12/2019       RE: Girish Chauhan  [address] [facility] [id number]     Dear Colleague,    Thank you for referring your patient, Girish Chauhan, to the [facility]OLOGY AND INST FOR PROSTATE AND UROLOGIC CANCERS at [facility]. Please see a copy of my visit note below.    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 He went home on oral abx and has since completed this course. On most recent oncology visit his CT scan revealed right hydroureter likely due to extrinsic compression from a right pelvic mass, presumably metastatic disease. He is not currently on chemo due to a cardiac event this past spring which may have been a side effect of the chemo (xeloda). It would appear that they are considering further chemo vs. clinical trial. Denies flank pain. Urinating 3-5 times per day. Drinking a lot of water. Denies gross hematuria. No dysuria. Denies fevers/chills, N/V. Occasional right lower quadrant pain (area of GI stent and prior surgery) shortly after eating for a few minutes that spontaneously resolves.    Apixaban for A-fib and plavix for cardiac stent placed in May 2019.    Past Medical History:   Diagnosis Date     Aortic stenosis due to bicuspid aortic valve      Atrial fibrillation (H) 2006    hx of paroxysmal atrial fibrillation since approximately 2006. S/p cardioversion in 2013 with recurrent atrial fibrillation s/p repeat cardioversion 9/29/14.     Basal cell carcinoma  1/2016    right shoulder and right posterior calf s/p electrodessication and curretage 1/2016.     CAD (coronary artery disease) 12/2011    s/p angiogram 12/2011 s/p percutaneous intervention on the LAD with multiple drug-eluting stents complicated by a small perforation in the diagonal branch which sealed spontaneously.  Patient with significant Circumflex stenosis which is being treated conservatively.     Gout     affects left foot 2-3 times per year     Hyperlipidemia      Hypertension      Liver disease      Melanoma (H) 9/2015    back s/p resection 9/2015     Squamous cell carcinoma     nose s/p excision     Past Surgical History:   Procedure Laterality Date     ------------OTHER-------------      multiple skin cancer resections     CARDIOVERSION  2013, 9/2014     ENDOSCOPIC RETROGRADE CHOLANGIOPANCREATOGRAM N/A 2/26/2016    Procedure: COMBINED ENDOSCOPIC RETROGRADE CHOLANGIOPANCREATOGRAPHY, PLACE TUBE/STENT;  Surgeon: Rafa Andrade MD;  Location: [facility] OR     ENDOSCOPIC RETROGRADE CHOLANGIOPANCREATOGRAPHY  2/2014     ENDOSCOPIC ULTRASOUND UPPER GASTROINTESTINAL TRACT (GI) N/A 6/7/2019    Procedure: ENDOSCOPIC ULTRASOUND, with hot axios stent placement;  Surgeon: Gabino Rodriguez MD;  Location: [facility] OR     ENTEROSCOPY SMALL BOWEL N/A 6/7/2019    Procedure: ENTEROSCOPY;  Surgeon: Gabino Rodriguez MD;  Location: [facility] OR     ESOPHAGOSCOPY, GASTROSCOPY, DUODENOSCOPY (EGD), DILATATION, COMBINED N/A 7/29/2019    Procedure: Esophagoscopy, gastroscopy, duodenoscopy (EGD), with stent exchange and dilation;  Surgeon: Gabino Rodriguez MD;  Location: [facility] OR     EXPLORE COMMON BILE DUCT N/A 3/8/2016    Procedure: EXPLORE COMMON BILE DUCT;  Surgeon: Jose Eduardo Pinedo MD;  Location: [facility] OR     HEPATECTOMY PARTIAL Left 3/8/2016    Procedure: HEPATECTOMY PARTIAL;  Surgeon: Jose Eduardo Pinedo MD;  Location: [facility] OR     INSERT PORT VASCULAR ACCESS Right 12/8/2017    Procedure: INSERT PORT VASCULAR ACCESS;  Place  single lumen venous chest port - right;  Surgeon: Drew Powell PA-C;  Location: [facility] OR     SOFT TISSUE SURGERY       STENT  12/2011    LAD with multiple SAM     Social History     Socioeconomic History     Marital status:      Spouse name: Not on file     Number of children: 1     Years of education: Not on file     Highest education level: Not on file   Occupational History     Employer: [employer]   Social Needs     Financial resource strain: Not on file     Food insecurity:     Worry: Not on file     Inability: Not on file     Transportation needs:     Medical: Not on file     Non-medical: Not on file   Tobacco Use     Smoking status: Never Smoker     Smokeless tobacco: Never Used   Substance and Sexual Activity     Alcohol use: No     Alcohol/week: 0.0 oz     Drug use: No     Sexual activity: Not on file   Lifestyle     Physical activity:     Days per week: Not on file     Minutes per session: Not on file     Stress: Not on file   Relationships     Social connections:     Talks on phone: Not on file     Gets together: Not on file     Attends Jehovah's witness service: Not on file     Active member of club or organization: Not on file     Attends meetings of clubs or organizations: Not on file     Relationship status: Not on file     Intimate partner violence:     Fear of current or ex partner: Not on file     Emotionally abused: Not on file     Physically abused: Not on file     Forced sexual activity: Not on file   Other Topics Concern     Not on file   Social History Narrative    Works for [employer] with [employer].  Lives in [place].   with one grown daughter.  No tobacco, rare Etoh, no drug use.     Family History   Problem Relation Age of Onset     Skin Cancer Mother      Other - See Comments Father         father passed away in his 60s intraoperatively with an unknown bile duct obstruction     Review of Systems     Constitutional:  Negative for fever, chills, weight loss, weight  gain, fatigue, decreased appetite, night sweats, recent stressors, height gain, height loss, post-operative complications, incisional pain, hallucinations, increased energy, hyperactivity and confused.   HENT:  Negative for ear pain, hearing loss, tinnitus, nosebleeds, trouble swallowing, hoarse voice, mouth sores, sore throat, ear discharge, tooth pain, gum tenderness, taste disturbance, smell disturbance, hearing aid, bleeding gums, dry mouth, sinus pain, sinus congestion and neck mass.    Eyes:  Negative for double vision, pain, redness, eye pain, decreased vision, eye watering, eye bulging, eye dryness, flashing lights, spots, floaters, strabismus, tunnel vision, jaundice and eye irritation.   Respiratory:   Negative for cough, hemoptysis, sputum production, shortness of breath, wheezing, sleep disturbances due to breathing, snores loudly, respiratory pain, dyspnea on exertion, cough disturbing sleep and postural dyspnea.    Cardiovascular:  Negative for chest pain, dyspnea on exertion, palpitations, orthopnea, claudication, leg swelling, fingers/toes turn blue, hypertension, hypotension, syncope, history of heart murmur, chest pain on exertion, chest pain at rest, pacemaker, few scattered varicosities, leg pain, sleep disturbances due to breathing, tachycardia, light-headedness, exercise intolerance and edema.   Gastrointestinal:  Negative for heartburn, nausea, vomiting, abdominal pain, diarrhea, constipation, blood in stool, melena, rectal pain, bloating, hemorrhoids, bowel incontinence, jaundice, rectal bleeding, coffee ground emesis and change in stool.   Genitourinary:  Negative for bladder incontinence, dysuria, urgency, hematuria, flank pain, difficulty urinating, nocturia, voiding less frequently, scrotal pain, ulcerations, penile discharge, male genitourinary complaint and reduced libido.   Musculoskeletal:  Negative for myalgias, back pain, joint swelling, arthralgias, stiffness, muscle cramps, neck  "pain, bone pain, muscle weakness and fracture.   Skin:  Negative for nail changes, itching, poor wound healing, rash, hair changes, skin changes, acne, warts, poor wound healing, scarring, flaky skin, Raynaud's phenomenon, sensitivity to sunlight and skin thickening.   Neurological:  Negative for dizziness, tingling, tremors, speech change, seizures, loss of consciousness, weakness, light-headedness, numbness, headaches, disturbances in coordination, extremity numbness, memory loss, difficulty walking and paralysis.   Endo/Heme:  Negative for anemia, swollen glands and bruises/bleeds easily.   Psychiatric/Behavioral:  Negative for depression, hallucinations, memory loss, decreased concentration, mood swings and panic attacks.    Endocrine:  Negative for altered temperature regulation, polyphagia, polydipsia, unwanted hair growth and change in facial hair.    Allergies   Allergen Reactions     Blood Transfusion Related (Informational Only) Other (See Comments)     Patient has a history of a clinically significant antibody against RBC antigens.  A delay in compatible RBCs may occur.       Current Outpatient Medications   Medication     acetaminophen (TYLENOL) 500 MG tablet     allopurinol (ZYLOPRIM) 100 MG tablet     apixaban ANTICOAGULANT (ELIQUIS) 5 MG tablet     atorvastatin (LIPITOR) 40 MG tablet     calcium carbonate (TUMS) 500 MG chewable tablet     clopidogrel (PLAVIX) 75 MG tablet     furosemide (LASIX) 20 MG tablet     losartan (COZAAR) 25 MG tablet     metoprolol tartrate (LOPRESSOR) 50 MG tablet     Nitroglycerin (NITROSTAT SL)     No current facility-administered medications for this visit.      /86   Pulse 71   Ht 1.778 m (5' 10\")   Wt 73 kg (161 lb)   BMI 23.10 kg/m    No LMP for male patient. Body mass index is 23.1 kg/m .  He is alert and oriented.  He is well groomed, comfortable in no acute distress.  Eyes have anicteric sclerae, moist conjunctivae.  Normal mood and affect.   Normocephalic, " atraumatic without masses, lesions, obvious abnormalities.  Non-labored breathing.  Abdomen is soft, non-tender, non-distended, no CVAT, no organomegaly. Skin pale, dry, warm to touch. No lower extremity edema.  Full ROM in extremities.      Urine dip trace blood otherwise negative    A/P: Girish Chauhan is a 64 year old F with PMH significant for recurrent cholangiocarcinoma  (with biopsy proven met to bladder), CAD, HTN, Afib (on apixaban), aortic stenosis, chronic diastolic heart failure, CKD, CAD with MI and coronary stent placement (May 2019, on plavix), recent gastrojejunal stenting and exchange with recent admission (7/28-7/30/19) for sepsis who now presents with new pelvic mass and presumably malignant obstruction of right ureteral with associated hydroureteronephrosis that is asymptomatic. Planning for possible additional chemo in near future.    - Plan to schedule attempt at right ureteral stent placement on 9/16/19; patient at increased risk of bleeding with procedure and stent due to prior met to bladder; Discussed need for future stent exchanges  - Back-up plan will be percutaneous nephrostomy tube if stent placement is unsuccessful    Terrell Larsen MD PGY2    Addendum:    The patient was seen and evaluated with the resident.  The plan was formulated in conjunction with me and I agree with the above note with changes made as necessary.    Patient with known metastatic cholangiocarcinoma to the bladder who has increased asymptomatic right sided hydronephrosis.  We discussed options of observation, retrograde ureteral stent in the operating room or antegrade decompression with percutaneous nephrostomy tube with interventional radiology.  We discussed that the concern with observation is decline in renal function or life threatening infection from obstructed urine.  We further discussed that he may not be a good candidate for a retrograde stent if there is recurrence of the metastatic cancer in the  bladder.  I discussed that should this be the case we would not plan on resection or stent placement given his anticoagulation.  We discussed that he would have to come off his anticoagulation prior to any biopsy/resection of if ultimately he needed to have a percutaneous nephrostomy tube.   We discussed that he would need routine changes with either ureteral stent or PNT.    Will plan for cystoscopy, right retrograde pyelogram, right ureteral stent placement on Monday    35 minutes were spent with patient today, >50% in counseling and coordination of care    Sivan Walker MD MPH   of Urology    CC  Patient Care Team:  Dario Jain as PCP - General (Internal Medicine)  Naresh De Los Santos MD as MD (Oncology)  Lyric Frey MD as MD (Urology)  Kecia Loco, RN as Nurse Coordinator (Oncology)  Tena Blanca, PEDRITO as Specialty Care Coordinator (Urology)

## 2019-09-12 NOTE — PHARMACY - PREOPERATIVE ASSESSMENT CENTER
Anticoagulation Note - Preoperative Assessment Center (PAC) Pharmacist     Patient seen and interviewed during time of PAC Clinic appointment September 12, 2019.  The purpose of this note is to document the perioperative anticoagulation plan outlined by the providers caring for Girish Chauhan.     Current Regimen  Anticoagulation Regimen as of September 12, 2019: apixaban (Eliquis) 5 mg PO BID   Indication: atrial fibrillation  Prescriber:  Dr. Prado.  Follows with cardiology at [facility] Dr. Rose)  Current medications that may interact with this include: plavix    Perioperative plan  Girish Chauhan is scheduled for Cystoscopy R retrograde pyelogram, R ureteral stene placement on 9/16/19 with Dr. Walker.  Discussed case with urology (phone conversation with PEDRITO Madsen) and per Dr. Walker she recommends patient stay on both his plavix and Eliquis uninterrupted for upcoming procedure.      Jeff Ward, [facility]  September 12, 2019  12:32 PM

## 2019-09-12 NOTE — ANESTHESIA PREPROCEDURE EVALUATION
Anesthesia Pre-Procedure Evaluation    Patient: Girish Chauhan   MRN:     [id number] Gender:   male   Age:    64 year old :      1955        Preoperative Diagnosis: Right Hydronephrosis, Recurrent Cholangiocarcinoma   Procedure(s):  Cystoscopy, Right Retrograde Pyelogram, Right Ureteral Stent Placement     Past Medical History:   Diagnosis Date     Aortic stenosis due to bicuspid aortic valve      Atrial fibrillation (H)     hx of paroxysmal atrial fibrillation since approximately . S/p cardioversion in  with recurrent atrial fibrillation s/p repeat cardioversion 14.     Basal cell carcinoma 2016    right shoulder and right posterior calf s/p electrodessication and curretage 2016.     CAD (coronary artery disease) 2011    s/p angiogram 2011 s/p percutaneous intervention on the LAD with multiple drug-eluting stents complicated by a small perforation in the diagonal branch which sealed spontaneously.  Patient with significant Circumflex stenosis which is being treated conservatively.     Cholangiocarcinoma (H)      CKD (chronic kidney disease)      Gout     affects left foot 2-3 times per year     Hyperlipidemia      Hypertension      Liver disease      Melanoma (H) 2015    back s/p resection 2015     Squamous cell carcinoma     nose s/p excision      Past Surgical History:   Procedure Laterality Date     ------------OTHER-------------      multiple skin cancer resections     CARDIOVERSION  , 2014     ENDOSCOPIC RETROGRADE CHOLANGIOPANCREATOGRAM N/A 2016    Procedure: COMBINED ENDOSCOPIC RETROGRADE CHOLANGIOPANCREATOGRAPHY, PLACE TUBE/STENT;  Surgeon: Rafa Andrade MD;  Location: [facility]     ENDOSCOPIC RETROGRADE CHOLANGIOPANCREATOGRAPHY  2014     ENDOSCOPIC ULTRASOUND UPPER GASTROINTESTINAL TRACT (GI) N/A 2019    Procedure: ENDOSCOPIC ULTRASOUND, with hot axios stent placement;  Surgeon: Gabino Rodriguez MD;  Location: [facility]     ENTEROSCOPY SMALL  BOWEL N/A 6/7/2019    Procedure: ENTEROSCOPY;  Surgeon: Gabino Rodriguez MD;  Location: [facility]     ESOPHAGOSCOPY, GASTROSCOPY, DUODENOSCOPY (EGD), DILATATION, COMBINED N/A 7/29/2019    Procedure: Esophagoscopy, gastroscopy, duodenoscopy (EGD), with stent exchange and dilation;  Surgeon: Gabino Rodriguez MD;  Location: [facility]     EXPLORE COMMON BILE DUCT N/A 3/8/2016    Procedure: EXPLORE COMMON BILE DUCT;  Surgeon: Jose Eduardo Pinedo MD;  Location: [facility]     HEPATECTOMY PARTIAL Left 3/8/2016    Procedure: HEPATECTOMY PARTIAL;  Surgeon: Jose Eduardo Pinedo MD;  Location: [facility]     INSERT PORT VASCULAR ACCESS Right 12/8/2017    Procedure: INSERT PORT VASCULAR ACCESS;  Place single lumen venous chest port - right;  Surgeon: Drew Powell PA-C;  Location: [facility]     SOFT TISSUE SURGERY       STENT  12/2011    LAD with multiple SAM          Anesthesia Evaluation     . Pt has had prior anesthetic. Type: General and MAC    No history of anesthetic complications          ROS/MED HX    ENT/Pulmonary:     (+)MARVIN risk factors hypertension, , . .   (-) tobacco use   Neurologic:  - neg neurologic ROS     Cardiovascular: Comment: Cardiomyopathy, ascending aorta dilation 3.9cm    (+) Dyslipidemia, hypertension--CAD, -past MI,-stent,2011 x4, 6/17/2019 x 1  5 Drug Eluting Stent .. : . CHF Last EF: 45-50% date: 7/2019 . . :. dysrhythmias a-fib, valvular problems/murmurs type: AS . Previous cardiac testing Echodate:7/28/2019results:Interpretation Summary  Left ventricular wall thickness is normal. Mild left ventricular dilation is  present. The Ejection Fraction is estimated at 45-50%. No regional wall motion  abnormalities are seen.  Right ventricular function, chamber size, wall motion, and thickness are  normal.  Mild mitral annular calcification is present. Trace mitral insufficiency is  present. Moderate AS is present. The Vmax is calculated at 3.18m/s. mean  pressure gradient is 26mmHg. Calculated valve area  is 1.5cm2. Mild to moderate  tricuspid insufficiency is present.  The inferior vena cava was normal in size with preserved respiratory  variability. Sinuses of Valsalva 4.5 cm. Ascending aorta 3.9 cm. No  pericardial effusion is present.     Previous study not available for comparison.date: results:ECG reviewed date:6/17/2019 results:Atrial fibrillation  Nonspecific T wave abnormality , probably digitalis effect  Abnormal ECG  When compared with ECG of 17-JUN-2019 07:21,  No significant change was found  Cath date: 6/17/2019 results:   Result Narrative        65 yo M with known CAD, remote PCI of LAD in 2011, presented in May 2019   to [facility] with a NSTEMI, with coronary angiography there showing patent   LAD stents with a severe stenosis in OM1 that could not be successfully   crossed with a wire.     (Known metastatic cholangiocarcinoma s/p initial resection 2016, more   recent chemotherapy; and s/p Axios stenting of carcinomatous obstruction   of biliary limb of prior Carol-en-Y jejunostomy June 2019 when he presented   with an SBO)    Returns today for repeat coronary angiography with another attempt at the   OM1 stenosis    LM minimal dz  LAD patent prox to distal stents  LCx 99% OM1 stenosis with disease extension for 10-15mm on either side of   stenosis; mild dz elsewhere in Cx  RCA mild to moderate diffuse dz, 50-60% focal distal RCA lesion    Successful PCI of OM1 with PTCA followed by 2.5x24 Synergy SAM  0% residual, NITZA 3 flow post    OM1 lesion essentially behaving as a ; requiring wire escalation,   progressive PTCA and Guidezilla support for PCI              METS/Exercise Tolerance:  >4 METS   Hematologic:     (+) Anemia, History of Transfusion no previous transfusion reaction -      Musculoskeletal:   (+) arthritis,  other musculoskeletal- gout      GI/Hepatic:     (+) GERD Other, Other GI/Hepatic cholangiocarcinoma, s/p partial hepatatectomy, bowel stent in place      Renal/Genitourinary:     (+)  chronic renal disease, type: CRI, Pt does not require dialysis, Pt has no history of transplant, Other Renal/ Genitourinary, right hydronephrosis      Endo:  - neg endo ROS       Psychiatric:  - neg psychiatric ROS       Infectious Disease:  - neg infectious disease ROS       Malignancy:   (+) Malignancy History of Other and Skin  Skin CA Remission status post Surgery, Other CA cholangiocarcinoma Active status post Chemo and Surgery         Other:    (+) no H/O Chronic Pain,                       PHYSICAL EXAM:   Mental Status/Neuro: A/A/O   Airway: Facies: Feasible  Mallampati: I  Mouth/Opening: Full  TM distance: > 6 cm  Neck ROM: Full   Respiratory: Auscultation: CTAB     Resp. Rate: Normal     Resp. Effort: Normal      CV: Rhythm: Regular  Heart: Murmur (Systolic)  Edema: RUE; LUE  Pulses: Weak   Comments: 1 left upper broken tooth - awaiting crown 9/17/2019     Dental: Details                LABS:  CBC:   Lab Results   Component Value Date    WBC 8.4 08/23/2019    WBC 5.4 08/08/2019    HGB 8.7 (L) 08/23/2019    HGB 10.0 (L) 08/08/2019    HCT 27.5 (L) 08/23/2019    HCT 32.1 (L) 08/08/2019     08/23/2019     (L) 08/08/2019     BMP:   Lab Results   Component Value Date     08/23/2019     08/08/2019    POTASSIUM 4.3 08/23/2019    POTASSIUM 4.0 08/08/2019    CHLORIDE 108 08/23/2019    CHLORIDE 107 08/08/2019    CO2 26 08/23/2019    CO2 25 08/08/2019    BUN 24 08/23/2019    BUN 16 08/08/2019    CR 1.34 (H) 08/23/2019    CR 1.45 (H) 08/08/2019    GLC 93 08/23/2019     (H) 08/08/2019     COAGS:   Lab Results   Component Value Date    PTT 35 07/30/2019    INR 1.35 (H) 07/30/2019    FIBR 404 07/30/2019     POC:   Lab Results   Component Value Date    BGM 89 07/17/2019     OTHER:   Lab Results   Component Value Date    PH 7.38 03/08/2016    LACT 1.2 07/28/2019    GARY 8.4 (L) 08/23/2019    PHOS 3.6 07/30/2019    MAG 2.2 07/30/2019    ALBUMIN 3.2 (L) 08/23/2019    PROTTOTAL 6.6 (L)  "08/23/2019    ALT 23 08/23/2019    AST 18 08/23/2019    ALKPHOS 143 08/23/2019    BILITOTAL 0.5 08/23/2019    LIPASE 131 07/29/2019    AMYLASE 71 07/29/2019    TSH 3.88 10/15/2018        Preop Vitals    BP Readings from Last 3 Encounters:   09/12/19 126/86   09/12/19 126/86   08/26/19 131/84    Pulse Readings from Last 3 Encounters:   09/12/19 91   09/12/19 71   08/26/19 80      Resp Readings from Last 3 Encounters:   09/12/19 19   08/26/19 14   08/08/19 16    SpO2 Readings from Last 3 Encounters:   09/12/19 100%   08/26/19 100%   08/08/19 100%      Temp Readings from Last 1 Encounters:   09/12/19 97.9  F (36.6  C) (Oral)    Ht Readings from Last 1 Encounters:   09/12/19 1.778 m (5' 10\")      Wt Readings from Last 1 Encounters:   09/12/19 77.6 kg (171 lb)    Estimated body mass index is 24.54 kg/m  as calculated from the following:    Height as of this encounter: 1.778 m (5' 10\").    Weight as of this encounter: 77.6 kg (171 lb).     LDA:  Port A Cath Single 12/08/17 Right Chest wall (Active)   Access Date 08/23/19 8/23/2019  7:00 AM   Access Attempts 1 8/23/2019  7:00 AM   Gauge/Length Power noncoring 90 degree bend;20 gauge;3/4 inch 8/23/2019  7:00 AM   Site Assessment WDL 8/23/2019  7:00 AM   Line Status Heparin locked 8/23/2019  7:58 AM   Extravasation? No 8/23/2019  7:00 AM   Dressing Intervention Transparent 8/23/2019  7:00 AM   De-Access Date 08/23/19 8/23/2019  7:58 AM   Number of days: 643       Intrathecal/Epidural Catheter 03/08/16 (Active)   Number of days: 1283       Closed/Suction Drain 1 Left Abdomen (Active)   Number of days: 1283       Closed/Suction Drain 2 Left Abdomen (Active)   Number of days: 1283       Biliary Drain (Active)   Number of days: 1283       Biliary Stent 8 Nepalese (Active)   Number of days: 1283        JZG FV AN PLAN NO PONV RULE       PAC Discussion and Assessment    ASA Classification: 3  Case is suitable for: [place]  Anesthetic techniques and relevant risks " discussed: MAC with GA as backup and GA  Invasive monitoring and risk discussed:   Types:   Possibility and Risk of blood transfusion discussed:   NPO instructions given:   Additional anesthetic preparation and risks discussed:   Needs early admission to pre-op area:   Other:     PAC Resident/NP Anesthesia Assessment:  Girish Chauhan is a 64 year old male scheduled for a Cystoscopy, Right Retrograde Pyelogram, Right Ureteral Stent Placement on 9/16/2019 by Dr. Walker in treatment of right hydronephrosis in the setting of cholangiocarcinoma.  PAC referral for risk assessment and optimization for anesthesia with comorbid conditions of: heart failure, CAD, cardiomyopathy, old MI, ascending aorta dilation, bicuspid aortic valve, aortic stenosis, atrial fibrillation, anemia, gout, GERD, CKD, history of melanoma and history of SCC.     Pre-operative considerations:  1.  Cardiac:  Functional status- METS >4.  He walks for exercise and recently walked 35 minutes around [place].  He denies exertional dyspnea.  He is following with Dr. Mendez in outside cardiology for all of the above cardiac conditions.  He had an MI in May 2019.  An OM SAM placement was attempted then, but unsuccessful.  A repeat attempt was successful on 6/17/2019.  His last echocardiogram on 7/28/2019 showe an dEF of 45-50%, no wall motion abnormalities, moderate aortic stenosis with a mean gradient of 26mmHg and an ascending aorta dilation of 3.9cm.  He saw Dr. Rose last on 7/2/2019.  Dr. Rose noted the likelihood of multiple upcoming procedures due to his cholangiocarcinoma and noted he was okay to proceed if able to stay on plavix and could hold eliquis up to 3 days if needed.  Per Dr. Walker, patient can remain on both for this upcoming procedure.  ChadsVasc = 3.   Low risk surgery with 6.6% risk of major adverse cardiac event.   2.  Pulm:  Airway feasible.  MARVIN risk: intermediate.    3.  GI:  Risk of PONV score = 2.  If > 2, anti-emetic  intervention recommended.  GERD is managed with prn Tums and diet.  4. Renal:  +CKD - most recent creatinine was stable at 1.34.  5. Heme: Plavix and eliquis as above.  VTE risk = 3%.  He has chronic anemia.  His last hgb was 8.7.  He has known blood antibodies.  Patient to return this weekend, two days prior to surgery to complete T&S and recheck hgb.  Please check lab results for updated hgb.    6. Access:  Right chest portacath.    Patient is optimized and is acceptable candidate for the proposed procedure.  No further diagnostic evaluation is needed.     Patient discussed with Dr. Gilmore.      **For further details of assessment, testing, and physical exam please see H and P completed on same date.          Adrianne Sorensen DNP, RN, APRN      Reviewed and Signed by PAC Mid-Level Provider/Resident  Mid-Level Provider/Resident: Adrianne Sorensen DNP, RN, APRN  Date: 9/12/2019  Time: 1518    Attending Anesthesiologist Anesthesia Assessment:        Anesthesiologist:   Date:   Time:   Pass/Fail:   Disposition:     PAC Pharmacist Assessment:        Pharmacist:   Date:   Time:    YO Mahajan CNP

## 2019-09-12 NOTE — TELEPHONE ENCOUNTER
Per PAC clinic, changed locations for patients surgery with Dr. Walker from [facility] to [place] on 9/16. Spoke with patient to inform

## 2019-09-12 NOTE — TELEPHONE ENCOUNTER
FUTURE VISIT INFORMATION      SURGERY INFORMATION:    Date: 19    Location: [facility] OR    Surgeon:  Sivan Walker    Anesthesia Type:  Other    RECORDS REQUESTED FROM:       Primary Care Provider: Dario Ray- [facility]    Pertinent Medical History: Chronic systolic congestive heart failure, CKD, CAD    Most recent EKG+ Tracin19    Most recent ECHO:  19    Most recent Cardiac Stress Test: 10/23/2006    Most recent Coronary Angiogram: 19

## 2019-09-12 NOTE — H&P
Pre-Operative H & P     CC:  Preoperative exam to assess for increased cardiopulmonary risk while undergoing surgery and anesthesia.    Date of Encounter: 9/12/2019  Primary Care Physician:  Dario Jain  Girish Chauhan is a 64 year old male who presents for pre-operative H & P in preparation for a Cystoscopy, Right Retrograde Pyelogram, Right Ureteral Stent Placement on 9/16/2019 by Dr. Walker at [facility].     Girish Chauhan is a 64 year old male with heart failure, CAD, cardiomyopathy, old MI, ascending aorta dilation, bicuspid aortic valve, aortic stenosis, atrial fibrillation, anemia, gout, GERD, CKD, history of melanoma and history of SCC that has right hydronephrosis in the setting of cholangiocarcinoma.  He is s/p chemotherapy and multiple surgeries for management of the cholangiocarcinoma.  His last chemo was in May 2019.  His most recent CT scan ordered by oncology showed right sided hydronephrosis likely r/t compression from a right pelvic mass thought to likely be metastasis.  He was referred to Dr. Walker by oncology for surgical consultation regarding the hydronephrosis.  The above listed surgery has been recommended.      History is obtained from the patient and the medical record.     Past Medical History  Past Medical History:   Diagnosis Date     Aortic stenosis due to bicuspid aortic valve      Atrial fibrillation (H) 2006    hx of paroxysmal atrial fibrillation since approximately 2006. S/p cardioversion in 2013 with recurrent atrial fibrillation s/p repeat cardioversion 9/29/14.     Basal cell carcinoma 1/2016    right shoulder and right posterior calf s/p electrodessication and curretage 1/2016.     CAD (coronary artery disease) 12/2011    s/p angiogram 12/2011 s/p percutaneous intervention on the LAD with multiple drug-eluting stents complicated by a small perforation in the diagonal branch which sealed spontaneously.  Patient with  significant Circumflex stenosis which is being treated conservatively.     Cholangiocarcinoma (H)      CKD (chronic kidney disease)      Gout     affects left foot 2-3 times per year     Hyperlipidemia      Hypertension      Liver disease      Melanoma (H) 9/2015    back s/p resection 9/2015     Squamous cell carcinoma     nose s/p excision       Past Surgical History  Past Surgical History:   Procedure Laterality Date     ------------OTHER-------------      multiple skin cancer resections     CARDIOVERSION  2013, 9/2014     ENDOSCOPIC RETROGRADE CHOLANGIOPANCREATOGRAM N/A 2/26/2016    Procedure: COMBINED ENDOSCOPIC RETROGRADE CHOLANGIOPANCREATOGRAPHY, PLACE TUBE/STENT;  Surgeon: Rafa Andrade MD;  Location: [facility] OR     ENDOSCOPIC RETROGRADE CHOLANGIOPANCREATOGRAPHY  2/2014     ENDOSCOPIC ULTRASOUND UPPER GASTROINTESTINAL TRACT (GI) N/A 6/7/2019    Procedure: ENDOSCOPIC ULTRASOUND, with hot axios stent placement;  Surgeon: Gabino Rodriguez MD;  Location: [facility] OR     ENTEROSCOPY SMALL BOWEL N/A 6/7/2019    Procedure: ENTEROSCOPY;  Surgeon: Gabino Rodriguez MD;  Location: [facility] OR     ESOPHAGOSCOPY, GASTROSCOPY, DUODENOSCOPY (EGD), DILATATION, COMBINED N/A 7/29/2019    Procedure: Esophagoscopy, gastroscopy, duodenoscopy (EGD), with stent exchange and dilation;  Surgeon: Gabino Rodriguez MD;  Location: [facility] OR     EXPLORE COMMON BILE DUCT N/A 3/8/2016    Procedure: EXPLORE COMMON BILE DUCT;  Surgeon: Jose Eduardo Pinedo MD;  Location: [facility] OR     HEPATECTOMY PARTIAL Left 3/8/2016    Procedure: HEPATECTOMY PARTIAL;  Surgeon: Jose Eduardo Pinedo MD;  Location: [facility] OR     INSERT PORT VASCULAR ACCESS Right 12/8/2017    Procedure: INSERT PORT VASCULAR ACCESS;  Place single lumen venous chest port - right;  Surgeon: Drew Powell PA-C;  [facility]: [facility] OR     SOFT TISSUE SURGERY       STENT  12/2011    LAD with multiple SAM       Hx of Blood transfusions/reactions: yes, no reactions     Hx of abnormal  bleeding or anti-platelet use: plavix      Steroid use in the last year: none    Personal or FH with difficulty with Anesthesia:  none    Prior to Admission Medications  Current Outpatient Medications   Medication Sig Dispense Refill     acetaminophen (TYLENOL) 500 MG tablet Take 500 mg by mouth every 6 hours as needed for fever or pain       allopurinol (ZYLOPRIM) 100 MG tablet Take 100 mg by mouth every evening        apixaban ANTICOAGULANT (ELIQUIS) 5 MG tablet Take 1 tablet (5 mg) by mouth 2 times daily       atorvastatin (LIPITOR) 40 MG tablet Take 40 mg by mouth every evening        calcium carbonate (TUMS) 500 MG chewable tablet Take 1 chew tab by mouth 4 times daily as needed for heartburn       clopidogrel (PLAVIX) 75 MG tablet Take 75 mg by mouth every morning        colchicine (COLCYRS) 0.6 MG tablet Take 0.6 mg by mouth as needed       furosemide (LASIX) 20 MG tablet Take 1 tablet (20 mg) by mouth daily       losartan (COZAAR) 25 MG tablet Take 25 mg by mouth daily        metoprolol tartrate (LOPRESSOR) 50 MG tablet Take 150 mg by mouth 2 times daily        multivitamin w/minerals (THERA-VIT-M) tablet Take 1 tablet by mouth daily       Nitroglycerin (NITROSTAT SL) Place 0.4 mg under the tongue every 5 minutes as needed for chest pain (Carries medication - has never used)          Allergies  Allergies   Allergen Reactions     Blood Transfusion Related (Informational Only) Other (See Comments)     Patient has a history of a clinically significant antibody against RBC antigens.  A delay in compatible RBCs may occur.       Social History  Social History     Socioeconomic History     Marital status:      Spouse name: Not on file     Number of children: 1     Years of education: Not on file     Highest education level: Not on file   Occupational History     Occupation: retired   Social Needs     Financial resource strain: Not on file     Food insecurity:     Worry: Not on file     Inability: Not on file      Transportation needs:     Medical: Not on file     Non-medical: Not on file   Tobacco Use     Smoking status: Never Smoker     Smokeless tobacco: Never Used   Substance and Sexual Activity     Alcohol use: Yes     Alcohol/week: 1.2 oz     Types: 2 Cans of beer per week     Drug use: No     Sexual activity: Not on file   Lifestyle     Physical activity:     Days per week: Not on file     Minutes per session: Not on file     Stress: Not on file   Relationships     Social connections:     Talks on phone: Not on file     Gets together: Not on file     Attends Shinto service: Not on file     Active member of club or organization: Not on file     Attends meetings of clubs or organizations: Not on file     Relationship status: Not on file     Intimate partner violence:     Fear of current or ex partner: Not on file     Emotionally abused: Not on file     Physically abused: Not on file     Forced sexual activity: Not on file   Other Topics Concern     Not on file   Social History Narrative    Works for [employer] with [employer].  Lives in [place].   with one grown daughter.  No tobacco, rare Etoh, no drug use.       Family History  Family History   Problem Relation Age of Onset     Skin Cancer Mother      Other - See Comments Father         father passed away in his 60s post-op with an unknown bile duct obstruction.  no anesthesia complication.       Osteoarthritis Sister      Cerebrovascular Disease Brother      Other - See Comments Brother         prediabetes     Osteoarthritis Sister      No Known Problems Brother                  ROS/MED HX  The complete review of systems is negative other than noted in the HPI or here.   ENT/Pulmonary:     (+)MARVIN risk factors hypertension, , . .   (-) tobacco use   Neurologic:  - neg neurologic ROS     Cardiovascular: Comment: Cardiomyopathy, ascending aorta dilation 3.9cm    (+) Dyslipidemia, hypertension--CAD, -past MI,-stent,2011 x4, 6/17/2019 x 1  5 Drug  "Eluting Stent .. : . CHF Last EF: 45-50% date: 7/2019 . . :. dysrhythmias a-fib, valvular problems/murmurs type: AS .                   METS/Exercise Tolerance:  >4 METS   Hematologic:     (+) Anemia, History of Transfusion no previous transfusion reaction -      Musculoskeletal:   (+) arthritis,  other musculoskeletal- gout      GI/Hepatic:     (+) GERD Other, Other GI/Hepatic cholangiocarcinoma, s/p partial hepatatectomy, bowel stent in place      Renal/Genitourinary:     (+) chronic renal disease, type: CRI, Pt does not require dialysis, Pt has no history of transplant, Other Renal/ Genitourinary, right hydronephrosis      Endo:  - neg endo ROS       Psychiatric:  - neg psychiatric ROS       Infectious Disease:  - neg infectious disease ROS       Malignancy:   (+) Malignancy History of Other and Skin  Skin CA Remission status post Surgery, Other CA cholangiocarcinoma Active status post Chemo and Surgery         Other:    (+) no H/O Chronic Pain,                       PHYSICAL EXAM:   Mental Status/Neuro: A/A/O   Airway: Facies: Feasible  Mallampati: I  Mouth/Opening: Full  TM distance: > 6 cm  Neck ROM: Full   Respiratory: Auscultation: CTAB     Resp. Rate: Normal     Resp. Effort: Normal      CV: Rhythm: Regular  Heart: Murmur (Systolic)  Edema: RUE; LUE  Pulses: Weak   Comments: 1 left upper broken tooth - awaiting crown 9/17/2019     Dental: Details                Temp: 97.9  F (36.6  C) Temp src: Oral BP: 126/86 Pulse: 91   Resp: 19 SpO2: 100 %         171 lbs 0 oz  5' 10\"   Body mass index is 24.54 kg/m .       Physical Exam  Constitutional: Awake, alert, cooperative, no apparent distress, and appears stated age.  Eyes: Pupils equal, round and reactive to light, extra ocular muscles intact, sclera clear, conjunctiva normal.  HENT: Normocephalic, oral pharynx with moist mucus membranes, good dentition. No goiter appreciated.   Respiratory: Clear to auscultation bilaterally, no crackles or " wheezing.  Cardiovascular: Regular rate and rhythm, normal S1 and S2, and 3-4/6 systolic murmur noted.  Carotids +2, no bruits. Trace BLE edema. Palpable pulses to radial.  Diminished  DP and physical therapy pulses.   GI: Normal bowel sounds, soft, non-distended, non-tender, no masses palpated, no hepatosplenomegaly.    Lymph/Hematologic: No cervical lymphadenopathy and no supraclavicular lymphadenopathy.  Genitourinary:  deferred  Skin: Warm and dry.  No rashes at anticipated surgical site.   Musculoskeletal: Full ROM of neck. There is no redness, warmth, or swelling of the exposed joints. Gross motor strength is normal.    Neurologic: Awake, alert, oriented to name, place and time. Cranial nerves II-XII are grossly intact. Gait is normal.   Neuropsychiatric: Calm, cooperative. Normal affect.     Labs: (personally reviewed)   Component      Latest Ref Rng & Units 8/23/2019   WBC      4.0 - 11.0 10e9/L 8.4   RBC Count      4.4 - 5.9 10e12/L 2.94 (L)   Hemoglobin      13.3 - 17.7 g/dL 8.7 (L)   Hematocrit      40.0 - 53.0 % 27.5 (L)   MCV      78 - 100 fl 94   MCH      26.5 - 33.0 pg 29.6   MCHC      31.5 - 36.5 g/dL 31.6   RDW      10.0 - 15.0 % 14.7   Platelet Count      150 - 450 10e9/L 232   Diff Method       Automated Method   % Neutrophils      % 59.3   % Lymphocytes      % 20.6   % Monocytes      % 16.1   % Eosinophils      % 3.4   % Basophils      % 0.2   % Immature Granulocytes      % 0.4   Nucleated RBCs      0 /100 0   Absolute Neutrophil      1.6 - 8.3 10e9/L 5.0   Absolute Lymphocytes      0.8 - 5.3 10e9/L 1.7   Absolute Monocytes      0.0 - 1.3 10e9/L 1.4 (H)   Absolute Eosinophils      0.0 - 0.7 10e9/L 0.3   Absolute Basophils      0.0 - 0.2 10e9/L 0.0   Abs Immature Granulocytes      0 - 0.4 10e9/L 0.0   Absolute Nucleated RBC       0.0   Sodium      133 - 144 mmol/L 137   Potassium      3.4 - 5.3 mmol/L 4.3   Chloride      94 - 109 mmol/L 108   Carbon Dioxide      20 - 32 mmol/L 26   Anion Gap       3 - 14 mmol/L 4   Glucose      70 - 99 mg/dL 93   Urea Nitrogen      7 - 30 mg/dL 24   Creatinine      0.66 - 1.25 mg/dL 1.34 (H)   GFR Estimate      >60 mL/min/1.73:m2 56 (L)   GFR Estimate If Black      >60 mL/min/1.73:m2 65   Calcium      8.5 - 10.1 mg/dL 8.4 (L)   Bilirubin Total      0.2 - 1.3 mg/dL 0.5   Albumin      3.4 - 5.0 g/dL 3.2 (L)   Protein Total      6.8 - 8.8 g/dL 6.6 (L)   Alkaline Phosphatase      40 - 150 U/L 143   ALT      0 - 70 U/L 23   AST      0 - 45 U/L 18           Echocardiogram  7/28/2019  Interpretation Summary  Left ventricular wall thickness is normal. Mild left ventricular dilation is  present. The Ejection Fraction is estimated at 45-50%. No regional wall motion  abnormalities are seen.  Right ventricular function, chamber size, wall motion, and thickness are  normal.  Mild mitral annular calcification is present. Trace mitral insufficiency is  present. Moderate AS is present. The Vmax is calculated at 3.18m/s. mean  pressure gradient is 26mmHg. Calculated valve area is 1.5cm2. Mild to moderate  tricuspid insufficiency is present.  The inferior vena cava was normal in size with preserved respiratory  variability. Sinuses of Valsalva 4.5 cm. Ascending aorta 3.9 cm. No  pericardial effusion is present.     Previous study not available for comparison.    EKG  6/17/2019    Atrial fibrillation  Nonspecific T wave abnormality , probably digitalis effect  Abnormal ECG  When compared with ECG of 17-JUN-2019 07:21,  No significant change was found    Cardiac Cath  6/17/2019  Result Narrative     63 yo M with known CAD, remote PCI of LAD in 2011, presented in May 2019   to [facility] with a NSTEMI, with coronary angiography there showing patent   LAD stents with a severe stenosis in OM1 that could not be successfully   crossed with a wire.     (Known metastatic cholangiocarcinoma s/p initial resection 2016, more   recent chemotherapy; and s/p Axios stenting of carcinomatous obstruction   of  biliary limb of prior Carol-en-Y jejunostomy June 2019 when he presented   with an SBO)    Returns today for repeat coronary angiography with another attempt at the   OM1 stenosis    LM minimal dz  LAD patent prox to distal stents  LCx 99% OM1 stenosis with disease extension for 10-15mm on either side of   stenosis; mild dz elsewhere in Cx  RCA mild to moderate diffuse dz, 50-60% focal distal RCA lesion    Successful PCI of OM1 with PTCA followed by 2.5x24 Synergy SAM  0% residual, NITZA 3 flow post    OM1 lesion essentially behaving as a ; requiring wire escalation,   progressive PTCA and Guidezilla support for PCI         ASSESSMENT and PLAN  Girish Chauhan is a 64 year old male scheduled for a Cystoscopy, Right Retrograde Pyelogram, Right Ureteral Stent Placement on 9/16/2019 by Dr. Walker in treatment of right hydronephrosis in the setting of cholangiocarcinoma.  PAC referral for risk assessment and optimization for anesthesia with comorbid conditions of: heart failure, CAD, cardiomyopathy, old MI, ascending aorta dilation, bicuspid aortic valve, aortic stenosis, atrial fibrillation, anemia, gout, GERD, CKD, history of melanoma and history of SCC.     Pre-operative considerations:  1.  Cardiac:  Functional status- METS >4.  He walks for exercise and recently walked 35 minutes around [place].  He denies exertional dyspnea.  He is following with Dr. Mendez in outside cardiology for all of the above cardiac conditions.  He had an MI in May 2019.  An OM SAM placement was attempted then, but unsuccessful.  A repeat attempt was successful on 6/17/2019.  His last echocardiogram on 7/28/2019 showe an dEF of 45-50%, no wall motion abnormalities, moderate aortic stenosis with a mean gradient of 26mmHg and an ascending aorta dilation of 3.9cm.  He saw Dr. Rose last on 7/2/2019.  Dr. Rose noted the likelihood of multiple upcoming procedures due to his cholangiocarcinoma and noted he was okay to proceed if able to  stay on plavix and could hold eliquis up to 3 days if needed.  Per Dr. Walker, patient can remain on both for this upcoming procedure.  ChadsVasc = 3.   Low risk surgery with 6.6% risk of major adverse cardiac event.   2.  Pulm:  Airway feasible.  MARVIN risk: intermediate.    3.  GI:  Risk of PONV score = 2.  If > 2, anti-emetic intervention recommended.  GERD is managed with prn Tums and diet.  4. Renal:  +CKD - most recent creatinine was stable at 1.34.  5. Heme: Plavix and eliquis as above.  VTE risk = 3%.  He has chronic anemia.  His last hgb was 8.7.  He has known blood antibodies.  Patient to return this weekend, two days prior to surgery to complete T&S and recheck hgb.  Please check lab results for updated hgb.    6. Access:  Right chest portacath.    Patient is optimized and is acceptable candidate for the proposed procedure.  No further diagnostic evaluation is needed.     Patient discussed with Dr. Gilmore.          Adrianne Sorensen DNP, RN, APRN  Preoperative Assessment Center  [facility]  Clinic and Surgery Center  Phone: [phone]  Fax: [phone]

## 2019-09-12 NOTE — PATIENT INSTRUCTIONS
We are looking at a cystoscopy and a right ureteral stent placement on Monday    It was a pleasure meeting with you today.  Thank you for allowing me and my team the privilege of caring for you today.  YOU are the reason we are here, and I truly hope we provided you with the excellent service you deserve.  Please let us know if there is anything else we can do for you so that we can be sure you are leaving completely satisfied with your care experience.

## 2019-09-12 NOTE — PATIENT INSTRUCTIONS
Preparing for Your Surgery      Name:  Girish Chauhan   MRN:  [id number]   :  1955   Today's Date:  2019     Arriving for surgery:  Surgery date:  19  Arrival time:  10AM  Please come to:     [facility] [address]  [address] [place]  [address]    - parking is available in front of [facility] from 5:15AM to 8:00PM. If you prefer, park your car in the Green Lot.    -Proceed to the 3rd floor, check in at the Adult Surgery Waiting Lounge. [phone]    If an escort is needed stop at the Information Desk in the lobby. Inform the information person that you are here for surgery. An escort to the Adult Surgery Waiting Lounge will be provided.        What can I eat or drink?  -  You may have solid food or milk products until 8 hours prior to your surgery. 19, 4AM  -  You may have water, apple juice or 7up/Sprite until 2 hours prior to your surgery.19, 10AM     Which medicines can I take?  Stop Aspirin, vitamins and supplements one week prior to surgery.  Hold Ibuprofen for 24 hours and/or Naproxen for 48 hours prior to surgery.   -  Do NOT take these medications in the morning, the day of surgery:    Calcium        Losartan(Cozaar)    -  Please take these medications the day of surgery:    Metoprolol(Lopressor)   Apixaban(Eliquis)    Clopidogrel(Plavix)   Furosemide(Lasix)    How do I prepare myself?  -  Take two showers: one the night before surgery; and one the morning of surgery.         Use Scrubcare or Hibiclens to wash from neck down, leave soap on your skin for up to one minute.  Do not get soap in your eyes or ears.  You may use your own shampoo and conditioner; no other hair products.   -  Do NOT use lotion, powder, deodorant, or antiperspirant the day of your surgery.  -  Do NOT wear jewelry.  - Do not bring your own medications to the hospital, except for inhalers and eye   drops.  -  Bring your ID and insurance  card.    -If you are scheduled to go home the Same Day as surgery you must have a responsible adult as a  and to stay with you overnight the first 24 hours after surgery.     Questions or Concerns:  -If you are scheduled on the [facility] or [facility] campus and have questions or concerns regarding the day of surgery, please call Preadmission Nursing at [phone].     -For questions after surgery please call your surgeons office.

## 2019-09-13 NOTE — NURSING NOTE
Pre Op Teaching Flowsheet       Pre and Post op Patient Education  Relevant Diagnosis:  right hydronephrosis, recurrent cholangiocarcinoma  Surgical procedure:  cystoscopy, right retrograde pyelogram, right ureteral stent placement  Teaching Topic:  Pre and post op teaching  Person Involved in teaching: Girish Chauhan    Motivation Level:  Asks Questions: Yes  Eager to Learn:  Yes  Cooperative: Yes  Receptive (willing/able to accept information):  Yes    Patient demonstrates understanding of the following:  Date of surgery:  9/16/19  Location of surgery:  [address] [place] [address]  History and Physical and any other testing necessary prior to surgery: Yes  Required time line for completion of History and Physical and any pre-op testing: Yes    Patient demonstrates understanding of the following:  Pre-op bowel prep:  N/A  Pre-op showering/scrub information with PCMX Soap: Yes  Blood thinner medications discussed and when to stop (if applicable):  Okay to continue with his plavix and apixaban      Infection Prevention:   Patient demonstrates understanding of the following:  Surgical procedure site care taught: Yes  Signs and symptoms of infection taught:  Yes      Post-op follow-up:  Discussed how to contact the hospital, nurse, and clinic scheduling staff if necessary.    Instructional materials used/given/mailed:  [facility] Booklet, post op teaching sheet, Map, Soap, and arrival/location information.    Surgical instructions packet given to patient in office:  Yes.  Consent signed

## 2019-09-14 DIAGNOSIS — Z01.818 PREOP EXAMINATION: ICD-10-CM

## 2019-09-14 LAB
ABO + RH BLD: NORMAL
ABO + RH BLD: NORMAL
BLD GP AB SCN SERPL QL: NORMAL
BLOOD BANK CMNT PATIENT-IMP: NORMAL
HGB BLD-MCNC: 10.5 G/DL (ref 13.3–17.7)
SPECIMEN EXP DATE BLD: NORMAL

## 2019-09-15 ENCOUNTER — ANESTHESIA EVENT (OUTPATIENT)
Dept: SURGERY | Facility: CLINIC | Age: 64
End: 2019-09-15
Payer: COMMERCIAL

## 2019-09-15 ASSESSMENT — ENCOUNTER SYMPTOMS: DYSRHYTHMIAS: 1

## 2019-09-15 ASSESSMENT — LIFESTYLE VARIABLES: TOBACCO_USE: 0

## 2019-09-15 NOTE — ANESTHESIA PREPROCEDURE EVALUATION
Anesthesia Pre-Procedure Evaluation    Patient: Girish Chauhan   MRN:     [id number] Gender:   male   Age:    64 year old :      1955        Preoperative Diagnosis: Right Hydronephrosis, Recurrent Cholangiocarcinoma   Procedure(s):  Cystoscopy, Right Retrograde Pyelogram, Right Ureteral Stent Placement     Past Medical History:   Diagnosis Date     Aortic stenosis due to bicuspid aortic valve      Atrial fibrillation (H)     hx of paroxysmal atrial fibrillation since approximately . S/p cardioversion in  with recurrent atrial fibrillation s/p repeat cardioversion 14.     Basal cell carcinoma 2016    right shoulder and right posterior calf s/p electrodessication and curretage 2016.     CAD (coronary artery disease) 2011    s/p angiogram 2011 s/p percutaneous intervention on the LAD with multiple drug-eluting stents complicated by a small perforation in the diagonal branch which sealed spontaneously.  Patient with significant Circumflex stenosis which is being treated conservatively.     Cholangiocarcinoma (H)      CKD (chronic kidney disease)      Gout     affects left foot 2-3 times per year     Hyperlipidemia      Hypertension      Liver disease      Melanoma (H) 2015    back s/p resection 2015     Squamous cell carcinoma     nose s/p excision      Past Surgical History:   Procedure Laterality Date     ------------OTHER-------------      multiple skin cancer resections     CARDIOVERSION  , 2014     ENDOSCOPIC RETROGRADE CHOLANGIOPANCREATOGRAM N/A 2016    Procedure: COMBINED ENDOSCOPIC RETROGRADE CHOLANGIOPANCREATOGRAPHY, PLACE TUBE/STENT;  Surgeon: Rafa Andrade MD;  Location: [facility]     ENDOSCOPIC RETROGRADE CHOLANGIOPANCREATOGRAPHY  2014     ENDOSCOPIC ULTRASOUND UPPER GASTROINTESTINAL TRACT (GI) N/A 2019    Procedure: ENDOSCOPIC ULTRASOUND, with hot axios stent placement;  Surgeon: Gabino Rodriguez MD;  Location: [facility]     ENTEROSCOPY SMALL  BOWEL N/A 6/7/2019    Procedure: ENTEROSCOPY;  Surgeon: Gabino Rodriguez MD;  Location: [facility]     ESOPHAGOSCOPY, GASTROSCOPY, DUODENOSCOPY (EGD), DILATATION, COMBINED N/A 7/29/2019    Procedure: Esophagoscopy, gastroscopy, duodenoscopy (EGD), with stent exchange and dilation;  Surgeon: Gabino Rodriguez MD;  Location: [facility]     EXPLORE COMMON BILE DUCT N/A 3/8/2016    Procedure: EXPLORE COMMON BILE DUCT;  Surgeon: Jose Eduardo Pinedo MD;  [facility]: [facility]     HEPATECTOMY PARTIAL Left 3/8/2016    Procedure: HEPATECTOMY PARTIAL;  Surgeon: Jose Eduardo Pinedo MD;  Location: [facility]     INSERT PORT VASCULAR ACCESS Right 12/8/2017    Procedure: INSERT PORT VASCULAR ACCESS;  Place single lumen venous chest port - right;  Surgeon: Drew Powell PA-C;  [facility]     SOFT TISSUE SURGERY       STENT  12/2011    LAD with multiple SAM          Anesthesia Evaluation     . Pt has had prior anesthetic. Type: General and MAC    No history of anesthetic complications          ROS/MED HX    ENT/Pulmonary:     (+)MARVIN risk factors hypertension, , . .   (-) tobacco use   Neurologic:  - neg neurologic ROS     Cardiovascular: Comment: Cardiomyopathy, ascending aorta dilation 3.9cm    (+) Dyslipidemia, hypertension--CAD, -past MI,-stent,2011 x4, 6/17/2019 x 1  5 Drug Eluting Stent .. Taking blood thinners Pt has received instructions: Instructions Given to patient: Continuing Plavix and Eliquis. . CHF Last EF: 45-50% date: 7/2019 . . :. dysrhythmias a-fib, valvular problems/murmurs type: AS . Previous cardiac testing Echodate:7/28/2019results:Interpretation Summary  Left ventricular wall thickness is normal. Mild left ventricular dilation is  present. The Ejection Fraction is estimated at 45-50%. No regional wall motion  abnormalities are seen.  Right ventricular function, chamber size, wall motion, and thickness are  normal.  Mild mitral annular calcification is present. Trace mitral insufficiency is  present.  Moderate AS is present. The Vmax is calculated at 3.18m/s. mean  pressure gradient is 26mmHg. Calculated valve area is 1.5cm2. Mild to moderate  tricuspid insufficiency is present.  The inferior vena cava was normal in size with preserved respiratory  variability. Sinuses of Valsalva 4.5 cm. Ascending aorta 3.9 cm. No  pericardial effusion is present.     Previous study not available for comparison.date: results:ECG reviewed date:6/17/2019 results:Atrial fibrillation  Nonspecific T wave abnormality , probably digitalis effect  Abnormal ECG  When compared with ECG of 17-JUN-2019 07:21,  No significant change was found  Cath date: 6/17/2019 results:   Result Narrative        65 yo M with known CAD, remote PCI of LAD in 2011, presented in May 2019   to [facility] with a NSTEMI, with coronary angiography there showing patent   LAD stents with a severe stenosis in OM1 that could not be successfully   crossed with a wire.     (Known metastatic cholangiocarcinoma s/p initial resection 2016, more   recent chemotherapy; and s/p Axios stenting of carcinomatous obstruction   of biliary limb of prior Carol-en-Y jejunostomy June 2019 when he presented   with an SBO)    Returns today for repeat coronary angiography with another attempt at the   OM1 stenosis    LM minimal dz  LAD patent prox to distal stents  LCx 99% OM1 stenosis with disease extension for 10-15mm on either side of   stenosis; mild dz elsewhere in Cx  RCA mild to moderate diffuse dz, 50-60% focal distal RCA lesion    Successful PCI of OM1 with PTCA followed by 2.5x24 Synergy ASM  0% residual, NITZA 3 flow post    OM1 lesion essentially behaving as a ; requiring wire escalation,   progressive PTCA and Guidezilla support for PCI              METS/Exercise Tolerance:  >4 METS   Hematologic:     (+) Anemia, History of Transfusion no previous transfusion reaction -      Musculoskeletal:   (+) arthritis,  other musculoskeletal- gout      GI/Hepatic:     (+) GERD Other,  Other GI/Hepatic cholangiocarcinoma, s/p partial hepatatectomy, bowel stent in place      Renal/Genitourinary:     (+) chronic renal disease, type: CRI, Pt does not require dialysis, Pt has no history of transplant, Other Renal/ Genitourinary, right hydronephrosis      Endo:  - neg endo ROS       Psychiatric:  - neg psychiatric ROS       Infectious Disease:  - neg infectious disease ROS       Malignancy:   (+) Malignancy History of Other and Skin  Skin CA Remission status post Surgery, Other CA cholangiocarcinoma Active status post Chemo and Surgery         Other:    (+) no H/O Chronic Pain,                       PHYSICAL EXAM:   Mental Status/Neuro: A/A/O   Airway: Facies: Feasible  Mallampati: I  Mouth/Opening: Full  TM distance: > 6 cm  Neck ROM: Full   Respiratory: Auscultation: CTAB     Resp. Rate: Normal     Resp. Effort: Normal      CV: Rhythm: Regular  Rate: Age appropriate  Heart: Normal Sounds  Edema: None   Comments:      Dental: Normal Dentition                LABS:  CBC:   Lab Results   Component Value Date    WBC 8.4 08/23/2019    WBC 5.4 08/08/2019    HGB 10.5 (L) 09/14/2019    HGB 8.7 (L) 08/23/2019    HCT 27.5 (L) 08/23/2019    HCT 32.1 (L) 08/08/2019     08/23/2019     (L) 08/08/2019     BMP:   Lab Results   Component Value Date     08/23/2019     08/08/2019    POTASSIUM 4.3 08/23/2019    POTASSIUM 4.0 08/08/2019    CHLORIDE 108 08/23/2019    CHLORIDE 107 08/08/2019    CO2 26 08/23/2019    CO2 25 08/08/2019    BUN 24 08/23/2019    BUN 16 08/08/2019    CR 1.34 (H) 08/23/2019    CR 1.45 (H) 08/08/2019    GLC 93 08/23/2019     (H) 08/08/2019     COAGS:   Lab Results   Component Value Date    PTT 35 07/30/2019    INR 1.35 (H) 07/30/2019    FIBR 404 07/30/2019     POC:   Lab Results   Component Value Date    BGM 89 07/17/2019     OTHER:   Lab Results   Component Value Date    PH 7.38 03/08/2016    LACT 1.2 07/28/2019    GARY 8.4 (L) 08/23/2019    PHOS 3.6 07/30/2019     "MAG 2.2 07/30/2019    ALBUMIN 3.2 (L) 08/23/2019    PROTTOTAL 6.6 (L) 08/23/2019    ALT 23 08/23/2019    AST 18 08/23/2019    ALKPHOS 143 08/23/2019    BILITOTAL 0.5 08/23/2019    LIPASE 131 07/29/2019    AMYLASE 71 07/29/2019    TSH 3.88 10/15/2018        Preop Vitals    BP Readings from Last 3 Encounters:   09/12/19 126/86   09/12/19 126/86   08/26/19 131/84    Pulse Readings from Last 3 Encounters:   09/12/19 91   09/12/19 71   08/26/19 80      Resp Readings from Last 3 Encounters:   09/12/19 19   08/26/19 14   08/08/19 16    SpO2 Readings from Last 3 Encounters:   09/12/19 100%   08/26/19 100%   08/08/19 100%      Temp Readings from Last 1 Encounters:   09/12/19 36.6  C (97.9  F) (Oral)    Ht Readings from Last 1 Encounters:   09/12/19 1.778 m (5' 10\")      Wt Readings from Last 1 Encounters:   09/12/19 77.6 kg (171 lb)    Estimated body mass index is 24.54 kg/m  as calculated from the following:    Height as of 9/12/19: 1.778 m (5' 10\").    Weight as of 9/12/19: 77.6 kg (171 lb).     LDA:  Port A Cath Single 12/08/17 Right Chest wall (Active)   Number of days: 646       Intrathecal/Epidural Catheter 03/08/16 (Active)   Number of days: 1286       Closed/Suction Drain 1 Left Abdomen (Active)   Number of days: 1286       Closed/Suction Drain 2 Left Abdomen (Active)   Number of days: 1286       Biliary Drain (Active)   Number of days: 1286       Biliary Stent 8 [place] (Active)   Number of days: 1286        Assessment:   ASA SCORE: 3      Smoking Status:  Non-Smoker/Unknown   NPO Status: NPO Appropriate     Plan:   Anes. Type:  MAC   Pre-Medication: Acetaminophen   Induction:  IV (Standard)   Airway: Native Airway   Access/Monitoring: PIV   Maintenance: TIVA     Advanced Monitoring: BIS     Postop Plan:   Postop Pain: Opioids  Postop Sedation/Airway: Not planned  Disposition: Outpatient     PONV Management:   Adult Risk Factors:, Non-Smoker, Postop Opioids   Prevention: Ondansetron, Propofol, No Volatiles "                   Yakelin Jo MD

## 2019-09-16 ENCOUNTER — ANESTHESIA (OUTPATIENT)
Dept: SURGERY | Facility: CLINIC | Age: 64
End: 2019-09-16
Payer: COMMERCIAL

## 2019-09-16 ENCOUNTER — HOSPITAL ENCOUNTER (OUTPATIENT)
Facility: CLINIC | Age: 64
Discharge: HOME | End: 2019-09-16
Attending: UROLOGY | Admitting: UROLOGY
Payer: COMMERCIAL

## 2019-09-16 ENCOUNTER — APPOINTMENT (OUTPATIENT)
Dept: GENERAL RADIOLOGY | Facility: CLINIC | Age: 64
End: 2019-09-16
Attending: UROLOGY
Payer: COMMERCIAL

## 2019-09-16 VITALS
WEIGHT: 166.01 LBS | RESPIRATION RATE: 18 BRPM | OXYGEN SATURATION: 100 % | HEART RATE: 70 BPM | DIASTOLIC BLOOD PRESSURE: 79 MMHG | BODY MASS INDEX: 23.77 KG/M2 | TEMPERATURE: 98.4 F | SYSTOLIC BLOOD PRESSURE: 119 MMHG | HEIGHT: 70 IN

## 2019-09-16 DIAGNOSIS — N13.39 OTHER HYDRONEPHROSIS: Primary | ICD-10-CM

## 2019-09-16 LAB
CREAT SERPL-MCNC: 1.54 MG/DL (ref 0.66–1.25)
GFR SERPL CREATININE-BSD FRML MDRD: 47 ML/MIN/{1.73_M2}
GLUCOSE SERPL-MCNC: 90 MG/DL (ref 70–99)
POTASSIUM SERPL-SCNC: 4.1 MMOL/L (ref 3.4–5.3)

## 2019-09-16 PROCEDURE — 25000128 H RX IP 250 OP 636: Performed by: UROLOGY

## 2019-09-16 PROCEDURE — 71000027 ZZH RECOVERY PHASE 2 EACH 15 MINS: Performed by: UROLOGY

## 2019-09-16 PROCEDURE — 25800030 ZZH RX IP 258 OP 636: Performed by: ANESTHESIOLOGY

## 2019-09-16 PROCEDURE — 37000009 ZZH ANESTHESIA TECHNICAL FEE, EACH ADDTL 15 MIN: Performed by: UROLOGY

## 2019-09-16 PROCEDURE — 25000132 ZZH RX MED GY IP 250 OP 250 PS 637: Performed by: STUDENT IN AN ORGANIZED HEALTH CARE EDUCATION/TRAINING PROGRAM

## 2019-09-16 PROCEDURE — 40000170 ZZH STATISTIC PRE-PROCEDURE ASSESSMENT II: Performed by: UROLOGY

## 2019-09-16 PROCEDURE — 25500064 ZZH RX 255 OP 636: Performed by: UROLOGY

## 2019-09-16 PROCEDURE — C2617 STENT, NON-COR, TEM W/O DEL: HCPCS | Performed by: UROLOGY

## 2019-09-16 PROCEDURE — 84132 ASSAY OF SERUM POTASSIUM: CPT | Performed by: ANESTHESIOLOGY

## 2019-09-16 PROCEDURE — 25000125 ZZHC RX 250: Performed by: UROLOGY

## 2019-09-16 PROCEDURE — 82947 ASSAY GLUCOSE BLOOD QUANT: CPT | Performed by: ANESTHESIOLOGY

## 2019-09-16 PROCEDURE — 37000008 ZZH ANESTHESIA TECHNICAL FEE, 1ST 30 MIN: Performed by: UROLOGY

## 2019-09-16 PROCEDURE — 40000278 XR SURGERY CARM FLUORO LESS THAN 5 MIN: Mod: TC

## 2019-09-16 PROCEDURE — 82565 ASSAY OF CREATININE: CPT | Performed by: ANESTHESIOLOGY

## 2019-09-16 PROCEDURE — 27210794 ZZH OR GENERAL SUPPLY STERILE: Performed by: UROLOGY

## 2019-09-16 PROCEDURE — 36000061 ZZH SURGERY LEVEL 3 W FLUORO 1ST 30 MIN - UMMC: Performed by: UROLOGY

## 2019-09-16 PROCEDURE — 36000059 ZZH SURGERY LEVEL 3 EA 15 ADDTL MIN UMMC: Performed by: UROLOGY

## 2019-09-16 PROCEDURE — 25000128 H RX IP 250 OP 636: Performed by: STUDENT IN AN ORGANIZED HEALTH CARE EDUCATION/TRAINING PROGRAM

## 2019-09-16 PROCEDURE — C1769 GUIDE WIRE: HCPCS | Performed by: UROLOGY

## 2019-09-16 DEVICE — STENT URETERAL PERCUFLEX PLUS 6FRX28CM M0061752640
Type: IMPLANTABLE DEVICE | Site: URETER | Status: NON-FUNCTIONAL
Removed: 2021-04-19

## 2019-09-16 RX ORDER — HEPARIN SODIUM,PORCINE 10 UNIT/ML
5-10 VIAL (ML) INTRAVENOUS EVERY 24 HOURS
Status: DISCONTINUED | OUTPATIENT
Start: 2019-09-16 | End: 2019-09-16 | Stop reason: HOSPADM

## 2019-09-16 RX ORDER — OXYBUTYNIN CHLORIDE 5 MG/1
5 TABLET ORAL 3 TIMES DAILY PRN
Qty: 60 TABLET | Refills: 3 | Status: SHIPPED | OUTPATIENT
Start: 2019-09-16 | End: 2019-11-06 | Stop reason: ALTCHOICE

## 2019-09-16 RX ORDER — PROPOFOL 10 MG/ML
INJECTION, EMULSION INTRAVENOUS PRN
Status: DISCONTINUED | OUTPATIENT
Start: 2019-09-16 | End: 2019-09-16

## 2019-09-16 RX ORDER — SODIUM CHLORIDE, SODIUM LACTATE, POTASSIUM CHLORIDE, CALCIUM CHLORIDE 600; 310; 30; 20 MG/100ML; MG/100ML; MG/100ML; MG/100ML
INJECTION, SOLUTION INTRAVENOUS CONTINUOUS
Status: DISCONTINUED | OUTPATIENT
Start: 2019-09-16 | End: 2019-09-16 | Stop reason: HOSPADM

## 2019-09-16 RX ORDER — LIDOCAINE 40 MG/G
CREAM TOPICAL
Status: DISCONTINUED | OUTPATIENT
Start: 2019-09-16 | End: 2019-09-16 | Stop reason: HOSPADM

## 2019-09-16 RX ORDER — HEPARIN SODIUM,PORCINE 10 UNIT/ML
5-10 VIAL (ML) INTRAVENOUS
Status: DISCONTINUED | OUTPATIENT
Start: 2019-09-16 | End: 2019-09-16 | Stop reason: HOSPADM

## 2019-09-16 RX ORDER — ACETAMINOPHEN 325 MG/1
650 TABLET ORAL
Status: DISCONTINUED | OUTPATIENT
Start: 2019-09-16 | End: 2019-09-16 | Stop reason: HOSPADM

## 2019-09-16 RX ORDER — HEPARIN SODIUM (PORCINE) LOCK FLUSH IV SOLN 100 UNIT/ML 100 UNIT/ML
5 SOLUTION INTRAVENOUS
Status: DISCONTINUED | OUTPATIENT
Start: 2019-09-16 | End: 2019-09-16 | Stop reason: HOSPADM

## 2019-09-16 RX ORDER — FENTANYL CITRATE 50 UG/ML
25-50 INJECTION, SOLUTION INTRAMUSCULAR; INTRAVENOUS EVERY 5 MIN PRN
Status: DISCONTINUED | OUTPATIENT
Start: 2019-09-16 | End: 2019-09-16 | Stop reason: HOSPADM

## 2019-09-16 RX ORDER — ONDANSETRON 2 MG/ML
INJECTION INTRAMUSCULAR; INTRAVENOUS PRN
Status: DISCONTINUED | OUTPATIENT
Start: 2019-09-16 | End: 2019-09-16

## 2019-09-16 RX ORDER — FENTANYL CITRATE 50 UG/ML
INJECTION, SOLUTION INTRAMUSCULAR; INTRAVENOUS PRN
Status: DISCONTINUED | OUTPATIENT
Start: 2019-09-16 | End: 2019-09-16

## 2019-09-16 RX ORDER — NALOXONE HYDROCHLORIDE 0.4 MG/ML
.1-.4 INJECTION, SOLUTION INTRAMUSCULAR; INTRAVENOUS; SUBCUTANEOUS
Status: DISCONTINUED | OUTPATIENT
Start: 2019-09-16 | End: 2019-09-16 | Stop reason: HOSPADM

## 2019-09-16 RX ORDER — LIDOCAINE HYDROCHLORIDE 20 MG/ML
JELLY TOPICAL PRN
Status: DISCONTINUED | OUTPATIENT
Start: 2019-09-16 | End: 2019-09-16 | Stop reason: HOSPADM

## 2019-09-16 RX ORDER — CEFAZOLIN SODIUM 1 G/3ML
1 INJECTION, POWDER, FOR SOLUTION INTRAMUSCULAR; INTRAVENOUS SEE ADMIN INSTRUCTIONS
Status: DISCONTINUED | OUTPATIENT
Start: 2019-09-16 | End: 2019-09-16 | Stop reason: HOSPADM

## 2019-09-16 RX ORDER — ONDANSETRON 2 MG/ML
4 INJECTION INTRAMUSCULAR; INTRAVENOUS EVERY 30 MIN PRN
Status: DISCONTINUED | OUTPATIENT
Start: 2019-09-16 | End: 2019-09-16 | Stop reason: HOSPADM

## 2019-09-16 RX ORDER — MEPERIDINE HYDROCHLORIDE 25 MG/ML
12.5 INJECTION INTRAMUSCULAR; INTRAVENOUS; SUBCUTANEOUS
Status: DISCONTINUED | OUTPATIENT
Start: 2019-09-16 | End: 2019-09-16 | Stop reason: HOSPADM

## 2019-09-16 RX ORDER — PROPOFOL 10 MG/ML
INJECTION, EMULSION INTRAVENOUS CONTINUOUS PRN
Status: DISCONTINUED | OUTPATIENT
Start: 2019-09-16 | End: 2019-09-16

## 2019-09-16 RX ORDER — ACETAMINOPHEN 325 MG/1
975 TABLET ORAL ONCE
Status: COMPLETED | OUTPATIENT
Start: 2019-09-16 | End: 2019-09-16

## 2019-09-16 RX ORDER — CEFAZOLIN SODIUM 2 G/100ML
2 INJECTION, SOLUTION INTRAVENOUS
Status: COMPLETED | OUTPATIENT
Start: 2019-09-16 | End: 2019-09-16

## 2019-09-16 RX ORDER — TAMSULOSIN HYDROCHLORIDE 0.4 MG/1
0.4 CAPSULE ORAL DAILY
Qty: 30 CAPSULE | Refills: 11 | Status: SHIPPED | OUTPATIENT
Start: 2019-09-16 | End: 2019-11-06 | Stop reason: ALTCHOICE

## 2019-09-16 RX ORDER — ONDANSETRON 4 MG/1
4 TABLET, ORALLY DISINTEGRATING ORAL EVERY 30 MIN PRN
Status: DISCONTINUED | OUTPATIENT
Start: 2019-09-16 | End: 2019-09-16 | Stop reason: HOSPADM

## 2019-09-16 RX ADMIN — ACETAMINOPHEN 975 MG: 325 TABLET, FILM COATED ORAL at 10:42

## 2019-09-16 RX ADMIN — PROPOFOL 70 MG: 10 INJECTION, EMULSION INTRAVENOUS at 13:55

## 2019-09-16 RX ADMIN — ONDANSETRON 4 MG: 2 INJECTION INTRAMUSCULAR; INTRAVENOUS at 14:17

## 2019-09-16 RX ADMIN — SODIUM CHLORIDE, POTASSIUM CHLORIDE, SODIUM LACTATE AND CALCIUM CHLORIDE: 600; 310; 30; 20 INJECTION, SOLUTION INTRAVENOUS at 13:45

## 2019-09-16 RX ADMIN — FENTANYL CITRATE 25 MCG: 50 INJECTION, SOLUTION INTRAMUSCULAR; INTRAVENOUS at 14:17

## 2019-09-16 RX ADMIN — PROPOFOL 100 MCG/KG/MIN: 10 INJECTION, EMULSION INTRAVENOUS at 13:50

## 2019-09-16 RX ADMIN — CEFAZOLIN SODIUM 2 G: 2 INJECTION, SOLUTION INTRAVENOUS at 13:51

## 2019-09-16 RX ADMIN — FENTANYL CITRATE 25 MCG: 50 INJECTION, SOLUTION INTRAMUSCULAR; INTRAVENOUS at 13:50

## 2019-09-16 ASSESSMENT — MIFFLIN-ST. JEOR: SCORE: 1559.25

## 2019-09-16 NOTE — BRIEF OP NOTE
[facility], [place]    Brief Operative Note    Pre-operative diagnosis: Right Hydronephrosis, Recurrent Cholangiocarcinoma  Post-operative diagnosis * No post-op diagnosis entered *  Procedure: Procedure(s):  Cystoscopy, Right Retrograde Pyelogram, Right Ureteral Stent Placement  Surgeon: Surgeon(s) and Role:     * Sivan Walker MD - Primary     * Terrell Larsen MD - Resident - Assisting  Anesthesia: Other   Estimated blood loss: Minimal  Drains:  6 Fr x 28 cm double J stent  Specimens: * No specimens in log *  Findings:   High bladder neck; friable urothelium; mild tortuosity of distal ureter on retrograde as well as hydroureter and hydronephrosis; successful right stent placement with hydronephrotic drip and good curls noted in renal pelvis and bladder.  Complications: None.  Implants:    Implant Name Type Inv. Item Serial No.  Lot No. LRB No. Used   STENT URETERAL PERCUFLEX PLUS [id number] Stent STENT URETERAL PERCUFLEX PLUS [id number]  [employer] [id number] Right 1        Post-op plan:  - Transfer to PACU  - Void prior to discharge  - Discharge home once PACU criteria are met  - Follow up for repeat stent exchange in 3-4 months; follow up earlier as needed with Dr. Aaron Larsen,  Urology, PGY-2

## 2019-09-16 NOTE — ANESTHESIA POSTPROCEDURE EVALUATION
Anesthesia POST Procedure Evaluation    Patient: Girish Chauhan   MRN:     [id number] Gender:   male   Age:    64 year old :      1955        Preoperative Diagnosis: Right Hydronephrosis, Recurrent Cholangiocarcinoma   Procedure(s):  Cystoscopy, Right Retrograde Pyelogram, Right Ureteral Stent Placement   Postop Comments: No value filed.       Anesthesia Type:  Not documented  MAC    Reportable Event: NO     PAIN: Uncomplicated   Sign Out status: Comfortable, Well controlled pain     PONV: No PONV   Sign Out status:  No Nausea or Vomiting     Neuro/Psych: Uneventful perioperative course   Sign Out Status: Preoperative baseline; Age appropriate mentation     Airway/Resp.: Uneventful perioperative course   Sign Out Status: Non labored breathing, age appropriate RR; Resp. Status within EXPECTED Parameters     CV: Uneventful perioperative course   Sign Out status: Appropriate BP and perfusion indices; Appropriate HR/Rhythm     Disposition:   Sign Out in:  PACU  Disposition:  Phase II; Home  Recovery Course: Uneventful  Follow-Up: Not required           Last Anesthesia Record Vitals:  CRNA VITALS  2019 1354 - 2019 1454      2019             Pulse:  83    Ht Rate:  76    SpO2:  100 %          Last PACU Vitals:  Vitals Value Taken Time   /79 2019  2:30 PM   Temp 36.5  C (97.7  F) 2019  2:30 PM   Pulse 70 2019  2:30 PM   Resp 16 2019  2:30 PM   SpO2 100 % 2019  2:30 PM   Temp src     NIBP 106/76 2019  2:21 PM   Pulse 83 2019  2:24 PM   SpO2 100 % 2019  2:24 PM   Resp     Temp 33.5  C (92.3  F) 2019  2:23 PM   Ht Rate 76 2019  2:24 PM   Temp 2           Electronically Signed By: Andre Roberts DO, 2019, 3:20 PM

## 2019-09-16 NOTE — ANESTHESIA CARE TRANSFER NOTE
Patient: Girish Chauhan    Procedure(s):  Cystoscopy, Right Retrograde Pyelogram, Right Ureteral Stent Placement    Diagnosis: Right Hydronephrosis, Recurrent Cholangiocarcinoma  Diagnosis Additional Information: No value filed.    Anesthesia Type:   MAC     Note:  Airway :Face Mask  Patient transferred to:PACU  Comments: VSS. Breathing spontaneously at a regular rate with adequate tidal volumes and maintaining O2 sats on 6L facemask. Denies nausea or pain. No apparent complications from anesthesia.     Yakelin Jo MD  CA-1      Handoff Report: Identifed the Patient, Identified the Reponsible Provider, Reviewed the pertinent medical history, Discussed the surgical course, Reviewed Intra-OP anesthesia mangement and issues during anesthesia, Set expectations for post-procedure period and Allowed opportunity for questions and acknowledgement of understanding      Vitals: (Last set prior to Anesthesia Care Transfer)    CRNA VITALS  9/16/2019 1354 - 9/16/2019 1437      9/16/2019             Pulse:  83    Ht Rate:  76    SpO2:  100 %                Electronically Signed By: Yakelin Jo MD  September 16, 2019  2:37 PM

## 2019-09-16 NOTE — OP NOTE
OPERATIVE REPORT  DATE OF SERVICE: 9/16/19    PREOPERATIVE DIAGNOSIS: Right hydroureteronephrosis, recurrent cholangocarcinoma    POSTOPERATIVE DIAGNOSIS:  Right hydroureteronephrosis, recurrent cholangiocarcinoma    PROCEDURES PERFORMED:   1. Cystourethroscopy with right retrograde pyelography  2. Placement of right ureteral stent.  3. Intraoperative interpretation of fluoroscopic imaging.     STAFF SURGEON: Sivan Walker MD    RESIDENT: Terrell Larsen MD    ANESTHESIA: MAC    ESTIMATED BLOOD LOSS: Minimal.     DRAINS:  6 Fr x 28 cm double J stent    SIGNIFICANT FINDINGS: High bladder neck; friable urothelium; mild tortuosity of distal ureter on retrograde as well as hydroureter and hydronephrosis; successful stent placement with hydronephrotic drip and good curls noted in renal pelvis and bladder    OPERATIVE INDICATIONS:   Girish Chauhan is a 64 year old male with PMH significant for recurrent cholangiocarcinoma  (with biopsy proven met to bladder), CAD, HTN, Afib (on apixaban), aortic stenosis, chronic diastolic heart failure, CKD, CAD with MI and coronary stent placement (2011 and May 2019, on plavix), recent gastrojejunal stenting and exchange with recent admission (7/28-7/30/19) for sepsis who now presented with new pelvic mass and presumably malignant obstruction of right ureteral with associated hydroureteronephrosis that is asymptomatic. He may pursue additional chemotherapy in the near future. The patient was counseled on the alternatives, risks, and benefits and elected to proceed with the above stated procedure.    DESCRIPTION OF PROCEDURE:    After informed consent was obtained, the patient was taken to the operating room, and moved to the operating table.  After adequate anesthesia was induced, the patient was repositioned in dorsal lithotomy position and prepped and draped in the usual sterile fashion. Urojet was used to lubricated the urethra. A timeout was taken to confirm correct patient,  procedure and laterality.     A 22-French cystoscope was inserted into a well lubricated urethra. The urethra was unremarkable.  The bladder neck was noted to be high and the bladder was free of tumors, stones or diverticuli. The urothelium near the bladder neck was friable. The media was clear.  Bilateral ureteral orifices were orthotopic.  An initial attempt at advancing the Sensor guidewire through the right ureter resulted in coiling of the wire presumably within the right distal ureter. A retrograde was performed with the open ended catheter at the right UO to better delineate the anatomy. Hydroureter and mild tortuosity of the right distal ureter was noted. A second attempt at advancing the Sensor guidewire was successful and it was advanced into the right renal pelvis with the aid of a 5-French open ended ureteral catheter.  A retrograde pyelogram demonstrated moderate right hydronephrosis. The wire was replaced and a 6 Fr x 28 cm double J stent was advanced over the guidewire under fluoroscopic guidance with a good curl in the renal pelvis. The stent passed up the ureter and was noted to be snug with mild resistance in the distal ureter presumably 2/2 known pelvic mass but passable. The curl within the bladder was adjusted with a stent grasper under direct cystoscopic vision resulting in a good curl in the bladder. The bladder was then drained.    The patient tolerated the procedure well.  There were no complications. He was awoken from anesthesia and transferred to PACU in stable condition.       Post-op plan:  - Transfer to PACU  - Void prior to discharge  - Discharge home once PACU criteria are met  - Follow up for repeat stent exchange in 3-4 months; follow up earlier as needed with Dr. Aaron Larsen MD  Urology, PGY2    Addendum:    I, Sivan Walker, was present and scrubbed for the entire case.  I agree with the note as above with changes made as needed.  I spoke to the patient's wife in the  waiting room at the end of the case.    Sivan Walker MD MPH   of Urology

## 2019-09-16 NOTE — DISCHARGE INSTRUCTIONS
Same-Day Surgery   Adult Discharge Orders & Instructions     For 24 hours after surgery:  1. Get plenty of rest.  A responsible adult must stay with you for at least 24 hours after you leave the hospital.   2. Pain medication can slow your reflexes. Do not drive or use heavy equipment.  If you have weakness or tingling, don't drive or use heavy equipment until this feeling goes away.  3. Mixing alcohol and pain medication can cause dizziness and slow your breathing. It can even be fatal. Do not drink alcohol while taking pain medication.  4. Avoid strenuous or risky activities.  Ask for help when climbing stairs.   5. You may feel lightheaded.  If so, sit for a few minutes before standing.  Have someone help you get up.   6. If you have nausea (feel sick to your stomach), drink only clear liquids such as apple juice, ginger ale, broth or 7-Up.  Rest may also help.  Be sure to drink enough fluids.  Move to a regular diet as you feel able. Take pain medications with a small amount of solid food, such as toast or crackers, to avoid nausea.   7. A slight fever is normal. Call the doctor if your fever is over 100 F (37.7 C) (taken under the tongue) or lasts longer than 24 hours.  8. You may have a dry mouth, muscle aches, trouble sleeping or a sore throat.  These symptoms should go away after 24 hours.  9. Do not make important or legal decisions.   Pain Management:      1. Take pain medication (if prescribed) for pain as directed by your physician.        2. WARNING: If the pain medication you have been prescribed contains Tylenol  (acetaminophen), DO NOT take additional doses of Tylenol (acetaminophen).     Call your doctor for any of the followin.  Signs of infection (fever, growing tenderness at the surgery site, severe pain, a large amount of drainage or bleeding, foul-smelling drainage, redness, swelling).    2.  It has been over 8 to 10 hours since surgery and you are still not able to urinate (pee).    3.   Headache for over 24 hours.    4.  Numbness, tingling or weakness the day after surgery (if you had spinal anesthesia).  To contact a doctor, call _____________________________________ or:      [phone] and ask for the Resident On Call for:          __________________________________________ (answered 24 hours a day)      Emergency Department:  [facility]: [phone]  [place] [facility]: [phone]               Rev. 10/2014   Discharge Instructions: Following a Cystoscopy/Stent and/or Ureteroscopy    Drainage:    Urine may be slightly bloody but should taper off in a few days.    You may have some frequency and urgency for a few days.    Comfort:    A burning sensation when urinating is common. Drinking extra fluids helps decrease this burning feeling and also helps to clear the bloody urine.    Mild abdominal cramps may occur for a few days.    Baths:    Daily tub baths aide in passing urine.    Frequent use of bubble bath is discouraged since it encourages urinary tract infections.    Report to your doctor at once if you experience:    Inability to pass your urine    Continuous or heavy bleeding    Severe Pain    Elevated temperature > than 101.5 for 24 hours    Home Activity:    The day of surgery spend a quiet evening at home.    Increase activity as tolerated.    Rev. 5/12

## 2019-09-17 ENCOUNTER — PATIENT OUTREACH (OUTPATIENT)
Dept: UROLOGY | Facility: CLINIC | Age: 64
End: 2019-09-17

## 2019-09-17 DIAGNOSIS — C22.1 CHOLANGIOCARCINOMA (H): ICD-10-CM

## 2019-09-17 DIAGNOSIS — N13.30 HYDRONEPHROSIS OF RIGHT KIDNEY: Primary | ICD-10-CM

## 2019-09-17 RX ORDER — CEFAZOLIN SODIUM 2 G/50ML
2 SOLUTION INTRAVENOUS
Status: CANCELLED | OUTPATIENT
Start: 2019-09-17

## 2019-09-17 RX ORDER — CEFAZOLIN SODIUM 1 G/50ML
1 INJECTION, SOLUTION INTRAVENOUS SEE ADMIN INSTRUCTIONS
Status: CANCELLED | OUTPATIENT
Start: 2019-09-17

## 2019-09-17 NOTE — RESULT ENCOUNTER NOTE
Day of surgery preop labs.  Surgery already complete.  Hgb much improved from previous.       Adrianne Sorensen DNP, RN, ANP-C

## 2019-09-17 NOTE — PROGRESS NOTES
Left message for the patient to call me to discuss how he is doing since surgery yesterday and discuss the next steps.     [place], PEDRITO   Care Coordinator Urology

## 2019-09-18 ENCOUNTER — PRE VISIT (OUTPATIENT)
Dept: SURGERY | Facility: CLINIC | Age: 64
End: 2019-09-18

## 2019-09-18 NOTE — TELEPHONE ENCOUNTER
FUTURE VISIT INFORMATION      SURGERY INFORMATION:    Date: 19    Location: [facility]    Surgeon:  Sivan Walker    Anesthesia Type:  MAC    RECORDS REQUESTED FROM:       Primary Care Provider: Dario Jain    Pertinent Medical History: Aortic root dilatation, disorder of aorta, coronary atherosclerosis, paroxysmal atrial fibrillation    Most recent EKG+ Tracin19    Most recent ECHO: 19    Most recent Cardiac Stress Test: 2011- [facility]

## 2019-09-27 ENCOUNTER — HOSPITAL ENCOUNTER (OUTPATIENT)
Dept: CARDIOLOGY | Facility: HOSPITAL | Age: 64
Discharge: HOME | End: 2019-09-27
Attending: INTERNAL MEDICINE

## 2019-09-27 ENCOUNTER — ANCILLARY PROCEDURE (OUTPATIENT)
Dept: CT IMAGING | Facility: CLINIC | Age: 64
End: 2019-09-27
Attending: INTERNAL MEDICINE
Payer: COMMERCIAL

## 2019-09-27 DIAGNOSIS — C22.1 CHOLANGIOCARCINOMA (H): ICD-10-CM

## 2019-09-27 DIAGNOSIS — I42.9 CARDIOMYOPATHY (H): ICD-10-CM

## 2019-09-27 LAB
ALBUMIN SERPL-MCNC: 3.5 G/DL (ref 3.4–5)
ALP SERPL-CCNC: 123 U/L (ref 40–150)
ALT SERPL W P-5'-P-CCNC: 19 U/L (ref 0–70)
ANION GAP SERPL CALCULATED.3IONS-SCNC: 6 MMOL/L (ref 3–14)
AST SERPL W P-5'-P-CCNC: 19 U/L (ref 0–45)
BASOPHILS # BLD AUTO: 0 10E9/L (ref 0–0.2)
BASOPHILS NFR BLD AUTO: 0.4 %
BILIRUB SERPL-MCNC: 0.5 MG/DL (ref 0.2–1.3)
BUN SERPL-MCNC: 28 MG/DL (ref 7–30)
CALCIUM SERPL-MCNC: 8.7 MG/DL (ref 8.5–10.1)
CHLORIDE SERPL-SCNC: 107 MMOL/L (ref 94–109)
CO2 SERPL-SCNC: 23 MMOL/L (ref 20–32)
CREAT SERPL-MCNC: 1.5 MG/DL (ref 0.66–1.25)
DIFFERENTIAL METHOD BLD: ABNORMAL
EOSINOPHIL # BLD AUTO: 0.3 10E9/L (ref 0–0.7)
EOSINOPHIL NFR BLD AUTO: 3.1 %
ERYTHROCYTE [DISTWIDTH] IN BLOOD BY AUTOMATED COUNT: 13.9 % (ref 10–15)
GFR SERPL CREATININE-BSD FRML MDRD: 49 ML/MIN/{1.73_M2}
GLUCOSE SERPL-MCNC: 96 MG/DL (ref 70–99)
HCT VFR BLD AUTO: 33.8 % (ref 40–53)
HGB BLD-MCNC: 10.6 G/DL (ref 13.3–17.7)
IMM GRANULOCYTES # BLD: 0 10E9/L (ref 0–0.4)
IMM GRANULOCYTES NFR BLD: 0.4 %
LYMPHOCYTES # BLD AUTO: 1.7 10E9/L (ref 0.8–5.3)
LYMPHOCYTES NFR BLD AUTO: 16.4 %
MCH RBC QN AUTO: 29.1 PG (ref 26.5–33)
MCHC RBC AUTO-ENTMCNC: 31.4 G/DL (ref 31.5–36.5)
MCV RBC AUTO: 93 FL (ref 78–100)
MONOCYTES # BLD AUTO: 1.3 10E9/L (ref 0–1.3)
MONOCYTES NFR BLD AUTO: 12.5 %
NEUTROPHILS # BLD AUTO: 6.8 10E9/L (ref 1.6–8.3)
NEUTROPHILS NFR BLD AUTO: 67.2 %
NRBC # BLD AUTO: 0 10*3/UL
NRBC BLD AUTO-RTO: 0 /100
PLATELET # BLD AUTO: 228 10E9/L (ref 150–450)
POTASSIUM SERPL-SCNC: 4.2 MMOL/L (ref 3.4–5.3)
PROT SERPL-MCNC: 7 G/DL (ref 6.8–8.8)
RBC # BLD AUTO: 3.64 10E12/L (ref 4.4–5.9)
SODIUM SERPL-SCNC: 137 MMOL/L (ref 133–144)
WBC # BLD AUTO: 10.1 10E9/L (ref 4–11)

## 2019-09-27 PROCEDURE — 25000128 H RX IP 250 OP 636: Performed by: INTERNAL MEDICINE

## 2019-09-27 PROCEDURE — 80053 COMPREHEN METABOLIC PANEL: CPT | Performed by: INTERNAL MEDICINE

## 2019-09-27 PROCEDURE — 85025 COMPLETE CBC W/AUTO DIFF WBC: CPT | Performed by: INTERNAL MEDICINE

## 2019-09-27 PROCEDURE — 86301 IMMUNOASSAY TUMOR CA 19-9: CPT | Performed by: INTERNAL MEDICINE

## 2019-09-27 RX ORDER — IOPAMIDOL 755 MG/ML
101 INJECTION, SOLUTION INTRAVASCULAR ONCE
Status: COMPLETED | OUTPATIENT
Start: 2019-09-27 | End: 2019-09-27

## 2019-09-27 RX ORDER — HEPARIN SODIUM (PORCINE) LOCK FLUSH IV SOLN 100 UNIT/ML 100 UNIT/ML
5 SOLUTION INTRAVENOUS
Status: COMPLETED | OUTPATIENT
Start: 2019-09-27 | End: 2019-09-27

## 2019-09-27 RX ORDER — HEPARIN SODIUM (PORCINE) LOCK FLUSH IV SOLN 100 UNIT/ML 100 UNIT/ML
500 SOLUTION INTRAVENOUS ONCE
Status: COMPLETED | OUTPATIENT
Start: 2019-09-27 | End: 2019-09-27

## 2019-09-27 RX ADMIN — HEPARIN 5 ML: 100 SYRINGE at 07:44

## 2019-09-27 RX ADMIN — IOPAMIDOL 101 ML: 755 INJECTION, SOLUTION INTRAVASCULAR at 08:51

## 2019-09-27 RX ADMIN — HEPARIN SODIUM (PORCINE) LOCK FLUSH IV SOLN 100 UNIT/ML 500 UNITS: 100 SOLUTION at 09:16

## 2019-09-27 ASSESSMENT — MIFFLIN-ST. JEOR: SCORE: 1524.84

## 2019-09-28 LAB — CANCER AG19-9 SERPL-ACNC: 547 U/ML (ref 0–37)

## 2019-09-30 LAB
AORTIC ROOT: 4.5 CM
AORTIC VALVE MEAN VELOCITY: 205 CM/S
AV DIMENSIONLESS INDEX VTI: 0.2
AV MEAN GRADIENT: 20 MMHG
AV PEAK GRADIENT: 31.8 MMHG
AV VALVE AREA: 1.1 CM2
AV VELOCITY RATIO: 0.2
BSA FOR ECHO PROCEDURE: 1.89 M2
CV ECHO HEIGHT: 70.8 IN
CV ECHO WEIGHT: 158 LBS
DOP CALC AO PEAK VEL: 282 CM/S
DOP CALC AO VTI: 50.2 CM
DOP CALC LVOT AREA: 4.91 CM2
DOP CALC LVOT DIAMETER: 2.5 CM
DOP CALC LVOT PEAK VEL: 62.8 CM/S
DOP CALC LVOT STROKE VOLUME: 56.9 CM3
DOP CALCLVOT PEAK VEL VTI: 11.6 CM
EJECTION FRACTION: 42 % (ref 55–75)
FRACTIONAL SHORTENING: 17 % (ref 28–44)
INTERVENTRICULAR SEPTUM IN END DIASTOLE: 1.09 CM (ref 0.6–1)
IVS/PW RATIO: 0.9
LA AREA 1: 23 CM2
LA AREA 2: 26 CM2
LEFT ATRIUM LENGTH: 5.7 CM
LEFT ATRIUM SIZE: 3.9 CM
LEFT ATRIUM VOLUME INDEX: 47.2 ML/M2
LEFT ATRIUM VOLUME: 89.2 ML
LEFT VENTRICLE CARDIAC INDEX: 3.4 L/MIN/M2
LEFT VENTRICLE CARDIAC OUTPUT: 6.4 L/MIN
LEFT VENTRICLE DIASTOLIC VOLUME INDEX: 79.9 CM3/M2 (ref 34–74)
LEFT VENTRICLE DIASTOLIC VOLUME: 151 CM3 (ref 62–150)
LEFT VENTRICLE HEART RATE: 113 BPM
LEFT VENTRICLE MASS INDEX: 107.3 G/M2
LEFT VENTRICLE SYSTOLIC VOLUME INDEX: 46.5 CM3/M2 (ref 11–31)
LEFT VENTRICLE SYSTOLIC VOLUME: 87.9 CM3 (ref 21–61)
LEFT VENTRICULAR INTERNAL DIMENSION IN DIASTOLE: 4.71 CM (ref 4.2–5.8)
LEFT VENTRICULAR INTERNAL DIMENSION IN SYSTOLE: 3.91 CM (ref 2.5–4)
LEFT VENTRICULAR MASS: 202.7 G
LEFT VENTRICULAR OUTFLOW TRACT MEAN GRADIENT: 1 MMHG
LEFT VENTRICULAR OUTFLOW TRACT MEAN VELOCITY: 53.5 CM/S
LEFT VENTRICULAR OUTFLOW TRACT PEAK GRADIENT: 2 MMHG
LEFT VENTRICULAR POSTERIOR WALL IN END DIASTOLE: 1.23 CM (ref 0.6–1)
LV STROKE VOLUME INDEX: 30.1 ML/M2
MITRAL VALVE DECELERATION SLOPE: 3560 MM/S2
MITRAL VALVE PRESSURE HALF-TIME: 65 MS
MV AVERAGE E/E' RATIO: 7.9 CM/S
MV DECELERATION TIME: 222 MS
MV E'TISSUE VEL-LAT: 9.96 CM/S
MV E'TISSUE VEL-MED: 10.1 CM/S
MV LATERAL E/E' RATIO: 7.9
MV MEDIAL E/E' RATIO: 7.8
MV PEAK E VELOCITY: 79.1 CM/S
MV VALVE AREA PRESSURE 1/2 METHOD: 3.4 CM2
NUC REST DIASTOLIC VOLUME INDEX: 2528 LBS
NUC REST SYSTOLIC VOLUME INDEX: 70.75 IN
PR MAX PG: 4 MMHG
PR PEAK VELOCITY: 95 CM/S
TRICUSPID REGURGITATION PEAK PRESSURE GRADIENT: 10.6 MMHG
TRICUSPID VALVE ANULAR PLANE SYSTOLIC EXCURSION: 1.6 CM
TRICUSPID VALVE PEAK REGURGITANT VELOCITY: 163 CM/S

## 2019-10-01 ENCOUNTER — APPOINTMENT (OUTPATIENT)
Dept: LAB | Facility: CLINIC | Age: 64
End: 2019-10-01
Attending: INTERNAL MEDICINE
Payer: COMMERCIAL

## 2019-10-02 ENCOUNTER — PATIENT OUTREACH (OUTPATIENT)
Dept: ONCOLOGY | Facility: CLINIC | Age: 64
End: 2019-10-02

## 2019-10-02 ENCOUNTER — RECORDS - HEALTHEAST (OUTPATIENT)
Dept: ADMINISTRATIVE | Facility: OTHER | Age: 64
End: 2019-10-02

## 2019-10-02 ENCOUNTER — ONCOLOGY VISIT (OUTPATIENT)
Dept: ONCOLOGY | Facility: CLINIC | Age: 64
End: 2019-10-02
Attending: INTERNAL MEDICINE
Payer: COMMERCIAL

## 2019-10-02 VITALS
HEIGHT: 70 IN | RESPIRATION RATE: 18 BRPM | OXYGEN SATURATION: 100 % | DIASTOLIC BLOOD PRESSURE: 87 MMHG | BODY MASS INDEX: 24.55 KG/M2 | TEMPERATURE: 97.7 F | SYSTOLIC BLOOD PRESSURE: 130 MMHG | HEART RATE: 95 BPM | WEIGHT: 171.5 LBS

## 2019-10-02 DIAGNOSIS — C22.1 CHOLANGIOCARCINOMA (H): Primary | ICD-10-CM

## 2019-10-02 PROCEDURE — 99215 OFFICE O/P EST HI 40 MIN: CPT | Mod: ZP | Performed by: INTERNAL MEDICINE

## 2019-10-02 PROCEDURE — G0463 HOSPITAL OUTPT CLINIC VISIT: HCPCS | Mod: ZF

## 2019-10-02 ASSESSMENT — PAIN SCALES - GENERAL: PAINLEVEL: MODERATE PAIN (5)

## 2019-10-02 ASSESSMENT — MIFFLIN-ST. JEOR: SCORE: 1584.17

## 2019-10-02 NOTE — Clinical Note
10/2/2019       RE: Girish Chauhan  [address] [address]     Dear Colleague,    Thank you for referring your patient, Girish Chauhan, to the [facility]. Please see a copy of my visit note below.    No notes on file    Again, thank you for allowing me to participate in the care of your patient.      Sincerely,    Naresh De Los Santos MD

## 2019-10-02 NOTE — PROGRESS NOTES
History of stent placement with Dr. Rodriguez this summer.    Successful placement of a 15mm Axios lumen apposing stent to maintain the gastrojejunostomy left in indefinitely on 7/28/19    C/o pain at stent location, heartburn. Passing stool, no blood in stool but did not   Noticed light blood in urine last night. No fever.    Per Dr. Rodriguez  I just spoke to Dr De Los Santos about him   Plan will be to move forward with his chemo and for us to see if we can get him in next week some time for relook upper endoscopy with me    Patient likely being admitted for chemo with in the next week, hoping to coordinate timing with admission.     Can expect a call for date and time for procedure.   Will need a , someone to stay with them for 24 hours and stay in town for 24 hours (within 45 min of Hospital) post procedure, rational explained.     Had preop for urology procedure on 9/12- will be valid until 10-12      Blood thinner -  On apixaban- hold 2 days, Plavix, hold for 5 days  ASA - no  Diabetic - no  NSAIDS: no    A pre-op nurse will call 1-2 days prior to the procedure.   Is advised to be NPO/no solid food 8 hours before the procedure. Ok to drink clear liquids (Water, Apple Juice or Gatorade) up to 2 hours prior to procedure.         Jing Juarez RN Care Coordinator

## 2019-10-02 NOTE — LETTER
10/2/2019      RE: Girish Chauhan  [address] [facility] [id number]       Girish Chauhan is here today in follow-up of metastatic biliary carcinoma.    Mr. Chauhan had disease resected in 2016 with a recurrence within the bladder wall which was controlled for a long period with platinum and gemcitabine.  He eventually had platinum dropped after 6 months for neuropathy and renal failure and then went on to develop TTP related to his gemcitabine.  He was off treatment from about a year ago until this spring when he had progression of intra-abdominal disease.  He had one cycle of Xeloda and early in the course of disease had a myocardial infarction and was hospitalized elsewhere.  He actually continued the Xeloda through this event without ongoing ischemia and had treatment of a fixed coronary lesion so I do not think his ischemia was related to the Xeloda.  He more recently has had problems with a bowel obstruction in the biliary limb and then bacteremia. He feels like he has some similar discomfort in the RUQ in the past day. He recently had his obstructed ureter stented as well and has no problems with that. He is having no fever chills or sweats. His appetite is good (though he has been reluctant to eat today for fear something was wrong with the stent) and he's gained back a little of the weight he lost.    On exam he appears well and I did not otherwise examine him today.    I reviewed his CT which shows progression of soft tissue around his resection bed/small bowel and between the bladder /rectum with possible other peritoneal disease. His  has risen to 547. His creatinine is stable at 1.5 and the remainder of his labs including electrolytes liver enzymes and blood counts are unremarkable.    A/P: Progressive metastatic cholangiocarcinoma in a patient with a near normal performance status. We had a long discussion about how to proceed. I recommended therapy with a fluoropyrimidine, and would like  to include oxaliplatin, but he still has enough neuropathy I'm not sure if the latter is feasible. I don't believe his MI was related to the xeloda, but there is enough uncertainty that I will do the first day as an inpatient with infusional 5FU on a monitored bed. We can plan subsequent cycles with either infusional 5FU or xeloda as the patient desires and then make a decision further down the road about the oxaliplatin. We talked about enrollment in a trial of an FGF inhibitor if the 5FU fails. At the end of this long discussion, he and his wife had all their questions answered and wished to proceed with the above plan.    Total visit time >40 minutes, all spent on the above discussion.    Naresh De Los Santos MD

## 2019-10-02 NOTE — PROGRESS NOTES
Girish Chauhan is here today in follow-up of metastatic biliary carcinoma.    Mr. Chauhan had disease resected in 2016 with a recurrence within the bladder wall which was controlled for a long period with platinum and gemcitabine.  He eventually had platinum dropped after 6 months for neuropathy and renal failure and then went on to develop TTP related to his gemcitabine.  He was off treatment from about a year ago until this spring when he had progression of intra-abdominal disease.  He had one cycle of Xeloda and early in the course of disease had a myocardial infarction and was hospitalized elsewhere.  He actually continued the Xeloda through this event without ongoing ischemia and had treatment of a fixed coronary lesion so I do not think his ischemia was related to the Xeloda.  He more recently has had problems with a bowel obstruction in the biliary limb and then bacteremia. He feels like he has some similar discomfort in the RUQ in the past day. He recently had his obstructed ureter stented as well and has no problems with that. He is having no fever chills or sweats. His appetite is good (though he has been reluctant to eat today for fear something was wrong with the stent) and he's gained back a little of the weight he lost.    On exam he appears well and I did not otherwise examine him today.    I reviewed his CT which shows progression of soft tissue around his resection bed/small bowel and between the bladder /rectum with possible other peritoneal disease. His  has risen to 547. His creatinine is stable at 1.5 and the remainder of his labs including electrolytes liver enzymes and blood counts are unremarkable.    A/P: Progressive metastatic cholangiocarcinoma in a patient with a near normal performance status. We had a long discussion about how to proceed. I recommended therapy with a fluoropyrimidine, and would like to include oxaliplatin, but he still has enough neuropathy I'm not sure if the  latter is feasible. I don't believe his MI was related to the xeloda, but there is enough uncertainty that I will do the first day as an inpatient with infusional 5FU on a monitored bed. We can plan subsequent cycles with either infusional 5FU or xeloda as the patient desires and then make a decision further down the road about the oxaliplatin. We talked about enrollment in a trial of an FGF inhibitor if the 5FU fails. At the end of this long discussion, he and his wife had all their questions answered and wished to proceed with the above plan.    Total visit time >40 minutes, all spent on the above discussion.

## 2019-10-02 NOTE — NURSING NOTE
"Oncology Rooming Note    October 2, 2019 4:38 PM   Girish Chauhan is a 64 year old male who presents for:    Chief Complaint   Patient presents with     Oncology Clinic Visit     Return visit related to Cholangiocarcinoma     Initial Vitals: /87 (BP Location: Left arm, Patient Position: Sitting, Cuff Size: Adult Large)   Pulse 95   Temp 97.7  F (36.5  C) (Oral)   Resp 18   Ht 1.778 m (5' 10\")   Wt 77.8 kg (171 lb 8 oz)   SpO2 100%   BMI 24.61 kg/m   Estimated body mass index is 24.61 kg/m  as calculated from the following:    Height as of this encounter: 1.778 m (5' 10\").    Weight as of this encounter: 77.8 kg (171 lb 8 oz). Body surface area is 1.96 meters squared.  Moderate Pain (5) Comment: Data Unavailable   No LMP for male patient.  Allergies reviewed: Yes  Medications reviewed: Yes    Medications: MEDICATION REFILLS NEEDED TODAY. Provider was notified.  Pharmacy name entered into [employer]:    [id number] - [place], [place] - [address] AT [facility] [place], [place] - [address] [facility] - [place], [place] - [address]    Clinical concerns: No new concerns. Provider was notified.      Ashwini Barfield LPN            "

## 2019-10-03 ENCOUNTER — HOME INFUSION (PRE-WILLOW HOME INFUSION) (OUTPATIENT)
Dept: PHARMACY | Facility: CLINIC | Age: 64
End: 2019-10-03

## 2019-10-03 ENCOUNTER — PREP FOR PROCEDURE (OUTPATIENT)
Dept: GASTROENTEROLOGY | Facility: CLINIC | Age: 64
End: 2019-10-03

## 2019-10-03 ENCOUNTER — HEALTH MAINTENANCE LETTER (OUTPATIENT)
Age: 64
End: 2019-10-03

## 2019-10-03 DIAGNOSIS — R10.84 ABDOMINAL PAIN, GENERALIZED: Primary | ICD-10-CM

## 2019-10-03 RX ORDER — PROCHLORPERAZINE MALEATE 10 MG
10 TABLET ORAL EVERY 6 HOURS PRN
Status: CANCELLED
Start: 2019-10-04

## 2019-10-03 RX ORDER — MEPERIDINE HYDROCHLORIDE 25 MG/ML
25 INJECTION INTRAMUSCULAR; INTRAVENOUS; SUBCUTANEOUS EVERY 30 MIN PRN
Status: CANCELLED | OUTPATIENT
Start: 2019-10-04

## 2019-10-03 RX ORDER — LORAZEPAM 0.5 MG/1
.5-1 TABLET ORAL EVERY 6 HOURS PRN
Status: CANCELLED
Start: 2019-10-04

## 2019-10-03 RX ORDER — EPINEPHRINE 1 MG/ML
0.3 INJECTION, SOLUTION INTRAMUSCULAR; SUBCUTANEOUS EVERY 5 MIN PRN
Status: CANCELLED | OUTPATIENT
Start: 2019-10-04

## 2019-10-03 RX ORDER — LORAZEPAM 2 MG/ML
.5-1 INJECTION INTRAMUSCULAR EVERY 6 HOURS PRN
Status: CANCELLED | OUTPATIENT
Start: 2019-10-04

## 2019-10-03 RX ORDER — ALBUTEROL SULFATE 0.83 MG/ML
2.5 SOLUTION RESPIRATORY (INHALATION)
Status: CANCELLED | OUTPATIENT
Start: 2019-10-04

## 2019-10-03 RX ORDER — ONDANSETRON 8 MG/1
16 TABLET, FILM COATED ORAL ONCE
Status: CANCELLED
Start: 2019-10-04

## 2019-10-03 RX ORDER — METHYLPREDNISOLONE SODIUM SUCCINATE 125 MG/2ML
125 INJECTION, POWDER, LYOPHILIZED, FOR SOLUTION INTRAMUSCULAR; INTRAVENOUS
Status: CANCELLED
Start: 2019-10-04

## 2019-10-03 RX ORDER — DEXAMETHASONE 4 MG/1
12 TABLET ORAL ONCE
Status: CANCELLED
Start: 2019-10-04

## 2019-10-03 RX ORDER — SODIUM CHLORIDE 9 MG/ML
1000 INJECTION, SOLUTION INTRAVENOUS CONTINUOUS PRN
Status: CANCELLED
Start: 2019-10-04

## 2019-10-03 RX ORDER — NALOXONE HYDROCHLORIDE 0.4 MG/ML
.1-.4 INJECTION, SOLUTION INTRAMUSCULAR; INTRAVENOUS; SUBCUTANEOUS
Status: CANCELLED | OUTPATIENT
Start: 2019-10-04

## 2019-10-03 RX ORDER — ALBUTEROL SULFATE 90 UG/1
1-2 AEROSOL, METERED RESPIRATORY (INHALATION)
Status: CANCELLED
Start: 2019-10-04

## 2019-10-03 RX ORDER — DIPHENHYDRAMINE HYDROCHLORIDE 50 MG/ML
50 INJECTION INTRAMUSCULAR; INTRAVENOUS
Status: CANCELLED
Start: 2019-10-04

## 2019-10-03 NOTE — PROGRESS NOTES
Therapy: 5fu  Insurance: [employer]   Ded: $250   Met: $250  Co-Insurance: 95%  Max Out of Pocket: $1200  Met: $1200    Pt has home disconnect coverage    In reference to referral made on 10/3/19 to check 5fu and home disconnect coverage      Please contact Intake with any questions, [phone] or In Basket pool, [facility] ([phone]).

## 2019-10-03 NOTE — PROGRESS NOTES
Met with patient and his wife during appointment with Dr De Los Santos. Discussed 5-F/U and side effects. Patient will be admitted to  for cardiac monitoring during 5 F/U infusion. If infusion goes well, patient will go home from hospital with 5 F/U pump. Patient and wife had no further questions or concerns at the end of visit.

## 2019-10-04 ENCOUNTER — DOCUMENTATION ONLY (OUTPATIENT)
Dept: PHARMACY | Facility: CLINIC | Age: 64
End: 2019-10-04

## 2019-10-04 ENCOUNTER — RECORDS - HEALTHEAST (OUTPATIENT)
Dept: ADMINISTRATIVE | Facility: OTHER | Age: 64
End: 2019-10-04

## 2019-10-04 ENCOUNTER — HOSPITAL ENCOUNTER (INPATIENT)
Facility: CLINIC | Age: 64
LOS: 1 days | Discharge: HOME HEALTH | DRG: 847 | End: 2019-10-05
Attending: INTERNAL MEDICINE | Admitting: INTERNAL MEDICINE
Payer: COMMERCIAL

## 2019-10-04 DIAGNOSIS — T45.1X5A ANEMIA ASSOCIATED WITH CHEMOTHERAPY: Primary | ICD-10-CM

## 2019-10-04 DIAGNOSIS — N13.30 HYDRONEPHROSIS OF RIGHT KIDNEY: ICD-10-CM

## 2019-10-04 DIAGNOSIS — D64.81 ANEMIA ASSOCIATED WITH CHEMOTHERAPY: Primary | ICD-10-CM

## 2019-10-04 DIAGNOSIS — C22.1 CHOLANGIOCARCINOMA (H): ICD-10-CM

## 2019-10-04 LAB
ALBUMIN SERPL-MCNC: 3.4 G/DL (ref 3.4–5)
ALBUMIN UR-MCNC: NEGATIVE MG/DL
ALP SERPL-CCNC: 102 U/L (ref 40–150)
ALT SERPL W P-5'-P-CCNC: 23 U/L (ref 0–70)
ANION GAP SERPL CALCULATED.3IONS-SCNC: 5 MMOL/L (ref 3–14)
APPEARANCE UR: CLEAR
AST SERPL W P-5'-P-CCNC: 22 U/L (ref 0–45)
BACTERIA #/AREA URNS HPF: ABNORMAL /HPF
BASOPHILS # BLD AUTO: 0 10E9/L (ref 0–0.2)
BASOPHILS NFR BLD AUTO: 0.3 %
BILIRUB SERPL-MCNC: 0.3 MG/DL (ref 0.2–1.3)
BILIRUB UR QL STRIP: NEGATIVE
BUN SERPL-MCNC: 25 MG/DL (ref 7–30)
CALCIUM SERPL-MCNC: 8.6 MG/DL (ref 8.5–10.1)
CHLORIDE SERPL-SCNC: 106 MMOL/L (ref 94–109)
CO2 SERPL-SCNC: 25 MMOL/L (ref 20–32)
COLOR UR AUTO: ABNORMAL
CREAT SERPL-MCNC: 1.28 MG/DL (ref 0.66–1.25)
DIFFERENTIAL METHOD BLD: ABNORMAL
EOSINOPHIL # BLD AUTO: 0.3 10E9/L (ref 0–0.7)
EOSINOPHIL NFR BLD AUTO: 2.5 %
ERYTHROCYTE [DISTWIDTH] IN BLOOD BY AUTOMATED COUNT: 13.8 % (ref 10–15)
GFR SERPL CREATININE-BSD FRML MDRD: 59 ML/MIN/{1.73_M2}
GLUCOSE SERPL-MCNC: 90 MG/DL (ref 70–99)
GLUCOSE UR STRIP-MCNC: NEGATIVE MG/DL
HCT VFR BLD AUTO: 31.9 % (ref 40–53)
HGB BLD-MCNC: 9.9 G/DL (ref 13.3–17.7)
HGB UR QL STRIP: ABNORMAL
IMM GRANULOCYTES # BLD: 0 10E9/L (ref 0–0.4)
IMM GRANULOCYTES NFR BLD: 0.2 %
KETONES UR STRIP-MCNC: NEGATIVE MG/DL
LEUKOCYTE ESTERASE UR QL STRIP: ABNORMAL
LYMPHOCYTES # BLD AUTO: 1.5 10E9/L (ref 0.8–5.3)
LYMPHOCYTES NFR BLD AUTO: 14.6 %
MAGNESIUM SERPL-MCNC: 1.8 MG/DL (ref 1.6–2.3)
MCH RBC QN AUTO: 28.6 PG (ref 26.5–33)
MCHC RBC AUTO-ENTMCNC: 31 G/DL (ref 31.5–36.5)
MCV RBC AUTO: 92 FL (ref 78–100)
MONOCYTES # BLD AUTO: 1.1 10E9/L (ref 0–1.3)
MONOCYTES NFR BLD AUTO: 11 %
NEUTROPHILS # BLD AUTO: 7.1 10E9/L (ref 1.6–8.3)
NEUTROPHILS NFR BLD AUTO: 71.4 %
NITRATE UR QL: NEGATIVE
NRBC # BLD AUTO: 0 10*3/UL
NRBC BLD AUTO-RTO: 0 /100
PH UR STRIP: 6.5 PH (ref 5–7)
PHOSPHATE SERPL-MCNC: 3.2 MG/DL (ref 2.5–4.5)
PLATELET # BLD AUTO: 182 10E9/L (ref 150–450)
POTASSIUM SERPL-SCNC: 3.9 MMOL/L (ref 3.4–5.3)
PROT SERPL-MCNC: 6.6 G/DL (ref 6.8–8.8)
RBC # BLD AUTO: 3.46 10E12/L (ref 4.4–5.9)
RBC #/AREA URNS AUTO: 1 /HPF (ref 0–2)
SODIUM SERPL-SCNC: 136 MMOL/L (ref 133–144)
SOURCE: ABNORMAL
SP GR UR STRIP: 1 (ref 1–1.03)
UROBILINOGEN UR STRIP-MCNC: NORMAL MG/DL (ref 0–2)
WBC # BLD AUTO: 10 10E9/L (ref 4–11)
WBC #/AREA URNS AUTO: 1 /HPF (ref 0–5)

## 2019-10-04 PROCEDURE — 93005 ELECTROCARDIOGRAM TRACING: CPT

## 2019-10-04 PROCEDURE — 80053 COMPREHEN METABOLIC PANEL: CPT | Performed by: INTERNAL MEDICINE

## 2019-10-04 PROCEDURE — 93010 ELECTROCARDIOGRAM REPORT: CPT | Performed by: INTERNAL MEDICINE

## 2019-10-04 PROCEDURE — 21400000 ZZH R&B CCU UMMC

## 2019-10-04 PROCEDURE — 25000128 H RX IP 250 OP 636: Performed by: INTERNAL MEDICINE

## 2019-10-04 PROCEDURE — 25000132 ZZH RX MED GY IP 250 OP 250 PS 637: Performed by: PHYSICIAN ASSISTANT

## 2019-10-04 PROCEDURE — 99221 1ST HOSP IP/OBS SF/LOW 40: CPT | Mod: AI | Performed by: INTERNAL MEDICINE

## 2019-10-04 PROCEDURE — 81001 URINALYSIS AUTO W/SCOPE: CPT | Performed by: PHYSICIAN ASSISTANT

## 2019-10-04 PROCEDURE — 25000131 ZZH RX MED GY IP 250 OP 636 PS 637: Performed by: INTERNAL MEDICINE

## 2019-10-04 PROCEDURE — 36592 COLLECT BLOOD FROM PICC: CPT | Performed by: INTERNAL MEDICINE

## 2019-10-04 PROCEDURE — 85025 COMPLETE CBC W/AUTO DIFF WBC: CPT | Performed by: INTERNAL MEDICINE

## 2019-10-04 PROCEDURE — 84100 ASSAY OF PHOSPHORUS: CPT | Performed by: INTERNAL MEDICINE

## 2019-10-04 PROCEDURE — 25800030 ZZH RX IP 258 OP 636: Performed by: INTERNAL MEDICINE

## 2019-10-04 PROCEDURE — 3E04305 INTRODUCTION OF OTHER ANTINEOPLASTIC INTO CENTRAL VEIN, PERCUTANEOUS APPROACH: ICD-10-PCS | Performed by: INTERNAL MEDICINE

## 2019-10-04 PROCEDURE — 83735 ASSAY OF MAGNESIUM: CPT | Performed by: INTERNAL MEDICINE

## 2019-10-04 RX ORDER — LOSARTAN POTASSIUM 25 MG/1
25 TABLET ORAL DAILY
Status: DISCONTINUED | OUTPATIENT
Start: 2019-10-05 | End: 2019-10-05 | Stop reason: HOSPADM

## 2019-10-04 RX ORDER — HYDRALAZINE HYDROCHLORIDE 20 MG/ML
10 INJECTION INTRAMUSCULAR; INTRAVENOUS EVERY 6 HOURS PRN
Status: DISCONTINUED | OUTPATIENT
Start: 2019-10-04 | End: 2019-10-05 | Stop reason: HOSPADM

## 2019-10-04 RX ORDER — ONDANSETRON 8 MG/1
16 TABLET, FILM COATED ORAL ONCE
Status: DISCONTINUED | OUTPATIENT
Start: 2019-10-04 | End: 2019-10-04 | Stop reason: CLARIF

## 2019-10-04 RX ORDER — ONDANSETRON 4 MG/1
8 TABLET, ORALLY DISINTEGRATING ORAL EVERY 8 HOURS PRN
Status: DISCONTINUED | OUTPATIENT
Start: 2019-10-04 | End: 2019-10-05 | Stop reason: HOSPADM

## 2019-10-04 RX ORDER — DEXAMETHASONE 4 MG/1
12 TABLET ORAL ONCE
Status: COMPLETED | OUTPATIENT
Start: 2019-10-04 | End: 2019-10-04

## 2019-10-04 RX ORDER — ACETAMINOPHEN 325 MG/1
650 TABLET ORAL EVERY 4 HOURS PRN
Status: DISCONTINUED | OUTPATIENT
Start: 2019-10-04 | End: 2019-10-05 | Stop reason: HOSPADM

## 2019-10-04 RX ORDER — HEPARIN SODIUM,PORCINE 10 UNIT/ML
5-10 VIAL (ML) INTRAVENOUS EVERY 24 HOURS
Status: DISCONTINUED | OUTPATIENT
Start: 2019-10-04 | End: 2019-10-05 | Stop reason: HOSPADM

## 2019-10-04 RX ORDER — ONDANSETRON 4 MG/1
4 TABLET, FILM COATED ORAL EVERY 8 HOURS PRN
Qty: 30 TABLET | Refills: 0 | Status: SHIPPED | OUTPATIENT
Start: 2019-10-04

## 2019-10-04 RX ORDER — MULTIPLE VITAMINS W/ MINERALS TAB 9MG-400MCG
1 TAB ORAL DAILY
Status: DISCONTINUED | OUTPATIENT
Start: 2019-10-05 | End: 2019-10-05 | Stop reason: HOSPADM

## 2019-10-04 RX ORDER — ALBUTEROL SULFATE 0.83 MG/ML
2.5 SOLUTION RESPIRATORY (INHALATION)
Status: DISCONTINUED | OUTPATIENT
Start: 2019-10-04 | End: 2019-10-05 | Stop reason: HOSPADM

## 2019-10-04 RX ORDER — HEPARIN SODIUM (PORCINE) LOCK FLUSH IV SOLN 100 UNIT/ML 100 UNIT/ML
5 SOLUTION INTRAVENOUS
Status: DISCONTINUED | OUTPATIENT
Start: 2019-10-04 | End: 2019-10-05 | Stop reason: HOSPADM

## 2019-10-04 RX ORDER — MEPERIDINE HYDROCHLORIDE 25 MG/ML
25 INJECTION INTRAMUSCULAR; INTRAVENOUS; SUBCUTANEOUS EVERY 30 MIN PRN
Status: DISCONTINUED | OUTPATIENT
Start: 2019-10-04 | End: 2019-10-05 | Stop reason: HOSPADM

## 2019-10-04 RX ORDER — ATORVASTATIN CALCIUM 40 MG/1
40 TABLET, FILM COATED ORAL EVERY EVENING
Status: DISCONTINUED | OUTPATIENT
Start: 2019-10-04 | End: 2019-10-05 | Stop reason: HOSPADM

## 2019-10-04 RX ORDER — ONDANSETRON 4 MG/1
8 TABLET, FILM COATED ORAL EVERY 8 HOURS PRN
Status: DISCONTINUED | OUTPATIENT
Start: 2019-10-04 | End: 2019-10-05 | Stop reason: HOSPADM

## 2019-10-04 RX ORDER — SODIUM CHLORIDE 9 MG/ML
1000 INJECTION, SOLUTION INTRAVENOUS CONTINUOUS PRN
Status: DISCONTINUED | OUTPATIENT
Start: 2019-10-04 | End: 2019-10-05 | Stop reason: HOSPADM

## 2019-10-04 RX ORDER — PANTOPRAZOLE SODIUM 40 MG/1
40 TABLET, DELAYED RELEASE ORAL
Status: DISCONTINUED | OUTPATIENT
Start: 2019-10-05 | End: 2019-10-05 | Stop reason: HOSPADM

## 2019-10-04 RX ORDER — ONDANSETRON 2 MG/ML
8 INJECTION INTRAMUSCULAR; INTRAVENOUS EVERY 8 HOURS PRN
Status: DISCONTINUED | OUTPATIENT
Start: 2019-10-04 | End: 2019-10-05 | Stop reason: HOSPADM

## 2019-10-04 RX ORDER — DIPHENHYDRAMINE HYDROCHLORIDE 50 MG/ML
50 INJECTION INTRAMUSCULAR; INTRAVENOUS
Status: DISCONTINUED | OUTPATIENT
Start: 2019-10-04 | End: 2019-10-05 | Stop reason: HOSPADM

## 2019-10-04 RX ORDER — PROCHLORPERAZINE MALEATE 5 MG
10 TABLET ORAL EVERY 6 HOURS PRN
Status: DISCONTINUED | OUTPATIENT
Start: 2019-10-04 | End: 2019-10-05 | Stop reason: HOSPADM

## 2019-10-04 RX ORDER — NALOXONE HYDROCHLORIDE 0.4 MG/ML
.1-.4 INJECTION, SOLUTION INTRAMUSCULAR; INTRAVENOUS; SUBCUTANEOUS
Status: DISCONTINUED | OUTPATIENT
Start: 2019-10-04 | End: 2019-10-05 | Stop reason: HOSPADM

## 2019-10-04 RX ORDER — EPINEPHRINE 1 MG/ML
0.3 INJECTION, SOLUTION, CONCENTRATE INTRAVENOUS EVERY 5 MIN PRN
Status: DISCONTINUED | OUTPATIENT
Start: 2019-10-04 | End: 2019-10-05 | Stop reason: CLARIF

## 2019-10-04 RX ORDER — CALCIUM CARBONATE 500 MG/1
500 TABLET, CHEWABLE ORAL 4 TIMES DAILY PRN
Status: DISCONTINUED | OUTPATIENT
Start: 2019-10-04 | End: 2019-10-05 | Stop reason: HOSPADM

## 2019-10-04 RX ORDER — HEPARIN SODIUM,PORCINE 10 UNIT/ML
5-10 VIAL (ML) INTRAVENOUS
Status: DISCONTINUED | OUTPATIENT
Start: 2019-10-04 | End: 2019-10-05 | Stop reason: HOSPADM

## 2019-10-04 RX ORDER — FUROSEMIDE 20 MG
20 TABLET ORAL DAILY
Status: DISCONTINUED | OUTPATIENT
Start: 2019-10-05 | End: 2019-10-05 | Stop reason: HOSPADM

## 2019-10-04 RX ORDER — ALLOPURINOL 100 MG/1
100 TABLET ORAL EVERY EVENING
Status: DISCONTINUED | OUTPATIENT
Start: 2019-10-04 | End: 2019-10-05 | Stop reason: HOSPADM

## 2019-10-04 RX ORDER — LORAZEPAM 2 MG/ML
.5-1 INJECTION INTRAMUSCULAR EVERY 6 HOURS PRN
Status: DISCONTINUED | OUTPATIENT
Start: 2019-10-04 | End: 2019-10-05 | Stop reason: HOSPADM

## 2019-10-04 RX ORDER — LORAZEPAM 0.5 MG/1
.5-1 TABLET ORAL EVERY 6 HOURS PRN
Status: DISCONTINUED | OUTPATIENT
Start: 2019-10-04 | End: 2019-10-05 | Stop reason: HOSPADM

## 2019-10-04 RX ORDER — ALBUTEROL SULFATE 90 UG/1
1-2 AEROSOL, METERED RESPIRATORY (INHALATION)
Status: DISCONTINUED | OUTPATIENT
Start: 2019-10-04 | End: 2019-10-05 | Stop reason: HOSPADM

## 2019-10-04 RX ORDER — METHYLPREDNISOLONE SODIUM SUCCINATE 125 MG/2ML
125 INJECTION, POWDER, LYOPHILIZED, FOR SOLUTION INTRAMUSCULAR; INTRAVENOUS
Status: DISCONTINUED | OUTPATIENT
Start: 2019-10-04 | End: 2019-10-05 | Stop reason: HOSPADM

## 2019-10-04 RX ORDER — LIDOCAINE 40 MG/G
CREAM TOPICAL
Status: DISCONTINUED | OUTPATIENT
Start: 2019-10-04 | End: 2019-10-05 | Stop reason: HOSPADM

## 2019-10-04 RX ADMIN — DEXAMETHASONE 12 MG: 4 TABLET ORAL at 15:02

## 2019-10-04 RX ADMIN — LEUCOVORIN CALCIUM 686 MG: 350 INJECTION, POWDER, LYOPHILIZED, FOR SOLUTION INTRAMUSCULAR; INTRAVENOUS at 13:17

## 2019-10-04 RX ADMIN — ATORVASTATIN CALCIUM 40 MG: 40 TABLET, FILM COATED ORAL at 20:29

## 2019-10-04 RX ADMIN — FLUOROURACIL 2350 MG: 50 INJECTION, SOLUTION INTRAVENOUS at 15:28

## 2019-10-04 RX ADMIN — APIXABAN 5 MG: 5 TABLET, FILM COATED ORAL at 20:29

## 2019-10-04 RX ADMIN — ALLOPURINOL 100 MG: 100 TABLET ORAL at 20:29

## 2019-10-04 RX ADMIN — METOPROLOL TARTRATE 150 MG: 100 TABLET, FILM COATED ORAL at 20:28

## 2019-10-04 ASSESSMENT — ACTIVITIES OF DAILY LIVING (ADL)
ADLS_ACUITY_SCORE: 13

## 2019-10-04 NOTE — PLAN OF CARE
[id number]: Pt admitted for CIVI 5FU and Leucovorin. Pt oriented to the unit and the plan of care. Port accessed and Leucovorin started at 1320 to run  over 2 hours prior to 5FU. EKG completed and telemetry started- rated controlled a-fib. Denies pain, nausea, or palpitations.

## 2019-10-04 NOTE — PROGRESS NOTES
CIVI Fluorouracil hung via port with great blood return over 23 hours with the premedication of dexamethasone. Ate 100% of lunch. Voiding large amounts- 525 ml and PVR measured per urology's recommendations and found to be 200 ml. UA/UC ordered and patient aware. Has been on continuous telemetry showing A-fib with intermittent atrial flutter, but will be transferring to  for closer monitoring of telemetry as he is high risk. Pt updated regarding the plan.     1800- UA/UC sent. MD notified about the PVR. Pt transferred via WC to  and report given to PEDRITO Hassan. Pt settled in his room, blood return checked on CIVI chemo prior to transfer.

## 2019-10-04 NOTE — PROGRESS NOTES
Transfer  Transferred from: 7D  Via:bed  Reason for transfer: Pt inappropriate for unit  Family: Aware of transfer  Belongings:Sent with pt  Chart:Sent with pt  Medications: Meds from bin sent with pt  Report called from: PEDRITO Daniels  Patient VSS. AOx4. Denies pain. Chemotherapy infusing through Right port. Up independent/SBA. Patient denies nausea/vomiting/diarrhea. Patient on roomair with no needs for oxygen therapy. Transferred to unit 6C for closer telemetry monitoring.

## 2019-10-04 NOTE — PROVIDER NOTIFICATION
DATE/TIME  (DOT-TD, DOT-NOW) CHEMO CHECK ACTIVITY (REGIMEN & DOSE CHECK, DAY, DOSE #, NAME OF CHEMO #1)  CHEMO DRUG #2  CHEMO DRUG #3 NAME OF RN #1 (USE DOT-ME HERE) NAME OF RN#2 (2ND RN TO LOG IN SEPARATELY)   10/4/2019   12:00 PM  Cycle 1 FOLFOX 6 protocol double check    PEDRITO Faye RN    10/4/2019  1:59 PM   Fluorouracil CIVI over 23 hours    Frances Barker, PEDRITO Merchant

## 2019-10-04 NOTE — PLAN OF CARE
Per Mobility Level Guideline;  Bed/chair alarm No.  Patient may ambulate independently  Patient requires the following assistive equipment: N/A   PT/OT consult orders in place No

## 2019-10-04 NOTE — PROGRESS NOTES
Care Coordinator Progress Note    Admission Date/Time:  10/4/2019  Attending MD:  Hill Middleton DO    Data  Chart reviewed, discussed with interdisciplinary team.   Patient was admitted for:    Anemia associated with chemotherapy  Cholangiocarcinoma (H).    Concerns with insurance coverage for discharge needs: None.  Current Living Situation: Patient lives with spouse.  Support System: Supportive and Involved  Services Involved: None  Transportation at Discharge: Car and Family or friend will provide  Transportation to Medical Appointments:   - Name of caregiver: Mily, spouse  Barriers to Discharge: Medical Stability    Coordination of Care and Referrals: Provided patient/family with options for Home Infusion.        Patient was admitted for close monitoring with starting Cycle 1 Day 1 of 5FU. Per team, if patient remains stable, patient will discharge home tomorrow on 5FU via home pump.     Per chart review, clinic coordinator completed a benefit check with [facility] and patient has 100% coverage for 5FU at home and disconnects.     Writer met with the patient to introduce the role of the care coordinator and assess discharge needs. Writer discussed above. Patient in agreement with referral being placed to [facility]. Writer discussed how [facility] nurse will come tomorrow to hook him up to the home pump, and they will then provide disconnects when the chemo bag change is due. Patient stated understanding and agreement. Patient reported that he does not currently receive any in-home supports or services and is independent at home. Patient had no questions or concerns at this time.     AVS updated. HI liaison notified. RNCC will follow as needed.      Plan  Anticipated Discharge Date:  10/5/19  Anticipated Discharge Plan:  Home with home infusion services for 5FU and clinic follow-up as recommended.     Rosalie Neal, RN, BSN, PHN  Care Coordinator

## 2019-10-04 NOTE — LETTER
Transition Communication Hand-off for Care Transitions to Next Level of Care Provider    Name: Girish Chauhan  : 1955  MRN #: [id number]  Primary Care Provider: Dario Ray     Primary Clinic: [facility] [address]  [facility] [address]     Reason for Hospitalization:  Cholangiocarcinoma  Cholangiocarcinoma (H)  Admit Date/Time: 10/4/2019  8:26 AM  Discharge Date: 10/5/2019  Payor Source: Payor: [employer] / Plan: [employer] [place] [employer]VANTAGE / Product Type: HMO /     Girish Chauhan is a 64 year old man with recurrent cholangiocarcinoma, CAD, HTN, Afib on apixaban, aortic stenosis, and chronic diastolic heart failure, CKD, and recent gastrojejunal stenting and exchange (2019). Admitted for close monitoring with starting Cycle 1 Day 1 of 5FU.    Discharge Plan:  Home with 5FU provided by [facility].      Discharge Needs Assessment:  Needs      Most Recent Value   Home Infusion Provider  [facility] [phone], Fax: [phone]        Follow-up plan:    Future Appointments   Date Time Provider Department Center   10/18/2019  6:30 AM  [facility] LAB DRAW [facility]   10/18/2019  7:00 AM Julia Smith PA-C [facility] [place]   10/18/2019  9:00 AM  ONCOLOGY INFUSION [facility] [place]   2019  9:30 AM Tyesha Reina PA-C [facility]   2019  4:30 PM Naresh De Los Santos MD [facility] [place]       Any outstanding tests or procedures:        Radiology & Cardiology Orders     Future Labs/Procedures Complete By Expires    NM Renogram*  10/12/2019 (Approximate) 2019        Referrals     Future Labs/Procedures    Home infusion referral     Comments:    [facility]  Phone  [phone]  Fax  [phone]    Assist with management and disconnect of 5FU.    Anticipated Length of Therapy: Per MD.    Home Infusion Pharmacist to adjust therapy based on labs and clinical assessments: Yes    Labs:  May draw labs from Venous Catheter: Yes  Home  Infusion Pharmacist to order labs based on therapy type and clinical assessments: Yes  Call/Fax Lab Results to: Dr. De Los Santos (Fax: [phone]).    Agency Staff to assess nursing needs for Infusion Therapy.    Access Device Management:  IV Access Type: Port-a-Cath  Flush with Heparin and Normal Saline IVP PRN and routine site care (per agency protocol) to maintain access device? Yes    UROLOGY ADULT REFERRAL     Scheduling Instructions:    Follow up hospitalization and NM Renogram results    Comments:    Your provider has referred you to: [facility]: [facility]    Please be aware that coverage of these services is subject to the terms and limitations of your health insurance plan.  Call member services at your health plan with any benefit or coverage questions.      Please bring the following with you to your appointment:    (1) Any X-Rays, CTs or MRIs which have been performed.  Contact the facility where they were done to arrange for  prior to your scheduled appointment.    (2) List of current medications  (3) This referral request   (4) Any documents/labs given to you for this referral            [employer] Vigil (Casual, Weekend Coverage)    AVS/Discharge Summary is the source of truth; this is a helpful guide for improved communication of patient story

## 2019-10-04 NOTE — H&P
[facility], [place]    History and Physical  Hematology / Oncology     Date of Admission: 10/4/19   Date of Service (when I saw the patient): 10/04/19    Assessment & Plan    Girish Chauhan is a 64 year old man with recurrent cholangiocarcinoma, CAD, HTN, Afib on apixaban, aortic stenosis, and chronic diastolic heart failure, CKD, and recent gastrojejunal stenting and exchange (7/2019). Admitted for close monitoring with starting Cycle 1 Day 1 of 5FU.      ONC  # Progressive recurrent metastatic cholangiocarcinoma  Cholangiocarcinoma resected in 3/2016 (including partial liver resection) with negative margins and no LN involvement, but with perineural and lymphovascular invasion. No adjuvant chemotherapy given. Recurred in bladder 11/2017, bx c/w metastatic cholangiocarcinoma, started on cisplatin/gemcitabine 12/2017. Cisplatin held 6/2018 for poor tolerance. Gemcitabine discontinued 8/2018 for possible TTP, then went off treatment. In 4/2019 was found to have disease progression in the abdominal cavity anterior to the liver, just below the diaphragm. Started on capecitabine 4/30/19 (last dose 5/7) but developed an NSTEMI s/p angiogram 5/6/19 (see below) and actually continued on the capecitabine for several days after NSTEMI without any ongoing cardiac symptoms or evidence of ongoing ischemia. On follow up with Dr. De Los Santos 6/24, disease appeared stable. Decision made with family to hold off on treatment for 2 months and reevaluate. CT A/P on 7/28/19 showed findings concerning for recurrent/metastatic disease. Repeat most recent CT CAP 9/27 shows progression of soft tissue around his resection bed/small bowel and between the bladder /rectum with possible other peritoneal disease.  has also risen to 547. Recently seen by Dr. De Los Santos 10/2 and it was decided to proceed with 5-FU therapy and Xeloda.   - Admitted for close tele monitoring on 5-FU given history of recent ischemic event.  Following one cycle of Xeloda 5/2019 he had a myocardial infarction at OSH.  He actually continued the Xeloda through this event without ongoing ischemia and had treatment of the fixed coronary lesion so unlikely his ischemia was related to the Xeloda. EKG on admission showing know chronic A fib.   - Tele monitoring inpatient.   - Plan to monitor x24hrs inpatient on tele for initiation of 5-FU. EKG on admission showing chronic A fib, stable. Labs on admission show Cr stable at 1.3 (baseline 1.4), LFTs WNL, WBC 10, Hgb 9.9, Plts 182. Per Dr De Los Santos, if labs and clinically stable x24hrs then okay to discharge patient to home with remained of pump to complete.     Treatment plan: Cycle 1 Day 1 = 10/4/19  - Pre meds: Zofran 16 mg, Dex 12 mg once   - Leucovorin 686 mg once   - 5FU 2,350 mg Days 1-2 --- if tolerated well, okay to discharge to home with pump and complete remainder of infusion from home. Care coordinator set up home infusions to connect pump tomorrow at home.   - PRN: Compazine, Ativan      GI  # GERD  - Continue PTA PRN Tums.  - PPI with pre med Dex.      # H/o Recent SBO  # H/o Biliary obstruction s/p stent  # H/o Carol-en-Y hepaticojejunostomy 3/16/16  Presented to OSH and transferred to [facility] 6/5/19-6/9/19 with SBO with possible involvement of draining choledochojejunostomy loop, subjective fevers, and a fib with RVR.   -Had stenting on 6/7/19 for obstructed biliary limb.Found 1 stent missing and the other had migrated. Underwent uncomplicated small bowel enteroscopy/stent exchange with Dr. Rodriguez on 7/17.   - On admission reporting occasional RUQ abdominal pain, occurs at rest without eating. Pain started 10/1. On admission LFTs WNL. No current pain. Paged sent to Dr. Bhatia, may need RUQ US inpatient and/or close follow up with GI outpatient. No issues with bowel movements, GERD. No fevers or chills.     CARDS  # Recent MI  # Coronary artery disease  # Hyperlipidemia  # Atrial Fibrillation  # Bicuspid  aortic valve and moderate Aortic stenosis  # Heart failure with reduced EF  # Hypertension  CAD with history of multivessel stenting in 2011. Developed NSTEMI, s/p LHC and angiogram 5/6/19 with 99% obstruction of OM2, unsuccessful PCI. He also developed afib with RVR during this time which necessitated increase of his metoprolol and starting on apixaban. He was also started on ASA/clopidogrel for CAD. Repeat angiogram done 6/17 with SAM to the OM1.  - Continue PTA metoprolol, Apixaban, Losartan, Lasix and Atorvastatin   - Hold PTA Clopidogrel in case of GI procedure per discussion patient had with Dr. Bhatia. HELD since 10/3.  - Last Echo 7/30 with improved EF 45-50%, mild LV dilation.        # CKD stage III  # Hydronephrosis 2/2 stent malfunction   - Baseline Cr around 1.4-1.5. Improved on admission Cr 1.3.   - On recent CT CAP 9/27, showed interval placement of double-J stent to the right kidney with proximal end of the stent within the right lower pole major calyx and this portion of the hydronephrosis is well drained. However, right upper pole major calyceal system continues to be dilated and likely this portion is not well drained by the new catheter.  - Urology consulted, appreciate assitance.      # Hx of Gout  - Conitnue on allopurinol. Takes colchicine for 3-5 days with flares PRN. No recent flares.     # FEN  - IVFs: per chemotherapy plan, encourage PO intake.   - Diet: Regular diet      # PPX  - DVT: anticoagulation as above   - Bowel: senna-colace, miralax prn  - GI: PPI as above     Lines/Drains: Port, PT/OT   Consults:   CODE: Full      Discussed with staff physician, Dr. Middleton.      Shannon Lopes PA-C   Hematology/Oncology   Pager: [phone]      Code Status   Full Code    Primary Care Physician   Dario Ray    Chief Complaint   Recurrent metastatic cholangiocarcinoma    History is obtained from the patient and chart review     History of Present Illness   Girish Chauhan is a 64  year old man with recurrent cholangiocarcinoma, CAD, HTN, Afib on apixaban, aortic stenosis, and chronic diastolic heart failure, CKD, and recent gastrojejunal stenting and exchange (7/2019). Admitted for close monitoring with starting Cycle 1 Day 1 of 5FU.      On admission reports to be feeling well. Has been active lately, walking frequently around [employer]. Gaining wt, good appetite, eating and drinking well. Denies chest pain, SOB, fevers, chills, changes in bowels or bladder. Has noticed RUQ pain since 10/2, comes and goes and is mild. Occurs at rest without eating. No nausea, vomiting or heart burn noted. Held Plavix since yesterday as informed by Dr Bhatai for possible GI procedure. Anxious to start chemo, hopeful to discharge to home tomorrow.     Past Medical History    I have reviewed this patient's medical history and updated it with pertinent information if needed.   Past Medical History:   Diagnosis Date     Aortic stenosis due to bicuspid aortic valve      Atrial fibrillation (H) 2006    hx of paroxysmal atrial fibrillation since approximately 2006. S/p cardioversion in 2013 with recurrent atrial fibrillation s/p repeat cardioversion 9/29/14.     Basal cell carcinoma 1/2016    right shoulder and right posterior calf s/p electrodessication and curretage 1/2016.     CAD (coronary artery disease) 12/2011    s/p angiogram 12/2011 s/p percutaneous intervention on the LAD with multiple drug-eluting stents complicated by a small perforation in the diagonal branch which sealed spontaneously.  Patient with significant Circumflex stenosis which is being treated conservatively.     Cholangiocarcinoma (H)      CKD (chronic kidney disease)      Gout     affects left foot 2-3 times per year     Hyperlipidemia      Hypertension      Liver disease      Melanoma (H) 9/2015    back s/p resection 9/2015     Squamous cell carcinoma     nose s/p excision       Past Surgical History   I have reviewed this patient's  surgical history and updated it with pertinent information if needed.  Past Surgical History:   Procedure Laterality Date     ------------OTHER-------------      multiple skin cancer resections     CARDIOVERSION  2013, 9/2014     CYSTOSCOPY, RETROGRADES, INSERT STENT URETER(S), COMBINED N/A 9/16/2019    Procedure: Cystoscopy, Right Retrograde Pyelogram, Right Ureteral Stent Placement;  Surgeon: Sivan Walker MD;  Location: [facility] OR     ENDOSCOPIC RETROGRADE CHOLANGIOPANCREATOGRAM N/A 2/26/2016    Procedure: COMBINED ENDOSCOPIC RETROGRADE CHOLANGIOPANCREATOGRAPHY, PLACE TUBE/STENT;  Surgeon: Rafa Andrade MD;  Location: [facility] OR     ENDOSCOPIC RETROGRADE CHOLANGIOPANCREATOGRAPHY  2/2014     ENDOSCOPIC ULTRASOUND UPPER GASTROINTESTINAL TRACT (GI) N/A 6/7/2019    Procedure: ENDOSCOPIC ULTRASOUND, with hot axios stent placement;  Surgeon: Gabino Rodriguez MD;  Location: [facility] OR     ENTEROSCOPY SMALL BOWEL N/A 6/7/2019    Procedure: ENTEROSCOPY;  Surgeon: Gabino Rodriguez MD;  Location: [facility] OR     ESOPHAGOSCOPY, GASTROSCOPY, DUODENOSCOPY (EGD), DILATATION, COMBINED N/A 7/29/2019    Procedure: Esophagoscopy, gastroscopy, duodenoscopy (EGD), with stent exchange and dilation;  Surgeon: Gabino Rodriguez MD;  Location: [facility] OR     EXPLORE COMMON BILE DUCT N/A 3/8/2016    Procedure: EXPLORE COMMON BILE DUCT;  Surgeon: Jose Eduardo Pinedo MD;  Location: [facility] OR     HEPATECTOMY PARTIAL Left 3/8/2016    Procedure: HEPATECTOMY PARTIAL;  Surgeon: Jose Eduardo Pinedo MD;  Location: [facility] OR     INSERT PORT VASCULAR ACCESS Right 12/8/2017    Procedure: INSERT PORT VASCULAR ACCESS;  Place single lumen venous chest port - right;  Surgeon: Drew Powell PA-C;  Location: [facility] OR     SOFT TISSUE SURGERY       STENT  12/2011    LAD with multiple SAM       Prior to Admission Medications   Cannot display prior to admission medications because the patient has not been admitted in this contact.     Allergies    Allergies   Allergen Reactions     Blood Transfusion Related (Informational Only) Other (See Comments)     Patient has a history of a clinically significant antibody against RBC antigens.  A delay in compatible RBCs may occur.       Social History   I have reviewed this patient's social history and updated it with pertinent information if needed. Girish Chauhan  reports that he has never smoked. He has never used smokeless tobacco. He reports current alcohol use of about 2.0 standard drinks of alcohol per week. He reports that he does not use drugs.    Family History   I have reviewed this patient's family history and updated it with pertinent information if needed.   Family History   Problem Relation Age of Onset     Skin Cancer Mother      Other - See Comments Father         father passed away in his 60s post-op with an unknown bile duct obstruction.  no anesthesia complication.       Osteoarthritis Sister      Cerebrovascular Disease Brother      Other - See Comments Brother         prediabetes     Osteoarthritis Sister      No Known Problems Brother        Review of Systems   The 10 point Review of Systems is negative other than noted in the HPI or here.     Physical Exam                      Vital Signs with Ranges     0 lbs 0 oz    Constitutional: Pleasant male seen sitting up in bed. No apparent distress, and appears stated age.  Eyes: Lids and lashes normal, sclera clear, conjunctiva normal.  ENT: Normocephalic, without obvious abnormality, atraumatic, oral pharynx with moist mucus membranes, tonsils without erythema or exudates, gums normal and good dentition.   Respiratory: No increased work of breathing, good air exchange, clear to auscultation bilaterally, no crackles or wheezing.  Cardiovascular: Normal apical impulse, irregular rate and rhythm with known chronic A fib, normal S1 and S2, and no murmur noted.  GI: No masses. +BS. Soft. No tenderness on palpation.   Lymph/Hematologic: No cervical  lymphadenopathy and no supraclavicular lymphadenopathy.  Skin: No bruising or bleeding, normal skin color, texture, turgor, no redness, warmth, or swelling, no rashes, no lesions, no jaundice.  Extremities: There is no redness, warmth, or swelling of the joints. Trace lower extremity edema. No cyanosis.  Neurologic: Awake, alert, oriented to name, place and time.    Vascular access: R Port c/d/i     Data   No results found for this or any previous visit (from the past 24 hour(s)).

## 2019-10-04 NOTE — CONSULTS
Urology Consult History and Physical    Name: Girish Chauhan    MRN: [id number]   YOB: 1955       We were asked to see Girish Chauhan at the request of Shannon Mcgraw PA-C for evaluation and treatment of the following chief complaint:          Chief Complaint:   Right hydronephrosis despite stent    History is obtained from patient and review of records          History of Present Illness:   Girish Chauhan is a 64 year old male with PMH significant for HTN, CAD s/p NSTEMI, afib on apixac=ban, aortic stenosis, HF, recent gastrojejunal stenting who was admitted today for chemotherapy (5FU/ xeloda) to treat progressive recurrent metastatic cholangiocarcinoma.     From a Urology standpoint the cancer, which was initially treated surgically in 3/16 with a partial liver resection did recur in the bladder in 11/17 for which he was started on gem/cis but Cis was later held for poor tolerance. He was found to have right hydronephrosis and underwent 9/16/19 cystoscopy by Dr. Walker showing no bladder tumors but a ureteral stent was placed to decompress the kidney. Urology was consulted today because CT imaging on 9/27 showed progressive disease as well as moderate upper pole hydronephrosis despite a stent located in a lower pole calyx.   - Vitals are normal  - UOP hasn't been recorded  - Labs: Cr (1.28mg/dL, which is below baseline 1.3-1.5); Last UA was normal 9/12/19.  No leukocytosis    The patient is tolerating the stent easily.  He might notice a little increased urinary frequency, but otherwise no flank pain, abdominal pain, hematuria, dysuria.  Emptying is variable.  He typically feels he empties but sometimes suspects he might not.  No incontinence.  Has never had a UTI.            Past Medical History:     Past Medical History:   Diagnosis Date     Aortic stenosis due to bicuspid aortic valve      Atrial fibrillation (H) 2006    hx of paroxysmal atrial fibrillation since approximately 2006. S/p  cardioversion in 2013 with recurrent atrial fibrillation s/p repeat cardioversion 9/29/14.     Basal cell carcinoma 1/2016    right shoulder and right posterior calf s/p electrodessication and curretage 1/2016.     CAD (coronary artery disease) 12/2011    s/p angiogram 12/2011 s/p percutaneous intervention on the LAD with multiple drug-eluting stents complicated by a small perforation in the diagonal branch which sealed spontaneously.  Patient with significant Circumflex stenosis which is being treated conservatively.     Cholangiocarcinoma (H)      CKD (chronic kidney disease)      Gout     affects left foot 2-3 times per year     Hyperlipidemia      Hypertension      Liver disease      Melanoma (H) 9/2015    back s/p resection 9/2015     Squamous cell carcinoma     nose s/p excision            Past Surgical History:     Past Surgical History:   Procedure Laterality Date     ------------OTHER-------------      multiple skin cancer resections     CARDIOVERSION  2013, 9/2014     CYSTOSCOPY, RETROGRADES, INSERT STENT URETER(S), COMBINED N/A 9/16/2019    Procedure: Cystoscopy, Right Retrograde Pyelogram, Right Ureteral Stent Placement;  Surgeon: Sivan Walker MD;  Location: [facility]     ENDOSCOPIC RETROGRADE CHOLANGIOPANCREATOGRAM N/A 2/26/2016    Procedure: COMBINED ENDOSCOPIC RETROGRADE CHOLANGIOPANCREATOGRAPHY, PLACE TUBE/STENT;  Surgeon: Rafa Andrade MD;  Location: [facility]     ENDOSCOPIC RETROGRADE CHOLANGIOPANCREATOGRAPHY  2/2014     ENDOSCOPIC ULTRASOUND UPPER GASTROINTESTINAL TRACT (GI) N/A 6/7/2019    Procedure: ENDOSCOPIC ULTRASOUND, with hot axios stent placement;  Surgeon: Gabino Rodriguez MD;  Location: [facility]     ENTEROSCOPY SMALL BOWEL N/A 6/7/2019    Procedure: ENTEROSCOPY;  Surgeon: Gabino Rodriguez MD;  [facility]: [facility]     ESOPHAGOSCOPY, GASTROSCOPY, DUODENOSCOPY (EGD), DILATATION, COMBINED N/A 7/29/2019    Procedure: Esophagoscopy, gastroscopy, duodenoscopy (EGD), with stent  exchange and dilation;  Surgeon: Gabino Rodriguez MD;  [facility]: [facility]     EXPLORE COMMON BILE DUCT N/A 3/8/2016    Procedure: EXPLORE COMMON BILE DUCT;  Surgeon: Jose Eduardo Pinedo MD;  [facility]     HEPATECTOMY PARTIAL Left 3/8/2016    Procedure: HEPATECTOMY PARTIAL;  Surgeon: Jose Eduardo Pinedo MD;  [facility]: [facility]     INSERT PORT VASCULAR ACCESS Right 12/8/2017    Procedure: INSERT PORT VASCULAR ACCESS;  Place single lumen venous chest port - right;  Surgeon: Drew Powell PA-C;  Location: [facility]     SOFT TISSUE SURGERY       STENT  12/2011    LAD with multiple SAM            Social History:     Social History     Tobacco Use     Smoking status: Never Smoker     Smokeless tobacco: Never Used   Substance Use Topics     Alcohol use: Yes     Alcohol/week: 2.0 standard drinks     Types: 2 Cans of beer per week            Family History:     Family History   Problem Relation Age of Onset     Skin Cancer Mother      Other - See Comments Father         father passed away in his 60s post-op with an unknown bile duct obstruction.  no anesthesia complication.       Osteoarthritis Sister      Cerebrovascular Disease Brother      Other - See Comments Brother         prediabetes     Osteoarthritis Sister      No Known Problems Brother             Allergies:     Allergies   Allergen Reactions     Blood Transfusion Related (Informational Only) Other (See Comments)     Patient has a history of a clinically significant antibody against RBC antigens.  A delay in compatible RBCs may occur.            Medications:     Current Facility-Administered Medications   Medication     acetaminophen (TYLENOL) tablet 650 mg     albuterol (PROAIR HFA/PROVENTIL HFA/VENTOLIN HFA) 108 (90 Base) MCG/ACT inhaler 1-2 puff     albuterol (PROVENTIL) neb solution 2.5 mg     allopurinol (ZYLOPRIM) tablet 100 mg     apixaban ANTICOAGULANT (ELIQUIS) tablet 5 mg     atorvastatin (LIPITOR) tablet 40 mg     calcium carbonate (TUMS)  chewable tablet 500 mg     Chemotherapy Infusing-Continuous Infusion     dexamethasone (DECADRON) tablet 12 mg     diphenhydrAMINE (BENADRYL) injection 50 mg     EPINEPHrine PF (ADRENALIN) injection 0.3 mg     fluorouracil (ADRUCIL) 2,350 mg in D5W 1,097 mL CHEMOTHERAPY     [START ON 10/5/2019] furosemide (LASIX) tablet 20 mg     heparin 100 UNIT/ML injection 5 mL     heparin lock flush 10 UNIT/ML injection 5-10 mL     heparin lock flush 10 UNIT/ML injection 5-10 mL     [START ON 10/5/2019] influenza recomb quadrivalent PF (FLUBLOK) injection 0.5 mL     leucovorin calcium 686 mg in D5W 100 mL infusion     lidocaine (LMX4) cream     lidocaine 1 % 0.1-1 mL     LORazepam (ATIVAN) injection 0.5-1 mg     LORazepam (ATIVAN) tablet 0.5-1 mg     [START ON 10/5/2019] losartan (COZAAR) tablet 25 mg     Medication Instruction     MEDICATION INSTRUCTION     meperidine (DEMEROL) injection 25 mg     methylPREDNISolone sodium succinate (solu-MEDROL) injection 125 mg     metoprolol tartrate (LOPRESSOR) tablet 150 mg     [START ON 10/5/2019] multivitamin w/minerals (THERA-VIT-M) tablet 1 tablet     naloxone (NARCAN) injection 0.1-0.4 mg     ondansetron (ZOFRAN) injection 8 mg    Or     ondansetron (ZOFRAN-ODT) ODT tab 8 mg    Or     ondansetron (ZOFRAN) tablet 8 mg     [START ON 10/5/2019] pantoprazole (PROTONIX) EC tablet 40 mg     prochlorperazine (COMPAZINE) injection 10 mg     prochlorperazine (COMPAZINE) tablet 10 mg     sodium chloride (PF) 0.9% PF flush 10-20 mL     sodium chloride (PF) 0.9% PF flush 10-20 mL     sodium chloride 0.9% infusion     Facility-Administered Medications Ordered in Other Encounters   Medication     sodium chloride (PF) 0.9% PF flush 20 mL             Review of Systems:    ROS: See HPI for pertinent details.  Remainder of 10-point ROS negative.         Physical Exam:   VS:  T: 97    HR: 74    BP: 155/88    RR: 16   GEN:  AOx3.  NAD.  Pleasant.  NECK:  Supple.  No lymphadenopathy.  LUNGS:  Non-labored breathing.  BACK:  No costoverterbral tenderness.  ABD:  Soft.  NT.  ND.  No rebound or guarding.  No masses.    EXT:  Warm, well perfused.  no lower extremity edema bilaterally  SKIN:  Warm.  Dry.  No rashes.  NEURO:  CN grossly intact.            Data:   All laboratory data reviewed:    No results found for: PSA  Recent Labs   Lab 10/04/19  1125   WBC 10.0   HGB 9.9*        Recent Labs   Lab 10/04/19  1125      POTASSIUM 3.9   CHLORIDE 106   CO2 25   BUN 25   CR 1.28*   GLC 90   GARY 8.6   MAG 1.8   PHOS 3.2     No lab results found in last 7 days.    Invalid input(s): URINEBLOOD  No results found for this or any previous visit.    All pertinent imaging reviewed:  EXAMINATION: CT CHEST/ABDOMEN/PELVIS W CONTRAST, 9/27/2019 9:02 AM     TECHNIQUE:  Helical CT images from the lung apices through the  symphysis pubis were obtained with contrast.  Coronal and sagittal  reformatted images were generated at a workstation for further  assessment.     CONTRAST:  101 ml Isovue 370.     COMPARISON: None.     HISTORY: assess progression; Cholangiocarcinoma (H)     ADDITIONAL HISTORY FROM THE EMR: 64-year-old male with history of  metastatic cholangiocarcinoma status post left hepatectomy,  cholecystectomy, and hepaticojejunostomy . Recurrent disease at the  left hepatectomy margin which required stenting. Recent right ureteral  stent placement.     FINDINGS:     CHEST: No consolidation. Previously seen patchy groundglass alveolar  opacifications are completely resolved. No new suspicious lung nodule.  Few scattered lung nodules, stable compared to multiple prior exams,  largest is in the right lower lobe posterior basal segment measuring 3  mm (series 6 image 260). No pleural effusion or pneumothorax.Central  tracheobronchial tree is patent. Heart size is normal. No pericardial  effusion. Three-vessel coronary artery atherosclerosis with stent  along the course of LAD. Bovine aortic arch. Origin of the  great  vessels are patent. Aortic valve calcification, unchanged. Grossly no  pulmonary embolus. No thoracic aneurysm. No thoracic lymphadenopathy.  No thyroid nodules. Right chest wall Port-A-Cath with the tip of the  catheter projecting to low SVC.     ABDOMEN and PELVIS:   Postsurgical changes of left hepatectomy, cholecystectomy, and  hepaticojejunostomy. Axios metallic gastrojejunostomy stent, which  appears appropriately positioned. There is lobulated enhancing soft  tissue density in the distal margin of the stent, stable to slightly  more conspicuous to prior exam. Indeterminant if this is normal  jejunal mucosal wall or recurrent disease at the distal end of the  stent. Nevertheless, the stomach is well decompressed and there is no  evidence of obstruction.     Increased conspicuity of soft tissue density encasing the proximal  portion of the jejunum. This measured as 3.8 x 2.7 cm (series 3 image  289, previously 2.7 x 2.8 cm and measured in the same fashion. The  enhancing portion of this soft tissue density extends more further  into the ventral abdominal wall in today's exam.  Unchanged mild  surrounding omental haziness. Previously described omental lymph node  was measuring 8 x 12 mm, today measured as 11 x 17 mm (series 3 image  318).     No splenomegaly. Several indeterminate hypodense foci throughout the  spleen, largest is in the inferior portion of the spleen measuring 6  mm craniocaudally, unchanged compared to CT from 6/21/2019, however  new since 4/19/2019.     Continued hydronephrosis of the right kidney upper pole. There is new  double-J stent catheter with the proximal end of the catheter within  the lower pole major calyx with the distal end within the bladder.  Normal enhancement of the right renal cortex. No new solid lesion.  Normal stone. Left kidney collecting system is unremarkable. Bilateral  normal adrenal glands.     Stable to slightly increased soft tissue thickening posterior  to  bladder wall in the left of midline, extending from the anterior  rectal wall through to the left ventrolateral abdominal wall, today  measured as 9 mm at thickest point, previously 7 mm (series 3 images  551-537). Stable pelvic phleboliths.     Previously seen nodular enhancing lesion in the posterior aspect of  the bladder at the right UV junction is less conspicuous due to  recently placed stents, however grossly similar in appearance.  Increased conspicuity of soft tissue lesion anterior to rectum on  axial measuring 62.9 x 1.7 cm (series 3 image 558) previously 2.1 x  1.2 cm.     No suspicious lesion in the liver parenchyma. Biliary ductal tree is  not dilated.     Residual pancreas looks unremarkable. No pancreatic ductal  dilatation.Unchanged 10 mm inter aortacaval lymph node (series 3 image  363).  No aortic aneurysm. No vascular occlusion.     BONES and SOFT TISSUES: Diffuse demineralization of the thoracolumbar  spine. No worrisome lytic or sclerotic bone lesion identified. Old rib  fractures on the right posterior ribs.                                                                      IMPRESSION:   In this patient with metastatic cholangiocarcinoma status post post  left hepatectomy, cholecystectomy,hepaticojejunostomy and recent  ureteral stent placement; overall findings consistent with progressive  disease.     1a. Increased conspicuity of soft tissue density encasing the proximal  portion of the jejunum, today measuring 3.8 x 2.7 cm , previously 2.7  x 2.8 cm with further invasion into the adjacent ventral abdominal  wall.   1b. Increased size of an omental lymph nodes adjacent to the  above-mentioned soft tissue density measuring 11 x 17 mm, previously 8  x 12 mm.   1c. Increased size of a prerectal soft tissue mass.  1d. Increased thickening of soft tissue density extending from the  presacral region left anterolateral to ventral abdominal wall.  2. There is lobulated enhancing soft tissue  density in the distal  margin of the gastrojejunostomy stent, stable to slightly more  conspicuous compared to prior exam. Indeterminant if this is normal  jejunal mucosal wall or recurrent disease at the distal end of the  stent. Nevertheless, the stomach is well decompressed and there is no  evidence of obstruction.   3. Several indeterminant hypodense foci in the spleen, stable since  6/21/2018, and however new since 4/19/2019.  4. Interval placement of double-J stent to the right kidney. The  proximal end of the stent is within the right lower pole major calyx  and this portion of the hydronephrosis is well drained. However, right  upper pole major calyceal system continues to be dilated and likely  this portion is not well drained by the new catheter. Recommend  consultation to urology.   5. Complete resolution of previously seen patchy groundglass alveolar  opacities. No evidence of lung metastasis. Stable few scattered lung  nodules, largest is 3 mm in size.            Impression and Plan:   Impression / Plan:   Girish Chauhan is a 64 year old male with persistent right hydronephrosis (likely 2/2 to metastatic disease) despite a properly positioned stent.  Bladder also appears distended on CT scan.    No h/o UTIs or flank pain and CR remains normal      RECS:  - will check UA today to assure no infection, although pt has no history or symptoms or infection and UA in September was negative.    - Please check BladderScan postvoid residual. If >200-250cc place [place], start Flomax (0.4mg daily) and recheck renal ultrasound in 24 hours.  If PVR is low, obtain NM renogram (which will definitively tell us whether the patient is obstructed.      Urology will follow    Discussed with Dr. Kaplan    Thank you for the opportunity to participate in the care of Girish Chauhan.     Krissy Swartz PA-C  Urology Physician Assistant  Personal Pager: [phone]    Please call Job Code:   [phone] to reach the Urology  resident or PA on call - Weekdays  [phone] to reach the Urology resident or PA on call - Weeknights and weekends

## 2019-10-04 NOTE — PROGRESS NOTES
Prior Authorization Approval    Ondansetron HCl 4MG tablets  Date Initiated: 10/4/2019  Date Completed: 10/4/2019  Prior Auth Type: Quantity Limit                Status: Approved    Effective Date: 10/04/2019 - 04/04/2020  Copay: 3.57     [place] Filled: Yes    Insurance: [employer]. - Phone [phone] Fax [phone]  ID: [id number]  Case Number: [id number]   Submitted Via: Lita Ma  [facility] Liaison  Ph: [phone] Page: [phone]

## 2019-10-05 ENCOUNTER — HOME INFUSION (PRE-WILLOW HOME INFUSION) (OUTPATIENT)
Dept: PHARMACY | Facility: CLINIC | Age: 64
End: 2019-10-05

## 2019-10-05 ENCOUNTER — RECORDS - HEALTHEAST (OUTPATIENT)
Dept: ADMINISTRATIVE | Facility: OTHER | Age: 64
End: 2019-10-05

## 2019-10-05 VITALS
BODY MASS INDEX: 23.6 KG/M2 | TEMPERATURE: 97.6 F | RESPIRATION RATE: 18 BRPM | DIASTOLIC BLOOD PRESSURE: 99 MMHG | WEIGHT: 164.5 LBS | SYSTOLIC BLOOD PRESSURE: 142 MMHG | OXYGEN SATURATION: 96 % | HEART RATE: 95 BPM

## 2019-10-05 LAB
ALBUMIN SERPL-MCNC: 3.3 G/DL (ref 3.4–5)
ALP SERPL-CCNC: 115 U/L (ref 40–150)
ALT SERPL W P-5'-P-CCNC: 21 U/L (ref 0–70)
ANION GAP SERPL CALCULATED.3IONS-SCNC: 8 MMOL/L (ref 3–14)
AST SERPL W P-5'-P-CCNC: 12 U/L (ref 0–45)
BASOPHILS # BLD AUTO: 0 10E9/L (ref 0–0.2)
BASOPHILS NFR BLD AUTO: 0 %
BILIRUB SERPL-MCNC: 0.4 MG/DL (ref 0.2–1.3)
BUN SERPL-MCNC: 27 MG/DL (ref 7–30)
CALCIUM SERPL-MCNC: 9.2 MG/DL (ref 8.5–10.1)
CHLORIDE SERPL-SCNC: 106 MMOL/L (ref 94–109)
CO2 SERPL-SCNC: 23 MMOL/L (ref 20–32)
CREAT SERPL-MCNC: 1.25 MG/DL (ref 0.66–1.25)
DIFFERENTIAL METHOD BLD: ABNORMAL
EOSINOPHIL # BLD AUTO: 0 10E9/L (ref 0–0.7)
EOSINOPHIL NFR BLD AUTO: 0 %
ERYTHROCYTE [DISTWIDTH] IN BLOOD BY AUTOMATED COUNT: 13.7 % (ref 10–15)
GFR SERPL CREATININE-BSD FRML MDRD: 61 ML/MIN/{1.73_M2}
GLUCOSE SERPL-MCNC: 155 MG/DL (ref 70–99)
HCT VFR BLD AUTO: 34.6 % (ref 40–53)
HGB BLD-MCNC: 10.9 G/DL (ref 13.3–17.7)
IMM GRANULOCYTES # BLD: 0 10E9/L (ref 0–0.4)
IMM GRANULOCYTES NFR BLD: 0.4 %
LYMPHOCYTES # BLD AUTO: 0.9 10E9/L (ref 0.8–5.3)
LYMPHOCYTES NFR BLD AUTO: 13 %
MCH RBC QN AUTO: 28.5 PG (ref 26.5–33)
MCHC RBC AUTO-ENTMCNC: 31.5 G/DL (ref 31.5–36.5)
MCV RBC AUTO: 91 FL (ref 78–100)
MONOCYTES # BLD AUTO: 0.2 10E9/L (ref 0–1.3)
MONOCYTES NFR BLD AUTO: 3.3 %
NEUTROPHILS # BLD AUTO: 5.6 10E9/L (ref 1.6–8.3)
NEUTROPHILS NFR BLD AUTO: 83.3 %
NRBC # BLD AUTO: 0 10*3/UL
NRBC BLD AUTO-RTO: 0 /100
PLATELET # BLD AUTO: 208 10E9/L (ref 150–450)
POTASSIUM SERPL-SCNC: 4.6 MMOL/L (ref 3.4–5.3)
PROT SERPL-MCNC: 6.9 G/DL (ref 6.8–8.8)
RBC # BLD AUTO: 3.82 10E12/L (ref 4.4–5.9)
SODIUM SERPL-SCNC: 137 MMOL/L (ref 133–144)
WBC # BLD AUTO: 6.8 10E9/L (ref 4–11)

## 2019-10-05 PROCEDURE — 36415 COLL VENOUS BLD VENIPUNCTURE: CPT | Performed by: PHYSICIAN ASSISTANT

## 2019-10-05 PROCEDURE — 85025 COMPLETE CBC W/AUTO DIFF WBC: CPT | Performed by: PHYSICIAN ASSISTANT

## 2019-10-05 PROCEDURE — 25800030 ZZH RX IP 258 OP 636: Performed by: INTERNAL MEDICINE

## 2019-10-05 PROCEDURE — 25000128 H RX IP 250 OP 636: Performed by: INTERNAL MEDICINE

## 2019-10-05 PROCEDURE — 90682 RIV4 VACC RECOMBINANT DNA IM: CPT | Performed by: INTERNAL MEDICINE

## 2019-10-05 PROCEDURE — 99239 HOSP IP/OBS DSCHRG MGMT >30: CPT | Performed by: INTERNAL MEDICINE

## 2019-10-05 PROCEDURE — 25000132 ZZH RX MED GY IP 250 OP 250 PS 637: Performed by: PHYSICIAN ASSISTANT

## 2019-10-05 PROCEDURE — 80053 COMPREHEN METABOLIC PANEL: CPT | Performed by: PHYSICIAN ASSISTANT

## 2019-10-05 RX ORDER — EPINEPHRINE 1 MG/ML
0.3 INJECTION, SOLUTION, CONCENTRATE INTRAVENOUS EVERY 5 MIN PRN
Status: DISCONTINUED | OUTPATIENT
Start: 2019-10-05 | End: 2019-10-05 | Stop reason: CLARIF

## 2019-10-05 RX ADMIN — LOSARTAN POTASSIUM 25 MG: 25 TABLET ORAL at 08:52

## 2019-10-05 RX ADMIN — FUROSEMIDE 20 MG: 20 TABLET ORAL at 08:52

## 2019-10-05 RX ADMIN — METOPROLOL TARTRATE 150 MG: 100 TABLET, FILM COATED ORAL at 08:52

## 2019-10-05 RX ADMIN — FLUOROURACIL 2350 MG: 50 INJECTION, SOLUTION INTRAVENOUS at 13:41

## 2019-10-05 RX ADMIN — APIXABAN 5 MG: 5 TABLET, FILM COATED ORAL at 08:52

## 2019-10-05 RX ADMIN — MULTIPLE VITAMINS W/ MINERALS TAB 1 TABLET: TAB at 08:52

## 2019-10-05 RX ADMIN — INFLUENZA A VIRUS A/BRISBANE/02/2018 (H1N1) RECOMBINANT HEMAGGLUTININ ANTIGEN, INFLUENZA A VIRUS A/KANSAS/14/2017 (H3N2) RECOMBINANT HEMAGGLUTININ ANTIGEN, INFLUENZA B VIRUS B/PHUKET/3073/2013 RECOMBINANT HEMAGGLUTININ ANTIGEN, AND INFLUENZA B VIRUS B/MARYLAND/15/2016 RECOMBINANT HEMAGGLUTININ ANTIGEN 0.5 ML: 45; 45; 45; 45 INJECTION INTRAMUSCULAR at 10:18

## 2019-10-05 ASSESSMENT — ACTIVITIES OF DAILY LIVING (ADL)
ADLS_ACUITY_SCORE: 13

## 2019-10-05 NOTE — PROGRESS NOTES
DISCHARGE   Discharged to: Home  Via: Automobile  Accompanied by: Wife  Discharge Instructions: principal diagnosis, major findings/procedures, diet, activity, medications, follow up appointments, when to call the MD, and what to watchout for (i.e. s/s of infection, increasing SOB, palpitations, Angina). Pt states understanding of all discharge instructions.   Prescriptions: To be filled at discharge pharmacy; scripts sent to pharmacy.  Order for 5FU will be filled by [facility]  Follow Up Appointments: with Dr. Bhatia () in 1-2 weeks.  Belongings: All sent with pt. Pt verifies that they are taking all belongings with them.   IV: Portacath maintained for chemotherapy infusion.  [facility] staff will de-access the site when current cycle of chemotherapy is complete.   Telemetry: discontinued.   Pt exhibits understanding of above discharge instructions; all questions answered.  Discharge Paperwork: faxed to discharge planner.

## 2019-10-05 NOTE — PLAN OF CARE
Admitted 10/4 for cardiac monitoring with starting Cycle 1 Day 1 of 5 with fluorouracil for cholangiocarcinoma.  Neuro: A&Ox4.   Cardiac: Afib/aflutter. VSS.   Respiratory: Sating WNL on RA.  GI/: Adequate urine output.   Diet/appetite: Tolerating regular diet.   Activity:  Up independently.  Pain: Denies.  Skin: No new deficits noted.  LDA's: Portacath  Chemo continues to infuse at 47.7 ml/hr. Chemo RN checked line for blood return every 4 hours.    Plan: Continue with POC. Notify primary team with changes.

## 2019-10-05 NOTE — PROGRESS NOTES
"S: \"Krishna\" (he/him/his pronouns) was admitted 10/4 for cardiac monitoring with starting Cycle 1 Day 1 of 5 with fluorouracil     B: medical history of cholangiocarcinoma, CAD, NSTEMI, HTN, Afib on apixaban, aortic stenosis, and chronic diastolic heart failure, CKD, and recent gastrojejunal stenting and exchange (7/2019). Started on capecitabine 4/30/19 (last dose 5/7) but developed an NSTEMI, now being monitored at the beginning of new chemo round.     A: Monitored vitals and assessed pt status.   Changed: none  Running: fluorouracil (R chest port-a cath)  PRN: none  Tele: atrial fibrillation (chronic)  O2: room air  Mobility: up ad vickie     Neuro: A&O x 4, pleasant and calm  Cardiac: irregular rhythm; hypertensive (team notified): 145/97, 142/90, 155/88  Respiratory: lungs sounds clear and equal   GI/: voiding adequately; post-void residual checked as per urology: scanned at 150 mL (note states to straight cath if >200); last BM 10/4  Diet/appetite: regular diet, tolerating; appetite good  Activity: independnent  Pain: none reported  Skin: no areas of concern noted  LDAs: R chest port A cath (blood return checked by 7D RN Q4H    R: Home tomorrow with home infusion pump if no cardiac symptoms. Continue to monitor Pt status and report changes to treatment team - heme onc solid tumor.    "

## 2019-10-05 NOTE — PROGRESS NOTES
Care Coordinator - Discharge Planning    Admission Date/Time:  10/4/2019  Attending MD:  Hill Middleton,      Data  Date of initial CC assessment:  10/4/2019  Chart reviewed, discussed with interdisciplinary team.   Patient was admitted for:   1. Anemia associated with chemotherapy    2. Cholangiocarcinoma (H)    3. Hydronephrosis of right kidney         Assessment   Full assessment completed in previous note    Coordination of Care and Referrals: Referral previously made to [facility] for 5FU. Per MD, patient stable to discharge today. Writer updated HI; they will contact patient directly regarding nursing visit and delivery of supplies.       Plan  Anticipated Discharge Date:  10/5/2019  Anticipated Discharge Plan:  Home with [facility] providing 5FU and clinic follow up as recommended by the team    CTS Handoff completed:  YES    MENDEZ Vigil

## 2019-10-05 NOTE — DISCHARGE SUMMARY
[facility], [place]    Discharge Summary  Hematology / Oncology    Date of Admission:  10/4/2019  Date of Discharge:  10/5/2019  Discharging Provider: Thompson Munoz  Date of Service (when I saw the patient): 10/05/19    Discharge Diagnoses   Patient Active Problem List   Diagnosis     Cholangiocarcinoma (H)     Aortic root dilatation (H)     Disorder of aorta (H)     Coronary atherosclerosis     Gout     Pure hypercholesterolemia     Malignant melanoma of skin (H)     Paroxysmal atrial fibrillation (H)     Anemia associated with chemotherapy     Secondary malignant neoplasm of bladder (H)     Thrombotic microangiopathy (H)     Aortic stenosis     Peritoneal carcinomatosis (H)     SBO (small bowel obstruction) (H)     Cardiomyopathy (H)     Essential hypertension     CKD (chronic kidney disease) stage 3, GFR 30-59 ml/min (H)     Bicuspid aortic valve     Abnormal coronary angiogram     Heart failure with preserved ejection fraction (H)     Mixed hyperlipidemia     Cholangitis     Thrombocytopenia (H)     Fever, unspecified fever cause     Nausea and vomiting, intractability of vomiting not specified, unspecified vomiting type     Permanent atrial fibrillation     Chronic systolic congestive heart failure (H)       History of Present Illness   Girish Chauhan is an 64 year old male who presented with progressive recurrent metastatic cholangiocarcinoma admitted for initiation of 5FU    Hospital Course   Girish Chauhan was admitted on 10/4/2019.  The following problems were addressed during his hospitalization:    ONC  # Progressive recurrent metastatic cholangiocarcinoma  Cholangiocarcinoma resected in 3/2016 (including partial liver resection) with negative margins and no LN involvement, but with perineural and lymphovascular invasion. No adjuvant chemotherapy given. Recurred in bladder 11/2017, bx c/w metastatic cholangiocarcinoma, started on cisplatin/gemcitabine 12/2017. Cisplatin  held 6/2018 for poor tolerance. Gemcitabine discontinued 8/2018 for possible TTP, then went off treatment. In 4/2019 was found to have disease progression in the abdominal cavity anterior to the liver, just below the diaphragm. Started on capecitabine 4/30/19 (last dose 5/7) but developed an NSTEMI s/p angiogram 5/6/19 (see below) and actually continued on the capecitabine for several days after NSTEMI without any ongoing cardiac symptoms or evidence of ongoing ischemia. On follow up with Dr. De Los Santos 6/24, disease appeared stable. Decision made with family to hold off on treatment for 2 months and reevaluate. CT A/P on 7/28/19 showed findings concerning for recurrent/metastatic disease. Repeat most recent CT CAP 9/27 shows progression of soft tissue around his resection bed/small bowel and between the bladder /rectum with possible other peritoneal disease.  has also risen to 547. Recently seen by Dr. De Los Santos 10/2 and it was decided to proceed with 5-FU therapy and Xeloda.   - Admitted for close tele monitoring on 5-FU given history of recent ischemic event. Following one cycle of Xeloda 5/2019 he had a myocardial infarction at OSH.  He actually continued the Xeloda through this event without ongoing ischemia and had treatment of the fixed coronary lesion so unlikely his ischemia was related to the Xeloda. EKG on admission showing know chronic A fib.   - He tolerated 5FU without issue for 24hrs and will discharge home with home infusion to complete cycle 1 below. Chemotherapy orders placed by Dr. Middleton.     Treatment plan: Cycle 1 Day 1 = 10/4/19  - Pre meds: Zofran 16 mg, Dex 12 mg once   - Leucovorin 686 mg once   - 5FU 2,350 mg Days 1-2  - PRN: Compazine, Ativan    # CKD stage III  # Hydronephrosis 2/2 stent malfunction   Baseline Cr around 1.4-1.5. Improved on admission Cr 1.3. On recent CT CAP 9/27, showed interval placement of double-J stent to the right kidney with proximal end of the stent within the  right lower pole major calyx and this portion of the hydronephrosis is well drained. However, right upper pole major calyceal system continues to be dilated and likely this portion is not well drained by the new catheter. Urology consulted, who recommended checking PVR (less than 200) and ordering an NM renogram  To evaluate for obstruction.  Unfortunately renogram cannot be completed over the weekend.  Discussed with urology on-call who recommended outpatient renogram and close urology follow-up.  Orders placed.  - NM Renogram and Urology follow up ordered on discharge    This patient was seen and discussed with Dr. Kane Munoz MD  Internal Medicine PGY3  [phone]    Significant Results and Procedures   None     Pending Results   These results will be followed up by N/A  Unresulted Labs Ordered in the Past 30 Days of this Admission     No orders found for last 31 day(s).          Code Status   Full Code    Primary Care Physician   Dario Ray    Physical Exam   Temp: 97.6  F (36.4  C) Temp src: Oral BP: (!) 142/99 Pulse: 95 Heart Rate: 89 Resp: 18 SpO2: 96 % O2 Device: None (Room air)    Vitals:    10/04/19 1018 10/05/19 0359   Weight: 76.9 kg (169 lb 9.6 oz) 74.6 kg (164 lb 8 oz)     Vital Signs with Ranges  Temp:  [97.5  F (36.4  C)-97.9  F (36.6  C)] 97.6  F (36.4  C)  Pulse:  [83-95] 95  Heart Rate:  [] 89  Resp:  [16-18] 18  BP: (125-147)/() 142/99  SpO2:  [92 %-99 %] 96 %  I/O last 3 completed shifts:  In: 3197.9 [P.O.:2130; IV Piggyback:1067.9]  Out: 1825 [Urine:1825]    Constitutional: NAD  Eyes: White sclera. EOMi. PERRL.  ENT: NC/AT.  Respiratory: CTAB  Cardiovascular: Irregularly irregular. No m/r/g  GI: Soft, non-tender, non-distended  Lymph/Hematologic: No cervical, supraclavicular lymphadenopathy  Skin: C/d/i without rash on exposed skin  Musculoskeletal: WWP. No peripheral edema  Neurologic: Alert and oriented. Moving all four extremities equally. No gross  focal deficits.  Neuropsychiatric: Appropriate affect    Time Spent on this Encounter   I, Thompson Munoz MD, personally saw the patient today and spent greater than 30 minutes discharging this patient.    Discharge Disposition   Discharged to home  Condition at discharge: Stable    Consultations This Hospital Stay   UROLOGY IP CONSULT    Discharge Orders      **CBC with platelets differential FUTURE 2mo    Last Lab Result: Hemoglobin (g/dL)       Date                     Value                 10/04/2019               9.9 (L)          ----------     **Comprehensive metabolic panel FUTURE 2mo     Home infusion referral      Reason for your hospital stay    Cholangiocarcinoma, scheduled chemotherapy     Follow Up and recommended labs and tests    Follow up with Dr. Bhatia with GI , at (location with clinic name or city) [facility], within 1-2  Weeks to follow up biliary stents and abdominal pain     Activity    Your activity upon discharge: activity as tolerated     When to contact your care team    [facility] triage line at [phone] for temp greater than or equal to 100.4, uncontrolled nausea/vomiting/diarrhea/constipation, unrelieved pain, bleeding not relieved with pressure, dizziness, chest pain, shortness of breath, loss of consciousness, and any new or concerning symptoms.     IV access    **Ordering Provider MUST call/page Care Coordinator/ to discuss arranging this service**    You are going home with the following vascular access device: Port-a-Cath.     Full Code     Diet    Follow this diet upon discharge: Orders Placed This Encounter      Room Service      Regular Diet Adult     Discharge Medications   Current Discharge Medication List      START taking these medications    Details   ondansetron (ZOFRAN) 4 MG tablet Take 1 tablet (4 mg) by mouth every 8 hours as needed for nausea  Qty: 30 tablet, Refills: 0    Associated Diagnoses: Cholangiocarcinoma (H)         CONTINUE these  medications which have NOT CHANGED    Details   acetaminophen (TYLENOL) 500 MG tablet Take 500 mg by mouth every 6 hours as needed for fever or pain      allopurinol (ZYLOPRIM) 100 MG tablet Take 100 mg by mouth every evening       apixaban ANTICOAGULANT (ELIQUIS) 5 MG tablet Take 1 tablet (5 mg) by mouth 2 times daily      atorvastatin (LIPITOR) 40 MG tablet Take 40 mg by mouth every evening       calcium carbonate (TUMS) 500 MG chewable tablet Take 1 chew tab by mouth 4 times daily as needed for heartburn      clopidogrel (PLAVIX) 75 MG tablet Take 75 mg by mouth every morning       colchicine (COLCYRS) 0.6 MG tablet Take 0.6 mg by mouth as needed      furosemide (LASIX) 20 MG tablet Take 1 tablet (20 mg) by mouth daily      losartan (COZAAR) 25 MG tablet Take 25 mg by mouth daily       metoprolol tartrate (LOPRESSOR) 50 MG tablet Take 150 mg by mouth 2 times daily       multivitamin w/minerals (THERA-VIT-M) tablet Take 1 tablet by mouth daily      oxybutynin (DITROPAN) 5 MG tablet Take 1 tablet (5 mg) by mouth 3 times daily as needed for bladder spasms For bladder spasms  Qty: 60 tablet, Refills: 3    Associated Diagnoses: Other hydronephrosis      tamsulosin (FLOMAX) 0.4 MG capsule Take 1 capsule (0.4 mg) by mouth daily For stent discomfort  Qty: 30 capsule, Refills: 11    Associated Diagnoses: Other hydronephrosis      Nitroglycerin (NITROSTAT SL) Place 0.4 mg under the tongue every 5 minutes as needed for chest pain (Carries medication - has never used)          STOP taking these medications       phenazopyridine (AZO) 97.5 MG tablet Comments:   Reason for Stopping:             Allergies   Allergies   Allergen Reactions     Blood Transfusion Related (Informational Only) Other (See Comments)     Patient has a history of a clinically significant antibody against RBC antigens.  A delay in compatible RBCs may occur.     Data   Most Recent 3 CBC's:  Recent Labs   Lab Test 10/05/19  0546 10/04/19  1123  09/27/19  0751   WBC 6.8 10.0 10.1   HGB 10.9* 9.9* 10.6*   MCV 91 92 93    182 228      Most Recent 3 BMP's:  Recent Labs   Lab Test 10/05/19  0546 10/04/19  1125 09/27/19  0751    136 137   POTASSIUM 4.6 3.9 4.2   CHLORIDE 106 106 107   CO2 23 25 23   BUN 27 25 28   CR 1.25 1.28* 1.50*   ANIONGAP 8 5 6   GARY 9.2 8.6 8.7   * 90 96     Most Recent 2 LFT's:  Recent Labs   Lab Test 10/05/19  0546 10/04/19  1125   AST 12 22   ALT 21 23   ALKPHOS 115 102   BILITOTAL 0.4 0.3     Most Recent INR's and Anticoagulation Dosing History:  Anticoagulation Dose History     Recent Dosing and Labs Latest Ref Rng & Units 6/5/2019 6/6/2019 7/17/2019 7/28/2019 7/29/2019 7/29/2019 7/30/2019    INR 0.86 - 1.14 1.65(A) 1.64(H) 1.28(H) 1.50(H) 1.48(H) 1.35(H) 1.35(H)        Most Recent 3 Troponin's:No lab results found.  Most Recent Cholesterol Panel:No lab results found.  Most Recent 6 Bacteria Isolates From Any Culture (See EPIC Reports for Culture Details):  Recent Labs   Lab Test 07/31/19  1407 07/30/19  0551 07/30/19  0541 07/29/19  1627 07/29/19  1624 07/28/19  0956   CULT No growth No growth No growth No growth No growth Cultured on the 1st day of incubation:  Enterococcus casseliflavus  *  Critical Value/Significant Value, preliminary result only, called to and read back by  Shiva Cody RN at 0255 on 7.29.19 by SS.    (Note)  NEGATIVE for the following: Staphylococcus spp., Staph aureus, Staph  epidermidis, Staph lugdunensis, Streptococcus spp., Strep pneumoniae,  Strep pyogenes, Strep agalactiae, Strep anginosus group, Enterococcus  faecalis, Enterococcus faecium, and Listeria spp. by [employer]  multiplex nucleic acid test. Final identification and antimicrobial  susceptibility testing will be verified by standard methods.     Critical Value/Significant Value called to and read back by Shiva Cody RN at 0543 on 7.29.19 by SS.       Most Recent TSH, T4 and A1c Labs:  Recent Labs   Lab Test  10/15/18  0945   TSH 3.88

## 2019-10-05 NOTE — PLAN OF CARE
D/A/I:  Patient A&O x4, denied pain, palpitations, difficulty breathing, SOB, dizziness, and nausea.  Lung sounds clear to auscultation, systolic heart murmur noted.  Had good urine output, see I&O flowsheet.  In atrial fibrillation with HR 70s-90s, SBP 140s, SaO2 96-99% on room air.  Fluorouracil (5FU) continued at 102.2 mg/hr (47.7 ml/hr), expected to transition to home pump at 1430.  Ambulated in room independently with steady gait.  Verbalized readiness for discharge, wife will drive patient home.    P:  Prepare for discharge.  Ensure patient understands follow-up cares and appointments.  Ensure patient understands and recognizes signs/symptoms that indicate a need for further medical consultation.  Expected to transition to home infusion pump at 1430 to complete chemo infusion at home.  [facility] will also make a visit to the patient's home tomorrow to stop the infusion and de-access the port.

## 2019-10-06 ENCOUNTER — HOME INFUSION (PRE-WILLOW HOME INFUSION) (OUTPATIENT)
Dept: PHARMACY | Facility: CLINIC | Age: 64
End: 2019-10-06

## 2019-10-07 ENCOUNTER — TELEPHONE (OUTPATIENT)
Dept: GASTROENTEROLOGY | Facility: CLINIC | Age: 64
End: 2019-10-07

## 2019-10-07 ENCOUNTER — PATIENT OUTREACH (OUTPATIENT)
Dept: ONCOLOGY | Facility: CLINIC | Age: 64
End: 2019-10-07

## 2019-10-07 DIAGNOSIS — N13.30 HYDRONEPHROSIS OF RIGHT KIDNEY: Primary | ICD-10-CM

## 2019-10-07 DIAGNOSIS — C22.1 CHOLANGIOCARCINOMA (H): ICD-10-CM

## 2019-10-07 LAB — INTERPRETATION ECG - MUSE: NORMAL

## 2019-10-07 NOTE — TELEPHONE ENCOUNTER
Spoke to patient in regards to scheduled procedure. Informed patient he is scheduled with Dr. Rodriguez on 10/16/19. Informed patient he will need a  and someone to monitor him for 24 hours after the procedure. Informed patient all [facility].     10/7/19 342pm

## 2019-10-07 NOTE — PROGRESS NOTES
This is a recent snapshot of the patient's [facility] medical record.  For current drug dose and complete information and questions, call [phone] or In Basket pool, fv home infusion ([id number])  CSN Number:  [id number]

## 2019-10-07 NOTE — PROGRESS NOTES
RN Care Coordination Note  Post Hospital Discharge     Outgoing Call: Placed call to patient to follow up after recent IP chemotherapy of 5FU. Patient states things went well and he did not have any chest pain or abnormal heart rhythms. States he is currently feeling well has been taking zofran preventatively. Also states he had no problems with infusion pump, however it did complete about 2 hours prior to when it was expected. We reviewed follow up appointments and next cycle of chemotherapy. He had no further questions or concerns at this time.         Kecia Loco RN, BSN, OCN   Care Coordinator   [facility]

## 2019-10-07 NOTE — PROGRESS NOTES
This is a recent snapshot of the patient's [facility] medical record.  For current drug dose and complete information and questions, call [phone] or In Basket pool, fv home infusion ([id number])  CSN Number:  [id number]

## 2019-10-11 ENCOUNTER — HOSPITAL ENCOUNTER (OUTPATIENT)
Dept: NUCLEAR MEDICINE | Facility: CLINIC | Age: 64
Setting detail: NUCLEAR MEDICINE
Discharge: HOME | End: 2019-10-11
Attending: UROLOGY | Admitting: UROLOGY
Payer: COMMERCIAL

## 2019-10-11 DIAGNOSIS — C22.1 CHOLANGIOCARCINOMA (H): ICD-10-CM

## 2019-10-11 DIAGNOSIS — N13.30 HYDRONEPHROSIS OF RIGHT KIDNEY: ICD-10-CM

## 2019-10-11 PROCEDURE — A9562 TC99M MERTIATIDE: HCPCS | Performed by: UROLOGY

## 2019-10-11 PROCEDURE — 25000128 H RX IP 250 OP 636: Performed by: UROLOGY

## 2019-10-11 PROCEDURE — 78708 K FLOW/FUNCT IMAGE W/DRUG: CPT

## 2019-10-11 PROCEDURE — 34300033 ZZH RX 343: Performed by: UROLOGY

## 2019-10-11 RX ORDER — FUROSEMIDE 10 MG/ML
20 INJECTION INTRAMUSCULAR; INTRAVENOUS ONCE
Status: COMPLETED | OUTPATIENT
Start: 2019-10-11 | End: 2019-10-11

## 2019-10-11 RX ADMIN — TECHNESCAN TC 99M MERTIATIDE 11.2 MILLICURIE: 1 INJECTION, POWDER, LYOPHILIZED, FOR SOLUTION INTRAVENOUS at 07:44

## 2019-10-11 RX ADMIN — FUROSEMIDE 20 MG: 10 INJECTION, SOLUTION INTRAVENOUS at 07:55

## 2019-10-15 ENCOUNTER — ANESTHESIA EVENT (OUTPATIENT)
Dept: SURGERY | Facility: CLINIC | Age: 64
End: 2019-10-15
Payer: COMMERCIAL

## 2019-10-15 NOTE — OR NURSING
"Franchesca Sorensen RN Amateau, Stuart Kevin, MD             Hi Krishna Ni just called back and has been holding his Plavix (LD 10/10 and Eliquis (LD 10/12) as instructed by Jing.    Previous Messages      ----- Message -----   From: Franchesca Sorensen RN   Sent: 10/15/2019  10:10 AM CDT   To: Gabino Rodriguez MD   Subject: Bloodthinners                                     Hi Dr. Rodriguez,     I could not reach pt for his pre-op call. LM for him to call back. His med list includes Plavix and Eliquis. He had an MI 5/19 and a SAM 6/17/19. He was seen in PAC on 9/12 for an H&P for another procedure and the provider noted \"Dr. Rose noted the likelihood of multiple upcoming procedures due to his cholangiocarcinoma and noted he was okay to proceed if able to stay on plavix and could hold eliquis up to 3 days if needed.\"     Pt was D/Remy from the hospital on 10/5 after starting 5 F.U     I don't know if the pt held his Eliquis today. Can you do his procedure tomorrow if is on both bloodthinners?     Franchesca Sorensen RN, BSN   Preadmission Nursing   [phone]             "

## 2019-10-16 ENCOUNTER — APPOINTMENT (OUTPATIENT)
Dept: GENERAL RADIOLOGY | Facility: CLINIC | Age: 64
End: 2019-10-16
Attending: INTERNAL MEDICINE
Payer: COMMERCIAL

## 2019-10-16 ENCOUNTER — HOSPITAL ENCOUNTER (OUTPATIENT)
Facility: CLINIC | Age: 64
Discharge: HOME | End: 2019-10-16
Attending: INTERNAL MEDICINE | Admitting: INTERNAL MEDICINE
Payer: COMMERCIAL

## 2019-10-16 ENCOUNTER — ANESTHESIA (OUTPATIENT)
Dept: SURGERY | Facility: CLINIC | Age: 64
End: 2019-10-16
Payer: COMMERCIAL

## 2019-10-16 VITALS
OXYGEN SATURATION: 100 % | SYSTOLIC BLOOD PRESSURE: 143 MMHG | WEIGHT: 169.09 LBS | RESPIRATION RATE: 15 BRPM | HEIGHT: 70 IN | BODY MASS INDEX: 24.21 KG/M2 | TEMPERATURE: 97.1 F | HEART RATE: 74 BPM | DIASTOLIC BLOOD PRESSURE: 104 MMHG

## 2019-10-16 DIAGNOSIS — R10.84 ABDOMINAL PAIN, GENERALIZED: ICD-10-CM

## 2019-10-16 LAB
GLUCOSE BLDC GLUCOMTR-MCNC: 65 MG/DL (ref 70–99)
UPPER GI ENDOSCOPY: NORMAL

## 2019-10-16 PROCEDURE — 37000009 ZZH ANESTHESIA TECHNICAL FEE, EACH ADDTL 15 MIN: Performed by: INTERNAL MEDICINE

## 2019-10-16 PROCEDURE — 25000128 H RX IP 250 OP 636: Performed by: ANESTHESIOLOGY

## 2019-10-16 PROCEDURE — 71000027 ZZH RECOVERY PHASE 2 EACH 15 MINS: Performed by: INTERNAL MEDICINE

## 2019-10-16 PROCEDURE — 25000125 ZZHC RX 250: Performed by: INTERNAL MEDICINE

## 2019-10-16 PROCEDURE — 40000170 ZZH STATISTIC PRE-PROCEDURE ASSESSMENT II: Performed by: INTERNAL MEDICINE

## 2019-10-16 PROCEDURE — 37000008 ZZH ANESTHESIA TECHNICAL FEE, 1ST 30 MIN: Performed by: INTERNAL MEDICINE

## 2019-10-16 PROCEDURE — 25800030 ZZH RX IP 258 OP 636: Performed by: NURSE ANESTHETIST, CERTIFIED REGISTERED

## 2019-10-16 PROCEDURE — 25000566 ZZH SEVOFLURANE, EA 15 MIN: Performed by: INTERNAL MEDICINE

## 2019-10-16 PROCEDURE — 40000277 XR SURGERY CARM FLUORO LESS THAN 5 MIN W STILLS: Mod: TC

## 2019-10-16 PROCEDURE — 25000125 ZZHC RX 250: Performed by: NURSE ANESTHETIST, CERTIFIED REGISTERED

## 2019-10-16 PROCEDURE — 27210794 ZZH OR GENERAL SUPPLY STERILE: Performed by: INTERNAL MEDICINE

## 2019-10-16 PROCEDURE — 36000053 ZZH SURGERY LEVEL 2 EA 15 ADDTL MIN - UMMC: Performed by: INTERNAL MEDICINE

## 2019-10-16 PROCEDURE — 36000051 ZZH SURGERY LEVEL 2 1ST 30 MIN - UMMC: Performed by: INTERNAL MEDICINE

## 2019-10-16 PROCEDURE — 25000128 H RX IP 250 OP 636: Performed by: NURSE ANESTHETIST, CERTIFIED REGISTERED

## 2019-10-16 PROCEDURE — 71000014 ZZH RECOVERY PHASE 1 LEVEL 2 FIRST HR: Performed by: INTERNAL MEDICINE

## 2019-10-16 PROCEDURE — 82962 GLUCOSE BLOOD TEST: CPT

## 2019-10-16 RX ORDER — NALOXONE HYDROCHLORIDE 0.4 MG/ML
.1-.4 INJECTION, SOLUTION INTRAMUSCULAR; INTRAVENOUS; SUBCUTANEOUS
Status: DISCONTINUED | OUTPATIENT
Start: 2019-10-16 | End: 2019-10-16 | Stop reason: HOSPADM

## 2019-10-16 RX ORDER — ONDANSETRON 4 MG/1
4 TABLET, ORALLY DISINTEGRATING ORAL EVERY 30 MIN PRN
Status: DISCONTINUED | OUTPATIENT
Start: 2019-10-16 | End: 2019-10-16 | Stop reason: HOSPADM

## 2019-10-16 RX ORDER — ONDANSETRON 2 MG/ML
4 INJECTION INTRAMUSCULAR; INTRAVENOUS EVERY 6 HOURS PRN
Status: DISCONTINUED | OUTPATIENT
Start: 2019-10-16 | End: 2019-10-16 | Stop reason: HOSPADM

## 2019-10-16 RX ORDER — PROPOFOL 10 MG/ML
INJECTION, EMULSION INTRAVENOUS PRN
Status: DISCONTINUED | OUTPATIENT
Start: 2019-10-16 | End: 2019-10-16

## 2019-10-16 RX ORDER — ONDANSETRON 2 MG/ML
4 INJECTION INTRAMUSCULAR; INTRAVENOUS
Status: DISCONTINUED | OUTPATIENT
Start: 2019-10-16 | End: 2019-10-16 | Stop reason: HOSPADM

## 2019-10-16 RX ORDER — ONDANSETRON 2 MG/ML
4 INJECTION INTRAMUSCULAR; INTRAVENOUS EVERY 30 MIN PRN
Status: DISCONTINUED | OUTPATIENT
Start: 2019-10-16 | End: 2019-10-16 | Stop reason: HOSPADM

## 2019-10-16 RX ORDER — CIPROFLOXACIN 2 MG/ML
INJECTION, SOLUTION INTRAVENOUS PRN
Status: DISCONTINUED | OUTPATIENT
Start: 2019-10-16 | End: 2019-10-16

## 2019-10-16 RX ORDER — ONDANSETRON 4 MG/1
4 TABLET, ORALLY DISINTEGRATING ORAL EVERY 6 HOURS PRN
Status: DISCONTINUED | OUTPATIENT
Start: 2019-10-16 | End: 2019-10-16 | Stop reason: HOSPADM

## 2019-10-16 RX ORDER — FENTANYL CITRATE 50 UG/ML
INJECTION, SOLUTION INTRAMUSCULAR; INTRAVENOUS PRN
Status: DISCONTINUED | OUTPATIENT
Start: 2019-10-16 | End: 2019-10-16

## 2019-10-16 RX ORDER — HEPARIN SODIUM (PORCINE) LOCK FLUSH IV SOLN 100 UNIT/ML 100 UNIT/ML
5 SOLUTION INTRAVENOUS
Status: DISCONTINUED | OUTPATIENT
Start: 2019-10-16 | End: 2019-10-16 | Stop reason: HOSPADM

## 2019-10-16 RX ORDER — SODIUM CHLORIDE, SODIUM LACTATE, POTASSIUM CHLORIDE, CALCIUM CHLORIDE 600; 310; 30; 20 MG/100ML; MG/100ML; MG/100ML; MG/100ML
INJECTION, SOLUTION INTRAVENOUS CONTINUOUS PRN
Status: DISCONTINUED | OUTPATIENT
Start: 2019-10-16 | End: 2019-10-16

## 2019-10-16 RX ORDER — MEPERIDINE HYDROCHLORIDE 25 MG/ML
12.5 INJECTION INTRAMUSCULAR; INTRAVENOUS; SUBCUTANEOUS
Status: DISCONTINUED | OUTPATIENT
Start: 2019-10-16 | End: 2019-10-16 | Stop reason: HOSPADM

## 2019-10-16 RX ORDER — SODIUM CHLORIDE, SODIUM LACTATE, POTASSIUM CHLORIDE, CALCIUM CHLORIDE 600; 310; 30; 20 MG/100ML; MG/100ML; MG/100ML; MG/100ML
INJECTION, SOLUTION INTRAVENOUS CONTINUOUS
Status: DISCONTINUED | OUTPATIENT
Start: 2019-10-16 | End: 2019-10-16 | Stop reason: HOSPADM

## 2019-10-16 RX ORDER — ONDANSETRON 2 MG/ML
INJECTION INTRAMUSCULAR; INTRAVENOUS PRN
Status: DISCONTINUED | OUTPATIENT
Start: 2019-10-16 | End: 2019-10-16

## 2019-10-16 RX ORDER — LIDOCAINE 40 MG/G
CREAM TOPICAL
Status: DISCONTINUED | OUTPATIENT
Start: 2019-10-16 | End: 2019-10-16 | Stop reason: HOSPADM

## 2019-10-16 RX ORDER — LIDOCAINE HYDROCHLORIDE 20 MG/ML
INJECTION, SOLUTION INFILTRATION; PERINEURAL PRN
Status: DISCONTINUED | OUTPATIENT
Start: 2019-10-16 | End: 2019-10-16

## 2019-10-16 RX ORDER — FLUMAZENIL 0.1 MG/ML
0.2 INJECTION, SOLUTION INTRAVENOUS
Status: DISCONTINUED | OUTPATIENT
Start: 2019-10-16 | End: 2019-10-16 | Stop reason: HOSPADM

## 2019-10-16 RX ORDER — EPHEDRINE SULFATE 50 MG/ML
INJECTION, SOLUTION INTRAMUSCULAR; INTRAVENOUS; SUBCUTANEOUS PRN
Status: DISCONTINUED | OUTPATIENT
Start: 2019-10-16 | End: 2019-10-16

## 2019-10-16 RX ADMIN — HEPARIN SODIUM (PORCINE) LOCK FLUSH IV SOLN 100 UNIT/ML 5 ML: 100 SOLUTION at 14:31

## 2019-10-16 RX ADMIN — PHENYLEPHRINE HYDROCHLORIDE 100 MCG: 10 INJECTION INTRAVENOUS at 12:55

## 2019-10-16 RX ADMIN — LIDOCAINE HYDROCHLORIDE 100 MG: 20 INJECTION, SOLUTION INFILTRATION; PERINEURAL at 12:38

## 2019-10-16 RX ADMIN — PROPOFOL 150 MG: 10 INJECTION, EMULSION INTRAVENOUS at 12:38

## 2019-10-16 RX ADMIN — SUGAMMADEX 200 MG: 100 INJECTION, SOLUTION INTRAVENOUS at 13:09

## 2019-10-16 RX ADMIN — FENTANYL CITRATE 100 MCG: 50 INJECTION, SOLUTION INTRAMUSCULAR; INTRAVENOUS at 12:38

## 2019-10-16 RX ADMIN — PHENYLEPHRINE HYDROCHLORIDE 100 MCG: 10 INJECTION INTRAVENOUS at 13:01

## 2019-10-16 RX ADMIN — ROCURONIUM BROMIDE 50 MG: 10 INJECTION INTRAVENOUS at 12:38

## 2019-10-16 RX ADMIN — ONDANSETRON 4 MG: 2 INJECTION INTRAMUSCULAR; INTRAVENOUS at 13:03

## 2019-10-16 RX ADMIN — SODIUM CHLORIDE, POTASSIUM CHLORIDE, SODIUM LACTATE AND CALCIUM CHLORIDE: 600; 310; 30; 20 INJECTION, SOLUTION INTRAVENOUS at 12:28

## 2019-10-16 RX ADMIN — Medication 5 MG: at 13:01

## 2019-10-16 RX ADMIN — CIPROFLOXACIN 400 MG: 2 INJECTION INTRAVENOUS at 12:49

## 2019-10-16 ASSESSMENT — ENCOUNTER SYMPTOMS: DYSRHYTHMIAS: 1

## 2019-10-16 ASSESSMENT — LIFESTYLE VARIABLES: TOBACCO_USE: 0

## 2019-10-16 ASSESSMENT — MIFFLIN-ST. JEOR: SCORE: 1573.25

## 2019-10-16 NOTE — PROGRESS NOTES
Oncology/Hematology Visit Note  Oct 18, 2019    Reason for Visit: seen in f/u of recurrent, metastatic cholangiocarcinoma    Oncology HPI:   Girish Chauhan is a 64 year old year old gentleman with cholangiocarcinoma  Which was resected in 3/2016 (including partial liver resection) with negative margins and no LN involvement, but with perineural and lymphovascular invasion. No adjuvant chemotherapy given. Recurred in bladder 11/2017, bx c/w metastatic cholangiocarcinoma, started on cisplatin/gemcitabine 12/2017. Cisplatin held 6/2018 for poor tolerance. Gemcitabine discontinued 8/2018 for possible TTP, then went off treatment. In 4/2019 was found to have disease progression in the abdominal cavity anterior to the liver, just below the diaphragm. Started on capecitabine 4/30/19 (last dose 5/7) but developed an NSTEMI s/p angiogram 5/6/19 (see below) and actually continued on the capecitabine for several days after NSTEMI without any ongoing cardiac symptoms or evidence of ongoing ischemia. On follow up with Dr. De Los Santos 6/24, disease appeared stable. Decision made with family to hold off on treatment for 2 months and reevaluate.     He was then transferred to [facility] on 6/5/19 after presenting to OSH with SBO with possible involvement of draining choledochojejunostomy loop, subjective fevers, and A fib with RVR. GI consulted, s/p small bowel enteroscopy with stenting on 6/7 for obstructed biliary limb. Discharged on 6/9/19.     He was admitted on 7/28/19 with fever, nausea, vomiting, and abdominal pain. Hospitalization complicated by sepsis and enterococcus casseliflavus bacteremia. Additionally, Krishna was found to have migrated gastrojejunal stent on CT A/P on admission. S/p ERCP with stent exchange 7/29 by Dr. Rodriguez. Discharged on Linezolid for 2 weeks.      CT CAP showed disease progression. He started on 5FU on 10/4/19 inpatient due to history of MI while taking Xeloda. He is here today for routine follow up prior to  cycle 2.     Interval history: Krishna is feeling well.  He has started to notice some skin changes on the bridge of his nose at a prior site of squamous cell carcinoma which was removed around 2001.  He feels the area is now rough.  This started about a month and a half ago.  He denies any issues with his heart and has not felt any rapid irregular heartbeats.  He notes his vision is gradually declining and his last eye exam was about a year ago.  He has occasional abdominal pain at his stent site particularly if he eats a large meal or does not chew his food well enough.  He reports he is doing well getting in fluids.  He has numbness in his feet which she feels is stable.  He is going for walks regularly, but walks slower than he used to due to the neuropathy.  Is typically napping between 30 minutes to an hour and 1/2/day.  He has intermittent acid reflux which she manages with Tums as needed.  He denies other concerns.    Review of Systems:  Patient denies any of the following except if noted above: fevers, chills, acute vision or hearing changes, chest pain, dyspnea, abdominal pain, nausea, vomiting, diarrhea, constipation, urinary concerns, headaches, issues with sleep or mood.    Current Outpatient Medications   Medication Sig Dispense Refill     allopurinol (ZYLOPRIM) 100 MG tablet Take 100 mg by mouth every evening        apixaban ANTICOAGULANT (ELIQUIS) 5 MG tablet Take 1 tablet (5 mg) by mouth 2 times daily       atorvastatin (LIPITOR) 40 MG tablet Take 40 mg by mouth every evening        calcium carbonate (TUMS) 500 MG chewable tablet Take 1 chew tab by mouth 4 times daily as needed for heartburn       clopidogrel (PLAVIX) 75 MG tablet Take 75 mg by mouth every morning        furosemide (LASIX) 20 MG tablet Take 1 tablet (20 mg) by mouth daily       losartan (COZAAR) 25 MG tablet Take 25 mg by mouth daily        metoprolol tartrate (LOPRESSOR) 50 MG tablet Take 150 mg by mouth 2 times daily         multivitamin w/minerals (THERA-VIT-M) tablet Take 1 tablet by mouth daily       oxybutynin (DITROPAN) 5 MG tablet Take 1 tablet (5 mg) by mouth 3 times daily as needed for bladder spasms For bladder spasms 60 tablet 3     tamsulosin (FLOMAX) 0.4 MG capsule Take 1 capsule (0.4 mg) by mouth daily For stent discomfort 30 capsule 11     acetaminophen (TYLENOL) 500 MG tablet Take 500 mg by mouth every 6 hours as needed for fever or pain       colchicine (COLCYRS) 0.6 MG tablet Take 0.6 mg by mouth as needed       Nitroglycerin (NITROSTAT SL) Place 0.4 mg under the tongue every 5 minutes as needed for chest pain (Carries medication - has never used)        ondansetron (ZOFRAN) 4 MG tablet Take 1 tablet (4 mg) by mouth every 8 hours as needed for nausea (Patient not taking: Reported on 10/18/2019) 30 tablet 0       Allergies   Allergen Reactions     Blood Transfusion Related (Informational Only) Other (See Comments)     Patient has a history of a clinically significant antibody against RBC antigens.  A delay in compatible RBCs may occur.         Exam:   General: The patient is a pleasant male in no acute distress.  /81 (BP Location: Right arm, Patient Position: Sitting, Cuff Size: Adult Regular)   Pulse 95   Temp 97.3  F (36.3  C) (Oral)   Resp 16   Wt 79 kg (174 lb 3.2 oz)   SpO2 100%   BMI 25.00 kg/m    Wt Readings from Last 10 Encounters:   10/18/19 79 kg (174 lb 3.2 oz)   10/16/19 76.7 kg (169 lb 1.5 oz)   10/05/19 74.6 kg (164 lb 8 oz)   10/02/19 77.8 kg (171 lb 8 oz)   09/16/19 75.3 kg (166 lb 0.1 oz)   09/12/19 77.6 kg (171 lb)   09/12/19 73 kg (161 lb)   08/26/19 74.1 kg (163 lb 5.8 oz)   08/08/19 75 kg (165 lb 4.8 oz)   08/05/19 74.4 kg (164 lb)   HEENT: EOMI, PERRL. Sclerae are anicteric. Oral mucosa is pink and moist with no lesions or thrush.   Lymph: Neck is supple with no lymphadenopathy in the cervical or supraclavicular areas.   Heart: Regular rate and rhythm.   Lungs: Clear to auscultation  bilaterally.   Abdomen: Bowel sounds present, soft, nontender, liver is palpable about 2 cm below the right costal margin. No other masses palpated.   Extremities: No lower extremity edema noted bilaterally.   Neuro: Cranial nerves II through XII are grossly intact.  Skin: Skin on bridge of nose appears mildly dry. No rashes, petechiae, or bruising noted on exposed skin.    Labs:    10/18/2019 06:36   Sodium 142   Potassium 3.9   Chloride 110 (H)   Carbon Dioxide 23   Urea Nitrogen 18   Creatinine 1.34 (H)   GFR Estimate 56 (L)   GFR Estimate If Black 65   Calcium 8.2 (L)   Anion Gap 8   Albumin 3.2 (L)   Protein Total 6.2 (L)   Bilirubin Total 0.3   Alkaline Phosphatase 97   ALT 33   AST 30   Glucose 122 (H)   WBC 6.6   Hemoglobin 9.9 (L)   Hematocrit 31.5 (L)   Platelet Count 157   RBC Count 3.43 (L)   MCV 92   MCH 28.9   MCHC 31.4 (L)   RDW 14.0   Diff Method Automated Method   % Neutrophils 62.5   % Lymphocytes 20.7   % Monocytes 12.0   % Eosinophils 4.3   % Basophils 0.3   % Immature Granulocytes 0.2   Nucleated RBCs 0   Absolute Neutrophil 4.1   Absolute Lymphocytes 1.4   Absolute Monocytes 0.8   Absolute Eosinophils 0.3   Absolute Basophils 0.0   Abs Immature Granulocytes 0.0   Absolute Nucleated RBC 0.0       Impression/plan:   Recurrent metastatic cholangiocarcinoma  -initiated on capecitabine 4/30/19 (last dose 5/7) but developed an NSTEMI s/p angiogram 5/6/19   -Due to progression in September 2019, he started on 5FU on 10/4/19. He tolerated this well without any cardiac issues. He will continue with cycle 2 today. He will follow up every 2 weeks and will be seen every 4 weeks. Will likely plan for his next scan in 2-3 months. If he progresses, we may consider enrollment in a trial of an FGF inhibitor.     Cards hx of HLD/HTN, atrial fibrillation, bicuspid aortic valve and moderate aortic stenosis, heart failure with reduced EF, CAD with history of multivessel stenting in 2011.  - Developed NSTEMI, s/p  LHC and angiogram 5/6/19 with 99% obstruction of OM2, unsuccessful PCI. He also developed afib with RVR during this time which necessitated increase of his metoprolol and starting on apixaban. He was also started on ASA/clopidogrel for CAD. Repeat angiogram done 6/17 with SAM to the OM1. Repeat Echo 7/30 with improved EF 45-50%, mild LV dilation.   had stopped anticoagulation on 7/27 due to thrombocytopenia.  Resumed plavix, asa and apixiban on 8/5 when platelets were >137K  -continue metoprolol, losartan and lasix.  -Last Echo on 9/30/19 showed an EF of 42%. Follows with cardiology.     History of skin SCC. Discussed that he may see dermatology to further assess. He prefers to wait until the new year. Reviewed that 5FU has activity against SCC so if he is having a recurrent SCC, his systemic treatment may treat that area as well.     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 Stable. Will continue to monitor.     Gradual vision decline. Recommend follow up with his eye doctor for a routine exam.     Acid reflux. Occurs intermittently. Will continue to take Tums prn.     CKD. Creatinine is relatively stable. No concerns today.    Hypoalbuminemia. Encouraged him to increase protein in his diet.     Anemia. Relatively stable. Likely due to anemia of chronic disease and chemotherapy.     Julia Smith PA-C  [facility]  [address], [place] [address] [phone]

## 2019-10-16 NOTE — ANESTHESIA CARE TRANSFER NOTE
Patient: Girish Chauhan    Procedure(s):  Upper Gastrointestinal Endoscopy    Diagnosis: Abdominal pain, generalized [R10.84]  Diagnosis Additional Information: No value filed.    Anesthesia Type:   General     Note:  Airway :Face Mask  Patient transferred to:PACU  Comments: Awake with good patent airway.  VSSHandoff Report: Identifed the Patient, Identified the Reponsible Provider, Reviewed the pertinent medical history, Discussed the surgical course, Reviewed Intra-OP anesthesia mangement and issues during anesthesia, Set expectations for post-procedure period and Allowed opportunity for questions and acknowledgement of understanding      Vitals: (Last set prior to Anesthesia Care Transfer)    CRNA VITALS  10/16/2019 1245 - 10/16/2019 1318      10/16/2019             Resp Rate (observed):  11                Electronically Signed By: YO Avila CRNA  October 16, 2019  1:18 PM

## 2019-10-16 NOTE — ANESTHESIA POSTPROCEDURE EVALUATION
Anesthesia POST Procedure Evaluation    Patient: Girish Chauhan   MRN:     [id number] Gender:   male   Age:    64 year old :      1955        Preoperative Diagnosis: Abdominal pain, generalized [R10.84]   Procedure(s):  Upper Gastrointestinal Endoscopy   Postop Comments: No value filed.       Anesthesia Type:  Not documented  General    Reportable Event: NO     PAIN: Uncomplicated   Sign Out status: Comfortable, Well controlled pain     PONV: No PONV   Sign Out status:  No Nausea or Vomiting     Neuro/Psych: Uneventful perioperative course   Sign Out Status: Preoperative baseline; Age appropriate mentation     Airway/Resp.: Uneventful perioperative course   Sign Out Status: Non labored breathing, age appropriate RR; Resp. Status within EXPECTED Parameters     CV: Uneventful perioperative course   Sign Out status: Appropriate BP and perfusion indices; Appropriate HR/Rhythm     Disposition:   Sign Out in:  PACU  Disposition:  Phase II; Home  Recovery Course: Uneventful  Follow-Up: Not required           Last Anesthesia Record Vitals:  CRNA VITALS  10/16/2019 1245 - 10/16/2019 1345      10/16/2019             Resp Rate (observed):  11          Last PACU Vitals:  Vitals Value Taken Time   /95 10/16/2019  1:40 PM   Temp 36.4  C (97.5  F) 10/16/2019  1:19 PM   Pulse 79 10/16/2019  1:40 PM   Resp 12 10/16/2019  1:30 PM   SpO2 100 % 10/16/2019  1:46 PM   Temp src     NIBP     Pulse     SpO2     Resp     Temp     Ht Rate     Temp 2     Vitals shown include unvalidated device data.      Electronically Signed By: Jordin Camacho MD, 2019, 1:47 PM

## 2019-10-16 NOTE — BRIEF OP NOTE
[facility], [place]    Brief Operative Note    Pre-operative diagnosis: Abdominal pain, generalized [R10.84]  Post-operative diagnosis Same as pre-operative diagnosis    Procedure: Procedure(s):  Upper Gastrointestinal Endoscopy  Surgeon: Surgeon(s) and Role:     * Gabino Rodriguez MD - Primary  Anesthesia: General   Estimated blood loss: None  Drains: None  Specimens: * No specimens in log *  Complications: None.    Impression:                                 - Carol en Y anatomy                        - Well positioned, fully expanded, patent Axios stent                        just beyond the pylorus maintaining a duodenojejunostomy                        in communication with the previously obstructed biliary                        limb                        - Partial (mild) obstruction of the duodenal sweep due                        to the well positioned stent     Recommendation:                              - General anesthesia recovery with probable discharge                        home this aftenroon                        - Given the partial mechanical obstruction in the region                        of the stent, slow well chewed food intake is recommended                        - With progressive food intolerance, consideration may                        be given to placement of an uncovered metal duodenal                        stent from pylorus to feeding jejunum (RYHJ); no plans                        however for a procedure at this moment                        - Once daily low dose PPI for acid reduction (to be                        prescribed)                        - The findings and recommendations were discussed with                        the patient and their family        Nikolay Jolly MD  Interventional Endoscopy Fellow

## 2019-10-16 NOTE — ANESTHESIA PREPROCEDURE EVALUATION
Anesthesia Pre-Procedure Evaluation    Patient: Girish Chauhan   MRN:     [id number] Gender:   male   Age:    64 year old :      1955        Preoperative Diagnosis: Abdominal pain, generalized [R10.84]   Procedure(s):  Upper Gastrointestinal Endoscopy     Past Medical History:   Diagnosis Date     Aortic stenosis due to bicuspid aortic valve      Atrial fibrillation (H)     hx of paroxysmal atrial fibrillation since approximately . S/p cardioversion in  with recurrent atrial fibrillation s/p repeat cardioversion 14.     Basal cell carcinoma 2016    right shoulder and right posterior calf s/p electrodessication and curretage 2016.     CAD (coronary artery disease) 2011    s/p angiogram 2011 s/p percutaneous intervention on the LAD with multiple drug-eluting stents complicated by a small perforation in the diagonal branch which sealed spontaneously.  Patient with significant Circumflex stenosis which is being treated conservatively.     Cholangiocarcinoma (H)      CKD (chronic kidney disease)      Gout     affects left foot 2-3 times per year     Hyperlipidemia      Hypertension      Liver disease      Melanoma (H) 2015    back s/p resection 2015     Squamous cell carcinoma     nose s/p excision      Past Surgical History:   Procedure Laterality Date     ------------OTHER-------------      multiple skin cancer resections     CARDIOVERSION  2014     CYSTOSCOPY, RETROGRADES, INSERT STENT URETER(S), COMBINED N/A 2019    Procedure: Cystoscopy, Right Retrograde Pyelogram, Right Ureteral Stent Placement;  Surgeon: Sivan Walker MD;  Location: [facility] OR     ENDOSCOPIC RETROGRADE CHOLANGIOPANCREATOGRAM N/A 2016    Procedure: COMBINED ENDOSCOPIC RETROGRADE CHOLANGIOPANCREATOGRAPHY, PLACE TUBE/STENT;  Surgeon: Rafa Andrade MD;  Location: [facility] OR     ENDOSCOPIC RETROGRADE CHOLANGIOPANCREATOGRAPHY  2014     ENDOSCOPIC ULTRASOUND UPPER GASTROINTESTINAL TRACT  (GI) N/A 6/7/2019    Procedure: ENDOSCOPIC ULTRASOUND, with hot axios stent placement;  Surgeon: Gabino Rodriguez MD;  Location: [facility] OR     ENTEROSCOPY SMALL BOWEL N/A 6/7/2019    Procedure: ENTEROSCOPY;  Surgeon: Gabino Rodriguez MD;  Location: [facility]     ESOPHAGOSCOPY, GASTROSCOPY, DUODENOSCOPY (EGD), DILATATION, COMBINED N/A 7/29/2019    Procedure: Esophagoscopy, gastroscopy, duodenoscopy (EGD), with stent exchange and dilation;  Surgeon: Gabino Rodriguez MD;  Location: [facility] OR     EXPLORE COMMON BILE DUCT N/A 3/8/2016    Procedure: EXPLORE COMMON BILE DUCT;  Surgeon: Jose Eduardo Pinedo MD;  Location: [facility]     HEPATECTOMY PARTIAL Left 3/8/2016    Procedure: HEPATECTOMY PARTIAL;  Surgeon: Jose Eduardo Pinedo MD;  Location: [facility]     INSERT PORT VASCULAR ACCESS Right 12/8/2017    Procedure: INSERT PORT VASCULAR ACCESS;  Place single lumen venous chest port - right;  Surgeon: Drew Powell PA-C;  Location: [facility] OR     SOFT TISSUE SURGERY       STENT  12/2011    LAD with multiple SAM          Anesthesia Evaluation     . Pt has had prior anesthetic. Type: General and MAC    No history of anesthetic complications          ROS/MED HX    ENT/Pulmonary:     (+)MARVIN risk factors hypertension, , . .   (-) tobacco use   Neurologic:  - neg neurologic ROS     Cardiovascular: Comment: Cardiomyopathy, ascending aorta dilation 3.9cm    (+) Dyslipidemia, hypertension--CAD, -past MI,-stent,2011 x4, 6/17/2019 x 1  5 Drug Eluting Stent .. Taking blood thinners Pt has received instructions: Instructions Given to patient: Continuing Plavix and Eliquis. . CHF Last EF: 45-50% date: 7/2019 . . :. dysrhythmias a-fib, valvular problems/murmurs type: AS . Previous cardiac testing Echodate:7/28/2019results:Interpretation Summary  Left ventricular wall thickness is normal. Mild left ventricular dilation is  present. The Ejection Fraction is estimated at 45-50%. No regional wall motion  abnormalities are seen.  Right  ventricular function, chamber size, wall motion, and thickness are  normal.  Mild mitral annular calcification is present. Trace mitral insufficiency is  present. Moderate AS is present. The Vmax is calculated at 3.18m/s. mean  pressure gradient is 26mmHg. Calculated valve area is 1.5cm2. Mild to moderate  tricuspid insufficiency is present.  The inferior vena cava was normal in size with preserved respiratory  variability. Sinuses of Valsalva 4.5 cm. Ascending aorta 3.9 cm. No  pericardial effusion is present.     Previous study not available for comparison.date: results:ECG reviewed date:6/17/2019 results:Atrial fibrillation  Nonspecific T wave abnormality , probably digitalis effect  Abnormal ECG  When compared with ECG of 17-JUN-2019 07:21,  No significant change was found  Cath date: 6/17/2019 results:   Result Narrative        65 yo M with known CAD, remote PCI of LAD in 2011, presented in May 2019   to [facility] with a NSTEMI, with coronary angiography there showing patent   LAD stents with a severe stenosis in OM1 that could not be successfully   crossed with a wire.     (Known metastatic cholangiocarcinoma s/p initial resection 2016, more   recent chemotherapy; and s/p Axios stenting of carcinomatous obstruction   of biliary limb of prior Carol-en-Y jejunostomy June 2019 when he presented   with an SBO)    Returns today for repeat coronary angiography with another attempt at the   OM1 stenosis    LM minimal dz  LAD patent prox to distal stents  LCx 99% OM1 stenosis with disease extension for 10-15mm on either side of   stenosis; mild dz elsewhere in Cx  RCA mild to moderate diffuse dz, 50-60% focal distal RCA lesion    Successful PCI of OM1 with PTCA followed by 2.5x24 Synergy SAM  0% residual, NITZA 3 flow post    OM1 lesion essentially behaving as a ; requiring wire escalation,   progressive PTCA and Guidezilla support for PCI              METS/Exercise Tolerance:  >4 METS   Hematologic:     (+) Anemia,  History of Transfusion no previous transfusion reaction -      Musculoskeletal:   (+) arthritis,  other musculoskeletal- gout      GI/Hepatic:     (+) GERD Other, Other GI/Hepatic cholangiocarcinoma, s/p partial hepatatectomy, bowel stent in place      Renal/Genitourinary:     (+) chronic renal disease, type: CRI, Pt does not require dialysis, Pt has no history of transplant, Other Renal/ Genitourinary, right hydronephrosis      Endo:  - neg endo ROS       Psychiatric:  - neg psychiatric ROS       Infectious Disease:  - neg infectious disease ROS       Malignancy:   (+) Malignancy History of Other and Skin  Skin CA Remission status post Surgery, Other CA cholangiocarcinoma Active status post Chemo and Surgery         Other:    (+) no H/O Chronic Pain,                       PHYSICAL EXAM:   Mental Status/Neuro: A/A/O   Airway: Facies: Feasible  Mallampati: II  Mouth/Opening: Full  TM distance: > 6 cm  Neck ROM: Full   Respiratory: Auscultation: CTAB     Resp. Rate: Normal     Resp. Effort: Normal      CV: Rhythm: Regular  Rate: Age appropriate  Heart: Normal Sounds  Edema: None   Comments:      Dental: Normal Dentition                LABS:  CBC:   Lab Results   Component Value Date    WBC 6.8 10/05/2019    WBC 10.0 10/04/2019    HGB 10.9 (L) 10/05/2019    HGB 9.9 (L) 10/04/2019    HCT 34.6 (L) 10/05/2019    HCT 31.9 (L) 10/04/2019     10/05/2019     10/04/2019     BMP:   Lab Results   Component Value Date     10/05/2019     10/04/2019    POTASSIUM 4.6 10/05/2019    POTASSIUM 3.9 10/04/2019    CHLORIDE 106 10/05/2019    CHLORIDE 106 10/04/2019    CO2 23 10/05/2019    CO2 25 10/04/2019    BUN 27 10/05/2019    BUN 25 10/04/2019    CR 1.25 10/05/2019    CR 1.28 (H) 10/04/2019     (H) 10/05/2019    GLC 90 10/04/2019     COAGS:   Lab Results   Component Value Date    PTT 35 07/30/2019    INR 1.35 (H) 07/30/2019    FIBR 404 07/30/2019     POC:   Lab Results   Component Value Date    BGM 89  "07/17/2019     OTHER:   Lab Results   Component Value Date    PH 7.38 03/08/2016    LACT 1.2 07/28/2019    GARY 9.2 10/05/2019    PHOS 3.2 10/04/2019    MAG 1.8 10/04/2019    ALBUMIN 3.3 (L) 10/05/2019    PROTTOTAL 6.9 10/05/2019    ALT 21 10/05/2019    AST 12 10/05/2019    ALKPHOS 115 10/05/2019    BILITOTAL 0.4 10/05/2019    LIPASE 131 07/29/2019    AMYLASE 71 07/29/2019    TSH 3.88 10/15/2018        Preop Vitals    BP Readings from Last 3 Encounters:   10/16/19 (!) 138/93   10/05/19 (!) 142/99   10/02/19 130/87    Pulse Readings from Last 3 Encounters:   10/05/19 95   10/02/19 95   09/16/19 70      Resp Readings from Last 3 Encounters:   10/16/19 18   10/05/19 18   10/02/19 18    SpO2 Readings from Last 3 Encounters:   10/16/19 97%   10/05/19 96%   10/02/19 100%      Temp Readings from Last 1 Encounters:   10/16/19 36.5  C (97.7  F) (Oral)    Ht Readings from Last 1 Encounters:   10/16/19 1.778 m (5' 10\")      Wt Readings from Last 1 Encounters:   10/16/19 76.7 kg (169 lb 1.5 oz)    Estimated body mass index is 24.26 kg/m  as calculated from the following:    Height as of this encounter: 1.778 m (5' 10\").    Weight as of this encounter: 76.7 kg (169 lb 1.5 oz).     LDA:  Port A Cath Single 12/08/17 Right Chest wall (Active)   Number of days: 677       Port A Cath Single Right Chest wall (Active)   Number of days:         Assessment:   ASA SCORE: 3    H&P: History and physical reviewed and following examination; no interval change.    NPO Status: NPO Appropriate     Plan:   Anes. Type:  General   Pre-Medication: None   Induction:  IV (Standard)   Airway: ETT; Oral   Access/Monitoring: PIV   Maintenance: Balanced     Postop Plan:   Postop Pain: Opioids  Postop Sedation/Airway: Not planned     PONV Management:   Adult Risk Factors:, Postop Opioids     CONSENT: Direct conversation   Plan and risks discussed with: Patient                      Jordin Camacho MD  "

## 2019-10-16 NOTE — DISCHARGE INSTRUCTIONS
[facility]  Same-Day Surgery   Adult Discharge Orders & Instructions     Resume previous medication including Plavix and Eliquis.  For 24 hours after surgery    1. Get plenty of rest.  A responsible adult must stay with you for at least 24 hours after you leave the hospital.   2. Do not drive or use heavy equipment.  If you have weakness or tingling, don't drive or use heavy equipment until this feeling goes away.  3. Do not drink alcohol.  4. Avoid strenuous or risky activities.  Ask for help when climbing stairs.   5. You may feel lightheaded.  IF so, sit for a few minutes before standing.  Have someone help you get up.   6. If you have nausea (feel sick to your stomach): Drink only clear liquids such as apple juice, ginger ale, broth or 7-Up.  Rest may also help.  Be sure to drink enough fluids.  Move to a regular diet as you feel able.  7. You may have a slight fever. Call the doctor if your fever is over 100 F (37.7 C) (taken under the tongue) or lasts longer than 24 hours.  8. You may have a dry mouth, a sore throat, muscle aches or trouble sleeping.  These should go away after 24 hours.  9. Do not make important or legal decisions.   Call your doctor for any of the followin.  Signs of infection (fever, growing tenderness at the surgery site, a large amount of drainage or bleeding, severe pain, foul-smelling drainage, redness, swelling).    2. It has been over 8 to 10 hours since surgery and you are still not able to urinate (pass water).    3.  Headache for over 24 hours.    To contact a doctor, call Dr Rodriguez at [phone] (clinic) or:        [phone] and ask for the resident on call for Gastroenterology (answered 24 hours a day)      Emergency Department:    [facility]: [phone]       (TTY for hearing impaired: [phone])

## 2019-10-16 NOTE — OR NURSING
Fellow Dr. MEGHAN Jolly with Gi text paged the following message:  Please complete your Brief Op Note for Krishna Chauhan and address AVS for diet and medications, specifically when pt should restart his Plavix.  pt cannot go to phase 2 until Op not is complete.  Thank you.  Tamara MAJOR [id number]

## 2019-10-18 ENCOUNTER — RECORDS - HEALTHEAST (OUTPATIENT)
Dept: ADMINISTRATIVE | Facility: OTHER | Age: 64
End: 2019-10-18

## 2019-10-18 ENCOUNTER — ONCOLOGY VISIT (OUTPATIENT)
Dept: ONCOLOGY | Facility: CLINIC | Age: 64
End: 2019-10-18
Attending: PHYSICIAN ASSISTANT
Payer: COMMERCIAL

## 2019-10-18 ENCOUNTER — APPOINTMENT (OUTPATIENT)
Dept: LAB | Facility: CLINIC | Age: 64
End: 2019-10-18
Attending: INTERNAL MEDICINE
Payer: COMMERCIAL

## 2019-10-18 ENCOUNTER — INFUSION THERAPY VISIT (OUTPATIENT)
Dept: ONCOLOGY | Facility: CLINIC | Age: 64
End: 2019-10-18
Attending: INTERNAL MEDICINE
Payer: COMMERCIAL

## 2019-10-18 ENCOUNTER — HOME INFUSION (PRE-WILLOW HOME INFUSION) (OUTPATIENT)
Dept: PHARMACY | Facility: CLINIC | Age: 64
End: 2019-10-18

## 2019-10-18 VITALS
HEART RATE: 95 BPM | SYSTOLIC BLOOD PRESSURE: 139 MMHG | WEIGHT: 174.2 LBS | OXYGEN SATURATION: 100 % | RESPIRATION RATE: 16 BRPM | TEMPERATURE: 97.3 F | BODY MASS INDEX: 25 KG/M2 | DIASTOLIC BLOOD PRESSURE: 81 MMHG

## 2019-10-18 DIAGNOSIS — C22.1 CHOLANGIOCARCINOMA (H): Primary | ICD-10-CM

## 2019-10-18 DIAGNOSIS — T45.1X5A ANEMIA ASSOCIATED WITH CHEMOTHERAPY: ICD-10-CM

## 2019-10-18 DIAGNOSIS — D64.81 ANEMIA ASSOCIATED WITH CHEMOTHERAPY: ICD-10-CM

## 2019-10-18 DIAGNOSIS — D64.81 ANEMIA ASSOCIATED WITH CHEMOTHERAPY: Primary | ICD-10-CM

## 2019-10-18 DIAGNOSIS — T45.1X5A ANEMIA ASSOCIATED WITH CHEMOTHERAPY: Primary | ICD-10-CM

## 2019-10-18 DIAGNOSIS — C22.1 CHOLANGIOCARCINOMA (H): ICD-10-CM

## 2019-10-18 LAB
ALBUMIN SERPL-MCNC: 3.2 G/DL (ref 3.4–5)
ALP SERPL-CCNC: 97 U/L (ref 40–150)
ALT SERPL W P-5'-P-CCNC: 33 U/L (ref 0–70)
ANION GAP SERPL CALCULATED.3IONS-SCNC: 8 MMOL/L (ref 3–14)
AST SERPL W P-5'-P-CCNC: 30 U/L (ref 0–45)
BASOPHILS # BLD AUTO: 0 10E9/L (ref 0–0.2)
BASOPHILS NFR BLD AUTO: 0.3 %
BILIRUB SERPL-MCNC: 0.3 MG/DL (ref 0.2–1.3)
BUN SERPL-MCNC: 18 MG/DL (ref 7–30)
CALCIUM SERPL-MCNC: 8.2 MG/DL (ref 8.5–10.1)
CHLORIDE SERPL-SCNC: 110 MMOL/L (ref 94–109)
CO2 SERPL-SCNC: 23 MMOL/L (ref 20–32)
CREAT SERPL-MCNC: 1.34 MG/DL (ref 0.66–1.25)
DIFFERENTIAL METHOD BLD: ABNORMAL
EOSINOPHIL # BLD AUTO: 0.3 10E9/L (ref 0–0.7)
EOSINOPHIL NFR BLD AUTO: 4.3 %
ERYTHROCYTE [DISTWIDTH] IN BLOOD BY AUTOMATED COUNT: 14 % (ref 10–15)
GFR SERPL CREATININE-BSD FRML MDRD: 56 ML/MIN/{1.73_M2}
GLUCOSE SERPL-MCNC: 122 MG/DL (ref 70–99)
HCT VFR BLD AUTO: 31.5 % (ref 40–53)
HGB BLD-MCNC: 9.9 G/DL (ref 13.3–17.7)
IMM GRANULOCYTES # BLD: 0 10E9/L (ref 0–0.4)
IMM GRANULOCYTES NFR BLD: 0.2 %
LYMPHOCYTES # BLD AUTO: 1.4 10E9/L (ref 0.8–5.3)
LYMPHOCYTES NFR BLD AUTO: 20.7 %
MCH RBC QN AUTO: 28.9 PG (ref 26.5–33)
MCHC RBC AUTO-ENTMCNC: 31.4 G/DL (ref 31.5–36.5)
MCV RBC AUTO: 92 FL (ref 78–100)
MONOCYTES # BLD AUTO: 0.8 10E9/L (ref 0–1.3)
MONOCYTES NFR BLD AUTO: 12 %
NEUTROPHILS # BLD AUTO: 4.1 10E9/L (ref 1.6–8.3)
NEUTROPHILS NFR BLD AUTO: 62.5 %
NRBC # BLD AUTO: 0 10*3/UL
NRBC BLD AUTO-RTO: 0 /100
PLATELET # BLD AUTO: 157 10E9/L (ref 150–450)
POTASSIUM SERPL-SCNC: 3.9 MMOL/L (ref 3.4–5.3)
PROT SERPL-MCNC: 6.2 G/DL (ref 6.8–8.8)
RBC # BLD AUTO: 3.43 10E12/L (ref 4.4–5.9)
SODIUM SERPL-SCNC: 142 MMOL/L (ref 133–144)
WBC # BLD AUTO: 6.6 10E9/L (ref 4–11)

## 2019-10-18 PROCEDURE — 99214 OFFICE O/P EST MOD 30 MIN: CPT | Mod: ZP | Performed by: PHYSICIAN ASSISTANT

## 2019-10-18 PROCEDURE — 80053 COMPREHEN METABOLIC PANEL: CPT | Performed by: INTERNAL MEDICINE

## 2019-10-18 PROCEDURE — 25000128 H RX IP 250 OP 636: Mod: ZF | Performed by: PHYSICIAN ASSISTANT

## 2019-10-18 PROCEDURE — 25800030 ZZH RX IP 258 OP 636: Mod: ZF | Performed by: PHYSICIAN ASSISTANT

## 2019-10-18 PROCEDURE — 85025 COMPLETE CBC W/AUTO DIFF WBC: CPT | Performed by: INTERNAL MEDICINE

## 2019-10-18 PROCEDURE — 96375 TX/PRO/DX INJ NEW DRUG ADDON: CPT

## 2019-10-18 PROCEDURE — 96365 THER/PROPH/DIAG IV INF INIT: CPT | Mod: 59

## 2019-10-18 PROCEDURE — G0463 HOSPITAL OUTPT CLINIC VISIT: HCPCS | Mod: ZF

## 2019-10-18 PROCEDURE — G0498 CHEMO EXTEND IV INFUS W/PUMP: HCPCS

## 2019-10-18 RX ORDER — FLUOROURACIL 50 MG/ML
400 INJECTION, SOLUTION INTRAVENOUS ONCE
Status: CANCELLED | OUTPATIENT
Start: 2019-10-18

## 2019-10-18 RX ORDER — LORAZEPAM 2 MG/ML
0.5 INJECTION INTRAMUSCULAR EVERY 4 HOURS PRN
Status: CANCELLED
Start: 2019-11-01

## 2019-10-18 RX ORDER — SODIUM CHLORIDE 9 MG/ML
1000 INJECTION, SOLUTION INTRAVENOUS CONTINUOUS PRN
Status: CANCELLED
Start: 2019-11-01

## 2019-10-18 RX ORDER — FLUOROURACIL 50 MG/ML
400 INJECTION, SOLUTION INTRAVENOUS ONCE
Status: COMPLETED | OUTPATIENT
Start: 2019-10-18 | End: 2019-10-18

## 2019-10-18 RX ORDER — FLUOROURACIL 50 MG/ML
400 INJECTION, SOLUTION INTRAVENOUS ONCE
Status: CANCELLED | OUTPATIENT
Start: 2019-11-01

## 2019-10-18 RX ORDER — EPINEPHRINE 0.3 MG/.3ML
0.3 INJECTION SUBCUTANEOUS EVERY 5 MIN PRN
Status: CANCELLED | OUTPATIENT
Start: 2019-10-18

## 2019-10-18 RX ORDER — EPINEPHRINE 0.3 MG/.3ML
0.3 INJECTION SUBCUTANEOUS EVERY 5 MIN PRN
Status: CANCELLED | OUTPATIENT
Start: 2019-11-01

## 2019-10-18 RX ORDER — ALBUTEROL SULFATE 90 UG/1
1-2 AEROSOL, METERED RESPIRATORY (INHALATION)
Status: CANCELLED
Start: 2019-10-18

## 2019-10-18 RX ORDER — ALBUTEROL SULFATE 90 UG/1
1-2 AEROSOL, METERED RESPIRATORY (INHALATION)
Status: CANCELLED
Start: 2019-11-01

## 2019-10-18 RX ORDER — MEPERIDINE HYDROCHLORIDE 25 MG/ML
25 INJECTION INTRAMUSCULAR; INTRAVENOUS; SUBCUTANEOUS EVERY 30 MIN PRN
Status: CANCELLED | OUTPATIENT
Start: 2019-10-18

## 2019-10-18 RX ORDER — HEPARIN SODIUM (PORCINE) LOCK FLUSH IV SOLN 100 UNIT/ML 100 UNIT/ML
5 SOLUTION INTRAVENOUS ONCE
Status: COMPLETED | OUTPATIENT
Start: 2019-10-18 | End: 2019-10-18

## 2019-10-18 RX ORDER — NALOXONE HYDROCHLORIDE 0.4 MG/ML
.1-.4 INJECTION, SOLUTION INTRAMUSCULAR; INTRAVENOUS; SUBCUTANEOUS
Status: CANCELLED | OUTPATIENT
Start: 2019-10-18

## 2019-10-18 RX ORDER — ALBUTEROL SULFATE 0.83 MG/ML
2.5 SOLUTION RESPIRATORY (INHALATION)
Status: CANCELLED | OUTPATIENT
Start: 2019-11-01

## 2019-10-18 RX ORDER — EPINEPHRINE 1 MG/ML
0.3 INJECTION, SOLUTION INTRAMUSCULAR; SUBCUTANEOUS EVERY 5 MIN PRN
Status: CANCELLED | OUTPATIENT
Start: 2019-11-01

## 2019-10-18 RX ORDER — LORAZEPAM 2 MG/ML
0.5 INJECTION INTRAMUSCULAR EVERY 4 HOURS PRN
Status: CANCELLED
Start: 2019-10-18

## 2019-10-18 RX ORDER — METHYLPREDNISOLONE SODIUM SUCCINATE 125 MG/2ML
125 INJECTION, POWDER, LYOPHILIZED, FOR SOLUTION INTRAMUSCULAR; INTRAVENOUS
Status: CANCELLED
Start: 2019-11-01

## 2019-10-18 RX ORDER — SODIUM CHLORIDE 9 MG/ML
1000 INJECTION, SOLUTION INTRAVENOUS CONTINUOUS PRN
Status: CANCELLED
Start: 2019-10-18

## 2019-10-18 RX ORDER — METHYLPREDNISOLONE SODIUM SUCCINATE 125 MG/2ML
125 INJECTION, POWDER, LYOPHILIZED, FOR SOLUTION INTRAMUSCULAR; INTRAVENOUS
Status: CANCELLED
Start: 2019-10-18

## 2019-10-18 RX ORDER — DIPHENHYDRAMINE HYDROCHLORIDE 50 MG/ML
50 INJECTION INTRAMUSCULAR; INTRAVENOUS
Status: CANCELLED
Start: 2019-11-01

## 2019-10-18 RX ORDER — EPINEPHRINE 1 MG/ML
0.3 INJECTION, SOLUTION INTRAMUSCULAR; SUBCUTANEOUS EVERY 5 MIN PRN
Status: CANCELLED | OUTPATIENT
Start: 2019-10-18

## 2019-10-18 RX ORDER — MEPERIDINE HYDROCHLORIDE 25 MG/ML
25 INJECTION INTRAMUSCULAR; INTRAVENOUS; SUBCUTANEOUS EVERY 30 MIN PRN
Status: CANCELLED | OUTPATIENT
Start: 2019-11-01

## 2019-10-18 RX ORDER — NALOXONE HYDROCHLORIDE 0.4 MG/ML
.1-.4 INJECTION, SOLUTION INTRAMUSCULAR; INTRAVENOUS; SUBCUTANEOUS
Status: CANCELLED | OUTPATIENT
Start: 2019-11-01

## 2019-10-18 RX ORDER — DIPHENHYDRAMINE HYDROCHLORIDE 50 MG/ML
50 INJECTION INTRAMUSCULAR; INTRAVENOUS
Status: CANCELLED
Start: 2019-10-18

## 2019-10-18 RX ORDER — ALBUTEROL SULFATE 0.83 MG/ML
2.5 SOLUTION RESPIRATORY (INHALATION)
Status: CANCELLED | OUTPATIENT
Start: 2019-10-18

## 2019-10-18 RX ADMIN — DEXTROSE MONOHYDRATE 250 ML: 50 INJECTION, SOLUTION INTRAVENOUS at 08:43

## 2019-10-18 RX ADMIN — HEPARIN 5 ML: 100 SYRINGE at 06:32

## 2019-10-18 RX ADMIN — FLUOROURACIL 785 MG: 50 INJECTION, SOLUTION INTRAVENOUS at 09:26

## 2019-10-18 RX ADMIN — DEXAMETHASONE SODIUM PHOSPHATE: 10 INJECTION, SOLUTION INTRAMUSCULAR; INTRAVENOUS at 08:43

## 2019-10-18 RX ADMIN — LEUCOVORIN CALCIUM 700 MG: 500 INJECTION, POWDER, LYOPHILIZED, FOR SOLUTION INTRAMUSCULAR; INTRAVENOUS at 08:57

## 2019-10-18 ASSESSMENT — PAIN SCALES - GENERAL: PAINLEVEL: NO PAIN (0)

## 2019-10-18 NOTE — PROGRESS NOTES
Infusion Nursing Note:  Girish Chauhan presents today for Cycle 2 Day 1 Leucovorin, Fluorouracil bolus/pump.    Patient seen by provider today: Yes: Julia YATES   present during visit today: Not Applicable.    Note: Evaluated by Julia YATES pre infusion.  Received part of cycle 1 while inpatient to monitor heart as he has a hx of MI while taking Xeloda.    Intravenous Access:  Implanted Port.    Treatment Conditions:  Lab Results   Component Value Date    HGB 9.9 10/18/2019     Lab Results   Component Value Date    WBC 6.6 10/18/2019      Lab Results   Component Value Date    ANEU 4.1 10/18/2019     Lab Results   Component Value Date     10/18/2019      Lab Results   Component Value Date     10/18/2019                   Lab Results   Component Value Date    POTASSIUM 3.9 10/18/2019           Lab Results   Component Value Date    MAG 1.8 10/04/2019            Lab Results   Component Value Date    CR 1.34 10/18/2019                   Lab Results   Component Value Date    GARY 8.2 10/18/2019                Lab Results   Component Value Date    BILITOTAL 0.3 10/18/2019           Lab Results   Component Value Date    ALBUMIN 3.2 10/18/2019                    Lab Results   Component Value Date    ALT 33 10/18/2019           Lab Results   Component Value Date    AST 30 10/18/2019       Results reviewed, labs MET treatment parameters, ok to proceed with treatment.      Post Infusion Assessment:  Patient tolerated infusion without incident.  Blood return noted pre and post infusion.  Site patent and intact, free from redness, edema or discomfort.  No evidence of extravasations.  Fluorouracil dosi-infuser pump infusing at 5.2 ml/hr x 46 hr.  Pump connections taped, clamps taped to open, capillary element taped down to skin with tegaderm.  Pump connections double checked by Shiva Pa with [facility] contacted with pump disconnect date/time which is Sunday at 0730.  They will not arrive  before 0800 and will call him to confirm appointment time.       Discharge Plan:   Patient declined prescription refills.  AVS to patient via [facility].  Patient will return 11/1/19 labs, chemo for next appointment.   Patient discharged in stable condition accompanied by: self.  Departure Mode: Ambulatory.  Face to Face time: 0.    Lily Hummel

## 2019-10-18 NOTE — LETTER
RE: Girish Chauhan  [address] [address]       Oncology/Hematology Visit Note  Oct 18, 2019    Reason for Visit: seen in f/u of recurrent, metastatic cholangiocarcinoma    Oncology HPI:   Girish Chauhan is a 64 year old year old gentleman with cholangiocarcinoma  Which was resected in 3/2016 (including partial liver resection) with negative margins and no LN involvement, but with perineural and lymphovascular invasion. No adjuvant chemotherapy given. Recurred in bladder 11/2017, bx c/w metastatic cholangiocarcinoma, started on cisplatin/gemcitabine 12/2017. Cisplatin held 6/2018 for poor tolerance. Gemcitabine discontinued 8/2018 for possible TTP, then went off treatment. In 4/2019 was found to have disease progression in the abdominal cavity anterior to the liver, just below the diaphragm. Started on capecitabine 4/30/19 (last dose 5/7) but developed an NSTEMI s/p angiogram 5/6/19 (see below) and actually continued on the capecitabine for several days after NSTEMI without any ongoing cardiac symptoms or evidence of ongoing ischemia. On follow up with Dr. De Los Santos 6/24, disease appeared stable. Decision made with family to hold off on treatment for 2 months and reevaluate.     He was then transferred to [facility] on 6/5/19 after presenting to OSH with SBO with possible involvement of draining choledochojejunostomy loop, subjective fevers, and A fib with RVR. GI consulted, s/p small bowel enteroscopy with stenting on 6/7 for obstructed biliary limb. Discharged on 6/9/19.     He was admitted on 7/28/19 with fever, nausea, vomiting, and abdominal pain. Hospitalization complicated by sepsis and enterococcus casseliflavus bacteremia. Additionally, Krishna was found to have migrated gastrojejunal stent on CT A/P on admission. S/p ERCP with stent exchange 7/29 by Dr. Rodriguez. Discharged on Linezolid for 2 weeks.      CT CAP showed disease progression. He started on 5FU on 10/4/19 inpatient due to history of  MI while taking Xeloda. He is here today for routine follow up prior to cycle 2.     Interval history: Krishna is feeling well.  He has started to notice some skin changes on the bridge of his nose at a prior site of squamous cell carcinoma which was removed around 2001.  He feels the area is now rough.  This started about a month and a half ago.  He denies any issues with his heart and has not felt any rapid irregular heartbeats.  He notes his vision is gradually declining and his last eye exam was about a year ago.  He has occasional abdominal pain at his stent site particularly if he eats a large meal or does not chew his food well enough.  He reports he is doing well getting in fluids.  He has numbness in his feet which she feels is stable.  He is going for walks regularly, but walks slower than he used to due to the neuropathy.  Is typically napping between 30 minutes to an hour and 1/2/day.  He has intermittent acid reflux which she manages with Tums as needed.  He denies other concerns.    Review of Systems:  Patient denies any of the following except if noted above: fevers, chills, acute vision or hearing changes, chest pain, dyspnea, abdominal pain, nausea, vomiting, diarrhea, constipation, urinary concerns, headaches, issues with sleep or mood.    Current Outpatient Medications   Medication Sig Dispense Refill     allopurinol (ZYLOPRIM) 100 MG tablet Take 100 mg by mouth every evening        apixaban ANTICOAGULANT (ELIQUIS) 5 MG tablet Take 1 tablet (5 mg) by mouth 2 times daily       atorvastatin (LIPITOR) 40 MG tablet Take 40 mg by mouth every evening        calcium carbonate (TUMS) 500 MG chewable tablet Take 1 chew tab by mouth 4 times daily as needed for heartburn       clopidogrel (PLAVIX) 75 MG tablet Take 75 mg by mouth every morning        furosemide (LASIX) 20 MG tablet Take 1 tablet (20 mg) by mouth daily       losartan (COZAAR) 25 MG tablet Take 25 mg by mouth daily        metoprolol tartrate  (LOPRESSOR) 50 MG tablet Take 150 mg by mouth 2 times daily        multivitamin w/minerals (THERA-VIT-M) tablet Take 1 tablet by mouth daily       oxybutynin (DITROPAN) 5 MG tablet Take 1 tablet (5 mg) by mouth 3 times daily as needed for bladder spasms For bladder spasms 60 tablet 3     tamsulosin (FLOMAX) 0.4 MG capsule Take 1 capsule (0.4 mg) by mouth daily For stent discomfort 30 capsule 11     acetaminophen (TYLENOL) 500 MG tablet Take 500 mg by mouth every 6 hours as needed for fever or pain       colchicine (COLCYRS) 0.6 MG tablet Take 0.6 mg by mouth as needed       Nitroglycerin (NITROSTAT SL) Place 0.4 mg under the tongue every 5 minutes as needed for chest pain (Carries medication - has never used)        ondansetron (ZOFRAN) 4 MG tablet Take 1 tablet (4 mg) by mouth every 8 hours as needed for nausea (Patient not taking: Reported on 10/18/2019) 30 tablet 0       Allergies   Allergen Reactions     Blood Transfusion Related (Informational Only) Other (See Comments)     Patient has a history of a clinically significant antibody against RBC antigens.  A delay in compatible RBCs may occur.         Exam:   General: The patient is a pleasant male in no acute distress.  /81 (BP Location: Right arm, Patient Position: Sitting, Cuff Size: Adult Regular)   Pulse 95   Temp 97.3  F (36.3  C) (Oral)   Resp 16   Wt 79 kg (174 lb 3.2 oz)   SpO2 100%   BMI 25.00 kg/m     Wt Readings from Last 10 Encounters:   10/18/19 79 kg (174 lb 3.2 oz)   10/16/19 76.7 kg (169 lb 1.5 oz)   10/05/19 74.6 kg (164 lb 8 oz)   10/02/19 77.8 kg (171 lb 8 oz)   09/16/19 75.3 kg (166 lb 0.1 oz)   09/12/19 77.6 kg (171 lb)   09/12/19 73 kg (161 lb)   08/26/19 74.1 kg (163 lb 5.8 oz)   08/08/19 75 kg (165 lb 4.8 oz)   08/05/19 74.4 kg (164 lb)   HEENT: EOMI, PERRL. Sclerae are anicteric. Oral mucosa is pink and moist with no lesions or thrush.   Lymph: Neck is supple with no lymphadenopathy in the cervical or supraclavicular areas.    Heart: Regular rate and rhythm.   Lungs: Clear to auscultation bilaterally.   Abdomen: Bowel sounds present, soft, nontender, liver is palpable about 2 cm below the right costal margin. No other masses palpated.   Extremities: No lower extremity edema noted bilaterally.   Neuro: Cranial nerves II through XII are grossly intact.  Skin: Skin on bridge of nose appears mildly dry. No rashes, petechiae, or bruising noted on exposed skin.    Labs:    10/18/2019 06:36   Sodium 142   Potassium 3.9   Chloride 110 (H)   Carbon Dioxide 23   Urea Nitrogen 18   Creatinine 1.34 (H)   GFR Estimate 56 (L)   GFR Estimate If Black 65   Calcium 8.2 (L)   Anion Gap 8   Albumin 3.2 (L)   Protein Total 6.2 (L)   Bilirubin Total 0.3   Alkaline Phosphatase 97   ALT 33   AST 30   Glucose 122 (H)   WBC 6.6   Hemoglobin 9.9 (L)   Hematocrit 31.5 (L)   Platelet Count 157   RBC Count 3.43 (L)   MCV 92   MCH 28.9   MCHC 31.4 (L)   RDW 14.0   Diff Method Automated Method   % Neutrophils 62.5   % Lymphocytes 20.7   % Monocytes 12.0   % Eosinophils 4.3   % Basophils 0.3   % Immature Granulocytes 0.2   Nucleated RBCs 0   Absolute Neutrophil 4.1   Absolute Lymphocytes 1.4   Absolute Monocytes 0.8   Absolute Eosinophils 0.3   Absolute Basophils 0.0   Abs Immature Granulocytes 0.0   Absolute Nucleated RBC 0.0       Impression/plan:   Recurrent metastatic cholangiocarcinoma  -initiated on capecitabine 4/30/19 (last dose 5/7) but developed an NSTEMI s/p angiogram 5/6/19   -Due to progression in September 2019, he started on 5FU on 10/4/19. He tolerated this well without any cardiac issues. He will continue with cycle 2 today. He will follow up every 2 weeks and will be seen every 4 weeks. Will likely plan for his next scan in 2-3 months. If he progresses, we may consider enrollment in a trial of an FGF inhibitor.     Cards hx of HLD/HTN, atrial fibrillation, bicuspid aortic valve and moderate aortic stenosis, heart failure with reduced EF, CAD with  history of multivessel stenting in 2011.  - Developed NSTEMI, s/p LHC and angiogram 5/6/19 with 99% obstruction of OM2, unsuccessful PCI. He also developed afib with RVR during this time which necessitated increase of his metoprolol and starting on apixaban. He was also started on ASA/clopidogrel for CAD. Repeat angiogram done 6/17 with SAM to the OM1. Repeat Echo 7/30 with improved EF 45-50%, mild LV dilation.   had stopped anticoagulation on 7/27 due to thrombocytopenia.  Resumed plavix, asa and apixiban on 8/5 when platelets were >137K  -continue metoprolol, losartan and lasix.  -Last Echo on 9/30/19 showed an EF of 42%. Follows with cardiology.     History of skin SCC. Discussed that he may see dermatology to further assess. He prefers to wait until the new year. Reviewed that 5FU has activity against SCC so if he is having a recurrent SCC, his systemic treatment may treat that area as well.     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 Stable. Will continue to monitor.     Gradual vision decline. Recommend follow up with his eye doctor for a routine exam.     Acid reflux. Occurs intermittently. Will continue to take Tums prn.     CKD. Creatinine is relatively stable. No concerns today.    Hypoalbuminemia. Encouraged him to increase protein in his diet.     Anemia. Relatively stable. Likely due to anemia of chronic disease and chemotherapy.      Julia Smith PA-C  [facility]  [address] [place] [address]  [phone]

## 2019-10-18 NOTE — PATIENT INSTRUCTIONS
[facility] Main Line: [phone]    Call triage nurse with chills and/or temperature greater than or equal to 100.4, uncontrolled nausea/vomiting, diarrhea, constipation, dizziness, shortness of breath, chest pain, bleeding, unexplained bruising, or any new/concerning symptoms, questions/concerns.   If you are having any concerning symptoms or wish to speak to a provider before your next infusion visit, please call your care coordinator or triage to notify them so we can adequately serve you.   Triage Nurse Line: [phone]    If after hours, weekends, or holidays [phone]

## 2019-10-18 NOTE — NURSING NOTE
"Oncology Rooming Note    October 18, 2019 6:51 AM   Girish Chauhan is a 64 year old male who presents for:    Chief Complaint   Patient presents with     Port Draw     port accessed and labs drawn by rn.  vital signs taken.     Oncology Clinic Visit     Return; Cholangiocarcinoma     Initial Vitals: /81 (BP Location: Right arm, Patient Position: Sitting, Cuff Size: Adult Regular)   Pulse 95   Temp 97.3  F (36.3  C) (Oral)   Resp 16   Wt 79 kg (174 lb 3.2 oz)   SpO2 100%   BMI 25.00 kg/m   Estimated body mass index is 25 kg/m  as calculated from the following:    Height as of 10/16/19: 1.778 m (5' 10\").    Weight as of this encounter: 79 kg (174 lb 3.2 oz). Body surface area is 1.98 meters squared.  No Pain (0) Comment: Data Unavailable   No LMP for male patient.  Allergies reviewed: Yes  Medications reviewed: Yes    Medications: Medication refills not needed today.  Pharmacy name entered into [employer]:    [id number] [place], [place] - [address] AT [facility] [place], [place] - [address] [facility] - [place], [place] - [address]    Clinical concerns: Pt here for first chemo txKatie Dumont was notified.      Evy Cardoso CMA              "

## 2019-10-20 ENCOUNTER — HOME INFUSION (PRE-WILLOW HOME INFUSION) (OUTPATIENT)
Dept: PHARMACY | Facility: CLINIC | Age: 64
End: 2019-10-20

## 2019-10-21 NOTE — PROGRESS NOTES
This is a recent snapshot of the patient's [facility] medical record.  For current drug dose and complete information and questions, call [phone] or In Basket pool, fv home infusion ([id number])  CSN Number:  [id number]

## 2019-10-22 NOTE — PROGRESS NOTES
This is a recent snapshot of the patient's [facility] medical record.  For current drug dose and complete information and questions, call [phone] or In Basket pool, fv home infusion ([id number])  CSN Number:  [id number]

## 2019-10-24 ENCOUNTER — CARE COORDINATION (OUTPATIENT)
Dept: GASTROENTEROLOGY | Facility: CLINIC | Age: 64
End: 2019-10-24

## 2019-10-24 NOTE — PROGRESS NOTES
Post Upper Endoscopy (10/16/19) with Dr. Rodriguez: Follow-up     Post procedure recommendations:   -Given the partial mechanical obstruction in the region of the stent, slow well chewed food intake is recommended.  -With progressive food intolerance, consideration may be given to placement of an uncovered metal duodenal stent from pylorus to feeding jejunum (RYHJ); no plans however for a procedure at this moment.    Orders placed: None     Patient states is feeling good, eating and drinking without difficulty.    Nausea: No  Vomiting: No   Fever: No  Abdominal pain: No    Clinic contact and scheduling numbers verified for future questions/concerns.     PRABHJOT [address] Dr[address] Dr. Rodriguez, & Dr. Duncan  Advanced Endoscopy  [phone]

## 2019-11-01 ENCOUNTER — APPOINTMENT (OUTPATIENT)
Dept: LAB | Facility: CLINIC | Age: 64
End: 2019-11-01
Attending: INTERNAL MEDICINE
Payer: COMMERCIAL

## 2019-11-01 ENCOUNTER — INFUSION THERAPY VISIT (OUTPATIENT)
Dept: ONCOLOGY | Facility: CLINIC | Age: 64
End: 2019-11-01
Attending: PHYSICIAN ASSISTANT
Payer: COMMERCIAL

## 2019-11-01 ENCOUNTER — APPOINTMENT (OUTPATIENT)
Dept: LAB | Facility: CLINIC | Age: 64
End: 2019-11-01
Attending: PHYSICIAN ASSISTANT
Payer: COMMERCIAL

## 2019-11-01 ENCOUNTER — HOME INFUSION (PRE-WILLOW HOME INFUSION) (OUTPATIENT)
Dept: PHARMACY | Facility: CLINIC | Age: 64
End: 2019-11-01

## 2019-11-01 VITALS
RESPIRATION RATE: 16 BRPM | DIASTOLIC BLOOD PRESSURE: 71 MMHG | OXYGEN SATURATION: 100 % | HEART RATE: 87 BPM | BODY MASS INDEX: 24.54 KG/M2 | WEIGHT: 171 LBS | TEMPERATURE: 97.9 F | SYSTOLIC BLOOD PRESSURE: 103 MMHG

## 2019-11-01 DIAGNOSIS — D64.81 ANEMIA ASSOCIATED WITH CHEMOTHERAPY: Primary | ICD-10-CM

## 2019-11-01 DIAGNOSIS — T45.1X5A ANEMIA ASSOCIATED WITH CHEMOTHERAPY: Primary | ICD-10-CM

## 2019-11-01 DIAGNOSIS — C22.1 CHOLANGIOCARCINOMA (H): ICD-10-CM

## 2019-11-01 PROCEDURE — 25000128 H RX IP 250 OP 636: Mod: ZF | Performed by: PHYSICIAN ASSISTANT

## 2019-11-01 PROCEDURE — 96409 CHEMO IV PUSH SNGL DRUG: CPT

## 2019-11-01 PROCEDURE — G0498 CHEMO EXTEND IV INFUS W/PUMP: HCPCS

## 2019-11-01 PROCEDURE — 96367 TX/PROPH/DG ADDL SEQ IV INF: CPT

## 2019-11-01 PROCEDURE — 25800030 ZZH RX IP 258 OP 636: Mod: ZF | Performed by: PHYSICIAN ASSISTANT

## 2019-11-01 RX ORDER — FLUOROURACIL 50 MG/ML
400 INJECTION, SOLUTION INTRAVENOUS ONCE
Status: COMPLETED | OUTPATIENT
Start: 2019-11-01 | End: 2019-11-01

## 2019-11-01 RX ORDER — HEPARIN SODIUM (PORCINE) LOCK FLUSH IV SOLN 100 UNIT/ML 100 UNIT/ML
5 SOLUTION INTRAVENOUS ONCE
Status: COMPLETED | OUTPATIENT
Start: 2019-11-01 | End: 2019-11-01

## 2019-11-01 RX ADMIN — DEXAMETHASONE SODIUM PHOSPHATE: 10 INJECTION, SOLUTION INTRAMUSCULAR; INTRAVENOUS at 14:38

## 2019-11-01 RX ADMIN — FLUOROURACIL 785 MG: 50 INJECTION, SOLUTION INTRAVENOUS at 15:48

## 2019-11-01 RX ADMIN — LEUCOVORIN CALCIUM 700 MG: 500 INJECTION, POWDER, LYOPHILIZED, FOR SOLUTION INTRAMUSCULAR; INTRAVENOUS at 15:08

## 2019-11-01 RX ADMIN — HEPARIN 5 ML: 100 SYRINGE at 13:54

## 2019-11-01 ASSESSMENT — PAIN SCALES - GENERAL: PAINLEVEL: SEVERE PAIN (6)

## 2019-11-01 NOTE — NURSING NOTE
Chief Complaint   Patient presents with     Port Draw     Labs drawn via port by RN in lab. VS taken. Patient checked in for next appt.     Port accessed with 20 gauge flat needle by RN, labs collected, line flushed with saline and heparin.  Vitals taken. Pt checked in for appointment.    Victoria Bales RN

## 2019-11-01 NOTE — PATIENT INSTRUCTIONS
Contact Numbers:   [facility] Main Line: [phone]    Call triage to speak with triage if you are experiencing chills and/or temperature greater than or equal to 100.5, uncontrolled nausea/vomiting, diarrhea, constipation, dizziness, shortness of breath, chest pain, bleeding, unexplained bruising, or any new/concerning symptoms, questions/concerns.     If you are having any concerning symptoms or wish to speak to a provider before your next infusion visit, please call your care coordinator or triage to notify them so we can adequately serve you.     If you need a refill on a narcotic prescription, please call triage or your care coordinator before your infusion appointment.                 November 2019 Sunday Monday Tuesday Wednesday Thursday Friday Saturday                            1    LAB   6:00 AM   (15 min.)   [facility] LAB HOME INFUSION   [facility], Laboratory Services    [facility] LAB DRAW   1:30 PM   (15 min.)    [facility]   [facility]    [facility] ONC INFUSION 240   2:00 PM   (240 min.)    ONCOLOGY INFUSION   [facility] 2       3     4     5     6     7     8     9       10     11    CYSTOSCOPY, WITH RETROGRADE PYELOGRAM AND URETERAL STENT REPLACEMENT   7:00 AM   Sivan Walker MD   UR OR 12     13     14     15    [facility] LAB DRAW   6:30 AM   (15 min.)    [facility] LAB DRAW   [facility] Lab Draw    [facility] RETURN   6:45 AM   (50 min.)   Julia Smith PA-C   [facility] ONC INFUSION 240   8:00 AM   (240 min.)    ONCOLOGY INFUSION   [facility] 16       17     18     19     20     21     22     23       24     25     26     27     28     29    [facility] LAB DRAW   9:30 AM   (15 min.)    [facility] LAB DRAW   [facility]    [facility] ONC INFUSION 240  10:00 AM   (240 min.)    ONCOLOGY INFUSION   [facility] 30 December 2019 Sunday Monday Tuesday  Wednesday Thursday Friday Saturday   1     2    PAC EVAL   9:15 AM   (60 min.)   Tyesha Reina PA-C   [facility] 3     4     5     6     7       8     9     10     11     12     13    [facility] LAB DRAW   9:45 AM   (15 min.)    [facility] LAB DRAW   [facility]    [facility] RETURN  10:05 AM   (50 min.)   Jennifer Jalloh APRN CNP   [facility]    [facility] ONC INFUSION 240  11:30 AM   (240 min.)   [facility] ONCOLOGY INFUSION   [facility] 14       15     16     17     18     19     20     21       22     23     24     25     26     27    [facility] LAB DRAW   9:30 AM   (15 min.)    [facility] LAB DRAW   [facility] Lab Draw    [facility] ONC INFUSION 240  10:00 AM   (240 min.)    ONCOLOGY INFUSION   [facility] 28       29     30    [facility] RETURN   4:15 PM   (30 min.)   Naresh De Los Santos MD   [facility] 31                                         Lab Results:  Recent Results (from the past 12 hour(s))   CBC with platelets differential    Collection Time: 11/01/19  2:05 PM   Result Value Ref Range    WBC 7.6 4.0 - 11.0 10e9/L    RBC Count 3.54 (L) 4.4 - 5.9 10e12/L    Hemoglobin 10.3 (L) 13.3 - 17.7 g/dL    Hematocrit 31.6 (L) 40.0 - 53.0 %    MCV 89 78 - 100 fl    MCH 29.1 26.5 - 33.0 pg    MCHC 32.6 31.5 - 36.5 g/dL    RDW 14.1 10.0 - 15.0 %    Platelet Count 202 150 - 450 10e9/L    Diff Method Automated Method     % Neutrophils 70.4 %    % Lymphocytes 17.7 %    % Monocytes 9.6 %    % Eosinophils 1.7 %    % Basophils 0.3 %    % Immature Granulocytes 0.3 %    Nucleated RBCs 0 0 /100    Absolute Neutrophil 5.3 1.6 - 8.3 10e9/L    Absolute Lymphocytes 1.3 0.8 - 5.3 10e9/L    Absolute Monocytes 0.7 0.0 - 1.3 10e9/L    Absolute Eosinophils 0.1 0.0 - 0.7 10e9/L    Absolute Basophils 0.0 0.0 - 0.2 10e9/L    Abs Immature Granulocytes 0.0 0 - 0.4 10e9/L    Absolute Nucleated RBC 0.0

## 2019-11-03 ENCOUNTER — HOME INFUSION (PRE-WILLOW HOME INFUSION) (OUTPATIENT)
Dept: PHARMACY | Facility: CLINIC | Age: 64
End: 2019-11-03

## 2019-11-04 NOTE — PROGRESS NOTES
This is a recent snapshot of the patient's [facility] medical record.  For current drug dose and complete information and questions, call [phone] or In [facility] pool, fv home infusion ([id number])  CSN Number:  [id number]

## 2019-11-04 NOTE — PROGRESS NOTES
This is a recent snapshot of the patient's [facility] medical record.  For current drug dose and complete information and questions, call [phone] or In Basket pool, fv home infusion ([id number])  CSN Number:  [id number]

## 2019-11-05 ENCOUNTER — AMBULATORY - HEALTHEAST (OUTPATIENT)
Dept: CARDIOLOGY | Facility: CLINIC | Age: 64
End: 2019-11-05

## 2019-11-06 ENCOUNTER — ANESTHESIA EVENT (OUTPATIENT)
Dept: SURGERY | Facility: CLINIC | Age: 64
End: 2019-11-06
Payer: COMMERCIAL

## 2019-11-06 ENCOUNTER — RECORDS - HEALTHEAST (OUTPATIENT)
Dept: ADMINISTRATIVE | Facility: OTHER | Age: 64
End: 2019-11-06

## 2019-11-06 ENCOUNTER — OFFICE VISIT (OUTPATIENT)
Dept: SURGERY | Facility: CLINIC | Age: 64
End: 2019-11-06
Payer: COMMERCIAL

## 2019-11-06 ENCOUNTER — PRE VISIT (OUTPATIENT)
Dept: SURGERY | Facility: CLINIC | Age: 64
End: 2019-11-06

## 2019-11-06 VITALS
HEART RATE: 70 BPM | HEIGHT: 70 IN | BODY MASS INDEX: 23.86 KG/M2 | SYSTOLIC BLOOD PRESSURE: 102 MMHG | OXYGEN SATURATION: 98 % | RESPIRATION RATE: 19 BRPM | DIASTOLIC BLOOD PRESSURE: 76 MMHG | TEMPERATURE: 98.2 F | WEIGHT: 166.7 LBS

## 2019-11-06 DIAGNOSIS — N39.0 URINARY TRACT INFECTION: ICD-10-CM

## 2019-11-06 DIAGNOSIS — Z01.818 PREOP EXAMINATION: Primary | ICD-10-CM

## 2019-11-06 DIAGNOSIS — N39.0 URINARY TRACT INFECTION: Primary | ICD-10-CM

## 2019-11-06 RX ORDER — LOSARTAN POTASSIUM 25 MG/1
25 TABLET ORAL EVERY MORNING
COMMUNITY
End: 2020-04-20

## 2019-11-06 ASSESSMENT — LIFESTYLE VARIABLES: TOBACCO_USE: 0

## 2019-11-06 ASSESSMENT — PAIN SCALES - GENERAL: PAINLEVEL: NO PAIN (0)

## 2019-11-06 ASSESSMENT — ENCOUNTER SYMPTOMS: DYSRHYTHMIAS: 1

## 2019-11-06 ASSESSMENT — MIFFLIN-ST. JEOR: SCORE: 1562.4

## 2019-11-06 NOTE — PHARMACY - PREOPERATIVE ASSESSMENT CENTER
Anticoagulation Note - Preoperative Assessment Center (PAC) Pharmacist     Patient seen and interviewed during time of PAC Clinic appointment November 6, 2019.  The purpose of this note is to document the perioperative anticoagulation plan outlined by the providers caring for Girish Chauhan.     Current Regimen  Anticoagulation Regimen as of November 6, 2019:  Apixaban 5mg po bid  Indication:  Afib  Prescriber:  Dr. Deangelo Rose  Expected Duration of therapy:  Indefinite  Current medications that may interact with this include:  Clopidogrel at stable doses.    Perioperative plan  Girish Chauhan is scheduled for cystoscopy, right ureteral stent exchange on 11/11/19 with Dr. Walker and the perioperative anticoagulation plan outlined by PEDRITO Madsen (Urology) is to continue apixaban and clopidogrel up to the day of hte procedure, hold the morning of the procedure.  Resumption of anticoagulation after procedure will be based on surgery team assessment of bleeding risks and complications.  This plan may require re-assessment and modification by his primary team in the perioperative setting depending on patients clinical situation.      Franchesca Aquino PharmD, MS  November 6, 2019  1:24 PM

## 2019-11-06 NOTE — TELEPHONE ENCOUNTER
FUTURE VISIT INFORMATION      SURGERY INFORMATION:    Date: 11/11/19    Location: [facility] OR    Surgeon:  Sivan Walker    Anesthesia Type:  MAC    RECORDS REQUESTED FROM:       Primary Care Provider: [facility]    Pertinent Medical History: Aortic root dilatation, disorder of aorta, aortic stenosis, hypertension, bicuspid aortic valve, heart failure with preserved ejection fraction    Most recent EKG+ Tracing: 10/4/19    Most recent ECHO: 9/30/19    Most recent Cardiac Stress Test: 12/21/11- [facility]    Most recent Coronary Angiogram: 5/30/19-[facility]

## 2019-11-06 NOTE — PROGRESS NOTES
Preoperative Assessment Center medication history for November 6, 2019 is complete.    See Epic admission navigator for prior to admission medications.   Operating room staff will still need to confirm medications and last dose information on day of surgery.     Medication history interview sources:   Patient    Changes made to PTA medication list (reason)  Added:  None  Deleted: Oxybutinin, tamsulosin  Changed:  None    Additional medication history information (including reliability of information, actions taken by pharmacist):None    -- No recent (within 30 days) course of antibiotics  -- No recent (within 30 days) course of steroids  -- No recent (within 30 days) chronic daily medications stopped   -- Patient declines being on any other prescription or over-the-counter medications    Prior to Admission medications    Medication Sig Last Dose Taking? Auth Provider   acetaminophen (TYLENOL) 500 MG tablet Take 500 mg by mouth every 6 hours as needed for fever or pain Taking Yes Unknown, Entered By History   allopurinol (ZYLOPRIM) 100 MG tablet Take 100 mg by mouth every evening  Taking Yes Reported, Patient   apixaban ANTICOAGULANT (ELIQUIS) 5 MG tablet Take 1 tablet (5 mg) by mouth 2 times daily Taking Yes Jennifer Jalloh APRN CNP   atorvastatin (LIPITOR) 40 MG tablet Take 40 mg by mouth every evening  Taking Yes Unknown, Entered By History   calcium carbonate (TUMS) 500 MG chewable tablet Take 1 chew tab by mouth 4 times daily as needed for heartburn Taking Yes Unknown, Entered By History   clopidogrel (PLAVIX) 75 MG tablet Take 75 mg by mouth every morning  Taking Yes Jennifer Jalloh APRN CNP   furosemide (LASIX) 20 MG tablet Take 1 tablet (20 mg) by mouth daily Taking Yes Jennifer Jalloh APRN CNP   losartan (COZAAR) 25 MG tablet Take 25 mg by mouth every morning Taking Yes Unknown, Entered By History   metoprolol tartrate (LOPRESSOR) 50 MG tablet Take 150 mg by mouth 2 times daily  Taking Yes Unknown,  Entered By History   multivitamin w/minerals (THERA-VIT-M) tablet Take 1 tablet by mouth daily Taking Yes Unknown, Entered By History   colchicine (COLCYRS) 0.6 MG tablet Take 0.6 mg by mouth as needed Not Taking  Unknown, Entered By History   Nitroglycerin (NITROSTAT SL) Place 0.4 mg under the tongue every 5 minutes as needed for chest pain (Carries medication - has never used)  Not Taking  Reported, Patient   ondansetron (ZOFRAN) 4 MG tablet Take 1 tablet (4 mg) by mouth every 8 hours as needed for nausea  Patient not taking: Reported on 10/18/2019 Not Taking  Shannon Lopes PA-C         Medication history completed by: Franchesca Aquino RP

## 2019-11-06 NOTE — ANESTHESIA PREPROCEDURE EVALUATION
Anesthesia Pre-Procedure Evaluation    Patient: Girish Chauhan   MRN:     [id number] Gender:   male   Age:    64 year old :      1955        Preoperative Diagnosis: Right Hydronephrosis   Procedure(s):  Cystoscopy, Right Retrograde Pyelogram, Right Ureteral Stent Exchange     Past Medical History:   Diagnosis Date     Aortic stenosis due to bicuspid aortic valve      Atrial fibrillation (H)     hx of paroxysmal atrial fibrillation since approximately . S/p cardioversion in  with recurrent atrial fibrillation s/p repeat cardioversion 14.     Basal cell carcinoma 2016    right shoulder and right posterior calf s/p electrodessication and curretage 2016.     CAD (coronary artery disease) 2011    s/p angiogram 2011 s/p percutaneous intervention on the LAD with multiple drug-eluting stents complicated by a small perforation in the diagonal branch which sealed spontaneously.  Patient with significant Circumflex stenosis which is being treated conservatively.     Cholangiocarcinoma (H)      CKD (chronic kidney disease)      Gout     affects left foot 2-3 times per year     Hyperlipidemia      Hypertension      Liver disease      Melanoma (H) 2015    back s/p resection 2015     Squamous cell carcinoma     nose s/p excision      Past Surgical History:   Procedure Laterality Date     ------------OTHER-------------      multiple skin cancer resections     CARDIOVERSION  , 2014     CYSTOSCOPY, RETROGRADES, INSERT STENT URETER(S), COMBINED N/A 2019    Procedure: Cystoscopy, Right Retrograde Pyelogram, Right Ureteral Stent Placement;  Surgeon: Sivan Walker MD;  Location: [facility]     ENDOSCOPIC RETROGRADE CHOLANGIOPANCREATOGRAM N/A 2016    Procedure: COMBINED ENDOSCOPIC RETROGRADE CHOLANGIOPANCREATOGRAPHY, PLACE TUBE/STENT;  Surgeon: Rafa Andrade MD;  [facility]     ENDOSCOPIC RETROGRADE CHOLANGIOPANCREATOGRAPHY  2014     ENDOSCOPIC ULTRASOUND UPPER  GASTROINTESTINAL TRACT (GI) N/A 6/7/2019    Procedure: ENDOSCOPIC ULTRASOUND, with hot axios stent placement;  Surgeon: Gabino Rodriguez MD;  [facility]     ENTEROSCOPY SMALL BOWEL N/A 6/7/2019    Procedure: ENTEROSCOPY;  Surgeon: Gabino Rodrigeuz MD;  [facility]     ESOPHAGOSCOPY, GASTROSCOPY, DUODENOSCOPY (EGD), COMBINED N/A 10/16/2019    Procedure: Upper Gastrointestinal Endoscopy;  Surgeon: Gabino Rodriguez MD;  [facility]     ESOPHAGOSCOPY, GASTROSCOPY, DUODENOSCOPY (EGD), DILATATION, COMBINED N/A 7/29/2019    Procedure: Esophagoscopy, gastroscopy, duodenoscopy (EGD), with stent exchange and dilation;  Surgeon: Gabino Rodriguez MD;  [facility]     EXPLORE COMMON BILE DUCT N/A 3/8/2016    Procedure: EXPLORE COMMON BILE DUCT;  Surgeon: Jose Eduardo Pinedo MD;  [facility]     HEPATECTOMY PARTIAL Left 3/8/2016    Procedure: HEPATECTOMY PARTIAL;  Surgeon: Jose Eduardo Pinedo MD;  [facility]     INSERT PORT VASCULAR ACCESS Right 12/8/2017    Procedure: INSERT PORT VASCULAR ACCESS;  Place single lumen venous chest port - right;  Surgeon: Drew Powell PA-C;  [facility]     SOFT TISSUE SURGERY       STENT  12/2011    LAD with multiple SAM          Anesthesia Evaluation     . Pt has had prior anesthetic. Type: General and MAC    No history of anesthetic complications          ROS/MED HX    ENT/Pulmonary:     (+)MARVIN risk factors hypertension, , . .   (-) tobacco use   Neurologic:     (+)neuropathy     Cardiovascular: Comment: Cardiomyopathy, ascending aorta dilation 3.9 cm    (+) Dyslipidemia, hypertension--CAD, -past MI,-stent,2011 x4, 6/17/2019 x 1  5 Drug Eluting Stent .. Taking blood thinners Pt has received instructions: Instructions Given to patient: Continuing Plavix and Eliquis-okay per surgery. CHF Last EF: 42% date: 9/30/19 . . :. dysrhythmias a-fib, valvular problems/murmurs type: AS mild:. Previous cardiac testing  Echodate:9/30/19results:date: results:ECG reviewed date:10/4/19 results:Atrial fibrillation rate 77  Cath date: 6/17/2019 results:   Result Narrative        65 yo M with known CAD, remote PCI of LAD in 2011, presented in May 2019   to [facility] with a NSTEMI, with coronary angiography there showing patent   LAD stents with a severe stenosis in OM1 that could not be successfully   crossed with a wire.     (Known metastatic cholangiocarcinoma s/p initial resection 2016, more   recent chemotherapy; and s/p Axios stenting of carcinomatous obstruction   of biliary limb of prior Carol-en-Y jejunostomy June 2019 when he presented   with an SBO)    Returns today for repeat coronary angiography with another attempt at the   OM1 stenosis    LM minimal dz  LAD patent prox to distal stents  LCx 99% OM1 stenosis with disease extension for 10-15mm on either side of   stenosis; mild dz elsewhere in Cx  RCA mild to moderate diffuse dz, 50-60% focal distal RCA lesion    Successful PCI of OM1 with PTCA followed by 2.5x24 Synergy SAM  0% residual, NITZA 3 flow post    OM1 lesion essentially behaving as a ; requiring wire escalation,   progressive PTCA and Guidezilla support for PCI              METS/Exercise Tolerance:  >4 METS   Hematologic:     (+) Anemia, History of Transfusion no previous transfusion reaction -      Musculoskeletal:   (+) arthritis,  other musculoskeletal- gout      GI/Hepatic:     (+) GERD Other, Other GI/Hepatic cholangiocarcinoma, s/p partial hepatatectomy, bowel stent in place      Renal/Genitourinary:     (+) chronic renal disease, type: CRI, Pt does not require dialysis, Pt has no history of transplant, Other Renal/ Genitourinary, right hydronephrosis      Endo:  - neg endo ROS       Psychiatric:  - neg psychiatric ROS       Infectious Disease:  - neg infectious disease ROS       Malignancy:   (+) Malignancy History of Other and Skin  Skin CA Remission status post Surgery, Other CA cholangiocarcinoma Active  status post Chemo and Surgery         Other:    (+) No chance of pregnancy C-spine cleared: N/A, no H/O Chronic Pain,no other significant disability                        PHYSICAL EXAM:   Mental Status/Neuro: A/A/O; Age Appropriate   Airway: Facies: Feasible  Mallampati: I  Mouth/Opening: Full  TM distance: > 6 cm  Neck ROM: Limited   Respiratory: Auscultation: CTAB     Resp. Rate: Normal     Resp. Effort: Normal      CV: Rhythm: Regular  Heart: Normal Sounds  Edema: None   Comments:      Dental: Normal Dentition                LABS:  CBC:   Lab Results   Component Value Date    WBC 7.6 11/01/2019    WBC 6.6 10/18/2019    HGB 10.3 (L) 11/01/2019    HGB 9.9 (L) 10/18/2019    HCT 31.6 (L) 11/01/2019    HCT 31.5 (L) 10/18/2019     11/01/2019     10/18/2019     BMP:   Lab Results   Component Value Date     11/01/2019     10/18/2019    POTASSIUM 3.8 11/01/2019    POTASSIUM 3.9 10/18/2019    CHLORIDE 110 (H) 11/01/2019    CHLORIDE 110 (H) 10/18/2019    CO2 25 11/01/2019    CO2 23 10/18/2019    BUN 23 11/01/2019    BUN 18 10/18/2019    CR 1.33 (H) 11/01/2019    CR 1.34 (H) 10/18/2019     (H) 11/01/2019     (H) 10/18/2019     COAGS:   Lab Results   Component Value Date    PTT 35 07/30/2019    INR 1.35 (H) 07/30/2019    FIBR 404 07/30/2019     POC:   Lab Results   Component Value Date    BGM 65 (L) 10/16/2019     OTHER:   Lab Results   Component Value Date    PH 7.38 03/08/2016    LACT 1.2 07/28/2019    GARY 8.5 11/01/2019    PHOS 3.2 10/04/2019    MAG 1.8 10/04/2019    ALBUMIN 3.2 (L) 11/01/2019    PROTTOTAL 6.3 (L) 11/01/2019    ALT 68 11/01/2019    AST 42 11/01/2019    ALKPHOS 175 (H) 11/01/2019    BILITOTAL 0.3 11/01/2019    LIPASE 131 07/29/2019    AMYLASE 71 07/29/2019    TSH 3.88 10/15/2018        Preop Vitals    BP Readings from Last 3 Encounters:   11/01/19 103/71   10/18/19 139/81   10/16/19 (!) 143/104    Pulse Readings from Last 3 Encounters:   11/06/19 70   11/01/19 87  "  10/18/19 95      Resp Readings from Last 3 Encounters:   11/06/19 19   11/01/19 16   10/18/19 16    SpO2 Readings from Last 3 Encounters:   11/06/19 98%   11/01/19 100%   10/18/19 100%      Temp Readings from Last 1 Encounters:   11/06/19 98.2  F (36.8  C) (Oral)    Ht Readings from Last 1 Encounters:   11/06/19 1.778 m (5' 10\")      Wt Readings from Last 1 Encounters:   11/06/19 75.6 kg (166 lb 11.2 oz)    Estimated body mass index is 23.92 kg/m  as calculated from the following:    Height as of this encounter: 1.778 m (5' 10\").    Weight as of this encounter: 75.6 kg (166 lb 11.2 oz).     LDA:  Port A Cath Single 12/08/17 Right Chest wall (Active)   Access Date 11/01/19 11/1/2019  2:00 PM   Access Attempts 1 11/1/2019  2:00 PM   Gauge/Length Power noncoring 90 degree bend;20 gauge;3/4 inch 11/1/2019  2:00 PM   Site Assessment WDL 11/1/2019  4:00 PM   Line Status Blood return noted;Infusing 11/1/2019  4:00 PM   Extravasation? No 11/1/2019  4:00 PM   Dressing Intervention Transparent 11/1/2019  4:00 PM   De-Access Date 11/03/19 11/1/2019  4:00 PM   Number of days: 698        Assessment:   ASA SCORE: 3    H&P: History and physical reviewed and following examination; no interval change.   Smoking Status:  Non-Smoker/Unknown   NPO Status: NPO Appropriate     Plan:   Anes. Type:  MAC   Pre-Medication: None   Induction:  N/a   Airway: Native Airway   Access/Monitoring: PIV   Maintenance: N/a     Drips/Meds: prn beta-blocker, phenylephrine.     Postop Plan:   Postop Pain: None  Postop Sedation/Airway: Not planned  Disposition: Outpatient     PONV Management:   Adult Risk Factors:, Non-Smoker   Prevention:, No Volatiles     CONSENT: Direct conversation   Plan and risks discussed with: Patient   Blood Products: Consent Deferred (Minimal Blood Loss)       Comments for Plan/Consent:  10/16/19; 1241; Mask Ventilation: Easy; Ease of Intubation: Easy; Airway Size: 7.5;  Cuffed;  Oral;  Blade Type: Thom;  Blade Size: 4;  " Place by: Sarah Haynes OMFS;  Insertion Attempts: 1;  Secured at (cm)to lip: 22 cm;  Breath Sounds: Equal, clear and bilateral;  End Tidal CO2: Present;  Dentition: Intact, Unchanged;  Grade View of Cords: 2              PAC Discussion and Assessment    ASA Classification: 3  Case is suitable for: [facility]  Anesthetic techniques and relevant risks discussed: MAC with GA as backup  Invasive monitoring and risk discussed: No  Types:   Possibility and Risk of blood transfusion discussed: No  NPO instructions given:   Additional anesthetic preparation and risks discussed:   Needs early admission to pre-op area:   Other:     PAC Resident/NP Anesthesia Assessment:  Girish Chauhan is a 64 year old male scheduled to undergo Cystoscopy, Right Retrograde Pyelogram, Right Ureteral Stent Exchange with Dr. Walker on 11/11/19. He has the following specific operative considerations:   - RCRI : 6.6% risk of major adverse cardiac event.   - VTE risk: 3%  - MARVIN # of risks 3/8 = Intermediate risk  - If afib, [id number] score 3.    - Risk of PONV score = 2.  If > 2, anti-emetic intervention recommended.    --Hydronephrosis in setting of cholangiocarcinoma. Followed by Oncology and referred to Dr. Walker. Above procedures planned with MAC.  --HLD. Atorvastatin. HTN. Will hold losartan on DOS but will take metoprolol. Known CAD s/p remote stent to LAD in 2011. He had an MI in May 2019. An OM SAM placement was attempted but unsuccessful. A repeat attempt was successful on 6/17/2019.  His last echocardiogram on 9/30/2019 showed an EF of 42%, mild aortic stenosis with a mean gradient of 20mmHg. An ascending aorta dilation of 3.9 cm was documented on 7/28/19.  He saw Cardiologist, Dr. Rose last on 7/2/19. Dr. Rose noted the likelihood of multiple upcoming procedures due to his cholangiocarcinoma and noted he was okay to proceed if able to stay on plavix and could hold eliquis up to 3 days if needed. Per Dr. Walker, patient can remain  on both for this upcoming procedure. He will hold Lasix on DOS. Able to walk distance. Per patient, he will see his Cardiology a few days before his surgery again for routine follow up.  --Nonsmoker. Denies pulmonary symptoms.   --GERD. Managed with TUMS and diet.  --CRI Cr 1.33, GFR 57.  --Chronic anemia with improved Hgb at 10.3. History of blood transfusion. Known blood antibodies.   --Gout right foot. Will take allopurinol on DOS. Colchicine prn.   --Right chest port.        Patient was discussed with Dr Gilmore.        Reviewed and Signed by PAC Mid-Level Provider/Resident  Mid-Level Provider/Resident: YO Jorge, STEFFANEI  Date: 11/6/19  Time: 12:56pm    Attending Anesthesiologist Anesthesia Assessment:        Anesthesiologist:   Date:   Time:   Pass/Fail:   Disposition:     PAC Pharmacist Assessment:        Pharmacist:   Date:   Time:    YO Carlton

## 2019-11-06 NOTE — H&P
Pre-Operative H & P     CC:  Preoperative exam to assess for increased cardiopulmonary risk while undergoing surgery and anesthesia.    Date of Encounter: 11/6/2019  Primary Care Physician:  Dario Jain  Reason for visit: Right Hydronephrosis  HPI  Girish Chauhan is a 64 year old male who presents for pre-operative H & P in preparation for Cystoscopy, Right Retrograde Pyelogram, Right Ureteral Stent Exchange with Dr. Walker on 11/11/19 at [facility]. History is obtained from the patient and medical record.    Patient with complex history including CAD, cardiomyopathy, old MI, ascending aorta dilation, bicuspid aortic valve, aortic stenosis, atrial fibrillation, anemia, gout, GERD, CKD, history of melanoma and history of SCC who has right hydronephrosis in the setting of cholangiocarcinoma. He is s/p chemotherapy and multiple surgeries for management of the cholangiocarcinoma. He has ongoing chemotherapy every other Friday. He was referred to Dr. Walker in 9/2019 and underwent cystoscopy, retrogrades, and insertion of stent on 9/16/19 for hydronephrosis. A follow up NM renogram on 10/11/19 showed delayed excretion and persistent hydronephrosis in the right renal collecting system with preserved function. Findings were concerning for chronic dilation versus partial UPJ obstruction. Above procedure is now planned.     Past Medical History  Past Medical History:   Diagnosis Date     Aortic stenosis due to bicuspid aortic valve      Atrial fibrillation (H) 2006    hx of paroxysmal atrial fibrillation since approximately 2006. S/p cardioversion in 2013 with recurrent atrial fibrillation s/p repeat cardioversion 9/29/14.     Basal cell carcinoma 1/2016    right shoulder and right posterior calf s/p electrodessication and curretage 1/2016.     CAD (coronary artery disease) 12/2011    s/p angiogram 12/2011 s/p percutaneous intervention on the LAD with multiple drug-eluting  stents complicated by a small perforation in the diagonal branch which sealed spontaneously.  Patient with significant Circumflex stenosis which is being treated conservatively.     Cholangiocarcinoma (H)      CKD (chronic kidney disease)      Gout     affects left foot 2-3 times per year     Hyperlipidemia      Hypertension      Liver disease      Melanoma (H) 9/2015    back s/p resection 9/2015     Squamous cell carcinoma     nose s/p excision       Past Surgical History  Past Surgical History:   Procedure Laterality Date     ------------OTHER-------------      multiple skin cancer resections     CARDIOVERSION  2013, 9/2014     CYSTOSCOPY, RETROGRADES, INSERT STENT URETER(S), COMBINED N/A 9/16/2019    Procedure: Cystoscopy, Right Retrograde Pyelogram, Right Ureteral Stent Placement;  Surgeon: Sivan Walker MD;  Location: [facility] OR     ENDOSCOPIC RETROGRADE CHOLANGIOPANCREATOGRAM N/A 2/26/2016    Procedure: COMBINED ENDOSCOPIC RETROGRADE CHOLANGIOPANCREATOGRAPHY, PLACE TUBE/STENT;  Surgeon: Rafa Andrade MD;  Location: [facility]     ENDOSCOPIC RETROGRADE CHOLANGIOPANCREATOGRAPHY  2/2014     ENDOSCOPIC ULTRASOUND UPPER GASTROINTESTINAL TRACT (GI) N/A 6/7/2019    Procedure: ENDOSCOPIC ULTRASOUND, with hot axios stent placement;  Surgeon: Gabino Rodriguez MD;  Location: [facility]     ENTEROSCOPY SMALL BOWEL N/A 6/7/2019    Procedure: ENTEROSCOPY;  Surgeon: Gabino Rodriguez MD;  [facility]: [facility]     ESOPHAGOSCOPY, GASTROSCOPY, DUODENOSCOPY (EGD), COMBINED N/A 10/16/2019    Procedure: Upper Gastrointestinal Endoscopy;  Surgeon: Gabino Rodriguez MD;  [facility]     ESOPHAGOSCOPY, GASTROSCOPY, DUODENOSCOPY (EGD), DILATATION, COMBINED N/A 7/29/2019    Procedure: Esophagoscopy, gastroscopy, duodenoscopy (EGD), with stent exchange and dilation;  Surgeon: Gabino Rodriguez MD;  Location: [facility]     EXPLORE COMMON BILE DUCT N/A 3/8/2016    Procedure: EXPLORE COMMON BILE DUCT;  Surgeon: Khris  Jose Eduardo Alcantara MD;  [facility]     HEPATECTOMY PARTIAL Left 3/8/2016    Procedure: HEPATECTOMY PARTIAL;  Surgeon: Jose Eduardo Pinedo MD;  [facility]     INSERT PORT VASCULAR ACCESS Right 12/8/2017    Procedure: INSERT PORT VASCULAR ACCESS;  Place single lumen venous chest port - right;  Surgeon: Drew Powell PA-C;  [facility]: [facility]     SOFT TISSUE SURGERY       STENT  12/2011    LAD with multiple SAM       Hx of Blood transfusions/reactions: Yes, no reactions. History of blood antibodies.    Hx of abnormal bleeding or anti-platelet use: Anticoagulated with apixaban. Plavix daily.    Menstrual history: No LMP for male patient.    Steroid use in the last year: Denies.     Personal or FH with difficulty with Anesthesia:  Denies.     Prior to Admission Medications  Current Outpatient Medications   Medication Sig Dispense Refill     acetaminophen (TYLENOL) 500 MG tablet Take 500 mg by mouth every 6 hours as needed for fever or pain       allopurinol (ZYLOPRIM) 100 MG tablet Take 100 mg by mouth every evening        apixaban ANTICOAGULANT (ELIQUIS) 5 MG tablet Take 1 tablet (5 mg) by mouth 2 times daily       atorvastatin (LIPITOR) 40 MG tablet Take 40 mg by mouth every evening        calcium carbonate (TUMS) 500 MG chewable tablet Take 1 chew tab by mouth 4 times daily as needed for heartburn       clopidogrel (PLAVIX) 75 MG tablet Take 75 mg by mouth every morning        colchicine (COLCYRS) 0.6 MG tablet Take 0.6 mg by mouth as needed       furosemide (LASIX) 20 MG tablet Take 1 tablet (20 mg) by mouth daily       losartan (COZAAR) 25 MG tablet Take 25 mg by mouth every morning       metoprolol tartrate (LOPRESSOR) 50 MG tablet Take 150 mg by mouth 2 times daily        multivitamin w/minerals (THERA-VIT-M) tablet Take 1 tablet by mouth daily       Nitroglycerin (NITROSTAT SL) Place 0.4 mg under the tongue every 5 minutes as needed for chest pain (Carries medication - has never used)         ondansetron (ZOFRAN) 4 MG tablet Take 1 tablet (4 mg) by mouth every 8 hours as needed for nausea (Patient not taking: Reported on 10/18/2019) 30 tablet 0       Allergies  Allergies   Allergen Reactions     Blood Transfusion Related (Informational Only) Other (See Comments)     Patient has a history of a clinically significant antibody against RBC antigens.  A delay in compatible RBCs may occur.       Social History  Social History     Socioeconomic History     Marital status:      Spouse name: Not on file     Number of children: 1     Years of education: Not on file     Highest education level: Not on file   Occupational History     Occupation: retired   Social Needs     Financial resource strain: Not on file     Food insecurity:     Worry: Not on file     Inability: Not on file     Transportation needs:     Medical: Not on file     Non-medical: Not on file   Tobacco Use     Smoking status: Never Smoker     Smokeless tobacco: Never Used   Substance and Sexual Activity     Alcohol use: Yes     Alcohol/week: 2.0 standard drinks     Types: 2 Cans of beer per week     Drug use: No     Sexual activity: Not on file   Lifestyle     Physical activity:     Days per week: Not on file     Minutes per session: Not on file     Stress: Not on file   Relationships     Social connections:     Talks on phone: Not on file     Gets together: Not on file     Attends Orthodox service: Not on file     Active member of club or organization: Not on file     Attends meetings of clubs or organizations: Not on file     Relationship status: Not on file     Intimate partner violence:     Fear of current or ex partner: Not on file     Emotionally abused: Not on file     Physically abused: Not on file     Forced sexual activity: Not on file   Other Topics Concern     Not on file   Social History Narrative    Works for [employer] with [employer].  Lives in [place].   with one grown daughter.  No tobacco, rare Etoh, no  drug use.       Family History  Family History   Problem Relation Age of Onset     Skin Cancer Mother      Other - See Comments Father         father passed away in his 60s post-op with an unknown bile duct obstruction.  no anesthesia complication.       Osteoarthritis Sister      Cerebrovascular Disease Brother      Other - See Comments Brother         prediabetes     Osteoarthritis Sister      No Known Problems Brother        Review of Systems  ROS/MED HX  The complete review of systems is negative other than noted in the HPI or here.   ENT/Pulmonary:     (+)MARVIN risk factors hypertension, , . .   (-) tobacco use   Neurologic:  - neg neurologic ROS     Cardiovascular: Comment: Cardiomyopathy, ascending aorta dilation 3.9 cm    (+) Dyslipidemia, hypertension--CAD, -past MI,-stent,2011 x4, 6/17/2019 x 1  5 Drug Eluting Stent .. Taking blood thinners Pt has received instructions: Instructions Given to patient: Continuing Plavix and Eliquis-okay per surgery. CHF Last EF: 42% date: 9/30/19 . . :. dysrhythmias a-fib, valvular problems/murmurs type: AS mild:. Previous cardiac testing Echodate:9/30/19results:date: results:ECG reviewed date:10/4/19 results:Atrial fibrillation rate 77  Cath date: 6/17/2019 results:   Result Narrative        65 yo M with known CAD, remote PCI of LAD in 2011, presented in May 2019   to [facility] with a NSTEMI, with coronary angiography there showing patent   LAD stents with a severe stenosis in OM1 that could not be successfully   crossed with a wire.     (Known metastatic cholangiocarcinoma s/p initial resection 2016, more   recent chemotherapy; and s/p Axios stenting of carcinomatous obstruction   of biliary limb of prior Carol-en-Y jejunostomy June 2019 when he presented   with an SBO)    Returns today for repeat coronary angiography with another attempt at the   OM1 stenosis    LM minimal dz  LAD patent prox to distal stents  LCx 99% OM1 stenosis with disease extension for 10-15mm on either  side of   stenosis; mild dz elsewhere in Cx  RCA mild to moderate diffuse dz, 50-60% focal distal RCA lesion    Successful PCI of OM1 with PTCA followed by 2.5x24 Synergy SAM  0% residual, NITZA 3 flow post    OM1 lesion essentially behaving as a ; requiring wire escalation,   progressive PTCA and Guidezilla support for PCI              METS/Exercise Tolerance:  >4 METS   Hematologic:     (+) Anemia, History of Transfusion no previous transfusion reaction -      Musculoskeletal:   (+) arthritis,  other musculoskeletal- gout      GI/Hepatic:     (+) GERD Other, Other GI/Hepatic cholangiocarcinoma, s/p partial hepatatectomy, bowel stent in place      Renal/Genitourinary:     (+) chronic renal disease, type: CRI, Pt does not require dialysis, Pt has no history of transplant, Other Renal/ Genitourinary, right hydronephrosis      Endo:  - neg endo ROS       Psychiatric:  - neg psychiatric ROS       Infectious Disease:  - neg infectious disease ROS       Malignancy:   (+) Malignancy History of Other and Skin  Skin CA Remission status post Surgery, Other CA cholangiocarcinoma Active status post Chemo and Surgery         Other:    (+) No chance of pregnancy C-spine cleared: N/A, no H/O Chronic Pain,no other significant disability            PHYSICAL EXAM:   Mental Status/Neuro: A/A/O; Age Appropriate   Airway: Facies: Feasible  Mallampati: I  Mouth/Opening: Full  TM distance: > 6 cm  Neck ROM: Full   Respiratory: Auscultation: CTAB     Resp. Rate: Normal     Resp. Effort: Normal      CV: Rhythm: Regular  Heart: Normal Sounds  Edema: None   Comments:      Personally reviewed  LABS:  CBC:   Lab Results   Component Value Date    WBC 7.6 11/01/2019    WBC 6.6 10/18/2019    HGB 10.3 (L) 11/01/2019    HGB 9.9 (L) 10/18/2019    HCT 31.6 (L) 11/01/2019    HCT 31.5 (L) 10/18/2019     11/01/2019     10/18/2019     BMP:   Lab Results   Component Value Date     11/01/2019     10/18/2019    POTASSIUM 3.8  "11/01/2019    POTASSIUM 3.9 10/18/2019    CHLORIDE 110 (H) 11/01/2019    CHLORIDE 110 (H) 10/18/2019    CO2 25 11/01/2019    CO2 23 10/18/2019    BUN 23 11/01/2019    BUN 18 10/18/2019    CR 1.33 (H) 11/01/2019    CR 1.34 (H) 10/18/2019     (H) 11/01/2019     (H) 10/18/2019     COAGS:   Lab Results   Component Value Date    PTT 35 07/30/2019    INR 1.35 (H) 07/30/2019    FIBR 404 07/30/2019     POC:   Lab Results   Component Value Date    BGM 65 (L) 10/16/2019     OTHER:   Lab Results   Component Value Date    PH 7.38 03/08/2016    LACT 1.2 07/28/2019    GARY 8.5 11/01/2019    PHOS 3.2 10/04/2019    MAG 1.8 10/04/2019    ALBUMIN 3.2 (L) 11/01/2019    PROTTOTAL 6.3 (L) 11/01/2019    ALT 68 11/01/2019    AST 42 11/01/2019    ALKPHOS 175 (H) 11/01/2019    BILITOTAL 0.3 11/01/2019    LIPASE 131 07/29/2019    AMYLASE 71 07/29/2019    TSH 3.88 10/15/2018        Preop Vitals    BP Readings from Last 3 Encounters:   11/01/19 103/71   10/18/19 139/81   10/16/19 (!) 143/104    Pulse Readings from Last 3 Encounters:   11/06/19 70   11/01/19 87   10/18/19 95      Resp Readings from Last 3 Encounters:   11/06/19 19   11/01/19 16   10/18/19 16    SpO2 Readings from Last 3 Encounters:   11/06/19 98%   11/01/19 100%   10/18/19 100%      Temp Readings from Last 1 Encounters:   11/06/19 98.2  F (36.8  C) (Oral)    Ht Readings from Last 1 Encounters:   11/06/19 1.778 m (5' 10\")      Wt Readings from Last 1 Encounters:   11/06/19 75.6 kg (166 lb 11.2 oz)    Estimated body mass index is 23.92 kg/m  as calculated from the following:    Height as of this encounter: 1.778 m (5' 10\").    Weight as of this encounter: 75.6 kg (166 lb 11.2 oz).     Temp: 98.2  F (36.8  C) Temp src: Oral BP: 102/76 Pulse: 70   Resp: 19 SpO2: 98 %         166 lbs 11.2 oz  5' 10\"   Body mass index is 23.92 kg/m .       Physical Exam  Constitutional: Awake, alert, cooperative, no apparent distress, and appears stated age.  Eyes: Pupils equal, round " and reactive to light, extra ocular muscles intact, sclera clear, conjunctiva normal. Glasses on.  HENT: Normocephalic, oral pharynx with moist mucus membranes, good dentition. No goiter appreciated.   Respiratory: Clear to auscultation bilaterally, no crackles or wheezing. No cough or obvious dyspnea.  Cardiovascular: Regular rate and irregular rhythm, systolic murmur noted.  Carotids +2, no bruits. No edema. Palpable pulses to radial  DP and PT arteries.   GI: Normal bowel sounds, soft, non-distended, non-tender, no masses palpated. Surgical scars: well healed.   Lymph/Hematologic: No cervical lymphadenopathy and no supraclavicular lymphadenopathy.  Genitourinary:  Deferred.   Skin: Warm and dry.  Right upper chest port.  Musculoskeletal: Mildly limited ROM of neck. There is no redness, warmth, or swelling of the joints. Gross motor strength is mildly weakened.  Neurologic: Awake, alert, oriented to name, place and time. Cranial nerves II-XII are grossly intact. Gait is normal. Mildly slurred speech.  Neuropsychiatric: Calm, cooperative. Normal affect.     EKG: Personally reviewed 10/4/19 Atrial fibrillation rate 77  Cardiac echo: 9/30/19   Normal left ventricular size with borderline concentric hypertrophy.    Left ventricular ejection fraction is moderately decreased. The   calculated left ventricular ejection fraction is 42%.    Normal right ventricular size and systolic function.    Mild aortic stenosis.    Left atrial volume is moderately increased.    When compared to the previous study dated 5/17/2019, the ejection   fraction is slightly higher.  Cardiac Cath  6/17/2019  65 yo M with known CAD, remote PCI of LAD in 2011, presented in May 2019   to [facility] with a NSTEMI, with coronary angiography there showing patent   LAD stents with a severe stenosis in OM1 that could not be successfully   crossed with a wire.     (Known metastatic cholangiocarcinoma s/p initial resection 2016, more   recent chemotherapy;  and s/p Axios stenting of carcinomatous obstruction   of biliary limb of prior Carol-en-Y jejunostomy June 2019 when he presented   with an SBO)    Returns today for repeat coronary angiography with another attempt at the   OM1 stenosis  LM minimal dz  LAD patent prox to distal stents  LCx 99% OM1 stenosis with disease extension for 10-15mm on either side of   stenosis; mild dz elsewhere in Cx  RCA mild to moderate diffuse dz, 50-60% focal distal RCA lesion    Successful PCI of OM1 with PTCA followed by 2.5x24 Synergy SAM  0% residual, NITZA 3 flow post    OM1 lesion essentially behaving as a ; requiring wire escalation,   progressive PTCA and Guidezilla support for PCI    10/11/19 NM Renogram  Impression:  Delayed excretion and persistent hydronephrosis in the right renal  collecting system with preserved function. In the setting of ureteral  stent these findings may represent chronic dilation versus partial UPJ  obstruction.  9/27/19 CAP  IMPRESSION:   In this patient with metastatic cholangiocarcinoma status post post  left hepatectomy, cholecystectomy,hepaticojejunostomy and recent  ureteral stent placement; overall findings consistent with progressive  disease.     1a. Increased conspicuity of soft tissue density encasing the proximal  portion of the jejunum, today measuring 3.8 x 2.7 cm , previously 2.7  x 2.8 cm with further invasion into the adjacent ventral abdominal  wall.   1b. Increased size of an omental lymph nodes adjacent to the  above-mentioned soft tissue density measuring 11 x 17 mm, previously 8  x 12 mm.   1c. Increased size of a prerectal soft tissue mass.  1d. Increased thickening of soft tissue density extending from the  presacral region left anterolateral to ventral abdominal wall.  2. There is lobulated enhancing soft tissue density in the distal  margin of the gastrojejunostomy stent, stable to slightly more  conspicuous compared to prior exam. Indeterminant if this is normal  jejunal  mucosal wall or recurrent disease at the distal end of the  stent. Nevertheless, the stomach is well decompressed and there is no  evidence of obstruction.   3. Several indeterminant hypodense foci in the spleen, stable since  6/21/2018, and however new since 4/19/2019.  4. Interval placement of double-J stent to the right kidney. The  proximal end of the stent is within the right lower pole major calyx  and this portion of the hydronephrosis is well drained. However, right  upper pole major calyceal system continues to be dilated and likely  this portion is not well drained by the new catheter. Recommend  consultation to urology.   5. Complete resolution of previously seen patchy groundglass alveolar  opacities. No evidence of lung metastasis. Stable few scattered lung  nodules, largest is 3 mm in size.   Imaging and cardiac testing reviewed by this provider    Outside records reviewed from: Care Everywhere    ASSESSMENT and PLAN  Girish Chauhan is a 64 year old male scheduled to undergo Cystoscopy, Right Retrograde Pyelogram, Right Ureteral Stent Exchange with Dr. Walker on 11/11/19. He has the following specific operative considerations:   - RCRI : 6.6% risk of major adverse cardiac event.   - Anesthesia considerations:  Refer to PAC assessment in anesthesia records  - VTE risk: 3%  - MARVIN # of risks 3/8 = Intermediate risk  - If afib, [id number] score 3.    - Risk of PONV score = 2.  If > 2, anti-emetic intervention recommended.    --Hydronephrosis in setting of cholangiocarcinoma. Followed by Oncology and referred to Dr. Walker. Above procedures planned with MAC.   --HLD. Atorvastatin. HTN. Will hold losartan on DOS but will take metoprolol. Known CAD s/p remote stent to LAD in 2011. He had an MI in May 2019. An OM SAM placement was attempted but unsuccessful. A repeat attempt was successful on 6/17/2019.  His last echocardiogram on 9/30/2019 showed an EF of 42%, mild aortic stenosis with a mean gradient of  20mmHg. An ascending aorta dilation of 3.9 cm was documented on 7/28/19.  He saw Cardiologist, Dr. Rose last on 7/2/19. Dr. Rose noted the likelihood of multiple upcoming procedures due to his cholangiocarcinoma and noted he was okay to proceed if able to stay on plavix and could hold eliquis up to 3 days if needed. Per Dr. Walker, patient can remain on both for this upcoming procedure. He will hold Lasix on DOS. Able to walk distance. Per patient, he will see his Cardiology a few days before his surgery again for routine follow up.  --Nonsmoker. Denies pulmonary symptoms.   --GERD. Managed with TUMS and diet.  --CRI Cr 1.33, GFR 57.  --Chronic anemia with improved Hgb at 10.3. History of blood transfusion. Known blood antibodies.   --Gout right foot. Will take allopurinol on DOS. Colchicine prn.   --Right chest port.    Arrival time, NPO, shower and medication instructions provided by nursing staff today. Preparing For Your Surgery handout given.    Patient was discussed with Dr Gilmore.    Hayde Blanca, YO CNS  Preoperative Assessment Center  [facility]  [facility]  Phone: [phone]  Fax: [phone]

## 2019-11-06 NOTE — PATIENT INSTRUCTIONS
Preparing for Your Surgery      Name:  Girish Chauhan   MRN:  [id number]   :  1955   Today's Date:  2019     Arriving for surgery:  Surgery date:  19  Arrival time:  05:30 am  Please come to:       [facility] [address]  [address] [place]  [address]    - ? parking is available in front of the hospital      -    Please proceed to [address] on the 3rd floor. [phone]?     - ?If you are in need of directions, wheelchair or escort please stop at the Information Desk in the lobby.  Inform the information person that you are here for surgery; a wheelchair and escort to [address] will be provided.?     What can I eat or drink?  -  You may have solid food or milk products until 8 hours prior to your surgery.  -  You may have water, apple juice or 7up/Sprite until 2 hours prior to your surgery.    Which medicines can I take?  Stop Aspirin, vitamins and supplements one week prior to surgery.  Hold Ibuprofen for 24 hours and/or Naproxen for 48 hours prior to surgery.   -  Do NOT take these medications in the morning, the day of surgery:  Cozaar + Plavix + Eliquis + lasix if normally taken in the morning.  -  Please take these medications the day of surgery:  Tylenol if needed; take all other scheduled medications normally taken in the morning.    How do I prepare myself?  -  Take two showers: one the night before surgery; and one the morning of surgery.         Use Scrubcare or Hibiclens to wash from neck down, leave soap on your skin for up to one minute.  Do not get soap in your eyes or ears.  You may use your own shampoo and conditioner; no other hair products.   -  Do NOT use lotion, powder, deodorant, or antiperspirant the day of your surgery.  -  Do NOT wear any jewelry.  - Do not bring your own medications to the hospital, except for inhalers and eye   drops.  -  Bring your ID and insurance card.    -If you are scheduled to go home the Same Day as  surgery you must have a responsible adult as a  and to stay with you overnight the first 24 hours after surgery.     Questions or Concerns:  -If you are scheduled on the [facility] or [facility] campus and have questions or concerns regarding the day of surgery, please call Preadmission Nursing at [phone].     -If you are scheduled at the [facility] and have questions or concerns regarding the day of surgery please call [phone].    -For questions after surgery please call your surgeons office.

## 2019-11-07 ENCOUNTER — AMBULATORY - HEALTHEAST (OUTPATIENT)
Dept: CARDIOLOGY | Facility: CLINIC | Age: 64
End: 2019-11-07

## 2019-11-07 LAB
BACTERIA SPEC CULT: NO GROWTH
Lab: NORMAL
SPECIMEN SOURCE: NORMAL

## 2019-11-08 ENCOUNTER — OFFICE VISIT - HEALTHEAST (OUTPATIENT)
Dept: CARDIOLOGY | Facility: CLINIC | Age: 64
End: 2019-11-08

## 2019-11-08 DIAGNOSIS — I25.10 CORONARY ARTERY DISEASE INVOLVING NATIVE CORONARY ARTERY OF NATIVE HEART, ANGINA PRESENCE UNSPECIFIED: ICD-10-CM

## 2019-11-08 DIAGNOSIS — I48.91 ATRIAL FIBRILLATION WITH RVR (H): ICD-10-CM

## 2019-11-08 DIAGNOSIS — I50.22 CHRONIC SYSTOLIC CONGESTIVE HEART FAILURE (H): ICD-10-CM

## 2019-11-08 ASSESSMENT — MIFFLIN-ST. JEOR: SCORE: 1556.6

## 2019-11-10 ENCOUNTER — COMMUNICATION - HEALTHEAST (OUTPATIENT)
Dept: CARDIOLOGY | Facility: CLINIC | Age: 64
End: 2019-11-10

## 2019-11-10 DIAGNOSIS — I25.10 CAD (CORONARY ARTERY DISEASE): ICD-10-CM

## 2019-11-10 DIAGNOSIS — E78.5 HYPERLIPEMIA: ICD-10-CM

## 2019-11-11 ENCOUNTER — ANESTHESIA (OUTPATIENT)
Dept: SURGERY | Facility: CLINIC | Age: 64
End: 2019-11-11
Payer: COMMERCIAL

## 2019-11-11 ENCOUNTER — HOSPITAL ENCOUNTER (OUTPATIENT)
Facility: CLINIC | Age: 64
Discharge: HOME | End: 2019-11-11
Attending: UROLOGY | Admitting: UROLOGY
Payer: COMMERCIAL

## 2019-11-11 ENCOUNTER — APPOINTMENT (OUTPATIENT)
Dept: GENERAL RADIOLOGY | Facility: CLINIC | Age: 64
End: 2019-11-11
Attending: UROLOGY
Payer: COMMERCIAL

## 2019-11-11 VITALS
BODY MASS INDEX: 23.77 KG/M2 | RESPIRATION RATE: 14 BRPM | OXYGEN SATURATION: 100 % | HEIGHT: 70 IN | DIASTOLIC BLOOD PRESSURE: 93 MMHG | WEIGHT: 166.01 LBS | TEMPERATURE: 97.3 F | HEART RATE: 73 BPM | SYSTOLIC BLOOD PRESSURE: 119 MMHG

## 2019-11-11 LAB — GLUCOSE BLDC GLUCOMTR-MCNC: 99 MG/DL (ref 70–99)

## 2019-11-11 PROCEDURE — 25000128 H RX IP 250 OP 636: Performed by: ANESTHESIOLOGY

## 2019-11-11 PROCEDURE — 36000059 ZZH SURGERY LEVEL 3 EA 15 ADDTL MIN UMMC: Performed by: UROLOGY

## 2019-11-11 PROCEDURE — 40000170 ZZH STATISTIC PRE-PROCEDURE ASSESSMENT II: Performed by: UROLOGY

## 2019-11-11 PROCEDURE — C1769 GUIDE WIRE: HCPCS | Performed by: UROLOGY

## 2019-11-11 PROCEDURE — 82962 GLUCOSE BLOOD TEST: CPT

## 2019-11-11 PROCEDURE — 37000008 ZZH ANESTHESIA TECHNICAL FEE, 1ST 30 MIN: Performed by: UROLOGY

## 2019-11-11 PROCEDURE — 27210794 ZZH OR GENERAL SUPPLY STERILE: Performed by: UROLOGY

## 2019-11-11 PROCEDURE — 25500064 ZZH RX 255 OP 636: Performed by: UROLOGY

## 2019-11-11 PROCEDURE — 25000566 ZZH SEVOFLURANE, EA 15 MIN: Performed by: UROLOGY

## 2019-11-11 PROCEDURE — 25800030 ZZH RX IP 258 OP 636: Performed by: NURSE ANESTHETIST, CERTIFIED REGISTERED

## 2019-11-11 PROCEDURE — 25000128 H RX IP 250 OP 636: Performed by: UROLOGY

## 2019-11-11 PROCEDURE — 25000132 ZZH RX MED GY IP 250 OP 250 PS 637: Performed by: ANESTHESIOLOGY

## 2019-11-11 PROCEDURE — 25000128 H RX IP 250 OP 636: Performed by: NURSE ANESTHETIST, CERTIFIED REGISTERED

## 2019-11-11 PROCEDURE — 25800025 ZZH RX 258: Performed by: UROLOGY

## 2019-11-11 PROCEDURE — 37000009 ZZH ANESTHESIA TECHNICAL FEE, EACH ADDTL 15 MIN: Performed by: UROLOGY

## 2019-11-11 PROCEDURE — 71000027 ZZH RECOVERY PHASE 2 EACH 15 MINS: Performed by: UROLOGY

## 2019-11-11 PROCEDURE — 36000061 ZZH SURGERY LEVEL 3 W FLUORO 1ST 30 MIN - UMMC: Performed by: UROLOGY

## 2019-11-11 PROCEDURE — C2617 STENT, NON-COR, TEM W/O DEL: HCPCS | Performed by: UROLOGY

## 2019-11-11 PROCEDURE — 40000278 XR SURGERY CARM FLUORO LESS THAN 5 MIN: Mod: TC

## 2019-11-11 DEVICE — STENT URETERAL PERCUFLEX PLUS 7FRX26CM M0061752730
Type: IMPLANTABLE DEVICE | Site: URETER | Status: NON-FUNCTIONAL
Removed: 2020-02-05

## 2019-11-11 RX ORDER — MEPERIDINE HYDROCHLORIDE 25 MG/ML
12.5 INJECTION INTRAMUSCULAR; INTRAVENOUS; SUBCUTANEOUS
Status: DISCONTINUED | OUTPATIENT
Start: 2019-11-11 | End: 2019-11-11 | Stop reason: HOSPADM

## 2019-11-11 RX ORDER — ACETAMINOPHEN 325 MG/1
975 TABLET ORAL
Status: COMPLETED | OUTPATIENT
Start: 2019-11-11 | End: 2019-11-11

## 2019-11-11 RX ORDER — DEXAMETHASONE SODIUM PHOSPHATE 4 MG/ML
INJECTION, SOLUTION INTRA-ARTICULAR; INTRALESIONAL; INTRAMUSCULAR; INTRAVENOUS; SOFT TISSUE PRN
Status: DISCONTINUED | OUTPATIENT
Start: 2019-11-11 | End: 2019-11-11

## 2019-11-11 RX ORDER — HYDROMORPHONE HYDROCHLORIDE 1 MG/ML
.3-.5 INJECTION, SOLUTION INTRAMUSCULAR; INTRAVENOUS; SUBCUTANEOUS EVERY 10 MIN PRN
Status: DISCONTINUED | OUTPATIENT
Start: 2019-11-11 | End: 2019-11-11 | Stop reason: HOSPADM

## 2019-11-11 RX ORDER — ONDANSETRON 2 MG/ML
4 INJECTION INTRAMUSCULAR; INTRAVENOUS EVERY 30 MIN PRN
Status: DISCONTINUED | OUTPATIENT
Start: 2019-11-11 | End: 2019-11-11 | Stop reason: HOSPADM

## 2019-11-11 RX ORDER — METOPROLOL TARTRATE 1 MG/ML
1-2 INJECTION, SOLUTION INTRAVENOUS EVERY 5 MIN PRN
Status: DISCONTINUED | OUTPATIENT
Start: 2019-11-11 | End: 2019-11-11 | Stop reason: HOSPADM

## 2019-11-11 RX ORDER — CEFAZOLIN SODIUM 1 G/3ML
1 INJECTION, POWDER, FOR SOLUTION INTRAMUSCULAR; INTRAVENOUS SEE ADMIN INSTRUCTIONS
Status: DISCONTINUED | OUTPATIENT
Start: 2019-11-11 | End: 2019-11-11 | Stop reason: HOSPADM

## 2019-11-11 RX ORDER — ONDANSETRON 2 MG/ML
INJECTION INTRAMUSCULAR; INTRAVENOUS PRN
Status: DISCONTINUED | OUTPATIENT
Start: 2019-11-11 | End: 2019-11-11

## 2019-11-11 RX ORDER — FENTANYL CITRATE 50 UG/ML
25-50 INJECTION, SOLUTION INTRAMUSCULAR; INTRAVENOUS
Status: DISCONTINUED | OUTPATIENT
Start: 2019-11-11 | End: 2019-11-11 | Stop reason: HOSPADM

## 2019-11-11 RX ORDER — PROPOFOL 10 MG/ML
INJECTION, EMULSION INTRAVENOUS CONTINUOUS PRN
Status: DISCONTINUED | OUTPATIENT
Start: 2019-11-11 | End: 2019-11-11

## 2019-11-11 RX ORDER — ONDANSETRON 4 MG/1
4 TABLET, ORALLY DISINTEGRATING ORAL EVERY 30 MIN PRN
Status: DISCONTINUED | OUTPATIENT
Start: 2019-11-11 | End: 2019-11-11 | Stop reason: HOSPADM

## 2019-11-11 RX ORDER — NALOXONE HYDROCHLORIDE 0.4 MG/ML
.1-.4 INJECTION, SOLUTION INTRAMUSCULAR; INTRAVENOUS; SUBCUTANEOUS
Status: DISCONTINUED | OUTPATIENT
Start: 2019-11-11 | End: 2019-11-11 | Stop reason: HOSPADM

## 2019-11-11 RX ORDER — HEPARIN SODIUM (PORCINE) LOCK FLUSH IV SOLN 100 UNIT/ML 100 UNIT/ML
5 SOLUTION INTRAVENOUS
Status: DISCONTINUED | OUTPATIENT
Start: 2019-11-11 | End: 2019-11-11 | Stop reason: HOSPADM

## 2019-11-11 RX ORDER — DIMENHYDRINATE 50 MG/ML
25 INJECTION, SOLUTION INTRAMUSCULAR; INTRAVENOUS
Status: DISCONTINUED | OUTPATIENT
Start: 2019-11-11 | End: 2019-11-11 | Stop reason: HOSPADM

## 2019-11-11 RX ORDER — SODIUM CHLORIDE, SODIUM LACTATE, POTASSIUM CHLORIDE, CALCIUM CHLORIDE 600; 310; 30; 20 MG/100ML; MG/100ML; MG/100ML; MG/100ML
INJECTION, SOLUTION INTRAVENOUS CONTINUOUS PRN
Status: DISCONTINUED | OUTPATIENT
Start: 2019-11-11 | End: 2019-11-11

## 2019-11-11 RX ORDER — PROPOFOL 10 MG/ML
INJECTION, EMULSION INTRAVENOUS PRN
Status: DISCONTINUED | OUTPATIENT
Start: 2019-11-11 | End: 2019-11-11

## 2019-11-11 RX ORDER — SODIUM CHLORIDE, SODIUM LACTATE, POTASSIUM CHLORIDE, CALCIUM CHLORIDE 600; 310; 30; 20 MG/100ML; MG/100ML; MG/100ML; MG/100ML
INJECTION, SOLUTION INTRAVENOUS CONTINUOUS
Status: DISCONTINUED | OUTPATIENT
Start: 2019-11-11 | End: 2019-11-11 | Stop reason: HOSPADM

## 2019-11-11 RX ORDER — CEFAZOLIN SODIUM 2 G/100ML
2 INJECTION, SOLUTION INTRAVENOUS
Status: COMPLETED | OUTPATIENT
Start: 2019-11-11 | End: 2019-11-11

## 2019-11-11 RX ORDER — PHENYLEPHRINE HYDROCHLORIDE 10 MG/ML
INJECTION INTRAVENOUS PRN
Status: DISCONTINUED | OUTPATIENT
Start: 2019-11-11 | End: 2019-11-11

## 2019-11-11 RX ORDER — FENTANYL CITRATE 50 UG/ML
INJECTION, SOLUTION INTRAMUSCULAR; INTRAVENOUS PRN
Status: DISCONTINUED | OUTPATIENT
Start: 2019-11-11 | End: 2019-11-11

## 2019-11-11 RX ORDER — HYDRALAZINE HYDROCHLORIDE 20 MG/ML
2.5-5 INJECTION INTRAMUSCULAR; INTRAVENOUS EVERY 10 MIN PRN
Status: DISCONTINUED | OUTPATIENT
Start: 2019-11-11 | End: 2019-11-11 | Stop reason: HOSPADM

## 2019-11-11 RX ORDER — ALBUTEROL SULFATE 0.83 MG/ML
2.5 SOLUTION RESPIRATORY (INHALATION) EVERY 4 HOURS PRN
Status: DISCONTINUED | OUTPATIENT
Start: 2019-11-11 | End: 2019-11-11 | Stop reason: HOSPADM

## 2019-11-11 RX ADMIN — DEXAMETHASONE SODIUM PHOSPHATE 4 MG: 4 INJECTION, SOLUTION INTRAMUSCULAR; INTRAVENOUS at 07:52

## 2019-11-11 RX ADMIN — PHENYLEPHRINE HYDROCHLORIDE 50 MCG: 10 INJECTION INTRAVENOUS at 07:28

## 2019-11-11 RX ADMIN — MIDAZOLAM 2 MG: 1 INJECTION INTRAMUSCULAR; INTRAVENOUS at 07:07

## 2019-11-11 RX ADMIN — ACETAMINOPHEN 975 MG: 325 TABLET, FILM COATED ORAL at 06:38

## 2019-11-11 RX ADMIN — PROPOFOL 100 MCG/KG/MIN: 10 INJECTION, EMULSION INTRAVENOUS at 07:11

## 2019-11-11 RX ADMIN — Medication 5 ML: at 10:44

## 2019-11-11 RX ADMIN — PHENYLEPHRINE HYDROCHLORIDE 0.3 MCG/KG/MIN: 10 INJECTION INTRAVENOUS at 07:38

## 2019-11-11 RX ADMIN — PHENYLEPHRINE HYDROCHLORIDE 50 MCG: 10 INJECTION INTRAVENOUS at 07:38

## 2019-11-11 RX ADMIN — PHENYLEPHRINE HYDROCHLORIDE 50 MCG: 10 INJECTION INTRAVENOUS at 07:23

## 2019-11-11 RX ADMIN — SODIUM CHLORIDE, POTASSIUM CHLORIDE, SODIUM LACTATE AND CALCIUM CHLORIDE: 600; 310; 30; 20 INJECTION, SOLUTION INTRAVENOUS at 07:06

## 2019-11-11 RX ADMIN — ONDANSETRON 4 MG: 2 INJECTION INTRAMUSCULAR; INTRAVENOUS at 07:52

## 2019-11-11 RX ADMIN — PROPOFOL 30 MG: 10 INJECTION, EMULSION INTRAVENOUS at 07:38

## 2019-11-11 RX ADMIN — PROPOFOL 30 MG: 10 INJECTION, EMULSION INTRAVENOUS at 07:14

## 2019-11-11 RX ADMIN — FENTANYL CITRATE 25 MCG: 50 INJECTION, SOLUTION INTRAMUSCULAR; INTRAVENOUS at 07:13

## 2019-11-11 RX ADMIN — CEFAZOLIN SODIUM 2 G: 2 INJECTION, SOLUTION INTRAVENOUS at 07:04

## 2019-11-11 ASSESSMENT — MIFFLIN-ST. JEOR: SCORE: 1559.25

## 2019-11-11 NOTE — DISCHARGE INSTRUCTIONS
Same-Day Surgery   Adult Discharge Orders & Instructions     For 24 hours after surgery:  1. Get plenty of rest.  A responsible adult must stay with you for at least 24 hours after you leave the hospital.   2. Pain medication can slow your reflexes. Do not drive or use heavy equipment.  If you have weakness or tingling, don't drive or use heavy equipment until this feeling goes away.  3. Mixing alcohol and pain medication can cause dizziness and slow your breathing. It can even be fatal. Do not drink alcohol while taking pain medication.  4. Avoid strenuous or risky activities.  Ask for help when climbing stairs.   5. You may feel lightheaded.  If so, sit for a few minutes before standing.  Have someone help you get up.   6. If you have nausea (feel sick to your stomach), drink only clear liquids such as apple juice, ginger ale, broth or 7-Up.  Rest may also help.  Be sure to drink enough fluids.  Move to a regular diet as you feel able. Take pain medications with a small amount of solid food, such as toast or crackers, to avoid nausea.   7. A slight fever is normal. Call the doctor if your fever is over 100 F (37.7 C) (taken under the tongue) or lasts longer than 24 hours.  8. You may have a dry mouth, muscle aches, trouble sleeping or a sore throat.  These symptoms should go away after 24 hours.  9. Do not make important or legal decisions.   Pain Management:      1. Take pain medication (if prescribed) for pain as directed by your physician.        2. WARNING: If the pain medication you have been prescribed contains Tylenol  (acetaminophen), DO NOT take additional doses of Tylenol (acetaminophen).     Call your doctor for any of the followin.  Signs of infection (fever, growing tenderness at the surgery site, severe pain, a large amount of drainage or bleeding, foul-smelling drainage, redness, swelling).    2.  It has been over 8 to 10 hours since surgery and you are still not able to urinate (pee).    3.   Headache for over 24 hours.    4.  Numbness, tingling or weakness the day after surgery (if you had spinal anesthesia).  To contact a doctor, call _____________________________________ or:      [phone] and ask for the Resident On Call for:          __________________________________________ (answered 24 hours a day)      Emergency Department:  [facility]: [phone]  [facility]: [phone]               Rev. 10/2014   Discharge Instructions: Following a Cystoscopy/Stent and/or Ureteroscopy    Drainage:    Urine may be slightly bloody but should taper off in a few days.    You may have some frequency and urgency for a few days.    Comfort:    A burning sensation when urinating is common. Drinking extra fluids helps decrease this burning feeling and also helps to clear the bloody urine.    Mild abdominal cramps may occur for a few days.    Baths:    Daily tub baths aide in passing urine.    Frequent use of bubble bath is discouraged since it encourages urinary tract infections.    Report to your doctor at once if you experience:    Inability to pass your urine    Continuous or heavy bleeding    Severe Pain    Elevated temperature > than 101.5 for 24 hours    Home Activity:    The day of surgery spend a quiet evening at home.    Increase activity as tolerated.    Rev. 5/12

## 2019-11-11 NOTE — ANESTHESIA POSTPROCEDURE EVALUATION
Anesthesia POST Procedure Evaluation    Patient: Girish Chauhan   MRN:     [id number] Gender:   male   Age:    64 year old :      1955        Preoperative Diagnosis: Right Hydronephrosis   Procedure(s):  Cystoscopy, Right Retrograde Pyelogram, Right Ureteral Stent Exchange   Postop Comments: No value filed.       Anesthesia Type:  Not documented  MAC    Reportable Event: NO     PAIN: Uncomplicated   Sign Out status: Comfortable, Well controlled pain     PONV: No PONV   Sign Out status:  No Nausea or Vomiting     Neuro/Psych: Uneventful perioperative course   Sign Out Status: Preoperative baseline; Age appropriate mentation     Airway/Resp.: Uneventful perioperative course   Sign Out Status: Non labored breathing, age appropriate RR; Resp. Status within EXPECTED Parameters     CV: Uneventful perioperative course   Sign Out status: Appropriate BP and perfusion indices; Appropriate HR/Rhythm     Disposition:   Sign Out in:  PACU  Disposition:  Phase II; Home  Recovery Course: Uneventful  Follow-Up: Not required           Last Anesthesia Record Vitals:  CRNA VITALS  2019 0729 - 2019 0829      2019             Pulse:  83    SpO2:  100 %    Resp Rate (observed):  (!) 1          Last PACU Vitals:  Vitals Value Taken Time   /82 2019  8:03 AM   Temp 35.8  C (96.4  F) 2019  8:03 AM   Pulse 77 2019  8:03 AM   Resp     SpO2 100 % 2019  8:04 AM   Temp src Skin 2019  7:50 AM   NIBP 105/67 2019  7:58 AM   Pulse 83 2019  7:59 AM   SpO2 100 % 2019  7:59 AM   Resp     Temp     Ht Rate 90 2019  7:57 AM   Temp 2     Vitals shown include unvalidated device data.      Electronically Signed By: Elida Barr MD, 2019, 8:52 AM

## 2019-11-11 NOTE — OR NURSING
"Dr. Oliverun in to see patient. Notified of slurred speech when waking up, is improving. Wife states patient is at baseline and sometimes \"mumbles.\" RN neuro exam normal. No new orders.     Called and asked Dr. Walker if patient needed to void prior to discharging home: YES patient needs to void. Also patient can continue home Plavix and Eliquis as scheduled normally per Dr. Walker.       "

## 2019-11-11 NOTE — OP NOTE
OPERATIVE REPORT    DATE AND TIME OF OP NOTE/SURGERY: November 11, 2019    PREOPERATIVE DIAGNOSIS: Right ureteral hydronephrosis, metastatic cholangiocarcinoma    POSTOPERATIVE DIAGNOSIS:  Same    PROCEDURES PERFORMED:   1. Cystourethroscopy with Right retrograde pyelography  2. Exchange of Right ureteral stent(s).  3. Intraoperative interpretation of fluoroscopic imaging.     STAFF SURGEON: Dr. Walker     RESIDENT: Hazel Garcia MD    ANESTHESIA: Monitor Anesthesia Care    ESTIMATED BLOOD LOSS: 0 mL.     DRAINS:    - 7Fr x 26 cm double J Right ureteral stent      OPERATIVE INDICATIONS:   Girish Chauhan is a 64 year old male with a PMH of recurrent cholangiocarcinoma (with biopsy proven metasatic disease to bladder in past), afib on apixaban, and chronic right hydronephrosis suspected to be 2/2 mass compression. Given this, urology was consulted and we placed a right ureteral stent (6F, 28 cm) on 9/16/2019. F/u imaging noted interval improvement of the hydronephrosis but there was still some residual hydronephrosis; this was investigated with a renogram, which showed delay that was suspected to be d/t either partial obstruction or chronic dilation. We thus offered the above procedure.      The patient was counseled on the alternatives, risks, and benefits and elected to proceed with the above stated procedure.    DESCRIPTION OF PROCEDURE:    After informed consent was obtained, the patient was taken to the operating room, and moved to the operating table.  After adequate anesthesia was induced, the patient was repositioned in dorsal lithotomy position in supportive yellowfin stirrups with care in neural safety in positioning of all four extremities.  She was then prepped and draped in the usual sterile fashion. A timeout was taken to confirm correct patient, procedure and laterality.     A 22-Haitian cystoscope was inserted into a well lubricated urethra. The urethra was unremarkable. The bladder neck was quite  friable.  The bladder was free of tumors, stones or diverticuli.  The media was mildly bloody.  Bilateral ureteral orifices were orthotopic.     We were able to appreciate the right-sided stent, which had mild encrustation.  We used a 5F open ended catheter to pass a sensor guidewire up the renal pelvis under direct fluorscopy, alongside the stent.We then pulled the stent using the stent grasper. We then advanced the 5F open ended catheter over the wire to the proximal ureter obtain a retrograde pyelogram, which noted moderate hydroureteronephrosis, down to the proximal ureter. We also used the 5F to measure ureteral length, which was ~23 cm. However, given the tortuosity of the patient's upper tract, we opted for a 7F-26 cm stent. We then re-inserted the wire and exchanged the 5Fr catheter for the above stent described in the DRAINS section, with a good coil both proximally and distally. The proximal end coiled in the lower pole, and we were unable to mobilize it to the upper pole (which was the site associated with residual hydronephrosis after initial stent placement), but we could see that the hydronephrosis in the renal pelvis and both poles was already improving on fluoroscopy once the stent was deployed.     The patient tolerated the procedure well.  There were no complications.       PLAN:   - Discharge home today  - Follow-up with Dr. Walker for [facility] in 4 weeks    Addendum:    I, Sivan Walker, was present for the entire case.  I agree with the note as above with changes made as needed. I spoke to his wife in the waiting room at the end of the case    Sivan Walker MD MPH   of Urology

## 2019-11-11 NOTE — OR NURSING
Patients wife voiced concerns about patient having some mumbled speech. Dr. Barr in to see patient and spoke with patients wife. OK for patient to discharge home.

## 2019-11-11 NOTE — ANESTHESIA CARE TRANSFER NOTE
Patient: Girish Chauhan    Procedure(s):  Cystoscopy, Right Retrograde Pyelogram, Right Ureteral Stent Exchange    Diagnosis: Right Hydronephrosis  Diagnosis Additional Information: No value filed.    Anesthesia Type:   MAC     Note:  Airway :Face Mask  Patient transferred to:Phase II  Comments: 108/82, sat 100%, HR 77, RR 14Handoff Report: Identifed the Patient, Identified the Reponsible Provider, Reviewed the pertinent medical history, Discussed the surgical course, Reviewed Intra-OP anesthesia mangement and issues during anesthesia, Set expectations for post-procedure period and Allowed opportunity for questions and acknowledgement of understanding      Vitals: (Last set prior to Anesthesia Care Transfer)    CRNA VITALS  11/11/2019 0729 - 11/11/2019 0808      11/11/2019             Pulse:  83    SpO2:  100 %    Resp Rate (observed):  (!) 1                Electronically Signed By: YO Medina CRNA  November 11, 2019  8:08 AM

## 2019-11-12 ENCOUNTER — PATIENT OUTREACH (OUTPATIENT)
Dept: UROLOGY | Facility: CLINIC | Age: 64
End: 2019-11-12

## 2019-11-12 DIAGNOSIS — N13.30 HYDRONEPHROSIS OF RIGHT KIDNEY: Primary | ICD-10-CM

## 2019-11-12 NOTE — PROGRESS NOTES
Urology Postop Phone Note:    Mr. Girish Chauhan is a 64 year old male who underwent cystoscopy and stent exchange on 11/11/19 with Dr. ríos for a history of hydronephrosis.  His postoperative course was unremarkable and the patient was d/c to home on 11/11/19.    Fevers/chills: Patient denies any fever or chills.  Eating/drinking: Patient is able to eat and drink without any complaints.  Bowel habits: Patient reports having a normal bowel movement.  Urine output voiding Color yellow Clarity clear Odor none  Incisions: Patient denies any signs and symptoms of infection.  Pain: Patient reports pain as a 1 out of 10.  The pain is located kidney  Narcotics: The patient denies taking any pain medication.    Follow up appointment next scheduled stent exchange. Patient will have his renal ultrasound done   Informed the patient of the clinic triage nursing # ([phone] option 2) and urology resident on call # for after hours concerns.    Thanks,   Tena Blanca RN   Department of Urologic Surgery

## 2019-11-15 ENCOUNTER — INFUSION THERAPY VISIT (OUTPATIENT)
Dept: ONCOLOGY | Facility: CLINIC | Age: 64
End: 2019-11-15
Attending: PHYSICIAN ASSISTANT
Payer: COMMERCIAL

## 2019-11-15 ENCOUNTER — HOME INFUSION (PRE-WILLOW HOME INFUSION) (OUTPATIENT)
Dept: PHARMACY | Facility: CLINIC | Age: 64
End: 2019-11-15

## 2019-11-15 ENCOUNTER — RECORDS - HEALTHEAST (OUTPATIENT)
Dept: ADMINISTRATIVE | Facility: OTHER | Age: 64
End: 2019-11-15

## 2019-11-15 ENCOUNTER — APPOINTMENT (OUTPATIENT)
Dept: LAB | Facility: CLINIC | Age: 64
End: 2019-11-15
Attending: INTERNAL MEDICINE
Payer: COMMERCIAL

## 2019-11-15 VITALS
BODY MASS INDEX: 24.19 KG/M2 | TEMPERATURE: 97.5 F | SYSTOLIC BLOOD PRESSURE: 93 MMHG | WEIGHT: 168.6 LBS | DIASTOLIC BLOOD PRESSURE: 62 MMHG | OXYGEN SATURATION: 96 % | HEART RATE: 79 BPM | RESPIRATION RATE: 16 BRPM

## 2019-11-15 DIAGNOSIS — C22.1 CHOLANGIOCARCINOMA (H): ICD-10-CM

## 2019-11-15 DIAGNOSIS — D64.81 ANEMIA ASSOCIATED WITH CHEMOTHERAPY: Primary | ICD-10-CM

## 2019-11-15 DIAGNOSIS — T45.1X5A ANEMIA ASSOCIATED WITH CHEMOTHERAPY: ICD-10-CM

## 2019-11-15 DIAGNOSIS — T45.1X5A ANEMIA ASSOCIATED WITH CHEMOTHERAPY: Primary | ICD-10-CM

## 2019-11-15 DIAGNOSIS — D64.81 ANEMIA ASSOCIATED WITH CHEMOTHERAPY: ICD-10-CM

## 2019-11-15 DIAGNOSIS — C22.1 CHOLANGIOCARCINOMA (H): Primary | ICD-10-CM

## 2019-11-15 LAB
ALBUMIN SERPL-MCNC: 3.3 G/DL (ref 3.4–5)
ALP SERPL-CCNC: 126 U/L (ref 40–150)
ALT SERPL W P-5'-P-CCNC: 49 U/L (ref 0–70)
ANION GAP SERPL CALCULATED.3IONS-SCNC: 4 MMOL/L (ref 3–14)
AST SERPL W P-5'-P-CCNC: 40 U/L (ref 0–45)
BASOPHILS # BLD AUTO: 0 10E9/L (ref 0–0.2)
BASOPHILS NFR BLD AUTO: 0.5 %
BILIRUB SERPL-MCNC: 0.4 MG/DL (ref 0.2–1.3)
BUN SERPL-MCNC: 23 MG/DL (ref 7–30)
CALCIUM SERPL-MCNC: 8.6 MG/DL (ref 8.5–10.1)
CHLORIDE SERPL-SCNC: 110 MMOL/L (ref 94–109)
CO2 SERPL-SCNC: 25 MMOL/L (ref 20–32)
CREAT SERPL-MCNC: 1.49 MG/DL (ref 0.66–1.25)
DIFFERENTIAL METHOD BLD: ABNORMAL
EOSINOPHIL # BLD AUTO: 0.3 10E9/L (ref 0–0.7)
EOSINOPHIL NFR BLD AUTO: 3.1 %
ERYTHROCYTE [DISTWIDTH] IN BLOOD BY AUTOMATED COUNT: 15.1 % (ref 10–15)
GFR SERPL CREATININE-BSD FRML MDRD: 49 ML/MIN/{1.73_M2}
GLUCOSE SERPL-MCNC: 124 MG/DL (ref 70–99)
HCT VFR BLD AUTO: 33 % (ref 40–53)
HGB BLD-MCNC: 10.4 G/DL (ref 13.3–17.7)
IMM GRANULOCYTES # BLD: 0 10E9/L (ref 0–0.4)
IMM GRANULOCYTES NFR BLD: 0.4 %
LYMPHOCYTES # BLD AUTO: 2 10E9/L (ref 0.8–5.3)
LYMPHOCYTES NFR BLD AUTO: 23.1 %
MCH RBC QN AUTO: 28.9 PG (ref 26.5–33)
MCHC RBC AUTO-ENTMCNC: 31.5 G/DL (ref 31.5–36.5)
MCV RBC AUTO: 92 FL (ref 78–100)
MONOCYTES # BLD AUTO: 0.9 10E9/L (ref 0–1.3)
MONOCYTES NFR BLD AUTO: 10.9 %
NEUTROPHILS # BLD AUTO: 5.3 10E9/L (ref 1.6–8.3)
NEUTROPHILS NFR BLD AUTO: 62 %
NRBC # BLD AUTO: 0 10*3/UL
NRBC BLD AUTO-RTO: 0 /100
PLATELET # BLD AUTO: 204 10E9/L (ref 150–450)
POTASSIUM SERPL-SCNC: 4 MMOL/L (ref 3.4–5.3)
PROT SERPL-MCNC: 6.4 G/DL (ref 6.8–8.8)
RBC # BLD AUTO: 3.6 10E12/L (ref 4.4–5.9)
SODIUM SERPL-SCNC: 139 MMOL/L (ref 133–144)
WBC # BLD AUTO: 8.5 10E9/L (ref 4–11)

## 2019-11-15 PROCEDURE — G0498 CHEMO EXTEND IV INFUS W/PUMP: HCPCS

## 2019-11-15 PROCEDURE — 99215 OFFICE O/P EST HI 40 MIN: CPT | Mod: ZP | Performed by: PHYSICIAN ASSISTANT

## 2019-11-15 PROCEDURE — 85025 COMPLETE CBC W/AUTO DIFF WBC: CPT | Performed by: PHYSICIAN ASSISTANT

## 2019-11-15 PROCEDURE — 80053 COMPREHEN METABOLIC PANEL: CPT | Performed by: PHYSICIAN ASSISTANT

## 2019-11-15 PROCEDURE — 86301 IMMUNOASSAY TUMOR CA 19-9: CPT | Performed by: PHYSICIAN ASSISTANT

## 2019-11-15 PROCEDURE — 25000128 H RX IP 250 OP 636: Mod: ZF | Performed by: PHYSICIAN ASSISTANT

## 2019-11-15 PROCEDURE — 96409 CHEMO IV PUSH SNGL DRUG: CPT

## 2019-11-15 PROCEDURE — 96367 TX/PROPH/DG ADDL SEQ IV INF: CPT

## 2019-11-15 PROCEDURE — 25800030 ZZH RX IP 258 OP 636: Mod: ZF | Performed by: PHYSICIAN ASSISTANT

## 2019-11-15 PROCEDURE — G0463 HOSPITAL OUTPT CLINIC VISIT: HCPCS | Mod: ZF

## 2019-11-15 RX ORDER — EPINEPHRINE 1 MG/ML
0.3 INJECTION, SOLUTION INTRAMUSCULAR; SUBCUTANEOUS EVERY 5 MIN PRN
Status: CANCELLED | OUTPATIENT
Start: 2019-11-15

## 2019-11-15 RX ORDER — LORAZEPAM 2 MG/ML
0.5 INJECTION INTRAMUSCULAR EVERY 4 HOURS PRN
Status: CANCELLED
Start: 2019-11-29

## 2019-11-15 RX ORDER — ALBUTEROL SULFATE 90 UG/1
1-2 AEROSOL, METERED RESPIRATORY (INHALATION)
Status: CANCELLED
Start: 2019-11-15

## 2019-11-15 RX ORDER — METHYLPREDNISOLONE SODIUM SUCCINATE 125 MG/2ML
125 INJECTION, POWDER, LYOPHILIZED, FOR SOLUTION INTRAMUSCULAR; INTRAVENOUS
Status: CANCELLED
Start: 2019-11-29

## 2019-11-15 RX ORDER — NALOXONE HYDROCHLORIDE 0.4 MG/ML
.1-.4 INJECTION, SOLUTION INTRAMUSCULAR; INTRAVENOUS; SUBCUTANEOUS
Status: CANCELLED | OUTPATIENT
Start: 2019-11-15

## 2019-11-15 RX ORDER — FLUOROURACIL 50 MG/ML
400 INJECTION, SOLUTION INTRAVENOUS ONCE
Status: COMPLETED | OUTPATIENT
Start: 2019-11-15 | End: 2019-11-15

## 2019-11-15 RX ORDER — ALBUTEROL SULFATE 0.83 MG/ML
2.5 SOLUTION RESPIRATORY (INHALATION)
Status: CANCELLED | OUTPATIENT
Start: 2019-11-29

## 2019-11-15 RX ORDER — ALBUTEROL SULFATE 0.83 MG/ML
2.5 SOLUTION RESPIRATORY (INHALATION)
Status: CANCELLED | OUTPATIENT
Start: 2019-11-15

## 2019-11-15 RX ORDER — EPINEPHRINE 0.3 MG/.3ML
0.3 INJECTION SUBCUTANEOUS EVERY 5 MIN PRN
Status: CANCELLED | OUTPATIENT
Start: 2019-11-29

## 2019-11-15 RX ORDER — DIPHENHYDRAMINE HYDROCHLORIDE 50 MG/ML
50 INJECTION INTRAMUSCULAR; INTRAVENOUS
Status: CANCELLED
Start: 2019-11-15

## 2019-11-15 RX ORDER — DIPHENHYDRAMINE HYDROCHLORIDE 50 MG/ML
50 INJECTION INTRAMUSCULAR; INTRAVENOUS
Status: CANCELLED
Start: 2019-11-29

## 2019-11-15 RX ORDER — EPINEPHRINE 0.3 MG/.3ML
0.3 INJECTION SUBCUTANEOUS EVERY 5 MIN PRN
Status: CANCELLED | OUTPATIENT
Start: 2019-11-15

## 2019-11-15 RX ORDER — METHYLPREDNISOLONE SODIUM SUCCINATE 125 MG/2ML
125 INJECTION, POWDER, LYOPHILIZED, FOR SOLUTION INTRAMUSCULAR; INTRAVENOUS
Status: CANCELLED
Start: 2019-11-15

## 2019-11-15 RX ORDER — MEPERIDINE HYDROCHLORIDE 25 MG/ML
25 INJECTION INTRAMUSCULAR; INTRAVENOUS; SUBCUTANEOUS EVERY 30 MIN PRN
Status: CANCELLED | OUTPATIENT
Start: 2019-11-29

## 2019-11-15 RX ORDER — SODIUM CHLORIDE 9 MG/ML
1000 INJECTION, SOLUTION INTRAVENOUS CONTINUOUS PRN
Status: CANCELLED
Start: 2019-11-15

## 2019-11-15 RX ORDER — EPINEPHRINE 1 MG/ML
0.3 INJECTION, SOLUTION INTRAMUSCULAR; SUBCUTANEOUS EVERY 5 MIN PRN
Status: CANCELLED | OUTPATIENT
Start: 2019-11-29

## 2019-11-15 RX ORDER — MEPERIDINE HYDROCHLORIDE 25 MG/ML
25 INJECTION INTRAMUSCULAR; INTRAVENOUS; SUBCUTANEOUS EVERY 30 MIN PRN
Status: CANCELLED | OUTPATIENT
Start: 2019-11-15

## 2019-11-15 RX ORDER — LORAZEPAM 2 MG/ML
0.5 INJECTION INTRAMUSCULAR EVERY 4 HOURS PRN
Status: CANCELLED
Start: 2019-11-15

## 2019-11-15 RX ORDER — HEPARIN SODIUM (PORCINE) LOCK FLUSH IV SOLN 100 UNIT/ML 100 UNIT/ML
5 SOLUTION INTRAVENOUS ONCE
Status: COMPLETED | OUTPATIENT
Start: 2019-11-15 | End: 2019-11-15

## 2019-11-15 RX ORDER — FLUOROURACIL 50 MG/ML
400 INJECTION, SOLUTION INTRAVENOUS ONCE
Status: CANCELLED | OUTPATIENT
Start: 2019-11-29

## 2019-11-15 RX ORDER — ALBUTEROL SULFATE 90 UG/1
1-2 AEROSOL, METERED RESPIRATORY (INHALATION)
Status: CANCELLED
Start: 2019-11-29

## 2019-11-15 RX ORDER — FLUOROURACIL 50 MG/ML
400 INJECTION, SOLUTION INTRAVENOUS ONCE
Status: CANCELLED | OUTPATIENT
Start: 2019-11-15

## 2019-11-15 RX ORDER — NALOXONE HYDROCHLORIDE 0.4 MG/ML
.1-.4 INJECTION, SOLUTION INTRAMUSCULAR; INTRAVENOUS; SUBCUTANEOUS
Status: CANCELLED | OUTPATIENT
Start: 2019-11-29

## 2019-11-15 RX ORDER — SODIUM CHLORIDE 9 MG/ML
1000 INJECTION, SOLUTION INTRAVENOUS CONTINUOUS PRN
Status: CANCELLED
Start: 2019-11-29

## 2019-11-15 RX ADMIN — DEXAMETHASONE SODIUM PHOSPHATE: 10 INJECTION, SOLUTION INTRAMUSCULAR; INTRAVENOUS at 08:10

## 2019-11-15 RX ADMIN — FLUOROURACIL 785 MG: 50 INJECTION, SOLUTION INTRAVENOUS at 09:29

## 2019-11-15 RX ADMIN — HEPARIN 5 ML: 100 SYRINGE at 06:29

## 2019-11-15 RX ADMIN — LEUCOVORIN CALCIUM 700 MG: 200 INJECTION, POWDER, LYOPHILIZED, FOR SUSPENSION INTRAMUSCULAR; INTRAVENOUS at 09:07

## 2019-11-15 ASSESSMENT — PAIN SCALES - GENERAL: PAINLEVEL: NO PAIN (0)

## 2019-11-15 NOTE — PROGRESS NOTES
Infusion Nursing Note:  Girish Chauhan presents today for cycle 4 day 1 Leucovorin, Fluorouracil push and pump connection.    Patient seen by provider today: Yes: Julia YATES   present during visit today: Not Applicable.    Note: Ok to proceed with treatment. Denies any pain today.    Intravenous Access:  Implanted Port.    Treatment Conditions:  Lab Results   Component Value Date    HGB 10.4 11/15/2019     Lab Results   Component Value Date    WBC 8.5 11/15/2019      Lab Results   Component Value Date    ANEU 5.3 11/15/2019     Lab Results   Component Value Date     11/15/2019      Lab Results   Component Value Date     11/15/2019                   Lab Results   Component Value Date    POTASSIUM 4.0 11/15/2019           Lab Results   Component Value Date    MAG 1.8 10/04/2019            Lab Results   Component Value Date    CR 1.49 11/15/2019                   Lab Results   Component Value Date    GARY 8.6 11/15/2019                Lab Results   Component Value Date    BILITOTAL 0.4 11/15/2019           Lab Results   Component Value Date    ALBUMIN 3.3 11/15/2019                    Lab Results   Component Value Date    ALT 49 11/15/2019           Lab Results   Component Value Date    AST 40 11/15/2019       Results reviewed, labs MET treatment parameters, ok to proceed with treatment.      Post Infusion Assessment:  Patient tolerated infusion without incident.  Blood return noted every 2 ml during fluorouracil push  Blood return noted pre and post infusion.  Site patent and intact, free from redness, edema or discomfort.  No evidence of extravasations.  Prior to discharge: Port secured in place with tegaderm. Flushed with 10cc NS, positive blood return noted. Continuous home infusion C-series pump connected, all connectors secured with tape. All clamps secured open with tape.Pt instructed to call [facility] if there are any questions or concerns at home. Pt verbalized  understanding. Pt set up for pump disconnect at home with [facility] on Sunday 11/17 at 8 am ([facility] not open until 8 am)       Discharge Plan:   Patient declined prescription refills.  Discharge instructions reviewed with: Patient.  Patient and/or family verbalized understanding of discharge instructions and all questions answered.  AVS to patient via [employer].  Patient will return 11/29/19 for next appointment.   Patient discharged in stable condition accompanied by: self.  Departure Mode: Ambulatory.    Francisca Bartholomew RN

## 2019-11-15 NOTE — NURSING NOTE
"Oncology Rooming Note    November 15, 2019 6:58 AM   Girish Chauhan is a 64 year old male who presents for:    Chief Complaint   Patient presents with     Port Draw     port accessed and labs drawn by rn.  vital signs taken.     Oncology Clinic Visit     Return Hilar Cholangiocarcinoma     Initial Vitals: BP 93/62 (BP Location: Right arm, Patient Position: Sitting, Cuff Size: Adult Regular)   Pulse 79   Temp 97.5  F (36.4  C) (Oral)   Resp 16   Wt 76.5 kg (168 lb 9.6 oz)   SpO2 96%   BMI 24.19 kg/m   Estimated body mass index is 24.19 kg/m  as calculated from the following:    Height as of 11/11/19: 1.778 m (5' 10\").    Weight as of this encounter: 76.5 kg (168 lb 9.6 oz). Body surface area is 1.94 meters squared.  No Pain (0) Comment: Data Unavailable   No LMP for male patient.  Allergies reviewed: Yes  Medications reviewed: Yes    Medications: Medication refills not needed today.  Pharmacy name entered into [facility]: [id number] [place], [place] - [address] AT [facility]    Clinical concerns: Lab results; chemo today?      Tanisha Beebe CMA              "

## 2019-11-15 NOTE — LETTER
11/15/2019      RE: Girish Chauhan  [address] [facility] [address]       Oncology/Hematology Visit Note  Nov 15, 2019    Reason for Visit: seen in f/u of recurrent, metastatic cholangiocarcinoma    Oncology HPI:   Girish Chauhan is a 64 year old year old gentleman with cholangiocarcinoma  Which was resected in 3/2016 (including partial liver resection) with negative margins and no LN involvement, but with perineural and lymphovascular invasion. No adjuvant chemotherapy given. Recurred in bladder 11/2017, bx c/w metastatic cholangiocarcinoma, started on cisplatin/gemcitabine 12/2017. Cisplatin held 6/2018 for poor tolerance. Gemcitabine discontinued 8/2018 for possible TTP, then went off treatment. In 4/2019 was found to have disease progression in the abdominal cavity anterior to the liver, just below the diaphragm. Started on capecitabine 4/30/19 (last dose 5/7) but developed an NSTEMI s/p angiogram 5/6/19 (see below) and actually continued on the capecitabine for several days after NSTEMI without any ongoing cardiac symptoms or evidence of ongoing ischemia. On follow up with Dr. De Los Santos 6/24, disease appeared stable. Decision made with family to hold off on treatment for 2 months and reevaluate.     He was then transferred to [facility] on 6/5/19 after presenting to OSH with SBO with possible involvement of draining choledochojejunostomy loop, subjective fevers, and A fib with RVR. GI consulted, s/p small bowel enteroscopy with stenting on 6/7 for obstructed biliary limb. Discharged on 6/9/19.     He was admitted on 7/28/19 with fever, nausea, vomiting, and abdominal pain. Hospitalization complicated by sepsis and enterococcus casseliflavus bacteremia. Additionally, Krishna was found to have migrated gastrojejunal stent on CT A/P on admission. S/p ERCP with stent exchange 7/29 by Dr. Rodriguez. Discharged on Linezolid for 2 weeks.      CT CAP showed disease progression. He started on 5FU on 10/4/19 inpatient due to  history of MI while taking Xeloda. He is here today for routine follow up prior to cycle 4.     Interval history: Krishna is feeling well.  His stent exchange went well.  He will be having a renal ultrasound.  He reports that he has had some fatigue for a few days after chemotherapy.  He reports numbness in his feet with no pain.  He occasionally feels tingling in his fingertips.  He did have an episode of nausea and vomiting vision changes.  He is occasionally taking Tums for heartburn.  He has been working to increase protein in his diet and has been eating more beans.  He did have some lightheadedness with position change this morning, but otherwise denies feeling dizzy or lightheaded.  He is drinking about a half a gallon of fluids per day.  He recently saw his cardiologist last Friday who made no medication changes.  He denies other concerns.    Current Outpatient Medications   Medication Sig Dispense Refill     acetaminophen (TYLENOL) 500 MG tablet Take 500 mg by mouth every 6 hours as needed for fever or pain       allopurinol (ZYLOPRIM) 100 MG tablet Take 100 mg by mouth every evening        apixaban ANTICOAGULANT (ELIQUIS) 5 MG tablet Take 1 tablet (5 mg) by mouth 2 times daily       atorvastatin (LIPITOR) 40 MG tablet Take 40 mg by mouth every evening        calcium carbonate (TUMS) 500 MG chewable tablet Take 1 chew tab by mouth 4 times daily as needed for heartburn       clopidogrel (PLAVIX) 75 MG tablet Take 75 mg by mouth every morning        colchicine (COLCYRS) 0.6 MG tablet Take 0.6 mg by mouth as needed       furosemide (LASIX) 20 MG tablet Take 1 tablet (20 mg) by mouth daily       losartan (COZAAR) 25 MG tablet Take 25 mg by mouth every morning       metoprolol tartrate (LOPRESSOR) 50 MG tablet Take 150 mg by mouth 2 times daily        multivitamin w/minerals (THERA-VIT-M) tablet Take 1 tablet by mouth daily       ondansetron (ZOFRAN) 4 MG tablet Take 1 tablet (4 mg) by mouth every 8 hours as needed  for nausea 30 tablet 0     Nitroglycerin (NITROSTAT SL) Place 0.4 mg under the tongue every 5 minutes as needed for chest pain (Carries medication - has never used)        Allergies   Allergen Reactions     Blood Transfusion Related (Informational Only) Other (See Comments)     Patient has a history of a clinically significant antibody against RBC antigens.  A delay in compatible RBCs may occur.     Exam:   General: The patient is a pleasant male in no acute distress.  BP 93/62 (BP Location: Right arm, Patient Position: Sitting, Cuff Size: Adult Regular)   Pulse 79   Temp 97.5  F (36.4  C) (Oral)   Resp 16   Wt 76.5 kg (168 lb 9.6 oz)   SpO2 96%   BMI 24.19 kg/m     Wt Readings from Last 10 Encounters:   11/15/19 76.5 kg (168 lb 9.6 oz)   11/11/19 75.3 kg (166 lb 0.1 oz)   11/06/19 75.6 kg (166 lb 11.2 oz)   11/01/19 77.6 kg (171 lb)   10/18/19 79 kg (174 lb 3.2 oz)   10/16/19 76.7 kg (169 lb 1.5 oz)   10/05/19 74.6 kg (164 lb 8 oz)   10/02/19 77.8 kg (171 lb 8 oz)   09/16/19 75.3 kg (166 lb 0.1 oz)   09/12/19 77.6 kg (171 lb)   HEENT: EOMI, PERRL. Sclerae are anicteric. Oral mucosa is pink and moist with no lesions or thrush.   Lymph: Neck is supple with no lymphadenopathy in the cervical or supraclavicular areas.   Heart: Regular rate and rhythm.   Lungs: Clear to auscultation bilaterally.   Abdomen: Bowel sounds present, soft, nontender.  Extremities: No lower extremity edema noted bilaterally.   Neuro: Cranial nerves II through XII are grossly intact.  Skin: Skin on bridge of nose appears mildly dry. No rashes, petechiae, or bruising noted on exposed skin.    Labs:    11/15/2019 06:35   Sodium 139   Potassium 4.0   Chloride 110 (H)   Carbon Dioxide 25   Urea Nitrogen 23   Creatinine 1.49 (H)   GFR Estimate 49 (L)   GFR Estimate If Black 57 (L)   Calcium 8.6   Anion Gap 4   Albumin 3.3 (L)   Protein Total 6.4 (L)   Bilirubin Total 0.4   Alkaline Phosphatase 126   ALT 49   AST 40   Glucose 124 (H)   WBC 8.5    Hemoglobin 10.4 (L)   Hematocrit 33.0 (L)   Platelet Count 204   RBC Count 3.60 (L)   MCV 92   MCH 28.9   MCHC 31.5   RDW 15.1 (H)   Diff Method Automated Method   % Neutrophils 62.0   % Lymphocytes 23.1   % Monocytes 10.9   % Eosinophils 3.1   % Basophils 0.5   % Immature Granulocytes 0.4   Nucleated RBCs 0   Absolute Neutrophil 5.3   Absolute Lymphocytes 2.0   Absolute Monocytes 0.9   Absolute Eosinophils 0.3   Absolute Basophils 0.0   Abs Immature Granulocytes 0.0   Absolute Nucleated RBC 0.0     Impression/plan:   Recurrent metastatic cholangiocarcinoma  -initiated on capecitabine 4/30/19 (last dose 5/7) but developed an NSTEMI s/p angiogram 5/6/19   -Due to progression in September 2019, he started on 5FU on 10/4/19. He tolerated this well without any cardiac issues. He will continue with cycle 4 today. He will return every 2 weeks for chemotherapy and will be seen every 4 weeks. Will likely plan for his next scan in 2-3 months. Will recheck a  today. Will await result to determine timing of next scan. If he progresses, we may consider enrollment in a trial of an FGF inhibitor.     Cards hx of HLD/HTN, atrial fibrillation, bicuspid aortic valve and moderate aortic stenosis, heart failure with reduced EF, CAD with history of multivessel stenting in 2011.  - Developed NSTEMI, s/p LHC and angiogram 5/6/19 with 99% obstruction of OM2, unsuccessful PCI. He also developed afib with RVR during this time which necessitated increase of his metoprolol and starting on apixaban. He was also started on ASA/clopidogrel for CAD. Repeat angiogram done 6/17 with SAM to the OM1. Repeat Echo 7/30 with improved EF 45-50%, mild LV dilation.   had stopped anticoagulation on 7/27 due to thrombocytopenia.  Resumed plavix, asa and apixiban on 8/5 when platelets were >137K  -continue metoprolol, losartan and lasix. If BP remains low, recommend discussing with cardiology about adjusting medications.  -Last Echo on 9/30/19 showed  an EF of 42%. Follows with cardiology.     History of skin SCC. Previously, discussed that he may see dermatology to further assess. He prefers to wait until the new year.     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 Stable. Will continue to monitor.     Gradual vision decline. No change since the last visit. Recommend follow up with his eye doctor for a routine exam.     Acid reflux. Occurs intermittently. Will continue to take Tums prn.     CKD. Creatinine is relatively stable. No concerns today. Last right ureteral stent exchange on 11/11/19.     Hypoalbuminemia. Mildly improved. Recommend continuing to push protein in his diet.     Anemia. Relatively stable. Likely due to anemia of chronic disease and chemotherapy.     Vaccination. Patient received the influenza vaccine this season.    Julia Smith PA-C  [facility]  [address] [place] [address] [phone]    11/18/19 Addendum:  has risen from [phone] to [phone]. I am concerning that the patient's disease is progressing. I have discussed his case with Dr. De Los Santos and will plan to get a CT CAP on 11/25 and Dr. De Los Santos will see him to review on 11/26.       Julia Smith PA-C

## 2019-11-15 NOTE — NURSING NOTE
"Chief Complaint   Patient presents with     Port Draw     port accessed and labs drawn by rn.  vital signs taken.     Port accessed by RN in lab with 20g 3/4\" power needle, labs drawn, port flushed with saline and heparin, vitals checked, pt checked in for next appointment.  Carey Larson RN    "

## 2019-11-15 NOTE — PROGRESS NOTES
Oncology/Hematology Visit Note  Nov 15, 2019    Reason for Visit: seen in f/u of recurrent, metastatic cholangiocarcinoma    Oncology HPI:   Girish Chauhan is a 64 year old year old gentleman with cholangiocarcinoma  Which was resected in 3/2016 (including partial liver resection) with negative margins and no LN involvement, but with perineural and lymphovascular invasion. No adjuvant chemotherapy given. Recurred in bladder 11/2017, bx c/w metastatic cholangiocarcinoma, started on cisplatin/gemcitabine 12/2017. Cisplatin held 6/2018 for poor tolerance. Gemcitabine discontinued 8/2018 for possible TTP, then went off treatment. In 4/2019 was found to have disease progression in the abdominal cavity anterior to the liver, just below the diaphragm. Started on capecitabine 4/30/19 (last dose 5/7) but developed an NSTEMI s/p angiogram 5/6/19 (see below) and actually continued on the capecitabine for several days after NSTEMI without any ongoing cardiac symptoms or evidence of ongoing ischemia. On follow up with Dr. De Los Santos 6/24, disease appeared stable. Decision made with family to hold off on treatment for 2 months and reevaluate.     He was then transferred to [facility] on 6/5/19 after presenting to OSH with SBO with possible involvement of draining choledochojejunostomy loop, subjective fevers, and A fib with RVR. GI consulted, s/p small bowel enteroscopy with stenting on 6/7 for obstructed biliary limb. Discharged on 6/9/19.     He was admitted on 7/28/19 with fever, nausea, vomiting, and abdominal pain. Hospitalization complicated by sepsis and enterococcus casseliflavus bacteremia. Additionally, Krishna was found to have migrated gastrojejunal stent on CT A/P on admission. S/p ERCP with stent exchange 7/29 by Dr. Rodriguez. Discharged on Linezolid for 2 weeks.      CT CAP showed disease progression. He started on 5FU on 10/4/19 inpatient due to history of MI while taking Xeloda. He is here today for routine follow up prior to  cycle 4.     Interval history: Krishna is feeling well.  His stent exchange went well.  He will be having a renal ultrasound.  He reports that he has had some fatigue for a few days after chemotherapy.  He reports numbness in his feet with no pain.  He occasionally feels tingling in his fingertips.  He did have an episode of nausea and vomiting vision changes.  He is occasionally taking Tums for heartburn.  He has been working to increase protein in his diet and has been eating more beans.  He did have some lightheadedness with position change this morning, but otherwise denies feeling dizzy or lightheaded.  He is drinking about a half a gallon of fluids per day.  He recently saw his cardiologist last Friday who made no medication changes.  He denies other concerns.    Current Outpatient Medications   Medication Sig Dispense Refill     acetaminophen (TYLENOL) 500 MG tablet Take 500 mg by mouth every 6 hours as needed for fever or pain       allopurinol (ZYLOPRIM) 100 MG tablet Take 100 mg by mouth every evening        apixaban ANTICOAGULANT (ELIQUIS) 5 MG tablet Take 1 tablet (5 mg) by mouth 2 times daily       atorvastatin (LIPITOR) 40 MG tablet Take 40 mg by mouth every evening        calcium carbonate (TUMS) 500 MG chewable tablet Take 1 chew tab by mouth 4 times daily as needed for heartburn       clopidogrel (PLAVIX) 75 MG tablet Take 75 mg by mouth every morning        colchicine (COLCYRS) 0.6 MG tablet Take 0.6 mg by mouth as needed       furosemide (LASIX) 20 MG tablet Take 1 tablet (20 mg) by mouth daily       losartan (COZAAR) 25 MG tablet Take 25 mg by mouth every morning       metoprolol tartrate (LOPRESSOR) 50 MG tablet Take 150 mg by mouth 2 times daily        multivitamin w/minerals (THERA-VIT-M) tablet Take 1 tablet by mouth daily       ondansetron (ZOFRAN) 4 MG tablet Take 1 tablet (4 mg) by mouth every 8 hours as needed for nausea 30 tablet 0     Nitroglycerin (NITROSTAT SL) Place 0.4 mg under the  tongue every 5 minutes as needed for chest pain (Carries medication - has never used)        Allergies   Allergen Reactions     Blood Transfusion Related (Informational Only) Other (See Comments)     Patient has a history of a clinically significant antibody against RBC antigens.  A delay in compatible RBCs may occur.     Exam:   General: The patient is a pleasant male in no acute distress.  BP 93/62 (BP Location: Right arm, Patient Position: Sitting, Cuff Size: Adult Regular)   Pulse 79   Temp 97.5  F (36.4  C) (Oral)   Resp 16   Wt 76.5 kg (168 lb 9.6 oz)   SpO2 96%   BMI 24.19 kg/m    Wt Readings from Last 10 Encounters:   11/15/19 76.5 kg (168 lb 9.6 oz)   11/11/19 75.3 kg (166 lb 0.1 oz)   11/06/19 75.6 kg (166 lb 11.2 oz)   11/01/19 77.6 kg (171 lb)   10/18/19 79 kg (174 lb 3.2 oz)   10/16/19 76.7 kg (169 lb 1.5 oz)   10/05/19 74.6 kg (164 lb 8 oz)   10/02/19 77.8 kg (171 lb 8 oz)   09/16/19 75.3 kg (166 lb 0.1 oz)   09/12/19 77.6 kg (171 lb)   HEENT: EOMI, PERRL. Sclerae are anicteric. Oral mucosa is pink and moist with no lesions or thrush.   Lymph: Neck is supple with no lymphadenopathy in the cervical or supraclavicular areas.   Heart: Regular rate and rhythm.   Lungs: Clear to auscultation bilaterally.   Abdomen: Bowel sounds present, soft, nontender.  Extremities: No lower extremity edema noted bilaterally.   Neuro: Cranial nerves II through XII are grossly intact.  Skin: Skin on bridge of nose appears mildly dry. No rashes, petechiae, or bruising noted on exposed skin.    Labs:    11/15/2019 06:35   Sodium 139   Potassium 4.0   Chloride 110 (H)   Carbon Dioxide 25   Urea Nitrogen 23   Creatinine 1.49 (H)   GFR Estimate 49 (L)   GFR Estimate If Black 57 (L)   Calcium 8.6   Anion Gap 4   Albumin 3.3 (L)   Protein Total 6.4 (L)   Bilirubin Total 0.4   Alkaline Phosphatase 126   ALT 49   AST 40   Glucose 124 (H)   WBC 8.5   Hemoglobin 10.4 (L)   Hematocrit 33.0 (L)   Platelet Count 204   RBC Count  3.60 (L)   MCV 92   MCH 28.9   MCHC 31.5   RDW 15.1 (H)   Diff Method Automated Method   % Neutrophils 62.0   % Lymphocytes 23.1   % Monocytes 10.9   % Eosinophils 3.1   % Basophils 0.5   % Immature Granulocytes 0.4   Nucleated RBCs 0   Absolute Neutrophil 5.3   Absolute Lymphocytes 2.0   Absolute Monocytes 0.9   Absolute Eosinophils 0.3   Absolute Basophils 0.0   Abs Immature Granulocytes 0.0   Absolute Nucleated RBC 0.0     Impression/plan:   Recurrent metastatic cholangiocarcinoma  -initiated on capecitabine 4/30/19 (last dose 5/7) but developed an NSTEMI s/p angiogram 5/6/19   -Due to progression in September 2019, he started on 5FU on 10/4/19. He tolerated this well without any cardiac issues. He will continue with cycle 4 today. He will return every 2 weeks for chemotherapy and will be seen every 4 weeks. Will likely plan for his next scan in 2-3 months. Will recheck a  today. Will await result to determine timing of next scan. If he progresses, we may consider enrollment in a trial of an FGF inhibitor.     Cards hx of HLD/HTN, atrial fibrillation, bicuspid aortic valve and moderate aortic stenosis, heart failure with reduced EF, CAD with history of multivessel stenting in 2011.  - Developed NSTEMI, s/p LHC and angiogram 5/6/19 with 99% obstruction of OM2, unsuccessful PCI. He also developed afib with RVR during this time which necessitated increase of his metoprolol and starting on apixaban. He was also started on ASA/clopidogrel for CAD. Repeat angiogram done 6/17 with SAM to the OM1. Repeat Echo 7/30 with improved EF 45-50%, mild LV dilation.   had stopped anticoagulation on 7/27 due to thrombocytopenia.  Resumed plavix, asa and apixiban on 8/5 when platelets were >137K  -continue metoprolol, losartan and lasix. If BP remains low, recommend discussing with cardiology about adjusting medications.  -Last Echo on 9/30/19 showed an EF of 42%. Follows with cardiology.     History of skin SCC. Previously,  discussed that he may see dermatology to further assess. He prefers to wait until the new year.     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 Stable. Will continue to monitor.     Gradual vision decline. No change since the last visit. Recommend follow up with his eye doctor for a routine exam.     Acid reflux. Occurs intermittently. Will continue to take Tums prn.     CKD. Creatinine is relatively stable. No concerns today. Last right ureteral stent exchange on 11/11/19.     Hypoalbuminemia. Mildly improved. Recommend continuing to push protein in his diet.     Anemia. Relatively stable. Likely due to anemia of chronic disease and chemotherapy.     Vaccination. Patient received the influenza vaccine this season.    Julia Smith PA-C  [facility]  [address], [place] [id number] [phone]    11/18/19 Addendum:  has risen from 547 to 943. I am concerning that the patient's disease is progressing. I have discussed his case with Dr. De Los Santos and will plan to get a CT CAP on 11/25 and Dr. De Los Santos will see him to review on 11/26.

## 2019-11-15 NOTE — PATIENT INSTRUCTIONS
Contact Numbers:   [facility] [place]: [phone]    Call triage to speak with triage if you are experiencing chills and/or temperature greater than or equal to 100.5, uncontrolled nausea/vomiting, diarrhea, constipation, dizziness, shortness of breath, chest pain, bleeding, unexplained bruising, or any new/concerning symptoms, questions/concerns.     If you are having any concerning symptoms or wish to speak to a provider before your next infusion visit, please call your care coordinator or triage to notify them so we can adequately serve you.     If you need a refill on a narcotic prescription, please call triage or your care coordinator before your infusion appointment.                 November 2019 Sunday Monday Tuesday Wednesday Thursday Friday Saturday                            1    LAB   6:00 AM   (15 min.)   UR LAB HOME INFUSION   [facility], Laboratory Services    [facility] LAB DRAW   1:30 PM   (15 min.)    [facility] LAB DRAW   [facility] Lab Draw    [facility] ONC INFUSION 240   2:00 PM   (240 min.)    ONCOLOGY INFUSION   [facility] 2       3     4     5     6    PAC PHARMACIST  12:45 PM   (30 min.)   Pharmacist,  Pac   [facility] Preoperative Assessment [place]    PAC EVAL  12:45 PM   (60 min.)   Hayde Blanca, YO CNS   [facility] Preoperative Assessment [place] 7     8     9       10     11    Admission   5:34 AM   Sivan Walker MD   UR MAIN OR   (Discharge: 11/11/2019)    CYSTOSCOPY, WITH RETROGRADE PYELOGRAM AND URETERAL STENT REPLACEMENT   7:00 AM   Sivan Walker MD   UR OR    XR SURGCARM FLUORO < 5 MIN   7:15 AM   (15 min.)   URCARM2   [facility],  Radiology 12     13     14     15    [facility] LAB DRAW   6:30 AM   (15 min.)    [facility] LAB DRAW   [facility] Lab Draw    [facility] RETURN   6:45 AM   (50 min.)   Julia Smith PA-C   [facility] ONC INFUSION 240   8:00 AM   (240 min.)    ONCOLOGY [facility]  [facility] 16       17     18     19     20     21     22     23       24     25     26     27     28     29    [facility] LAB DRAW   9:30 AM   (15 min.)    [facility] LAB DRAW   [facility] Lab Draw    [facility] ONC INFUSION 240  10:00 AM   (240 min.)    ONCOLOGY INFUSION   [facility] 30 December 2019 Sunday Monday Tuesday Wednesday Thursday Friday Saturday   1     2     3    US RENAL COMPLETE   1:00 PM   (60 min.)   UCUS3   [facility]  4     5     6     7       8     9     10     11     12     13    [facility] LAB DRAW   9:45 AM   (15 min.)    [facility] LAB DRAW   [facility] Lab Draw    [facility] RETURN  10:05 AM   (50 min.)   Jennifer Jalloh, YO CNP   [facility]    [facility] ONC INFUSION 240  11:30 AM   (240 min.)    ONCOLOGY INFUSION   [facility] 14       15     16     17     18     19     20     21       22     23     24     25     26     27    [facility] LAB DRAW   9:30 AM   (15 min.)    [facility] LAB DRAW   [facility] Lab Draw    [facility] ONC INFUSION 240  10:00 AM   (240 min.)    ONCOLOGY INFUSION   [facility] 28       29     30    [facility] RETURN   4:15 PM   (30 min.)   Naresh De Los Santos MD   [facility] 31                                         Lab Results:  Recent Results (from the past 12 hour(s))   CBC with platelets differential    Collection Time: 11/15/19  6:35 AM   Result Value Ref Range    WBC 8.5 4.0 - 11.0 10e9/L    RBC Count 3.60 (L) 4.4 - 5.9 10e12/L    Hemoglobin 10.4 (L) 13.3 - 17.7 g/dL    Hematocrit 33.0 (L) 40.0 - 53.0 %    MCV 92 78 - 100 fl    MCH 28.9 26.5 - 33.0 pg    MCHC 31.5 31.5 - 36.5 g/dL    RDW 15.1 (H) 10.0 - 15.0 %    Platelet Count 204 150 - 450 10e9/L    Diff Method Automated Method     % Neutrophils 62.0 %    % Lymphocytes 23.1 %    % Monocytes 10.9 %    % Eosinophils 3.1 %    % Basophils 0.5 %    % Immature  Granulocytes 0.4 %    Nucleated RBCs 0 0 /100    Absolute Neutrophil 5.3 1.6 - 8.3 10e9/L    Absolute Lymphocytes 2.0 0.8 - 5.3 10e9/L    Absolute Monocytes 0.9 0.0 - 1.3 10e9/L    Absolute Eosinophils 0.3 0.0 - 0.7 10e9/L    Absolute Basophils 0.0 0.0 - 0.2 10e9/L    Abs Immature Granulocytes 0.0 0 - 0.4 10e9/L    Absolute Nucleated RBC 0.0    Comprehensive metabolic panel    Collection Time: 11/15/19  6:35 AM   Result Value Ref Range    Sodium 139 133 - 144 mmol/L    Potassium 4.0 3.4 - 5.3 mmol/L    Chloride 110 (H) 94 - 109 mmol/L    Carbon Dioxide 25 20 - 32 mmol/L    Anion Gap 4 3 - 14 mmol/L    Glucose 124 (H) 70 - 99 mg/dL    Urea Nitrogen 23 7 - 30 mg/dL    Creatinine 1.49 (H) 0.66 - 1.25 mg/dL    GFR Estimate 49 (L) >60 mL/min/[1.73_m2]    GFR Estimate If Black 57 (L) >60 mL/min/[1.73_m2]    Calcium 8.6 8.5 - 10.1 mg/dL    Bilirubin Total 0.4 0.2 - 1.3 mg/dL    Albumin 3.3 (L) 3.4 - 5.0 g/dL    Protein Total 6.4 (L) 6.8 - 8.8 g/dL    Alkaline Phosphatase 126 40 - 150 U/L    ALT 49 0 - 70 U/L    AST 40 0 - 45 U/L

## 2019-11-16 LAB — CANCER AG19-9 SERPL-ACNC: 943 U/ML (ref 0–37)

## 2019-11-17 ENCOUNTER — HOME INFUSION (PRE-WILLOW HOME INFUSION) (OUTPATIENT)
Dept: PHARMACY | Facility: CLINIC | Age: 64
End: 2019-11-17

## 2019-11-17 NOTE — PHARMACY
Skilled nurse visit in the home, for discontinuation of chemotherapy. 4705 mg IV via c-pump of Fluorouracil infused over 46 hours.    Yung SWEENEY RN CRNI  [phone]  [email or web address]

## 2019-11-18 NOTE — PROGRESS NOTES
This is a recent snapshot of the patient's [facility] medical record.  For current drug dose and complete information and questions, call [phone] or In Basket pool, fv home infusion ([id number])  CSN Number:  [id number]

## 2019-11-19 ENCOUNTER — PATIENT OUTREACH (OUTPATIENT)
Dept: ONCOLOGY | Facility: CLINIC | Age: 64
End: 2019-11-19

## 2019-11-19 ENCOUNTER — OFFICE VISIT - HEALTHEAST (OUTPATIENT)
Dept: FAMILY MEDICINE | Facility: CLINIC | Age: 64
End: 2019-11-19

## 2019-11-19 DIAGNOSIS — N18.30 CKD (CHRONIC KIDNEY DISEASE) STAGE 3, GFR 30-59 ML/MIN (H): ICD-10-CM

## 2019-11-19 DIAGNOSIS — I48.91 ATRIAL FIBRILLATION WITH RVR (H): ICD-10-CM

## 2019-11-19 DIAGNOSIS — I35.0 NONRHEUMATIC AORTIC VALVE STENOSIS: ICD-10-CM

## 2019-11-19 DIAGNOSIS — D64.9 ANEMIA, UNSPECIFIED TYPE: ICD-10-CM

## 2019-11-19 DIAGNOSIS — I25.10 CORONARY ARTERY DISEASE INVOLVING NATIVE CORONARY ARTERY OF NATIVE HEART, ANGINA PRESENCE UNSPECIFIED: ICD-10-CM

## 2019-11-19 DIAGNOSIS — I42.9 CARDIOMYOPATHY, UNSPECIFIED TYPE (H): ICD-10-CM

## 2019-11-19 DIAGNOSIS — I50.22 CHRONIC SYSTOLIC CONGESTIVE HEART FAILURE (H): ICD-10-CM

## 2019-11-19 DIAGNOSIS — Q23.81 BICUSPID AORTIC VALVE: ICD-10-CM

## 2019-11-19 DIAGNOSIS — C22.1 CHOLANGIOCARCINOMA (H): ICD-10-CM

## 2019-11-19 DIAGNOSIS — R06.02 SHORTNESS OF BREATH: ICD-10-CM

## 2019-11-19 LAB
ERYTHROCYTE [DISTWIDTH] IN BLOOD BY AUTOMATED COUNT: 12.9 % (ref 11–14.5)
HCT VFR BLD AUTO: 33.9 % (ref 40–54)
HGB BLD-MCNC: 11.4 G/DL (ref 14–18)
MCH RBC QN AUTO: 29.9 PG (ref 27–34)
MCHC RBC AUTO-ENTMCNC: 33.7 G/DL (ref 32–36)
MCV RBC AUTO: 89 FL (ref 80–100)
PLATELET # BLD AUTO: 205 THOU/UL (ref 140–440)
PMV BLD AUTO: 8.5 FL (ref 7–10)
RBC # BLD AUTO: 3.83 MILL/UL (ref 4.4–6.2)
WBC: 10 THOU/UL (ref 4–11)

## 2019-11-19 ASSESSMENT — MIFFLIN-ST. JEOR: SCORE: 1544.69

## 2019-11-19 NOTE — PROGRESS NOTES
RN Care Coordination Note   Outgoing Call: Placed call to patient to inform his tumor marker, ca 19-9, has gone up. Per Julia Smith PA-C and Dr De Los Santos, patient should have CT and see Dr De Los Santos on 11/25.     Unable to reach patient, LM with about info and asked he call back to further discuss. [employer] message also sent.       Kecia Loco RN, BSN, OCN   RN Care Coordinator   [facility]

## 2019-11-20 LAB — BNP SERPL-MCNC: 128 PG/ML (ref 0–55)

## 2019-11-22 ENCOUNTER — ANCILLARY PROCEDURE (OUTPATIENT)
Dept: CT IMAGING | Facility: CLINIC | Age: 64
End: 2019-11-22
Attending: PHYSICIAN ASSISTANT
Payer: COMMERCIAL

## 2019-11-22 DIAGNOSIS — C22.1 CHOLANGIOCARCINOMA (H): ICD-10-CM

## 2019-11-22 RX ORDER — IOPAMIDOL 755 MG/ML
103 INJECTION, SOLUTION INTRAVASCULAR ONCE
Status: COMPLETED | OUTPATIENT
Start: 2019-11-22 | End: 2019-11-22

## 2019-11-22 RX ORDER — HEPARIN SODIUM (PORCINE) LOCK FLUSH IV SOLN 100 UNIT/ML 100 UNIT/ML
5 SOLUTION INTRAVENOUS ONCE
Status: COMPLETED | OUTPATIENT
Start: 2019-11-22 | End: 2019-11-22

## 2019-11-22 RX ADMIN — IOPAMIDOL 103 ML: 755 INJECTION, SOLUTION INTRAVASCULAR at 14:25

## 2019-11-22 RX ADMIN — HEPARIN SODIUM (PORCINE) LOCK FLUSH IV SOLN 100 UNIT/ML 5 ML: 100 SOLUTION at 14:31

## 2019-11-24 ENCOUNTER — COMMUNICATION - HEALTHEAST (OUTPATIENT)
Dept: FAMILY MEDICINE | Facility: CLINIC | Age: 64
End: 2019-11-24

## 2019-11-25 ENCOUNTER — ONCOLOGY VISIT (OUTPATIENT)
Dept: ONCOLOGY | Facility: CLINIC | Age: 64
End: 2019-11-25
Attending: INTERNAL MEDICINE
Payer: COMMERCIAL

## 2019-11-25 ENCOUNTER — RECORDS - HEALTHEAST (OUTPATIENT)
Dept: ADMINISTRATIVE | Facility: OTHER | Age: 64
End: 2019-11-25

## 2019-11-25 VITALS
HEART RATE: 67 BPM | WEIGHT: 167.1 LBS | DIASTOLIC BLOOD PRESSURE: 70 MMHG | OXYGEN SATURATION: 97 % | BODY MASS INDEX: 23.92 KG/M2 | HEIGHT: 70 IN | SYSTOLIC BLOOD PRESSURE: 103 MMHG | RESPIRATION RATE: 14 BRPM | TEMPERATURE: 97.4 F

## 2019-11-25 DIAGNOSIS — C22.1 CHOLANGIOCARCINOMA (H): Primary | ICD-10-CM

## 2019-11-25 DIAGNOSIS — C78.6 PERITONEAL CARCINOMATOSIS (H): ICD-10-CM

## 2019-11-25 PROCEDURE — G0463 HOSPITAL OUTPT CLINIC VISIT: HCPCS | Mod: ZF

## 2019-11-25 PROCEDURE — 99214 OFFICE O/P EST MOD 30 MIN: CPT | Mod: ZP | Performed by: INTERNAL MEDICINE

## 2019-11-25 ASSESSMENT — MIFFLIN-ST. JEOR: SCORE: 1564.21

## 2019-11-25 ASSESSMENT — PAIN SCALES - GENERAL: PAINLEVEL: NO PAIN (0)

## 2019-11-25 NOTE — Clinical Note
11/25/2019       RE: Girish Chauhan  [address] [id number]     Dear Colleague,    Thank you for referring your patient, Girish Chauhan, to the [facility]. Please see a copy of my visit note below.    Girish Chauhan is here today in follow-up of metastatic cholangiocarcinoma.    He originally had resection back in March 2016 and had a biopsy-proven recurrence in November 2017 in the wall of his bladder.  He was treated with gemcitabine plus cisplatin with the cisplatin drop for intolerance and then gemcitabine for the development of TTP.  He was off treatment from August 2018 until April of this year when he had progression in the abdominal cavity and was started on capecitabine.  He had an MRI on the capecitabine but is since been rechallenged with infusional 5-FU without the development of further cardiac ischemia.  He is here today for a planned response assessment.  We are doing this a bit sooner than originally planned because his tumor marker had risen significantly.  He tells me he is tolerating the 5-FU fairly well he overall feels like his clinical condition is stable although he remarks that probably is improved since he would expect the chemo to be making him feel worse and he does not feel any worse with it.  He has not had any significant problems with hand-foot toxicity or diarrhea.  He has had no fevers or chills.  He denies any active symptoms related to his heart.  He feels like he is eating fairly well but he has to be somewhat cautious as he gets sick if he eats too large in amount or too quickly.  He thinks his weight has been stable over the last couple months.  Remainder of his 10 point review of systems is otherwise unremarkable.    On physical exam Mr. Chauhan appears thin but well.  His vital signs are unremarkable.  He has no visible lesions in the oropharynx and no scleral icterus.  No adenopathy is palpable in his neck or supraclavicular spaces.  His  lungs are clear to auscultation without dullness to percussion.  His heart rate and rhythm are regular without murmur gallop.  Jugular venous pressure appears normal.  His abdomen is soft and nontender without discretely palpable mass or organomegaly.  He has no peripheral edema no tenderness in his calves or thighs.  He has no cyanosis or clubbing of his digits.  His speech is fluent and his cranial nerves are grossly intact.  His gait and station are unremarkable.    Reviewed with patient his wife his lab work and CT scan.  His CA-19-9 level has markedly risen up to 940.  His electrolytes and renal function are stable with an estimated GFR of approximately 50.  His albumin is minimally depressed at 3.3 and his liver enzymes are unremarkable.  He has mild normocytic anemia and otherwise unremarkable blood counts.  On his CT scan his visible sites of disease, which are in the anterior abdominal cavity just in front of his resection site and in the pelvis around his bladder, appears stable to actually somewhat improved.  I do not see any new sites of disease.    Assessment/plan: Metastatic cholangiocarcinoma with a rising tumor marker but is stable improved CT scan.  Perhaps his tumor marker is going up due to the prior issues with his biliary tract or perhaps he has some occult progression within his peritoneal cavity that we can see.  Since his overall clinical condition is stable we decided together today to continue on with his current chemotherapy and reassess after another 6 to 8 weeks.    Again, thank you for allowing me to participate in the care of your patient.      Sincerely,    Naresh De Los Santos MD

## 2019-11-25 NOTE — NURSING NOTE
"Oncology Rooming Note    November 25, 2019 1:32 PM   Girish Chauhan is a 64 year old male who presents for:    Chief Complaint   Patient presents with     Oncology Clinic Visit     Return - Cholangiocarcinoma     Initial Vitals: Pulse 67   Temp 97.4  F (36.3  C) (Oral)   Resp 14   Ht 1.778 m (5' 10\")   Wt 75.8 kg (167 lb 1.6 oz)   SpO2 97%   BMI 23.98 kg/m   Estimated body mass index is 23.98 kg/m  as calculated from the following:    Height as of this encounter: 1.778 m (5' 10\").    Weight as of this encounter: 75.8 kg (167 lb 1.6 oz). Body surface area is 1.93 meters squared.  Data Unavailable Comment: Data Unavailable   No LMP for male patient.  Allergies reviewed: Yes  Medications reviewed: Yes    Medications: Medication refills not needed today.  Pharmacy [facility] entered into [id number] [address] AT [facility]    Clinical concerns: Concerns about a wet cough, when reclined, that has madeline going on for 1 week. Did see his primary for it, but they didn't find the source of the cough.       Glenroy Garcia , EMT            "

## 2019-11-25 NOTE — PROGRESS NOTES
Girish Chauhan is here today in follow-up of metastatic cholangiocarcinoma.    He originally had resection back in March 2016 and had a biopsy-proven recurrence in November 2017 in the wall of his bladder.  He was treated with gemcitabine plus cisplatin with the cisplatin dropped for intolerance and then gemcitabine dropped for the development of TTP.  He was off treatment from August 2018 until April of this year when he had progression in the abdominal cavity and was started on capecitabine.  He had an MI on the capecitabine but has since been rechallenged with infusional 5-FU without the development of further cardiac ischemia.  He is here today for a planned response assessment.  We are doing this a bit sooner than originally planned because his tumor marker had risen significantly.  He tells me he is tolerating the 5-FU fairly well. He overall feels like his clinical condition is stable although he remarks that probably it is improved since he would expect the chemo to be making him feel worse and he does not feel any worse with it.  He has not had any significant problems with hand-foot toxicity or diarrhea.  He has had no fevers or chills.  He denies any active symptoms related to his heart.  He feels like he is eating fairly well but he has to be somewhat cautious as he gets sick if he eats too large an amount or too quickly.  He thinks his weight has been stable over the last couple months.  Remainder of his 10 point review of systems is otherwise unremarkable.    On physical exam Mr. Chauhan appears thin but well.  His vital signs are unremarkable.  He has no visible lesions in the oropharynx and no scleral icterus.  No adenopathy is palpable in his neck or supraclavicular spaces.  His lungs are clear to auscultation without dullness to percussion.  His heart rate and rhythm are regular without murmur gallop.  Jugular venous pressure appears normal.  His abdomen is soft and nontender without discretely  palpable mass or organomegaly.  He has no peripheral edema no tenderness in his calves or thighs.  He has no cyanosis or clubbing of his digits.  His speech is fluent and his cranial nerves are grossly intact.  His gait and station are unremarkable.    I reviewed with patient and his wife his lab work and CT scan.  His CA-19-9 level has markedly risen up to 940.  His electrolytes and renal function are stable with an estimated GFR of approximately 50.  His albumin is minimally depressed at 3.3 and his liver enzymes are unremarkable.  He has mild normocytic anemia and otherwise unremarkable blood counts.  On his CT scan his visible sites of disease, which are in the anterior abdominal cavity just in front of his resection site and in the pelvis around his bladder, appears stable to actually somewhat improved.  I do not see any new sites of disease.    Assessment/plan: Metastatic cholangiocarcinoma with a rising tumor marker but a stable to improved CT scan.  Perhaps his tumor marker is going up due to the prior issues with his biliary tract or perhaps he has some occult progression within his peritoneal cavity that we can see.  Since his overall clinical condition is stable we decided together today to continue on with his current chemotherapy and reassess after another 6 to 8 weeks.

## 2019-11-25 NOTE — LETTER
11/25/2019      RE: Girish Chauhan  [address] [facility] [id number]       Girish Chauhan is here today in follow-up of metastatic cholangiocarcinoma.    He originally had resection back in March 2016 and had a biopsy-proven recurrence in November 2017 in the wall of his bladder.  He was treated with gemcitabine plus cisplatin with the cisplatin dropped for intolerance and then gemcitabine dropped for the development of TTP.  He was off treatment from August 2018 until April of this year when he had progression in the abdominal cavity and was started on capecitabine.  He had an MI on the capecitabine but has since been rechallenged with infusional 5-FU without the development of further cardiac ischemia.  He is here today for a planned response assessment.  We are doing this a bit sooner than originally planned because his tumor marker had risen significantly.  He tells me he is tolerating the 5-FU fairly well. He overall feels like his clinical condition is stable although he remarks that probably it is improved since he would expect the chemo to be making him feel worse and he does not feel any worse with it.  He has not had any significant problems with hand-foot toxicity or diarrhea.  He has had no fevers or chills.  He denies any active symptoms related to his heart.  He feels like he is eating fairly well but he has to be somewhat cautious as he gets sick if he eats too large an amount or too quickly.  He thinks his weight has been stable over the last couple months.  Remainder of his 10 point review of systems is otherwise unremarkable.    On physical exam Mr. Chauhan appears thin but well.  His vital signs are unremarkable.  He has no visible lesions in the oropharynx and no scleral icterus.  No adenopathy is palpable in his neck or supraclavicular spaces.  His lungs are clear to auscultation without dullness to percussion.  His heart rate and rhythm are regular without murmur gallop.  Jugular venous  pressure appears normal.  His abdomen is soft and nontender without discretely palpable mass or organomegaly.  He has no peripheral edema no tenderness in his calves or thighs.  He has no cyanosis or clubbing of his digits.  His speech is fluent and his cranial nerves are grossly intact.  His gait and station are unremarkable.    I reviewed with patient and his wife his lab work and CT scan.  His CA-19-9 level has markedly risen up to 940.  His electrolytes and renal function are stable with an estimated GFR of approximately 50.  His albumin is minimally depressed at 3.3 and his liver enzymes are unremarkable.  He has mild normocytic anemia and otherwise unremarkable blood counts.  On his CT scan his visible sites of disease, which are in the anterior abdominal cavity just in front of his resection site and in the pelvis around his bladder, appears stable to actually somewhat improved.  I do not see any new sites of disease.    Assessment/plan: Metastatic cholangiocarcinoma with a rising tumor marker but a stable to improved CT scan.  Perhaps his tumor marker is going up due to the prior issues with his biliary tract or perhaps he has some occult progression within his peritoneal cavity that we can see.  Since his overall clinical condition is stable we decided together today to continue on with his current chemotherapy and reassess after another 6 to 8 weeks.    Naresh De Los Santos MD

## 2019-11-29 ENCOUNTER — APPOINTMENT (OUTPATIENT)
Dept: LAB | Facility: CLINIC | Age: 64
End: 2019-11-29
Attending: INTERNAL MEDICINE
Payer: COMMERCIAL

## 2019-11-29 ENCOUNTER — INFUSION THERAPY VISIT (OUTPATIENT)
Dept: ONCOLOGY | Facility: CLINIC | Age: 64
End: 2019-11-29
Attending: INTERNAL MEDICINE
Payer: COMMERCIAL

## 2019-11-29 VITALS
HEART RATE: 86 BPM | DIASTOLIC BLOOD PRESSURE: 62 MMHG | OXYGEN SATURATION: 100 % | BODY MASS INDEX: 23.5 KG/M2 | RESPIRATION RATE: 16 BRPM | WEIGHT: 163.8 LBS | TEMPERATURE: 97.8 F | SYSTOLIC BLOOD PRESSURE: 96 MMHG

## 2019-11-29 DIAGNOSIS — D64.81 ANEMIA ASSOCIATED WITH CHEMOTHERAPY: Primary | ICD-10-CM

## 2019-11-29 DIAGNOSIS — C22.1 CHOLANGIOCARCINOMA (H): ICD-10-CM

## 2019-11-29 DIAGNOSIS — T45.1X5A ANEMIA ASSOCIATED WITH CHEMOTHERAPY: Primary | ICD-10-CM

## 2019-11-29 LAB
ALBUMIN SERPL-MCNC: 3.2 G/DL (ref 3.4–5)
ALP SERPL-CCNC: 131 U/L (ref 40–150)
ALT SERPL W P-5'-P-CCNC: 50 U/L (ref 0–70)
ANION GAP SERPL CALCULATED.3IONS-SCNC: 6 MMOL/L (ref 3–14)
AST SERPL W P-5'-P-CCNC: 34 U/L (ref 0–45)
BASOPHILS # BLD AUTO: 0 10E9/L (ref 0–0.2)
BASOPHILS NFR BLD AUTO: 0.5 %
BILIRUB SERPL-MCNC: 0.5 MG/DL (ref 0.2–1.3)
BUN SERPL-MCNC: 31 MG/DL (ref 7–30)
CALCIUM SERPL-MCNC: 8.8 MG/DL (ref 8.5–10.1)
CHLORIDE SERPL-SCNC: 110 MMOL/L (ref 94–109)
CO2 SERPL-SCNC: 22 MMOL/L (ref 20–32)
CREAT SERPL-MCNC: 1.53 MG/DL (ref 0.66–1.25)
DIFFERENTIAL METHOD BLD: ABNORMAL
EOSINOPHIL # BLD AUTO: 0.3 10E9/L (ref 0–0.7)
EOSINOPHIL NFR BLD AUTO: 3.9 %
ERYTHROCYTE [DISTWIDTH] IN BLOOD BY AUTOMATED COUNT: 16.9 % (ref 10–15)
GFR SERPL CREATININE-BSD FRML MDRD: 48 ML/MIN/{1.73_M2}
GLUCOSE SERPL-MCNC: 86 MG/DL (ref 70–99)
HCT VFR BLD AUTO: 31.1 % (ref 40–53)
HGB BLD-MCNC: 10.1 G/DL (ref 13.3–17.7)
IMM GRANULOCYTES # BLD: 0 10E9/L (ref 0–0.4)
IMM GRANULOCYTES NFR BLD: 0.4 %
LYMPHOCYTES # BLD AUTO: 1.6 10E9/L (ref 0.8–5.3)
LYMPHOCYTES NFR BLD AUTO: 20.7 %
MCH RBC QN AUTO: 29.7 PG (ref 26.5–33)
MCHC RBC AUTO-ENTMCNC: 32.5 G/DL (ref 31.5–36.5)
MCV RBC AUTO: 92 FL (ref 78–100)
MONOCYTES # BLD AUTO: 1 10E9/L (ref 0–1.3)
MONOCYTES NFR BLD AUTO: 13.7 %
NEUTROPHILS # BLD AUTO: 4.6 10E9/L (ref 1.6–8.3)
NEUTROPHILS NFR BLD AUTO: 60.8 %
NRBC # BLD AUTO: 0 10*3/UL
NRBC BLD AUTO-RTO: 0 /100
PLATELET # BLD AUTO: 209 10E9/L (ref 150–450)
POTASSIUM SERPL-SCNC: 4.3 MMOL/L (ref 3.4–5.3)
PROT SERPL-MCNC: 6.5 G/DL (ref 6.8–8.8)
RBC # BLD AUTO: 3.4 10E12/L (ref 4.4–5.9)
SODIUM SERPL-SCNC: 137 MMOL/L (ref 133–144)
WBC # BLD AUTO: 7.5 10E9/L (ref 4–11)

## 2019-11-29 PROCEDURE — 85025 COMPLETE CBC W/AUTO DIFF WBC: CPT | Performed by: PHYSICIAN ASSISTANT

## 2019-11-29 PROCEDURE — 96409 CHEMO IV PUSH SNGL DRUG: CPT

## 2019-11-29 PROCEDURE — 96367 TX/PROPH/DG ADDL SEQ IV INF: CPT

## 2019-11-29 PROCEDURE — G0498 CHEMO EXTEND IV INFUS W/PUMP: HCPCS

## 2019-11-29 PROCEDURE — 25800030 ZZH RX IP 258 OP 636: Mod: ZF | Performed by: PHYSICIAN ASSISTANT

## 2019-11-29 PROCEDURE — 25000128 H RX IP 250 OP 636: Mod: ZF | Performed by: PHYSICIAN ASSISTANT

## 2019-11-29 PROCEDURE — 80053 COMPREHEN METABOLIC PANEL: CPT | Performed by: PHYSICIAN ASSISTANT

## 2019-11-29 PROCEDURE — 25800030 ZZH RX IP 258 OP 636: Mod: ZF | Performed by: INTERNAL MEDICINE

## 2019-11-29 PROCEDURE — 25000128 H RX IP 250 OP 636: Mod: ZF | Performed by: INTERNAL MEDICINE

## 2019-11-29 RX ORDER — HEPARIN SODIUM (PORCINE) LOCK FLUSH IV SOLN 100 UNIT/ML 100 UNIT/ML
5 SOLUTION INTRAVENOUS ONCE
Status: COMPLETED | OUTPATIENT
Start: 2019-11-29 | End: 2019-11-29

## 2019-11-29 RX ORDER — FLUOROURACIL 50 MG/ML
400 INJECTION, SOLUTION INTRAVENOUS ONCE
Status: COMPLETED | OUTPATIENT
Start: 2019-11-29 | End: 2019-11-29

## 2019-11-29 RX ADMIN — HEPARIN 5 ML: 100 SYRINGE at 09:58

## 2019-11-29 RX ADMIN — LEUCOVORIN CALCIUM 700 MG: 200 INJECTION, POWDER, LYOPHILIZED, FOR SUSPENSION INTRAMUSCULAR; INTRAVENOUS at 10:57

## 2019-11-29 RX ADMIN — SODIUM CHLORIDE 250 ML: 9 INJECTION, SOLUTION INTRAVENOUS at 10:37

## 2019-11-29 RX ADMIN — FLUOROURACIL 785 MG: 50 INJECTION, SOLUTION INTRAVENOUS at 11:58

## 2019-11-29 RX ADMIN — DEXAMETHASONE SODIUM PHOSPHATE: 10 INJECTION, SOLUTION INTRAMUSCULAR; INTRAVENOUS at 10:37

## 2019-11-29 ASSESSMENT — PAIN SCALES - GENERAL: PAINLEVEL: NO PAIN (0)

## 2019-11-29 NOTE — PROGRESS NOTES
Infusion Nursing Note:  Girish Chauhan presents today for Cycle 5 Day 1 Leucovorin, Fluorouracil bolus and pump connect.    Patient seen by provider today: No   present during visit today: Not Applicable.    Note: Patient presents to infusion today stating he feels well. He states he had a flare up of gout earlier this week, but took his gout medications and the flare has completely resolved. He denies any symptoms, concerns, or pain today.     Intravenous Access:  Implanted Port.    Treatment Conditions:  Lab Results   Component Value Date    HGB 10.1 11/29/2019     Lab Results   Component Value Date    WBC 7.5 11/29/2019      Lab Results   Component Value Date    ANEU 4.6 11/29/2019     Lab Results   Component Value Date     11/29/2019      Lab Results   Component Value Date     11/29/2019                   Lab Results   Component Value Date    POTASSIUM 4.3 11/29/2019           Lab Results   Component Value Date    MAG 1.8 10/04/2019            Lab Results   Component Value Date    CR 1.53 11/29/2019                   Lab Results   Component Value Date    GARY 8.8 11/29/2019                Lab Results   Component Value Date    BILITOTAL 0.5 11/29/2019           Lab Results   Component Value Date    ALBUMIN 3.2 11/29/2019                    Lab Results   Component Value Date    ALT 50 11/29/2019           Lab Results   Component Value Date    AST 34 11/29/2019       Results reviewed, labs MET treatment parameters, ok to proceed with treatment.      Post Infusion Assessment:  Patient tolerated infusion without incident.  Blood return noted pre and post infusion.  Blood return noted during Fluorouracil administration every 2 cc.  Site patent and intact, free from redness, edema or discomfort.  No evidence of extravasations.       Prior to discharge: Port is secured in place with tegaderm and flushed with 10cc NS with positive blood return noted.  Continuous home infusion Dosi-Fuser pump  "connected.    All connectors secured in place and clamps taped open.  Connections, clamps, and tape double checked by Analy Mead RN.   Pump started, \"running\" noted on display (CADD): Not Applicable.  Patient instructed to call our clinic or [facility] with any questions or concerns at home.  Patient verbalized understanding.    Patient set up for pump disconnect at home with [facility] on Sunday 12/1/19 at 0930 per patient's request. Writer confirmed disconnect time with Silvia at [facility]. Patient aware of disconnect time.         Discharge Plan:   Patient declined prescription refills.  Discharge instructions reviewed with: Patient.  Patient and/or family verbalized understanding of discharge instructions and all questions answered.  AVS to patient via [employer].  Patient will return 12/13/19 for next appointment.   Patient discharged in stable condition accompanied by: self.  Departure Mode: Ambulatory.    Sisi Andersen RN                        "

## 2019-11-29 NOTE — PATIENT INSTRUCTIONS
Contact Numbers    [facility] Main Line/Triage and after hours / weekends / holidays: [phone]      Please call the triage or after hours line if you experience a temperature greater than or equal to 100.5, shaking chills, have uncontrolled nausea, vomiting and/or diarrhea, dizziness, shortness of breath, chest pain, bleeding, unexplained bruising, or if you have any other new/concerning symptoms, questions or concerns.      If you are having any concerning symptoms or wish to speak to a provider before your next infusion visit, please call your care coordinator or triage to notify them so we can adequately serve you.     If you need a refill on a narcotic prescription or other medication, please call before your infusion appointment.

## 2019-12-01 ENCOUNTER — HOME INFUSION (PRE-WILLOW HOME INFUSION) (OUTPATIENT)
Dept: PHARMACY | Facility: CLINIC | Age: 64
End: 2019-12-01

## 2019-12-01 ENCOUNTER — APPOINTMENT (OUTPATIENT)
Dept: LAB | Facility: CLINIC | Age: 64
End: 2019-12-01
Attending: INTERNAL MEDICINE
Payer: COMMERCIAL

## 2019-12-01 NOTE — PROGRESS NOTES
Skilled nurse visit in the home, for discontinuation of ADRUCIL 4705 mg infused over 38 hours (unintended interruption). Girish Chauhan accidentally cut his chemo tubing about 8 hours before he was due for disconnect.  The cut shouldn't have affected pump rate and the homepump had approximately 2 to 5 mL left visually. He was disconnected within an hour by [facility] after he cut the line and the port tubing had been immediately clamped by patient.  Port WNL.    Nona SAMAYOA,BSN,VA-BC,CMSRN  [facility]  [phone]  [email or web address]

## 2019-12-02 NOTE — PROGRESS NOTES
This is a recent snapshot of the patient's [facility] medical record.  For current drug dose and complete information and questions, call [phone] or In Basket pool, fv home infusion ([id number])  CSN Number:  [id number]

## 2019-12-03 ENCOUNTER — ANCILLARY PROCEDURE (OUTPATIENT)
Dept: ULTRASOUND IMAGING | Facility: CLINIC | Age: 64
End: 2019-12-03
Attending: UROLOGY
Payer: COMMERCIAL

## 2019-12-03 DIAGNOSIS — N13.30 HYDRONEPHROSIS OF RIGHT KIDNEY: ICD-10-CM

## 2019-12-04 ENCOUNTER — TELEPHONE (OUTPATIENT)
Dept: UROLOGY | Facility: CLINIC | Age: 64
End: 2019-12-04

## 2019-12-04 NOTE — TELEPHONE ENCOUNTER
December 4, 2019    Called patient today to discuss recent imaging.  The scans show less hydronephrosis that the original scan from prior to stents but the ureteral stent and upper pole dilation have not changed.  Patient continues to be asymptomatic, the only thing he notes is that he often has to double void to empty.  He denies flank pain, fever, chills, nausea, vomiting.      A/P:  Girish Chauhan is a 64 year old M with metastatic cholangiocarcinoma and right hydronephrosis with some upper pole dilation despite ureteral stent    At this time there is no renal insufficiency, no thinning of the cortex in the upper pole and patient is asymptomatic.  We discussed options for another stent exchange versus observation.  He expresses that he would most prefer to wait and only move things up again if symptomatic or has any clinical changes.  He does also voice understanding about risk for loss of nephrons from chronic obstruction but then again the last renal scan was equivocal for chronic dilation versus partial obstruction.      At this time we discussed warning signs for him to seek medical attention    Plan for PVR in the clinic in the near future to ensure it is not reflux contributing and plan to exchange the stent likely late January at [facility]    At this time he expresses understanding and is agreeable to the plan    Sivan Walker MD MPH   of Urology    CC  Patient Care Team:  Dairo Jain as PCP - General (Internal Medicine)  Naresh De Los Santos MD as MD (Oncology)  Lyric Frey MD as MD (Urology)  Kecia Loco, RN as Nurse Coordinator (Oncology)  Sivan Walker MD as MD (Urology)  Tena Blanca, RN as Specialty Care Coordinator (Urology)

## 2019-12-04 NOTE — TELEPHONE ENCOUNTER
[facility] Call Center    Phone Message    May a detailed message be left on voicemail: yes    Reason for Call: Other: Pt called back from Dr. Walker's VM. Please call back pt anytime today. Thanks.     Action Taken: Message routed to:  [facility] ([facility]): URO

## 2019-12-04 NOTE — TELEPHONE ENCOUNTER
December 4, 2019    Attempted to reach patient about his recent imaging results.  Left VM for him to call us back or we will try him again later.    Sivan Walker MD MPH   of Urology

## 2019-12-05 ENCOUNTER — PATIENT OUTREACH (OUTPATIENT)
Dept: ONCOLOGY | Facility: CLINIC | Age: 64
End: 2019-12-05

## 2019-12-05 NOTE — PROGRESS NOTES
RN Care Coordination Note  Incoming Call: Received voicemail from patient stating he had had struggling with constipation. Looking for recommendations     Outgoing Call: Placed return call to patient. Unable to reach. Left detailed message informing patient he can use Senna starting with 1 in am 1 in pm, then increasing to 2 in am and 2 in pm. If still no results, may add 1 scoop Miralax in am. Asked patient return call to discuss further.        Kecia Loco RN, BSN, OCN   RN Care Coordinator   [facility]

## 2019-12-06 ENCOUNTER — RECORDS - HEALTHEAST (OUTPATIENT)
Dept: ADMINISTRATIVE | Facility: OTHER | Age: 64
End: 2019-12-06

## 2019-12-10 ENCOUNTER — MYC MEDICAL ADVICE (OUTPATIENT)
Dept: ONCOLOGY | Facility: CLINIC | Age: 64
End: 2019-12-10

## 2019-12-10 NOTE — TELEPHONE ENCOUNTER
Called Pt to assess. Pt had a BM today. Previous BM was estimated to be Saturday or early Sunday. Pt is usually a 1x/day type of shree. Pt took a Senna about 10 pm, results at 6:30 today. Discussed how to use/dose his laxatives and the typical progression of Tx. Voiced that I was pleased that his issue had resolved, and encouraged Pt to call if issue reoccurs (or indeed for any symptoms). Pt verbalized understanding.

## 2019-12-13 ENCOUNTER — ONCOLOGY VISIT (OUTPATIENT)
Dept: ONCOLOGY | Facility: CLINIC | Age: 64
End: 2019-12-13
Attending: PHYSICIAN ASSISTANT
Payer: COMMERCIAL

## 2019-12-13 ENCOUNTER — RECORDS - HEALTHEAST (OUTPATIENT)
Dept: ADMINISTRATIVE | Facility: OTHER | Age: 64
End: 2019-12-13

## 2019-12-13 ENCOUNTER — APPOINTMENT (OUTPATIENT)
Dept: LAB | Facility: CLINIC | Age: 64
End: 2019-12-13
Attending: INTERNAL MEDICINE
Payer: COMMERCIAL

## 2019-12-13 ENCOUNTER — INFUSION THERAPY VISIT (OUTPATIENT)
Dept: ONCOLOGY | Facility: CLINIC | Age: 64
End: 2019-12-13
Attending: INTERNAL MEDICINE
Payer: COMMERCIAL

## 2019-12-13 ENCOUNTER — HOME INFUSION (PRE-WILLOW HOME INFUSION) (OUTPATIENT)
Dept: PHARMACY | Facility: CLINIC | Age: 64
End: 2019-12-13

## 2019-12-13 VITALS
DIASTOLIC BLOOD PRESSURE: 71 MMHG | WEIGHT: 166 LBS | BODY MASS INDEX: 23.82 KG/M2 | TEMPERATURE: 97.4 F | RESPIRATION RATE: 16 BRPM | SYSTOLIC BLOOD PRESSURE: 116 MMHG | OXYGEN SATURATION: 99 % | HEART RATE: 58 BPM

## 2019-12-13 DIAGNOSIS — C22.1 CHOLANGIOCARCINOMA (H): ICD-10-CM

## 2019-12-13 DIAGNOSIS — D64.81 ANEMIA ASSOCIATED WITH CHEMOTHERAPY: ICD-10-CM

## 2019-12-13 DIAGNOSIS — D64.81 ANEMIA ASSOCIATED WITH CHEMOTHERAPY: Primary | ICD-10-CM

## 2019-12-13 DIAGNOSIS — T45.1X5A ANEMIA ASSOCIATED WITH CHEMOTHERAPY: ICD-10-CM

## 2019-12-13 DIAGNOSIS — T45.1X5A ANEMIA ASSOCIATED WITH CHEMOTHERAPY: Primary | ICD-10-CM

## 2019-12-13 LAB
ALBUMIN SERPL-MCNC: 3 G/DL (ref 3.4–5)
ALP SERPL-CCNC: 220 U/L (ref 40–150)
ALT SERPL W P-5'-P-CCNC: 110 U/L (ref 0–70)
ANION GAP SERPL CALCULATED.3IONS-SCNC: 4 MMOL/L (ref 3–14)
AST SERPL W P-5'-P-CCNC: 79 U/L (ref 0–45)
BASOPHILS # BLD AUTO: 0 10E9/L (ref 0–0.2)
BASOPHILS NFR BLD AUTO: 0.4 %
BILIRUB SERPL-MCNC: 0.3 MG/DL (ref 0.2–1.3)
BUN SERPL-MCNC: 26 MG/DL (ref 7–30)
CALCIUM SERPL-MCNC: 8.8 MG/DL (ref 8.5–10.1)
CHLORIDE SERPL-SCNC: 111 MMOL/L (ref 94–109)
CO2 SERPL-SCNC: 24 MMOL/L (ref 20–32)
CREAT SERPL-MCNC: 1.41 MG/DL (ref 0.66–1.25)
DIFFERENTIAL METHOD BLD: ABNORMAL
EOSINOPHIL # BLD AUTO: 0.6 10E9/L (ref 0–0.7)
EOSINOPHIL NFR BLD AUTO: 6.8 %
ERYTHROCYTE [DISTWIDTH] IN BLOOD BY AUTOMATED COUNT: 17.4 % (ref 10–15)
GFR SERPL CREATININE-BSD FRML MDRD: 53 ML/MIN/{1.73_M2}
GLUCOSE SERPL-MCNC: 109 MG/DL (ref 70–99)
HCT VFR BLD AUTO: 30.9 % (ref 40–53)
HGB BLD-MCNC: 9.9 G/DL (ref 13.3–17.7)
IMM GRANULOCYTES # BLD: 0 10E9/L (ref 0–0.4)
IMM GRANULOCYTES NFR BLD: 0.4 %
LYMPHOCYTES # BLD AUTO: 1.4 10E9/L (ref 0.8–5.3)
LYMPHOCYTES NFR BLD AUTO: 16.6 %
MCH RBC QN AUTO: 29.4 PG (ref 26.5–33)
MCHC RBC AUTO-ENTMCNC: 32 G/DL (ref 31.5–36.5)
MCV RBC AUTO: 92 FL (ref 78–100)
MONOCYTES # BLD AUTO: 0.7 10E9/L (ref 0–1.3)
MONOCYTES NFR BLD AUTO: 8.5 %
NEUTROPHILS # BLD AUTO: 5.5 10E9/L (ref 1.6–8.3)
NEUTROPHILS NFR BLD AUTO: 67.3 %
NRBC # BLD AUTO: 0 10*3/UL
NRBC BLD AUTO-RTO: 0 /100
PLATELET # BLD AUTO: 230 10E9/L (ref 150–450)
POTASSIUM SERPL-SCNC: 4.4 MMOL/L (ref 3.4–5.3)
PROT SERPL-MCNC: 6.3 G/DL (ref 6.8–8.8)
RBC # BLD AUTO: 3.37 10E12/L (ref 4.4–5.9)
SODIUM SERPL-SCNC: 139 MMOL/L (ref 133–144)
WBC # BLD AUTO: 8.1 10E9/L (ref 4–11)

## 2019-12-13 PROCEDURE — 96375 TX/PRO/DX INJ NEW DRUG ADDON: CPT

## 2019-12-13 PROCEDURE — 96409 CHEMO IV PUSH SNGL DRUG: CPT

## 2019-12-13 PROCEDURE — 25800030 ZZH RX IP 258 OP 636: Mod: ZF | Performed by: NURSE PRACTITIONER

## 2019-12-13 PROCEDURE — G0463 HOSPITAL OUTPT CLINIC VISIT: HCPCS | Mod: ZF

## 2019-12-13 PROCEDURE — 25000128 H RX IP 250 OP 636: Mod: ZF | Performed by: NURSE PRACTITIONER

## 2019-12-13 PROCEDURE — 85025 COMPLETE CBC W/AUTO DIFF WBC: CPT | Performed by: NURSE PRACTITIONER

## 2019-12-13 PROCEDURE — G0498 CHEMO EXTEND IV INFUS W/PUMP: HCPCS

## 2019-12-13 PROCEDURE — 96367 TX/PROPH/DG ADDL SEQ IV INF: CPT

## 2019-12-13 PROCEDURE — 80053 COMPREHEN METABOLIC PANEL: CPT | Performed by: NURSE PRACTITIONER

## 2019-12-13 PROCEDURE — 99214 OFFICE O/P EST MOD 30 MIN: CPT | Mod: ZP | Performed by: NURSE PRACTITIONER

## 2019-12-13 RX ORDER — ALBUTEROL SULFATE 90 UG/1
1-2 AEROSOL, METERED RESPIRATORY (INHALATION)
Status: CANCELLED
Start: 2019-12-13

## 2019-12-13 RX ORDER — DIPHENHYDRAMINE HYDROCHLORIDE 50 MG/ML
50 INJECTION INTRAMUSCULAR; INTRAVENOUS
Status: CANCELLED
Start: 2019-12-13

## 2019-12-13 RX ORDER — HEPARIN SODIUM (PORCINE) LOCK FLUSH IV SOLN 100 UNIT/ML 100 UNIT/ML
5 SOLUTION INTRAVENOUS EVERY 8 HOURS
Status: DISCONTINUED | OUTPATIENT
Start: 2019-12-13 | End: 2019-12-13 | Stop reason: HOSPADM

## 2019-12-13 RX ORDER — EPINEPHRINE 1 MG/ML
0.3 INJECTION, SOLUTION INTRAMUSCULAR; SUBCUTANEOUS EVERY 5 MIN PRN
Status: CANCELLED | OUTPATIENT
Start: 2019-12-13

## 2019-12-13 RX ORDER — ALBUTEROL SULFATE 0.83 MG/ML
2.5 SOLUTION RESPIRATORY (INHALATION)
Status: CANCELLED | OUTPATIENT
Start: 2019-12-13

## 2019-12-13 RX ORDER — NALOXONE HYDROCHLORIDE 0.4 MG/ML
.1-.4 INJECTION, SOLUTION INTRAMUSCULAR; INTRAVENOUS; SUBCUTANEOUS
Status: CANCELLED | OUTPATIENT
Start: 2019-12-13

## 2019-12-13 RX ORDER — SODIUM CHLORIDE 9 MG/ML
1000 INJECTION, SOLUTION INTRAVENOUS CONTINUOUS PRN
Status: CANCELLED
Start: 2019-12-13

## 2019-12-13 RX ORDER — MEPERIDINE HYDROCHLORIDE 25 MG/ML
25 INJECTION INTRAMUSCULAR; INTRAVENOUS; SUBCUTANEOUS EVERY 30 MIN PRN
Status: CANCELLED | OUTPATIENT
Start: 2019-12-13

## 2019-12-13 RX ORDER — EPINEPHRINE 0.3 MG/.3ML
0.3 INJECTION SUBCUTANEOUS EVERY 5 MIN PRN
Status: CANCELLED | OUTPATIENT
Start: 2019-12-13

## 2019-12-13 RX ORDER — LORAZEPAM 2 MG/ML
0.5 INJECTION INTRAMUSCULAR EVERY 4 HOURS PRN
Status: CANCELLED
Start: 2019-12-13

## 2019-12-13 RX ORDER — FLUOROURACIL 50 MG/ML
400 INJECTION, SOLUTION INTRAVENOUS ONCE
Status: COMPLETED | OUTPATIENT
Start: 2019-12-13 | End: 2019-12-13

## 2019-12-13 RX ORDER — METHYLPREDNISOLONE SODIUM SUCCINATE 125 MG/2ML
125 INJECTION, POWDER, LYOPHILIZED, FOR SOLUTION INTRAMUSCULAR; INTRAVENOUS
Status: CANCELLED
Start: 2019-12-13

## 2019-12-13 RX ORDER — FLUOROURACIL 50 MG/ML
400 INJECTION, SOLUTION INTRAVENOUS ONCE
Status: CANCELLED | OUTPATIENT
Start: 2019-12-13

## 2019-12-13 RX ADMIN — LEUCOVORIN CALCIUM 700 MG: 200 INJECTION, POWDER, LYOPHILIZED, FOR SUSPENSION INTRAMUSCULAR; INTRAVENOUS at 12:39

## 2019-12-13 RX ADMIN — DEXAMETHASONE SODIUM PHOSPHATE: 10 INJECTION, SOLUTION INTRAMUSCULAR; INTRAVENOUS at 12:25

## 2019-12-13 RX ADMIN — SODIUM CHLORIDE 250 ML: 9 INJECTION, SOLUTION INTRAVENOUS at 12:25

## 2019-12-13 RX ADMIN — HEPARIN 5 ML: 100 SYRINGE at 09:55

## 2019-12-13 RX ADMIN — FLUOROURACIL 785 MG: 50 INJECTION, SOLUTION INTRAVENOUS at 13:07

## 2019-12-13 ASSESSMENT — PAIN SCALES - GENERAL: PAINLEVEL: NO PAIN (0)

## 2019-12-13 NOTE — PATIENT INSTRUCTIONS
Contact Numbers    [facility] Main Line: [phone]  [facility] Triage and after hours / weekends / holidays: [phone]      Please call the triage or after hours line if you experience a temperature greater than or equal to 100.5, shaking chills, have uncontrolled nausea, vomiting and/or diarrhea, dizziness, shortness of breath, chest pain, bleeding, unexplained bruising, or if you have any other new/concerning symptoms, questions or concerns.      If you are having any concerning symptoms or wish to speak to a provider before your next infusion visit, please call your care coordinator or triage to notify them so we can adequately serve you.     If you need a refill on a narcotic prescription or other medication, please call before your infusion appointment.       December 2019 Sunday Monday Tuesday Wednesday Thursday Friday Saturday   1    LAB   3:15 PM   (15 min.)   [facility] HOME INFUSION   [facility], Laboratory Services 2     3    US RENAL COMPLETE   1:00 PM   (60 min.)   UCUS3   [facility] US 4     5     6     7       8     9     10     11     12     13    P [facility] LAB DRAW   9:45 AM   (15 min.)    [facility] LAB DRAW   [facility] Lab Draw    [facility] RETURN  10:05 AM   (50 min.)   Jennifer Jalloh, YO CNP   [facility] ONC INFUSION 240  11:30 AM   (240 min.)    ONCOLOGY INFUSION   [facility] 14       15     16     17     18     19     20     21       22     23     24     25     26     27    CT CHEST ABDOMEN PELVIS WWO   8:40 AM   (20 min.)   UCCT1   [facility] CT    [facility] DRAW   9:30 AM   (15 min.)    [facility] LAB DRAW   [facility] Lab Draw    P ONC INFUSION 240  10:00 AM   (240 min.)    ONCOLOGY INFUSION   [facility] 28       29     30    [facility] RETURN   4:15 PM   (30 min.)   Naresh De Los Santos MD   [facility] 31 January 2020       Sunday Monday Tuesday Wednesday Thursday Friday Saturday                  1     2     3     4       5     6     7     8     9     10     11       12     13     14     15     16     17     18       19     20     21     22     23     24     25       26     27     28     29     30     31                         Recent Results (from the past 24 hour(s))   CBC with platelets differential    Collection Time: 12/13/19  9:57 AM   Result Value Ref Range    WBC 8.1 4.0 - 11.0 10e9/L    RBC Count 3.37 (L) 4.4 - 5.9 10e12/L    Hemoglobin 9.9 (L) 13.3 - 17.7 g/dL    Hematocrit 30.9 (L) 40.0 - 53.0 %    MCV 92 78 - 100 fl    MCH 29.4 26.5 - 33.0 pg    MCHC 32.0 31.5 - 36.5 g/dL    RDW 17.4 (H) 10.0 - 15.0 %    Platelet Count 230 150 - 450 10e9/L    Diff Method Automated Method     % Neutrophils 67.3 %    % Lymphocytes 16.6 %    % Monocytes 8.5 %    % Eosinophils 6.8 %    % Basophils 0.4 %    % Immature Granulocytes 0.4 %    Nucleated RBCs 0 0 /100    Absolute Neutrophil 5.5 1.6 - 8.3 10e9/L    Absolute Lymphocytes 1.4 0.8 - 5.3 10e9/L    Absolute Monocytes 0.7 0.0 - 1.3 10e9/L    Absolute Eosinophils 0.6 0.0 - 0.7 10e9/L    Absolute Basophils 0.0 0.0 - 0.2 10e9/L    Abs Immature Granulocytes 0.0 0 - 0.4 10e9/L    Absolute Nucleated RBC 0.0    Comprehensive metabolic panel    Collection Time: 12/13/19  9:57 AM   Result Value Ref Range    Sodium 139 133 - 144 mmol/L    Potassium 4.4 3.4 - 5.3 mmol/L    Chloride 111 (H) 94 - 109 mmol/L    Carbon Dioxide 24 20 - 32 mmol/L    Anion Gap 4 3 - 14 mmol/L    Glucose 109 (H) 70 - 99 mg/dL    Urea Nitrogen 26 7 - 30 mg/dL    Creatinine 1.41 (H) 0.66 - 1.25 mg/dL    GFR Estimate 53 (L) >60 mL/min/[1.73_m2]    GFR Estimate If Black 61 >60 mL/min/[1.73_m2]    Calcium 8.8 8.5 - 10.1 mg/dL    Bilirubin Total 0.3 0.2 - 1.3 mg/dL    Albumin 3.0 (L) 3.4 - 5.0 g/dL    Protein Total 6.3 (L) 6.8 - 8.8 g/dL    Alkaline Phosphatase 220 (H) 40 - 150 U/L     (H) 0 - 70 U/L    AST 79 (H) 0 - 45  U/L

## 2019-12-13 NOTE — PROGRESS NOTES
Reason for Visit: seen in f/u of recurrent, metastatic cholangiocarcinoma    Oncology HPI:   Girish Chauhan is a 64 year old year old gentleman with cholangiocarcinoma  Which was resected in 3/2016 (including partial liver resection) with negative margins and no LN involvement, but with perineural and lymphovascular invasion. No adjuvant chemotherapy given. Recurred in bladder 11/2017, bx c/w metastatic cholangiocarcinoma, started on cisplatin/gemcitabine 12/2017. Cisplatin held 6/2018 for poor tolerance. Gemcitabine discontinued 8/2018 for possible TTP, then went off treatment. In 4/2019 was found to have disease progression in the abdominal cavity anterior to the liver, just below the diaphragm. Started on capecitabine 4/30/19 (last dose 5/7) but developed an NSTEMI s/p angiogram 5/6/19 (see below) and actually continued on the capecitabine for several days after NSTEMI without any ongoing cardiac symptoms or evidence of ongoing ischemia. On follow up with Dr. De Los Santos 6/24, disease appeared stable. Decision made with family to hold off on treatment for 2 months and reevaluate.      He was then transferred to [facility] on 6/5/19 after presenting to OSH with SBO with possible involvement of draining choledochojejunostomy loop, subjective fevers, and A fib with RVR. GI consulted, s/p small bowel enteroscopy with stenting on 6/7 for obstructed biliary limb. Discharged on 6/9/19.     He was admitted on 7/28/19 with fever, nausea, vomiting, and abdominal pain. Hospitalization complicated by sepsis and enterococcus casseliflavus bacteremia. Additionally, Krishna was found to have migrated gastrojejunal stent on CT A/P on admission. S/p ERCP with stent exchange 7/29 by Dr. Rodriguez. Discharged on Linezolid for 2 weeks.       CT CAP showed disease progression. He started on 5FU on 10/4/19 inpatient due to history of MI while taking Xeloda. He has had stable disease radiographically, but a rising tumor marker after 4 cycles. He was  continued on 5FU.  I'm seeing him prior to cycle 6 today.      Interval history: Krishna has been tolerating treatment OK, has some fatigue with treatment, but that is pretty stable. Has intermittent dizziness, shortness of breath. Usually, when he gets up too fast. He takes his BP and sometimes it is as low as 70 systolic. He notes his pulse remains irregular, but usually rate in the 70s-80s. His cardiologist decreased furosemide a bit, and he thinks it has helped. No fluid retention, edema. Stools are regular. Bowels are normal in color, no dark urine. No abdominal pain, N/V. No icterus or jaundice. No itching, rash. Recently had seen derm and had a few pre-cancerous lesions removed.     Current Outpatient Medications   Medication Sig Dispense Refill     acetaminophen (TYLENOL) 500 MG tablet Take 500 mg by mouth every 6 hours as needed for fever or pain       allopurinol (ZYLOPRIM) 100 MG tablet Take 100 mg by mouth every evening        apixaban ANTICOAGULANT (ELIQUIS) 5 MG tablet Take 1 tablet (5 mg) by mouth 2 times daily       atorvastatin (LIPITOR) 40 MG tablet Take 40 mg by mouth every evening        calcium carbonate (TUMS) 500 MG chewable tablet Take 1 chew tab by mouth 4 times daily as needed for heartburn       clopidogrel (PLAVIX) 75 MG tablet Take 75 mg by mouth every morning        colchicine (COLCYRS) 0.6 MG tablet Take 0.6 mg by mouth as needed       furosemide (LASIX) 20 MG tablet Take 1 tablet (20 mg) by mouth daily       losartan (COZAAR) 25 MG tablet Take 25 mg by mouth every morning       metoprolol tartrate (LOPRESSOR) 50 MG tablet Take 150 mg by mouth 2 times daily        multivitamin w/minerals (THERA-VIT-M) tablet Take 1 tablet by mouth daily       Nitroglycerin (NITROSTAT SL) Place 0.4 mg under the tongue every 5 minutes as needed for chest pain (Carries medication - has never used)        ondansetron (ZOFRAN) 4 MG tablet Take 1 tablet (4 mg) by mouth every 8 hours as needed for nausea 30  tablet 0          Allergies   Allergen Reactions     Blood Transfusion Related (Informational Only) Other (See Comments)     Patient has a history of a clinically significant antibody against RBC antigens.  A delay in compatible RBCs may occur.         Exam: alert, appears slim, in NAD Blood pressure 116/71, pulse 58, temperature 97.4  F (36.3  C), temperature source Oral, resp. rate 16, weight 75.3 kg (166 lb), SpO2 99 %.  Wt Readings from Last 4 Encounters:   12/13/19 75.3 kg (166 lb)   11/29/19 74.3 kg (163 lb 12.8 oz)   11/25/19 75.8 kg (167 lb 1.6 oz)   11/15/19 76.5 kg (168 lb 9.6 oz)     Oropharynx is moist and without lesion. Neck supple and without adenopathy. Lungs:CTA. Heart: RRR, no murmur or rub. Abdomen: soft, nontender, BS active, no masses or organomegaly.  Extremities: warm, no edema. Speech is clear. CN wnl. Gait/station wnl. Skin: no rashes or bruising on exposed skin. Has a couple of scabs on the forehead and bridge of nose.       Labs: Results for JENNYFER MCGARRY (MRN [id number]) as of 12/13/2019 10:42   Ref. Range 12/13/2019 09:57   Sodium Latest Ref Range: 133 - 144 mmol/L 139   Potassium Latest Ref Range: 3.4 - 5.3 mmol/L 4.4   Chloride Latest Ref Range: 94 - 109 mmol/L 111 (H)   Carbon Dioxide Latest Ref Range: 20 - 32 mmol/L 24   Urea Nitrogen Latest Ref Range: 7 - 30 mg/dL 26   Creatinine Latest Ref Range: 0.66 - 1.25 mg/dL 1.41 (H)   GFR Estimate Latest Ref Range: >60 mL/min/1.73_m2 53 (L)   GFR Estimate If Black Latest Ref Range: >60 mL/min/1.73_m2 61   Calcium Latest Ref Range: 8.5 - 10.1 mg/dL 8.8   Anion Gap Latest Ref Range: 3 - 14 mmol/L 4   Albumin Latest Ref Range: 3.4 - 5.0 g/dL 3.0 (L)   Protein Total Latest Ref Range: 6.8 - 8.8 g/dL 6.3 (L)   Bilirubin Total Latest Ref Range: 0.2 - 1.3 mg/dL 0.3   Alkaline Phosphatase Latest Ref Range: 40 - 150 U/L 220 (H)   ALT Latest Ref Range: 0 - 70 U/L 110 (H)   AST Latest Ref Range: 0 - 45 U/L 79 (H)   Glucose Latest Ref Range: 70 - 99  mg/dL 109 (H)   WBC Latest Ref Range: 4.0 - 11.0 10e9/L 8.1   Hemoglobin Latest Ref Range: 13.3 - 17.7 g/dL 9.9 (L)   Hematocrit Latest Ref Range: 40.0 - 53.0 % 30.9 (L)   Platelet Count Latest Ref Range: 150 - 450 10e9/L 230   RBC Count Latest Ref Range: 4.4 - 5.9 10e12/L 3.37 (L)   MCV Latest Ref Range: 78 - 100 fl 92   MCH Latest Ref Range: 26.5 - 33.0 pg 29.4   MCHC Latest Ref Range: 31.5 - 36.5 g/dL 32.0   RDW Latest Ref Range: 10.0 - 15.0 % 17.4 (H)   Diff Method Unknown Automated Method   % Neutrophils Latest Units: % 67.3   % Lymphocytes Latest Units: % 16.6   % Monocytes Latest Units: % 8.5   % Eosinophils Latest Units: % 6.8   % Basophils Latest Units: % 0.4   % Immature Granulocytes Latest Units: % 0.4   Nucleated RBCs Latest Ref Range: 0 /100 0   Absolute Neutrophil Latest Ref Range: 1.6 - 8.3 10e9/L 5.5   Absolute Lymphocytes Latest Ref Range: 0.8 - 5.3 10e9/L 1.4   Absolute Monocytes Latest Ref Range: 0.0 - 1.3 10e9/L 0.7   Absolute Eosinophils Latest Ref Range: 0.0 - 0.7 10e9/L 0.6   Absolute Basophils Latest Ref Range: 0.0 - 0.2 10e9/L 0.0   Abs Immature Granulocytes Latest Ref Range: 0 - 0.4 10e9/L 0.0   Absolute Nucleated RBC Unknown 0.0       Imaging: n/a    Impression/plan:   Recurrent, metastatic cholangiocarcinoma with stable to improved disease on 5FU, but a rising tumor marker when checked in November.  -tolerating the 5FU ok, no apparent recurrence of ischemia (occurred while on xeloda)  -will continue treatment today, monitor closely for jaundice, change in stool/urine color    Hx of biliary obstruction with complications of bacteremia, SBO  -had ERCP with stent exchange on 7/28/19  -has rising LFTs today. Bili wnl. Stool/urine without obstructive signs. Will monitor closely. If jaundice, pale stools, dark urine, will need to re-consult Dr. Rodriguez    Cards hx of HLD/HTN, atrial fibrillation, bicuspid aortic valve and moderate aortic stenosis, heart failure with reduced EF, CAD with history  of multivessel stenting in 2011.  - Developed NSTEMI, s/p LHC and angiogram 5/6/19 with 99% obstruction of OM2, unsuccessful PCI. He also developed afib with RVR during this time which necessitated increase of his metoprolol and starting on apixaban. He was also started on ASA/clopidogrel for CAD. Repeat angiogram done 6/17 with SAM to the OM1. Repeat Echo 7/30 with improved EF 45-50%, mild LV dilation.   had stopped anticoagulation on 7/27 due to thrombocytopenia.  Resumed plavix, asa and apixiban on 8/5 when platelets were >137K  -continue metoprolol, losartan and lasix.  -encouraged him to f/u with cardiology if BP remains low. Perhaps, could reduce furosemide to 10 mg daily  -Last Echo on 9/30/19 showed an EF of 42%. Follows with cardiology.      History of skin SCC. Saw derm this past week     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 Stable. Will continue to monitor.      CKD. Creatinine is relatively stable. No concerns today. Last right ureteral stent exchange on 11/11/19.   -follows with Dr. Walker. Plan to exchange the stent in late January

## 2019-12-13 NOTE — PROGRESS NOTES
Infusion Nursing Note:  Girish Chauhan presents today for Cycle 6 Day 1 Leucovorin, Fluorouracil bolus and pump connect.    Patient seen by provider today: Yes: Jennifer Jalloh DNP   present during visit today: Not Applicable.    Note: per luc Rodriguez RN educated pt on need to call for jaundice, pale stools, or dark urine (due to elevated LFTs, pt has monitored for this previously and understands).    Pain: denies    Intravenous Access:  Implanted Port.    Treatment Conditions:  Lab Results   Component Value Date    HGB 9.9 12/13/2019     Lab Results   Component Value Date    WBC 8.1 12/13/2019      Lab Results   Component Value Date    ANEU 5.5 12/13/2019     Lab Results   Component Value Date     12/13/2019      Lab Results   Component Value Date     12/13/2019                   Lab Results   Component Value Date    POTASSIUM 4.4 12/13/2019           Lab Results   Component Value Date                  Lab Results   Component Value Date    CR 1.41 12/13/2019                   Lab Results   Component Value Date    GARY 8.8 12/13/2019                Lab Results   Component Value Date    BILITOTAL 0.3 12/13/2019           Lab Results   Component Value Date    ALBUMIN 3.0 12/13/2019                    Lab Results   Component Value Date     12/13/2019           Lab Results   Component Value Date    AST 79 12/13/2019       Results reviewed, labs MET treatment parameters, ok to proceed with treatment.      Post Infusion Assessment:  Patient tolerated infusion without incident.  Blood return noted pre and post infusion.  Blood return noted during administration of Fluorouracil bolus every 2cc.  Site patent and intact, free from redness, edema or discomfort.  No evidence of extravasations.     Prior to discharge: Port is secured in place with tegaderm and flushed with 10cc NS with positive blood return noted.  Continuous home infusion Dosi-Fuser pump connected.    All connectors secured in place  "and clamps taped open. Verified with Ember Bower RN.   Pump started, \"running\" noted on display (CADD): Not Applicable.  Patient instructed to call our clinic or [facility] with any questions or concerns at home.  Patient verbalized understanding.    Patient set up for pump disconnect at home with [facility] on Anurag 12/15 at 1100. ISAI Pa RN, aware.          Discharge Plan:   Patient declined prescription refills.  AVS to patient via [employer].  Patient will return 12/27 for next appointment.   Patient discharged in stable condition accompanied by: self.  Departure Mode: Ambulatory.    ANTHONY SINGLETON RN    "

## 2019-12-13 NOTE — NURSING NOTE
"Oncology Rooming Note    December 13, 2019 10:20 AM   Girish Chauhan is a 64 year old male who presents for:    Chief Complaint   Patient presents with     Blood Draw     Labs drawn via PORT by RN in lab. Line flushed with saline and heparin. VS taken.      Oncology Clinic Visit     Return; Cholangiocarcinoma     Initial Vitals: /71 (BP Location: Right arm, Patient Position: Sitting, Cuff Size: Adult Regular)   Pulse 58   Temp 97.4  F (36.3  C) (Oral)   Resp 16   Wt 75.3 kg (166 lb)   SpO2 99%   BMI 23.82 kg/m   Estimated body mass index is 23.82 kg/m  as calculated from the following:    Height as of 11/25/19: 1.778 m (5' 10\").    Weight as of this encounter: 75.3 kg (166 lb). Body surface area is 1.93 meters squared.  No Pain (0) Comment: Data Unavailable   No LMP for male patient.  Allergies reviewed: Yes  Medications reviewed: Yes    Medications: Medication refills not needed today.  Pharmacy [facility] entered into [employer]: [id number] [place], [place] - [address] AT [facility]    Clinical concerns: No new concerns        Nona Singh CMA                        "

## 2019-12-13 NOTE — LETTER
12/13/2019       RE: Girish Chauhan  [address] [address]     Dear Colleague,    Thank you for referring your patient, Girish Chauhan, to the [facility]. Please see a copy of my visit note below.    Reason for Visit: seen in f/u of recurrent, metastatic cholangiocarcinoma    Oncology HPI:   Girish Chauhan is a 64 year old year old gentleman with cholangiocarcinoma  Which was resected in 3/2016 (including partial liver resection) with negative margins and no LN involvement, but with perineural and lymphovascular invasion. No adjuvant chemotherapy given. Recurred in bladder 11/2017, bx c/w metastatic cholangiocarcinoma, started on cisplatin/gemcitabine 12/2017. Cisplatin held 6/2018 for poor tolerance. Gemcitabine discontinued 8/2018 for possible TTP, then went off treatment. In 4/2019 was found to have disease progression in the abdominal cavity anterior to the liver, just below the diaphragm. Started on capecitabine 4/30/19 (last dose 5/7) but developed an NSTEMI s/p angiogram 5/6/19 (see below) and actually continued on the capecitabine for several days after NSTEMI without any ongoing cardiac symptoms or evidence of ongoing ischemia. On follow up with Dr. De Los Santos 6/24, disease appeared stable. Decision made with family to hold off on treatment for 2 months and reevaluate.      He was then transferred to [facility] on 6/5/19 after presenting to OSH with SBO with possible involvement of draining choledochojejunostomy loop, subjective fevers, and A fib with RVR. GI consulted, s/p small bowel enteroscopy with stenting on 6/7 for obstructed biliary limb. Discharged on 6/9/19.     He was admitted on 7/28/19 with fever, nausea, vomiting, and abdominal pain. Hospitalization complicated by sepsis and enterococcus casseliflavus bacteremia. Additionally, Krishna was found to have migrated gastrojejunal stent on CT A/P on admission. S/p ERCP with stent exchange 7/29 by Dr. Rodriguez. Discharged on  Linezolid for 2 weeks.       CT CAP showed disease progression. He started on 5FU on 10/4/19 inpatient due to history of MI while taking Xeloda. He has had stable disease radiographically, but a rising tumor marker after 4 cycles. He was continued on 5FU.  I'm seeing him prior to cycle 6 today.      Interval history: Krishna has been tolerating treatment OK, has some fatigue with treatment, but that is pretty stable. Has intermittent dizziness, shortness of breath. Usually, when he gets up too fast. He takes his BP and sometimes it is as low as 70 systolic. He notes his pulse remains irregular, but usually rate in the 70s-80s. His cardiologist decreased furosemide a bit, and he thinks it has helped. No fluid retention, edema. Stools are regular. Bowels are normal in color, no dark urine. No abdominal pain, N/V. No icterus or jaundice. No itching, rash. Recently had seen derm and had a few pre-cancerous lesions removed.     Current Outpatient Medications   Medication Sig Dispense Refill     acetaminophen (TYLENOL) 500 MG tablet Take 500 mg by mouth every 6 hours as needed for fever or pain       allopurinol (ZYLOPRIM) 100 MG tablet Take 100 mg by mouth every evening        apixaban ANTICOAGULANT (ELIQUIS) 5 MG tablet Take 1 tablet (5 mg) by mouth 2 times daily       atorvastatin (LIPITOR) 40 MG tablet Take 40 mg by mouth every evening        calcium carbonate (TUMS) 500 MG chewable tablet Take 1 chew tab by mouth 4 times daily as needed for heartburn       clopidogrel (PLAVIX) 75 MG tablet Take 75 mg by mouth every morning        colchicine (COLCYRS) 0.6 MG tablet Take 0.6 mg by mouth as needed       furosemide (LASIX) 20 MG tablet Take 1 tablet (20 mg) by mouth daily       losartan (COZAAR) 25 MG tablet Take 25 mg by mouth every morning       metoprolol tartrate (LOPRESSOR) 50 MG tablet Take 150 mg by mouth 2 times daily        multivitamin w/minerals (THERA-VIT-M) tablet Take 1 tablet by mouth daily       Nitroglycerin  (NITROSTAT SL) Place 0.4 mg under the tongue every 5 minutes as needed for chest pain (Carries medication - has never used)        ondansetron (ZOFRAN) 4 MG tablet Take 1 tablet (4 mg) by mouth every 8 hours as needed for nausea 30 tablet 0          Allergies   Allergen Reactions     Blood Transfusion Related (Informational Only) Other (See Comments)     Patient has a history of a clinically significant antibody against RBC antigens.  A delay in compatible RBCs may occur.         Exam: alert, appears slim, in NAD Blood pressure 116/71, pulse 58, temperature 97.4  F (36.3  C), temperature source Oral, resp. rate 16, weight 75.3 kg (166 lb), SpO2 99 %.  Wt Readings from Last 4 Encounters:   12/13/19 75.3 kg (166 lb)   11/29/19 74.3 kg (163 lb 12.8 oz)   11/25/19 75.8 kg (167 lb 1.6 oz)   11/15/19 76.5 kg (168 lb 9.6 oz)     Oropharynx is moist and without lesion. Neck supple and without adenopathy. Lungs:CTA. Heart: RRR, no murmur or rub. Abdomen: soft, nontender, BS active, no masses or organomegaly.  Extremities: warm, no edema. Speech is clear. CN wnl. Gait/station wnl. Skin: no rashes or bruising on exposed skin. Has a couple of scabs on the forehead and bridge of nose.       Labs: Results for JENNYFER MCGARRY (MRN [id number]) as of 12/13/2019 10:42   Ref. Range 12/13/2019 09:57   Sodium Latest Ref Range: 133 - 144 mmol/L 139   Potassium Latest Ref Range: 3.4 - 5.3 mmol/L 4.4   Chloride Latest Ref Range: 94 - 109 mmol/L 111 (H)   Carbon Dioxide Latest Ref Range: 20 - 32 mmol/L 24   Urea Nitrogen Latest Ref Range: 7 - 30 mg/dL 26   Creatinine Latest Ref Range: 0.66 - 1.25 mg/dL 1.41 (H)   GFR Estimate Latest Ref Range: >60 mL/min/1.73_m2 53 (L)   GFR Estimate If Black Latest Ref Range: >60 mL/min/1.73_m2 61   Calcium Latest Ref Range: 8.5 - 10.1 mg/dL 8.8   Anion Gap Latest Ref Range: 3 - 14 mmol/L 4   Albumin Latest Ref Range: 3.4 - 5.0 g/dL 3.0 (L)   Protein Total Latest Ref Range: 6.8 - 8.8 g/dL 6.3 (L)   Bilirubin  Total Latest Ref Range: 0.2 - 1.3 mg/dL 0.3   Alkaline Phosphatase Latest Ref Range: 40 - 150 U/L 220 (H)   ALT Latest Ref Range: 0 - 70 U/L 110 (H)   AST Latest Ref Range: 0 - 45 U/L 79 (H)   Glucose Latest Ref Range: 70 - 99 mg/dL 109 (H)   WBC Latest Ref Range: 4.0 - 11.0 10e9/L 8.1   Hemoglobin Latest Ref Range: 13.3 - 17.7 g/dL 9.9 (L)   Hematocrit Latest Ref Range: 40.0 - 53.0 % 30.9 (L)   Platelet Count Latest Ref Range: 150 - 450 10e9/L 230   RBC Count Latest Ref Range: 4.4 - 5.9 10e12/L 3.37 (L)   MCV Latest Ref Range: 78 - 100 fl 92   MCH Latest Ref Range: 26.5 - 33.0 pg 29.4   MCHC Latest Ref Range: 31.5 - 36.5 g/dL 32.0   RDW Latest Ref Range: 10.0 - 15.0 % 17.4 (H)   Diff Method Unknown Automated Method   % Neutrophils Latest Units: % 67.3   % Lymphocytes Latest Units: % 16.6   % Monocytes Latest Units: % 8.5   % Eosinophils Latest Units: % 6.8   % Basophils Latest Units: % 0.4   % Immature Granulocytes Latest Units: % 0.4   Nucleated RBCs Latest Ref Range: 0 /100 0   Absolute Neutrophil Latest Ref Range: 1.6 - 8.3 10e9/L 5.5   Absolute Lymphocytes Latest Ref Range: 0.8 - 5.3 10e9/L 1.4   Absolute Monocytes Latest Ref Range: 0.0 - 1.3 10e9/L 0.7   Absolute Eosinophils Latest Ref Range: 0.0 - 0.7 10e9/L 0.6   Absolute Basophils Latest Ref Range: 0.0 - 0.2 10e9/L 0.0   Abs Immature Granulocytes Latest Ref Range: 0 - 0.4 10e9/L 0.0   Absolute Nucleated RBC Unknown 0.0       Imaging: n/a    Impression/plan:   Recurrent, metastatic cholangiocarcinoma with stable to improved disease on 5FU, but a rising tumor marker when checked in November.  -tolerating the 5FU ok, no apparent recurrence of ischemia (occurred while on xeloda)  -will continue treatment today, monitor closely for jaundice, change in stool/urine color    Hx of biliary obstruction with complications of bacteremia, SBO  -had ERCP with stent exchange on 7/28/19  -has rising LFTs today. Bili wnl. Stool/urine without obstructive signs. Will monitor  closely. If jaundice, pale stools, dark urine, will need to re-consult Dr. Rodriguez    Cards hx of HLD/HTN, atrial fibrillation, bicuspid aortic valve and moderate aortic stenosis, heart failure with reduced EF, CAD with history of multivessel stenting in 2011.  - Developed NSTEMI, s/p LHC and angiogram 5/6/19 with 99% obstruction of OM2, unsuccessful PCI. He also developed afib with RVR during this time which necessitated increase of his metoprolol and starting on apixaban. He was also started on ASA/clopidogrel for CAD. Repeat angiogram done 6/17 with SAM to the OM1. Repeat Echo 7/30 with improved EF 45-50%, mild LV dilation.   had stopped anticoagulation on 7/27 due to thrombocytopenia.  Resumed plavix, asa and apixiban on 8/5 when platelets were >137K  -continue metoprolol, losartan and lasix.  -encouraged him to f/u with cardiology if BP remains low. Perhaps, could reduce furosemide to 10 mg daily  -Last Echo on 9/30/19 showed an EF of 42%. Follows with cardiology.      History of skin SCC.  Saw derm this past week     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 Stable. Will continue to monitor.      CKD. Creatinine is relatively stable. No concerns today. Last right ureteral stent exchange on 11/11/19.   -follows with Dr. Walker. Plan to exchange the stent in late January     Again, thank you for allowing me to participate in the care of your patient.      Sincerely,    YO Grubbs CNP

## 2019-12-15 ENCOUNTER — HOME INFUSION (PRE-WILLOW HOME INFUSION) (OUTPATIENT)
Dept: PHARMACY | Facility: CLINIC | Age: 64
End: 2019-12-15

## 2019-12-15 NOTE — PHARMACY
Skilled nurse visit in the home, for discontinuation of chemotherapy. 4705 mg IV via c-pump of Fluorouracil infused over 46 hours.     Yung SWEENEY RN CRNI  [phone]  [email or web address]

## 2019-12-16 ENCOUNTER — COMMUNICATION - HEALTHEAST (OUTPATIENT)
Dept: FAMILY MEDICINE | Facility: CLINIC | Age: 64
End: 2019-12-16

## 2019-12-16 ENCOUNTER — COMMUNICATION - HEALTHEAST (OUTPATIENT)
Dept: CARDIOLOGY | Facility: CLINIC | Age: 64
End: 2019-12-16

## 2019-12-16 DIAGNOSIS — C22.1 CHOLANGIOCARCINOMA (H): ICD-10-CM

## 2019-12-16 DIAGNOSIS — R10.13 ABDOMINAL PAIN, EPIGASTRIC: ICD-10-CM

## 2019-12-16 DIAGNOSIS — N13.30 HYDRONEPHROSIS OF RIGHT KIDNEY: Primary | ICD-10-CM

## 2019-12-16 RX ORDER — CEFAZOLIN SODIUM 2 G/50ML
2 SOLUTION INTRAVENOUS
Status: CANCELLED | OUTPATIENT
Start: 2019-12-16

## 2019-12-16 RX ORDER — CEFAZOLIN SODIUM 1 G/50ML
1 INJECTION, SOLUTION INTRAVENOUS SEE ADMIN INSTRUCTIONS
Status: CANCELLED | OUTPATIENT
Start: 2019-12-16

## 2019-12-16 NOTE — PROGRESS NOTES
This is a recent snapshot of the patient's [facility] medical record.  For current drug dose and complete information and questions, call [phone] or In Basket pool, [facility] home infusion ([id number])  CSN Number:  [id number]

## 2019-12-26 DIAGNOSIS — C22.1 CHOLANGIOCARCINOMA (H): ICD-10-CM

## 2019-12-26 DIAGNOSIS — D64.81 ANEMIA ASSOCIATED WITH CHEMOTHERAPY: ICD-10-CM

## 2019-12-26 DIAGNOSIS — T45.1X5A ANEMIA ASSOCIATED WITH CHEMOTHERAPY: ICD-10-CM

## 2019-12-26 RX ORDER — DIPHENHYDRAMINE HYDROCHLORIDE 50 MG/ML
50 INJECTION INTRAMUSCULAR; INTRAVENOUS
Status: CANCELLED
Start: 2019-12-27

## 2019-12-26 RX ORDER — EPINEPHRINE 0.3 MG/.3ML
0.3 INJECTION SUBCUTANEOUS EVERY 5 MIN PRN
Status: CANCELLED | OUTPATIENT
Start: 2019-12-27

## 2019-12-26 RX ORDER — FLUOROURACIL 50 MG/ML
400 INJECTION, SOLUTION INTRAVENOUS ONCE
Status: CANCELLED | OUTPATIENT
Start: 2019-12-27

## 2019-12-26 RX ORDER — NALOXONE HYDROCHLORIDE 0.4 MG/ML
.1-.4 INJECTION, SOLUTION INTRAMUSCULAR; INTRAVENOUS; SUBCUTANEOUS
Status: CANCELLED | OUTPATIENT
Start: 2019-12-27

## 2019-12-26 RX ORDER — ALBUTEROL SULFATE 0.83 MG/ML
2.5 SOLUTION RESPIRATORY (INHALATION)
Status: CANCELLED | OUTPATIENT
Start: 2019-12-27

## 2019-12-26 RX ORDER — EPINEPHRINE 1 MG/ML
0.3 INJECTION, SOLUTION INTRAMUSCULAR; SUBCUTANEOUS EVERY 5 MIN PRN
Status: CANCELLED | OUTPATIENT
Start: 2019-12-27

## 2019-12-26 RX ORDER — SODIUM CHLORIDE 9 MG/ML
1000 INJECTION, SOLUTION INTRAVENOUS CONTINUOUS PRN
Status: CANCELLED
Start: 2019-12-27

## 2019-12-26 RX ORDER — MEPERIDINE HYDROCHLORIDE 25 MG/ML
25 INJECTION INTRAMUSCULAR; INTRAVENOUS; SUBCUTANEOUS EVERY 30 MIN PRN
Status: CANCELLED | OUTPATIENT
Start: 2019-12-27

## 2019-12-26 RX ORDER — LORAZEPAM 2 MG/ML
0.5 INJECTION INTRAMUSCULAR EVERY 4 HOURS PRN
Status: CANCELLED
Start: 2019-12-27

## 2019-12-26 RX ORDER — METHYLPREDNISOLONE SODIUM SUCCINATE 125 MG/2ML
125 INJECTION, POWDER, LYOPHILIZED, FOR SOLUTION INTRAMUSCULAR; INTRAVENOUS
Status: CANCELLED
Start: 2019-12-27

## 2019-12-26 RX ORDER — ALBUTEROL SULFATE 90 UG/1
1-2 AEROSOL, METERED RESPIRATORY (INHALATION)
Status: CANCELLED
Start: 2019-12-27

## 2019-12-27 ENCOUNTER — HOME INFUSION (PRE-WILLOW HOME INFUSION) (OUTPATIENT)
Dept: PHARMACY | Facility: CLINIC | Age: 64
End: 2019-12-27

## 2019-12-27 ENCOUNTER — ANCILLARY PROCEDURE (OUTPATIENT)
Dept: CT IMAGING | Facility: CLINIC | Age: 64
End: 2019-12-27
Attending: INTERNAL MEDICINE
Payer: COMMERCIAL

## 2019-12-27 ENCOUNTER — APPOINTMENT (OUTPATIENT)
Dept: LAB | Facility: CLINIC | Age: 64
End: 2019-12-27
Attending: INTERNAL MEDICINE
Payer: COMMERCIAL

## 2019-12-27 ENCOUNTER — INFUSION THERAPY VISIT (OUTPATIENT)
Dept: ONCOLOGY | Facility: CLINIC | Age: 64
End: 2019-12-27
Attending: INTERNAL MEDICINE
Payer: COMMERCIAL

## 2019-12-27 VITALS
BODY MASS INDEX: 23.96 KG/M2 | DIASTOLIC BLOOD PRESSURE: 69 MMHG | TEMPERATURE: 97.7 F | WEIGHT: 167 LBS | SYSTOLIC BLOOD PRESSURE: 98 MMHG | RESPIRATION RATE: 16 BRPM | OXYGEN SATURATION: 100 % | HEART RATE: 88 BPM

## 2019-12-27 DIAGNOSIS — T45.1X5A ANEMIA ASSOCIATED WITH CHEMOTHERAPY: Primary | ICD-10-CM

## 2019-12-27 DIAGNOSIS — C78.6 PERITONEAL CARCINOMATOSIS (H): ICD-10-CM

## 2019-12-27 DIAGNOSIS — C22.1 CHOLANGIOCARCINOMA (H): ICD-10-CM

## 2019-12-27 DIAGNOSIS — D64.81 ANEMIA ASSOCIATED WITH CHEMOTHERAPY: Primary | ICD-10-CM

## 2019-12-27 LAB
ALBUMIN SERPL-MCNC: 3 G/DL (ref 3.4–5)
ALP SERPL-CCNC: 120 U/L (ref 40–150)
ALT SERPL W P-5'-P-CCNC: 39 U/L (ref 0–70)
ANION GAP SERPL CALCULATED.3IONS-SCNC: 6 MMOL/L (ref 3–14)
AST SERPL W P-5'-P-CCNC: 25 U/L (ref 0–45)
BASOPHILS # BLD AUTO: 0 10E9/L (ref 0–0.2)
BASOPHILS NFR BLD AUTO: 0.4 %
BILIRUB SERPL-MCNC: 0.4 MG/DL (ref 0.2–1.3)
BUN SERPL-MCNC: 27 MG/DL (ref 7–30)
CALCIUM SERPL-MCNC: 8.5 MG/DL (ref 8.5–10.1)
CHLORIDE SERPL-SCNC: 104 MMOL/L (ref 94–109)
CO2 SERPL-SCNC: 22 MMOL/L (ref 20–32)
CREAT SERPL-MCNC: 1.45 MG/DL (ref 0.66–1.25)
DIFFERENTIAL METHOD BLD: ABNORMAL
EOSINOPHIL # BLD AUTO: 0.9 10E9/L (ref 0–0.7)
EOSINOPHIL NFR BLD AUTO: 10.9 %
ERYTHROCYTE [DISTWIDTH] IN BLOOD BY AUTOMATED COUNT: 18.3 % (ref 10–15)
GFR SERPL CREATININE-BSD FRML MDRD: 51 ML/MIN/{1.73_M2}
GLUCOSE SERPL-MCNC: 100 MG/DL (ref 70–99)
HCT VFR BLD AUTO: 28.6 % (ref 40–53)
HGB BLD-MCNC: 9.3 G/DL (ref 13.3–17.7)
IMM GRANULOCYTES # BLD: 0 10E9/L (ref 0–0.4)
IMM GRANULOCYTES NFR BLD: 0.3 %
LYMPHOCYTES # BLD AUTO: 1.3 10E9/L (ref 0.8–5.3)
LYMPHOCYTES NFR BLD AUTO: 16.6 %
MCH RBC QN AUTO: 30.4 PG (ref 26.5–33)
MCHC RBC AUTO-ENTMCNC: 32.5 G/DL (ref 31.5–36.5)
MCV RBC AUTO: 94 FL (ref 78–100)
MONOCYTES # BLD AUTO: 0.9 10E9/L (ref 0–1.3)
MONOCYTES NFR BLD AUTO: 11.7 %
NEUTROPHILS # BLD AUTO: 4.7 10E9/L (ref 1.6–8.3)
NEUTROPHILS NFR BLD AUTO: 60.1 %
NRBC # BLD AUTO: 0 10*3/UL
NRBC BLD AUTO-RTO: 0 /100
PLATELET # BLD AUTO: 183 10E9/L (ref 150–450)
POTASSIUM SERPL-SCNC: 4 MMOL/L (ref 3.4–5.3)
PROT SERPL-MCNC: 6 G/DL (ref 6.8–8.8)
RBC # BLD AUTO: 3.06 10E12/L (ref 4.4–5.9)
SODIUM SERPL-SCNC: 132 MMOL/L (ref 133–144)
WBC # BLD AUTO: 7.8 10E9/L (ref 4–11)

## 2019-12-27 PROCEDURE — 85025 COMPLETE CBC W/AUTO DIFF WBC: CPT | Performed by: INTERNAL MEDICINE

## 2019-12-27 PROCEDURE — 25800030 ZZH RX IP 258 OP 636: Mod: ZF | Performed by: INTERNAL MEDICINE

## 2019-12-27 PROCEDURE — 86301 IMMUNOASSAY TUMOR CA 19-9: CPT | Performed by: INTERNAL MEDICINE

## 2019-12-27 PROCEDURE — 96409 CHEMO IV PUSH SNGL DRUG: CPT

## 2019-12-27 PROCEDURE — 25000128 H RX IP 250 OP 636: Mod: ZF | Performed by: INTERNAL MEDICINE

## 2019-12-27 PROCEDURE — G0498 CHEMO EXTEND IV INFUS W/PUMP: HCPCS

## 2019-12-27 PROCEDURE — 80053 COMPREHEN METABOLIC PANEL: CPT | Performed by: INTERNAL MEDICINE

## 2019-12-27 PROCEDURE — 96367 TX/PROPH/DG ADDL SEQ IV INF: CPT

## 2019-12-27 RX ORDER — SENNOSIDES 8.6 MG
1 TABLET ORAL PRN
Status: ON HOLD | COMMUNITY
End: 2020-01-01

## 2019-12-27 RX ORDER — IOPAMIDOL 755 MG/ML
101 INJECTION, SOLUTION INTRAVASCULAR ONCE
Status: COMPLETED | OUTPATIENT
Start: 2019-12-27 | End: 2019-12-27

## 2019-12-27 RX ORDER — FLUOROURACIL 50 MG/ML
400 INJECTION, SOLUTION INTRAVENOUS ONCE
Status: COMPLETED | OUTPATIENT
Start: 2019-12-27 | End: 2019-12-27

## 2019-12-27 RX ORDER — HEPARIN SODIUM (PORCINE) LOCK FLUSH IV SOLN 100 UNIT/ML 100 UNIT/ML
5 SOLUTION INTRAVENOUS EVERY 8 HOURS
Status: DISCONTINUED | OUTPATIENT
Start: 2019-12-27 | End: 2019-12-27 | Stop reason: HOSPADM

## 2019-12-27 RX ADMIN — DEXTROSE MONOHYDRATE 250 ML: 50 INJECTION, SOLUTION INTRAVENOUS at 10:41

## 2019-12-27 RX ADMIN — LEUCOVORIN CALCIUM 700 MG: 500 INJECTION, POWDER, LYOPHILIZED, FOR SOLUTION INTRAMUSCULAR; INTRAVENOUS at 11:14

## 2019-12-27 RX ADMIN — FLUOROURACIL 785 MG: 50 INJECTION, SOLUTION INTRAVENOUS at 12:05

## 2019-12-27 RX ADMIN — Medication 5 ML: at 09:37

## 2019-12-27 RX ADMIN — IOPAMIDOL 101 ML: 755 INJECTION, SOLUTION INTRAVASCULAR at 08:28

## 2019-12-27 RX ADMIN — DEXAMETHASONE SODIUM PHOSPHATE: 10 INJECTION, SOLUTION INTRAMUSCULAR; INTRAVENOUS at 10:41

## 2019-12-27 ASSESSMENT — PAIN SCALES - GENERAL: PAINLEVEL: NO PAIN (0)

## 2019-12-27 NOTE — PATIENT INSTRUCTIONS
[facility] Triage and after hours / weekends / holidays:  [phone]    Please call the triage or after hours line if you experience a temperature greater than or equal to 100.5, shaking chills, have uncontrolled nausea, vomiting and/or diarrhea, dizziness, shortness of breath, chest pain, bleeding, unexplained bruising, or if you have any other new/concerning symptoms, questions or concerns.      If you are having any concerning symptoms or wish to speak to a provider before your next infusion visit, please call your care coordinator or triage to notify them so we can adequately serve you.     If you need a refill on a narcotic prescription or other medication, please call before your infusion appointment.                 December 2019 Sunday Monday Tuesday Wednesday Thursday Friday Saturday   1    LAB   3:15 PM   (15 min.)   UR LAB HOME INFUSION   [facility] [place], Laboratory Services 2     3    US RENAL COMPLETE   1:00 PM   (60 min.)   UCUS3   [facility] 4     5     6     7       8     9     10     11     12     13    [facility] DRAW   9:45 AM   (15 min.)   [facility]   [facility] Lab Draw    [facility] RETURN  10:05 AM   (50 min.)   Jennifer Jalloh, YO CNP   [facility] ONC INFUSION 240  11:30 AM   (240 min.)    ONCOLOGY INFUSION   [facility] 14       15     16     17     18     19     20     21       22     23     24     25     26     27    CT CHEST ABDOMEN PELVIS WWO   8:40 AM   (20 min.)   UCCT1   [facility] CT    [facility] DRAW   9:30 AM   (15 min.)   [facility] DRAW   [facility] Lab Draw    [facility] ONC INFUSION 240  10:00 AM   (240 min.)    ONCOLOGY INFUSION   [facility] 28       29     30    [facility] RETURN   4:15 PM   (30 min.)   Naresh De Los Santos MD   [facility] 31 January 2020 Sunday Monday Tuesday Wednesday  Thursday Friday Saturday                  1     2     3     4       5     6     7     8     9     10     11       12     13     14     15     16     17     18       19     20     21     22     23     24     25       26     27     28     29     30     31                          Lab Results:  Recent Results (from the past 12 hour(s))   Comprehensive metabolic panel    Collection Time: 12/27/19  9:42 AM   Result Value Ref Range    Sodium 132 (L) 133 - 144 mmol/L    Potassium 4.0 3.4 - 5.3 mmol/L    Chloride 104 94 - 109 mmol/L    Carbon Dioxide 22 20 - 32 mmol/L    Anion Gap 6 3 - 14 mmol/L    Glucose 100 (H) 70 - 99 mg/dL    Urea Nitrogen 27 7 - 30 mg/dL    Creatinine 1.45 (H) 0.66 - 1.25 mg/dL    GFR Estimate 51 (L) >60 mL/min/[1.73_m2]    GFR Estimate If Black 59 (L) >60 mL/min/[1.73_m2]    Calcium 8.5 8.5 - 10.1 mg/dL    Bilirubin Total 0.4 0.2 - 1.3 mg/dL    Albumin 3.0 (L) 3.4 - 5.0 g/dL    Protein Total 6.0 (L) 6.8 - 8.8 g/dL    Alkaline Phosphatase 120 40 - 150 U/L    ALT 39 0 - 70 U/L    AST 25 0 - 45 U/L   CBC with platelets differential    Collection Time: 12/27/19  9:42 AM   Result Value Ref Range    WBC 7.8 4.0 - 11.0 10e9/L    RBC Count 3.06 (L) 4.4 - 5.9 10e12/L    Hemoglobin 9.3 (L) 13.3 - 17.7 g/dL    Hematocrit 28.6 (L) 40.0 - 53.0 %    MCV 94 78 - 100 fl    MCH 30.4 26.5 - 33.0 pg    MCHC 32.5 31.5 - 36.5 g/dL    RDW 18.3 (H) 10.0 - 15.0 %    Platelet Count 183 150 - 450 10e9/L    Diff Method Automated Method     % Neutrophils 60.1 %    % Lymphocytes 16.6 %    % Monocytes 11.7 %    % Eosinophils 10.9 %    % Basophils 0.4 %    % Immature Granulocytes 0.3 %    Nucleated RBCs 0 0 /100    Absolute Neutrophil 4.7 1.6 - 8.3 10e9/L    Absolute Lymphocytes 1.3 0.8 - 5.3 10e9/L    Absolute Monocytes 0.9 0.0 - 1.3 10e9/L    Absolute Eosinophils 0.9 (H) 0.0 - 0.7 10e9/L    Absolute Basophils 0.0 0.0 - 0.2 10e9/L    Abs Immature Granulocytes 0.0 0 - 0.4 10e9/L    Absolute Nucleated RBC 0.0        Dear Patients  and Caregivers,    Each day we work diligently to eliminate any barriers to your care including working with your insurance coverage to preauthorize your facility administered medication treatment. Our goal is to be transparent with any anticipated concerns with coverage for your treatment. At the beginning of every year this goal is particularly challenging because of changes to insurance coverage.    How can you help?    Provide any new insurance card to the infusion nurse at your next appointment or enter the information into your [employer] account prior to January 1st 2020.     It is vital that if a  reaches out that you return their phone call in a timely manner. Due to regulations, the team is unable to leave detailed voicemails. When you return the finance teams call they will be able to inform you of the details so a decision about your appointment can be made.    Also please understand:    We go through this process each year and each year offers new challenges.    Insurance companies will not allow the reauthorization process to begin until 2020, not allowing us to work in advance on this process.    Even if you are not changing insurance plans, insurance companies often update their policies requiring all treatments to be reauthorized.    As institutions across the country work to reauthorize treatments, insurance companies sometimes have longer than usual wait times in their call centers and leads to delayed response to prior authorization requests.     Thank you for your patience as we work this process. We do strive to keep you updated throughout the process if there are any delays or if the process is taking longer than anticipated. Lastly please feel free to speak with one of our infusion finance specialists at your next appointment or via phone ([phone]) if you have any questions. Thank you for choosing [facility] for your care.

## 2019-12-27 NOTE — PROGRESS NOTES
"Infusion Nursing Note:  Girish Chauhan presents today for Day 1 Cycle 7 of Leucovorin and Fluorouracil IV push and home pump connect.    Patient seen by provider today: No   present during visit today: Not Applicable.    Note: Patient presents to infusion fusion feeling well. Patient states a intermittently productive cough that he's had for the past month. Patient states cough is only productive when he wakes up from a nights sleep/when hes been laying down for a long period of time. Sputum is green/white \"at times but moreso white\". Cough has not increased or inhibited his activities. Otherwise, patient states no discomfort or acute complaints or concerns wanting to be addressed. Patient had CT imaging today with an appointment with Dr. De Los Santos on 12/30. Sodium 132.    TORB. 12/27/2019. 0948. Dr. De Los Santos. Norberto Charles RN. Ok to proceed with treatment today.    Intravenous Access:  Implanted Port.    Treatment Conditions:  Lab Results   Component Value Date    HGB 9.3 12/27/2019     Lab Results   Component Value Date    WBC 7.8 12/27/2019      Lab Results   Component Value Date    ANEU 4.7 12/27/2019     Lab Results   Component Value Date     12/27/2019      Lab Results   Component Value Date     12/27/2019                   Lab Results   Component Value Date    POTASSIUM 4.0 12/27/2019           Lab Results   Component Value Date    MAG 1.8 10/04/2019            Lab Results   Component Value Date    CR 1.45 12/27/2019                   Lab Results   Component Value Date    GARY 8.5 12/27/2019                Lab Results   Component Value Date    BILITOTAL 0.4 12/27/2019           Lab Results   Component Value Date    ALBUMIN 3.0 12/27/2019                    Lab Results   Component Value Date    ALT 39 12/27/2019           Lab Results   Component Value Date    AST 25 12/27/2019       Results reviewed, labs MET treatment parameters, ok to proceed with treatment.      Post Infusion " Assessment:  Patient tolerated infusion without incident.  Blood return noted pre and post infusion.  Blood return noted every 2 ml with Fluorouracil medication  Site patent and intact, free from redness, edema or discomfort.  No evidence of extravasations.     Fluorouracil connected per protocol.  Connections taped, temperature sensor taped to skin and verified by Jess Valentino RN.  Pump to infuse at 5.2mls/hour for 46 hours.  Disconnect time of 8:00am on 12/29 called to [facility] Infusion.  Spoke with Jennifer from [facility] to confirm appointment      Discharge Plan:   Patient declined prescription refills.  Discharge instructions reviewed with: Patient.  Patient and/or family verbalized understanding of discharge instructions and all questions answered.  AVS to patient via [employer].  Patient will return 12/30 for next appointment.   Patient discharged in stable condition accompanied by: self.  Departure Mode: Ambulatory.  Face to Face time: 0 minutes.    Norberto Charles RN

## 2019-12-28 ENCOUNTER — COMMUNICATION - HEALTHEAST (OUTPATIENT)
Dept: FAMILY MEDICINE | Facility: CLINIC | Age: 64
End: 2019-12-28

## 2019-12-28 DIAGNOSIS — R10.13 ABDOMINAL PAIN, EPIGASTRIC: ICD-10-CM

## 2019-12-28 LAB — CANCER AG19-9 SERPL-ACNC: 628 U/ML (ref 0–37)

## 2019-12-29 ENCOUNTER — HOME INFUSION (PRE-WILLOW HOME INFUSION) (OUTPATIENT)
Dept: PHARMACY | Facility: CLINIC | Age: 64
End: 2019-12-29

## 2019-12-29 NOTE — PHARMACY
Skilled nurse visit in the home, for discontinuation of 4705 mg of 5FU infused over 46hours. Pt tolerated well and reports mild constipation and fatigue.  Deaccessed port, pt assessment wnl  Meghan Miner RN  [facility] home infusion  [email or web address]  [phone]

## 2019-12-30 ENCOUNTER — ONCOLOGY VISIT (OUTPATIENT)
Dept: ONCOLOGY | Facility: CLINIC | Age: 64
End: 2019-12-30
Attending: INTERNAL MEDICINE
Payer: COMMERCIAL

## 2019-12-30 ENCOUNTER — RECORDS - HEALTHEAST (OUTPATIENT)
Dept: ADMINISTRATIVE | Facility: OTHER | Age: 64
End: 2019-12-30

## 2019-12-30 VITALS
BODY MASS INDEX: 23.98 KG/M2 | SYSTOLIC BLOOD PRESSURE: 99 MMHG | DIASTOLIC BLOOD PRESSURE: 66 MMHG | OXYGEN SATURATION: 100 % | WEIGHT: 167.11 LBS | TEMPERATURE: 97.6 F | HEART RATE: 61 BPM

## 2019-12-30 DIAGNOSIS — C22.1 CHOLANGIOCARCINOMA (H): Primary | ICD-10-CM

## 2019-12-30 PROCEDURE — G0463 HOSPITAL OUTPT CLINIC VISIT: HCPCS | Mod: ZF

## 2019-12-30 PROCEDURE — 99214 OFFICE O/P EST MOD 30 MIN: CPT | Mod: ZP | Performed by: INTERNAL MEDICINE

## 2019-12-30 ASSESSMENT — PAIN SCALES - GENERAL: PAINLEVEL: NO PAIN (0)

## 2019-12-30 NOTE — PROGRESS NOTES
Girish Chauhan is here today in follow-up of recurrent cholangiocarcinoma.    He was originally diagnosed in early 2016 and had a recurrence in the wall of his urinary bladder in November 2017 with an excellent period of control with gemcitabine and cisplatin but treatment was complicated with intolerance due to neuropathy and then the development of TTP with gemcitabine.  He eventually had progression again in April of this year at which point he began capecitabine but had an MI while on capecitabine, but since has been rechallenged with infusional 5-FU without any further cardiac ischemia.  Overall he continues to tolerate the chemotherapy fairly well.  His appetite is always off a little bit for a couple days right after but he feels like he maintains his oral intake.  He is somewhat fatigued but is still able to participate in activities he would like.  He has not had any significant problems with diarrhea and in fact occasionally has some minor problems with constipation.  The remainder of his 10 point review of systems is otherwise unremarkable.    On physical exam Mr. Chauhan appears thin but well with unremarkable vital signs.  He has no icterus and no visible lesions in the oropharynx.  He has no palpable adenopathy in his neck or supraclavicular spaces.  His lungs are clear to auscultation with dullness to percussion.  His heart rate and rhythm are regular without murmur gallop and the jugular venous pressure appears normal.  His abdomen is soft and nontender without palpable mass or organomegaly.  He has no edema no tenderness in his calves or thighs.  His speech is fluent and cranial nervers are grossly intact. his gait and station are unremarkable.    I reviewed with the patient and his wife his lab work and CT scan. On the scan his ill-defined areas of disease particular around the bladder all appear stable.  His tumor markers improved markedly now down to 628.  His electrolytes renal function and blood  counts are relatively unremarkable.    Assessment/plan: Metastatic cholangiocarcinoma with disease controlled on infusional 5-FU.  We will continue on with the every 2-week infusion and plan on seeing him back for another response assessment in about 3 months.

## 2019-12-30 NOTE — LETTER
12/30/2019       RE: Girish Chauhan  [address] [address]     Dear Colleague,    Thank you for referring your patient, Girish Chauhan, to the [facility]. Please see a copy of my visit note below.    No notes on file    Again, thank you for allowing me to participate in the care of your patient.      Sincerely,    Naresh De Los Santos MD

## 2019-12-30 NOTE — NURSING NOTE
"Oncology Rooming Note    December 30, 2019 4:39 PM   Girish Chauhan is a 64 year old male who presents for:    Chief Complaint   Patient presents with     Oncology Clinic Visit     Return visit; Hilar cholangiocarcinoma; vitals taken     Initial Vitals: BP 99/66   Pulse 61   Temp 97.6  F (36.4  C) (Oral)   Wt 75.8 kg (167 lb 1.7 oz)   SpO2 100%   BMI 23.98 kg/m   Estimated body mass index is 23.98 kg/m  as calculated from the following:    Height as of 11/25/19: 1.778 m (5' 10\").    Weight as of this encounter: 75.8 kg (167 lb 1.7 oz). Body surface area is 1.93 meters squared.  No Pain (0) Comment: Data Unavailable   No LMP for male patient.  Allergies reviewed: Yes  Medications reviewed: Yes    Medications: Medication refills not needed today.  Pharmacy name entered into [employer]: [id number] [place], [place] - [address] AT [facility]    Clinical concerns: No new concerns today. Dr. De Los Santos was notified.      BRIELLE CONTRERAS, WILFREDO            "

## 2019-12-30 NOTE — LETTER
12/30/2019      RE: Girish Chauhan  [address] [address]       Girish Chauhan is here today in follow-up of recurrent cholangiocarcinoma.    He was originally diagnosed in early 2016 and had a recurrence in the wall of his urinary bladder in November 2017 with an excellent period of control with gemcitabine and cisplatin but treatment was complicated with intolerance due to neuropathy and then the development of TTP with gemcitabine.  He eventually had progression again in April of this year at which point he began capecitabine but had an MI while on capecitabine, but since has been rechallenged with infusional 5-FU without any further cardiac ischemia.  Overall he continues to tolerate the chemotherapy fairly well.  His appetite is always off a little bit for a couple days right after but he feels like he maintains his oral intake.  He is somewhat fatigued but is still able to participate in activities he would like.  He has not had any significant problems with diarrhea and in fact occasionally has some minor problems with constipation.  The remainder of his 10 point review of systems is otherwise unremarkable.    On physical exam Mr. Chauhan appears thin but well with unremarkable vital signs.  He has no icterus and no visible lesions in the oropharynx.  He has no palpable adenopathy in his neck or supraclavicular spaces.  His lungs are clear to auscultation with dullness to percussion.  His heart rate and rhythm are regular without murmur gallop and the jugular venous pressure appears normal.  His abdomen is soft and nontender without palpable mass or organomegaly.  He has no edema no tenderness in his calves or thighs.  His speech is fluent and cranial nervers are grossly intact. his gait and station are unremarkable.    I reviewed with the patient and his wife his lab work and CT scan. On the scan his ill-defined areas of disease particular around the bladder all appear stable.  His tumor  markers improved markedly now down to 628.  His electrolytes renal function and blood counts are relatively unremarkable.    Assessment/plan: Metastatic cholangiocarcinoma with disease controlled on infusional 5-FU.  We will continue on with the every 2-week infusion and plan on seeing him back for another response assessment in about 3 months.    Naresh De Los Santos MD

## 2019-12-30 NOTE — LETTER
12/30/2019      RE: Girish Chauhan  [address] [address]       Girish Chauhan is here today in follow-up of recurrent cholangiocarcinoma.    He was originally diagnosed in early 2016 and had a recurrence in the wall of his bladder in November 2017 with an excellent period of control with gemcitabine and cisplatin but treatment was complicated with intolerance due to neuropathy and then the development of TTP with gemcitabine.  He eventually had progression again in April of this year which point he began capecitabine but had a MI while on capecitabine, but since has been rechallenged with infusional 5-FU without any further cardiac ischemia.  Overall he continues to tolerate the chemotherapy fairly well.  His appetite is always off a little bit in a couple days right after but he feels like he maintains his oral intake.  He is somewhat fatigued but is still able to participate in activities he would like.  He has not had any significant problems with diarrhea and in fact occasionally has some minor problems with constipation.  The remainder of his 10 point review of systems is otherwise unremarkable.    On physical exam Mr. Chauhan appears thin but well with unremarkable vital signs.  He has no icterus and no visible lesions in the oropharynx.  He has no palpable adenopathy in his neck or supraclavicular spaces.  His lungs are clear to auscultation with dullness to percussion.  His heart rate and rhythm are regular without murmur gallop and the jugular venous pressure appears normal.  His abdomen is soft and nontender without palpable mass or organomegaly.  He has no edema no tenderness in his calves or thighs.  His speech is fluent screeners are grossly intact his gait and station are unremarkable.    I reviewed with the patient and his wife his lab work and CT scan on the scan his ill-defined areas of disease particular around the bladder all appear stable.  His tumor markers improved markedly  now down to 628.  His electrolytes renal function and blood counts are relatively unremarkable.    Assessment/plan: Metastatic cholangiocarcinoma with disease controlled on infusional 5-FU.  We will continue on with the every 2-week infusion and plan on seeing him back for another response assessment in about 3 months.    Naresh De Los Santos MD

## 2019-12-31 NOTE — PROGRESS NOTES
This is a recent snapshot of the patient's [facility] medical record.  For current drug dose and complete information and questions, call [phone] or In [facility] pool, fv home infusion ([id number])  CSN Number:  [id number]

## 2020-01-01 ENCOUNTER — PRE VISIT (OUTPATIENT)
Dept: SURGERY | Facility: CLINIC | Age: 65
End: 2020-01-01

## 2020-01-01 ENCOUNTER — HEALTH MAINTENANCE LETTER (OUTPATIENT)
Age: 65
End: 2020-01-01

## 2020-01-01 ENCOUNTER — TELEPHONE (OUTPATIENT)
Dept: ONCOLOGY | Facility: CLINIC | Age: 65
End: 2020-01-01

## 2020-01-01 ENCOUNTER — PATIENT OUTREACH (OUTPATIENT)
Dept: UROLOGY | Facility: CLINIC | Age: 65
End: 2020-01-01

## 2020-01-01 ENCOUNTER — COMMUNICATION - HEALTHEAST (OUTPATIENT)
Dept: FAMILY MEDICINE | Facility: CLINIC | Age: 65
End: 2020-01-01

## 2020-01-01 ENCOUNTER — VIRTUAL VISIT (OUTPATIENT)
Dept: ONCOLOGY | Facility: CLINIC | Age: 65
End: 2020-01-01
Attending: INTERNAL MEDICINE
Payer: COMMERCIAL

## 2020-01-01 ENCOUNTER — ANCILLARY PROCEDURE (OUTPATIENT)
Dept: CT IMAGING | Facility: CLINIC | Age: 65
End: 2020-01-01
Attending: INTERNAL MEDICINE
Payer: COMMERCIAL

## 2020-01-01 ENCOUNTER — HOSPITAL ENCOUNTER (OUTPATIENT)
Facility: CLINIC | Age: 65
Discharge: HOME | End: 2020-08-25
Attending: INTERNAL MEDICINE | Admitting: INTERNAL MEDICINE
Payer: COMMERCIAL

## 2020-01-01 ENCOUNTER — RECORDS - HEALTHEAST (OUTPATIENT)
Dept: ADMINISTRATIVE | Facility: OTHER | Age: 65
End: 2020-01-01

## 2020-01-01 ENCOUNTER — COMMUNICATION - HEALTHEAST (OUTPATIENT)
Dept: CARDIOLOGY | Facility: CLINIC | Age: 65
End: 2020-01-01

## 2020-01-01 ENCOUNTER — ANESTHESIA (OUTPATIENT)
Dept: SURGERY | Facility: CLINIC | Age: 65
End: 2020-01-01
Payer: COMMERCIAL

## 2020-01-01 ENCOUNTER — OFFICE VISIT (OUTPATIENT)
Dept: SURGERY | Facility: CLINIC | Age: 65
End: 2020-01-01
Payer: COMMERCIAL

## 2020-01-01 ENCOUNTER — MYC MEDICAL ADVICE (OUTPATIENT)
Dept: UROLOGY | Facility: CLINIC | Age: 65
End: 2020-01-01

## 2020-01-01 ENCOUNTER — MYC MEDICAL ADVICE (OUTPATIENT)
Dept: SURGERY | Facility: CLINIC | Age: 65
End: 2020-01-01

## 2020-01-01 ENCOUNTER — PREP FOR PROCEDURE (OUTPATIENT)
Dept: GASTROENTEROLOGY | Facility: CLINIC | Age: 65
End: 2020-01-01

## 2020-01-01 ENCOUNTER — APPOINTMENT (OUTPATIENT)
Dept: GENERAL RADIOLOGY | Facility: CLINIC | Age: 65
End: 2020-01-01
Attending: UROLOGY
Payer: COMMERCIAL

## 2020-01-01 ENCOUNTER — ANESTHESIA EVENT (OUTPATIENT)
Dept: SURGERY | Facility: CLINIC | Age: 65
End: 2020-01-01
Payer: COMMERCIAL

## 2020-01-01 ENCOUNTER — APPOINTMENT (OUTPATIENT)
Dept: GENERAL RADIOLOGY | Facility: CLINIC | Age: 65
End: 2020-01-01
Attending: INTERNAL MEDICINE
Payer: COMMERCIAL

## 2020-01-01 ENCOUNTER — PATIENT OUTREACH (OUTPATIENT)
Dept: GASTROENTEROLOGY | Facility: CLINIC | Age: 65
End: 2020-01-01

## 2020-01-01 ENCOUNTER — VIRTUAL VISIT (OUTPATIENT)
Dept: ONCOLOGY | Facility: CLINIC | Age: 65
End: 2020-01-01
Attending: DIETITIAN, REGISTERED
Payer: COMMERCIAL

## 2020-01-01 ENCOUNTER — ANESTHESIA EVENT (OUTPATIENT)
Dept: SURGERY | Facility: CLINIC | Age: 65
End: 2020-01-01

## 2020-01-01 ENCOUNTER — MYC MEDICAL ADVICE (OUTPATIENT)
Dept: ONCOLOGY | Facility: CLINIC | Age: 65
End: 2020-01-01

## 2020-01-01 ENCOUNTER — TELEPHONE (OUTPATIENT)
Dept: PHARMACY | Facility: CLINIC | Age: 65
End: 2020-01-01

## 2020-01-01 ENCOUNTER — APPOINTMENT (OUTPATIENT)
Dept: LAB | Facility: CLINIC | Age: 65
End: 2020-01-01
Attending: INTERNAL MEDICINE
Payer: COMMERCIAL

## 2020-01-01 ENCOUNTER — HOSPITAL ENCOUNTER (OUTPATIENT)
Facility: CLINIC | Age: 65
Discharge: HOME | End: 2020-10-12
Attending: UROLOGY | Admitting: UROLOGY
Payer: COMMERCIAL

## 2020-01-01 ENCOUNTER — TELEPHONE (OUTPATIENT)
Dept: NURSING | Facility: CLINIC | Age: 65
End: 2020-01-01

## 2020-01-01 ENCOUNTER — VIRTUAL VISIT (OUTPATIENT)
Dept: ONCOLOGY | Facility: CLINIC | Age: 65
End: 2020-01-01
Attending: NURSE PRACTITIONER
Payer: COMMERCIAL

## 2020-01-01 VITALS
HEART RATE: 77 BPM | SYSTOLIC BLOOD PRESSURE: 120 MMHG | WEIGHT: 143 LBS | DIASTOLIC BLOOD PRESSURE: 79 MMHG | OXYGEN SATURATION: 98 % | BODY MASS INDEX: 19.95 KG/M2 | RESPIRATION RATE: 16 BRPM | TEMPERATURE: 98.3 F

## 2020-01-01 VITALS
TEMPERATURE: 97.7 F | OXYGEN SATURATION: 100 % | WEIGHT: 154 LBS | DIASTOLIC BLOOD PRESSURE: 79 MMHG | BODY MASS INDEX: 21.56 KG/M2 | HEIGHT: 71 IN | HEART RATE: 93 BPM | SYSTOLIC BLOOD PRESSURE: 112 MMHG | RESPIRATION RATE: 16 BRPM

## 2020-01-01 VITALS
HEART RATE: 92 BPM | HEIGHT: 71 IN | OXYGEN SATURATION: 100 % | BODY MASS INDEX: 21.17 KG/M2 | RESPIRATION RATE: 16 BRPM | DIASTOLIC BLOOD PRESSURE: 94 MMHG | TEMPERATURE: 97.9 F | WEIGHT: 151.24 LBS | SYSTOLIC BLOOD PRESSURE: 115 MMHG

## 2020-01-01 VITALS
DIASTOLIC BLOOD PRESSURE: 70 MMHG | WEIGHT: 148.7 LBS | OXYGEN SATURATION: 97 % | HEART RATE: 87 BPM | TEMPERATURE: 98 F | RESPIRATION RATE: 18 BRPM | SYSTOLIC BLOOD PRESSURE: 100 MMHG | BODY MASS INDEX: 20.75 KG/M2

## 2020-01-01 VITALS
SYSTOLIC BLOOD PRESSURE: 101 MMHG | OXYGEN SATURATION: 100 % | HEART RATE: 69 BPM | HEIGHT: 71 IN | DIASTOLIC BLOOD PRESSURE: 69 MMHG | WEIGHT: 150 LBS | BODY MASS INDEX: 21 KG/M2 | TEMPERATURE: 97.5 F | RESPIRATION RATE: 16 BRPM

## 2020-01-01 VITALS
OXYGEN SATURATION: 100 % | DIASTOLIC BLOOD PRESSURE: 72 MMHG | SYSTOLIC BLOOD PRESSURE: 102 MMHG | TEMPERATURE: 97.6 F | RESPIRATION RATE: 18 BRPM | BODY MASS INDEX: 21.69 KG/M2 | HEART RATE: 85 BPM | WEIGHT: 155.5 LBS

## 2020-01-01 VITALS
DIASTOLIC BLOOD PRESSURE: 91 MMHG | RESPIRATION RATE: 12 BRPM | TEMPERATURE: 97.5 F | OXYGEN SATURATION: 100 % | HEART RATE: 75 BPM | WEIGHT: 148.81 LBS | BODY MASS INDEX: 20.83 KG/M2 | SYSTOLIC BLOOD PRESSURE: 114 MMHG | HEIGHT: 71 IN

## 2020-01-01 VITALS
HEART RATE: 86 BPM | TEMPERATURE: 97.5 F | BODY MASS INDEX: 22.04 KG/M2 | WEIGHT: 158 LBS | RESPIRATION RATE: 16 BRPM | OXYGEN SATURATION: 100 % | DIASTOLIC BLOOD PRESSURE: 82 MMHG | SYSTOLIC BLOOD PRESSURE: 118 MMHG

## 2020-01-01 DIAGNOSIS — D64.81 ANEMIA ASSOCIATED WITH CHEMOTHERAPY: ICD-10-CM

## 2020-01-01 DIAGNOSIS — Z11.59 ENCOUNTER FOR SCREENING FOR OTHER VIRAL DISEASES: ICD-10-CM

## 2020-01-01 DIAGNOSIS — T45.1X5A CHEMOTHERAPY-INDUCED NEUTROPENIA (H): ICD-10-CM

## 2020-01-01 DIAGNOSIS — D64.81 ANEMIA ASSOCIATED WITH CHEMOTHERAPY: Primary | ICD-10-CM

## 2020-01-01 DIAGNOSIS — N18.30 CKD (CHRONIC KIDNEY DISEASE) STAGE 3, GFR 30-59 ML/MIN (H): ICD-10-CM

## 2020-01-01 DIAGNOSIS — C22.1 CHOLANGIOCARCINOMA (H): ICD-10-CM

## 2020-01-01 DIAGNOSIS — T45.1X5A ANEMIA ASSOCIATED WITH CHEMOTHERAPY: Primary | ICD-10-CM

## 2020-01-01 DIAGNOSIS — Z11.59 ENCOUNTER FOR SCREENING FOR OTHER VIRAL DISEASES: Primary | ICD-10-CM

## 2020-01-01 DIAGNOSIS — R10.84 ABDOMINAL PAIN, GENERALIZED: Primary | ICD-10-CM

## 2020-01-01 DIAGNOSIS — T45.1X5A ANEMIA ASSOCIATED WITH CHEMOTHERAPY: ICD-10-CM

## 2020-01-01 DIAGNOSIS — C79.11 SECONDARY MALIGNANT NEOPLASM OF BLADDER (H): Primary | ICD-10-CM

## 2020-01-01 DIAGNOSIS — C22.1 CHOLANGIOCARCINOMA (H): Primary | ICD-10-CM

## 2020-01-01 DIAGNOSIS — R10.84 ABDOMINAL PAIN, GENERALIZED: ICD-10-CM

## 2020-01-01 DIAGNOSIS — E78.5 HYPERLIPEMIA: ICD-10-CM

## 2020-01-01 DIAGNOSIS — K31.1 GASTRIC OUTLET OBSTRUCTION: ICD-10-CM

## 2020-01-01 DIAGNOSIS — N39.0 URINARY TRACT INFECTION: ICD-10-CM

## 2020-01-01 DIAGNOSIS — I50.22 CHRONIC SYSTOLIC (CONGESTIVE) HEART FAILURE (H): ICD-10-CM

## 2020-01-01 DIAGNOSIS — I25.10 CAD (CORONARY ARTERY DISEASE): ICD-10-CM

## 2020-01-01 DIAGNOSIS — C78.6 PERITONEAL CARCINOMATOSIS (H): ICD-10-CM

## 2020-01-01 DIAGNOSIS — N13.30 HYDRONEPHROSIS OF RIGHT KIDNEY: Primary | ICD-10-CM

## 2020-01-01 DIAGNOSIS — Z01.818 PREOP EXAMINATION: Primary | ICD-10-CM

## 2020-01-01 DIAGNOSIS — G62.0 DRUG-INDUCED POLYNEUROPATHY (H): ICD-10-CM

## 2020-01-01 DIAGNOSIS — N13.30 HYDRONEPHROSIS OF RIGHT KIDNEY: ICD-10-CM

## 2020-01-01 DIAGNOSIS — K31.1 GASTRIC OUTLET OBSTRUCTION: Primary | ICD-10-CM

## 2020-01-01 DIAGNOSIS — I25.10 CORONARY ARTERY DISEASE INVOLVING NATIVE CORONARY ARTERY OF NATIVE HEART, ANGINA PRESENCE UNSPECIFIED: ICD-10-CM

## 2020-01-01 DIAGNOSIS — N39.0 URINARY TRACT INFECTION: Primary | ICD-10-CM

## 2020-01-01 DIAGNOSIS — Z87.39 HISTORY OF GOUT: ICD-10-CM

## 2020-01-01 DIAGNOSIS — D70.1 CHEMOTHERAPY-INDUCED NEUTROPENIA (H): ICD-10-CM

## 2020-01-01 DIAGNOSIS — C79.11 SECONDARY MALIGNANT NEOPLASM OF BLADDER (H): ICD-10-CM

## 2020-01-01 LAB
ALBUMIN SERPL-MCNC: 2.6 G/DL (ref 3.4–5)
ALBUMIN SERPL-MCNC: 2.8 G/DL (ref 3.4–5)
ALBUMIN SERPL-MCNC: 2.9 G/DL (ref 3.4–5)
ALBUMIN SERPL-MCNC: 2.9 G/DL (ref 3.4–5)
ALBUMIN SERPL-MCNC: 3.1 G/DL (ref 3.4–5)
ALBUMIN SERPL-MCNC: 3.2 G/DL (ref 3.4–5)
ALBUMIN SERPL-MCNC: 3.4 G/DL (ref 3.4–5)
ALBUMIN SERPL-MCNC: 3.5 G/DL (ref 3.4–5)
ALBUMIN UR-MCNC: 10 MG/DL
ALP SERPL-CCNC: 109 U/L (ref 40–150)
ALP SERPL-CCNC: 121 U/L (ref 40–150)
ALP SERPL-CCNC: 139 U/L (ref 40–150)
ALP SERPL-CCNC: 169 U/L (ref 40–150)
ALP SERPL-CCNC: 199 U/L (ref 40–150)
ALP SERPL-CCNC: 240 U/L (ref 40–150)
ALP SERPL-CCNC: 332 U/L (ref 40–150)
ALP SERPL-CCNC: 387 U/L (ref 40–150)
ALT SERPL W P-5'-P-CCNC: 38 U/L (ref 0–70)
ALT SERPL W P-5'-P-CCNC: 38 U/L (ref 0–70)
ALT SERPL W P-5'-P-CCNC: 43 U/L (ref 0–70)
ALT SERPL W P-5'-P-CCNC: 53 U/L (ref 0–70)
ALT SERPL W P-5'-P-CCNC: 55 U/L (ref 0–70)
ALT SERPL W P-5'-P-CCNC: 57 U/L (ref 0–70)
ALT SERPL W P-5'-P-CCNC: 61 U/L (ref 0–70)
ALT SERPL W P-5'-P-CCNC: 64 U/L (ref 0–70)
AMYLASE SERPL-CCNC: 112 U/L (ref 30–110)
ANION GAP SERPL CALCULATED.3IONS-SCNC: 3 MMOL/L (ref 3–14)
ANION GAP SERPL CALCULATED.3IONS-SCNC: 4 MMOL/L (ref 3–14)
ANION GAP SERPL CALCULATED.3IONS-SCNC: 5 MMOL/L (ref 3–14)
ANION GAP SERPL CALCULATED.3IONS-SCNC: 5 MMOL/L (ref 3–14)
ANION GAP SERPL CALCULATED.3IONS-SCNC: 6 MMOL/L (ref 3–14)
ANION GAP SERPL CALCULATED.3IONS-SCNC: 7 MMOL/L (ref 3–14)
ANION GAP SERPL CALCULATED.3IONS-SCNC: 8 MMOL/L (ref 3–14)
ANION GAP SERPL CALCULATED.3IONS-SCNC: 9 MMOL/L (ref 3–14)
APPEARANCE UR: CLEAR
AST SERPL W P-5'-P-CCNC: 31 U/L (ref 0–45)
AST SERPL W P-5'-P-CCNC: 31 U/L (ref 0–45)
AST SERPL W P-5'-P-CCNC: 33 U/L (ref 0–45)
AST SERPL W P-5'-P-CCNC: 50 U/L (ref 0–45)
AST SERPL W P-5'-P-CCNC: 52 U/L (ref 0–45)
AST SERPL W P-5'-P-CCNC: 57 U/L (ref 0–45)
BACTERIA SPEC CULT: NO GROWTH
BASOPHILS # BLD AUTO: 0 10E9/L (ref 0–0.2)
BASOPHILS NFR BLD AUTO: 0.2 %
BASOPHILS NFR BLD AUTO: 0.2 %
BASOPHILS NFR BLD AUTO: 0.3 %
BASOPHILS NFR BLD AUTO: 0.3 %
BASOPHILS NFR BLD AUTO: 0.4 %
BASOPHILS NFR BLD AUTO: 0.4 %
BASOPHILS NFR BLD AUTO: 0.5 %
BILIRUB SERPL-MCNC: 0.4 MG/DL (ref 0.2–1.3)
BILIRUB SERPL-MCNC: 0.6 MG/DL (ref 0.2–1.3)
BILIRUB SERPL-MCNC: 0.7 MG/DL (ref 0.2–1.3)
BILIRUB SERPL-MCNC: 0.8 MG/DL (ref 0.2–1.3)
BILIRUB SERPL-MCNC: 0.9 MG/DL (ref 0.2–1.3)
BILIRUB SERPL-MCNC: 1.2 MG/DL (ref 0.2–1.3)
BILIRUB UR QL STRIP: NEGATIVE
BUN SERPL-MCNC: 20 MG/DL (ref 7–30)
BUN SERPL-MCNC: 20 MG/DL (ref 7–30)
BUN SERPL-MCNC: 23 MG/DL (ref 7–30)
BUN SERPL-MCNC: 25 MG/DL (ref 7–30)
BUN SERPL-MCNC: 26 MG/DL (ref 7–30)
BUN SERPL-MCNC: 29 MG/DL (ref 7–30)
BUN SERPL-MCNC: 29 MG/DL (ref 7–30)
BUN SERPL-MCNC: 34 MG/DL (ref 7–30)
CALCIUM SERPL-MCNC: 8.1 MG/DL (ref 8.5–10.1)
CALCIUM SERPL-MCNC: 8.3 MG/DL (ref 8.5–10.1)
CALCIUM SERPL-MCNC: 8.5 MG/DL (ref 8.5–10.1)
CALCIUM SERPL-MCNC: 8.6 MG/DL (ref 8.5–10.1)
CALCIUM SERPL-MCNC: 8.7 MG/DL (ref 8.5–10.1)
CALCIUM SERPL-MCNC: 8.8 MG/DL (ref 8.5–10.1)
CALCIUM SERPL-MCNC: 8.9 MG/DL (ref 8.5–10.1)
CALCIUM SERPL-MCNC: 9 MG/DL (ref 8.5–10.1)
CANCER AG19-9 SERPL-ACNC: 226 U/ML (ref 0–37)
CANCER AG19-9 SERPL-ACNC: 331 U/ML (ref 0–37)
CANCER AG19-9 SERPL-ACNC: 331 U/ML (ref 0–37)
CANCER AG19-9 SERPL-ACNC: 358 U/ML (ref 0–37)
CANCER AG19-9 SERPL-ACNC: 365 U/ML (ref 0–37)
CANCER AG19-9 SERPL-ACNC: 392 U/ML (ref 0–37)
CANCER AG19-9 SERPL-ACNC: 399 U/ML (ref 0–37)
CHLORIDE SERPL-SCNC: 105 MMOL/L (ref 94–109)
CHLORIDE SERPL-SCNC: 106 MMOL/L (ref 94–109)
CHLORIDE SERPL-SCNC: 107 MMOL/L (ref 94–109)
CHLORIDE SERPL-SCNC: 109 MMOL/L (ref 94–109)
CHLORIDE SERPL-SCNC: 110 MMOL/L (ref 94–109)
CO2 SERPL-SCNC: 22 MMOL/L (ref 20–32)
CO2 SERPL-SCNC: 22 MMOL/L (ref 20–32)
CO2 SERPL-SCNC: 25 MMOL/L (ref 20–32)
CO2 SERPL-SCNC: 26 MMOL/L (ref 20–32)
CO2 SERPL-SCNC: 27 MMOL/L (ref 20–32)
CO2 SERPL-SCNC: 28 MMOL/L (ref 20–32)
COLOR UR AUTO: YELLOW
CREAT SERPL-MCNC: 1.14 MG/DL (ref 0.66–1.25)
CREAT SERPL-MCNC: 1.18 MG/DL (ref 0.66–1.25)
CREAT SERPL-MCNC: 1.25 MG/DL (ref 0.66–1.25)
CREAT SERPL-MCNC: 1.3 MG/DL (ref 0.66–1.25)
CREAT SERPL-MCNC: 1.3 MG/DL (ref 0.66–1.25)
CREAT SERPL-MCNC: 1.33 MG/DL (ref 0.66–1.25)
CREAT SERPL-MCNC: 1.37 MG/DL (ref 0.66–1.25)
CREAT SERPL-MCNC: 1.55 MG/DL (ref 0.66–1.25)
DIFFERENTIAL METHOD BLD: ABNORMAL
EOSINOPHIL # BLD AUTO: 0 10E9/L (ref 0–0.7)
EOSINOPHIL # BLD AUTO: 0.1 10E9/L (ref 0–0.7)
EOSINOPHIL # BLD AUTO: 0.2 10E9/L (ref 0–0.7)
EOSINOPHIL # BLD AUTO: 0.6 10E9/L (ref 0–0.7)
EOSINOPHIL # BLD AUTO: 0.7 10E9/L (ref 0–0.7)
EOSINOPHIL # BLD AUTO: 0.8 10E9/L (ref 0–0.7)
EOSINOPHIL # BLD AUTO: 1.2 10E9/L (ref 0–0.7)
EOSINOPHIL NFR BLD AUTO: 0.5 %
EOSINOPHIL NFR BLD AUTO: 0.9 %
EOSINOPHIL NFR BLD AUTO: 1.7 %
EOSINOPHIL NFR BLD AUTO: 13.3 %
EOSINOPHIL NFR BLD AUTO: 6.9 %
EOSINOPHIL NFR BLD AUTO: 8 %
EOSINOPHIL NFR BLD AUTO: 8.6 %
ERYTHROCYTE [DISTWIDTH] IN BLOOD BY AUTOMATED COUNT: 16.7 % (ref 10–15)
ERYTHROCYTE [DISTWIDTH] IN BLOOD BY AUTOMATED COUNT: 17.1 % (ref 10–15)
ERYTHROCYTE [DISTWIDTH] IN BLOOD BY AUTOMATED COUNT: 17.2 % (ref 10–15)
ERYTHROCYTE [DISTWIDTH] IN BLOOD BY AUTOMATED COUNT: 18.6 % (ref 10–15)
ERYTHROCYTE [DISTWIDTH] IN BLOOD BY AUTOMATED COUNT: 18.7 % (ref 10–15)
ERYTHROCYTE [DISTWIDTH] IN BLOOD BY AUTOMATED COUNT: 18.9 % (ref 10–15)
ERYTHROCYTE [DISTWIDTH] IN BLOOD BY AUTOMATED COUNT: 19.1 % (ref 10–15)
ERYTHROCYTE [DISTWIDTH] IN BLOOD BY AUTOMATED COUNT: 19.7 % (ref 10–15)
GFR SERPL CREATININE-BSD FRML MDRD: 47 ML/MIN/{1.73_M2}
GFR SERPL CREATININE-BSD FRML MDRD: 54 ML/MIN/{1.73_M2}
GFR SERPL CREATININE-BSD FRML MDRD: 56 ML/MIN/{1.73_M2}
GFR SERPL CREATININE-BSD FRML MDRD: 58 ML/MIN/{1.73_M2}
GFR SERPL CREATININE-BSD FRML MDRD: 58 ML/MIN/{1.73_M2}
GFR SERPL CREATININE-BSD FRML MDRD: 61 ML/MIN/{1.73_M2}
GFR SERPL CREATININE-BSD FRML MDRD: 65 ML/MIN/{1.73_M2}
GFR SERPL CREATININE-BSD FRML MDRD: 68 ML/MIN/{1.73_M2}
GLUCOSE BLDC GLUCOMTR-MCNC: 104 MG/DL (ref 70–99)
GLUCOSE SERPL-MCNC: 102 MG/DL (ref 70–99)
GLUCOSE SERPL-MCNC: 108 MG/DL (ref 70–99)
GLUCOSE SERPL-MCNC: 111 MG/DL (ref 70–99)
GLUCOSE SERPL-MCNC: 115 MG/DL (ref 70–99)
GLUCOSE SERPL-MCNC: 86 MG/DL (ref 70–99)
GLUCOSE SERPL-MCNC: 93 MG/DL (ref 70–99)
GLUCOSE SERPL-MCNC: 93 MG/DL (ref 70–99)
GLUCOSE SERPL-MCNC: 95 MG/DL (ref 70–99)
GLUCOSE SERPL-MCNC: 97 MG/DL (ref 70–99)
GLUCOSE UR STRIP-MCNC: NEGATIVE MG/DL
HCT VFR BLD AUTO: 31.7 % (ref 40–53)
HCT VFR BLD AUTO: 32 % (ref 40–53)
HCT VFR BLD AUTO: 32.1 % (ref 40–53)
HCT VFR BLD AUTO: 34.7 % (ref 40–53)
HCT VFR BLD AUTO: 34.7 % (ref 40–53)
HCT VFR BLD AUTO: 34.9 % (ref 40–53)
HCT VFR BLD AUTO: 35.3 % (ref 40–53)
HCT VFR BLD AUTO: 36.3 % (ref 40–53)
HGB BLD-MCNC: 10.6 G/DL (ref 13.3–17.7)
HGB BLD-MCNC: 10.7 G/DL (ref 13.3–17.7)
HGB BLD-MCNC: 10.8 G/DL (ref 13.3–17.7)
HGB BLD-MCNC: 11.1 G/DL (ref 13.3–17.7)
HGB BLD-MCNC: 11.2 G/DL (ref 13.3–17.7)
HGB BLD-MCNC: 11.5 G/DL (ref 13.3–17.7)
HGB BLD-MCNC: 11.6 G/DL (ref 13.3–17.7)
HGB BLD-MCNC: 11.6 G/DL (ref 13.3–17.7)
HGB UR QL STRIP: ABNORMAL
HYALINE CASTS #/AREA URNS LPF: 6 /LPF (ref 0–2)
IMM GRANULOCYTES # BLD: 0 10E9/L (ref 0–0.4)
IMM GRANULOCYTES NFR BLD: 0.3 %
IMM GRANULOCYTES NFR BLD: 0.3 %
IMM GRANULOCYTES NFR BLD: 0.4 %
INR PPP: 1.37 (ref 0.86–1.14)
KETONES UR STRIP-MCNC: NEGATIVE MG/DL
LABORATORY COMMENT REPORT: NORMAL
LEUKOCYTE ESTERASE UR QL STRIP: ABNORMAL
LIPASE SERPL-CCNC: 207 U/L (ref 73–393)
LYMPHOCYTES # BLD AUTO: 1.2 10E9/L (ref 0.8–5.3)
LYMPHOCYTES # BLD AUTO: 1.3 10E9/L (ref 0.8–5.3)
LYMPHOCYTES # BLD AUTO: 1.4 10E9/L (ref 0.8–5.3)
LYMPHOCYTES # BLD AUTO: 1.4 10E9/L (ref 0.8–5.3)
LYMPHOCYTES NFR BLD AUTO: 12.4 %
LYMPHOCYTES NFR BLD AUTO: 12.7 %
LYMPHOCYTES NFR BLD AUTO: 13.6 %
LYMPHOCYTES NFR BLD AUTO: 15.7 %
LYMPHOCYTES NFR BLD AUTO: 15.7 %
LYMPHOCYTES NFR BLD AUTO: 16.1 %
LYMPHOCYTES NFR BLD AUTO: 18.2 %
MCH RBC QN AUTO: 32.2 PG (ref 26.5–33)
MCH RBC QN AUTO: 32.6 PG (ref 26.5–33)
MCH RBC QN AUTO: 32.7 PG (ref 26.5–33)
MCH RBC QN AUTO: 33 PG (ref 26.5–33)
MCH RBC QN AUTO: 33.2 PG (ref 26.5–33)
MCH RBC QN AUTO: 33.2 PG (ref 26.5–33)
MCHC RBC AUTO-ENTMCNC: 32 G/DL (ref 31.5–36.5)
MCHC RBC AUTO-ENTMCNC: 32 G/DL (ref 31.5–36.5)
MCHC RBC AUTO-ENTMCNC: 32.3 G/DL (ref 31.5–36.5)
MCHC RBC AUTO-ENTMCNC: 32.9 G/DL (ref 31.5–36.5)
MCHC RBC AUTO-ENTMCNC: 33 G/DL (ref 31.5–36.5)
MCHC RBC AUTO-ENTMCNC: 33.1 G/DL (ref 31.5–36.5)
MCHC RBC AUTO-ENTMCNC: 33.6 G/DL (ref 31.5–36.5)
MCHC RBC AUTO-ENTMCNC: 33.8 G/DL (ref 31.5–36.5)
MCV RBC AUTO: 100 FL (ref 78–100)
MCV RBC AUTO: 100 FL (ref 78–100)
MCV RBC AUTO: 101 FL (ref 78–100)
MCV RBC AUTO: 102 FL (ref 78–100)
MCV RBC AUTO: 98 FL (ref 78–100)
MCV RBC AUTO: 98 FL (ref 78–100)
MCV RBC AUTO: 99 FL (ref 78–100)
MCV RBC AUTO: 99 FL (ref 78–100)
MONOCYTES # BLD AUTO: 0.8 10E9/L (ref 0–1.3)
MONOCYTES # BLD AUTO: 1.1 10E9/L (ref 0–1.3)
MONOCYTES # BLD AUTO: 1.3 10E9/L (ref 0–1.3)
MONOCYTES # BLD AUTO: 1.7 10E9/L (ref 0–1.3)
MONOCYTES # BLD AUTO: 1.7 10E9/L (ref 0–1.3)
MONOCYTES NFR BLD AUTO: 10 %
MONOCYTES NFR BLD AUTO: 11.4 %
MONOCYTES NFR BLD AUTO: 12.9 %
MONOCYTES NFR BLD AUTO: 14.1 %
MONOCYTES NFR BLD AUTO: 14.7 %
MONOCYTES NFR BLD AUTO: 16.9 %
MONOCYTES NFR BLD AUTO: 18.1 %
MUCOUS THREADS #/AREA URNS LPF: PRESENT /LPF
NEUTROPHILS # BLD AUTO: 4.9 10E9/L (ref 1.6–8.3)
NEUTROPHILS # BLD AUTO: 5 10E9/L (ref 1.6–8.3)
NEUTROPHILS # BLD AUTO: 5.1 10E9/L (ref 1.6–8.3)
NEUTROPHILS # BLD AUTO: 5.3 10E9/L (ref 1.6–8.3)
NEUTROPHILS # BLD AUTO: 6.4 10E9/L (ref 1.6–8.3)
NEUTROPHILS # BLD AUTO: 6.5 10E9/L (ref 1.6–8.3)
NEUTROPHILS # BLD AUTO: 7 10E9/L (ref 1.6–8.3)
NEUTROPHILS NFR BLD AUTO: 56.2 %
NEUTROPHILS NFR BLD AUTO: 62.4 %
NEUTROPHILS NFR BLD AUTO: 65.7 %
NEUTROPHILS NFR BLD AUTO: 65.9 %
NEUTROPHILS NFR BLD AUTO: 66.5 %
NEUTROPHILS NFR BLD AUTO: 66.9 %
NEUTROPHILS NFR BLD AUTO: 69.1 %
NITRATE UR QL: NEGATIVE
NRBC # BLD AUTO: 0 10*3/UL
NRBC BLD AUTO-RTO: 0 /100
PH UR STRIP: 6 PH (ref 5–7)
PLATELET # BLD AUTO: 130 10E9/L (ref 150–450)
PLATELET # BLD AUTO: 137 10E9/L (ref 150–450)
PLATELET # BLD AUTO: 139 10E9/L (ref 150–450)
PLATELET # BLD AUTO: 156 10E9/L (ref 150–450)
PLATELET # BLD AUTO: 161 10E9/L (ref 150–450)
PLATELET # BLD AUTO: 229 10E9/L (ref 150–450)
PLATELET # BLD AUTO: 241 10E9/L (ref 150–450)
PLATELET # BLD AUTO: 261 10E9/L (ref 150–450)
POTASSIUM SERPL-SCNC: 3.4 MMOL/L (ref 3.4–5.3)
POTASSIUM SERPL-SCNC: 3.4 MMOL/L (ref 3.4–5.3)
POTASSIUM SERPL-SCNC: 3.7 MMOL/L (ref 3.4–5.3)
POTASSIUM SERPL-SCNC: 3.8 MMOL/L (ref 3.4–5.3)
POTASSIUM SERPL-SCNC: 4 MMOL/L (ref 3.4–5.3)
POTASSIUM SERPL-SCNC: 4.1 MMOL/L (ref 3.4–5.3)
POTASSIUM SERPL-SCNC: 4.2 MMOL/L (ref 3.4–5.3)
POTASSIUM SERPL-SCNC: 4.2 MMOL/L (ref 3.4–5.3)
POTASSIUM SERPL-SCNC: 4.5 MMOL/L (ref 3.4–5.3)
PROT SERPL-MCNC: 5.7 G/DL (ref 6.8–8.8)
PROT SERPL-MCNC: 5.8 G/DL (ref 6.8–8.8)
PROT SERPL-MCNC: 6 G/DL (ref 6.8–8.8)
PROT SERPL-MCNC: 6.2 G/DL (ref 6.8–8.8)
PROT SERPL-MCNC: 6.3 G/DL (ref 6.8–8.8)
PROT SERPL-MCNC: 6.5 G/DL (ref 6.8–8.8)
PROT SERPL-MCNC: 6.6 G/DL (ref 6.8–8.8)
PROT SERPL-MCNC: 6.6 G/DL (ref 6.8–8.8)
RADIOLOGIST FLAGS: NORMAL
RBC # BLD AUTO: 3.21 10E12/L (ref 4.4–5.9)
RBC # BLD AUTO: 3.22 10E12/L (ref 4.4–5.9)
RBC # BLD AUTO: 3.25 10E12/L (ref 4.4–5.9)
RBC # BLD AUTO: 3.4 10E12/L (ref 4.4–5.9)
RBC # BLD AUTO: 3.48 10E12/L (ref 4.4–5.9)
RBC # BLD AUTO: 3.52 10E12/L (ref 4.4–5.9)
RBC # BLD AUTO: 3.6 10E12/L (ref 4.4–5.9)
RBC # BLD AUTO: 3.6 10E12/L (ref 4.4–5.9)
RBC #/AREA URNS AUTO: 137 /HPF (ref 0–2)
SARS-COV-2 RNA SPEC QL NAA+PROBE: NEGATIVE
SARS-COV-2 RNA SPEC QL NAA+PROBE: NORMAL
SARS-COV-2 RNA SPEC QL NAA+PROBE: NOT DETECTED
SODIUM SERPL-SCNC: 137 MMOL/L (ref 133–144)
SODIUM SERPL-SCNC: 137 MMOL/L (ref 133–144)
SODIUM SERPL-SCNC: 138 MMOL/L (ref 133–144)
SODIUM SERPL-SCNC: 139 MMOL/L (ref 133–144)
SODIUM SERPL-SCNC: 140 MMOL/L (ref 133–144)
SODIUM SERPL-SCNC: 140 MMOL/L (ref 133–144)
SODIUM SERPL-SCNC: 141 MMOL/L (ref 133–144)
SODIUM SERPL-SCNC: 141 MMOL/L (ref 133–144)
SOURCE: ABNORMAL
SP GR UR STRIP: 1.02 (ref 1–1.03)
SPECIMEN SOURCE: NORMAL
TRANS CELLS #/AREA URNS HPF: 2 /HPF (ref 0–1)
UPPER GI ENDOSCOPY: NORMAL
UROBILINOGEN UR STRIP-MCNC: NORMAL MG/DL (ref 0–2)
WBC # BLD AUTO: 10.1 10E9/L (ref 4–11)
WBC # BLD AUTO: 5.5 10E9/L (ref 4–11)
WBC # BLD AUTO: 7.5 10E9/L (ref 4–11)
WBC # BLD AUTO: 8 10E9/L (ref 4–11)
WBC # BLD AUTO: 8.2 10E9/L (ref 4–11)
WBC # BLD AUTO: 8.9 10E9/L (ref 4–11)
WBC # BLD AUTO: 9.6 10E9/L (ref 4–11)
WBC # BLD AUTO: 9.7 10E9/L (ref 4–11)
WBC #/AREA URNS AUTO: 4 /HPF (ref 0–5)

## 2020-01-01 PROCEDURE — 80053 COMPREHEN METABOLIC PANEL: CPT | Performed by: INTERNAL MEDICINE

## 2020-01-01 PROCEDURE — 36591 DRAW BLOOD OFF VENOUS DEVICE: CPT

## 2020-01-01 PROCEDURE — 99215 OFFICE O/P EST HI 40 MIN: CPT | Mod: 95 | Performed by: INTERNAL MEDICINE

## 2020-01-01 PROCEDURE — 83690 ASSAY OF LIPASE: CPT | Performed by: INTERNAL MEDICINE

## 2020-01-01 PROCEDURE — 85025 COMPLETE CBC W/AUTO DIFF WBC: CPT | Performed by: NURSE PRACTITIONER

## 2020-01-01 PROCEDURE — 86301 IMMUNOASSAY TUMOR CA 19-9: CPT | Performed by: INTERNAL MEDICINE

## 2020-01-01 PROCEDURE — C1876 STENT, NON-COA/NON-COV W/DEL: HCPCS | Performed by: INTERNAL MEDICINE

## 2020-01-01 PROCEDURE — C1874 STENT, COATED/COV W/DEL SYS: HCPCS | Performed by: INTERNAL MEDICINE

## 2020-01-01 PROCEDURE — 360N000025 HC SURGERY LEVEL 3 W FLUORO 1ST 30 MIN - UMMC: Performed by: UROLOGY

## 2020-01-01 PROCEDURE — 250N000011 HC RX IP 250 OP 636: Performed by: INTERNAL MEDICINE

## 2020-01-01 PROCEDURE — C1769 GUIDE WIRE: HCPCS | Performed by: INTERNAL MEDICINE

## 2020-01-01 PROCEDURE — 81001 URINALYSIS AUTO W/SCOPE: CPT | Performed by: UROLOGY

## 2020-01-01 PROCEDURE — 99215 OFFICE O/P EST HI 40 MIN: CPT | Mod: GT | Performed by: NURSE PRACTITIONER

## 2020-01-01 PROCEDURE — 85025 COMPLETE CBC W/AUTO DIFF WBC: CPT | Performed by: INTERNAL MEDICINE

## 2020-01-01 PROCEDURE — 27210794 ZZH OR GENERAL SUPPLY STERILE: Performed by: INTERNAL MEDICINE

## 2020-01-01 PROCEDURE — 258N000003 HC RX IP 258 OP 636: Performed by: NURSE ANESTHETIST, CERTIFIED REGISTERED

## 2020-01-01 PROCEDURE — 25000565 ZZH ISOFLURANE, EA 15 MIN: Performed by: INTERNAL MEDICINE

## 2020-01-01 PROCEDURE — 25000125 ZZHC RX 250: Performed by: INTERNAL MEDICINE

## 2020-01-01 PROCEDURE — 36000053 ZZH SURGERY LEVEL 2 EA 15 ADDTL MIN - UMMC: Performed by: INTERNAL MEDICINE

## 2020-01-01 PROCEDURE — 360N000023 HC SURGERY LEVEL 3 EA 15 ADDTL MIN UMMC: Performed by: UROLOGY

## 2020-01-01 PROCEDURE — 71000014 ZZH RECOVERY PHASE 1 LEVEL 2 FIRST HR: Performed by: INTERNAL MEDICINE

## 2020-01-01 PROCEDURE — 87086 URINE CULTURE/COLONY COUNT: CPT | Performed by: UROLOGY

## 2020-01-01 PROCEDURE — 25000128 H RX IP 250 OP 636: Performed by: INTERNAL MEDICINE

## 2020-01-01 PROCEDURE — 250N000009 HC RX 250: Performed by: UROLOGY

## 2020-01-01 PROCEDURE — 25800030 ZZH RX IP 258 OP 636: Performed by: NURSE ANESTHETIST, CERTIFIED REGISTERED

## 2020-01-01 PROCEDURE — 85610 PROTHROMBIN TIME: CPT | Performed by: INTERNAL MEDICINE

## 2020-01-01 PROCEDURE — 36592 COLLECT BLOOD FROM PICC: CPT | Performed by: INTERNAL MEDICINE

## 2020-01-01 PROCEDURE — C2617 STENT, NON-COR, TEM W/O DEL: HCPCS | Performed by: UROLOGY

## 2020-01-01 PROCEDURE — 37000008 ZZH ANESTHESIA TECHNICAL FEE, 1ST 30 MIN: Performed by: INTERNAL MEDICINE

## 2020-01-01 PROCEDURE — 80053 COMPREHEN METABOLIC PANEL: CPT | Performed by: PATHOLOGY

## 2020-01-01 PROCEDURE — 255N000002 HC RX 255 OP 636: Performed by: UROLOGY

## 2020-01-01 PROCEDURE — 40001009 ZZH VIDEO/TELEPHONE VISIT; NO CHARGE

## 2020-01-01 PROCEDURE — 999N000180 XR SURGERY CARM FLUORO LESS THAN 5 MIN: Mod: TC

## 2020-01-01 PROCEDURE — 250N000011 HC RX IP 250 OP 636: Performed by: NURSE ANESTHETIST, CERTIFIED REGISTERED

## 2020-01-01 PROCEDURE — 82947 ASSAY GLUCOSE BLOOD QUANT: CPT | Mod: G2 | Performed by: ANESTHESIOLOGY

## 2020-01-01 PROCEDURE — 85025 COMPLETE CBC W/AUTO DIFF WBC: CPT | Performed by: PATHOLOGY

## 2020-01-01 PROCEDURE — 37000009 ZZH ANESTHESIA TECHNICAL FEE, EACH ADDTL 15 MIN: Performed by: INTERNAL MEDICINE

## 2020-01-01 PROCEDURE — 999N000139 HC STATISTIC PRE-PROCEDURE ASSESSMENT II: Performed by: UROLOGY

## 2020-01-01 PROCEDURE — 71000027 ZZH RECOVERY PHASE 2 EACH 15 MINS: Performed by: INTERNAL MEDICINE

## 2020-01-01 PROCEDURE — 272N000001 HC OR GENERAL SUPPLY STERILE: Performed by: UROLOGY

## 2020-01-01 PROCEDURE — 370N000001 HC ANESTHESIA TECHNICAL FEE, 1ST 30 MIN: Performed by: UROLOGY

## 2020-01-01 PROCEDURE — 82150 ASSAY OF AMYLASE: CPT | Performed by: INTERNAL MEDICINE

## 2020-01-01 PROCEDURE — 80053 COMPREHEN METABOLIC PANEL: CPT | Performed by: NURSE PRACTITIONER

## 2020-01-01 PROCEDURE — 86301 IMMUNOASSAY TUMOR CA 19-9: CPT | Performed by: NURSE PRACTITIONER

## 2020-01-01 PROCEDURE — C1887 CATHETER, GUIDING: HCPCS | Performed by: UROLOGY

## 2020-01-01 PROCEDURE — 999N001193 HC VIDEO/TELEPHONE VISIT; NO CHARGE

## 2020-01-01 PROCEDURE — 74177 CT ABD & PELVIS W/CONTRAST: CPT | Mod: GC | Performed by: STUDENT IN AN ORGANIZED HEALTH CARE EDUCATION/TRAINING PROGRAM

## 2020-01-01 PROCEDURE — 82962 GLUCOSE BLOOD TEST: CPT

## 2020-01-01 PROCEDURE — 25000128 H RX IP 250 OP 636: Performed by: ANESTHESIOLOGY

## 2020-01-01 PROCEDURE — 40000170 ZZH STATISTIC PRE-PROCEDURE ASSESSMENT II: Performed by: INTERNAL MEDICINE

## 2020-01-01 PROCEDURE — 99000 SPECIMEN HANDLING OFFICE-LAB: CPT | Performed by: PATHOLOGY

## 2020-01-01 PROCEDURE — 250N000011 HC RX IP 250 OP 636: Performed by: ANESTHESIOLOGY

## 2020-01-01 PROCEDURE — 25500064 ZZH RX 255 OP 636: Performed by: INTERNAL MEDICINE

## 2020-01-01 PROCEDURE — U0003 INFECTIOUS AGENT DETECTION BY NUCLEIC ACID (DNA OR RNA); SEVERE ACUTE RESPIRATORY SYNDROME CORONAVIRUS 2 (SARS-COV-2) (CORONAVIRUS DISEASE [COVID-19]), AMPLIFIED PROBE TECHNIQUE, MAKING USE OF HIGH THROUGHPUT TECHNOLOGIES AS DESCRIBED BY CMS-2020-01-R: HCPCS | Mod: 90 | Performed by: PATHOLOGY

## 2020-01-01 PROCEDURE — C1769 GUIDE WIRE: HCPCS | Performed by: UROLOGY

## 2020-01-01 PROCEDURE — 25000125 ZZHC RX 250: Performed by: NURSE ANESTHETIST, CERTIFIED REGISTERED

## 2020-01-01 PROCEDURE — 97802 MEDICAL NUTRITION INDIV IN: CPT

## 2020-01-01 PROCEDURE — 761N000007 HC RECOVERY PHASE 2 EACH 15 MINS: Performed by: UROLOGY

## 2020-01-01 PROCEDURE — 40000279 XR SURGERY CARM FLUORO GREATER THAN 5 MIN W STILLS: Mod: TC

## 2020-01-01 PROCEDURE — 36000051 ZZH SURGERY LEVEL 2 1ST 30 MIN - UMMC: Performed by: INTERNAL MEDICINE

## 2020-01-01 PROCEDURE — 25000128 H RX IP 250 OP 636: Performed by: NURSE ANESTHETIST, CERTIFIED REGISTERED

## 2020-01-01 PROCEDURE — 370N000002 HC ANESTHESIA TECHNICAL FEE, EACH ADDTL 15 MIN: Performed by: UROLOGY

## 2020-01-01 PROCEDURE — 250N000011 HC RX IP 250 OP 636: Performed by: UROLOGY

## 2020-01-01 PROCEDURE — 71260 CT THORAX DX C+: CPT | Mod: GC | Performed by: STUDENT IN AN ORGANIZED HEALTH CARE EDUCATION/TRAINING PROGRAM

## 2020-01-01 PROCEDURE — 99214 OFFICE O/P EST MOD 30 MIN: CPT | Performed by: NURSE PRACTITIONER

## 2020-01-01 PROCEDURE — 85027 COMPLETE CBC AUTOMATED: CPT | Performed by: INTERNAL MEDICINE

## 2020-01-01 PROCEDURE — 250N000013 HC RX MED GY IP 250 OP 250 PS 637: Performed by: NURSE ANESTHETIST, CERTIFIED REGISTERED

## 2020-01-01 PROCEDURE — C1726 CATH, BAL DIL, NON-VASCULAR: HCPCS | Performed by: INTERNAL MEDICINE

## 2020-01-01 PROCEDURE — 84132 ASSAY OF SERUM POTASSIUM: CPT | Performed by: ANESTHESIOLOGY

## 2020-01-01 DEVICE — GORE VIABIL BILIARY ENDO 10MMX8CM 8.5FR
Type: IMPLANTABLE DEVICE | Site: JEJUNUM | Status: NON-FUNCTIONAL
Brand: GORE VIABIL BILIARY ENDOPROSTHESIS
Removed: 2021-04-09

## 2020-01-01 DEVICE — IMPLANTABLE DEVICE: Type: IMPLANTABLE DEVICE | Site: DUODENUM | Status: FUNCTIONAL

## 2020-01-01 DEVICE — STENT URETERAL PERCUFLEX PLUS 7FRX26CM M0061752730
Type: IMPLANTABLE DEVICE | Site: URETER | Status: NON-FUNCTIONAL
Removed: 2021-01-15

## 2020-01-01 RX ORDER — IOPAMIDOL 755 MG/ML
88 INJECTION, SOLUTION INTRAVASCULAR ONCE
Status: COMPLETED | OUTPATIENT
Start: 2020-01-01 | End: 2020-01-01

## 2020-01-01 RX ORDER — PROPOFOL 10 MG/ML
INJECTION, EMULSION INTRAVENOUS PRN
Status: DISCONTINUED | OUTPATIENT
Start: 2020-01-01 | End: 2020-01-01

## 2020-01-01 RX ORDER — ONDANSETRON 2 MG/ML
INJECTION INTRAMUSCULAR; INTRAVENOUS PRN
Status: DISCONTINUED | OUTPATIENT
Start: 2020-01-01 | End: 2020-01-01

## 2020-01-01 RX ORDER — CAPECITABINE 500 MG/1
800 TABLET, FILM COATED ORAL 2 TIMES DAILY
Qty: 84 TABLET | Refills: 0 | Status: SHIPPED | OUTPATIENT
Start: 2020-01-01 | End: 2020-01-01

## 2020-01-01 RX ORDER — HEPARIN SODIUM (PORCINE) LOCK FLUSH IV SOLN 100 UNIT/ML 100 UNIT/ML
5 SOLUTION INTRAVENOUS ONCE
Status: COMPLETED | OUTPATIENT
Start: 2020-01-01 | End: 2020-01-01

## 2020-01-01 RX ORDER — SODIUM CHLORIDE, SODIUM LACTATE, POTASSIUM CHLORIDE, CALCIUM CHLORIDE 600; 310; 30; 20 MG/100ML; MG/100ML; MG/100ML; MG/100ML
INJECTION, SOLUTION INTRAVENOUS CONTINUOUS PRN
Status: DISCONTINUED | OUTPATIENT
Start: 2020-01-01 | End: 2020-01-01

## 2020-01-01 RX ORDER — HEPARIN SODIUM (PORCINE) LOCK FLUSH IV SOLN 100 UNIT/ML 100 UNIT/ML
500 SOLUTION INTRAVENOUS ONCE
Status: COMPLETED | OUTPATIENT
Start: 2020-01-01 | End: 2020-01-01

## 2020-01-01 RX ORDER — ONDANSETRON 2 MG/ML
4 INJECTION INTRAMUSCULAR; INTRAVENOUS EVERY 30 MIN PRN
Status: DISCONTINUED | OUTPATIENT
Start: 2020-01-01 | End: 2020-01-01 | Stop reason: HOSPADM

## 2020-01-01 RX ORDER — IOPAMIDOL 755 MG/ML
97 INJECTION, SOLUTION INTRAVASCULAR ONCE
Status: COMPLETED | OUTPATIENT
Start: 2020-01-01 | End: 2020-01-01

## 2020-01-01 RX ORDER — FENTANYL CITRATE 50 UG/ML
INJECTION, SOLUTION INTRAMUSCULAR; INTRAVENOUS PRN
Status: DISCONTINUED | OUTPATIENT
Start: 2020-01-01 | End: 2020-01-01

## 2020-01-01 RX ORDER — PROPOFOL 10 MG/ML
INJECTION, EMULSION INTRAVENOUS CONTINUOUS PRN
Status: DISCONTINUED | OUTPATIENT
Start: 2020-01-01 | End: 2020-01-01

## 2020-01-01 RX ORDER — LIDOCAINE HYDROCHLORIDE 20 MG/ML
INJECTION, SOLUTION INFILTRATION; PERINEURAL PRN
Status: DISCONTINUED | OUTPATIENT
Start: 2020-01-01 | End: 2020-01-01

## 2020-01-01 RX ORDER — DEXAMETHASONE SODIUM PHOSPHATE 4 MG/ML
INJECTION, SOLUTION INTRA-ARTICULAR; INTRALESIONAL; INTRAMUSCULAR; INTRAVENOUS; SOFT TISSUE PRN
Status: DISCONTINUED | OUTPATIENT
Start: 2020-01-01 | End: 2020-01-01

## 2020-01-01 RX ORDER — LIDOCAINE 40 MG/G
CREAM TOPICAL
Status: DISCONTINUED | OUTPATIENT
Start: 2020-01-01 | End: 2020-01-01 | Stop reason: HOSPADM

## 2020-01-01 RX ORDER — LIDOCAINE HYDROCHLORIDE 20 MG/ML
JELLY TOPICAL PRN
Status: DISCONTINUED | OUTPATIENT
Start: 2020-01-01 | End: 2020-01-01 | Stop reason: HOSPADM

## 2020-01-01 RX ORDER — POLYETHYLENE GLYCOL 3350 17 G/17G
1 POWDER, FOR SOLUTION ORAL PRN
COMMUNITY

## 2020-01-01 RX ORDER — CEFAZOLIN SODIUM 1 G/50ML
1 INJECTION, SOLUTION INTRAVENOUS SEE ADMIN INSTRUCTIONS
Status: CANCELLED | OUTPATIENT
Start: 2020-01-01

## 2020-01-01 RX ORDER — HEPARIN SODIUM (PORCINE) LOCK FLUSH IV SOLN 100 UNIT/ML 100 UNIT/ML
5 SOLUTION INTRAVENOUS EVERY 8 HOURS PRN
Status: DISCONTINUED | OUTPATIENT
Start: 2020-01-01 | End: 2021-01-01 | Stop reason: HOSPADM

## 2020-01-01 RX ORDER — HEPARIN SODIUM (PORCINE) LOCK FLUSH IV SOLN 100 UNIT/ML 100 UNIT/ML
5 SOLUTION INTRAVENOUS EVERY 8 HOURS
Status: DISCONTINUED | OUTPATIENT
Start: 2020-01-01 | End: 2020-01-01 | Stop reason: HOSPADM

## 2020-01-01 RX ORDER — IOPAMIDOL 755 MG/ML
95 INJECTION, SOLUTION INTRAVASCULAR ONCE
Status: COMPLETED | OUTPATIENT
Start: 2020-01-01 | End: 2020-01-01

## 2020-01-01 RX ORDER — ONDANSETRON 4 MG/1
4 TABLET, ORALLY DISINTEGRATING ORAL EVERY 30 MIN PRN
Status: DISCONTINUED | OUTPATIENT
Start: 2020-01-01 | End: 2020-01-01 | Stop reason: HOSPADM

## 2020-01-01 RX ORDER — IOPAMIDOL 510 MG/ML
INJECTION, SOLUTION INTRAVASCULAR PRN
Status: DISCONTINUED | OUTPATIENT
Start: 2020-01-01 | End: 2020-01-01 | Stop reason: HOSPADM

## 2020-01-01 RX ORDER — PHYSOSTIGMINE SALICYLATE 1 MG/ML
1.2 INJECTION INTRAVENOUS
Status: DISCONTINUED | OUTPATIENT
Start: 2020-01-01 | End: 2020-01-01 | Stop reason: HOSPADM

## 2020-01-01 RX ORDER — ESMOLOL HYDROCHLORIDE 10 MG/ML
INJECTION INTRAVENOUS PRN
Status: DISCONTINUED | OUTPATIENT
Start: 2020-01-01 | End: 2020-01-01

## 2020-01-01 RX ORDER — NALOXONE HYDROCHLORIDE 0.4 MG/ML
.1-.4 INJECTION, SOLUTION INTRAMUSCULAR; INTRAVENOUS; SUBCUTANEOUS
Status: DISCONTINUED | OUTPATIENT
Start: 2020-01-01 | End: 2020-01-01 | Stop reason: HOSPADM

## 2020-01-01 RX ORDER — HEPARIN SODIUM (PORCINE) LOCK FLUSH IV SOLN 100 UNIT/ML 100 UNIT/ML
5 SOLUTION INTRAVENOUS
Status: DISCONTINUED | OUTPATIENT
Start: 2020-01-01 | End: 2020-01-01 | Stop reason: HOSPADM

## 2020-01-01 RX ORDER — HEPARIN SODIUM (PORCINE) LOCK FLUSH IV SOLN 100 UNIT/ML 100 UNIT/ML
5 SOLUTION INTRAVENOUS DAILY PRN
Status: DISCONTINUED | OUTPATIENT
Start: 2020-01-01 | End: 2020-01-01 | Stop reason: HOSPADM

## 2020-01-01 RX ORDER — HYDRALAZINE HYDROCHLORIDE 20 MG/ML
2.5-5 INJECTION INTRAMUSCULAR; INTRAVENOUS EVERY 10 MIN PRN
Status: DISCONTINUED | OUTPATIENT
Start: 2020-01-01 | End: 2020-01-01 | Stop reason: HOSPADM

## 2020-01-01 RX ORDER — DRONABINOL 2.5 MG/1
2.5 CAPSULE ORAL
Qty: 60 CAPSULE | Refills: 0 | Status: SHIPPED | OUTPATIENT
Start: 2020-01-01 | End: 2021-01-01

## 2020-01-01 RX ORDER — FENTANYL CITRATE 50 UG/ML
25-50 INJECTION, SOLUTION INTRAMUSCULAR; INTRAVENOUS
Status: DISCONTINUED | OUTPATIENT
Start: 2020-01-01 | End: 2020-01-01 | Stop reason: HOSPADM

## 2020-01-01 RX ORDER — CEFAZOLIN SODIUM 1 G/3ML
1 INJECTION, POWDER, FOR SOLUTION INTRAMUSCULAR; INTRAVENOUS SEE ADMIN INSTRUCTIONS
Status: DISCONTINUED | OUTPATIENT
Start: 2020-01-01 | End: 2020-01-01 | Stop reason: HOSPADM

## 2020-01-01 RX ORDER — FENTANYL CITRATE-0.9 % NACL/PF 10 MCG/ML
PLASTIC BAG, INJECTION (ML) INTRAVENOUS PRN
Status: DISCONTINUED | OUTPATIENT
Start: 2020-01-01 | End: 2020-01-01

## 2020-01-01 RX ORDER — LABETALOL 20 MG/4 ML (5 MG/ML) INTRAVENOUS SYRINGE
5 EVERY 10 MIN PRN
Status: DISCONTINUED | OUTPATIENT
Start: 2020-01-01 | End: 2020-01-01 | Stop reason: HOSPADM

## 2020-01-01 RX ORDER — INDOMETHACIN 50 MG/1
100 SUPPOSITORY RECTAL
Status: DISCONTINUED | OUTPATIENT
Start: 2020-01-01 | End: 2020-01-01 | Stop reason: HOSPADM

## 2020-01-01 RX ORDER — HEPARIN SODIUM,PORCINE 10 UNIT/ML
5-10 VIAL (ML) INTRAVENOUS
Status: DISCONTINUED | OUTPATIENT
Start: 2020-01-01 | End: 2020-01-01 | Stop reason: HOSPADM

## 2020-01-01 RX ORDER — CAPECITABINE 500 MG/1
TABLET, FILM COATED ORAL
Qty: 84 TABLET | Refills: 0 | OUTPATIENT
Start: 2020-01-01

## 2020-01-01 RX ORDER — HYDROMORPHONE HYDROCHLORIDE 1 MG/ML
.3-.5 INJECTION, SOLUTION INTRAMUSCULAR; INTRAVENOUS; SUBCUTANEOUS EVERY 10 MIN PRN
Status: DISCONTINUED | OUTPATIENT
Start: 2020-01-01 | End: 2020-01-01 | Stop reason: HOSPADM

## 2020-01-01 RX ORDER — ALBUTEROL SULFATE 0.83 MG/ML
2.5 SOLUTION RESPIRATORY (INHALATION) EVERY 4 HOURS PRN
Status: DISCONTINUED | OUTPATIENT
Start: 2020-01-01 | End: 2020-01-01 | Stop reason: HOSPADM

## 2020-01-01 RX ORDER — CAPECITABINE 500 MG/1
800 TABLET, FILM COATED ORAL 2 TIMES DAILY
Qty: 84 TABLET | Refills: 0 | Status: SHIPPED | OUTPATIENT
Start: 2020-01-01 | End: 2021-01-01

## 2020-01-01 RX ORDER — FENTANYL CITRATE 50 UG/ML
25-50 INJECTION, SOLUTION INTRAMUSCULAR; INTRAVENOUS EVERY 5 MIN PRN
Status: DISCONTINUED | OUTPATIENT
Start: 2020-01-01 | End: 2020-01-01 | Stop reason: HOSPADM

## 2020-01-01 RX ORDER — SODIUM CHLORIDE, SODIUM LACTATE, POTASSIUM CHLORIDE, CALCIUM CHLORIDE 600; 310; 30; 20 MG/100ML; MG/100ML; MG/100ML; MG/100ML
INJECTION, SOLUTION INTRAVENOUS CONTINUOUS
Status: DISCONTINUED | OUTPATIENT
Start: 2020-01-01 | End: 2020-01-01 | Stop reason: HOSPADM

## 2020-01-01 RX ORDER — CEFAZOLIN SODIUM 2 G/100ML
2 INJECTION, SOLUTION INTRAVENOUS
Status: COMPLETED | OUTPATIENT
Start: 2020-01-01 | End: 2020-01-01

## 2020-01-01 RX ORDER — HEPARIN SODIUM,PORCINE 10 UNIT/ML
5-10 VIAL (ML) INTRAVENOUS EVERY 24 HOURS
Status: DISCONTINUED | OUTPATIENT
Start: 2020-01-01 | End: 2020-01-01 | Stop reason: HOSPADM

## 2020-01-01 RX ORDER — CEFAZOLIN SODIUM 2 G/50ML
2 SOLUTION INTRAVENOUS
Status: CANCELLED | OUTPATIENT
Start: 2020-01-01

## 2020-01-01 RX ADMIN — Medication 100 MCG: at 07:54

## 2020-01-01 RX ADMIN — Medication 5 ML: at 11:57

## 2020-01-01 RX ADMIN — Medication 5 ML: at 14:50

## 2020-01-01 RX ADMIN — HEPARIN SODIUM (PORCINE) LOCK FLUSH IV SOLN 100 UNIT/ML 5 ML: 100 SOLUTION at 12:57

## 2020-01-01 RX ADMIN — Medication 50 MCG: at 07:59

## 2020-01-01 RX ADMIN — ONDANSETRON 4 MG: 2 INJECTION INTRAMUSCULAR; INTRAVENOUS at 14:05

## 2020-01-01 RX ADMIN — LIDOCAINE HYDROCHLORIDE 100 MG: 20 INJECTION, SOLUTION INFILTRATION; PERINEURAL at 12:19

## 2020-01-01 RX ADMIN — ROCURONIUM BROMIDE 40 MG: 10 INJECTION INTRAVENOUS at 12:22

## 2020-01-01 RX ADMIN — SODIUM CHLORIDE, POTASSIUM CHLORIDE, SODIUM LACTATE AND CALCIUM CHLORIDE: 600; 310; 30; 20 INJECTION, SOLUTION INTRAVENOUS at 07:29

## 2020-01-01 RX ADMIN — Medication 5 ML: at 14:25

## 2020-01-01 RX ADMIN — HEPARIN 5 ML: 100 SYRINGE at 09:45

## 2020-01-01 RX ADMIN — PHENYLEPHRINE HYDROCHLORIDE 150 MCG: 10 INJECTION INTRAVENOUS at 12:45

## 2020-01-01 RX ADMIN — POLYETHYLENE GLYCOL 400 AND PROPYLENE GLYCOL 2 DROP: 4; 3 SOLUTION/ DROPS OPHTHALMIC at 07:39

## 2020-01-01 RX ADMIN — CEFAZOLIN 2 G: 10 INJECTION, POWDER, FOR SOLUTION INTRAVENOUS at 07:37

## 2020-01-01 RX ADMIN — PROPOFOL 20 MG: 10 INJECTION, EMULSION INTRAVENOUS at 07:36

## 2020-01-01 RX ADMIN — SODIUM CHLORIDE, PRESERVATIVE FREE 500 UNITS: 5 INJECTION INTRAVENOUS at 13:50

## 2020-01-01 RX ADMIN — DEXAMETHASONE SODIUM PHOSPHATE 4 MG: 4 INJECTION, SOLUTION INTRA-ARTICULAR; INTRALESIONAL; INTRAMUSCULAR; INTRAVENOUS; SOFT TISSUE at 12:55

## 2020-01-01 RX ADMIN — SODIUM CHLORIDE, POTASSIUM CHLORIDE, SODIUM LACTATE AND CALCIUM CHLORIDE: 600; 310; 30; 20 INJECTION, SOLUTION INTRAVENOUS at 12:05

## 2020-01-01 RX ADMIN — Medication 50 MCG: at 08:26

## 2020-01-01 RX ADMIN — PROPOFOL 70 MG: 10 INJECTION, EMULSION INTRAVENOUS at 12:22

## 2020-01-01 RX ADMIN — IOPAMIDOL 95 ML: 755 INJECTION, SOLUTION INTRAVASCULAR at 15:08

## 2020-01-01 RX ADMIN — SUGAMMADEX 200 MG: 100 INJECTION, SOLUTION INTRAVENOUS at 13:38

## 2020-01-01 RX ADMIN — IOPAMIDOL 88 ML: 755 INJECTION, SOLUTION INTRAVASCULAR at 14:55

## 2020-01-01 RX ADMIN — HEPARIN SODIUM (PORCINE) LOCK FLUSH IV SOLN 100 UNIT/ML 500 UNITS: 100 SOLUTION at 15:23

## 2020-01-01 RX ADMIN — IOPAMIDOL 97 ML: 755 INJECTION, SOLUTION INTRAVASCULAR at 12:52

## 2020-01-01 RX ADMIN — PHENYLEPHRINE HYDROCHLORIDE 100 MCG: 10 INJECTION INTRAVENOUS at 12:47

## 2020-01-01 RX ADMIN — Medication 5 ML: at 12:04

## 2020-01-01 RX ADMIN — PHENYLEPHRINE HYDROCHLORIDE 0.5 MCG/KG/MIN: 10 INJECTION INTRAVENOUS at 12:46

## 2020-01-01 RX ADMIN — ONDANSETRON 4 MG: 2 INJECTION INTRAMUSCULAR; INTRAVENOUS at 07:48

## 2020-01-01 RX ADMIN — Medication 50 MCG: at 08:12

## 2020-01-01 RX ADMIN — FENTANYL CITRATE 150 MCG: 50 INJECTION, SOLUTION INTRAMUSCULAR; INTRAVENOUS at 12:18

## 2020-01-01 RX ADMIN — PROPOFOL 100 MCG/KG/MIN: 10 INJECTION, EMULSION INTRAVENOUS at 07:33

## 2020-01-01 RX ADMIN — PROPOFOL 40 MG: 10 INJECTION, EMULSION INTRAVENOUS at 07:33

## 2020-01-01 RX ADMIN — ESMOLOL HYDROCHLORIDE 30 MG: 10 INJECTION, SOLUTION INTRAVENOUS at 12:28

## 2020-01-01 RX ADMIN — PHENYLEPHRINE HYDROCHLORIDE 100 MCG: 10 INJECTION INTRAVENOUS at 12:35

## 2020-01-01 RX ADMIN — PHENYLEPHRINE HYDROCHLORIDE 150 MCG: 10 INJECTION INTRAVENOUS at 12:29

## 2020-01-01 RX ADMIN — Medication 5 ML: at 13:54

## 2020-01-01 RX ADMIN — HEPARIN SODIUM (PORCINE) LOCK FLUSH IV SOLN 100 UNIT/ML 5 ML: 100 SOLUTION at 14:58

## 2020-01-01 ASSESSMENT — ENCOUNTER SYMPTOMS
DYSRHYTHMIAS: 1
ORTHOPNEA: 0
SEIZURES: 0
SEIZURES: 0
DYSRHYTHMIAS: 1

## 2020-01-01 ASSESSMENT — PAIN SCALES - GENERAL
PAINLEVEL: NO PAIN (0)
PAINLEVEL: MODERATE PAIN (4)

## 2020-01-01 ASSESSMENT — MIFFLIN-ST. JEOR
SCORE: 1515.67
SCORE: 1503.13
SCORE: 1497.53
SCORE: 1491.87

## 2020-01-01 ASSESSMENT — LIFESTYLE VARIABLES
TOBACCO_USE: 0

## 2020-01-03 NOTE — PROGRESS NOTES
This is a recent snapshot of the patient's [facility] medical record.  For current drug dose and complete information and questions, call [phone] or In [facility] pool, fv home infusion ([id number])  CSN Number:  [id number]

## 2020-01-08 ENCOUNTER — COMMUNICATION - HEALTHEAST (OUTPATIENT)
Dept: CARDIOLOGY | Facility: CLINIC | Age: 65
End: 2020-01-08

## 2020-01-08 DIAGNOSIS — T45.1X5A ANEMIA ASSOCIATED WITH CHEMOTHERAPY: ICD-10-CM

## 2020-01-08 DIAGNOSIS — C22.1 CHOLANGIOCARCINOMA (H): ICD-10-CM

## 2020-01-08 DIAGNOSIS — D64.81 ANEMIA ASSOCIATED WITH CHEMOTHERAPY: ICD-10-CM

## 2020-01-08 DIAGNOSIS — I48.91 ATRIAL FIBRILLATION WITH RVR (H): ICD-10-CM

## 2020-01-08 RX ORDER — EPINEPHRINE 0.3 MG/.3ML
0.3 INJECTION SUBCUTANEOUS EVERY 5 MIN PRN
Status: CANCELLED | OUTPATIENT
Start: 2020-01-10

## 2020-01-08 RX ORDER — DIPHENHYDRAMINE HYDROCHLORIDE 50 MG/ML
50 INJECTION INTRAMUSCULAR; INTRAVENOUS
Status: CANCELLED
Start: 2020-01-10

## 2020-01-08 RX ORDER — LORAZEPAM 2 MG/ML
0.5 INJECTION INTRAMUSCULAR EVERY 4 HOURS PRN
Status: CANCELLED
Start: 2020-01-10

## 2020-01-08 RX ORDER — METHYLPREDNISOLONE SODIUM SUCCINATE 125 MG/2ML
125 INJECTION, POWDER, LYOPHILIZED, FOR SOLUTION INTRAMUSCULAR; INTRAVENOUS
Status: CANCELLED
Start: 2020-01-10

## 2020-01-08 RX ORDER — EPINEPHRINE 1 MG/ML
0.3 INJECTION, SOLUTION INTRAMUSCULAR; SUBCUTANEOUS EVERY 5 MIN PRN
Status: CANCELLED | OUTPATIENT
Start: 2020-01-10

## 2020-01-08 RX ORDER — SODIUM CHLORIDE 9 MG/ML
1000 INJECTION, SOLUTION INTRAVENOUS CONTINUOUS PRN
Status: CANCELLED
Start: 2020-01-10

## 2020-01-08 RX ORDER — NALOXONE HYDROCHLORIDE 0.4 MG/ML
.1-.4 INJECTION, SOLUTION INTRAMUSCULAR; INTRAVENOUS; SUBCUTANEOUS
Status: CANCELLED | OUTPATIENT
Start: 2020-01-10

## 2020-01-08 RX ORDER — ALBUTEROL SULFATE 90 UG/1
1-2 AEROSOL, METERED RESPIRATORY (INHALATION)
Status: CANCELLED
Start: 2020-01-10

## 2020-01-08 RX ORDER — MEPERIDINE HYDROCHLORIDE 25 MG/ML
25 INJECTION INTRAMUSCULAR; INTRAVENOUS; SUBCUTANEOUS EVERY 30 MIN PRN
Status: CANCELLED | OUTPATIENT
Start: 2020-01-10

## 2020-01-08 RX ORDER — FLUOROURACIL 50 MG/ML
400 INJECTION, SOLUTION INTRAVENOUS ONCE
Status: CANCELLED | OUTPATIENT
Start: 2020-01-10

## 2020-01-08 RX ORDER — ALBUTEROL SULFATE 0.83 MG/ML
2.5 SOLUTION RESPIRATORY (INHALATION)
Status: CANCELLED | OUTPATIENT
Start: 2020-01-10

## 2020-01-10 ENCOUNTER — APPOINTMENT (OUTPATIENT)
Dept: LAB | Facility: CLINIC | Age: 65
End: 2020-01-10
Attending: INTERNAL MEDICINE
Payer: COMMERCIAL

## 2020-01-10 ENCOUNTER — HOME INFUSION (PRE-WILLOW HOME INFUSION) (OUTPATIENT)
Dept: PHARMACY | Facility: CLINIC | Age: 65
End: 2020-01-10

## 2020-01-10 ENCOUNTER — INFUSION THERAPY VISIT (OUTPATIENT)
Dept: ONCOLOGY | Facility: CLINIC | Age: 65
End: 2020-01-10
Attending: INTERNAL MEDICINE
Payer: COMMERCIAL

## 2020-01-10 VITALS
RESPIRATION RATE: 16 BRPM | HEART RATE: 92 BPM | BODY MASS INDEX: 25.07 KG/M2 | OXYGEN SATURATION: 98 % | WEIGHT: 174.7 LBS | DIASTOLIC BLOOD PRESSURE: 57 MMHG | SYSTOLIC BLOOD PRESSURE: 106 MMHG | TEMPERATURE: 97.8 F

## 2020-01-10 DIAGNOSIS — T45.1X5A ANEMIA ASSOCIATED WITH CHEMOTHERAPY: Primary | ICD-10-CM

## 2020-01-10 DIAGNOSIS — C22.1 CHOLANGIOCARCINOMA (H): ICD-10-CM

## 2020-01-10 DIAGNOSIS — D64.81 ANEMIA ASSOCIATED WITH CHEMOTHERAPY: Primary | ICD-10-CM

## 2020-01-10 LAB
ALBUMIN SERPL-MCNC: 3 G/DL (ref 3.4–5)
ALP SERPL-CCNC: 118 U/L (ref 40–150)
ALT SERPL W P-5'-P-CCNC: 43 U/L (ref 0–70)
ANION GAP SERPL CALCULATED.3IONS-SCNC: 6 MMOL/L (ref 3–14)
AST SERPL W P-5'-P-CCNC: 32 U/L (ref 0–45)
BASOPHILS # BLD AUTO: 0 10E9/L (ref 0–0.2)
BASOPHILS NFR BLD AUTO: 0.4 %
BILIRUB SERPL-MCNC: 0.5 MG/DL (ref 0.2–1.3)
BUN SERPL-MCNC: 27 MG/DL (ref 7–30)
CALCIUM SERPL-MCNC: 8.6 MG/DL (ref 8.5–10.1)
CHLORIDE SERPL-SCNC: 111 MMOL/L (ref 94–109)
CO2 SERPL-SCNC: 26 MMOL/L (ref 20–32)
CREAT SERPL-MCNC: 1.44 MG/DL (ref 0.66–1.25)
DIFFERENTIAL METHOD BLD: ABNORMAL
EOSINOPHIL # BLD AUTO: 1.1 10E9/L (ref 0–0.7)
EOSINOPHIL NFR BLD AUTO: 14.5 %
ERYTHROCYTE [DISTWIDTH] IN BLOOD BY AUTOMATED COUNT: 19 % (ref 10–15)
GFR SERPL CREATININE-BSD FRML MDRD: 51 ML/MIN/{1.73_M2}
GLUCOSE SERPL-MCNC: 92 MG/DL (ref 70–99)
HCT VFR BLD AUTO: 29.6 % (ref 40–53)
HGB BLD-MCNC: 9.5 G/DL (ref 13.3–17.7)
IMM GRANULOCYTES # BLD: 0 10E9/L (ref 0–0.4)
IMM GRANULOCYTES NFR BLD: 0.4 %
LYMPHOCYTES # BLD AUTO: 1.3 10E9/L (ref 0.8–5.3)
LYMPHOCYTES NFR BLD AUTO: 17.5 %
MCH RBC QN AUTO: 30.6 PG (ref 26.5–33)
MCHC RBC AUTO-ENTMCNC: 32.1 G/DL (ref 31.5–36.5)
MCV RBC AUTO: 96 FL (ref 78–100)
MONOCYTES # BLD AUTO: 0.9 10E9/L (ref 0–1.3)
MONOCYTES NFR BLD AUTO: 12.8 %
NEUTROPHILS # BLD AUTO: 3.9 10E9/L (ref 1.6–8.3)
NEUTROPHILS NFR BLD AUTO: 54.4 %
NRBC # BLD AUTO: 0 10*3/UL
NRBC BLD AUTO-RTO: 0 /100
PLATELET # BLD AUTO: 165 10E9/L (ref 150–450)
POTASSIUM SERPL-SCNC: 4.2 MMOL/L (ref 3.4–5.3)
PROT SERPL-MCNC: 6 G/DL (ref 6.8–8.8)
RBC # BLD AUTO: 3.1 10E12/L (ref 4.4–5.9)
SODIUM SERPL-SCNC: 144 MMOL/L (ref 133–144)
WBC # BLD AUTO: 7.3 10E9/L (ref 4–11)

## 2020-01-10 PROCEDURE — 80053 COMPREHEN METABOLIC PANEL: CPT | Performed by: INTERNAL MEDICINE

## 2020-01-10 PROCEDURE — G0498 CHEMO EXTEND IV INFUS W/PUMP: HCPCS

## 2020-01-10 PROCEDURE — 85025 COMPLETE CBC W/AUTO DIFF WBC: CPT | Performed by: INTERNAL MEDICINE

## 2020-01-10 PROCEDURE — 96367 TX/PROPH/DG ADDL SEQ IV INF: CPT

## 2020-01-10 PROCEDURE — 25800030 ZZH RX IP 258 OP 636: Mod: ZF | Performed by: INTERNAL MEDICINE

## 2020-01-10 PROCEDURE — 25000128 H RX IP 250 OP 636: Mod: ZF | Performed by: INTERNAL MEDICINE

## 2020-01-10 PROCEDURE — 96409 CHEMO IV PUSH SNGL DRUG: CPT

## 2020-01-10 RX ORDER — HEPARIN SODIUM (PORCINE) LOCK FLUSH IV SOLN 100 UNIT/ML 100 UNIT/ML
5 SOLUTION INTRAVENOUS ONCE
Status: COMPLETED | OUTPATIENT
Start: 2020-01-10 | End: 2020-01-10

## 2020-01-10 RX ORDER — FLUOROURACIL 50 MG/ML
400 INJECTION, SOLUTION INTRAVENOUS ONCE
Status: COMPLETED | OUTPATIENT
Start: 2020-01-10 | End: 2020-01-10

## 2020-01-10 RX ADMIN — Medication 5 ML: at 07:53

## 2020-01-10 RX ADMIN — DEXTROSE MONOHYDRATE 250 ML: 50 INJECTION, SOLUTION INTRAVENOUS at 09:09

## 2020-01-10 RX ADMIN — DEXAMETHASONE SODIUM PHOSPHATE: 10 INJECTION, SOLUTION INTRAMUSCULAR; INTRAVENOUS at 09:13

## 2020-01-10 RX ADMIN — LEUCOVORIN CALCIUM 700 MG: 500 INJECTION, POWDER, LYOPHILIZED, FOR SOLUTION INTRAMUSCULAR; INTRAVENOUS at 10:35

## 2020-01-10 RX ADMIN — FLUOROURACIL 785 MG: 50 INJECTION, SOLUTION INTRAVENOUS at 10:51

## 2020-01-10 ASSESSMENT — PAIN SCALES - GENERAL: PAINLEVEL: NO PAIN (0)

## 2020-01-10 NOTE — PATIENT INSTRUCTIONS
[facility] Triage and after hours / weekends / holidays:  [phone]    Please call the triage or after hours line if you experience a temperature greater than or equal to 100.5, shaking chills, have uncontrolled nausea, vomiting and/or diarrhea, dizziness, shortness of breath, chest pain, bleeding, unexplained bruising, or if you have any other new/concerning symptoms, questions or concerns.      If you are having any concerning symptoms or wish to speak to a provider before your next infusion visit, please call your care coordinator or triage to notify them so we can adequately serve you.     If you need a refill on a narcotic prescription or other medication, please call before your infusion appointment.               January 2020 Sunday Monday Tuesday Wednesday Thursday Friday Saturday                  1    LAB   2:00 PM   (15 min.)   UR LAB HOME INFUSION   [facility], Laboratory Services 2     3     4       5     6     7     8     9     10    [facility] LAB DRAW   7:45 AM   (15 min.)   [facility] DRAW   [facility] Lab Draw    P ONC INFUSION 240   8:30 AM   (240 min.)    ONCOLOGY INFUSION   [facility] 11       12     13     14     15     16     17     18       19     20     21     22     23     24    [facility] LAB DRAW   7:15 AM   (15 min.)    [facility]   [facility]    P ONC INFUSION 240   8:00 AM   (240 min.)    ONCOLOGY INFUSION   [facility] 25       26     27     28     29     30     31                      February 2020 Sunday Monday Tuesday Wednesday Thursday Friday Saturday                                 1       2     3     4     5     6     7    [facility] LAB DRAW   7:15 AM   (15 min.)    [facility]   [facility]    [facility] INFUSION 240   8:00 AM   (240 min.)    ONCOLOGY INFUSION   [facility] 8       9     10     11     12     13     14     15       16      17     18     19     20     21    [facility] LAB DRAW   7:15 AM   (15 min.)   [facility] LAB DRAW   [facility] Lab Draw    [facility] ONC INFUSION 240   8:00 AM   (240 min.)    ONCOLOGY INFUSION   [facility] 22       23  Happy Birthday!     24     25     26     27     28     29                     Lab Results:  Recent Results (from the past 12 hour(s))   CBC with platelets differential    Collection Time: 01/10/20  8:03 AM   Result Value Ref Range    WBC 7.3 4.0 - 11.0 10e9/L    RBC Count 3.10 (L) 4.4 - 5.9 10e12/L    Hemoglobin 9.5 (L) 13.3 - 17.7 g/dL    Hematocrit 29.6 (L) 40.0 - 53.0 %    MCV 96 78 - 100 fl    MCH 30.6 26.5 - 33.0 pg    MCHC 32.1 31.5 - 36.5 g/dL    RDW 19.0 (H) 10.0 - 15.0 %    Platelet Count 165 150 - 450 10e9/L    Diff Method Automated Method     % Neutrophils 54.4 %    % Lymphocytes 17.5 %    % Monocytes 12.8 %    % Eosinophils 14.5 %    % Basophils 0.4 %    % Immature Granulocytes 0.4 %    Nucleated RBCs 0 0 /100    Absolute Neutrophil 3.9 1.6 - 8.3 10e9/L    Absolute Lymphocytes 1.3 0.8 - 5.3 10e9/L    Absolute Monocytes 0.9 0.0 - 1.3 10e9/L    Absolute Eosinophils 1.1 (H) 0.0 - 0.7 10e9/L    Absolute Basophils 0.0 0.0 - 0.2 10e9/L    Abs Immature Granulocytes 0.0 0 - 0.4 10e9/L    Absolute Nucleated RBC 0.0    Comprehensive metabolic panel    Collection Time: 01/10/20  8:03 AM   Result Value Ref Range    Sodium 144 133 - 144 mmol/L    Potassium 4.2 3.4 - 5.3 mmol/L    Chloride 111 (H) 94 - 109 mmol/L    Carbon Dioxide 26 20 - 32 mmol/L    Anion Gap 6 3 - 14 mmol/L    Glucose 92 70 - 99 mg/dL    Urea Nitrogen 27 7 - 30 mg/dL    Creatinine 1.44 (H) 0.66 - 1.25 mg/dL    GFR Estimate 51 (L) >60 mL/min/[1.73_m2]    GFR Estimate If Black 60 (L) >60 mL/min/[1.73_m2]    Calcium 8.6 8.5 - 10.1 mg/dL    Bilirubin Total 0.5 0.2 - 1.3 mg/dL    Albumin 3.0 (L) 3.4 - 5.0 g/dL    Protein Total 6.0 (L) 6.8 - 8.8 g/dL    Alkaline Phosphatase 118 40 - 150 U/L    ALT 43 0 - 70 U/L     AST 32 0 - 45 U/L

## 2020-01-10 NOTE — PROGRESS NOTES
Infusion Nursing Note:  Girish Chauhan presents today for Day 1 Cycle 8 Leucovorin.    Patient seen by provider today: No   present during visit today: Not Applicable.    Note: Patient presents to infusion feeling well. Patient continues to have a intermittent productive cough of light green contents. Symptoms have not worsened with no new development of symptoms such as shortness of breath or fever. Patient states no discomfort and denies acute complaints or concerns needing to be addressed today. Unsigned orders for Aranesp in February noted on supportive plan. IB to Dr. De Los Santos sent to clarify Aranesp orders since patient hasn't had medication since January 2019.     Intravenous Access:  Implanted Port.    Treatment Conditions:  Lab Results   Component Value Date    HGB 9.5 01/10/2020     Lab Results   Component Value Date    WBC 7.3 01/10/2020      Lab Results   Component Value Date    ANEU 3.9 01/10/2020     Lab Results   Component Value Date     01/10/2020      Lab Results   Component Value Date     01/10/2020                   Lab Results   Component Value Date    POTASSIUM 4.2 01/10/2020           Lab Results   Component Value Date    MAG 1.8 10/04/2019            Lab Results   Component Value Date    CR 1.44 01/10/2020                   Lab Results   Component Value Date    GARY 8.6 01/10/2020                Lab Results   Component Value Date    BILITOTAL 0.5 01/10/2020           Lab Results   Component Value Date    ALBUMIN 3.0 01/10/2020                    Lab Results   Component Value Date    ALT 43 01/10/2020           Lab Results   Component Value Date    AST 32 01/10/2020       Results reviewed, labs did meet treatment parameters, ok for treatment    Post Infusion Assessment:  Patient tolerated injection without incident.  Blood return noted pre and post infusion.  Blood return noted during Fluorouracil administration every 2 cc.  Site patent and intact, free from redness, edema  or discomfort.  No evidence of extravasations.     Prior to discharge: Port is secured in place with tegaderm and flushed with 10cc NS with positive blood return noted.  Continuous Fluorouracil home infusion heat sensor  Pump connected.    All connectors secured in place and clamps taped open and confirmed by Analy STEINBERG RN   Patient instructed to call our clinic or [facility] with any questions or concerns at home.  Patient verbalized understanding.    Patient set up for pump disconnect at home with [facility] on 1/12/2020 at 8:00am.  Appointment confirmed by Leena from [facility]        Discharge Plan:   Patient declined prescription refills.  Discharge instructions reviewed with: Patient.  Patient and/or family verbalized understanding of discharge instructions and all questions answered.  AVS to patient via [employer].  Patient will return 1/24 for next appointment.   Patient discharged in stable condition accompanied by: self.  Departure Mode: Ambulatory.  Face to Face time: 0 minutes.    Norberto Charles RN

## 2020-01-12 ENCOUNTER — HOME INFUSION (PRE-WILLOW HOME INFUSION) (OUTPATIENT)
Dept: PHARMACY | Facility: CLINIC | Age: 65
End: 2020-01-12

## 2020-01-13 NOTE — PROGRESS NOTES
This is a recent snapshot of the patient's [facility] medical record.  For current drug dose and complete information and questions, call [phone] or In [facility] pool, fv home infusion ([id number])  CSN Number:  [id number]

## 2020-01-13 NOTE — PROGRESS NOTES
This is a recent snapshot of the patient's [facility] medical record.  For current drug dose and complete information and questions, call [phone] or In Basket pool, fv home infusion ([id number])  CSN Number:  [id number]

## 2020-01-20 ENCOUNTER — OFFICE VISIT - HEALTHEAST (OUTPATIENT)
Dept: FAMILY MEDICINE | Facility: CLINIC | Age: 65
End: 2020-01-20

## 2020-01-20 DIAGNOSIS — I50.22 CHRONIC SYSTOLIC CONGESTIVE HEART FAILURE (H): ICD-10-CM

## 2020-01-20 DIAGNOSIS — C22.1 CHOLANGIOCARCINOMA (H): ICD-10-CM

## 2020-01-20 DIAGNOSIS — R05.9 COUGH: ICD-10-CM

## 2020-01-20 DIAGNOSIS — R09.82 PND (POST-NASAL DRIP): ICD-10-CM

## 2020-01-20 DIAGNOSIS — I42.9 CARDIOMYOPATHY, UNSPECIFIED TYPE (H): ICD-10-CM

## 2020-01-20 DIAGNOSIS — I48.91 ATRIAL FIBRILLATION WITH RVR (H): ICD-10-CM

## 2020-01-20 ASSESSMENT — MIFFLIN-ST. JEOR: SCORE: 1576.72

## 2020-01-21 DIAGNOSIS — T45.1X5A ANEMIA ASSOCIATED WITH CHEMOTHERAPY: ICD-10-CM

## 2020-01-21 DIAGNOSIS — D64.81 ANEMIA ASSOCIATED WITH CHEMOTHERAPY: ICD-10-CM

## 2020-01-21 DIAGNOSIS — C22.1 CHOLANGIOCARCINOMA (H): ICD-10-CM

## 2020-01-21 RX ORDER — EPINEPHRINE 0.3 MG/.3ML
0.3 INJECTION SUBCUTANEOUS EVERY 5 MIN PRN
Status: CANCELLED | OUTPATIENT
Start: 2020-01-24

## 2020-01-21 RX ORDER — ALBUTEROL SULFATE 90 UG/1
1-2 AEROSOL, METERED RESPIRATORY (INHALATION)
Status: CANCELLED
Start: 2020-01-24

## 2020-01-21 RX ORDER — DIPHENHYDRAMINE HYDROCHLORIDE 50 MG/ML
50 INJECTION INTRAMUSCULAR; INTRAVENOUS
Status: CANCELLED
Start: 2020-01-24

## 2020-01-21 RX ORDER — MEPERIDINE HYDROCHLORIDE 25 MG/ML
25 INJECTION INTRAMUSCULAR; INTRAVENOUS; SUBCUTANEOUS EVERY 30 MIN PRN
Status: CANCELLED | OUTPATIENT
Start: 2020-01-24

## 2020-01-21 RX ORDER — LORAZEPAM 2 MG/ML
0.5 INJECTION INTRAMUSCULAR EVERY 4 HOURS PRN
Status: CANCELLED
Start: 2020-01-24

## 2020-01-21 RX ORDER — EPINEPHRINE 1 MG/ML
0.3 INJECTION, SOLUTION INTRAMUSCULAR; SUBCUTANEOUS EVERY 5 MIN PRN
Status: CANCELLED | OUTPATIENT
Start: 2020-01-24

## 2020-01-21 RX ORDER — NALOXONE HYDROCHLORIDE 0.4 MG/ML
.1-.4 INJECTION, SOLUTION INTRAMUSCULAR; INTRAVENOUS; SUBCUTANEOUS
Status: CANCELLED | OUTPATIENT
Start: 2020-01-24

## 2020-01-21 RX ORDER — ALBUTEROL SULFATE 0.83 MG/ML
2.5 SOLUTION RESPIRATORY (INHALATION)
Status: CANCELLED | OUTPATIENT
Start: 2020-01-24

## 2020-01-21 RX ORDER — SODIUM CHLORIDE 9 MG/ML
1000 INJECTION, SOLUTION INTRAVENOUS CONTINUOUS PRN
Status: CANCELLED
Start: 2020-01-24

## 2020-01-21 RX ORDER — FLUOROURACIL 50 MG/ML
400 INJECTION, SOLUTION INTRAVENOUS ONCE
Status: CANCELLED | OUTPATIENT
Start: 2020-01-24

## 2020-01-21 RX ORDER — METHYLPREDNISOLONE SODIUM SUCCINATE 125 MG/2ML
125 INJECTION, POWDER, LYOPHILIZED, FOR SOLUTION INTRAMUSCULAR; INTRAVENOUS
Status: CANCELLED
Start: 2020-01-24

## 2020-01-24 ENCOUNTER — HOME INFUSION (PRE-WILLOW HOME INFUSION) (OUTPATIENT)
Dept: PHARMACY | Facility: CLINIC | Age: 65
End: 2020-01-24

## 2020-01-24 ENCOUNTER — INFUSION THERAPY VISIT (OUTPATIENT)
Dept: ONCOLOGY | Facility: CLINIC | Age: 65
End: 2020-01-24
Attending: INTERNAL MEDICINE
Payer: COMMERCIAL

## 2020-01-24 VITALS
RESPIRATION RATE: 16 BRPM | WEIGHT: 174.8 LBS | BODY MASS INDEX: 25.08 KG/M2 | OXYGEN SATURATION: 100 % | TEMPERATURE: 98 F | SYSTOLIC BLOOD PRESSURE: 96 MMHG | HEART RATE: 93 BPM | DIASTOLIC BLOOD PRESSURE: 66 MMHG

## 2020-01-24 DIAGNOSIS — D64.81 ANEMIA ASSOCIATED WITH CHEMOTHERAPY: Primary | ICD-10-CM

## 2020-01-24 DIAGNOSIS — C22.1 CHOLANGIOCARCINOMA (H): ICD-10-CM

## 2020-01-24 DIAGNOSIS — T45.1X5A ANEMIA ASSOCIATED WITH CHEMOTHERAPY: Primary | ICD-10-CM

## 2020-01-24 LAB
ALBUMIN SERPL-MCNC: 3 G/DL (ref 3.4–5)
ALP SERPL-CCNC: 113 U/L (ref 40–150)
ALT SERPL W P-5'-P-CCNC: 23 U/L (ref 0–70)
ANION GAP SERPL CALCULATED.3IONS-SCNC: 4 MMOL/L (ref 3–14)
AST SERPL W P-5'-P-CCNC: 18 U/L (ref 0–45)
BASOPHILS # BLD AUTO: 0.1 10E9/L (ref 0–0.2)
BASOPHILS NFR BLD AUTO: 0.6 %
BILIRUB SERPL-MCNC: 0.7 MG/DL (ref 0.2–1.3)
BUN SERPL-MCNC: 30 MG/DL (ref 7–30)
CALCIUM SERPL-MCNC: 8.8 MG/DL (ref 8.5–10.1)
CHLORIDE SERPL-SCNC: 109 MMOL/L (ref 94–109)
CO2 SERPL-SCNC: 27 MMOL/L (ref 20–32)
CREAT SERPL-MCNC: 1.41 MG/DL (ref 0.66–1.25)
DIFFERENTIAL METHOD BLD: ABNORMAL
EOSINOPHIL # BLD AUTO: 1.6 10E9/L (ref 0–0.7)
EOSINOPHIL NFR BLD AUTO: 15.4 %
ERYTHROCYTE [DISTWIDTH] IN BLOOD BY AUTOMATED COUNT: 17.5 % (ref 10–15)
GFR SERPL CREATININE-BSD FRML MDRD: 53 ML/MIN/{1.73_M2}
GLUCOSE SERPL-MCNC: 112 MG/DL (ref 70–99)
HCT VFR BLD AUTO: 30.7 % (ref 40–53)
HGB BLD-MCNC: 10.1 G/DL (ref 13.3–17.7)
IMM GRANULOCYTES # BLD: 0 10E9/L (ref 0–0.4)
IMM GRANULOCYTES NFR BLD: 0.4 %
LYMPHOCYTES # BLD AUTO: 2.1 10E9/L (ref 0.8–5.3)
LYMPHOCYTES NFR BLD AUTO: 20.5 %
MCH RBC QN AUTO: 31 PG (ref 26.5–33)
MCHC RBC AUTO-ENTMCNC: 32.9 G/DL (ref 31.5–36.5)
MCV RBC AUTO: 94 FL (ref 78–100)
MONOCYTES # BLD AUTO: 1.4 10E9/L (ref 0–1.3)
MONOCYTES NFR BLD AUTO: 13.9 %
NEUTROPHILS # BLD AUTO: 5 10E9/L (ref 1.6–8.3)
NEUTROPHILS NFR BLD AUTO: 49.2 %
NRBC # BLD AUTO: 0 10*3/UL
NRBC BLD AUTO-RTO: 0 /100
PLATELET # BLD AUTO: 188 10E9/L (ref 150–450)
POTASSIUM SERPL-SCNC: 3.8 MMOL/L (ref 3.4–5.3)
PROT SERPL-MCNC: 6.3 G/DL (ref 6.8–8.8)
RBC # BLD AUTO: 3.26 10E12/L (ref 4.4–5.9)
SODIUM SERPL-SCNC: 140 MMOL/L (ref 133–144)
WBC # BLD AUTO: 10.2 10E9/L (ref 4–11)

## 2020-01-24 PROCEDURE — 25000128 H RX IP 250 OP 636: Mod: ZF | Performed by: INTERNAL MEDICINE

## 2020-01-24 PROCEDURE — 96367 TX/PROPH/DG ADDL SEQ IV INF: CPT

## 2020-01-24 PROCEDURE — 96409 CHEMO IV PUSH SNGL DRUG: CPT

## 2020-01-24 PROCEDURE — 80053 COMPREHEN METABOLIC PANEL: CPT | Performed by: INTERNAL MEDICINE

## 2020-01-24 PROCEDURE — 85025 COMPLETE CBC W/AUTO DIFF WBC: CPT | Performed by: INTERNAL MEDICINE

## 2020-01-24 PROCEDURE — 25800030 ZZH RX IP 258 OP 636: Mod: ZF | Performed by: INTERNAL MEDICINE

## 2020-01-24 PROCEDURE — G0498 CHEMO EXTEND IV INFUS W/PUMP: HCPCS

## 2020-01-24 RX ORDER — HEPARIN SODIUM (PORCINE) LOCK FLUSH IV SOLN 100 UNIT/ML 100 UNIT/ML
500 SOLUTION INTRAVENOUS ONCE
Status: COMPLETED | OUTPATIENT
Start: 2020-01-24 | End: 2020-01-24

## 2020-01-24 RX ORDER — FLUOROURACIL 50 MG/ML
400 INJECTION, SOLUTION INTRAVENOUS ONCE
Status: COMPLETED | OUTPATIENT
Start: 2020-01-24 | End: 2020-01-24

## 2020-01-24 RX ADMIN — DEXAMETHASONE SODIUM PHOSPHATE: 10 INJECTION, SOLUTION INTRAMUSCULAR; INTRAVENOUS at 08:40

## 2020-01-24 RX ADMIN — LEUCOVORIN CALCIUM 700 MG: 350 INJECTION, POWDER, LYOPHILIZED, FOR SOLUTION INTRAMUSCULAR; INTRAVENOUS at 09:39

## 2020-01-24 RX ADMIN — Medication 500 UNITS: at 08:14

## 2020-01-24 RX ADMIN — SODIUM CHLORIDE 250 ML: 9 INJECTION, SOLUTION INTRAVENOUS at 08:18

## 2020-01-24 RX ADMIN — FLUOROURACIL 785 MG: 50 INJECTION, SOLUTION INTRAVENOUS at 09:59

## 2020-01-24 ASSESSMENT — PAIN SCALES - GENERAL: PAINLEVEL: MILD PAIN (3)

## 2020-01-24 NOTE — PROGRESS NOTES
Infusion Nursing Note:  Girish Chauhan presents today for Day 1 Cycle 9 Leucovorin Fluorouracil bolus and pump connect.    Patient seen by provider today: No   present during visit today: Not Applicable.    Note: Krishna arrives to infusion today doing well overall. He does note increased neuropathy in his feet since his last chemo treatment. Denies fall or difficulty with ADLs but does feel somewhat off balance. Writer discussed risk for falls with increased neuropathy and need to notify provider if it continues to worsen with this next cycle of chemo. Patient verbalized understanding and agreement and will call triage or [facility] prior to next chemo cycle if neuropathy worsens.     Intravenous Access:  Implanted Port.    Treatment Conditions:  Lab Results   Component Value Date    HGB 10.1 01/24/2020     Lab Results   Component Value Date    WBC 10.2 01/24/2020      Lab Results   Component Value Date    ANEU 5.0 01/24/2020     Lab Results   Component Value Date     01/24/2020      Lab Results   Component Value Date     01/24/2020                   Lab Results   Component Value Date    POTASSIUM 3.8 01/24/2020           Lab Results   Component Value Date    MAG 1.8 10/04/2019            Lab Results   Component Value Date    CR 1.41 01/24/2020                   Lab Results   Component Value Date    GARY 8.8 01/24/2020                Lab Results   Component Value Date    BILITOTAL 0.7 01/24/2020           Lab Results   Component Value Date    ALBUMIN 3.0 01/24/2020                    Lab Results   Component Value Date    ALT 23 01/24/2020           Lab Results   Component Value Date    AST 18 01/24/2020       Results reviewed, labs MET treatment parameters, ok to proceed with treatment.    Post Infusion Assessment:  Patient tolerated infusion without incident.  Blood return noted pre and post infusion and prior to pump connect.  Blood return noted during administration of fluorouracil every 2 cc. Stop  time: 1000  Fluorouracil C series pump connected at 1005 and set to run at 5.2 ml/hr. Patient set up for at-home pump disconnect with [facility] on 01/26 at 0800.   All connections verified as taped and open and heat sensor secured in place by second RN.     Discharge Plan:   Patient declined prescription refills.  AVS to patient via MYCHART.  Patient will return 02/07 for next appointment.   Patient discharged in stable condition accompanied by: self.  Departure Mode: Ambulatory.    Analy Mead RN

## 2020-01-24 NOTE — NURSING NOTE
Chief Complaint   Patient presents with     Port Draw     Weight and VS checked.  Port accessed with Power Needle.  Labs drawn without difficulty.  Heparin Locked.       CARRIE KRAMER RN on 1/24/2020 at 7:48 AM

## 2020-01-24 NOTE — PATIENT INSTRUCTIONS
Contact Numbers  [facility] Main Line: [phone]  [facility] NurseTriage and After Hours:  [phone]    Call triage with chills and/or temperature greater than or equal to 100.5, uncontrolled nausea/vomiting, diarrhea, constipation, dizziness, shortness of breath, chest pain, bleeding, unexplained bruising, or any new/concerning symptoms, questions/concerns.     If after hours, weekends, or holidays, call the nurse triage number. Calls may be forwarded to the hospital , please ask for the adult oncology doctor on call.     If you are having any concerning symptoms or wish to speak to a provider before your next infusion visit, please call your care coordinator or triage to notify them so we can adequately serve you.     If you need a refill on a narcotic prescription, please call triage or your care coordinator before your infusion appointment.       Lab Results:  Recent Results (from the past 12 hour(s))   CBC with platelets differential    Collection Time: 01/24/20  7:44 AM   Result Value Ref Range    WBC 10.2 4.0 - 11.0 10e9/L    RBC Count 3.26 (L) 4.4 - 5.9 10e12/L    Hemoglobin 10.1 (L) 13.3 - 17.7 g/dL    Hematocrit 30.7 (L) 40.0 - 53.0 %    MCV 94 78 - 100 fl    MCH 31.0 26.5 - 33.0 pg    MCHC 32.9 31.5 - 36.5 g/dL    RDW 17.5 (H) 10.0 - 15.0 %    Platelet Count 188 150 - 450 10e9/L    Diff Method Automated Method     % Neutrophils 49.2 %    % Lymphocytes 20.5 %    % Monocytes 13.9 %    % Eosinophils 15.4 %    % Basophils 0.6 %    % Immature Granulocytes 0.4 %    Nucleated RBCs 0 0 /100    Absolute Neutrophil 5.0 1.6 - 8.3 10e9/L    Absolute Lymphocytes 2.1 0.8 - 5.3 10e9/L    Absolute Monocytes 1.4 (H) 0.0 - 1.3 10e9/L    Absolute Eosinophils 1.6 (H) 0.0 - 0.7 10e9/L    Absolute Basophils 0.1 0.0 - 0.2 10e9/L    Abs Immature Granulocytes 0.0 0 - 0.4 10e9/L    Absolute Nucleated RBC 0.0    Comprehensive metabolic panel    Collection Time: 01/24/20  7:44 AM   Result Value Ref Range    Sodium 140 133 - 144  mmol/L    Potassium 3.8 3.4 - 5.3 mmol/L    Chloride 109 94 - 109 mmol/L    Carbon Dioxide 27 20 - 32 mmol/L    Anion Gap 4 3 - 14 mmol/L    Glucose 112 (H) 70 - 99 mg/dL    Urea Nitrogen 30 7 - 30 mg/dL    Creatinine 1.41 (H) 0.66 - 1.25 mg/dL    GFR Estimate 53 (L) >60 mL/min/[1.73_m2]    GFR Estimate If Black 61 >60 mL/min/[1.73_m2]    Calcium 8.8 8.5 - 10.1 mg/dL    Bilirubin Total 0.7 0.2 - 1.3 mg/dL    Albumin 3.0 (L) 3.4 - 5.0 g/dL    Protein Total 6.3 (L) 6.8 - 8.8 g/dL    Alkaline Phosphatase 113 40 - 150 U/L    ALT 23 0 - 70 U/L    AST 18 0 - 45 U/L

## 2020-01-26 ENCOUNTER — HOME INFUSION (PRE-WILLOW HOME INFUSION) (OUTPATIENT)
Dept: PHARMACY | Facility: CLINIC | Age: 65
End: 2020-01-26

## 2020-01-27 NOTE — PROGRESS NOTES
This is a recent snapshot of the patient's [facility] medical record.  For current drug dose and complete information and questions, call [phone] or In Basket pool, fv home infusion ([id number])  CSN Number:  [id number]

## 2020-01-27 NOTE — PROGRESS NOTES
Skilled nurse visit in the home, for discontinuation of Fluorouracil 4705 mg infused over 46 hours.    Nona SAMAYOA,BSN,VA-BC,CMSRN  [facility]  [phone]  [email or web address]

## 2020-01-28 NOTE — PROGRESS NOTES
This is a recent snapshot of the patient's [facility] medical record.  For current drug dose and complete information and questions, call [phone] or In Basket pool, fv home infusion ([id number])  CSN Number:  [id number]

## 2020-01-31 ENCOUNTER — HOSPITAL ENCOUNTER (OUTPATIENT)
Facility: CLINIC | Age: 65
End: 2020-01-31
Attending: UROLOGY | Admitting: UROLOGY
Payer: COMMERCIAL

## 2020-01-31 ENCOUNTER — TELEPHONE (OUTPATIENT)
Dept: UROLOGY | Facility: CLINIC | Age: 65
End: 2020-01-31

## 2020-01-31 DIAGNOSIS — N13.30 HYDRONEPHROSIS OF RIGHT KIDNEY: ICD-10-CM

## 2020-01-31 NOTE — TELEPHONE ENCOUNTER
FUTURE VISIT INFORMATION      SURGERY INFORMATION:    Date: 2/7/20    Location: [facility] OR    Surgeon:  Sivan Walker    Anesthesia Type:  CHoice    Procedure: CYSTOSCOPY WITH RIGHT RETROGRADE PYELOGRAM AND RIGHT URETERAL STENT EXCHANGE    RECORDS REQUESTED FROM:       Primary Care Provider: Dario Jain- [facility]    Pertinent Medical History: Aortic Root Dilatation, coronary atherosclerosis, paroxysmal atrial fibrillation, hypertension, bicuspid aortic valve    Most recent EKG+ Tracing: 10/4/19    Most recent ECHO: 9/30/19-[facility]    Most [facility]

## 2020-01-31 NOTE — TELEPHONE ENCOUNTER
Patient is scheduled for surgery with Dr. Walker      Spoke or left message with: Girish    Date of Surgery: 2/7/20    Location: [facility] OR    Informed patient they will need an adult  yes    Pre-op with surgeon (if applicable): n/a    H&P: Scheduled with PAC 2/3/20    Additional imaging/appointments: n/a    Surgery packet: sent 1/31/20     Additional comments: n/a

## 2020-02-03 ENCOUNTER — OFFICE VISIT (OUTPATIENT)
Dept: SURGERY | Facility: CLINIC | Age: 65
End: 2020-02-03
Payer: COMMERCIAL

## 2020-02-03 ENCOUNTER — RECORDS - HEALTHEAST (OUTPATIENT)
Dept: ADMINISTRATIVE | Facility: OTHER | Age: 65
End: 2020-02-03

## 2020-02-03 ENCOUNTER — ANESTHESIA EVENT (OUTPATIENT)
Dept: SURGERY | Facility: CLINIC | Age: 65
End: 2020-02-03
Payer: COMMERCIAL

## 2020-02-03 ENCOUNTER — PRE VISIT (OUTPATIENT)
Dept: SURGERY | Facility: CLINIC | Age: 65
End: 2020-02-03

## 2020-02-03 VITALS
DIASTOLIC BLOOD PRESSURE: 71 MMHG | HEIGHT: 72 IN | TEMPERATURE: 97.4 F | HEART RATE: 75 BPM | OXYGEN SATURATION: 100 % | BODY MASS INDEX: 23.4 KG/M2 | SYSTOLIC BLOOD PRESSURE: 112 MMHG | WEIGHT: 172.8 LBS | RESPIRATION RATE: 15 BRPM

## 2020-02-03 DIAGNOSIS — C22.1 CHOLANGIOCARCINOMA (H): ICD-10-CM

## 2020-02-03 DIAGNOSIS — Z01.818 PRE-OP EXAMINATION: Primary | ICD-10-CM

## 2020-02-03 DIAGNOSIS — Z01.818 PREOP EXAMINATION: Primary | ICD-10-CM

## 2020-02-03 LAB
ALBUMIN UR-MCNC: 30 MG/DL
APPEARANCE UR: ABNORMAL
BILIRUB UR QL STRIP: NEGATIVE
COLOR UR AUTO: YELLOW
GLUCOSE UR STRIP-MCNC: NEGATIVE MG/DL
HGB UR QL STRIP: ABNORMAL
HYALINE CASTS #/AREA URNS LPF: 7 /LPF (ref 0–2)
KETONES UR STRIP-MCNC: NEGATIVE MG/DL
LEUKOCYTE ESTERASE UR QL STRIP: ABNORMAL
MUCOUS THREADS #/AREA URNS LPF: PRESENT /LPF
NITRATE UR QL: NEGATIVE
PH UR STRIP: 6 PH (ref 5–7)
RBC #/AREA URNS AUTO: >182 /HPF (ref 0–2)
SOURCE: ABNORMAL
SP GR UR STRIP: 1.02 (ref 1–1.03)
SPERM #/AREA URNS HPF: PRESENT /HPF
UROBILINOGEN UR STRIP-MCNC: 0 MG/DL (ref 0–2)
WBC #/AREA URNS AUTO: 14 /HPF (ref 0–5)

## 2020-02-03 RX ORDER — FLUTICASONE PROPIONATE 50 MCG
1 SPRAY, SUSPENSION (ML) NASAL DAILY
Status: ON HOLD | COMMUNITY
End: 2020-07-16

## 2020-02-03 ASSESSMENT — ENCOUNTER SYMPTOMS: DYSRHYTHMIAS: 1

## 2020-02-03 ASSESSMENT — MIFFLIN-ST. JEOR: SCORE: 1613.88

## 2020-02-03 ASSESSMENT — PAIN SCALES - GENERAL: PAINLEVEL: NO PAIN (0)

## 2020-02-03 ASSESSMENT — LIFESTYLE VARIABLES: TOBACCO_USE: 0

## 2020-02-03 NOTE — PATIENT INSTRUCTIONS
Preparing for Your Surgery      Name:  Girish Chauhan   MRN:  [id number]   :  1955   Today's Date:  2/3/2020     Arriving for surgery:  Surgery date:  2020  Arrival time:  11:00AM  Please come to:     [facility] [address]  [address] [place]  [address]    - parking is available in front of [facility] from 5:15AM to 8:00PM. If you prefer, park your car in the Green Lot.    -Proceed to the 3rd floor, check in at the Adult Surgery Waiting Lounge. [phone]    If an escort is needed stop at the Information Desk in the lobby. Inform the information person that you are here for surgery. An escort to the Adult Surgery Waiting Lounge will be provided.        What can I eat or drink?  -  You may have solid food or milk products until 8 hours prior to your surgery. 2020, 5AM  -  You may have water, apple juice or 7up/Sprite until 2 hours prior to your surgery. 2020, 11AM    Which medicines can I take?  Stop Aspirin, Multivitamins and supplements one week prior to surgery.  Hold Ibuprofen for 24 hours and/or Naproxen for 48 hours prior to surgery.   -  Do NOT take these medications in the morning, the day of surgery:    Tums    Furosemide(Lasix)    Losartan(Cozaar)      -  Please take these medications the day of surgery:    Apixaban(Eliquis)   Clopidogrel(Plavix)    Flonase nasal spray   Metoprolol  -As Needed:    Acetaminophen(Tylenol)  Colchicine(Colcrys)    Odansetron(Zofran)    How do I prepare myself?  -  Take two showers: one the night before surgery; and one the morning of surgery.         Use Scrubcare or Hibiclens to wash from neck down, leave soap on your skin for up to one minute.  Do not get soap in your eyes or ears.  You may use your own shampoo and conditioner; no other hair products.   -  Do NOT use lotion, powder, deodorant, or antiperspirant the day of your surgery.  -  Do NOT wear jewelry.  - Do not bring your own  medications to the hospital, except for inhalers and eye   drops.  -  Bring your ID and insurance card.    -If you are scheduled to go home the Same Day as surgery you must have a responsible adult as a  and to stay with you overnight the first 24 hours after surgery.     Questions or Concerns:  -If you are scheduled on the [facility] or [facility] campus and have questions or concerns regarding the day of surgery, please call Preadmission Nursing at [phone].     -If you have health changes between today and your surgery please call your surgeon. For questions after surgery please call your surgeons office.

## 2020-02-03 NOTE — PROGRESS NOTES
Preoperative Assessment Center medication history for February 3, 2020 is complete.    See Epic admission navigator for prior to admission medications.   Operating room staff will still need to confirm medications and last dose information on day of surgery.     Medication history interview sources:  patient, payor info, care everywhere.     Changes made to PTA medication list (reason)  Added: flonase, nonformulary chemo med.   Deleted: none  Changed: tums    Additional medication history information (including reliability of information, actions taken by pharmacist):    -- No recent (within 30 days) course of antibiotics  -- No recent (within 30 days) course of steroids  -- No recent (within 30 days) chronic daily medications stopped   -- Patient declines being on any other prescription or over-the-counter medications    Prior to Admission medications    Medication Sig Last Dose Taking? Auth Provider   allopurinol (ZYLOPRIM) 100 MG tablet Take 100 mg by mouth every evening  Taking Yes Reported, Patient   apixaban ANTICOAGULANT (ELIQUIS) 5 MG tablet Take 1 tablet (5 mg) by mouth 2 times daily Taking Yes Jennifer Jalloh APRN CNP   atorvastatin (LIPITOR) 40 MG tablet Take 40 mg by mouth every evening  Taking Yes Unknown, Entered By History   calcium carbonate (TUMS) 500 MG chewable tablet Take 1-3 chew tab by mouth 4 times daily as needed for heartburn  Taking Yes Unknown, Entered By History   clopidogrel (PLAVIX) 75 MG tablet Take 75 mg by mouth every morning  Taking Yes Jennifer Jalloh APRN CNP   COMPOUNDED NON-CONTROLLED SUBSTANCE (CMPD RX) - PHARMACY TO MIX COMPOUNDED MEDICATION Patient is actively on chemotherapy.  Please see oncology navigator for details.  Receives every 2 weeks as an infusion over approximately 46 hours. Taking Yes Unknown, Entered By History   furosemide (LASIX) 20 MG tablet Take 1 tablet (20 mg) by mouth daily Taking Yes Jennifer Jalloh APRN CNP   losartan (COZAAR) 25 MG tablet Take 25 mg  by mouth every morning Taking Yes Unknown, Entered By History   metoprolol tartrate (LOPRESSOR) 50 MG tablet Take 150 mg by mouth 2 times daily  Taking Yes Unknown, Entered By History   multivitamin w/minerals (THERA-VIT-M) tablet Take 1 tablet by mouth daily Taking Yes Unknown, Entered By History   ondansetron (ZOFRAN) 4 MG tablet Take 1 tablet (4 mg) by mouth every 8 hours as needed for nausea Taking Yes Shannon Lopes, BUD   sennosides (SENOKOT) 8.6 MG tablet Take 1 tablet by mouth every evening  Taking Yes Reported, Patient   acetaminophen (TYLENOL) 500 MG tablet Take 500 mg by mouth every 6 hours as needed for fever or pain Not Taking  Unknown, Entered By History   colchicine (COLCYRS) 0.6 MG tablet Take 0.6 mg by mouth 3 times daily as needed (gout flare)  Not Taking  Unknown, Entered By History   fluticasone (FLONASE) 50 MCG/ACT nasal spray Spray 1 spray into both nostrils daily Not Taking  Unknown, Entered By History   Nitroglycerin (NITROSTAT SL) Place 0.4 mg under the tongue every 5 minutes as needed for chest pain (Carries medication - has never used)  Not Taking  Reported, Patient        Medication history completed by: Jeff Ward [facility]

## 2020-02-03 NOTE — H&P
Pre-Operative H & P     CC:  Preoperative exam to assess for increased cardiopulmonary risk while undergoing surgery and anesthesia.    Date of Encounter: 2/3/2020  Primary Care Physician:  Dario Jain  Reason:  right ureteral hydronephrosis    HPI  Girish Chauhan is a 64 year old male who presents for pre-operative H & P in preparation for CYSTOSCOPY WITH RIGHT RETROGRADE PYELOGRAM AND RIGHT URETERAL STENT EXCHANGE on 2/7/2020 with Dr. Walker at [facility] with choice anesthesia.  Mr. 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  His last cystoscopy with stent exchange with Dr. Walker was on 11/11/19.     Additional past medical history includes:  CAD with NSTEMI s/p PCIs with last SAM place 6/17/2019; Atrial fibrillation; HTN; HLD; bicuspid aortic valve with mild aortic stenosis (mean pressure gradient is 26mmHg, calculated valve area 1.5cm2); and CKD.      History is obtained from the patient and electronic health record.     Past Medical History  Past Medical History:   Diagnosis Date     Aortic stenosis due to bicuspid aortic valve      Atrial fibrillation (H) 2006    hx of paroxysmal atrial fibrillation since approximately 2006. S/p cardioversion in 2013 with recurrent atrial fibrillation s/p repeat cardioversion 9/29/14.     Basal cell carcinoma 1/2016    right shoulder and right posterior calf s/p electrodessication and curretage 1/2016.     CAD (coronary artery disease) 12/2011    s/p angiogram 12/2011 s/p percutaneous intervention on the LAD with multiple drug-eluting stents complicated by a small perforation in the diagonal branch which sealed spontaneously.  Patient with significant Circumflex stenosis which is being treated conservatively.     Cholangiocarcinoma (H)      CKD (chronic kidney disease)      Gout     affects left foot 2-3 times per year     Hyperlipidemia      Hypertension       Liver disease      Melanoma (H) 9/2015    back s/p resection 9/2015     Squamous cell carcinoma     nose s/p excision       Past Surgical History  Past Surgical History:   Procedure Laterality Date     ------------OTHER-------------      multiple skin cancer resections     CARDIOVERSION  2013, 9/2014     COMBINED CYSTOSCOPY, RETROGRADES, EXCHANGE STENT URETER(S) Right 11/11/2019    Procedure: Cystoscopy, Right Retrograde Pyelogram, Right Ureteral Stent Exchange;  Surgeon: Sivan Walker MD;  [facility]     CYSTOSCOPY, RETROGRADES, INSERT STENT URETER(S), COMBINED N/A 9/16/2019    Procedure: Cystoscopy, Right Retrograde Pyelogram, Right Ureteral Stent Placement;  Surgeon: Sivan Walker MD;  [facility]     ENDOSCOPIC RETROGRADE CHOLANGIOPANCREATOGRAM N/A 2/26/2016    Procedure: COMBINED ENDOSCOPIC RETROGRADE CHOLANGIOPANCREATOGRAPHY, PLACE TUBE/STENT;  Surgeon: Rafa Andrade MD;  [facility]     ENDOSCOPIC RETROGRADE CHOLANGIOPANCREATOGRAPHY  2/2014     ENDOSCOPIC ULTRASOUND UPPER GASTROINTESTINAL TRACT (GI) N/A 6/7/2019    Procedure: ENDOSCOPIC ULTRASOUND, with hot axios stent placement;  Surgeon: Gabino Rodriguez MD;  [facility]     ENTEROSCOPY SMALL BOWEL N/A 6/7/2019    Procedure: ENTEROSCOPY;  Surgeon: Gabino Rodriguez MD;  [facility]     ESOPHAGOSCOPY, GASTROSCOPY, DUODENOSCOPY (EGD), COMBINED N/A 10/16/2019    Procedure: Upper Gastrointestinal Endoscopy;  Surgeon: Gabino Rodriguez MD;  [facility]     ESOPHAGOSCOPY, GASTROSCOPY, DUODENOSCOPY (EGD), DILATATION, COMBINED N/A 7/29/2019    Procedure: Esophagoscopy, gastroscopy, duodenoscopy (EGD), with stent exchange and dilation;  Surgeon: Gabino Rodriguez MD;  [facility]     EXPLORE COMMON BILE DUCT N/A 3/8/2016    Procedure: EXPLORE COMMON BILE DUCT;  Surgeon: Jose Eduardo Pinedo MD;  [facility]     HEPATECTOMY PARTIAL Left 3/8/2016    Procedure: HEPATECTOMY PARTIAL;   Surgeon: Jose Eduardo Pinedo MD;  [facility]     INSERT PORT VASCULAR ACCESS Right 12/8/2017    Procedure: INSERT PORT VASCULAR ACCESS;  Place single lumen venous chest port - right;  Surgeon: Drew Powell PA-C;  [facility]     SOFT TISSUE SURGERY       STENT  12/2011    LAD with multiple SAM       Hx of Blood transfusions/reactions: yes with known antibodies.  No h/o reaction.      Hx of abnormal bleeding or anti-platelet use: yes    Steroid use in the last year: denies     Personal or FH with difficulty with Anesthesia:  denies    Prior to Admission Medications  Current Outpatient Medications   Medication Sig Dispense Refill     acetaminophen (TYLENOL) 500 MG tablet Take 500 mg by mouth every 6 hours as needed for fever or pain       allopurinol (ZYLOPRIM) 100 MG tablet Take 100 mg by mouth every evening        apixaban ANTICOAGULANT (ELIQUIS) 5 MG tablet Take 1 tablet (5 mg) by mouth 2 times daily       atorvastatin (LIPITOR) 40 MG tablet Take 40 mg by mouth every evening        calcium carbonate (TUMS) 500 MG chewable tablet Take 1-3 chew tab by mouth 4 times daily as needed for heartburn        clopidogrel (PLAVIX) 75 MG tablet Take 75 mg by mouth every morning        colchicine (COLCYRS) 0.6 MG tablet Take 0.6 mg by mouth 3 times daily as needed (gout flare)        COMPOUNDED NON-CONTROLLED SUBSTANCE (CMPD RX) - PHARMACY TO MIX COMPOUNDED MEDICATION Patient is actively on chemotherapy.  Please see oncology navigator for details.  Receives every 2 weeks as an infusion over approximately 46 hours.       fluticasone (FLONASE) 50 MCG/ACT nasal spray Spray 1 spray into both nostrils daily       furosemide (LASIX) 20 MG tablet Take 1 tablet (20 mg) by mouth daily       losartan (COZAAR) 25 MG tablet Take 25 mg by mouth every morning       metoprolol tartrate (LOPRESSOR) 50 MG tablet Take 150 mg by mouth 2 times daily        multivitamin w/minerals (THERA-VIT-M) tablet Take 1 tablet by mouth  daily       Nitroglycerin (NITROSTAT SL) Place 0.4 mg under the tongue every 5 minutes as needed for chest pain (Carries medication - has never used)        ondansetron (ZOFRAN) 4 MG tablet Take 1 tablet (4 mg) by mouth every 8 hours as needed for nausea 30 tablet 0     sennosides (SENOKOT) 8.6 MG tablet Take 1 tablet by mouth every evening          Allergies  Allergies   Allergen Reactions     Blood Transfusion Related (Informational Only) Other (See Comments)     Patient has a history of a clinically significant antibody against RBC antigens.  A delay in compatible RBCs may occur.       Social History  Social History     Socioeconomic History     Marital status:      Spouse name: Not on file     Number of children: 1     Years of education: Not on file     Highest education level: Not on file   Occupational History     Occupation: retired   Social Needs     Financial resource strain: Not on file     Food insecurity:     Worry: Not on file     Inability: Not on file     Transportation needs:     Medical: Not on file     Non-medical: Not on file   Tobacco Use     Smoking status: Never Smoker     Smokeless tobacco: Never Used   Substance and Sexual Activity     Alcohol use: Yes     Alcohol/week: 2.0 standard drinks     Types: 2 Cans of beer per week     Drug use: No     Sexual activity: Not on file   Lifestyle     Physical activity:     Days per week: Not on file     Minutes per session: Not on file     Stress: Not on file   Relationships     Social connections:     Talks on phone: Not on file     Gets together: Not on file     Attends Adventism service: Not on file     Active member of club or organization: Not on file     Attends meetings of clubs or organizations: Not on file     Relationship status: Not on file     Intimate partner violence:     Fear of current or ex partner: Not on file     Emotionally abused: Not on file     Physically abused: Not on file     Forced sexual activity: Not on file   Other  Topics Concern     Not on file   Social History Narrative    Works for [employer] with [email or web address] system.  Lives in [place].   with one grown daughter.  No tobacco, rare Etoh, no drug use.       Family History  Family History   Problem Relation Age of Onset     Skin Cancer Mother      Other - See Comments Father         father passed away in his 60s post-op with an unknown bile duct obstruction.  no anesthesia complication.       Osteoarthritis Sister      Cerebrovascular Disease Brother      Other - See Comments Brother         prediabetes     Osteoarthritis Sister      No Known Problems Brother          ROS/MED HX    ENT/Pulmonary:     (+)MARVIN risk factors hypertension, , . .   (-) tobacco use   Neurologic:     (+)neuropathy - feet,     Cardiovascular: Comment:  History of multivessel stenting in 2011; Presented with non-ST segment elevation myocardial infarction in May 2019, failed stenting of obtuse marginal (had met die load with h/o CKD) at [place] and successful stenting of the same vessel at [facility] on June 17, 2019, no angina.    (+) Dyslipidemia, hypertension--CAD, -past MI,-stent,2011 x4, 6/17/2019 x 1  Total: 5 Drug Eluting Stent .. Taking blood thinners Pt has received instructions: Instructions Given to patient: Continuing Plavix and Eliquis-okay per surgery. CHF Last EF: 42% date: 9/30/19 . . :. dysrhythmias a-fib, valvular problems/murmurs type: AS mild:. Previous cardiac testing Echodate:9/30/19results:date: results:ECG reviewed date:10/4/19 results:Atrial fibrillation rate 77  Cath date: 6/17/2019 results:   Result Narrative        65 yo M with known CAD, remote PCI of LAD in 2011, presented in May 2019   to [facility] with a NSTEMI, with coronary angiography there showing patent   LAD stents with a severe stenosis in OM1 that could not be successfully   crossed with a wire.     Returns today for repeat coronary angiography with another attempt at the   OM1 stenosis    LM minimal  dz  LAD patent prox to distal stents  LCx 99% OM1 stenosis with disease extension for 10-15mm on either side of   stenosis; mild dz elsewhere in Cx  RCA mild to moderate diffuse dz, 50-60% focal distal RCA lesion    Successful PCI of OM1 with PTCA followed by 2.5x24 Synergy SAM  0% residual, NITZA 3 flow post    OM1 lesion essentially behaving as a ; requiring wire escalation,   progressive PTCA and Guidezilla support for PCI              METS/Exercise Tolerance: Comment: Current chemotherapy medication has caused fatigue which was started in September 2019.    Hematologic:     (+) Anemia, History of Transfusion (June 2019 with known antibodies. ) no previous transfusion reaction -      Musculoskeletal:   (+) arthritis (Gout related),  other musculoskeletal- gout      GI/Hepatic: Comment: Known metastatic cholangiocarcinoma s/p initial resection 2016, more recent chemotherapy; and s/p Axios stenting of carcinomatous obstruction  of biliary limb of prior Carol-en-Y jejunostomy June 2019.      (+) GERD (takes TUMS as needed.) Other, cholecystitis/cholelithiasis, Other GI/Hepatic cholangiocarcinoma, s/p partial hepatatectomy, bowel stent in place     Acute appendicitis: s/p cholecystectomy.   Renal/Genitourinary:     (+) chronic renal disease, type: CRI, Pt does not require dialysis, Pt has no history of transplant, Other Renal/ Genitourinary, right hydronephrosis      Endo:  - neg endo ROS       Psychiatric:  - neg psychiatric ROS       Infectious Disease:  - neg infectious disease ROS       Malignancy:   (+) Malignancy History of Other and Skin  Skin CA Remission status post Surgery, Other CA cholangiocarcinoma Active status post Chemo and Surgery         Other:    (+) No chance of pregnancy C-spine cleared: N/A, no H/O Chronic Pain,no other significant disability                PHYSICAL EXAM:   Mental Status/Neuro: A/A/O   Airway: Facies: Feasible (bautista)  Mallampati: I  Mouth/Opening: Full  TM distance: > 6  "cm  Neck ROM: Full   Respiratory: Auscultation: CTAB     Resp. Rate: Normal     Resp. Effort: Normal      CV: Rhythm: Irregular  Rate: Age appropriate  Heart: Normal Sounds  Edema: None   Comments:      Dental: Normal Dentition            Temp: 97.4  F (36.3  C) Temp src: Oral BP: 112/71 Pulse: 75   Resp: 15 SpO2: 100 %         172 lbs 12.8 oz  5' 11.5\"   Body mass index is 23.76 kg/m .       Physical Exam  Constitutional: Awake, alert, cooperative, no apparent distress, and appears stated age.  Eyes: Pupils equal, round and reactive to light, extra ocular muscles intact, sclera clear, conjunctiva normal.  HENT: Normocephalic, oral pharynx with moist mucus membranes, dentition fair repair. No goiter appreciated.   Respiratory: Clear to auscultation bilaterally, no crackles or wheezing.  Cardiovascular: Regular rate and rhythm, normal S1 and S2, and no murmur noted.  Carotids +2, no bruits. No edema. Palpable pulses to radial  DP and PT arteries. Evidence of right chest port.   GI: Normal bowel sounds, soft, non-distended, non-tender, no masses palpated, no hepatosplenomegaly.  Surgical scars: well healed.   Lymph/Hematologic: No cervical lymphadenopathy and no supraclavicular lymphadenopathy.  Genitourinary:  deferred  Skin: Warm and dry.    Musculoskeletal: Full ROM of neck. There is no redness, warmth, or swelling of the joints. Gross motor strength is normal.    Neurologic: Awake, alert, oriented to name, place and time. Cranial nerves II-XII are grossly intact. Gait is normal.   Neuropsychiatric: Calm, cooperative. Normal affect.     Labs: (personally reviewed)  Lab Results   Component Value Date    WBC 10.2 01/24/2020     Lab Results   Component Value Date    RBC 3.26 01/24/2020     Lab Results   Component Value Date    HGB 10.1 01/24/2020     Lab Results   Component Value Date    HCT 30.7 01/24/2020     Lab Results   Component Value Date    MCV 94 01/24/2020     Lab Results   Component Value Date    MCH 31.0 " 01/24/2020     Lab Results   Component Value Date    MCHC 32.9 01/24/2020     Lab Results   Component Value Date    RDW 17.5 01/24/2020     Lab Results   Component Value Date     01/24/2020     Last Comprehensive Metabolic Panel:  Sodium   Date Value Ref Range Status   01/24/2020 140 133 - 144 mmol/L Final     Potassium   Date Value Ref Range Status   01/24/2020 3.8 3.4 - 5.3 mmol/L Final     Chloride   Date Value Ref Range Status   01/24/2020 109 94 - 109 mmol/L Final     Carbon Dioxide   Date Value Ref Range Status   01/24/2020 27 20 - 32 mmol/L Final     Anion Gap   Date Value Ref Range Status   01/24/2020 4 3 - 14 mmol/L Final     Glucose   Date Value Ref Range Status   01/24/2020 112 (H) 70 - 99 mg/dL Final     Urea Nitrogen   Date Value Ref Range Status   01/24/2020 30 7 - 30 mg/dL Final     Creatinine   Date Value Ref Range Status   01/24/2020 1.41 (H) 0.66 - 1.25 mg/dL Final     GFR Estimate   Date Value Ref Range Status   01/24/2020 53 (L) >60 mL/min/[1.73_m2] Final     Comment:     Non  GFR Calc  Starting 12/18/2018, serum creatinine based estimated GFR (eGFR) will be   calculated using the Chronic Kidney Disease Epidemiology Collaboration   (CKD-EPI) equation.       Calcium   Date Value Ref Range Status   01/24/2020 8.8 8.5 - 10.1 mg/dL Final     PROCEDURES  Echocardiogram 9/30/19  Normal left ventricular size with borderline concentric hypertrophy.    Left ventricular ejection fraction is moderately decreased. The  calculated left ventricular ejection fraction is 42%.    Normal right ventricular size and systolic function.    Mild aortic stenosis.    Left atrial volume is moderately increased.    When compared to the previous study dated 5/17/2019, the ejection  fraction is slightly higher.      Echocardiogram 7/28/2019  Interpretation Summary  Left ventricular wall thickness is normal. Mild left ventricular dilation is  present. The Ejection Fraction is estimated at 45-50%. No  regional wall motion  abnormalities are seen.  Right ventricular function, chamber size, wall motion, and thickness are  normal.  Mild mitral annular calcification is present. Trace mitral insufficiency is  present. Moderate AS is present. The Vmax is calculated at 3.18m/s. mean  pressure gradient is 26mmHg. Calculated valve area is 1.5cm2. Mild to moderate  tricuspid insufficiency is present.  The inferior vena cava was normal in size with preserved respiratory  variability. Sinuses of Valsalva 4.5 cm. Ascending aorta 3.9 cm. No  pericardial effusion is present.     Previous study not available for comparison.  _____________________________________________________________________________       Left Ventricle  Left ventricular wall thickness is normal. Mild left ventricular dilation is  present. The Ejection Fraction is estimated at 45-50%. Left ventricular  diastolic function is not assessable. No regional wall motion abnormalities  are seen.     Right Ventricle  Right ventricular function, chamber size, wall motion, and thickness are  normal.     Atria  Moderate biatrial enlargement is present.        Mitral Valve  Mild mitral annular calcification is present. Trace mitral insufficiency is  present.     Aortic Valve  Trace aortic insufficiency is present. Moderate AS is present. The Vmax is  calculated at 3.18m/s. mean pressure gradient is 26mmHg. Calculated valve area  is 1.5cm2.     Tricuspid Valve  The tricuspid valve is normal. Mild to moderate tricuspid insufficiency is  present. The right ventricular systolic pressure is approximated at 33.6 mmHg  plus the right atrial pressure.     Pulmonic Valve  The pulmonic valve is normal. Trace pulmonic insufficiency is present.     Vessels  The inferior vena cava was normal in size with preserved respiratory  variability. Sinuses of Valsalva 4.5 cm. Ascending aorta 3.9 cm.     Pericardium  No pericardial effusion is present.        Compared to Previous Study  Previous  study not available for comparison.  _____________________________________________________________________________  Coronary angiogram 6/17/19    LM minimal dz  LAD patent prox to distal stents  LCx 99% OM1 stenosis with disease extension for 10-15mm on either side of  stenosis; mild dz elsewhere in Cx  RCA mild to moderate diffuse dz, 50-60% focal distal RCA lesion    Successful PCI of OM1 with PTCA followed by 2.5x24 Synergy SAM  0% residual, NITZA 3 flow post    OM1 lesion essentially behaving as a ; requiring wire escalation,  progressive PTCA and Guidezilla support for PCI.        Outside records reviewed from: Care Everywhere    ASSESSMENT and PLAN  Girish Chauhan is a 64 year old male scheduled to undergo CYSTOSCOPY WITH RIGHT RETROGRADE PYELOGRAM AND RIGHT URETERAL STENT EXCHANGE on 2/7/2020 with Dr. Walker at [facility] with choice anesthesia.  He has the following specific operative considerations:   - MARVIN # of risks 4/8 = intermediate risk  - VTE risk:  3%  - Risk of PONV score = 1.  If > 2, anti-emetic intervention recommended.      #  Cardiology -  complex cardiac history followed by Dr. Rose with last visit on 11/2019, currently stable. METS:  <4.Prior to starting chemotherapy last fall, METS>4.   RCRI : Coronary Artery Disease (MI, positive stress test, angina, Qs on EKG).  0.9 % risk of major adverse cardiac event.      - Known CAD with h/o multivessel stenting in 2011/non-ST segment elevation myocardial infarction in May 2019, failed stenting of obtuse marginal at [place] and successful stenting of the same vessel at [facility] on June 17, 2019, no angina. Denies recent cardiac symptoms. LVEF 42% on echocardiogram 9/2019.   Take beta blocker,clopidogrel  and statin DOS.      - Bicuspid aortic valve with with mild aortic stenosis (mean pressure gradient is 26mmHg, calculated valve area 1.5cm2, echocardiogram 9/2019), followed with serial echocardiograms     - Atrial  fibrillation, rate controlled on beta blocker and anticoagulated with apixaban.      - HTN, hold ARB and diuretic DOS.     - Dr. Aaron mccartney'd patient to stay on apixaban and clopidogrel for procedure.  #  Pulmonary - no smoking hx  #  Hematology - Anemia: Recommend use of blood conservation techniques intraoperatively and close monitoring of postoperative bleeding.     - Known antibodies with last transfusion > 6 months ago.  #  GI - Known metastatic cholangiocarcinoma s/p initial resection 2016, more recent chemotherapy which will start the DOS ; and s/p Axios stenting of carcinomatous obstruction of biliary limb of prior Carol-en-Y jejunostomy June 2019.  #  Renal - CKD: Cr 1.41 and GFR 53 (1/24/20).Suggest that nephrotoxic substances and medications be avoided.  Recommend close monitoring of fluid balance and electrolytes throughout the perioperative period.    #  Nutrition - albumin 3.0    - Anesthesia considerations:  Refer to PAC assessment in anesthesia records  - Right chest port      Arrival time, NPO, shower and medication instructions provided by nursing staff today.  Preparing For Your Surgery handout given.  Patient was discussed with Dr Almonte.        YO Dowling CNP  Preoperative Assessment Center  [facility]  Clinic and Surgery Center  Phone: [phone]  Fax: [phone]

## 2020-02-03 NOTE — ANESTHESIA PREPROCEDURE EVALUATION
Anesthesia Pre-Procedure Evaluation    Patient: Girish Chauhan   MRN:     [id number] Gender:   male   Age:    64 year old :      1955        Preoperative Diagnosis: * No surgery found *        Past Medical History:   Diagnosis Date     Aortic stenosis due to bicuspid aortic valve      Atrial fibrillation (H)     hx of paroxysmal atrial fibrillation since approximately . S/p cardioversion in  with recurrent atrial fibrillation s/p repeat cardioversion 14.     Basal cell carcinoma 2016    right shoulder and right posterior calf s/p electrodessication and curretage 2016.     CAD (coronary artery disease) 2011    s/p angiogram 2011 s/p percutaneous intervention on the LAD with multiple drug-eluting stents complicated by a small perforation in the diagonal branch which sealed spontaneously.  Patient with significant Circumflex stenosis which is being treated conservatively.     Cholangiocarcinoma (H)      CKD (chronic kidney disease)      Gout     affects left foot 2-3 times per year     Hyperlipidemia      Hypertension      Liver disease      Melanoma (H) 2015    back s/p resection 2015     Squamous cell carcinoma     nose s/p excision      Past Surgical History:   Procedure Laterality Date     ------------OTHER-------------      multiple skin cancer resections     CARDIOVERSION  , 2014     COMBINED CYSTOSCOPY, RETROGRADES, EXCHANGE STENT URETER(S) Right 2019    Procedure: Cystoscopy, Right Retrograde Pyelogram, Right Ureteral Stent Exchange;  Surgeon: Sivan Walker MD;  [facility]     CYSTOSCOPY, RETROGRADES, INSERT STENT URETER(S), COMBINED N/A 2019    Procedure: Cystoscopy, Right Retrograde Pyelogram, Right Ureteral Stent Placement;  Surgeon: Sivan Walker MD;  [facility]     ENDOSCOPIC RETROGRADE CHOLANGIOPANCREATOGRAM N/A 2016    Procedure: COMBINED ENDOSCOPIC RETROGRADE CHOLANGIOPANCREATOGRAPHY, PLACE TUBE/STENT;  Surgeon:  Rafa Andrade MD;  [facility]     ENDOSCOPIC RETROGRADE CHOLANGIOPANCREATOGRAPHY  2/2014     ENDOSCOPIC ULTRASOUND UPPER GASTROINTESTINAL TRACT (GI) N/A 6/7/2019    Procedure: ENDOSCOPIC ULTRASOUND, with hot axios stent placement;  Surgeon: Gabino Rodriguez MD;  [facility]     ENTEROSCOPY SMALL BOWEL N/A 6/7/2019    Procedure: ENTEROSCOPY;  Surgeon: Gabino Rodriguez MD;  [facility]     ESOPHAGOSCOPY, GASTROSCOPY, DUODENOSCOPY (EGD), COMBINED N/A 10/16/2019    Procedure: Upper Gastrointestinal Endoscopy;  Surgeon: Gabino Rodriguez MD;  [facility]     ESOPHAGOSCOPY, GASTROSCOPY, DUODENOSCOPY (EGD), DILATATION, COMBINED N/A 7/29/2019    Procedure: Esophagoscopy, gastroscopy, duodenoscopy (EGD), with stent exchange and dilation;  Surgeon: Gabino Rodriguez MD;  [facility]     EXPLORE COMMON BILE DUCT N/A 3/8/2016    Procedure: EXPLORE COMMON BILE DUCT;  Surgeon: Jose Eduardo Pinedo MD;  [facility]     HEPATECTOMY PARTIAL Left 3/8/2016    Procedure: HEPATECTOMY PARTIAL;  Surgeon: Jose Eduardo Pinedo MD;  [facility]     INSERT PORT VASCULAR ACCESS Right 12/8/2017    Procedure: INSERT PORT VASCULAR ACCESS;  Place single lumen venous chest port - right;  Surgeon: Drew Powell PA-C;  [facility]     SOFT TISSUE SURGERY       STENT  12/2011    LAD with multiple SAM          Anesthesia Evaluation     . Pt has had prior anesthetic. Type: General and MAC    No history of anesthetic complications          ROS/MED HX    ENT/Pulmonary:     (+)MARVIN risk factors hypertension, , . .   (-) tobacco use   Neurologic:     (+)neuropathy - feet,     Cardiovascular: Comment:  History of multivessel stenting in 2011/non-ST segment elevation myocardial infarction in May 2019, failed stenting of obtuse marginal at [facility] and successful stenting of the same vessel at [facility] on June 17, 2019, no angina.    (+) Dyslipidemia, hypertension--CAD, -past  MI,-stent,2011 x4, 6/17/2019 x 1  5 Drug Eluting Stent .. Taking blood thinners Pt has received instructions: Instructions Given to patient: Continuing Plavix and Eliquis-okay per surgery. CHF Last EF: 42% date: 9/30/19 . . :. dysrhythmias a-fib, valvular problems/murmurs type: AS moderate.:. Previous cardiac testing Echodate:9/30/19results:date: results:ECG reviewed date:10/4/19 results:Atrial fibrillation rate 77  Cath date: 6/17/2019 results:   Result Narrative        63 yo M with known CAD, remote PCI of LAD in 2011, presented in May 2019   to [facility] with a NSTEMI, with coronary angiography there showing patent   LAD stents with a severe stenosis in OM1 that could not be successfully   crossed with a wire.     (Known metastatic cholangiocarcinoma s/p initial resection 2016, more   recent chemotherapy; and s/p Axios stenting of carcinomatous obstruction   of biliary limb of prior Carol-en-Y jejunostomy June 2019 when he presented   with an SBO)    Returns today for repeat coronary angiography with another attempt at the   OM1 stenosis    LM minimal dz  LAD patent prox to distal stents  LCx 99% OM1 stenosis with disease extension for 10-15mm on either side of   stenosis; mild dz elsewhere in Cx  RCA mild to moderate diffuse dz, 50-60% focal distal RCA lesion    Successful PCI of OM1 with PTCA followed by 2.5x24 Synergy SAM  0% residual, NITZA 3 flow post    OM1 lesion essentially behaving as a ; requiring wire escalation,   progressive PTCA and Guidezilla support for PCI              METS/Exercise Tolerance: Comment: Current chemotherapy medication has caused fatigue which was started in September 2019.    Hematologic:     (+) Anemia, History of Transfusion (June 2019 with known antibodies. ) no previous transfusion reaction -      Musculoskeletal:   (+) arthritis (Gout related),  other musculoskeletal- gout      GI/Hepatic: Comment: Known metastatic cholangiocarcinoma s/p initial resection 2016, more recent  chemotherapy; and s/p Axios stenting of carcinomatous obstruction  of biliary limb of prior Carol-en-Y jejunostomy June 2019.      (+) GERD (takes TUMS as needed.) Other, cholecystitis/cholelithiasis, Other GI/Hepatic cholangiocarcinoma, s/p partial hepatatectomy, bowel stent in place     Acute appendicitis: s/p cholecystectomy.   Renal/Genitourinary:     (+) chronic renal disease, type: CRI, Pt does not require dialysis, Pt has no history of transplant, Other Renal/ Genitourinary, right hydronephrosis      Endo:  - neg endo ROS       Psychiatric:  - neg psychiatric ROS       Infectious Disease:  - neg infectious disease ROS       Malignancy:   (+) Malignancy History of Other and Skin  Skin CA Remission status post Surgery, Other CA cholangiocarcinoma Active status post Chemo and Surgery         Other:    (+) No chance of pregnancy C-spine cleared: N/A, no H/O Chronic Pain,no other significant disability                        PHYSICAL EXAM:   Mental Status/Neuro: A/A/O   Airway: Facies: Feasible (beard)  Mallampati: II  Mouth/Opening: Full  TM distance: > 6 cm  Neck ROM: Full   Respiratory: Auscultation: CTAB     Resp. Rate: Normal     Resp. Effort: Normal      CV: Rhythm: Irregular  Rate: Age appropriate  Heart: Normal Sounds  Edema: None   Comments:      Dental: Normal Dentition                LABS:  CBC:   Lab Results   Component Value Date    WBC 10.2 01/24/2020    WBC 7.3 01/10/2020    HGB 10.1 (L) 01/24/2020    HGB 9.5 (L) 01/10/2020    HCT 30.7 (L) 01/24/2020    HCT 29.6 (L) 01/10/2020     01/24/2020     01/10/2020     BMP:   Lab Results   Component Value Date     01/24/2020     01/10/2020    POTASSIUM 3.8 01/24/2020    POTASSIUM 4.2 01/10/2020    CHLORIDE 109 01/24/2020    CHLORIDE 111 (H) 01/10/2020    CO2 27 01/24/2020    CO2 26 01/10/2020    BUN 30 01/24/2020    BUN 27 01/10/2020    CR 1.41 (H) 01/24/2020    CR 1.44 (H) 01/10/2020     (H) 01/24/2020    GLC 92 01/10/2020  "    COAGS:   Lab Results   Component Value Date    PTT 35 07/30/2019    INR 1.35 (H) 07/30/2019    FIBR 404 07/30/2019     POC:   Lab Results   Component Value Date    BGM 99 11/11/2019     OTHER:   Lab Results   Component Value Date    PH 7.38 03/08/2016    LACT 1.2 07/28/2019    GARY 8.8 01/24/2020    PHOS 3.2 10/04/2019    MAG 1.8 10/04/2019    ALBUMIN 3.0 (L) 01/24/2020    PROTTOTAL 6.3 (L) 01/24/2020    ALT 23 01/24/2020    AST 18 01/24/2020    ALKPHOS 113 01/24/2020    BILITOTAL 0.7 01/24/2020    LIPASE 131 07/29/2019    AMYLASE 71 07/29/2019    TSH 3.88 10/15/2018        Preop Vitals    BP Readings from Last 3 Encounters:   02/03/20 112/71   01/24/20 96/66   01/10/20 106/57    Pulse Readings from Last 3 Encounters:   02/03/20 75   01/24/20 93   01/10/20 92      Resp Readings from Last 3 Encounters:   02/03/20 15   01/24/20 16   01/10/20 16    SpO2 Readings from Last 3 Encounters:   02/03/20 100%   01/24/20 100%   01/10/20 98%      Temp Readings from Last 1 Encounters:   02/03/20 97.4  F (36.3  C) (Oral)    Ht Readings from Last 1 Encounters:   02/03/20 1.816 m (5' 11.5\")      Wt Readings from Last 1 Encounters:   02/03/20 78.4 kg (172 lb 12.8 oz)    Estimated body mass index is 23.76 kg/m  as calculated from the following:    Height as of this encounter: 1.816 m (5' 11.5\").    Weight as of this encounter: 78.4 kg (172 lb 12.8 oz).     LDA:  Port A Cath Single 12/08/17 Right Chest wall (Active)   Access Date 01/24/20 1/24/2020  7:30 AM   Access Attempts 1 1/24/2020  7:30 AM   Gauge Power noncoring 90 degree bend;3/4 inch 1/24/2020  7:30 AM   Site Assessment WDL 1/24/2020  8:00 AM   Line Status Blood return noted;Infusing 1/24/2020  8:00 AM   Extravasation? No 1/10/2020 10:57 AM   Dressing Intervention Transparent 1/24/2020  7:30 AM   De-Access Date 01/26/20 1/24/2020  8:00 AM   Number of days: 787        Assessment:   ASA SCORE: 4    H&P: History and physical reviewed and following examination; no interval " change.         Plan:   Anes. Type:  MAC   Pre-Medication: None   Induction:  IV (Standard)   Airway: Native Airway   Access/Monitoring: Central Access/Port present   Maintenance: Balanced     Postop Plan:   Postop Pain: Opioids  Postop Sedation/Airway: Not planned     PONV Management:   Adult Risk Factors:, Postop Opioids   Prevention: Ondansetron, Dexamethasone     CONSENT: Direct conversation   Plan and risks discussed with: Patient                   PAC Discussion and Assessment    ASA Classification: 3  Case is suitable [place]: [facility] [place]  Anesthetic techniques and relevant risks discussed: PAC Recommendations anesthetic techniques: Choice.  Invasive monitoring and risk discussed:   Types:   Possibility and Risk of blood transfusion discussed:   NPO instructions given:   Additional anesthetic preparation and risks discussed:   Needs early admission to pre-op area:   Other:     PAC Resident/NP Anesthesia Assessment:  Girish Chauhan is a 64-year-old male scheduled for CYSTOSCOPY WITH RIGHT RETROGRADE PYELOGRAM AND RIGHT URETERAL STENT EXCHANGE on 2/7/2020 with Dr. Walker at [facility] with choice anesthesia.  Mr. 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  His last cystoscopy with stent exchange with Dr. Walker was on 11/11/19.     Additional past medical history includes:  CAD with NSTEMI s/p PCIs with last SAM place 6/17/2019; Atrial fibrillation; HTN; HLD; bicuspid aortic valve with mild aortic stenosis (mean pressure gradient is 26mmHg, calculated valve area 1.5cm2); and CKD.       PROCEDURES  Echocardiogram 9/30/19  Normal left ventricular size with borderline concentric hypertrophy.    Left ventricular ejection fraction is moderately decreased. The  calculated left ventricular ejection fraction is 42%.    Normal right ventricular size and systolic function.    Mild aortic stenosis.    Left atrial volume is  moderately increased.    When compared to the previous study dated 5/17/2019, the ejection  fraction is slightly higher.      Echocardiogram 7/28/2019  Interpretation Summary  Left ventricular wall thickness is normal. Mild left ventricular dilation is  present. The Ejection Fraction is estimated at 45-50%. No regional wall motion  abnormalities are seen.  Right ventricular function, chamber size, wall motion, and thickness are  normal.  Mild mitral annular calcification is present. Trace mitral insufficiency is  present. Moderate AS is present. The Vmax is calculated at 3.18m/s. mean  pressure gradient is 26mmHg. Calculated valve area is 1.5cm2. Mild to moderate  tricuspid insufficiency is present.  The inferior vena cava was normal in size with preserved respiratory  variability. Sinuses of Valsalva 4.5 cm. Ascending aorta 3.9 cm. No  pericardial effusion is present.     Previous study not available for comparison.  _____________________________________________________________________________  __        Left Ventricle  Left ventricular wall thickness is normal. Mild left ventricular dilation is  present. The Ejection Fraction is estimated at 45-50%. Left ventricular  diastolic function is not assessable. No regional wall motion abnormalities  are seen.     Right Ventricle  Right ventricular function, chamber size, wall motion, and thickness are  normal.     Atria  Moderate biatrial enlargement is present.        Mitral Valve  Mild mitral annular calcification is present. Trace mitral insufficiency is  present.     Aortic Valve  Trace aortic insufficiency is present. Moderate AS is present. The Vmax is  calculated at 3.18m/s. mean pressure gradient is 26mmHg. Calculated valve area  is 1.5cm2.     Tricuspid Valve  The tricuspid valve is normal. Mild to moderate tricuspid insufficiency is  present. The right ventricular systolic pressure is approximated at 33.6 mmHg  plus the right atrial pressure.     Pulmonic  Valve  The pulmonic valve is normal. Trace pulmonic insufficiency is present.     Vessels  The inferior vena cava was normal in size with preserved respiratory  variability. Sinuses of Valsalva 4.5 cm. Ascending aorta 3.9 cm.     Pericardium  No pericardial effusion is present.        Compared to Previous Study  Previous study not available for comparison.  _____________________________________________________________________________  Coronary angiogram 6/17/19    LM minimal dz  LAD patent prox to distal stents  LCx 99% OM1 stenosis with disease extension for 10-15mm on either side of  stenosis; mild dz elsewhere in Cx  RCA mild to moderate diffuse dz, 50-60% focal distal RCA lesion    Successful PCI of OM1 with PTCA followed by 2.5x24 Synergy SAM  0% residual, NITZA 3 flow post    OM1 lesion essentially behaving as a ; requiring wire escalation,  progressive PTCA and Guidezilla support for PCI.      He has the following specific operative considerations:   - MARVIN # of risks 4/8 = intermediate risk  - VTE risk:  3%  - Risk of PONV score = 1.  If > 2, anti-emetic intervention recommended.      #  Cardiology -  complex cardiac history followed by Dr. Rose with last visit on 11/2019, currently stable. METS:  <4.Prior to starting chemotherapy last fall, METS>4.   RCRI : Coronary Artery Disease (MI, positive stress test, angina, Qs on EKG).  0.9 % risk of major adverse cardiac event.      - Known CAD with h/o multivessel stenting in 2011/non-ST segment elevation myocardial infarction in May 2019, failed stenting of obtuse marginal at [place] and successful stenting of the same vessel at [facility] on June 17, 2019, no angina. Denies recent cardiac symptoms. LVEF 42% on echocardiogram 9/2019.   Take beta blocker,clopidogrel  and statin DOS.      - Bicuspid aortic valve with with mild aortic stenosis (mean pressure gradient is 26mmHg, calculated valve area 1.5cm2, echocardiogram 9/2019), followed with serial  echocardiograms     - Atrial fibrillation, rate controlled on beta blocker and anticoagulated with apixaban.      -   - HTN, hold ARB and diuretic DOS.     - Dr. Aaron mccartney's patient to stay on apixaban and clopidogrel for procedure.  #  Pulmonary - no smoking hx  #  Hematology - Anemia: Recommend use of blood conservation techniques intraoperatively and close monitoring of postoperative bleeding.     - Known antibodies with last transfusion > 6 months ago.  #  GI - Known metastatic cholangiocarcinoma s/p initial resection 2016, more recent chemotherapy which will start the DOS ; and s/p Axios stenting of carcinomatous obstruction of biliary limb of prior Carol-en-Y jejunostomy June 2019.  #  Renal - CKD: Cr 1.41 and GFR 53 (1/24/20).Suggest that nephrotoxic substances and medications be avoided.  Recommend close monitoring of fluid balance and electrolytes throughout the perioperative period.    #  Nutrition - albumin 3.0    - Anesthesia considerations:  Refer to PAC assessment in anesthesia records  - Right chest port      Arrival time, NPO, shower and medication instructions provided by nursing staff today.  Preparing For Your Surgery handout given.  Patient was discussed with Dr Almonte.      Reviewed and Signed by PAC Mid-Level Provider/Resident  Mid-Level Provider/Resident: Sierra Huff APRN CNP  Date: 2/3/2020  Time: 9:38    Attending Anesthesiologist Anesthesia Assessment:  I have reviewed the medical record and discussed the patient with the [facility].  The patient is scheduled for cystocopy and stent exchange.  He has had this procedure 9/19 and 11/19 under MAC sedation without complication  He has a history of multiple SAM placement for CAD, most recent 6/2019.  He denies symptoms since then, the plan is to continue DAPT without stoppage  He has known cardiomyopathy with EF 42 % presumable secondary to CADz which has been stable.  He has known moderate aortic stenosis with gradient of 20 mm  Of note, he has  continuous 5FU chemotherapy delivered through a port via a silastic pump.  This will still be infusing the day of surgery.  We have discussed this with the oncology pharmacist and the oncology team caring for the patient  There is no reported interaction of 5FU with anesthetics, but the pump can simply be clamped during the anesthetic and the clamp removed at the end of the case per oncology  Final plan per attending anesthesiologist the day of surgery.      Reviewed and Signed by PAC Anesthesiologist  Anesthesiologist: Edgar Almonte MD  Date: 2/3/2020  Time:   Pass/Fail:   Disposition:     PAC Pharmacist Assessment:        Pharmacist:   Date:   Time:    YO Dowling CNP

## 2020-02-03 NOTE — PHARMACY - PREOPERATIVE ASSESSMENT CENTER
Anticoagulation Note - Preoperative Assessment Center (PAC) Pharmacist     Patient seen and interviewed during time of PAC Clinic appointment February 3, 2020.  The purpose of this note is to document the perioperative anticoagulation plan outlined by the providers caring for Girish Chauhan.     Current Regimen  Anticoagulation Regimen as of February 3, 2020: apixaban (Eliquis) 5 mg PO BID  Indication: atrial fibrillation.   Prescriber:  Dr. Deangelo Rose  Expected Duration of therapy: indefinite  Current medications that may interact with this include: plavix    Perioperative plan  Girish Chauhan is scheduled for CYSTOSCOPY WITH RIGHT RETROGRADE PYELOGRAM AND RIGHT URETERAL STENT EXCHANGE on 2/7/20 with Dr. Walker.  Per epic message discussion with Dr. Walker patient can remain on his plavix and Eliquis for the upcoming procedure.       Jeff Ward [facility]  February 3, 2020  8:16 AM

## 2020-02-04 ENCOUNTER — TELEPHONE (OUTPATIENT)
Dept: UROLOGY | Facility: CLINIC | Age: 65
End: 2020-02-04

## 2020-02-04 LAB
BACTERIA SPEC CULT: NORMAL
Lab: NORMAL
SPECIMEN SOURCE: NORMAL

## 2020-02-04 NOTE — TELEPHONE ENCOUNTER
February 4, 2020    Spoke to patient today about the fact that we are currently scheduled to exchange his stent while he is getting his chemo (48 hour infusion Friday to Sunday).  Discussed that as his stent exchange is relatively elective (eg not causing him issues or symptoms right now) that it does not make sense to change his stent during this setting but to see if we could reschedule for later just prior to his next chemo on the 21st.  He expresses understanding and is agreeable to this plan.  Will have my office work to coordinate    Sivan Walker MD MPH   of Urology

## 2020-02-05 ENCOUNTER — PATIENT OUTREACH (OUTPATIENT)
Dept: UROLOGY | Facility: CLINIC | Age: 65
End: 2020-02-05

## 2020-02-05 ENCOUNTER — ANESTHESIA (OUTPATIENT)
Dept: SURGERY | Facility: CLINIC | Age: 65
End: 2020-02-05
Payer: COMMERCIAL

## 2020-02-05 ENCOUNTER — APPOINTMENT (OUTPATIENT)
Dept: GENERAL RADIOLOGY | Facility: CLINIC | Age: 65
End: 2020-02-05
Attending: UROLOGY
Payer: COMMERCIAL

## 2020-02-05 ENCOUNTER — HOSPITAL ENCOUNTER (OUTPATIENT)
Facility: CLINIC | Age: 65
Discharge: HOME | End: 2020-02-05
Attending: UROLOGY | Admitting: UROLOGY
Payer: COMMERCIAL

## 2020-02-05 VITALS
SYSTOLIC BLOOD PRESSURE: 109 MMHG | DIASTOLIC BLOOD PRESSURE: 75 MMHG | RESPIRATION RATE: 16 BRPM | WEIGHT: 169.31 LBS | BODY MASS INDEX: 23.7 KG/M2 | OXYGEN SATURATION: 98 % | HEIGHT: 71 IN | HEART RATE: 78 BPM | TEMPERATURE: 97.5 F

## 2020-02-05 DIAGNOSIS — N13.30 HYDRONEPHROSIS, RIGHT: Primary | ICD-10-CM

## 2020-02-05 DIAGNOSIS — D64.81 ANEMIA ASSOCIATED WITH CHEMOTHERAPY: ICD-10-CM

## 2020-02-05 DIAGNOSIS — C22.1 CHOLANGIOCARCINOMA (H): ICD-10-CM

## 2020-02-05 DIAGNOSIS — T45.1X5A ANEMIA ASSOCIATED WITH CHEMOTHERAPY: ICD-10-CM

## 2020-02-05 LAB
ABO + RH BLD: NORMAL
ABO + RH BLD: NORMAL
BLD GP AB SCN SERPL QL: NORMAL
BLOOD BANK CMNT PATIENT-IMP: NORMAL
GLUCOSE BLDC GLUCOMTR-MCNC: 89 MG/DL (ref 70–99)
POTASSIUM SERPL-SCNC: 4.6 MMOL/L (ref 3.4–5.3)
SPECIMEN EXP DATE BLD: NORMAL

## 2020-02-05 PROCEDURE — 25500064 ZZH RX 255 OP 636: Performed by: UROLOGY

## 2020-02-05 PROCEDURE — 25000132 ZZH RX MED GY IP 250 OP 250 PS 637: Performed by: ANESTHESIOLOGY

## 2020-02-05 PROCEDURE — 86901 BLOOD TYPING SEROLOGIC RH(D): CPT | Performed by: ANESTHESIOLOGY

## 2020-02-05 PROCEDURE — C1769 GUIDE WIRE: HCPCS | Performed by: UROLOGY

## 2020-02-05 PROCEDURE — 40000170 ZZH STATISTIC PRE-PROCEDURE ASSESSMENT II: Performed by: UROLOGY

## 2020-02-05 PROCEDURE — 25800030 ZZH RX IP 258 OP 636: Performed by: ANESTHESIOLOGY

## 2020-02-05 PROCEDURE — 25000125 ZZHC RX 250: Performed by: UROLOGY

## 2020-02-05 PROCEDURE — 36000059 ZZH SURGERY LEVEL 3 EA 15 ADDTL MIN UMMC: Performed by: UROLOGY

## 2020-02-05 PROCEDURE — 25000125 ZZHC RX 250: Performed by: NURSE ANESTHETIST, CERTIFIED REGISTERED

## 2020-02-05 PROCEDURE — 71000027 ZZH RECOVERY PHASE 2 EACH 15 MINS: Performed by: UROLOGY

## 2020-02-05 PROCEDURE — 36415 COLL VENOUS BLD VENIPUNCTURE: CPT | Performed by: ANESTHESIOLOGY

## 2020-02-05 PROCEDURE — 37000008 ZZH ANESTHESIA TECHNICAL FEE, 1ST 30 MIN: Performed by: UROLOGY

## 2020-02-05 PROCEDURE — 36000061 ZZH SURGERY LEVEL 3 W FLUORO 1ST 30 MIN - UMMC: Performed by: UROLOGY

## 2020-02-05 PROCEDURE — 86900 BLOOD TYPING SEROLOGIC ABO: CPT | Performed by: ANESTHESIOLOGY

## 2020-02-05 PROCEDURE — 84132 ASSAY OF SERUM POTASSIUM: CPT | Performed by: ANESTHESIOLOGY

## 2020-02-05 PROCEDURE — 40000278 XR SURGERY CARM FLUORO LESS THAN 5 MIN: Mod: TC

## 2020-02-05 PROCEDURE — 27210794 ZZH OR GENERAL SUPPLY STERILE: Performed by: UROLOGY

## 2020-02-05 PROCEDURE — 25000128 H RX IP 250 OP 636: Performed by: UROLOGY

## 2020-02-05 PROCEDURE — 82962 GLUCOSE BLOOD TEST: CPT

## 2020-02-05 PROCEDURE — 37000009 ZZH ANESTHESIA TECHNICAL FEE, EACH ADDTL 15 MIN: Performed by: UROLOGY

## 2020-02-05 PROCEDURE — 86850 RBC ANTIBODY SCREEN: CPT | Performed by: ANESTHESIOLOGY

## 2020-02-05 PROCEDURE — C2617 STENT, NON-COR, TEM W/O DEL: HCPCS | Performed by: UROLOGY

## 2020-02-05 PROCEDURE — 25000128 H RX IP 250 OP 636: Performed by: NURSE ANESTHETIST, CERTIFIED REGISTERED

## 2020-02-05 DEVICE — STENT URETERAL PERCUFLEX PLUS 7FRX26CM M0061752730
Type: IMPLANTABLE DEVICE | Status: NON-FUNCTIONAL
Removed: 2020-06-08

## 2020-02-05 RX ORDER — SODIUM CHLORIDE, SODIUM LACTATE, POTASSIUM CHLORIDE, CALCIUM CHLORIDE 600; 310; 30; 20 MG/100ML; MG/100ML; MG/100ML; MG/100ML
INJECTION, SOLUTION INTRAVENOUS CONTINUOUS
Status: DISCONTINUED | OUTPATIENT
Start: 2020-02-05 | End: 2020-02-05 | Stop reason: HOSPADM

## 2020-02-05 RX ORDER — METHYLPREDNISOLONE SODIUM SUCCINATE 125 MG/2ML
125 INJECTION, POWDER, LYOPHILIZED, FOR SOLUTION INTRAMUSCULAR; INTRAVENOUS
Status: CANCELLED
Start: 2020-02-07

## 2020-02-05 RX ORDER — EPINEPHRINE 0.3 MG/.3ML
0.3 INJECTION SUBCUTANEOUS EVERY 5 MIN PRN
Status: CANCELLED | OUTPATIENT
Start: 2020-02-07

## 2020-02-05 RX ORDER — ALBUTEROL SULFATE 0.83 MG/ML
2.5 SOLUTION RESPIRATORY (INHALATION)
Status: CANCELLED | OUTPATIENT
Start: 2020-02-07

## 2020-02-05 RX ORDER — PROPOFOL 10 MG/ML
INJECTION, EMULSION INTRAVENOUS CONTINUOUS PRN
Status: DISCONTINUED | OUTPATIENT
Start: 2020-02-05 | End: 2020-02-05

## 2020-02-05 RX ORDER — CEFAZOLIN SODIUM 2 G/100ML
2 INJECTION, SOLUTION INTRAVENOUS
Status: COMPLETED | OUTPATIENT
Start: 2020-02-05 | End: 2020-02-05

## 2020-02-05 RX ORDER — EPINEPHRINE 1 MG/ML
0.3 INJECTION, SOLUTION INTRAMUSCULAR; SUBCUTANEOUS EVERY 5 MIN PRN
Status: CANCELLED | OUTPATIENT
Start: 2020-02-07

## 2020-02-05 RX ORDER — SODIUM CHLORIDE 9 MG/ML
1000 INJECTION, SOLUTION INTRAVENOUS CONTINUOUS PRN
Status: CANCELLED
Start: 2020-02-07

## 2020-02-05 RX ORDER — MEPERIDINE HYDROCHLORIDE 25 MG/ML
25 INJECTION INTRAMUSCULAR; INTRAVENOUS; SUBCUTANEOUS EVERY 30 MIN PRN
Status: CANCELLED | OUTPATIENT
Start: 2020-02-07

## 2020-02-05 RX ORDER — LIDOCAINE HYDROCHLORIDE 20 MG/ML
JELLY TOPICAL PRN
Status: DISCONTINUED | OUTPATIENT
Start: 2020-02-05 | End: 2020-02-05 | Stop reason: HOSPADM

## 2020-02-05 RX ORDER — DIPHENHYDRAMINE HYDROCHLORIDE 50 MG/ML
50 INJECTION INTRAMUSCULAR; INTRAVENOUS
Status: CANCELLED
Start: 2020-02-07

## 2020-02-05 RX ORDER — FLUOROURACIL 50 MG/ML
400 INJECTION, SOLUTION INTRAVENOUS ONCE
Status: CANCELLED | OUTPATIENT
Start: 2020-02-07

## 2020-02-05 RX ORDER — LORAZEPAM 2 MG/ML
0.5 INJECTION INTRAMUSCULAR EVERY 4 HOURS PRN
Status: CANCELLED
Start: 2020-02-07

## 2020-02-05 RX ORDER — FENTANYL CITRATE 50 UG/ML
INJECTION, SOLUTION INTRAMUSCULAR; INTRAVENOUS PRN
Status: DISCONTINUED | OUTPATIENT
Start: 2020-02-05 | End: 2020-02-05

## 2020-02-05 RX ORDER — PHENYLEPHRINE HYDROCHLORIDE 10 MG/ML
INJECTION INTRAVENOUS PRN
Status: DISCONTINUED | OUTPATIENT
Start: 2020-02-05 | End: 2020-02-05

## 2020-02-05 RX ORDER — PROPOFOL 10 MG/ML
INJECTION, EMULSION INTRAVENOUS PRN
Status: DISCONTINUED | OUTPATIENT
Start: 2020-02-05 | End: 2020-02-05

## 2020-02-05 RX ORDER — NALOXONE HYDROCHLORIDE 0.4 MG/ML
.1-.4 INJECTION, SOLUTION INTRAMUSCULAR; INTRAVENOUS; SUBCUTANEOUS
Status: CANCELLED | OUTPATIENT
Start: 2020-02-07

## 2020-02-05 RX ORDER — CEFAZOLIN SODIUM 1 G/3ML
1 INJECTION, POWDER, FOR SOLUTION INTRAMUSCULAR; INTRAVENOUS SEE ADMIN INSTRUCTIONS
Status: DISCONTINUED | OUTPATIENT
Start: 2020-02-05 | End: 2020-02-05 | Stop reason: HOSPADM

## 2020-02-05 RX ORDER — CEFAZOLIN SODIUM 2 G/50ML
2 SOLUTION INTRAVENOUS
Status: CANCELLED | OUTPATIENT
Start: 2020-02-05

## 2020-02-05 RX ORDER — CEFAZOLIN SODIUM 1 G/50ML
1 INJECTION, SOLUTION INTRAVENOUS SEE ADMIN INSTRUCTIONS
Status: CANCELLED | OUTPATIENT
Start: 2020-02-05

## 2020-02-05 RX ORDER — ALBUTEROL SULFATE 90 UG/1
1-2 AEROSOL, METERED RESPIRATORY (INHALATION)
Status: CANCELLED
Start: 2020-02-07

## 2020-02-05 RX ORDER — CITRIC ACID/SODIUM CITRATE 334-500MG
30 SOLUTION, ORAL ORAL ONCE
Status: COMPLETED | OUTPATIENT
Start: 2020-02-05 | End: 2020-02-05

## 2020-02-05 RX ORDER — ACETAMINOPHEN 325 MG/1
975 TABLET ORAL ONCE
Status: COMPLETED | OUTPATIENT
Start: 2020-02-05 | End: 2020-02-05

## 2020-02-05 RX ADMIN — CEFAZOLIN SODIUM 2 G: 2 INJECTION, SOLUTION INTRAVENOUS at 12:56

## 2020-02-05 RX ADMIN — ACETAMINOPHEN 975 MG: 325 TABLET, FILM COATED ORAL at 11:20

## 2020-02-05 RX ADMIN — SODIUM CHLORIDE, POTASSIUM CHLORIDE, SODIUM LACTATE AND CALCIUM CHLORIDE: 600; 310; 30; 20 INJECTION, SOLUTION INTRAVENOUS at 12:47

## 2020-02-05 RX ADMIN — DEXMEDETOMIDINE 4 MCG: 100 INJECTION, SOLUTION, CONCENTRATE INTRAVENOUS at 13:00

## 2020-02-05 RX ADMIN — SODIUM CITRATE AND CITRIC ACID MONOHYDRATE 30 ML: 500; 334 SOLUTION ORAL at 11:21

## 2020-02-05 RX ADMIN — PROPOFOL 75 MCG/KG/MIN: 10 INJECTION, EMULSION INTRAVENOUS at 12:55

## 2020-02-05 RX ADMIN — PROPOFOL 20 MG: 10 INJECTION, EMULSION INTRAVENOUS at 12:55

## 2020-02-05 RX ADMIN — FENTANYL CITRATE 25 MCG: 50 INJECTION, SOLUTION INTRAMUSCULAR; INTRAVENOUS at 13:00

## 2020-02-05 RX ADMIN — FENTANYL CITRATE 50 MCG: 50 INJECTION, SOLUTION INTRAMUSCULAR; INTRAVENOUS at 12:54

## 2020-02-05 RX ADMIN — PHENYLEPHRINE HYDROCHLORIDE 100 MCG: 10 INJECTION INTRAVENOUS at 13:06

## 2020-02-05 ASSESSMENT — MIFFLIN-ST. JEOR: SCORE: 1598

## 2020-02-05 NOTE — ANESTHESIA POSTPROCEDURE EVALUATION
Anesthesia POST Procedure Evaluation    Patient: Girish Chauhan   MRN:     [id number] Gender:   male   Age:    64 year old :      1955        Preoperative Diagnosis: Hydronephrosis of right kidney [N13.30]  Cholangiocarcinoma (H) [C22.1]   Procedure(s):  CYSTOSCOPY, WITH Right RETROGRADE PYELOGRAM AND Right URETERAL STENT INSERTION   Postop Comments: No value filed.       Anesthesia Type:  Not documented  MAC    Reportable Event: NO     PAIN: Uncomplicated   Sign Out status: Comfortable, Well controlled pain     PONV: No PONV   Sign Out status:  No Nausea or Vomiting     Neuro/Psych: Uneventful perioperative course   Sign Out Status: Preoperative baseline; Age appropriate mentation     Airway/Resp.: Uneventful perioperative course   Sign Out Status: Non labored breathing, age appropriate RR; Resp. Status within EXPECTED Parameters     CV: Uneventful perioperative course   Sign Out status: Appropriate BP and perfusion indices; Appropriate HR/Rhythm     Disposition:   Sign Out in:  PACU  Disposition:  Phase II; Home  Recovery Course: Uneventful  Follow-Up: Not required           Last Anesthesia Record Vitals:  CRNA VITALS  2020 1258 - 2020 1354      2020             EKG:  Atrial fibrillation          Last PACU Vitals:  Vitals Value Taken Time   BP 87/60 2020  1:30 PM   Temp 36.5  C (97.7  F) 2020  1:30 PM   Pulse 81 2020  1:30 PM   Resp 16 2020  1:30 PM   SpO2 97 % 2020  1:30 PM   Temp src Axillary 2020  1:30 PM   NIBP 103/72 2020  1:21 PM   Pulse 88 2020  1:26 PM   SpO2 99 % 2020  1:26 PM   Resp     Temp 36.9  C (98.4  F) 2020  1:24 PM   Ht Rate 101 2020  1:24 PM   Temp 2           Electronically Signed By: Lisa Vega MD, 2020, 1:54 PM

## 2020-02-05 NOTE — OP NOTE
February 5, 2020    Pre-operative diagnosis: Hydronephrosis of right kidney [N13.30]  Cholangiocarcinoma (H) [C22.1]  Post-operative diagnosis Same as pre-operative diagnosis    Procedure: Procedure(s):  CYSTOSCOPY, WITH Right RETROGRADE PYELOGRAM AND Right URETERAL STENT INSERTION  Surgeon: Surgeon(s) and Role:     * Sivan Walker MD - Primary     * Hazel Garcia MD - Resident - Assisting  Anesthesia: Choice   Estimated blood loss: Less than 10 ml  Drains: 7F 26 cm right ureteral double J stent  Specimens: * No specimens in log *      Implants: PLEASE NOTE ONLY ONE 7X26 FR STENT WAS PLACED  Implant Name Type Inv. Item Serial No.  Lot No. LRB No. Used   STENT URETERAL PERCUFLEX PLUS [id number] Stent STENT URETERAL PERCUFLEX PLUS [id number]  [employer] [id number] Right 1   STENT URETERAL PERCUFLEX PLUS [id number] Stent STENT URETERAL PERCUFLEX PLUS [id number]  [employer] [id number] Right 1       OPERATIVE INDICATIONS:   Girish Chauhan is a 64 year old male with a PMH of cholangiocarcinoma and a urologic hx of right hydronephrosis managed with chronic stent exchanges. The patient presents today for a scheduled stent exchange.      The patient was counseled on the alternatives, risks, and benefits and elected to proceed with the above stated procedure.    DESCRIPTION OF PROCEDURE:    After informed consent was obtained, the patient was taken to the operating room, and moved to the operating table.  After adequate anesthesia was induced, the patient was repositioned in dorsal lithotomy position in supportive yellowfin stirrups with care in neural safety in positioning all four extremities.  She was then prepped and draped in the usual sterile fashion. A timeout was taken to confirm correct patient, procedure and laterality.     A 22-Filipino cystoscope was inserted into a well lubricated urethra. The urethra was unremarkable, and the prostate was 5 cm with moderate lateral lobe  hypertrophy.  The bladder was free of tumors, stones or diverticuli.  The media was clear.  Bilateral ureteral orifices were orthotopic.      We were able to appreciate the right-sided stent, which had mild encrustation. We then pulled the stent using the stent grasper to the urethral meatus, and then used it to pass a sensor guidewire up the renal pelvis under direct fluorscopy. We then exchanged the stent for a 5Fr catheter, which we used to obtain a retrograde pyelogram, which noted mild pelviectasis. We then re-inserted the wire and exchanged the 5Fr catheter for the above stent described in the DRAINS section.     The patient tolerated the procedure well.  There were no complications.         PLAN:   - Discharge home today  - Follow-up with Dr. Walker for next stent exchange in approximately 3 months    Addendum:    I, Sivan Walker, was present for the entire case.  I agree with the note as above with changes made as needed.    Sivan Walker MD MPH   of Urology

## 2020-02-05 NOTE — ANESTHESIA CARE TRANSFER NOTE
Patient: Girish Chauhan    Procedure(s):  CYSTOSCOPY, WITH Right RETROGRADE PYELOGRAM AND Right URETERAL STENT INSERTION    Diagnosis: Hydronephrosis of right kidney [N13.30]  Cholangiocarcinoma (H) [C22.1]  Diagnosis Additional Information: No value filed.    Anesthesia Type:   MAC     Note:  Airway :Face Mask  Patient transferred to:PACU  Handoff Report: Identifed the Patient, Identified the Reponsible Provider, Reviewed the pertinent medical history, Discussed the surgical course, Reviewed Intra-OP anesthesia mangement and issues during anesthesia, Set expectations for post-procedure period and Allowed opportunity for questions and acknowledgement of understanding      Vitals: (Last set prior to Anesthesia Care Transfer)    CRNA VITALS  2/5/2020 1258 - 2/5/2020 1334      2/5/2020             EKG:  Atrial fibrillation                Electronically Signed By: YO Mcknight CRNA  February 5, 2020  1:34 PM

## 2020-02-05 NOTE — PROGRESS NOTES
Patient notified of surgery time today   945 am arrival  1145 am surgery time    [place], PEDRITO   Care Coordinator Urology

## 2020-02-05 NOTE — DISCHARGE INSTRUCTIONS
Same-Day Surgery   Adult Discharge Orders & Instructions     For 24 hours after surgery:  1. Get plenty of rest.  A responsible adult must stay with you for at least 24 hours after you leave the hospital.   2. Pain medication can slow your reflexes. Do not drive or use heavy equipment.  If you have weakness or tingling, don't drive or use heavy equipment until this feeling goes away.  3. Mixing alcohol and pain medication can cause dizziness and slow your breathing. It can even be fatal. Do not drink alcohol while taking pain medication.  4. Avoid strenuous or risky activities.  Ask for help when climbing stairs.   5. You may feel lightheaded.  If so, sit for a few minutes before standing.  Have someone help you get up.   6. If you have nausea (feel sick to your stomach), drink only clear liquids such as apple juice, ginger ale, broth or 7-Up.  Rest may also help.  Be sure to drink enough fluids.  Move to a regular diet as you feel able. Take pain medications with a small amount of solid food, such as toast or crackers, to avoid nausea.   7. A slight fever is normal. Call the doctor if your fever is over 100 F (37.7 C) (taken under the tongue) or lasts longer than 24 hours.  8. You may have a dry mouth, muscle aches, trouble sleeping or a sore throat.  These symptoms should go away after 24 hours.  9. Do not make important or legal decisions.   Pain Management:      1. Take pain medication (if prescribed) for pain as directed by your physician.        2. WARNING: If the pain medication you have been prescribed contains Tylenol (acetaminophen), DO NOT take additional doses of Tylenol (acetaminophen).     Call your doctor for any of the followin.  Signs of infection (fever, growing tenderness at the surgery site, severe pain, a large amount of drainage or bleeding, foul-smelling drainage, redness, swelling).    2.  It has been over 8 to 10 hours since surgery and you are still not able to urinate (pee).    3.   Headache for over 24 hours.    4.  Numbness, tingling or weakness the day after surgery (if you had spinal anesthesia).  To contact a doctor, call:      [phone] and ask for the Resident On Call for:          Urology, Dr. Walker (answered 24 hours a day)      Emergency Department:  [place] [facility]: [phone]  [place] [facility]: [phone]            Discharge Instructions: Following a Cystoscopy/Stent and/or Ureteroscopy    Drainage:    Urine may be slightly bloody but should taper off in a few days.    You may have some frequency and urgency for a few days.    Comfort:    A burning sensation when urinating is common. Drinking extra fluids helps decrease this burning feeling and also helps to clear the bloody urine.    Mild abdominal cramps may occur for a few days.    Baths:    Daily tub baths aide in passing urine.    Frequent use of bubble bath is discouraged since it encourages urinary tract infections.    Report to your doctor at once if you experience:    Inability to pass your urine    Continuous or heavy bleeding    Severe Pain    Elevated temperature > than 101.5 for 24 hours    Home Activity:    The day of surgery spend a quiet evening at home.    Increase activity as tolerated.    Rev. 5/12

## 2020-02-07 ENCOUNTER — APPOINTMENT (OUTPATIENT)
Dept: LAB | Facility: CLINIC | Age: 65
End: 2020-02-07
Attending: INTERNAL MEDICINE
Payer: COMMERCIAL

## 2020-02-07 ENCOUNTER — HOME INFUSION (PRE-WILLOW HOME INFUSION) (OUTPATIENT)
Dept: PHARMACY | Facility: CLINIC | Age: 65
End: 2020-02-07

## 2020-02-07 ENCOUNTER — INFUSION THERAPY VISIT (OUTPATIENT)
Dept: ONCOLOGY | Facility: CLINIC | Age: 65
End: 2020-02-07
Attending: INTERNAL MEDICINE
Payer: COMMERCIAL

## 2020-02-07 VITALS
HEART RATE: 85 BPM | TEMPERATURE: 97.6 F | WEIGHT: 175.3 LBS | BODY MASS INDEX: 24.11 KG/M2 | SYSTOLIC BLOOD PRESSURE: 113 MMHG | OXYGEN SATURATION: 99 % | RESPIRATION RATE: 16 BRPM | DIASTOLIC BLOOD PRESSURE: 77 MMHG

## 2020-02-07 DIAGNOSIS — T45.1X5A ANEMIA ASSOCIATED WITH CHEMOTHERAPY: Primary | ICD-10-CM

## 2020-02-07 DIAGNOSIS — C22.1 CHOLANGIOCARCINOMA (H): ICD-10-CM

## 2020-02-07 DIAGNOSIS — D64.81 ANEMIA ASSOCIATED WITH CHEMOTHERAPY: Primary | ICD-10-CM

## 2020-02-07 LAB
ALBUMIN SERPL-MCNC: 3.1 G/DL (ref 3.4–5)
ALP SERPL-CCNC: 101 U/L (ref 40–150)
ALT SERPL W P-5'-P-CCNC: 40 U/L (ref 0–70)
ANION GAP SERPL CALCULATED.3IONS-SCNC: 7 MMOL/L (ref 3–14)
AST SERPL W P-5'-P-CCNC: 36 U/L (ref 0–45)
BASOPHILS # BLD AUTO: 0 10E9/L (ref 0–0.2)
BASOPHILS NFR BLD AUTO: 0.4 %
BILIRUB SERPL-MCNC: 0.4 MG/DL (ref 0.2–1.3)
BUN SERPL-MCNC: 23 MG/DL (ref 7–30)
CALCIUM SERPL-MCNC: 8.8 MG/DL (ref 8.5–10.1)
CHLORIDE SERPL-SCNC: 110 MMOL/L (ref 94–109)
CO2 SERPL-SCNC: 24 MMOL/L (ref 20–32)
CREAT SERPL-MCNC: 1.35 MG/DL (ref 0.66–1.25)
DIFFERENTIAL METHOD BLD: ABNORMAL
EOSINOPHIL # BLD AUTO: 0.7 10E9/L (ref 0–0.7)
EOSINOPHIL NFR BLD AUTO: 8.1 %
ERYTHROCYTE [DISTWIDTH] IN BLOOD BY AUTOMATED COUNT: 17.4 % (ref 10–15)
GFR SERPL CREATININE-BSD FRML MDRD: 56 ML/MIN/{1.73_M2}
GLUCOSE SERPL-MCNC: 97 MG/DL (ref 70–99)
HCT VFR BLD AUTO: 32.3 % (ref 40–53)
HGB BLD-MCNC: 10.2 G/DL (ref 13.3–17.7)
IMM GRANULOCYTES # BLD: 0 10E9/L (ref 0–0.4)
IMM GRANULOCYTES NFR BLD: 0.2 %
LYMPHOCYTES # BLD AUTO: 1.7 10E9/L (ref 0.8–5.3)
LYMPHOCYTES NFR BLD AUTO: 18.3 %
MCH RBC QN AUTO: 30.3 PG (ref 26.5–33)
MCHC RBC AUTO-ENTMCNC: 31.6 G/DL (ref 31.5–36.5)
MCV RBC AUTO: 96 FL (ref 78–100)
MONOCYTES # BLD AUTO: 1 10E9/L (ref 0–1.3)
MONOCYTES NFR BLD AUTO: 10.8 %
NEUTROPHILS # BLD AUTO: 5.7 10E9/L (ref 1.6–8.3)
NEUTROPHILS NFR BLD AUTO: 62.2 %
NRBC # BLD AUTO: 0 10*3/UL
NRBC BLD AUTO-RTO: 0 /100
PLATELET # BLD AUTO: 181 10E9/L (ref 150–450)
POTASSIUM SERPL-SCNC: 3.9 MMOL/L (ref 3.4–5.3)
PROT SERPL-MCNC: 5.9 G/DL (ref 6.8–8.8)
RBC # BLD AUTO: 3.37 10E12/L (ref 4.4–5.9)
SODIUM SERPL-SCNC: 142 MMOL/L (ref 133–144)
WBC # BLD AUTO: 9.2 10E9/L (ref 4–11)

## 2020-02-07 PROCEDURE — 96375 TX/PRO/DX INJ NEW DRUG ADDON: CPT

## 2020-02-07 PROCEDURE — 25800030 ZZH RX IP 258 OP 636: Mod: ZF | Performed by: INTERNAL MEDICINE

## 2020-02-07 PROCEDURE — 85025 COMPLETE CBC W/AUTO DIFF WBC: CPT | Performed by: INTERNAL MEDICINE

## 2020-02-07 PROCEDURE — 25000128 H RX IP 250 OP 636: Mod: ZF | Performed by: INTERNAL MEDICINE

## 2020-02-07 PROCEDURE — 96409 CHEMO IV PUSH SNGL DRUG: CPT

## 2020-02-07 PROCEDURE — 96367 TX/PROPH/DG ADDL SEQ IV INF: CPT

## 2020-02-07 PROCEDURE — G0498 CHEMO EXTEND IV INFUS W/PUMP: HCPCS

## 2020-02-07 PROCEDURE — 80053 COMPREHEN METABOLIC PANEL: CPT | Performed by: INTERNAL MEDICINE

## 2020-02-07 RX ORDER — HEPARIN SODIUM (PORCINE) LOCK FLUSH IV SOLN 100 UNIT/ML 100 UNIT/ML
5 SOLUTION INTRAVENOUS EVERY 8 HOURS PRN
Status: DISCONTINUED | OUTPATIENT
Start: 2020-02-07 | End: 2020-02-07 | Stop reason: HOSPADM

## 2020-02-07 RX ORDER — FLUOROURACIL 50 MG/ML
400 INJECTION, SOLUTION INTRAVENOUS ONCE
Status: COMPLETED | OUTPATIENT
Start: 2020-02-07 | End: 2020-02-07

## 2020-02-07 RX ADMIN — FLUOROURACIL 785 MG: 50 INJECTION, SOLUTION INTRAVENOUS at 10:15

## 2020-02-07 RX ADMIN — DEXAMETHASONE SODIUM PHOSPHATE: 10 INJECTION, SOLUTION INTRAMUSCULAR; INTRAVENOUS at 08:50

## 2020-02-07 RX ADMIN — DEXTROSE MONOHYDRATE 250 ML: 50 INJECTION, SOLUTION INTRAVENOUS at 08:50

## 2020-02-07 RX ADMIN — Medication 5 ML: at 06:58

## 2020-02-07 RX ADMIN — LEUCOVORIN CALCIUM 700 MG: 500 INJECTION, POWDER, LYOPHILIZED, FOR SOLUTION INTRAMUSCULAR; INTRAVENOUS at 09:50

## 2020-02-07 ASSESSMENT — PAIN SCALES - GENERAL: PAINLEVEL: NO PAIN (0)

## 2020-02-07 NOTE — NURSING NOTE
Chief Complaint   Patient presents with     Port Draw     Labs drawn via port by RN in lab. Line flushed and hep locked. VS taken.     Lilliam Jackson RN

## 2020-02-07 NOTE — PATIENT INSTRUCTIONS
[facility] Triage and after hours / weekends / holidays:  [phone]    Please call the triage or after hours line if you experience a temperature greater than or equal to 100.5, shaking chills, have uncontrolled nausea, vomiting and/or diarrhea, dizziness, shortness of breath, chest pain, bleeding, unexplained bruising, or if you have any other new/concerning symptoms, questions or concerns.      If you are having any concerning symptoms or wish to speak to a provider before your next infusion visit, please call your care coordinator or triage to notify them so we can adequately serve you.     If you need a refill on a narcotic prescription or other medication, please call before your infusion appointment.                 February 2020 Sunday Monday Tuesday Wednesday Thursday Friday Saturday                                 1    LAB   7:15 AM   (15 min.)   [facility] LAB HOME INFUSION   [facility], Laboratory Services   2     3    PAC PHARMACIST   7:45 AM   (30 min.)   Pharmacist,  Pac   [facility] Preoperative Assessment [facility]    PAC EVAL   8:15 AM   (60 min.)   Sierra Huff APRN CNP   [facility] Preoperative Assessment [facility]    LAB  10:30 AM   (15 min.)    LAB   [facility] Lab 4     5    Admission   9:37 AM   Sivan Walker MD   [facility] MAIN OR   (Discharge: 2/5/2020)    CYSTOSCOPY, WITH RETROGRADE PYELOGRAM AND URETERAL STENT INSERTION  11:45 AM   Sivan Walker MD   [facility] OR    XR SURGCARM FLUORO < 5 MIN  12:55 PM   (15 min.)   URCARM2   [facility],  Radiology 6     7    [facility] LAB DRAW   7:15 AM   (15 min.)    [facility] LAB DRAW   [facility] Lab Draw    [facility] ONC INFUSION 240   8:00 AM   (240 min.)    ONCOLOGY INFUSION   [facility] 8       9     10     11     12     13     14     15       16     17     18     19     20     21    [facility] LAB DRAW   7:15 AM   (15 min.)    [facility] LAB DRAW   [facility] Lab Draw    [facility] ONC INFUSION 240   8:00 AM   (240  min.)    ONCOLOGY INFUSION   [facility] 22       23  Happy Birthday!     24     25     26     27     28     29 March 2020 Sunday Monday Tuesday Wednesday Thursday Friday Saturday   1     2     3     4     5     6    [facility] DRAW   7:15 AM   (15 min.)    [facility] LAB DRAW   [facility] Lab Draw    [facility] ONC INFUSION 240   8:00 AM   (240 min.)    ONCOLOGY INFUSION   [facility] 7       8     9     10     11     12     13     14       15     16     17     18     19    [facility] LAB DRAW   8:00 AM   (15 min.)    [facility] LAB DRAW   [facility] Lab Draw    CT CHEST ABDOMEN PELVIS WWO   8:40 AM   (20 min.)   UCCT2   [facility] CT 20    [facility] DRAW   7:15 AM   (15 min.)    [facility] LAB DRAW   [facility] Lab Draw    [facility] ONC INFUSION 240   8:00 AM   (240 min.)    ONCOLOGY INFUSION   [facility] 21       22     23    [facility] RETURN   7:30 AM   (30 min.)   Naresh De Los Santos MD   [facility] 24     25     26     27     28       29     30     31                                         Lab Results:  Recent Results (from the past 12 hour(s))   CBC with platelets differential    Collection Time: 02/07/20  7:08 AM   Result Value Ref Range    WBC 9.2 4.0 - 11.0 10e9/L    RBC Count 3.37 (L) 4.4 - 5.9 10e12/L    Hemoglobin 10.2 (L) 13.3 - 17.7 g/dL    Hematocrit 32.3 (L) 40.0 - 53.0 %    MCV 96 78 - 100 fl    MCH 30.3 26.5 - 33.0 pg    MCHC 31.6 31.5 - 36.5 g/dL    RDW 17.4 (H) 10.0 - 15.0 %    Platelet Count 181 150 - 450 10e9/L    Diff Method Automated Method     % Neutrophils 62.2 %    % Lymphocytes 18.3 %    % Monocytes 10.8 %    % Eosinophils 8.1 %    % Basophils 0.4 %    % Immature Granulocytes 0.2 %    Nucleated RBCs 0 0 /100    Absolute Neutrophil 5.7 1.6 - 8.3 10e9/L    Absolute Lymphocytes 1.7 0.8 - 5.3 10e9/L    Absolute Monocytes 1.0 0.0 - 1.3 10e9/L    Absolute  Eosinophils 0.7 0.0 - 0.7 10e9/L    Absolute Basophils 0.0 0.0 - 0.2 10e9/L    Abs Immature Granulocytes 0.0 0 - 0.4 10e9/L    Absolute Nucleated RBC 0.0    Comprehensive metabolic panel    Collection Time: 02/07/20  7:08 AM   Result Value Ref Range    Sodium 142 133 - 144 mmol/L    Potassium 3.9 3.4 - 5.3 mmol/L    Chloride 110 (H) 94 - 109 mmol/L    Carbon Dioxide 24 20 - 32 mmol/L    Anion Gap 7 3 - 14 mmol/L    Glucose 97 70 - 99 mg/dL    Urea Nitrogen 23 7 - 30 mg/dL    Creatinine 1.35 (H) 0.66 - 1.25 mg/dL    GFR Estimate 56 (L) >60 mL/min/[1.73_m2]    GFR Estimate If Black 64 >60 mL/min/[1.73_m2]    Calcium 8.8 8.5 - 10.1 mg/dL    Bilirubin Total 0.4 0.2 - 1.3 mg/dL    Albumin 3.1 (L) 3.4 - 5.0 g/dL    Protein Total 5.9 (L) 6.8 - 8.8 g/dL    Alkaline Phosphatase 101 40 - 150 U/L    ALT 40 0 - 70 U/L    AST 36 0 - 45 U/L

## 2020-02-07 NOTE — PROGRESS NOTES
Infusion Nursing Note:  Girish Chauhan presents today for Day 1 Cycle 10 of Leucovorin, Fluorouracil IVP and home pump connect.    Patient seen by provider today: No   present during visit today: Not Applicable.    Note: Patient presents to infusion feeling well. He states he just had a stent exchange on 2/5/2020 and feels well post procedure. Patient denies pain and states no acute complaints or concerns needing to be addressed today..    Intravenous Access:  Implanted Port.    Treatment Conditions:  Lab Results   Component Value Date    HGB 10.2 02/07/2020     Lab Results   Component Value Date    WBC 9.2 02/07/2020      Lab Results   Component Value Date    ANEU 5.7 02/07/2020     Lab Results   Component Value Date     02/07/2020      Lab Results   Component Value Date     02/07/2020                   Lab Results   Component Value Date    POTASSIUM 3.9 02/07/2020           Lab Results   Component Value Date    MAG 1.8 10/04/2019            Lab Results   Component Value Date    CR 1.35 02/07/2020                   Lab Results   Component Value Date    GARY 8.8 02/07/2020                Lab Results   Component Value Date    BILITOTAL 0.4 02/07/2020           Lab Results   Component Value Date    ALBUMIN 3.1 02/07/2020                    Lab Results   Component Value Date    ALT 40 02/07/2020           Lab Results   Component Value Date    AST 36 02/07/2020       Results reviewed, labs MET treatment parameters, ok to proceed with treatment.      Post Infusion Assessment:  Patient tolerated infusion without incident.  Blood return noted pre and post infusion.  Blood return noted during Fluorouracil IVP administration every 2 cc.  Site patent and intact, free from redness, edema or discomfort.  No evidence of extravasations.     Prior to discharge: Port is secured in place with tegaderm and flushed with 10cc NS with positive blood return noted.  Continuous home infusion heat sensor pump  "connected.    All connectors secured in place and clamps taped open. Oliverio Lugo RN verified assessment  Pump started, \"running\" noted on display (CADD): Not Applicable.  Patient instructed to call our clinic or [facility] with any questions or concerns at home.  Patient verbalized understanding.    Patient set up for pump disconnect at home with [facility] on 2/9/2020 at 8:00am. Confirmed with Sarah MAJOR.        Discharge Plan:   Patient declined prescription refills.  Discharge instructions reviewed with: Patient.  Patient and/or family verbalized understanding of discharge instructions and all questions answered.  AVS to patient via MOGLT.  Patient will return 2/21 for next appointment.   Patient discharged in stable condition accompanied by: self.  Departure Mode: Ambulatory.  Face to Face time: 0 minutes.    Norberto Charles RN                        "

## 2020-02-09 ENCOUNTER — HOME INFUSION (PRE-WILLOW HOME INFUSION) (OUTPATIENT)
Dept: PHARMACY | Facility: CLINIC | Age: 65
End: 2020-02-09

## 2020-02-09 NOTE — PHARMACY
Skilled nurse visit in the home, for discontinuation of chemotherapy. 4705  mg of Fluorouracil infused over 46 hours.    Yung SWEENEY RN CRNI  [phone]  [email or web address]

## 2020-02-10 NOTE — PROGRESS NOTES
This is a recent snapshot of the patient's [facility] medical record.  For current drug dose and complete information and questions, call [phone] or In [facility] pool, fv home infusion ([id number])  CSN Number:  [id number]

## 2020-02-11 ENCOUNTER — APPOINTMENT (OUTPATIENT)
Dept: GENERAL RADIOLOGY | Facility: CLINIC | Age: 65
DRG: 871 | End: 2020-02-11
Attending: EMERGENCY MEDICINE
Payer: COMMERCIAL

## 2020-02-11 ENCOUNTER — RECORDS - HEALTHEAST (OUTPATIENT)
Dept: ADMINISTRATIVE | Facility: OTHER | Age: 65
End: 2020-02-11

## 2020-02-11 ENCOUNTER — APPOINTMENT (OUTPATIENT)
Dept: CT IMAGING | Facility: CLINIC | Age: 65
DRG: 871 | End: 2020-02-11
Attending: EMERGENCY MEDICINE
Payer: COMMERCIAL

## 2020-02-11 ENCOUNTER — HOSPITAL ENCOUNTER (INPATIENT)
Facility: CLINIC | Age: 65
LOS: 1 days | Discharge: HOME | DRG: 871 | End: 2020-02-13
Attending: EMERGENCY MEDICINE | Admitting: INTERNAL MEDICINE
Payer: COMMERCIAL

## 2020-02-11 ENCOUNTER — NURSE TRIAGE (OUTPATIENT)
Dept: NURSING | Facility: CLINIC | Age: 65
End: 2020-02-11

## 2020-02-11 DIAGNOSIS — R41.0 CONFUSION: ICD-10-CM

## 2020-02-11 DIAGNOSIS — J10.1 INFLUENZA A: ICD-10-CM

## 2020-02-11 DIAGNOSIS — N17.9 ACUTE KIDNEY INJURY (H): ICD-10-CM

## 2020-02-11 DIAGNOSIS — I48.91 ATRIAL FIBRILLATION WITH RVR (H): ICD-10-CM

## 2020-02-11 DIAGNOSIS — J11.1 INFLUENZA: Primary | ICD-10-CM

## 2020-02-11 LAB
ALBUMIN SERPL-MCNC: 3.4 G/DL (ref 3.4–5)
ALP SERPL-CCNC: 87 U/L (ref 40–150)
ALT SERPL W P-5'-P-CCNC: 24 U/L (ref 0–70)
AMMONIA PLAS-SCNC: 24 UMOL/L (ref 10–50)
ANION GAP SERPL CALCULATED.3IONS-SCNC: 8 MMOL/L (ref 3–14)
APTT PPP: 33 SEC (ref 22–37)
AST SERPL W P-5'-P-CCNC: 20 U/L (ref 0–45)
BASOPHILS # BLD AUTO: 0 10E9/L (ref 0–0.2)
BASOPHILS NFR BLD AUTO: 0.1 %
BILIRUB SERPL-MCNC: 1.2 MG/DL (ref 0.2–1.3)
BUN SERPL-MCNC: 35 MG/DL (ref 7–30)
CALCIUM SERPL-MCNC: 8.8 MG/DL (ref 8.5–10.1)
CHLORIDE SERPL-SCNC: 106 MMOL/L (ref 94–109)
CO2 SERPL-SCNC: 22 MMOL/L (ref 20–32)
CREAT SERPL-MCNC: 1.66 MG/DL (ref 0.66–1.25)
CRP SERPL-MCNC: 20 MG/L (ref 0–8)
DIFFERENTIAL METHOD BLD: ABNORMAL
EOSINOPHIL # BLD AUTO: 0 10E9/L (ref 0–0.7)
EOSINOPHIL NFR BLD AUTO: 0.1 %
ERYTHROCYTE [DISTWIDTH] IN BLOOD BY AUTOMATED COUNT: 17 % (ref 10–15)
FLUAV+FLUBV AG SPEC QL: NEGATIVE
FLUAV+FLUBV AG SPEC QL: POSITIVE
GFR SERPL CREATININE-BSD FRML MDRD: 43 ML/MIN/{1.73_M2}
GLUCOSE SERPL-MCNC: 139 MG/DL (ref 70–99)
HCT VFR BLD AUTO: 32.4 % (ref 40–53)
HGB BLD-MCNC: 10.5 G/DL (ref 13.3–17.7)
IMM GRANULOCYTES # BLD: 0.1 10E9/L (ref 0–0.4)
IMM GRANULOCYTES NFR BLD: 0.6 %
INR PPP: 1.38 (ref 0.86–1.14)
LACTATE BLD-SCNC: 1.1 MMOL/L (ref 0.7–2)
LIPASE SERPL-CCNC: 176 U/L (ref 73–393)
LYMPHOCYTES # BLD AUTO: 0.5 10E9/L (ref 0.8–5.3)
LYMPHOCYTES NFR BLD AUTO: 4.5 %
MCH RBC QN AUTO: 31.1 PG (ref 26.5–33)
MCHC RBC AUTO-ENTMCNC: 32.4 G/DL (ref 31.5–36.5)
MCV RBC AUTO: 96 FL (ref 78–100)
MONOCYTES # BLD AUTO: 0.5 10E9/L (ref 0–1.3)
MONOCYTES NFR BLD AUTO: 4.6 %
NEUTROPHILS # BLD AUTO: 9.5 10E9/L (ref 1.6–8.3)
NEUTROPHILS NFR BLD AUTO: 90.1 %
NRBC # BLD AUTO: 0 10*3/UL
NRBC BLD AUTO-RTO: 0 /100
NT-PROBNP SERPL-MCNC: 2526 PG/ML (ref 0–900)
PLATELET # BLD AUTO: 134 10E9/L (ref 150–450)
POTASSIUM SERPL-SCNC: 4 MMOL/L (ref 3.4–5.3)
PROCALCITONIN SERPL-MCNC: 1.67 NG/ML
PROT SERPL-MCNC: 6.1 G/DL (ref 6.8–8.8)
RBC # BLD AUTO: 3.38 10E12/L (ref 4.4–5.9)
SODIUM SERPL-SCNC: 137 MMOL/L (ref 133–144)
SPECIMEN SOURCE: ABNORMAL
TROPONIN I SERPL-MCNC: <0.015 UG/L (ref 0–0.04)
WBC # BLD AUTO: 10.5 10E9/L (ref 4–11)

## 2020-02-11 PROCEDURE — 84484 ASSAY OF TROPONIN QUANT: CPT | Performed by: EMERGENCY MEDICINE

## 2020-02-11 PROCEDURE — 86140 C-REACTIVE PROTEIN: CPT | Performed by: EMERGENCY MEDICINE

## 2020-02-11 PROCEDURE — 93010 ELECTROCARDIOGRAM REPORT: CPT | Mod: Z6 | Performed by: EMERGENCY MEDICINE

## 2020-02-11 PROCEDURE — 80053 COMPREHEN METABOLIC PANEL: CPT | Performed by: EMERGENCY MEDICINE

## 2020-02-11 PROCEDURE — 85025 COMPLETE CBC W/AUTO DIFF WBC: CPT | Performed by: EMERGENCY MEDICINE

## 2020-02-11 PROCEDURE — 25800030 ZZH RX IP 258 OP 636: Performed by: EMERGENCY MEDICINE

## 2020-02-11 PROCEDURE — 85610 PROTHROMBIN TIME: CPT | Performed by: EMERGENCY MEDICINE

## 2020-02-11 PROCEDURE — 87040 BLOOD CULTURE FOR BACTERIA: CPT | Performed by: EMERGENCY MEDICINE

## 2020-02-11 PROCEDURE — 93005 ELECTROCARDIOGRAM TRACING: CPT | Performed by: EMERGENCY MEDICINE

## 2020-02-11 PROCEDURE — 96375 TX/PRO/DX INJ NEW DRUG ADDON: CPT | Performed by: EMERGENCY MEDICINE

## 2020-02-11 PROCEDURE — 85730 THROMBOPLASTIN TIME PARTIAL: CPT | Performed by: EMERGENCY MEDICINE

## 2020-02-11 PROCEDURE — 25000125 ZZHC RX 250: Performed by: EMERGENCY MEDICINE

## 2020-02-11 PROCEDURE — 83690 ASSAY OF LIPASE: CPT | Performed by: EMERGENCY MEDICINE

## 2020-02-11 PROCEDURE — 96361 HYDRATE IV INFUSION ADD-ON: CPT | Performed by: EMERGENCY MEDICINE

## 2020-02-11 PROCEDURE — 83605 ASSAY OF LACTIC ACID: CPT | Performed by: EMERGENCY MEDICINE

## 2020-02-11 PROCEDURE — 99285 EMERGENCY DEPT VISIT HI MDM: CPT | Mod: 25 | Performed by: EMERGENCY MEDICINE

## 2020-02-11 PROCEDURE — 84145 PROCALCITONIN (PCT): CPT | Performed by: EMERGENCY MEDICINE

## 2020-02-11 PROCEDURE — 82140 ASSAY OF AMMONIA: CPT | Performed by: EMERGENCY MEDICINE

## 2020-02-11 PROCEDURE — 83880 ASSAY OF NATRIURETIC PEPTIDE: CPT | Performed by: EMERGENCY MEDICINE

## 2020-02-11 PROCEDURE — 87804 INFLUENZA ASSAY W/OPTIC: CPT | Performed by: EMERGENCY MEDICINE

## 2020-02-11 PROCEDURE — 71046 X-RAY EXAM CHEST 2 VIEWS: CPT

## 2020-02-11 PROCEDURE — 25000132 ZZH RX MED GY IP 250 OP 250 PS 637: Performed by: EMERGENCY MEDICINE

## 2020-02-11 PROCEDURE — 70450 CT HEAD/BRAIN W/O DYE: CPT

## 2020-02-11 RX ORDER — OSELTAMIVIR PHOSPHATE 75 MG/1
75 CAPSULE ORAL ONCE
Status: COMPLETED | OUTPATIENT
Start: 2020-02-11 | End: 2020-02-11

## 2020-02-11 RX ORDER — METOPROLOL TARTRATE 1 MG/ML
5 INJECTION, SOLUTION INTRAVENOUS ONCE
Status: COMPLETED | OUTPATIENT
Start: 2020-02-11 | End: 2020-02-11

## 2020-02-11 RX ORDER — SODIUM CHLORIDE 9 MG/ML
1000 INJECTION, SOLUTION INTRAVENOUS CONTINUOUS
Status: DISCONTINUED | OUTPATIENT
Start: 2020-02-11 | End: 2020-02-13 | Stop reason: HOSPADM

## 2020-02-11 RX ORDER — METOPROLOL TARTRATE 50 MG
150 TABLET ORAL ONCE
Status: COMPLETED | OUTPATIENT
Start: 2020-02-11 | End: 2020-02-11

## 2020-02-11 RX ADMIN — SODIUM CHLORIDE 1000 ML: 900 INJECTION, SOLUTION INTRAVENOUS at 23:24

## 2020-02-11 RX ADMIN — METOPROLOL TARTRATE 5 MG: 5 INJECTION INTRAVENOUS at 21:06

## 2020-02-11 RX ADMIN — METOPROLOL TARTRATE 150 MG: 50 TABLET ORAL at 21:07

## 2020-02-11 RX ADMIN — SODIUM CHLORIDE 1000 ML: 9 INJECTION, SOLUTION INTRAVENOUS at 21:07

## 2020-02-11 RX ADMIN — OSELTAMIVIR PHOSPHATE 75 MG: 75 CAPSULE ORAL at 23:13

## 2020-02-11 ASSESSMENT — ENCOUNTER SYMPTOMS
FATIGUE: 1
ABDOMINAL PAIN: 0
COUGH: 1
FEVER: 1
CONFUSION: 1
VOMITING: 0
SHORTNESS OF BREATH: 1
SPEECH DIFFICULTY: 1

## 2020-02-11 ASSESSMENT — MIFFLIN-ST. JEOR: SCORE: 1570.57

## 2020-02-12 ENCOUNTER — RECORDS - HEALTHEAST (OUTPATIENT)
Dept: ADMINISTRATIVE | Facility: OTHER | Age: 65
End: 2020-02-12

## 2020-02-12 PROBLEM — J11.1 INFLUENZA: Status: ACTIVE | Noted: 2020-02-12

## 2020-02-12 LAB
ALBUMIN UR-MCNC: 10 MG/DL
APPEARANCE UR: CLEAR
BILIRUB UR QL STRIP: NEGATIVE
COLOR UR AUTO: YELLOW
CREAT SERPL-MCNC: 1.49 MG/DL (ref 0.66–1.25)
GFR SERPL CREATININE-BSD FRML MDRD: 49 ML/MIN/{1.73_M2}
GLUCOSE UR STRIP-MCNC: NEGATIVE MG/DL
HGB UR QL STRIP: ABNORMAL
INTERPRETATION ECG - MUSE: NORMAL
INTERPRETATION ECG - MUSE: NORMAL
KETONES UR STRIP-MCNC: NEGATIVE MG/DL
LACTATE BLD-SCNC: 1 MMOL/L (ref 0.7–2)
LEUKOCYTE ESTERASE UR QL STRIP: NEGATIVE
NITRATE UR QL: NEGATIVE
PH UR STRIP: 5.5 PH (ref 5–7)
RBC #/AREA URNS AUTO: 92 /HPF (ref 0–2)
SOURCE: ABNORMAL
SP GR UR STRIP: 1.01 (ref 1–1.03)
TRANS CELLS #/AREA URNS HPF: <1 /HPF (ref 0–1)
UROBILINOGEN UR STRIP-MCNC: NORMAL MG/DL (ref 0–2)
WBC #/AREA URNS AUTO: 1 /HPF (ref 0–5)

## 2020-02-12 PROCEDURE — 96376 TX/PRO/DX INJ SAME DRUG ADON: CPT | Performed by: EMERGENCY MEDICINE

## 2020-02-12 PROCEDURE — 25000125 ZZHC RX 250: Performed by: INTERNAL MEDICINE

## 2020-02-12 PROCEDURE — 36415 COLL VENOUS BLD VENIPUNCTURE: CPT | Performed by: INTERNAL MEDICINE

## 2020-02-12 PROCEDURE — 81001 URINALYSIS AUTO W/SCOPE: CPT | Performed by: STUDENT IN AN ORGANIZED HEALTH CARE EDUCATION/TRAINING PROGRAM

## 2020-02-12 PROCEDURE — 87040 BLOOD CULTURE FOR BACTERIA: CPT | Performed by: EMERGENCY MEDICINE

## 2020-02-12 PROCEDURE — 12000001 ZZH R&B MED SURG/OB UMMC

## 2020-02-12 PROCEDURE — 99222 1ST HOSP IP/OBS MODERATE 55: CPT | Mod: AI | Performed by: INTERNAL MEDICINE

## 2020-02-12 PROCEDURE — 25000132 ZZH RX MED GY IP 250 OP 250 PS 637: Performed by: STUDENT IN AN ORGANIZED HEALTH CARE EDUCATION/TRAINING PROGRAM

## 2020-02-12 PROCEDURE — 25000132 ZZH RX MED GY IP 250 OP 250 PS 637: Performed by: INTERNAL MEDICINE

## 2020-02-12 PROCEDURE — 25000128 H RX IP 250 OP 636: Performed by: STUDENT IN AN ORGANIZED HEALTH CARE EDUCATION/TRAINING PROGRAM

## 2020-02-12 PROCEDURE — 93005 ELECTROCARDIOGRAM TRACING: CPT | Mod: 76

## 2020-02-12 PROCEDURE — 82565 ASSAY OF CREATININE: CPT | Performed by: INTERNAL MEDICINE

## 2020-02-12 PROCEDURE — 83605 ASSAY OF LACTIC ACID: CPT | Performed by: INTERNAL MEDICINE

## 2020-02-12 PROCEDURE — 25800030 ZZH RX IP 258 OP 636: Performed by: STUDENT IN AN ORGANIZED HEALTH CARE EDUCATION/TRAINING PROGRAM

## 2020-02-12 PROCEDURE — 96365 THER/PROPH/DIAG IV INF INIT: CPT | Performed by: EMERGENCY MEDICINE

## 2020-02-12 PROCEDURE — 99222 1ST HOSP IP/OBS MODERATE 55: CPT | Mod: GC | Performed by: INTERNAL MEDICINE

## 2020-02-12 RX ORDER — ONDANSETRON 2 MG/ML
4 INJECTION INTRAMUSCULAR; INTRAVENOUS EVERY 6 HOURS PRN
Status: DISCONTINUED | OUTPATIENT
Start: 2020-02-12 | End: 2020-02-13 | Stop reason: HOSPADM

## 2020-02-12 RX ORDER — AZITHROMYCIN 250 MG/1
250 TABLET, FILM COATED ORAL DAILY
Status: DISCONTINUED | OUTPATIENT
Start: 2020-02-13 | End: 2020-02-13

## 2020-02-12 RX ORDER — ACETAMINOPHEN 325 MG/1
650 TABLET ORAL EVERY 4 HOURS PRN
Status: DISCONTINUED | OUTPATIENT
Start: 2020-02-12 | End: 2020-02-13 | Stop reason: HOSPADM

## 2020-02-12 RX ORDER — ONDANSETRON 4 MG/1
4 TABLET, FILM COATED ORAL EVERY 8 HOURS PRN
Status: DISCONTINUED | OUTPATIENT
Start: 2020-02-12 | End: 2020-02-13 | Stop reason: HOSPADM

## 2020-02-12 RX ORDER — METOPROLOL TARTRATE 1 MG/ML
2.5 INJECTION, SOLUTION INTRAVENOUS ONCE
Status: COMPLETED | OUTPATIENT
Start: 2020-02-12 | End: 2020-02-12

## 2020-02-12 RX ORDER — METOPROLOL TARTRATE 50 MG
150 TABLET ORAL 2 TIMES DAILY
Status: DISCONTINUED | OUTPATIENT
Start: 2020-02-12 | End: 2020-02-13 | Stop reason: HOSPADM

## 2020-02-12 RX ORDER — CEFTRIAXONE 2 G/1
2 INJECTION, POWDER, FOR SOLUTION INTRAMUSCULAR; INTRAVENOUS EVERY 24 HOURS
Status: DISCONTINUED | OUTPATIENT
Start: 2020-02-12 | End: 2020-02-13

## 2020-02-12 RX ORDER — FUROSEMIDE 20 MG
20 TABLET ORAL DAILY
Status: DISCONTINUED | OUTPATIENT
Start: 2020-02-12 | End: 2020-02-13 | Stop reason: HOSPADM

## 2020-02-12 RX ORDER — OSELTAMIVIR PHOSPHATE 75 MG/1
75 CAPSULE ORAL 2 TIMES DAILY
Status: DISCONTINUED | OUTPATIENT
Start: 2020-02-12 | End: 2020-02-13 | Stop reason: HOSPADM

## 2020-02-12 RX ORDER — AZITHROMYCIN 500 MG/1
500 TABLET, FILM COATED ORAL ONCE
Status: COMPLETED | OUTPATIENT
Start: 2020-02-12 | End: 2020-02-12

## 2020-02-12 RX ORDER — PROCHLORPERAZINE MALEATE 5 MG
5 TABLET ORAL EVERY 6 HOURS PRN
Status: DISCONTINUED | OUTPATIENT
Start: 2020-02-12 | End: 2020-02-13 | Stop reason: HOSPADM

## 2020-02-12 RX ORDER — LOSARTAN POTASSIUM 25 MG/1
25 TABLET ORAL EVERY MORNING
Status: DISCONTINUED | OUTPATIENT
Start: 2020-02-12 | End: 2020-02-13 | Stop reason: HOSPADM

## 2020-02-12 RX ORDER — OSELTAMIVIR PHOSPHATE 75 MG/1
75 CAPSULE ORAL 2 TIMES DAILY
Status: DISCONTINUED | OUTPATIENT
Start: 2020-02-13 | End: 2020-02-12

## 2020-02-12 RX ORDER — NITROGLYCERIN 0.4 MG/1
0.4 TABLET SUBLINGUAL EVERY 5 MIN PRN
Status: DISCONTINUED | OUTPATIENT
Start: 2020-02-12 | End: 2020-02-13 | Stop reason: HOSPADM

## 2020-02-12 RX ORDER — CLOPIDOGREL BISULFATE 75 MG/1
75 TABLET ORAL EVERY MORNING
Status: DISCONTINUED | OUTPATIENT
Start: 2020-02-12 | End: 2020-02-13 | Stop reason: HOSPADM

## 2020-02-12 RX ORDER — SENNOSIDES 8.6 MG
1 TABLET ORAL EVERY EVENING
Status: DISCONTINUED | OUTPATIENT
Start: 2020-02-12 | End: 2020-02-13 | Stop reason: HOSPADM

## 2020-02-12 RX ORDER — SODIUM CHLORIDE 9 MG/ML
INJECTION, SOLUTION INTRAVENOUS CONTINUOUS
Status: DISCONTINUED | OUTPATIENT
Start: 2020-02-12 | End: 2020-02-13 | Stop reason: HOSPADM

## 2020-02-12 RX ORDER — FLUTICASONE PROPIONATE 50 MCG
1 SPRAY, SUSPENSION (ML) NASAL DAILY
Status: DISCONTINUED | OUTPATIENT
Start: 2020-02-12 | End: 2020-02-13 | Stop reason: HOSPADM

## 2020-02-12 RX ORDER — CALCIUM CARBONATE 500 MG/1
500-1500 TABLET, CHEWABLE ORAL 4 TIMES DAILY PRN
Status: DISCONTINUED | OUTPATIENT
Start: 2020-02-12 | End: 2020-02-13 | Stop reason: HOSPADM

## 2020-02-12 RX ORDER — COLCHICINE 0.6 MG/1
0.6 TABLET ORAL 3 TIMES DAILY PRN
Status: DISCONTINUED | OUTPATIENT
Start: 2020-02-12 | End: 2020-02-13 | Stop reason: HOSPADM

## 2020-02-12 RX ORDER — ALLOPURINOL 100 MG/1
100 TABLET ORAL EVERY EVENING
Status: DISCONTINUED | OUTPATIENT
Start: 2020-02-12 | End: 2020-02-13 | Stop reason: HOSPADM

## 2020-02-12 RX ORDER — ATORVASTATIN CALCIUM 40 MG/1
40 TABLET, FILM COATED ORAL EVERY EVENING
Status: DISCONTINUED | OUTPATIENT
Start: 2020-02-12 | End: 2020-02-13 | Stop reason: HOSPADM

## 2020-02-12 RX ADMIN — AZITHROMYCIN 500 MG: 500 TABLET, FILM COATED ORAL at 17:18

## 2020-02-12 RX ADMIN — METOPROLOL TARTRATE 150 MG: 50 TABLET, FILM COATED ORAL at 18:53

## 2020-02-12 RX ADMIN — LOSARTAN POTASSIUM 25 MG: 25 TABLET, FILM COATED ORAL at 08:46

## 2020-02-12 RX ADMIN — CLOPIDOGREL BISULFATE 75 MG: 75 TABLET ORAL at 08:46

## 2020-02-12 RX ADMIN — ATORVASTATIN CALCIUM 40 MG: 40 TABLET, FILM COATED ORAL at 19:47

## 2020-02-12 RX ADMIN — ACETAMINOPHEN 650 MG: 325 TABLET, FILM COATED ORAL at 06:56

## 2020-02-12 RX ADMIN — METOPROLOL TARTRATE 2.5 MG: 5 INJECTION INTRAVENOUS at 09:16

## 2020-02-12 RX ADMIN — FUROSEMIDE 20 MG: 20 TABLET ORAL at 08:46

## 2020-02-12 RX ADMIN — APIXABAN 5 MG: 5 TABLET, FILM COATED ORAL at 08:47

## 2020-02-12 RX ADMIN — OSELTAMIVIR PHOSPHATE 75 MG: 75 CAPSULE ORAL at 19:47

## 2020-02-12 RX ADMIN — OSELTAMIVIR PHOSPHATE 75 MG: 75 CAPSULE ORAL at 10:46

## 2020-02-12 RX ADMIN — SENNOSIDES 1 TABLET: 8.6 TABLET, FILM COATED ORAL at 19:47

## 2020-02-12 RX ADMIN — METOPROLOL TARTRATE 150 MG: 50 TABLET, FILM COATED ORAL at 08:45

## 2020-02-12 RX ADMIN — APIXABAN 5 MG: 5 TABLET, FILM COATED ORAL at 18:53

## 2020-02-12 RX ADMIN — CEFTRIAXONE SODIUM 2 G: 2 INJECTION, POWDER, FOR SOLUTION INTRAMUSCULAR; INTRAVENOUS at 05:44

## 2020-02-12 RX ADMIN — SODIUM CHLORIDE: 9 INJECTION, SOLUTION INTRAVENOUS at 10:46

## 2020-02-12 RX ADMIN — ALLOPURINOL 100 MG: 100 TABLET ORAL at 19:47

## 2020-02-12 RX ADMIN — ACETAMINOPHEN 650 MG: 325 TABLET, FILM COATED ORAL at 17:31

## 2020-02-12 ASSESSMENT — ACTIVITIES OF DAILY LIVING (ADL)
ADLS_ACUITY_SCORE: 12

## 2020-02-12 ASSESSMENT — MIFFLIN-ST. JEOR: SCORE: 1563.13

## 2020-02-12 NOTE — ED PROVIDER NOTES
[place] EMERGENCY DEPARTMENT ([facility])  2/11/20   History     Chief Complaint   Patient presents with     Fever     Flu Symptoms     HPI  Girish Chauhan is a 64 year old male with history notable for cholangiocarcinoma (last chemo on 2/7/20), recent right retrograde pyelogram and right ureteral stent exchaneon 2/5/2020 by Urology, secondary to right hydronephrosis,  partial left hepatectomy, CKD, atrial fibrillation (on Eliquis, s/p cardioversion x2), CAD (s/p stent placement), aortic root dilatation, chronic systolic CHF, thrombocytopenia, SBO, and HTN, who presents to the Emergency Department for multiple complaints. Patient states today he has been feeling fatigued, has a bit of cough, and had a fever of 101.5  F at 3:30 PM.  He also states that he felt dizzy while waiting in lobby.  He states he last took Tylenol yesterday and also has not taken his usual home medications for today.  Patient's friend reports that he has been having new word finding difficulty and increased labored breathing.  He otherwise denies vomiting, abdominal pain, previous history of UTIs, or previous history of elevated ammonia.    Per chart review, patient's last echocardiogram was 7/30/2019 which showed EF of 45 to 50%.  There was trace mitral insufficiency, but otherwise unremarkable.    I have reviewed the Medications, Allergies, Past Medical and Surgical History, and Social History in the [employer] system.    Past Medical History:   Diagnosis Date     Aortic stenosis due to bicuspid aortic valve      Atrial fibrillation (H) 2006    hx of paroxysmal atrial fibrillation since approximately 2006. S/p cardioversion in 2013 with recurrent atrial fibrillation s/p repeat cardioversion 9/29/14.     Basal cell carcinoma 1/2016    right shoulder and right posterior calf s/p electrodessication and curretage 1/2016.     CAD (coronary artery disease) 12/2011    s/p angiogram 12/2011 s/p percutaneous intervention on the LAD with  multiple drug-eluting stents complicated by a small perforation in the diagonal branch which sealed spontaneously.  Patient with significant Circumflex stenosis which is being treated conservatively.     Cholangiocarcinoma (H)      CKD (chronic kidney disease)      Gout     affects left foot 2-3 times per year     Hyperlipidemia      Hypertension      Liver disease      Melanoma (H) 9/2015    back s/p resection 9/2015     Squamous cell carcinoma     nose s/p excision       Past Surgical History:   Procedure Laterality Date     ------------OTHER-------------      multiple skin cancer resections     CARDIOVERSION  2013, 9/2014     COMBINED CYSTOSCOPY, RETROGRADES, EXCHANGE STENT URETER(S) Right 11/11/2019    Procedure: Cystoscopy, Right Retrograde Pyelogram, Right Ureteral Stent Exchange;  Surgeon: Sivan Walker MD;  Location: [facility] OR     CYSTOSCOPY, RETROGRADES, INSERT STENT URETER(S), COMBINED N/A 9/16/2019    Procedure: Cystoscopy, Right Retrograde Pyelogram, Right Ureteral Stent Placement;  Surgeon: Sivan Walker MD;  [facility]: [facility] OR     CYSTOSCOPY, RETROGRADES, INSERT STENT URETER(S), COMBINED Right 2/5/2020    Procedure: CYSTOSCOPY, WITH Right RETROGRADE PYELOGRAM AND Right URETERAL STENT INSERTION;  Surgeon: Sivan Walker MD;  [facility]: [facility] OR     ENDOSCOPIC RETROGRADE CHOLANGIOPANCREATOGRAM N/A 2/26/2016    Procedure: COMBINED ENDOSCOPIC RETROGRADE CHOLANGIOPANCREATOGRAPHY, PLACE TUBE/STENT;  Surgeon: Rafa Andrade MD;  [facility]     ENDOSCOPIC RETROGRADE CHOLANGIOPANCREATOGRAPHY  2/2014     ENDOSCOPIC ULTRASOUND UPPER GASTROINTESTINAL TRACT (GI) N/A 6/7/2019    Procedure: ENDOSCOPIC ULTRASOUND, with hot axios stent placement;  Surgeon: Gabino Rodriguez MD;  [facility]     ENTEROSCOPY SMALL BOWEL N/A 6/7/2019    Procedure: ENTEROSCOPY;  Surgeon: Gabino Rodriguez MD;  [facility]     ESOPHAGOSCOPY, GASTROSCOPY, DUODENOSCOPY (EGD), COMBINED N/A  10/16/2019    Procedure: Upper Gastrointestinal Endoscopy;  Surgeon: Gabino Rodriguez MD;  [facility]     ESOPHAGOSCOPY, GASTROSCOPY, DUODENOSCOPY (EGD), DILATATION, COMBINED N/A 7/29/2019    Procedure: Esophagoscopy, gastroscopy, duodenoscopy (EGD), with stent exchange and dilation;  Surgeon: Gabino Rodriguez MD;  [facility]     EXPLORE COMMON BILE DUCT N/A 3/8/2016    Procedure: EXPLORE COMMON BILE DUCT;  Surgeon: Jose Eduardo Pinedo MD;  [facility]     HEPATECTOMY PARTIAL Left 3/8/2016    Procedure: HEPATECTOMY PARTIAL;  Surgeon: Jose Eduardo Pinedo MD;  [facility]     INSERT PORT VASCULAR ACCESS Right 12/8/2017    Procedure: INSERT PORT VASCULAR ACCESS;  Place single lumen venous chest port - right;  Surgeon: Drew Powell PA-C;  [facility]     SOFT TISSUE SURGERY       STENT  12/2011    LAD with multiple SAM       Family History   Problem Relation Age of Onset     Skin Cancer Mother      Other - See Comments Father         father passed away in his 60s post-op with an unknown bile duct obstruction.  no anesthesia complication.       Osteoarthritis Sister      Cerebrovascular Disease Brother      Other - See Comments Brother         prediabetes     Osteoarthritis Sister      No Known Problems Brother        Social History     Tobacco Use     Smoking status: Never Smoker     Smokeless tobacco: Never Used   Substance Use Topics     Alcohol use: Yes     Alcohol/week: 2.0 standard drinks     Types: 2 Cans of beer per week       Current Facility-Administered Medications   Medication     oseltamivir (TAMIFLU) capsule 75 mg     sodium chloride 0.9% infusion     Current Outpatient Medications   Medication     acetaminophen (TYLENOL) 500 MG tablet     allopurinol (ZYLOPRIM) 100 MG tablet     apixaban ANTICOAGULANT (ELIQUIS) 5 MG tablet     atorvastatin (LIPITOR) 40 MG tablet     calcium carbonate (TUMS) 500 MG chewable tablet     clopidogrel (PLAVIX) 75 MG tablet      "colchicine (COLCYRS) 0.6 MG tablet     COMPOUNDED NON-CONTROLLED SUBSTANCE (CMPD RX) - PHARMACY TO MIX COMPOUNDED MEDICATION     fluticasone (FLONASE) 50 MCG/ACT nasal spray     furosemide (LASIX) 20 MG tablet     losartan (COZAAR) 25 MG tablet     metoprolol tartrate (LOPRESSOR) 50 MG tablet     multivitamin w/minerals (THERA-VIT-M) tablet     Nitroglycerin (NITROSTAT SL)     ondansetron (ZOFRAN) 4 MG tablet     sennosides (SENOKOT) 8.6 MG tablet        Allergies   Allergen Reactions     Blood Transfusion Related (Informational Only) Other (See Comments)     Patient has a history of a clinically significant antibody against RBC antigens.  A delay in compatible RBCs may occur.        Review of Systems   Constitutional: Positive for fatigue and fever.   Respiratory: Positive for cough and shortness of breath.    Gastrointestinal: Negative for abdominal pain and vomiting.   Neurological: Positive for speech difficulty.   Psychiatric/Behavioral: Positive for confusion.   All other systems reviewed and are negative.      Physical Exam   BP: (!) 112/91  Pulse: 85  Temp: 98.4  F (36.9  C)  Resp: 16  Height: 180.3 cm (5' 11\")  Weight: 74.8 kg (165 lb)  SpO2: 98 %      Physical Exam  Constitutional:       General: He is not in acute distress.     Appearance: He is not diaphoretic.   HENT:      Head: Normocephalic.      Mouth/Throat:      Pharynx: No oropharyngeal exudate.   Eyes:      Extraocular Movements: Extraocular movements intact.   Neck:      Musculoskeletal: Neck supple.   Cardiovascular:      Rate and Rhythm: Rhythm irregular.      Heart sounds: Normal heart sounds.   Pulmonary:      Effort: No respiratory distress.      Breath sounds: Normal breath sounds.   Abdominal:      General: There is no distension.      Palpations: Abdomen is soft.      Tenderness: There is no abdominal tenderness.   Musculoskeletal:         General: No deformity.   Skin:     General: Skin is dry.   Neurological:      Mental Status: He is " alert.      Comments: Alert to person and place.  Patient initially unsure of whether it was daytime or nighttime or the date moderate confusion with some degree of word finding difficulty.   Psychiatric:         Behavior: Behavior normal.         ED Course        Procedures   8:13 PM  The patient was seen and examined by Dr. Jacobsen in Room 21.                EKG Interpretation:      Interpreted by Dawit Jacobsen DO  Time reviewed: 2035  Symptoms at time of EKG: Confusion, cough, fever  Rhythm: Atrial fibrillation with RVR  Rate: 180Axis: normal  Ectopy: none  Conduction: normal  ST Segments/ T Waves: No ST-T wave changes  Q Waves: none  Comparison to prior: Previous EKG was rate controlled A. fib    Clinical Impression: Abnormal EKG, A. fib RVR      Results for orders placed or performed during the hospital encounter of 02/11/20   CT Head w/o Contrast     Status: None    Narrative    CT HEAD W/O CONTRAST 2/11/2020 8:53 PM    Provided History: confusion, on blood thinners, current treatment for  metastatic cholangiocarcinoma  ICD-10:    Comparison: None.    Technique: Using multidetector thin collimation helical acquisition  technique, axial, coronal and sagittal CT images from the skull base  to the vertex were obtained without intravenous contrast.     Findings:    No intracranial hemorrhage, mass effect, or midline shift. Mild  generalized parenchymal volume loss. The ventricles are proportionate  to the cerebral sulci. The gray to white matter differentiation of the  cerebral hemispheres is preserved. The basal cisterns are patent.    The visualized paranasal sinuses are clear. The mastoid air cells are  clear.       Impression    Impression: No acute intracranial pathology.    I have personally reviewed the examination and initial interpretation  and I agree with the findings.    GOKUL CERON MD   XR Chest 2 Views     Status: None    Narrative    EXAMINATION: XR CHEST 2 VW, 2/11/2020 8:42  PM    INDICATION: Cough    COMPARISON: CT chest abdomen pelvis total 20/7/2019    FINDINGS: PA and lateral upright views of the chest.     Trachea is midline. Cardiac mediastinal borders are clear. Cardiac  silhouette is not enlarged. No focal airspace opacity. No pleural  effusion. No pneumothorax. Right-sided Port-A-Cath with tip projecting  at the superior cavoatrial junction. Partially visualized surgical  clips and gastric pylorus stent. No acute osseous abnormalities.  Multiple healed right-sided rib fractures from from the sixth to the  ninth rib. Soft tissues are grossly unremarkable.      Impression    IMPRESSION: No acute cardiac or pulmonary abnormalities.    I have personally reviewed the examination and initial interpretation  and I agree with the findings.    REYNALDO VALLES MD   CBC with platelets differential     Status: Abnormal   Result Value Ref Range    WBC 10.5 4.0 - 11.0 10e9/L    RBC Count 3.38 (L) 4.4 - 5.9 10e12/L    Hemoglobin 10.5 (L) 13.3 - 17.7 g/dL    Hematocrit 32.4 (L) 40.0 - 53.0 %    MCV 96 78 - 100 fl    MCH 31.1 26.5 - 33.0 pg    MCHC 32.4 31.5 - 36.5 g/dL    RDW 17.0 (H) 10.0 - 15.0 %    Platelet Count 134 (L) 150 - 450 10e9/L    Diff Method Automated Method     % Neutrophils 90.1 %    % Lymphocytes 4.5 %    % Monocytes 4.6 %    % Eosinophils 0.1 %    % Basophils 0.1 %    % Immature Granulocytes 0.6 %    Nucleated RBCs 0 0 /100    Absolute Neutrophil 9.5 (H) 1.6 - 8.3 10e9/L    Absolute Lymphocytes 0.5 (L) 0.8 - 5.3 10e9/L    Absolute Monocytes 0.5 0.0 - 1.3 10e9/L    Absolute Eosinophils 0.0 0.0 - 0.7 10e9/L    Absolute Basophils 0.0 0.0 - 0.2 10e9/L    Abs Immature Granulocytes 0.1 0 - 0.4 10e9/L    Absolute Nucleated RBC 0.0    Comprehensive metabolic panel     Status: Abnormal   Result Value Ref Range    Sodium 137 133 - 144 mmol/L    Potassium 4.0 3.4 - 5.3 mmol/L    Chloride 106 94 - 109 mmol/L    Carbon Dioxide 22 20 - 32 mmol/L    Anion Gap 8 3 - 14 mmol/L    Glucose 139  (H) 70 - 99 mg/dL    Urea Nitrogen 35 (H) 7 - 30 mg/dL    Creatinine 1.66 (H) 0.66 - 1.25 mg/dL    GFR Estimate 43 (L) >60 mL/min/[1.73_m2]    GFR Estimate If Black 50 (L) >60 mL/min/[1.73_m2]    Calcium 8.8 8.5 - 10.1 mg/dL    Bilirubin Total 1.2 0.2 - 1.3 mg/dL    Albumin 3.4 3.4 - 5.0 g/dL    Protein Total 6.1 (L) 6.8 - 8.8 g/dL    Alkaline Phosphatase 87 40 - 150 U/L    ALT 24 0 - 70 U/L    AST 20 0 - 45 U/L   Lipase     Status: None   Result Value Ref Range    Lipase 176 73 - 393 U/L   Lactic acid whole blood     Status: None   Result Value Ref Range    Lactic Acid 1.1 0.7 - 2.0 mmol/L   INR     Status: Abnormal   Result Value Ref Range    INR 1.38 (H) 0.86 - 1.14   Partial thromboplastin time     Status: None   Result Value Ref Range    PTT 33 22 - 37 sec   CRP inflammation     Status: Abnormal   Result Value Ref Range    CRP Inflammation 20.0 (H) 0.0 - 8.0 mg/L   Procalcitonin     Status: None   Result Value Ref Range    Procalcitonin 1.67 ng/ml   Troponin I     Status: None   Result Value Ref Range    Troponin I ES <0.015 0.000 - 0.045 ug/L   Ammonia     Status: None   Result Value Ref Range    Ammonia 24 10 - 50 umol/L   Nt probnp inpatient (BNP)     Status: Abnormal   Result Value Ref Range    N-Terminal Pro BNP Inpatient 2,526 (H) 0 - 900 pg/mL   EKG 12-lead, tracing only     Status: None (Preliminary result)   Result Value Ref Range    Interpretation ECG Click View Image link to view waveform and result    Blood culture     Status: None (Preliminary result)   Result Value Ref Range    Specimen Description Blood Portacath     Culture Micro PENDING    Influenza A/B antigen     Status: Abnormal   Result Value Ref Range    Influenza A/B Agn Specimen Nasopharyngeal     Influenza A Positive (A) NEG^Negative    Influenza B Negative NEG^Negative             Labs Ordered and Resulted from Time of ED Arrival Up to the Time of Departure from the ED   CBC WITH PLATELETS DIFFERENTIAL - Abnormal; Notable for the  following components:       Result Value    RBC Count 3.38 (*)     Hemoglobin 10.5 (*)     Hematocrit 32.4 (*)     RDW 17.0 (*)     Platelet Count 134 (*)     Absolute Neutrophil 9.5 (*)     Absolute Lymphocytes 0.5 (*)     All other components within normal limits   COMPREHENSIVE METABOLIC PANEL - Abnormal; Notable for the following components:    Glucose 139 (*)     Urea Nitrogen 35 (*)     Creatinine 1.66 (*)     GFR Estimate 43 (*)     GFR Estimate If Black 50 (*)     Protein Total 6.1 (*)     All other components within normal limits   INR - Abnormal; Notable for the following components:    INR 1.38 (*)     All other components within normal limits   CRP INFLAMMATION - Abnormal; Notable for the following components:    CRP Inflammation 20.0 (*)     All other components within normal limits   NT PROBNP INPATIENT - Abnormal; Notable for the following components:    N-Terminal Pro BNP Inpatient 2,526 (*)     All other components within normal limits   INFLUENZA A/B ANTIGEN - Abnormal; Notable for the following components:    Influenza A Positive (*)     All other components within normal limits   LIPASE   LACTIC ACID WHOLE BLOOD   PARTIAL THROMBOPLASTIN TIME   PROCALCITONIN   TROPONIN I   AMMONIA   UA MACROSCOPIC WITH REFLEX TO MICRO AND CULTURE   PERIPHERAL IV CATHETER   BLOOD CULTURE   BLOOD CULTURE            Assessments & Plan (with Medical Decision Making)   64-year-old male presents to us with a chief complaint of fever along confusion.  Differential includes but not limited to neutropenic fever, influenza, pneumonia, UTI, dehydration, encephalopathy, intracranial bleeding.  CT scan of the patient's head and chest x-ray were clear.  Patient was positive for influenza A.  Tamiflu was ordered he has a slight increase in his creatinine from the baseline.  Patient's heart rate initially was normal on the vital signs.  He subsequently went into atrial fibrillation with RVR.  He is known to be in atrial  fibrillation at baseline.  He did report that he had not taken his medications today.  Patient was given 5 IV metoprolol and then his normal 150mg dose of oral metoprolol.  Heart rate did respond and return to a normal rate.  Patient did have some degree of confusion here in the emergency department.  He had some severe word finding difficulty and was not oriented to the time.  We symptoms did somewhat improve when his heart rate was controlled.  Given the confusion the patient's current health issues feel is appropriate to admit him to the hospital.  Patient is agreeable to this plan.  I have reviewed the nursing notes.    I have reviewed the findings, diagnosis, plan and need for follow up with the patient.    New Prescriptions    No medications on file       Final diagnoses:   Confusion   Influenza A   Acute kidney injury (H)       IAdolfo, am serving as a trained medical scribe to document services personally performed by  Dawit Jacobsen DO, based on the provider's statements to me.      Dawit RAMESH DO, was physically present and have reviewed and verified the accuracy of this note documented by Adolfo Roberts.     2/11/2020   [facility], [place], EMERGENCY DEPARTMENT     Dawit Jacobsen DO  02/11/20 1734

## 2020-02-12 NOTE — PHARMACY-ADMISSION MEDICATION HISTORY
Admission medication history interview status for the 2/11/2020 admission is complete. See Epic admission navigator for allergy information, pharmacy, prior to admission medications and immunization status.     Medication history interview sources:  patient and [employer]    Changes made to PTA medication list (reason)  Added: None  Deleted: None  Changed: None    Additional medication history information (including reliability of information, actions taken by pharmacist):  - Patient is very knowledgeable about medications. When I stated the name he knew the dose and when he takes it during the day for all of his medications. He is also very adherent telling me things will go wrong if he doesn't take his meds.    Prior to Admission medications    Medication Sig Last Dose Taking? Auth Provider   acetaminophen (TYLENOL) 500 MG tablet Take 500 mg by mouth every 6 hours as needed for fever or pain 2/10/2020 at AM Yes Unknown, Entered By History   allopurinol (ZYLOPRIM) 100 MG tablet Take 100 mg by mouth every evening  2/10/2020 at PM Yes Reported, Patient   apixaban ANTICOAGULANT (ELIQUIS) 5 MG tablet Take 1 tablet (5 mg) by mouth 2 times daily 2/10/2020 at PM Yes Jennifer Jalloh APRN CNP   atorvastatin (LIPITOR) 40 MG tablet Take 40 mg by mouth every evening  2/10/2020 at PM Yes Unknown, Entered By History   calcium carbonate (TUMS) 500 MG chewable tablet Take 1-3 chew tab by mouth 4 times daily as needed for heartburn  Past Month at Unknown time Yes Unknown, Entered By History   clopidogrel (PLAVIX) 75 MG tablet Take 75 mg by mouth every morning  2/10/2020 at AM Yes Jennifer Jalloh APRN CNP   furosemide (LASIX) 20 MG tablet Take 1 tablet (20 mg) by mouth daily 2/10/2020 at AM Yes Jennifer Jalloh APRN CNP   losartan (COZAAR) 25 MG tablet Take 25 mg by mouth every morning 2/10/2020 at AM Yes Unknown, Entered By History   metoprolol tartrate (LOPRESSOR) 50 MG tablet Take 150 mg by mouth 2 times daily  2/10/2020 at PM  Yes Unknown, Entered By History   multivitamin w/minerals (THERA-VIT-M) tablet Take 1 tablet by mouth daily 2/10/2020 at AM Yes Unknown, Entered By History   ondansetron (ZOFRAN) 4 MG tablet Take 1 tablet (4 mg) by mouth every 8 hours as needed for nausea Past Month at Unknown time Yes Shannon Lopes PA-C   sennosides (SENOKOT) 8.6 MG tablet Take 1 tablet by mouth every evening  Past Week at Unknown time Yes Reported, Patient   colchicine (COLCYRS) 0.6 MG tablet Take 0.6 mg by mouth 3 times daily as needed (gout flare)  More than a month at Unknown time  Unknown, Entered By History   COMPOUNDED NON-CONTROLLED SUBSTANCE (CMPD RX) - PHARMACY TO MIX COMPOUNDED MEDICATION Patient is actively on chemotherapy.  Please see oncology navigator for details.  Receives every 2 weeks as an infusion over approximately 46 hours. Unknown at Unknown time  Unknown, Entered By History   fluticasone (FLONASE) 50 MCG/ACT nasal spray Spray 1 spray into both nostrils daily Unknown at Unknown time  Unknown, Entered By History   Nitroglycerin (NITROSTAT SL) Place 0.4 mg under the tongue every 5 minutes as needed for chest pain (Carries medication - has never used)  More than a month at Unknown time  Reported, Patient         Medication history completed by:   Denise Benitez  PharmD Student  February 12, 2020

## 2020-02-12 NOTE — CONSULTS
[facility], [place]   Hematology/Oncology Consult Note    Girish Chauhan MRN# [id number]   Age: 64 year old YOB: 1955          Reason for Consult:   Cholangiocarcinoma         Assessment and Plan:   Girish Chauhan is a 64 year old male with PMHx of A Fib, aortic stenosis 2/2 to bicuspid aortic valve, CKD, HLD, HTN, CAD s/p 5 stents and metastatic cholangiocarcinoma who was admitted last night with fever at home, and found to have influenza A. He is not neutropenic currently.     Summary of Recommendations:    Agree with treating withTamiflu, supportive care and closely monitoring given impaired immune system while on chemotherapy    Agree with empiric antibiotics coverage for community acquired pneumonia, may stop if patient improves and blood culture returns to be negative.     He has his next chemotherapy scheduled on 2/21, depends on how quickly he recovers, we may need to postpone his next chemotherapy, will follow along and adjust clinic appointment if needed.     Restaging CT on 12/27 showed no evidence of disease progression, due to another CT c/a/p on 3/19/2020 which is already scheduled, will keep the appointment.         Thank you for involving us in the care of this patient. We will continue to follow during the hospitalization.    Patient was seen and plan of care developed with Dr. Weaver.    Abundio Montemayor MD/MPH  Heme/Onc Fellow, PGY4  02/12/2020  [phone]         History of Present Illness:   History obtained from chart review and confirmed with patient.    Girish Chauhan is a 64 year old male with PMHx of A Fib, aortic stenosis 2/2 to bicuspid aortic valve, CKD, HLD, HTN, CAD s/p 5 stents and metastatic cholangiocarcinoma (originally diagnosed in 2016) who was admitted last night after a fever at home of 101.5 F. He also has felt weak, disoriented, and nauseous. His wife at home was diagnosed with influenza recently. He has been coughing for a couple  of weeks but it has acutely worsened during the last 3-4 days. Not productive cough. He had vomiting twice, and held his metoprolol in concern of vomiting. He was fount to be confused in ED, so a head CT was obtained which showed no acute pathology. A CXR showed no acute cardiac or pulmonary abnormalities. His viral swab was positive for influenza A. Laboratory studies were remarkable for a procalcitonin level of 1.67. Was treated with a dose of tamiflu, and empirically started on Rocephin for suspect CAP given impaired immune system being on chemotherapy.       Oncology history: adapted from clinical note:    Girish Chauhan is a 64 year old year old gentleman with cholangiocarcinoma  Which was resected in 3/2016 (including partial liver resection) with negative margins and no LN involvement, but with perineural and lymphovascular invasion. No adjuvant chemotherapy given. Recurred/mets in bladder 11/2017, bx c/w metastatic cholangiocarcinoma, started on cisplatin/gemcitabine 12/2017. Cisplatin held 6/2018 for poor tolerance. Gemcitabine discontinued 8/2018 for possible TTP, then went off treatment. In 4/2019 was found to have disease progression in the abdominal cavity anterior to the liver, just below the diaphragm. Started on capecitabine 4/30/19 (last dose 5/7) but developed an NSTEMI s/p angiogram 5/6/19 (see below) and actually continued on the capecitabine for several days after NSTEMI without any ongoing cardiac symptoms or evidence of ongoing ischemia. On follow up with Dr. De Los Santos 6/24/19, disease appeared stable. Decision made with family to hold off on treatment for 2 months and reevaluate.   He was then transferred to [facility] on 6/5/19 after presenting to OSH with SBO with possible involvement of draining choledochojejunostomy loop, subjective fevers, and A fib with RVR. GI consulted, s/p small bowel enteroscopy with stenting on 6/7 for obstructed biliary limb. Discharged on 6/9/19.  He was again admitted on  7/28/19 with fever, nausea, vomiting, and abdominal pain. Hospitalization complicated by sepsis and enterococcus casseliflavus bacteremia. Additionally, Krishna was found to have migrated gastrojejunal stent on CT A/P on admission. S/p ERCP with stent exchange 7/29 by Dr. Rodriguez. Discharged on Linezolid for 2 weeks.    CT CAP showed disease progression on 9/27/19. He wsas started on 5FU on 10/4/19 inpatient due to history of MI while taking Xeloda. He has had stable disease radiographically, but a rising tumor marker after 4 cycles. He was continued on 5FU, and  was down trending in December. He got his cycle#10 Fluorouracil and Leucovorin infusion on 2/7/2020.            Review of Systems:   A comprehensive ROS was performed with the patient and was found to be negative with the exception of that noted in the HPI above.       Past Medical History:     Past Medical History:   Diagnosis Date     Aortic stenosis due to bicuspid aortic valve      Atrial fibrillation (H) 2006    hx of paroxysmal atrial fibrillation since approximately 2006. S/p cardioversion in 2013 with recurrent atrial fibrillation s/p repeat cardioversion 9/29/14.     Basal cell carcinoma 1/2016    right shoulder and right posterior calf s/p electrodessication and curretage 1/2016.     CAD (coronary artery disease) 12/2011    s/p angiogram 12/2011 s/p percutaneous intervention on the LAD with multiple drug-eluting stents complicated by a small perforation in the diagonal branch which sealed spontaneously.  Patient with significant Circumflex stenosis which is being treated conservatively.     Cholangiocarcinoma (H)      CKD (chronic kidney disease)      Gout     affects left foot 2-3 times per year     Hyperlipidemia      Hypertension      Liver disease      Melanoma (H) 9/2015    back s/p resection 9/2015     Squamous cell carcinoma     nose s/p excision            Past Surgical History:     Past Surgical History:   Procedure Laterality Date      ------------OTHER-------------      multiple skin cancer resections     CARDIOVERSION  2013, 9/2014     COMBINED CYSTOSCOPY, RETROGRADES, EXCHANGE STENT URETER(S) Right 11/11/2019    Procedure: Cystoscopy, Right Retrograde Pyelogram, Right Ureteral Stent Exchange;  Surgeon: Sivan Walker MD;  Location: [facility]     CYSTOSCOPY, RETROGRADES, INSERT STENT URETER(S), COMBINED N/A 9/16/2019    Procedure: Cystoscopy, Right Retrograde Pyelogram, Right Ureteral Stent Placement;  Surgeon: Sivan Walker MD;  Location: [facility]     CYSTOSCOPY, RETROGRADES, INSERT STENT URETER(S), COMBINED Right 2/5/2020    Procedure: CYSTOSCOPY, WITH Right RETROGRADE PYELOGRAM AND Right URETERAL STENT INSERTION;  Surgeon: Sivan Walker MD;  Location: [facility] OR     ENDOSCOPIC RETROGRADE CHOLANGIOPANCREATOGRAM N/A 2/26/2016    Procedure: COMBINED ENDOSCOPIC RETROGRADE CHOLANGIOPANCREATOGRAPHY, PLACE TUBE/STENT;  Surgeon: Rafa Andrade MD;  [facility]     ENDOSCOPIC RETROGRADE CHOLANGIOPANCREATOGRAPHY  2/2014     ENDOSCOPIC ULTRASOUND UPPER GASTROINTESTINAL TRACT (GI) N/A 6/7/2019    Procedure: ENDOSCOPIC ULTRASOUND, with hot axios stent placement;  Surgeon: Gabino Rodriguez MD;  [facility]     ENTEROSCOPY SMALL BOWEL N/A 6/7/2019    Procedure: ENTEROSCOPY;  Surgeon: Gabino Rodriguez MD;  [facility]     ESOPHAGOSCOPY, GASTROSCOPY, DUODENOSCOPY (EGD), COMBINED N/A 10/16/2019    Procedure: Upper Gastrointestinal Endoscopy;  Surgeon: Gabino Rodriguez MD;  [facility]     ESOPHAGOSCOPY, GASTROSCOPY, DUODENOSCOPY (EGD), DILATATION, COMBINED N/A 7/29/2019    Procedure: Esophagoscopy, gastroscopy, duodenoscopy (EGD), with stent exchange and dilation;  Surgeon: Gabino Rodriguez MD;  [facility]     EXPLORE COMMON BILE DUCT N/A 3/8/2016    Procedure: EXPLORE COMMON BILE DUCT;  Surgeon: Jose Eduardo Pinedo MD;  [facility]     HEPATECTOMY PARTIAL Left 3/8/2016    Procedure:  HEPATECTOMY PARTIAL;  Surgeon: Jose Eduardo Pinedo MD;  [facility]     INSERT PORT VASCULAR ACCESS Right 12/8/2017    Procedure: INSERT PORT VASCULAR ACCESS;  Place single lumen venous chest port - right;  Surgeon: Drew Powell PA-C;  [facility]     SOFT TISSUE SURGERY       STENT  12/2011    LAD with multiple SAM            Social History:     Social History     Socioeconomic History     Marital status:      Spouse name: Not on file     Number of children: 1     Years of education: Not on file     Highest education level: Not on file   Occupational History     Occupation: retired   Social Needs     Financial resource strain: Not on file     Food insecurity:     Worry: Not on file     Inability: Not on file     Transportation needs:     Medical: Not on file     Non-medical: Not on file   Tobacco Use     Smoking status: Never Smoker     Smokeless tobacco: Never Used   Substance and Sexual Activity     Alcohol use: Not Currently     Alcohol/week: 2.0 standard drinks     Types: 2 Cans of beer per week     Drug use: No     Sexual activity: Not on file   Lifestyle     Physical activity:     Days per week: Not on file     Minutes per session: Not on file     Stress: Not on file   Relationships     Social connections:     Talks on phone: Not on file     Gets together: Not on file     Attends Yazidi service: Not on file     Active member of club or organization: Not on file     Attends meetings of clubs or organizations: Not on file     Relationship status: Not on file     Intimate partner violence:     Fear of current or ex partner: Not on file     Emotionally abused: Not on file     Physically abused: Not on file     Forced sexual activity: Not on file   Other Topics Concern     Not on file   Social History Narrative    Works for [employer] with [employer].  Lives in [place].   with one grown daughter.  No tobacco, rare Etoh, no drug use.            Family History:      Family History   Problem Relation Age of Onset     Skin Cancer Mother      Other - See Comments Father         father passed away in his 60s post-op with an unknown bile duct obstruction.  no anesthesia complication.       Osteoarthritis Sister      Cerebrovascular Disease Brother      Other - See Comments Brother         prediabetes     Osteoarthritis Sister      No Known Problems Brother             Allergies:     Allergies   Allergen Reactions     Blood Transfusion Related (Informational Only) Other (See Comments)     Patient has a history of a clinically significant antibody against RBC antigens.  A delay in compatible RBCs may occur.            Medications:     Medications Prior to Admission   Medication Sig Dispense Refill Last Dose     acetaminophen (TYLENOL) 500 MG tablet Take 500 mg by mouth every 6 hours as needed for fever or pain   Past Week at Unknown time     allopurinol (ZYLOPRIM) 100 MG tablet Take 100 mg by mouth every evening    Past Week at Unknown time     apixaban ANTICOAGULANT (ELIQUIS) 5 MG tablet Take 1 tablet (5 mg) by mouth 2 times daily   Past Week at Unknown time     atorvastatin (LIPITOR) 40 MG tablet Take 40 mg by mouth every evening    Past Week at Unknown time     calcium carbonate (TUMS) 500 MG chewable tablet Take 1-3 chew tab by mouth 4 times daily as needed for heartburn    Past Month at Unknown time     clopidogrel (PLAVIX) 75 MG tablet Take 75 mg by mouth every morning    Past Week at Unknown time     colchicine (COLCYRS) 0.6 MG tablet Take 0.6 mg by mouth 3 times daily as needed (gout flare)    Past Month at Unknown time     furosemide (LASIX) 20 MG tablet Take 1 tablet (20 mg) by mouth daily   Past Week at Unknown time     losartan (COZAAR) 25 MG tablet Take 25 mg by mouth every morning   Past Week at Unknown time     metoprolol tartrate (LOPRESSOR) 50 MG tablet Take 150 mg by mouth 2 times daily    Past Week at Unknown time     multivitamin w/minerals (THERA-VIT-M) tablet  "Take 1 tablet by mouth daily   Past Week at Unknown time     sennosides (SENOKOT) 8.6 MG tablet Take 1 tablet by mouth every evening    Past Week at Unknown time     COMPOUNDED NON-CONTROLLED SUBSTANCE (CMPD RX) - PHARMACY TO MIX COMPOUNDED MEDICATION Patient is actively on chemotherapy.  Please see oncology navigator for details.  Receives every 2 weeks as an infusion over approximately 46 hours.   Unknown at Unknown time     fluticasone (FLONASE) 50 MCG/ACT nasal spray Spray 1 spray into both nostrils daily   Unknown at Unknown time     Nitroglycerin (NITROSTAT SL) Place 0.4 mg under the tongue every 5 minutes as needed for chest pain (Carries medication - has never used)    More than a month at Unknown time     ondansetron (ZOFRAN) 4 MG tablet Take 1 tablet (4 mg) by mouth every 8 hours as needed for nausea 30 tablet 0 More than a month at Unknown time            Physical Exam:   /64 (BP Location: Left arm)   Pulse 106   Temp 98.6  F (37  C) (Oral)   Resp 16   Ht 1.803 m (5' 11\")   Wt 74.8 kg (165 lb)   SpO2 97%   BMI 23.01 kg/m    Vitals:    02/11/20 1942   Weight: 74.8 kg (165 lb)     General: Appears well, sitting in bed, in no acute distress.  Heme/Lymph: No overt bleeding. No cervical, axillary, or supraclavicular adenopathy.  Skin: No concerning lesions, rash, jaundice, cyanosis, erythema, or ecchymoses on exposed surfaces.  HEENT: NCAT. EOMI, anicteric sclera. Oral mucosa pink and moist with no lesions or thrush.  Respiratory: Non-labored breathing, good air exchange, lungs clear to auscultation bilaterally.  Cardiovascular: Regular rate and rhythm. No murmur or rub.   Gastrointestinal: Normoactive bowel sounds. Abdomen soft, non-distended, and non-tender. No palpable masses or organomegaly.  Extremities: No extremity edema.   Neurologic: A&O x 3, speech normal, sensation to light touch grossly WNL.         Data:   I have personally reviewed the following labs/imaging:  CBC  Recent Labs   Lab " 02/11/20 2104 02/07/20  0708   WBC 10.5 9.2   RBC 3.38* 3.37*   HGB 10.5* 10.2*   HCT 32.4* 32.3*   MCV 96 96   MCH 31.1 30.3   MCHC 32.4 31.6   RDW 17.0* 17.4*   * 181     CMP  Recent Labs   Lab 02/12/20  0545 02/11/20 2104 02/07/20  0708   NA  --  137 142   POTASSIUM  --  4.0 3.9   CHLORIDE  --  106 110*   CO2  --  22 24   ANIONGAP  --  8 7   GLC  --  139* 97   BUN  --  35* 23   CR 1.49* 1.66* 1.35*   GFRESTIMATED 49* 43* 56*   GFRESTBLACK 57* 50* 64   GARY  --  8.8 8.8   PROTTOTAL  --  6.1* 5.9*   ALBUMIN  --  3.4 3.1*   BILITOTAL  --  1.2 0.4   ALKPHOS  --  87 101   AST  --  20 36   ALT  --  24 40     INR  Recent Labs   Lab 02/11/20 2104   INR 1.38*

## 2020-02-12 NOTE — ED NOTES
Spoke with admitting team regarding heart rate and fever. Admitting MD says he is okay as long as his heart rate is under 130. He said he will put in an order for IV metoprolol as a PRN. ER RN delegated to oncoming RN to discuss with team about the sepsis trigger

## 2020-02-12 NOTE — ED TRIAGE NOTES
On chemotherapy, last administered on Sunday. Fever at home, highest of 101.5 around 1AM. He reports that his wife is sick at home with flu like symptoms. He has a dry cough that started on Sunday.

## 2020-02-12 NOTE — H&P
[facility]    History and Physical  Hematology / Oncology     Date of Admission:  2/11/2020  Date of Service (when I saw the patient): 02/12/20    Assessment & Plan   Girish Chauhan is a 64 year old male with a past medical history of myocardial infarction, afib, aortic stenosis, and cholangiocarcinoma on chemotherapy that is admitted with influenza A.    --ONC--  # Stage IV Cholangiocarcinoma on chemotherapy  - Holding oncologic therapy for now  - Defer to day team on necessity of oncology consult    --ID--  # Influenza A  # Elevated procalcitonin  # Fevers  - Ceftriaxone 2g iv q24h, given medical comorbidities and elevated procalcitonin  - Tamiflu 75 mg po bid (renal dosing per pharmacy)  - Will follow blood cultures  - UA/UC  - Tylenol 650 mg po q6h prn for fevers, pain    --NEURO--  No issues    --CVS--  # HLD  - Home statin    # AFib  - Home apixaban  - Home metoprolol 150 mg bid  - Tele    # CAD s/p stent x5  # Volume retention  - Home plavix  - Home lasix    --PULM--  No issues    --GI--  No issues    --RENAL--  # Stage 3B Chronic Kidney Disease  - Avoid nephrotoxic agents  - Maintain adequate intravascular volume, cardiac output    --ENDO--  No issues    --HEME--  # Anemia  # Thrombocytopenia  Likely due to chronic illness, malignancy, chemotherapy  - Transfusions for Hgb<8, platelets<10    --MSK--  No issues    F: Normal saline @ 100 mL/hr  E: Replete as needed  N: Regular diet    VTE: Home apixaban  Dispo: Admit to [place] [id number], anticipate stay of 1-2 midnights. Tele floor.    --  Arnie Pinedo MD PhD  Hematology, Oncology, and Bone Marrow Transplantation Service, [facility]  Pager: [phone]  Please see sticky note for cross cover information    Code Status   Full Code    Primary Care Physician   Dario Ray    Chief Complaint   Fever    History is obtained from the patient    History of Present Illness    Girish Chauhan is a 64 year old male with a past medical history of afib, aortic stenosis 2/2 to bicuspid aortic valve, CKD, HLD, HTN, CAD s/p 5 stents and cholangiocarcinoma who presents to the emergency department today after measuring a temperature at home of 101.5 F. He has felt weak, disoriented, and nauseous. Did not take his metoprolol because he thought he would throw it up. His wife at home has influenza. He has been coughing for a couple of weeks but it has acutely worsened during the last 3-4 days. Not productive. Threw up once.     In the ED he had AMS (word finding difficulties per pt) so a head CT was obtained which showed no acute pathology. A CXR showed no acute cardiac or pulmonary abnormalities. He tested positive for influenza A. Laboratory studies were remarkable for a procalcitonin level of 1.67. Was treated with a dose of tamiflu and has been feeling better since taking his metoprolol.     Denies cp, sob, dysuria, new weakness/numbness/tingling, abdominal pain.    Oncologic History (per Dr De Los Santos's note on 12/30/19):  - Diagnosed early 2016 w/ recurrence in the wall of his urinary bladder in November 2017  - Excellent control with gemcitabine+cisplatin, c/b neuropathy and TTP  - Progression in April of 2019, started capecitabine but had an MI  - Challenged w/ infusional 5-FU    Past Medical History    I have reviewed this patient's medical history and updated it with pertinent information if needed.   Past Medical History:   Diagnosis Date     Aortic stenosis due to bicuspid aortic valve      Atrial fibrillation (H) 2006    hx of paroxysmal atrial fibrillation since approximately 2006. S/p cardioversion in 2013 with recurrent atrial fibrillation s/p repeat cardioversion 9/29/14.     Basal cell carcinoma 1/2016    right shoulder and right posterior calf s/p electrodessication and curretage 1/2016.     CAD (coronary artery disease) 12/2011    s/p angiogram 12/2011 s/p percutaneous intervention  on the LAD with multiple drug-eluting stents complicated by a small perforation in the diagonal branch which sealed spontaneously.  Patient with significant Circumflex stenosis which is being treated conservatively.     Cholangiocarcinoma (H)      CKD (chronic kidney disease)      Gout     affects left foot 2-3 times per year     Hyperlipidemia      Hypertension      Liver disease      Melanoma (H) 9/2015    back s/p resection 9/2015     Squamous cell carcinoma     nose s/p excision       Past Surgical History   I have reviewed this patient's surgical history and updated it with pertinent information if needed.  Past Surgical History:   Procedure Laterality Date     ------------OTHER-------------      multiple skin cancer resections     CARDIOVERSION  2013, 9/2014     COMBINED CYSTOSCOPY, RETROGRADES, EXCHANGE STENT URETER(S) Right 11/11/2019    Procedure: Cystoscopy, Right Retrograde Pyelogram, Right Ureteral Stent Exchange;  Surgeon: Sivan Walker MD;  [facility]: [facility] OR     CYSTOSCOPY, RETROGRADES, INSERT STENT URETER(S), COMBINED N/A 9/16/2019    Procedure: Cystoscopy, Right Retrograde Pyelogram, Right Ureteral Stent Placement;  Surgeon: Sivan Walker MD;  [facility] OR     CYSTOSCOPY, RETROGRADES, INSERT STENT URETER(S), COMBINED Right 2/5/2020    Procedure: CYSTOSCOPY, WITH Right RETROGRADE PYELOGRAM AND Right URETERAL STENT INSERTION;  Surgeon: Sivan Walker MD;  [facility]     ENDOSCOPIC RETROGRADE CHOLANGIOPANCREATOGRAM N/A 2/26/2016    Procedure: COMBINED ENDOSCOPIC RETROGRADE CHOLANGIOPANCREATOGRAPHY, PLACE TUBE/STENT;  Surgeon: Rafa Andrade MD;  [facility]     ENDOSCOPIC RETROGRADE CHOLANGIOPANCREATOGRAPHY  2/2014     ENDOSCOPIC ULTRASOUND UPPER GASTROINTESTINAL TRACT (GI) N/A 6/7/2019    Procedure: ENDOSCOPIC ULTRASOUND, with hot axios stent placement;  Surgeon: Gabino Rodriguez MD;  [facility]     ENTEROSCOPY SMALL BOWEL N/A 6/7/2019     Procedure: ENTEROSCOPY;  Surgeon: Gabino Rodriguez MD;  [facility]     ESOPHAGOSCOPY, GASTROSCOPY, DUODENOSCOPY (EGD), COMBINED N/A 10/16/2019    Procedure: Upper Gastrointestinal Endoscopy;  Surgeon: Gabino Rodriguez MD;  [facility]     ESOPHAGOSCOPY, GASTROSCOPY, DUODENOSCOPY (EGD), DILATATION, COMBINED N/A 7/29/2019    Procedure: Esophagoscopy, gastroscopy, duodenoscopy (EGD), with stent exchange and dilation;  Surgeon: Gabino Rodriguez MD;  [facility]     EXPLORE COMMON BILE DUCT N/A 3/8/2016    Procedure: EXPLORE COMMON BILE DUCT;  Surgeon: Jose Eduardo Pinedo MD;  [facility]     HEPATECTOMY PARTIAL Left 3/8/2016    Procedure: HEPATECTOMY PARTIAL;  Surgeon: Jose Eduardo Pinedo MD;  [facility]     INSERT PORT VASCULAR ACCESS Right 12/8/2017    Procedure: INSERT PORT VASCULAR ACCESS;  Place single lumen venous chest port - right;  Surgeon: Drew Powell PA-C;  [facility]     SOFT TISSUE SURGERY       STENT  12/2011    LAD with multiple SAM       Prior to Admission Medications   Prior to Admission Medications   Prescriptions Last Dose Informant Patient Reported? Taking?   COMPOUNDED NON-CONTROLLED SUBSTANCE (CMPD RX) - PHARMACY TO MIX COMPOUNDED MEDICATION   Yes No   Sig: Patient is actively on chemotherapy.  Please see oncology navigator for details.  Receives every 2 weeks as an infusion over approximately 46 hours.   Nitroglycerin (NITROSTAT SL)   Yes No   Sig: Place 0.4 mg under the tongue every 5 minutes as needed for chest pain (Carries medication - has never used)    acetaminophen (TYLENOL) 500 MG tablet   Yes No   Sig: Take 500 mg by mouth every 6 hours as needed for fever or pain   allopurinol (ZYLOPRIM) 100 MG tablet   Yes No   Sig: Take 100 mg by mouth every evening    apixaban ANTICOAGULANT (ELIQUIS) 5 MG tablet   Yes No   Sig: Take 1 tablet (5 mg) by mouth 2 times daily   atorvastatin (LIPITOR) 40 MG tablet   Yes No   Sig: Take 40 mg by mouth  every evening    calcium carbonate (TUMS) 500 MG chewable tablet   Yes No   Sig: Take 1-3 chew tab by mouth 4 times daily as needed for heartburn    clopidogrel (PLAVIX) 75 MG tablet   Yes No   Sig: Take 75 mg by mouth every morning    colchicine (COLCYRS) 0.6 MG tablet   Yes No   Sig: Take 0.6 mg by mouth 3 times daily as needed (gout flare)    fluticasone (FLONASE) 50 MCG/ACT nasal spray   Yes No   Sig: Spray 1 spray into both nostrils daily   furosemide (LASIX) 20 MG tablet   Yes No   Sig: Take 1 tablet (20 mg) by mouth daily   losartan (COZAAR) 25 MG tablet   Yes No   Sig: Take 25 mg by mouth every morning   metoprolol tartrate (LOPRESSOR) 50 MG tablet   Yes No   Sig: Take 150 mg by mouth 2 times daily    multivitamin w/minerals (THERA-VIT-M) tablet   Yes No   Sig: Take 1 tablet by mouth daily   ondansetron (ZOFRAN) 4 MG tablet   No No   Sig: Take 1 tablet (4 mg) by mouth every 8 hours as needed for nausea   Patient not taking: Reported on 2/7/2020   sennosides (SENOKOT) 8.6 MG tablet   Yes No   Sig: Take 1 tablet by mouth every evening       Facility-Administered Medications: None     Allergies   Allergies   Allergen Reactions     Blood Transfusion Related (Informational Only) Other (See Comments)     Patient has a history of a clinically significant antibody against RBC antigens.  A delay in compatible RBCs may occur.       Social History   I have reviewed this patient's social history and updated it with pertinent information if needed. Girish CELINE Chauhan  reports that he has never smoked. He has never used smokeless tobacco. He reports current alcohol use of about 2.0 standard drinks of alcohol per week. He reports that he does not use drugs.    Family History   I have reviewed this patient's family history and updated it with pertinent information if needed.   Family History   Problem Relation Age of Onset     Skin Cancer Mother      Other - See Comments Father         father passed away in his 60s post-op  with an unknown bile duct obstruction.  no anesthesia complication.       Osteoarthritis Sister      Cerebrovascular Disease Brother      Other - See Comments Brother         prediabetes     Osteoarthritis Sister      No Known Problems Brother        Review of Systems   The 10 point Review of Systems is negative other than noted in the HPI or here.     Physical Exam   Temp: 98.9  F (37.2  C) Temp src: Oral BP: 101/72(pt stated baseline) Pulse: 85 Heart Rate: 87 Resp: 15 SpO2: 97 % O2 Device: None (Room air)    Vital Signs with Ranges  Temp:  [98.4  F (36.9  C)-98.9  F (37.2  C)] 98.9  F (37.2  C)  Pulse:  [85] 85  Heart Rate:  [] 87  Resp:  [15-16] 15  BP: (101-112)/(72-91) 101/72  SpO2:  [96 %-100 %] 97 %  165 lbs 0 oz    Constitutional: Well appearing in NAD  Eyes: No scleral icterus, conjugate gaze  ENT: No mucositis  Respiratory: CTAB, strong respiratory effort  Cardiovascular: RRR, no murmurs gallops rubs  GI: Soft, non-tender, non-distended  Vascular: Port c/d/i  Skin: No rashes appreciated  Musculoskeletal: No deformities, no pretibial pitting edema  Neurologic: A/ox3, moving all 4 extremities  Neuropsychiatric: Affect is appropriate    Data   Results for orders placed or performed during the hospital encounter of 02/11/20 (from the past 24 hour(s))   EKG 12-lead, tracing only   Result Value Ref Range    Interpretation ECG Click View Image link to view waveform and result    XR Chest 2 Views    Narrative    EXAMINATION: XR CHEST 2 VW, 2/11/2020 8:42 PM    INDICATION: Cough    COMPARISON: CT chest abdomen pelvis total 20/7/2019    FINDINGS: PA and lateral upright views of the chest.     Trachea is midline. Cardiac mediastinal borders are clear. Cardiac  silhouette is not enlarged. No focal airspace opacity. No pleural  effusion. No pneumothorax. Right-sided Port-A-Cath with tip projecting  at the superior cavoatrial junction. Partially visualized surgical  clips and gastric pylorus stent. No acute  osseous abnormalities.  Multiple healed right-sided rib fractures from from the sixth to the  ninth rib. Soft tissues are grossly unremarkable.      Impression    IMPRESSION: No acute cardiac or pulmonary abnormalities.    I have personally reviewed the examination and initial interpretation  and I agree with the findings.    REYNALDO VALLES MD   CT Head w/o Contrast    Narrative    CT HEAD W/O CONTRAST 2/11/2020 8:53 PM    Provided History: confusion, on blood thinners, current treatment for  metastatic cholangiocarcinoma  ICD-10:    Comparison: None.    Technique: Using multidetector thin collimation helical acquisition  technique, axial, coronal and sagittal CT images from the skull base  to the vertex were obtained without intravenous contrast.     Findings:    No intracranial hemorrhage, mass effect, or midline shift. Mild  generalized parenchymal volume loss. The ventricles are proportionate  to the cerebral sulci. The gray to white matter differentiation of the  cerebral hemispheres is preserved. The basal cisterns are patent.    The visualized paranasal sinuses are clear. The mastoid air cells are  clear.       Impression    Impression: No acute intracranial pathology.    I have personally reviewed the examination and initial interpretation  and I agree with the findings.    GOKUL CERON MD   CBC with platelets differential   Result Value Ref Range    WBC 10.5 4.0 - 11.0 10e9/L    RBC Count 3.38 (L) 4.4 - 5.9 10e12/L    Hemoglobin 10.5 (L) 13.3 - 17.7 g/dL    Hematocrit 32.4 (L) 40.0 - 53.0 %    MCV 96 78 - 100 fl    MCH 31.1 26.5 - 33.0 pg    MCHC 32.4 31.5 - 36.5 g/dL    RDW 17.0 (H) 10.0 - 15.0 %    Platelet Count 134 (L) 150 - 450 10e9/L    Diff Method Automated Method     % Neutrophils 90.1 %    % Lymphocytes 4.5 %    % Monocytes 4.6 %    % Eosinophils 0.1 %    % Basophils 0.1 %    % Immature Granulocytes 0.6 %    Nucleated RBCs 0 0 /100    Absolute Neutrophil 9.5 (H) 1.6 - 8.3 10e9/L    Absolute  Lymphocytes 0.5 (L) 0.8 - 5.3 10e9/L    Absolute Monocytes 0.5 0.0 - 1.3 10e9/L    Absolute Eosinophils 0.0 0.0 - 0.7 10e9/L    Absolute Basophils 0.0 0.0 - 0.2 10e9/L    Abs Immature Granulocytes 0.1 0 - 0.4 10e9/L    Absolute Nucleated RBC 0.0    Comprehensive metabolic panel   Result Value Ref Range    Sodium 137 133 - 144 mmol/L    Potassium 4.0 3.4 - 5.3 mmol/L    Chloride 106 94 - 109 mmol/L    Carbon Dioxide 22 20 - 32 mmol/L    Anion Gap 8 3 - 14 mmol/L    Glucose 139 (H) 70 - 99 mg/dL    Urea Nitrogen 35 (H) 7 - 30 mg/dL    Creatinine 1.66 (H) 0.66 - 1.25 mg/dL    GFR Estimate 43 (L) >60 mL/min/[1.73_m2]    GFR Estimate If Black 50 (L) >60 mL/min/[1.73_m2]    Calcium 8.8 8.5 - 10.1 mg/dL    Bilirubin Total 1.2 0.2 - 1.3 mg/dL    Albumin 3.4 3.4 - 5.0 g/dL    Protein Total 6.1 (L) 6.8 - 8.8 g/dL    Alkaline Phosphatase 87 40 - 150 U/L    ALT 24 0 - 70 U/L    AST 20 0 - 45 U/L   Lipase   Result Value Ref Range    Lipase 176 73 - 393 U/L   Lactic acid whole blood   Result Value Ref Range    Lactic Acid 1.1 0.7 - 2.0 mmol/L   Blood culture   Result Value Ref Range    Specimen Description Blood Portacath     Culture Micro PENDING    INR   Result Value Ref Range    INR 1.38 (H) 0.86 - 1.14   Partial thromboplastin time   Result Value Ref Range    PTT 33 22 - 37 sec   CRP inflammation   Result Value Ref Range    CRP Inflammation 20.0 (H) 0.0 - 8.0 mg/L   Procalcitonin   Result Value Ref Range    Procalcitonin 1.67 ng/ml   Troponin I   Result Value Ref Range    Troponin I ES <0.015 0.000 - 0.045 ug/L   Ammonia   Result Value Ref Range    Ammonia 24 10 - 50 umol/L   Nt probnp inpatient (BNP)   Result Value Ref Range    N-Terminal Pro BNP Inpatient 2,526 (H) 0 - 900 pg/mL   Influenza A/B antigen   Result Value Ref Range    Influenza A/B Agn Specimen Nasopharyngeal     Influenza A Positive (A) NEG^Negative    Influenza B Negative NEG^Negative

## 2020-02-12 NOTE — ED NOTES
[facility], [place]   ED Nurse to Floor Handoff     Girish Chauhan is a 64 year old male who speaks English and lives with a spouse,  in a home  They arrived in the ED by car from home    ED Chief Complaint: Fever and Flu Symptoms    ED Dx;   Final diagnoses:   Confusion   Influenza A   Acute kidney injury (H)         Needed?: No    Allergies:   Allergies   Allergen Reactions     Blood Transfusion Related (Informational Only) Other (See Comments)     Patient has a history of a clinically significant antibody against RBC antigens.  A delay in compatible RBCs may occur.   .  Past Medical Hx:   Past Medical History:   Diagnosis Date     Aortic stenosis due to bicuspid aortic valve      Atrial fibrillation (H) 2006    hx of paroxysmal atrial fibrillation since approximately 2006. S/p cardioversion in 2013 with recurrent atrial fibrillation s/p repeat cardioversion 9/29/14.     Basal cell carcinoma 1/2016    right shoulder and right posterior calf s/p electrodessication and curretage 1/2016.     CAD (coronary artery disease) 12/2011    s/p angiogram 12/2011 s/p percutaneous intervention on the LAD with multiple drug-eluting stents complicated by a small perforation in the diagonal branch which sealed spontaneously.  Patient with significant Circumflex stenosis which is being treated conservatively.     Cholangiocarcinoma (H)      CKD (chronic kidney disease)      Gout     affects left foot 2-3 times per year     Hyperlipidemia      Hypertension      Liver disease      Melanoma (H) 9/2015    back s/p resection 9/2015     Squamous cell carcinoma     nose s/p excision      Baseline Mental status: WDL  Current Mental Status changes: at basesline    Infection present or suspected this encounter: yes respiratory  Sepsis suspected: No  Isolation type: Droplet     Activity level - Baseline/Home:  Independent  Activity Level - Current:   Independent    Bariatric equipment needed?: No    In  the ED these meds were given:   Medications   0.9% sodium chloride BOLUS (1,000 mLs Intravenous New Bag 2/11/20 2107)     Followed by   sodium chloride 0.9% infusion (1,000 mLs Intravenous New Bag 2/11/20 2324)   metoprolol (LOPRESSOR) injection 5 mg (5 mg Intravenous Given 2/11/20 2106)   metoprolol tartrate (LOPRESSOR) tablet 150 mg (150 mg Oral Given 2/11/20 2107)   oseltamivir (TAMIFLU) capsule 75 mg (75 mg Oral Given 2/11/20 2313)       Drips running?  Yes    Home pump  No    Current LDAs  Port A Cath Single 12/08/17 Right Chest wall (Active)   Number of days: 795       Labs results:   Labs Ordered and Resulted from Time of ED Arrival Up to the Time of Departure from the ED   CBC WITH PLATELETS DIFFERENTIAL - Abnormal; Notable for the following components:       Result Value    RBC Count 3.38 (*)     Hemoglobin 10.5 (*)     Hematocrit 32.4 (*)     RDW 17.0 (*)     Platelet Count 134 (*)     Absolute Neutrophil 9.5 (*)     Absolute Lymphocytes 0.5 (*)     All other components within normal limits   COMPREHENSIVE METABOLIC PANEL - Abnormal; Notable for the following components:    Glucose 139 (*)     Urea Nitrogen 35 (*)     Creatinine 1.66 (*)     GFR Estimate 43 (*)     GFR Estimate If Black 50 (*)     Protein Total 6.1 (*)     All other components within normal limits   INR - Abnormal; Notable for the following components:    INR 1.38 (*)     All other components within normal limits   CRP INFLAMMATION - Abnormal; Notable for the following components:    CRP Inflammation 20.0 (*)     All other components within normal limits   NT PROBNP INPATIENT - Abnormal; Notable for the following components:    N-Terminal Pro BNP Inpatient 2,526 (*)     All other components within normal limits   INFLUENZA A/B ANTIGEN - Abnormal; Notable for the following components:    Influenza A Positive (*)     All other components within normal limits   LIPASE   LACTIC ACID WHOLE BLOOD   PARTIAL THROMBOPLASTIN TIME   PROCALCITONIN    TROPONIN I   AMMONIA   UA MACROSCOPIC WITH REFLEX TO MICRO AND CULTURE   PERIPHERAL IV CATHETER   BLOOD CULTURE   BLOOD CULTURE       Imaging Studies:   Recent Results (from the past 24 hour(s))   XR Chest 2 Views    Narrative    EXAMINATION: XR CHEST 2 VW, 2/11/2020 8:42 PM    INDICATION: Cough    COMPARISON: CT chest abdomen pelvis total 20/7/2019    FINDINGS: PA and lateral upright views of the chest.     Trachea is midline. Cardiac mediastinal borders are clear. Cardiac  silhouette is not enlarged. No focal airspace opacity. No pleural  effusion. No pneumothorax. Right-sided Port-A-Cath with tip projecting  at the superior cavoatrial junction. Partially visualized surgical  clips and gastric pylorus stent. No acute osseous abnormalities.  Multiple healed right-sided rib fractures from from the sixth to the  ninth rib. Soft tissues are grossly unremarkable.      Impression    IMPRESSION: No acute cardiac or pulmonary abnormalities.    I have personally reviewed the examination and initial interpretation  and I agree with the findings.    REYNALDO VALLES MD   CT Head w/o Contrast    Narrative    CT HEAD W/O CONTRAST 2/11/2020 8:53 PM    Provided History: confusion, on blood thinners, current treatment for  metastatic cholangiocarcinoma  ICD-10:    Comparison: None.    Technique: Using multidetector thin collimation helical acquisition  technique, axial, coronal and sagittal CT images from the skull base  to the vertex were obtained without intravenous contrast.     Findings:    No intracranial hemorrhage, mass effect, or midline shift. Mild  generalized parenchymal volume loss. The ventricles are proportionate  to the cerebral sulci. The gray to white matter differentiation of the  cerebral hemispheres is preserved. The basal cisterns are patent.    The visualized paranasal sinuses are clear. The mastoid air cells are  clear.       Impression    Impression: No acute intracranial pathology.    I have personally  "reviewed the examination and initial interpretation  and I agree with the findings.    GOKUL CERON MD       Recent vital signs:   /72 (BP Location: Left arm)   Pulse 85   Temp 98.9  F (37.2  C)   Resp 15   Ht 1.803 m (5' 11\")   Wt 74.8 kg (165 lb)   SpO2 98%   BMI 23.01 kg/m      [place] Coma Scale Score: 15 (02/11/20 2113)       Cardiac Rhythm: a-fib   Pt needs tele? Yes  Skin/wound Issues: None    Code Status: Full Code    Pain control: pt had none    Nausea control: good    Abnormal labs/tests/findings requiring intervention: see results, influenza A positive    Family present during ED course? No   Family Comments/Social Situation comments: lives wth wife    Tasks needing completion: None    Doe Hensley, RN  [phone]      "

## 2020-02-12 NOTE — PLAN OF CARE
"Assumed cares from admission until 1500.     /67 (BP Location: Right arm)   Pulse 100   Temp 96.6  F (35.9  C) (Oral)   Resp 16   Ht 1.803 m (5' 11\")   Wt 74.8 kg (165 lb)   SpO2 96%   BMI 23.01 kg/m       Pain: Declines.  Neuro: A and O x 4.  Respiratory: Lung sounds clear and equal on RA. Cough present.  Cardiac/Neurovascular: HR irregular and @ times elevated. Tele displays a fib. Pulses WDL. Numbness/tingling in RLE, baseline.  GI/: Bowel sounds active. Adequate UOP.  Nutrition: Ate 75% lunch.  Activity: Up ind.  Skin: No concerns.   Lines: Port in R chest, infusing NS @ 100/hr.  Events this shift: Arrived to [facility] around 0930 from [facility], settled to room and admission documentation completed.Triggered sepsis, lactic 1.0. Oncology consult placed.     Plan: Cont to monitor.    Nona Jauregui RN on 2/12/2020 at 2:24 PM      "

## 2020-02-12 NOTE — TELEPHONE ENCOUNTER
Katalina (brother) calls and says that his brother has a fever. Fever = 101.5-oral. Katalina says that pt. Has been nauseated, has vomited, has been slightly confused, has a headache, and has shallow breathing. Katalina says that pt's last chemotherapy treatment was a few days ago. Katalina says that he will take Krishna to an ER now.  Reason for Disposition    [1] Neutropenia known or suspected (e.g., recent cancer chemotherapy) AND [2] fever > 100.4 F (38.0 C)    Additional Information    Negative: Shock suspected (e.g., cold/pale/clammy skin, too weak to stand, low BP, rapid pulse)    Negative: Difficult to awaken or acting confused (e.g., disoriented, slurred speech)    Negative: Bluish (or gray) lips or face now    Negative: New onset rash with many purple (or blood-colored) spots or dots    Negative: Sounds like a life-threatening emergency to the triager    Negative: Other symptom is present, see that guideline  (e.g., symptoms of cough, runny nose, sore throat, earache, abdominal pain, diarrhea, vomiting)    Negative: Fever > 104 F (40 C)    Protocols used: CANCER - FEVER-A-

## 2020-02-13 ENCOUNTER — PATIENT OUTREACH (OUTPATIENT)
Dept: CARE COORDINATION | Facility: CLINIC | Age: 65
End: 2020-02-13

## 2020-02-13 ENCOUNTER — TELEPHONE (OUTPATIENT)
Dept: UROLOGY | Facility: CLINIC | Age: 65
End: 2020-02-13

## 2020-02-13 VITALS
BODY MASS INDEX: 22.87 KG/M2 | SYSTOLIC BLOOD PRESSURE: 132 MMHG | WEIGHT: 163.36 LBS | HEIGHT: 71 IN | RESPIRATION RATE: 18 BRPM | TEMPERATURE: 98.1 F | HEART RATE: 77 BPM | OXYGEN SATURATION: 96 % | DIASTOLIC BLOOD PRESSURE: 85 MMHG

## 2020-02-13 PROBLEM — N13.30 HYDRONEPHROSIS, RIGHT: Status: ACTIVE | Noted: 2020-02-13

## 2020-02-13 LAB
ALBUMIN SERPL-MCNC: 2.6 G/DL (ref 3.4–5)
ALP SERPL-CCNC: 82 U/L (ref 40–150)
ALT SERPL W P-5'-P-CCNC: 21 U/L (ref 0–70)
ANION GAP SERPL CALCULATED.3IONS-SCNC: 5 MMOL/L (ref 3–14)
AST SERPL W P-5'-P-CCNC: 19 U/L (ref 0–45)
BASOPHILS # BLD AUTO: 0 10E9/L (ref 0–0.2)
BASOPHILS NFR BLD AUTO: 0.4 %
BILIRUB SERPL-MCNC: 0.6 MG/DL (ref 0.2–1.3)
BUN SERPL-MCNC: 28 MG/DL (ref 7–30)
CALCIUM SERPL-MCNC: 7.9 MG/DL (ref 8.5–10.1)
CHLORIDE SERPL-SCNC: 116 MMOL/L (ref 94–109)
CO2 SERPL-SCNC: 22 MMOL/L (ref 20–32)
CREAT SERPL-MCNC: 1.37 MG/DL (ref 0.66–1.25)
DIFFERENTIAL METHOD BLD: ABNORMAL
EOSINOPHIL # BLD AUTO: 0 10E9/L (ref 0–0.7)
EOSINOPHIL NFR BLD AUTO: 0.4 %
ERYTHROCYTE [DISTWIDTH] IN BLOOD BY AUTOMATED COUNT: 17.1 % (ref 10–15)
GFR SERPL CREATININE-BSD FRML MDRD: 55 ML/MIN/{1.73_M2}
GLUCOSE SERPL-MCNC: 100 MG/DL (ref 70–99)
HCT VFR BLD AUTO: 28.6 % (ref 40–53)
HGB BLD-MCNC: 9 G/DL (ref 13.3–17.7)
IMM GRANULOCYTES # BLD: 0 10E9/L (ref 0–0.4)
IMM GRANULOCYTES NFR BLD: 0.2 %
INR PPP: 1.68 (ref 0.86–1.14)
LYMPHOCYTES # BLD AUTO: 0.7 10E9/L (ref 0.8–5.3)
LYMPHOCYTES NFR BLD AUTO: 15.7 %
MAGNESIUM SERPL-MCNC: 2 MG/DL (ref 1.6–2.3)
MCH RBC QN AUTO: 30.4 PG (ref 26.5–33)
MCHC RBC AUTO-ENTMCNC: 31.5 G/DL (ref 31.5–36.5)
MCV RBC AUTO: 97 FL (ref 78–100)
MONOCYTES # BLD AUTO: 0.7 10E9/L (ref 0–1.3)
MONOCYTES NFR BLD AUTO: 14.7 %
NEUTROPHILS # BLD AUTO: 3.2 10E9/L (ref 1.6–8.3)
NEUTROPHILS NFR BLD AUTO: 68.6 %
NRBC # BLD AUTO: 0 10*3/UL
NRBC BLD AUTO-RTO: 0 /100
PHOSPHATE SERPL-MCNC: 2.8 MG/DL (ref 2.5–4.5)
PLATELET # BLD AUTO: 90 10E9/L (ref 150–450)
POTASSIUM SERPL-SCNC: 3.4 MMOL/L (ref 3.4–5.3)
PROT SERPL-MCNC: 5.2 G/DL (ref 6.8–8.8)
RBC # BLD AUTO: 2.96 10E12/L (ref 4.4–5.9)
SODIUM SERPL-SCNC: 143 MMOL/L (ref 133–144)
WBC # BLD AUTO: 4.6 10E9/L (ref 4–11)

## 2020-02-13 PROCEDURE — 80053 COMPREHEN METABOLIC PANEL: CPT | Performed by: INTERNAL MEDICINE

## 2020-02-13 PROCEDURE — 25000128 H RX IP 250 OP 636: Performed by: STUDENT IN AN ORGANIZED HEALTH CARE EDUCATION/TRAINING PROGRAM

## 2020-02-13 PROCEDURE — 85025 COMPLETE CBC W/AUTO DIFF WBC: CPT | Performed by: INTERNAL MEDICINE

## 2020-02-13 PROCEDURE — 25000132 ZZH RX MED GY IP 250 OP 250 PS 637: Performed by: INTERNAL MEDICINE

## 2020-02-13 PROCEDURE — 99238 HOSP IP/OBS DSCHRG MGMT 30/<: CPT | Performed by: INTERNAL MEDICINE

## 2020-02-13 PROCEDURE — 84100 ASSAY OF PHOSPHORUS: CPT | Performed by: INTERNAL MEDICINE

## 2020-02-13 PROCEDURE — 25000132 ZZH RX MED GY IP 250 OP 250 PS 637: Performed by: STUDENT IN AN ORGANIZED HEALTH CARE EDUCATION/TRAINING PROGRAM

## 2020-02-13 PROCEDURE — 83735 ASSAY OF MAGNESIUM: CPT | Performed by: INTERNAL MEDICINE

## 2020-02-13 PROCEDURE — 85610 PROTHROMBIN TIME: CPT | Performed by: INTERNAL MEDICINE

## 2020-02-13 PROCEDURE — 36592 COLLECT BLOOD FROM PICC: CPT | Performed by: INTERNAL MEDICINE

## 2020-02-13 RX ORDER — OSELTAMIVIR PHOSPHATE 75 MG/1
75 CAPSULE ORAL 2 TIMES DAILY
Qty: 6 CAPSULE | Refills: 0 | Status: SHIPPED | OUTPATIENT
Start: 2020-02-13 | End: 2020-02-24

## 2020-02-13 RX ADMIN — OSELTAMIVIR PHOSPHATE 75 MG: 75 CAPSULE ORAL at 08:54

## 2020-02-13 RX ADMIN — BENZOCAINE AND MENTHOL 1 LOZENGE: 15; 3.6 LOZENGE ORAL at 00:01

## 2020-02-13 RX ADMIN — FLUTICASONE PROPIONATE 1 SPRAY: 50 SPRAY, METERED NASAL at 08:54

## 2020-02-13 RX ADMIN — AZITHROMYCIN MONOHYDRATE 250 MG: 250 TABLET ORAL at 08:55

## 2020-02-13 RX ADMIN — APIXABAN 5 MG: 5 TABLET, FILM COATED ORAL at 08:54

## 2020-02-13 RX ADMIN — FUROSEMIDE 20 MG: 20 TABLET ORAL at 08:55

## 2020-02-13 RX ADMIN — METOPROLOL TARTRATE 150 MG: 50 TABLET, FILM COATED ORAL at 08:54

## 2020-02-13 RX ADMIN — CLOPIDOGREL BISULFATE 75 MG: 75 TABLET ORAL at 08:55

## 2020-02-13 RX ADMIN — CEFTRIAXONE SODIUM 2 G: 2 INJECTION, POWDER, FOR SOLUTION INTRAMUSCULAR; INTRAVENOUS at 05:07

## 2020-02-13 RX ADMIN — BENZOCAINE AND MENTHOL 1 LOZENGE: 15; 3.6 LOZENGE ORAL at 09:03

## 2020-02-13 RX ADMIN — LOSARTAN POTASSIUM 25 MG: 25 TABLET, FILM COATED ORAL at 08:55

## 2020-02-13 ASSESSMENT — ACTIVITIES OF DAILY LIVING (ADL)
ADLS_ACUITY_SCORE: 12

## 2020-02-13 NOTE — TELEPHONE ENCOUNTER
Patient is scheduled for surgery with Dr. Walker      Spoke or left message with: Girish    Date of Surgery: 5/4/20    Location: [facility]    Informed patient they will need an adult  yes    Pre-op with surgeon (if applicable): n/a    H&P: Scheduled with PAC 4/27/20    Additional imaging/appointments: n/a    Surgery packet: mailed 2/13/20     Additional comments: n/a

## 2020-02-13 NOTE — PLAN OF CARE
"Assumed cares from 0700 until discharge.     /85 (BP Location: Right arm)   Pulse 77   Temp 98.1  F (36.7  C) (Oral)   Resp 18   Ht 1.803 m (5' 11\")   Wt 74.1 kg (163 lb 5.8 oz)   SpO2 96%   BMI 22.78 kg/m      Pain: Declines.  Neuro: A and O x 4.  Respiratory: Lung sounds clear and equal on RA. Cough present.  Cardiac/Neurovascular: HR irregular. Tele displays a fib. Pulses WDL. Numbness/tingling in RLE, baseline.  GI/: Bowel sounds active. Adequate UOP.  Nutrition: Ate 100% breakfast.  Activity: Up ind.  Skin: No concerns.   Lines: Port in R chest, deaccessed prior to discharge..  Events this shift: Pt medically ready for discharge. Reviewed discharge instructions with pt, and pt verbalized understanding.     Plan: Pt left unit around 1340. Pt aware he has 1 med at discharge pharmacy. Pt also aware of necessary follow up. Pt left unit with all belongings; went to [employer] to meet his brother who's picking him up.     Nona Jauregui RN on 2/13/2020 at 2:06 PM  "

## 2020-02-13 NOTE — PLAN OF CARE
"/74 (BP Location: Right arm)   Pulse 80   Temp 95.7  F (35.4  C) (Oral)   Resp 18   Ht 1.803 m (5' 11\")   Wt 74.1 kg (163 lb 5.8 oz)   SpO2 93%   BMI 22.78 kg/m    Neuro: A&Ox4.   Cardiac: Afebrile, VSS. Telemetry maintained: afib, HR's 70-90. No noted Tachycardia or RVR runs noted. Occ PAC's present.   Respiratory: RA   GI/: Voiding spontaneously. No BM this shift.   Diet/appetite: Tolerating  reg diet. Denies nausea   Activity: Up in room independently  Pain: Denies   Skin: No new deficits noted.  Lines: port; NS infusion maintained per order  Rested btwn cares. Able ot make needs known. Continue POC.  "

## 2020-02-13 NOTE — PLAN OF CARE
Patient alert and oriented x4, calm, pleasant, cooperative with cares. Able to ambulate to bathroom on stand by assistance  with steady gait and good balance.  With ongoing IVF NS at 100 ml/hr via Port, infusing well. No c/o pain or discomfort, breathing comfortably in room air with occasional non productive cough. Patient developed fever of Tmax 102.5 F and appeared shivering. Was given tylenol after MD was notified. Cold pack applied to forehead and encourage fluid intake. Moreover, patient was  tachycardic and slightly elevated BP noted as well. Poor appetite this shift and only ate popcicle. Improved VS noted after 2 hrs and patient verbalized feeling better. On Tamiflu and Zithromycin for Abx. Resting in bed as of this time. Observed closely/  P: Continue to administer Abx as ordered. Observe closely.

## 2020-02-13 NOTE — PROGRESS NOTES
"        Hematology / Oncology  Daily Progress Note   Date of Service: 02/13/2020  Patient: Girish Chauhan  MRN: [id number]  Admission Date: 2/11/2020  Hospital Day # 1  Cancer Diagnosis: Metastatic Cholangiocarcinoma   Primary Outpatient Oncologist: Dr. De Los Santos  Current Treatment Plan: 5FU-- C10     Recommendations:   -Ok to discharge with Tamiflu   - Arranged for appointment with lab, [facility] and infusion on 2/24    Assessment & Plan:   Girish Chauhan is a 64 year old male with PMHx of A Fib, aortic stenosis 2/2 to bicuspid aortic valve, CKD, HLD, HTN, CAD s/p 5 stents and metastatic cholangiocarcinoma who was admitted last night with fever at home, and found to have influenza A. He is not neutropenic currently.      #Influenza A   - Agree with Tamiflu     #Metastatic Cholangiocarcinoma  - Ok to continue with chemotherapy as scheduled. Arranged for follow up.        Patient was seen and plan of care was discussed with attending physician Dr. Weaver.     Thank you for the opportunity to partake in this patients plan of care. Please do not hesitate to page with questions. We will sign off as patient is discharging today.    Galina Sorensen PA-C   Hematology/Oncology   Pager: [phone]   ___________________________________________________________________    Subjective & Interval History:    No acute events noted overnight. Patient states that his wife also is recovering from the flu. He has no additional oncologic questions at this point.       Physical Exam:    Blood pressure 132/85, pulse 77, temperature 98.1  F (36.7  C), temperature source Oral, resp. rate 18, height 1.803 m (5' 11\"), weight 74.1 kg (163 lb 5.8 oz), SpO2 96 %.    General: lying in bed, no acute distress  HEENT: sclera anicteric, EOMI, MMM  Neck: supple, normal ROM  CV: RRR, normal S1/S2, no m/r/g  Resp: CTAB, no wheezing/crackles, normal respiratory effort on ambient air. Voice sounds hoarse.   GI: soft, non-tender, non-distended, bowel sounds present " and normoactive  MSK: warm and well-perfused, normal tone  Skin: no rashes on limited exam, no jaundice  Neuro: Alert and interactive, moves all extremities equally, no focal deficits    Labs & Studies: I personally reviewed the following studies:  ROUTINE LABS (Last four results):  CMP  Recent Labs   Lab 02/13/20  0546 02/12/20  0545 02/11/20 2104 02/07/20  0708     --  137 142   POTASSIUM 3.4  --  4.0 3.9   CHLORIDE 116*  --  106 110*   CO2 22  --  22 24   ANIONGAP 5  --  8 7   *  --  139* 97   BUN 28  --  35* 23   CR 1.37* 1.49* 1.66* 1.35*   GFRESTIMATED 55* 49* 43* 56*   GFRESTBLACK 63 57* 50* 64   GARY 7.9*  --  8.8 8.8   MAG 2.0  --   --   --    PHOS 2.8  --   --   --    PROTTOTAL 5.2*  --  6.1* 5.9*   ALBUMIN 2.6*  --  3.4 3.1*   BILITOTAL 0.6  --  1.2 0.4   ALKPHOS 82  --  87 101   AST 19  --  20 36   ALT 21  --  24 40     CBC  Recent Labs   Lab 02/13/20  0546 02/11/20 2104 02/07/20  0708   WBC 4.6 10.5 9.2   RBC 2.96* 3.38* 3.37*   HGB 9.0* 10.5* 10.2*   HCT 28.6* 32.4* 32.3*   MCV 97 96 96   MCH 30.4 31.1 30.3   MCHC 31.5 32.4 31.6   RDW 17.1* 17.0* 17.4*   PLT 90* 134* 181     INR  Recent Labs   Lab 02/13/20  0546 02/11/20 2104   INR 1.68* 1.38*       Medications list for reference:  Current Facility-Administered Medications   Medication     acetaminophen (TYLENOL) tablet 650 mg     allopurinol (ZYLOPRIM) tablet 100 mg     apixaban ANTICOAGULANT (ELIQUIS) tablet 5 mg     atorvastatin (LIPITOR) tablet 40 mg     benzocaine-menthol (CEPACOL) 15-3.6 MG lozenge 1 lozenge     calcium carbonate (TUMS) chewable tablet 500-1,500 mg     clopidogrel (PLAVIX) tablet 75 mg     colchicine (COLCYRS) tablet 0.6 mg     fluticasone (FLONASE) 50 MCG/ACT spray 1 spray     furosemide (LASIX) tablet 20 mg     losartan (COZAAR) tablet 25 mg     Medication Instruction:  No rectal suppositories if WBC less than 1000/microliter or platelets less than 50,000/microliter.     metoprolol tartrate (LOPRESSOR) tablet 150  mg     nitroGLYcerin (NITROSTAT) sublingual tablet 0.4 mg     ondansetron (ZOFRAN) injection 4 mg     ondansetron (ZOFRAN) tablet 4 mg     oseltamivir (TAMIFLU) capsule 75 mg     prochlorperazine (COMPAZINE) tablet 5 mg    Or     prochlorperazine (COMPAZINE) injection 5 mg     sennosides (SENOKOT) tablet 1 tablet     sodium chloride 0.9% infusion     sodium chloride 0.9% infusion     Current Outpatient Medications   Medication     acetaminophen (TYLENOL) 500 MG tablet     allopurinol (ZYLOPRIM) 100 MG tablet     apixaban ANTICOAGULANT (ELIQUIS) 5 MG tablet     atorvastatin (LIPITOR) 40 MG tablet     calcium carbonate (TUMS) 500 MG chewable tablet     clopidogrel (PLAVIX) 75 MG tablet     furosemide (LASIX) 20 MG tablet     losartan (COZAAR) 25 MG tablet     metoprolol tartrate (LOPRESSOR) 50 MG tablet     multivitamin w/minerals (THERA-VIT-M) tablet     ondansetron (ZOFRAN) 4 MG tablet     oseltamivir (TAMIFLU) 75 MG capsule     sennosides (SENOKOT) 8.6 MG tablet     colchicine (COLCYRS) 0.6 MG tablet     COMPOUNDED NON-CONTROLLED SUBSTANCE (CMPD RX) - PHARMACY TO MIX COMPOUNDED MEDICATION     fluticasone (FLONASE) 50 MCG/ACT nasal spray     Nitroglycerin (NITROSTAT SL)

## 2020-02-14 NOTE — TELEPHONE ENCOUNTER
FUTURE VISIT INFORMATION      SURGERY INFORMATION:    Date: 20    Location: [facility] OR    Surgeon:  Sivan Walker MD    Anesthesia Type:  Choice    Procedure: CYSTOSCOPY, WITH RIGHT RETROGRADE PYELOGRAM AND RIGHT URETERAL STENT EXCHANGE    RECORDS REQUESTED FROM:       Primary Care Provider: Dario Jain DO  - [facility]    Pertinent Medical History: Aortic root dilatation, coronary atherosclerosis, cardiomyopathy, hypertension, bicuspid aortic valve     Most recent EKG+ Tracin20    Most recent ECHO: 19- [facility]

## 2020-02-14 NOTE — DISCHARGE SUMMARY
[facility] [place]  Hospitalist Discharge Summary       Date of Admission:  2/11/2020  Date of Discharge:  2/13/2020  1:40 PM  Discharging Provider: Sylvester Carlson MD  Discharge Team: Hospitalist Service, [place]     Discharge Diagnoses   Influenza URI  Sepsis    Follow-ups Needed After Discharge   Follow-up Appointments     [facility]ult [facility] Follow-up and recommended labs and tests      Follow up with primary care provider, Dario Ray, within 7   days for hospital follow- up.  No follow up labs or test are needed.    Follow up with oncology as planned.       Appointments on [place] and/or [facility] (with [facility] or [facility]   provider or service). Call [phone] if you haven't heard regarding   these appointments within 7 days of discharge.             Unresulted Labs Ordered in the Past 30 Days of this Admission     Date and Time Order Name Status Description    2/11/2020 2011 Blood culture Preliminary     2/11/2020 2011 Blood culture Preliminary       These results will be followed up by oncology.    Discharge Disposition   Discharged to home  Condition at discharge: Stable    Hospital Course   Girish Chauhan is a 64 year old male with a past medical history of myocardial infarction, afib, aortic stenosis, and cholangiocarcinoma on chemotherapy that is admitted with influenza A.     --ONC--  # Stage IV Cholangiocarcinoma on chemotherapy  - oncology outpatient follow up as planned (within two weeks)    --ID--  # Influenza A  # Elevated procalcitonin  # Fevers  He was septic on arrival and admitted for IV ceftriaxone and azithromycin for CAP with confusion in the setting of immunocompromise from his cancer therapy. However, he remained hemodynamically stable with negative cultures and an alternative diagnosis for his symptoms, and he was narrowed to antivirals only on day of discharge.     --NEURO--  No issues    --CVS--  # HLD  - Home statin    # AFib  - Home  apixaban  - Home metoprolol 150 mg bid  - Tele    # CAD s/p stent x5  # Volume retention  - Home plavix  - Home lasix    --PULM--  No issues    --GI--  No issues    --RENAL--  # Stage 3B Chronic Kidney Disease  - Avoided nephrotoxic agents  - Maintain adequate intravascular volume, cardiac output    --ENDO--  No issues    --HEME--  # Anemia  # Thrombocytopenia  Likely due to chronic illness, malignancy, chemotherapy. Chronic.     --MSK--  No issues    DISPO  Discharged to home.       Consultations This Hospital Stay   ONCOLOGY ADULT IP CONSULT    Code Status   Full Code    Time Spent on this Encounter   I, Sylvester Carlson MD, personally saw the patient today and spent less than or equal to 30 minutes discharging this patient.       Sylvester Carlson MD  [facility], [place]  ______________________________________________________________________    Physical Exam   Vital Signs: Temp: 98.1  F (36.7  C) Temp src: Oral BP: 132/85   Heart Rate: 97 Resp: 18 SpO2: 96 % O2 Device: None (Room air)    Weight: 163 lbs 5.77 oz  General Appearance: A&Ox3 in NAD  Respiratory: CTAB no crackles or rhonchi  Cardiovascular: RRR +loud holosystolic apical murmur  GI: soft nontender  Skin: no rash  Other: Non focal         Primary Care Physician   Daroi Ray    Discharge Orders      Reason for your hospital stay    Influenza URI  sepsis     Adult [facility] Follow-up and recommended labs and tests    Follow up with primary care provider, Dario Ray, within 7 days for hospital follow- up.  No follow up labs or test are needed.    Follow up with oncology as planned.       Appointments on [place] and/or [facility] (with [facility] or [facility] provider or service). Call [phone] if you haven't heard regarding these appointments within 7 days of discharge.     Activity    Your activity upon discharge: activity as tolerated     Full Code     Diet    Follow this diet upon discharge:  Orders Placed This Encounter      Regular Diet Adult       Significant Results and Procedures   Results for orders placed or performed during the hospital encounter of 02/11/20   CT Head w/o Contrast    Narrative    CT HEAD W/O CONTRAST 2/11/2020 8:53 PM    Provided History: confusion, on blood thinners, current treatment for  metastatic cholangiocarcinoma  ICD-10:    Comparison: None.    Technique: Using multidetector thin collimation helical acquisition  technique, axial, coronal and sagittal CT images from the skull base  to the vertex were obtained without intravenous contrast.     Findings:    No intracranial hemorrhage, mass effect, or midline shift. Mild  generalized parenchymal volume loss. The ventricles are proportionate  to the cerebral sulci. The gray to white matter differentiation of the  cerebral hemispheres is preserved. The basal cisterns are patent.    The visualized paranasal sinuses are clear. The mastoid air cells are  clear.       Impression    Impression: No acute intracranial pathology.    I have personally reviewed the examination and initial interpretation  and I agree with the findings.    GOKUL CERON MD   XR Chest 2 Views    Narrative    EXAMINATION: XR CHEST 2 VW, 2/11/2020 8:42 PM    INDICATION: Cough    COMPARISON: CT chest abdomen pelvis total 20/7/2019    FINDINGS: PA and lateral upright views of the chest.     Trachea is midline. Cardiac mediastinal borders are clear. Cardiac  silhouette is not enlarged. No focal airspace opacity. No pleural  effusion. No pneumothorax. Right-sided Port-A-Cath with tip projecting  at the superior cavoatrial junction. Partially visualized surgical  clips and gastric pylorus stent. No acute osseous abnormalities.  Multiple healed right-sided rib fractures from from the sixth to the  ninth rib. Soft tissues are grossly unremarkable.      Impression    IMPRESSION: No acute cardiac or pulmonary abnormalities.    I have personally reviewed the  examination and initial interpretation  and I agree with the findings.    REYNALDO VALLES MD       Discharge Medications   Discharge Medication List as of 2/13/2020 12:33 PM      START taking these medications    Details   oseltamivir (TAMIFLU) 75 MG capsule Take 1 capsule (75 mg) by mouth 2 times daily for 6 doses, Disp-6 capsule, R-0, E-Prescribe         CONTINUE these medications which have NOT CHANGED    Details   acetaminophen (TYLENOL) 500 MG tablet Take 500 mg by mouth every 6 hours as needed for fever or pain, Historical      allopurinol (ZYLOPRIM) 100 MG tablet Take 100 mg by mouth every evening , Historical      apixaban ANTICOAGULANT (ELIQUIS) 5 MG tablet Take 1 tablet (5 mg) by mouth 2 times daily, Historical      atorvastatin (LIPITOR) 40 MG tablet Take 40 mg by mouth every evening , Historical      calcium carbonate (TUMS) 500 MG chewable tablet Take 1-3 chew tab by mouth 4 times daily as needed for heartburn , Historical      clopidogrel (PLAVIX) 75 MG tablet Take 75 mg by mouth every morning , Historical      colchicine (COLCYRS) 0.6 MG tablet Take 0.6 mg by mouth 3 times daily as needed (gout flare) , Historical      COMPOUNDED NON-CONTROLLED SUBSTANCE (CMPD RX) - PHARMACY TO MIX COMPOUNDED MEDICATION Patient is actively on chemotherapy.  Please see oncology navigator for details.  Receives every 2 weeks as an infusion over approximately 46 hours., Historical      fluticasone (FLONASE) 50 MCG/ACT nasal spray Spray 1 spray into both nostrils daily, Historical      furosemide (LASIX) 20 MG tablet Take 1 tablet (20 mg) by mouth daily, Historical      losartan (COZAAR) 25 MG tablet Take 25 mg by mouth every morning, Historical      metoprolol tartrate (LOPRESSOR) 50 MG tablet Take 150 mg by mouth 2 times daily , Historical      multivitamin w/minerals (THERA-VIT-M) tablet Take 1 tablet by mouth daily, Historical      Nitroglycerin (NITROSTAT SL) Place 0.4 mg under the tongue every 5 minutes as needed  for chest pain (Carries medication - has never used) , Historical      ondansetron (ZOFRAN) 4 MG tablet Take 1 tablet (4 mg) by mouth every 8 hours as needed for nausea, Disp-30 tablet, R-0, E-Prescribe      sennosides (SENOKOT) 8.6 MG tablet Take 1 tablet by mouth every evening , Historical           Allergies   Allergies   Allergen Reactions     Blood Transfusion Related (Informational Only) Other (See Comments)     Patient has a history of a clinically significant antibody against RBC antigens.  A delay in compatible RBCs may occur.

## 2020-02-17 LAB
BACTERIA SPEC CULT: NO GROWTH
SPECIMEN SOURCE: NORMAL

## 2020-02-18 LAB
BACTERIA SPEC CULT: NO GROWTH
SPECIMEN SOURCE: NORMAL

## 2020-02-19 ENCOUNTER — APPOINTMENT (OUTPATIENT)
Dept: LAB | Facility: CLINIC | Age: 65
End: 2020-02-19
Attending: INTERNAL MEDICINE
Payer: COMMERCIAL

## 2020-02-19 ENCOUNTER — RECORDS - HEALTHEAST (OUTPATIENT)
Dept: ADMINISTRATIVE | Facility: OTHER | Age: 65
End: 2020-02-19

## 2020-02-19 ENCOUNTER — ONCOLOGY VISIT (OUTPATIENT)
Dept: ONCOLOGY | Facility: CLINIC | Age: 65
End: 2020-02-19
Attending: INTERNAL MEDICINE
Payer: COMMERCIAL

## 2020-02-19 ENCOUNTER — TELEPHONE (OUTPATIENT)
Dept: ONCOLOGY | Facility: CLINIC | Age: 65
End: 2020-02-19

## 2020-02-19 VITALS
WEIGHT: 172.9 LBS | BODY MASS INDEX: 24.11 KG/M2 | RESPIRATION RATE: 16 BRPM | SYSTOLIC BLOOD PRESSURE: 112 MMHG | HEART RATE: 101 BPM | TEMPERATURE: 97.5 F | DIASTOLIC BLOOD PRESSURE: 79 MMHG | OXYGEN SATURATION: 97 %

## 2020-02-19 DIAGNOSIS — L03.113 CELLULITIS OF RIGHT UPPER EXTREMITY: ICD-10-CM

## 2020-02-19 DIAGNOSIS — C22.1 CHOLANGIOCARCINOMA (H): Primary | ICD-10-CM

## 2020-02-19 DIAGNOSIS — R60.9 EDEMA: Primary | ICD-10-CM

## 2020-02-19 DIAGNOSIS — C78.6 PERITONEAL CARCINOMATOSIS (H): ICD-10-CM

## 2020-02-19 LAB
ALBUMIN SERPL-MCNC: 3.2 G/DL (ref 3.4–5)
ALP SERPL-CCNC: 186 U/L (ref 40–150)
ALT SERPL W P-5'-P-CCNC: 69 U/L (ref 0–70)
ANION GAP SERPL CALCULATED.3IONS-SCNC: 7 MMOL/L (ref 3–14)
AST SERPL W P-5'-P-CCNC: 45 U/L (ref 0–45)
BASOPHILS # BLD AUTO: 0 10E9/L (ref 0–0.2)
BASOPHILS NFR BLD AUTO: 0.3 %
BILIRUB SERPL-MCNC: 0.5 MG/DL (ref 0.2–1.3)
BUN SERPL-MCNC: 23 MG/DL (ref 7–30)
CALCIUM SERPL-MCNC: 8.7 MG/DL (ref 8.5–10.1)
CHLORIDE SERPL-SCNC: 108 MMOL/L (ref 94–109)
CO2 SERPL-SCNC: 23 MMOL/L (ref 20–32)
CREAT SERPL-MCNC: 1.42 MG/DL (ref 0.66–1.25)
DIFFERENTIAL METHOD BLD: ABNORMAL
EOSINOPHIL # BLD AUTO: 0.2 10E9/L (ref 0–0.7)
EOSINOPHIL NFR BLD AUTO: 1.4 %
ERYTHROCYTE [DISTWIDTH] IN BLOOD BY AUTOMATED COUNT: 17 % (ref 10–15)
GFR SERPL CREATININE-BSD FRML MDRD: 52 ML/MIN/{1.73_M2}
GLUCOSE SERPL-MCNC: 80 MG/DL (ref 70–99)
HCT VFR BLD AUTO: 33.7 % (ref 40–53)
HGB BLD-MCNC: 10.9 G/DL (ref 13.3–17.7)
IMM GRANULOCYTES # BLD: 0.1 10E9/L (ref 0–0.4)
IMM GRANULOCYTES NFR BLD: 0.6 %
LYMPHOCYTES # BLD AUTO: 2.2 10E9/L (ref 0.8–5.3)
LYMPHOCYTES NFR BLD AUTO: 19 %
MCH RBC QN AUTO: 31.1 PG (ref 26.5–33)
MCHC RBC AUTO-ENTMCNC: 32.3 G/DL (ref 31.5–36.5)
MCV RBC AUTO: 96 FL (ref 78–100)
MONOCYTES # BLD AUTO: 1.7 10E9/L (ref 0–1.3)
MONOCYTES NFR BLD AUTO: 14.4 %
NEUTROPHILS # BLD AUTO: 7.5 10E9/L (ref 1.6–8.3)
NEUTROPHILS NFR BLD AUTO: 64.3 %
NRBC # BLD AUTO: 0 10*3/UL
NRBC BLD AUTO-RTO: 0 /100
PLATELET # BLD AUTO: 265 10E9/L (ref 150–450)
POTASSIUM SERPL-SCNC: 3.6 MMOL/L (ref 3.4–5.3)
PROT SERPL-MCNC: 6.6 G/DL (ref 6.8–8.8)
RBC # BLD AUTO: 3.51 10E12/L (ref 4.4–5.9)
SODIUM SERPL-SCNC: 138 MMOL/L (ref 133–144)
WBC # BLD AUTO: 11.7 10E9/L (ref 4–11)

## 2020-02-19 PROCEDURE — 80053 COMPREHEN METABOLIC PANEL: CPT | Performed by: INTERNAL MEDICINE

## 2020-02-19 PROCEDURE — 85025 COMPLETE CBC W/AUTO DIFF WBC: CPT | Performed by: INTERNAL MEDICINE

## 2020-02-19 PROCEDURE — 87040 BLOOD CULTURE FOR BACTERIA: CPT | Performed by: PHYSICIAN ASSISTANT

## 2020-02-19 PROCEDURE — 36591 DRAW BLOOD OFF VENOUS DEVICE: CPT

## 2020-02-19 PROCEDURE — G0463 HOSPITAL OUTPT CLINIC VISIT: HCPCS | Mod: ZF

## 2020-02-19 PROCEDURE — 25000128 H RX IP 250 OP 636: Mod: ZF | Performed by: PHYSICIAN ASSISTANT

## 2020-02-19 PROCEDURE — 99213 OFFICE O/P EST LOW 20 MIN: CPT | Mod: ZP | Performed by: PHYSICIAN ASSISTANT

## 2020-02-19 RX ORDER — SULFAMETHOXAZOLE/TRIMETHOPRIM 800-160 MG
1 TABLET ORAL 2 TIMES DAILY
Qty: 14 TABLET | Refills: 0 | Status: SHIPPED | OUTPATIENT
Start: 2020-02-19 | End: 2020-02-24

## 2020-02-19 RX ORDER — HEPARIN SODIUM (PORCINE) LOCK FLUSH IV SOLN 100 UNIT/ML 100 UNIT/ML
5 SOLUTION INTRAVENOUS ONCE
Status: COMPLETED | OUTPATIENT
Start: 2020-02-19 | End: 2020-02-19

## 2020-02-19 RX ADMIN — Medication 5 ML: at 14:13

## 2020-02-19 ASSESSMENT — PAIN SCALES - GENERAL: PAINLEVEL: EXTREME PAIN (8)

## 2020-02-19 NOTE — LETTER
2/19/2020      RE: Girish Chauhan  [address] [facility] [id number]       Reason for Visit: add on for right hand swelling and pain     Oncology HPI:   Girish Chauhan is a 64 year old year old gentleman with cholangiocarcinoma  Which was resected in 3/2016 (including partial liver resection) with negative margins and no LN involvement, but with perineural and lymphovascular invasion. No adjuvant chemotherapy given. Recurred in bladder 11/2017, bx c/w metastatic cholangiocarcinoma, started on cisplatin/gemcitabine 12/2017. Cisplatin held 6/2018 for poor tolerance. Gemcitabine discontinued 8/2018 for possible TTP, then went off treatment. In 4/2019 was found to have disease progression in the abdominal cavity anterior to the liver, just below the diaphragm. Started on capecitabine 4/30/19 (last dose 5/7) but developed an NSTEMI s/p angiogram 5/6/19 (see below) and actually continued on the capecitabine for several days after NSTEMI without any ongoing cardiac symptoms or evidence of ongoing ischemia. On follow up with Dr. De Los Santos 6/24, disease appeared stable. Decision made with family to hold off on treatment for 2 months and reevaluate.      He was then transferred to [facility] on 6/5/19 after presenting to OSH with SBO with possible involvement of draining choledochojejunostomy loop, subjective fevers, and A fib with RVR. GI consulted, s/p small bowel enteroscopy with stenting on 6/7 for obstructed biliary limb. Discharged on 6/9/19.     He was admitted on 7/28/19 with fever, nausea, vomiting, and abdominal pain. Hospitalization complicated by sepsis and enterococcus casseliflavus bacteremia. Additionally, Krishna was found to have migrated gastrojejunal stent on CT A/P on admission. S/p ERCP with stent exchange 7/29 by Dr. Rodriguez. Discharged on Linezolid for 2 weeks.       CT CAP showed disease progression. He started on 5FU on 10/4/19 inpatient due to history of MI while taking Xeloda. He has had stable disease  radiographically, but a rising tumor marker after 4 cycles. He was continued on 5FU.     Interval history:   Krishna presents today unaccompanied as an add on for right hand swelling and pain.     2 days ago he noticed occasional pain coming from his elbow.  It was short bursts of pain occurring which was bothersome though he was still able to work and do everything he needed to.  Then last night the pain started to become worse and then his wrist started to swell and he noticed he was becoming weaker and dropping things.  Now the pain is a burning sensation which is worse with movement.  He does have pins and needle sensation in his middle finger.  He did not notice the warmth to the area until I pointed out.  He denies any fevers or chills.  He is otherwise doing well.      Current Outpatient Medications   Medication Sig Dispense Refill     acetaminophen (TYLENOL) 500 MG tablet Take 500 mg by mouth every 6 hours as needed for fever or pain       allopurinol (ZYLOPRIM) 100 MG tablet Take 100 mg by mouth every evening        apixaban ANTICOAGULANT (ELIQUIS) 5 MG tablet Take 1 tablet (5 mg) by mouth 2 times daily       atorvastatin (LIPITOR) 40 MG tablet Take 40 mg by mouth every evening        calcium carbonate (TUMS) 500 MG chewable tablet Take 1-3 chew tab by mouth 4 times daily as needed for heartburn        clopidogrel (PLAVIX) 75 MG tablet Take 75 mg by mouth every morning        colchicine (COLCYRS) 0.6 MG tablet Take 0.6 mg by mouth 3 times daily as needed (gout flare)        fluticasone (FLONASE) 50 MCG/ACT nasal spray Spray 1 spray into both nostrils daily       furosemide (LASIX) 20 MG tablet Take 1 tablet (20 mg) by mouth daily       losartan (COZAAR) 25 MG tablet Take 25 mg by mouth every morning       metoprolol tartrate (LOPRESSOR) 50 MG tablet Take 150 mg by mouth 2 times daily        multivitamin w/minerals (THERA-VIT-M) tablet Take 1 tablet by mouth daily       Nitroglycerin (NITROSTAT SL) Place 0.4 mg  under the tongue every 5 minutes as needed for chest pain (Carries medication - has never used)        ondansetron (ZOFRAN) 4 MG tablet Take 1 tablet (4 mg) by mouth every 8 hours as needed for nausea 30 tablet 0     sennosides (SENOKOT) 8.6 MG tablet Take 1 tablet by mouth every evening        sulfamethoxazole-trimethoprim 800-160 MG PO tablet Take 1 tablet by mouth 2 times daily 14 tablet 0     COMPOUNDED NON-CONTROLLED SUBSTANCE (CMPD RX) - PHARMACY TO MIX COMPOUNDED MEDICATION Patient is actively on chemotherapy.  Please see oncology navigator for details.  Receives every 2 weeks as an infusion over approximately 46 hours.            Allergies   Allergen Reactions     Blood Transfusion Related (Informational Only) Other (See Comments)     Patient has a history of a clinically significant antibody against RBC antigens.  A delay in compatible RBCs may occur.         Exam: alert, appears slim, in NAD Blood pressure 112/79, pulse 101, temperature 97.5  F (36.4  C), temperature source Oral, resp. rate 16, weight 78.4 kg (172 lb 14.4 oz), SpO2 97 %.  Wt Readings from Last 4 Encounters:   02/19/20 78.4 kg (172 lb 14.4 oz)   02/12/20 74.1 kg (163 lb 5.8 oz)   02/07/20 79.5 kg (175 lb 4.8 oz)   02/05/20 76.8 kg (169 lb 5 oz)     Skin: significant generalized edema surrounding right wrist with warmth to the touch and erythema of the lateral aspect of wrist, about 6x4 cm in size. Some tenderness to palpation over erythema, otherwise no pain. Fingers are cold, though same temperature as unaffected hand. No open lesions or scabbing seen.             Labs:    2/19/2020 12:29   Sodium 138   Potassium 3.6   Chloride 108   Carbon Dioxide 23   Urea Nitrogen 23   Creatinine 1.42 (H)   GFR Estimate 52 (L)   GFR Estimate If Black 61   Calcium 8.7   Anion Gap 7   Albumin 3.2 (L)   Protein Total 6.6 (L)   Bilirubin Total 0.5   Alkaline Phosphatase 186 (H)   ALT 69   AST 45   Glucose 80   WBC 11.7 (H)   Hemoglobin 10.9 (L)   Hematocrit  33.7 (L)   Platelet Count 265   RBC Count 3.51 (L)   MCV 96   MCH 31.1   MCHC 32.3   RDW 17.0 (H)   Diff Method Automated Method   % Neutrophils 64.3   % Lymphocytes 19.0   % Monocytes 14.4   % Eosinophils 1.4   % Basophils 0.3   % Immature Granulocytes 0.6   Nucleated RBCs 0   Absolute Neutrophil 7.5   Absolute Lymphocytes 2.2   Absolute Monocytes 1.7 (H)   Absolute Eosinophils 0.2   Absolute Basophils 0.0   Abs Immature Granulocytes 0.1   Absolute Nucleated RBC 0.0       Imaging: n/a    Impression/plan:     Cellulitis of Right Wrist  -new onset of pain 2 days ago with developed swelling, redness, unrelieved pain and weakness of right hand last night. Had blood culture taken from the area in the hospital, though no open wound or scab seen. Likely cellulitis, though could be infection of the wrist/joint due to location. Will give him Bactrim DS BID for 7 days. Shilo an outline of erythema on wrist. Instructed him to call if redness continue to spread, he start having f/c, edema or pain continue to worsen. Did draw blood cultures x 2 to look for systemic involvement.   -no US at this time as swelling more likely due to cellulitis than DVT  -has follow-up with Jennifer on Monday   -consider sending to ortho if joint involvement suspected if symptoms were to worsen    Recurrent, metastatic cholangiocarcinoma with stable to improved disease on 5FU, but a rising tumor marker when checked in November.  -tolerating the 5FU ok, no apparent recurrence of ischemia (occurred while on xeloda)  -next infusion on Monday 2/ 24. Will follow-up with Jennifer Jalloh NP prior to infusion and check on infection      Nadira Cedeno PA-C  [facility]  [address] [place] [address] [phone]

## 2020-02-19 NOTE — NURSING NOTE
"Oncology Rooming Note    February 19, 2020 12:43 PM   Girish Chauhan is a 64 year old male who presents for:    Chief Complaint   Patient presents with     Blood Draw     Labs drawn via  by RN in lab. VS taken.     Oncology Clinic Visit     Return; Hilar Cholangiocarcinoma     Initial Vitals: /79 (BP Location: Left arm, Patient Position: Sitting, Cuff Size: Adult Regular)   Pulse 101   Temp 97.5  F (36.4  C) (Oral)   Resp 16   Wt 78.4 kg (172 lb 14.4 oz)   SpO2 97%   BMI 24.11 kg/m   Estimated body mass index is 24.11 kg/m  as calculated from the following:    Height as of 2/11/20: 1.803 m (5' 11\").    Weight as of this encounter: 78.4 kg (172 lb 14.4 oz). Body surface area is 1.98 meters squared.  Extreme Pain (8) Comment: Data Unavailable   No LMP for male patient.  Allergies reviewed: Yes  Medications reviewed: Yes    Medications: Medication refills not needed today.  Pharmacy name entered into [employer]: [id number] [address] AT [facility]    Clinical concerns: Patient states he still has pain and swelling in his hand.        Sisi Lutz CMA              "

## 2020-02-19 NOTE — PROGRESS NOTES
Reason for Visit: add on for right hand swelling and pain     Oncology HPI:   Girish Chauhan is a 64 year old year old gentleman with cholangiocarcinoma  Which was resected in 3/2016 (including partial liver resection) with negative margins and no LN involvement, but with perineural and lymphovascular invasion. No adjuvant chemotherapy given. Recurred in bladder 11/2017, bx c/w metastatic cholangiocarcinoma, started on cisplatin/gemcitabine 12/2017. Cisplatin held 6/2018 for poor tolerance. Gemcitabine discontinued 8/2018 for possible TTP, then went off treatment. In 4/2019 was found to have disease progression in the abdominal cavity anterior to the liver, just below the diaphragm. Started on capecitabine 4/30/19 (last dose 5/7) but developed an NSTEMI s/p angiogram 5/6/19 (see below) and actually continued on the capecitabine for several days after NSTEMI without any ongoing cardiac symptoms or evidence of ongoing ischemia. On follow up with Dr. De Los Santos 6/24, disease appeared stable. Decision made with family to hold off on treatment for 2 months and reevaluate.      He was then transferred to [facility] on 6/5/19 after presenting to OSH with SBO with possible involvement of draining choledochojejunostomy loop, subjective fevers, and A fib with RVR. GI consulted, s/p small bowel enteroscopy with stenting on 6/7 for obstructed biliary limb. Discharged on 6/9/19.     He was admitted on 7/28/19 with fever, nausea, vomiting, and abdominal pain. Hospitalization complicated by sepsis and enterococcus casseliflavus bacteremia. Additionally, Krishna was found to have migrated gastrojejunal stent on CT A/P on admission. S/p ERCP with stent exchange 7/29 by Dr. Rodriguez. Discharged on Linezolid for 2 weeks.       CT CAP showed disease progression. He started on 5FU on 10/4/19 inpatient due to history of MI while taking Xeloda. He has had stable disease radiographically, but a rising tumor marker after 4 cycles. He was continued on 5FU.      Interval history:   Krishna presents today unaccompanied as an add on for right hand swelling and pain.     2 days ago he noticed occasional pain coming from his elbow.  It was short bursts of pain occurring which was bothersome though he was still able to work and do everything he needed to.  Then last night the pain started to become worse and then his wrist started to swell and he noticed he was becoming weaker and dropping things.  Now the pain is a burning sensation which is worse with movement.  He does have pins and needle sensation in his middle finger.  He did not notice the warmth to the area until I pointed out.  He denies any fevers or chills.  He is otherwise doing well.      Current Outpatient Medications   Medication Sig Dispense Refill     acetaminophen (TYLENOL) 500 MG tablet Take 500 mg by mouth every 6 hours as needed for fever or pain       allopurinol (ZYLOPRIM) 100 MG tablet Take 100 mg by mouth every evening        apixaban ANTICOAGULANT (ELIQUIS) 5 MG tablet Take 1 tablet (5 mg) by mouth 2 times daily       atorvastatin (LIPITOR) 40 MG tablet Take 40 mg by mouth every evening        calcium carbonate (TUMS) 500 MG chewable tablet Take 1-3 chew tab by mouth 4 times daily as needed for heartburn        clopidogrel (PLAVIX) 75 MG tablet Take 75 mg by mouth every morning        colchicine (COLCYRS) 0.6 MG tablet Take 0.6 mg by mouth 3 times daily as needed (gout flare)        fluticasone (FLONASE) 50 MCG/ACT nasal spray Spray 1 spray into both nostrils daily       furosemide (LASIX) 20 MG tablet Take 1 tablet (20 mg) by mouth daily       losartan (COZAAR) 25 MG tablet Take 25 mg by mouth every morning       metoprolol tartrate (LOPRESSOR) 50 MG tablet Take 150 mg by mouth 2 times daily        multivitamin w/minerals (THERA-VIT-M) tablet Take 1 tablet by mouth daily       Nitroglycerin (NITROSTAT SL) Place 0.4 mg under the tongue every 5 minutes as needed for chest pain (Carries medication - has  never used)        ondansetron (ZOFRAN) 4 MG tablet Take 1 tablet (4 mg) by mouth every 8 hours as needed for nausea 30 tablet 0     sennosides (SENOKOT) 8.6 MG tablet Take 1 tablet by mouth every evening        sulfamethoxazole-trimethoprim 800-160 MG PO tablet Take 1 tablet by mouth 2 times daily 14 tablet 0     COMPOUNDED NON-CONTROLLED SUBSTANCE (CMPD RX) - PHARMACY TO MIX COMPOUNDED MEDICATION Patient is actively on chemotherapy.  Please see oncology navigator for details.  Receives every 2 weeks as an infusion over approximately 46 hours.            Allergies   Allergen Reactions     Blood Transfusion Related (Informational Only) Other (See Comments)     Patient has a history of a clinically significant antibody against RBC antigens.  A delay in compatible RBCs may occur.         Exam: alert, appears slim, in NAD Blood pressure 112/79, pulse 101, temperature 97.5  F (36.4  C), temperature source Oral, resp. rate 16, weight 78.4 kg (172 lb 14.4 oz), SpO2 97 %.  Wt Readings from Last 4 Encounters:   02/19/20 78.4 kg (172 lb 14.4 oz)   02/12/20 74.1 kg (163 lb 5.8 oz)   02/07/20 79.5 kg (175 lb 4.8 oz)   02/05/20 76.8 kg (169 lb 5 oz)     Skin: significant generalized edema surrounding right wrist with warmth to the touch and erythema of the lateral aspect of wrist, about 6x4 cm in size. Some tenderness to palpation over erythema, otherwise no pain. Fingers are cold, though same temperature as unaffected hand. No open lesions or scabbing seen.             Labs:    2/19/2020 12:29   Sodium 138   Potassium 3.6   Chloride 108   Carbon Dioxide 23   Urea Nitrogen 23   Creatinine 1.42 (H)   GFR Estimate 52 (L)   GFR Estimate If Black 61   Calcium 8.7   Anion Gap 7   Albumin 3.2 (L)   Protein Total 6.6 (L)   Bilirubin Total 0.5   Alkaline Phosphatase 186 (H)   ALT 69   AST 45   Glucose 80   WBC 11.7 (H)   Hemoglobin 10.9 (L)   Hematocrit 33.7 (L)   Platelet Count 265   RBC Count 3.51 (L)   MCV 96   MCH 31.1   MCHC 32.3    RDW 17.0 (H)   Diff Method Automated Method   % Neutrophils 64.3   % Lymphocytes 19.0   % Monocytes 14.4   % Eosinophils 1.4   % Basophils 0.3   % Immature Granulocytes 0.6   Nucleated RBCs 0   Absolute Neutrophil 7.5   Absolute Lymphocytes 2.2   Absolute Monocytes 1.7 (H)   Absolute Eosinophils 0.2   Absolute Basophils 0.0   Abs Immature Granulocytes 0.1   Absolute Nucleated RBC 0.0       Imaging: n/a    Impression/plan:     Cellulitis of Right Wrist  -new onset of pain 2 days ago with developed swelling, redness, unrelieved pain and weakness of right hand last night. Had blood culture taken from the area in the hospital, though no open wound or scab seen. Likely cellulitis, though could be infection of the wrist/joint due to location. Will give him Bactrim DS BID for 7 days. Shilo an outline of erythema on wrist. Instructed him to call if redness continue to spread, he start having f/c, edema or pain continue to worsen. Did draw blood cultures x 2 to look for systemic involvement.   -no US at this time as swelling more likely due to cellulitis than DVT  -has follow-up with Jennifer on Monday   -consider sending to ortho if joint involvement suspected if symptoms were to worsen    Recurrent, metastatic cholangiocarcinoma with stable to improved disease on 5FU, but a rising tumor marker when checked in November.  -tolerating the 5FU ok, no apparent recurrence of ischemia (occurred while on xeloda)  -next infusion on Monday 2/ 24. Will follow-up with Jennifer Jalloh NP prior to infusion and check on infection      Nadira Cedeno PA-C  [facility]  [address] [place] [address]  [phone]

## 2020-02-19 NOTE — NURSING NOTE
Chief Complaint   Patient presents with     Blood Draw     Labs drawn via  by RN in lab. VS taken.     Lilliam Jackson RN

## 2020-02-19 NOTE — NURSING NOTE
"Port accessed with 20 g 3/4\" gripper needle by RN, BC collected, line flushed with saline and heparin. Second BC collected via RUE  by MA. Pt tolerated well. Port de-accessed.     Nathalie Patel, BSN, RN, PHN      "

## 2020-02-19 NOTE — TELEPHONE ENCOUNTER
"Pt called to CO swelling in his right wrist beginning Monday. When it began it felt \"like a stinger\", sensation began at elbow and worked it's way down over time. Yesterday eveing began swelling in his Right wrist and hand, no discoloration,or temperature differential. Last evening for example, pad of middle finger felt like \"needles being pushed into it\" and pain was rated 9/10.  General pain in right extremity is rated 8-9/10, waking the Pt up from sleep with pain. Pain is constant and varies with intensity but more concerning to the Pt is that the hand is \"useless\", and extremely fatigued, with \"no power in that hand\", unable to grasp and hold a coffee mug for example.  Pt reports that at some point in his near past either during his admission or urologic procedure he had labs drawn \"from the same spot as is swollen now\" and thinks this may be related/a vector for infection.  Pt is afebrile, no other concerning Sx, other right sided function working properly.  On 2/7 Pt had Day 1 Cycle 10 of Leucovorin, Fluorouracil IVP and home pump connect. Pt is scheduled for labs, Visit with Jennifer Jalloh, and infusion on 2/24.    Paged Jennifer [phone]  Spoke with Jennifer. Concerning is the unexplained weakness of hand. Ideally would see clinic provider. Nadira Cedeno has seen Pt previously and has a 1:10 opening. Added Pt, Pageronnie Waddell to discuss adding labs, pt is available for visit. Adding a US RUE per Nadira.  "

## 2020-02-21 ENCOUNTER — PATIENT OUTREACH (OUTPATIENT)
Dept: ONCOLOGY | Facility: CLINIC | Age: 65
End: 2020-02-21

## 2020-02-21 NOTE — PROGRESS NOTES
"RN Care Coordination Note  Outgoing Call: Placed call to patient to follow up on wrist/arm cellulitis. Patient reports he can't really hold anything but swelling is going down on the wrist. \"I know have a wrist bone.\" Able to wiggle fingers and is 3/4 way of making fist. States fingers/hand is not tingling or painful. Patient feels things are slowly improving. RNCC updated Nadira Cedeno PA-C feels patient is ok to watch over weekend and be re-evaluated on Monday as scheduled.         Kecia Loco RN, BSN, OCN   RN Care Coordinator   [facility]              "

## 2020-02-24 ENCOUNTER — INFUSION THERAPY VISIT (OUTPATIENT)
Dept: ONCOLOGY | Facility: CLINIC | Age: 65
End: 2020-02-24
Attending: INTERNAL MEDICINE
Payer: COMMERCIAL

## 2020-02-24 ENCOUNTER — HOME INFUSION (PRE-WILLOW HOME INFUSION) (OUTPATIENT)
Dept: PHARMACY | Facility: CLINIC | Age: 65
End: 2020-02-24

## 2020-02-24 ENCOUNTER — RECORDS - HEALTHEAST (OUTPATIENT)
Dept: ADMINISTRATIVE | Facility: OTHER | Age: 65
End: 2020-02-24

## 2020-02-24 ENCOUNTER — APPOINTMENT (OUTPATIENT)
Dept: LAB | Facility: CLINIC | Age: 65
End: 2020-02-24
Attending: INTERNAL MEDICINE
Payer: COMMERCIAL

## 2020-02-24 ENCOUNTER — ONCOLOGY VISIT (OUTPATIENT)
Dept: ONCOLOGY | Facility: CLINIC | Age: 65
End: 2020-02-24
Attending: NURSE PRACTITIONER
Payer: COMMERCIAL

## 2020-02-24 VITALS
BODY MASS INDEX: 23.21 KG/M2 | WEIGHT: 166.4 LBS | RESPIRATION RATE: 16 BRPM | TEMPERATURE: 97.5 F | OXYGEN SATURATION: 98 % | SYSTOLIC BLOOD PRESSURE: 108 MMHG | DIASTOLIC BLOOD PRESSURE: 71 MMHG | HEART RATE: 102 BPM

## 2020-02-24 DIAGNOSIS — D64.81 ANEMIA ASSOCIATED WITH CHEMOTHERAPY: Primary | ICD-10-CM

## 2020-02-24 DIAGNOSIS — T45.1X5A ANEMIA ASSOCIATED WITH CHEMOTHERAPY: ICD-10-CM

## 2020-02-24 DIAGNOSIS — C22.1 CHOLANGIOCARCINOMA (H): ICD-10-CM

## 2020-02-24 DIAGNOSIS — D64.81 ANEMIA ASSOCIATED WITH CHEMOTHERAPY: ICD-10-CM

## 2020-02-24 DIAGNOSIS — T45.1X5A ANEMIA ASSOCIATED WITH CHEMOTHERAPY: Primary | ICD-10-CM

## 2020-02-24 PROCEDURE — 25800030 ZZH RX IP 258 OP 636: Mod: ZF | Performed by: NURSE PRACTITIONER

## 2020-02-24 PROCEDURE — 96367 TX/PROPH/DG ADDL SEQ IV INF: CPT

## 2020-02-24 PROCEDURE — 25000128 H RX IP 250 OP 636: Mod: ZF | Performed by: NURSE PRACTITIONER

## 2020-02-24 PROCEDURE — 96409 CHEMO IV PUSH SNGL DRUG: CPT

## 2020-02-24 PROCEDURE — G0463 HOSPITAL OUTPT CLINIC VISIT: HCPCS | Mod: ZF

## 2020-02-24 PROCEDURE — G0498 CHEMO EXTEND IV INFUS W/PUMP: HCPCS

## 2020-02-24 PROCEDURE — 99214 OFFICE O/P EST MOD 30 MIN: CPT | Mod: ZP | Performed by: NURSE PRACTITIONER

## 2020-02-24 RX ORDER — ALBUTEROL SULFATE 0.83 MG/ML
2.5 SOLUTION RESPIRATORY (INHALATION)
Status: CANCELLED | OUTPATIENT
Start: 2020-02-24

## 2020-02-24 RX ORDER — MEPERIDINE HYDROCHLORIDE 25 MG/ML
25 INJECTION INTRAMUSCULAR; INTRAVENOUS; SUBCUTANEOUS EVERY 30 MIN PRN
Status: CANCELLED | OUTPATIENT
Start: 2020-02-24

## 2020-02-24 RX ORDER — LORAZEPAM 2 MG/ML
0.5 INJECTION INTRAMUSCULAR EVERY 4 HOURS PRN
Status: CANCELLED
Start: 2020-02-24

## 2020-02-24 RX ORDER — METHYLPREDNISOLONE SODIUM SUCCINATE 125 MG/2ML
125 INJECTION, POWDER, LYOPHILIZED, FOR SOLUTION INTRAMUSCULAR; INTRAVENOUS
Status: CANCELLED
Start: 2020-02-24

## 2020-02-24 RX ORDER — FLUOROURACIL 50 MG/ML
400 INJECTION, SOLUTION INTRAVENOUS ONCE
Status: COMPLETED | OUTPATIENT
Start: 2020-02-24 | End: 2020-02-24

## 2020-02-24 RX ORDER — NALOXONE HYDROCHLORIDE 0.4 MG/ML
.1-.4 INJECTION, SOLUTION INTRAMUSCULAR; INTRAVENOUS; SUBCUTANEOUS
Status: CANCELLED | OUTPATIENT
Start: 2020-02-24

## 2020-02-24 RX ORDER — EPINEPHRINE 0.3 MG/.3ML
0.3 INJECTION SUBCUTANEOUS EVERY 5 MIN PRN
Status: CANCELLED | OUTPATIENT
Start: 2020-02-24

## 2020-02-24 RX ORDER — SODIUM CHLORIDE 9 MG/ML
1000 INJECTION, SOLUTION INTRAVENOUS CONTINUOUS PRN
Status: CANCELLED
Start: 2020-02-24

## 2020-02-24 RX ORDER — HEPARIN SODIUM (PORCINE) LOCK FLUSH IV SOLN 100 UNIT/ML 100 UNIT/ML
5 SOLUTION INTRAVENOUS ONCE
Status: COMPLETED | OUTPATIENT
Start: 2020-02-24 | End: 2020-02-24

## 2020-02-24 RX ORDER — EPINEPHRINE 1 MG/ML
0.3 INJECTION, SOLUTION INTRAMUSCULAR; SUBCUTANEOUS EVERY 5 MIN PRN
Status: CANCELLED | OUTPATIENT
Start: 2020-02-24

## 2020-02-24 RX ORDER — DIPHENHYDRAMINE HYDROCHLORIDE 50 MG/ML
50 INJECTION INTRAMUSCULAR; INTRAVENOUS
Status: CANCELLED
Start: 2020-02-24

## 2020-02-24 RX ORDER — FLUOROURACIL 50 MG/ML
400 INJECTION, SOLUTION INTRAVENOUS ONCE
Status: CANCELLED | OUTPATIENT
Start: 2020-02-24

## 2020-02-24 RX ORDER — ALBUTEROL SULFATE 90 UG/1
1-2 AEROSOL, METERED RESPIRATORY (INHALATION)
Status: CANCELLED
Start: 2020-02-24

## 2020-02-24 RX ADMIN — SODIUM CHLORIDE 250 ML: 9 INJECTION, SOLUTION INTRAVENOUS at 15:58

## 2020-02-24 RX ADMIN — Medication 5 ML: at 14:22

## 2020-02-24 RX ADMIN — DEXAMETHASONE SODIUM PHOSPHATE: 10 INJECTION, SOLUTION INTRAMUSCULAR; INTRAVENOUS at 15:58

## 2020-02-24 RX ADMIN — FLUOROURACIL 785 MG: 50 INJECTION, SOLUTION INTRAVENOUS at 16:39

## 2020-02-24 RX ADMIN — LEUCOVORIN CALCIUM 700 MG: 350 INJECTION, POWDER, LYOPHILIZED, FOR SOLUTION INTRAMUSCULAR; INTRAVENOUS at 16:19

## 2020-02-24 ASSESSMENT — PAIN SCALES - GENERAL: PAINLEVEL: NO PAIN (0)

## 2020-02-24 NOTE — PROGRESS NOTES
Infusion Nursing Note:  Girish Chauhan presents today for Cycle 11 Day 1 Leucovorin, Fluorouracil bolus and pump connect.    Patient seen by provider today: Yes: Jennifer Jalloh DNP   present during visit today: Not Applicable.    Note:   2/24/20 RAYB Jennifer Jalloh DNP/Sisi Andersen RN  -Ok to proceed with treatment today. Increase in creatinine due to Bactrim, which is being discontinued. Encouraged patient to push PO hydration and a BMP will be checked on pump disconnect.     Intravenous Access:  Implanted Port.    Treatment Conditions:  Lab Results   Component Value Date    HGB 10.6 02/24/2020     Lab Results   Component Value Date    WBC 11.2 02/24/2020      Lab Results   Component Value Date    ANEU 7.7 02/24/2020     Lab Results   Component Value Date     02/24/2020      Lab Results   Component Value Date     02/24/2020                   Lab Results   Component Value Date    POTASSIUM 4.1 02/24/2020           Lab Results   Component Value Date    MAG 2.0 02/13/2020            Lab Results   Component Value Date    CR 1.87 02/24/2020                   Lab Results   Component Value Date    GARY 9.1 02/24/2020                Lab Results   Component Value Date    BILITOTAL 0.4 02/24/2020           Lab Results   Component Value Date    ALBUMIN 3.1 02/24/2020                    Lab Results   Component Value Date    ALT 52 02/24/2020           Lab Results   Component Value Date    AST 44 02/24/2020       Results reviewed, labs MET treatment parameters, ok to proceed with treatment.      Post Infusion Assessment:  Patient tolerated infusion without incident.  Blood return noted pre and post infusion.  Blood return noted during Fluorouracil bolus administration every 2 cc.  Site patent and intact, free from redness, edema or discomfort.  No evidence of extravasations.     Prior to discharge: Port is secured in place with tegaderm and flushed with 10cc NS with positive blood return noted.   "Continuous home infusion Dosi-Fuser pump connected.    All connectors secured in place and clamps taped open.  Clamps, connections, and tape double checked by Ruthann Avelar RN.   Pump started, \"running\" noted on display (CADD): Not Applicable.  Patient instructed to call our clinic or [facility] with any questions or concerns at home.  Patient verbalized understanding.    Patient set up for pump disconnect at home with [facility] on Wednesday 2/26/20 at 1300 (time per patient request). Writer confirmed disconnect time with PEDRITO Franklin at [facility].       Discharge Plan:   Patient declined prescription refills.  Discharge instructions reviewed with: Patient.  Patient and/or family verbalized understanding of discharge instructions and all questions answered.  AVS to patient via [employer].  Patient will return 3/9/20 for next appointment.   Patient discharged in stable condition accompanied by: self.  Departure Mode: Ambulatory.    Sisi Andersen RN                        "

## 2020-02-24 NOTE — LETTER
2/24/2020       RE: Girish Chauhan  [address] [facility] [id number]     Dear Colleague,    Thank you for referring your patient, Girish Chauhan, to the [facility]. Please see a copy of my visit note below.    Reason for Visit: seen in f/u of cholangiocarcinoma    Oncology HPI:   Girish Chauhan is a 64 year old year old gentleman with cholangiocarcinoma  Which was resected in 3/2016 (including partial liver resection) with negative margins and no LN involvement, but with perineural and lymphovascular invasion. No adjuvant chemotherapy given. Recurred in bladder 11/2017, bx c/w metastatic cholangiocarcinoma, started on cisplatin/gemcitabine 12/2017. Cisplatin held 6/2018 for poor tolerance. Gemcitabine discontinued 8/2018 for possible TTP, then went off treatment. In 4/2019 was found to have disease progression in the abdominal cavity anterior to the liver, just below the diaphragm. Started on capecitabine 4/30/19 (last dose 5/7) but developed an NSTEMI s/p angiogram 5/6/19 (see below) and actually continued on the capecitabine for several days after NSTEMI without any ongoing cardiac symptoms or evidence of ongoing ischemia. On follow up with Dr. De Los Santos 6/24, disease appeared stable. Decision made with family to hold off on treatment for 2 months and reevaluate.      He was then transferred to [facility] on 6/5/19 after presenting to OSH with SBO with possible involvement of draining choledochojejunostomy loop, subjective fevers, and A fib with RVR. GI consulted, s/p small bowel enteroscopy with stenting on 6/7 for obstructed biliary limb. Discharged on 6/9/19.     He was admitted on 7/28/19 with fever, nausea, vomiting, and abdominal pain. Hospitalization complicated by sepsis and enterococcus casseliflavus bacteremia. Additionally, Krishna was found to have migrated gastrojejunal stent on CT A/P on admission. S/p ERCP with stent exchange 7/29 by Dr. Rodriguez. Discharged on Linezolid for 2  weeks.       CT CAP showed disease progression. He started on 5FU on 10/4/19 inpatient due to history of MI while taking Xeloda. He has had stable disease radiographically, but a rising tumor marker after 4 cycles. He was continued on 5FU.     He was hospitalized in February with influenza A and then seen on 2/19 with wrist swelling and redness and was started on bactrim for cellulitis    Interval history: Krishna was feeling ill this AM and called to reschedule his appointment. It was his birthday yesterday and he ate too much food. He vomited around 3 am. He slept in for a few more hours and came to clinic this afternoon. Hasn't had much to eat/drink today, but no longer having nausea. Bowels are regular. Has a non-productive cough at times, Mild dyspnea on exertion, unchanged from baseline. Has chronic fatigue and weakness. Doesn't feel this is much changed after his recent hospitalization. Was in with wrist swelling last week and started on Bactrim for cellulitis. Has noted improvement in the swelling and pain. Mobility in the wrist and fingers is improved. Redness has resolved. No fevers/chills/sweats. No chest pain, palpitation, dizziness. Is unsteady on his feet, but that is not new.     Current Outpatient Medications   Medication Sig Dispense Refill     acetaminophen (TYLENOL) 500 MG tablet Take 500 mg by mouth every 6 hours as needed for fever or pain       allopurinol (ZYLOPRIM) 100 MG tablet Take 100 mg by mouth every evening        apixaban ANTICOAGULANT (ELIQUIS) 5 MG tablet Take 1 tablet (5 mg) by mouth 2 times daily       atorvastatin (LIPITOR) 40 MG tablet Take 40 mg by mouth every evening        calcium carbonate (TUMS) 500 MG chewable tablet Take 1-3 chew tab by mouth 4 times daily as needed for heartburn        clopidogrel (PLAVIX) 75 MG tablet Take 75 mg by mouth every morning        colchicine (COLCYRS) 0.6 MG tablet Take 0.6 mg by mouth 3 times daily as needed (gout flare)        COMPOUNDED  NON-CONTROLLED SUBSTANCE (CMPD RX) - PHARMACY TO MIX COMPOUNDED MEDICATION Patient is actively on chemotherapy.  Please see oncology navigator for details.  Receives every 2 weeks as an infusion over approximately 46 hours.       fluticasone (FLONASE) 50 MCG/ACT nasal spray Spray 1 spray into both nostrils daily       furosemide (LASIX) 20 MG tablet Take 1 tablet (20 mg) by mouth daily       losartan (COZAAR) 25 MG tablet Take 25 mg by mouth every morning       metoprolol tartrate (LOPRESSOR) 50 MG tablet Take 150 mg by mouth 2 times daily        multivitamin w/minerals (THERA-VIT-M) tablet Take 1 tablet by mouth daily       Nitroglycerin (NITROSTAT SL) Place 0.4 mg under the tongue every 5 minutes as needed for chest pain (Carries medication - has never used)        ondansetron (ZOFRAN) 4 MG tablet Take 1 tablet (4 mg) by mouth every 8 hours as needed for nausea 30 tablet 0     sennosides (SENOKOT) 8.6 MG tablet Take 1 tablet by mouth every evening        sulfamethoxazole-trimethoprim 800-160 MG PO tablet Take 1 tablet by mouth 2 times daily 14 tablet 0          Allergies   Allergen Reactions     Blood Transfusion Related (Informational Only) Other (See Comments)     Patient has a history of a clinically significant antibody against RBC antigens.  A delay in compatible RBCs may occur.         Exam: alert, appears well.  Blood pressure 108/71, pulse 102, temperature 97.5  F (36.4  C), temperature source Oral, resp. rate 16, weight 75.5 kg (166 lb 6.4 oz), SpO2 98 %.  Wt Readings from Last 4 Encounters:   02/24/20 75.5 kg (166 lb 6.4 oz)   02/19/20 78.4 kg (172 lb 14.4 oz)   02/12/20 74.1 kg (163 lb 5.8 oz)   02/07/20 79.5 kg (175 lb 4.8 oz)     Oropharynx is moist, scabbed lesion on the left lower lip. Neck supple and without adenopathy. Lungs:CTA. Heart: RRR, no murmur or rub. Abdomen: soft, nontender, BS active, no masses or organomegaly.  Extremities: warm, R wrist with mild edema, no focal warmth or erythema.  Skin on the dorsum of the hand is dry and flaky. No forearm edema or palpable cord. Speech is clear. CN wnl.  Port in the right chest without erythema or tenderness. Gait is wide based and unsteady.    Labs: Results for JENNYFER MCGARRY (MRN [id number]) as of 2/24/2020 15:06   Ref. Range 2/24/2020 14:27   Sodium Latest Ref Range: 133 - 144 mmol/L 139   Potassium Latest Ref Range: 3.4 - 5.3 mmol/L 4.1   Chloride Latest Ref Range: 94 - 109 mmol/L 109   Carbon Dioxide Latest Ref Range: 20 - 32 mmol/L 24   Urea Nitrogen Latest Ref Range: 7 - 30 mg/dL 36 (H)   Creatinine Latest Ref Range: 0.66 - 1.25 mg/dL 1.87 (H)   GFR Estimate Latest Ref Range: >60 mL/min/1.73_m2 37 (L)   GFR Estimate If Black Latest Ref Range: >60 mL/min/1.73_m2 43 (L)   Calcium Latest Ref Range: 8.5 - 10.1 mg/dL 9.1   Anion Gap Latest Ref Range: 3 - 14 mmol/L 6   Albumin Latest Ref Range: 3.4 - 5.0 g/dL 3.1 (L)   Protein Total Latest Ref Range: 6.8 - 8.8 g/dL 6.6 (L)   Bilirubin Total Latest Ref Range: 0.2 - 1.3 mg/dL 0.4   Alkaline Phosphatase Latest Ref Range: 40 - 150 U/L 202 (H)   ALT Latest Ref Range: 0 - 70 U/L 52   AST Latest Ref Range: 0 - 45 U/L 44   Glucose Latest Ref Range: 70 - 99 mg/dL 100 (H)   WBC Latest Ref Range: 4.0 - 11.0 10e9/L 11.2 (H)   Hemoglobin Latest Ref Range: 13.3 - 17.7 g/dL 10.6 (L)   Hematocrit Latest Ref Range: 40.0 - 53.0 % 32.8 (L)   Platelet Count Latest Ref Range: 150 - 450 10e9/L 332   RBC Count Latest Ref Range: 4.4 - 5.9 10e12/L 3.45 (L)   MCV Latest Ref Range: 78 - 100 fl 95   MCH Latest Ref Range: 26.5 - 33.0 pg 30.7   MCHC Latest Ref Range: 31.5 - 36.5 g/dL 32.3   RDW Latest Ref Range: 10.0 - 15.0 % 16.8 (H)   Diff Method Unknown Automated Method   % Neutrophils Latest Units: % 69.0   % Lymphocytes Latest Units: % 18.0   % Monocytes Latest Units: % 10.9   % Eosinophils Latest Units: % 1.3   % Basophils Latest Units: % 0.4   % Immature Granulocytes Latest Units: % 0.4   Nucleated RBCs Latest Ref Range: 0 /100 0    Absolute Neutrophil Latest Ref Range: 1.6 - 8.3 10e9/L 7.7   Absolute Lymphocytes Latest Ref Range: 0.8 - 5.3 10e9/L 2.0   Absolute Monocytes Latest Ref Range: 0.0 - 1.3 10e9/L 1.2   Absolute Eosinophils Latest Ref Range: 0.0 - 0.7 10e9/L 0.2   Absolute Basophils Latest Ref Range: 0.0 - 0.2 10e9/L 0.0   Abs Immature Granulocytes Latest Ref Range: 0 - 0.4 10e9/L 0.1   Absolute Nucleated RBC Unknown 0.0       Imaging: n/a    Impression/plan:   Recurrent, metastatic cholangiocarcinoma with stable to improved disease on 5FU, but a rising tumor marker when checked in November.  -he had stable disease on CT imaging in late December/ Ca 19-9 was decreased to 628.  -his performance status is low, but stable. He tolerates the 5FU reasonably well. His recent infection concerns are now controlled. OK to resume 5FU today. Restaging at the end of March and will f/u with Dr. De Los Santos at that time.    R wrist cellulitis  -was started on bactrim on 2/19/20  -clinically improved. With rising Cr will discontinue further bactrim and monitor.    Influenza A  -still has a dry cough, but otherwise stable.      Hx of biliary obstruction with complications of bacteremia, SBO  -had ERCP with stent exchange on 7/28/19  -LFTs stable    Cards: hx of HLD/HTN, atrial fibrillation, bicuspid aortic valve and moderate aortic stenosis, heart failure with reduced EF, CAD with history of multivessel stenting in 2011.  - Developed NSTEMI, s/p LHC and angiogram 5/6/19 with 99% obstruction of OM2, unsuccessful PCI. He also developed afib with RVR during this time which necessitated increase of his metoprolol and starting on apixaban. He was also started on ASA/clopidogrel for CAD. Repeat angiogram done 6/17 with SAM to the OM1. Repeat Echo 7/30 with improved EF 45-50%, mild LV dilation.   had stopped anticoagulation on 7/27 due to thrombocytopenia.  Resumed plavix, asa and apixiban on 8/5 when platelets were >137K  -on losartan and lasix.  -Last Echo on  9/30/19 showed an EF of 42%. Follows with cardiology.        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 Stable.      CKD, hx of R hydronephrosis x/t extrinsic compression  -. Both BUN and CR up slightly from baseline. Could be slightly volume depleted related to vomiting last night and little po today. Also could be side effect of bactrim.  Stop bactrim, push oral hydration. Recheck BMP on the pump disconnect day.   -follows with Dr. Walker, had R ureteral stent exchange on 2/5/20.     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 Will monitor    Again, thank you for allowing me to participate in the care of your patient.      Sincerely,    YO Grubbs CNP

## 2020-02-24 NOTE — NURSING NOTE
"Oncology Rooming Note    February 24, 2020 2:34 PM   Girish Chauhan is a 65 year old male who presents for:    Chief Complaint   Patient presents with     Port Draw     port accessed and labs drawn by rn.  vital signs taken.     Oncology Clinic Visit     Return - Cholangiocarcinoma      Initial Vitals: /71 (BP Location: Right arm, Patient Position: Sitting, Cuff Size: Adult Regular)   Pulse 102   Temp 97.5  F (36.4  C) (Oral)   Resp 16   Wt 75.5 kg (166 lb 6.4 oz)   SpO2 98%   BMI 23.21 kg/m   Estimated body mass index is 23.21 kg/m  as calculated from the following:    Height as of 2/11/20: 1.803 m (5' 11\").    Weight as of this encounter: 75.5 kg (166 lb 6.4 oz). Body surface area is 1.94 meters squared.  No Pain (0) Comment: Data Unavailable   No LMP for male patient.  Allergies reviewed: Yes  Medications reviewed: Yes    Medications: Medication refills not needed today.  [facility] into [id number] [address] AT [facility]    Clinical concerns: Concerns about the cellulitis on his right hand/wrist.        Glenroy Garcia, EMT              "

## 2020-02-24 NOTE — PROGRESS NOTES
Reason for Visit: seen in f/u of cholangiocarcinoma    Oncology HPI:   Girish Chauhan is a 64 year old year old gentleman with cholangiocarcinoma  Which was resected in 3/2016 (including partial liver resection) with negative margins and no LN involvement, but with perineural and lymphovascular invasion. No adjuvant chemotherapy given. Recurred in bladder 11/2017, bx c/w metastatic cholangiocarcinoma, started on cisplatin/gemcitabine 12/2017. Cisplatin held 6/2018 for poor tolerance. Gemcitabine discontinued 8/2018 for possible TTP, then went off treatment. In 4/2019 was found to have disease progression in the abdominal cavity anterior to the liver, just below the diaphragm. Started on capecitabine 4/30/19 (last dose 5/7) but developed an NSTEMI s/p angiogram 5/6/19 (see below) and actually continued on the capecitabine for several days after NSTEMI without any ongoing cardiac symptoms or evidence of ongoing ischemia. On follow up with Dr. De Los Santos 6/24, disease appeared stable. Decision made with family to hold off on treatment for 2 months and reevaluate.      He was then transferred to [facility] on 6/5/19 after presenting to OSH with SBO with possible involvement of draining choledochojejunostomy loop, subjective fevers, and A fib with RVR. GI consulted, s/p small bowel enteroscopy with stenting on 6/7 for obstructed biliary limb. Discharged on 6/9/19.     He was admitted on 7/28/19 with fever, nausea, vomiting, and abdominal pain. Hospitalization complicated by sepsis and enterococcus casseliflavus bacteremia. Additionally, Krishna was found to have migrated gastrojejunal stent on CT A/P on admission. S/p ERCP with stent exchange 7/29 by Dr. Rodriguez. Discharged on Linezolid for 2 weeks.       CT CAP showed disease progression. He started on 5FU on 10/4/19 inpatient due to history of MI while taking Xeloda. He has had stable disease radiographically, but a rising tumor marker after 4 cycles. He was continued on 5FU.     He  was hospitalized in February with influenza A and then seen on 2/19 with wrist swelling and redness and was started on bactrim for cellulitis    Interval history: Krishna was feeling ill this AM and called to reschedule his appointment. It was his birthday yesterday and he ate too much food. He vomited around 3 am. He slept in for a few more hours and came to clinic this afternoon. Hasn't had much to eat/drink today, but no longer having nausea. Bowels are regular. Has a non-productive cough at times, Mild dyspnea on exertion, unchanged from baseline. Has chronic fatigue and weakness. Doesn't feel this is much changed after his recent hospitalization. Was in with wrist swelling last week and started on Bactrim for cellulitis. Has noted improvement in the swelling and pain. Mobility in the wrist and fingers is improved. Redness has resolved. No fevers/chills/sweats. No chest pain, palpitation, dizziness. Is unsteady on his feet, but that is not new.     Current Outpatient Medications   Medication Sig Dispense Refill     acetaminophen (TYLENOL) 500 MG tablet Take 500 mg by mouth every 6 hours as needed for fever or pain       allopurinol (ZYLOPRIM) 100 MG tablet Take 100 mg by mouth every evening        apixaban ANTICOAGULANT (ELIQUIS) 5 MG tablet Take 1 tablet (5 mg) by mouth 2 times daily       atorvastatin (LIPITOR) 40 MG tablet Take 40 mg by mouth every evening        calcium carbonate (TUMS) 500 MG chewable tablet Take 1-3 chew tab by mouth 4 times daily as needed for heartburn        clopidogrel (PLAVIX) 75 MG tablet Take 75 mg by mouth every morning        colchicine (COLCYRS) 0.6 MG tablet Take 0.6 mg by mouth 3 times daily as needed (gout flare)        COMPOUNDED NON-CONTROLLED SUBSTANCE (CMPD RX) - PHARMACY TO MIX COMPOUNDED MEDICATION Patient is actively on chemotherapy.  Please see oncology navigator for details.  Receives every 2 weeks as an infusion over approximately 46 hours.       fluticasone (FLONASE) 50  MCG/ACT nasal spray Spray 1 spray into both nostrils daily       furosemide (LASIX) 20 MG tablet Take 1 tablet (20 mg) by mouth daily       losartan (COZAAR) 25 MG tablet Take 25 mg by mouth every morning       metoprolol tartrate (LOPRESSOR) 50 MG tablet Take 150 mg by mouth 2 times daily        multivitamin w/minerals (THERA-VIT-M) tablet Take 1 tablet by mouth daily       Nitroglycerin (NITROSTAT SL) Place 0.4 mg under the tongue every 5 minutes as needed for chest pain (Carries medication - has never used)        ondansetron (ZOFRAN) 4 MG tablet Take 1 tablet (4 mg) by mouth every 8 hours as needed for nausea 30 tablet 0     sennosides (SENOKOT) 8.6 MG tablet Take 1 tablet by mouth every evening        sulfamethoxazole-trimethoprim 800-160 MG PO tablet Take 1 tablet by mouth 2 times daily 14 tablet 0          Allergies   Allergen Reactions     Blood Transfusion Related (Informational Only) Other (See Comments)     Patient has a history of a clinically significant antibody against RBC antigens.  A delay in compatible RBCs may occur.         Exam: alert, appears well.  Blood pressure 108/71, pulse 102, temperature 97.5  F (36.4  C), temperature source Oral, resp. rate 16, weight 75.5 kg (166 lb 6.4 oz), SpO2 98 %.  Wt Readings from Last 4 Encounters:   02/24/20 75.5 kg (166 lb 6.4 oz)   02/19/20 78.4 kg (172 lb 14.4 oz)   02/12/20 74.1 kg (163 lb 5.8 oz)   02/07/20 79.5 kg (175 lb 4.8 oz)     Oropharynx is moist, scabbed lesion on the left lower lip. Neck supple and without adenopathy. Lungs:CTA. Heart: RRR, no murmur or rub. Abdomen: soft, nontender, BS active, no masses or organomegaly.  Extremities: warm, R wrist with mild edema, no focal warmth or erythema. Skin on the dorsum of the hand is dry and flaky. No forearm edema or palpable cord. Speech is clear. CN wnl.  Port in the right chest without erythema or tenderness. Gait is wide based and unsteady.    Labs: Results for JENNYFER MCGARRY (MRN [id number]) as of  2/24/2020 15:06   Ref. Range 2/24/2020 14:27   Sodium Latest Ref Range: 133 - 144 mmol/L 139   Potassium Latest Ref Range: 3.4 - 5.3 mmol/L 4.1   Chloride Latest Ref Range: 94 - 109 mmol/L 109   Carbon Dioxide Latest Ref Range: 20 - 32 mmol/L 24   Urea Nitrogen Latest Ref Range: 7 - 30 mg/dL 36 (H)   Creatinine Latest Ref Range: 0.66 - 1.25 mg/dL 1.87 (H)   GFR Estimate Latest Ref Range: >60 mL/min/1.73_m2 37 (L)   GFR Estimate If Black Latest Ref Range: >60 mL/min/1.73_m2 43 (L)   Calcium Latest Ref Range: 8.5 - 10.1 mg/dL 9.1   Anion Gap Latest Ref Range: 3 - 14 mmol/L 6   Albumin Latest Ref Range: 3.4 - 5.0 g/dL 3.1 (L)   Protein Total Latest Ref Range: 6.8 - 8.8 g/dL 6.6 (L)   Bilirubin Total Latest Ref Range: 0.2 - 1.3 mg/dL 0.4   Alkaline Phosphatase Latest Ref Range: 40 - 150 U/L 202 (H)   ALT Latest Ref Range: 0 - 70 U/L 52   AST Latest Ref Range: 0 - 45 U/L 44   Glucose Latest Ref Range: 70 - 99 mg/dL 100 (H)   WBC Latest Ref Range: 4.0 - 11.0 10e9/L 11.2 (H)   Hemoglobin Latest Ref Range: 13.3 - 17.7 g/dL 10.6 (L)   Hematocrit Latest Ref Range: 40.0 - 53.0 % 32.8 (L)   Platelet Count Latest Ref Range: 150 - 450 10e9/L 332   RBC Count Latest Ref Range: 4.4 - 5.9 10e12/L 3.45 (L)   MCV Latest Ref Range: 78 - 100 fl 95   MCH Latest Ref Range: 26.5 - 33.0 pg 30.7   MCHC Latest Ref Range: 31.5 - 36.5 g/dL 32.3   RDW Latest Ref Range: 10.0 - 15.0 % 16.8 (H)   Diff Method Unknown Automated Method   % Neutrophils Latest Units: % 69.0   % Lymphocytes Latest Units: % 18.0   % Monocytes Latest Units: % 10.9   % Eosinophils Latest Units: % 1.3   % Basophils Latest Units: % 0.4   % Immature Granulocytes Latest Units: % 0.4   Nucleated RBCs Latest Ref Range: 0 /100 0   Absolute Neutrophil Latest Ref Range: 1.6 - 8.3 10e9/L 7.7   Absolute Lymphocytes Latest Ref Range: 0.8 - 5.3 10e9/L 2.0   Absolute Monocytes Latest Ref Range: 0.0 - 1.3 10e9/L 1.2   Absolute Eosinophils Latest Ref Range: 0.0 - 0.7 10e9/L 0.2   Absolute  Basophils Latest Ref Range: 0.0 - 0.2 10e9/L 0.0   Abs Immature Granulocytes Latest Ref Range: 0 - 0.4 10e9/L 0.1   Absolute Nucleated RBC Unknown 0.0       Imaging: n/a    Impression/plan:   Recurrent, metastatic cholangiocarcinoma with stable to improved disease on 5FU, but a rising tumor marker when checked in November.  -he had stable disease on CT imaging in late December/ Ca 19-9 was decreased to 628.  -his performance status is low, but stable. He tolerates the 5FU reasonably well. His recent infection concerns are now controlled. OK to resume 5FU today. Restaging at the end of March and will f/u with Dr. De Los Santos at that time.    R wrist cellulitis  -was started on bactrim on 2/19/20  -clinically improved. With rising Cr will discontinue further bactrim and monitor.    Influenza A  -still has a dry cough, but otherwise stable.      Hx of biliary obstruction with complications of bacteremia, SBO  -had ERCP with stent exchange on 7/28/19  -LFTs stable    Cards: hx of HLD/HTN, atrial fibrillation, bicuspid aortic valve and moderate aortic stenosis, heart failure with reduced EF, CAD with history of multivessel stenting in 2011.  - Developed NSTEMI, s/p LHC and angiogram 5/6/19 with 99% obstruction of OM2, unsuccessful PCI. He also developed afib with RVR during this time which necessitated increase of his metoprolol and starting on apixaban. He was also started on ASA/clopidogrel for CAD. Repeat angiogram done 6/17 with SAM to the OM1. Repeat Echo 7/30 with improved EF 45-50%, mild LV dilation.   had stopped anticoagulation on 7/27 due to thrombocytopenia.  Resumed plavix, asa and apixiban on 8/5 when platelets were >137K  -on losartan and lasix.  -Last Echo on 9/30/19 showed an EF of 42%. Follows with cardiology.        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 Stable.      CKD, hx of R hydronephrosis x/t extrinsic compression  -. Both BUN and CR up slightly from baseline. Could be slightly volume  depleted related to vomiting last night and little po today. Also could be side effect of bactrim.  Stop bactrim, push oral hydration. Recheck BMP on the pump disconnect day.   -follows with Dr. Walker, had R ureteral stent exchange on 2/5/20.     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 Will monitor

## 2020-02-24 NOTE — PATIENT INSTRUCTIONS
Contact Numbers    [facility] Main Line/Triage and after hours / weekends / holidays: [phone]      Please call the triage or after hours line if you experience a temperature greater than or equal to 100.5, shaking chills, have uncontrolled nausea, vomiting and/or diarrhea, dizziness, shortness of breath, chest pain, bleeding, unexplained bruising, or if you have any other new/concerning symptoms, questions or concerns.      If you are having any concerning symptoms or wish to speak to a provider before your next infusion visit, please call your care coordinator or triage to notify them so we can adequately serve you.     If you need a refill on a narcotic prescription or other medication, please call before your infusion appointment.       February 2020 Sunday Monday Tuesday Wednesday Thursday Friday Saturday                                 1    LAB   7:15 AM   (15 min.)   UR LAB HOME INFUSION   [facility], Laboratory Services   2     3    PAC PHARMACIST   7:45 AM   (30 min.)   Pharmacist,  Pac   [facility] Preoperative Assessment [place]    PAC EVAL   8:15 AM   (60 min.)   Sierra Huff APRN CNP   [facility] Assessment [place]    LAB  10:30 AM   (15 min.)    LAB   [facility] Lab 4     5    Admission   9:37 AM   Sivan Walker MD   UR MAIN OR   (Discharge: 2/5/2020)    CYSTOSCOPY, WITH RETROGRADE PYELOGRAM AND URETERAL STENT INSERTION  11:45 AM   Sivan Walker MD   UR OR    XR SURGCARM FLUORO < 5 MIN  12:55 PM   (15 min.)   URCARM2   [facility],  Radiology 6     7    [facility] MASONIC LAB DRAW   7:15 AM   (15 min.)    MASONIC LAB DRAW   [facility] Lab Draw    [facility] ONC INFUSION 240   8:00 AM   (240 min.)    ONCOLOGY INFUSION   [facility] 8       9     10     11    Admission   7:48 PM   Sylvester Carlson MD   [address] [facility] [place]   (Discharge: 2/13/2020)    XR CHEST 2 VIEWS   8:25 PM   (15 min.)   UUXR3   [facility],  Radiology    CT HEAD WO    8:30 PM   (20 min.)   UUCT1   [facility] [place], [place] 12     13     14     15       16     17     18     19    [facility] MASONIC LAB DRAW  12:30 PM   (15 min.)   [facility] MASONIC LAB DRAW   [facility] Masonic Lab Draw    UMP RETURN  12:55 PM   (50 min.)   Nadira Cedeno PA-C   [facility] 20     21     22       23  Happy Birthday!     24    [facility] MASONIC LAB DRAW   2:15 PM   (15 min.)   [facility] MASONIC LAB DRAW   [facility] Masonic Lab Draw    UMP RETURN   2:25 PM   (50 min.)   Jennifer Jalloh APRN CNP   [facility] 120   3:30 PM   (120 min.)    ONCOLOGY INFUSION   [facility] 25     26     27     28     29 March 2020 Sunday Monday Tuesday Wednesday Thursday Friday Saturday   1     2     3     4     5     6     7       8     9    [facility] MASONIC LAB DRAW   2:00 PM   (15 min.)    MASONIC LAB DRAW   [facility] Lab Draw    UMP ONC INFUSION 120   2:30 PM   (120 min.)   UC ONCOLOGY INFUSION   [facility] 10     11     12     13     14       15     16     17     18     19    [facility] MASONIC LAB DRAW   8:00 AM   (15 min.)   [facility] LAB DRAW   [facility]    CT CHEST ABDOMEN PELVIS WWO   8:40 AM   (20 min.)   UCCT2   [facility] [place] 20    [facility] MASONIC LAB DRAW   7:15 AM   (15 min.)   [facility] MASONIC LAB DRAW   [facility] Lab Draw 21       22     23    UMP RETURN   7:30 AM   (30 min.)   Naresh De Los Santos MD   [facility]    UMP ONC INFUSION 240   9:30 AM   (240 min.)    ONCOLOGY INFUSION   [facility] 24     25     26     27     28       29     30     31                                         Lab Results:  Recent Results (from the past 12 hour(s))   CBC with platelets differential    Collection Time: 02/24/20  2:27 PM   Result Value Ref Range    WBC 11.2 (H) 4.0 - 11.0 10e9/L    RBC Count 3.45 (L) 4.4 - 5.9 10e12/L    Hemoglobin 10.6 (L) 13.3 - 17.7 g/dL     Hematocrit 32.8 (L) 40.0 - 53.0 %    MCV 95 78 - 100 fl    MCH 30.7 26.5 - 33.0 pg    MCHC 32.3 31.5 - 36.5 g/dL    RDW 16.8 (H) 10.0 - 15.0 %    Platelet Count 332 150 - 450 10e9/L    Diff Method Automated Method     % Neutrophils 69.0 %    % Lymphocytes 18.0 %    % Monocytes 10.9 %    % Eosinophils 1.3 %    % Basophils 0.4 %    % Immature Granulocytes 0.4 %    Nucleated RBCs 0 0 /100    Absolute Neutrophil 7.7 1.6 - 8.3 10e9/L    Absolute Lymphocytes 2.0 0.8 - 5.3 10e9/L    Absolute Monocytes 1.2 0.0 - 1.3 10e9/L    Absolute Eosinophils 0.2 0.0 - 0.7 10e9/L    Absolute Basophils 0.0 0.0 - 0.2 10e9/L    Abs Immature Granulocytes 0.1 0 - 0.4 10e9/L    Absolute Nucleated RBC 0.0    Comprehensive metabolic panel    Collection Time: 02/24/20  2:27 PM   Result Value Ref Range    Sodium 139 133 - 144 mmol/L    Potassium 4.1 3.4 - 5.3 mmol/L    Chloride 109 94 - 109 mmol/L    Carbon Dioxide 24 20 - 32 mmol/L    Anion Gap 6 3 - 14 mmol/L    Glucose 100 (H) 70 - 99 mg/dL    Urea Nitrogen 36 (H) 7 - 30 mg/dL    Creatinine 1.87 (H) 0.66 - 1.25 mg/dL    GFR Estimate 37 (L) >60 mL/min/[1.73_m2]    GFR Estimate If Black 43 (L) >60 mL/min/[1.73_m2]    Calcium 9.1 8.5 - 10.1 mg/dL    Bilirubin Total 0.4 0.2 - 1.3 mg/dL    Albumin 3.1 (L) 3.4 - 5.0 g/dL    Protein Total 6.6 (L) 6.8 - 8.8 g/dL    Alkaline Phosphatase 202 (H) 40 - 150 U/L    ALT 52 0 - 70 U/L    AST 44 0 - 45 U/L

## 2020-02-25 LAB
BACTERIA SPEC CULT: NO GROWTH
BACTERIA SPEC CULT: NO GROWTH
SPECIMEN SOURCE: NORMAL
SPECIMEN SOURCE: NORMAL

## 2020-02-25 NOTE — PROGRESS NOTES
This is a recent snapshot of the patient's [facility] medical record.  For current drug dose and complete information and questions, call [phone] or In Basket pool, fv home infusion ([id number])  CSN Number:  [id number]

## 2020-02-26 ENCOUNTER — HOME INFUSION (PRE-WILLOW HOME INFUSION) (OUTPATIENT)
Dept: PHARMACY | Facility: CLINIC | Age: 65
End: 2020-02-26

## 2020-02-26 ENCOUNTER — MEDICAL CORRESPONDENCE (OUTPATIENT)
Dept: HEALTH INFORMATION MANAGEMENT | Facility: CLINIC | Age: 65
End: 2020-02-26

## 2020-02-26 LAB
ANION GAP SERPL CALCULATED.3IONS-SCNC: 8 MMOL/L (ref 3–14)
BUN SERPL-MCNC: 47 MG/DL (ref 7–30)
CALCIUM SERPL-MCNC: 8.8 MG/DL (ref 8.5–10.1)
CHLORIDE SERPL-SCNC: 112 MMOL/L (ref 94–109)
CO2 SERPL-SCNC: 23 MMOL/L (ref 20–32)
CREAT SERPL-MCNC: 1.75 MG/DL (ref 0.66–1.25)
GFR SERPL CREATININE-BSD FRML MDRD: 41 ML/MIN/{1.73_M2}
GLUCOSE SERPL-MCNC: 103 MG/DL (ref 70–99)
POTASSIUM SERPL-SCNC: 3.8 MMOL/L (ref 3.4–5.3)
SODIUM SERPL-SCNC: 143 MMOL/L (ref 133–144)

## 2020-02-26 PROCEDURE — 80048 BASIC METABOLIC PNL TOTAL CA: CPT | Performed by: INTERNAL MEDICINE

## 2020-02-26 NOTE — PHARMACY
Skilled nurse visit in the home, for discontinuation of chemotherapy. 4705  mg of Fluorouracil infused over 46 hours.    Lab (BMP) drawn prior to port deaccess.     Yung SWEENEY RN ALENI  [phone]  [email or web address]

## 2020-02-27 ENCOUNTER — MYC MEDICAL ADVICE (OUTPATIENT)
Dept: ONCOLOGY | Facility: CLINIC | Age: 65
End: 2020-02-27

## 2020-02-27 NOTE — TELEPHONE ENCOUNTER
Writer placed call to patient to assess status of R wrist cellulitis. Patient taken offBactrim due to imapct to kidney function. Patient sent [employer] message asking if he shouldgo back on Bactrim as he is done with his chemo now. Per YO Smith, CNP, he can monitor wrist for now if no increase/worsening of symptoms. If worsening, Jennifer to guide next steps.

## 2020-02-27 NOTE — TELEPHONE ENCOUNTER
"Pt called and reported that while he did not feel his cellulitis was getting worse, he feels some soreness at the base of his thumb and occasional \"pinpricks\" on the top of the wrist. No redness or discoloration or heat at site. OK with plan to monitor, I asked him to call back tomorrow between 12 & 2 to report how he thinks things are progressing. Pt verbalized understanding and agreement with plan.  "

## 2020-02-27 NOTE — PROGRESS NOTES
This is a recent snapshot of the patient's [facility] medical record.  For current drug dose and complete information and questions, call [phone] or In Basket pool, fv home infusion ([id number])  CSN Number:  [id number]

## 2020-02-28 NOTE — TELEPHONE ENCOUNTER
Pt calling back to report that his hand feels the same as yesterday. The only difference is that he is noticing a white powdery, flour-like matter in the DIP joint of his R index finger. No swelling, redness, pain.    Pt will send pic.

## 2020-02-28 NOTE — TELEPHONE ENCOUNTER
Unable to send pictures. Feeling like its ok and will wait til Monday and see how his hand/wrist is. Instructed pt to call to speak with a nurse over the weekend with new or worsening sx. He states understanding.

## 2020-03-09 ENCOUNTER — APPOINTMENT (OUTPATIENT)
Dept: LAB | Facility: CLINIC | Age: 65
End: 2020-03-09
Attending: INTERNAL MEDICINE
Payer: COMMERCIAL

## 2020-03-09 ENCOUNTER — HOME INFUSION (PRE-WILLOW HOME INFUSION) (OUTPATIENT)
Dept: PHARMACY | Facility: CLINIC | Age: 65
End: 2020-03-09

## 2020-03-09 ENCOUNTER — INFUSION THERAPY VISIT (OUTPATIENT)
Dept: ONCOLOGY | Facility: CLINIC | Age: 65
End: 2020-03-09
Attending: INTERNAL MEDICINE
Payer: COMMERCIAL

## 2020-03-09 VITALS
RESPIRATION RATE: 16 BRPM | HEART RATE: 99 BPM | DIASTOLIC BLOOD PRESSURE: 79 MMHG | OXYGEN SATURATION: 99 % | BODY MASS INDEX: 23.32 KG/M2 | SYSTOLIC BLOOD PRESSURE: 124 MMHG | WEIGHT: 167.2 LBS | TEMPERATURE: 96.6 F

## 2020-03-09 DIAGNOSIS — T45.1X5A ANEMIA ASSOCIATED WITH CHEMOTHERAPY: Primary | ICD-10-CM

## 2020-03-09 DIAGNOSIS — D64.81 ANEMIA ASSOCIATED WITH CHEMOTHERAPY: Primary | ICD-10-CM

## 2020-03-09 DIAGNOSIS — C22.1 CHOLANGIOCARCINOMA (H): ICD-10-CM

## 2020-03-09 LAB
ALBUMIN SERPL-MCNC: 3.1 G/DL (ref 3.4–5)
ALP SERPL-CCNC: 117 U/L (ref 40–150)
ALT SERPL W P-5'-P-CCNC: 36 U/L (ref 0–70)
ANION GAP SERPL CALCULATED.3IONS-SCNC: 6 MMOL/L (ref 3–14)
AST SERPL W P-5'-P-CCNC: 27 U/L (ref 0–45)
BASOPHILS # BLD AUTO: 0 10E9/L (ref 0–0.2)
BASOPHILS NFR BLD AUTO: 0.2 %
BILIRUB SERPL-MCNC: 0.5 MG/DL (ref 0.2–1.3)
BUN SERPL-MCNC: 21 MG/DL (ref 7–30)
CALCIUM SERPL-MCNC: 8.4 MG/DL (ref 8.5–10.1)
CHLORIDE SERPL-SCNC: 109 MMOL/L (ref 94–109)
CO2 SERPL-SCNC: 24 MMOL/L (ref 20–32)
CREAT SERPL-MCNC: 1.45 MG/DL (ref 0.66–1.25)
DIFFERENTIAL METHOD BLD: ABNORMAL
EOSINOPHIL # BLD AUTO: 0.3 10E9/L (ref 0–0.7)
EOSINOPHIL NFR BLD AUTO: 3.7 %
ERYTHROCYTE [DISTWIDTH] IN BLOOD BY AUTOMATED COUNT: 16.3 % (ref 10–15)
GFR SERPL CREATININE-BSD FRML MDRD: 51 ML/MIN/{1.73_M2}
GLUCOSE SERPL-MCNC: 94 MG/DL (ref 70–99)
HCT VFR BLD AUTO: 31.1 % (ref 40–53)
HGB BLD-MCNC: 10 G/DL (ref 13.3–17.7)
IMM GRANULOCYTES # BLD: 0.1 10E9/L (ref 0–0.4)
IMM GRANULOCYTES NFR BLD: 0.8 %
LYMPHOCYTES # BLD AUTO: 2 10E9/L (ref 0.8–5.3)
LYMPHOCYTES NFR BLD AUTO: 22 %
MCH RBC QN AUTO: 31.2 PG (ref 26.5–33)
MCHC RBC AUTO-ENTMCNC: 32.2 G/DL (ref 31.5–36.5)
MCV RBC AUTO: 97 FL (ref 78–100)
MONOCYTES # BLD AUTO: 1.3 10E9/L (ref 0–1.3)
MONOCYTES NFR BLD AUTO: 14.8 %
NEUTROPHILS # BLD AUTO: 5.3 10E9/L (ref 1.6–8.3)
NEUTROPHILS NFR BLD AUTO: 58.5 %
NRBC # BLD AUTO: 0 10*3/UL
NRBC BLD AUTO-RTO: 0 /100
PLATELET # BLD AUTO: 157 10E9/L (ref 150–450)
POTASSIUM SERPL-SCNC: 3.9 MMOL/L (ref 3.4–5.3)
PROT SERPL-MCNC: 6 G/DL (ref 6.8–8.8)
RBC # BLD AUTO: 3.21 10E12/L (ref 4.4–5.9)
SODIUM SERPL-SCNC: 138 MMOL/L (ref 133–144)
WBC # BLD AUTO: 9 10E9/L (ref 4–11)

## 2020-03-09 PROCEDURE — 25000128 H RX IP 250 OP 636: Mod: ZF | Performed by: NURSE PRACTITIONER

## 2020-03-09 PROCEDURE — G0498 CHEMO EXTEND IV INFUS W/PUMP: HCPCS

## 2020-03-09 PROCEDURE — 85025 COMPLETE CBC W/AUTO DIFF WBC: CPT | Performed by: INTERNAL MEDICINE

## 2020-03-09 PROCEDURE — 96367 TX/PROPH/DG ADDL SEQ IV INF: CPT

## 2020-03-09 PROCEDURE — 25800030 ZZH RX IP 258 OP 636: Mod: ZF | Performed by: NURSE PRACTITIONER

## 2020-03-09 PROCEDURE — 25000128 H RX IP 250 OP 636: Mod: ZF | Performed by: INTERNAL MEDICINE

## 2020-03-09 PROCEDURE — 80053 COMPREHEN METABOLIC PANEL: CPT | Performed by: INTERNAL MEDICINE

## 2020-03-09 PROCEDURE — 96409 CHEMO IV PUSH SNGL DRUG: CPT

## 2020-03-09 RX ORDER — LORAZEPAM 2 MG/ML
0.5 INJECTION INTRAMUSCULAR EVERY 4 HOURS PRN
Status: CANCELLED
Start: 2020-03-09

## 2020-03-09 RX ORDER — ALBUTEROL SULFATE 90 UG/1
1-2 AEROSOL, METERED RESPIRATORY (INHALATION)
Status: CANCELLED
Start: 2020-03-09

## 2020-03-09 RX ORDER — SODIUM CHLORIDE 9 MG/ML
1000 INJECTION, SOLUTION INTRAVENOUS CONTINUOUS PRN
Status: CANCELLED
Start: 2020-03-09

## 2020-03-09 RX ORDER — NALOXONE HYDROCHLORIDE 0.4 MG/ML
.1-.4 INJECTION, SOLUTION INTRAMUSCULAR; INTRAVENOUS; SUBCUTANEOUS
Status: CANCELLED | OUTPATIENT
Start: 2020-03-09

## 2020-03-09 RX ORDER — METHYLPREDNISOLONE SODIUM SUCCINATE 125 MG/2ML
125 INJECTION, POWDER, LYOPHILIZED, FOR SOLUTION INTRAMUSCULAR; INTRAVENOUS
Status: CANCELLED
Start: 2020-03-09

## 2020-03-09 RX ORDER — HEPARIN SODIUM (PORCINE) LOCK FLUSH IV SOLN 100 UNIT/ML 100 UNIT/ML
5 SOLUTION INTRAVENOUS ONCE
Status: COMPLETED | OUTPATIENT
Start: 2020-03-09 | End: 2020-03-09

## 2020-03-09 RX ORDER — ALBUTEROL SULFATE 0.83 MG/ML
2.5 SOLUTION RESPIRATORY (INHALATION)
Status: CANCELLED | OUTPATIENT
Start: 2020-03-09

## 2020-03-09 RX ORDER — MEPERIDINE HYDROCHLORIDE 25 MG/ML
25 INJECTION INTRAMUSCULAR; INTRAVENOUS; SUBCUTANEOUS EVERY 30 MIN PRN
Status: CANCELLED | OUTPATIENT
Start: 2020-03-09

## 2020-03-09 RX ORDER — FLUOROURACIL 50 MG/ML
400 INJECTION, SOLUTION INTRAVENOUS ONCE
Status: CANCELLED | OUTPATIENT
Start: 2020-03-09

## 2020-03-09 RX ORDER — DIPHENHYDRAMINE HYDROCHLORIDE 50 MG/ML
50 INJECTION INTRAMUSCULAR; INTRAVENOUS
Status: CANCELLED
Start: 2020-03-09

## 2020-03-09 RX ORDER — FLUOROURACIL 50 MG/ML
400 INJECTION, SOLUTION INTRAVENOUS ONCE
Status: COMPLETED | OUTPATIENT
Start: 2020-03-09 | End: 2020-03-09

## 2020-03-09 RX ORDER — EPINEPHRINE 0.3 MG/.3ML
0.3 INJECTION SUBCUTANEOUS EVERY 5 MIN PRN
Status: CANCELLED | OUTPATIENT
Start: 2020-03-09

## 2020-03-09 RX ORDER — EPINEPHRINE 1 MG/ML
0.3 INJECTION, SOLUTION INTRAMUSCULAR; SUBCUTANEOUS EVERY 5 MIN PRN
Status: CANCELLED | OUTPATIENT
Start: 2020-03-09

## 2020-03-09 RX ADMIN — LEUCOVORIN CALCIUM 700 MG: 350 INJECTION, POWDER, LYOPHILIZED, FOR SUSPENSION INTRAMUSCULAR; INTRAVENOUS at 15:49

## 2020-03-09 RX ADMIN — FLUOROURACIL 785 MG: 50 INJECTION, SOLUTION INTRAVENOUS at 16:20

## 2020-03-09 RX ADMIN — DEXAMETHASONE SODIUM PHOSPHATE: 10 INJECTION, SOLUTION INTRAMUSCULAR; INTRAVENOUS at 14:57

## 2020-03-09 RX ADMIN — SODIUM CHLORIDE 250 ML: 9 INJECTION, SOLUTION INTRAVENOUS at 14:56

## 2020-03-09 RX ADMIN — Medication 5 ML: at 13:52

## 2020-03-09 ASSESSMENT — PAIN SCALES - GENERAL: PAINLEVEL: NO PAIN (0)

## 2020-03-09 NOTE — PROGRESS NOTES
Infusion Nursing Note:  Girish Chauhan presents today for Cycle 12 Day 1 Fluorouracil bolus and pump connect.    Patient seen by provider today: No   present during visit today: Not Applicable.    Note: Patient presents to infusion today stating he feels well. He reports he is still having nausea and heartburn issues if he eats too much in one setting, but he is trying to be mindful and eat smaller amounts more frequently. He denies any new concerns or symptoms today. He denies any pain.     Intravenous Access:  Implanted Port.    Treatment Conditions:  Lab Results   Component Value Date    HGB 10.0 03/09/2020     Lab Results   Component Value Date    WBC 9.0 03/09/2020      Lab Results   Component Value Date    ANEU 5.3 03/09/2020     Lab Results   Component Value Date     03/09/2020      Lab Results   Component Value Date     03/09/2020                   Lab Results   Component Value Date    POTASSIUM 3.9 03/09/2020           Lab Results   Component Value Date    MAG 2.0 02/13/2020            Lab Results   Component Value Date    CR 1.45 03/09/2020                   Lab Results   Component Value Date    GARY 8.4 03/09/2020                Lab Results   Component Value Date    BILITOTAL 0.5 03/09/2020           Lab Results   Component Value Date    ALBUMIN 3.1 03/09/2020                    Lab Results   Component Value Date    ALT 36 03/09/2020           Lab Results   Component Value Date    AST 27 03/09/2020       Results reviewed, labs MET treatment parameters, ok to proceed with treatment.      Post Infusion Assessment:  Patient tolerated infusion without incident.  Blood return noted pre and post infusion.  Blood return noted during Fluorouracil bolus administration every 2 cc.  Site patent and intact, free from redness, edema or discomfort.  No evidence of extravasations.       Prior to discharge: Port is secured in place with tegaderm and flushed with 10cc NS with positive blood return  "noted.  Continuous home infusion Dosi-Fuser pump connected.    All connectors secured in place and clamps taped open.  Clamps, tape, and connections double checked by Sarah Mcgee RN.   Pump started, \"running\" noted on display (CADD): Not Applicable.  Patient instructed to call our clinic or [facility] with any questions or concerns at home.  Patient verbalized understanding.    Patient set up for pump disconnect at home with [facility] on Wednesday 3/11/20 at 1200pm. Disconnect time per patient's request. Writer confirmed disconnect with PEDRITO Hidalgo at [facility].             Discharge Plan:   Patient declined prescription refills.  Discharge instructions reviewed with: Patient.  Patient and/or family verbalized understanding of discharge instructions and all questions answered.  AVS to patient via [employer].  Patient will return 3/23/20 for next appointment.   Patient discharged in stable condition accompanied by: self.  Departure Mode: Ambulatory.    Sisi Andersen RN                        "

## 2020-03-09 NOTE — NURSING NOTE
"Chief Complaint   Patient presents with     Port Draw     labs drawn via port by rn. vs taken     Port accessed by RN with 20 G 3/4\" power needle, labs drawn from port in lab. Flushed with saline and heparin. VS taken. Pt checked into next appointment.   Galina Choudhary, RN     "

## 2020-03-09 NOTE — PATIENT INSTRUCTIONS
Contact Numbers    [facility] Main Line/Triage and after hours / weekends / holidays: [phone]      Please call the triage or after hours line if you experience a temperature greater than or equal to 100.5, shaking chills, have uncontrolled nausea, vomiting and/or diarrhea, dizziness, shortness of breath, chest pain, bleeding, unexplained bruising, or if you have any other new/concerning symptoms, questions or concerns.      If you are having any concerning symptoms or wish to speak to a provider before your next infusion visit, please call your care coordinator or triage to notify them so we can adequately serve you.     If you need a refill on a narcotic prescription or other medication, please call before your infusion appointment.       March 2020 Sunday Monday Tuesday Wednesday Thursday Friday Saturday   1     2     3     4     5     6     7       8     9    P MASONIC LAB DRAW   2:00 PM   (15 min.)    MASONIC LAB DRAW   [facility] Lab Draw    UMP ONC INFUSION 120   2:30 PM   (120 min.)    ONCOLOGY INFUSION   [facility] 10     11     12     13     14       15     16     17     18     19    CT CHEST ABDOMEN PELVIS WWO   8:40 AM   (20 min.)   UCCT2   [facility] CT 20     21       22     23    UMP MASONIC LAB DRAW   7:15 AM   (15 min.)    MASONIC LAB DRAW   [facility] Lab Draw    UMP RETURN   7:30 AM   (30 min.)   Naresh De Los Santos MD   [facility]    UMP ONC INFUSION 240   9:30 AM   (240 min.)    ONCOLOGY INFUSION   [facility] 24     25     26     27     28       29     30     31 April 2020 Sunday Monday Tuesday Wednesday Thursday Friday Saturday                  1     2     3     4       5     6     7     8     9     10     11       12     13     14     15     16     17     18       19     20     21     22     23     24     25       26     27    PAC EVAL   8:15 AM   (60  min.)   Sierra Huff, APRN CNP   [facility] Preoperative Assessment [place] 28     29     30                               Lab Results:  Recent Results (from the past 12 hour(s))   CBC with platelets differential    Collection Time: 03/09/20  1:54 PM   Result Value Ref Range    WBC 9.0 4.0 - 11.0 10e9/L    RBC Count 3.21 (L) 4.4 - 5.9 10e12/L    Hemoglobin 10.0 (L) 13.3 - 17.7 g/dL    Hematocrit 31.1 (L) 40.0 - 53.0 %    MCV 97 78 - 100 fl    MCH 31.2 26.5 - 33.0 pg    MCHC 32.2 31.5 - 36.5 g/dL    RDW 16.3 (H) 10.0 - 15.0 %    Platelet Count 157 150 - 450 10e9/L    Diff Method Automated Method     % Neutrophils 58.5 %    % Lymphocytes 22.0 %    % Monocytes 14.8 %    % Eosinophils 3.7 %    % Basophils 0.2 %    % Immature Granulocytes 0.8 %    Nucleated RBCs 0 0 /100    Absolute Neutrophil 5.3 1.6 - 8.3 10e9/L    Absolute Lymphocytes 2.0 0.8 - 5.3 10e9/L    Absolute Monocytes 1.3 0.0 - 1.3 10e9/L    Absolute Eosinophils 0.3 0.0 - 0.7 10e9/L    Absolute Basophils 0.0 0.0 - 0.2 10e9/L    Abs Immature Granulocytes 0.1 0 - 0.4 10e9/L    Absolute Nucleated RBC 0.0    Comprehensive metabolic panel    Collection Time: 03/09/20  1:54 PM   Result Value Ref Range    Sodium 138 133 - 144 mmol/L    Potassium 3.9 3.4 - 5.3 mmol/L    Chloride 109 94 - 109 mmol/L    Carbon Dioxide 24 20 - 32 mmol/L    Anion Gap 6 3 - 14 mmol/L    Glucose 94 70 - 99 mg/dL    Urea Nitrogen 21 7 - 30 mg/dL    Creatinine 1.45 (H) 0.66 - 1.25 mg/dL    GFR Estimate 51 (L) >60 mL/min/[1.73_m2]    GFR Estimate If Black 59 (L) >60 mL/min/[1.73_m2]    Calcium 8.4 (L) 8.5 - 10.1 mg/dL    Bilirubin Total 0.5 0.2 - 1.3 mg/dL    Albumin 3.1 (L) 3.4 - 5.0 g/dL    Protein Total 6.0 (L) 6.8 - 8.8 g/dL    Alkaline Phosphatase 117 40 - 150 U/L    ALT 36 0 - 70 U/L    AST 27 0 - 45 U/L

## 2020-03-10 NOTE — PROGRESS NOTES
This is a recent snapshot of the patient's [facility] medical record.  For current drug dose and complete information and questions, call [phone] or In Basket pool, fv home infusion ([id number])  CSN Number:  [id number]

## 2020-03-11 ENCOUNTER — HOME INFUSION (PRE-WILLOW HOME INFUSION) (OUTPATIENT)
Dept: PHARMACY | Facility: CLINIC | Age: 65
End: 2020-03-11

## 2020-03-12 NOTE — PROGRESS NOTES
Skilled nurse visit in the home, for discontinuation of Continuous fluorouracil over 46 hours.    Dhara Kenney?BSN  RN  CPN  DOROTHY   [facility] Infusion  [address] [address]  [email or web address]  Office:[phone]   Fax [phone]

## 2020-03-19 ENCOUNTER — ANCILLARY PROCEDURE (OUTPATIENT)
Dept: CT IMAGING | Facility: CLINIC | Age: 65
End: 2020-03-19
Attending: INTERNAL MEDICINE
Payer: COMMERCIAL

## 2020-03-19 DIAGNOSIS — C22.1 CHOLANGIOCARCINOMA (H): ICD-10-CM

## 2020-03-19 LAB
ALBUMIN SERPL-MCNC: 3 G/DL (ref 3.4–5)
ALP SERPL-CCNC: 127 U/L (ref 40–150)
ALT SERPL W P-5'-P-CCNC: 55 U/L (ref 0–70)
ANION GAP SERPL CALCULATED.3IONS-SCNC: 6 MMOL/L (ref 3–14)
AST SERPL W P-5'-P-CCNC: 40 U/L (ref 0–45)
BASOPHILS # BLD AUTO: 0 10E9/L (ref 0–0.2)
BASOPHILS NFR BLD AUTO: 0.6 %
BILIRUB SERPL-MCNC: 0.6 MG/DL (ref 0.2–1.3)
BUN SERPL-MCNC: 32 MG/DL (ref 7–30)
CALCIUM SERPL-MCNC: 8.4 MG/DL (ref 8.5–10.1)
CHLORIDE SERPL-SCNC: 104 MMOL/L (ref 94–109)
CO2 SERPL-SCNC: 25 MMOL/L (ref 20–32)
CREAT SERPL-MCNC: 1.37 MG/DL (ref 0.66–1.25)
DIFFERENTIAL METHOD BLD: ABNORMAL
EOSINOPHIL # BLD AUTO: 0.5 10E9/L (ref 0–0.7)
EOSINOPHIL NFR BLD AUTO: 7.4 %
ERYTHROCYTE [DISTWIDTH] IN BLOOD BY AUTOMATED COUNT: 16.3 % (ref 10–15)
GFR SERPL CREATININE-BSD FRML MDRD: 54 ML/MIN/{1.73_M2}
GLUCOSE SERPL-MCNC: 85 MG/DL (ref 70–99)
HCT VFR BLD AUTO: 31.1 % (ref 40–53)
HGB BLD-MCNC: 10.1 G/DL (ref 13.3–17.7)
IMM GRANULOCYTES # BLD: 0.1 10E9/L (ref 0–0.4)
IMM GRANULOCYTES NFR BLD: 0.7 %
LYMPHOCYTES # BLD AUTO: 2 10E9/L (ref 0.8–5.3)
LYMPHOCYTES NFR BLD AUTO: 28.8 %
MCH RBC QN AUTO: 30.7 PG (ref 26.5–33)
MCHC RBC AUTO-ENTMCNC: 32.5 G/DL (ref 31.5–36.5)
MCV RBC AUTO: 95 FL (ref 78–100)
MONOCYTES # BLD AUTO: 1.2 10E9/L (ref 0–1.3)
MONOCYTES NFR BLD AUTO: 16.6 %
NEUTROPHILS # BLD AUTO: 3.2 10E9/L (ref 1.6–8.3)
NEUTROPHILS NFR BLD AUTO: 45.9 %
NRBC # BLD AUTO: 0 10*3/UL
NRBC BLD AUTO-RTO: 0 /100
PLATELET # BLD AUTO: 205 10E9/L (ref 150–450)
POTASSIUM SERPL-SCNC: 4 MMOL/L (ref 3.4–5.3)
PROT SERPL-MCNC: 5.8 G/DL (ref 6.8–8.8)
RBC # BLD AUTO: 3.29 10E12/L (ref 4.4–5.9)
SODIUM SERPL-SCNC: 135 MMOL/L (ref 133–144)
WBC # BLD AUTO: 7 10E9/L (ref 4–11)

## 2020-03-19 PROCEDURE — 86301 IMMUNOASSAY TUMOR CA 19-9: CPT | Performed by: INTERNAL MEDICINE

## 2020-03-19 PROCEDURE — 36591 DRAW BLOOD OFF VENOUS DEVICE: CPT

## 2020-03-19 PROCEDURE — 80053 COMPREHEN METABOLIC PANEL: CPT | Performed by: INTERNAL MEDICINE

## 2020-03-19 PROCEDURE — 25000128 H RX IP 250 OP 636: Performed by: INTERNAL MEDICINE

## 2020-03-19 PROCEDURE — 85025 COMPLETE CBC W/AUTO DIFF WBC: CPT | Performed by: INTERNAL MEDICINE

## 2020-03-19 RX ORDER — HEPARIN SODIUM (PORCINE) LOCK FLUSH IV SOLN 100 UNIT/ML 100 UNIT/ML
5 SOLUTION INTRAVENOUS ONCE
Status: COMPLETED | OUTPATIENT
Start: 2020-03-19 | End: 2020-03-19

## 2020-03-19 RX ORDER — IOPAMIDOL 755 MG/ML
103 INJECTION, SOLUTION INTRAVASCULAR ONCE
Status: COMPLETED | OUTPATIENT
Start: 2020-03-19 | End: 2020-03-19

## 2020-03-19 RX ADMIN — IOPAMIDOL 103 ML: 755 INJECTION, SOLUTION INTRAVASCULAR at 08:51

## 2020-03-19 RX ADMIN — Medication 5 ML: at 09:24

## 2020-03-19 NOTE — NURSING NOTE
Chief Complaint   Patient presents with     Port Draw     labs drawn via port by rn     Labs drawn from previously accessed port by rn in lab. Flushed with saline and heparin. Port de-accessed.   Galina Choudhary RN

## 2020-03-20 LAB — CANCER AG19-9 SERPL-ACNC: 451 U/ML (ref 0–37)

## 2020-03-23 ENCOUNTER — RECORDS - HEALTHEAST (OUTPATIENT)
Dept: ADMINISTRATIVE | Facility: OTHER | Age: 65
End: 2020-03-23

## 2020-03-23 ENCOUNTER — VIRTUAL VISIT (OUTPATIENT)
Dept: ONCOLOGY | Facility: CLINIC | Age: 65
End: 2020-03-23
Attending: INTERNAL MEDICINE
Payer: COMMERCIAL

## 2020-03-23 ENCOUNTER — TELEPHONE (OUTPATIENT)
Dept: ONCOLOGY | Facility: CLINIC | Age: 65
End: 2020-03-23

## 2020-03-23 VITALS
BODY MASS INDEX: 22.2 KG/M2 | WEIGHT: 159.2 LBS | DIASTOLIC BLOOD PRESSURE: 75 MMHG | TEMPERATURE: 97.5 F | HEART RATE: 72 BPM | SYSTOLIC BLOOD PRESSURE: 106 MMHG

## 2020-03-23 DIAGNOSIS — D64.81 ANEMIA ASSOCIATED WITH CHEMOTHERAPY: Primary | ICD-10-CM

## 2020-03-23 DIAGNOSIS — T45.1X5A ANEMIA ASSOCIATED WITH CHEMOTHERAPY: Primary | ICD-10-CM

## 2020-03-23 DIAGNOSIS — D64.81 ANEMIA ASSOCIATED WITH CHEMOTHERAPY: ICD-10-CM

## 2020-03-23 DIAGNOSIS — C22.1 CHOLANGIOCARCINOMA (H): ICD-10-CM

## 2020-03-23 DIAGNOSIS — T45.1X5A ANEMIA ASSOCIATED WITH CHEMOTHERAPY: ICD-10-CM

## 2020-03-23 DIAGNOSIS — C22.1 CHOLANGIOCARCINOMA (H): Primary | ICD-10-CM

## 2020-03-23 PROCEDURE — 40000114 ZZH STATISTIC NO CHARGE CLINIC VISIT

## 2020-03-23 PROCEDURE — 99214 OFFICE O/P EST MOD 30 MIN: CPT | Mod: TEL | Performed by: INTERNAL MEDICINE

## 2020-03-23 RX ORDER — CAPECITABINE 500 MG/1
800 TABLET, FILM COATED ORAL 2 TIMES DAILY
Qty: 84 TABLET | Refills: 0 | Status: SHIPPED | OUTPATIENT
Start: 2020-03-23 | End: 2020-05-01

## 2020-03-23 NOTE — LETTER
"  3/23/2020      RE: Girish Chauhan  [address] [address]       Girish Chauhan is a 65 year old male who is being evaluated via a billable telephone visit.      The patient has been notified of following:     \"This telephone visit will be conducted via a call between you and your physician/provider. We have found that certain health care needs can be provided without the need for a physical exam.  This service lets us provide the care you need with a short phone conversation.  If a prescription is necessary we can send it directly to your pharmacy.  If lab work is needed we can place an order for that and you can then stop by our lab to have the test done at a later time.    If during the course of the call the physician/provider feels a telephone visit is not appropriate, you will not be charged for this service.\"     Girish Chauhan complains of    Chief Complaint   Patient presents with     Telephone     Return: Cholangiocarcinoma        I have reviewed and updated the patient's Past Medical History, Social History, Family History and Medication List.    ALLERGIES  Blood transfusion related (informational only)    Tammie Lester MA    Additional provider notes:     Girish Chauhan is here today in follow-up of advanced cholangiocarcinoma.  Today's visit is been converted to a virtual visit.    He is 64 years old and was originally diagnosed in 2016 at which point he had resection with no adjuvant chemotherapy.  He had biopsy confirmed recurrence in the wall of his bladder in 2017 and initially responded to cisplatin and gemcitabine but had to discontinue cisplatin for toxicity and then subsequently had to discontinue gemcitabine due to TTP.  About a year ago he had disease progression within the abdominal cavity with peritoneal mets on the surface of the liver and was started on capecitabine during which time he had an MI.  He has subsequently been rechallenged with infusional 5-FU without " recurrence of ischemia.  At our last evaluation he had evidence of improving disease and returns today for another response assessment.  In the last 2 months his course has been complicated by the development of cellulitis on his wrist which is resolved with antibiotic therapy.  He also had influenza A approximately 4 weeks ago.  He is now had resolution of his respiratory symptoms and has no ongoing fevers or chills.  He continues to be somewhat fatigued.  He has had problems ever since stenting of his obstructed biliary limb with some degree of early satiety.  That is perhaps a little bit worse and that he finds that if he eats anything late at night he will wake up in the middle of the night feeling nauseated and sometimes needing to vomit.  He believes his weight is stable.  He is moving his bowels once every day or 2.  He has no new sites of pain.  He is noted no change in his urine.  He is noted no change in the color of his skin or eyes.  The remainder of a 10 point review of systems is otherwise negative.    I reviewed with the patient and the wife the results of his lab and CT scan.  His electrolytes are normal.  His renal function is stable to slightly improved with a creatinine of 1.37.  His albumin is stable and depressed at 3.0 and his liver enzymes are normal.  His blood counts are notable for mild normocytic anemia.  His CA 19 9 level continues to improve now down to 451.  On his CT scan there is no evidence of disease progression.  He does have some new hazy nodularity in his lung that has an infection appearance and is consistent with his recent influenza.      Assessment/plan: Metastatic cholangiocarcinoma with improving disease on single agent infusional 5-FU.  His performance status remains at an ECOG of 1-2.      I recommended that we continue on with our current therapy.  In light of the current pandemic though we are going to change him from his infusional 5-FU back to oral capecitabine.  I  think even though he had evidence of ischemia during his initial cycle we have fairly clearly shown that he does not have there is not a direct relation between that and his fluoropyrimidine, but we will be carefully paying attention to whether he redevelops any cardiac symptoms as we do this.  I have adjusted his dose for his renal function.  We will plan on 1.5 g twice per day for 2 weeks on and one-week off.  We will see him back in about 2-1/2 months or after 4 cycles for his next reevaluation.    Total visit time > 30 minutes    Naresh De Los Santos MD

## 2020-03-23 NOTE — ORAL ONC MGMT
"Oral Chemotherapy Monitoring Program    Primary Oncologist: Dr. De Los Santos  Primary Oncology Clinic: [facility]  Cancer Diagnosis: cholangiocarcinoma    Drug: Xeloda 1500mg BID for 2 weeks on, 1 week off  Start Date: TBD based on delivery date from [facility]  Dose is appropriate for patients: 1.9 BSA and 56.4ml/min Renal Function (Dr. De Los Santos dose reduced 20% due to renal function)   Expected duration of therapy: Until disease progression or unacceptable toxicity    Drug Interaction Assessment:   Allopurinol-capecitabine: may decrease capecitabine due to possibility of inhibiting conversion to 5-fluorouracil. Very little data to support. Dr. De Los Santos approved continuing allopurinol.    Lab Monitoring Plan  CMP/CBC q3wks    Subjective/Objective:  Girish Chauhan is a 65 year old male contacted by phone for an initial visit for oral chemotherapy education.     ORAL CHEMOTHERAPY 4/22/2019 5/10/2019 3/23/2020   Drug Name Xeloda (Capecitabine) Xeloda (Capecitabine) Xeloda (Capecitabine)   Current Dosage 1500mg 1500mg 1500mg   Current Schedule BID BID BID   Cycle Details 2 weeks on 1 week off Drug on Hold 2 weeks on 1 week off   Start Date of Last Cycle - 4/30/2019 -   Doses missed in last 2 weeks - 0 -   Adherence Assessment - Adherent -   Adverse Effects - No AE identified during assessment -   Any new drug interactions? - No -       Last PHQ-2 Score on record:   PHQ-2 ( [facility]) 2/3/2020 6/24/2019   Q1: Little interest or pleasure in doing things 0 0   Q2: Feeling down, depressed or hopeless 0 0   PHQ-2 Score 0 0       Vitals:  BP:   BP Readings from Last 1 Encounters:   03/23/20 106/75     Wt Readings from Last 1 Encounters:   03/23/20 72.2 kg (159 lb 3.2 oz)     Estimated body surface area is 1.9 meters squared as calculated from the following:    Height as of 2/11/20: 1.803 m (5' 11\").    Weight as of an earlier encounter on 3/23/20: 72.2 kg (159 lb 3.2 oz).      Labs:  _  Result Component Current Result Ref Range "   Sodium 135 (3/19/2020) 133 - 144 mmol/L     _  Result Component Current Result Ref Range   Potassium 4.0 (3/19/2020) 3.4 - 5.3 mmol/L     _  Result Component Current Result Ref Range   Calcium 8.4 (L) (3/19/2020) 8.5 - 10.1 mg/dL     No results found for Mag within last 30 days.     No results found for Phos within last 30 days.     _  Result Component Current Result Ref Range   Albumin 3.0 (L) (3/19/2020) 3.4 - 5.0 g/dL     _  Result Component Current Result Ref Range   Urea Nitrogen 32 (H) (3/19/2020) 7 - 30 mg/dL     _  Result Component Current Result Ref Range   Creatinine 1.37 (H) (3/19/2020) 0.66 - 1.25 mg/dL       _  Result Component Current Result Ref Range   AST 40 (3/19/2020) 0 - 45 U/L     _  Result Component Current Result Ref Range   ALT 55 (3/19/2020) 0 - 70 U/L     _  Result Component Current Result Ref Range   Bilirubin Total 0.6 (3/19/2020) 0.2 - 1.3 mg/dL       _  Result Component Current Result Ref Range   WBC 7.0 (3/19/2020) 4.0 - 11.0 10e9/L     _  Result Component Current Result Ref Range   Hemoglobin 10.1 (L) (3/19/2020) 13.3 - 17.7 g/dL     _  Result Component Current Result Ref Range   Platelet Count 205 (3/19/2020) 150 - 450 10e9/L     _  Result Component Current Result Ref Range   Absolute Neutrophil 3.2 (3/19/2020) 1.6 - 8.3 10e9/L         Assessment:  Patient is appropriate to start therapy.    Plan:  Basic chemotherapy teaching was reviewed with the patient including indication, start date of therapy, dose, administration, adverse effects, missed doses, food and drug interactions, monitoring, side effect management, office contact information, and safe handling. Written materials were provided and all questions answered.    Patient will call [facility] to set up delivery. He will start once received.     Follow-Up:  4/1: 1 week follow up assessment     Sylvester Potter, [place], MS  Hematology/Oncology Clinical Pharmacist  [facility]  [facility]  [phone]

## 2020-03-23 NOTE — PROGRESS NOTES
"Girish Chauhan is a 65 year old male who is being evaluated via a billable telephone visit.      The patient has been notified of following:     \"This telephone visit will be conducted via a call between you and your physician/provider. We have found that certain health care needs can be provided without the need for a physical exam.  This service lets us provide the care you need with a short phone conversation.  If a prescription is necessary we can send it directly to your pharmacy.  If lab work is needed we can place an order for that and you can then stop by our lab to have the test done at a later time.    If during the course of the call the physician/provider feels a telephone visit is not appropriate, you will not be charged for this service.\"     Girish Chauhan complains of    Chief Complaint   Patient presents with     Telephone     Return: Cholangiocarcinoma        I have reviewed and updated the patient's Past Medical History, Social History, Family History and Medication List.    ALLERGIES  Blood transfusion related (informational only)    Tammie Lester MA    Additional provider notes:     Girish Chauhan is here today in follow-up of advanced cholangiocarcinoma.  Today's visit is been converted to a virtual visit.    He is 64 years old and was originally diagnosed in 2016 at which point he had resection with no adjuvant chemotherapy.  He had biopsy confirmed recurrence in the wall of his bladder in 2017 and initially responded to cisplatin and gemcitabine but had to discontinue cisplatin for toxicity and then subsequently had to discontinue gemcitabine due to TTP.  About a year ago he had disease progression within the abdominal cavity with peritoneal mets on the surface of the liver and was started on capecitabine during which time he had an MI.  He has subsequently been rechallenged with infusional 5-FU without recurrence of ischemia.  At our last evaluation he had evidence of improving disease and " returns today for another response assessment.  In the last 2 months his course has been complicated by the development of cellulitis on his wrist which is resolved with antibiotic therapy.  He also had influenza A approximately 4 weeks ago.  He is now had resolution of his respiratory symptoms and has no ongoing fevers or chills.  He continues to be somewhat fatigued.  He has had problems ever since stenting of his obstructed biliary limb with some degree of early satiety.  That is perhaps a little bit worse and that he finds that if he eats anything late at night he will wake up in the middle of the night feeling nauseated and sometimes needing to vomit.  He believes his weight is stable.  He is moving his bowels once every day or 2.  He has no new sites of pain.  He is noted no change in his urine.  He is noted no change in the color of his skin or eyes.  The remainder of a 10 point review of systems is otherwise negative.    I reviewed with the patient and the wife the results of his lab and CT scan.  His electrolytes are normal.  His renal function is stable to slightly improved with a creatinine of 1.37.  His albumin is stable and depressed at 3.0 and his liver enzymes are normal.  His blood counts are notable for mild normocytic anemia.  His CA 19 9 level continues to improve now down to 451.  On his CT scan there is no evidence of disease progression.  He does have some new hazy nodularity in his lung that has an infection appearance and is consistent with his recent influenza.      Assessment/plan: Metastatic cholangiocarcinoma with improving disease on single agent infusional 5-FU.  His performance status remains at an ECOG of 1-2.      I recommended that we continue on with our current therapy.  In light of the current pandemic though we are going to change him from his infusional 5-FU back to oral capecitabine.  I think even though he had evidence of ischemia during his initial cycle we have fairly  clearly shown that he does not have there is not a direct relation between that and his fluoropyrimidine, but we will be carefully paying attention to whether he redevelops any cardiac symptoms as we do this.  I have adjusted his dose for his renal function.  We will plan on 1.5 g twice per day for 2 weeks on and one-week off.  We will see him back in about 2-1/2 months or after 4 cycles for his next reevaluation.    Total visit time > 30 minutes

## 2020-03-23 NOTE — ORAL ONC MGMT
Oral Chemotherapy Monitoring Program     Placed call to patient in follow up of oral chemotherapy. Left message requesting call back. No drug names were mentioned. Will update when response received.     Sylvester Potter, PharmD, MS  Hematology/Oncology Clinical Pharmacist  [facility]  [facility]

## 2020-03-23 NOTE — NURSING NOTE
Chief Complaint   Patient presents with     Blood Draw     Labs drawn via PORT by RN in lab. Line flushed with saline and heparin. VS taken.      Edd Rodrigues RN     DISPLAY PLAN FREE TEXT

## 2020-04-01 ENCOUNTER — APPOINTMENT (OUTPATIENT)
Dept: LAB | Facility: CLINIC | Age: 65
End: 2020-04-01
Attending: INTERNAL MEDICINE
Payer: COMMERCIAL

## 2020-04-02 ENCOUNTER — TELEPHONE (OUTPATIENT)
Dept: ONCOLOGY | Facility: CLINIC | Age: 65
End: 2020-04-02

## 2020-04-02 NOTE — ORAL ONC MGMT
"Oral Chemotherapy Monitoring Program    Primary Oncologist: Dr. De Los Santos  Primary Oncology Clinic: [facility]  Cancer Diagnosis: Cholangiocarcinoma    Therapy: Capecitabine (Xeloda) 1500 mg (3 x 500 mg) by mouth BID for 14 days on, 7 off  Start Date: 3/26/2020    Drug Interaction Assessment: No new drug interactions identified.    Lab Monitoring Plan  CMP and CBC every 3 weeks  Subjective/Objective:  Girish Chauhan is a 65 year old male contacted by phone for a follow-up visit for oral chemotherapy.  Krishna reports that Xeloda therapy is going well at this time. He reports that he is a bit more tired than usual and has lost a bit of taste, but these are not bothersome. He states that his bowel function is good and regular and that he is sleeping well. He denies HFS, NVDC, mucositis, and decreased appetite. Krishna reports that he applies lotion regularly and drinks 2.5 quarts of water daily. He had no further questions or concerns.    ORAL CHEMOTHERAPY 4/22/2019 5/10/2019 3/23/2020 4/2/2020   Drug Name Xeloda (Capecitabine) Xeloda (Capecitabine) Xeloda (Capecitabine) Xeloda (Capecitabine)   Current Dosage 1500mg 1500mg 1500mg 1500mg   Current Schedule BID BID BID BID   Cycle Details 2 weeks on 1 week off Drug on Hold 2 weeks on 1 week off 2 weeks on 1 week off   Start Date of Last Cycle - 4/30/2019 - 3/26/2020   Planned next cycle start date - - - 4/16/2020   Doses missed in last 2 weeks - 0 - 0   Adherence Assessment - Adherent - Adherent   Adverse Effects - No AE identified during assessment - No AE identified during assessment   Any new drug interactions? - No - No   Is the dose as ordered appropriate for the patient? - - - Yes       Vitals:  BP:   BP Readings from Last 1 Encounters:   03/23/20 106/75     Wt Readings from Last 1 Encounters:   03/23/20 72.2 kg (159 lb 3.2 oz)     Estimated body surface area is 1.9 meters squared as calculated from the following:    Height as of 2/11/20: 1.803 m (5' 11\").    " Weight as of 3/23/20: 72.2 kg (159 lb 3.2 oz).    Labs:  _  Result Component Current Result Ref Range   Sodium 135 (3/19/2020) 133 - 144 mmol/L     _  Result Component Current Result Ref Range   Potassium 4.0 (3/19/2020) 3.4 - 5.3 mmol/L     _  Result Component Current Result Ref Range   Calcium 8.4 (L) (3/19/2020) 8.5 - 10.1 mg/dL     No results found for Mag within last 30 days.     No results found for Phos within last 30 days.     _  Result Component Current Result Ref Range   Albumin 3.0 (L) (3/19/2020) 3.4 - 5.0 g/dL     _  Result Component Current Result Ref Range   Urea Nitrogen 32 (H) (3/19/2020) 7 - 30 mg/dL     _  Result Component Current Result Ref Range   Creatinine 1.37 (H) (3/19/2020) 0.66 - 1.25 mg/dL       _  Result Component Current Result Ref Range   AST 40 (3/19/2020) 0 - 45 U/L     _  Result Component Current Result Ref Range   ALT 55 (3/19/2020) 0 - 70 U/L     _  Result Component Current Result Ref Range   Bilirubin Total 0.6 (3/19/2020) 0.2 - 1.3 mg/dL       _  Result Component Current Result Ref Range   WBC 7.0 (3/19/2020) 4.0 - 11.0 10e9/L     _  Result Component Current Result Ref Range   Hemoglobin 10.1 (L) (3/19/2020) 13.3 - 17.7 g/dL     _  Result Component Current Result Ref Range   Platelet Count 205 (3/19/2020) 150 - 450 10e9/L     _  Result Component Current Result Ref Range   Absolute Neutrophil 3.2 (3/19/2020) 1.6 - 8.3 10e9/L       Assessment:  Krishna is tolerating Xeloda therapy well. No pharmacological interventions made at this time.    Plan:  Continue Xeloda therapy.    Follow-Up:  Review labs on 4/13/2020    Refill Due:  4/16/2020    Maria De Jesus Chiang  Pharmacy Intern  [facility]  [phone]

## 2020-04-09 ENCOUNTER — COMMUNICATION - HEALTHEAST (OUTPATIENT)
Dept: CARDIOLOGY | Facility: CLINIC | Age: 65
End: 2020-04-09

## 2020-04-13 ENCOUNTER — TELEPHONE (OUTPATIENT)
Dept: ONCOLOGY | Facility: CLINIC | Age: 65
End: 2020-04-13

## 2020-04-13 DIAGNOSIS — C22.1 CHOLANGIOCARCINOMA (H): ICD-10-CM

## 2020-04-13 DIAGNOSIS — D64.81 ANEMIA ASSOCIATED WITH CHEMOTHERAPY: Primary | ICD-10-CM

## 2020-04-13 DIAGNOSIS — T45.1X5A ANEMIA ASSOCIATED WITH CHEMOTHERAPY: Primary | ICD-10-CM

## 2020-04-13 DIAGNOSIS — D64.81 ANEMIA ASSOCIATED WITH CHEMOTHERAPY: ICD-10-CM

## 2020-04-13 DIAGNOSIS — T45.1X5A ANEMIA ASSOCIATED WITH CHEMOTHERAPY: ICD-10-CM

## 2020-04-13 LAB
ALBUMIN SERPL-MCNC: 3.2 G/DL (ref 3.4–5)
ALP SERPL-CCNC: 74 U/L (ref 40–150)
ALT SERPL W P-5'-P-CCNC: 21 U/L (ref 0–70)
ANION GAP SERPL CALCULATED.3IONS-SCNC: 7 MMOL/L (ref 3–14)
AST SERPL W P-5'-P-CCNC: 20 U/L (ref 0–45)
BASOPHILS # BLD AUTO: 0 10E9/L (ref 0–0.2)
BASOPHILS NFR BLD AUTO: 0.3 %
BILIRUB SERPL-MCNC: 0.9 MG/DL (ref 0.2–1.3)
BUN SERPL-MCNC: 31 MG/DL (ref 7–30)
CALCIUM SERPL-MCNC: 8.4 MG/DL (ref 8.5–10.1)
CHLORIDE SERPL-SCNC: 108 MMOL/L (ref 94–109)
CO2 SERPL-SCNC: 25 MMOL/L (ref 20–32)
CREAT SERPL-MCNC: 1.6 MG/DL (ref 0.66–1.25)
DIFFERENTIAL METHOD BLD: ABNORMAL
EOSINOPHIL # BLD AUTO: 0.4 10E9/L (ref 0–0.7)
EOSINOPHIL NFR BLD AUTO: 3.8 %
ERYTHROCYTE [DISTWIDTH] IN BLOOD BY AUTOMATED COUNT: 16.7 % (ref 10–15)
GFR SERPL CREATININE-BSD FRML MDRD: 45 ML/MIN/{1.73_M2}
GLUCOSE SERPL-MCNC: 116 MG/DL (ref 70–99)
HCT VFR BLD AUTO: 30.4 % (ref 40–53)
HGB BLD-MCNC: 10.4 G/DL (ref 13.3–17.7)
IMM GRANULOCYTES # BLD: 0.1 10E9/L (ref 0–0.4)
IMM GRANULOCYTES NFR BLD: 0.8 %
LYMPHOCYTES # BLD AUTO: 1.8 10E9/L (ref 0.8–5.3)
LYMPHOCYTES NFR BLD AUTO: 19 %
MCH RBC QN AUTO: 32.5 PG (ref 26.5–33)
MCHC RBC AUTO-ENTMCNC: 34.2 G/DL (ref 31.5–36.5)
MCV RBC AUTO: 95 FL (ref 78–100)
MONOCYTES # BLD AUTO: 1.4 10E9/L (ref 0–1.3)
MONOCYTES NFR BLD AUTO: 14.9 %
NEUTROPHILS # BLD AUTO: 5.7 10E9/L (ref 1.6–8.3)
NEUTROPHILS NFR BLD AUTO: 61.2 %
NRBC # BLD AUTO: 0 10*3/UL
NRBC BLD AUTO-RTO: 0 /100
PLATELET # BLD AUTO: 160 10E9/L (ref 150–450)
POTASSIUM SERPL-SCNC: 3.5 MMOL/L (ref 3.4–5.3)
PROT SERPL-MCNC: 5.8 G/DL (ref 6.8–8.8)
RBC # BLD AUTO: 3.2 10E12/L (ref 4.4–5.9)
SODIUM SERPL-SCNC: 140 MMOL/L (ref 133–144)
WBC # BLD AUTO: 9.3 10E9/L (ref 4–11)

## 2020-04-13 PROCEDURE — 80053 COMPREHEN METABOLIC PANEL: CPT | Performed by: INTERNAL MEDICINE

## 2020-04-13 PROCEDURE — 86301 IMMUNOASSAY TUMOR CA 19-9: CPT | Performed by: INTERNAL MEDICINE

## 2020-04-13 PROCEDURE — 85025 COMPLETE CBC W/AUTO DIFF WBC: CPT | Performed by: INTERNAL MEDICINE

## 2020-04-13 PROCEDURE — 25000128 H RX IP 250 OP 636: Performed by: INTERNAL MEDICINE

## 2020-04-13 PROCEDURE — 36591 DRAW BLOOD OFF VENOUS DEVICE: CPT

## 2020-04-13 RX ORDER — HEPARIN SODIUM (PORCINE) LOCK FLUSH IV SOLN 100 UNIT/ML 100 UNIT/ML
5 SOLUTION INTRAVENOUS EVERY 8 HOURS
Status: DISCONTINUED | OUTPATIENT
Start: 2020-04-13 | End: 2020-04-21 | Stop reason: HOSPADM

## 2020-04-13 RX ORDER — CAPECITABINE 500 MG/1
800 TABLET, FILM COATED ORAL 2 TIMES DAILY
Qty: 84 TABLET | Refills: 0 | Status: SHIPPED | OUTPATIENT
Start: 2020-04-13 | End: 2020-05-01

## 2020-04-13 RX ADMIN — Medication 5 ML: at 10:13

## 2020-04-13 NOTE — ORAL ONC MGMT
Oral Chemotherapy Monitoring Program     Placed call to patient in follow up of Xeloda therapy and required lab work. Krishna said he had a bout of diarrhea last night and this could explain his elevated SCr. He said he plans to start next cycle of Xeloda on Thursday 4/16/2020. He said his cardiologist stopped Cozaar and he has been feeling better since stopping that medication. He thanked me for the call and care.     Left message to please call back in follow-up of therapy. No patient or drug names were mentioned.     Girish Campbell PharmD  [facility]  [phone]  April 13, 2020

## 2020-04-13 NOTE — NURSING NOTE
Chief Complaint   Patient presents with     Port Draw     Labs drawn via PORT by RN in lab. Lab only.      Edd Rodrigues RN,

## 2020-04-14 LAB — CANCER AG19-9 SERPL-ACNC: 241 U/ML (ref 0–37)

## 2020-04-15 ENCOUNTER — TELEPHONE (OUTPATIENT)
Dept: ONCOLOGY | Facility: CLINIC | Age: 65
End: 2020-04-15

## 2020-04-15 NOTE — TELEPHONE ENCOUNTER
Pt reporting 6 days of diarrhea.4-6 episodes per day.No abd cramping. No recent abx. Has not tried imodium, which I instructed him to do. No fever. Feels fine otherwise. Is due to start Xeloda tomorrow.     Paged to Dr De Los Santos    Routed to Care Team    Per Dr De Los Santos, push fluids, hold Xeloda x 1 week.  Use imodium as previously discussed. Will set up video Visit with Jennifer Jalloh NP ( per Jennifer)  on Monday at 330. email confirmed. Msg sent to scheduling

## 2020-04-20 ENCOUNTER — VIRTUAL VISIT (OUTPATIENT)
Dept: ONCOLOGY | Facility: CLINIC | Age: 65
End: 2020-04-20
Attending: INTERNAL MEDICINE
Payer: COMMERCIAL

## 2020-04-20 DIAGNOSIS — C22.1 CHOLANGIOCARCINOMA (H): ICD-10-CM

## 2020-04-20 PROCEDURE — 99214 OFFICE O/P EST MOD 30 MIN: CPT | Mod: TEL | Performed by: NURSE PRACTITIONER

## 2020-04-20 PROCEDURE — 40000114 ZZH STATISTIC NO CHARGE CLINIC VISIT

## 2020-04-20 NOTE — PROGRESS NOTES
"Girish Chauhan is a 65 year old male who is being evaluated via a billable video visit.      The patient has been notified of following:     \"This video visit will be conducted via a call between you and your physician/provider. We have found that certain health care needs can be provided without the need for an in-person physical exam.  This service lets us provide the care you need with a video conversation.  If a prescription is necessary we can send it directly to your pharmacy.  If lab work is needed we can place an order for that and you can then stop by our lab to have the test done at a later time.    Video visits are billed at different rates depending on your insurance coverage.  Please reach out to your insurance provider with any questions.    If during the course of the call the physician/provider feels a video visit is not appropriate, you will not be charged for this service.\"    Patient has given verbal consent for Video visit? Yes    How would you like to obtain your AVS? Gómezhart    Patient would like the video invitation sent by: Send to e-mail at: [email or web address]       Concerns: New rash on hands.   Refills: None.    Sisi Lutz CMA      Unable to do a video visit-technology did not work. Converted to a phone visit    Additional provider notes: insert own note template here     Reason for Visit: f/u metastatic cholangiocarcinoma    Oncology HPI:   Girish Chauhan is a 64 year old year old gentleman with cholangiocarcinoma  Which was resected in 3/2016 (including partial liver resection) with negative margins and no LN involvement, but with perineural and lymphovascular invasion. No adjuvant chemotherapy given. Recurred in bladder 11/2017, bx c/w metastatic cholangiocarcinoma, started on cisplatin/gemcitabine 12/2017. Cisplatin held 6/2018 for poor tolerance. Gemcitabine discontinued 8/2018 for possible TTP, then went off treatment. In 4/2019 was found to have disease progression in the abdominal " cavity anterior to the liver, just below the diaphragm. Started on capecitabine 4/30/19 (last dose 5/7) but developed an NSTEMI s/p angiogram 5/6/19 (see below) and actually continued on the capecitabine for several days after NSTEMI without any ongoing cardiac symptoms or evidence of ongoing ischemia. On follow up with Dr. De Los Santos 6/24, disease appeared stable. Decision made with family to hold off on treatment for 2 months and reevaluate.      He was then transferred to [facility] on 6/5/19 after presenting to OSH with SBO with possible involvement of draining choledochojejunostomy loop, subjective fevers, and A fib with RVR. GI consulted, s/p small bowel enteroscopy with stenting on 6/7 for obstructed biliary limb. Discharged on 6/9/19.     He was admitted on 7/28/19 with fever, nausea, vomiting, and abdominal pain. Hospitalization complicated by sepsis and enterococcus casseliflavus bacteremia. Additionally, Krishna was found to have migrated gastrojejunal stent on CT A/P on admission. S/p ERCP with stent exchange 7/29 by Dr. Rodriguez. Discharged on Linezolid for 2 weeks.       CT CAP showed disease progression. He started on 5FU on 10/4/19 inpatient due to history of MI while taking Xeloda. He has had stable disease radiographically, but a rising tumor marker after 4 cycles. He was continued on 5FU.     He was hospitalized in February with influenza A and then seen on 2/19 with wrist swelling and redness and was started on bactrim for cellulitis    He had a visit with Dr. De Los Santos on 3/23/20. His cancer was stable. Infusional 5FU was switched back to oral capecitabine.      Interval history: I spoke with Krishna on the phone. His wife joined in the phone call as well. Krishna noted that he was feeling quite ill last week. Appetite was decreased, had some increased regurgitation, increased diarrhea. Started using imodium and that has helped the diarrhea. Has some frequent, pellet like stools that are hard to control at night. Denies  much nausea, but his wife notes that he did complain of this last week. He thinks it was reflux. Denies having heartburn. Tried zofran and that did help once. His appetite was minimal, eating only a small meal once a day, sometimes only bites. He is starting to eat more today, had 2 meals already.  No mouth sores or tenderness in the hands/feet. 2 weeks ago, sat outside in the sun and since then developed a scaly red rash on the dorsum of the hands and forearms. No pain or itching. No rash on the face. Has a small blood blister on the lip that was present prior to starting xeloda. No pain or increased blistering. Has a similar lesion on the pad of the thumb. Denies pain or injury. Urine is light. Trying to drink a couple fluids, but notes that last week he was missing his goal of a couple liters/day. Denies feeling dizzy/weak since his cardiologist discontinued the losartan.  No fevers/chills, cough, sob, cp, palpitation.    Current Outpatient Medications   Medication Sig Dispense Refill     acetaminophen (TYLENOL) 500 MG tablet Take 500 mg by mouth every 6 hours as needed for fever or pain       allopurinol (ZYLOPRIM) 100 MG tablet Take 100 mg by mouth every evening        apixaban ANTICOAGULANT (ELIQUIS) 5 MG tablet Take 1 tablet (5 mg) by mouth 2 times daily       atorvastatin (LIPITOR) 40 MG tablet Take 40 mg by mouth every evening        calcium carbonate (TUMS) 500 MG chewable tablet Take 1-3 chew tab by mouth 4 times daily as needed for heartburn        capecitabine (XELODA) 500 MG tablet Take 3 tablets (1,500 mg) by mouth 2 times daily for 14 days Days 1 through 14, then off for 7 days. Take within 30 mins after meal. 84 tablet 0     clopidogrel (PLAVIX) 75 MG tablet Take 75 mg by mouth every morning        colchicine (COLCYRS) 0.6 MG tablet Take 0.6 mg by mouth 3 times daily as needed (gout flare)        COMPOUNDED NON-CONTROLLED SUBSTANCE (CMPD RX) - PHARMACY TO MIX COMPOUNDED MEDICATION Patient is  actively on chemotherapy.  Please see oncology navigator for details.  Receives every 2 weeks as an infusion over approximately 46 hours.       fluticasone (FLONASE) 50 MCG/ACT nasal spray Spray 1 spray into both nostrils daily       furosemide (LASIX) 20 MG tablet Take 1 tablet (20 mg) by mouth daily       metoprolol tartrate (LOPRESSOR) 50 MG tablet Take 150 mg by mouth 2 times daily        multivitamin w/minerals (THERA-VIT-M) tablet Take 1 tablet by mouth daily       Nitroglycerin (NITROSTAT SL) Place 0.4 mg under the tongue every 5 minutes as needed for chest pain (Carries medication - has never used)        ondansetron (ZOFRAN) 4 MG tablet Take 1 tablet (4 mg) by mouth every 8 hours as needed for nausea 30 tablet 0     sennosides (SENOKOT) 8.6 MG tablet Take 1 tablet by mouth every evening        capecitabine (XELODA) 500 MG tablet Take 3 tablets (1,500 mg) by mouth 2 times daily for 14 days Days 1 through 14, then off for 7 days. Take within 30 mins after meal. 84 tablet 0          Allergies   Allergen Reactions     Blood Transfusion Related (Informational Only) Other (See Comments)     Patient has a history of a clinically significant antibody against RBC antigens.  A delay in compatible RBCs may occur.         Objective: speech is slow, sometimes slurred, consistent with baseline. Alert/oriented There were no vitals taken for this visit.  Wt Readings from Last 4 Encounters:   03/23/20 72.2 kg (159 lb 3.2 oz)   03/09/20 75.8 kg (167 lb 3.2 oz)   02/24/20 75.5 kg (166 lb 6.4 oz)   02/19/20 78.4 kg (172 lb 14.4 oz)         Labs:   Results for JENNYFER MCGARRY (MRN [id number]) as of 4/20/2020 15:38   Ref. Range 4/13/2020 10:14   Sodium Latest Ref Range: 133 - 144 mmol/L 140   Potassium Latest Ref Range: 3.4 - 5.3 mmol/L 3.5   Chloride Latest Ref Range: 94 - 109 mmol/L 108   Carbon Dioxide Latest Ref Range: 20 - 32 mmol/L 25   Urea Nitrogen Latest Ref Range: 7 - 30 mg/dL 31 (H)   Creatinine Latest Ref Range: 0.66 -  1.25 mg/dL 1.60 (H)   GFR Estimate Latest Ref Range: >60 mL/min/1.73_m2 45 (L)   GFR Estimate If Black Latest Ref Range: >60 mL/min/1.73_m2 52 (L)   Calcium Latest Ref Range: 8.5 - 10.1 mg/dL 8.4 (L)   Anion Gap Latest Ref Range: 3 - 14 mmol/L 7   Albumin Latest Ref Range: 3.4 - 5.0 g/dL 3.2 (L)   Protein Total Latest Ref Range: 6.8 - 8.8 g/dL 5.8 (L)   Bilirubin Total Latest Ref Range: 0.2 - 1.3 mg/dL 0.9   Alkaline Phosphatase Latest Ref Range: 40 - 150 U/L 74   ALT Latest Ref Range: 0 - 70 U/L 21   AST Latest Ref Range: 0 - 45 U/L 20   Cancer Antigen 19-9 Latest Ref Range: 0 - 37 U/mL 241 (H)   Glucose Latest Ref Range: 70 - 99 mg/dL 116 (H)   WBC Latest Ref Range: 4.0 - 11.0 10e9/L 9.3   Hemoglobin Latest Ref Range: 13.3 - 17.7 g/dL 10.4 (L)   Hematocrit Latest Ref Range: 40.0 - 53.0 % 30.4 (L)   Platelet Count Latest Ref Range: 150 - 450 10e9/L 160   RBC Count Latest Ref Range: 4.4 - 5.9 10e12/L 3.20 (L)   MCV Latest Ref Range: 78 - 100 fl 95   MCH Latest Ref Range: 26.5 - 33.0 pg 32.5   MCHC Latest Ref Range: 31.5 - 36.5 g/dL 34.2   RDW Latest Ref Range: 10.0 - 15.0 % 16.7 (H)   Diff Method Unknown Automated Method   % Neutrophils Latest Units: % 61.2   % Lymphocytes Latest Units: % 19.0   % Monocytes Latest Units: % 14.9   % Eosinophils Latest Units: % 3.8   % Basophils Latest Units: % 0.3   % Immature Granulocytes Latest Units: % 0.8   Nucleated RBCs Latest Ref Range: 0 /100 0   Absolute Neutrophil Latest Ref Range: 1.6 - 8.3 10e9/L 5.7   Absolute Lymphocytes Latest Ref Range: 0.8 - 5.3 10e9/L 1.8   Absolute Monocytes Latest Ref Range: 0.0 - 1.3 10e9/L 1.4 (H)   Absolute Eosinophils Latest Ref Range: 0.0 - 0.7 10e9/L 0.4   Absolute Basophils Latest Ref Range: 0.0 - 0.2 10e9/L 0.0   Abs Immature Granulocytes Latest Ref Range: 0 - 0.4 10e9/L 0.1   Absolute Nucleated RBC Unknown 0.0       Imaging: n/a    Impression/plan:   Recurrent, Metastatic cholangiocarcinoma with stable disease on single agent infusional  5FU.  -switched to xeloda 1.5 G bid for 2 weeks on 1 week off  -was due for cycle 2 on 4/16/20, but is holding for an additional week. I think he will be ready to resume treatment on 4/23/20. His CrCl of 48 requires a dose reduction.  I reviewed his dosing of 1.5 g bid, which is already dose reduced from 1gm/m2 bid standard dosing. Will keep him at the 1.5 gm bid dosing for the next cycle. If diarrhea or nausea worsens or appetite worsens, we may need to dose reduce.  -will f/u with the pharmacy team prior to the next cycle      Skin rash  -sounds consistent with photosensitivity reaction to the sun while on xeloda  -should use emollients regularly, avoid sun exposure, use sunscreen when exposed    Hx of biliary obstruction with complications of bacteremia, SBO  -had ERCP with stent exchange on 7/28/19  -LFTs stable    Cards: hx of HLD/HTN, atrial fibrillation, bicuspid aortic valve and moderate aortic stenosis, heart failure with reduced EF, CAD with history of multivessel stenting in 2011.  - Developed NSTEMI, s/p LHC and angiogram 5/6/19 with 99% obstruction of OM2, unsuccessful PCI. He also developed afib with RVR during this time which necessitated increase of his metoprolol and starting on apixaban. He was also started on ASA/clopidogrel for CAD. Repeat angiogram done 6/17 with SAM to the OM1. Repeat Echo 7/30 with improved EF 45-50%, mild LV dilation.   had stopped anticoagulation on 7/27 due to thrombocytopenia.  Resumed plavix, asa and apixiban on 8/5 when platelets were >137K  -Last Echo on 9/30/19 showed an EF of 42%. Follows with cardiology.           CKD, hx of R hydronephrosis x/t extrinsic compression, s/p ureteral stenting  -BUN/Cr at baseline. Discussed importance of adequate hydration if having diarrhea.        Visit length 25 minutes        YO Grubbs CNP

## 2020-04-21 ENCOUNTER — PATIENT OUTREACH (OUTPATIENT)
Dept: UROLOGY | Facility: CLINIC | Age: 65
End: 2020-04-21

## 2020-04-21 NOTE — PROGRESS NOTES
Left message t have the patient call me to see if things are okay for his procedure to postpone his procedure for a month.   Tena Blanca RN   Care Coordinator Urology

## 2020-04-23 ENCOUNTER — TELEPHONE (OUTPATIENT)
Dept: UROLOGY | Facility: CLINIC | Age: 65
End: 2020-04-23

## 2020-04-23 NOTE — TELEPHONE ENCOUNTER
----- Message from Tena Blanca RN sent at 2/13/2020 11:06 AM CST -----  Regarding: FW: teaching    ----- Message -----  From: Jesenia Hernandez  Sent: 2/13/2020   8:48 AM CST  To: Tena Blanca RN  Subject: teaching                                         Dr Walker    CYSTOSCOPY, WITH RIGHT RETROGRADE PYELOGRAM AND RIGHT URETERAL STENT EXCHANGE (Right)     Surgery scheduled on 5/4/20 @ [place] OR    Patient informed, packet mailed, please call for teaching    Tracy Ba

## 2020-04-23 NOTE — TELEPHONE ENCOUNTER
Patient would like to rescheduled his surgery for his stent exchanged. He is doing fine currently. His creatinine:    Component      Latest Ref Rng & Units 3/19/2020             Creatinine      0.66 - 1.25 mg/dL 1.37 (H)     Component      Latest Ref Rng & Units 4/13/2020             Creatinine      0.66 - 1.25 mg/dL 1.60 (H)     Patient to call if he has any symptoms   Will move the patient's surgery to end of of May possibly June.   Patient verbalized understanding.    Tena Blanca RN   Care Coordinator Urology

## 2020-04-23 NOTE — TELEPHONE ENCOUNTER
Called to inform patient of surgery date change from 5/4/20 to 6/8/20 @ [place] OR with Dr Walker.  Left voice mail for patient with surgery information and phone number [phone] for patient to call back.

## 2020-04-27 ENCOUNTER — PRE VISIT (OUTPATIENT)
Dept: SURGERY | Facility: CLINIC | Age: 65
End: 2020-04-27

## 2020-04-27 ENCOUNTER — TELEPHONE (OUTPATIENT)
Dept: ONCOLOGY | Facility: CLINIC | Age: 65
End: 2020-04-27

## 2020-04-27 NOTE — ORAL ONC MGMT
Oral Chemotherapy Monitoring Program (Left Voicemail)      Primary Oncologist: Dr. De Los Santos  Primary Oncology Clinic: [facility]  Cancer Diagnosis: Cholangiocarcinoma     Attempted to contact patient today for follow up regarding oral chemotherapy, Xeloda, for assessment. No answer. Left voicemail for patient to please call me back at [phone] when able. No patient or medication names were mentioned while leaving this message.    Payal Duff   PharmD Candidate & Oncology Intern   [facility]   [phone]

## 2020-04-28 ENCOUNTER — TELEPHONE (OUTPATIENT)
Dept: ONCOLOGY | Facility: CLINIC | Age: 65
End: 2020-04-28

## 2020-04-28 NOTE — TELEPHONE ENCOUNTER
Oral Chemotherapy Monitoring Program.    Placed call to patient in follow-up of Xeloda therapy.    Left message to return call.    Polly West, LannyD, BCOP, BCPS  Clinical Pharmacy Specialist  [facility]  Pager [phone]  Phone [phone]

## 2020-04-30 DIAGNOSIS — T45.1X5A ANEMIA ASSOCIATED WITH CHEMOTHERAPY: ICD-10-CM

## 2020-04-30 DIAGNOSIS — T45.1X5A ANEMIA ASSOCIATED WITH CHEMOTHERAPY: Primary | ICD-10-CM

## 2020-04-30 DIAGNOSIS — D64.81 ANEMIA ASSOCIATED WITH CHEMOTHERAPY: ICD-10-CM

## 2020-04-30 DIAGNOSIS — C22.1 CHOLANGIOCARCINOMA (H): ICD-10-CM

## 2020-04-30 DIAGNOSIS — D64.81 ANEMIA ASSOCIATED WITH CHEMOTHERAPY: Primary | ICD-10-CM

## 2020-05-01 ENCOUNTER — APPOINTMENT (OUTPATIENT)
Dept: LAB | Facility: CLINIC | Age: 65
End: 2020-05-01
Attending: INTERNAL MEDICINE
Payer: COMMERCIAL

## 2020-05-01 DIAGNOSIS — D64.81 ANEMIA ASSOCIATED WITH CHEMOTHERAPY: Primary | ICD-10-CM

## 2020-05-01 DIAGNOSIS — C22.1 CHOLANGIOCARCINOMA (H): ICD-10-CM

## 2020-05-01 DIAGNOSIS — T45.1X5A ANEMIA ASSOCIATED WITH CHEMOTHERAPY: Primary | ICD-10-CM

## 2020-05-01 RX ORDER — CAPECITABINE 500 MG/1
800 TABLET, FILM COATED ORAL 2 TIMES DAILY
Qty: 84 TABLET | Refills: 0 | Status: SHIPPED | OUTPATIENT
Start: 2020-05-01 | End: 2020-06-25

## 2020-05-01 RX ORDER — CAPECITABINE 500 MG/1
TABLET, FILM COATED ORAL
Qty: 84 TABLET | Refills: 0 | OUTPATIENT
Start: 2020-05-01

## 2020-05-02 ENCOUNTER — COMMUNICATION - HEALTHEAST (OUTPATIENT)
Dept: CARDIOLOGY | Facility: CLINIC | Age: 65
End: 2020-05-02

## 2020-05-02 DIAGNOSIS — I25.10 CAD (CORONARY ARTERY DISEASE): ICD-10-CM

## 2020-05-04 DIAGNOSIS — T45.1X5A ANEMIA ASSOCIATED WITH CHEMOTHERAPY: ICD-10-CM

## 2020-05-04 DIAGNOSIS — D64.81 ANEMIA ASSOCIATED WITH CHEMOTHERAPY: ICD-10-CM

## 2020-05-04 DIAGNOSIS — Z11.59 ENCOUNTER FOR SCREENING FOR OTHER VIRAL DISEASES: Primary | ICD-10-CM

## 2020-05-04 DIAGNOSIS — C22.1 CHOLANGIOCARCINOMA (H): ICD-10-CM

## 2020-05-04 LAB
ALBUMIN SERPL-MCNC: 3.1 G/DL (ref 3.4–5)
ALP SERPL-CCNC: 123 U/L (ref 40–150)
ALT SERPL W P-5'-P-CCNC: 34 U/L (ref 0–70)
ANION GAP SERPL CALCULATED.3IONS-SCNC: 6 MMOL/L (ref 3–14)
AST SERPL W P-5'-P-CCNC: 29 U/L (ref 0–45)
BASOPHILS # BLD AUTO: 0 10E9/L (ref 0–0.2)
BASOPHILS NFR BLD AUTO: 0.3 %
BILIRUB SERPL-MCNC: 0.6 MG/DL (ref 0.2–1.3)
BUN SERPL-MCNC: 32 MG/DL (ref 7–30)
CALCIUM SERPL-MCNC: 9 MG/DL (ref 8.5–10.1)
CHLORIDE SERPL-SCNC: 108 MMOL/L (ref 94–109)
CO2 SERPL-SCNC: 25 MMOL/L (ref 20–32)
CREAT SERPL-MCNC: 1.29 MG/DL (ref 0.66–1.25)
DIFFERENTIAL METHOD BLD: ABNORMAL
EOSINOPHIL # BLD AUTO: 0.3 10E9/L (ref 0–0.7)
EOSINOPHIL NFR BLD AUTO: 4.1 %
ERYTHROCYTE [DISTWIDTH] IN BLOOD BY AUTOMATED COUNT: 18.2 % (ref 10–15)
GFR SERPL CREATININE-BSD FRML MDRD: 59 ML/MIN/{1.73_M2}
GLUCOSE SERPL-MCNC: 97 MG/DL (ref 70–99)
HCT VFR BLD AUTO: 29.3 % (ref 40–53)
HGB BLD-MCNC: 9.4 G/DL (ref 13.3–17.7)
IMM GRANULOCYTES # BLD: 0 10E9/L (ref 0–0.4)
IMM GRANULOCYTES NFR BLD: 0.3 %
LYMPHOCYTES # BLD AUTO: 1.3 10E9/L (ref 0.8–5.3)
LYMPHOCYTES NFR BLD AUTO: 19.2 %
MCH RBC QN AUTO: 32.5 PG (ref 26.5–33)
MCHC RBC AUTO-ENTMCNC: 32.1 G/DL (ref 31.5–36.5)
MCV RBC AUTO: 101 FL (ref 78–100)
MONOCYTES # BLD AUTO: 0.9 10E9/L (ref 0–1.3)
MONOCYTES NFR BLD AUTO: 13.6 %
NEUTROPHILS # BLD AUTO: 4.2 10E9/L (ref 1.6–8.3)
NEUTROPHILS NFR BLD AUTO: 62.5 %
NRBC # BLD AUTO: 0 10*3/UL
NRBC BLD AUTO-RTO: 0 /100
PLATELET # BLD AUTO: 158 10E9/L (ref 150–450)
POTASSIUM SERPL-SCNC: 4 MMOL/L (ref 3.4–5.3)
PROT SERPL-MCNC: 5.8 G/DL (ref 6.8–8.8)
RBC # BLD AUTO: 2.89 10E12/L (ref 4.4–5.9)
SODIUM SERPL-SCNC: 139 MMOL/L (ref 133–144)
WBC # BLD AUTO: 6.8 10E9/L (ref 4–11)

## 2020-05-04 PROCEDURE — 86301 IMMUNOASSAY TUMOR CA 19-9: CPT | Performed by: INTERNAL MEDICINE

## 2020-05-04 PROCEDURE — 25000128 H RX IP 250 OP 636: Performed by: INTERNAL MEDICINE

## 2020-05-04 PROCEDURE — 85025 COMPLETE CBC W/AUTO DIFF WBC: CPT | Performed by: INTERNAL MEDICINE

## 2020-05-04 PROCEDURE — 80053 COMPREHEN METABOLIC PANEL: CPT | Performed by: INTERNAL MEDICINE

## 2020-05-04 RX ORDER — HEPARIN SODIUM (PORCINE) LOCK FLUSH IV SOLN 100 UNIT/ML 100 UNIT/ML
5 SOLUTION INTRAVENOUS ONCE
Status: COMPLETED | OUTPATIENT
Start: 2020-05-04 | End: 2020-05-04

## 2020-05-04 RX ADMIN — Medication 5 ML: at 10:23

## 2020-05-04 NOTE — NURSING NOTE
"Chief Complaint   Patient presents with     Blood Draw     lab only appointment.  port accessed and labs drawn by rn in lab.       Lab only appointment.  Port accessed with 20g 3/4\" gripper needle, labs drawn by RN in lab.  Port flushed with saline and heparin.  Port de-accessed.    Carey Larson RN      "

## 2020-05-05 ENCOUNTER — TELEPHONE (OUTPATIENT)
Dept: ONCOLOGY | Facility: CLINIC | Age: 65
End: 2020-05-05

## 2020-05-05 LAB — CANCER AG19-9 SERPL-ACNC: 280 U/ML (ref 0–37)

## 2020-05-05 NOTE — TELEPHONE ENCOUNTER
Oral Chemotherapy Monitoring Program    Called patient and left message to please call back. No names or medications were mentioned.     Left message regarding 5/4 labs, hemoglobin 9.4 which could explain his fatigue, otherwise labs stable. Cycle 2 started 4/23. Ask about diarrhea, rash, nausea if he calls back. Next labs 5/25, next appt 6/15.       Tani Martinman  Pharmacy Intern  Oral Chemotherapy Monitoring Program  [phone]

## 2020-05-06 NOTE — TELEPHONE ENCOUNTER
Oral Chemotherapy Monitoring Program    Called patient and left message to please call back. No names or medications were mentioned.       Tani Martinman  Pharmacy Intern  Oral Chemotherapy Monitoring Program  [phone]

## 2020-05-07 ENCOUNTER — TELEPHONE (OUTPATIENT)
Dept: ONCOLOGY | Facility: CLINIC | Age: 65
End: 2020-05-07

## 2020-05-07 NOTE — ORAL ONC MGMT
Oral Chemotherapy Monitoring Program     Primary Oncologist: Dr. De Los Santos  Primary Oncology Clinic: [facility]  Cancer Diagnosis: Cholangiocarcinoma    Therapy: Xeloda (capecitabine) 1500mg BID, days 1-14 of 21-day cycle  C1D1: 3/26/20     Drug Interaction Assessment: No new drug interactions identified.      Lab Monitoring Plan  CBC/CMP every 3 weeks    Subjective/Objective:  Girish Chauhan is a 65 year old male contacted by phone for a follow-up visit for oral chemotherapy. Krishna says he is feeling quite well and has tolerated this cycle of Xeloda much better. He typically has 1 bowel movement per day and said it is typically formed rather than loose. He is having some tenderness on his fingertips which started recently. He specifically denied any nausea.    ORAL CHEMOTHERAPY 4/22/2019 5/10/2019 3/23/2020 4/2/2020 5/7/2020   Drug Name Xeloda (Capecitabine) Xeloda (Capecitabine) Xeloda (Capecitabine) Xeloda (Capecitabine) Xeloda (Capecitabine)   Current Dosage 1500mg 1500mg 1500mg 1500mg 1500mg   Current Schedule BID BID BID BID BID   Cycle Details 2 weeks on 1 week off Drug on Hold 2 weeks on 1 week off 2 weeks on 1 week off 2 weeks on 1 week off   Start Date of Last Cycle - 4/30/2019 - 3/26/2020 4/24/2020   Planned next cycle start date - - - 4/16/2020 5/15/2020   Doses missed in last 2 weeks - 0 - 0 0   Adherence Assessment - Adherent - Adherent Adherent   Adverse Effects - No AE identified during assessment - No AE identified during assessment No AE identified during assessment   Any new drug interactions? - No - No -   Is the dose as ordered appropriate for the patient? - - - Yes -        Assessment/Plan:  Mild tenderness on fingertips, likely HFS from Xeloda. Counseled to moisturize with lotion and monitor for changes. Continue Xeloda at current dose and schedule.      Follow-Up:  Labs on 5/25     Refill Due:  5/28 for 6/4 via [facility]     Nathan Morrell  Oncology Pharmacy Intern   [facility]  Cancer Clinic   [phone]

## 2020-05-11 ENCOUNTER — HOME INFUSION (PRE-WILLOW HOME INFUSION) (OUTPATIENT)
Dept: PHARMACY | Facility: CLINIC | Age: 65
End: 2020-05-11

## 2020-05-14 ENCOUNTER — HOME INFUSION (PRE-WILLOW HOME INFUSION) (OUTPATIENT)
Dept: PHARMACY | Facility: CLINIC | Age: 65
End: 2020-05-14

## 2020-05-14 NOTE — ORAL ONC MGMT
Addendum 5/14/2020    Spoke with Krishna by telephone, he had some confusion over his Xeloda cycle dates. He was under the impression that he should continue taking Xeloda with the extra 1-week break, so his cycle would be days 1-14 of 28. This is in contrast to what we had discussed during our last visit on 5/7/20, when we determined that he would go back to the 21-day cycle (days 1-14 of 21).     Sent message to Jennifer Jalloh for clarification:    Jennifer Jalloh APRN CNP Kolling, Adam               That was only for that one cycle. If he is doing ok, can keep at the 2 week on, 1 week off cycle.    Previous Messages     ----- Message -----   From: Nathan Morrell   Sent: 5/14/2020   9:34 AM CDT   To: YO Jasso CNP   Subject: Xeloda Cycle                                     Hi Jennifer,     I spoke with Krishna last week and today about his Xeloda, and I'm wondering if there's some confusion around his cycle dates. It looks like last time you saw him on 4/20/20 you decided to have him hold his Xeloda 1 extra week so he would have 2 weeks on + 2 weeks off. Do you want him to continue that cycle going forward, or should he go back to 2 weeks on + 1 week off?         Called Krishna back to relay this information. Unfortunately, he had already set up delivery via [facility] under the assumption that he would have a second week off Xeloda. Due to this anticipated delay, we discussed that he would proceed with the 2 week on + 2 week off schedule for this cycle then plan to return to the 2 week on + 1 week off schedule for the next cycle.    To summarize:  C1D1: 3/26/20  - Days 1-14 of 21-day cycle    C2D1 (current): 4/24/20   - Start date delayed due to toxicity, 1 week hold  - Days 1-14 of 28-day cycle    C3D1 (planned): 5/22/20  - Days 1-14 of 21-day cycle    C4D1 (planned): 6/12/10  - Days 1-14 of 21-day cycle    Nathan Morrell  Oncology Pharmacy Intern  [facility]  [phone]

## 2020-05-15 NOTE — PROGRESS NOTES
This is a recent snapshot of the patient's [facility] medical record.  For current drug dose and complete information and questions, call [phone] or In Basket pool, fv home infusion ([id number])  CSN Number:  [id number]

## 2020-05-20 ENCOUNTER — TELEPHONE (OUTPATIENT)
Dept: ONCOLOGY | Facility: CLINIC | Age: 65
End: 2020-05-20

## 2020-05-20 NOTE — TELEPHONE ENCOUNTER
Patient called asking about his capecitabine. He said that he received his capecitabine from [facility] and is asking about their label as it says it is substituted. He said the directions are correct but is asking about the substitution. I said I could assume but it would be better to check with [facility]. He agreed to call [facility].

## 2020-05-21 ENCOUNTER — COMMUNICATION - HEALTHEAST (OUTPATIENT)
Dept: FAMILY MEDICINE | Facility: CLINIC | Age: 65
End: 2020-05-21

## 2020-05-21 DIAGNOSIS — Z87.39 HISTORY OF GOUT: ICD-10-CM

## 2020-05-25 DIAGNOSIS — D64.81 ANEMIA ASSOCIATED WITH CHEMOTHERAPY: ICD-10-CM

## 2020-05-25 DIAGNOSIS — D64.81 ANEMIA ASSOCIATED WITH CHEMOTHERAPY: Primary | ICD-10-CM

## 2020-05-25 DIAGNOSIS — C22.1 CHOLANGIOCARCINOMA (H): ICD-10-CM

## 2020-05-25 DIAGNOSIS — T45.1X5A ANEMIA ASSOCIATED WITH CHEMOTHERAPY: Primary | ICD-10-CM

## 2020-05-25 DIAGNOSIS — T45.1X5A ANEMIA ASSOCIATED WITH CHEMOTHERAPY: ICD-10-CM

## 2020-05-25 LAB
ALBUMIN SERPL-MCNC: 2.8 G/DL (ref 3.4–5)
ALP SERPL-CCNC: 204 U/L (ref 40–150)
ALT SERPL W P-5'-P-CCNC: 41 U/L (ref 0–70)
ANION GAP SERPL CALCULATED.3IONS-SCNC: 7 MMOL/L (ref 3–14)
AST SERPL W P-5'-P-CCNC: 32 U/L (ref 0–45)
BASOPHILS # BLD AUTO: 0 10E9/L (ref 0–0.2)
BASOPHILS NFR BLD AUTO: 0.4 %
BILIRUB SERPL-MCNC: 0.5 MG/DL (ref 0.2–1.3)
BUN SERPL-MCNC: 28 MG/DL (ref 7–30)
CALCIUM SERPL-MCNC: 8.7 MG/DL (ref 8.5–10.1)
CHLORIDE SERPL-SCNC: 108 MMOL/L (ref 94–109)
CO2 SERPL-SCNC: 23 MMOL/L (ref 20–32)
CREAT SERPL-MCNC: 1.3 MG/DL (ref 0.66–1.25)
DIFFERENTIAL METHOD BLD: ABNORMAL
EOSINOPHIL # BLD AUTO: 0.1 10E9/L (ref 0–0.7)
EOSINOPHIL NFR BLD AUTO: 0.9 %
ERYTHROCYTE [DISTWIDTH] IN BLOOD BY AUTOMATED COUNT: 17.1 % (ref 10–15)
GFR SERPL CREATININE-BSD FRML MDRD: 58 ML/MIN/{1.73_M2}
GLUCOSE SERPL-MCNC: 110 MG/DL (ref 70–99)
HCT VFR BLD AUTO: 30.8 % (ref 40–53)
HGB BLD-MCNC: 10.1 G/DL (ref 13.3–17.7)
IMM GRANULOCYTES # BLD: 0 10E9/L (ref 0–0.4)
IMM GRANULOCYTES NFR BLD: 0.3 %
LYMPHOCYTES # BLD AUTO: 1.3 10E9/L (ref 0.8–5.3)
LYMPHOCYTES NFR BLD AUTO: 18.8 %
MCH RBC QN AUTO: 33.2 PG (ref 26.5–33)
MCHC RBC AUTO-ENTMCNC: 32.8 G/DL (ref 31.5–36.5)
MCV RBC AUTO: 101 FL (ref 78–100)
MONOCYTES # BLD AUTO: 0.8 10E9/L (ref 0–1.3)
MONOCYTES NFR BLD AUTO: 11.5 %
NEUTROPHILS # BLD AUTO: 4.6 10E9/L (ref 1.6–8.3)
NEUTROPHILS NFR BLD AUTO: 68.1 %
NRBC # BLD AUTO: 0 10*3/UL
NRBC BLD AUTO-RTO: 0 /100
PLATELET # BLD AUTO: 186 10E9/L (ref 150–450)
POTASSIUM SERPL-SCNC: 3.9 MMOL/L (ref 3.4–5.3)
PROT SERPL-MCNC: 5.8 G/DL (ref 6.8–8.8)
RBC # BLD AUTO: 3.04 10E12/L (ref 4.4–5.9)
SODIUM SERPL-SCNC: 138 MMOL/L (ref 133–144)
WBC # BLD AUTO: 6.7 10E9/L (ref 4–11)

## 2020-05-25 PROCEDURE — 36591 DRAW BLOOD OFF VENOUS DEVICE: CPT

## 2020-05-25 PROCEDURE — 86301 IMMUNOASSAY TUMOR CA 19-9: CPT | Performed by: INTERNAL MEDICINE

## 2020-05-25 PROCEDURE — 25000128 H RX IP 250 OP 636: Performed by: INTERNAL MEDICINE

## 2020-05-25 PROCEDURE — 85025 COMPLETE CBC W/AUTO DIFF WBC: CPT | Performed by: INTERNAL MEDICINE

## 2020-05-25 PROCEDURE — 80053 COMPREHEN METABOLIC PANEL: CPT | Performed by: INTERNAL MEDICINE

## 2020-05-25 RX ORDER — HEPARIN SODIUM (PORCINE) LOCK FLUSH IV SOLN 100 UNIT/ML 100 UNIT/ML
5 SOLUTION INTRAVENOUS
Status: COMPLETED | OUTPATIENT
Start: 2020-05-25 | End: 2020-05-25

## 2020-05-25 RX ADMIN — Medication 5 ML: at 09:58

## 2020-05-25 NOTE — NURSING NOTE
"Chief Complaint   Patient presents with     Port Draw     labs drawn from port by rn.      Port accessed with 20 gauge 3/4\" gripper needle and labs drawn by rn.  Port flushed with NS and heparin then de-accessed.  Pt tolerated well.      Lashawn Gill RN      "

## 2020-05-26 ENCOUNTER — TELEPHONE (OUTPATIENT)
Dept: ONCOLOGY | Facility: CLINIC | Age: 65
End: 2020-05-26

## 2020-05-26 ENCOUNTER — COMMUNICATION - HEALTHEAST (OUTPATIENT)
Dept: FAMILY MEDICINE | Facility: CLINIC | Age: 65
End: 2020-05-26

## 2020-05-26 NOTE — TELEPHONE ENCOUNTER
Oral Chemotherapy Monitoring Program   Lab Follow-Up    Placed call to patient in follow up of lab work completed yesterday (5/25) for Xeloda oral chemotherapy.     Labs:  CBC RESULTS:   Recent Labs   Lab Test 05/25/20  1005   WBC 6.7   RBC 3.04*   HGB 10.1*   HCT 30.8*   *   MCH 33.2*   MCHC 32.8   RDW 17.1*      ANC 4.6     Last Comprehensive Metabolic Panel:  Sodium   Date Value Ref Range Status   05/25/2020 138 133 - 144 mmol/L Final     Potassium   Date Value Ref Range Status   05/25/2020 3.9 3.4 - 5.3 mmol/L Final     Chloride   Date Value Ref Range Status   05/25/2020 108 94 - 109 mmol/L Final     Carbon Dioxide   Date Value Ref Range Status   05/25/2020 23 20 - 32 mmol/L Final     Anion Gap   Date Value Ref Range Status   05/25/2020 7 3 - 14 mmol/L Final     Glucose   Date Value Ref Range Status   05/25/2020 110 (H) 70 - 99 mg/dL Final     Urea Nitrogen   Date Value Ref Range Status   05/25/2020 28 7 - 30 mg/dL Final     Creatinine   Date Value Ref Range Status   05/25/2020 1.30 (H) 0.66 - 1.25 mg/dL Final     GFR Estimate   Date Value Ref Range Status   05/25/2020 58 (L) >60 mL/min/[1.73_m2] Final     Comment:     Non  GFR Calc  Starting 12/18/2018, serum creatinine based estimated GFR (eGFR) will be   calculated using the Chronic Kidney Disease Epidemiology Collaboration   (CKD-EPI) equation.       Calcium   Date Value Ref Range Status   05/25/2020 8.7 8.5 - 10.1 mg/dL Final     Bilirubin Total   Date Value Ref Range Status   05/25/2020 0.5 0.2 - 1.3 mg/dL Final     Alkaline Phosphatase   Date Value Ref Range Status   05/25/2020 204 (H) 40 - 150 U/L Final     ALT   Date Value Ref Range Status   05/25/2020 41 0 - 70 U/L Final     AST   Date Value Ref Range Status   05/25/2020 32 0 - 45 U/L Final       Assessment/Plan:  I detailed to Krishna that his lab results were stable. Noted elevations in his alk phos, but not as elevated as historically seen. Krishna reports that he is tolerating  therapy well so far. He started his most recent cycle on 5/21/2020. He denied any concerns at this time.     Follow-Up:   6/15: review labs/appt with Dr. Magali Pastor, PharmD  Oral Chemotherapy Monitoring Program  [facility]  [phone]  May 26, 2020

## 2020-05-27 LAB — CANCER AG19-9 SERPL-ACNC: 235 U/ML (ref 0–37)

## 2020-05-28 NOTE — PROGRESS NOTES
This is a recent snapshot of the patient's [facility] medical record.  For current drug dose and complete information and questions, call [phone] or In Basket pool, fv home infusion ([id number])  CSN Number:  [id number]

## 2020-05-29 NOTE — PHARMACY - PREOPERATIVE ASSESSMENT CENTER
Anticoagulation Note - Preoperative Assessment Center (PAC) Pharmacist     Patient was interviewed via phone call on May 29, 2020 prior to PAC clinic appointment. The purpose of this note is to document the perioperative anticoagulation plan outlined by the providers caring for Girish Chauhan.     Current Regimen  Anticoagulation Regimen as of May 29, 2020: apixaban (Eliquis) 5 mg PO BID  Indication: atrial fibrillation.   Prescriber:  Dr. Deangelo Rose  Expected Duration of therapy: indefinite  Current medications that may interact with this include: plavix    Perioperative plan  Girish Chauhan is scheduled for CYSTOSCOPY, WITH RIGHT RETROGRADE PYELOGRAM AND RIGHT URETERAL STENT EXCHANGE on 6/8/20 with Dr Walker.  Per epic message discussion with Dr. Walker patient can remain on his plavix and Eliquis for the upcoming procedure.     Jeff Ward [facility]  May 29, 2020  9:47 AM

## 2020-05-29 NOTE — PROGRESS NOTES
Preoperative Assessment Center Medication History Note    Medication history completed via phone call on May 29, 2020 by this writer. See Epic admission navigator for prior to admission medications. Operating room staff will still need to confirm medications and last dose information on day of surgery.     Medication history interview sources:  patient, payor information    Changes made to PTA medication list (reason)  Added: none  Deleted: IV chemo titus (not on any longer).   Changed: Lasix sig,     Additional medication history information (including reliability of information, actions taken by pharmacist):    -- Patient declines being on any other prescription or over-the-counter medications    Prior to Admission medications    Medication Sig Last Dose Taking? Auth Provider   acetaminophen (TYLENOL) 500 MG tablet Take 500 mg by mouth every 6 hours as needed for fever or pain Taking Yes Unknown, Entered By History   allopurinol (ZYLOPRIM) 100 MG tablet Take 100 mg by mouth every evening  Taking Yes Reported, Patient   apixaban ANTICOAGULANT (ELIQUIS) 5 MG tablet Take 5 mg by mouth 2 times daily  Taking Yes Jennifer Jalloh APRN CNP   atorvastatin (LIPITOR) 40 MG tablet Take 40 mg by mouth every evening  Taking Yes Unknown, Entered By History   calcium carbonate (TUMS) 500 MG chewable tablet Take 1-3 chew tab by mouth 4 times daily as needed for heartburn  Taking Yes Unknown, Entered By History   capecitabine (XELODA) 500 MG tablet Take 3 tablets (1,500 mg) by mouth 2 times daily for 14 days Days 1 through 14, then off for 7 days. Take within 30 mins after meal. Taking Yes Naresh De Los Santos MD   clopidogrel (PLAVIX) 75 MG tablet Take 75 mg by mouth every morning  Taking Yes Jennifer Jalloh APRN CNP   colchicine (COLCYRS) 0.6 MG tablet Take 0.6 mg by mouth 3 times daily as needed (gout flare)  Taking Yes Unknown, Entered By History   fluticasone (FLONASE) 50 MCG/ACT nasal spray Spray 1 spray into both  nostrils daily Taking Yes Unknown, Entered By History   furosemide (LASIX) 20 MG tablet Take 20 mg by mouth every other day  Taking Yes Jennifer Jalloh APRN CNP   metoprolol tartrate (LOPRESSOR) 50 MG tablet Take 150 mg by mouth 2 times daily  Taking Yes Unknown, Entered By History   multivitamin w/minerals (THERA-VIT-M) tablet Take 1 tablet by mouth daily Taking Yes Unknown, Entered By History   ondansetron (ZOFRAN) 4 MG tablet Take 1 tablet (4 mg) by mouth every 8 hours as needed for nausea Taking Yes Shannon Lopes, PAAndreaC   sennosides (SENOKOT) 8.6 MG tablet Take 1 tablet by mouth every evening  Taking Yes Reported, Patient   Nitroglycerin (NITROSTAT SL) Place 0.4 mg under the tongue every 5 minutes as needed for chest pain (Carries medication - has never used)  Not Taking  Reported, Patient          Medication history completed by: Jeff Ward [facility]

## 2020-06-01 ENCOUNTER — ANESTHESIA EVENT (OUTPATIENT)
Dept: SURGERY | Facility: CLINIC | Age: 65
End: 2020-06-01
Payer: COMMERCIAL

## 2020-06-01 ENCOUNTER — OFFICE VISIT (OUTPATIENT)
Dept: SURGERY | Facility: CLINIC | Age: 65
End: 2020-06-01
Payer: COMMERCIAL

## 2020-06-01 ENCOUNTER — RECORDS - HEALTHEAST (OUTPATIENT)
Dept: ADMINISTRATIVE | Facility: OTHER | Age: 65
End: 2020-06-01

## 2020-06-01 DIAGNOSIS — Z01.818 PREOP EXAMINATION: Primary | ICD-10-CM

## 2020-06-01 LAB
ALBUMIN UR-MCNC: NEGATIVE MG/DL
APPEARANCE UR: CLEAR
BACTERIA #/AREA URNS HPF: ABNORMAL /HPF
BILIRUB UR QL STRIP: NEGATIVE
COLOR UR AUTO: YELLOW
GLUCOSE UR STRIP-MCNC: NEGATIVE MG/DL
HGB UR QL STRIP: ABNORMAL
KETONES UR STRIP-MCNC: NEGATIVE MG/DL
LEUKOCYTE ESTERASE UR QL STRIP: NEGATIVE
MUCOUS THREADS #/AREA URNS LPF: PRESENT /LPF
NITRATE UR QL: NEGATIVE
PH UR STRIP: 6 PH (ref 5–7)
RBC #/AREA URNS AUTO: 54 /HPF (ref 0–2)
SOURCE: ABNORMAL
SP GR UR STRIP: 1.01 (ref 1–1.03)
SQUAMOUS #/AREA URNS AUTO: <1 /HPF (ref 0–1)
UROBILINOGEN UR STRIP-MCNC: 0 MG/DL (ref 0–2)
WBC #/AREA URNS AUTO: 5 /HPF (ref 0–5)

## 2020-06-01 ASSESSMENT — LIFESTYLE VARIABLES: TOBACCO_USE: 0

## 2020-06-01 ASSESSMENT — ENCOUNTER SYMPTOMS
DYSRHYTHMIAS: 1
SEIZURES: 0

## 2020-06-01 NOTE — ANESTHESIA PREPROCEDURE EVALUATION
Anesthesia Pre-Procedure Evaluation    Patient: Girish Chauhan   MRN:     [id number] Gender:   male   Age:    65 year old :      1955        Preoperative Diagnosis: Hydronephrosis, right [N13.30]  Cholangiocarcinoma (H) [C22.1]   Procedure(s):  CYSTOSCOPY, WITH RIGHT RETROGRADE PYELOGRAM AND RIGHT URETERAL STENT EXCHANGE     LABS:  CBC:   Lab Results   Component Value Date    WBC 6.7 2020    WBC 6.8 2020    HGB 10.1 (L) 2020    HGB 9.4 (L) 2020    HCT 30.8 (L) 2020    HCT 29.3 (L) 2020     2020     2020     BMP:   Lab Results   Component Value Date     2020     2020    POTASSIUM 3.9 2020    POTASSIUM 4.0 2020    CHLORIDE 108 2020    CHLORIDE 108 2020    CO2 23 2020    CO2 25 2020    BUN 28 2020    BUN 32 (H) 2020    CR 1.30 (H) 2020    CR 1.29 (H) 2020     (H) 2020    GLC 97 2020     COAGS:   Lab Results   Component Value Date    PTT 33 2020    INR 1.68 (H) 2020    FIBR 404 2019     POC:   Lab Results   Component Value Date    BGM 89 2020     OTHER:   Lab Results   Component Value Date    PH 7.38 2016    LACT 1.1 2020    GARY 8.7 2020    PHOS 2.8 2020    MAG 2.0 2020    ALBUMIN 2.8 (L) 2020    PROTTOTAL 5.8 (L) 2020    ALT 41 2020    AST 32 2020    ALKPHOS 204 (H) 2020    BILITOTAL 0.5 2020    LIPASE 176 2020    AMYLASE 71 2019    TAYLOR 24 2020    TSH 3.88 10/15/2018    CRP 20.0 (H) 2020        Preop Vitals    BP Readings from Last 3 Encounters:   20 119/80   20 106/75   20 124/79    Pulse Readings from Last 3 Encounters:   20 82   20 72   20 99      Resp Readings from Last 3 Encounters:   20 17   20 16   20 16    SpO2 Readings from Last 3 Encounters:   20 99%   20 99%  "  02/24/20 98%      Temp Readings from Last 1 Encounters:   06/01/20 97.8  F (36.6  C) (Oral)    Ht Readings from Last 1 Encounters:   06/01/20 1.803 m (5' 11\")      Wt Readings from Last 1 Encounters:   06/01/20 71.2 kg (157 lb)    Estimated body mass index is 21.9 kg/m  as calculated from the following:    Height as of this encounter: 1.803 m (5' 11\").    Weight as of this encounter: 71.2 kg (157 lb).     LDA:  Port A Cath Single 12/08/17 Right Chest wall (Active)   Access Date 05/25/20 05/25/20 1000   Access Attempts 1 05/25/20 1000   Gauge Noncoring 90 degree bend;20 gauge;3/4 inch 05/25/20 1000   Site Assessment WDL 05/25/20 1000   Line Status Blood return noted;Heparin locked 05/25/20 1000   Dressing Intervention Gauze 05/25/20 1000   De-Access Date 05/25/20 05/25/20 1000   Date to be Reflushed 06/25/20 05/25/20 1000   Number of days: 906        Past Medical History:   Diagnosis Date     Anemia      Aortic stenosis due to bicuspid aortic valve      Atrial fibrillation (H) 2006    hx of paroxysmal atrial fibrillation since approximately 2006. S/p cardioversion in 2013 with recurrent atrial fibrillation s/p repeat cardioversion 9/29/14.     Basal cell carcinoma 1/2016    right shoulder and right posterior calf s/p electrodessication and curretage 1/2016.     CAD (coronary artery disease) 12/2011    s/p angiogram 12/2011 s/p percutaneous intervention on the LAD with multiple drug-eluting stents complicated by a small perforation in the diagonal branch which sealed spontaneously.  Patient with significant Circumflex stenosis which is being treated conservatively.     Cholangiocarcinoma (H)      CKD (chronic kidney disease)      Gout     affects left foot 2-3 times per year     Hyperlipidemia      Hypertension      Liver disease      Melanoma (H) 9/2015    back s/p resection 9/2015     Squamous cell carcinoma     nose s/p excision      Past Surgical History:   Procedure Laterality Date     " ------------OTHER-------------      multiple skin cancer resections     CARDIOVERSION  2013, 9/2014     COMBINED CYSTOSCOPY, RETROGRADES, EXCHANGE STENT URETER(S) Right 11/11/2019    Procedure: Cystoscopy, Right Retrograde Pyelogram, Right Ureteral Stent Exchange;  Surgeon: Sivan Walker MD;  [facility]     CYSTOSCOPY, RETROGRADES, INSERT STENT URETER(S), COMBINED N/A 9/16/2019    Procedure: Cystoscopy, Right Retrograde Pyelogram, Right Ureteral Stent Placement;  Surgeon: Sivan Walker MD;  [facility]     CYSTOSCOPY, RETROGRADES, INSERT STENT URETER(S), COMBINED Right 2/5/2020    Procedure: CYSTOSCOPY, WITH Right RETROGRADE PYELOGRAM AND Right URETERAL STENT INSERTION;  Surgeon: Sivan Walker MD;  [facility]     ENDOSCOPIC RETROGRADE CHOLANGIOPANCREATOGRAM N/A 2/26/2016    Procedure: COMBINED ENDOSCOPIC RETROGRADE CHOLANGIOPANCREATOGRAPHY, PLACE TUBE/STENT;  Surgeon: Rafa Andrade MD;  [facility]     ENDOSCOPIC RETROGRADE CHOLANGIOPANCREATOGRAPHY  2/2014     ENDOSCOPIC ULTRASOUND UPPER GASTROINTESTINAL TRACT (GI) N/A 6/7/2019    Procedure: ENDOSCOPIC ULTRASOUND, with hot axios stent placement;  Surgeon: Gabino Rodriguez MD;  [facility]     ENTEROSCOPY SMALL BOWEL N/A 6/7/2019    Procedure: ENTEROSCOPY;  Surgeon: Gabino Rodriguez MD;  [facility]     ESOPHAGOSCOPY, GASTROSCOPY, DUODENOSCOPY (EGD), COMBINED N/A 10/16/2019    Procedure: Upper Gastrointestinal Endoscopy;  Surgeon: Gabino Rodriguez MD;  [facility]     ESOPHAGOSCOPY, GASTROSCOPY, DUODENOSCOPY (EGD), DILATATION, COMBINED N/A 7/29/2019    Procedure: Esophagoscopy, gastroscopy, duodenoscopy (EGD), with stent exchange and dilation;  Surgeon: Gabino Rodriguez MD;  [facility]     EXPLORE COMMON BILE DUCT N/A 3/8/2016    Procedure: EXPLORE COMMON BILE DUCT;  Surgeon: Jose Eduardo Pinedo MD;  [facility]     HEPATECTOMY PARTIAL Left 3/8/2016    Procedure:  HEPATECTOMY PARTIAL;  Surgeon: Jose Eduardo Pinedo MD;  [facility]     INSERT PORT VASCULAR ACCESS Right 12/8/2017    Procedure: INSERT PORT VASCULAR ACCESS;  Place single lumen venous chest port - right;  Surgeon: Drew Powell PA-C;  [facility]     STENT  12/2011    LAD with multiple SAM      Allergies   Allergen Reactions     Blood Transfusion Related (Informational Only) Other (See Comments)     Patient has a history of a clinically significant antibody against RBC antigens.  A delay in compatible RBCs may occur.        Anesthesia Evaluation     . Pt has had prior anesthetic. Type: General and MAC    No history of anesthetic complications          ROS/MED HX    ENT/Pulmonary:     (+)MARVIN risk factors hypertension, , . .   (-) tobacco use   Neurologic:     (+)neuropathy - feet,    (-) seizures, CVA, TIA and migraines   Cardiovascular: Comment:  History of multivessel stenting in 2011/non-ST segment elevation myocardial infarction in May 2019, failed stenting of obtuse marginal at [facility] and successful stenting of the same vessel at [facility] on June 17, 2019, no angina.    (+) Dyslipidemia, hypertension--CAD, -past MI,-stent,2011 x4, 6/17/2019 x 1  5 Drug Eluting Stent .. Taking blood thinners Pt has received instructions: Instructions Given to patient: Continuing Plavix and Eliquis-okay per surgery. CHF Last EF: 42% date: 9/30/19 . . :. dysrhythmias a-fib, valvular problems/murmurs type: AS moderate.:. Previous cardiac testing Echodate:9/30/19results:date: results:ECG reviewed date:2/11/20 results:A fib with RVR, inferior infarct age undetermined.   Cath date: 6/17/2019 results:   Result Narrative        65 yo M with known CAD, remote PCI of LAD in 2011, presented in May 2019   to [facility] with a NSTEMI, with coronary angiography there showing patent   LAD stents with a severe stenosis in OM1 that could not be successfully   crossed with a wire.     (Known metastatic  cholangiocarcinoma s/p initial resection 2016, more   recent chemotherapy; and s/p Axios stenting of carcinomatous obstruction   of biliary limb of prior Carol-en-Y jejunostomy June 2019 when he presented   with an SBO)    Returns today for repeat coronary angiography with another attempt at the   OM1 stenosis    LM minimal dz  LAD patent prox to distal stents  LCx 99% OM1 stenosis with disease extension for 10-15mm on either side of   stenosis; mild dz elsewhere in Cx  RCA mild to moderate diffuse dz, 50-60% focal distal RCA lesion    Successful PCI of OM1 with PTCA followed by 2.5x24 Synergy SAM  0% residual, NITZA 3 flow post    OM1 lesion essentially behaving as a ; requiring wire escalation,   progressive PTCA and Guidezilla support for PCI              METS/Exercise Tolerance: Comment: Current chemotherapy medication has caused fatigue which was started in September 2019.    Hematologic:     (+) Anemia, History of Transfusion (July 2019 with known antibodies. ) no previous transfusion reaction -      Musculoskeletal:   (+) arthritis (Gout related),  other musculoskeletal- gout      GI/Hepatic: Comment: Known metastatic cholangiocarcinoma s/p initial resection 2016, more recent chemotherapy; and s/p Axios stenting of carcinomatous obstruction  of biliary limb of prior Carol-en-Y jejunostomy June 2019.      (+) GERD (takes TUMS as needed. No recent symptoms) Other, cholecystitis/cholelithiasis, Other GI/Hepatic cholangiocarcinoma, s/p partial hepatatectomy, bowel stent in place     Acute appendicitis: s/p cholecystectomy.   Renal/Genitourinary:     (+) chronic renal disease, type: CRI, Pt does not require dialysis, Pt has no history of transplant, Other Renal/ Genitourinary, right hydronephrosis      Endo:  - neg endo ROS   (+) Other Endocrine Disorder gout.      Psychiatric:  - neg psychiatric ROS       Infectious Disease:  - neg infectious disease ROS       Malignancy:   (+) Malignancy History of Other and  Skin  Skin CA Remission status post Surgery, Other CA cholangiocarcinoma on Xeloda  Active status post Chemo and Surgery         Other:    (+) No chance of pregnancy C-spine cleared: N/A, no H/O Chronic Pain,no other significant disability                        PHYSICAL EXAM:   Mental Status/Neuro: A/A/O; Age Appropriate   Airway: Facies: Feasible  Mallampati: I  Mouth/Opening: Full  TM distance: > 6 cm  Neck ROM: Limited   Respiratory: Auscultation: Wheezing     Resp. Rate: Normal     Resp. Effort: Normal      CV: Rhythm: Afib  Heart: Murmur  Edema: None  Pulses: Normal  Neck: Normal   Comments: Full beard      Dental: Normal Dentition                Assessment:   ASA SCORE: 3            Plan:   Anes. Type:  General   Pre-Medication: None   Induction:  IV (Standard)   Airway: LMA   Access/Monitoring: PIV   Maintenance: Balanced     Postop Plan:   Postop Pain: Opioids  Postop Sedation/Airway: Not planned     PONV Management:  NO PONV Prophylaxis Required     CONSENT: Direct conversation                      PAC Discussion and Assessment    ASA Classification: 3  Case is suitable for: [facility]  Anesthetic techniques and relevant risks discussed: PAC Recommendations anesthetic techniques: Choice - patient prefers MAC.  Invasive monitoring and risk discussed:   Types:   Possibility and Risk of blood transfusion discussed:   NPO instructions given:   Additional anesthetic preparation and risks discussed:   Needs early admission to pre-op area:   Other:     PAC Resident/NP Anesthesia Assessment:  Krishna is a 65 year old man who is scheduled for CYSTOSCOPY, WITH RIGHT RETROGRADE PYELOGRAM AND RIGHT URETERAL STENT EXCHANGE on 6/8/20 by Dr. Walker in treatment of  Hydronephrosis, right .  PAC referral for risk assessment and optimization for anesthesia with comorbid conditions of CAD, a fib, bicuspid aortic valve, mild aortic stenosis, HTN, HLD, anemia, gout, cholangiocarcinoma, CKD:    Pre-operative considerations:  1.   Cardiac:  Functional status- METS >4, the patient walks 1 mile 4-5 times a week. He denies any cardiac symptoms.  Low risk surgery with 0.9% (RCRI #) risk of major adverse cardiac event.   ~ CAD - the patient had multiple SAM placed to LAD in 2011 and had 1 SAM placed to OM in 5/2019. He has had recent cardiac testing with EKG and echo.  Okay to continue Plavix and Eliquis per Dr. Walker.  ~ Ascending aortic aneurysm - 3.9 cm and stable.   ~ CHF - EF 42% on recent echo - No edema on today's exam, continue lasix.   ~ Mild aortic stenosis 26 mmHg.   ~ HTN/HLD - continue metoprolol, lipitor    ~ A fib - CHADSVASC = 3, okay to continue Plavix and Eliquis per Dr. Walker.    2.  Pulm:  Airway feasible.  MARVIN risk: Intermediate (male, age, HTN)   ~ The patient does have slight wheeze on LLL on exam. He has a normal white count and denies any respiratory symptoms. Groundglass opacity was seen on recent Chest CT scan and he is scheduled for follow up scan on 6/12/20. Will notify oncology team of findings. Otherwise after discussion with bib George to proceed.     3. Heme: Anemia - hgb was 10.1 on 5/25/20. Low bleeding risk.      4. Endo: Gout - continue allopurinol. Patient takes colchicine as need for flare.     5. GI:  Risk of PONV score = 1.  If > 2, anti-emetic intervention recommended.  ~ The patient has metastatic cholangiocarcinoma s/p resection and with history of SBO s/p stents - he denies any issues with digestion and is supplementing his nutrition with high protein drink as needed. He is on his 2 weeks of Xeloda and will be off next week.   ~ GERD - the patient takes TUMs as needed but hasn't taken recently.     6. : Hydronephrosis - procedure as above.   ~ CKD - baseline creatinine 1.3-1.4. Consideration for close monitoring of fluids and avoid nephrotoxins.     VTE risk: 3%    Patient is optimized and is acceptable candidate for the proposed procedure.  No further diagnostic evaluation is needed.     Patient  discussed with Dr. Grullon.     For further details of assessment, testing, and physical exam please see H and P completed on same date.    Milagro Naranjo PA-C          Mid-Level Provider/Resident:   Date:   Time:     Attending Anesthesiologist Anesthesia Assessment:        Anesthesiologist:   Date:   Time:   Pass/Fail:   Disposition:     PAC Pharmacist Assessment:        Pharmacist:   Date:   Time:    Milagro Naranjo PA-C

## 2020-06-02 ENCOUNTER — COMMUNICATION - HEALTHEAST (OUTPATIENT)
Dept: FAMILY MEDICINE | Facility: CLINIC | Age: 65
End: 2020-06-02

## 2020-06-03 ENCOUNTER — PATIENT OUTREACH (OUTPATIENT)
Dept: UROLOGY | Facility: CLINIC | Age: 65
End: 2020-06-03

## 2020-06-03 DIAGNOSIS — T45.1X5A ANEMIA ASSOCIATED WITH CHEMOTHERAPY: Primary | ICD-10-CM

## 2020-06-03 DIAGNOSIS — N39.0 URINARY TRACT INFECTION: ICD-10-CM

## 2020-06-03 DIAGNOSIS — N39.0 URINARY TRACT INFECTION: Primary | ICD-10-CM

## 2020-06-03 DIAGNOSIS — C22.1 CHOLANGIOCARCINOMA (H): ICD-10-CM

## 2020-06-03 DIAGNOSIS — D64.81 ANEMIA ASSOCIATED WITH CHEMOTHERAPY: Primary | ICD-10-CM

## 2020-06-03 RX ORDER — CAPECITABINE 500 MG/1
800 TABLET, FILM COATED ORAL 2 TIMES DAILY
Qty: 84 TABLET | Refills: 0 | Status: SHIPPED | OUTPATIENT
Start: 2020-06-03 | End: 2020-06-25

## 2020-06-03 NOTE — TELEPHONE ENCOUNTER
Lab called to add urine culture, but I am unable to due to urine was not added to preservatives right away.  Left message for patient to come in today to drop off more.  Patient confirmed he would be here at 230 today  covid-19 testing on Friday at the [facility] building. Noted patient has cough but he denies fever, sore throat, any upset stomach.Pre-op physical completed Monday 6/1/2020  Tena Blanca RN   Care Coordinator Urology

## 2020-06-03 NOTE — ORAL ONC MGMT
Oral Chemotherapy Monitoring Program    Krishna is scheduled to start his next cycle of Xeloda of 6/11. He doesn't have labs until 6/12 and sees Dr. De Los Santos on 6/15. I told Krishna not to start taking next cycle of chemo until he has his labs drawn on 6/12 so could start on 6/13 or wait until he sees Dr. De Los Santos on 6/15. He is fine with doing whatever Dr. De Los Santos wants him to do. Inbasket was sent to Dr. De Los Santos asking when patient should start. I released the script to his out of state mail pharmacy so he will have when he is ready to start his next cycle.    Polly Guajardo, Pharm.D., [facility]  [phone]  06/03/20

## 2020-06-04 LAB
BACTERIA SPEC CULT: NO GROWTH
Lab: NORMAL
SPECIMEN SOURCE: NORMAL

## 2020-06-05 DIAGNOSIS — Z11.59 ENCOUNTER FOR SCREENING FOR OTHER VIRAL DISEASES: ICD-10-CM

## 2020-06-06 LAB
SARS-COV-2 RNA SPEC QL NAA+PROBE: NOT DETECTED
SPECIMEN SOURCE: NORMAL

## 2020-06-08 ENCOUNTER — APPOINTMENT (OUTPATIENT)
Dept: GENERAL RADIOLOGY | Facility: CLINIC | Age: 65
End: 2020-06-08
Attending: UROLOGY
Payer: COMMERCIAL

## 2020-06-08 ENCOUNTER — HOSPITAL ENCOUNTER (OUTPATIENT)
Facility: CLINIC | Age: 65
Discharge: HOME | End: 2020-06-08
Attending: UROLOGY | Admitting: UROLOGY
Payer: COMMERCIAL

## 2020-06-08 ENCOUNTER — ANESTHESIA (OUTPATIENT)
Dept: SURGERY | Facility: CLINIC | Age: 65
End: 2020-06-08
Payer: COMMERCIAL

## 2020-06-08 VITALS
HEART RATE: 81 BPM | DIASTOLIC BLOOD PRESSURE: 87 MMHG | HEIGHT: 71 IN | WEIGHT: 153.88 LBS | BODY MASS INDEX: 21.54 KG/M2 | SYSTOLIC BLOOD PRESSURE: 124 MMHG | RESPIRATION RATE: 16 BRPM | OXYGEN SATURATION: 99 % | TEMPERATURE: 97.9 F

## 2020-06-08 DIAGNOSIS — N13.30 HYDRONEPHROSIS, RIGHT: ICD-10-CM

## 2020-06-08 DIAGNOSIS — C22.1 CHOLANGIOCARCINOMA (H): ICD-10-CM

## 2020-06-08 LAB
CREAT SERPL-MCNC: 1.16 MG/DL (ref 0.66–1.25)
GFR SERPL CREATININE-BSD FRML MDRD: 67 ML/MIN/{1.73_M2}
GLUCOSE SERPL-MCNC: 95 MG/DL (ref 70–99)
POTASSIUM SERPL-SCNC: 3.6 MMOL/L (ref 3.4–5.3)

## 2020-06-08 PROCEDURE — 84132 ASSAY OF SERUM POTASSIUM: CPT | Performed by: ANESTHESIOLOGY

## 2020-06-08 PROCEDURE — C1769 GUIDE WIRE: HCPCS | Performed by: UROLOGY

## 2020-06-08 PROCEDURE — 36000059 ZZH SURGERY LEVEL 3 EA 15 ADDTL MIN UMMC: Performed by: UROLOGY

## 2020-06-08 PROCEDURE — 37000008 ZZH ANESTHESIA TECHNICAL FEE, 1ST 30 MIN: Performed by: UROLOGY

## 2020-06-08 PROCEDURE — 37000009 ZZH ANESTHESIA TECHNICAL FEE, EACH ADDTL 15 MIN: Performed by: UROLOGY

## 2020-06-08 PROCEDURE — 25000125 ZZHC RX 250: Performed by: STUDENT IN AN ORGANIZED HEALTH CARE EDUCATION/TRAINING PROGRAM

## 2020-06-08 PROCEDURE — 25000125 ZZHC RX 250: Performed by: UROLOGY

## 2020-06-08 PROCEDURE — 36415 COLL VENOUS BLD VENIPUNCTURE: CPT | Performed by: ANESTHESIOLOGY

## 2020-06-08 PROCEDURE — 25800030 ZZH RX IP 258 OP 636: Performed by: STUDENT IN AN ORGANIZED HEALTH CARE EDUCATION/TRAINING PROGRAM

## 2020-06-08 PROCEDURE — 25000128 H RX IP 250 OP 636: Performed by: STUDENT IN AN ORGANIZED HEALTH CARE EDUCATION/TRAINING PROGRAM

## 2020-06-08 PROCEDURE — 27210794 ZZH OR GENERAL SUPPLY STERILE: Performed by: UROLOGY

## 2020-06-08 PROCEDURE — 82947 ASSAY GLUCOSE BLOOD QUANT: CPT | Performed by: ANESTHESIOLOGY

## 2020-06-08 PROCEDURE — 40000278 XR SURGERY CARM FLUORO LESS THAN 5 MIN: Mod: TC

## 2020-06-08 PROCEDURE — 25000128 H RX IP 250 OP 636: Performed by: UROLOGY

## 2020-06-08 PROCEDURE — 25500064 ZZH RX 255 OP 636: Performed by: UROLOGY

## 2020-06-08 PROCEDURE — 40000170 ZZH STATISTIC PRE-PROCEDURE ASSESSMENT II: Performed by: UROLOGY

## 2020-06-08 PROCEDURE — C2617 STENT, NON-COR, TEM W/O DEL: HCPCS | Performed by: UROLOGY

## 2020-06-08 PROCEDURE — 82565 ASSAY OF CREATININE: CPT | Performed by: ANESTHESIOLOGY

## 2020-06-08 PROCEDURE — 71000027 ZZH RECOVERY PHASE 2 EACH 15 MINS: Performed by: UROLOGY

## 2020-06-08 PROCEDURE — 36000061 ZZH SURGERY LEVEL 3 W FLUORO 1ST 30 MIN - UMMC: Performed by: UROLOGY

## 2020-06-08 DEVICE — STENT URETERAL PERCUFLEX PLUS 7FRX26CM M0061752730
Type: IMPLANTABLE DEVICE | Site: URETER | Status: NON-FUNCTIONAL
Removed: 2020-10-12

## 2020-06-08 RX ORDER — ONDANSETRON 2 MG/ML
4 INJECTION INTRAMUSCULAR; INTRAVENOUS EVERY 30 MIN PRN
Status: DISCONTINUED | OUTPATIENT
Start: 2020-06-08 | End: 2020-06-08 | Stop reason: HOSPADM

## 2020-06-08 RX ORDER — PROPOFOL 10 MG/ML
INJECTION, EMULSION INTRAVENOUS PRN
Status: DISCONTINUED | OUTPATIENT
Start: 2020-06-08 | End: 2020-06-08

## 2020-06-08 RX ORDER — CEFAZOLIN SODIUM 1 G/3ML
1 INJECTION, POWDER, FOR SOLUTION INTRAMUSCULAR; INTRAVENOUS SEE ADMIN INSTRUCTIONS
Status: DISCONTINUED | OUTPATIENT
Start: 2020-06-08 | End: 2020-06-08 | Stop reason: HOSPADM

## 2020-06-08 RX ORDER — MAGNESIUM HYDROXIDE 1200 MG/15ML
LIQUID ORAL PRN
Status: DISCONTINUED | OUTPATIENT
Start: 2020-06-08 | End: 2020-06-08 | Stop reason: HOSPADM

## 2020-06-08 RX ORDER — OXYCODONE HYDROCHLORIDE 5 MG/1
5 TABLET ORAL EVERY 4 HOURS PRN
Status: DISCONTINUED | OUTPATIENT
Start: 2020-06-08 | End: 2020-06-08 | Stop reason: HOSPADM

## 2020-06-08 RX ORDER — FENTANYL CITRATE 50 UG/ML
INJECTION, SOLUTION INTRAMUSCULAR; INTRAVENOUS PRN
Status: DISCONTINUED | OUTPATIENT
Start: 2020-06-08 | End: 2020-06-08

## 2020-06-08 RX ORDER — ONDANSETRON 2 MG/ML
INJECTION INTRAMUSCULAR; INTRAVENOUS PRN
Status: DISCONTINUED | OUTPATIENT
Start: 2020-06-08 | End: 2020-06-08

## 2020-06-08 RX ORDER — PHENYLEPHRINE HYDROCHLORIDE 10 MG/ML
INJECTION INTRAVENOUS PRN
Status: DISCONTINUED | OUTPATIENT
Start: 2020-06-08 | End: 2020-06-08

## 2020-06-08 RX ORDER — ONDANSETRON 4 MG/1
4 TABLET, ORALLY DISINTEGRATING ORAL EVERY 30 MIN PRN
Status: DISCONTINUED | OUTPATIENT
Start: 2020-06-08 | End: 2020-06-08 | Stop reason: HOSPADM

## 2020-06-08 RX ORDER — MEPERIDINE HYDROCHLORIDE 25 MG/ML
12.5 INJECTION INTRAMUSCULAR; INTRAVENOUS; SUBCUTANEOUS
Status: DISCONTINUED | OUTPATIENT
Start: 2020-06-08 | End: 2020-06-08 | Stop reason: HOSPADM

## 2020-06-08 RX ORDER — FENTANYL CITRATE 50 UG/ML
25-50 INJECTION, SOLUTION INTRAMUSCULAR; INTRAVENOUS
Status: DISCONTINUED | OUTPATIENT
Start: 2020-06-08 | End: 2020-06-08 | Stop reason: HOSPADM

## 2020-06-08 RX ORDER — LIDOCAINE HYDROCHLORIDE 20 MG/ML
JELLY TOPICAL PRN
Status: DISCONTINUED | OUTPATIENT
Start: 2020-06-08 | End: 2020-06-08 | Stop reason: HOSPADM

## 2020-06-08 RX ORDER — SODIUM CHLORIDE, SODIUM LACTATE, POTASSIUM CHLORIDE, CALCIUM CHLORIDE 600; 310; 30; 20 MG/100ML; MG/100ML; MG/100ML; MG/100ML
INJECTION, SOLUTION INTRAVENOUS CONTINUOUS PRN
Status: DISCONTINUED | OUTPATIENT
Start: 2020-06-08 | End: 2020-06-08

## 2020-06-08 RX ORDER — PROPOFOL 10 MG/ML
INJECTION, EMULSION INTRAVENOUS CONTINUOUS PRN
Status: DISCONTINUED | OUTPATIENT
Start: 2020-06-08 | End: 2020-06-08

## 2020-06-08 RX ORDER — CEFAZOLIN SODIUM 2 G/100ML
2 INJECTION, SOLUTION INTRAVENOUS
Status: COMPLETED | OUTPATIENT
Start: 2020-06-08 | End: 2020-06-08

## 2020-06-08 RX ORDER — LIDOCAINE HYDROCHLORIDE 20 MG/ML
INJECTION, SOLUTION INFILTRATION; PERINEURAL PRN
Status: DISCONTINUED | OUTPATIENT
Start: 2020-06-08 | End: 2020-06-08

## 2020-06-08 RX ORDER — LABETALOL HYDROCHLORIDE 5 MG/ML
10 INJECTION, SOLUTION INTRAVENOUS
Status: DISCONTINUED | OUTPATIENT
Start: 2020-06-08 | End: 2020-06-08 | Stop reason: HOSPADM

## 2020-06-08 RX ORDER — SODIUM CHLORIDE, SODIUM LACTATE, POTASSIUM CHLORIDE, CALCIUM CHLORIDE 600; 310; 30; 20 MG/100ML; MG/100ML; MG/100ML; MG/100ML
INJECTION, SOLUTION INTRAVENOUS CONTINUOUS
Status: DISCONTINUED | OUTPATIENT
Start: 2020-06-08 | End: 2020-06-08 | Stop reason: HOSPADM

## 2020-06-08 RX ORDER — NALOXONE HYDROCHLORIDE 0.4 MG/ML
.1-.4 INJECTION, SOLUTION INTRAMUSCULAR; INTRAVENOUS; SUBCUTANEOUS
Status: DISCONTINUED | OUTPATIENT
Start: 2020-06-08 | End: 2020-06-08 | Stop reason: HOSPADM

## 2020-06-08 RX ADMIN — PHENYLEPHRINE HYDROCHLORIDE 50 MCG: 10 INJECTION INTRAVENOUS at 12:40

## 2020-06-08 RX ADMIN — SODIUM CHLORIDE, POTASSIUM CHLORIDE, SODIUM LACTATE AND CALCIUM CHLORIDE: 600; 310; 30; 20 INJECTION, SOLUTION INTRAVENOUS at 12:10

## 2020-06-08 RX ADMIN — PHENYLEPHRINE HYDROCHLORIDE 50 MCG: 10 INJECTION INTRAVENOUS at 12:28

## 2020-06-08 RX ADMIN — PHENYLEPHRINE HYDROCHLORIDE 50 MCG: 10 INJECTION INTRAVENOUS at 12:44

## 2020-06-08 RX ADMIN — LIDOCAINE HYDROCHLORIDE 60 MG: 20 INJECTION, SOLUTION INFILTRATION; PERINEURAL at 12:17

## 2020-06-08 RX ADMIN — PHENYLEPHRINE HYDROCHLORIDE 50 MCG: 10 INJECTION INTRAVENOUS at 12:37

## 2020-06-08 RX ADMIN — PROPOFOL 40 MG: 10 INJECTION, EMULSION INTRAVENOUS at 12:19

## 2020-06-08 RX ADMIN — PHENYLEPHRINE HYDROCHLORIDE 50 MCG: 10 INJECTION INTRAVENOUS at 12:46

## 2020-06-08 RX ADMIN — PHENYLEPHRINE HYDROCHLORIDE 50 MCG: 10 INJECTION INTRAVENOUS at 12:19

## 2020-06-08 RX ADMIN — CEFAZOLIN 2 G: 10 INJECTION, POWDER, FOR SOLUTION INTRAVENOUS at 12:16

## 2020-06-08 RX ADMIN — PROPOFOL 10 MG: 10 INJECTION, EMULSION INTRAVENOUS at 12:30

## 2020-06-08 RX ADMIN — PROPOFOL 10 MG: 10 INJECTION, EMULSION INTRAVENOUS at 12:31

## 2020-06-08 RX ADMIN — PHENYLEPHRINE HYDROCHLORIDE 50 MCG: 10 INJECTION INTRAVENOUS at 12:34

## 2020-06-08 RX ADMIN — ONDANSETRON 4 MG: 2 INJECTION INTRAMUSCULAR; INTRAVENOUS at 12:24

## 2020-06-08 RX ADMIN — PHENYLEPHRINE HYDROCHLORIDE 50 MCG: 10 INJECTION INTRAVENOUS at 12:31

## 2020-06-08 RX ADMIN — PROPOFOL 100 MCG/KG/MIN: 10 INJECTION, EMULSION INTRAVENOUS at 12:16

## 2020-06-08 RX ADMIN — FENTANYL CITRATE 25 MCG: 50 INJECTION, SOLUTION INTRAMUSCULAR; INTRAVENOUS at 12:18

## 2020-06-08 ASSESSMENT — MIFFLIN-ST. JEOR: SCORE: 1514.87

## 2020-06-08 NOTE — DISCHARGE INSTRUCTIONS
Same-Day Surgery   Adult Discharge Orders & Instructions     For 24 hours after surgery:  1. Get plenty of rest.  A responsible adult must stay with you for at least 24 hours after you leave the hospital.   2. Pain medication can slow your reflexes. Do not drive or use heavy equipment.  If you have weakness or tingling, don't drive or use heavy equipment until this feeling goes away.  3. Mixing alcohol and pain medication can cause dizziness and slow your breathing. It can even be fatal. Do not drink alcohol while taking pain medication.  4. Avoid strenuous or risky activities.  Ask for help when climbing stairs.   5. You may feel lightheaded.  If so, sit for a few minutes before standing.  Have someone help you get up.   6. If you have nausea (feel sick to your stomach), drink only clear liquids such as apple juice, ginger ale, broth or 7-Up.  Rest may also help.  Be sure to drink enough fluids.  Move to a regular diet as you feel able. Take pain medications with a small amount of solid food, such as toast or crackers, to avoid nausea.   7. A slight fever is normal. Call the doctor if your fever is over 100 F (37.7 C) (taken under the tongue) or lasts longer than 24 hours.  8. You may have a dry mouth, muscle aches, trouble sleeping or a sore throat.  These symptoms should go away after 24 hours.  9. Do not make important or legal decisions.   Pain Management:      1. Take pain medication (if prescribed) for pain as directed by your physician.        2. WARNING: If the pain medication you have been prescribed contains Tylenol  (acetaminophen), DO NOT take additional doses of Tylenol (acetaminophen).     Call your doctor for any of the followin.  Signs of infection (fever, growing tenderness at the surgery site, severe pain, a large amount of drainage or bleeding, foul-smelling drainage, redness, swelling).    2.  It has been over 8 to 10 hours since surgery and you are still not able to urinate (pee).    3.   Headache for over 24 hours.    4.  Numbness, tingling or weakness the day after surgery (if you had spinal anesthesia).  To contact a doctor, call _____________________________________ or:      [phone] and ask for the Resident On Call for:          ______urology______________________ (answered 24 hours a day)      Emergency Department:  [place] [facility]: [phone]  [place] [facility]: [phone]               Rev. 10/2014   Discharge Instructions: Following a Cystoscopy/Stent and/or Ureteroscopy    Drainage:    Urine may be slightly bloody but should taper off in a few days.    You may have some frequency and urgency for a few days.    Comfort:    A burning sensation when urinating is common. Drinking extra fluids helps decrease this burning feeling and also helps to clear the bloody urine.    Mild abdominal cramps may occur for a few days.    Baths:    Daily tub baths aide in passing urine.    Frequent use of bubble bath is discouraged since it encourages urinary tract infections.    Report to your doctor at once if you experience:    Inability to pass your urine    Continuous or heavy bleeding    Severe Pain    Elevated temperature > than 101.5 for 24 hours    Home Activity:    The day of surgery spend a quiet evening at home.    Increase activity as tolerated.    Rev. 5/12

## 2020-06-08 NOTE — OR NURSING
Patient having some mumbled speech. Talked with Patient's wife Mily, and she stated this has happened before where his speech is more mumbled after anesthesia but it clears up. Searched in chart, and found previous encounter where this was documented that patient had increased mumbling after anesthesia.     Updated Dr. Cadena. Neuro exam negative. Does not need to see patient prior to discharge home. Ok to discharge when ready.

## 2020-06-08 NOTE — ANESTHESIA CARE TRANSFER NOTE
Patient: Girish Chauhan    Procedure(s):  CYSTOSCOPY, WITH RIGHT RETROGRADE PYELOGRAM AND RIGHT URETERAL STENT EXCHANGE    Diagnosis: Hydronephrosis, right [N13.30]  Cholangiocarcinoma (H) [C22.1]  Diagnosis Additional Information: No value filed.    Anesthesia Type:   General     Note:  Airway :Face Mask  Patient transferred to:Phase II  Comments: VSS. Breathing spontaneously at a regular rate with adequate tidal volumes and maintaining O2 sats on 6L facemask. Denies nausea or pain. No apparent complications from anesthesia.     Siva Valderrama DO  CA-3  Handoff Report: Identifed the Patient, Identified the Reponsible Provider, Reviewed the pertinent medical history, Discussed the surgical course, Reviewed Intra-OP anesthesia mangement and issues during anesthesia, Set expectations for post-procedure period and Allowed opportunity for questions and acknowledgement of understanding      Vitals: (Last set prior to Anesthesia Care Transfer)    CRNA VITALS  6/8/2020 1222 - 6/8/2020 1304      6/8/2020             Pulse:  94    Ht Rate:  77    SpO2:  100 %    Resp Rate (observed):  (!) 5                Electronically Signed By: Siva Valderrama DO  June 8, 2020  1:04 PM

## 2020-06-08 NOTE — ANESTHESIA POSTPROCEDURE EVALUATION
Anesthesia POST Procedure Evaluation    Patient: Girish Chauhan   MRN:     [id number] Gender:   male   Age:    65 year old :      1955        Preoperative Diagnosis: Hydronephrosis, right [N13.30]  Cholangiocarcinoma (H) [C22.1]   Procedure(s):  CYSTOSCOPY, WITH RIGHT RETROGRADE PYELOGRAM AND RIGHT URETERAL STENT EXCHANGE   Postop Comments: No value filed.     Anesthesia Type: General       Disposition: Outpatient   Postop Pain Control: Uneventful            Sign Out: Well controlled pain   PONV: No   Neuro/Psych: Uneventful            Sign Out: Acceptable/Baseline neuro status   Airway/Respiratory: Uneventful            Sign Out: Acceptable/Baseline resp. status   CV/Hemodynamics: Uneventful            Sign Out: Acceptable CV status   Other NRE: NONE   DID A NON-ROUTINE EVENT OCCUR? No         Last Anesthesia Record Vitals:  CRNA VITALS  2020 1222 - 2020 1300      2020             Pulse:  94    Ht Rate:  77    SpO2:  100 %    Resp Rate (observed):  (!) 5          Last PACU Vitals:  Vitals Value Taken Time   /81 2020 12:55 PM   Temp     Pulse 79 2020 12:55 PM   Resp     SpO2 100 % 2020 12:56 PM   Temp src     NIBP 117/82 2020 12:49 PM   Pulse 94 2020 12:52 PM   SpO2 100 % 2020 12:52 PM   Resp     Temp 35.5  C (95.9  F) 2020 12:49 PM   Ht Rate 77 2020 12:52 PM   Temp 2     Vitals shown include unvalidated device data.      Electronically Signed By: Emerald Diehl MD, 2020, 1:00 PM

## 2020-06-08 NOTE — OP NOTE
June 8, 2020    Preoperative diagnosis: Metastatic cholangiocarcinoma with right hydronephrosis    Postoperative diagnosis: Same    Procedure: Cystoscopy with right retrograde pyelogram, right ureteral stent exchange, intraoperative interpretation of imaging    Surgeon: Sivan Walker MD     Assistant: None    Anesthesia: MAN    EBL: minimaL  UOP: NOT RECORDED  IVF: please see anesthesia record    Drains:7X26 fr stent    Complications: None noted    Specimens: None    Findings: High bladder neck with moderately obstructing median lobe, moderate fullness of the right renal pelvis, stent in appropriate position    Indication for procedure: Girish Chauhan is 65 year old with history of recurrent cholangiocarcinoma (with biopsy proven metasatic disease to bladder in past), afib on apixaban, and chronic right hydronephrosis suspected to be 2/2 mass compression. Given this, urology was consulted and we placed a right ureteral stent (6F, 28 cm) on 9/16/2019. F/u imaging noted interval improvement of the hydronephrosis but there was still some residual hydronephrosis; this was investigated with a renogram in the past, which showed delay that was suspected to be d/t either partial obstruction or chronic dilation. We thus offered the above procedure of stent exchange.  He thus far has tolerated the stent without any pain, hematuria or UTI.     The patient was counseled on the alternatives, risks, and benefits and elected to proceed with the above stated procedure.    Summary of procedure:  After patient was identified in the pre-operative area and procedure verified, informed consent was obtained.  After this patient was taken to the operating suite and placed in the supine position.  Intravenous davis-operative antibiotics were administered by anesthesia.  After appropriate anesthesia was induced and the airway secured, patient was placed in the dorsal lithotomy position in supportive yellow-fin stirrups with careful  attention to neural safety in positioning of all four extremities.  At this time patient was prepped and draped in the standard fashion.      A time out was performed to confirm patient and procedure.    A 22 Fr rigid cystoscope was inserted into a normal appearing urethral meatus.  The prostate was moderately obstructing.  The urothelium was carefully examined and some trabeculation otherwise no obvious tumors, stones, foreign body or other urothelial abnormalities noted. Bilateral ureteral orifices were noted in the normal orthotopic position.  Stent on the patient's right side was visualized slightly encrusted, grasped and brought to the meatus.  Sensor wire placed up to the pelvis and stent removed.  Open ended catheter placed up to pelvis and retrograde done to show the moderate dilation of the pelvis.  The wire was then replaced, open ended removed and a new 7 x 26 Fr stent placed with position confirmed radiologically.      Once appropriate hemostasis was obtained the bladder was drained and the procedure terminated.  Counts were reported as correct    Patient went to the recovery room in good condition    I spoke to his wife via the telephone at the end of the case    Plan: Discharge home. Follow up for stent exchange in 3-4 months    Sivan Walker MD MPH   of Urology

## 2020-06-09 ENCOUNTER — TELEPHONE (OUTPATIENT)
Dept: ONCOLOGY | Facility: CLINIC | Age: 65
End: 2020-06-09

## 2020-06-09 NOTE — ORAL ONC MGMT
Oral Chemotherapy Monitoring Program    Call placed to Krishna to alert him that Dr. De Los Santos does not want patient to start Xeloda cycle until he has seen Dr. De Los Santos on 6/15. Patient was due to start next cycle on 6/11. Patient is getting labs/scan on 6/12. Patient understands plan and thanked me for the call.    Polly Guajardo, Pharm.D., [facility]  [phone]  06/09/20

## 2020-06-10 ENCOUNTER — COMMUNICATION - HEALTHEAST (OUTPATIENT)
Dept: CARDIOLOGY | Facility: CLINIC | Age: 65
End: 2020-06-10

## 2020-06-10 DIAGNOSIS — Q23.81 BICUSPID AORTIC VALVE: ICD-10-CM

## 2020-06-10 DIAGNOSIS — I35.0 NONRHEUMATIC AORTIC VALVE STENOSIS: ICD-10-CM

## 2020-06-12 ENCOUNTER — PATIENT OUTREACH (OUTPATIENT)
Dept: UROLOGY | Facility: CLINIC | Age: 65
End: 2020-06-12

## 2020-06-12 ENCOUNTER — COMMUNICATION - HEALTHEAST (OUTPATIENT)
Dept: CARDIOLOGY | Facility: CLINIC | Age: 65
End: 2020-06-12

## 2020-06-12 ENCOUNTER — ANCILLARY PROCEDURE (OUTPATIENT)
Dept: CT IMAGING | Facility: CLINIC | Age: 65
End: 2020-06-12
Attending: INTERNAL MEDICINE
Payer: COMMERCIAL

## 2020-06-12 ENCOUNTER — HOSPITAL ENCOUNTER (OUTPATIENT)
Dept: CARDIOLOGY | Facility: HOSPITAL | Age: 65
Discharge: HOME | End: 2020-06-12
Attending: INTERNAL MEDICINE

## 2020-06-12 DIAGNOSIS — C22.1 CHOLANGIOCARCINOMA (H): ICD-10-CM

## 2020-06-12 DIAGNOSIS — D64.81 ANEMIA ASSOCIATED WITH CHEMOTHERAPY: ICD-10-CM

## 2020-06-12 DIAGNOSIS — Q23.81 BICUSPID AORTIC VALVE: ICD-10-CM

## 2020-06-12 DIAGNOSIS — T45.1X5A ANEMIA ASSOCIATED WITH CHEMOTHERAPY: ICD-10-CM

## 2020-06-12 DIAGNOSIS — I35.0 NONRHEUMATIC AORTIC VALVE STENOSIS: ICD-10-CM

## 2020-06-12 LAB
ALBUMIN SERPL-MCNC: 3 G/DL (ref 3.4–5)
ALP SERPL-CCNC: 166 U/L (ref 40–150)
ALT SERPL W P-5'-P-CCNC: 37 U/L (ref 0–70)
ANION GAP SERPL CALCULATED.3IONS-SCNC: 7 MMOL/L (ref 3–14)
AORTIC ROOT: 4.3 CM
AORTIC VALVE MEAN VELOCITY: 198 CM/S
AST SERPL W P-5'-P-CCNC: 41 U/L (ref 0–45)
AV DIMENSIONLESS INDEX VTI: 0.3
AV MEAN GRADIENT: 18 MMHG
AV PEAK GRADIENT: 32.3 MMHG
AV VALVE AREA: 1.3 CM2
AV VELOCITY RATIO: 0.3
BASOPHILS # BLD AUTO: 0 10E9/L (ref 0–0.2)
BASOPHILS NFR BLD AUTO: 0.3 %
BILIRUB SERPL-MCNC: 0.5 MG/DL (ref 0.2–1.3)
BSA FOR ECHO PROCEDURE: 1.96 M2
BUN SERPL-MCNC: 25 MG/DL (ref 7–30)
CALCIUM SERPL-MCNC: 8.3 MG/DL (ref 8.5–10.1)
CHLORIDE SERPL-SCNC: 111 MMOL/L (ref 94–109)
CO2 SERPL-SCNC: 25 MMOL/L (ref 20–32)
CREAT SERPL-MCNC: 1.24 MG/DL (ref 0.66–1.25)
CV BLOOD PRESSURE: ABNORMAL MMHG
CV ECHO HEIGHT: 70.8 IN
CV ECHO WEIGHT: 169 LBS
DIFFERENTIAL METHOD BLD: ABNORMAL
DOP CALC AO PEAK VEL: 284 CM/S
DOP CALC AO VTI: 59.7 CM
DOP CALC LVOT AREA: 4.91 CM2
DOP CALC LVOT DIAMETER: 2.5 CM
DOP CALC LVOT PEAK VEL: 86.5 CM/S
DOP CALC LVOT STROKE VOLUME: 78.5 CM3
DOP CALCLVOT PEAK VEL VTI: 16 CM
EJECTION FRACTION: 42 % (ref 55–75)
EOSINOPHIL # BLD AUTO: 0.5 10E9/L (ref 0–0.7)
EOSINOPHIL NFR BLD AUTO: 6 %
ERYTHROCYTE [DISTWIDTH] IN BLOOD BY AUTOMATED COUNT: 17.6 % (ref 10–15)
FRACTIONAL SHORTENING: 43.9 % (ref 28–44)
GFR SERPL CREATININE-BSD FRML MDRD: 61 ML/MIN/{1.73_M2}
GLUCOSE SERPL-MCNC: 80 MG/DL (ref 70–99)
HCT VFR BLD AUTO: 31.6 % (ref 40–53)
HGB BLD-MCNC: 10.6 G/DL (ref 13.3–17.7)
IMM GRANULOCYTES # BLD: 0 10E9/L (ref 0–0.4)
IMM GRANULOCYTES NFR BLD: 0.3 %
INTERVENTRICULAR SEPTUM IN END DIASTOLE: 1.41 CM (ref 0.6–1)
IVS/PW RATIO: 1.1
LA AREA 1: 17 CM2
LA AREA 2: 17.2 CM2
LEFT ATRIUM LENGTH: 5 CM
LEFT ATRIUM SIZE: 3 CM
LEFT ATRIUM TO AORTIC ROOT RATIO: 0.65 NO UNITS
LEFT ATRIUM VOLUME INDEX: 25.4 ML/M2
LEFT ATRIUM VOLUME: 49.7 ML
LEFT VENTRICLE CARDIAC INDEX: 3 L/MIN/M2
LEFT VENTRICLE CARDIAC OUTPUT: 5.9 L/MIN
LEFT VENTRICLE DIASTOLIC VOLUME INDEX: 64.8 CM3/M2 (ref 34–74)
LEFT VENTRICLE DIASTOLIC VOLUME: 127 CM3 (ref 62–150)
LEFT VENTRICLE HEART RATE: 75 BPM
LEFT VENTRICLE MASS INDEX: 154.9 G/M2
LEFT VENTRICLE SYSTOLIC VOLUME INDEX: 37.8 CM3/M2 (ref 11–31)
LEFT VENTRICLE SYSTOLIC VOLUME: 74.1 CM3 (ref 21–61)
LEFT VENTRICULAR INTERNAL DIMENSION IN DIASTOLE: 5.24 CM (ref 4.2–5.8)
LEFT VENTRICULAR INTERNAL DIMENSION IN SYSTOLE: 2.94 CM (ref 2.5–4)
LEFT VENTRICULAR MASS: 303.7 G
LEFT VENTRICULAR OUTFLOW TRACT MEAN GRADIENT: 2 MMHG
LEFT VENTRICULAR OUTFLOW TRACT MEAN VELOCITY: 62.9 CM/S
LEFT VENTRICULAR OUTFLOW TRACT PEAK GRADIENT: 3 MMHG
LEFT VENTRICULAR POSTERIOR WALL IN END DIASTOLE: 1.33 CM (ref 0.6–1)
LV STROKE VOLUME INDEX: 40.1 ML/M2
LYMPHOCYTES # BLD AUTO: 1.9 10E9/L (ref 0.8–5.3)
LYMPHOCYTES NFR BLD AUTO: 21.1 %
MCH RBC QN AUTO: 33.5 PG (ref 26.5–33)
MCHC RBC AUTO-ENTMCNC: 33.5 G/DL (ref 31.5–36.5)
MCV RBC AUTO: 100 FL (ref 78–100)
MITRAL VALVE DECELERATION SLOPE: 6140 MM/S2
MITRAL VALVE PRESSURE HALF-TIME: 45 MS
MONOCYTES # BLD AUTO: 1.1 10E9/L (ref 0–1.3)
MONOCYTES NFR BLD AUTO: 12.4 %
MV AVERAGE E/E' RATIO: 20.1 CM/S
MV DECELERATION TIME: 155 MS
MV E'TISSUE VEL-LAT: 5.03 CM/S
MV E'TISSUE VEL-MED: 4.45 CM/S
MV LATERAL E/E' RATIO: 18.9
MV MEDIAL E/E' RATIO: 21.4
MV PEAK E VELOCITY: 95.1 CM/S
MV VALVE AREA PRESSURE 1/2 METHOD: 4.9 CM2
NEUTROPHILS # BLD AUTO: 5.4 10E9/L (ref 1.6–8.3)
NEUTROPHILS NFR BLD AUTO: 59.9 %
NRBC # BLD AUTO: 0 10*3/UL
NRBC BLD AUTO-RTO: 0 /100
NUC REST DIASTOLIC VOLUME INDEX: 2711 LBS
NUC REST SYSTOLIC VOLUME INDEX: 70.75 IN
PLATELET # BLD AUTO: 179 10E9/L (ref 150–450)
POTASSIUM SERPL-SCNC: 3.4 MMOL/L (ref 3.4–5.3)
PR MAX PG: 4 MMHG
PR PEAK VELOCITY: 106 CM/S
PROT SERPL-MCNC: 5.9 G/DL (ref 6.8–8.8)
RBC # BLD AUTO: 3.16 10E12/L (ref 4.4–5.9)
SODIUM SERPL-SCNC: 143 MMOL/L (ref 133–144)
TRICUSPID REGURGITATION PEAK PRESSURE GRADIENT: 28.5 MMHG
TRICUSPID VALVE ANULAR PLANE SYSTOLIC EXCURSION: 2 CM
TRICUSPID VALVE PEAK REGURGITANT VELOCITY: 267 CM/S
WBC # BLD AUTO: 9.1 10E9/L (ref 4–11)

## 2020-06-12 PROCEDURE — 86301 IMMUNOASSAY TUMOR CA 19-9: CPT | Performed by: INTERNAL MEDICINE

## 2020-06-12 PROCEDURE — 25000128 H RX IP 250 OP 636: Performed by: INTERNAL MEDICINE

## 2020-06-12 PROCEDURE — 80053 COMPREHEN METABOLIC PANEL: CPT | Performed by: INTERNAL MEDICINE

## 2020-06-12 PROCEDURE — 85025 COMPLETE CBC W/AUTO DIFF WBC: CPT | Performed by: INTERNAL MEDICINE

## 2020-06-12 RX ORDER — HEPARIN SODIUM (PORCINE) LOCK FLUSH IV SOLN 100 UNIT/ML 100 UNIT/ML
5 SOLUTION INTRAVENOUS DAILY PRN
Status: DISCONTINUED | OUTPATIENT
Start: 2020-06-12 | End: 2020-06-20 | Stop reason: HOSPADM

## 2020-06-12 RX ORDER — IOPAMIDOL 755 MG/ML
93 INJECTION, SOLUTION INTRAVASCULAR ONCE
Status: COMPLETED | OUTPATIENT
Start: 2020-06-12 | End: 2020-06-12

## 2020-06-12 RX ORDER — HEPARIN SODIUM (PORCINE) LOCK FLUSH IV SOLN 100 UNIT/ML 100 UNIT/ML
5 SOLUTION INTRAVENOUS ONCE
Status: COMPLETED | OUTPATIENT
Start: 2020-06-12 | End: 2020-06-12

## 2020-06-12 RX ADMIN — IOPAMIDOL 93 ML: 755 INJECTION, SOLUTION INTRAVASCULAR at 14:16

## 2020-06-12 RX ADMIN — Medication 5 ML: at 13:34

## 2020-06-12 RX ADMIN — HEPARIN SODIUM (PORCINE) LOCK FLUSH IV SOLN 100 UNIT/ML 5 ML: 100 SOLUTION at 14:58

## 2020-06-12 ASSESSMENT — MIFFLIN-ST. JEOR: SCORE: 1576.72

## 2020-06-12 NOTE — TELEPHONE ENCOUNTER
Sent my-chart message and left voicemail asking patient how he is doing since his redo stent exchange on 6/8/2020 with Dr. Sivan Walker.    Tena Blanca, RN   Care Coordinator Urology

## 2020-06-12 NOTE — NURSING NOTE
Chief Complaint   Patient presents with     Port Draw     Labs drawn via port by RN in lab.     Labs drawn via Port accessed using 20g flat needle. Line flushed and Heparin locked.     Deana George RN

## 2020-06-13 LAB — CANCER AG19-9 SERPL-ACNC: 252 U/ML (ref 0–37)

## 2020-06-15 ENCOUNTER — VIRTUAL VISIT (OUTPATIENT)
Dept: ONCOLOGY | Facility: CLINIC | Age: 65
End: 2020-06-15
Attending: INTERNAL MEDICINE
Payer: COMMERCIAL

## 2020-06-15 ENCOUNTER — RECORDS - HEALTHEAST (OUTPATIENT)
Dept: ADMINISTRATIVE | Facility: OTHER | Age: 65
End: 2020-06-15

## 2020-06-15 ENCOUNTER — OFFICE VISIT - HEALTHEAST (OUTPATIENT)
Dept: CARDIOLOGY | Facility: CLINIC | Age: 65
End: 2020-06-15

## 2020-06-15 DIAGNOSIS — C22.1 CHOLANGIOCARCINOMA (H): Primary | ICD-10-CM

## 2020-06-15 DIAGNOSIS — D64.81 ANEMIA ASSOCIATED WITH CHEMOTHERAPY: ICD-10-CM

## 2020-06-15 DIAGNOSIS — T45.1X5A ANEMIA ASSOCIATED WITH CHEMOTHERAPY: ICD-10-CM

## 2020-06-15 DIAGNOSIS — I50.22 CHRONIC SYSTOLIC CONGESTIVE HEART FAILURE (H): ICD-10-CM

## 2020-06-15 DIAGNOSIS — I48.91 ATRIAL FIBRILLATION WITH RVR (H): ICD-10-CM

## 2020-06-15 DIAGNOSIS — I25.10 CORONARY ARTERY DISEASE INVOLVING NATIVE CORONARY ARTERY OF NATIVE HEART, ANGINA PRESENCE UNSPECIFIED: ICD-10-CM

## 2020-06-15 PROCEDURE — 99214 OFFICE O/P EST MOD 30 MIN: CPT | Mod: GT | Performed by: INTERNAL MEDICINE

## 2020-06-15 PROCEDURE — 40000114 ZZH STATISTIC NO CHARGE CLINIC VISIT

## 2020-06-15 RX ORDER — LOSARTAN POTASSIUM 25 MG/1
TABLET ORAL
Status: ON HOLD | COMMUNITY
Start: 2020-02-28 | End: 2020-07-16

## 2020-06-15 ASSESSMENT — PAIN SCALES - GENERAL: PAINLEVEL: NO PAIN (0)

## 2020-06-15 NOTE — LETTER
"    6/15/2020         RE: Girish Chauhan  [address] [id number]        Dear Colleague,    Thank you for referring your patient, Girish Chauhan, to the [facility]. Please see a copy of my visit note below.    Girish Chauhan is a 65 year old male who is being evaluated via a billable video visit.      The patient has been notified of following:     \"This video visit will be conducted via a call between you and your physician/provider. We have found that certain health care needs can be provided without the need for an in-person physical exam.  This service lets us provide the care you need with a video conversation.  If a prescription is necessary we can send it directly to your pharmacy.  If lab work is needed we can place an order for that and you can then stop by our lab to have the test done at a later time.    Video visits are billed at different rates depending on your insurance coverage.  Please reach out to your insurance provider with any questions.    If during the course of the call the physician/provider feels a video visit is not appropriate, you will not be charged for this service.\"    Patient has given verbal consent for Video visit? Yes    Will anyone else be joining your video visit? No         I have reviewed and updated the patient's allergies and medication list.    Concerns: No new concerns.   Refills: None needed.          Sisi Lutz [facility]    Provider Note:    Krishna Chauhan is here today in follow-up of metastatic cholangiocarcinoma.    He had his original resection back in 2016 and then in 2017 had a recurrence within the wall of his bladder which initially responded well to cisplatin and gemcitabine but he had to stop both of those agents, first the cisplatin due to toxicity and then the gemcitabine due to TTP.  He was off treatment for quite a while but then had progression within the abdominal cavity with peritoneal mets on the surface of his liver.  " He was treated initially with capecitabine and had an myocardial infarction that we decided was unrelated to his treatment and he has since been on treatment with infusional 5-FU and now back on Xeloda.  We are evaluating him today for an ongoing response assessment after having had a good response at our last evaluation.  He tells me overall he feels like he is doing well.  He gets out and walks for at least a mile and a half a day and notes that he no longer has to take a break in the middle of his walk.  He is eating reasonably well but does not seem to be able to gain weight.  Most foods do not taste very good to him so he does not have a lot of interest in eating but his wife is doing a good job of making sure he has a broad and healthy diet.  He is not having trouble with nausea to any significant degree at present. His biggest complaint is that has lost his fingerprints and he finds that its much harder to maintain a  on smooth surfaces.  He also notes his nails are a little bit fragile but that is a minor issue for him.  The remainder of his 10 point review of systems is otherwise relatively unremarkable.  He he recently had his urinary stents changed and earlier today had a visit with his cardiologist to follow-up on his heart problems without any new or active issues identified.    GENERAL: Healthy, alert and no distress  EYES: Eyes grossly normal to inspection.  No discharge or erythema, or obvious scleral/conjunctival abnormalities.  RESP: No audible wheeze, cough, or visible cyanosis.  No visible retractions or increased work of breathing.    SKIN: Visible skin clear. No significant rash, abnormal pigmentation or lesions.  NEURO: Cranial nerves grossly intact.  Mentation and speech appropriate for age.  PSYCH: Mentation appears normal, affect normal/bright, judgement and insight intact, normal speech and appearance well-groomed.    I personally reviewed his imaging studies and went over the results  with him and his wife.  There is no evidence of progression of his disease on his scan.  His CA-19-9 level is stable at about 250.  His renal function is a improved.  His albumin is 3 and his liver enzymes are unremarkable.  He has mild anemia and otherwise unremarkable blood counts.    Assessment/plan: Metastatic cholangiocarcinoma with peritoneal metastasis in a patient with an improving performance status of ECOG 1 and evidence of stable disease on his current therapy.  We will continue on with the same dose and schedule of Xeloda and reevaluate his disease status again in another few months.    Video-Visit Details    Type of service:  Video Visit    Video Start Time: 4:00  Video End Time: 4:30    Originating Location (pt. Location): Home    Distant Location (provider location):  [facility]     Platform used for Video Visit: Faisal De Los Santos MD

## 2020-06-15 NOTE — PROGRESS NOTES
"Girish Chauhan is a 65 year old male who is being evaluated via a billable video visit.      The patient has been notified of following:     \"This video visit will be conducted via a call between you and your physician/provider. We have found that certain health care needs can be provided without the need for an in-person physical exam.  This service lets us provide the care you need with a video conversation.  If a prescription is necessary we can send it directly to your pharmacy.  If lab work is needed we can place an order for that and you can then stop by our lab to have the test done at a later time.    Video visits are billed at different rates depending on your insurance coverage.  Please reach out to your insurance provider with any questions.    If during the course of the call the physician/provider feels a video visit is not appropriate, you will not be charged for this service.\"    Patient has given verbal consent for Video visit? Yes    Will anyone else be joining your video visit? No         I have reviewed and updated the patient's allergies and medication list.    Concerns: No new concerns.   Refills: None needed.          Sisi Lutz, [facility]    Provider Note:    Krishna Chauhan is here today in follow-up of metastatic cholangiocarcinoma.    He had his original resection back in 2016 and then in 2017 had a recurrence within the wall of his bladder which initially responded well to cisplatin and gemcitabine but he had to stop both of those agents, first the cisplatin due to toxicity and then the gemcitabine due to TTP.  He was off treatment for quite a while but then had progression within the abdominal cavity with peritoneal mets on the surface of his liver.  He was treated initially with capecitabine and had an myocardial infarction that we decided was unrelated to his treatment and he has since been on treatment with infusional 5-FU and now back on Xeloda.  We are evaluating him today for an ongoing " response assessment after having had a good response at our last evaluation.  He tells me overall he feels like he is doing well.  He gets out and walks for at least a mile and a half a day and notes that he no longer has to take a break in the middle of his walk.  He is eating reasonably well but does not seem to be able to gain weight.  Most foods do not taste very good to him so he does not have a lot of interest in eating but his wife is doing a good job of making sure he has a broad and healthy diet.  He is not having trouble with nausea to any significant degree at present. His biggest complaint is that has lost his fingerprints and he finds that its much harder to maintain a  on smooth surfaces.  He also notes his nails are a little bit fragile but that is a minor issue for him.  The remainder of his 10 point review of systems is otherwise relatively unremarkable.  He he recently had his urinary stents changed and earlier today had a visit with his cardiologist to follow-up on his heart problems without any new or active issues identified.    GENERAL: Healthy, alert and no distress  EYES: Eyes grossly normal to inspection.  No discharge or erythema, or obvious scleral/conjunctival abnormalities.  RESP: No audible wheeze, cough, or visible cyanosis.  No visible retractions or increased work of breathing.    SKIN: Visible skin clear. No significant rash, abnormal pigmentation or lesions.  NEURO: Cranial nerves grossly intact.  Mentation and speech appropriate for age.  PSYCH: Mentation appears normal, affect normal/bright, judgement and insight intact, normal speech and appearance well-groomed.    I personally reviewed his imaging studies and went over the results with him and his wife.  There is no evidence of progression of his disease on his scan.  His CA-19-9 level is stable at about 250.  His renal function is a improved.  His albumin is 3 and his liver enzymes are unremarkable.  He has mild anemia and  otherwise unremarkable blood counts.    Assessment/plan: Metastatic cholangiocarcinoma with peritoneal metastasis in a patient with an improving performance status of ECOG 1 and evidence of stable disease on his current therapy.  We will continue on with the same dose and schedule of Xeloda and reevaluate his disease status again in another few months.    Video-Visit Details    Type of service:  Video Visit    Video Start Time: 4:00  Video End Time: 4:30    Originating Location (pt. Location): Home    Distant Location (provider location):  [facility]     Platform used for Video Visit: Faisal De Los Santos MD

## 2020-06-21 DIAGNOSIS — T45.1X5A ANEMIA ASSOCIATED WITH CHEMOTHERAPY: Primary | ICD-10-CM

## 2020-06-21 DIAGNOSIS — D64.81 ANEMIA ASSOCIATED WITH CHEMOTHERAPY: Primary | ICD-10-CM

## 2020-06-21 DIAGNOSIS — C22.1 CHOLANGIOCARCINOMA (H): ICD-10-CM

## 2020-06-25 DIAGNOSIS — D64.81 ANEMIA ASSOCIATED WITH CHEMOTHERAPY: Primary | ICD-10-CM

## 2020-06-25 DIAGNOSIS — T45.1X5A ANEMIA ASSOCIATED WITH CHEMOTHERAPY: Primary | ICD-10-CM

## 2020-06-25 DIAGNOSIS — C22.1 CHOLANGIOCARCINOMA (H): ICD-10-CM

## 2020-06-25 RX ORDER — CAPECITABINE 500 MG/1
800 TABLET, FILM COATED ORAL 2 TIMES DAILY
Qty: 84 TABLET | Refills: 0 | Status: ON HOLD | OUTPATIENT
Start: 2020-06-25 | End: 2020-07-16

## 2020-06-30 ENCOUNTER — TELEPHONE (OUTPATIENT)
Dept: ONCOLOGY | Facility: CLINIC | Age: 65
End: 2020-06-30

## 2020-06-30 DIAGNOSIS — D64.81 ANEMIA ASSOCIATED WITH CHEMOTHERAPY: ICD-10-CM

## 2020-06-30 DIAGNOSIS — T45.1X5A ANEMIA ASSOCIATED WITH CHEMOTHERAPY: ICD-10-CM

## 2020-06-30 DIAGNOSIS — C22.1 CHOLANGIOCARCINOMA (H): ICD-10-CM

## 2020-06-30 NOTE — TELEPHONE ENCOUNTER
Oral Chemotherapy Monitoring Program    Primary Oncologist: Dr. De Los Santos  Primary Oncology Clinic: [facility]  Cancer Diagnosis: Cholangiocarcinoma    Therapy History:  Xeloda 1500mg (3 x 500mg) by mouth BID, days 1-14 of a 21-day cycle    Drug Interaction Assessment: No new drug interactions identified    Lab Monitoring Plan  CMP and CBC q 3 weeks  Subjective/Objective:  Girish Chauhan is a 65 year old male contacted by phone for a follow-up visit for oral chemotherapy.  Krishna confirmed he is adherent to Xeloda therapy. He states that therapy is going well. He reports some neuropathy in his hands and feet. He also states he has some diarrhea and constipation but is using senna to manage. Krishna says his taste is off, but he is able to maintain a good diet. He denied any nausea. Krishna was wondering if the softening of his finger nails was normal.    ORAL CHEMOTHERAPY 4/22/2019 5/10/2019 3/23/2020 4/2/2020 5/7/2020 6/30/2020   Drug Name Xeloda (Capecitabine) Xeloda (Capecitabine) Xeloda (Capecitabine) Xeloda (Capecitabine) Xeloda (Capecitabine) Xeloda (Capecitabine)   Current Dosage 1500mg 1500mg 1500mg 1500mg 1500mg 1500mg   Current Schedule BID BID BID BID BID BID   Cycle Details 2 weeks on 1 week off Drug on Hold 2 weeks on 1 week off 2 weeks on 1 week off 2 weeks on 1 week off 2 weeks on 1 week off   Start Date of Last Cycle - 4/30/2019 - 3/26/2020 4/24/2020 6/15/2020   Planned next cycle start date - - - 4/16/2020 5/15/2020 7/6/2020   Doses missed in last 2 weeks - 0 - 0 0 0   Adherence Assessment - Adherent - Adherent Adherent Adherent   Adverse Effects - No AE identified during assessment - No AE identified during assessment No AE identified during assessment No AE identified during assessment   Any new drug interactions? - No - No - No   Is the dose as ordered appropriate for the patient? - - - Yes - Yes       Vitals:  BP:   BP Readings from Last 1 Encounters:   06/08/20 124/87     Wt Readings from Last 1  "Encounters:   06/08/20 69.8 kg (153 lb 14.1 oz)     Estimated body surface area is 1.87 meters squared as calculated from the following:    Height as of 6/8/20: 1.803 m (5' 10.98\").    Weight as of 6/8/20: 69.8 kg (153 lb 14.1 oz).    Labs:  _  Result Component Current Result Ref Range   Sodium 143 (6/12/2020) 133 - 144 mmol/L     _  Result Component Current Result Ref Range   Potassium 3.4 (6/12/2020) 3.4 - 5.3 mmol/L     _  Result Component Current Result Ref Range   Calcium 8.3 (L) (6/12/2020) 8.5 - 10.1 mg/dL     No results found for Mag within last 30 days.     No results found for Phos within last 30 days.     _  Result Component Current Result Ref Range   Albumin 3.0 (L) (6/12/2020) 3.4 - 5.0 g/dL     _  Result Component Current Result Ref Range   Urea Nitrogen 25 (6/12/2020) 7 - 30 mg/dL     _  Result Component Current Result Ref Range   Creatinine 1.24 (6/12/2020) 0.66 - 1.25 mg/dL       _  Result Component Current Result Ref Range   AST 41 (6/12/2020) 0 - 45 U/L     _  Result Component Current Result Ref Range   ALT 37 (6/12/2020) 0 - 70 U/L     _  Result Component Current Result Ref Range   Bilirubin Total 0.5 (6/12/2020) 0.2 - 1.3 mg/dL       _  Result Component Current Result Ref Range   WBC 9.1 (6/12/2020) 4.0 - 11.0 10e9/L     _  Result Component Current Result Ref Range   Hemoglobin 10.6 (L) (6/12/2020) 13.3 - 17.7 g/dL     _  Result Component Current Result Ref Range   Platelet Count 179 (6/12/2020) 150 - 450 10e9/L     _  Result Component Current Result Ref Range   Absolute Neutrophil 5.4 (6/12/2020) 1.6 - 8.3 10e9/L       Assessment:  Krishna is tolerating Xeloda therapy well.    Plan:  Continue Xeloda therapy as planned.    Follow-Up:  Review labs the week of 7/6    Refill Due:  7/16    Wendy Richards  Pharmacy Intern  [facility]  [phone]    "

## 2020-06-30 NOTE — TELEPHONE ENCOUNTER
Oral Chemotherapy Monitoring Program    Placed call to patient in follow up of capecitabine therapy.    Left message to please call back in follow up of therapy. No patient or drug names were mentioned.    Wendy Richards  Pharmacy Intern  [facility]  [phone]

## 2020-07-01 RX ORDER — CAPECITABINE 500 MG/1
TABLET, FILM COATED ORAL
Qty: 84 TABLET | Refills: 0 | OUTPATIENT
Start: 2020-07-01

## 2020-07-07 DIAGNOSIS — D64.81 ANEMIA ASSOCIATED WITH CHEMOTHERAPY: ICD-10-CM

## 2020-07-07 DIAGNOSIS — T45.1X5A ANEMIA ASSOCIATED WITH CHEMOTHERAPY: ICD-10-CM

## 2020-07-07 DIAGNOSIS — C22.1 CHOLANGIOCARCINOMA (H): ICD-10-CM

## 2020-07-07 LAB
ALBUMIN SERPL-MCNC: 3.4 G/DL (ref 3.4–5)
ALP SERPL-CCNC: 196 U/L (ref 40–150)
ALT SERPL W P-5'-P-CCNC: 55 U/L (ref 0–70)
ANION GAP SERPL CALCULATED.3IONS-SCNC: 7 MMOL/L (ref 3–14)
AST SERPL W P-5'-P-CCNC: 50 U/L (ref 0–45)
BASOPHILS # BLD AUTO: 0 10E9/L (ref 0–0.2)
BASOPHILS NFR BLD AUTO: 0.3 %
BILIRUB SERPL-MCNC: 0.7 MG/DL (ref 0.2–1.3)
BUN SERPL-MCNC: 42 MG/DL (ref 7–30)
CALCIUM SERPL-MCNC: 8.6 MG/DL (ref 8.5–10.1)
CHLORIDE SERPL-SCNC: 110 MMOL/L (ref 94–109)
CO2 SERPL-SCNC: 22 MMOL/L (ref 20–32)
CREAT SERPL-MCNC: 1.25 MG/DL (ref 0.66–1.25)
DIFFERENTIAL METHOD BLD: ABNORMAL
EOSINOPHIL # BLD AUTO: 0.7 10E9/L (ref 0–0.7)
EOSINOPHIL NFR BLD AUTO: 6.1 %
ERYTHROCYTE [DISTWIDTH] IN BLOOD BY AUTOMATED COUNT: 18.3 % (ref 10–15)
GFR SERPL CREATININE-BSD FRML MDRD: 61 ML/MIN/{1.73_M2}
GLUCOSE SERPL-MCNC: 85 MG/DL (ref 70–99)
HCT VFR BLD AUTO: 34.2 % (ref 40–53)
HGB BLD-MCNC: 11.1 G/DL (ref 13.3–17.7)
IMM GRANULOCYTES # BLD: 0 10E9/L (ref 0–0.4)
IMM GRANULOCYTES NFR BLD: 0.2 %
LYMPHOCYTES # BLD AUTO: 1.6 10E9/L (ref 0.8–5.3)
LYMPHOCYTES NFR BLD AUTO: 14.6 %
MCH RBC QN AUTO: 33.3 PG (ref 26.5–33)
MCHC RBC AUTO-ENTMCNC: 32.5 G/DL (ref 31.5–36.5)
MCV RBC AUTO: 103 FL (ref 78–100)
MONOCYTES # BLD AUTO: 1.4 10E9/L (ref 0–1.3)
MONOCYTES NFR BLD AUTO: 13 %
NEUTROPHILS # BLD AUTO: 7.3 10E9/L (ref 1.6–8.3)
NEUTROPHILS NFR BLD AUTO: 65.8 %
NRBC # BLD AUTO: 0 10*3/UL
NRBC BLD AUTO-RTO: 0 /100
PLATELET # BLD AUTO: 160 10E9/L (ref 150–450)
POTASSIUM SERPL-SCNC: 4 MMOL/L (ref 3.4–5.3)
PROT SERPL-MCNC: 6.4 G/DL (ref 6.8–8.8)
RBC # BLD AUTO: 3.33 10E12/L (ref 4.4–5.9)
SODIUM SERPL-SCNC: 139 MMOL/L (ref 133–144)
WBC # BLD AUTO: 11.1 10E9/L (ref 4–11)

## 2020-07-07 PROCEDURE — 86301 IMMUNOASSAY TUMOR CA 19-9: CPT | Performed by: INTERNAL MEDICINE

## 2020-07-07 PROCEDURE — 80053 COMPREHEN METABOLIC PANEL: CPT | Performed by: INTERNAL MEDICINE

## 2020-07-07 PROCEDURE — 25000128 H RX IP 250 OP 636: Performed by: INTERNAL MEDICINE

## 2020-07-07 PROCEDURE — 85025 COMPLETE CBC W/AUTO DIFF WBC: CPT | Performed by: INTERNAL MEDICINE

## 2020-07-07 RX ORDER — HEPARIN SODIUM (PORCINE) LOCK FLUSH IV SOLN 100 UNIT/ML 100 UNIT/ML
5 SOLUTION INTRAVENOUS DAILY PRN
Status: DISCONTINUED | OUTPATIENT
Start: 2020-07-07 | End: 2020-07-15 | Stop reason: HOSPADM

## 2020-07-07 RX ADMIN — HEPARIN SODIUM (PORCINE) LOCK FLUSH IV SOLN 100 UNIT/ML 5 ML: 100 SOLUTION at 07:08

## 2020-07-07 NOTE — NURSING NOTE
Chief Complaint   Patient presents with     Port Draw     Labs drawn via port by RN in lab.      Labs drawn via Port accessed using 20g gripper needle. Line flushed and Heparin locked.     Deana George RN

## 2020-07-08 LAB — CANCER AG19-9 SERPL-ACNC: 309 U/ML (ref 0–37)

## 2020-07-12 DIAGNOSIS — T45.1X5A ANEMIA ASSOCIATED WITH CHEMOTHERAPY: Primary | ICD-10-CM

## 2020-07-12 DIAGNOSIS — C22.1 CHOLANGIOCARCINOMA (H): ICD-10-CM

## 2020-07-12 DIAGNOSIS — D64.81 ANEMIA ASSOCIATED WITH CHEMOTHERAPY: Primary | ICD-10-CM

## 2020-07-15 ENCOUNTER — TELEPHONE (OUTPATIENT)
Dept: ONCOLOGY | Facility: CLINIC | Age: 65
End: 2020-07-15

## 2020-07-15 ENCOUNTER — HOSPITAL ENCOUNTER (INPATIENT)
Facility: CLINIC | Age: 65
LOS: 1 days | Discharge: HOME | DRG: 872 | End: 2020-07-16
Attending: EMERGENCY MEDICINE | Admitting: INTERNAL MEDICINE
Payer: COMMERCIAL

## 2020-07-15 ENCOUNTER — APPOINTMENT (OUTPATIENT)
Dept: GENERAL RADIOLOGY | Facility: CLINIC | Age: 65
DRG: 872 | End: 2020-07-15
Attending: EMERGENCY MEDICINE
Payer: COMMERCIAL

## 2020-07-15 ENCOUNTER — RECORDS - HEALTHEAST (OUTPATIENT)
Dept: ADMINISTRATIVE | Facility: OTHER | Age: 65
End: 2020-07-15

## 2020-07-15 ENCOUNTER — APPOINTMENT (OUTPATIENT)
Dept: ULTRASOUND IMAGING | Facility: CLINIC | Age: 65
DRG: 872 | End: 2020-07-15
Attending: EMERGENCY MEDICINE
Payer: COMMERCIAL

## 2020-07-15 DIAGNOSIS — R50.9 FEVER, UNSPECIFIED FEVER CAUSE: Primary | ICD-10-CM

## 2020-07-15 DIAGNOSIS — A41.9 SEPSIS, DUE TO UNSPECIFIED ORGANISM, UNSPECIFIED WHETHER ACUTE ORGAN DYSFUNCTION PRESENT (H): ICD-10-CM

## 2020-07-15 DIAGNOSIS — Z20.828 CONTACT WITH AND (SUSPECTED) EXPOSURE TO OTHER VIRAL COMMUNICABLE DISEASES: ICD-10-CM

## 2020-07-15 LAB
ALBUMIN SERPL-MCNC: 3.6 G/DL (ref 3.4–5)
ALP SERPL-CCNC: 208 U/L (ref 40–150)
ALT SERPL W P-5'-P-CCNC: 113 U/L (ref 0–70)
ANION GAP SERPL CALCULATED.3IONS-SCNC: 8 MMOL/L (ref 3–14)
AST SERPL W P-5'-P-CCNC: 111 U/L (ref 0–45)
BASOPHILS # BLD AUTO: 0 10E9/L (ref 0–0.2)
BASOPHILS NFR BLD AUTO: 0.2 %
BILIRUB SERPL-MCNC: 1.5 MG/DL (ref 0.2–1.3)
BUN SERPL-MCNC: 43 MG/DL (ref 7–30)
CALCIUM SERPL-MCNC: 9.2 MG/DL (ref 8.5–10.1)
CHLORIDE SERPL-SCNC: 105 MMOL/L (ref 94–109)
CO2 SERPL-SCNC: 21 MMOL/L (ref 20–32)
CREAT SERPL-MCNC: 1.69 MG/DL (ref 0.66–1.25)
DIFFERENTIAL METHOD BLD: ABNORMAL
EOSINOPHIL # BLD AUTO: 0 10E9/L (ref 0–0.7)
EOSINOPHIL NFR BLD AUTO: 0.1 %
ERYTHROCYTE [DISTWIDTH] IN BLOOD BY AUTOMATED COUNT: 18.6 % (ref 10–15)
GFR SERPL CREATININE-BSD FRML MDRD: 42 ML/MIN/{1.73_M2}
GLUCOSE SERPL-MCNC: 111 MG/DL (ref 70–99)
HCT VFR BLD AUTO: 35.6 % (ref 40–53)
HGB BLD-MCNC: 11.8 G/DL (ref 13.3–17.7)
IMM GRANULOCYTES # BLD: 0 10E9/L (ref 0–0.4)
IMM GRANULOCYTES NFR BLD: 0.3 %
INR PPP: 1.44 (ref 0.86–1.14)
LABORATORY COMMENT REPORT: NORMAL
LACTATE BLD-SCNC: 1.4 MMOL/L (ref 0.7–2)
LYMPHOCYTES # BLD AUTO: 1 10E9/L (ref 0.8–5.3)
LYMPHOCYTES NFR BLD AUTO: 7.7 %
MCH RBC QN AUTO: 33.4 PG (ref 26.5–33)
MCHC RBC AUTO-ENTMCNC: 33.1 G/DL (ref 31.5–36.5)
MCV RBC AUTO: 101 FL (ref 78–100)
MONOCYTES # BLD AUTO: 1.5 10E9/L (ref 0–1.3)
MONOCYTES NFR BLD AUTO: 11.6 %
NEUTROPHILS # BLD AUTO: 10.1 10E9/L (ref 1.6–8.3)
NEUTROPHILS NFR BLD AUTO: 80.1 %
NRBC # BLD AUTO: 0 10*3/UL
NRBC BLD AUTO-RTO: 0 /100
PLATELET # BLD AUTO: 194 10E9/L (ref 150–450)
POTASSIUM SERPL-SCNC: 3.9 MMOL/L (ref 3.4–5.3)
PROT SERPL-MCNC: 6.9 G/DL (ref 6.8–8.8)
RBC # BLD AUTO: 3.53 10E12/L (ref 4.4–5.9)
SARS-COV-2 RNA SPEC QL NAA+PROBE: NEGATIVE
SARS-COV-2 RNA SPEC QL NAA+PROBE: NORMAL
SODIUM SERPL-SCNC: 135 MMOL/L (ref 133–144)
SPECIMEN SOURCE: NORMAL
SPECIMEN SOURCE: NORMAL
TROPONIN I SERPL-MCNC: <0.015 UG/L (ref 0–0.04)
TSH SERPL DL<=0.005 MIU/L-ACNC: 2.34 MU/L (ref 0.4–4)
WBC # BLD AUTO: 12.6 10E9/L (ref 4–11)

## 2020-07-15 PROCEDURE — 99285 EMERGENCY DEPT VISIT HI MDM: CPT | Mod: 25 | Performed by: EMERGENCY MEDICINE

## 2020-07-15 PROCEDURE — 96367 TX/PROPH/DG ADDL SEQ IV INF: CPT | Performed by: EMERGENCY MEDICINE

## 2020-07-15 PROCEDURE — 25000128 H RX IP 250 OP 636: Performed by: EMERGENCY MEDICINE

## 2020-07-15 PROCEDURE — 96366 THER/PROPH/DIAG IV INF ADDON: CPT | Performed by: EMERGENCY MEDICINE

## 2020-07-15 PROCEDURE — 87040 BLOOD CULTURE FOR BACTERIA: CPT | Performed by: EMERGENCY MEDICINE

## 2020-07-15 PROCEDURE — U0003 INFECTIOUS AGENT DETECTION BY NUCLEIC ACID (DNA OR RNA); SEVERE ACUTE RESPIRATORY SYNDROME CORONAVIRUS 2 (SARS-COV-2) (CORONAVIRUS DISEASE [COVID-19]), AMPLIFIED PROBE TECHNIQUE, MAKING USE OF HIGH THROUGHPUT TECHNOLOGIES AS DESCRIBED BY CMS-2020-01-R: HCPCS | Performed by: EMERGENCY MEDICINE

## 2020-07-15 PROCEDURE — 99285 EMERGENCY DEPT VISIT HI MDM: CPT | Mod: Z6 | Performed by: EMERGENCY MEDICINE

## 2020-07-15 PROCEDURE — 76705 ECHO EXAM OF ABDOMEN: CPT

## 2020-07-15 PROCEDURE — 84484 ASSAY OF TROPONIN QUANT: CPT | Performed by: EMERGENCY MEDICINE

## 2020-07-15 PROCEDURE — 25800030 ZZH RX IP 258 OP 636: Performed by: EMERGENCY MEDICINE

## 2020-07-15 PROCEDURE — 71045 X-RAY EXAM CHEST 1 VIEW: CPT

## 2020-07-15 PROCEDURE — 80053 COMPREHEN METABOLIC PANEL: CPT | Performed by: EMERGENCY MEDICINE

## 2020-07-15 PROCEDURE — 85025 COMPLETE CBC W/AUTO DIFF WBC: CPT | Performed by: EMERGENCY MEDICINE

## 2020-07-15 PROCEDURE — 84443 ASSAY THYROID STIM HORMONE: CPT | Performed by: EMERGENCY MEDICINE

## 2020-07-15 PROCEDURE — 96361 HYDRATE IV INFUSION ADD-ON: CPT | Performed by: EMERGENCY MEDICINE

## 2020-07-15 PROCEDURE — C9803 HOPD COVID-19 SPEC COLLECT: HCPCS | Performed by: EMERGENCY MEDICINE

## 2020-07-15 PROCEDURE — 85610 PROTHROMBIN TIME: CPT | Performed by: EMERGENCY MEDICINE

## 2020-07-15 PROCEDURE — 83605 ASSAY OF LACTIC ACID: CPT | Performed by: EMERGENCY MEDICINE

## 2020-07-15 PROCEDURE — 96365 THER/PROPH/DIAG IV INF INIT: CPT | Performed by: EMERGENCY MEDICINE

## 2020-07-15 RX ORDER — SODIUM CHLORIDE, SODIUM LACTATE, POTASSIUM CHLORIDE, CALCIUM CHLORIDE 600; 310; 30; 20 MG/100ML; MG/100ML; MG/100ML; MG/100ML
INJECTION, SOLUTION INTRAVENOUS CONTINUOUS
Status: DISCONTINUED | OUTPATIENT
Start: 2020-07-15 | End: 2020-07-16

## 2020-07-15 RX ADMIN — CEFEPIME HYDROCHLORIDE 2 G: 2 INJECTION, POWDER, FOR SOLUTION INTRAVENOUS at 22:37

## 2020-07-15 RX ADMIN — VANCOMYCIN HYDROCHLORIDE 1500 MG: 10 INJECTION, POWDER, LYOPHILIZED, FOR SOLUTION INTRAVENOUS at 23:22

## 2020-07-15 RX ADMIN — SODIUM CHLORIDE, POTASSIUM CHLORIDE, SODIUM LACTATE AND CALCIUM CHLORIDE: 600; 310; 30; 20 INJECTION, SOLUTION INTRAVENOUS at 21:43

## 2020-07-15 RX ADMIN — SODIUM CHLORIDE, POTASSIUM CHLORIDE, SODIUM LACTATE AND CALCIUM CHLORIDE: 600; 310; 30; 20 INJECTION, SOLUTION INTRAVENOUS at 23:30

## 2020-07-15 ASSESSMENT — ENCOUNTER SYMPTOMS
FATIGUE: 1
SORE THROAT: 0
COUGH: 0
APPETITE CHANGE: 1
FEVER: 1
HEADACHES: 0
ABDOMINAL PAIN: 0
NECK STIFFNESS: 0

## 2020-07-15 NOTE — TELEPHONE ENCOUNTER
Pt calling with temp of 101 that started 15 minutes ago. Did have chills earlier today. Is fatigued and no appetite.     Requested on call resident contact patient to advise as clinic is closed.     Paged to Onc resident @ [phone]

## 2020-07-16 ENCOUNTER — RECORDS - HEALTHEAST (OUTPATIENT)
Dept: ADMINISTRATIVE | Facility: OTHER | Age: 65
End: 2020-07-16

## 2020-07-16 ENCOUNTER — PATIENT OUTREACH (OUTPATIENT)
Dept: CARE COORDINATION | Facility: CLINIC | Age: 65
End: 2020-07-16

## 2020-07-16 VITALS
TEMPERATURE: 97.7 F | SYSTOLIC BLOOD PRESSURE: 123 MMHG | OXYGEN SATURATION: 100 % | BODY MASS INDEX: 21.3 KG/M2 | DIASTOLIC BLOOD PRESSURE: 87 MMHG | HEART RATE: 101 BPM | HEIGHT: 71 IN | WEIGHT: 152.12 LBS | RESPIRATION RATE: 16 BRPM

## 2020-07-16 PROBLEM — A41.9 SEPSIS (H): Status: ACTIVE | Noted: 2020-07-16

## 2020-07-16 LAB
ALBUMIN SERPL-MCNC: 3 G/DL (ref 3.4–5)
ALBUMIN UR-MCNC: 30 MG/DL
ALP SERPL-CCNC: 160 U/L (ref 40–150)
ALT SERPL W P-5'-P-CCNC: 87 U/L (ref 0–70)
ANION GAP SERPL CALCULATED.3IONS-SCNC: 6 MMOL/L (ref 3–14)
APPEARANCE UR: ABNORMAL
AST SERPL W P-5'-P-CCNC: 82 U/L (ref 0–45)
BILIRUB SERPL-MCNC: 1.4 MG/DL (ref 0.2–1.3)
BILIRUB UR QL STRIP: NEGATIVE
BUN SERPL-MCNC: 37 MG/DL (ref 7–30)
CALCIUM SERPL-MCNC: 8.6 MG/DL (ref 8.5–10.1)
CHLORIDE SERPL-SCNC: 111 MMOL/L (ref 94–109)
CO2 SERPL-SCNC: 23 MMOL/L (ref 20–32)
COLOR UR AUTO: YELLOW
CREAT SERPL-MCNC: 1.39 MG/DL (ref 0.66–1.25)
CREAT SERPL-MCNC: 1.41 MG/DL (ref 0.66–1.25)
GFR SERPL CREATININE-BSD FRML MDRD: 52 ML/MIN/{1.73_M2}
GFR SERPL CREATININE-BSD FRML MDRD: 53 ML/MIN/{1.73_M2}
GLUCOSE SERPL-MCNC: 97 MG/DL (ref 70–99)
GLUCOSE UR STRIP-MCNC: NEGATIVE MG/DL
HGB UR QL STRIP: ABNORMAL
HYALINE CASTS #/AREA URNS LPF: 1 /LPF (ref 0–2)
KETONES UR STRIP-MCNC: NEGATIVE MG/DL
LEUKOCYTE ESTERASE UR QL STRIP: ABNORMAL
MUCOUS THREADS #/AREA URNS LPF: PRESENT /LPF
NITRATE UR QL: NEGATIVE
PH UR STRIP: 5.5 PH (ref 5–7)
POTASSIUM SERPL-SCNC: 3.7 MMOL/L (ref 3.4–5.3)
PROT SERPL-MCNC: 5.6 G/DL (ref 6.8–8.8)
RBC #/AREA URNS AUTO: >182 /HPF (ref 0–2)
SODIUM SERPL-SCNC: 140 MMOL/L (ref 133–144)
SOURCE: ABNORMAL
SP GR UR STRIP: 1.01 (ref 1–1.03)
SQUAMOUS #/AREA URNS AUTO: <1 /HPF (ref 0–1)
UROBILINOGEN UR STRIP-MCNC: NORMAL MG/DL (ref 0–2)
WBC #/AREA URNS AUTO: 8 /HPF (ref 0–5)

## 2020-07-16 PROCEDURE — 85049 AUTOMATED PLATELET COUNT: CPT | Performed by: STUDENT IN AN ORGANIZED HEALTH CARE EDUCATION/TRAINING PROGRAM

## 2020-07-16 PROCEDURE — 25000132 ZZH RX MED GY IP 250 OP 250 PS 637: Performed by: STUDENT IN AN ORGANIZED HEALTH CARE EDUCATION/TRAINING PROGRAM

## 2020-07-16 PROCEDURE — 99223 1ST HOSP IP/OBS HIGH 75: CPT | Performed by: INTERNAL MEDICINE

## 2020-07-16 PROCEDURE — 12000004 ZZH R&B IMCU UMMC

## 2020-07-16 PROCEDURE — 36592 COLLECT BLOOD FROM PICC: CPT | Performed by: STUDENT IN AN ORGANIZED HEALTH CARE EDUCATION/TRAINING PROGRAM

## 2020-07-16 PROCEDURE — 25800030 ZZH RX IP 258 OP 636: Performed by: EMERGENCY MEDICINE

## 2020-07-16 PROCEDURE — 81001 URINALYSIS AUTO W/SCOPE: CPT | Performed by: EMERGENCY MEDICINE

## 2020-07-16 PROCEDURE — 25000128 H RX IP 250 OP 636: Performed by: INTERNAL MEDICINE

## 2020-07-16 PROCEDURE — 80053 COMPREHEN METABOLIC PANEL: CPT | Performed by: INTERNAL MEDICINE

## 2020-07-16 RX ORDER — ACETAMINOPHEN 500 MG
500 TABLET ORAL EVERY 6 HOURS PRN
Status: DISCONTINUED | OUTPATIENT
Start: 2020-07-16 | End: 2020-07-16 | Stop reason: HOSPADM

## 2020-07-16 RX ORDER — CALCIUM CARBONATE 500 MG/1
500-1500 TABLET, CHEWABLE ORAL 4 TIMES DAILY PRN
Status: DISCONTINUED | OUTPATIENT
Start: 2020-07-16 | End: 2020-07-16 | Stop reason: HOSPADM

## 2020-07-16 RX ORDER — METOPROLOL TARTRATE 50 MG
150 TABLET ORAL 2 TIMES DAILY
Status: DISCONTINUED | OUTPATIENT
Start: 2020-07-16 | End: 2020-07-16 | Stop reason: HOSPADM

## 2020-07-16 RX ORDER — SENNOSIDES 8.6 MG
1 TABLET ORAL EVERY EVENING
Status: DISCONTINUED | OUTPATIENT
Start: 2020-07-16 | End: 2020-07-16 | Stop reason: HOSPADM

## 2020-07-16 RX ORDER — POLYETHYLENE GLYCOL 3350 17 G/17G
17 POWDER, FOR SOLUTION ORAL DAILY
Status: DISCONTINUED | OUTPATIENT
Start: 2020-07-16 | End: 2020-07-16 | Stop reason: HOSPADM

## 2020-07-16 RX ORDER — ALLOPURINOL 100 MG/1
100 TABLET ORAL EVERY EVENING
Status: DISCONTINUED | OUTPATIENT
Start: 2020-07-16 | End: 2020-07-16 | Stop reason: HOSPADM

## 2020-07-16 RX ORDER — CIPROFLOXACIN 500 MG/1
500 TABLET, FILM COATED ORAL EVERY 12 HOURS SCHEDULED
Status: DISCONTINUED | OUTPATIENT
Start: 2020-07-16 | End: 2020-07-16 | Stop reason: HOSPADM

## 2020-07-16 RX ORDER — COLCHICINE 0.6 MG/1
0.6 TABLET ORAL 3 TIMES DAILY PRN
Status: DISCONTINUED | OUTPATIENT
Start: 2020-07-16 | End: 2020-07-16

## 2020-07-16 RX ORDER — CIPROFLOXACIN 500 MG/1
500 TABLET, FILM COATED ORAL 2 TIMES DAILY
Qty: 10 TABLET | Refills: 0 | Status: SHIPPED | OUTPATIENT
Start: 2020-07-16 | End: 2020-01-01

## 2020-07-16 RX ORDER — SODIUM CHLORIDE, SODIUM LACTATE, POTASSIUM CHLORIDE, CALCIUM CHLORIDE 600; 310; 30; 20 MG/100ML; MG/100ML; MG/100ML; MG/100ML
INJECTION, SOLUTION INTRAVENOUS CONTINUOUS
Status: DISCONTINUED | OUTPATIENT
Start: 2020-07-16 | End: 2020-07-16

## 2020-07-16 RX ORDER — HEPARIN SODIUM (PORCINE) LOCK FLUSH IV SOLN 100 UNIT/ML 100 UNIT/ML
500 SOLUTION INTRAVENOUS ONCE
Status: DISCONTINUED | OUTPATIENT
Start: 2020-07-16 | End: 2020-07-16 | Stop reason: HOSPADM

## 2020-07-16 RX ORDER — ONDANSETRON 4 MG/1
4 TABLET, FILM COATED ORAL EVERY 8 HOURS PRN
Status: DISCONTINUED | OUTPATIENT
Start: 2020-07-16 | End: 2020-07-16 | Stop reason: HOSPADM

## 2020-07-16 RX ORDER — ONDANSETRON 2 MG/ML
4 INJECTION INTRAMUSCULAR; INTRAVENOUS EVERY 6 HOURS PRN
Status: DISCONTINUED | OUTPATIENT
Start: 2020-07-16 | End: 2020-07-16 | Stop reason: HOSPADM

## 2020-07-16 RX ORDER — CLOPIDOGREL BISULFATE 75 MG/1
75 TABLET ORAL EVERY MORNING
Status: DISCONTINUED | OUTPATIENT
Start: 2020-07-16 | End: 2020-07-16

## 2020-07-16 RX ADMIN — SODIUM CHLORIDE, POTASSIUM CHLORIDE, SODIUM LACTATE AND CALCIUM CHLORIDE: 600; 310; 30; 20 INJECTION, SOLUTION INTRAVENOUS at 00:00

## 2020-07-16 RX ADMIN — METOPROLOL TARTRATE 150 MG: 50 TABLET, FILM COATED ORAL at 09:25

## 2020-07-16 RX ADMIN — POLYETHYLENE GLYCOL 3350 17 G: 17 POWDER, FOR SOLUTION ORAL at 09:25

## 2020-07-16 RX ADMIN — APIXABAN 5 MG: 5 TABLET, FILM COATED ORAL at 09:25

## 2020-07-16 RX ADMIN — CEFEPIME HYDROCHLORIDE 2 G: 2 INJECTION, POWDER, FOR SOLUTION INTRAVENOUS at 10:28

## 2020-07-16 ASSESSMENT — MIFFLIN-ST. JEOR: SCORE: 1507.13

## 2020-07-16 ASSESSMENT — ACTIVITIES OF DAILY LIVING (ADL)
ADLS_ACUITY_SCORE: 13
ADLS_ACUITY_SCORE: 14
ADLS_ACUITY_SCORE: 14

## 2020-07-16 NOTE — H&P
[facility] History and Physical    Girish Chauhan MRN# [id number]   Age: 65 year old YOB: 1955     Date of Admission:  7/15/2020  Primary care provider: Dario Jain          Assessment and Plan:   64 yo man with sepsis from an unclear source, although his urine does have blood and leukocyte esterase. Since he has a port, I will continue coverage with both cefepime and vancomycin for now.     Sepsis:   -Blood cx x2, urine cx pending  - continue cefepime, vancomycin  -not neutropenic  - hold Xeloda while inpt    CYNDEE:  - got 1L LR in the ED. Will recheck in the am  - give  ml/hr overnight    Atrial fib:  - continue apixaban  -continue metoprolol    HTN:  - hold home losartan while septic    Gout:  - continue home allopurinol    CAD:  - continue home plavix. Hold statin while inpt    FEN: regular  Ppx: on apixaban  Code:Full    Diane Felix MD         Chief Complaint:   'My fever just kept going up and up'     History is obtained from the patient          History of Present Illness:   This patient is a 65 year old  Man with complex PMH. He has metastatic cholangiocarcinoma that was resected in 2016, currently on Xeloda. He has CKD and atrial fib (on apixaban), systolic CHF, CAD with stent placement and prior SBO. Today he presented to the ED because hs temperature went up to 101.4 F at home. He feels general malaise and fatigue, decreased urination but no dysuria. He denies cough, sore throat, V/D. He does have some intermittent diarrhea that he thinks is d/t Xeloda. He has a port but denies any pain or tenderness at the site.     In the ED he had CXR and U/S abd without any clear etiology for his fevers. He got cefepime and vancomycin. His COVID swab was negative. UA shows blood and LE. Creatinine is elevated at 1.69 from 1.25. He got 1L LR.             Past Medical History:     Past Medical History:   Diagnosis Date     Anemia      Aortic stenosis due to bicuspid aortic valve       Ascending aortic aneurysm (H)      Atrial fibrillation (H) 2006    hx of paroxysmal atrial fibrillation since approximately 2006. S/p cardioversion in 2013 with recurrent atrial fibrillation s/p repeat cardioversion 9/29/14.     Basal cell carcinoma 1/2016    right shoulder and right posterior calf s/p electrodessication and curretage 1/2016.     CAD (coronary artery disease) 12/2011    s/p angiogram 12/2011 s/p percutaneous intervention on the LAD with multiple drug-eluting stents complicated by a small perforation in the diagonal branch which sealed spontaneously.  Patient with significant Circumflex stenosis which is being treated conservatively.     Cholangiocarcinoma (H)      CKD (chronic kidney disease)      Gout     affects left foot 2-3 times per year     Hyperlipidemia      Hypertension      Liver disease      Melanoma (H) 9/2015    back s/p resection 9/2015     Squamous cell carcinoma     nose s/p excision             Past Surgical History:      Past Surgical History:   Procedure Laterality Date     ------------OTHER-------------      multiple skin cancer resections     CARDIOVERSION  2013, 9/2014     COMBINED CYSTOSCOPY, RETROGRADES, EXCHANGE STENT URETER(S) Right 11/11/2019    Procedure: Cystoscopy, Right Retrograde Pyelogram, Right Ureteral Stent Exchange;  Surgeon: Sivan Walker MD;  [facility]     COMBINED CYSTOSCOPY, RETROGRADES, EXCHANGE STENT URETER(S) Right 6/8/2020    Procedure: CYSTOSCOPY, WITH RIGHT RETROGRADE PYELOGRAM AND RIGHT URETERAL STENT EXCHANGE;  Surgeon: Sivan Walker MD;  [facility]     CYSTOSCOPY, RETROGRADES, INSERT STENT URETER(S), COMBINED N/A 9/16/2019    Procedure: Cystoscopy, Right Retrograde Pyelogram, Right Ureteral Stent Placement;  Surgeon: Sivan Walker MD;  [facility]     CYSTOSCOPY, RETROGRADES, INSERT STENT URETER(S), COMBINED Right 2/5/2020    Procedure: CYSTOSCOPY, WITH Right RETROGRADE PYELOGRAM AND Right URETERAL STENT  INSERTION;  Surgeon: Sivan Walker MD;  [facility]     ENDOSCOPIC RETROGRADE CHOLANGIOPANCREATOGRAM N/A 2/26/2016    Procedure: COMBINED ENDOSCOPIC RETROGRADE CHOLANGIOPANCREATOGRAPHY, PLACE TUBE/STENT;  Surgeon: Rafa Andrade MD;  [facility]     ENDOSCOPIC RETROGRADE CHOLANGIOPANCREATOGRAPHY  2/2014     ENDOSCOPIC ULTRASOUND UPPER GASTROINTESTINAL TRACT (GI) N/A 6/7/2019    Procedure: ENDOSCOPIC ULTRASOUND, with hot axios stent placement;  Surgeon: Gabino Rodriguez MD;  [facility]     ENTEROSCOPY SMALL BOWEL N/A 6/7/2019    Procedure: ENTEROSCOPY;  Surgeon: Gabino Rodriguez MD;  [facility]     ESOPHAGOSCOPY, GASTROSCOPY, DUODENOSCOPY (EGD), COMBINED N/A 10/16/2019    Procedure: Upper Gastrointestinal Endoscopy;  Surgeon: Gabino Rodriguez MD;  [facility]     ESOPHAGOSCOPY, GASTROSCOPY, DUODENOSCOPY (EGD), DILATATION, COMBINED N/A 7/29/2019    Procedure: Esophagoscopy, gastroscopy, duodenoscopy (EGD), with stent exchange and dilation;  Surgeon: Gabino Rodriguez MD;  [facility]     EXPLORE COMMON BILE DUCT N/A 3/8/2016    Procedure: EXPLORE COMMON BILE DUCT;  Surgeon: Jose Eduardo Pinedo MD;  [facility]     HEPATECTOMY PARTIAL Left 3/8/2016    Procedure: HEPATECTOMY PARTIAL;  Surgeon: Jose Eduardo Pinedo MD;  [facility]     INSERT PORT VASCULAR ACCESS Right 12/8/2017    Procedure: INSERT PORT VASCULAR ACCESS;  Place single lumen venous chest port - right;  Surgeon: Drew Powell PA-C;  [facility]     STENT  12/2011    LAD with multiple SAM             Social History:     Social History     Tobacco Use     Smoking status: Never Smoker     Smokeless tobacco: Never Used   Substance Use Topics     Alcohol use: Not Currently     Alcohol/week: 2.0 standard drinks     Types: 2 Cans of beer per week             Family History:     Family History   Problem Relation Age of Onset     Skin Cancer Mother      Other - See Comments  Father         father passed away in his 60s post-op with an unknown bile duct obstruction.  no anesthesia complication.       Osteoarthritis Sister      Cerebrovascular Disease Brother      Other - See Comments Brother         prediabetes     Osteoarthritis Sister      No Known Problems Brother      Family history reviewed            Allergies:     Allergies   Allergen Reactions     Blood Transfusion Related (Informational Only) Other (See Comments)     Patient has a history of a clinically significant antibody against RBC antigens.  A delay in compatible RBCs may occur.              Review of Systems:   A comprehensive review of systems was performed and found to be negative except as described in this note           Physical Exam:   Temp: 98.1  F (36.7  C) Temp src: Oral BP: 99/76 Pulse: 95   Resp: 18 SpO2: 100 % O2 Device: None (Room air)    Constitutional: NAD  Eyes: no scleral icterus  ENT: no oral lesions  Pulm: CTAB  CV: irregular, no m/r/g  GI: bowel sounds present, soft and nontender to palpation  MSK: No edema in the extremities  Neuro: alert, conversant  Psych: appropriate mood and affect           Data:     Results for orders placed or performed during the hospital encounter of 07/15/20 (from the past 24 hour(s))   CBC with platelets differential   Result Value Ref Range    WBC 12.6 (H) 4.0 - 11.0 10e9/L    RBC Count 3.53 (L) 4.4 - 5.9 10e12/L    Hemoglobin 11.8 (L) 13.3 - 17.7 g/dL    Hematocrit 35.6 (L) 40.0 - 53.0 %     (H) 78 - 100 fl    MCH 33.4 (H) 26.5 - 33.0 pg    MCHC 33.1 31.5 - 36.5 g/dL    RDW 18.6 (H) 10.0 - 15.0 %    Platelet Count 194 150 - 450 10e9/L    Diff Method Automated Method     % Neutrophils 80.1 %    % Lymphocytes 7.7 %    % Monocytes 11.6 %    % Eosinophils 0.1 %    % Basophils 0.2 %    % Immature Granulocytes 0.3 %    Nucleated RBCs 0 0 /100    Absolute Neutrophil 10.1 (H) 1.6 - 8.3 10e9/L    Absolute Lymphocytes 1.0 0.8 - 5.3 10e9/L    Absolute Monocytes 1.5 (H) 0.0 -  1.3 10e9/L    Absolute Eosinophils 0.0 0.0 - 0.7 10e9/L    Absolute Basophils 0.0 0.0 - 0.2 10e9/L    Abs Immature Granulocytes 0.0 0 - 0.4 10e9/L    Absolute Nucleated RBC 0.0    Comprehensive metabolic panel   Result Value Ref Range    Sodium 135 133 - 144 mmol/L    Potassium 3.9 3.4 - 5.3 mmol/L    Chloride 105 94 - 109 mmol/L    Carbon Dioxide 21 20 - 32 mmol/L    Anion Gap 8 3 - 14 mmol/L    Glucose 111 (H) 70 - 99 mg/dL    Urea Nitrogen 43 (H) 7 - 30 mg/dL    Creatinine 1.69 (H) 0.66 - 1.25 mg/dL    GFR Estimate 42 (L) >60 mL/min/[1.73_m2]    GFR Estimate If Black 49 (L) >60 mL/min/[1.73_m2]    Calcium 9.2 8.5 - 10.1 mg/dL    Bilirubin Total 1.5 (H) 0.2 - 1.3 mg/dL    Albumin 3.6 3.4 - 5.0 g/dL    Protein Total 6.9 6.8 - 8.8 g/dL    Alkaline Phosphatase 208 (H) 40 - 150 U/L     (H) 0 - 70 U/L     (H) 0 - 45 U/L   INR   Result Value Ref Range    INR 1.44 (H) 0.86 - 1.14   Lactic acid whole blood   Result Value Ref Range    Lactic Acid 1.4 0.7 - 2.0 mmol/L   Blood culture    Specimen: Portacath; Blood    Port   Result Value Ref Range    Specimen Description Blood Port     Culture Micro PENDING    Troponin I   Result Value Ref Range    Troponin I ES <0.015 0.000 - 0.045 ug/L   TSH with free T4 reflex   Result Value Ref Range    TSH 2.34 0.40 - 4.00 mU/L   XR Chest Port 1 View    Narrative    Exam: XR CHEST PORT 1 VW, 7/15/2020 9:24 PM    Indication: fever, ? PNA or other such cause    Comparison: 6/12/2020    Findings:   Single portable AP radiograph of the chest at 45 degrees. Right chest  wall Port-A-Cath, tip at the superior cavoatrial junction. The trachea  is midline. The cardiomediastinal silhouette is within normal limits.  Coronary artery stents. No pneumothorax or pleural effusion. No focal  airspace opacity. Surgical clips in the right upper quadrant. The  visualized upper abdomen is otherwise unremarkable. No acute osseous  lesion.      Impression    Impression: No acute airspace  disease.    I have personally reviewed the examination and initial interpretation  and I agree with the findings.    JOSE ANTONIO KESSLER MD   Symptomatic COVID-19 Virus (Coronavirus) by PCR    Specimen: Nasopharyngeal   Result Value Ref Range    COVID-19 Virus PCR to [facility] - Source Nasopharyngeal     COVID-19 Virus PCR to [facility] - Result       Test received-See reflex to IDDL test SARS CoV2 (COVID-19) Virus RT-PCR   SARS-CoV-2 COVID-19 Virus (Coronavirus) RT-PCR Nasopharyngeal    Specimen: Nasopharyngeal   Result Value Ref Range    SARS-CoV-2 Virus Specimen Source Nasopharyngeal     SARS-CoV-2 PCR Result NEGATIVE     SARS-CoV-2 PCR Comment       Testing was performed using the Xpert Xpress SARS-CoV-2 Assay on the Cepheid Gene-Xpert   Instrument Systems. Additional information about this Emergency Use Authorization (EUA)   assay can be found via the Lab Guide.     Blood culture    Specimen: Arm, Left; Blood    Left Arm   Result Value Ref Range    Specimen Description Blood Left Arm     Culture Micro PENDING    US Abdomen Limited (RUQ)    Narrative    EXAMINATION: Limited Abdominal Ultrasound, 7/15/2020 11:50 PM     COMPARISON: 12/3/2019, 6/12/2020    HISTORY: LFTs fever, question of cholangitis    FINDINGS:   Fluid: No evidence of ascites or pleural effusions.    Liver: Postsurgical changes of left hepatic lobe resection. The right  lobe measures 14.5 cm. No focal hepatic lesions. Right hepatic lobe  demonstrates normal echotexture..     Gallbladder: Cholecystectomy    Bile Ducts: Trace intrahepatic biliary ductal dilation up to 0.5 cm  which is similar to prior CT and nonspecific in the setting of  cholecystectomy. Common bile duct measures up to 0.4 cm.    Pancreas: Poorly visualized secondary to bowel gas    Kidney: The right kidney measures 10.8 cm long. There is mild  hydronephrosis in the upper pole the right kidney. The known stent is  not visualized on this exam.      Impression    IMPRESSION:   1. Postsurgical  changes of left hepatic lobe resection. Normal  sonographic appearance of the right hepatic lobe.  2. Unchanged trace intrahepatic biliary ductal dilation which is  nonspecific in the setting of cholecystectomy.  3. Unchanged upper pole right hydronephrosis.   UA with Microscopic   Result Value Ref Range    Color Urine Yellow     Appearance Urine Slightly Cloudy     Glucose Urine Negative NEG^Negative mg/dL    Bilirubin Urine Negative NEG^Negative    Ketones Urine Negative NEG^Negative mg/dL    Specific Gravity Urine 1.012 1.003 - 1.035    Blood Urine Large (A) NEG^Negative    pH Urine 5.5 5.0 - 7.0 pH    Protein Albumin Urine 30 (A) NEG^Negative mg/dL    Urobilinogen mg/dL Normal 0.0 - 2.0 mg/dL    Nitrite Urine Negative NEG^Negative    Leukocyte Esterase Urine Large (A) NEG^Negative    Source Midstream Urine     WBC Urine 8 (H) 0 - 5 /HPF    RBC Urine >182 (H) 0 - 2 /HPF    Squamous Epithelial /HPF Urine <1 0 - 1 /HPF    Mucous Urine Present (A) NEG^Negative /LPF    Hyaline Casts 1 0 - 2 /LPF        Diane Felix MD

## 2020-07-16 NOTE — ED TRIAGE NOTES
Pt presents ambulatory to triage c/o fever (tmax 101.4) which started today, loss of appetite and fatigue. States he talked with an MD who told him to come to ER. Pt is on chemo.     Hx squamous cell ca, melanoma, cholangiocarcinoma, a fib, AAA, CKD

## 2020-07-16 NOTE — TELEPHONE ENCOUNTER
I called patient back since he has fever up to 101 and chills today wo any focal infection symptoms. Mainly he denies any cough, sore throat,dusuria, frequency. He has no BM today but has alternate constipation/diarrhea due to Xeloda which is not changed. He has no abdominal pain. Per patient report his PORT site looks good. He checked his blood pressure which is around 118/70s.With his medical history cholangitis vs. Bacteremia vs. Other infection need to be ruled out. I talked to Dr. Weaver who recommended ED visit. Patient is in agreement.

## 2020-07-16 NOTE — ED NOTES
Pt's systolic blood pressure readings are noted to be getting lower.  BP cuff repositioned with no change.  He has a LR infusion running at 100ml/hr.  Provider is updated, bolus ordered.

## 2020-07-16 NOTE — ED NOTES
[facility], [place]   ED Nurse to Floor Handoff     Girish Chauhan is a 65 year old male who speaks English and lives with a spouse,  in a home  They arrived in the ED by car from home    ED Chief Complaint: Fever    ED Dx;   Final diagnoses:   Sepsis, due to unspecified organism, unspecified whether acute organ dysfunction present (H)         Needed?: No    Allergies:   Allergies   Allergen Reactions     Blood Transfusion Related (Informational Only) Other (See Comments)     Patient has a history of a clinically significant antibody against RBC antigens.  A delay in compatible RBCs may occur.   .  Past Medical Hx:   Past Medical History:   Diagnosis Date     Anemia      Aortic stenosis due to bicuspid aortic valve      Ascending aortic aneurysm (H)      Atrial fibrillation (H) 2006    hx of paroxysmal atrial fibrillation since approximately 2006. S/p cardioversion in 2013 with recurrent atrial fibrillation s/p repeat cardioversion 9/29/14.     Basal cell carcinoma 1/2016    right shoulder and right posterior calf s/p electrodessication and curretage 1/2016.     CAD (coronary artery disease) 12/2011    s/p angiogram 12/2011 s/p percutaneous intervention on the LAD with multiple drug-eluting stents complicated by a small perforation in the diagonal branch which sealed spontaneously.  Patient with significant Circumflex stenosis which is being treated conservatively.     Cholangiocarcinoma (H)      CKD (chronic kidney disease)      Gout     affects left foot 2-3 times per year     Hyperlipidemia      Hypertension      Liver disease      Melanoma (H) 9/2015    back s/p resection 9/2015     Squamous cell carcinoma     nose s/p excision      Baseline Mental status: WDL  Current Mental Status changes: at basesline    Infection present or suspected this encounter: cultures pending  Sepsis suspected: Yes  Isolation type: Special Precautions-COVID-19     Activity level -  Baseline/Home:  Independent  Activity Level - Current:   Independent    Bariatric equipment needed?: No    In the ED these meds were given:   Medications   lactated ringers infusion ( Intravenous Rate/Dose Change 7/15/20 2340)   vancomycin 1500 mg in 0.9% NaCl 250 ml intermittent infusion 1,500 mg (1,500 mg Intravenous Given 7/15/20 2322)   vancomycin place titus - receiving intermittent dosing (has no administration in time range)   lactated ringers BOLUS 500 mL (500 mLs Intravenous Not Given 7/15/20 2340)   ceFEPIme (MAXIPIME) 2 g vial to attach to  ml bag for ADULTS or 50 ml bag for PEDS (0 g Intravenous Stopped 7/15/20 2316)       Drips running?  Yes    Home pump  No    Current LDAs  Port A Cath Single 12/08/17 Right Chest wall (Active)   Access Date 07/15/20 07/15/20 2053   Access Attempts 1 07/15/20 2053   Gauge 20 gauge;1 inch 07/15/20 2053   Site Assessment WDL 07/15/20 2053   Line Status Blood return noted;Saline locked 07/15/20 2053   Dressing Intervention New dressing;Transparent 07/15/20 2053   Number of days: 950       Incision/Surgical Site 06/08/20 Penis (Active)   Number of days: 37       Labs results:   Labs Ordered and Resulted from Time of ED Arrival Up to the Time of Departure from the ED   CBC WITH PLATELETS DIFFERENTIAL - Abnormal; Notable for the following components:       Result Value    WBC 12.6 (*)     RBC Count 3.53 (*)     Hemoglobin 11.8 (*)     Hematocrit 35.6 (*)      (*)     MCH 33.4 (*)     RDW 18.6 (*)     Absolute Neutrophil 10.1 (*)     Absolute Monocytes 1.5 (*)     All other components within normal limits   COMPREHENSIVE METABOLIC PANEL - Abnormal; Notable for the following components:    Glucose 111 (*)     Urea Nitrogen 43 (*)     Creatinine 1.69 (*)     GFR Estimate 42 (*)     GFR Estimate If Black 49 (*)     Bilirubin Total 1.5 (*)     Alkaline Phosphatase 208 (*)      (*)      (*)     All other components within normal limits   INR - Abnormal;  Notable for the following components:    INR 1.44 (*)     All other components within normal limits   LACTIC ACID WHOLE BLOOD   COVID-19 VIRUS (CORONAVIRUS) BY PCR   TROPONIN I   TSH WITH FREE T4 REFLEX   SARS-COV-2 (COVID-19) VIRUS RT-PCR   ROUTINE UA WITH MICROSCOPIC   BLOOD CULTURE   BLOOD CULTURE       Imaging Studies:   Recent Results (from the past 24 hour(s))   XR Chest Port 1 View    Narrative    Exam: XR CHEST PORT 1 VW, 7/15/2020 9:24 PM    Indication: fever, ? PNA or other such cause    Comparison: 6/12/2020    Findings:   Single portable AP radiograph of the chest at 45 degrees. Right chest  wall Port-A-Cath, tip at the superior cavoatrial junction. The trachea  is midline. The cardiomediastinal silhouette is within normal limits.  Coronary artery stents. No pneumothorax or pleural effusion. No focal  airspace opacity. Surgical clips in the right upper quadrant. The  visualized upper abdomen is otherwise unremarkable. No acute osseous  lesion.      Impression    Impression: No acute airspace disease.    I have personally reviewed the examination and initial interpretation  and I agree with the findings.    JOSE ANTONIO KESSLER MD       Recent vital signs:   BP 96/71   Pulse 99   Temp 97.9  F (36.6  C) (Oral)   Resp 18   SpO2 100%     Cindy Coma Scale Score: 15 (07/15/20 2108)       Skin/wound Issues: None    Code Status: Full Code    Pain control: pt had none    Nausea control: pt had none    Abnormal labs/tests/findings requiring intervention:   Fever at home, hx of bacteremia    Family present during ED course? No   Family Comments/Social Situation comments: lives at home with his wife    Tasks needing completion: UA, pt has not been able to void yet    Ani Jeffers, RN    [phone] [facility] ED

## 2020-07-16 NOTE — CONSULTS
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     Per patient, Tmax 101.8 at home. Associated poor appetite and fatigue. Decreased UOP and non productive cough. In the ED afebrile and HD stable. Labs significant for WBC 12.6, Cr 1.69 and LA 1.4. CXR negative. Abd US negative for acute path, Given 1L IVF bolus, started on Cefepime and Vanco. Admitted for sepsis and CYNDEE. BCx and UCx drawn.      On admission he reports he was feeling well until yesterday after eating lunch she all of a sudden felt feverish and checked his temperature and it was up to 101.8.  Tried to rest some but still did not feel right so presented to the emergency department.  He said he ate a pork chop that was not old, no nausea or vomiting diarrhea or changes in bladder.  After receiving IV fluids and antibiotics in the emergency department he is feeling back to himself today.  Asking to go home.  Denies dysuria or hematuria.      Oncologic History:  He had his original resection back in 2016 and then in 2017 had a recurrence within the wall of his bladder which initially responded well to cisplatin and gemcitabine but he had to stop both of those agents, first the cisplatin due to toxicity and then the gemcitabine due to  TTP.  He was off treatment for quite a while but then had progression within the abdominal cavity with peritoneal mets on the surface of his liver.  He was treated initially with capecitabine and had an myocardial infarction that we decided was unrelated to his treatment and he has since been on treatment with infusional 5-FU and now back on Xeloda since 3/2020. CA-19-9 level 250 ->309. Tolerating Xeloda well, ECOG 1.      Review of Systems:  A comprehensive ROS was performed and found to be negative or non-contributory with the exception of that noted in the HPI above.    Past Medical History:  Past Medical History:   Diagnosis Date     Anemia      Aortic stenosis due to bicuspid aortic valve      Ascending aortic aneurysm (H)      Atrial fibrillation (H) 2006    hx of paroxysmal atrial fibrillation since approximately 2006. S/p cardioversion in 2013 with recurrent atrial fibrillation s/p repeat cardioversion 9/29/14.     Basal cell carcinoma 1/2016    right shoulder and right posterior calf s/p electrodessication and curretage 1/2016.     CAD (coronary artery disease) 12/2011    s/p angiogram 12/2011 s/p percutaneous intervention on the LAD with multiple drug-eluting stents complicated by a small perforation in the diagonal branch which sealed spontaneously.  Patient with significant Circumflex stenosis which is being treated conservatively.     Cholangiocarcinoma (H)      CKD (chronic kidney disease)      Gout     affects left foot 2-3 times per year     Hyperlipidemia      Hypertension      Liver disease      Melanoma (H) 9/2015    back s/p resection 9/2015     Squamous cell carcinoma     nose s/p excision       Past Surgical History:  Past Surgical History:   Procedure Laterality Date     ------------OTHER-------------      multiple skin cancer resections     CARDIOVERSION  2013, 9/2014     COMBINED CYSTOSCOPY, RETROGRADES, EXCHANGE STENT URETER(S) Right 11/11/2019    Procedure: Cystoscopy, Right Retrograde  Pyelogram, Right Ureteral Stent Exchange;  Surgeon: Sivan Walker MD;  Location: [facility]     COMBINED CYSTOSCOPY, RETROGRADES, EXCHANGE STENT URETER(S) Right 6/8/2020    Procedure: CYSTOSCOPY, WITH RIGHT RETROGRADE PYELOGRAM AND RIGHT URETERAL STENT EXCHANGE;  Surgeon: Sivan Walker MD;  [facility]     CYSTOSCOPY, RETROGRADES, INSERT STENT URETER(S), COMBINED N/A 9/16/2019    Procedure: Cystoscopy, Right Retrograde Pyelogram, Right Ureteral Stent Placement;  Surgeon: Sivan Walker MD;  [facility]     CYSTOSCOPY, RETROGRADES, INSERT STENT URETER(S), COMBINED Right 2/5/2020    Procedure: CYSTOSCOPY, WITH Right RETROGRADE PYELOGRAM AND Right URETERAL STENT INSERTION;  Surgeon: Sivan Walker MD;  [facility]     ENDOSCOPIC RETROGRADE CHOLANGIOPANCREATOGRAM N/A 2/26/2016    Procedure: COMBINED ENDOSCOPIC RETROGRADE CHOLANGIOPANCREATOGRAPHY, PLACE TUBE/STENT;  Surgeon: Rafa Andrade MD;  [facility]     ENDOSCOPIC RETROGRADE CHOLANGIOPANCREATOGRAPHY  2/2014     ENDOSCOPIC ULTRASOUND UPPER GASTROINTESTINAL TRACT (GI) N/A 6/7/2019    Procedure: ENDOSCOPIC ULTRASOUND, with hot axios stent placement;  Surgeon: Gabino Rodriguez MD;  [facility]     ENTEROSCOPY SMALL BOWEL N/A 6/7/2019    Procedure: ENTEROSCOPY;  Surgeon: Gabino Rodriguez MD;  [facility]     ESOPHAGOSCOPY, GASTROSCOPY, DUODENOSCOPY (EGD), COMBINED N/A 10/16/2019    Procedure: Upper Gastrointestinal Endoscopy;  Surgeon: Gabino Rodriguez MD;  [facility]     ESOPHAGOSCOPY, GASTROSCOPY, DUODENOSCOPY (EGD), DILATATION, COMBINED N/A 7/29/2019    Procedure: Esophagoscopy, gastroscopy, duodenoscopy (EGD), with stent exchange and dilation;  Surgeon: Gabino Rodriguez MD;  [facility]     EXPLORE COMMON BILE DUCT N/A 3/8/2016    Procedure: EXPLORE COMMON BILE DUCT;  Surgeon: Jose Eduardo Pinedo MD;  [facility]     HEPATECTOMY PARTIAL Left 3/8/2016    Procedure:  HEPATECTOMY PARTIAL;  Surgeon: Jose Eduardo Pinedo MD;  [facility]     INSERT PORT VASCULAR ACCESS Right 12/8/2017    Procedure: INSERT PORT VASCULAR ACCESS;  Place single lumen venous chest port - right;  Surgeon: Drew Powell PA-C;  [facility]     STENT  12/2011    LAD with multiple SAM       Social History:  Social History     Socioeconomic History     Marital status:      Spouse name: Not on file     Number of children: 1     Years of education: Not on file     Highest education level: Not on file   Occupational History     Occupation: retired   Social Needs     Financial resource strain: Not on file     Food insecurity     Worry: Not on file     Inability: Not on file     Transportation needs     Medical: Not on file     Non-medical: Not on file   Tobacco Use     Smoking status: Never Smoker     Smokeless tobacco: Never Used   Substance and Sexual Activity     Alcohol use: Not Currently     Alcohol/week: 2.0 standard drinks     Types: 2 Cans of beer per week     Drug use: No     Sexual activity: Not on file   Lifestyle     Physical activity     Days per week: Not on file     Minutes per session: Not on file     Stress: Not on file   Relationships     Social connections     Talks on phone: Not on file     Gets together: Not on file     Attends Jainism service: Not on file     Active member of club or organization: Not on file     Attends meetings of clubs or organizations: Not on file     Relationship status: Not on file     Intimate partner violence     Fear of current or ex partner: Not on file     Emotionally abused: Not on file     Physically abused: Not on file     Forced sexual activity: Not on file   Other Topics Concern     Not on file   Social History Narrative    Works for [employer] with [employer] system.  Lives in [place].   with one grown daughter.  No tobacco, rare Etoh, no drug use.        Family History  Family History   Problem Relation Age of Onset      "Skin Cancer Mother      Other - See Comments Father         father passed away in his 60s post-op with an unknown bile duct obstruction.  no anesthesia complication.       Osteoarthritis Sister      Cerebrovascular Disease Brother      Other - See Comments Brother         prediabetes     Osteoarthritis Sister      No Known Problems Brother        Outpatient Medications:  No current facility-administered medications on file prior to encounter.   acetaminophen (TYLENOL) 500 MG tablet, Take 500 mg by mouth every 6 hours as needed for fever or pain  allopurinol (ZYLOPRIM) 100 MG tablet, Take 100 mg by mouth every evening   apixaban ANTICOAGULANT (ELIQUIS) 5 MG tablet, Take 5 mg by mouth 2 times daily   atorvastatin (LIPITOR) 40 MG tablet, Take 40 mg by mouth every evening   calcium carbonate (TUMS) 500 MG chewable tablet, Take 1-3 chew tab by mouth 4 times daily as needed for heartburn   capecitabine (XELODA) 500 MG tablet, Take 3 tablets (1,500 mg) by mouth 2 times daily for 14 days Days 1 through 14, then off for 7 days. Take within 30 mins after meal.  furosemide (LASIX) 20 MG tablet, Take 20 mg by mouth every other day   metoprolol tartrate (LOPRESSOR) 50 MG tablet, Take 150 mg by mouth 2 times daily   multivitamin w/minerals (THERA-VIT-M) tablet, Take 1 tablet by mouth daily  sennosides (SENOKOT) 8.6 MG tablet, Take 1 tablet by mouth every evening   colchicine (COLCYRS) 0.6 MG tablet, Take 0.6 mg by mouth 3 times daily as needed (gout flare)   fluticasone (FLONASE) 50 MCG/ACT nasal spray, Spray 1 spray into both nostrils daily  Nitroglycerin (NITROSTAT SL), Place 0.4 mg under the tongue every 5 minutes as needed for chest pain (Carries medication - has never used)   ondansetron (ZOFRAN) 4 MG tablet, Take 1 tablet (4 mg) by mouth every 8 hours as needed for nausea         Physical Exam:    Blood pressure 128/81, pulse 98, temperature 97.6  F (36.4  C), temperature source Oral, resp. rate 16, height 1.803 m (5' 11\"), " weight 69 kg (152 lb 1.9 oz), SpO2 100 %.  General: alert and cooperative, lying in bed, no acute distress  HEENT: sclera anicteric, EOMI, MMM  Neck: supple, normal ROM  CV: irregular rate, regular rhythm, harsh 4/6 blowing systolic murmur   Resp: CTAB, normal respiratory effort on ambient air  GI: soft, non-tender, non-distended, bowel sounds present and normoactive  MSK: warm and well-perfused, normal tone  Skin: no rashes on limited exam, no jaundice  Neuro: Alert and interactive, moves all extremities equally, no focal deficits    Labs & Studies: I personally reviewed the following studies:  ROUTINE LABS (Last four results):  CMP  Recent Labs   Lab 07/16/20  0601 07/15/20  2051   NA  --  135   POTASSIUM  --  3.9   CHLORIDE  --  105   CO2  --  21   ANIONGAP  --  8   GLC  --  111*   BUN  --  43*   CR 1.39* 1.69*   GFRESTIMATED 53* 42*   GFRESTBLACK 62 49*   GARY  --  9.2   PROTTOTAL  --  6.9   ALBUMIN  --  3.6   BILITOTAL  --  1.5*   ALKPHOS  --  208*   AST  --  111*   ALT  --  113*     CBC  Recent Labs   Lab 07/15/20  2051   WBC 12.6*   RBC 3.53*   HGB 11.8*   HCT 35.6*   *   MCH 33.4*   MCHC 33.1   RDW 18.6*        INR  Recent Labs   Lab 07/15/20  2051   INR 1.44*       Assessment & Plan:   Girish Chauhan is a 65 year old male with PMH significant for metastatic cholangiocarcinoma (on Xeloda) s/p partial left hepatectomy (3/2016), CKD, atrial fibrillation (on Eliquis) s/p cardioversion x2 (2013, 2014), CAD s/p stent placement (12/2011), aortic root dilatation, chronic systolic CHF, thrombocytopenia, SBO, and HTN who presented to [facility] ED with a fever (Tmax 101.8).      # Met Cholangiocarcinoma w/ peritoneal mets   See oncologic above. He was started back on Xeloda since 3/2020. CA-19-9 level 250 ->309 most recently. LFTs stable. Good performance status, ECOG 1. Mid cycle Xeloda at this time (day 8 today), HELD Xeloda inpatient due to concerns for infection. Will hold at discharge until follow up in  next 2 weeks. May need dose reduction of Xeloda if CrCl worsens, currently borderline ~53.   - Next follow up with Dr De Los Santos 9/11    # Hx of biliary obstruction with complications of bacteremia, SBO  - ERCP with stent exchange on 7/28/19  - LFT elevated on admit, now improved. Abd US negative for acute path. Repeat hepatic panel outpatient.      # CYNDEE   - Cr baseline 1.3-1.5, up to 1.69 on admission. Now improved with IVF, today is 1.39. Good UOP, UA +LE, blood and WBCs. UCx and BCx pending. Antibiotics per primary team, started on Cefepime and Vanco on admission.        Recommendations:   - Continue to hold Xeloda at discharge until oncology follow up with [facility] 7/30 and completes treatment for UTI.     We will continue to follow this patient. Please do not hesitate to page with any questions or concerns.    Patient was seen and plan of care was discussed with attending physician Dr. Weaver.    Shannon Lopes PA-C   Hematology/Oncology   Pager: [phone]

## 2020-07-16 NOTE — PHARMACY-ADMISSION MEDICATION HISTORY
Admission medication history interview status for the 7/15/2020 admission is complete. See Epic admission navigator for allergy information, pharmacy, prior to admission medications and immunization status.     Medication history interview sources:    Patient via phone, Surescripts     Changes made to PTA medication list (reason)  Added: none  Deleted: Flonase nasal spray  Changed: none    Additional medication history information:  Patient was pleasant and a very reliable source.  --Capecitabine cycle was started on 7/7/2020  --Flonase nasal spray deleted as patient states he does not use and was okay with it being removed from list       Prior to Admission medications    Medication Sig Last Dose Taking? Auth Provider   acetaminophen (TYLENOL) 500 MG tablet Take 500 mg by mouth every 6 hours as needed for fever or pain Past Month at Unknown time Yes Unknown, Entered By History   allopurinol (ZYLOPRIM) 100 MG tablet Take 100 mg by mouth every evening  7/14/2020 at PM Yes Reported, Patient   apixaban ANTICOAGULANT (ELIQUIS ANTICOAGULANT) 5 MG tablet Take 5 mg by mouth 2 times daily  7/15/2020 at AM Yes Jennifer Jalloh APRN CNP   atorvastatin (LIPITOR) 40 MG tablet Take 40 mg by mouth every evening  7/14/2020 at PM Yes Unknown, Entered By History   calcium carbonate (TUMS) 500 MG chewable tablet Take 1-3 chew tab by mouth 4 times daily as needed for heartburn  Past Month at Unknown time Yes Unknown, Entered By History   capecitabine (XELODA) 500 MG tablet Take 3 tablets (1,500 mg) by mouth 2 times daily for 14 days Days 1 through 14, then off for 7 days. Take within 30 mins after meal. 7/15/2020 at AM Yes Naresh De Los Santos MD   furosemide (LASIX) 20 MG tablet Take 20 mg by mouth every other day  7/15/2020 at AM Yes Jennifer Jalloh APRN CNP   metoprolol tartrate (LOPRESSOR) 50 MG tablet Take 150 mg by mouth 2 times daily  7/15/2020 at AM Yes Unknown, Entered By History   multivitamin w/minerals (THERA-VIT-M)  tablet Take 1 tablet by mouth daily 7/15/2020 at AM Yes Unknown, Entered By History   sennosides (SENOKOT) 8.6 MG tablet Take 1 tablet by mouth every evening  7/14/2020 at PM Yes Reported, Patient   colchicine (COLCYRS) 0.6 MG tablet Take 0.6 mg by mouth 3 times daily as needed (gout flare)  More than a month at Unknown time  Unknown, Entered By History   Nitroglycerin (NITROSTAT SL) Place 0.4 mg under the tongue every 5 minutes as needed for chest pain (Carries medication - has never used)  More than a month at Unknown time  Reported, Patient   ondansetron (ZOFRAN) 4 MG tablet Take 1 tablet (4 mg) by mouth every 8 hours as needed for nausea More than a month at Unknown time  Shannon Lopes, PAAndreaC     Medication history completed by:   Evelyn Valdez, PharmD Student    I was not present during medication history interview. I have reviewed, discussed and agree with medication history as documented by pharmacy student.  Nona Calzada, Pharm.D., [facility]  Pager [phone]

## 2020-07-16 NOTE — PHARMACY-VANCOMYCIN DOSING SERVICE
Pharmacy Vancomycin Initial Note  Date of Service July 15, 2020  Patient's  1955  65 year old male    Indication: Sepsis    Current estimated CrCl = Estimated Creatinine Clearance: 44.2 mL/min (A) (based on SCr of 1.69 mg/dL (H)).    Creatinine for last 3 days  7/15/2020:  8:51 PM Creatinine 1.69 mg/dL    Recent Vancomycin Level(s) for last 3 days  No results found for requested labs within last 72 hours.      Vancomycin IV Administrations (past 72 hours)      No vancomycin orders with administrations in past 72 hours.              Nephrotoxins and other renal medications (From now, onward)    Start     Dose/Rate Route Frequency Ordered Stop    07/15/20 223  vancomycin 1500 mg in 0.9% NaCl 250 ml intermittent infusion 1,500 mg      1,500 mg  over 90 Minutes Intravenous ONCE 07/15/20 2205      07/15/20 2229  vancomycin place titus - receiving intermittent dosing      1 each Intravenous SEE ADMIN INSTRUCTIONS 07/15/20 223          Contrast Orders - past 72 hours (72h ago, onward)    None            Plan:  1.  Give vancomycin 1500mg (~21mg/kg) x1 dose followed by intermittent due to CYNDEE.  2.  Goal Trough Level: 15-20 mg/L   3.  Pharmacy will check trough levels as appropriate in 1-3 Days.    4. Serum creatinine levels will be ordered daily for the first week of therapy and at least twice weekly for subsequent weeks.    5. [place] method utilized to dose vancomycin therapy: Method 2    Zenobia Michele, PharmD, BCCCP

## 2020-07-16 NOTE — PLAN OF CARE
Afebrile, HR afib 90s-100s, VSS. Denies pain, denies nausea. Appetite good, tolerating regular diet. No BM today. Urine output good amounts, tea-colored/no change.  Port heplocked and de-accessed.  Discharge instructions, medications, follow-up discussed with patient. Starting 5-day course of oral cipro BID. Holding Xeloda until reassessed at next oncology appointment.  Paperwork signed, copy given to patient. Patient left on foot, driven home by wife.

## 2020-07-16 NOTE — PLAN OF CARE
Neuro: A&Ox4.  Nice, cooperative. Hat and mask in place.   Cardiac: Afib rates 's VSS. Afebrile.   Respiratory: Sating mid 90's on RA.  GI/: Adequate urine output 700cc tea colored urine. No bm. No nausea.  Diet/appetite: No appetite.  Activity:  Assist of  1.  up to bathroom.  Pain: At acceptable level on current regimen. Denies  Skin: No new deficits noted. Slight small red blotches, not raised on hand from sun. Not open  LDA's: Right chest port infusing maintenance    Plan: Continue with POC. Notify primary team with changes.

## 2020-07-16 NOTE — PHARMACY-VANCOMYCIN DOSING SERVICE
Pharmacy Empiric Dose Change Per Policy  Original Dose Ordered: Intermittent loading dose of 1500 mg IV given on 7/15/20 at 2322  Dose Changed To: 1250 mg IV q24hr based on patient's improvement in renal function. Scr yesterday was 1.69 mg/dL and Scr today is 1.39 mg/dL.    Estimated Creatinine Clearance: 53.1 mL/min (A) (based on SCr of 1.39 mg/dL (H)).    Will plan to check level prior to 3rd dose pending changes in kidney function.    April Thornton, Pharmacy Intern

## 2020-07-16 NOTE — ED PROVIDER NOTES
[place] EMERGENCY DEPARTMENT ([facility])  July 15, 2020  History     Chief Complaint   Patient presents with     Fever     The history is provided by the patient and medical records.     Girish Chauhan is a 65 year old male with PMH significant for metastatic cholangiocarcinoma (on Xeloda) s/p partial left hepatectomy (3/2016), CKD, atrial fibrillation (on Eliquis) s/p cardioversion x2 (2013, 2014), CAD s/p stent placement (12/2011), aortic root dilatation, CHF, thrombocytopenia, SBO, and HTN who presents to the ED with a fever starting this afternoon.     Patient reports he began running a fever today with temps between 99 and 101.4.  He states he also has poor appetite and fatigue.  He also has some decreased UOP.  Maybe a slight occasional cough, but not notable, no hemoptysis, no production.     Patient denies abdominal pain, chest pain, shortness of breath, cough, sore throat, headache, or neck stiffness.  Patient reports he has been having alternating constipation and diarrhea but this has been ongoing with his chemotherapy regimen, and is not changed from usual baseline. No HEENT sx's. No new neck or back sx's. No MSK sx's. No rashes or skin changes. No other symptoms or complaints at this time. Please see ROS for further details.      PAST MEDICAL HISTORY:   Past Medical History:   Diagnosis Date     Anemia      Aortic stenosis due to bicuspid aortic valve      Ascending aortic aneurysm (H)      Atrial fibrillation (H) 2006    hx of paroxysmal atrial fibrillation since approximately 2006. S/p cardioversion in 2013 with recurrent atrial fibrillation s/p repeat cardioversion 9/29/14.     Basal cell carcinoma 1/2016    right shoulder and right posterior calf s/p electrodessication and curretage 1/2016.     CAD (coronary artery disease) 12/2011    s/p angiogram 12/2011 s/p percutaneous intervention on the LAD with multiple drug-eluting stents complicated by a small perforation in the diagonal  branch which sealed spontaneously.  Patient with significant Circumflex stenosis which is being treated conservatively.     Cholangiocarcinoma (H)      CKD (chronic kidney disease)      Gout     affects left foot 2-3 times per year     Hyperlipidemia      Hypertension      Liver disease      Melanoma (H) 9/2015    back s/p resection 9/2015     Squamous cell carcinoma     nose s/p excision       PAST SURGICAL HISTORY:   Past Surgical History:   Procedure Laterality Date     ------------OTHER-------------      multiple skin cancer resections     CARDIOVERSION  2013, 9/2014     COMBINED CYSTOSCOPY, RETROGRADES, EXCHANGE STENT URETER(S) Right 11/11/2019    Procedure: Cystoscopy, Right Retrograde Pyelogram, Right Ureteral Stent Exchange;  Surgeon: Sivan Walker MD;  [facility]     COMBINED CYSTOSCOPY, RETROGRADES, EXCHANGE STENT URETER(S) Right 6/8/2020    Procedure: CYSTOSCOPY, WITH RIGHT RETROGRADE PYELOGRAM AND RIGHT URETERAL STENT EXCHANGE;  Surgeon: Sivan Walker MD;  [facility]     CYSTOSCOPY, RETROGRADES, INSERT STENT URETER(S), COMBINED N/A 9/16/2019    Procedure: Cystoscopy, Right Retrograde Pyelogram, Right Ureteral Stent Placement;  Surgeon: Sivan Walker MD;  [facility]     CYSTOSCOPY, RETROGRADES, INSERT STENT URETER(S), COMBINED Right 2/5/2020    Procedure: CYSTOSCOPY, WITH Right RETROGRADE PYELOGRAM AND Right URETERAL STENT INSERTION;  Surgeon: Sivan Walker MD;  [facility]     ENDOSCOPIC RETROGRADE CHOLANGIOPANCREATOGRAM N/A 2/26/2016    Procedure: COMBINED ENDOSCOPIC RETROGRADE CHOLANGIOPANCREATOGRAPHY, PLACE TUBE/STENT;  Surgeon: Rafa Andrade MD;  [facility]     ENDOSCOPIC RETROGRADE CHOLANGIOPANCREATOGRAPHY  2/2014     ENDOSCOPIC ULTRASOUND UPPER GASTROINTESTINAL TRACT (GI) N/A 6/7/2019    Procedure: ENDOSCOPIC ULTRASOUND, with hot axios stent placement;  Surgeon: Gabino Rodriguez MD;  [facility]     ENTEROSCOPY SMALL  BOWEL N/A 6/7/2019    Procedure: ENTEROSCOPY;  Surgeon: Gabino Rodriguez MD;  [facility]     ESOPHAGOSCOPY, GASTROSCOPY, DUODENOSCOPY (EGD), COMBINED N/A 10/16/2019    Procedure: Upper Gastrointestinal Endoscopy;  Surgeon: Gabino Rodriguez MD;  [facility]     ESOPHAGOSCOPY, GASTROSCOPY, DUODENOSCOPY (EGD), DILATATION, COMBINED N/A 7/29/2019    Procedure: Esophagoscopy, gastroscopy, duodenoscopy (EGD), with stent exchange and dilation;  Surgeon: Gabino Rodriguez MD;  [facility]     EXPLORE COMMON BILE DUCT N/A 3/8/2016    Procedure: EXPLORE COMMON BILE DUCT;  Surgeon: Jose Eduardo Pinedo MD;  [facility]     HEPATECTOMY PARTIAL Left 3/8/2016    Procedure: HEPATECTOMY PARTIAL;  Surgeon: Jose Eduardo Pinedo MD;  [facility]     INSERT PORT VASCULAR ACCESS Right 12/8/2017    Procedure: INSERT PORT VASCULAR ACCESS;  Place single lumen venous chest port - right;  Surgeon: Drew Powell PA-C;  [facility]     STENT  12/2011    LAD with multiple SAM       Past medical history, past surgical history, medications, and allergies were reviewed with the patient. Additional pertinent items: None    FAMILY HISTORY:   Family History   Problem Relation Age of Onset     Skin Cancer Mother      Other - See Comments Father         father passed away in his 60s post-op with an unknown bile duct obstruction.  no anesthesia complication.       Osteoarthritis Sister      Cerebrovascular Disease Brother      Other - See Comments Brother         prediabetes     Osteoarthritis Sister      No Known Problems Brother        SOCIAL HISTORY:   Social History     Tobacco Use     Smoking status: Never Smoker     Smokeless tobacco: Never Used   Substance Use Topics     Alcohol use: Not Currently     Alcohol/week: 2.0 standard drinks     Types: 2 Cans of beer per week     Social history was reviewed with the patient. Additional pertinent items: None      Patient's Medications   New Prescriptions     No medications on file   Previous Medications    ACETAMINOPHEN (TYLENOL) 500 MG TABLET    Take 500 mg by mouth every 6 hours as needed for fever or pain    ALLOPURINOL (ZYLOPRIM) 100 MG TABLET    Take 100 mg by mouth every evening     APIXABAN ANTICOAGULANT (ELIQUIS) 5 MG TABLET    Take 5 mg by mouth 2 times daily     ATORVASTATIN (LIPITOR) 40 MG TABLET    Take 40 mg by mouth every evening     CALCIUM CARBONATE (TUMS) 500 MG CHEWABLE TABLET    Take 1-3 chew tab by mouth 4 times daily as needed for heartburn     CAPECITABINE (XELODA) 500 MG TABLET    Take 3 tablets (1,500 mg) by mouth 2 times daily for 14 days Days 1 through 14, then off for 7 days. Take within 30 mins after meal.    CLOPIDOGREL (PLAVIX) 75 MG TABLET    Take 75 mg by mouth every morning     COLCHICINE (COLCYRS) 0.6 MG TABLET    Take 0.6 mg by mouth 3 times daily as needed (gout flare)     FLUTICASONE (FLONASE) 50 MCG/ACT NASAL SPRAY    Spray 1 spray into both nostrils daily    FUROSEMIDE (LASIX) 20 MG TABLET    Take 20 mg by mouth every other day     LOSARTAN (COZAAR) 25 MG TABLET        METOPROLOL TARTRATE (LOPRESSOR) 50 MG TABLET    Take 150 mg by mouth 2 times daily     MULTIVITAMIN W/MINERALS (THERA-VIT-M) TABLET    Take 1 tablet by mouth daily    NITROGLYCERIN (NITROSTAT SL)    Place 0.4 mg under the tongue every 5 minutes as needed for chest pain (Carries medication - has never used)     ONDANSETRON (ZOFRAN) 4 MG TABLET    Take 1 tablet (4 mg) by mouth every 8 hours as needed for nausea    SENNOSIDES (SENOKOT) 8.6 MG TABLET    Take 1 tablet by mouth every evening    Modified Medications    No medications on file   Discontinued Medications    No medications on file          Allergies   Allergen Reactions     Blood Transfusion Related (Informational Only) Other (See Comments)     Patient has a history of a clinically significant antibody against RBC antigens.  A delay in compatible RBCs may occur.        Review of Systems   Constitutional:  Positive for appetite change (decrease), fatigue and fever.   HENT: Negative for sore throat.    Respiratory: Negative for cough and shortness of breath.    Cardiovascular: Negative for chest pain.   Gastrointestinal: Negative for abdominal pain, nausea and vomiting.   Genitourinary: Positive for decreased urine volume. Negative for dysuria, frequency and hematuria.   Musculoskeletal: Negative for back pain and neck stiffness.   Skin: Negative for color change and rash.   Allergic/Immunologic: Positive for immunocompromised state.   Neurological: Negative for seizures, syncope, weakness, light-headedness, numbness and headaches.   All other systems reviewed and are negative.        Physical Exam   Pulse: 91  Temp: 97.9  F (36.6  C)  Resp: 18  SpO2: 97 %      Physical Exam  CONSTITUTIONAL: Well-developed and well-nourished. Awake and alert. Non-toxic appearance. No acute distress.   HENT:   - Head: Normocephalic and atraumatic.   - Ears: Hearing and external ear grossly normal.   - Nose: Nose normal. No rhinorrhea. No epistaxis.   - Mouth/Throat: MMM. No trismus or drooling.  No tongue elevation. Normal voice. Uvula is midline and nonedematous. No orpharyngeal erythema, no exudates. No palatal petechiae or other intraoral lesions. No asymmetry. No obvious findings for PTA, RPA, epiglottitis or Naeem's Angina.   EYES: Conjunctivae and lids are normal. No scleral icterus.   NECK: Normal range of motion and phonation normal. Neck supple.  No tracheal deviation, no stridor. No edema or erythema noted.  CARDIOVASCULAR: Normal rate, regular rhythm and no appreciable abnormal heart sounds.  PULMONARY/CHEST: Normal work of breathing. No accessory muscle usage or stridor. No respiratory distress.  No appreciable abnormal breath sounds.  ABDOMEN: Soft, non-distended. No tenderness. No rigidity, rebound or guarding.   MUSCULOSKELETAL: Extremities warm and seemingly well perfused. No edema or calf tenderness.  NEUROLOGIC:  Awake, alert. Not disoriented. He displays no atrophy and no tremor. Normal tone. No seizure activity. GCS 15  SKIN: Skin is warm and dry. No rash noted. No diaphoresis. No pallor.   PSYCHIATRIC: Normal mood and affect. Speech and behavior normal. Thought processes linear. Cognition and memory are normal.     ED Course   9:12 PM  The patient was seen and examined by Evelyn Presley MD in Room ED35.     ED Course as of Jul 16 0416   Wed Jul 15, 2020   2157 Up from 1.25   Creatinine(!): 1.69   2157 Increasing   WBC(!): 12.6   2157 Up from previous   Hemoglobin(!): 11.8   2157 Lactic Acid: 1.4   2346 Just verified w/ nurses that they are indeed starting fluid bolus and will increase speed of such.       u Jul 16, 2020   0007 Additional IVF ordered. Just checked on pt again, he continues to look and report feeling well. No pain, no shortness of breath. No other new symptoms. Reports still feeling well/fine, no feverish feelings, HAs or other symptoms curerntly (essentially asymptomatic)            Assessments & Plan (with Medical Decision Making)   IMPRESSION: 65 year old male w/ PMH notable for metastatic cholangiocarcinoma (on Xeloda) s/p partial left hepatectomy (3/2016), CKD, atrial fibrillation (on Eliquis) s/p cardioversion x2 (2013, 2014), CAD s/p stent placement (12/2011), aortic root dilatation, CHF, thrombocytopenia, SBO, and HTN who presents to the ED with a fever starting this afternoon, as described further above. Clinically, patient appears quite good actually . Vitals grossly unremarkable. Otherwise on examination, no acute findings. Ddx includes, but not limited to, PNA, bacteremia (has port in place), COVID,   Bacteremia, medication effect, UTI, intraabdominal cause, cholangitis, et al.     PLAN: Labs, urine studies, chest & abdominal imaging, antimicrobials, likely obs admit  - Risks/benefits of pursuing imaging reviewed and accepted.     RESULTS:  - Labs: WBC 12's, hgb 11.8  --- COVID pending,  cultures pending  - Urine: Pending at signout  - Imaging: Written preliminary reports reviewed:  --- CXR: No acute findings  --- Abdominal US: Pending at signout  --- Results/reports reviewed w/ patient who expresses understanding of findings and F/U recommendations.    INTERVENTIONS:   - IVF infusion, added boluses when pt became hypotensive with improvement  - IV Vanc and Cefepime    RE-EVALUATION:  - The patient reports still feeling quite well.   - Patient's BP did decrease, improved with fluids, SBP back up to 100's     DISCUSSIONS:  - w/ IM: Reviewed patient/presentation, current state of workup/any pending studies. They will admit for further evaluation/management, F/U pending studies as needed, coordinate w/ consulting services as needed. No additional requests/recommendations for workup/management for in the ED at this time.    - w/ Patient: I have reviewed the available findings, plan with the patient. He expressed understanding and agreement with this plan. All questions answered to the best of our ability at this time.     DISPOSITION/PLANNING:  - IMPRESSION: Sepsis, source TBD; hypotension, improving w/ fluids  - DISPOSITION: Admit to IM ([facility]) for further evaluation/management  - PENDING: Cultures, urine studies, abdominal imaging  - RECOMMENDATIONS: Close hemodynamic monitoring, GI/Onc consults, expand imaging or obtain ERCP/MRCP, etc.       ______________________________________________________________________          I, April Burt, am serving as a trained medical scribe to document services personally performed by Evelyn Presley MD, based on the provider's statements to me.      I, Evelyn Presley MD, was physically present and have reviewed and verified the accuracy of this note documented by April Burt.    7/15/2020   [facility], EMERGENCY DEPARTMENT     Evelyn Presley MD  07/17/20 0522

## 2020-07-17 DIAGNOSIS — R30.0 DYSURIA: Primary | ICD-10-CM

## 2020-07-17 ASSESSMENT — ENCOUNTER SYMPTOMS
SEIZURES: 0
DYSURIA: 0
NUMBNESS: 0
VOMITING: 0
FREQUENCY: 0
NAUSEA: 0
WEAKNESS: 0
COLOR CHANGE: 0
LIGHT-HEADEDNESS: 0
SHORTNESS OF BREATH: 0
BACK PAIN: 0
HEMATURIA: 0

## 2020-07-19 NOTE — DISCHARGE SUMMARY
General acute hospital, [place]  Hospitalist Discharge Summary      Date of Admission:  7/15/2020  Date of Discharge:  7/16/2020  3:32 PM  Discharging Provider: Sylvester Carlson MD  Discharge Team: Hospitalist Service, [place]     Discharge Diagnoses   Sepsis 2/2 UTI  cholangiocarcinoma      Follow-ups Needed After Discharge   Follow-up Appointments     Adult [facility] Follow-up and recommended labs and tests      Follow up with primary care provider, Dario Ray, within 7   days for hospital follow- up.   Follow up with oncology as planned.     Appointments on [place] and/or [facility] (with [facility] or [facility]   provider or service). Call [phone] if you haven't heard regarding   these appointments within 7 days of discharge.             Unresulted Labs Ordered in the Past 30 Days of this Admission     Date and Time Order Name Status Description    7/15/2020 2031 Blood culture Preliminary     7/15/2020 2031 Blood culture Preliminary       These results will be followed up by oncology    Discharge Disposition   Discharged to home  Condition at discharge: Stable    Hospital Course    66 yo man cholangiocarcinoma  P/w fever  C/w sepsis and positive UA.    Sepsis, UTI  Admitted for IV antibiotics while awaiting blood cultures as he has a port  Cultures NGTD   Transitioned to oral cipro for 5d for UTI on discharge.   Held Xeloda while inpt. Oncology was consulted.      b  - improved with hydration.      Atrial fibrillation:  - continued PTA apixaban  -continued PTA metoprolol     HTN:  - restarted PTA losartan on discharge     Consultations This Hospital Stay   PHARMACY TO DOSE VANCO  PHARMACY TO DOSE VANCO  ONCOLOGY ADULT IP CONSULT  MEDICATION HISTORY IP PHARMACY CONSULT    Code Status   Prior    Time Spent on this Encounter   I, Sylvester Carlson MD, personally saw the patient today and spent less than or equal to 30 minutes discharging this patient.       Sylvester Carlson,  MD  [facility] [place]  ______________________________________________________________________    Physical Exam   Vital Signs:                    Weight: 152 lbs 1.88 oz  General Appearance: A&Ox3 in NAD  Respiratory: CTAB no c  Cardiovascular: RRR no m  GI: soft nt   Skin: wwp   Other: Ambulatory         Primary Care Physician   Dario Ray    Discharge Orders      Reason for your hospital stay    Fever  UTI  Cholangiocarcinoma     Adult [facility] Follow-up and recommended labs and tests    Follow up with primary care provider, Dario Ray, within 7 days for hospital follow- up.   Follow up with oncology as planned.     Appointments on [place] and/or [facility] (with [facility] or [facility] provider or service). Call [phone] if you haven't heard regarding these appointments within 7 days of discharge.     Activity    Your activity upon discharge: activity as tolerated and no driving for today     Diet    Follow this diet upon discharge: Orders Placed This Encounter      Regular Diet Adult       Significant Results and Procedures   Results for orders placed or performed during the hospital encounter of 07/15/20   XR Chest Port 1 View    Narrative    Exam: XR CHEST PORT 1 VW, 7/15/2020 9:24 PM    Indication: fever, ? PNA or other such cause    Comparison: 6/12/2020    Findings:   Single portable AP radiograph of the chest at 45 degrees. Right chest  wall Port-A-Cath, tip at the superior cavoatrial junction. The trachea  is midline. The cardiomediastinal silhouette is within normal limits.  Coronary artery stents. No pneumothorax or pleural effusion. No focal  airspace opacity. Surgical clips in the right upper quadrant. The  visualized upper abdomen is otherwise unremarkable. No acute osseous  lesion.      Impression    Impression: No acute airspace disease.    I have personally reviewed the examination and initial interpretation  and I agree with the  findings.    JOSE ANTONIO KESSLER MD   US Abdomen Limited (RUQ)    Narrative    EXAMINATION: Limited Abdominal Ultrasound, 7/15/2020 11:50 PM     COMPARISON: 12/3/2019, 6/12/2020    HISTORY: LFTs fever, question of cholangitis    FINDINGS:   Fluid: No evidence of ascites or pleural effusions.    Liver: Postsurgical changes of left hepatic lobe resection. The right  lobe measures 14.5 cm. No focal hepatic lesions. Right hepatic lobe  demonstrates normal echotexture.    Gallbladder: Cholecystectomy.    Bile Ducts: Trace intrahepatic biliary ductal dilation up to 0.5 cm  which is similar to prior CT and nonspecific in the setting of  cholecystectomy. Common bile duct measures up to 0.4 cm.    Pancreas: Poorly visualized secondary to bowel gas.    Kidney: The right kidney measures 10.8 cm long. There is mild  hydronephrosis in the upper pole of the right kidney with distention  of the renal pelvis unchanged from prior CT. The known stent is not  visualized on this exam.      Impression    IMPRESSION:   1. Postsurgical changes of left hepatic lobe resection. Normal  sonographic appearance of the right hepatic lobe.  2. Unchanged trace intrahepatic biliary ductal dilation which is  nonspecific in the setting of cholecystectomy.  3. Unchanged upper pole right hydronephrosis.    I have personally reviewed the examination and initial interpretation  and I agree with the findings.    JAKE PETERSON MD       Discharge Medications   Discharge Medication List as of 7/16/2020  2:39 PM      START taking these medications    Details   ciprofloxacin (CIPRO) 500 MG tablet Take 1 tablet (500 mg) by mouth 2 times daily for 5 days, Disp-10 tablet,R-0, E-Prescribe         CONTINUE these medications which have NOT CHANGED    Details   acetaminophen (TYLENOL) 500 MG tablet Take 500 mg by mouth every 6 hours as needed for fever or pain, Historical      allopurinol (ZYLOPRIM) 100 MG tablet Take 100 mg by mouth every evening , Historical       apixaban ANTICOAGULANT (ELIQUIS ANTICOAGULANT) 5 MG tablet Take 5 mg by mouth 2 times daily , Historical      atorvastatin (LIPITOR) 40 MG tablet Take 40 mg by mouth every evening , Historical      calcium carbonate (TUMS) 500 MG chewable tablet Take 1-3 chew tab by mouth 4 times daily as needed for heartburn , Historical      colchicine (COLCYRS) 0.6 MG tablet Take 0.6 mg by mouth 3 times daily as needed (gout flare) , Historical      furosemide (LASIX) 20 MG tablet Take 20 mg by mouth every other day , Historical      metoprolol tartrate (LOPRESSOR) 50 MG tablet Take 150 mg by mouth 2 times daily , Historical      multivitamin w/minerals (THERA-VIT-M) tablet Take 1 tablet by mouth daily, Historical      Nitroglycerin (NITROSTAT SL) Place 0.4 mg under the tongue every 5 minutes as needed for chest pain (Carries medication - has never used) , Historical      ondansetron (ZOFRAN) 4 MG tablet Take 1 tablet (4 mg) by mouth every 8 hours as needed for nausea, Disp-30 tablet, R-0, E-Prescribe      sennosides (SENOKOT) 8.6 MG tablet Take 1 tablet by mouth every evening , Historical      capecitabine (XELODA) 500 MG tablet Take 3 tablets (1,500 mg) by mouth 2 times daily for 14 days Days 1 through 14, then off for 7 days. Take within 30 mins after meal., Disp-84 tablet,R-0, E-Prescribe           Allergies   Allergies   Allergen Reactions     Blood Transfusion Related (Informational Only) Other (See Comments)     Patient has a history of a clinically significant antibody against RBC antigens.  A delay in compatible RBCs may occur.

## 2020-07-20 ENCOUNTER — PATIENT OUTREACH (OUTPATIENT)
Dept: ONCOLOGY | Facility: CLINIC | Age: 65
End: 2020-07-20

## 2020-07-20 NOTE — PROGRESS NOTES
RN Care Coordination Note  Post Hospital Call     Placed call to patient to follow-up after recent hospitalization. Calling to follow up on UTI symptoms, as patient did not have urine cx done. Unable to reach patient. Left detailed message. Asked patient return call with update.     Kecia Loco RN, BSN, OCN   RN Care Coordinator   [facility]

## 2020-07-21 LAB
BACTERIA SPEC CULT: NO GROWTH
SPECIMEN SOURCE: NORMAL

## 2020-07-22 ENCOUNTER — TELEPHONE (OUTPATIENT)
Dept: ONCOLOGY | Facility: CLINIC | Age: 65
End: 2020-07-22

## 2020-07-22 LAB
BACTERIA SPEC CULT: NO GROWTH
SPECIMEN SOURCE: NORMAL

## 2020-07-22 NOTE — TELEPHONE ENCOUNTER
Amish Carey CPhT  [facility]  Oncology Pharmacy Liaison   [email or web address]  [phone] (phone  [phone] (fax

## 2020-07-23 NOTE — TELEPHONE ENCOUNTER
Prior Authorization Approval    Authorization Effective Date: 7/22/2020  Authorization Expiration Date: 7/22/2021  Medication: Capecitabine PA   Approved Dose/Quantity: 500mg 84/21  Reference #: [id number]   Insurance Company: [employer] in [place] - Phone [phone] Fax [phone]  Expected CoPay:       CoPay Card Available:      Foundation Assistance Needed:    Which Pharmacy is filling the prescription (Not needed for infusion/clinic administered): [facility] - [place], [place] - [address] [place] [address]  Pharmacy Notified: Yes  Patient Notified:      Amish Carey CPhT  [facility]  Oncology Pharmacy [email or web address]  [phone] (phone  [phone] (fax

## 2020-07-29 NOTE — PROGRESS NOTES
"Girish Chauhan is a 65 year old male who is being evaluated via a billable video visit.      The patient has been notified of following:     \"This video visit will be conducted via a call between you and your physician/provider. We have found that certain health care needs can be provided without the need for an in-person physical exam.  This service lets us provide the care you need with a video conversation.  If a prescription is necessary we can send it directly to your pharmacy.  If lab work is needed we can place an order for that and you can then stop by our lab to have the test done at a later time.    Video visits are billed at different rates depending on your insurance coverage.  Please reach out to your insurance provider with any questions.    If during the course of the call the physician/provider feels a video visit is not appropriate, you will not be charged for this service.\"    Patient has given verbal consent for Video visit? Yes  How would you like to obtain your AVS? MyChart  If you are dropped from the video visit, the video invite should be resent to: Text to cell phone: [phone]   Will anyone else be joining your video visit? No       Vitals - Patient Reported  Weight (Patient Reported): 71.7 kg (158 lb)  Height (Patient Reported): 180.3 cm (5' 11\")  BMI (Based on Pt Reported Ht/Wt): 22.04  Pain Score: No Pain (0)    I have reviewed and updated the patient's allergies and medication list.    Concerns: No concerns  Refills: No refills needed.      Nona Singh CMA    Video-Visit Details    Type of service:  Video Visit    Video Start Time: 1:20pm  Video End Time: 1:47pm    Originating Location (pt. Location): Home    Distant Location (provider location):  [facility]     Platform used for Video Visit: Faisal Thomas CNP/Jennifer Jalloh CNP    July 30, 2020     Reason for Visit: follow up cholangiocarcinoma    Oncology HPI:   Girish Chauhan is a 64 year " old year old gentleman with cholangiocarcinoma  Which was resected in 3/2016 (including partial liver resection) with negative margins and no LN involvement, but with perineural and lymphovascular invasion. No adjuvant chemotherapy given. Recurred in bladder 11/2017, bx c/w metastatic cholangiocarcinoma, started on cisplatin/gemcitabine 12/2017. Cisplatin held 6/2018 for poor tolerance. Gemcitabine discontinued 8/2018 for possible TTP, then went off treatment. In 4/2019 was found to have disease progression in the abdominal cavity anterior to the liver, just below the diaphragm. Started on capecitabine 4/30/19 (last dose 5/7) but developed an NSTEMI s/p angiogram 5/6/19 (see below) and actually continued on the capecitabine for several days after NSTEMI without any ongoing cardiac symptoms or evidence of ongoing ischemia. On follow up with Dr. De Los Santos 6/24, disease appeared stable. Decision made with family to hold off on treatment for 2 months and reevaluate.      He was then transferred to [facility] on 6/5/19 after presenting to OSH with SBO with possible involvement of draining choledochojejunostomy loop, subjective fevers, and A fib with RVR. GI consulted, s/p small bowel enteroscopy with stenting on 6/7 for obstructed biliary limb. Discharged on 6/9/19.     He was admitted on 7/28/19 with fever, nausea, vomiting, and abdominal pain. Hospitalization complicated by sepsis and enterococcus casseliflavus bacteremia. Additionally, Krishna was found to have migrated gastrojejunal stent on CT A/P on admission. S/p ERCP with stent exchange 7/29 by Dr. Rodriguez. Discharged on Linezolid for 2 weeks.       CT CAP showed disease progression. He started on 5FU on 10/4/19 inpatient due to history of MI while taking Xeloda. He has had stable disease radiographically, but a rising tumor marker after 4 cycles. He was continued on 5FU.     He was hospitalized in February with influenza A and then seen on 2/19/20 with wrist swelling and  redness and was started on bactrim for cellulitis     He had a visit with Dr. De Los Santos on 3/23/20. His cancer was stable. Infusional 5FU was switched back to oral capecitabine.     7/15/20-7/16/20: [facility] admit for UTI. +UA but no urine culture done. Completed 5 days cipro. Blood cultures negative.     Interval history: Krishna has not had any more fevers since the hospital, he states that even by the time he got the the ER he no longer had a fever. They had him stop his Xeloda while in the hospital and he has not resumed it yet until discussing at our visit today. He denies nausea but does have a taste aversion making eating less appealing. His wife cooks for him. He is intentional about good fluid intake throughout the day. No urinary concerns at this time. His bowels have normalized off the Xeloda, occasionally he has to take a softener during the week he is off. Has occasional diarrhea while taking but does not need imodium. He has baseline numbness/loss of fingerprint of fingers making fine motor skills challenging. He thinks he had an inflamed disc after his hospital stay. He describes it as pain with bending his knee and shooting nerve pain from hip to knee on the right side. He had associated foot weakness and difficulty walking secondary to. No changes in speech or mentation, headache, or numbness/imobility of the right upper extremity. No swelling or redness to the right leg. He noticed some color change to his finger nails and skin redness where it was exposed to the sun.     Current Outpatient Medications   Medication Sig Dispense Refill     acetaminophen (TYLENOL) 500 MG tablet Take 500 mg by mouth every 6 hours as needed for fever or pain       allopurinol (ZYLOPRIM) 100 MG tablet Take 100 mg by mouth every evening        apixaban ANTICOAGULANT (ELIQUIS ANTICOAGULANT) 5 MG tablet Take 5 mg by mouth 2 times daily        atorvastatin (LIPITOR) 40 MG tablet Take 40 mg by mouth every evening        calcium  carbonate (TUMS) 500 MG chewable tablet Take 1-3 chew tab by mouth 4 times daily as needed for heartburn        colchicine (COLCYRS) 0.6 MG tablet Take 0.6 mg by mouth 3 times daily as needed (gout flare)        furosemide (LASIX) 20 MG tablet Take 20 mg by mouth every other day        metoprolol tartrate (LOPRESSOR) 50 MG tablet Take 150 mg by mouth 2 times daily        multivitamin w/minerals (THERA-VIT-M) tablet Take 1 tablet by mouth daily       Nitroglycerin (NITROSTAT SL) Place 0.4 mg under the tongue every 5 minutes as needed for chest pain (Carries medication - has never used)        ondansetron (ZOFRAN) 4 MG tablet Take 1 tablet (4 mg) by mouth every 8 hours as needed for nausea 30 tablet 0     sennosides (SENOKOT) 8.6 MG tablet Take 1 tablet by mouth every evening             Allergies   Allergen Reactions     Blood Transfusion Related (Informational Only) Other (See Comments)     Patient has a history of a clinically significant antibody against RBC antigens.  A delay in compatible RBCs may occur.       Objective:   There were no vitals taken for this visit.  Wt Readings from Last 4 Encounters:   07/16/20 69 kg (152 lb 1.9 oz)   06/08/20 69.8 kg (153 lb 14.1 oz)   06/01/20 71.2 kg (157 lb)   03/23/20 72.2 kg (159 lb 3.2 oz)     Video physical exam  General: Patient appears well in no acute distress. Normal body habitus. Speech slightly garbled at baseline.  Skin: No visualized rash or lesions on visualized skin  Eyes: EOMI, no erythema, sclera icterus or discharge noted  Resp: Appears to be breathing comfortably without accessory muscle usage, speaking in full sentences, no cough  MSK: Appears to have normal range of motion based on visualized movements  Neurologic: No apparent tremors, facial movements symmetric  Psych: affect good, alert and oriented    The rest of a comprehensive physical examination is deferred due to PHE (public health emergency) video restrictions    Labs:    7/30/2020 11:00    Sodium 137   Potassium 4.5   Chloride 105   Carbon Dioxide 28   Urea Nitrogen 34 (H)   Creatinine 1.33 (H)   GFR Estimate 56 (L)   GFR Estimate If Black 65   Calcium 8.7   Anion Gap 4   Albumin 3.2 (L)   Protein Total 6.6 (L)   Bilirubin Total 0.4   Alkaline Phosphatase 240 (H)   ALT 64   AST 50 (H)   Glucose 97   WBC 8.0   Hemoglobin 11.1 (L)   Hematocrit 34.7 (L)   Platelet Count 241   RBC Count 3.40 (L)    (H)   MCH 32.6   MCHC 32.0   RDW 17.2 (H)   Diff Method Automated Method   % Neutrophils 66.5   % Lymphocytes 15.7   % Monocytes 10.0   % Eosinophils 6.9   % Basophils 0.5   % Immature Granulocytes 0.4   Nucleated RBCs 0   Absolute Neutrophil 5.3   Absolute Lymphocytes 1.3   Absolute Monocytes 0.8   Absolute Eosinophils 0.6   Absolute Basophils 0.0   Abs Immature Granulocytes 0.0   Absolute Nucleated RBC 0.0       Imaging: n/a    Impression/plan:   Recurrent, Metastatic cholangiocarcinoma with stable disease was on single agent infusional 5FU, now on Xeloda.  -Xeloda 1.5 G bid for 2 weeks on 1 week off. Tolerates with mild diarrhea and skin changes (below)  -He was in the middle of a cycle when he went into the hospital. He has improved clinically since discharge so discussed okay to start new cycle today instead of picking up where he left of.   -will f/u with the pharmacy team prior to the next cycle, lab orders placed for 8/20     UTI with recent hospitalization  - UA with +leukocyte esterase and blood. Culture not completed. Was admitted for for IV antibiotics while awaiting blood cultures. Cultures were NGTD. Completed 5 days of oral cipro after discharge.  -No further fevers or urinary symptoms since discharge  -WBC back to normal range from elevation of 12.6 in hospital.   -Encouraged he stay hydrated    Lumbar radiculopathy  -Krishna describes pain that runs down the outside of his right leg to his knee. Concerning that he describes decrease in right foot strength and mobility associated with it  that decreased his ability to ambulate. I am glad he reports it is improving since he first noticed it after hospital stay, states he has had similar feeling before after he was positioned oddly during an EGD. Not consistent with Achilles tendonitis associated with cipro. No other symptoms consistent with stroke or VTE. Encouraged him to bring to our attention if symptoms return because imaging may be indicated.     Skin rash/nail discoloration/dryness  -Sounds consistent with photosensitivity reaction to the sun while on xeloda  -His hands and feet are dry, reinforced use of emollients regularly, avoid sun exposure, use sunscreen when exposed    Hx of biliary obstruction with complications of bacteremia, SBO  -had ERCP with stent exchange on 7/28/19  -LFTs stable     Cards: hx of HLD/HTN, atrial fibrillation, bicuspid aortic valve and moderate aortic stenosis, heart failure with reduced EF, CAD with history of multivessel stenting in 2011.  - Developed NSTEMI, s/p LHC and angiogram 5/6/19 with 99% obstruction of OM2, unsuccessful PCI. He also developed afib with RVR during this time which necessitated increase of his metoprolol and starting on apixaban. He was also started on ASA/clopidogrel for CAD. Repeat angiogram done 6/17 with SAM to the OM1. Repeat Echo 7/30/19 with improved EF 45-50%, mild LV dilation.   had stopped anticoagulation on 7/27/19 due to thrombocytopenia. Resumed plavix, asa and apixiban on 8/5/19 when platelets were >137K  -Last Echo on 9/30/19 showed an EF of 42%. Follows with cardiology and last saw them 6/12/20.        CKD, hx of R hydronephrosis x/t extrinsic compression, s/p ureteral stenting  -BUN/Cr at baseline. Discussed importance of adequate hydration if having diarrhea.      Transition Care Management Services  Admission date: 7/15/20  Discharge date: 7/16/20  Face-to-face visit date: 7/30/2020    Medical complexity decision making: Moderate complexity (26846)      Deana Thomas,  CNP on 7/30/2020 at 4:09 PM    The patient was seen in conjunction with Deana Thomas CNP who served as a scribe for today's visit. I have reviewed the note and agree with the above findings and plan. TW

## 2020-07-30 NOTE — NURSING NOTE
Chief Complaint   Patient presents with     Port Draw     Labs drawn via port by RN in lab. VS taken.      Labs drawn via Port accessed using 20g gripper needle. Line flushed and Heparin locked. Vital signs taken.     Deana George RN

## 2020-08-03 NOTE — TELEPHONE ENCOUNTER
Patient left message noting symptoms.    Per last procedure note:  With progressive food intolerance, consideration may                        be given to placement of an uncovered metal duodenal                        stent from pylorus to feeding jejunum (RYHJ); no plans                        however for a procedure at this moment     Called to triage patient. Left message.    Jing Juarez RN Care Coordinator

## 2020-08-05 NOTE — TELEPHONE ENCOUNTER
Forwarded symptoms detailed in MyChart to Dr. Rodriguez  Per Dr. Rodriguez  Lets get a CT of the abdomen with IV contrast if his kidney function allows     Then   Please assist in scheduling:     Procedure/Imaging/Clinic: Upper endoscopy with stent revision (possible EUS0   Physician: Jennifer   Timing: Next available on [place]   Procedure length:60   Anesthesia:Gen   Dx: Outlet obstruction   Tier:2   Location: [facility]    Orders placed for CT and procedure, MyChart sent.    Jing Juarez RN Care Coordinator

## 2020-08-07 PROBLEM — K31.1 GASTRIC OUTLET OBSTRUCTION: Status: ACTIVE | Noted: 2020-01-01

## 2020-08-10 NOTE — TELEPHONE ENCOUNTER
FUTURE VISIT INFORMATION      SURGERY INFORMATION:    Date: 20    Location: [facility] or    Surgeon:  Gabino Rodriguez MD    Anesthesia Type:  general    Procedure: ESOPHAGOGASTRODUODENOSCOPY (EGD) WIth Stent Revision ENDOSCOPIC ULTRASOUND, ESOPHAGOSCOPY / UPPER GASTROINTESTINAL TRACT (GI)     RECORDS REQUESTED FROM:       Primary Care Provider: Dario Jain DO - [facility]    Pertinent Medical History: Aortic root dilatation, disorder of aorta, paroxysmal atrial fibrillation, hypertension, bicuspid aortic valve    Most recent EKG+ Tracin20    Most recent ECHO: 20- [facility]    Most recent Cardiac Stress Test: 11- [facility]    Most recent Coronary Angiogram: 19- [facility]

## 2020-08-11 NOTE — H&P
Pre-Operative H & P     CC:  Preoperative exam to assess for increased cardiopulmonary risk while undergoing surgery and anesthesia.    Date of Encounter: 8/11/2020  Primary Care Physician:  Dario Jain  Associated Diagnosis: gastric outlet obstruction    HPI  Girish Chauhan is a 65 year old male who presents for pre-operative H & P in preparation for  ESOPHAGOGASTRODUODENOSCOPY (EGD) WIth Stent Revision, ENDOSCOPIC ULTRASOUND, ESOPHAGOSCOPY / UPPER GASTROINTESTINAL TRACT (GI)  with Dr. Rodriguez on 8/25/2020 at [facility]. General anesthesia.    Kirshna Chauhan is a 65 year old male with PMH significant for metastatic cholangiocarcinoma (on Xeloda) s/p partial left hepatectomy (3/2016), CKD, atrial fibrillation (on Eliquis) s/p cardioversion x2 (2013, 2014), CAD s/p SAM in 2011, NSTEMI in 5/2019 with SAM x1, CHF, bicuspid aortic valve with aortic stenosis, aortic root dilatation, CHF, SBO, and HTN. He also has a past medical history significant for anemia, gout, SBO, CKD, melanoma, BCC and SCC.  Recently he has started having symptoms of abdominal pain with emesis x1 and progressive food intolerance.  The above procedure is now planned.     History is obtained from the patient and chart review.     Past Medical History  Past Medical History:   Diagnosis Date     Anemia      Aortic stenosis due to bicuspid aortic valve      Ascending aortic aneurysm (H)      Atrial fibrillation (H) 2006    hx of paroxysmal atrial fibrillation since approximately 2006. S/p cardioversion in 2013 with recurrent atrial fibrillation s/p repeat cardioversion 9/29/14.     Basal cell carcinoma 1/2016    right shoulder and right posterior calf s/p electrodessication and curretage 1/2016.     CAD (coronary artery disease) 12/2011    s/p angiogram 12/2011 s/p percutaneous intervention on the LAD with multiple drug-eluting stents complicated by a small perforation in the diagonal branch which  sealed spontaneously.  Patient with significant Circumflex stenosis which is being treated conservatively.     Cholangiocarcinoma (H)      CKD (chronic kidney disease)      Gout     affects left foot 2-3 times per year     Hyperlipidemia      Hypertension      Liver disease      Melanoma (H) 9/2015    back s/p resection 9/2015     Squamous cell carcinoma     nose s/p excision       Past Surgical History  Past Surgical History:   Procedure Laterality Date     ------------OTHER-------------      multiple skin cancer resections     CARDIOVERSION  2013, 9/2014     COMBINED CYSTOSCOPY, RETROGRADES, EXCHANGE STENT URETER(S) Right 11/11/2019    Procedure: Cystoscopy, Right Retrograde Pyelogram, Right Ureteral Stent Exchange;  Surgeon: Sivan Walker MD;  [facility]     COMBINED CYSTOSCOPY, RETROGRADES, EXCHANGE STENT URETER(S) Right 6/8/2020    Procedure: CYSTOSCOPY, WITH RIGHT RETROGRADE PYELOGRAM AND RIGHT URETERAL STENT EXCHANGE;  Surgeon: Sivan Walker MD;  [facility]     CYSTOSCOPY, RETROGRADES, INSERT STENT URETER(S), COMBINED N/A 9/16/2019    Procedure: Cystoscopy, Right Retrograde Pyelogram, Right Ureteral Stent Placement;  Surgeon: Sivan Walker MD;  [facility]     CYSTOSCOPY, RETROGRADES, INSERT STENT URETER(S), COMBINED Right 2/5/2020    Procedure: CYSTOSCOPY, WITH Right RETROGRADE PYELOGRAM AND Right URETERAL STENT INSERTION;  Surgeon: Sivan Walker MD;  [facility]     ENDOSCOPIC RETROGRADE CHOLANGIOPANCREATOGRAM N/A 2/26/2016    Procedure: COMBINED ENDOSCOPIC RETROGRADE CHOLANGIOPANCREATOGRAPHY, PLACE TUBE/STENT;  Surgeon: Rafa Andrade MD;  [facility]     ENDOSCOPIC RETROGRADE CHOLANGIOPANCREATOGRAPHY  2/2014     ENDOSCOPIC ULTRASOUND UPPER GASTROINTESTINAL TRACT (GI) N/A 6/7/2019    Procedure: ENDOSCOPIC ULTRASOUND, with hot axios stent placement;  Surgeon: Gabino Rodriguez MD;  [facility]     ENTEROSCOPY SMALL BOWEL N/A 6/7/2019     Procedure: ENTEROSCOPY;  Surgeon: Gabino Rodriguez MD;  [facility]     ESOPHAGOSCOPY, GASTROSCOPY, DUODENOSCOPY (EGD), COMBINED N/A 10/16/2019    Procedure: Upper Gastrointestinal Endoscopy;  Surgeon: Gabino Rodriguez MD;  [facility]     ESOPHAGOSCOPY, GASTROSCOPY, DUODENOSCOPY (EGD), DILATATION, COMBINED N/A 7/29/2019    Procedure: Esophagoscopy, gastroscopy, duodenoscopy (EGD), with stent exchange and dilation;  Surgeon: Gabino Rodriguez MD;  [facility]     EXPLORE COMMON BILE DUCT N/A 3/8/2016    Procedure: EXPLORE COMMON BILE DUCT;  Surgeon: Jose Eduardo Pinedo MD;  [facility]     HEPATECTOMY PARTIAL Left 3/8/2016    Procedure: HEPATECTOMY PARTIAL;  Surgeon: Jose Eduardo Pinedo MD;  [facility]     INSERT PORT VASCULAR ACCESS Right 12/8/2017    Procedure: INSERT PORT VASCULAR ACCESS;  Place single lumen venous chest port - right;  Surgeon: Drew Powell PA-C;  [facility]     STENT  12/2011    LAD with multiple SAM       Hx of Blood transfusions/reactions: most recent transfusion 7/2019 with known antibodies     Hx of abnormal bleeding or anti-platelet use: Eliquis    Menstrual history: No LMP for male patient.:     Steroid use in the last year: denies    Personal or FH with difficulty with Anesthesia:  denies    Prior to Admission Medications  Current Outpatient Medications   Medication Sig Dispense Refill     acetaminophen (TYLENOL) 500 MG tablet Take 500 mg by mouth every 6 hours as needed for fever or pain       allopurinol (ZYLOPRIM) 100 MG tablet Take 100 mg by mouth every evening        apixaban ANTICOAGULANT (ELIQUIS ANTICOAGULANT) 5 MG tablet Take 5 mg by mouth 2 times daily        atorvastatin (LIPITOR) 40 MG tablet Take 40 mg by mouth every evening        calcium carbonate (TUMS) 500 MG chewable tablet Take 1-3 chew tab by mouth 4 times daily as needed for heartburn        capecitabine (XELODA) 500 MG tablet Take 3 tablets (1,500 mg) by mouth  2 times daily for 14 days Days 1 through 14, then off for 7 days. Take within 30 mins after meal. 84 tablet 0     furosemide (LASIX) 20 MG tablet Take 20 mg by mouth every other day        metoprolol tartrate (LOPRESSOR) 50 MG tablet Take 150 mg by mouth 2 times daily        multivitamin w/minerals (THERA-VIT-M) tablet Take 1 tablet by mouth every morning        Nitroglycerin (NITROSTAT SL) Place 0.4 mg under the tongue every 5 minutes as needed for chest pain (Carries medication - has never used)        ondansetron (ZOFRAN) 4 MG tablet Take 1 tablet (4 mg) by mouth every 8 hours as needed for nausea 30 tablet 0     sennosides (SENOKOT) 8.6 MG tablet Take 1 tablet by mouth every evening        colchicine (COLCYRS) 0.6 MG tablet Take 0.6 mg by mouth 3 times daily as needed (gout flare)          Allergies  Allergies   Allergen Reactions     Blood Transfusion Related (Informational Only) Other (See Comments)     Patient has a history of a clinically significant antibody against RBC antigens.  A delay in compatible RBCs may occur.       Social History  Social History     Socioeconomic History     Marital status:      Spouse name: Not on file     Number of children: 1     Years of education: Not on file     Highest education level: Not on file   Occupational History     Occupation: retired   Social Needs     Financial resource strain: Not on file     Food insecurity     Worry: Not on file     Inability: Not on file     Transportation needs     Medical: Not on file     Non-medical: Not on file   Tobacco Use     Smoking status: Never Smoker     Smokeless tobacco: Never Used   Substance and Sexual Activity     Alcohol use: Not Currently     Alcohol/week: 2.0 standard drinks     Types: 2 Cans of beer per week     Drug use: No     Sexual activity: Not on file   Lifestyle     Physical activity     Days per week: Not on file     Minutes per session: Not on file     Stress: Not on file   Relationships     Social  connections     Talks on phone: Not on file     Gets together: Not on file     Attends L[employer]-day service: Not on file     Active member of club or organization: Not on file     Attends meetings of clubs or organizations: Not on file     Relationship status: Not on file     Intimate partner violence     Fear of current or ex partner: Not on file     Emotionally abused: Not on file     Physically abused: Not on file     Forced sexual activity: Not on file   Other Topics Concern     Not on file   Social History Narrative    Works for [employer] with [employer] system.  Lives in [place].   with one grown daughter.  No tobacco, rare Etoh, no drug use.       Family History  Family History   Problem Relation Age of Onset     Skin Cancer Mother      Other - See Comments Father         father passed away in his 60s post-op with an unknown bile duct obstruction.  no anesthesia complication.       Osteoarthritis Sister      Cerebrovascular Disease Brother      Other - See Comments Brother         prediabetes     Osteoarthritis Sister      No Known Problems Brother            Anesthesia Evaluation     . Pt has had prior anesthetic. Type: General and MAC    No history of anesthetic complications          ROS/MED HX  The complete review of systems is negative other than noted in the HPI or here.   ENT/Pulmonary:     (+)MARVIN risk factors hypertension, , . .   (-) tobacco use   Neurologic:     (+)neuropathy - feet,    (-) seizures, CVA, TIA and migraines   Cardiovascular: Comment:  History of multivessel stenting in 2011/non-ST segment elevation myocardial infarction in May 2019, failed stenting of obtuse marginal at [facility] and successful stenting of the same vessel at [facility] on June 17, 2019, no angina.    (+) Dyslipidemia, hypertension--CAD, -past MI,-stent,2011 x4, 6/17/2019 x 1  5 Drug Eluting Stent .. Taking blood thinners Pt has received instructions: Instructions Given to patient: Continuing Plavix and  Eliquis-okay per surgery. CHF Last EF: 42% date: 9/30/19 . . :. dysrhythmias a-fib, valvular problems/murmurs type: AS moderate.:. Previous cardiac testing Echodate:9/30/19results:date: results:ECG reviewed date:2/11/20 results:A fib with RVR, inferior infarct age undetermined.   Cath date: 6/17/2019 results:   Result Narrative        65 yo M with known CAD, remote PCI of LAD in 2011, presented in May 2019   to [facility] with a NSTEMI, with coronary angiography there showing patent   LAD stents with a severe stenosis in OM1 that could not be successfully   crossed with a wire.     (Known metastatic cholangiocarcinoma s/p initial resection 2016, more   recent chemotherapy; and s/p Axios stenting of carcinomatous obstruction   of biliary limb of prior Carol-en-Y jejunostomy June 2019 when he presented   with an SBO)    Returns today for repeat coronary angiography with another attempt at the   OM1 stenosis    LM minimal dz  LAD patent prox to distal stents  LCx 99% OM1 stenosis with disease extension for 10-15mm on either side of   stenosis; mild dz elsewhere in Cx  RCA mild to moderate diffuse dz, 50-60% focal distal RCA lesion    Successful PCI of OM1 with PTCA followed by 2.5x24 Synergy SAM  0% residual, NITZA 3 flow post    OM1 lesion essentially behaving as a ; requiring wire escalation,   progressive PTCA and Guidezilla support for PCI              METS/Exercise Tolerance: Comment: Current chemotherapy medication has caused fatigue which was started in September 2019.    Hematologic:     (+) Anemia, History of Transfusion (July 2019 with known antibodies. ) no previous transfusion reaction -      Musculoskeletal:   (+) arthritis (Gout related),  other musculoskeletal- gout      GI/Hepatic: Comment: Known metastatic cholangiocarcinoma s/p initial resection 2016, more recent chemotherapy; and s/p Axios stenting of carcinomatous obstruction  of biliary limb of prior Carol-en-Y jejunostomy June 2019.      (+) GERD  "(takes TUMS as needed. No recent symptoms) Other, cholecystitis/cholelithiasis, Other GI/Hepatic cholangiocarcinoma, s/p partial hepatatectomy, bowel stent in place     Acute appendicitis: s/p cholecystectomy.   Renal/Genitourinary:     (+) chronic renal disease, type: CRI, Pt does not require dialysis, Pt has no history of transplant, Other Renal/ Genitourinary, right hydronephrosis      Endo:  - neg endo ROS   (+) Other Endocrine Disorder gout.      Psychiatric:  - neg psychiatric ROS       Infectious Disease:  - neg infectious disease ROS       Malignancy:   (+) Malignancy History of Other and Skin  Skin CA Remission status post Surgery, Other CA cholangiocarcinoma on Xeloda  Active status post Chemo and Surgery         Other:    (+) No chance of pregnancy C-spine cleared: N/A, no H/O Chronic Pain,no other significant disability            PHYSICAL EXAM:   Mental Status/Neuro: A/A/O; Age Appropriate   Airway: Facies: Feasible  Mallampati: I  Mouth/Opening: Full  TM distance: > 6 cm  Neck ROM: Full   Respiratory: Auscultation: CTAB     Resp. Rate: Normal     Resp. Effort: Normal      CV: Rhythm: Irregular; Afib  Rate: Age appropriate  Edema: None   Comments:      Dental: Normal Dentition              Temp: 97.7  F (36.5  C) Temp src: Oral BP: 112/79 Pulse: 93   Resp: 16 SpO2: 100 %         154 lbs 0 oz  5' 11\"[pt reported[   Body mass index is 21.48 kg/m .       Physical Exam  Constitutional: Awake, alert, cooperative, no apparent distress, and appears stated age.  Eyes: Pupils equal, round and reactive to light, extra ocular muscles intact, sclera clear, conjunctiva normal.  HENT: Normocephalic, oral pharynx with moist mucus membranes, good dentition. No goiter appreciated.   Respiratory: Clear to auscultation bilaterally, no crackles or wheezing.  Cardiovascular: irregular rhythm, normal S1 and S2, and systolic murmur noted.  Carotids +2, no bruits. No edema. Palpable pulses to radial  DP and PT arteries.   GI: " Normal bowel sounds, soft, abdomen  Lymph/Hematologic: No cervical lymphadenopathy and no supraclavicular lymphadenopathy.  Genitourinary:  deferred  Skin: Warm and dry.  No rashes at anticipated surgical site.   Musculoskeletal: Full ROM of neck. There is no redness, warmth, or swelling of the joints. Gross motor strength is normal.    Neurologic: Awake, alert, oriented to name, place and time. Cranial nerves II-XII are grossly intact. Gait is normal.   Neuropsychiatric: Calm, cooperative. Normal affect.     Labs: (personally reviewed)  Component      Latest Ref Rng & Units 7/30/2020   WBC      4.0 - 11.0 10e9/L 8.0   RBC Count      4.4 - 5.9 10e12/L 3.40 (L)   Hemoglobin      13.3 - 17.7 g/dL 11.1 (L)   Hematocrit      40.0 - 53.0 % 34.7 (L)   MCV      78 - 100 fl 102 (H)   MCH      26.5 - 33.0 pg 32.6   MCHC      31.5 - 36.5 g/dL 32.0   RDW      10.0 - 15.0 % 17.2 (H)   Platelet Count      150 - 450 10e9/L 241   Diff Method       Automated Method   % Neutrophils      % 66.5   % Lymphocytes      % 15.7   % Monocytes      % 10.0   % Eosinophils      % 6.9   % Basophils      % 0.5   % Immature Granulocytes      % 0.4   Nucleated RBCs      0 /100 0   Absolute Neutrophil      1.6 - 8.3 10e9/L 5.3   Absolute Lymphocytes      0.8 - 5.3 10e9/L 1.3   Absolute Monocytes      0.0 - 1.3 10e9/L 0.8   Absolute Eosinophils      0.0 - 0.7 10e9/L 0.6   Absolute Basophils      0.0 - 0.2 10e9/L 0.0   Abs Immature Granulocytes      0 - 0.4 10e9/L 0.0   Absolute Nucleated RBC       0.0     Component      Latest Ref Rng & Units 7/30/2020   Sodium      133 - 144 mmol/L 137   Potassium      3.4 - 5.3 mmol/L 4.5   Chloride      94 - 109 mmol/L 105   Carbon Dioxide      20 - 32 mmol/L 28   Anion Gap      3 - 14 mmol/L 4   Glucose      70 - 99 mg/dL 97   Urea Nitrogen      7 - 30 mg/dL 34 (H)   Creatinine      0.66 - 1.25 mg/dL 1.33 (H)   GFR Estimate      >60 mL/min/1.73:m2 56 (L)   GFR Estimate If Black      >60 mL/min/1.73:m2 65    Calcium      8.5 - 10.1 mg/dL 8.7   Bilirubin Total      0.2 - 1.3 mg/dL 0.4   Albumin      3.4 - 5.0 g/dL 3.2 (L)   Protein Total      6.8 - 8.8 g/dL 6.6 (L)   Alkaline Phosphatase      40 - 150 U/L 240 (H)   ALT      0 - 70 U/L 64   AST      0 - 45 U/L 50 (H)     EK20  A fib with RVR, inferior infarct age undetermined.      Echocardiogram 20  Normal left ventricular size with borderline concentric hypertrophy.    Left ventricular ejection fraction is moderately decreased. The  calculated left ventricular ejection fraction is 42%.    Normal right ventricular size and systolic function.    Mild aortic stenosis.    Left atrial volume is moderately increased.    When compared to the previous study dated 2019, the ejection  fraction is slightly higher.     Coronary angiogram 19     LM minimal dz  LAD patent prox to distal stents  LCx 99% OM1 stenosis with disease extension for 10-15mm on either side of  stenosis; mild dz elsewhere in Cx  RCA mild to moderate diffuse dz, 50-60% focal distal RCA lesion     Successful PCI of OM1 with PTCA followed by 2.5x24 Synergy SAM  0% residual, NITZA 3 flow post     OM1 lesion essentially behaving as a ; requiring wire escalation,  progressive PTCA and Guidezilla support for PCI.     EXAMINATION: CT ABDOMEN/PELVIS W CONTRAST, 2020   IMPRESSION:   In this patient with a history of cholangiocarcinoma status post  partial left hepatectomy and hepaticojejunostomy:     1.  Pain duodenojejunostomy stent with with improved gastric  distention.  2.  Unchanged soft tissue thickening along the anterior hepatectomy  resection margin.  3.  Stable peritoneal soft tissue thickening in the pelvis.  4.  Stable hydronephrosis of the right kidney with nephroureteral  stent. Resolution of previously seen proximal right ureter urothelial  thickening.  5.  Indeterminate splenic hypodensities, unchanged.         The patient's records and results personally reviewed by this  provider.       Outside records reviewed from: care everywhere    ASSESSMENT and PLAN  Girish Chauhan is a 65 year old man who is scheduled for ESOPHAGOGASTRODUODENOSCOPY (EGD) WIth Stent Revision, ENDOSCOPIC ULTRASOUND, ESOPHAGOSCOPY / UPPER GASTROINTESTINAL TRACT (GI) on 8/25/20 by Dr. Rodriguez in treatment of gastric outlet obstruction.  PAC referral for risk assessment and optimization for anesthesia with comorbid conditions of CAD, a fib s/p cardioversion 2013 and 2014, bicuspid aortic valve, mild aortic stenosis, HTN, HLD, anemia, gout, cholangiocarcinoma, CKD:     Pre-operative considerations:  1.  Cardiac:  Functional status- METS >4, the patient walks 1 mile 4-5 times a week. He denies any cardiac symptoms.  Low risk surgery with 0.9% (RCRI #) risk of major adverse cardiac event.   ~ CAD - the patient had multiple SAM placed to LAD in 2011 and had 1 SAM placed to OM in 5/2019.   Testing as above.    ~ Ascending aortic aneurysm - 3.9 cm and stable.   ~ HFrEF - EF 40-45% on echo 6/2020 - No edema on today's exam, continue lasix.   ~ Mild - moderate aortic stenosis, mean gradient is 18 mmHg, ÓSCAR 1.3cm2  ~ HTN/HLD - continue metoprolol, lipitor    ~ A fib - CHADSVASC = 3, continue Eliquis.  ~ Testing as above.      2.  Pulm:  Airway feasible.  MARVIN risk: Intermediate (male, age, HTN). Never smoker.     3. Heme: Anemia - hgb was 11.1 on 7/40/20. Low bleeding risk.      4. Endo: Gout - continue allopurinol. Patient takes colchicine as need for flare.      5. GI:  Risk of PONV score = 1.  If > 2, anti-emetic intervention recommended.  ~ The patient has metastatic cholangiocarcinoma s/p resection and with history of SBO s/p stents - he denies any issues with digestion and is supplementing his nutrition with high protein drink as needed. He is on his 2 weeks of Xeloda and will be off next week. Procedure as above for recent pain, emesisx1 and progressive food intolerance.  ~ GERD - the patient takes TUMs as needed but  hasn't taken recently.      6. : Hydronephrosis s/p cystoscopy and stent 6/8/2020.   ~ CKD - baseline creatinine 1.3-1.4.  (1.33 on 7/30/20)     VTE risk: 3%      Patient is optimized and is acceptable candidate for the proposed procedure.  No further diagnostic evaluation is needed.       Evelyn Madison PA-C  Preoperative Assessment Center  [facility]  [facility]  Phone: [phone]  Fax: [phone]

## 2020-08-11 NOTE — PATIENT INSTRUCTIONS
Preparing for Your Surgery      Name:  Girish Chauhan   MRN:  [id number]   :  1955   Today's Date:  2020       Arriving for surgery:  Surgery date:  2020  Arrival time:  10:45 am    Restrictions due to COVID 19:  Patients are allowed one visitor in the pre-op period  All visitors must wear a mask  No visitors under 18  No ill visitors   parking is not available     Please come to:       [facility] [address] [place] [address]  [id number]       -    Please proceed to the Surgery Lounge on the 3rd floor. [phone]?     - ?If you are in need of directions, wheelchair or escort please stop at the Information Desk in the lobby.       What can I eat or drink?  -  You may eat and drink normally for up to 8 hours before your surgery. (Until 4 am )  -  You may have clear liquids until 2 hours before surgery. (Until 10:45 am arrival time)  Examples of clear liquids:  Water  Clear broth  Juices (apple, white grape, white cranberry  and cider) without pulp  Noncarbonated, powder based beverages  (lemonade and Grady-Aid)  Sodas (Sprite, 7-Up, ginger ale and seltzer)  Coffee or tea (without milk or cream)  Gatorade    -  No Alcohol for at least 24 hours before surgery     Which medicines can I take?    Hold Aspirin for 7 days before surgery.     Hold Multivitamins for 7 days before surgery.  Hold Supplements for 7 days before surgery.  Hold Ibuprofen (Advil, Motrin) for 1 day before surgery--unless otherwise directed by surgeon.  Hold Naproxen (Aleve) for 4 days before surgery.        -  DO NOT take these medications the day of surgery:  Calcium carbonate (TUMS)        -  PLEASE TAKE these medications the day of surgery:  Eloquis (Apixaban)  Furosemide  Acetaminophen if needed  Metoprolol  Ondansetron (Zofran) if needed  Colchicine (Colcrys) if needed      How do I prepare myself?  - Please take 2 showers before surgery using Scrubcare or Hibiclens soap.     Use this soap only from the neck to your toes.     Leave the soap on your skin for one minute--then rinse thoroughly.      You may use your own shampoo and conditioner; no other hair products.   - Please remove all jewelry and body piercings.  - No lotions, deodorants or fragrance.  - No makeup or fingernail polish.   - Bring your ID and insurance card.    - All patients are required to have a Covid-19 test within 4 days of surgery/procedure.      -Patients will be contacted by the [facility] scheduling team within 1 week of surgery to make an appointment.      - Patients may call the Scheduling team at [phone] if they have not been scheduled within 4 days of  surgery.      ALL PATIENTS GOING HOME THE SAME DAY OF SURGERY ARE REQUIRED TO HAVE A RESPONSIBLE ADULT TO DRIVE AND BE IN ATTENDANCE WITH THEM FOR 24 HOURS FOLLOWING SURGERY     Questions or Concerns:    - For any questions regarding the day of surgery or your hospital stay, please contact the Pre Admission Nursing Office at [phone].       - If you have health changes between today and your surgery please call your surgeon.       For questions after surgery please call your surgeons office.

## 2020-08-11 NOTE — ANESTHESIA PREPROCEDURE EVALUATION
Anesthesia Pre-Procedure Evaluation    Patient: Girish Chauhan   MRN:     [id number] Gender:   male   Age:    65 year old :      1955        Preoperative Diagnosis: * No surgery found *        LABS:  CBC:   Lab Results   Component Value Date    WBC 8.0 2020    WBC 12.6 (H) 07/15/2020    HGB 11.1 (L) 2020    HGB 11.8 (L) 07/15/2020    HCT 34.7 (L) 2020    HCT 35.6 (L) 07/15/2020     2020     07/15/2020     BMP:   Lab Results   Component Value Date     2020     2020    POTASSIUM 4.5 2020    POTASSIUM 3.7 2020    CHLORIDE 105 2020    CHLORIDE 111 (H) 2020    CO2 28 2020    CO2 23 2020    BUN 34 (H) 2020    BUN 37 (H) 2020    CR 1.33 (H) 2020    CR 1.41 (H) 2020    GLC 97 2020    GLC 97 2020     COAGS:   Lab Results   Component Value Date    PTT 33 2020    INR 1.44 (H) 07/15/2020    FIBR 404 2019     POC:   Lab Results   Component Value Date    BGM 89 2020     OTHER:   Lab Results   Component Value Date    PH 7.38 2016    LACT 1.4 07/15/2020    GARY 8.7 2020    PHOS 2.8 2020    MAG 2.0 2020    ALBUMIN 3.2 (L) 2020    PROTTOTAL 6.6 (L) 2020    ALT 64 2020    AST 50 (H) 2020    ALKPHOS 240 (H) 2020    BILITOTAL 0.4 2020    LIPASE 176 2020    AMYLASE 71 2019    TAYLOR 24 2020    TSH 2.34 07/15/2020    CRP 20.0 (H) 2020        Preop Vitals    BP Readings from Last 3 Encounters:   20 118/82   20 123/87   20 124/87    Pulse Readings from Last 3 Encounters:   20 86   20 101   20 81      Resp Readings from Last 3 Encounters:   20 16   20 16   20 16    SpO2 Readings from Last 3 Encounters:   20 100%   20 100%   20 99%      Temp Readings from Last 1 Encounters:   20 97.5  F (36.4  C) (Oral)    Ht Readings from Last  "1 Encounters:   07/16/20 1.803 m (5' 11\")      Wt Readings from Last 1 Encounters:   07/30/20 71.7 kg (158 lb)    Estimated body mass index is 22.04 kg/m  as calculated from the following:    Height as of 7/16/20: 1.803 m (5' 11\").    Weight as of 7/30/20: 71.7 kg (158 lb).     LDA:  Port A Cath Single 12/08/17 Right Chest wall (Active)   Access Date 08/06/20 08/06/20 1247   Access Attempts 1 08/06/20 1247   Gauge 20 gauge;Power noncoring 90 degree bend;3/4 inch 08/06/20 1247   Site Assessment WDL 08/06/20 1257   Line Status Heparin locked 08/06/20 1257   Extravasation? No 07/30/20 1100   Dressing Intervention Transparent 08/06/20 1247   Dressing change due 07/23/20 07/16/20 0800   Needle Change Due 07/23/20 07/16/20 0800   De-Access Date 08/06/20 08/06/20 1257   Date to be Reflushed 08/30/20 07/30/20 1100   Number of days: 977        Past Medical History:   Diagnosis Date     Anemia      Aortic stenosis due to bicuspid aortic valve      Ascending aortic aneurysm (H)      Atrial fibrillation (H) 2006    hx of paroxysmal atrial fibrillation since approximately 2006. S/p cardioversion in 2013 with recurrent atrial fibrillation s/p repeat cardioversion 9/29/14.     Basal cell carcinoma 1/2016    right shoulder and right posterior calf s/p electrodessication and curretage 1/2016.     CAD (coronary artery disease) 12/2011    s/p angiogram 12/2011 s/p percutaneous intervention on the LAD with multiple drug-eluting stents complicated by a small perforation in the diagonal branch which sealed spontaneously.  Patient with significant Circumflex stenosis which is being treated conservatively.     Cholangiocarcinoma (H)      CKD (chronic kidney disease)      Gout     affects left foot 2-3 times per year     Hyperlipidemia      Hypertension      Liver disease      Melanoma (H) 9/2015    back s/p resection 9/2015     Squamous cell carcinoma     nose s/p excision      Past Surgical History:   Procedure Laterality Date     " ------------OTHER-------------      multiple skin cancer resections     CARDIOVERSION  2013, 9/2014     COMBINED CYSTOSCOPY, RETROGRADES, EXCHANGE STENT URETER(S) Right 11/11/2019    Procedure: Cystoscopy, Right Retrograde Pyelogram, Right Ureteral Stent Exchange;  Surgeon: Sivan Walker MD;  [facility]     COMBINED CYSTOSCOPY, RETROGRADES, EXCHANGE STENT URETER(S) Right 6/8/2020    Procedure: CYSTOSCOPY, WITH RIGHT RETROGRADE PYELOGRAM AND RIGHT URETERAL STENT EXCHANGE;  Surgeon: Sivan Walker MD;  [facility]     CYSTOSCOPY, RETROGRADES, INSERT STENT URETER(S), COMBINED N/A 9/16/2019    Procedure: Cystoscopy, Right Retrograde Pyelogram, Right Ureteral Stent Placement;  Surgeon: Sivan Walker MD;  [facility]     CYSTOSCOPY, RETROGRADES, INSERT STENT URETER(S), COMBINED Right 2/5/2020    Procedure: CYSTOSCOPY, WITH Right RETROGRADE PYELOGRAM AND Right URETERAL STENT INSERTION;  Surgeon: Sivan Walker MD;  [facility]     ENDOSCOPIC RETROGRADE CHOLANGIOPANCREATOGRAM N/A 2/26/2016    Procedure: COMBINED ENDOSCOPIC RETROGRADE CHOLANGIOPANCREATOGRAPHY, PLACE TUBE/STENT;  Surgeon: Rafa Andrade MD;  [facility]     ENDOSCOPIC RETROGRADE CHOLANGIOPANCREATOGRAPHY  2/2014     ENDOSCOPIC ULTRASOUND UPPER GASTROINTESTINAL TRACT (GI) N/A 6/7/2019    Procedure: ENDOSCOPIC ULTRASOUND, with hot axios stent placement;  Surgeon: Gabino Rodriguez MD;  [facility]     ENTEROSCOPY SMALL BOWEL N/A 6/7/2019    Procedure: ENTEROSCOPY;  Surgeon: Gabino Rodriguez MD;  [facility]     ESOPHAGOSCOPY, GASTROSCOPY, DUODENOSCOPY (EGD), COMBINED N/A 10/16/2019    Procedure: Upper Gastrointestinal Endoscopy;  Surgeon: Gabino Rodriguez MD;  [facility]     ESOPHAGOSCOPY, GASTROSCOPY, DUODENOSCOPY (EGD), DILATATION, COMBINED N/A 7/29/2019    Procedure: Esophagoscopy, gastroscopy, duodenoscopy (EGD), with stent exchange and dilation;  Surgeon: Jennifer  Gabino White MD;  [facility]     EXPLORE COMMON BILE DUCT N/A 3/8/2016    Procedure: EXPLORE COMMON BILE DUCT;  Surgeon: Jose Eduardo Pinedo MD;  [facility]     HEPATECTOMY PARTIAL Left 3/8/2016    Procedure: HEPATECTOMY PARTIAL;  Surgeon: Jose Eduardo Pinedo MD;  [facility]     INSERT PORT VASCULAR ACCESS Right 12/8/2017    Procedure: INSERT PORT VASCULAR ACCESS;  Place single lumen venous chest port - right;  Surgeon: Drew Powell PA-C;  [facility]     STENT  12/2011    LAD with multiple SAM      Allergies   Allergen Reactions     Blood Transfusion Related (Informational Only) Other (See Comments)     Patient has a history of a clinically significant antibody against RBC antigens.  A delay in compatible RBCs may occur.        Anesthesia Evaluation     . Pt has had prior anesthetic. Type: General and MAC    No history of anesthetic complications          ROS/MED HX    ENT/Pulmonary:     (+)MARVIN risk factors hypertension, , . .   (-) tobacco use   Neurologic:     (+)neuropathy - feet,    (-) seizures, CVA, TIA and migraines   Cardiovascular: Comment:  History of multivessel stenting in 2011/non-ST segment elevation myocardial infarction in May 2019, failed stenting of obtuse marginal at [place] and [place] [facility] [place], no angina.    (+) Dyslipidemia, hypertension--CAD, -past MI,-stent,2011 x4, 6/17/2019 x 1  5 Drug Eluting Stent .. Taking blood thinners Pt has received instructions: Instructions Given to patient: Continuing Plavix and Eliquis-okay per surgery. CHF Last EF: 42% date: 9/30/19 . . :. dysrhythmias a-fib, valvular problems/murmurs type: AS moderate.:. Previous cardiac testing Echodate:9/30/19results:date: results:ECG reviewed date:2/11/20 results:A fib with RVR, inferior infarct age undetermined.   Cath date: 6/17/2019 results:   Result Narrative        65 yo M with known CAD, remote PCI of LAD in 2011, presented in  May 2019   to [facility] with a NSTEMI, with coronary angiography there showing patent   LAD stents with a severe stenosis in OM1 that could not be successfully   crossed with a wire.     (Known metastatic cholangiocarcinoma s/p initial resection 2016, more   recent chemotherapy; and s/p Axios stenting of carcinomatous obstruction   of biliary limb of prior Carol-en-Y jejunostomy June 2019 when he presented   with an SBO)    Returns today for repeat coronary angiography with another attempt at the   OM1 stenosis    LM minimal dz  LAD patent prox to distal stents  LCx 99% OM1 stenosis with disease extension for 10-15mm on either side of   stenosis; mild dz elsewhere in Cx  RCA mild to moderate diffuse dz, 50-60% focal distal RCA lesion    Successful PCI of OM1 with PTCA followed by 2.5x24 Synergy SAM  0% residual, NITZA 3 flow post    OM1 lesion essentially behaving as a ; requiring wire escalation,   progressive PTCA and Guidezilla support for PCI              METS/Exercise Tolerance: Comment: Current chemotherapy medication has caused fatigue which was started in September 2019.    Hematologic:     (+) Anemia, History of Transfusion (July 2019 with known antibodies. ) no previous transfusion reaction -      Musculoskeletal:   (+) arthritis (Gout related),  other musculoskeletal- gout      GI/Hepatic: Comment: Known metastatic cholangiocarcinoma s/p initial resection 2016, more recent chemotherapy; and s/p Axios stenting of carcinomatous obstruction  of biliary limb of prior Carol-en-Y jejunostomy June 2019.      (+) GERD (takes TUMS as needed. No recent symptoms) Other, cholecystitis/cholelithiasis, Other GI/Hepatic cholangiocarcinoma, s/p partial hepatatectomy, bowel stent in place     Acute appendicitis: s/p cholecystectomy.   Renal/Genitourinary:     (+) chronic renal disease, type: CRI, Pt does not require dialysis, Pt has no history of transplant, Other Renal/ Genitourinary, right hydronephrosis      Endo:  - neg  endo ROS   (+) Other Endocrine Disorder gout.      Psychiatric:  - neg psychiatric ROS       Infectious Disease:  - neg infectious disease ROS       Malignancy:   (+) Malignancy History of Other and Skin  Skin CA Remission status post Surgery, Other CA cholangiocarcinoma on Xeloda  Active status post Chemo and Surgery         Other:    (+) No chance of pregnancy C-spine cleared: N/A, no H/O Chronic Pain,no other significant disability                        PHYSICAL EXAM:   Mental Status/Neuro: A/A/O; Age Appropriate   Airway: Facies: Feasible  Mallampati: I  Mouth/Opening: Full  TM distance: > 6 cm  Neck ROM: Full   Respiratory: Auscultation: CTAB     Resp. Rate: Normal     Resp. Effort: Normal      CV: Rhythm: Irregular; Afib  Rate: Age appropriate  Edema: None   Comments:      Dental: Normal Dentition                JZG FV AN PLAN NO PONV RULE       PAC Discussion and Assessment    ASA Classification: 3  Case is suitable for: [place]  Anesthetic techniques and relevant risks discussed: GA  Invasive monitoring and risk discussed: No  Types:   Possibility and Risk of blood transfusion discussed: No  NPO instructions given:   Additional anesthetic preparation and risks discussed:   Needs early admission to pre-op area:   Other:     PAC Resident/NP Anesthesia Assessment:  Girish Chauhan is a 65 year old man who is scheduled for ESOPHAGOGASTRODUODENOSCOPY (EGD) WIth Stent Revision, ENDOSCOPIC ULTRASOUND, ESOPHAGOSCOPY / UPPER GASTROINTESTINAL TRACT (GI) on 8/25/20 by Dr. Rodriguez in treatment of gastric outlet obstruction.  PAC referral for risk assessment and optimization for anesthesia with comorbid conditions of CAD, a fib s/p cardioversion 2013 and 2014, bicuspid aortic valve, mild aortic stenosis, HTN, HLD, anemia, gout, cholangiocarcinoma, CKD:     Pre-operative considerations:  1.  Cardiac:  Functional status- METS >4, the patient walks 1 mile 4-5 times a week. He denies any cardiac symptoms.  Low risk surgery  with 0.9% (RCRI #) risk of major adverse cardiac event.   ~ CAD - the patient had multiple SAM placed to LAD in 2011 and had 1 SAM placed to OM in 5/2019.  Atrial fibrillation. Testing as above.   ~ Ascending aortic aneurysm - 3.9 cm and stable.   ~ HFrEF - EF 45-50% on echo 7/2019 - No edema on today's exam, continue lasix.   ~ Mild aortic stenosis, mean gradient is 26 mmHg, ÓSCAR 1.5cm2.   ~ HTN/HLD - continue metoprolol, lipitor    ~ A fib - CHADSVASC = 3, continue Eliquis.     2.  Pulm:  Airway feasible.  MARVIN risk: Intermediate (male, age, HTN). Never smoker.     3. Heme: Anemia - hgb was 11.1 on 7/40/20. Low bleeding risk.      4. Endo: Gout - continue allopurinol. Patient takes colchicine as need for flare.      5. GI:  Risk of PONV score = 1.  If > 2, anti-emetic intervention recommended.  ~ The patient has metastatic cholangiocarcinoma s/p resection and with history of SBO s/p stents - he denies any issues with digestion and is supplementing his nutrition with high protein drink as needed. He is on his 2 weeks of Xeloda and will be off next week. Procedure as above for recent pain, emesisx1 and progressive food intolerance.  ~ GERD - the patient takes TUMs as needed but hasn't taken recently.      6. : Hydronephrosis s/p cystoscopy and stent 6/8/2020.   ~ CKD - baseline creatinine 1.3-1.4.  (1.33 on 7/30/20)     VTE risk: 3%  Patient is optimized and is acceptable candidate for the proposed procedure.  No further diagnostic evaluation is needed.         **For further details of assessment, testing, and physical exam please see H and P completed on same date.          Evelyn Madison PA-C, [facility]      Reviewed and Signed by PAC Mid-Level Provider/Resident  Mid-Level Provider/Resident: Evelyn Madison  Date: 8/11/2020  Time:     Attending Anesthesiologist Anesthesia Assessment:        Anesthesiologist:   Date:   Time:   Pass/Fail:   Disposition:     PAC Pharmacist Assessment:        Pharmacist:   Date:    Time:    Evelyn Madison PA-C

## 2020-08-12 NOTE — PHARMACY - PREOPERATIVE ASSESSMENT CENTER
Anticoagulation Note - Preoperative Assessment Center (PAC) Pharmacist     Patient was interviewed by PAC [facility] on August 11, 2020 prior to PAC clinic appointment. The purpose of this note is to document the perioperative anticoagulation plan outlined by the providers caring for Girish Chauhan.     Current Regimen  Anticoagulation Regimen as of August 12, 2020: apixaban (Eliquis) 5 mg PO BID  Indication: atrial fibrillation  Prescriber:  Dr. Deangelo Rose  Expected Duration of therapy: indefinite    Perioperative plan  Girish Chauhan is scheduled for EGD w/ stent revision and EUS/UpperGI on 8/25/20 with Dr. Rodriguez and the perioperative anticoagulation plan outlined by PAC staff and Dr. Rodriguez is hold apixaban x24 hr pre op.     Resumption of anticoagulation after procedure will be based on surgery team assessment of bleeding risks and complications.  This plan may require re-assessment and modification by his primary team in the perioperative setting depending on patients clinical situation.        Jeff Ward RP  August 12, 2020  1:53 PM

## 2020-08-20 NOTE — NURSING NOTE
"Chief Complaint   Patient presents with     Port Draw     port accessed and labs drawn by rn in lab.  lab only appointment.     Port accessed with 20g 3/4\" gripper needle, labs drawn by RN in lab.  Port flushed with saline and heparin.  Port de-accessed.    Carey Larson RN      "

## 2020-08-20 NOTE — TELEPHONE ENCOUNTER
Oral Chemotherapy Monitoring Program     Placed call to Krishna in follow up of lab work completed today for Xeloda oral chemotherapy.     Labs:  Last Comprehensive Metabolic Panel:  Sodium   Date Value Ref Range Status   08/20/2020 139 133 - 144 mmol/L Final     Potassium   Date Value Ref Range Status   08/20/2020 4.1 3.4 - 5.3 mmol/L Final     Chloride   Date Value Ref Range Status   08/20/2020 109 94 - 109 mmol/L Final     Carbon Dioxide   Date Value Ref Range Status   08/20/2020 25 20 - 32 mmol/L Final     Anion Gap   Date Value Ref Range Status   08/20/2020 6 3 - 14 mmol/L Final     Glucose   Date Value Ref Range Status   08/20/2020 86 70 - 99 mg/dL Final     Urea Nitrogen   Date Value Ref Range Status   08/20/2020 25 7 - 30 mg/dL Final     Creatinine   Date Value Ref Range Status   08/20/2020 1.30 (H) 0.66 - 1.25 mg/dL Final     GFR Estimate   Date Value Ref Range Status   08/20/2020 58 (L) >60 mL/min/[1.73_m2] Final     Comment:     Non  GFR Calc  Starting 12/18/2018, serum creatinine based estimated GFR (eGFR) will be   calculated using the Chronic Kidney Disease Epidemiology Collaboration   (CKD-EPI) equation.       Calcium   Date Value Ref Range Status   08/20/2020 8.6 8.5 - 10.1 mg/dL Final     Bilirubin Total   Date Value Ref Range Status   08/20/2020 0.6 0.2 - 1.3 mg/dL Final     Alkaline Phosphatase   Date Value Ref Range Status   08/20/2020 139 40 - 150 U/L Final     ALT   Date Value Ref Range Status   08/20/2020 55 0 - 70 U/L Final     AST   Date Value Ref Range Status   08/20/2020 52 (H) 0 - 45 U/L Final     CBC RESULTS:   Recent Labs   Lab Test 08/20/20  1201   WBC 8.2   RBC 3.48*   HGB 11.2*   HCT 34.7*      MCH 32.2   MCHC 32.3   RDW 18.6*   *   ANC 5.1       Assessment/Plan:  I was unable to reach Krishna. I left a message detailing that his labs are stable and without concern. I also explained that I would send a MyChart message about the results.       Sheeba Pastor  PharmD  Oral Chemotherapy Monitoring Program  [facility]  [phone]  August 20, 2020

## 2020-08-25 NOTE — ANESTHESIA POSTPROCEDURE EVALUATION
Anesthesia POST Procedure Evaluation    Patient: Girish Chauhan   MRN:     [id number] Gender:   male   Age:    65 year old :      1955        Preoperative Diagnosis: Gastric outlet obstruction [K31.1]   Procedure(s):  ESOPHAGOGASTRODUODENOSCOPY (EGD) WIth  duodenal and jejunal stent placement   Postop Comments: No value filed.     Anesthesia Type: General       Disposition: Outpatient   Postop Pain Control: Uneventful            Sign Out: Well controlled pain   PONV: No   Neuro/Psych: Uneventful            Sign Out: Acceptable/Baseline neuro status   Airway/Respiratory: Uneventful            Sign Out: Acceptable/Baseline resp. status   CV/Hemodynamics: Uneventful            Sign Out: Acceptable CV status   Other NRE: NONE   DID A NON-ROUTINE EVENT OCCUR? No         Last Anesthesia Record Vitals:  CRNA VITALS  2020 1319 - 2020 1419      2020             Pulse:  86    SpO2:  100 %          Last PACU Vitals:  Vitals Value Taken Time   /67 2020  2:20 PM   Temp 35.8  C (96.4  F) 2020  2:00 PM   Pulse 87 2020  2:29 PM   Resp 12 2020  2:15 PM   SpO2 100 % 2020  2:29 PM   Temp src     NIBP     Pulse     SpO2     Resp     Temp     Ht Rate     Temp 2     Vitals shown include unvalidated device data.      Electronically Signed By: Arnie Novak MD, 2020, 2:30 PM

## 2020-08-25 NOTE — OP NOTE
Upper GI Endoscopy  08/25/2020 12:00 PM  [facility], [address] [place] [address] [phone]     Endoscopy Department   _______________________________________________________________________________   Patient Name: Girish Chauhan           Procedure Date: 8/25/2020 12:00 PM   MRN: [id number]                       Account Number: [id number]   YOB: 1955              Admit Type: Outpatient   Age: 65                               Room: OR   Gender: Male                          Note Status: Finalized   Attending MD: Gabino Rodriguez MD   Total Sedation Time:   _______________________________________________________________________________       Procedure:           Upper GI endoscopy   Indications:         Epigastric abdominal pain, Stent follow-up   Providers:           Gabino Rodriguez MD   Patient Profile:     Mr Chauhan is a 63yo gentleman with cholangiocarcinoma                        status post left hepatectomy with RYHJ anatomy recurrent                        disease and metastasis causing obstruction of the                        biliary limb managed by a gastrojejunostomy secured by                        an Axios (replaced last July of 2019). More recently, he                        has had issues with tolerating a diet and intermittent                        abdominal pain. He now returns for further management by                        upper endoscopy.   Referring MD:        Jose Eduardo Pinedo MD   Requesting Provider: Dario Ray   Medicines:           General Anesthesia   Complications:       No immediate complications.   _______________________________________________________________________________   Procedure:           Pre-Anesthesia Assessment:                        - Prior to the procedure, a History and Physical was                        performed, and patient medications and allergies were                        reviewed.  The patient is competent. The risks and                        benefits of the procedure and the sedation options and                        risks were discussed with the patient. All questions                        were answered and informed consent was obtained. Patient                        identification and proposed procedure were verified by                        the nurse in the pre-procedure area. Mental Status                        Examination: alert and oriented. Airway Examination:                        Mallampati Class II (the uvula but not tonsillar pillars                        visualized). Respiratory Examination: clear to                        auscultation. CV Examination: normal. ASA Grade                        Assessment: III - A patient with severe systemic                        disease. After reviewing the risks and benefits, the                        patient was deemed in satisfactory condition to undergo                        the procedure. The anesthesia plan was to use general                        anesthesia. Immediately prior to administration of                        medications, the patient was re-assessed for adequacy to                        receive sedatives. The heart rate, respiratory rate,                        oxygen saturations, blood pressure, adequacy of                        pulmonary ventilation, and response to care were                        monitored throughout the procedure. The physical status                        of the patient was re-assessed after the procedure.                        After obtaining informed consent, the endoscope was                        passed under direct vision. Throughout the procedure,                        the patient's blood pressure, pulse, and oxygen                        saturations were monitored continuously. The endoscopes                        was introduced through the mouth, and advanced to the                  "       jejunum. The upper GI endoscopy was accomplished without                        difficulty. The patient tolerated the procedure well.                                                                                    Findings:         films demonstrated the Axios stent in its anticipated position and        widely expanded. Endoscopically, the esophagus was unremarkable and the        stomach intact through distorted distally near the pylorus which was        collapsed and tortuous. There was extrinsic compression of the proximal        duodenum and the Axios was not overtly visible due to this and probable        tumor overgrowth. The endoscope managed to travese this region to the        distal sweep which was unremarkable. We then passed a long 0.025\"        Visiglide wire across the extrinsic compression to the 4th portion. Over        this wire a [id number] duodenal Hanarostent was deployed with excellent        expansion. This allowed improved access to the area of tumor overgrowing        the Axios. The stent overlying this area was distrupted along with some        of the tumor growth using 100W F1.0 APC. This allowed access to the        Axios through which we passed the wire and curled its end in the biliary        limb. Over this wire a 12mm occlusion balloon was passed and contrast        was injected ensuring we were within the lumen of the biliary limb. Once        confirmed, we then depoyed a [id number] covered metal Viabil stent from        biliary limb, through the Axios and through the lumen of the Hanro.        Bilious drainage was excellent.                                                                                     Impression:          - Distorted and stenotic pylorus and proximal duodenum                        due to suspected extrinsic malignant compression                        - Tumor overgrowth obscuring the otherwise well seated                        and expanded " gastrojejunal (to the biliary limb) Axios                        stent                        - Successful placement of a Hanaro uncovered metal stent                        from stomach to duodenum, opening up the compressed                        proximal duodenum                        - Uncomplicated destruction of overlying tumor and                        disruption of now overlying Hanro stent allowing access                        to biliary limb across the Axios stent                        - Successful placement of a covered 10mm Viabil stent                        from biliary limb through the Axios and Hanaro to the                        stomach   Recommendation:      - General anesthesia recovery with probable discharge                        home this afternoon                        - Resume all medications and diet without rafia; the                        patient may find that he can liberalize oral intake,                        though he should avoid foods that chew poorly and                        continued small, more frequent meals                        - No plans for future endoscopy until symptoms dictate                        otherwise                        - The findings and recommendations were discussed with                        the patient and their family                                                                                       electronically signed by MEGHAN Rodriguez

## 2020-08-25 NOTE — ANESTHESIA CARE TRANSFER NOTE
Patient: Girish Chauhan    Procedure(s):  ESOPHAGOGASTRODUODENOSCOPY (EGD) WIth  duodenal and jejunal stent placement    Diagnosis: Gastric outlet obstruction [K31.1]  Diagnosis Additional Information: No value filed.    Anesthesia Type:   General     Note:  Airway :Face Mask  Patient transferred to:PACU  Comments: Oropharynx suctioned. spont resp. Extubated. Exchanging well. To PACU .report to RN at bedside. Pt awake. Handoff Report: Identifed the Patient, Identified the Reponsible Provider, Reviewed the pertinent medical history, Discussed the surgical course, Reviewed Intra-OP anesthesia mangement and issues during anesthesia, Set expectations for post-procedure period and Allowed opportunity for questions and acknowledgement of understanding      Vitals: (Last set prior to Anesthesia Care Transfer)    CRNA VITALS  8/25/2020 1319 - 8/25/2020 1358      8/25/2020             Pulse:  86    SpO2:  100 %                Electronically Signed By: YO Vergara CRNA  August 25, 2020  1:58 PM

## 2020-08-25 NOTE — OR NURSING
Dr. Rodriguez at bedside to discuss procedure outcomes and plan of care with pt. Per Dr. Rodriguez, it is okay to resume diet with special attention to eating small meals and chewing meals well. It is okay to resume all home meds.

## 2020-08-25 NOTE — ANESTHESIA PREPROCEDURE EVALUATION
Anesthesia Pre-Procedure Evaluation    Patient: Girish Chauhan   MRN:     [id number] Gender:   male   Age:    65 year old :      1955        Preoperative Diagnosis: Gastric outlet obstruction [K31.1]   Procedure(s):  ESOPHAGOGASTRODUODENOSCOPY (EGD) WIth Stent Revision  ENDOSCOPIC ULTRASOUND, ESOPHAGOSCOPY / UPPER GASTROINTESTINAL TRACT (GI)     LABS:  CBC:   Lab Results   Component Value Date    WBC 5.5 2020    WBC 8.2 2020    HGB 11.6 (L) 2020    HGB 11.2 (L) 2020    HCT 36.3 (L) 2020    HCT 34.7 (L) 2020     (L) 2020     (L) 2020     BMP:   Lab Results   Component Value Date     2020     2020    POTASSIUM 4.0 2020    POTASSIUM 4.1 2020    CHLORIDE 109 2020    CHLORIDE 109 2020    CO2 PENDING 2020    CO2 25 2020    BUN PENDING 2020    BUN 25 2020    CR PENDING 2020    CR 1.30 (H) 2020    GLC PENDING 2020    GLC 86 2020     COAGS:   Lab Results   Component Value Date    PTT 33 2020    INR 1.44 (H) 07/15/2020    FIBR 404 2019     POC:   Lab Results   Component Value Date     (H) 2020     OTHER:   Lab Results   Component Value Date    PH 7.38 2016    LACT 1.4 07/15/2020    GARY PENDING 2020    PHOS 2.8 2020    MAG 2.0 2020    ALBUMIN PENDING 2020    PROTTOTAL PENDING 2020    ALT PENDING 2020    AST PENDING 2020    ALKPHOS PENDING 2020    BILITOTAL PENDING 2020    LIPASE 176 2020    AMYLASE 71 2019    TAYLOR 24 2020    TSH 2.34 07/15/2020    CRP 20.0 (H) 2020        Preop Vitals    BP Readings from Last 3 Encounters:   20 108/74   20 112/79   20 118/82    Pulse Readings from Last 3 Encounters:   20 95   20 93   20 86      Resp Readings from Last 3 Encounters:   20 16   20 16   20 16    SpO2  "Readings from Last 3 Encounters:   08/25/20 100%   08/11/20 100%   07/30/20 100%      Temp Readings from Last 1 Encounters:   08/25/20 36.4  C (97.5  F) (Oral)    Ht Readings from Last 1 Encounters:   08/25/20 1.803 m (5' 11\")      Wt Readings from Last 1 Encounters:   08/25/20 68.6 kg (151 lb 3.8 oz)    Estimated body mass index is 21.09 kg/m  as calculated from the following:    Height as of this encounter: 1.803 m (5' 11\").    Weight as of this encounter: 68.6 kg (151 lb 3.8 oz).     LDA:  Port A Cath Single 12/08/17 Right Chest wall (Active)   Access Date 08/25/20 08/25/20 1035   Access Attempts 1 08/25/20 1035   Gauge Noncoring 90 degree bend;20 gauge;3/4 inch 08/25/20 1035   Site Assessment WDL 08/25/20 1035   Line Status Blood return noted;Saline locked 08/25/20 1035   Extravasation? No 08/25/20 1035   Dressing Intervention Transparent;Chlorhexadine sponge 08/25/20 1035   Number of days: 991        Past Medical History:   Diagnosis Date     Anemia      Aortic stenosis due to bicuspid aortic valve      Ascending aortic aneurysm (H)      Atrial fibrillation (H) 2006    hx of paroxysmal atrial fibrillation since approximately 2006. S/p cardioversion in 2013 with recurrent atrial fibrillation s/p repeat cardioversion 9/29/14.     Basal cell carcinoma 1/2016    right shoulder and right posterior calf s/p electrodessication and curretage 1/2016.     CAD (coronary artery disease) 12/2011    s/p angiogram 12/2011 s/p percutaneous intervention on the LAD with multiple drug-eluting stents complicated by a small perforation in the diagonal branch which sealed spontaneously.  Patient with significant Circumflex stenosis which is being treated conservatively.     Cholangiocarcinoma (H)      CKD (chronic kidney disease)      Gout     affects left foot 2-3 times per year     Hyperlipidemia      Hypertension      Liver disease      Melanoma (H) 9/2015    back s/p resection 9/2015     Squamous cell carcinoma     nose s/p " excision      Past Surgical History:   Procedure Laterality Date     ------------OTHER-------------      multiple skin cancer resections     CARDIOVERSION  2013, 9/2014     COMBINED CYSTOSCOPY, RETROGRADES, EXCHANGE STENT URETER(S) Right 11/11/2019    Procedure: Cystoscopy, Right Retrograde Pyelogram, Right Ureteral Stent Exchange;  Surgeon: Sivan Walker MD;  [facility]: [facility]     COMBINED CYSTOSCOPY, RETROGRADES, EXCHANGE STENT URETER(S) Right 6/8/2020    Procedure: CYSTOSCOPY, WITH RIGHT RETROGRADE PYELOGRAM AND RIGHT URETERAL STENT EXCHANGE;  Surgeon: Sivan Walker MD;  [facility]     CYSTOSCOPY, RETROGRADES, INSERT STENT URETER(S), COMBINED N/A 9/16/2019    Procedure: Cystoscopy, Right Retrograde Pyelogram, Right Ureteral Stent Placement;  Surgeon: Sivan Walker MD;  [facility]     CYSTOSCOPY, RETROGRADES, INSERT STENT URETER(S), COMBINED Right 2/5/2020    Procedure: CYSTOSCOPY, WITH Right RETROGRADE PYELOGRAM AND Right URETERAL STENT INSERTION;  Surgeon: Sivan Walker MD;  [facility]     ENDOSCOPIC RETROGRADE CHOLANGIOPANCREATOGRAM N/A 2/26/2016    Procedure: COMBINED ENDOSCOPIC RETROGRADE CHOLANGIOPANCREATOGRAPHY, PLACE TUBE/STENT;  Surgeon: Rafa Andrade MD;  [facility]     ENDOSCOPIC RETROGRADE CHOLANGIOPANCREATOGRAPHY  2/2014     ENDOSCOPIC ULTRASOUND UPPER GASTROINTESTINAL TRACT (GI) N/A 6/7/2019    Procedure: ENDOSCOPIC ULTRASOUND, with hot axios stent placement;  Surgeon: Gabino Rodriguez MD;  [facility]     ENTEROSCOPY SMALL BOWEL N/A 6/7/2019    Procedure: ENTEROSCOPY;  Surgeon: Gabino Rodriguez MD;  [facility]     ESOPHAGOSCOPY, GASTROSCOPY, DUODENOSCOPY (EGD), COMBINED N/A 10/16/2019    Procedure: Upper Gastrointestinal Endoscopy;  Surgeon: Gabino Rodriguez MD;  [facility]     ESOPHAGOSCOPY, GASTROSCOPY, DUODENOSCOPY (EGD), DILATATION, COMBINED N/A 7/29/2019    Procedure: Esophagoscopy, gastroscopy,  duodenoscopy (EGD), with stent exchange and dilation;  Surgeon: Gabino Rodriguez MD;  [facility]     EXPLORE COMMON BILE DUCT N/A 3/8/2016    Procedure: EXPLORE COMMON BILE DUCT;  Surgeon: Jose Eduardo Pinedo MD;  [facility]     HEPATECTOMY PARTIAL Left 3/8/2016    Procedure: HEPATECTOMY PARTIAL;  Surgeon: Jose Eduardo Pinedo MD;  [facility]     INSERT PORT VASCULAR ACCESS Right 12/8/2017    Procedure: INSERT PORT VASCULAR ACCESS;  Place single lumen venous chest port - right;  Surgeon: Drew Powell PA-C;  [facility]     STENT  12/2011    LAD with multiple SAM      Allergies   Allergen Reactions     Blood Transfusion Related (Informational Only) Other (See Comments)     Patient has a history of a clinically significant antibody against RBC antigens.  A delay in compatible RBCs may occur.        Anesthesia Evaluation     . Pt has had prior anesthetic. Type: General and MAC    No history of anesthetic complications          ROS/MED HX    ENT/Pulmonary:     (+)MARVIN risk factors hypertension, , . .   (-) tobacco use   Neurologic:     (+)neuropathy - feet,    (-) seizures, CVA, TIA and migraines   Cardiovascular: Comment:  History of multivessel stenting in 2011/non-ST segment elevation myocardial infarction in May 2019, failed stenting of obtuse marginal at [place] and successful stenting of the same vessel at [facility] on June 17, 2019, no angina.    (+) Dyslipidemia, hypertension--CAD, -past MI,-stent,2011 x4, 6/17/2019 x 1  5 Drug Eluting Stent .. Taking blood thinners Pt has received instructions: Instructions Given to patient: Continuing Plavix and Eliquis-okay per surgery. CHF Last EF: 42% date: 9/30/19 . . :. dysrhythmias a-fib, valvular problems/murmurs type: AS moderate.:. Previous cardiac testing Echodate:9/30/19results:date: results:ECG reviewed date:2/11/20 results:A fib with RVR, inferior infarct age undetermined.   Cath date: 6/17/2019 results:   Result  Narrative        63 yo M with known CAD, remote PCI of LAD in 2011, presented in May 2019   to [facility] with a NSTEMI, with coronary angiography there showing patent   LAD stents with a severe stenosis in OM1 that could not be successfully   crossed with a wire.     (Known metastatic cholangiocarcinoma s/p initial resection 2016, more   recent chemotherapy; and s/p Axios stenting of carcinomatous obstruction   of biliary limb of prior Carol-en-Y jejunostomy June 2019 when he presented   with an SBO)    Returns today for repeat coronary angiography with another attempt at the   OM1 stenosis    LM minimal dz  LAD patent prox to distal stents  LCx 99% OM1 stenosis with disease extension for 10-15mm on either side of   stenosis; mild dz elsewhere in Cx  RCA mild to moderate diffuse dz, 50-60% focal distal RCA lesion    Successful PCI of OM1 with PTCA followed by 2.5x24 Synergy SAM  0% residual, NITZA 3 flow post    OM1 lesion essentially behaving as a ; requiring wire escalation,   progressive PTCA and Guidezilla support for PCI              METS/Exercise Tolerance: Comment: Current chemotherapy medication has caused fatigue which was started in September 2019.    Hematologic:     (+) Anemia, History of Transfusion (July 2019 with known antibodies. ) no previous transfusion reaction -      Musculoskeletal:   (+) arthritis (Gout related),  other musculoskeletal- gout      GI/Hepatic: Comment: Known metastatic cholangiocarcinoma s/p initial resection 2016, more recent chemotherapy; and s/p Axios stenting of carcinomatous obstruction  of biliary limb of prior Craol-en-Y jejunostomy June 2019.      (+) GERD (takes TUMS as needed. No recent symptoms) Other, cholecystitis/cholelithiasis, Other GI/Hepatic cholangiocarcinoma, s/p partial hepatatectomy, bowel stent in place     Acute appendicitis: s/p cholecystectomy.   Renal/Genitourinary:     (+) chronic renal disease, type: CRI, Pt does not require dialysis, Pt has no history  of transplant, Other Renal/ Genitourinary, right hydronephrosis      Endo:  - neg endo ROS   (+) Other Endocrine Disorder gout.      Psychiatric:  - neg psychiatric ROS       Infectious Disease:  - neg infectious disease ROS       Malignancy:   (+) Malignancy History of Other and Skin  Skin CA Remission status post Surgery, Other CA cholangiocarcinoma on Xeloda  Active status post Chemo and Surgery         Other:    (+) No chance of pregnancy C-spine cleared: N/A, no H/O Chronic Pain,no other significant disability                        PHYSICAL EXAM:   Mental Status/Neuro: A/A/O   Airway: Facies: Feasible   Respiratory:   Resp. Effort: Normal      CV:   Rate: Age appropriate   Comments:                      Assessment:   ASA SCORE: 3    H&P: History and physical reviewed and following examination; no interval change.   Smoking Status:  Non-Smoker/Unknown   NPO Status: NPO Appropriate     Plan:   Anes. Type:  General   Pre-Medication: None   Induction:  IV (Standard)   Airway: ETT; Oral   Access/Monitoring: PIV   Maintenance: Balanced     Advanced Monitoring: BIS     Postop Plan:   Postop Pain: Opioids  Postop Sedation/Airway: Not planned  Disposition: Outpatient     PONV Management:   Adult Risk Factors:, Non-Smoker, Postop Opioids   Prevention: Ondansetron, Dexamethasone     CONSENT: Via Surgical Consent   Plan and risks discussed with: Patient                      Arnie Novak MD

## 2020-08-25 NOTE — DISCHARGE INSTRUCTIONS
General acute hospital  Same-Day Surgery   Adult Discharge Orders & Instructions     For 24 hours after surgery    1. Get plenty of rest.  A responsible adult must stay with you for at least 24 hours after you leave the hospital.   2. Do not drive or use heavy equipment.  If you have weakness or tingling, don't drive or use heavy equipment until this feeling goes away.  3. Do not drink alcohol.  4. Avoid strenuous or risky activities.  Ask for help when climbing stairs.   5. You may feel lightheaded.  IF so, sit for a few minutes before standing.  Have someone help you get up.   6. If you have nausea (feel sick to your stomach): Drink only clear liquids such as apple juice, ginger ale, broth or 7-Up.  Rest may also help.  Be sure to drink enough fluids.  Move to a regular diet as you feel able.  7. You may have a slight fever. Call the doctor if your fever is over 100 F (37.7 C) (taken under the tongue) or lasts longer than 24 hours.  8. You may have a dry mouth, a sore throat, muscle aches or trouble sleeping.  These should go away after 24 hours.  9. Do not make important or legal decisions.   Call your doctor for any of the followin.  Signs of infection (fever, growing tenderness at the surgery site, a large amount of drainage or bleeding, severe pain, foul-smelling drainage, redness, swelling).    2. It has been over 8 to 10 hours since surgery and you are still not able to urinate (pass water).    3.  Headache for over 24 hours.      To contact a doctor, call _____Dr Rodriguez at [phone] (clinic)__________________ or:        [phone] and ask for the resident on call for   _______Gastroenterology_______________ (answered 24 hours a day)      Emergency Department:    [facility]: [phone]       (TTY for hearing impaired: [phone])

## 2020-09-01 NOTE — TELEPHONE ENCOUNTER
Oral Chemotherapy Monitoring Program    Placed call to patient in follow up of Xeloda therapy.    Left message to please call back in follow up of therapy. No patient or drug names were mentioned.    Wendy Richards  Pharmacy Intern  [facility]  [phone]

## 2020-09-02 NOTE — TELEPHONE ENCOUNTER
Oral Chemotherapy Monitoring Program    Received call from Krishna on follow up of Xeloda therapy. Krishna tolerating therapy very well. He uses moisturizer very liberally. He has very mild redness and peeling on occasion on his hands only. He notes it sometimes feels like a tingle in his hands, but doesn't interfere with any activity and is more of an annoyance than anything. He has constipation and we discussed starting miralax that he has on hand and then adding senna after one week if miralax isnt enough. He is agreeable to the plan.     Assessment & Plan:  Continue current therapy.     Questions answered to patient's satisfaction.    Follow-Up:  9/11 with labs and call the patient to confirm he is able to continue on his xeloda, he will take his morning dose that day prior to labs to maintain his schedule that he likes    Med López, PharmD  September 2, 2020

## 2020-09-09 NOTE — TELEPHONE ENCOUNTER
Krishna is on his off week of Xeloda and experiencing significant tingling that started in his fingertips and has now moved into his palms over the past week and has become more red with some cracking at the knuckles. He uses lotion 5-6 times per day. Cannot make a fist very well with either hand.   His feet, just below his toes are red and tingling and is causing some difficulty walking. Is due for labs and CT this Fri 9/11 as well as video visit with Dr De Los Santos on Monday 9/14.    Paged to Dr De Los Santos to advise @ 2047 and 5047    Per Dr De Los Santos, to hold Xeloda until sees MD on 9/14    LM with recommendations to hold drug. To call triage with any questions or concerns.

## 2020-09-14 NOTE — LETTER
"    9/14/2020         RE: Girish Chauhan  [address] [facility] [address]      Girish Chauhan is a 65 year old male who is being evaluated via a billable video visit.      The patient has been notified of following:     \"This video visit will be conducted via a call between you and your physician/provider. We have found that certain health care needs can be provided without the need for an in-person physical exam.  This service lets us provide the care you need with a video conversation.  If a prescription is necessary we can send it directly to your pharmacy.  If lab work is needed we can place an order for that and you can then stop by our lab to have the test done at a later time.    Video visits are billed at different rates depending on your insurance coverage.  Please reach out to your insurance provider with any questions.    If during the course of the call the physician/provider feels a video visit is not appropriate, you will not be charged for this service.\"    Patient has given verbal consent for Video visit? Yes    How would you like to obtain your AVS? MyChart    If you are dropped from the video visit, the video invite should be resent to: Send to e-mail at: [email or web address]    Will anyone else be joining your video visit? No         I have reviewed and updated the patient's allergies and medication list.    Concerns: Patient thinks he may have blood in his urine.   Refills: None needed.     Vitals - Patient Reported  Weight (Patient Reported): 68.5 kg (151 lb)  Height (Patient Reported): 180.3 cm (5' 10.98\")  BMI (Based on Pt Reported Ht/Wt): 21.07  Pain Score: No Pain (0)        Sisi Lutz CMA        Video-Visit Details    Type of service:  Video Visit    Video Start Time: 4:45  Video End Time: 5:15    Originating Location (pt. Location): Home    Distant Location (provider location):  [facility]     Platform used for Video Visit: [employer]    Provider Note:      I am " seeing Krishna Chauhan today in follow-up of metastatic cholangiocarcinoma.    He had his original resection in 2016 and had an isolated recurrence within the wall of his urinary bladder.  He had initial good response to both cisplatin and gemcitabine but had to stop both due to toxicity, neurotoxicity from the platinum and TTP related to his gemcitabine.  He eventually had progression within the abdominal cavity with peritoneal mets and was treated with capecitabine complicated by myocardial infarction which appeared to be unrelated to his treatment and he has since been on treatment with infusional 5-FU and then back on Xeloda without ongoing cardiac issues.  His visit today is for ongoing response assessment.  Since our last visit he tells me things are generally going about the same.  This last cycle he did have more trouble with hand-foot toxicity and burning on his lips but no violet mouth ulcerations or significant diarrhea.  He continues to have a poor appetite and has to remind himself to eat but does not have problems with nausea or active food aversion.  He feels his weight has generally been stable between 148 and 152 pounds.  He notes in the last week or so he started to get a little bit of pink tinge to his urine again and believes he is due for another urinary stent exchange within the next couple of weeks.  The remainder of his 10 point review of systems otherwise is negative.    GENERAL: Chronically ill, alert and no distress  EYES: Eyes grossly normal to inspection.  No discharge or erythema, or obvious scleral/conjunctival abnormalities.  RESP: No audible wheeze, cough, or visible cyanosis.  No visible retractions or increased work of breathing.    SKIN: Mild peeling of skin on hands-no fissures or blisters.  NEURO: Cranial nerves grossly intact.  Mentation and speech appropriate for age.  PSYCH: Mentation appears normal, affect normal/bright, judgement and insight intact, normal speech and appearance  well-groomed.    I personally reviewed his imaging and went over the results with the patient and his wife.  His intra-abdominal disease all appears stable and I do not see any new sites of disease.  His CA-19-9 level is stable at about 330.  His electrolytes and renal function are unremarkable with mildly abnormal creatinine.  I would note that his creatinine at the start of his last cycle was a little bit higher than typical for him.  His albumin is down a little bit at 2.9 and otherwise his liver enzymes are unremarkable.  He has mild cytopenias as expected with his chemotherapy.    Assessment/plan: Metastatic cholangiocarcinoma with good control on single agent Xeloda.  His hands are healing up rapidly with a few extra days off and I think he may have had a little bit of increased toxicity due to getting dehydrated and having little bit worse renal function this past cycle.  We will try his next cycle at the same dose and schedule but if he has worse problems again with hand-foot toxicity will stretch out his interval between cycles to 2 weeks.  I encouraged him in his ongoing efforts to maintain his nutrition.  We talked about some simple strategies to help with management when his feet get tender.  We will see him back with another response assessment in another 3 months.    Mamadou De Los Santos MD

## 2020-09-14 NOTE — PROGRESS NOTES
"Girish Chauhan is a 65 year old male who is being evaluated via a billable video visit.      The patient has been notified of following:     \"This video visit will be conducted via a call between you and your physician/provider. We have found that certain health care needs can be provided without the need for an in-person physical exam.  This service lets us provide the care you need with a video conversation.  If a prescription is necessary we can send it directly to your pharmacy.  If lab work is needed we can place an order for that and you can then stop by our lab to have the test done at a later time.    Video visits are billed at different rates depending on your insurance coverage.  Please reach out to your insurance provider with any questions.    If during the course of the call the physician/provider feels a video visit is not appropriate, you will not be charged for this service.\"    Patient has given verbal consent for Video visit? Yes    How would you like to obtain your AVS? MyChart    If you are dropped from the video visit, the video invite should be resent to: Send to e-mail at: [email or web address]    Will anyone else be joining your video visit? No         I have reviewed and updated the patient's allergies and medication list.    Concerns: Patient thinks he may have blood in his urine.   Refills: None needed.     Vitals - Patient Reported  Weight (Patient Reported): 68.5 kg (151 lb)  Height (Patient Reported): 180.3 cm (5' 10.98\")  BMI (Based on Pt Reported Ht/Wt): 21.07  Pain Score: No Pain (0)        Sisi Lutz CMA        Video-Visit Details    Type of service:  Video Visit    Video Start Time: 4:45  Video End Time: 5:15    Originating Location (pt. Location): Home    Distant Location (provider location):  [facility]     Platform used for Video Visit: [employer]    Provider Note:      I am seeing Krishna Chauhan today in follow-up of metastatic cholangiocarcinoma.    He had his " original resection in 2016 and had an isolated recurrence within the wall of his urinary bladder.  He had initial good response to both cisplatin and gemcitabine but had to stop both due to toxicity, neurotoxicity from the platinum and TTP related to his gemcitabine.  He eventually had progression within the abdominal cavity with peritoneal mets and was treated with capecitabine complicated by myocardial infarction which appeared to be unrelated to his treatment and he has since been on treatment with infusional 5-FU and then back on Xeloda without ongoing cardiac issues.  His visit today is for ongoing response assessment.  Since our last visit he tells me things are generally going about the same.  This last cycle he did have more trouble with hand-foot toxicity and burning on his lips but no violet mouth ulcerations or significant diarrhea.  He continues to have a poor appetite and has to remind himself to eat but does not have problems with nausea or active food aversion.  He feels his weight has generally been stable between 148 and 152 pounds.  He notes in the last week or so he started to get a little bit of pink tinge to his urine again and believes he is due for another urinary stent exchange within the next couple of weeks.  The remainder of his 10 point review of systems otherwise is negative.    GENERAL: Chronically ill, alert and no distress  EYES: Eyes grossly normal to inspection.  No discharge or erythema, or obvious scleral/conjunctival abnormalities.  RESP: No audible wheeze, cough, or visible cyanosis.  No visible retractions or increased work of breathing.    SKIN: Mild peeling of skin on hands-no fissures or blisters.  NEURO: Cranial nerves grossly intact.  Mentation and speech appropriate for age.  PSYCH: Mentation appears normal, affect normal/bright, judgement and insight intact, normal speech and appearance well-groomed.    I personally reviewed his imaging and went over the results with the  patient and his wife.  His intra-abdominal disease all appears stable and I do not see any new sites of disease.  His CA-19-9 level is stable at about 330.  His electrolytes and renal function are unremarkable with mildly abnormal creatinine.  I would note that his creatinine at the start of his last cycle was a little bit higher than typical for him.  His albumin is down a little bit at 2.9 and otherwise his liver enzymes are unremarkable.  He has mild cytopenias as expected with his chemotherapy.    Assessment/plan: Metastatic cholangiocarcinoma with good control on single agent Xeloda.  His hands are healing up rapidly with a few extra days off and I think he may have had a little bit of increased toxicity due to getting dehydrated and having little bit worse renal function this past cycle.  We will try his next cycle at the same dose and schedule but if he has worse problems again with hand-foot toxicity will stretch out his interval between cycles to 2 weeks.  I encouraged him in his ongoing efforts to maintain his nutrition.  We talked about some simple strategies to help with management when his feet get tender.  We will see him back with another response assessment in another 3 months.    Mamadou De Los Santos MD

## 2020-09-15 NOTE — TELEPHONE ENCOUNTER
Patient calling to say he is having some irritation with his stent now and that only happens when it's time to exchange the stent. The last stent exchange was 6/8/20. Patient is due. Will ask for orders.  Patient understands he will need to get a pre-op physical, urine culture, and Covid-19 testing done before the procedure.  Tena Blanca RN   Care Coordinator Urology

## 2020-09-18 NOTE — TELEPHONE ENCOUNTER
FUTURE VISIT INFORMATION      SURGERY INFORMATION:    Date: 10/12/20    [facility]    Surgeon:  Sivan Walker MD     Anesthesia Type:  choice    Procedure: CYSTOSCOPY, WITH RETROGRADE PYELOGRAM AND RIGHT  URETERAL STENT REPLACEMENT     RECORDS REQUESTED FROM:       Primary Care Provider: Dario Jain    Pertinent Medical History: Aortic root dilatation, Paroxysmal atrial fibrillation, cardiomyopathy, heart failure    Most recent EKG+ Tracin20    Most recent ECHO: 20- [facility]    Most recent Cardiac Stress Test:- [facility]    Most recent Coronary Angiogram: 19- [facility]

## 2020-09-26 PROBLEM — A41.9 SEPSIS (H): Status: RESOLVED | Noted: 2020-07-16 | Resolved: 2020-01-01

## 2020-09-26 PROBLEM — R50.9 FEVER, UNSPECIFIED FEVER CAUSE: Status: RESOLVED | Noted: 2019-07-28 | Resolved: 2020-01-01

## 2020-09-30 NOTE — ORAL ONC MGMT
Oral Chemotherapy Monitoring Program    Placed call to patient in follow up of Xeloda therapy.    Left message to please call back in follow up of therapy. No patient or drug names were mentioned.    Fredo Patel  Pharmacy Intern  Oral Chemotherapy Monitoring Program  [facility]  [phone]

## 2020-10-08 NOTE — PHARMACY - PREOPERATIVE ASSESSMENT CENTER
Anticoagulation Note - Preoperative Assessment Center (PAC) Pharmacist     The purpose of this note is to document the perioperative anticoagulation plan outlined by the providers caring for Girish Chauhan.      Current Regimen  Anticoagulation Regimen as of October 8, 2020: apixaban (Eliquis) 5 mg PO BID  Indication: atrial fibrillation.   Prescriber:  Dr. Deangelo Rose  Expected Duration of therapy: indefinite  Note: no longer on Plavix (stopped 6/2019 by cardiologist)     Creatinine   Date Value Ref Range Status   10/02/2020 1.30 (H) 0.66 - 1.25 mg/dL Final      Perioperative plan  Girish Chauhan is scheduled for CYSTOSCOPY WITH RIGHT RETROGRADE PYELOGRAM AND RIGHT URETERAL STENT EXCHANGE on 10/12/20 with Dr. Walker.  Per Epic message with Dr. Walker OK to continue on the eliquis uninterrupted similar to previous plans in May and February of this year.     Jeff Ward, Pharm.D., [facility]   October 8, 2020

## 2020-10-09 NOTE — PATIENT INSTRUCTIONS
Preparing for Your Surgery      Name:  Girish Chauhan   MRN:  [id number]   :  1955   Today's Date:  10/9/2020       Arriving for surgery:  Surgery date:  10/12/20  Arrival time:  5:30 am    Restrictions due to COVID 19:  Patients are allowed one visitor in the pre-op period  All visitors must wear a mask  No visitors under 18  No ill visitors   parking is not available     Please come to:   [facility] [address] [facility]  [address] [place], [place] [address]. [place]  [address]    -Proceed to the 3rd floor, check in at the Adult Surgery Waiting Lounge. [phone]    If an escort is needed stop at the Information Desk in the lobby. Inform the information person that you are here for surgery. An escort to the Adult Surgery Waiting Lounge will be provided.    What can I eat or drink?  -  You may eat and drink normally for up to 8 hours before your surgery. (Until 11:30 pm 10-11-20)  -  You may have clear liquids until 2 hours before surgery. (Until 5:30 am)  Examples of clear liquids:  Water  Clear broth  Juices (apple, white grape, white cranberry  and cider) without pulp  Sodas (Sprite, 7-Up, ginger ale and seltzer)  Coffee or tea (without milk or cream)  Gatorade    -  No Alcohol for at least 24 hours before surgery     Which medicines can I take?  Hold Aspirin for 7 days before surgery.   Hold Multivitamins for 7 days before surgery. Hold Multivitamin starting now, if you have not already.  Hold Supplements for 7 days before surgery.  Hold Ibuprofen (Advil, Motrin) for 1 day before surgery--unless otherwise directed by surgeon.  Hold Naproxen (Aleve) for 4 days before surgery.    -  DO NOT take these medications the day of surgery:  Furosemide (Lasix), Senokot    -  PLEASE TAKE these medications the day of surgery:  Metoprolol, Apixaban (Eliquis),   Xeloda- if it is a day to take it  Acetaminophen (Tylenol) if needed  Ondansetron (Zofran) if needed    How do I prepare myself?  - Please take 2  showers before surgery using Scrubcare or Hibiclens soap.    Use this soap only from the neck to your toes.     Leave the soap on your skin for one minute--then rinse thoroughly.      You may use your own shampoo and conditioner; no other hair products.   - Please remove all jewelry and body piercings.  - No lotions, deodorants or fragrance.  - Bring your ID and insurance card.    - All patients are required to have a Covid-19 test within 4 days of surgery/procedure.      -Patients will be contacted by the [facility] scheduling team within 1 week of surgery to make an appointment.      - Patients may call the Scheduling team at [phone] if they have not been scheduled within 4 days of  surgery.      ALL PATIENTS GOING HOME THE SAME DAY OF SURGERY ARE REQUIRED TO HAVE A RESPONSIBLE ADULT TO DRIVE AND BE IN ATTENDANCE WITH THEM FOR 24 HOURS FOLLOWING SURGERY     Questions or Concerns:    - For any questions regarding the day of surgery or your hospital stay, please contact the Pre Admission Nursing Office at [phone].       - If you have health changes between today and your surgery please call your surgeon.       For questions after surgery please call your surgeons office.     AFTER YOUR SURGERY  Breathing exercises   Breathing exercises help you recover faster. Take deep breaths and let the air out slowly. This will:     Help you wake up after surgery.    Help prevent complications like pneumonia.  Preventing complications will help you go home sooner.   Nausea and vomiting   You may feel sick to your stomach after surgery; if so, let your nurse know.    Pain control:  After surgery, you may have pain. Our goal is to help you manage your pain. Pain medicine will help you feel comfortable enough to do activities that will help you heal.  These activities may include breathing exercises, walking and physical therapy.   To help your health care team treat your pain we will ask: 1) If you have pain  2)  where it is located 3) describe your pain in your words  Methods of pain control include medications given by mouth, vein or by nerve block for some surgeries.  Sequential Compression Device (SCD):  You may need to wear SCD S (also called pneumo boots)on your legs or feet. These are wraps connected to a machine that pumps in air and releases it. The repeated pumping helps prevent blood clots from forming.

## 2020-10-09 NOTE — H&P
"  Pre-Operative H & P     CC:  Preoperative exam to assess for increased cardiopulmonary risk while undergoing surgery and anesthesia.    Date of Encounter: 10/9/2020  Primary Care Physician:  Dario Jain Gissel is a 65 year old male who presents for pre-operative H & P in preparation for a CYSTOSCOPY, WITH RETROGRADE PYELOGRAM AND RIGHT URETERAL STENT REPLACEMENT on 10/12/20 by Dr. Walker at [facility].     \"Krishna\" Gissel is a 65 year old male with atrial fibrillation, CHF, cardiomyopathy, CAD, aortic stenosis, AAA, gout, peritoneal carcinomatosis, CKD, history of melanoma, BCC and SCC that has right hydronephrosis secondary to cholangiocarcinoma with mets.  He had his last cystoscopy with stent exchange in June 2020.  He continues to follow with Dr. Walker as directed. The above listed procedure has been recommended for further management.    History is obtained from the patient and the medical record.     Past Medical History  Past Medical History:   Diagnosis Date     Anemia      Aortic stenosis due to bicuspid aortic valve      Ascending aortic aneurysm (H)      Atrial fibrillation (H) 2006    hx of paroxysmal atrial fibrillation since approximately 2006. S/p cardioversion in 2013 with recurrent atrial fibrillation s/p repeat cardioversion 9/29/14.     Basal cell carcinoma 1/2016    right shoulder and right posterior calf s/p electrodessication and curretage 1/2016.     CAD (coronary artery disease) 12/2011    s/p angiogram 12/2011 s/p percutaneous intervention on the LAD with multiple drug-eluting stents complicated by a small perforation in the diagonal branch which sealed spontaneously.  Patient with significant Circumflex stenosis which is being treated conservatively.     Cholangiocarcinoma (H)      CKD (chronic kidney disease)      Gout     affects left foot 2-3 times per year     Hyperlipidemia      Hypertension      Liver disease      " Melanoma (H) 9/2015    back s/p resection 9/2015     Squamous cell carcinoma     nose s/p excision       Past Surgical History  Past Surgical History:   Procedure Laterality Date     ------------OTHER-------------      multiple skin cancer resections     CARDIOVERSION  2013, 9/2014     COMBINED CYSTOSCOPY, RETROGRADES, EXCHANGE STENT URETER(S) Right 11/11/2019    Procedure: Cystoscopy, Right Retrograde Pyelogram, Right Ureteral Stent Exchange;  Surgeon: Sivan Walker MD;  [facility]     COMBINED CYSTOSCOPY, RETROGRADES, EXCHANGE STENT URETER(S) Right 6/8/2020    Procedure: CYSTOSCOPY, WITH RIGHT RETROGRADE PYELOGRAM AND RIGHT URETERAL STENT EXCHANGE;  Surgeon: Sivan Walker MD;  [facility]     CYSTOSCOPY, RETROGRADES, INSERT STENT URETER(S), COMBINED N/A 9/16/2019    Procedure: Cystoscopy, Right Retrograde Pyelogram, Right Ureteral Stent Placement;  Surgeon: Sivan Walker MD;  [facility]     CYSTOSCOPY, RETROGRADES, INSERT STENT URETER(S), COMBINED Right 2/5/2020    Procedure: CYSTOSCOPY, WITH Right RETROGRADE PYELOGRAM AND Right URETERAL STENT INSERTION;  Surgeon: Sivan Walker MD;  [facility]     ENDOSCOPIC RETROGRADE CHOLANGIOPANCREATOGRAM N/A 2/26/2016    Procedure: COMBINED ENDOSCOPIC RETROGRADE CHOLANGIOPANCREATOGRAPHY, PLACE TUBE/STENT;  Surgeon: Rafa Andrade MD;  [facility]     ENDOSCOPIC RETROGRADE CHOLANGIOPANCREATOGRAPHY  2/2014     ENDOSCOPIC ULTRASOUND UPPER GASTROINTESTINAL TRACT (GI) N/A 6/7/2019    Procedure: ENDOSCOPIC ULTRASOUND, with hot axios stent placement;  Surgeon: Gabino Rodriguez MD;  [facility]     ENTEROSCOPY SMALL BOWEL N/A 6/7/2019    Procedure: ENTEROSCOPY;  Surgeon: Gabino Rodriguez MD;  [facility]     ESOPHAGOSCOPY, GASTROSCOPY, DUODENOSCOPY (EGD), COMBINED N/A 10/16/2019    Procedure: Upper Gastrointestinal Endoscopy;  Surgeon: Gabino Rodriguez MD;  [facility]     ESOPHAGOSCOPY,  GASTROSCOPY, DUODENOSCOPY (EGD), COMBINED N/A 8/25/2020    Procedure: ESOPHAGOGASTRODUODENOSCOPY (EGD) WIth  duodenal and jejunal stent placement;  Surgeon: Gabino Rodriguez MD;  [facility]     ESOPHAGOSCOPY, GASTROSCOPY, DUODENOSCOPY (EGD), DILATATION, COMBINED N/A 7/29/2019    Procedure: Esophagoscopy, gastroscopy, duodenoscopy (EGD), with stent exchange and dilation;  Surgeon: Gabino Rodriguez MD;  [facility]     EXPLORE COMMON BILE DUCT N/A 3/8/2016    Procedure: EXPLORE COMMON BILE DUCT;  Surgeon: Jose Eduardo Pinedo MD;  [facility]     HEPATECTOMY PARTIAL Left 3/8/2016    Procedure: HEPATECTOMY PARTIAL;  Surgeon: Jose Eduardo Pinedo MD;  [facility]     INSERT PORT VASCULAR ACCESS Right 12/8/2017    Procedure: INSERT PORT VASCULAR ACCESS;  Place single lumen venous chest port - right;  Surgeon: Drew Powell PA-C;  [facility]     STENT  12/2011    LAD with multiple SAM       Hx of Blood transfusions/reactions: yes, no reactions    Hx of abnormal bleeding or anti-platelet use: none      Steroid use in the last year: none    Personal or FH with difficulty with Anesthesia:  none    Prior to Admission Medications  Current Outpatient Medications   Medication Sig Dispense Refill     acetaminophen (TYLENOL) 500 MG tablet Take 500 mg by mouth every 6 hours as needed for fever or pain       allopurinol (ZYLOPRIM) 100 MG tablet Take 100 mg by mouth every evening        atorvastatin (LIPITOR) 40 MG tablet Take 40 mg by mouth every evening        calcium carbonate (TUMS) 500 MG chewable tablet Take 1-3 chew tab by mouth 4 times daily as needed for heartburn        capecitabine (XELODA) 500 MG tablet Take 3 tablets (1,500 mg) by mouth 2 times daily Days 1 through 14, then off for 7 days. Take within 30 mins after meal. 84 tablet 0     colchicine (COLCYRS) 0.6 MG tablet Take 0.6 mg by mouth 3 times daily as needed (gout flare)        furosemide (LASIX) 20 MG tablet Take 20 mg  by mouth every other day        metoprolol tartrate (LOPRESSOR) 50 MG tablet Take 150 mg by mouth 2 times daily        multivitamin w/minerals (THERA-VIT-M) tablet Take 1 tablet by mouth every morning        Nitroglycerin (NITROSTAT SL) Place 0.4 mg under the tongue every 5 minutes as needed for chest pain (Carries medication - has never used)        ondansetron (ZOFRAN) 4 MG tablet Take 1 tablet (4 mg) by mouth every 8 hours as needed for nausea 30 tablet 0     sennosides (SENOKOT) 8.6 MG tablet Take 1 tablet by mouth as needed for constipation        apixaban ANTICOAGULANT (ELIQUIS ANTICOAGULANT) 5 MG tablet Take 5 mg by mouth 2 times daily          Allergies  Allergies   Allergen Reactions     Blood Transfusion Related (Informational Only) Other (See Comments)     Patient has a history of a clinically significant antibody against RBC antigens.  A delay in compatible RBCs may occur.       Social History  Social History     Socioeconomic History     Marital status:      Spouse name: Not on file     Number of children: 1     Years of education: Not on file     Highest education level: Not on file   Occupational History     Occupation: retired   Social Needs     Financial resource strain: Not on file     Food insecurity     Worry: Not on file     Inability: Not on file     Transportation needs     Medical: Not on file     Non-medical: Not on file   Tobacco Use     Smoking status: Never Smoker     Smokeless tobacco: Never Used   Substance and Sexual Activity     Alcohol use: Not Currently     Alcohol/week: 2.0 standard drinks     Types: 2 Cans of beer per week     Drug use: No     Sexual activity: Not on file   Lifestyle     Physical activity     Days per week: Not on file     Minutes per session: Not on file     Stress: Not on file   Relationships     Social connections     Talks on phone: Not on file     Gets together: Not on file     Attends [employer] service: Not on file     Active member of club or  organization: Not on file     Attends meetings of clubs or organizations: Not on file     Relationship status: Not on file     Intimate partner violence     Fear of current or ex partner: Not on file     Emotionally abused: Not on file     Physically abused: Not on file     Forced sexual activity: Not on file   Other Topics Concern     Not on file   Social History Narrative    Works for [employer] with [employer].  Lives in [place].   with one grown daughter.  No tobacco, rare Etoh, no drug use.       Family History  Family History   Problem Relation Age of Onset     Skin Cancer Mother      Other - See Comments Father         father passed away in his 60s post-op with an unknown bile duct obstruction.  no anesthesia complication.       Osteoarthritis Sister      Cerebrovascular Disease Brother      Other - See Comments Brother         prediabetes     Osteoarthritis Sister      No Known Problems Brother      Anesthesia Reaction No family hx of      Deep Vein Thrombosis No family hx of                ROS/MED HX  The complete review of systems is negative other than noted in the HPI or here.   ENT/Pulmonary:     (+)MARVIN risk factors hypertension, , . .   (-) tobacco use and recent URI   Neurologic:  - neg neurologic ROS     Cardiovascular:     (+) Dyslipidemia, hypertension--CAD, --stent,2011, 6/2019  6 Drug Eluting Stent .. : . CHF Last EF: 40-45% date: 6/2020 . LYNCH, . :. valvular problems/murmurs type: AS .      (-) orthopnea/PND   METS/Exercise Tolerance:  4 - Raking leaves, gardening   Hematologic:     (+) History of Transfusion no previous transfusion reaction -     (-) history of blood clots   Musculoskeletal:   (+)  other musculoskeletal- gout- no recent flares      GI/Hepatic:     (+) GERD Other, Other GI/Hepatic alternates between constipation and diarrhea      Renal/Genitourinary:     (+) chronic renal disease, type: CRI, Pt does not require dialysis, Pt has no history of transplant, Other  "Renal/ Genitourinary, right hydronephrosis      Endo:  - neg endo ROS       Psychiatric:  - neg psychiatric ROS       Infectious Disease:  - neg infectious disease ROS      (-) Recent Fever   Malignancy:   (+) Malignancy History of Skin and Other  Skin CA Remission status post Surgery, Other CA cholangiocarcinoma with mets, peritoneal carcinomatosis Active status post Surgery and Chemo         Other:    (+) no H/O Chronic Pain,                         Temp: 97.5  F (36.4  C) Temp src: Oral BP: 101/69 Pulse: 69   Resp: 16 SpO2: 100 %         150 lbs 0 oz  5' 11\"[PT REPORTED[   Body mass index is 20.92 kg/m .       Physical Exam  Constitutional: Awake, alert, cooperative, no apparent distress, and appears stated age.  Eyes: Pupils equal, round and reactive to light, extra ocular muscles intact, sclera clear, conjunctiva normal.  HENT: Normocephalic, oral pharynx with moist mucus membranes, good dentition. No goiter appreciated.   Respiratory: Clear to auscultation bilaterally, no crackles or wheezing.  Cardiovascular: Regular rate and irregular rhythm, normal S1 and S2, and +systolic murmur noted.  Carotids +2, no bruits. No edema. Palpable pulses to radial  DP and PT arteries.   GI: Normal bowel sounds, soft, non-distended, non-tender, no masses palpated, no hepatosplenomegaly.    Lymph/Hematologic: No cervical lymphadenopathy and no supraclavicular lymphadenopathy.  Genitourinary:  deferred  Skin: Warm and dry.     Musculoskeletal: Full ROM of neck. There is no redness, warmth, or swelling of the exposed joints. Gross motor strength is decreased throughout.   Neurologic: Awake, alert, oriented to name, place and time. Cranial nerves II-XII are grossly intact. Gait is normal.   Neuropsychiatric: Calm, cooperative. Normal affect.   Access:  Right chest portacath    Labs: (personally reviewed)   Component      Latest Ref Rng & Units 10/2/2020   WBC      4.0 - 11.0 10e9/L 10.1   RBC Count      4.4 - 5.9 10e12/L 3.52 (L) "   Hemoglobin      13.3 - 17.7 g/dL 11.5 (L)   Hematocrit      40.0 - 53.0 % 34.9 (L)   MCV      78 - 100 fl 99   MCH      26.5 - 33.0 pg 32.7   MCHC      31.5 - 36.5 g/dL 33.0   RDW      10.0 - 15.0 % 18.9 (H)   Platelet Count      150 - 450 10e9/L 161   Diff Method       Automated Method   % Neutrophils      % 69.1   % Lymphocytes      % 12.4   % Monocytes      % 16.9   % Eosinophils      % 0.9   % Basophils      % 0.3   % Immature Granulocytes      % 0.4   Nucleated RBCs      0 /100 0   Absolute Neutrophil      1.6 - 8.3 10e9/L 7.0   Absolute Lymphocytes      0.8 - 5.3 10e9/L 1.3   Absolute Monocytes      0.0 - 1.3 10e9/L 1.7 (H)   Absolute Eosinophils      0.0 - 0.7 10e9/L 0.1   Absolute Basophils      0.0 - 0.2 10e9/L 0.0   Abs Immature Granulocytes      0 - 0.4 10e9/L 0.0   Absolute Nucleated RBC       0.0   Sodium      133 - 144 mmol/L 138   Potassium      3.4 - 5.3 mmol/L 3.4   Chloride      94 - 109 mmol/L 107   Carbon Dioxide      20 - 32 mmol/L 22   Anion Gap      3 - 14 mmol/L 9   Glucose      70 - 99 mg/dL 115 (H)   Urea Nitrogen      7 - 30 mg/dL 20   Creatinine      0.66 - 1.25 mg/dL 1.30 (H)   GFR Estimate      >60 mL/min/1.73:m2 58 (L)   GFR Estimate If Black      >60 mL/min/1.73:m2 67   Calcium      8.5 - 10.1 mg/dL 8.5   Bilirubin Total      0.2 - 1.3 mg/dL 1.2   Albumin      3.4 - 5.0 g/dL 3.1 (L)   Protein Total      6.8 - 8.8 g/dL 6.2 (L)   Alkaline Phosphatase      40 - 150 U/L 169 (H)   ALT      0 - 70 U/L 61   AST      0 - 45 U/L 57 (H)             [id number]  Atrial fibrillation with rapid ventricular response  Inferior infarct , age undetermined  Abnormal ECG  Unconfirmed report  Cardiac echo: 2020  Result Narrative   1. The left ventricle is normal in size. Left ventricular systolic   performance is mild to moderately reduced. The ejection fraction is   estimated to be 40-45%.   2. There is mild to moderate global reduction in left ventricular systolic   performance.   3. There is mild  "concentric increase in left ventricular wall thickness.   4. There is mild to moderate aortic stenosis.   5. Normal right ventricular size and systolic performance.   6. There is mild aortic root enlargement.    When compared to the prior real-time echocardiogram dated 27 September 2019, the findings are felt to be fairly similar in both examinations.           Outside records reviewed from: Care Everywhere        ASSESSMENT and PLAN  \"Krishna\" Gissel is a 65 year old male scheduled for a CYSTOSCOPY, WITH RETROGRADE PYELOGRAM AND RIGHT URETERAL STENT REPLACEMENT on 10/12/20 by Dr. Walker in treatment of hydronephrosis of right kidney in the setting of cholangiocarcinoma with mets.  PAC referral for risk assessment and optimization for anesthesia with comorbid conditions of: atrial fibrillation, CHF, cardiomyopathy, CAD, aortic stenosis, AAA, gout, peritoneal carcinomatosis, CKD, history of melanoma, BCC and SCC.      Pre-operative considerations:  1.  Cardiac:  Functional status- METS 4.  He walks 15-20 minutes regularly for exercise,but it is a somewhat slow pace.  He does note chronic exertional dyspnea.  He is followed by cardiology for all of the above cardiac conditions.  He had an echocardiogram last in June 2020 which showed mild - moderate aortic stenosis and EF 40-45%.  Aortic stenosis is being monitored and surgery hasn't been indicated yet. Ascending aorta noted 3.9cm on echo in 2019.  On eliquis for a-fib; Dr. Walker approved no interruption prior to surgery.  ChadsVasc = 3.  He has a total of 6 coronary stents.  The last was placed in June 2019.  He saw his cardiology provider last in June 2020.  Plavix was stopped then and no further testing was noted as indicated at that time.   Last BNP in Feb 2020 was 2526, recheck not indicated for this procedure.    Low risk surgery with 6.6%risk of major adverse cardiac event.   2.  Pulm:  Airway feasible.  MARVIN risk: intermediate.    3.  GI:  Risk of PONV score = 2.  If " > 2, anti-emetic intervention recommended.  4. Renal:  +CKD - recent creatinine 1.30.  5. Access:  Right chest portacath  6. Heme:  + chronic anemia - recent hgb 11.5    VTE risk: 3%    Patient is optimized and is acceptable candidate for the proposed procedure.  No further diagnostic evaluation is needed.     Patient discussed with Dr. Almonte.          Adrianne Sorensen, DNP, RN, APRN  Preoperative Assessment Center  [facility]  Phone: [phone]  Fax: [phone]

## 2020-10-09 NOTE — ANESTHESIA PREPROCEDURE EVALUATION
Anesthesia Pre-Procedure Evaluation    Patient: Girish Chauhan   MRN:     [id number] Gender:   male   Age:    65 year old :      1955        Preoperative Diagnosis: Hydronephrosis of right kidney [N13.30]  Cholangiocarcinoma (H) [C22.1]   Procedure(s):  CYSTOSCOPY, WITH RETROGRADE PYELOGRAM AND RIGHT URETERAL STENT REPLACEMENT     LABS:  CBC:   Lab Results   Component Value Date    WBC 10.1 10/02/2020    WBC 9.6 2020    HGB 11.5 (L) 10/02/2020    HGB 10.7 (L) 2020    HCT 34.9 (L) 10/02/2020    HCT 31.7 (L) 2020     10/02/2020     2020     BMP:   Lab Results   Component Value Date     10/02/2020     2020    POTASSIUM 3.4 10/02/2020    POTASSIUM 3.4 2020    CHLORIDE 107 10/02/2020    CHLORIDE 109 2020    CO2 22 10/02/2020    CO2 22 2020    BUN 20 10/02/2020    BUN 29 2020    CR 1.30 (H) 10/02/2020    CR 1.25 2020     (H) 10/02/2020    GLC 93 2020     COAGS:   Lab Results   Component Value Date    PTT 33 2020    INR 1.37 (H) 2020    FIBR 404 2019     POC:   Lab Results   Component Value Date     (H) 2020     OTHER:   Lab Results   Component Value Date    PH 7.38 2016    LACT 1.4 07/15/2020    GARY 8.5 10/02/2020    PHOS 2.8 2020    MAG 2.0 2020    ALBUMIN 3.1 (L) 10/02/2020    PROTTOTAL 6.2 (L) 10/02/2020    ALT 61 10/02/2020    AST 57 (H) 10/02/2020    ALKPHOS 169 (H) 10/02/2020    BILITOTAL 1.2 10/02/2020    LIPASE 207 2020    AMYLASE 112 (H) 2020    TAYLOR 24 2020    TSH 2.34 07/15/2020    CRP 20.0 (H) 2020        Preop Vitals    BP Readings from Last 3 Encounters:   20 102/72   20 (!) 115/94   20 112/79    Pulse Readings from Last 3 Encounters:   20 85   20 92   20 93      Resp Readings from Last 3 Encounters:   20 18   20 16   20 16    SpO2 Readings from Last 3 Encounters:   20  "100%   08/25/20 100%   08/11/20 100%      Temp Readings from Last 1 Encounters:   09/11/20 97.6  F (36.4  C)    Ht Readings from Last 1 Encounters:   08/25/20 1.803 m (5' 11\")      Wt Readings from Last 1 Encounters:   09/11/20 70.5 kg (155 lb 8 oz)    Estimated body mass index is 21.69 kg/m  as calculated from the following:    Height as of 8/25/20: 1.803 m (5' 11\").    Weight as of 9/11/20: 70.5 kg (155 lb 8 oz).     LDA:  Port A Cath Single 12/08/17 Right Chest wall (Active)   Access Date 10/02/20 10/02/20 1200   Access Attempts 1 10/02/20 1200   Gauge Noncoring 90 degree bend;20 gauge;3/4 inch 10/02/20 1200   Site Assessment WDL 10/02/20 1200   Line Status Blood return noted;Heparin locked 10/02/20 1200   Extravasation? No 10/02/20 1200   Dressing Intervention Gauze 10/02/20 1200   De-Access Date 10/02/20 10/02/20 1200   Number of days: 1036        Past Medical History:   Diagnosis Date     Anemia      Aortic stenosis due to bicuspid aortic valve      Ascending aortic aneurysm (H)      Atrial fibrillation (H) 2006    hx of paroxysmal atrial fibrillation since approximately 2006. S/p cardioversion in 2013 with recurrent atrial fibrillation s/p repeat cardioversion 9/29/14.     Basal cell carcinoma 1/2016    right shoulder and right posterior calf s/p electrodessication and curretage 1/2016.     CAD (coronary artery disease) 12/2011    s/p angiogram 12/2011 s/p percutaneous intervention on the LAD with multiple drug-eluting stents complicated by a small perforation in the diagonal branch which sealed spontaneously.  Patient with significant Circumflex stenosis which is being treated conservatively.     Cholangiocarcinoma (H)      CKD (chronic kidney disease)      Gout     affects left foot 2-3 times per year     Hyperlipidemia      Hypertension      Liver disease      Melanoma (H) 9/2015    back s/p resection 9/2015     Squamous cell carcinoma     nose s/p excision      Past Surgical History:   Procedure " Laterality Date     ------------OTHER-------------      multiple skin cancer resections     CARDIOVERSION  2013, 9/2014     COMBINED CYSTOSCOPY, RETROGRADES, EXCHANGE STENT URETER(S) Right 11/11/2019    Procedure: Cystoscopy, Right Retrograde Pyelogram, Right Ureteral Stent Exchange;  Surgeon: Sivan Walker MD;  [facility]     COMBINED CYSTOSCOPY, RETROGRADES, EXCHANGE STENT URETER(S) Right 6/8/2020    Procedure: CYSTOSCOPY, WITH RIGHT RETROGRADE PYELOGRAM AND RIGHT URETERAL STENT EXCHANGE;  Surgeon: Sivan Walker MD;  [facility]     CYSTOSCOPY, RETROGRADES, INSERT STENT URETER(S), COMBINED N/A 9/16/2019    Procedure: Cystoscopy, Right Retrograde Pyelogram, Right Ureteral Stent Placement;  Surgeon: Sivan Walker MD;  [facility]     CYSTOSCOPY, RETROGRADES, INSERT STENT URETER(S), COMBINED Right 2/5/2020    Procedure: CYSTOSCOPY, WITH Right RETROGRADE PYELOGRAM AND Right URETERAL STENT INSERTION;  Surgeon: Sivan Walker MD;  [facility]     ENDOSCOPIC RETROGRADE CHOLANGIOPANCREATOGRAM N/A 2/26/2016    Procedure: COMBINED ENDOSCOPIC RETROGRADE CHOLANGIOPANCREATOGRAPHY, PLACE TUBE/STENT;  Surgeon: Rafa Andrade MD;  [facility]     ENDOSCOPIC RETROGRADE CHOLANGIOPANCREATOGRAPHY  2/2014     ENDOSCOPIC ULTRASOUND UPPER GASTROINTESTINAL TRACT (GI) N/A 6/7/2019    Procedure: ENDOSCOPIC ULTRASOUND, with hot axios stent placement;  Surgeon: Gabino Rodriguez MD;  [facility]     ENTEROSCOPY SMALL BOWEL N/A 6/7/2019    Procedure: ENTEROSCOPY;  Surgeon: Gabino Rodriguez MD;  [facility]     ESOPHAGOSCOPY, GASTROSCOPY, DUODENOSCOPY (EGD), COMBINED N/A 10/16/2019    Procedure: Upper Gastrointestinal Endoscopy;  Surgeon: Gabino Rodriguez MD;  [facility]     ESOPHAGOSCOPY, GASTROSCOPY, DUODENOSCOPY (EGD), COMBINED N/A 8/25/2020    Procedure: ESOPHAGOGASTRODUODENOSCOPY (EGD) WIth  duodenal and jejunal stent placement;  Surgeon:  Gabino Rodriguez MD;  [facility]     ESOPHAGOSCOPY, GASTROSCOPY, DUODENOSCOPY (EGD), DILATATION, COMBINED N/A 7/29/2019    Procedure: Esophagoscopy, gastroscopy, duodenoscopy (EGD), with stent exchange and dilation;  Surgeon: Gabino Rodriguez MD;  [facility]     EXPLORE COMMON BILE DUCT N/A 3/8/2016    Procedure: EXPLORE COMMON BILE DUCT;  Surgeon: Jose Eduardo Pinedo MD;  [facility]     HEPATECTOMY PARTIAL Left 3/8/2016    Procedure: HEPATECTOMY PARTIAL;  Surgeon: Jose Eduardo Pinedo MD;  [facility]     INSERT PORT VASCULAR ACCESS Right 12/8/2017    Procedure: INSERT PORT VASCULAR ACCESS;  Place single lumen venous chest port - right;  Surgeon: Drew Powell PA-C;  [facility]     STENT  12/2011    LAD with multiple SAM      Allergies   Allergen Reactions     Blood Transfusion Related (Informational Only) Other (See Comments)     Patient has a history of a clinically significant antibody against RBC antigens.  A delay in compatible RBCs may occur.        Anesthesia Evaluation     . Pt has had prior anesthetic. Type: General and MAC    No history of anesthetic complications          ROS/MED HX    ENT/Pulmonary:     (+)MARVIN risk factors hypertension, , . Other pulmonary disease .   (-) tobacco use and recent URI   Neurologic:  - neg neurologic ROS     Cardiovascular:     (+) Dyslipidemia, hypertension--CAD, --stent,2011, 6/2019  6 Drug Eluting Stent .. : . CHF Last EF: 40-45% date: 6/2020 . LYNCH, . :. valvular problems/murmurs type: AS .      (-) orthopnea/PND   METS/Exercise Tolerance:  4 - Raking leaves, gardening   Hematologic:     (+) History of Transfusion no previous transfusion reaction -     (-) history of blood clots   Musculoskeletal:   (+)  other musculoskeletal- gout- no recent flares      GI/Hepatic:     (+) GERD Other, Other GI/Hepatic alternates between constipation and diarrhea      Renal/Genitourinary:     (+) chronic renal disease, type: CRI, Pt does not  "require dialysis, Pt has no history of transplant, Other Renal/ Genitourinary, right hydronephrosis      Endo:  - neg endo ROS       Psychiatric:  - neg psychiatric ROS       Infectious Disease:  - neg infectious disease ROS      (-) Recent Fever   Malignancy:   (+) Malignancy History of Skin and Other  Skin CA Remission status post Surgery, Other CA cholangiocarcinoma with mets, peritoneal carcinomatosis Active status post Surgery and Chemo         Other:    (+) no H/O Chronic Pain,                       PHYSICAL EXAM:   Mental Status/Neuro: A/A/O   Airway: Facies: Feasible  Mallampati: II  Mouth/Opening: Full  TM distance: > 6 cm  Neck ROM: Full   Respiratory: Auscultation: CTAB     Resp. Rate: Normal     Resp. Effort: Normal      CV: Rhythm: Irregular  Rate: Age appropriate  Heart: Murmur (Systolic)  Edema: None   Comments:      Dental: Normal Dentition                Assessment:   ASA SCORE: 3      Smoking Status:  Non-Smoker/Unknown   NPO Status: NPO Appropriate     Plan:   Anes. Type:  General   Pre-Medication: None   Induction:  IV (Standard)   Airway: LMA   Access/Monitoring: PIV   Maintenance: Balanced     Postop Plan:   Postop Pain: Opioids  Postop Sedation/Airway: Not planned     PONV Management:   Adult Risk Factors:, Non-Smoker, Postop Opioids   Prevention: Ondansetron, Dexamethasone                PAC Discussion and Assessment    ASA Classification: 3  Case is suitable for: [facility]  Anesthetic techniques and relevant risks discussed: GA and MAC with GA as backup  Invasive monitoring and risk discussed:   Types:   Possibility and Risk of blood transfusion discussed:   NPO instructions given:   Additional anesthetic preparation and risks discussed:   Needs early admission to pre-op area:   Other:     PAC Resident/NP Anesthesia Assessment:  \"Krishna\" Gissel is a 65 year old male scheduled for a CYSTOSCOPY, WITH RETROGRADE PYELOGRAM AND RIGHT URETERAL STENT REPLACEMENT on 10/12/20 by Dr. Walker in treatment of " hydronephrosis of right kidney in the setting of cholangiocarcinoma with mets.  PAC referral for risk assessment and optimization for anesthesia with comorbid conditions of: atrial fibrillation, CHF, cardiomyopathy, CAD, aortic stenosis, AAA, gout, peritoneal carcinomatosis, CKD, history of melanoma, BCC and SCC.      Pre-operative considerations:  1.  Cardiac:  Functional status- METS 4.  He walks 15-20 minutes regularly for exercise,but it is a somewhat slow pace.  He does note chronic exertional dyspnea.  He is followed by cardiology for all of the above cardiac conditions.  He had an echocardiogram last in June 2020 which showed mild - moderate aortic stenosis and EF 40-45%.  Aortic stenosis is being monitored and surgery hasn't been indicated yet. Ascending aorta noted 3.9cm on echo in 2019.  On eliquis for a-fib; Dr. Walker approved no interruption prior to surgery.  ChadsVasc = 3.  He has a total of 6 coronary stents.  The last was placed in June 2019.  He saw his cardiology provider last in June 2020.  Plavix was stopped then and no further testing was noted as indicated at that time.   Last BNP in Feb 2020 was 2526, recheck not indicated for this procedure.    Low risk surgery with 6.6%risk of major adverse cardiac event.   2.  Pulm:  Airway feasible.  MARVIN risk: intermediate.    3.  GI:  Risk of PONV score = 2.  If > 2, anti-emetic intervention recommended.  4. Renal:  +CKD - recent creatinine 1.30.  5. Access:  Right chest portacath  6. Heme:  + chronic anemia - recent hgb 11.5    VTE risk: 3%    Patient is optimized and is acceptable candidate for the proposed procedure.  No further diagnostic evaluation is needed.     Patient discussed with Dr. Almonte.      **For further details of assessment, testing, and physical exam please see H and P completed on same date.          Adrianne Sorensen DNP, RN, APRN      Reviewed and Signed by PAC Mid-Level Provider/Resident  Mid-Level Provider/Resident: Adrianne Sorensen,  BRANDY, RN, APRN  Date: 10/9/20  Time: 1249    Attending Anesthesiologist Anesthesia Assessment:        Anesthesiologist:   Date:   Time:   Pass/Fail:   Disposition:     PAC Pharmacist Assessment:        Pharmacist:   Date:   Time:    YO Mahajan CNP

## 2020-10-12 NOTE — ANESTHESIA CARE TRANSFER NOTE
Patient: Girish Chauhan    Procedure(s):  CYSTOSCOPY, WITH RETROGRADE PYELOGRAM AND RIGHT URETERAL STENT REPLACEMENT    Diagnosis: Hydronephrosis of right kidney [N13.30]  Cholangiocarcinoma (H) [C22.1]  Diagnosis Additional Information: No value filed.    Anesthesia Type:   MAC     Note:  Airway :Face Mask  Patient transferred to:Phase II  Comments: Patient transferred to Phase II on 8 LPM O2 via CFM. VSS upon arrival, respirations WNL. Patient transitioned uneventfully to RA. Report to RN.  Handoff Report: Identifed the Patient, Identified the Reponsible Provider, Reviewed the pertinent medical history, Discussed the surgical course, Reviewed Intra-OP anesthesia mangement and issues during anesthesia, Set expectations for post-procedure period and Allowed opportunity for questions and acknowledgement of understanding      Vitals: (Last set prior to Anesthesia Care Transfer)    CRNA VITALS  10/12/2020 0802 - 10/12/2020 0843      10/12/2020             NIBP:  90/65    Pulse:  69    NIBP Mean:  71    Temp:  36.8  C (98.3  F)    SpO2:  100 %    Resp Rate (observed):  12                Electronically Signed By: YO Bosch CRNA  October 12, 2020  8:43 AM

## 2020-10-12 NOTE — OP NOTE
10/12/20 OPERATIVE NOTE    Preoperative diagnosis: Metastatic cholangiocarcinoma with right hydronephrosis    Postoperative diagnosis: Same    Procedure: Cystoscopy with right retrograde pyelogram, right ureteral stent exchange, intraoperative interpretation of imaging    Surgeon: Sivan Walker MD     Assistant: Robin Ornelas MD    Anesthesia: MAC    EBL: minimaL  UOP: NOT RECORDED  IVF: please see anesthesia record    Drains:7X26 fr stent    Complications: None noted    Specimens: None    Findings: High bladder neck with moderately obstructing median lobe, old stent was some encrustation, moderate hydronephrosis, stent in appropriate position at the end of the case    Indication for procedure: Girish Chauhan is 65 year old with history of recurrent cholangiocarcinoma (with biopsy proven metasatic disease to bladder in past), afib on apixaban, and chronic right hydronephrosis suspected to be 2/2 mass compression. Given this, urology was consulted and we placed a right ureteral stent (6F, 28 cm) on 9/16/2019. F/u imaging noted interval improvement of the hydronephrosis but there was still some residual hydronephrosis; this was investigated with a renogram in the past, which showed delay that was suspected to be d/t either partial obstruction or chronic dilation. We thus offered the above procedure of stent exchange. He thus far has tolerated the stent without any pain, hematuria or UTI.     The patient was counseled on the alternatives, risks, and benefits and elected to proceed with the above stated procedure.    Summary of procedure:  After patient was identified in the pre-operative area and procedure verified, informed consent was obtained.  After this patient was taken to the operating suite and placed in the supine position.  Intravenous davis-operative antibiotics were administered by anesthesia.  After appropriate anesthesia was induced and the airway secured, patient was placed in the dorsal lithotomy  position in supportive yellow-fin stirrups with careful attention to neural safety in positioning of all four extremities.  At this time patient was prepped and draped in the standard fashion.      A time out was performed to confirm patient and procedure.    A 22 Fr rigid cystoscope was inserted into a normal appearing urethral meatus.  The prostate was moderately obstructing.  The urothelium was carefully examined and some trabeculation otherwise no obvious tumors, stones, foreign body or other urothelial abnormalities noted. Bilateral ureteral orifices were noted in the normal orthotopic position.  Stent on the patient's right side was visualized slightly encrusted, grasped and brought to the meatus.  Sensor wire placed up to the pelvis, though area of wire coiling present in mid-distal ureter.  Unable to advance wire into pelvis. Stent was removed, 5 french open ended catheter advanced over wire without success in straightening apparent tortuous/extrinsically compressed ureter. HEMA-1 catheter advanced over wire, successfully uncoiled wire and wire passed into renal pelvis without issue. 5 -french open ended catheter now successfully able to advance over the wire passed prior impassable point; retrograde pyelogram taken demonstrating moderate hydronephrosis. Wire was replaced and a new 7 x 26 Fr stent placed with position confirmed radiologically in kidney and visually in bladder.      The bladder was drained and the procedure terminated.  Counts were reported as correct. Patient went to the recovery room in good condition.    Plan:  -Follow up in 3 months for stent exchange.    Robin Ornelas MD  PGY-2 Urology    Addendum:    I, Sivan Walker, was present and scrubbed for the entire case.  I agree with the note as above with changes made as needed.  I spoke to his wife at the end of the case via telephone    Sivan Walker MD MPH   of Urology

## 2020-10-12 NOTE — DISCHARGE INSTRUCTIONS
Discharge Instructions: Following a Cystoscopy/Stent and/or Ureteroscopy    Drainage:    Urine may be slightly bloody but should taper off in a few days.    You may have some frequency and urgency for a few days.    Comfort:    A burning sensation when urinating is common. Drinking extra fluids helps decrease this burning feeling and also helps to clear the bloody urine.    Mild abdominal cramps may occur for a few days.    Baths:    Daily tub baths aide in passing urine.    Frequent use of bubble bath is discouraged since it encourages urinary tract infections.    Report to your doctor at once if you experience:    Inability to pass your urine    Continuous or heavy bleeding    Severe Pain    Elevated temperature > than 101.5 for 24 hours    Home Activity:    The day of surgery spend a quiet evening at home.    Increase activity as tolerated.    Rev. 5/12    Same-Day Surgery   Adult Discharge Orders & Instructions     For 24 hours after surgery:  1. Get plenty of rest.  A responsible adult must stay with you for at least 24 hours after you leave the hospital.   2. Pain medication can slow your reflexes. Do not drive or use heavy equipment.  If you have weakness or tingling, don't drive or use heavy equipment until this feeling goes away.  3. Mixing alcohol and pain medication can cause dizziness and slow your breathing. It can even be fatal. Do not drink alcohol while taking pain medication.  4. Avoid strenuous or risky activities.  Ask for help when climbing stairs.   5. You may feel lightheaded.  If so, sit for a few minutes before standing.  Have someone help you get up.   6. If you have nausea (feel sick to your stomach), drink only clear liquids such as apple juice, ginger ale, broth or 7-Up.  Rest may also help.  Be sure to drink enough fluids.  Move to a regular diet as you feel able. Take pain medications with a small amount of solid food, such as toast or crackers, to avoid nausea.   7. A slight fever is  normal. Call the doctor if your fever is over 100 F (37.7 C) (taken under the tongue) or lasts longer than 24 hours.  8. You may have a dry mouth, muscle aches, trouble sleeping or a sore throat.  These symptoms should go away after 24 hours.  9. Do not make important or legal decisions.   Pain Management:      1. Take pain medication (if prescribed) for pain as directed by your physician.        2. WARNING: If the pain medication you have been prescribed contains Tylenol  (acetaminophen), DO NOT take additional doses of Tylenol (acetaminophen).     Call your doctor for any of the followin.  Signs of infection (fever, growing tenderness at the surgery site, severe pain, a large amount of drainage or bleeding, foul-smelling drainage, redness, swelling).    2.  It has been over 8 to 10 hours since surgery and you are still not able to urinate (pee).    3.  Headache for over 24 hours.    4.  Numbness, tingling or weakness the day after surgery (if you had spinal anesthesia).  To contact a doctor, call ____[phone]__________________ or:      [phone] and ask for the Resident On Call for:          ____Urology__________ (answered 24 hours a day)      Emergency Department:  [place] [facility]: [phone]  [place] [facility]: [phone]               Rev. 10/2014

## 2020-10-12 NOTE — ANESTHESIA POSTPROCEDURE EVALUATION
Anesthesia POST Procedure Evaluation    Patient: Girish Chauhan   MRN:     [id number] Gender:   male   Age:    65 year old :      1955        Preoperative Diagnosis: Hydronephrosis of right kidney [N13.30]  Cholangiocarcinoma (H) [C22.1]   Procedure(s):  CYSTOSCOPY, WITH RETROGRADE PYELOGRAM AND RIGHT URETERAL STENT REPLACEMENT   Postop Comments: No value filed.     Anesthesia Type: MAC          Postop Pain Control: Uneventful            Sign Out: Well controlled pain   PONV: No   Neuro/Psych: Uneventful            Sign Out: Acceptable/Baseline neuro status   Airway/Respiratory: Uneventful            Sign Out: Acceptable/Baseline resp. status   CV/Hemodynamics: Uneventful            Sign Out: Acceptable CV status   Other NRE: NONE   DID A NON-ROUTINE EVENT OCCUR? No         Last Anesthesia Record Vitals:  CRNA VITALS  10/12/2020 0802 - 10/12/2020 0902      10/12/2020             NIBP:  90/65    Pulse:  69    NIBP Mean:  71    Temp:  36.8  C (98.3  F)    SpO2:  100 %    Resp Rate (observed):  12          Last PACU Vitals:  No vitals data found for the desired time range.        Electronically Signed By: Farrah Hernandez MD, 2020, 9:52 AM

## 2020-10-12 NOTE — OR NURSING
Dr. Corwin Hernandez states she will put in PACU/Ph2 orders.  States she does not need to see pt prior to discharge.

## 2020-10-12 NOTE — OR NURSING
Dr. Walker states she would like pt to void prior to discharge, call Dr. Walker if has large clots.

## 2020-10-13 NOTE — TELEPHONE ENCOUNTER
Sent my-chart message to see how the patient is doing since his procedure with Dr. Walker 10/12/20 for stent exchange.    Tena Blanca RN   Care Coordinator Urology

## 2020-10-13 NOTE — ORAL ONC MGMT
Oral Chemotherapy Monitoring Program    Primary Oncologist: Magali  [place] [facility] [place] Diagnosis: [place]    Therapy History:  Capecitabine 1500mg BID x14/28 days    Drug Interaction Assessment: No new medications      Subjective/Objective:  Girish Chauhan is a 65 year old male contacted by phone for a follow-up visit for oral chemotherapy.  He just started a new cycle today.    ORAL CHEMOTHERAPY 4/22/2019 5/10/2019 3/23/2020 4/2/2020 5/7/2020 6/30/2020 10/13/2020   Drug Name Xeloda (Capecitabine) Xeloda (Capecitabine) Xeloda (Capecitabine) Xeloda (Capecitabine) Xeloda (Capecitabine) Xeloda (Capecitabine) Xeloda (Capecitabine)   Current Dosage 1500mg 1500mg 1500mg 1500mg 1500mg 1500mg 1500mg   Current Schedule BID BID BID BID BID BID BID   Cycle Details 2 weeks on 1 week off Drug on Hold 2 weeks on 1 week off 2 weeks on 1 week off 2 weeks on 1 week off 2 weeks on 1 week off 2 weeks on 2 weeks off   Start Date of Last Cycle - 4/30/2019 - 3/26/2020 4/24/2020 6/15/2020 10/13/2020   Planned next cycle start date - - - 4/16/2020 5/15/2020 7/6/2020 11/10/2020   Doses missed in last 2 weeks - 0 - 0 0 0 0   Adherence Assessment - Adherent - Adherent Adherent Adherent Adherent   Adverse Effects - No AE identified during assessment - No AE identified during assessment No AE identified during assessment No AE identified during assessment Palmar-plantar erythrodysethesia syndrome   Palmar-plantar Erythrodysethesia syndrome[hand-foot syndrome] - - - - - - Grade 1   Pharmacist Intervention(Palmar-plantar) - - - - - - No   Home BPs - - - - - - Not applicable   Any new drug interactions? - No - No - No -   Is the dose as ordered appropriate for the patient? - - - Yes - Yes -       Last PHQ-2 Score on record:   PHQ-2 ( 1999 Pfizer) 10/9/2020 8/11/2020   Q1: Little interest or pleasure in doing things 0 0   Q2: Feeling down, depressed or hopeless 0 0   PHQ-2 Score 0 0             Vitals:  BP:   BP Readings from Last  "1 Encounters:   10/12/20 (!) 114/91     Wt Readings from Last 1 Encounters:   10/12/20 67.5 kg (148 lb 13 oz)     Estimated body surface area is 1.84 meters squared as calculated from the following:    Height as of 10/12/20: 1.803 m (5' 10.98\").    Weight as of 10/12/20: 67.5 kg (148 lb 13 oz).    Labs:  _  Result Component Current Result Ref Range   Sodium 138 (10/2/2020) 133 - 144 mmol/L     _  Result Component Current Result Ref Range   Potassium 3.8 (10/12/2020) 3.4 - 5.3 mmol/L     _  Result Component Current Result Ref Range   Calcium 8.5 (10/2/2020) 8.5 - 10.1 mg/dL     No results found for Mag within last 30 days.     No results found for Phos within last 30 days.     _  Result Component Current Result Ref Range   Albumin 3.1 (L) (10/2/2020) 3.4 - 5.0 g/dL     _  Result Component Current Result Ref Range   Urea Nitrogen 20 (10/2/2020) 7 - 30 mg/dL     _  Result Component Current Result Ref Range   Creatinine 1.30 (H) (10/2/2020) 0.66 - 1.25 mg/dL       _  Result Component Current Result Ref Range   AST 57 (H) (10/2/2020) 0 - 45 U/L     _  Result Component Current Result Ref Range   ALT 61 (10/2/2020) 0 - 70 U/L     _  Result Component Current Result Ref Range   Bilirubin Total 1.2 (10/2/2020) 0.2 - 1.3 mg/dL       _  Result Component Current Result Ref Range   WBC 10.1 (10/2/2020) 4.0 - 11.0 10e9/L     _  Result Component Current Result Ref Range   Hemoglobin 11.5 (L) (10/2/2020) 13.3 - 17.7 g/dL     _  Result Component Current Result Ref Range   Platelet Count 161 (10/2/2020) 150 - 450 10e9/L     _  Result Component Current Result Ref Range   Absolute Neutrophil 7.0 (10/2/2020) 1.6 - 8.3 10e9/L               Assessment:  Patient is tolerating expanded cycle of 2 weeks on 2 weeks off better than he expected.    Plan:  Continue expanded cycle dosing    Follow-Up:  In two weeks    Refill Due:  11/10    Jonathan Coelho Pharm D.  Clinical Oncology Pharmacist- Oral Chemotherapy Monitoring " [facility]  [phone]    October 13, 2020

## 2020-10-13 NOTE — ORAL ONC MGMT
Oral Chemotherapy Monitoring Program     Placed call to patient in follow up of Capecitabine therapy.     Left message to please call back in follow-up of therapy. No patient or drug names were mentioned.     Jonathan Coelho Pharm D.  Clinical Oncology Pharmacist- Oral Chemotherapy Monitoring Program  [phone]    October 13, 2020

## 2020-11-04 NOTE — NURSING NOTE
Chief Complaint   Patient presents with     Port Draw     labs drawn via port by RN in lab      /70 (BP Location: Right arm, Patient Position: Sitting, Cuff Size: Adult Regular)   Pulse 87   Temp 98  F (36.7  C) (Oral)   Resp 18   Wt 67.4 kg (148 lb 11.2 oz)   SpO2 97%   BMI 20.75 kg/m      Port accessed by RN. Labs drawn and sent. Line flushed with NS and Heparin. Pt tolerated well.    Leanne Rutherford RN on 11/4/2020 at 1:49 PM

## 2020-12-07 NOTE — NURSING NOTE
Chief Complaint   Patient presents with     Blood Draw     Labs drawn via PORT by RN in lab. VS taken.     Edd Rodrigues RN

## 2020-12-07 NOTE — TELEPHONE ENCOUNTER
Oral Chemotherapy Monitoring Program  Lab Follow Up    Reviewed lab results from 12/07/20.    ORAL CHEMOTHERAPY 4/2/2020 5/7/2020 6/30/2020 10/13/2020 11/4/2020 11/13/2020 12/7/2020   Assessment Type - - - - Refill Other Lab Monitoring;Other   Diagnosis Code - - - - Gallbladder Cancer Gallbladder Cancer Gallbladder Cancer   Providers - - - - Dr Naresh De Los Santos   [facility]   Drug Name Xeloda (Capecitabine) Xeloda (Capecitabine) Xeloda (Capecitabine) Xeloda (Capecitabine) Xeloda (Capecitabine) Xeloda (Capecitabine) Xeloda (Capecitabine)   Dose - - - - 1,500 mg 1,500 mg 1,500 mg   Current Schedule BID BID BID BID BID BID BID   Cycle Details 2 weeks on 1 week off 2 weeks on 1 week off 2 weeks on 1 week off 2 weeks on 2 weeks off 2 weeks on, 2 weeks off 2 weeks on, 2 weeks off 2 weeks on, 2 weeks off   Start Date of Last Cycle 3/26/2020 4/24/2020 6/15/2020 10/13/2020 - - -   Planned next cycle start date 4/16/2020 5/15/2020 7/6/2020 11/10/2020 - - -   Doses missed in last 2 weeks 0 0 0 0 - - -   Adherence Assessment Adherent Adherent Adherent Adherent - - -   Adverse Effects No AE identified during assessment No AE identified during assessment No AE identified during assessment Palmar-plantar erythrodysethesia syndrome - - -   Palmar-plantar Erythrodysethesia syndrome[hand-foot syndrome] - - - Grade 1 - - -   Pharmacist Intervention(Palmar-plantar) - - - No - - -   Home BPs - - - Not applicable - - -   Any new drug interactions? No - No - - - -   Is the dose as ordered appropriate for the patient? Yes - Yes - - - -       Labs:  _  Result Component Current Result Ref Range   Sodium 141 (12/7/2020) 133 - 144 mmol/L     _  Result Component Current Result Ref Range   Potassium 4.2 (12/7/2020) 3.4 - 5.3 mmol/L     _  Result Component Current Result Ref Range   Calcium 8.8 (12/7/2020) 8.5 - 10.1 mg/dL     No results found for Mag within last 30 days.     No results  found for Phos within last 30 days.     _  Result Component Current Result Ref Range   Albumin 2.6 (L) (12/7/2020) 3.4 - 5.0 g/dL     _  Result Component Current Result Ref Range   Urea Nitrogen 23 (12/7/2020) 7 - 30 mg/dL     _  Result Component Current Result Ref Range   Creatinine 1.18 (12/7/2020) 0.66 - 1.25 mg/dL     _  Result Component Current Result Ref Range   AST 52 (H) (12/7/2020) 0 - 45 U/L     _  Result Component Current Result Ref Range   ALT 57 (12/7/2020) 0 - 70 U/L     _  Result Component Current Result Ref Range   Bilirubin Total 0.6 (12/7/2020) 0.2 - 1.3 mg/dL     _  Result Component Current Result Ref Range   WBC 9.7 (12/7/2020) 4.0 - 11.0 10e9/L     _  Result Component Current Result Ref Range   Hemoglobin 10.6 (L) (12/7/2020) 13.3 - 17.7 g/dL     _  Result Component Current Result Ref Range   Platelet Count 229 (12/7/2020) 150 - 450 10e9/L     _  Result Component Current Result Ref Range   Absolute Neutrophil 6.4 (12/7/2020) 1.6 - 8.3 10e9/L       Assessment & Plan:  No concerning abnormalities. MyChart messaged sent in regards to lab results.    Follow-Up:  12/11 lab and monthly assessment.    Soto Montoya  Pharmacy Intern  [facility]  [phone]

## 2020-12-07 NOTE — TELEPHONE ENCOUNTER
Oral Chemotherapy Monitoring Program    Placed call to patient in follow up of Xeloda therapy.    Left message to please call back in follow up of therapy. No patient or drug names were mentioned.    Soto Montoya  Pharmacy Intern  [facility]  [phone]

## 2020-12-11 NOTE — TELEPHONE ENCOUNTER
Per Dr. Lopez: if pt symptomatic with fever/cough, should be seen. If unable to reach pt leave message for pt to call back and on-call provider can provide directions based on symptoms. Called pt again 5:08 PM no answer did not go to , called spouse Mily reached , left generic message to call [facility].

## 2020-12-11 NOTE — NURSING NOTE
"Chief Complaint   Patient presents with     Port Draw     Labs drawn via port by RN.     Port accessed with 20g 3/4\" power needle and labs drawn by rn.  Port flushed with NS and heparin.  Pt tolerated well.    Med Serrano RN  "

## 2020-12-11 NOTE — TELEPHONE ENCOUNTER
Call from Radiology fellow Helen Duran: urgent finding of bilateral lobes suspicious for infection or atypical pneumonia. Final reading will be released. Any questions can be reached at [phone] before 4:30 pm or page on-call Radiologist after 4:30.    Paged Dr. De Lso Santos 3:46 PM    Called pt to triage if symptomatic, reached  and lmcb.

## 2020-12-12 NOTE — TELEPHONE ENCOUNTER
"Patient returning call. Apparently [facility] (Dr. De Los Santos) has been trying to reach patient about CT results. Patient states he feels \"OK\" except his knee is bothering him \"a little bit\" today. Denies fever or shortness of breath. Page out to on call for Dr. De Los Santos (Dr. Hope) to call me back directly for SBAR report at 7:41p. Second page via answering service at 8:02p. Dr. Hope returned my call, given update on information from patient. Advised that patient denies fever or any respiratory symptoms. Advised patient to call [facility] back over the weekend with any questions or concerns. He says he has an appointment with Dr. De Los Santos on Monday.  Paradise Olvera RN  [facility]    "

## 2020-12-14 NOTE — Clinical Note
"    12/14/2020         RE: Girish Chauhan  [address] [address]        Dear Colleague,    Thank you for referring your patient, Girish Chauhan, to the [facility]. Please see a copy of my visit note below.    Girish Chauhan is a 65 year old male who is being evaluated via a billable video visit.      The patient has been notified of following:     \"This video visit will be conducted via a call between you and your physician/provider. We have found that certain health care needs can be provided without the need for an in-person physical exam.  This service lets us provide the care you need with a video conversation.  If a prescription is necessary we can send it directly to your pharmacy.  If lab work is needed we can place an order for that and you can then stop by our lab to have the test done at a later time.    Video visits are billed at different rates depending on your insurance coverage.  Please reach out to your insurance provider with any questions.    If during the course of the call the physician/provider feels a video visit is not appropriate, you will not be charged for this service.\"    Patient has given verbal consent for Video visit? Yes  How would you like to obtain your AVS? MyChart  If you are dropped from the video visit, the video invite should be resent to: Send to e-mail at: [email or web address]  Will anyone else be joining your video visit? No  {If patient encounters technical issues they should call [phone] :[id number]}    BRYANT Verdugo      Video-Visit Details    Type of service:  Video Visit    Video Start Time: {video visit start/end time:152948}  Video End Time: {video visit start/end time:152948}    Originating Location (pt. Location): {video visit patient location:338050::\"Home\"}    Distant Location (provider location):  [facility]     Platform used for Video Visit: {Virtual Visit " "Platforms:454796::\"Ashok\"}    {signature options:740877}            Again, thank you for allowing me to participate in the care of your patient.        Sincerely,        Naresh De Los Santos MD  "

## 2020-12-14 NOTE — PROGRESS NOTES
"Girish Chauhan is a 65 year old male who is being evaluated via a billable video visit.      The patient has been notified of following:     \"This video visit will be conducted via a call between you and your physician/provider. We have found that certain health care needs can be provided without the need for an in-person physical exam.  This service lets us provide the care you need with a video conversation.  If a prescription is necessary we can send it directly to your pharmacy.  If lab work is needed we can place an order for that and you can then stop by our lab to have the test done at a later time.    Video visits are billed at different rates depending on your insurance coverage.  Please reach out to your insurance provider with any questions.    If during the course of the call the physician/provider feels a video visit is not appropriate, you will not be charged for this service.\"    Patient has given verbal consent for Video visit? Yes  How would you like to obtain your AVS? MyChart  If you are dropped from the video visit, the video invite should be resent to: Send to e-mail at: [email or web address]  Will anyone else be joining your video visit? No      BRYANT Verdugo      Video-Visit Details    Type of service:  Video Visit    Video Start Time: 4:11 PM  Video End Time: 5 pm    Originating Location (pt. Location): Home    Distant Location (provider location):  [facility]     Platform used for Video Visit: Faisal Sorensen MD      -----------------------------    MEDICAL ONCOLOGY CLINIC FOLLOW-UP NOTE  December 14, 2020    DIAGNOSIS:   Metastatic cholangiocarcinoma.    TREATMENT  Xeloda 2 weeks on and 2 weeks off    HPI  He had his original resection in 2016 and had an isolated recurrence within the wall of his urinary bladder.  He had initial good response to both cisplatin and gemcitabine but had to stop both due to toxicity, neurotoxicity from the platinum and TTP " related to his gemcitabine.  He eventually had progression within the abdominal cavity with peritoneal mets and was treated with capecitabine complicated by myocardial infarction which appeared to be unrelated to his treatment and he has since been on treatment with infusional 5-FU and then back on Xeloda without ongoing cardiac issues.      INTERVAL HISTORY:    Patient seen today via video visit with his wife Mily.     He reports doing well. He feels ok overall. He lost his sense of taste and is trying to eat as much as he can but is losing weight. Has mild nausea, denies vomiting, abdominal pain, fever or chills.  He is seeing a dialectician on Wednesday.  He has mild dry cough for over a year without fever or chills. He had influenza in February of this year. His wife wonders if he had COVID earlier this year.   He is taking Xeloda two weeks on and two weeks off. He has neuropathy in his finger tips>toes (numbness, unable to button his shirts or open jars and bottles). He denies falls or dropping objects.     The remainder of his 10 point review of systems otherwise is negative.    OBJECTIVE:  No vitals were recorded  GENERAL: Chronically ill, alert and no distress, hoarse voice  EYES: Eyes grossly normal to inspection.  No discharge or erythema, or obvious scleral/conjunctival abnormalities.  RESP: No audible wheeze, cough, or visible cyanosis.  No visible retractions or increased work of breathing.    SKIN: Mild peeling of skin on hands-no fissures or blisters.  NEURO: Cranial nerves grossly intact.  Mentation and speech appropriate for age.  PSYCH: Mentation appears normal, affect normal/bright, judgement and insight intact, normal speech and appearance well-groomed.    Labs and recent imaging reviewed.     ASSESSMENT AND PLAN    # Metastatic cholangiocarcinoma with good control on single agent Xeloda.   - Currently on two weeks on and two weeks off regimen. He has grade 2 neuropathy symptoms which are  unchanged. Stable disease on recent imaging. Continue Xeloda two weeks on and two weeks off.   - Restaging scans in 3 months and follow-up with Dr. De Los Santos    # Cancer related malnutrition  - He is seeing nutrition on Wednesday  - Will consider starting pancreatic enzymes if he has diarrhea or malabsorption symptoms    # Imaging findings concerning for stent fracture  - No acute worsening abdominal pain or other GI symptoms noted. Low suspicion of stent fracture based on symptoms and imaging. We will have our gastroenterology colleagues review the images and weigh in.    # GGO on imaging  - Its possible that he had COVID infection along with influenza earlier this year. Tested for COVID multiple times and negative. Continue to monitor.    Patient was seen and plan discussed with attending physician, Dr. Magali Sorensen  Hematology, Oncology & Transplantation fellow  Pager: [phone]  12/14/2020    Attending note: I saw the patient in conjunction with the fellow and agree with the above.

## 2020-12-14 NOTE — LETTER
"    12/14/2020         RE: Girish Chauhan  [address] [place] [address]      Girish Chauhan is a 65 year old male who is being evaluated via a billable video visit.      The patient has been notified of following:     \"This video visit will be conducted via a call between you and your physician/provider. We have found that certain health care needs can be provided without the need for an in-person physical exam.  This service lets us provide the care you need with a video conversation.  If a prescription is necessary we can send it directly to your pharmacy.  If lab work is needed we can place an order for that and you can then stop by our lab to have the test done at a later time.    Video visits are billed at different rates depending on your insurance coverage.  Please reach out to your insurance provider with any questions.    If during the course of the call the physician/provider feels a video visit is not appropriate, you will not be charged for this service.\"    Patient has given verbal consent for Video visit? Yes  How would you like to obtain your AVS? MyChart  If you are dropped from the video visit, the video invite should be resent to: Send to e-mail at: [email or web address]  Will anyone else be joining your video visit? No      BRYANT Verdugo      Video-Visit Details    Type of service:  Video Visit    Video Start Time: 4:11 PM  Video End Time: 5 pm    Originating Location (pt. Location): Home    Distant Location (provider location):  [facility]     Platform used for Video Visit: Faisal Sorensen MD      -----------------------------    MEDICAL ONCOLOGY CLINIC FOLLOW-UP NOTE  December 14, 2020    DIAGNOSIS:   Metastatic cholangiocarcinoma.    TREATMENT  Xeloda 2 weeks on and 2 weeks off    HPI  He had his original resection in 2016 and had an isolated recurrence within the wall of his urinary bladder.  He had initial good response to both cisplatin " and gemcitabine but had to stop both due to toxicity, neurotoxicity from the platinum and TTP related to his gemcitabine.  He eventually had progression within the abdominal cavity with peritoneal mets and was treated with capecitabine complicated by myocardial infarction which appeared to be unrelated to his treatment and he has since been on treatment with infusional 5-FU and then back on Xeloda without ongoing cardiac issues.      INTERVAL HISTORY:    Patient seen today via video visit with his wife Mily.     He reports doing well. He feels ok overall. He lost his sense of taste and is trying to eat as much as he can but is losing weight. Has mild nausea, denies vomiting, abdominal pain, fever or chills.  He is seeing a dialectician on Wednesday.  He has mild dry cough for over a year without fever or chills. He had influenza in February of this year. His wife wonders if he had COVID earlier this year.   He is taking Xeloda two weeks on and two weeks off. He has neuropathy in his finger tips>toes (numbness, unable to button his shirts or open jars and bottles). He denies falls or dropping objects.     The remainder of his 10 point review of systems otherwise is negative.    OBJECTIVE:  No vitals were recorded  GENERAL: Chronically ill, alert and no distress, hoarse voice  EYES: Eyes grossly normal to inspection.  No discharge or erythema, or obvious scleral/conjunctival abnormalities.  RESP: No audible wheeze, cough, or visible cyanosis.  No visible retractions or increased work of breathing.    SKIN: Mild peeling of skin on hands-no fissures or blisters.  NEURO: Cranial nerves grossly intact.  Mentation and speech appropriate for age.  PSYCH: Mentation appears normal, affect normal/bright, judgement and insight intact, normal speech and appearance well-groomed.    Labs and recent imaging reviewed.     ASSESSMENT AND PLAN    # Metastatic cholangiocarcinoma with good control on single agent Xeloda.   - Currently on  two weeks on and two weeks off regimen. He has grade 2 neuropathy symptoms which are unchanged. Stable disease on recent imaging. Continue Xeloda two weeks on and two weeks off.   - Restaging scans in 3 months and follow-up with Dr. De Los Santos    # Cancer related malnutrition  - He is seeing nutrition on Wednesday  - Will consider starting pancreatic enzymes if he has diarrhea or malabsorption symptoms    # Imaging findings concerning for stent fracture  - No acute worsening abdominal pain or other GI symptoms noted. Low suspicion of stent fracture based on symptoms and imaging. We will have our gastroenterology colleagues review the images and weigh in.    # GGO on imaging  - Its possible that he had COVID infection along with influenza earlier this year. Tested for COVID multiple times and negative. Continue to monitor.    Patient was seen and plan discussed with attending physician, Dr. Magali Koch [facility]  Hematology, Oncology & Transplantation fellow  Pager: [phone]  12/14/2020    Attending note: I saw the patient in conjunction with the fellow and agree with the above.          Naresh De Los Santos MD

## 2020-12-15 PROBLEM — D70.1 CHEMOTHERAPY-INDUCED NEUTROPENIA (H): Status: ACTIVE | Noted: 2020-01-01

## 2020-12-15 PROBLEM — G62.0 DRUG-INDUCED POLYNEUROPATHY (H): Status: ACTIVE | Noted: 2020-01-01

## 2020-12-15 PROBLEM — T45.1X5A CHEMOTHERAPY-INDUCED NEUTROPENIA (H): Status: ACTIVE | Noted: 2020-01-01

## 2020-12-15 PROBLEM — M31.10 THROMBOTIC MICROANGIOPATHY (H): Status: RESOLVED | Noted: 2018-09-13 | Resolved: 2020-01-01

## 2020-12-16 NOTE — LETTER
"    12/16/2020         RE: Girish Chauhan  [address] [facility] [id number]        Dear Colleague,    Thank you for referring your patient, Girish Chauhan, to the [facility]. Please see a copy of my visit note below.    Girish Chauhan is a 65 year old male who is being evaluated via a billable telephone visit.      The patient has been notified of following:     \"This telephone visit will be conducted via a call between you and your physician/provider. We have found that certain health care needs can be provided without the need for a physical exam.  This service lets us provide the care you need with a short phone conversation.  If a prescription is necessary we can send it directly to your pharmacy.  If lab work is needed we can place an order for that and you can then stop by our lab to have the test done at a later time.    Telephone visits are billed at different rates depending on your insurance coverage. During this emergency period, for some insurers they may be billed the same as an in-person visit.  Please reach out to your insurance provider with any questions.    If during the course of the call the physician/provider feels a telephone visit is not appropriate, you will not be charged for this service.\"    Patient has given verbal consent for Telephone visit?  Yes    CLINICAL NUTRITION SERVICES - ASSESSMENT NOTE    Girish Chauhan 65 year old referred for MNT related to cholangiocarcinoma    Time Spent: 45 minutes  Visit Type: video converted to phone call after 5 minutes due to technical difficulites  Referring Physician: Magali 12/4  Pt accompanied by: his wife, Mily     NUTRITION HISTORY  Factors affecting nutrition intake include:decreased appetite, taste changes, early satiety, occasional vomiting  Current diet: general  Current appetite/intake: brenden Keller presents today with concerns of his lack of appetite and weight loss.    He has been eating very small " "amounts due to taste aversions, fullness and vomiting if he overeats.   He has been more focused on small, frequent meals as this is all he can tolerate.   He denies diarrhea or steatorrhea. He reports that his stools are normal and tend to me more constipated.    His wife inquires about an appetite stimulant.  He is not wanting to take medical marijuana despite strong recommendations from his family.      Diet Recall  Breakfast Oatmeal OR eggs and toast OR smoothie made with juice, milk, berries, spinach and tofu OR skips and has an Ensure   Lunch Roast beef or pork with small salad   Dinner Spaghetti OR chicken OR chili   Snacks smoothie   Beverages Water, ensure     ANTHROPOMETRICS  Height: 70\"  Weight: 143 lbs/64kg  BMI: 20  Weight Status:  Normal BMI (low range normal)  IBW: 166 lbs (86%)  Weight History: down 7 lb x past 2 months  Wt Readings from Last 6 Encounters:   12/07/20 64.9 kg (143 lb)   11/04/20 67.4 kg (148 lb 11.2 oz)   10/12/20 67.5 kg (148 lb 13 oz)   10/09/20 68 kg (150 lb)   09/11/20 70.5 kg (155 lb 8 oz)   08/25/20 68.6 kg (151 lb 3.8 oz)       Dosing Weight: 64kg    Medications/vitamins/minerals/herbals:   Reviewed    Labs:   Labs reviewed    NUTRITION FOCUSED PHYSICAL ASSESSMENT FOR DIAGNOSING MALNUTRITION:  Not observed  Consult for education only      ASSESSED NUTRITION NEEDS:  Estimated Energy Needs: 2200 kcals (30-35 Kcal/Kg)  Justification: repletion  Estimated Protein Needs: 75-90 grams protein (1.2-1.5 g pro/Kg)  Justification: Repletion  Estimated Fluid Needs: 2500  mL   Justification: maintenance    MALNUTRITION:  % Weight Loss:  Weight loss does not meet criteria for malnutrition   % Intake:  </= 50% for >/= 1 month (severe malnutrition)  Subcutaneous Fat Loss:  Not observed  Muscle Loss:  Not observed  Fluid Retention:  Not noted    Malnutrition Diagnosis:  Unable to evaluate at this time    NUTRITION DIAGNOSIS:  Inadequate oral intake related to decreased appetite, dysgeusia as " evidenced by 5% wt loss x past 2 months, pt report eating 50% of usual intake x past few months.     INTERVENTIONS  Provided written & verbal education:   - Discussed strategies to help fortify meals and snacks with calories and protein.  - Encouraged to focus on smaller, more frequent meals.  Encouraged to have a protein source with each meal and snack.   Reviewed sources of protein.   - Advised pt to aim for at least 2200-2400kcal and 75-9g protein daily.   - Reviewed common barriers to eating with cancer treatment.  Discussed ways to cope with decreased appetite and dysgeusia.   - Reviewed oral nutrition supplement options (Mass Pro weight dominguez, Ensure Enlive, Ensure Plus/Boost Plus, CIB, Benecalorie etc). Encouraged utilizing these ONS in home made shakes/smoothies to prevent flavor fatigue.  Encouraged pt to start consuming 2 ONS or home made shakes/smoothies daily.     - Reviewed s/sx of malabsorption. Encouraged to monitor for loose, oily/greasy stools.   - Reviewed benefits from appetite stimulant such as Marinol.     Provided pt with corresponding education materials/handouts on:  Mediterranean diet guidelines, High Calorie/High Protein Recipe booklet, Tips to Increase the Calories in Your Diet and Sources of Protein.     Pt verbalize understanding of materials provided during consult.   Patient Understanding: Excellent  Expected patient engagement: Excellent     Implementation  Collaboration and Referral of Nutrition care- Message sent to [facility] regarding prescription for Marinol. Hardik will discuss with Dr. De Los Santos.    Goals  1.  Aim for 5-6 small frequent meals  2.  Aim for 2200kcal and 75-90g protein/day  3. Weight maintenance     Follow-Up Plans: Pt has RD contact information for questions.      MONITORING AND EVALUATION:  -Food/beverage intake  -Weight trends    Ana Hernandez, ALYSSA, LD

## 2020-12-16 NOTE — PROGRESS NOTES
"Girish Chauhan is a 65 year old male who is being evaluated via a billable telephone visit.      The patient has been notified of following:     \"This telephone visit will be conducted via a call between you and your physician/provider. We have found that certain health care needs can be provided without the need for a physical exam.  This service lets us provide the care you need with a short phone conversation.  If a prescription is necessary we can send it directly to your pharmacy.  If lab work is needed we can place an order for that and you can then stop by our lab to have the test done at a later time.    Telephone visits are billed at different rates depending on your insurance coverage. During this emergency period, for some insurers they may be billed the same as an in-person visit.  Please reach out to your insurance provider with any questions.    If during the course of the call the physician/provider feels a telephone visit is not appropriate, you will not be charged for this service.\"    Patient has given verbal consent for Telephone visit?  Yes    CLINICAL NUTRITION SERVICES - ASSESSMENT NOTE    Girish Chauhan 65 year old referred for MNT related to cholangiocarcinoma    Time Spent: 45 minutes  Visit Type: video converted to phone call after 5 minutes due to technical difficulites  Referring Physician: Magali 12/4  Pt accompanied by: his wife, Mily     NUTRITION HISTORY  Factors affecting nutrition intake include:decreased appetite, taste changes, early satiety, occasional vomiting  Current diet: general  Current appetite/intake: brenden Keller presents today with concerns of his lack of appetite and weight loss.    He has been eating very small amounts due to taste aversions, fullness and vomiting if he overeats.   He has been more focused on small, frequent meals as this is all he can tolerate.   He denies diarrhea or steatorrhea. He reports that his stools are normal and tend to me more " "constipated.    His wife inquires about an appetite stimulant.  He is not wanting to take medical marijuana despite strong recommendations from his family.      Diet Recall  Breakfast Oatmeal OR eggs and toast OR smoothie made with juice, milk, berries, spinach and tofu OR skips and has an Ensure   Lunch Roast beef or pork with small salad   Dinner Spaghetti OR chicken OR chili   Snacks smoothie   Beverages Water, ensure     ANTHROPOMETRICS  Height: 70\"  Weight: 143 lbs/64kg  BMI: 20  Weight Status:  Normal BMI (low range normal)  IBW: 166 lbs (86%)  Weight History: down 7 lb x past 2 months  Wt Readings from Last 6 Encounters:   12/07/20 64.9 kg (143 lb)   11/04/20 67.4 kg (148 lb 11.2 oz)   10/12/20 67.5 kg (148 lb 13 oz)   10/09/20 68 kg (150 lb)   09/11/20 70.5 kg (155 lb 8 oz)   08/25/20 68.6 kg (151 lb 3.8 oz)       Dosing Weight: 64kg    Medications/vitamins/minerals/herbals:   Reviewed    Labs:   Labs reviewed    NUTRITION FOCUSED PHYSICAL ASSESSMENT FOR DIAGNOSING MALNUTRITION:  Not observed  Consult for education only      ASSESSED NUTRITION NEEDS:  Estimated Energy Needs: 2200 kcals (30-35 Kcal/Kg)  Justification: repletion  Estimated Protein Needs: 75-90 grams protein (1.2-1.5 g pro/Kg)  Justification: Repletion  Estimated Fluid Needs: 2500  mL   Justification: maintenance    MALNUTRITION:  % Weight Loss:  Weight loss does not meet criteria for malnutrition   % Intake:  </= 50% for >/= 1 month (severe malnutrition)  Subcutaneous Fat Loss:  Not observed  Muscle Loss:  Not observed  Fluid Retention:  Not noted    Malnutrition Diagnosis:  Unable to evaluate at this time    NUTRITION DIAGNOSIS:  Inadequate oral intake related to decreased appetite, dysgeusia as evidenced by 5% wt loss x past 2 months, pt report eating 50% of usual intake x past few months.     INTERVENTIONS  Provided written & verbal education:   - Discussed strategies to help fortify meals and snacks with calories and protein.  - Encouraged " to focus on smaller, more frequent meals.  Encouraged to have a protein source with each meal and snack.   Reviewed sources of protein.   - Advised pt to aim for at least 2200-2400kcal and 75-9g protein daily.   - Reviewed common barriers to eating with cancer treatment.  Discussed ways to cope with decreased appetite and dysgeusia.   - Reviewed oral nutrition supplement options (Mass Pro weight dominguez, Ensure Enlive, Ensure Plus/Boost Plus, CIB, Benecalorie etc). Encouraged utilizing these ONS in home made shakes/smoothies to prevent flavor fatigue.  Encouraged pt to start consuming 2 ONS or home made shakes/smoothies daily.     - Reviewed s/sx of malabsorption. Encouraged to monitor for loose, oily/greasy stools.   - Reviewed benefits from appetite stimulant such as Marinol.     Provided pt with corresponding education materials/handouts on:  Mediterranean diet guidelines, High Calorie/High Protein Recipe booklet, Tips to Increase the Calories in Your Diet and Sources of Protein.     Pt verbalize understanding of materials provided during consult.   Patient Understanding: Excellent  Expected patient engagement: Excellent     Implementation  Collaboration and Referral of Nutrition care- Message sent to [facility] regarding prescription for Marinol. Hardik will discuss with Dr. De Los Santos.    Goals  1.  Aim for 5-6 small frequent meals  2.  Aim for 2200kcal and 75-90g protein/day  3. Weight maintenance     Follow-Up Plans: Pt has RD contact information for questions.      MONITORING AND EVALUATION:  -Food/beverage intake  -Weight trends    Ana Hernandez, ALYSSA, LD

## 2020-12-31 NOTE — ORAL ONC MGMT
Oral Chemotherapy Monitoring Program     Placed call to patient in follow up of oral chemotherapy. Left message requesting call back. No drug names were mentioned. Will update when response received.     Romy Roman, PharmD, BCOP  Hematology/Oncology Clinical Pharmacist  [facility]  [facility]

## 2020-12-31 NOTE — ORAL ONC MGMT
Oral Chemotherapy Monitoring Program    Subjective/Objective:  Girish Chauhan is a 65 year old male contacted by phone for a follow-up visit for oral chemotherapy. Krishna confirms taking capecitabine 1500 mg BID x14d on then 14d off. His next cycle is due to start 1/5. He is tolerating this well overall, but still having some HFS symptoms. These are mild and typically do not interfere with his daily activities. He reports mild dryness of his hands and feet, tingling of his feet, and loss of fingerprints. We discussed using a good moisturizer and trying a thicker urea-based cream for the dryness. Krishna denies any recent medication changes or missed doses of capecitabine in his last cycle. He has labs scheduled for 1/4 prior to his 1/5 cycle start.    ORAL CHEMOTHERAPY 10/13/2020 11/4/2020 11/13/2020 12/7/2020 12/7/2020 12/30/2020 12/31/2020   Assessment Type - Refill Other Lab Monitoring;Other Refill Refill Monthly Follow up   Diagnosis Code - Gallbladder Cancer Gallbladder Cancer Gallbladder Cancer Gallbladder Cancer Gallbladder Cancer Bile Duct Cancer   Providers - Dr Naresh De Los Santos   [facility]   Drug Name Xeloda (Capecitabine) Xeloda (Capecitabine) Xeloda (Capecitabine) Xeloda (Capecitabine) Xeloda (Capecitabine) Xeloda (Capecitabine) Xeloda (Capecitabine)   Dose - 1,500 mg 1,500 mg 1,500 mg 1,500 mg 1,500 mg 1,500 mg   Current Schedule BID BID BID BID BID BID BID   Cycle Details 2 weeks on 2 weeks off 2 weeks on, 2 weeks off 2 weeks on, 2 weeks off 2 weeks on, 2 weeks off 2 weeks on, 2 weeks off 2 weeks on, 2 weeks off 2 weeks on, 2 weeks off   Start Date of Last Cycle 10/13/2020 - - - - - -   Planned next cycle start date 11/10/2020 - - - - - 1/5/2020   Doses missed in last 2 weeks 0 - - - - - 0   Adherence Assessment Adherent - - - - - Adherent   Adverse Effects Palmar-plantar erythrodysethesia  "syndrome - - - - - Palmar-plantar Erythrodysethesia Syndrome   Palmar-plantar Erythrodysethesia syndrome[hand-foot syndrome] Grade 1 - - - - - Grade 1   Pharmacist Intervention(Palmar-plantar) No - - - - - Yes   Intervention(s) - - - - - - OTC recommendation   Home BPs Not applicable - - - - - -   Any new drug interactions? - - - - - - No   Is the dose as ordered appropriate for the patient? - - - - - - Yes   Has the patient missed any days of school, work, or other routine activity? - - - - - - No       Last PHQ-2 Score on record:   PHQ-2 ( 1999 [employer]) 10/9/2020 8/11/2020   Q1: Little interest or pleasure in doing things 0 0   Q2: Feeling down, depressed or hopeless 0 0   PHQ-2 Score 0 0       Vitals:  BP:   BP Readings from Last 1 Encounters:   12/07/20 120/79     Wt Readings from Last 1 Encounters:   12/07/20 64.9 kg (143 lb)     Estimated body surface area is 1.8 meters squared as calculated from the following:    Height as of 10/12/20: 1.803 m (5' 10.98\").    Weight as of 12/7/20: 64.9 kg (143 lb).    Labs:  _  Result Component Current Result Ref Range   Sodium 140 (12/11/2020) 133 - 144 mmol/L     _  Result Component Current Result Ref Range   Potassium 4.2 (12/11/2020) 3.4 - 5.3 mmol/L     _  Result Component Current Result Ref Range   Calcium 9.0 (12/11/2020) 8.5 - 10.1 mg/dL     No results found for Mag within last 30 days.     No results found for Phos within last 30 days.     _  Result Component Current Result Ref Range   Albumin 2.8 (L) (12/11/2020) 3.4 - 5.0 g/dL     _  Result Component Current Result Ref Range   Urea Nitrogen 29 (12/11/2020) 7 - 30 mg/dL     _  Result Component Current Result Ref Range   Creatinine 1.37 (H) (12/11/2020) 0.66 - 1.25 mg/dL     _  Result Component Current Result Ref Range   AST 31 (12/11/2020) 0 - 45 U/L     _  Result Component Current Result Ref Range   ALT 38 (12/11/2020) 0 - 70 U/L     _  Result Component Current Result Ref Range   Bilirubin Total 0.8 (12/11/2020) 0.2 " - 1.3 mg/dL     _  Result Component Current Result Ref Range   WBC 7.5 (12/11/2020) 4.0 - 11.0 10e9/L     _  Result Component Current Result Ref Range   Hemoglobin 11.6 (L) (12/11/2020) 13.3 - 17.7 g/dL     _  Result Component Current Result Ref Range   Platelet Count 261 (12/11/2020) 150 - 450 10e9/L     _  Result Component Current Result Ref Range   Absolute Neutrophil 4.9 (12/11/2020) 1.6 - 8.3 10e9/L         Assessment/Plan:  Krishna is tolerating capecitabine well overall, with mild HFS.     Follow-Up:  1/4: monthly labs    Refill Due:  [facility] will deliver capecitabine 1/4 for 1/5 cycle start.    Romy Roman, PharmD, BCOP  Hematology/Oncology Clinical Pharmacist  [facility]  [facility]  [phone]

## 2021-01-01 ENCOUNTER — OFFICE VISIT (OUTPATIENT)
Dept: SURGERY | Facility: CLINIC | Age: 66
End: 2021-01-01
Payer: COMMERCIAL

## 2021-01-01 ENCOUNTER — APPOINTMENT (OUTPATIENT)
Dept: GENERAL RADIOLOGY | Facility: CLINIC | Age: 66
End: 2021-01-01
Attending: UROLOGY
Payer: COMMERCIAL

## 2021-01-01 ENCOUNTER — PRE VISIT (OUTPATIENT)
Dept: SURGERY | Facility: CLINIC | Age: 66
End: 2021-01-01

## 2021-01-01 ENCOUNTER — ANESTHESIA EVENT (OUTPATIENT)
Dept: SURGERY | Facility: CLINIC | Age: 66
End: 2021-01-01
Payer: COMMERCIAL

## 2021-01-01 ENCOUNTER — HOSPITAL ENCOUNTER (OUTPATIENT)
Facility: CLINIC | Age: 66
Discharge: HOME | End: 2021-04-19
Attending: UROLOGY | Admitting: UROLOGY
Payer: COMMERCIAL

## 2021-01-01 ENCOUNTER — HOSPITAL ENCOUNTER (OUTPATIENT)
Facility: CLINIC | Age: 66
End: 2021-01-01
Attending: UROLOGY | Admitting: UROLOGY
Payer: COMMERCIAL

## 2021-01-01 ENCOUNTER — PATIENT OUTREACH (OUTPATIENT)
Dept: GASTROENTEROLOGY | Facility: CLINIC | Age: 66
End: 2021-01-01

## 2021-01-01 ENCOUNTER — APPOINTMENT (OUTPATIENT)
Dept: LAB | Facility: CLINIC | Age: 66
End: 2021-01-01
Attending: INTERNAL MEDICINE
Payer: COMMERCIAL

## 2021-01-01 ENCOUNTER — PREP FOR PROCEDURE (OUTPATIENT)
Dept: GASTROENTEROLOGY | Facility: CLINIC | Age: 66
End: 2021-01-01

## 2021-01-01 ENCOUNTER — TRANSFERRED RECORDS (OUTPATIENT)
Dept: HEALTH INFORMATION MANAGEMENT | Facility: CLINIC | Age: 66
End: 2021-01-01

## 2021-01-01 ENCOUNTER — MYC MEDICAL ADVICE (OUTPATIENT)
Dept: INTERNAL MEDICINE | Facility: CLINIC | Age: 66
End: 2021-01-01

## 2021-01-01 ENCOUNTER — HOSPITAL ENCOUNTER (OUTPATIENT)
Facility: CLINIC | Age: 66
Setting detail: SPECIMEN
Discharge: HOME | End: 2021-05-20
Admitting: INTERNAL MEDICINE
Payer: COMMERCIAL

## 2021-01-01 ENCOUNTER — TELEPHONE (OUTPATIENT)
Dept: UROLOGY | Facility: CLINIC | Age: 66
End: 2021-01-01

## 2021-01-01 ENCOUNTER — COMMUNICATION - HEALTHEAST (OUTPATIENT)
Dept: CARDIOLOGY | Facility: CLINIC | Age: 66
End: 2021-01-01

## 2021-01-01 ENCOUNTER — VIRTUAL VISIT (OUTPATIENT)
Dept: ONCOLOGY | Facility: CLINIC | Age: 66
End: 2021-01-01
Attending: NURSE PRACTITIONER
Payer: COMMERCIAL

## 2021-01-01 ENCOUNTER — ANCILLARY PROCEDURE (OUTPATIENT)
Dept: CT IMAGING | Facility: CLINIC | Age: 66
End: 2021-01-01
Attending: INTERNAL MEDICINE
Payer: COMMERCIAL

## 2021-01-01 ENCOUNTER — OFFICE VISIT - HEALTHEAST (OUTPATIENT)
Dept: CARDIOLOGY | Facility: CLINIC | Age: 66
End: 2021-01-01

## 2021-01-01 ENCOUNTER — ANESTHESIA (OUTPATIENT)
Dept: SURGERY | Facility: CLINIC | Age: 66
End: 2021-01-01
Payer: COMMERCIAL

## 2021-01-01 ENCOUNTER — TELEPHONE (OUTPATIENT)
Dept: ONCOLOGY | Facility: CLINIC | Age: 66
End: 2021-01-01

## 2021-01-01 ENCOUNTER — PATIENT OUTREACH (OUTPATIENT)
Dept: ONCOLOGY | Facility: CLINIC | Age: 66
End: 2021-01-01

## 2021-01-01 ENCOUNTER — HOSPITAL ENCOUNTER (OUTPATIENT)
Facility: CLINIC | Age: 66
Discharge: HOME | End: 2021-04-09
Attending: INTERNAL MEDICINE | Admitting: INTERNAL MEDICINE
Payer: COMMERCIAL

## 2021-01-01 ENCOUNTER — VIRTUAL VISIT (OUTPATIENT)
Dept: SURGERY | Facility: CLINIC | Age: 66
End: 2021-01-01
Payer: COMMERCIAL

## 2021-01-01 ENCOUNTER — DOCUMENTATION ONLY (OUTPATIENT)
Dept: ONCOLOGY | Facility: CLINIC | Age: 66
End: 2021-01-01

## 2021-01-01 ENCOUNTER — APPOINTMENT (OUTPATIENT)
Dept: GENERAL RADIOLOGY | Facility: CLINIC | Age: 66
End: 2021-01-01
Attending: INTERNAL MEDICINE
Payer: COMMERCIAL

## 2021-01-01 ENCOUNTER — HOSPITAL ENCOUNTER (OUTPATIENT)
Facility: CLINIC | Age: 66
Setting detail: SPECIMEN
Discharge: HOME | End: 2021-05-27
Admitting: UROLOGY
Payer: COMMERCIAL

## 2021-01-01 ENCOUNTER — VIRTUAL VISIT (OUTPATIENT)
Dept: ONCOLOGY | Facility: CLINIC | Age: 66
End: 2021-01-01
Attending: INTERNAL MEDICINE
Payer: COMMERCIAL

## 2021-01-01 ENCOUNTER — MYC MEDICAL ADVICE (OUTPATIENT)
Dept: GASTROENTEROLOGY | Facility: CLINIC | Age: 66
End: 2021-01-01

## 2021-01-01 ENCOUNTER — COMMUNICATION - HEALTHEAST (OUTPATIENT)
Dept: ADMINISTRATIVE | Facility: CLINIC | Age: 66
End: 2021-01-01

## 2021-01-01 ENCOUNTER — VIRTUAL VISIT (OUTPATIENT)
Dept: INTERNAL MEDICINE | Facility: CLINIC | Age: 66
End: 2021-01-01
Payer: COMMERCIAL

## 2021-01-01 ENCOUNTER — HOSPITAL ENCOUNTER (OUTPATIENT)
Facility: CLINIC | Age: 66
Discharge: HOME | End: 2021-01-15
Attending: UROLOGY | Admitting: UROLOGY
Payer: COMMERCIAL

## 2021-01-01 ENCOUNTER — TELEPHONE (OUTPATIENT)
Dept: GASTROENTEROLOGY | Facility: CLINIC | Age: 66
End: 2021-01-01

## 2021-01-01 ENCOUNTER — OFFICE VISIT (OUTPATIENT)
Dept: INTERNAL MEDICINE | Facility: CLINIC | Age: 66
End: 2021-01-01
Payer: COMMERCIAL

## 2021-01-01 ENCOUNTER — HOSPITAL ENCOUNTER (OUTPATIENT)
Dept: CARDIOLOGY | Facility: HOSPITAL | Age: 66
Discharge: HOME | End: 2021-02-08
Attending: INTERNAL MEDICINE

## 2021-01-01 ENCOUNTER — IMMUNIZATION (OUTPATIENT)
Dept: NURSING | Facility: CLINIC | Age: 66
End: 2021-01-01
Payer: COMMERCIAL

## 2021-01-01 ENCOUNTER — HOSPITAL ENCOUNTER (OUTPATIENT)
Facility: CLINIC | Age: 66
Discharge: HOME | End: 2021-06-30
Attending: INTERNAL MEDICINE | Admitting: INTERNAL MEDICINE
Payer: COMMERCIAL

## 2021-01-01 VITALS
RESPIRATION RATE: 16 BRPM | BODY MASS INDEX: 18.95 KG/M2 | WEIGHT: 135.9 LBS | OXYGEN SATURATION: 100 % | SYSTOLIC BLOOD PRESSURE: 108 MMHG | TEMPERATURE: 97.5 F | DIASTOLIC BLOOD PRESSURE: 75 MMHG | HEART RATE: 73 BPM

## 2021-01-01 VITALS
WEIGHT: 138.8 LBS | RESPIRATION RATE: 16 BRPM | BODY MASS INDEX: 19.43 KG/M2 | DIASTOLIC BLOOD PRESSURE: 56 MMHG | SYSTOLIC BLOOD PRESSURE: 84 MMHG | HEIGHT: 71 IN | HEART RATE: 88 BPM

## 2021-01-01 VITALS
HEIGHT: 71 IN | DIASTOLIC BLOOD PRESSURE: 70 MMHG | HEART RATE: 72 BPM | RESPIRATION RATE: 16 BRPM | SYSTOLIC BLOOD PRESSURE: 110 MMHG | WEIGHT: 165 LBS | BODY MASS INDEX: 23.1 KG/M2

## 2021-01-01 VITALS
SYSTOLIC BLOOD PRESSURE: 102 MMHG | HEART RATE: 91 BPM | TEMPERATURE: 98 F | RESPIRATION RATE: 18 BRPM | OXYGEN SATURATION: 100 % | WEIGHT: 136.2 LBS | BODY MASS INDEX: 19 KG/M2 | DIASTOLIC BLOOD PRESSURE: 68 MMHG

## 2021-01-01 VITALS
SYSTOLIC BLOOD PRESSURE: 140 MMHG | OXYGEN SATURATION: 96 % | RESPIRATION RATE: 16 BRPM | BODY MASS INDEX: 19.2 KG/M2 | TEMPERATURE: 96.1 F | HEART RATE: 81 BPM | DIASTOLIC BLOOD PRESSURE: 114 MMHG | WEIGHT: 137.13 LBS | HEIGHT: 71 IN

## 2021-01-01 VITALS — BODY MASS INDEX: 22.12 KG/M2 | WEIGHT: 158 LBS | HEIGHT: 71 IN

## 2021-01-01 VITALS
SYSTOLIC BLOOD PRESSURE: 110 MMHG | WEIGHT: 138 LBS | DIASTOLIC BLOOD PRESSURE: 75 MMHG | HEIGHT: 71 IN | OXYGEN SATURATION: 100 % | RESPIRATION RATE: 16 BRPM | BODY MASS INDEX: 19.32 KG/M2 | HEART RATE: 87 BPM

## 2021-01-01 VITALS
HEART RATE: 80 BPM | RESPIRATION RATE: 16 BRPM | OXYGEN SATURATION: 100 % | BODY MASS INDEX: 23.72 KG/M2 | DIASTOLIC BLOOD PRESSURE: 66 MMHG | SYSTOLIC BLOOD PRESSURE: 102 MMHG | TEMPERATURE: 97.7 F | WEIGHT: 169.44 LBS | HEIGHT: 71 IN

## 2021-01-01 VITALS
DIASTOLIC BLOOD PRESSURE: 77 MMHG | HEART RATE: 70 BPM | RESPIRATION RATE: 17 BRPM | TEMPERATURE: 98.2 F | BODY MASS INDEX: 18.86 KG/M2 | SYSTOLIC BLOOD PRESSURE: 112 MMHG | OXYGEN SATURATION: 98 % | HEIGHT: 71 IN | WEIGHT: 134.7 LBS

## 2021-01-01 VITALS — WEIGHT: 165 LBS | HEIGHT: 70 IN | BODY MASS INDEX: 23.62 KG/M2

## 2021-01-01 VITALS — BODY MASS INDEX: 23.17 KG/M2 | HEIGHT: 71 IN | WEIGHT: 165.5 LBS

## 2021-01-01 VITALS — BODY MASS INDEX: 21.49 KG/M2 | SYSTOLIC BLOOD PRESSURE: 126 MMHG | WEIGHT: 153 LBS | DIASTOLIC BLOOD PRESSURE: 84 MMHG

## 2021-01-01 VITALS — WEIGHT: 174 LBS | HEIGHT: 70 IN | BODY MASS INDEX: 24.91 KG/M2

## 2021-01-01 VITALS — WEIGHT: 161.13 LBS | BODY MASS INDEX: 22.56 KG/M2 | HEIGHT: 71 IN

## 2021-01-01 VITALS
TEMPERATURE: 97.6 F | HEIGHT: 70 IN | RESPIRATION RATE: 16 BRPM | SYSTOLIC BLOOD PRESSURE: 107 MMHG | OXYGEN SATURATION: 100 % | WEIGHT: 123.9 LBS | HEART RATE: 80 BPM | DIASTOLIC BLOOD PRESSURE: 82 MMHG | BODY MASS INDEX: 17.74 KG/M2

## 2021-01-01 VITALS — BODY MASS INDEX: 23.66 KG/M2 | WEIGHT: 165.31 LBS | HEIGHT: 70 IN

## 2021-01-01 VITALS — BODY MASS INDEX: 22.12 KG/M2 | HEIGHT: 71 IN | WEIGHT: 158 LBS

## 2021-01-01 VITALS — BODY MASS INDEX: 24.34 KG/M2 | WEIGHT: 170 LBS | HEIGHT: 70 IN

## 2021-01-01 VITALS
HEART RATE: 90 BPM | OXYGEN SATURATION: 100 % | DIASTOLIC BLOOD PRESSURE: 72 MMHG | WEIGHT: 131.5 LBS | BODY MASS INDEX: 18.34 KG/M2 | RESPIRATION RATE: 14 BRPM | TEMPERATURE: 97.3 F | SYSTOLIC BLOOD PRESSURE: 106 MMHG

## 2021-01-01 VITALS — WEIGHT: 160 LBS | BODY MASS INDEX: 22.9 KG/M2 | HEIGHT: 70 IN

## 2021-01-01 VITALS
DIASTOLIC BLOOD PRESSURE: 64 MMHG | SYSTOLIC BLOOD PRESSURE: 95 MMHG | HEART RATE: 94 BPM | OXYGEN SATURATION: 100 % | BODY MASS INDEX: 17.43 KG/M2 | WEIGHT: 125 LBS

## 2021-01-01 VITALS
TEMPERATURE: 97.3 F | DIASTOLIC BLOOD PRESSURE: 79 MMHG | HEART RATE: 82 BPM | BODY MASS INDEX: 18.24 KG/M2 | WEIGHT: 130.29 LBS | RESPIRATION RATE: 16 BRPM | HEIGHT: 71 IN | SYSTOLIC BLOOD PRESSURE: 105 MMHG | OXYGEN SATURATION: 98 %

## 2021-01-01 VITALS
OXYGEN SATURATION: 100 % | DIASTOLIC BLOOD PRESSURE: 64 MMHG | WEIGHT: 162.38 LBS | RESPIRATION RATE: 20 BRPM | HEART RATE: 64 BPM | SYSTOLIC BLOOD PRESSURE: 100 MMHG | TEMPERATURE: 97.7 F | HEIGHT: 71 IN | BODY MASS INDEX: 22.73 KG/M2

## 2021-01-01 VITALS — WEIGHT: 158 LBS | BODY MASS INDEX: 22.12 KG/M2 | HEIGHT: 71 IN

## 2021-01-01 VITALS — HEIGHT: 71 IN | WEIGHT: 169.44 LBS | BODY MASS INDEX: 23.72 KG/M2

## 2021-01-01 VITALS — BODY MASS INDEX: 25.58 KG/M2 | WEIGHT: 178.7 LBS | HEIGHT: 70 IN

## 2021-01-01 VITALS — HEIGHT: 70 IN | WEIGHT: 177.8 LBS | BODY MASS INDEX: 25.45 KG/M2

## 2021-01-01 DIAGNOSIS — C78.6 PERITONEAL CARCINOMATOSIS (H): ICD-10-CM

## 2021-01-01 DIAGNOSIS — C79.11 SECONDARY MALIGNANT NEOPLASM OF BLADDER (H): ICD-10-CM

## 2021-01-01 DIAGNOSIS — T45.1X5A ANEMIA ASSOCIATED WITH CHEMOTHERAPY: Primary | ICD-10-CM

## 2021-01-01 DIAGNOSIS — R10.84 ABDOMINAL PAIN, GENERALIZED: ICD-10-CM

## 2021-01-01 DIAGNOSIS — I48.91 ATRIAL FIBRILLATION WITH RVR (H): ICD-10-CM

## 2021-01-01 DIAGNOSIS — Z23 NEED FOR VACCINATION: ICD-10-CM

## 2021-01-01 DIAGNOSIS — Z11.59 ENCOUNTER FOR SCREENING FOR OTHER VIRAL DISEASES: ICD-10-CM

## 2021-01-01 DIAGNOSIS — C22.1 CHOLANGIOCARCINOMA (H): ICD-10-CM

## 2021-01-01 DIAGNOSIS — D64.81 ANEMIA ASSOCIATED WITH CHEMOTHERAPY: ICD-10-CM

## 2021-01-01 DIAGNOSIS — I48.20 CHRONIC ATRIAL FIBRILLATION (H): Primary | ICD-10-CM

## 2021-01-01 DIAGNOSIS — Z01.818 PREOP EXAMINATION: Primary | ICD-10-CM

## 2021-01-01 DIAGNOSIS — R31.0 GROSS HEMATURIA: ICD-10-CM

## 2021-01-01 DIAGNOSIS — T45.1X5A ANEMIA ASSOCIATED WITH CHEMOTHERAPY: ICD-10-CM

## 2021-01-01 DIAGNOSIS — N39.0 URINARY TRACT INFECTION: ICD-10-CM

## 2021-01-01 DIAGNOSIS — I25.10 CAD (CORONARY ARTERY DISEASE): ICD-10-CM

## 2021-01-01 DIAGNOSIS — K31.5 DUODENAL STRICTURE: ICD-10-CM

## 2021-01-01 DIAGNOSIS — N13.30 HYDRONEPHROSIS OF RIGHT KIDNEY: ICD-10-CM

## 2021-01-01 DIAGNOSIS — E78.5 HYPERLIPEMIA: ICD-10-CM

## 2021-01-01 DIAGNOSIS — Z76.89 ENCOUNTER TO ESTABLISH CARE WITH NEW DOCTOR: Primary | ICD-10-CM

## 2021-01-01 DIAGNOSIS — I25.10 ASCVD (ARTERIOSCLEROTIC CARDIOVASCULAR DISEASE): ICD-10-CM

## 2021-01-01 DIAGNOSIS — D63.8 ANEMIA IN OTHER CHRONIC DISEASES CLASSIFIED ELSEWHERE: ICD-10-CM

## 2021-01-01 DIAGNOSIS — R63.4 UNINTENDED WEIGHT LOSS: ICD-10-CM

## 2021-01-01 DIAGNOSIS — K83.09 CHOLANGITIS (H): ICD-10-CM

## 2021-01-01 DIAGNOSIS — R63.4 WEIGHT LOSS: Primary | ICD-10-CM

## 2021-01-01 DIAGNOSIS — N13.30 HYDRONEPHROSIS OF RIGHT KIDNEY: Primary | ICD-10-CM

## 2021-01-01 DIAGNOSIS — D64.81 ANEMIA ASSOCIATED WITH CHEMOTHERAPY: Primary | ICD-10-CM

## 2021-01-01 DIAGNOSIS — E43 SEVERE PROTEIN-CALORIE MALNUTRITION (H): ICD-10-CM

## 2021-01-01 DIAGNOSIS — G62.9 POLYNEUROPATHY: ICD-10-CM

## 2021-01-01 DIAGNOSIS — N39.0 URINARY TRACT INFECTION: Primary | ICD-10-CM

## 2021-01-01 DIAGNOSIS — R31.0 GROSS HEMATURIA: Primary | ICD-10-CM

## 2021-01-01 DIAGNOSIS — N13.30 HYDRONEPHROSIS, RIGHT: Primary | ICD-10-CM

## 2021-01-01 DIAGNOSIS — I10 BENIGN ESSENTIAL HYPERTENSION: ICD-10-CM

## 2021-01-01 DIAGNOSIS — Z11.59 ENCOUNTER FOR SCREENING FOR OTHER VIRAL DISEASES: Primary | ICD-10-CM

## 2021-01-01 DIAGNOSIS — E46 MALNUTRITION, UNSPECIFIED TYPE (H): ICD-10-CM

## 2021-01-01 DIAGNOSIS — K56.609 SBO (SMALL BOWEL OBSTRUCTION) (H): ICD-10-CM

## 2021-01-01 DIAGNOSIS — G62.0 DRUG-INDUCED POLYNEUROPATHY (H): ICD-10-CM

## 2021-01-01 DIAGNOSIS — K21.00 GASTROESOPHAGEAL REFLUX DISEASE WITH ESOPHAGITIS WITHOUT HEMORRHAGE: ICD-10-CM

## 2021-01-01 DIAGNOSIS — K31.5 DUODENAL STRICTURE: Primary | ICD-10-CM

## 2021-01-01 DIAGNOSIS — R05.3 CHRONIC COUGH: ICD-10-CM

## 2021-01-01 DIAGNOSIS — C22.1 CHOLANGIOCARCINOMA (H): Primary | ICD-10-CM

## 2021-01-01 DIAGNOSIS — I50.9 CHRONIC HEART FAILURE, UNSPECIFIED HEART FAILURE TYPE (H): ICD-10-CM

## 2021-01-01 DIAGNOSIS — I48.20 CHRONIC ATRIAL FIBRILLATION (H): ICD-10-CM

## 2021-01-01 DIAGNOSIS — Q23.81 BICUSPID AORTIC VALVE: ICD-10-CM

## 2021-01-01 DIAGNOSIS — R10.84 ABDOMINAL PAIN, GENERALIZED: Primary | ICD-10-CM

## 2021-01-01 DIAGNOSIS — I77.810 AORTIC ROOT DILATATION (H): ICD-10-CM

## 2021-01-01 DIAGNOSIS — I35.0 AORTIC VALVE STENOSIS, ETIOLOGY OF CARDIAC VALVE DISEASE UNSPECIFIED: ICD-10-CM

## 2021-01-01 DIAGNOSIS — C79.11 SECONDARY MALIGNANT NEOPLASM OF BLADDER (H): Primary | ICD-10-CM

## 2021-01-01 DIAGNOSIS — K31.1 GASTRIC OUTLET OBSTRUCTION: ICD-10-CM

## 2021-01-01 DIAGNOSIS — Z95.828 PORT-A-CATH IN PLACE: Primary | ICD-10-CM

## 2021-01-01 DIAGNOSIS — E78.5 DYSLIPIDEMIA: ICD-10-CM

## 2021-01-01 LAB
ALBUMIN SERPL-MCNC: 2.3 G/DL (ref 3.4–5)
ALBUMIN SERPL-MCNC: 2.5 G/DL (ref 3.4–5)
ALBUMIN SERPL-MCNC: 2.7 G/DL (ref 3.4–5)
ALBUMIN SERPL-MCNC: 2.8 G/DL (ref 3.4–5)
ALBUMIN SERPL-MCNC: 2.9 G/DL (ref 3.4–5)
ALBUMIN UR-MCNC: >=300 MG/DL
ALP SERPL-CCNC: 192 U/L (ref 40–150)
ALP SERPL-CCNC: 249 U/L (ref 40–150)
ALP SERPL-CCNC: 303 U/L (ref 40–150)
ALP SERPL-CCNC: 322 U/L (ref 40–150)
ALP SERPL-CCNC: 404 U/L (ref 40–150)
ALT SERPL W P-5'-P-CCNC: 35 U/L (ref 0–70)
ALT SERPL W P-5'-P-CCNC: 38 U/L (ref 0–70)
ALT SERPL W P-5'-P-CCNC: 59 U/L (ref 0–70)
ALT SERPL W P-5'-P-CCNC: 61 U/L (ref 0–70)
ALT SERPL W P-5'-P-CCNC: 62 U/L (ref 0–70)
ANION GAP SERPL CALCULATED.3IONS-SCNC: 4 MMOL/L (ref 3–14)
ANION GAP SERPL CALCULATED.3IONS-SCNC: 5 MMOL/L (ref 3–14)
ANION GAP SERPL CALCULATED.3IONS-SCNC: 5 MMOL/L (ref 3–14)
ANION GAP SERPL CALCULATED.3IONS-SCNC: 6 MMOL/L (ref 3–14)
ANION GAP SERPL CALCULATED.3IONS-SCNC: 6 MMOL/L (ref 3–14)
APPEARANCE UR: ABNORMAL
AST SERPL W P-5'-P-CCNC: 27 U/L (ref 0–45)
AST SERPL W P-5'-P-CCNC: 38 U/L (ref 0–45)
AST SERPL W P-5'-P-CCNC: 52 U/L (ref 0–45)
AST SERPL W P-5'-P-CCNC: 55 U/L (ref 0–45)
AST SERPL W P-5'-P-CCNC: 59 U/L (ref 0–45)
BACTERIA #/AREA URNS HPF: ABNORMAL /HPF
BACTERIA SPEC CULT: NO GROWTH
BASOPHILS # BLD AUTO: 0 10E9/L (ref 0–0.2)
BASOPHILS # BLD AUTO: 0.1 10E9/L (ref 0–0.2)
BASOPHILS NFR BLD AUTO: 0.3 %
BASOPHILS NFR BLD AUTO: 0.3 %
BASOPHILS NFR BLD AUTO: 0.4 %
BASOPHILS NFR BLD AUTO: 0.4 %
BASOPHILS NFR BLD AUTO: 0.6 %
BILIRUB SERPL-MCNC: 0.4 MG/DL (ref 0.2–1.3)
BILIRUB SERPL-MCNC: 0.5 MG/DL (ref 0.2–1.3)
BILIRUB SERPL-MCNC: 0.7 MG/DL (ref 0.2–1.3)
BILIRUB SERPL-MCNC: 0.7 MG/DL (ref 0.2–1.3)
BILIRUB SERPL-MCNC: 0.9 MG/DL (ref 0.2–1.3)
BILIRUB UR QL STRIP: NEGATIVE
BUN SERPL-MCNC: 21 MG/DL (ref 7–30)
BUN SERPL-MCNC: 22 MG/DL (ref 7–30)
BUN SERPL-MCNC: 23 MG/DL (ref 7–30)
BUN SERPL-MCNC: 24 MG/DL (ref 7–30)
BUN SERPL-MCNC: 24 MG/DL (ref 7–30)
CALCIUM SERPL-MCNC: 7.9 MG/DL (ref 8.5–10.1)
CALCIUM SERPL-MCNC: 8.3 MG/DL (ref 8.5–10.1)
CALCIUM SERPL-MCNC: 8.4 MG/DL (ref 8.5–10.1)
CALCIUM SERPL-MCNC: 8.4 MG/DL (ref 8.5–10.1)
CALCIUM SERPL-MCNC: 8.8 MG/DL (ref 8.5–10.1)
CANCER AG19-9 SERPL-ACNC: 1090 U/ML (ref 0–37)
CANCER AG19-9 SERPL-ACNC: 385 U/ML (ref 0–37)
CANCER AG19-9 SERPL-ACNC: 393 U/ML (ref 0–37)
CANCER AG19-9 SERPL-ACNC: 508 U/ML (ref 0–37)
CANCER AG19-9 SERPL-ACNC: 669 U/ML (ref 0–37)
CHLORIDE SERPL-SCNC: 104 MMOL/L (ref 94–109)
CHLORIDE SERPL-SCNC: 108 MMOL/L (ref 94–109)
CHLORIDE SERPL-SCNC: 109 MMOL/L (ref 94–109)
CHLORIDE SERPL-SCNC: 109 MMOL/L (ref 94–109)
CHLORIDE SERPL-SCNC: 111 MMOL/L (ref 94–109)
CO2 SERPL-SCNC: 26 MMOL/L (ref 20–32)
CO2 SERPL-SCNC: 27 MMOL/L (ref 20–32)
CO2 SERPL-SCNC: 29 MMOL/L (ref 20–32)
COLOR UR AUTO: ABNORMAL
COPATH REPORT: NORMAL
CREAT SERPL-MCNC: 0.93 MG/DL (ref 0.66–1.25)
CREAT SERPL-MCNC: 0.98 MG/DL (ref 0.66–1.25)
CREAT SERPL-MCNC: 0.98 MG/DL (ref 0.66–1.25)
CREAT SERPL-MCNC: 1.07 MG/DL (ref 0.66–1.25)
CREAT SERPL-MCNC: 1.08 MG/DL (ref 0.66–1.25)
CREAT SERPL-MCNC: 1.12 MG/DL (ref 0.66–1.25)
DIFFERENTIAL METHOD BLD: ABNORMAL
EOSINOPHIL # BLD AUTO: 0.4 10E9/L (ref 0–0.7)
EOSINOPHIL # BLD AUTO: 0.6 10E9/L (ref 0–0.7)
EOSINOPHIL # BLD AUTO: 1.1 10E9/L (ref 0–0.7)
EOSINOPHIL # BLD AUTO: 2.2 10E9/L (ref 0–0.7)
EOSINOPHIL # BLD AUTO: 2.6 10E9/L (ref 0–0.7)
EOSINOPHIL NFR BLD AUTO: 12.8 %
EOSINOPHIL NFR BLD AUTO: 24.6 %
EOSINOPHIL NFR BLD AUTO: 26.9 %
EOSINOPHIL NFR BLD AUTO: 4.9 %
EOSINOPHIL NFR BLD AUTO: 5.1 %
ERCP: NORMAL
ERYTHROCYTE [DISTWIDTH] IN BLOOD BY AUTOMATED COUNT: 13.1 % (ref 10–15)
ERYTHROCYTE [DISTWIDTH] IN BLOOD BY AUTOMATED COUNT: 15.4 % (ref 10–15)
ERYTHROCYTE [DISTWIDTH] IN BLOOD BY AUTOMATED COUNT: 17.1 % (ref 10–15)
ERYTHROCYTE [DISTWIDTH] IN BLOOD BY AUTOMATED COUNT: 17.7 % (ref 10–15)
ERYTHROCYTE [DISTWIDTH] IN BLOOD BY AUTOMATED COUNT: 18.5 % (ref 10–15)
GFR SERPL CREATININE-BSD FRML MDRD: 69 ML/MIN/{1.73_M2}
GFR SERPL CREATININE-BSD FRML MDRD: 72 ML/MIN/{1.73_M2}
GFR SERPL CREATININE-BSD FRML MDRD: 73 ML/MIN/{1.73_M2}
GFR SERPL CREATININE-BSD FRML MDRD: 81 ML/MIN/{1.73_M2}
GFR SERPL CREATININE-BSD FRML MDRD: 81 ML/MIN/{1.73_M2}
GFR SERPL CREATININE-BSD FRML MDRD: 87 ML/MIN/{1.73_M2}
GLUCOSE BLDC GLUCOMTR-MCNC: 75 MG/DL (ref 70–99)
GLUCOSE BLDC GLUCOMTR-MCNC: 91 MG/DL (ref 70–99)
GLUCOSE BLDC GLUCOMTR-MCNC: 96 MG/DL (ref 70–99)
GLUCOSE BLDC GLUCOMTR-MCNC: 97 MG/DL (ref 70–99)
GLUCOSE SERPL-MCNC: 101 MG/DL (ref 70–99)
GLUCOSE SERPL-MCNC: 107 MG/DL (ref 70–99)
GLUCOSE SERPL-MCNC: 112 MG/DL (ref 70–99)
GLUCOSE SERPL-MCNC: 139 MG/DL (ref 70–99)
GLUCOSE SERPL-MCNC: 97 MG/DL (ref 70–99)
GLUCOSE UR STRIP-MCNC: NEGATIVE MG/DL
HCT VFR BLD AUTO: 30.4 % (ref 40–53)
HCT VFR BLD AUTO: 31.8 % (ref 40–53)
HCT VFR BLD AUTO: 32.8 % (ref 40–53)
HCT VFR BLD AUTO: 33.2 % (ref 40–53)
HCT VFR BLD AUTO: 34.2 % (ref 40–53)
HGB BLD-MCNC: 10.2 G/DL (ref 13.3–17.7)
HGB BLD-MCNC: 10.4 G/DL (ref 13.3–17.7)
HGB BLD-MCNC: 10.4 G/DL (ref 13.3–17.7)
HGB BLD-MCNC: 10.9 G/DL (ref 13.3–17.7)
HGB BLD-MCNC: 11.2 G/DL (ref 13.3–17.7)
HGB BLD-MCNC: 11.2 G/DL (ref 13.3–17.7)
HGB UR QL STRIP: ABNORMAL
IMM GRANULOCYTES # BLD: 0 10E9/L (ref 0–0.4)
IMM GRANULOCYTES NFR BLD: 0.1 %
IMM GRANULOCYTES NFR BLD: 0.2 %
IMM GRANULOCYTES NFR BLD: 0.3 %
IMM GRANULOCYTES NFR BLD: 0.4 %
IMM GRANULOCYTES NFR BLD: 0.4 %
INR PPP: 1.22 (ref 0.86–1.14)
INR PPP: 1.26 (ref 0.86–1.14)
INTERPRETATION ECG - MUSE: NORMAL
INTERPRETATION ECG - MUSE: NORMAL
KETONES UR STRIP-MCNC: NEGATIVE MG/DL
LABORATORY COMMENT REPORT: NORMAL
LEUKOCYTE ESTERASE UR QL STRIP: ABNORMAL
LYMPHOCYTES # BLD AUTO: 1.2 10E9/L (ref 0.8–5.3)
LYMPHOCYTES # BLD AUTO: 1.3 10E9/L (ref 0.8–5.3)
LYMPHOCYTES # BLD AUTO: 1.3 10E9/L (ref 0.8–5.3)
LYMPHOCYTES NFR BLD AUTO: 12.1 %
LYMPHOCYTES NFR BLD AUTO: 12.5 %
LYMPHOCYTES NFR BLD AUTO: 12.9 %
LYMPHOCYTES NFR BLD AUTO: 15.4 %
LYMPHOCYTES NFR BLD AUTO: 17 %
Lab: NORMAL
MCH RBC QN AUTO: 29.1 PG (ref 26.5–33)
MCH RBC QN AUTO: 30.8 PG (ref 26.5–33)
MCH RBC QN AUTO: 32.4 PG (ref 26.5–33)
MCH RBC QN AUTO: 32.5 PG (ref 26.5–33)
MCH RBC QN AUTO: 33.3 PG (ref 26.5–33)
MCHC RBC AUTO-ENTMCNC: 31.3 G/DL (ref 31.5–36.5)
MCHC RBC AUTO-ENTMCNC: 32.1 G/DL (ref 31.5–36.5)
MCHC RBC AUTO-ENTMCNC: 32.7 G/DL (ref 31.5–36.5)
MCHC RBC AUTO-ENTMCNC: 33.2 G/DL (ref 31.5–36.5)
MCHC RBC AUTO-ENTMCNC: 34.2 G/DL (ref 31.5–36.5)
MCV RBC AUTO: 100 FL (ref 78–100)
MCV RBC AUTO: 93 FL (ref 78–100)
MCV RBC AUTO: 95 FL (ref 78–100)
MCV RBC AUTO: 96 FL (ref 78–100)
MCV RBC AUTO: 99 FL (ref 78–100)
MONOCYTES # BLD AUTO: 0.8 10E9/L (ref 0–1.3)
MONOCYTES # BLD AUTO: 0.9 10E9/L (ref 0–1.3)
MONOCYTES # BLD AUTO: 1.7 10E9/L (ref 0–1.3)
MONOCYTES NFR BLD AUTO: 10 %
MONOCYTES NFR BLD AUTO: 12.9 %
MONOCYTES NFR BLD AUTO: 15.2 %
MONOCYTES NFR BLD AUTO: 8.5 %
MONOCYTES NFR BLD AUTO: 9.4 %
NEUTROPHILS # BLD AUTO: 4.7 10E9/L (ref 1.6–8.3)
NEUTROPHILS # BLD AUTO: 4.7 10E9/L (ref 1.6–8.3)
NEUTROPHILS # BLD AUTO: 5 10E9/L (ref 1.6–8.3)
NEUTROPHILS # BLD AUTO: 5.2 10E9/L (ref 1.6–8.3)
NEUTROPHILS # BLD AUTO: 7.4 10E9/L (ref 1.6–8.3)
NEUTROPHILS NFR BLD AUTO: 51.5 %
NEUTROPHILS NFR BLD AUTO: 52.2 %
NEUTROPHILS NFR BLD AUTO: 61 %
NEUTROPHILS NFR BLD AUTO: 64.8 %
NEUTROPHILS NFR BLD AUTO: 66.9 %
NITRATE UR QL: NEGATIVE
NON-SQ EPI CELLS #/AREA URNS LPF: ABNORMAL /LPF
NRBC # BLD AUTO: 0 10*3/UL
NRBC BLD AUTO-RTO: 0 /100
PH UR STRIP: 7 PH (ref 5–7)
PLATELET # BLD AUTO: 153 10E9/L (ref 150–450)
PLATELET # BLD AUTO: 157 10E9/L (ref 150–450)
PLATELET # BLD AUTO: 170 10E9/L (ref 150–450)
PLATELET # BLD AUTO: 178 10E9/L (ref 150–450)
PLATELET # BLD AUTO: 178 10E9/L (ref 150–450)
POTASSIUM SERPL-SCNC: 3.2 MMOL/L (ref 3.4–5.3)
POTASSIUM SERPL-SCNC: 3.2 MMOL/L (ref 3.4–5.3)
POTASSIUM SERPL-SCNC: 3.7 MMOL/L (ref 3.4–5.3)
POTASSIUM SERPL-SCNC: 3.7 MMOL/L (ref 3.4–5.3)
POTASSIUM SERPL-SCNC: 4.1 MMOL/L (ref 3.4–5.3)
POTASSIUM SERPL-SCNC: 4.2 MMOL/L (ref 3.4–5.3)
POTASSIUM SERPL-SCNC: 4.3 MMOL/L (ref 3.4–5.3)
PROT SERPL-MCNC: 5 G/DL (ref 6.8–8.8)
PROT SERPL-MCNC: 5.4 G/DL (ref 6.8–8.8)
PROT SERPL-MCNC: 5.5 G/DL (ref 6.8–8.8)
PROT SERPL-MCNC: 5.7 G/DL (ref 6.8–8.8)
PROT SERPL-MCNC: 5.9 G/DL (ref 6.8–8.8)
RBC # BLD AUTO: 3.2 10E12/L (ref 4.4–5.9)
RBC # BLD AUTO: 3.27 10E12/L (ref 4.4–5.9)
RBC # BLD AUTO: 3.31 10E12/L (ref 4.4–5.9)
RBC # BLD AUTO: 3.46 10E12/L (ref 4.4–5.9)
RBC # BLD AUTO: 3.57 10E12/L (ref 4.4–5.9)
RBC #/AREA URNS AUTO: >100 /HPF
SARS-COV-2 RNA RESP QL NAA+PROBE: NEGATIVE
SARS-COV-2 RNA RESP QL NAA+PROBE: NORMAL
SODIUM SERPL-SCNC: 139 MMOL/L (ref 133–144)
SODIUM SERPL-SCNC: 141 MMOL/L (ref 133–144)
SODIUM SERPL-SCNC: 141 MMOL/L (ref 133–144)
SODIUM SERPL-SCNC: 142 MMOL/L (ref 133–144)
SODIUM SERPL-SCNC: 143 MMOL/L (ref 133–144)
SOURCE: ABNORMAL
SP GR UR STRIP: 1.02 (ref 1–1.03)
SPECIMEN SOURCE: NORMAL
UPPER GI ENDOSCOPY: NORMAL
UROBILINOGEN UR STRIP-ACNC: 0.2 EU/DL (ref 0.2–1)
WBC # BLD AUTO: 11.1 10E9/L (ref 4–11)
WBC # BLD AUTO: 7.2 10E9/L (ref 4–11)
WBC # BLD AUTO: 8.5 10E9/L (ref 4–11)
WBC # BLD AUTO: 9 10E9/L (ref 4–11)
WBC # BLD AUTO: 9.7 10E9/L (ref 4–11)
WBC #/AREA URNS AUTO: ABNORMAL /HPF

## 2021-01-01 PROCEDURE — 74177 CT ABD & PELVIS W/CONTRAST: CPT | Mod: GC | Performed by: RADIOLOGY

## 2021-01-01 PROCEDURE — 272N000001 HC OR GENERAL SUPPLY STERILE: Performed by: UROLOGY

## 2021-01-01 PROCEDURE — 250N000009 HC RX 250: Performed by: UROLOGY

## 2021-01-01 PROCEDURE — 250N000011 HC RX IP 250 OP 636: Performed by: NURSE ANESTHETIST, CERTIFIED REGISTERED

## 2021-01-01 PROCEDURE — 81301 MICROSATELLITE INSTABILITY: CPT | Performed by: UROLOGY

## 2021-01-01 PROCEDURE — 370N000017 HC ANESTHESIA TECHNICAL FEE, PER MIN: Performed by: INTERNAL MEDICINE

## 2021-01-01 PROCEDURE — 999N000141 HC STATISTIC PRE-PROCEDURE NURSING ASSESSMENT: Performed by: INTERNAL MEDICINE

## 2021-01-01 PROCEDURE — 99214 OFFICE O/P EST MOD 30 MIN: CPT | Mod: 95 | Performed by: CLINICAL NURSE SPECIALIST

## 2021-01-01 PROCEDURE — 88342 IMHCHEM/IMCYTCHM 1ST ANTB: CPT | Mod: TC | Performed by: UROLOGY

## 2021-01-01 PROCEDURE — 85018 HEMOGLOBIN: CPT | Performed by: ANESTHESIOLOGY

## 2021-01-01 PROCEDURE — 99417 PROLNG OP E/M EACH 15 MIN: CPT | Performed by: INTERNAL MEDICINE

## 2021-01-01 PROCEDURE — 93005 ELECTROCARDIOGRAM TRACING: CPT | Performed by: PATHOLOGY

## 2021-01-01 PROCEDURE — 84132 ASSAY OF SERUM POTASSIUM: CPT | Performed by: ANESTHESIOLOGY

## 2021-01-01 PROCEDURE — 80053 COMPREHEN METABOLIC PANEL: CPT | Performed by: INTERNAL MEDICINE

## 2021-01-01 PROCEDURE — 250N000011 HC RX IP 250 OP 636: Performed by: INTERNAL MEDICINE

## 2021-01-01 PROCEDURE — 99214 OFFICE O/P EST MOD 30 MIN: CPT | Mod: 25 | Performed by: PATHOLOGY

## 2021-01-01 PROCEDURE — 82962 GLUCOSE BLOOD TEST: CPT

## 2021-01-01 PROCEDURE — 86301 IMMUNOASSAY TUMOR CA 19-9: CPT | Performed by: INTERNAL MEDICINE

## 2021-01-01 PROCEDURE — 250N000011 HC RX IP 250 OP 636: Performed by: ANESTHESIOLOGY

## 2021-01-01 PROCEDURE — 85025 COMPLETE CBC W/AUTO DIFF WBC: CPT | Performed by: INTERNAL MEDICINE

## 2021-01-01 PROCEDURE — 93005 ELECTROCARDIOGRAM TRACING: CPT

## 2021-01-01 PROCEDURE — C1769 GUIDE WIRE: HCPCS | Performed by: UROLOGY

## 2021-01-01 PROCEDURE — 85610 PROTHROMBIN TIME: CPT | Performed by: STUDENT IN AN ORGANIZED HEALTH CARE EDUCATION/TRAINING PROGRAM

## 2021-01-01 PROCEDURE — 999N000181 XR SURGERY CARM FLUORO GREATER THAN 5 MIN W STILLS: Mod: TC

## 2021-01-01 PROCEDURE — 99215 OFFICE O/P EST HI 40 MIN: CPT | Mod: 95 | Performed by: INTERNAL MEDICINE

## 2021-01-01 PROCEDURE — 258N000003 HC RX IP 258 OP 636: Performed by: NURSE ANESTHETIST, CERTIFIED REGISTERED

## 2021-01-01 PROCEDURE — U0005 INFEC AGEN DETEC AMPLI PROBE: HCPCS | Performed by: INTERNAL MEDICINE

## 2021-01-01 PROCEDURE — C1877 STENT, NON-COAT/COV W/O DEL: HCPCS | Performed by: INTERNAL MEDICINE

## 2021-01-01 PROCEDURE — 258N000003 HC RX IP 258 OP 636: Performed by: INTERNAL MEDICINE

## 2021-01-01 PROCEDURE — C1726 CATH, BAL DIL, NON-VASCULAR: HCPCS | Performed by: INTERNAL MEDICINE

## 2021-01-01 PROCEDURE — 81445 SO NEO GSAP 5-50DNA/DNA&RNA: CPT | Performed by: INTERNAL MEDICINE

## 2021-01-01 PROCEDURE — 250N000009 HC RX 250: Performed by: INTERNAL MEDICINE

## 2021-01-01 PROCEDURE — 36415 COLL VENOUS BLD VENIPUNCTURE: CPT | Performed by: ANESTHESIOLOGY

## 2021-01-01 PROCEDURE — 36591 DRAW BLOOD OFF VENOUS DEVICE: CPT

## 2021-01-01 PROCEDURE — 87086 URINE CULTURE/COLONY COUNT: CPT | Mod: 90 | Performed by: PATHOLOGY

## 2021-01-01 PROCEDURE — 250N000011 HC RX IP 250 OP 636: Performed by: UROLOGY

## 2021-01-01 PROCEDURE — 88341 IMHCHEM/IMCYTCHM EA ADD ANTB: CPT | Mod: TC | Performed by: UROLOGY

## 2021-01-01 PROCEDURE — 255N000002 HC RX 255 OP 636: Performed by: INTERNAL MEDICINE

## 2021-01-01 PROCEDURE — P9041 ALBUMIN (HUMAN),5%, 50ML: HCPCS | Performed by: NURSE ANESTHETIST, CERTIFIED REGISTERED

## 2021-01-01 PROCEDURE — 82565 ASSAY OF CREATININE: CPT | Performed by: ANESTHESIOLOGY

## 2021-01-01 PROCEDURE — U0005 INFEC AGEN DETEC AMPLI PROBE: HCPCS | Mod: 90 | Performed by: PATHOLOGY

## 2021-01-01 PROCEDURE — 88305 TISSUE EXAM BY PATHOLOGIST: CPT | Mod: TC | Performed by: INTERNAL MEDICINE

## 2021-01-01 PROCEDURE — G0452 MOLECULAR PATHOLOGY INTERPR: HCPCS | Mod: 26 | Performed by: PATHOLOGY

## 2021-01-01 PROCEDURE — U0003 INFECTIOUS AGENT DETECTION BY NUCLEIC ACID (DNA OR RNA); SEVERE ACUTE RESPIRATORY SYNDROME CORONAVIRUS 2 (SARS-COV-2) (CORONAVIRUS DISEASE [COVID-19]), AMPLIFIED PROBE TECHNIQUE, MAKING USE OF HIGH THROUGHPUT TECHNOLOGIES AS DESCRIBED BY CMS-2020-01-R: HCPCS | Mod: 90 | Performed by: PATHOLOGY

## 2021-01-01 PROCEDURE — 255N000002 HC RX 255 OP 636: Performed by: UROLOGY

## 2021-01-01 PROCEDURE — 360N000076 HC SURGERY LEVEL 3, PER MIN: Performed by: INTERNAL MEDICINE

## 2021-01-01 PROCEDURE — 360N000075 HC SURGERY LEVEL 2, PER MIN: Performed by: INTERNAL MEDICINE

## 2021-01-01 PROCEDURE — C2617 STENT, NON-COR, TEM W/O DEL: HCPCS | Performed by: UROLOGY

## 2021-01-01 PROCEDURE — 88341 IMHCHEM/IMCYTCHM EA ADD ANTB: CPT | Mod: 26 | Performed by: PATHOLOGY

## 2021-01-01 PROCEDURE — U0003 INFECTIOUS AGENT DETECTION BY NUCLEIC ACID (DNA OR RNA); SEVERE ACUTE RESPIRATORY SYNDROME CORONAVIRUS 2 (SARS-COV-2) (CORONAVIRUS DISEASE [COVID-19]), AMPLIFIED PROBE TECHNIQUE, MAKING USE OF HIGH THROUGHPUT TECHNOLOGIES AS DESCRIBED BY CMS-2020-01-R: HCPCS | Performed by: INTERNAL MEDICINE

## 2021-01-01 PROCEDURE — 999N001020 HC STATISTIC H-SEND OUTS PREP: Performed by: INTERNAL MEDICINE

## 2021-01-01 PROCEDURE — 999N000180 XR SURGERY CARM FLUORO LESS THAN 5 MIN: Mod: TC

## 2021-01-01 PROCEDURE — 81001 URINALYSIS AUTO W/SCOPE: CPT | Performed by: INTERNAL MEDICINE

## 2021-01-01 PROCEDURE — 88305 TISSUE EXAM BY PATHOLOGIST: CPT | Mod: 26 | Performed by: PATHOLOGY

## 2021-01-01 PROCEDURE — C1874 STENT, COATED/COV W/DEL SYS: HCPCS | Performed by: INTERNAL MEDICINE

## 2021-01-01 PROCEDURE — 258N000003 HC RX IP 258 OP 636: Performed by: ANESTHESIOLOGY

## 2021-01-01 PROCEDURE — 99205 OFFICE O/P NEW HI 60 MIN: CPT | Mod: 95 | Performed by: INTERNAL MEDICINE

## 2021-01-01 PROCEDURE — 0031A PR COVID VAC JANSSEN AD26 0.5ML: CPT

## 2021-01-01 PROCEDURE — 278N000051 HC OR IMPLANT GENERAL: Performed by: INTERNAL MEDICINE

## 2021-01-01 PROCEDURE — 87086 URINE CULTURE/COLONY COUNT: CPT | Performed by: UROLOGY

## 2021-01-01 PROCEDURE — 99000 SPECIMEN HANDLING OFFICE-LAB: CPT | Performed by: PATHOLOGY

## 2021-01-01 PROCEDURE — 999N000179 XR SURGERY CARM FLUORO LESS THAN 5 MIN W STILLS: Mod: TC

## 2021-01-01 PROCEDURE — 71260 CT THORAX DX C+: CPT | Mod: GC | Performed by: RADIOLOGY

## 2021-01-01 PROCEDURE — 81301 MICROSATELLITE INSTABILITY: CPT | Performed by: INTERNAL MEDICINE

## 2021-01-01 PROCEDURE — 710N000012 HC RECOVERY PHASE 2, PER MINUTE: Performed by: UROLOGY

## 2021-01-01 PROCEDURE — 370N000017 HC ANESTHESIA TECHNICAL FEE, PER MIN: Performed by: UROLOGY

## 2021-01-01 PROCEDURE — 99203 OFFICE O/P NEW LOW 30 MIN: CPT | Performed by: CLINICAL NURSE SPECIALIST

## 2021-01-01 PROCEDURE — 88342 IMHCHEM/IMCYTCHM 1ST ANTB: CPT | Mod: 26 | Performed by: PATHOLOGY

## 2021-01-01 PROCEDURE — 999N000054 HC STATISTIC EKG NON-CHARGEABLE

## 2021-01-01 PROCEDURE — 71260 CT THORAX DX C+: CPT | Performed by: RADIOLOGY

## 2021-01-01 PROCEDURE — 272N000001 HC OR GENERAL SUPPLY STERILE: Performed by: INTERNAL MEDICINE

## 2021-01-01 PROCEDURE — 710N000010 HC RECOVERY PHASE 1, LEVEL 2, PER MIN: Performed by: INTERNAL MEDICINE

## 2021-01-01 PROCEDURE — 999N001020 HC STATISTIC H-SEND OUTS PREP: Performed by: UROLOGY

## 2021-01-01 PROCEDURE — C1769 GUIDE WIRE: HCPCS | Performed by: INTERNAL MEDICINE

## 2021-01-01 PROCEDURE — 99215 OFFICE O/P EST HI 40 MIN: CPT | Mod: 95 | Performed by: NURSE PRACTITIONER

## 2021-01-01 PROCEDURE — 250N000009 HC RX 250: Performed by: NURSE ANESTHETIST, CERTIFIED REGISTERED

## 2021-01-01 PROCEDURE — 88305 TISSUE EXAM BY PATHOLOGIST: CPT | Mod: TC | Performed by: UROLOGY

## 2021-01-01 PROCEDURE — 999N001017 HC STATISTIC GLUCOSE BY METER IP

## 2021-01-01 PROCEDURE — 91303 PR COVID VAC JANSSEN AD26 0.5ML: CPT

## 2021-01-01 PROCEDURE — 999N000141 HC STATISTIC PRE-PROCEDURE NURSING ASSESSMENT: Performed by: UROLOGY

## 2021-01-01 PROCEDURE — 250N000025 HC SEVOFLURANE, PER MIN: Performed by: INTERNAL MEDICINE

## 2021-01-01 PROCEDURE — 999N001193 HC VIDEO/TELEPHONE VISIT; NO CHARGE

## 2021-01-01 PROCEDURE — 85610 PROTHROMBIN TIME: CPT | Performed by: ANESTHESIOLOGY

## 2021-01-01 PROCEDURE — 710N000012 HC RECOVERY PHASE 2, PER MINUTE: Performed by: INTERNAL MEDICINE

## 2021-01-01 PROCEDURE — 74177 CT ABD & PELVIS W/CONTRAST: CPT | Performed by: RADIOLOGY

## 2021-01-01 PROCEDURE — 360N000082 HC SURGERY LEVEL 2 W/ FLUORO, PER MIN: Performed by: UROLOGY

## 2021-01-01 PROCEDURE — 360N000076 HC SURGERY LEVEL 3, PER MIN: Performed by: UROLOGY

## 2021-01-01 PROCEDURE — 93010 ELECTROCARDIOGRAM REPORT: CPT | Mod: 59 | Performed by: INTERNAL MEDICINE

## 2021-01-01 DEVICE — IMPLANTABLE DEVICE: Type: IMPLANTABLE DEVICE | Site: JEJUNUM | Status: FUNCTIONAL

## 2021-01-01 DEVICE — STENT URETERAL PERCUFLEX PLUS 7FRX26CM M0061752730: Type: IMPLANTABLE DEVICE | Site: URETER | Status: FUNCTIONAL

## 2021-01-01 DEVICE — STENT ENDOPROS BILIARY GORE VIABIL W/HL 10X60MM VH1006200: Type: IMPLANTABLE DEVICE | Site: STOMACH | Status: FUNCTIONAL

## 2021-01-01 DEVICE — STENT ZIMMON BILIARY 07FRX07CM DBL PIGTAIL: Type: IMPLANTABLE DEVICE | Site: STOMACH | Status: FUNCTIONAL

## 2021-01-01 DEVICE — STENT ZIMMON PANCREA 5FRX07CM SGL PIGTAIL G22360: Type: IMPLANTABLE DEVICE | Site: STOMACH | Status: FUNCTIONAL

## 2021-01-01 DEVICE — STENT ZIMMON BILIARY 07FRX09CM DBL PIGTAIL
Type: IMPLANTABLE DEVICE | Site: JEJUNUM | Status: NON-FUNCTIONAL
Removed: 2021-06-30

## 2021-01-01 RX ORDER — CAPECITABINE 500 MG/1
800 TABLET, FILM COATED ORAL 2 TIMES DAILY
Qty: 84 TABLET | Refills: 0 | Status: SHIPPED | OUTPATIENT
Start: 2021-01-01 | End: 2021-01-01

## 2021-01-01 RX ORDER — HYDROMORPHONE HCL IN WATER/PF 6 MG/30 ML
0.2 PATIENT CONTROLLED ANALGESIA SYRINGE INTRAVENOUS EVERY 10 MIN PRN
Status: DISCONTINUED | OUTPATIENT
Start: 2021-01-01 | End: 2021-01-01 | Stop reason: HOSPADM

## 2021-01-01 RX ORDER — LIDOCAINE 40 MG/G
CREAM TOPICAL
Status: DISCONTINUED | OUTPATIENT
Start: 2021-01-01 | End: 2021-01-01 | Stop reason: HOSPADM

## 2021-01-01 RX ORDER — HEPARIN SODIUM (PORCINE) LOCK FLUSH IV SOLN 100 UNIT/ML 100 UNIT/ML
5 SOLUTION INTRAVENOUS
Status: DISCONTINUED | OUTPATIENT
Start: 2021-01-01 | End: 2021-01-01 | Stop reason: HOSPADM

## 2021-01-01 RX ORDER — PROPOFOL 10 MG/ML
INJECTION, EMULSION INTRAVENOUS PRN
Status: DISCONTINUED | OUTPATIENT
Start: 2021-01-01 | End: 2021-01-01

## 2021-01-01 RX ORDER — CEFAZOLIN SODIUM 2 G/50ML
2 SOLUTION INTRAVENOUS SEE ADMIN INSTRUCTIONS
Status: CANCELLED | OUTPATIENT
Start: 2021-01-01

## 2021-01-01 RX ORDER — HEPARIN SODIUM,PORCINE 10 UNIT/ML
5-10 VIAL (ML) INTRAVENOUS
Status: DISCONTINUED | OUTPATIENT
Start: 2021-01-01 | End: 2021-01-01 | Stop reason: HOSPADM

## 2021-01-01 RX ORDER — ONDANSETRON 2 MG/ML
4 INJECTION INTRAMUSCULAR; INTRAVENOUS EVERY 30 MIN PRN
Status: DISCONTINUED | OUTPATIENT
Start: 2021-01-01 | End: 2021-01-01 | Stop reason: HOSPADM

## 2021-01-01 RX ORDER — NALOXONE HYDROCHLORIDE 0.4 MG/ML
0.4 INJECTION, SOLUTION INTRAMUSCULAR; INTRAVENOUS; SUBCUTANEOUS
Status: DISCONTINUED | OUTPATIENT
Start: 2021-01-01 | End: 2021-01-01 | Stop reason: HOSPADM

## 2021-01-01 RX ORDER — HEPARIN SODIUM (PORCINE) LOCK FLUSH IV SOLN 100 UNIT/ML 100 UNIT/ML
500 SOLUTION INTRAVENOUS ONCE
Status: COMPLETED | OUTPATIENT
Start: 2021-01-01 | End: 2021-01-01

## 2021-01-01 RX ORDER — ONDANSETRON 4 MG/1
4 TABLET, ORALLY DISINTEGRATING ORAL EVERY 30 MIN PRN
Status: DISCONTINUED | OUTPATIENT
Start: 2021-01-01 | End: 2021-01-01 | Stop reason: HOSPADM

## 2021-01-01 RX ORDER — DIMENHYDRINATE 50 MG/ML
25 INJECTION, SOLUTION INTRAMUSCULAR; INTRAVENOUS
Status: DISCONTINUED | OUTPATIENT
Start: 2021-01-01 | End: 2021-01-01 | Stop reason: HOSPADM

## 2021-01-01 RX ORDER — ACETAMINOPHEN 325 MG/1
975 TABLET ORAL ONCE
Status: DISCONTINUED | OUTPATIENT
Start: 2021-01-01 | End: 2021-01-01 | Stop reason: HOSPADM

## 2021-01-01 RX ORDER — HEPARIN SODIUM,PORCINE 10 UNIT/ML
5-10 VIAL (ML) INTRAVENOUS EVERY 24 HOURS
Status: DISCONTINUED | OUTPATIENT
Start: 2021-01-01 | End: 2021-01-01 | Stop reason: HOSPADM

## 2021-01-01 RX ORDER — ALBUMIN, HUMAN INJ 5% 5 %
SOLUTION INTRAVENOUS CONTINUOUS PRN
Status: DISCONTINUED | OUTPATIENT
Start: 2021-01-01 | End: 2021-01-01

## 2021-01-01 RX ORDER — IOPAMIDOL 510 MG/ML
INJECTION, SOLUTION INTRAVASCULAR PRN
Status: DISCONTINUED | OUTPATIENT
Start: 2021-01-01 | End: 2021-01-01 | Stop reason: HOSPADM

## 2021-01-01 RX ORDER — IOPAMIDOL 755 MG/ML
82 INJECTION, SOLUTION INTRAVASCULAR ONCE
Status: COMPLETED | OUTPATIENT
Start: 2021-01-01 | End: 2021-01-01

## 2021-01-01 RX ORDER — HEPARIN SODIUM (PORCINE) LOCK FLUSH IV SOLN 100 UNIT/ML 100 UNIT/ML
5 SOLUTION INTRAVENOUS ONCE
Status: COMPLETED | OUTPATIENT
Start: 2021-01-01 | End: 2021-01-01

## 2021-01-01 RX ORDER — MEPERIDINE HYDROCHLORIDE 25 MG/ML
12.5 INJECTION INTRAMUSCULAR; INTRAVENOUS; SUBCUTANEOUS
Status: DISCONTINUED | OUTPATIENT
Start: 2021-01-01 | End: 2021-01-01 | Stop reason: HOSPADM

## 2021-01-01 RX ORDER — DEXAMETHASONE SODIUM PHOSPHATE 4 MG/ML
INJECTION, SOLUTION INTRA-ARTICULAR; INTRALESIONAL; INTRAMUSCULAR; INTRAVENOUS; SOFT TISSUE PRN
Status: DISCONTINUED | OUTPATIENT
Start: 2021-01-01 | End: 2021-01-01

## 2021-01-01 RX ORDER — ONDANSETRON 2 MG/ML
INJECTION INTRAMUSCULAR; INTRAVENOUS PRN
Status: DISCONTINUED | OUTPATIENT
Start: 2021-01-01 | End: 2021-01-01

## 2021-01-01 RX ORDER — LIDOCAINE HYDROCHLORIDE 20 MG/ML
INJECTION, SOLUTION INFILTRATION; PERINEURAL PRN
Status: DISCONTINUED | OUTPATIENT
Start: 2021-01-01 | End: 2021-01-01

## 2021-01-01 RX ORDER — CAPECITABINE 500 MG/1
800 TABLET, FILM COATED ORAL 2 TIMES DAILY
Qty: 84 TABLET | Refills: 0 | Status: SHIPPED | OUTPATIENT
Start: 2021-01-01

## 2021-01-01 RX ORDER — NALOXONE HYDROCHLORIDE 0.4 MG/ML
0.2 INJECTION, SOLUTION INTRAMUSCULAR; INTRAVENOUS; SUBCUTANEOUS
Status: DISCONTINUED | OUTPATIENT
Start: 2021-01-01 | End: 2021-01-01 | Stop reason: HOSPADM

## 2021-01-01 RX ORDER — CEFAZOLIN SODIUM 2 G/100ML
2 INJECTION, SOLUTION INTRAVENOUS SEE ADMIN INSTRUCTIONS
Status: DISCONTINUED | OUTPATIENT
Start: 2021-01-01 | End: 2021-01-01 | Stop reason: HOSPADM

## 2021-01-01 RX ORDER — PROPOFOL 10 MG/ML
INJECTION, EMULSION INTRAVENOUS CONTINUOUS PRN
Status: DISCONTINUED | OUTPATIENT
Start: 2021-01-01 | End: 2021-01-01

## 2021-01-01 RX ORDER — CEFAZOLIN SODIUM 2 G/100ML
2 INJECTION, SOLUTION INTRAVENOUS
Status: COMPLETED | OUTPATIENT
Start: 2021-01-01 | End: 2021-01-01

## 2021-01-01 RX ORDER — SODIUM CHLORIDE, SODIUM LACTATE, POTASSIUM CHLORIDE, CALCIUM CHLORIDE 600; 310; 30; 20 MG/100ML; MG/100ML; MG/100ML; MG/100ML
INJECTION, SOLUTION INTRAVENOUS CONTINUOUS PRN
Status: DISCONTINUED | OUTPATIENT
Start: 2021-01-01 | End: 2021-01-01

## 2021-01-01 RX ORDER — HYDROMORPHONE HYDROCHLORIDE 1 MG/ML
.3-.5 INJECTION, SOLUTION INTRAMUSCULAR; INTRAVENOUS; SUBCUTANEOUS EVERY 10 MIN PRN
Status: DISCONTINUED | OUTPATIENT
Start: 2021-01-01 | End: 2021-01-01 | Stop reason: HOSPADM

## 2021-01-01 RX ORDER — FENTANYL CITRATE 50 UG/ML
INJECTION, SOLUTION INTRAMUSCULAR; INTRAVENOUS PRN
Status: DISCONTINUED | OUTPATIENT
Start: 2021-01-01 | End: 2021-01-01

## 2021-01-01 RX ORDER — SODIUM CHLORIDE, SODIUM LACTATE, POTASSIUM CHLORIDE, CALCIUM CHLORIDE 600; 310; 30; 20 MG/100ML; MG/100ML; MG/100ML; MG/100ML
INJECTION, SOLUTION INTRAVENOUS CONTINUOUS
Status: DISCONTINUED | OUTPATIENT
Start: 2021-01-01 | End: 2021-01-01 | Stop reason: HOSPADM

## 2021-01-01 RX ORDER — ESMOLOL HYDROCHLORIDE 10 MG/ML
INJECTION INTRAVENOUS PRN
Status: DISCONTINUED | OUTPATIENT
Start: 2021-01-01 | End: 2021-01-01

## 2021-01-01 RX ORDER — CEFAZOLIN SODIUM 1 G/3ML
1 INJECTION, POWDER, FOR SOLUTION INTRAMUSCULAR; INTRAVENOUS SEE ADMIN INSTRUCTIONS
Status: DISCONTINUED | OUTPATIENT
Start: 2021-01-01 | End: 2021-01-01 | Stop reason: HOSPADM

## 2021-01-01 RX ORDER — FENTANYL CITRATE 50 UG/ML
25-50 INJECTION, SOLUTION INTRAMUSCULAR; INTRAVENOUS
Status: DISCONTINUED | OUTPATIENT
Start: 2021-01-01 | End: 2021-01-01 | Stop reason: HOSPADM

## 2021-01-01 RX ORDER — CEFAZOLIN SODIUM 2 G/50ML
2 SOLUTION INTRAVENOUS
Status: CANCELLED | OUTPATIENT
Start: 2021-01-01

## 2021-01-01 RX ORDER — AZITHROMYCIN MONOHYDRATE 10 MG/ML
SOLUTION/ DROPS OPHTHALMIC
COMMUNITY
Start: 2021-01-01

## 2021-01-01 RX ORDER — OMEPRAZOLE 40 MG/1
40 CAPSULE, DELAYED RELEASE ORAL DAILY
Qty: 90 CAPSULE | Refills: 0 | Status: SHIPPED | OUTPATIENT
Start: 2021-01-01

## 2021-01-01 RX ORDER — CIPROFLOXACIN 2 MG/ML
INJECTION, SOLUTION INTRAVENOUS PRN
Status: DISCONTINUED | OUTPATIENT
Start: 2021-01-01 | End: 2021-01-01

## 2021-01-01 RX ORDER — DIGOXIN 125 MCG
125 TABLET ORAL EVERY MORNING
COMMUNITY
Start: 2021-01-01

## 2021-01-01 RX ORDER — CYCLOSPORINE 0.5 MG/ML
EMULSION OPHTHALMIC
COMMUNITY
Start: 2021-01-01

## 2021-01-01 RX ORDER — HEPARIN SODIUM (PORCINE) LOCK FLUSH IV SOLN 100 UNIT/ML 100 UNIT/ML
5 SOLUTION INTRAVENOUS
Status: CANCELLED | OUTPATIENT
Start: 2021-01-01

## 2021-01-01 RX ORDER — CEFAZOLIN SODIUM 1 G/50ML
1 INJECTION, SOLUTION INTRAVENOUS SEE ADMIN INSTRUCTIONS
Status: CANCELLED | OUTPATIENT
Start: 2021-01-01

## 2021-01-01 RX ORDER — IOPAMIDOL 755 MG/ML
84 INJECTION, SOLUTION INTRAVASCULAR ONCE
Status: COMPLETED | OUTPATIENT
Start: 2021-01-01 | End: 2021-01-01

## 2021-01-01 RX ORDER — HYDRALAZINE HYDROCHLORIDE 20 MG/ML
2.5-5 INJECTION INTRAMUSCULAR; INTRAVENOUS EVERY 10 MIN PRN
Status: DISCONTINUED | OUTPATIENT
Start: 2021-01-01 | End: 2021-01-01 | Stop reason: HOSPADM

## 2021-01-01 RX ORDER — LIDOCAINE HYDROCHLORIDE 20 MG/ML
JELLY TOPICAL PRN
Status: DISCONTINUED | OUTPATIENT
Start: 2021-01-01 | End: 2021-01-01 | Stop reason: HOSPADM

## 2021-01-01 RX ORDER — METOPROLOL TARTRATE 1 MG/ML
1-2 INJECTION, SOLUTION INTRAVENOUS EVERY 5 MIN PRN
Status: DISCONTINUED | OUTPATIENT
Start: 2021-01-01 | End: 2021-01-01 | Stop reason: HOSPADM

## 2021-01-01 RX ORDER — DEXAMETHASONE 2 MG/1
4 TABLET ORAL
Qty: 60 TABLET | Refills: 1 | Status: SHIPPED | OUTPATIENT
Start: 2021-01-01

## 2021-01-01 RX ORDER — OXYCODONE HYDROCHLORIDE 5 MG/1
5 TABLET ORAL EVERY 4 HOURS PRN
Status: DISCONTINUED | OUTPATIENT
Start: 2021-01-01 | End: 2021-01-01 | Stop reason: HOSPADM

## 2021-01-01 RX ADMIN — PROPOFOL 10 MG: 10 INJECTION, EMULSION INTRAVENOUS at 16:09

## 2021-01-01 RX ADMIN — DEXAMETHASONE SODIUM PHOSPHATE 4 MG: 4 INJECTION, SOLUTION INTRAMUSCULAR; INTRAVENOUS at 15:02

## 2021-01-01 RX ADMIN — HEPARIN SODIUM (PORCINE) LOCK FLUSH IV SOLN 100 UNIT/ML 500 UNITS: 100 SOLUTION at 15:13

## 2021-01-01 RX ADMIN — SODIUM CHLORIDE, PRESERVATIVE FREE 5 ML: 5 INJECTION INTRAVENOUS at 17:00

## 2021-01-01 RX ADMIN — DEXAMETHASONE SODIUM PHOSPHATE 4 MG: 4 INJECTION, SOLUTION INTRA-ARTICULAR; INTRALESIONAL; INTRAMUSCULAR; INTRAVENOUS; SOFT TISSUE at 11:52

## 2021-01-01 RX ADMIN — PHENYLEPHRINE HYDROCHLORIDE 200 MCG: 10 INJECTION INTRAVENOUS at 08:01

## 2021-01-01 RX ADMIN — PROPOFOL 10 MG: 10 INJECTION, EMULSION INTRAVENOUS at 15:14

## 2021-01-01 RX ADMIN — ESMOLOL HYDROCHLORIDE 20 MG: 10 INJECTION, SOLUTION INTRAVENOUS at 09:28

## 2021-01-01 RX ADMIN — PROPOFOL 100 MCG/KG/MIN: 10 INJECTION, EMULSION INTRAVENOUS at 14:55

## 2021-01-01 RX ADMIN — FENTANYL CITRATE 25 MCG: 50 INJECTION, SOLUTION INTRAMUSCULAR; INTRAVENOUS at 11:11

## 2021-01-01 RX ADMIN — SUGAMMADEX 120 MG: 100 INJECTION, SOLUTION INTRAVENOUS at 09:35

## 2021-01-01 RX ADMIN — Medication 5 ML: at 11:13

## 2021-01-01 RX ADMIN — CEFAZOLIN 2 G: 10 INJECTION, POWDER, FOR SOLUTION INTRAVENOUS at 16:00

## 2021-01-01 RX ADMIN — ESMOLOL HYDROCHLORIDE 10 MG: 10 INJECTION, SOLUTION INTRAVENOUS at 09:25

## 2021-01-01 RX ADMIN — PHENYLEPHRINE HYDROCHLORIDE 100 MCG: 10 INJECTION INTRAVENOUS at 12:24

## 2021-01-01 RX ADMIN — LIDOCAINE HYDROCHLORIDE 100 MG: 20 INJECTION, SOLUTION INFILTRATION; PERINEURAL at 07:41

## 2021-01-01 RX ADMIN — FENTANYL CITRATE 25 MCG: 50 INJECTION, SOLUTION INTRAMUSCULAR; INTRAVENOUS at 15:02

## 2021-01-01 RX ADMIN — SODIUM CHLORIDE, POTASSIUM CHLORIDE, SODIUM LACTATE AND CALCIUM CHLORIDE: 600; 310; 30; 20 INJECTION, SOLUTION INTRAVENOUS at 16:12

## 2021-01-01 RX ADMIN — PROPOFOL 10 MG: 10 INJECTION, EMULSION INTRAVENOUS at 16:05

## 2021-01-01 RX ADMIN — SODIUM CHLORIDE, PRESERVATIVE FREE 5 ML: 5 INJECTION INTRAVENOUS at 12:00

## 2021-01-01 RX ADMIN — ONDANSETRON 4 MG: 2 INJECTION INTRAMUSCULAR; INTRAVENOUS at 11:52

## 2021-01-01 RX ADMIN — ONDANSETRON 4 MG: 2 INJECTION INTRAMUSCULAR; INTRAVENOUS at 07:43

## 2021-01-01 RX ADMIN — PHENYLEPHRINE HYDROCHLORIDE 200 MCG: 10 INJECTION INTRAVENOUS at 08:57

## 2021-01-01 RX ADMIN — HEPARIN SODIUM (PORCINE) LOCK FLUSH IV SOLN 100 UNIT/ML 5 ML: 100 SOLUTION at 18:42

## 2021-01-01 RX ADMIN — PHENYLEPHRINE HYDROCHLORIDE 100 MCG: 10 INJECTION INTRAVENOUS at 07:48

## 2021-01-01 RX ADMIN — CIPROFLOXACIN 400 MG: 2 INJECTION INTRAVENOUS at 12:10

## 2021-01-01 RX ADMIN — PHENYLEPHRINE HYDROCHLORIDE 100 MCG: 10 INJECTION INTRAVENOUS at 07:45

## 2021-01-01 RX ADMIN — PROPOFOL 100 MCG/KG/MIN: 10 INJECTION, EMULSION INTRAVENOUS at 15:56

## 2021-01-01 RX ADMIN — DEXAMETHASONE SODIUM PHOSPHATE 6 MG: 4 INJECTION, SOLUTION INTRA-ARTICULAR; INTRALESIONAL; INTRAMUSCULAR; INTRAVENOUS; SOFT TISSUE at 07:43

## 2021-01-01 RX ADMIN — PHENYLEPHRINE HYDROCHLORIDE 100 MCG: 10 INJECTION INTRAVENOUS at 08:46

## 2021-01-01 RX ADMIN — ONDANSETRON 4 MG: 2 INJECTION INTRAMUSCULAR; INTRAVENOUS at 15:35

## 2021-01-01 RX ADMIN — FENTANYL CITRATE 100 MCG: 50 INJECTION, SOLUTION INTRAMUSCULAR; INTRAVENOUS at 07:40

## 2021-01-01 RX ADMIN — SUGAMMADEX 120 MG: 100 INJECTION, SOLUTION INTRAVENOUS at 12:40

## 2021-01-01 RX ADMIN — IOPAMIDOL 84 ML: 755 INJECTION, SOLUTION INTRAVASCULAR at 14:52

## 2021-01-01 RX ADMIN — SODIUM CHLORIDE, POTASSIUM CHLORIDE, SODIUM LACTATE AND CALCIUM CHLORIDE: 600; 310; 30; 20 INJECTION, SOLUTION INTRAVENOUS at 07:09

## 2021-01-01 RX ADMIN — PROPOFOL 20 MG: 10 INJECTION, EMULSION INTRAVENOUS at 14:57

## 2021-01-01 RX ADMIN — PHENYLEPHRINE HYDROCHLORIDE 100 MCG: 10 INJECTION INTRAVENOUS at 07:49

## 2021-01-01 RX ADMIN — SODIUM CHLORIDE, PRESERVATIVE FREE 500 UNITS: 5 INJECTION INTRAVENOUS at 14:27

## 2021-01-01 RX ADMIN — ROCURONIUM BROMIDE 25 MG: 10 INJECTION INTRAVENOUS at 11:29

## 2021-01-01 RX ADMIN — PHENYLEPHRINE HYDROCHLORIDE 100 MCG: 10 INJECTION INTRAVENOUS at 12:00

## 2021-01-01 RX ADMIN — FENTANYL CITRATE 50 MCG: 50 INJECTION, SOLUTION INTRAMUSCULAR; INTRAVENOUS at 16:00

## 2021-01-01 RX ADMIN — SODIUM CHLORIDE, POTASSIUM CHLORIDE, SODIUM LACTATE AND CALCIUM CHLORIDE: 600; 310; 30; 20 INJECTION, SOLUTION INTRAVENOUS at 11:03

## 2021-01-01 RX ADMIN — ESMOLOL HYDROCHLORIDE 20 MG: 10 INJECTION, SOLUTION INTRAVENOUS at 07:42

## 2021-01-01 RX ADMIN — LIDOCAINE HYDROCHLORIDE 60 MG: 20 INJECTION, SOLUTION INFILTRATION; PERINEURAL at 11:11

## 2021-01-01 RX ADMIN — ALBUMIN (HUMAN): 12.5 SOLUTION INTRAVENOUS at 08:00

## 2021-01-01 RX ADMIN — PHENYLEPHRINE HYDROCHLORIDE 100 MCG: 10 INJECTION INTRAVENOUS at 11:19

## 2021-01-01 RX ADMIN — PROPOFOL 70 MG: 10 INJECTION, EMULSION INTRAVENOUS at 11:11

## 2021-01-01 RX ADMIN — ROCURONIUM BROMIDE 5 MG: 10 INJECTION INTRAVENOUS at 11:11

## 2021-01-01 RX ADMIN — Medication 5 ML: at 14:20

## 2021-01-01 RX ADMIN — PHENYLEPHRINE HYDROCHLORIDE 100 MCG: 10 INJECTION INTRAVENOUS at 07:56

## 2021-01-01 RX ADMIN — SODIUM CHLORIDE, POTASSIUM CHLORIDE, SODIUM LACTATE AND CALCIUM CHLORIDE: 600; 310; 30; 20 INJECTION, SOLUTION INTRAVENOUS at 14:51

## 2021-01-01 RX ADMIN — PHENYLEPHRINE HYDROCHLORIDE 0.3 MCG/KG/MIN: 10 INJECTION INTRAVENOUS at 07:50

## 2021-01-01 RX ADMIN — PHENYLEPHRINE HYDROCHLORIDE 100 MCG: 10 INJECTION INTRAVENOUS at 08:41

## 2021-01-01 RX ADMIN — ROCURONIUM BROMIDE 50 MG: 10 INJECTION INTRAVENOUS at 07:42

## 2021-01-01 RX ADMIN — Medication 5 ML: at 13:09

## 2021-01-01 RX ADMIN — IOPAMIDOL 82 ML: 755 INJECTION, SOLUTION INTRAVASCULAR at 14:52

## 2021-01-01 RX ADMIN — HEPARIN SODIUM (PORCINE) LOCK FLUSH IV SOLN 100 UNIT/ML 5 ML: 100 SOLUTION at 15:00

## 2021-01-01 RX ADMIN — Medication 100 MG: at 11:11

## 2021-01-01 RX ADMIN — PROPOFOL 130 MG: 10 INJECTION, EMULSION INTRAVENOUS at 07:41

## 2021-01-01 RX ADMIN — HEPARIN SODIUM (PORCINE) LOCK FLUSH IV SOLN 100 UNIT/ML 5 ML: 100 SOLUTION at 14:32

## 2021-01-01 RX ADMIN — CEFAZOLIN 2 G: 10 INJECTION, POWDER, FOR SOLUTION INTRAVENOUS at 15:01

## 2021-01-01 RX ADMIN — ESMOLOL HYDROCHLORIDE 20 MG: 10 INJECTION, SOLUTION INTRAVENOUS at 09:26

## 2021-01-01 ASSESSMENT — ENCOUNTER SYMPTOMS
INCREASED ENERGY: 0
CONSTIPATION: 1
DYSRHYTHMIAS: 1
VOMITING: 1
NAUSEA: 1
DYSRHYTHMIAS: 1
HEADACHES: 0
HYPOTENSION: 0
BRUISES/BLEEDS EASILY: 1
ABDOMINAL PAIN: 1
BLOATING: 0
NIGHT SWEATS: 0
TACHYCARDIA: 0
NECK MASS: 0
EYE REDNESS: 0
HEARTBURN: 1
EXTREMITY NUMBNESS: 0
NAUSEA: 1
NECK PAIN: 0
ARTHRALGIAS: 0
BLOATING: 0
SKIN CHANGES: 0
PARALYSIS: 0
HYPERTENSION: 0
DYSPNEA ON EXERTION: 0
SPEECH CHANGE: 0
EYE IRRITATION: 1
COUGH: 0
SLEEP DISTURBANCES DUE TO BREATHING: 0
HALLUCINATIONS: 0
RESPIRATORY PAIN: 0
ABDOMINAL PAIN: 1
BLOOD IN STOOL: 0
COUGH DISTURBING SLEEP: 0
CLAUDICATION: 0
DECREASED APPETITE: 0
EXERCISE INTOLERANCE: 0
DISTURBANCES IN COORDINATION: 0
DYSURIA: 0
FEVER: 0
VOMITING: 1
FATIGUE: 0
TROUBLE SWALLOWING: 0
ORTHOPNEA: 0
DYSURIA: 0
SYNCOPE: 0
LEG PAIN: 0
LOSS OF CONSCIOUSNESS: 0
EYE PAIN: 0
TINGLING: 0
STIFFNESS: 0
BACK PAIN: 0
CHILLS: 0
TREMORS: 0
DYSRHYTHMIAS: 1
WEIGHT LOSS: 0
JAUNDICE: 0
POLYPHAGIA: 0
FLANK PAIN: 0
POOR WOUND HEALING: 0
HOARSE VOICE: 0
NUMBNESS: 0
ORTHOPNEA: 0
INSOMNIA: 0
MUSCLE CRAMPS: 0
RECTAL PAIN: 0
PALPITATIONS: 0
BOWEL INCONTINENCE: 0
POSTURAL DYSPNEA: 0
BRUISES/BLEEDS EASILY: 1
NAIL CHANGES: 0
WEAKNESS: 0
JAUNDICE: 0
LEG SWELLING: 0
DOUBLE VISION: 0
SORE THROAT: 0
LIGHT-HEADEDNESS: 0
DIARRHEA: 0
RECTAL PAIN: 0
MEMORY LOSS: 0
WEIGHT GAIN: 0
DEPRESSION: 0
TASTE DISTURBANCE: 0
BOWEL INCONTINENCE: 0
PANIC: 0
EYE IRRITATION: 1
HEMOPTYSIS: 0
DIFFICULTY URINATING: 0
POLYDIPSIA: 0
DYSRHYTHMIAS: 1
SINUS CONGESTION: 0
DOUBLE VISION: 0
DIZZINESS: 0
DIFFICULTY URINATING: 0
SHORTNESS OF BREATH: 0
FLANK PAIN: 0
ALTERED TEMPERATURE REGULATION: 0
DIARRHEA: 0
MUSCLE WEAKNESS: 0
SWOLLEN GLANDS: 0
WHEEZING: 0
SEIZURES: 0
SMELL DISTURBANCE: 0
SPUTUM PRODUCTION: 0
MYALGIAS: 0
DECREASED CONCENTRATION: 0
HEMATURIA: 1
HEARTBURN: 1
JOINT SWELLING: 0
SNORES LOUDLY: 0
EYE PAIN: 0
HEMATURIA: 1
CONSTIPATION: 1
NERVOUS/ANXIOUS: 0
SWOLLEN GLANDS: 0
BLOOD IN STOOL: 0
SINUS PAIN: 0
EYE REDNESS: 0

## 2021-01-01 ASSESSMENT — PAIN SCALES - GENERAL
PAINLEVEL: NO PAIN (0)
PAINLEVEL: MODERATE PAIN (4)
PAINLEVEL: EXTREME PAIN (8)

## 2021-01-01 ASSESSMENT — MIFFLIN-ST. JEOR
SCORE: 1443.09
SCORE: 1358.25
SCORE: 1443.33
SCORE: 1403.13
SCORE: 1439.13
SCORE: 1423.13

## 2021-01-01 ASSESSMENT — LIFESTYLE VARIABLES: TOBACCO_USE: 0

## 2021-01-04 PROBLEM — N13.30 HYDRONEPHROSIS OF RIGHT KIDNEY: Status: ACTIVE | Noted: 2020-01-31

## 2021-01-04 NOTE — NURSING NOTE
"Chief Complaint   Patient presents with     Port Draw     Labs drawn via port by RN.      Port accessed with 20g 3/4\" gripper needle and labs drawn by rn.  Port flushed with NS and heparin. De-accessed. Pt tolerated well.    Med Serrano RN  "

## 2021-01-04 NOTE — TELEPHONE ENCOUNTER
FUTURE VISIT INFORMATION      SURGERY INFORMATION:    Date: 1/15/21    Location: [facility] or    Surgeon:  Sivan Walker MD    Anesthesia Type:  choice    Procedure: CYSTOSCOPY, WITH RETROGRADE PYELOGRAM AND URETERAL STENT REPLACEMENT    RECORDS REQUESTED FROM:       Primary Care Provider: Dario Jain DO  - [facility]    Pertinent Medical History: Aortic root dilatation, paroxysmal atrial fibrillation, hypertension, bicuspid aortic valve, chronic systolic congestive heart failure    Most recent EKG+ Tracin20    Most recent ECHO: 20- [facility]    Most recent Cardiac Stress Test: 11- [facility]    Most recent Coronary Angiogram: 19- [facility]

## 2021-01-04 NOTE — TELEPHONE ENCOUNTER
Oral Chemotherapy Monitoring Program  Lab Follow Up    Reviewed CBC/CMP lab results from 01/04/21 via [employer] message.    ORAL CHEMOTHERAPY 11/4/2020 11/13/2020 12/7/2020 12/7/2020 12/30/2020 12/31/2020 1/4/2021   Assessment Type Refill Other Lab Monitoring;Other Refill Refill Monthly Follow up Lab Monitoring   Diagnosis Code Gallbladder Cancer Gallbladder Cancer Gallbladder Cancer Gallbladder Cancer Gallbladder Cancer Bile Duct Cancer Bile Duct Cancer   Providers Dr Naresh De Los Santos   [facility]   Drug Name Xeloda (Capecitabine) Xeloda (Capecitabine) Xeloda (Capecitabine) Xeloda (Capecitabine) Xeloda (Capecitabine) Xeloda (Capecitabine) Xeloda (Capecitabine)   Dose 1,500 mg 1,500 mg 1,500 mg 1,500 mg 1,500 mg 1,500 mg 1,500 mg   Current Schedule BID BID BID BID BID BID BID   Cycle Details 2 weeks on, 2 weeks off 2 weeks on, 2 weeks off 2 weeks on, 2 weeks off 2 weeks on, 2 weeks off 2 weeks on, 2 weeks off 2 weeks on, 2 weeks off 2 weeks on, 2 weeks off   Start Date of Last Cycle - - - - - - -   Planned next cycle start date - - - - - 1/5/2020 -   Doses missed in last 2 weeks - - - - - 0 -   Adherence Assessment - - - - - Adherent -   Adverse Effects - - - - - Palmar-plantar Erythrodysethesia Syndrome -   Palmar-plantar Erythrodysethesia syndrome[hand-foot syndrome] - - - - - Grade 1 -   Pharmacist Intervention(Palmar-plantar) - - - - - Yes -   Intervention(s) - - - - - OTC recommendation -   Home BPs - - - - - - -   Any new drug interactions? - - - - - No -   Is the dose as ordered appropriate for the patient? - - - - - Yes -   Has the patient missed any days of school, work, or other routine activity? - - - - - No -       Labs:  _  Result Component Current Result Ref Range   Sodium 142 (1/4/2021) 133 - 144 mmol/L     _  Result Component Current Result Ref Range   Potassium 3.7  (1/4/2021) 3.4 - 5.3 mmol/L     _  Result Component Current Result Ref Range   Calcium 8.8 (1/4/2021) 8.5 - 10.1 mg/dL     No results found for Mag within last 30 days.     No results found for Phos within last 30 days.     _  Result Component Current Result Ref Range   Albumin 2.9 (L) (1/4/2021) 3.4 - 5.0 g/dL     _  Result Component Current Result Ref Range   Urea Nitrogen 24 (1/4/2021) 7 - 30 mg/dL     _  Result Component Current Result Ref Range   Creatinine 1.08 (1/4/2021) 0.66 - 1.25 mg/dL     _  Result Component Current Result Ref Range   AST 52 (H) (1/4/2021) 0 - 45 U/L     _  Result Component Current Result Ref Range   ALT 62 (1/4/2021) 0 - 70 U/L     _  Result Component Current Result Ref Range   Bilirubin Total 0.7 (1/4/2021) 0.2 - 1.3 mg/dL     _  Result Component Current Result Ref Range   WBC 8.5 (1/4/2021) 4.0 - 11.0 10e9/L     _  Result Component Current Result Ref Range   Hemoglobin 10.9 (L) (1/4/2021) 13.3 - 17.7 g/dL     _  Result Component Current Result Ref Range   Platelet Count 153 (1/4/2021) 150 - 450 10e9/L     _  Result Component Current Result Ref Range   Absolute Neutrophil 5.2 (1/4/2021) 1.6 - 8.3 10e9/L       Assessment & Plan:  No concerning abnormalities. [employer] message sent to patient.  Pt is appropriate to start next Xeloda cycle 01/05.    Follow-Up:  01/27: assessment and refill    Grace Myhre, LannyD  Hematology/Oncology Pharmacist  [facility]  [facility]  [phone]

## 2021-01-12 NOTE — PROGRESS NOTES
Preoperative Assessment Center Medication History Note    Medication history completed via phone call on January 12, 2021 by this writer. See Epic admission navigator for prior to admission medications. Operating room staff will still need to confirm medications and last dose information on day of surgery.     Medication history interview sources:  Patient Interview     Changes made to PTA medication list (reason)  Added: None    Deleted:   -- marinol    Changed: None    Additional medication history information (including reliability of information, actions taken by pharmacist):    -- No recent (within 30 days) course of antibiotics  -- No recent (within 30 days) course of systemic steroids  -- Patient declines being on any other prescription or over-the-counter medications  -- patient is on day 8 of his 14 day xeldoda cycle   -- patient takes his lasix every other day and took a dose 1/12/21  -- patient hasn't had a gout flare recently so last dose of colchicine was over a year ago      Prior to Admission medications    Medication Sig Last Dose Taking? Auth Provider   allopurinol (ZYLOPRIM) 100 MG tablet Take 100 mg by mouth every evening  Taking Yes Reported, Patient   apixaban ANTICOAGULANT (ELIQUIS ANTICOAGULANT) 5 MG tablet Take 5 mg by mouth 2 times daily  Taking Yes Jennifer Jalloh APRN CNP   atorvastatin (LIPITOR) 40 MG tablet Take 40 mg by mouth every evening  Taking Yes Unknown, Entered By History   calcium carbonate (TUMS) 500 MG chewable tablet Take 1-3 chew tab by mouth 4 times daily as needed for heartburn  Taking Yes Unknown, Entered By History   capecitabine (XELODA) 500 MG tablet Take 3 tablets (1,500 mg) by mouth 2 times daily for 14 days Days 1 through 14, then off for 14 days. Take within 30 mins after meal. Taking Yes Naresh De Los Santos MD   furosemide (LASIX) 20 MG tablet Take 20 mg by mouth every other day  Taking Yes Jennifer Jalloh APRN CNP   metoprolol tartrate (LOPRESSOR) 50 MG  tablet Take 150 mg by mouth 2 times daily  Taking Yes Unknown, Entered By History   multivitamin w/minerals (THERA-VIT-M) tablet Take 1 tablet by mouth every morning  Taking Yes Unknown, Entered By History   ondansetron (ZOFRAN) 4 MG tablet Take 1 tablet (4 mg) by mouth every 8 hours as needed for nausea Taking Yes Shannon Lopes PA-C   polyethylene glycol (MIRALAX) 17 g packet Take 1 packet by mouth as needed for constipation Taking Yes Reported, Patient   acetaminophen (TYLENOL) 500 MG tablet Take 500 mg by mouth every 6 hours as needed for fever or pain   Unknown, Entered By History   colchicine (COLCYRS) 0.6 MG tablet Take 0.6 mg by mouth 3 times daily as needed (gout flare)  Not Taking  Unknown, Entered By History   Nitroglycerin (NITROSTAT SL) Place 0.4 mg under the tongue every 5 minutes as needed for chest pain (Carries medication - has never used)    Reported, Patient         Medication history completed by: Lawson Martinez RP

## 2021-01-12 NOTE — PHARMACY - PREOPERATIVE ASSESSMENT CENTER
Anticoagulation Note - Preoperative Assessment Center (PAC) Pharmacist     Patient was interviewed via phone call on January 12, 2021 prior to PAC clinic appointment. The purpose of this note is to document the perioperative anticoagulation plan outlined by the providers caring for Girish Chauhan.     Current Regimen  Anticoagulation Regimen as of January 12, 2021: Apixaban 5mg by mouth twice daily   Indication: afib  Prescriber:  Dr. Deangelo Rose  Expected Duration of therapy: indefinite    Perioperative plan  Girish Chauhan is scheduled for CYSTOSCOPY, WITH RETROGRADE PYELOGRAM AND URETERAL STENT REPLACEMENT on 1/15/21 with Dr. Walker and the perioperative anticoagulation plan outlined by past stent exchanges is to continue apixaban uninterrupted. .     Resumption of anticoagulation after procedure will be based on surgery team assessment of bleeding risks and complications.  This plan may require re-assessment and modification by his primary team in the perioperative setting depending on patients clinical situation.        Lawson Martinez RPH  January 12, 2021  2:48 PM

## 2021-01-13 NOTE — H&P (VIEW-ONLY)
Pre-Operative H & P     CC:  Preoperative exam to assess for increased cardiopulmonary risk while undergoing surgery and anesthesia.    Date of Encounter: 1/13/2021  Primary Care Physician:  Dario Jain  Hydronephrosis of right kidney [N13.30]  HPI  Girish Chauhan is a 65 year old male who presents for pre-operative H & P in preparation for CYSTOSCOPY, WITH RETROGRADE PYELOGRAM AND URETERAL STENT REPLACEMENT with Dr. Walker on 1/15/21 at [facility] [place]. History is obtained from the patient and medical records.     Patient with history of metastatic cholangiocarcinoma with good control on Xeloda, followed by Dr. De Los Santos. He has hydronephrosis related to his disease and is s/p right ureteral stent placement. He last had stent exchanged on 10/12/20 by Dr. Walker. At that time it was recommended that he return for same procedure in 3 months. Above procedure now planned.     His history is otherwise significant for HLD, chronic atrial fibrillation, anticoagulated, cardiomyopathy, EF 42%,  CAD, with history of multivessel stenting in 2011, NSTEMI in 5/2019, with stenting of OM on 6/17/19, bicuspid aortic valve with mild to moderate aortic stenosis, AAA, gout, peritoneal carcinomatosis, CKD, history of melanoma, and BCC. For his cardiac issues he is followed by Dr. Milton BLUNT, last seen on 1/8/21 with plan for repeat Holter monitor to reassess rate control. This is scheduled for the future.     Past Medical History  Past Medical History:   Diagnosis Date     Anemia      Aortic stenosis due to bicuspid aortic valve      Ascending aortic aneurysm (H)      Atrial fibrillation (H) 2006    hx of paroxysmal atrial fibrillation since approximately 2006. S/p cardioversion in 2013 with recurrent atrial fibrillation s/p repeat cardioversion 9/29/14.     Basal cell carcinoma 01/2016    right shoulder and right posterior calf s/p electrodessication and curretage 1/2016.     CAD  (coronary artery disease) 12/2011    s/p angiogram 12/2011 s/p percutaneous intervention on the LAD with multiple drug-eluting stents complicated by a small perforation in the diagonal branch which sealed spontaneously.  Patient with significant Circumflex stenosis which is being treated conservatively.     Cholangiocarcinoma (H)      CKD (chronic kidney disease)      Gout     affects left foot 2-3 times per year     Hydronephrosis of right kidney      Hyperlipidemia      Hypertension      Liver disease      Melanoma (H) 09/2015    back s/p resection 9/2015     Red blood cell antibody positive      Squamous cell carcinoma     nose s/p excision       Past Surgical History  Past Surgical History:   Procedure Laterality Date     ------------OTHER-------------      multiple skin cancer resections     CARDIOVERSION  2013, 9/2014     COMBINED CYSTOSCOPY, RETROGRADES, EXCHANGE STENT URETER(S) Right 11/11/2019    Procedure: Cystoscopy, Right Retrograde Pyelogram, Right Ureteral Stent Exchange;  Surgeon: Sivan Walker MD;  [facility]     COMBINED CYSTOSCOPY, RETROGRADES, EXCHANGE STENT URETER(S) Right 6/8/2020    Procedure: CYSTOSCOPY, WITH RIGHT RETROGRADE PYELOGRAM AND RIGHT URETERAL STENT EXCHANGE;  Surgeon: Sivan Walker MD;  [facility]     COMBINED CYSTOSCOPY, RETROGRADES, EXCHANGE STENT URETER(S) Right 10/12/2020    Procedure: CYSTOSCOPY, WITH RETROGRADE PYELOGRAM AND RIGHT URETERAL STENT REPLACEMENT;  Surgeon: Sivan Walker MD;  [facility]     CYSTOSCOPY, RETROGRADES, INSERT STENT URETER(S), COMBINED N/A 9/16/2019    Procedure: Cystoscopy, Right Retrograde Pyelogram, Right Ureteral Stent Placement;  Surgeon: Sivan Walker MD;  [facility]     CYSTOSCOPY, RETROGRADES, INSERT STENT URETER(S), COMBINED Right 2/5/2020    Procedure: CYSTOSCOPY, WITH Right RETROGRADE PYELOGRAM AND Right URETERAL STENT INSERTION;  Surgeon: Sivan Walker MD;  [facility]      ENDOSCOPIC RETROGRADE CHOLANGIOPANCREATOGRAM N/A 2/26/2016    Procedure: COMBINED ENDOSCOPIC RETROGRADE CHOLANGIOPANCREATOGRAPHY, PLACE TUBE/STENT;  Surgeon: Rafa Andrade MD;  [facility]     ENDOSCOPIC RETROGRADE CHOLANGIOPANCREATOGRAPHY  2/2014     ENDOSCOPIC ULTRASOUND UPPER GASTROINTESTINAL TRACT (GI) N/A 6/7/2019    Procedure: ENDOSCOPIC ULTRASOUND, with hot axios stent placement;  Surgeon: Gabino Rodriguez MD;  [facility]     ENTEROSCOPY SMALL BOWEL N/A 6/7/2019    Procedure: ENTEROSCOPY;  Surgeon: Gabino Rodriguez MD;  [facility]     ESOPHAGOSCOPY, GASTROSCOPY, DUODENOSCOPY (EGD), COMBINED N/A 10/16/2019    Procedure: Upper Gastrointestinal Endoscopy;  Surgeon: Gabino Rodriguez MD;  [facility]     ESOPHAGOSCOPY, GASTROSCOPY, DUODENOSCOPY (EGD), COMBINED N/A 8/25/2020    Procedure: ESOPHAGOGASTRODUODENOSCOPY (EGD) WIth  duodenal and jejunal stent placement;  Surgeon: Gabino Rodriguez MD;  [facility]     ESOPHAGOSCOPY, GASTROSCOPY, DUODENOSCOPY (EGD), DILATATION, COMBINED N/A 7/29/2019    Procedure: Esophagoscopy, gastroscopy, duodenoscopy (EGD), with stent exchange and dilation;  Surgeon: Gabino Rodriguez MD;  [facility]     EXPLORE COMMON BILE DUCT N/A 3/8/2016    Procedure: EXPLORE COMMON BILE DUCT;  Surgeon: Jose Eduardo Pinedo MD;  [facility]     HEPATECTOMY PARTIAL Left 3/8/2016    Procedure: HEPATECTOMY PARTIAL;  Surgeon: Jose Eduardo Pinedo MD;  [facility]     INSERT PORT VASCULAR ACCESS Right 12/8/2017    Procedure: INSERT PORT VASCULAR ACCESS;  Place single lumen venous chest port - right;  Surgeon: Drew Powell PA-C;  [facility]     STENT  12/2011    LAD with multiple SAM       Hx of Blood transfusions/reactions: Yes in past. No known reactions but has blood antibody.      Hx of abnormal bleeding or anti-platelet use: Anticoagulated on apixaban    Menstrual history: No LMP for male patient.    Steroid use  in the last year: Unknown.    Personal or FH with difficulty with Anesthesia:  Denies.     Prior to Admission Medications  Current Outpatient Medications   Medication Sig Dispense Refill     acetaminophen (TYLENOL) 500 MG tablet Take 500 mg by mouth every 6 hours as needed for fever or pain       allopurinol (ZYLOPRIM) 100 MG tablet Take 100 mg by mouth every evening        apixaban ANTICOAGULANT (ELIQUIS ANTICOAGULANT) 5 MG tablet Take 5 mg by mouth 2 times daily        atorvastatin (LIPITOR) 40 MG tablet Take 40 mg by mouth every evening        calcium carbonate (TUMS) 500 MG chewable tablet Take 1-3 chew tab by mouth 4 times daily as needed for heartburn        capecitabine (XELODA) 500 MG tablet Take 3 tablets (1,500 mg) by mouth 2 times daily for 14 days Days 1 through 14, then off for 14 days. Take within 30 mins after meal. 84 tablet 0     colchicine (COLCYRS) 0.6 MG tablet Take 0.6 mg by mouth 3 times daily as needed (gout flare)        furosemide (LASIX) 20 MG tablet Take 20 mg by mouth every other day        metoprolol tartrate (LOPRESSOR) 50 MG tablet Take 150 mg by mouth 2 times daily        multivitamin w/minerals (THERA-VIT-M) tablet Take 1 tablet by mouth every morning        Nitroglycerin (NITROSTAT SL) Place 0.4 mg under the tongue every 5 minutes as needed for chest pain (Carries medication - has never used)        ondansetron (ZOFRAN) 4 MG tablet Take 1 tablet (4 mg) by mouth every 8 hours as needed for nausea 30 tablet 0     polyethylene glycol (MIRALAX) 17 g packet Take 1 packet by mouth as needed for constipation         Allergies  Allergies   Allergen Reactions     Blood Transfusion Related (Informational Only) Other (See Comments)     Patient has a history of a clinically significant antibody against RBC antigens.  A delay in compatible RBCs may occur.       Social History  Social History     Socioeconomic History     Marital status:      Spouse name: Not on file     Number of  children: 1     Years of education: Not on file     Highest education level: Not on file   Occupational History     Occupation: retired   Social Needs     Financial resource strain: Not on file     Food insecurity     Worry: Not on file     Inability: Not on file     Transportation needs     Medical: Not on file     Non-medical: Not on file   Tobacco Use     Smoking status: Never Smoker     Smokeless tobacco: Never Used   Substance and Sexual Activity     Alcohol use: Not Currently     Alcohol/week: 2.0 standard drinks     Types: 2 Cans of beer per week     Drug use: No     Sexual activity: Not on file   Lifestyle     Physical activity     Days per week: Not on file     Minutes per session: Not on file     Stress: Not on file   Relationships     Social connections     Talks on phone: Not on file     Gets together: Not on file     Attends Gnosticism service: Not on file     Active member of club or organization: Not on file     Attends meetings of clubs or organizations: Not on file     Relationship status: Not on file     Intimate partner violence     Fear of current or ex partner: Not on file     Emotionally abused: Not on file     Physically abused: Not on file     Forced sexual activity: Not on file   Other Topics Concern     Not on file   Social History Narrative    Works for [employer] with [employer].  Lives in [place].   with one grown daughter.  No tobacco, rare Etoh, no drug use.       Family History  Family History   Problem Relation Age of Onset     Skin Cancer Mother      Other - See Comments Father         father passed away in his 60s post-op with an unknown bile duct obstruction.  no anesthesia complication.       Osteoarthritis Sister      Cerebrovascular Disease Brother      Other - See Comments Brother         prediabetes     Osteoarthritis Sister      No Known Problems Brother      Anesthesia Reaction No family hx of      Deep Vein Thrombosis No family hx of        Personally  "reviewed    LABS:  CBC:   Lab Results   Component Value Date    WBC 8.5 01/04/2021    WBC 7.5 12/11/2020    HGB 10.9 (L) 01/04/2021    HGB 11.6 (L) 12/11/2020    HCT 32.8 (L) 01/04/2021    HCT 35.3 (L) 12/11/2020     01/04/2021     12/11/2020     BMP:   Lab Results   Component Value Date     01/04/2021     12/11/2020    POTASSIUM 3.7 01/04/2021    POTASSIUM 4.2 12/11/2020    CHLORIDE 108 01/04/2021    CHLORIDE 106 12/11/2020    CO2 29 01/04/2021    CO2 27 12/11/2020    BUN 24 01/04/2021    BUN 29 12/11/2020    CR 1.08 01/04/2021    CR 1.37 (H) 12/11/2020     (H) 01/04/2021     (H) 12/11/2020     COAGS:   Lab Results   Component Value Date    PTT 33 02/11/2020    INR 1.37 (H) 08/25/2020    FIBR 404 07/30/2019     POC:   Lab Results   Component Value Date     (H) 08/25/2020     OTHER:   Lab Results   Component Value Date    PH 7.38 03/08/2016    LACT 1.4 07/15/2020    GARY 8.8 01/04/2021    PHOS 2.8 02/13/2020    MAG 2.0 02/13/2020    ALBUMIN 2.9 (L) 01/04/2021    PROTTOTAL 5.7 (L) 01/04/2021    ALT 62 01/04/2021    AST 52 (H) 01/04/2021    ALKPHOS 249 (H) 01/04/2021    BILITOTAL 0.7 01/04/2021    LIPASE 207 08/25/2020    AMYLASE 112 (H) 08/25/2020    TAYLOR 24 02/11/2020    TSH 2.34 07/15/2020    CRP 20.0 (H) 02/11/2020        Preop Vitals    BP Readings from Last 3 Encounters:   01/13/21 110/75   12/07/20 120/79   11/04/20 100/70    Pulse Readings from Last 3 Encounters:   01/13/21 87   12/07/20 77   11/04/20 87      Resp Readings from Last 3 Encounters:   01/13/21 16   12/07/20 16   11/04/20 18    SpO2 Readings from Last 3 Encounters:   01/13/21 100%   12/07/20 98%   11/04/20 97%      Temp Readings from Last 1 Encounters:   12/07/20 98.3  F (36.8  C) (Oral)    Ht Readings from Last 1 Encounters:   01/13/21 1.803 m (5' 11\")      Wt Readings from Last 1 Encounters:   01/13/21 62.6 kg (138 lb)    Estimated body mass index is 19.25 kg/m  as calculated from the following:    " "Height as of this encounter: 1.803 m (5' 11\").    Weight as of this encounter: 62.6 kg (138 lb).       ROS/MED HX    The complete review of systems is negative other than noted in the HPI or here.     ENT/Pulmonary:     (+)MARVIN risk factors hypertension, other ENT- raspy, weak voice, , . .   (-) tobacco use and recent URI   Neurologic:  - neg neurologic ROS     Cardiovascular:     (+) Dyslipidemia, hypertension--CAD, --stent,2011, 6/2019  6 Drug Eluting Stent .. Taking blood thinners Pt has received instructions: Instructions Given to patient: Will remain on apixaban. CHF Last EF: 40-45% date: 6/2020 . LYNCH, . :. dysrhythmias a-fib, valvular problems/murmurs type: AS mild to mod aortic stenosis:. Previous cardiac testing Echodate:6/2020results:date: results:ECG reviewed date:2/12/20 results:A fib with RVR date: results:         (-) orthopnea/PND   METS/Exercise Tolerance:  1 - Eating, dressing   Hematologic:     (+) History of Transfusion no previous transfusion reaction Other Hematologic Disorder-Blood antibody     (-) history of blood clots   Musculoskeletal:   (+)  other musculoskeletal- gout- no recent flares      GI/Hepatic: Comment: Weight loss has to eat carefully. Occ vomiting/regurgitation if overeats.     (+) GERD Other, Other GI/Hepatic alternates between constipation and diarrhea      Renal/Genitourinary:     (+) chronic renal disease, type: CRI, Pt does not require dialysis, Pt has no history of transplant, Other Renal/ Genitourinary, right hydronephrosis      Endo:  - neg endo ROS       Psychiatric:  - neg psychiatric ROS       Infectious Disease:  - neg infectious disease ROS      (-) Recent Fever   Malignancy:   (+) Malignancy History of Skin and Other  Skin CA Remission status post Surgery, Other CA cholangiocarcinoma with mets, peritoneal carcinomatosis Active status post Surgery and Chemo         Other:    (+) No chance of pregnancy C-spine cleared: N/A, no H/O Chronic Pain,no other significant " "disability              PHYSICAL EXAM:   Mental Status/Neuro: A/A/O; Age Appropriate   Airway: Facies: Feasible  Mallampati: I  Mouth/Opening: Full  TM distance: > 6 cm  Neck ROM: Full   Respiratory: Auscultation: CTAB     Resp. Rate: Normal     Resp. Effort: Normal      CV: Rhythm: Irregular  Heart: Normal Sounds  Edema: None   Comments:      Dental: Normal Dentition              BP: 110/75 Pulse: 87   Resp: 16 SpO2: 100 %         138 lbs 0 oz  5' 11\"   Body mass index is 19.25 kg/m .       Physical Exam  Constitutional: Awake, alert, cooperative, no apparent distress, and appears stated age. Thin.  Eyes: Pupils equal, round and reactive to light, extra ocular muscles intact, sclera clear, conjunctiva normal. Glasses on.  HENT: Normocephalic, oral pharynx with moist mucus membranes, good dentition. No goiter appreciated. Weak, raspy voice.  Respiratory: Clear to auscultation bilaterally, no crackles or wheezing. No cough or obvious dyspnea.  Cardiovascular: Regular rate and irregular rhythm, normal S1 and S2, and no murmur noted.  Carotids +2, no bruits. No edema. Palpable pulses to radial  DP and PT arteries.   GI: Normal bowel sounds, soft, non-distended, non-tender, no masses palpated. No deep palpation.  Lymph/Hematologic: No cervical lymphadenopathy and no supraclavicular lymphadenopathy.  Genitourinary: Deferred.   Skin: Warm and dry.    Musculoskeletal: Full ROM of neck. There is no redness, warmth, or swelling of the joints. Gross motor strength is weakened.   Neurologic: Awake, alert, oriented to name, place and time. Cranial nerves II-XII are grossly intact. Gait is normal.   Neuropsychiatric: Calm, cooperative. Normal affect.     EKG: Personally reviewed 2/12/20 Atrial fibrillation with RVR  Cardiac echo: 6/2020  1. The left ventricle is normal in size. Left ventricular systolic   performance is mild to moderately reduced. The ejection fraction is   estimated to be 40-45%.   2. There is mild to moderate " global reduction in left ventricular systolic   performance.   3. There is mild concentric increase in left ventricular wall thickness.   4. There is mild to moderate aortic stenosis.   5. Normal right ventricular size and systolic performance.   6. There is mild aortic root enlargement.    When compared to the prior real-time echocardiogram dated 27 September 2019, the findings are felt to be fairly similar in both examinations.  Coronary angiogram 5/6/19  Summary/Conclusions  PRESENTATION / INDICATIONS  * NonSTEMI  * Severe LV dysfunction - EF 31%  VASCULAR ACCESS  * Using ultrasound guidance and a percutaneous technique, the right radial artery was accessed. Ultrasound was used to confirm vessel patency, localizing needle into the lumen of the vessel. An image was saved for the medical record.  DIAGNOSTIC - CORONARY  * Right dominant coronary artery system with heavily calcified vessels.  * The left main artery has mild disease (<25%).  * The LAD has patent proximal and mid-distal vessel stents with mild ISR (25%) - the remainder of the LAD has mild disease - moderate sized ostial/proximal D1 with mild disease - jailed mid D2 with an ostial 99% stenosis (aneurysmal segment) - distal D3 with mild disease.  * The circumflex is heavily calcified with mild-moderate proximal-mid vessel disease - small proximal OM1 with mild disease - large mid vessel OM2 with a 99% proximal-mid stenosis - distal OM3 with minimal disease.  * The RCA has mild-moderate proximal-mid vessel disease followed by a 50-60% distal vessel stenosis - the rPDA has 50% ostial-proximal disease - rPLA with mild disease.  DIAGNOSTIC - FFR   * FFR was used to assess the mid-distal RCA using IV adenosine - the baseline FFR was 0.98 and dropped to 0.88 after achieving peak hyperemia   INTERVENTION  * PCI was attempted on the OM1 - wire able to be advanced into the distal OM2, however, unable to cross the stenosis with 1.20mm balloon - OTW Turnpike LP  unable to cross the stenosis in attempt to exchange wire for a rotawire - attempts made to direct wire with rotawire were also unsuccessful via Turnpike LP and potentially tracked subintimal - given desire to reduce high contrast load, no further intervention performed with recommendation to return in 2-4 weeks for elective PCI of the OM2  HEMODYNAMICS  * The LVEDP is within normal limits  12/11/20 CT CAP                                                                   IMPRESSION: In this patient with history of cholangiocarcinoma the  current scan shows:   1. Postsurgical changes of left hepatectomy and Carol-en-Y  hepaticojejunostomy, with recurrent soft tissue mass extending from  the left hepatectomy bed, involving the proximal biliary limb and  gastric  pylorus, not significantly changed compared to prior CT dated  9/11/2020.  2. Change in orientation of stent traversing through the Axios  gastrojejunostomy stent and gastroduodenal stent  with possible  discontinuity involving the proximal part of the gastroduodenal stent,  concerning for proximal gastroduodenal stent fracture.  3. Mild intrahepatic biliary ductal dilatation, unchanged compared to  prior CT  9/11/2020.  4. Mild gastric distention, not significantly changed from prior exam.  5. Patchy groundglass density with centrilobular nodularities in the  left lung lower lobe  and right lung lower lobe, which may be  secondary to infective/ inflammatory etiology. Recommend attention on  follow-up.  6. Partially exophytic right renal parapelvic cyst; repositioned right  ureteric stent with improved hydronephrosis compared to prior CT  9/11/2020.  7. Persistent indeterminate splenic hypodensities.  8. Stable pelvic soft tissue thickenings.        Imaging and cardiac testing reviewed by this provider    Outside records reviewed from: Care Everywhere    ASSESSMENT and PLAN  Girish Chauhan is a 65 year old male scheduled to undergo CYSTOSCOPY, WITH RETROGRADE  PYELOGRAM AND URETERAL STENT REPLACEMENT with Dr. Walker on 1/15/21. He has the following specific operative considerations:   - RCRI : 0.9% risk of major adverse cardiac event.   - Anesthesia considerations:  Refer to PAC assessment in anesthesia records  - VTE risk: 3%  - MARVIN # of risks 2/8 = Low risk  - If afib, [id number] score 3  Risk category High.    - Risk of PONV score = 2.  If > 2, anti-emetic intervention recommended.    --Hydronephrosis secondary to metastatic cholangiocarcinoma s/p ureteral stent with routine exchanges. Above procedure now planned. Patient prefers MAC and this has worked well recently.  --HLD. atorvastatin. Complex cardiac history as above. Denies cardiac symptoms. Chronic anticoagulation with Eliquis due to atrial fibrillation. Perioperative anticoagulation plan outlined by past stent exchanges is to continue apixaban uninterrupted. Metoprolol. Lasix every other day. All testing above. Last EF 40-45%, mild to moderate aortic stenosis. Able to walk some distance, but activity limited overall.  --Nonsmoker. Denies pulmonary symptoms.   --Gout allopurinol and colchicine prn  --Malnutrition Eating carefully to avoid overeating and regurgitation.  Not using Marinol.  --CRI Last Cr 1.08.  --Past history of blood transfusion.   --History of blood antibody  --Right upper chest port.     Arrival time, NPO, shower and medication instructions provided by nursing staff today.     I also spent 10 minutes providing verbal and written information regarding blood antibody as he asked what the significance was. Questions answered.     Patient is optimized and is acceptable candidate for the proposed procedure.  No further diagnostic evaluation is needed.       YO Carlton CNS  Preoperative Assessment Center  [facility]  [facility]  Phone: [phone]  Fax: [phone]

## 2021-01-13 NOTE — PATIENT INSTRUCTIONS
Preparing for Your Surgery      Name:  Girish Chauhan   MRN:  [id number]   :  1955   Today's Date:  2021       Arriving for surgery:  Surgery date:  01/15/2021  Arrival time:  12:00 noon    Restrictions due to COVID 19:  No visitors are allowed at this time.    [employer] parking is available for anyone with mobility limitations or disabilities.  ([facility]  24 hours/ 7 days a week; [facility]  7 am- 3:30 pm, Mon- Fri)    Please come to:       [facility], [address]  [address] [place]  [address]       -    Please proceed to the Surgery Lounge on the 3rd floor. [phone]?     - ?If you are in need of directions, wheelchair or escort please stop at the Information Desk in the lobby.      What can I eat or drink?  -  You may eat and drink normally for up to 8 hours before your surgery. (Until 6 am)  -  You may have clear liquids until 2 hours before surgery. (Until 12 noon arrival time)    Examples of clear liquids:  Water  Clear broth  Juices (apple, white grape, white cranberry  and cider) without pulp  Noncarbonated, powder based beverages  (lemonade and Grady-Aid)  Sodas (Sprite, 7-Up, ginger ale and seltzer)  Coffee or tea (without milk or cream)  Gatorade    -  No Alcohol for at least 24 hours before surgery     Which medicines can I take?    Hold Aspirin for 7 days before surgery.   Hold Multivitamins for 7 days before surgery.  Hold Supplements for 7 days before surgery.  Hold Ibuprofen (Advil, Motrin) for 1 day before surgery--unless otherwise directed by surgeon.  Hold Naproxen (Aleve) for 4 days before surgery.    Plan for apixaban (Eliquis)---continue uninterrupted     -  DO NOT take these medications the day of surgery:  TUMS  Miralax       -  PLEASE TAKE these medications the day of surgery:  Capecitabine (Xeloda)  Colchicine if needed  Furosemide   Metoprolol  Ondansetron (zofran) if needed         How do I prepare myself?  - Please take 2 showers  before surgery using Scrubcare or Hibiclens soap.    Use this soap only from the neck to your toes.     Leave the soap on your skin for one minute--then rinse thoroughly.      You may use your own shampoo and conditioner; no other hair products.   - Please remove all jewelry and body piercings.  - No lotions, deodorants or fragrance.  - No makeup or fingernail polish.   - Bring your ID and insurance card.    - All patients are required to have a Covid-19 test within 4 days of surgery/procedure.      -Patients will be contacted by the [facility] scheduling team within 1 week of surgery to make an appointment.      - Patients may call the Scheduling team at [phone] if they have not been scheduled within 4 days of  surgery.      ALL PATIENTS GOING HOME THE SAME DAY OF SURGERY ARE REQUIRED TO HAVE A RESPONSIBLE ADULT TO DRIVE AND BE IN ATTENDANCE WITH THEM FOR 24 HOURS FOLLOWING SURGERY     Questions or Concerns:    - For any questions regarding the day of surgery or your hospital stay, please contact the Pre Admission Nursing Office at [phone].       - If you have health changes between today and your surgery please call your surgeon.       For questions after surgery please call your surgeons office.

## 2021-01-13 NOTE — H&P
Pre-Operative H & P     CC:  Preoperative exam to assess for increased cardiopulmonary risk while undergoing surgery and anesthesia.    Date of Encounter: 1/13/2021  Primary Care Physician:  Dario Jain  Hydronephrosis of right kidney [N13.30]  HPI  Girish Chauhan is a 65 year old male who presents for pre-operative H & P in preparation for CYSTOSCOPY, WITH RETROGRADE PYELOGRAM AND URETERAL STENT REPLACEMENT with Dr. Walker on 1/15/21 at [facility]. History is obtained from the patient and medical records.     Patient with history of metastatic cholangiocarcinoma with good control on Xeloda, followed by Dr. De Los Santos. He has hydronephrosis related to his disease and is s/p right ureteral stent placement. He last had stent exchanged on 10/12/20 by Dr. Walker. At that time it was recommended that he return for same procedure in 3 months. Above procedure now planned.     His history is otherwise significant for HLD, chronic atrial fibrillation, anticoagulated, cardiomyopathy, EF 42%,  CAD, with history of multivessel stenting in 2011, NSTEMI in 5/2019, with stenting of OM on 6/17/19, bicuspid aortic valve with mild to moderate aortic stenosis, AAA, gout, peritoneal carcinomatosis, CKD, history of melanoma, and BCC. For his cardiac issues he is followed by Dr. Milton BLUNT, last seen on 1/8/21 with plan for repeat Holter monitor to reassess rate control. This is scheduled for the future.     Past Medical History  Past Medical History:   Diagnosis Date     Anemia      Aortic stenosis due to bicuspid aortic valve      Ascending aortic aneurysm (H)      Atrial fibrillation (H) 2006    hx of paroxysmal atrial fibrillation since approximately 2006. S/p cardioversion in 2013 with recurrent atrial fibrillation s/p repeat cardioversion 9/29/14.     Basal cell carcinoma 01/2016    right shoulder and right posterior calf s/p electrodessication and curretage 1/2016.     CAD  (coronary artery disease) 12/2011    s/p angiogram 12/2011 s/p percutaneous intervention on the LAD with multiple drug-eluting stents complicated by a small perforation in the diagonal branch which sealed spontaneously.  Patient with significant Circumflex stenosis which is being treated conservatively.     Cholangiocarcinoma (H)      CKD (chronic kidney disease)      Gout     affects left foot 2-3 times per year     Hydronephrosis of right kidney      Hyperlipidemia      Hypertension      Liver disease      Melanoma (H) 09/2015    back s/p resection 9/2015     Red blood cell antibody positive      Squamous cell carcinoma     nose s/p excision       Past Surgical History  Past Surgical History:   Procedure Laterality Date     ------------OTHER-------------      multiple skin cancer resections     CARDIOVERSION  2013, 9/2014     COMBINED CYSTOSCOPY, RETROGRADES, EXCHANGE STENT URETER(S) Right 11/11/2019    Procedure: Cystoscopy, Right Retrograde Pyelogram, Right Ureteral Stent Exchange;  Surgeon: Sivan Walker MD;  Location: [facility]     COMBINED CYSTOSCOPY, RETROGRADES, EXCHANGE STENT URETER(S) Right 6/8/2020    Procedure: CYSTOSCOPY, WITH RIGHT RETROGRADE PYELOGRAM AND RIGHT URETERAL STENT EXCHANGE;  Surgeon: Sivan Walker MD;  [facility]     COMBINED CYSTOSCOPY, RETROGRADES, EXCHANGE STENT URETER(S) Right 10/12/2020    Procedure: CYSTOSCOPY, WITH RETROGRADE PYELOGRAM AND RIGHT URETERAL STENT REPLACEMENT;  Surgeon: Sivan Walker MD;  [facility]     CYSTOSCOPY, RETROGRADES, INSERT STENT URETER(S), COMBINED N/A 9/16/2019    Procedure: Cystoscopy, Right Retrograde Pyelogram, Right Ureteral Stent Placement;  Surgeon: Sivan Walker MD;  [facility]     CYSTOSCOPY, RETROGRADES, INSERT STENT URETER(S), COMBINED Right 2/5/2020    Procedure: CYSTOSCOPY, WITH Right RETROGRADE PYELOGRAM AND Right URETERAL STENT INSERTION;  Surgeon: Sivan Walker MD;  [facility]      ENDOSCOPIC RETROGRADE CHOLANGIOPANCREATOGRAM N/A 2/26/2016    Procedure: COMBINED ENDOSCOPIC RETROGRADE CHOLANGIOPANCREATOGRAPHY, PLACE TUBE/STENT;  Surgeon: Rafa Andrade MD;  [facility]     ENDOSCOPIC RETROGRADE CHOLANGIOPANCREATOGRAPHY  2/2014     ENDOSCOPIC ULTRASOUND UPPER GASTROINTESTINAL TRACT (GI) N/A 6/7/2019    Procedure: ENDOSCOPIC ULTRASOUND, with hot axios stent placement;  Surgeon: Gabino Rodriguez MD;  [facility]     ENTEROSCOPY SMALL BOWEL N/A 6/7/2019    Procedure: ENTEROSCOPY;  Surgeon: Gabino Rodriguez MD;  [facility]     ESOPHAGOSCOPY, GASTROSCOPY, DUODENOSCOPY (EGD), COMBINED N/A 10/16/2019    Procedure: Upper Gastrointestinal Endoscopy;  Surgeon: Gabino Rodriguez MD;  [facility]     ESOPHAGOSCOPY, GASTROSCOPY, DUODENOSCOPY (EGD), COMBINED N/A 8/25/2020    Procedure: ESOPHAGOGASTRODUODENOSCOPY (EGD) WIth  duodenal and jejunal stent placement;  Surgeon: Gabino Rodriguez MD;  [facility]     ESOPHAGOSCOPY, GASTROSCOPY, DUODENOSCOPY (EGD), DILATATION, COMBINED N/A 7/29/2019    Procedure: Esophagoscopy, gastroscopy, duodenoscopy (EGD), with stent exchange and dilation;  Surgeon: Gabino Rodriguez MD;  [facility]     EXPLORE COMMON BILE DUCT N/A 3/8/2016    Procedure: EXPLORE COMMON BILE DUCT;  Surgeon: Jose Eduardo Pinedo MD;  [facility]     HEPATECTOMY PARTIAL Left 3/8/2016    Procedure: HEPATECTOMY PARTIAL;  Surgeon: Jose Eduardo Pinedo MD;  [facility]     INSERT PORT VASCULAR ACCESS Right 12/8/2017    Procedure: INSERT PORT VASCULAR ACCESS;  Place single lumen venous chest port - right;  Surgeon: Drew Powell PA-C;  [facility]     STENT  12/2011    LAD with multiple SAM       Hx of Blood transfusions/reactions: Yes in past. No known reactions but has blood antibody.      Hx of abnormal bleeding or anti-platelet use: Anticoagulated on apixaban    Menstrual history: No LMP for male patient.    Steroid use  in the last year: Unknown.    Personal or FH with difficulty with Anesthesia:  Denies.     Prior to Admission Medications  Current Outpatient Medications   Medication Sig Dispense Refill     acetaminophen (TYLENOL) 500 MG tablet Take 500 mg by mouth every 6 hours as needed for fever or pain       allopurinol (ZYLOPRIM) 100 MG tablet Take 100 mg by mouth every evening        apixaban ANTICOAGULANT (ELIQUIS ANTICOAGULANT) 5 MG tablet Take 5 mg by mouth 2 times daily        atorvastatin (LIPITOR) 40 MG tablet Take 40 mg by mouth every evening        calcium carbonate (TUMS) 500 MG chewable tablet Take 1-3 chew tab by mouth 4 times daily as needed for heartburn        capecitabine (XELODA) 500 MG tablet Take 3 tablets (1,500 mg) by mouth 2 times daily for 14 days Days 1 through 14, then off for 14 days. Take within 30 mins after meal. 84 tablet 0     colchicine (COLCYRS) 0.6 MG tablet Take 0.6 mg by mouth 3 times daily as needed (gout flare)        furosemide (LASIX) 20 MG tablet Take 20 mg by mouth every other day        metoprolol tartrate (LOPRESSOR) 50 MG tablet Take 150 mg by mouth 2 times daily        multivitamin w/minerals (THERA-VIT-M) tablet Take 1 tablet by mouth every morning        Nitroglycerin (NITROSTAT SL) Place 0.4 mg under the tongue every 5 minutes as needed for chest pain (Carries medication - has never used)        ondansetron (ZOFRAN) 4 MG tablet Take 1 tablet (4 mg) by mouth every 8 hours as needed for nausea 30 tablet 0     polyethylene glycol (MIRALAX) 17 g packet Take 1 packet by mouth as needed for constipation         Allergies  Allergies   Allergen Reactions     Blood Transfusion Related (Informational Only) Other (See Comments)     Patient has a history of a clinically significant antibody against RBC antigens.  A delay in compatible RBCs may occur.       Social History  Social History     Socioeconomic History     Marital status:      Spouse name: Not on file     Number of  children: 1     Years of education: Not on file     Highest education level: Not on file   Occupational History     Occupation: retired   Social Needs     Financial resource strain: Not on file     Food insecurity     Worry: Not on file     Inability: Not on file     Transportation needs     Medical: Not on file     Non-medical: Not on file   Tobacco Use     Smoking status: Never Smoker     Smokeless tobacco: Never Used   Substance and Sexual Activity     Alcohol use: Not Currently     Alcohol/week: 2.0 standard drinks     Types: 2 Cans of beer per week     Drug use: No     Sexual activity: Not on file   Lifestyle     Physical activity     Days per week: Not on file     Minutes per session: Not on file     Stress: Not on file   Relationships     Social connections     Talks on phone: Not on file     Gets together: Not on file     Attends Gnosticist service: Not on file     Active member of club or organization: Not on file     Attends meetings of clubs or organizations: Not on file     Relationship status: Not on file     Intimate partner violence     Fear of current or ex partner: Not on file     Emotionally abused: Not on file     Physically abused: Not on file     Forced sexual activity: Not on file   Other Topics Concern     Not on file   Social History Narrative    Works for [employer] with [employer] system.  Lives in [place].   with one grown daughter.  No tobacco, rare Etoh, no drug use.       Family History  Family History   Problem Relation Age of Onset     Skin Cancer Mother      Other - See Comments Father         father passed away in his 60s post-op with an unknown bile duct obstruction.  no anesthesia complication.       Osteoarthritis Sister      Cerebrovascular Disease Brother      Other - See Comments Brother         prediabetes     Osteoarthritis Sister      No Known Problems Brother      Anesthesia Reaction No family hx of      Deep Vein Thrombosis No family hx of        Personally  "reviewed    LABS:  CBC:   Lab Results   Component Value Date    WBC 8.5 01/04/2021    WBC 7.5 12/11/2020    HGB 10.9 (L) 01/04/2021    HGB 11.6 (L) 12/11/2020    HCT 32.8 (L) 01/04/2021    HCT 35.3 (L) 12/11/2020     01/04/2021     12/11/2020     BMP:   Lab Results   Component Value Date     01/04/2021     12/11/2020    POTASSIUM 3.7 01/04/2021    POTASSIUM 4.2 12/11/2020    CHLORIDE 108 01/04/2021    CHLORIDE 106 12/11/2020    CO2 29 01/04/2021    CO2 27 12/11/2020    BUN 24 01/04/2021    BUN 29 12/11/2020    CR 1.08 01/04/2021    CR 1.37 (H) 12/11/2020     (H) 01/04/2021     (H) 12/11/2020     COAGS:   Lab Results   Component Value Date    PTT 33 02/11/2020    INR 1.37 (H) 08/25/2020    FIBR 404 07/30/2019     POC:   Lab Results   Component Value Date     (H) 08/25/2020     OTHER:   Lab Results   Component Value Date    PH 7.38 03/08/2016    LACT 1.4 07/15/2020    GARY 8.8 01/04/2021    PHOS 2.8 02/13/2020    MAG 2.0 02/13/2020    ALBUMIN 2.9 (L) 01/04/2021    PROTTOTAL 5.7 (L) 01/04/2021    ALT 62 01/04/2021    AST 52 (H) 01/04/2021    ALKPHOS 249 (H) 01/04/2021    BILITOTAL 0.7 01/04/2021    LIPASE 207 08/25/2020    AMYLASE 112 (H) 08/25/2020    TAYLOR 24 02/11/2020    TSH 2.34 07/15/2020    CRP 20.0 (H) 02/11/2020        Preop Vitals    BP Readings from Last 3 Encounters:   01/13/21 110/75   12/07/20 120/79   11/04/20 100/70    Pulse Readings from Last 3 Encounters:   01/13/21 87   12/07/20 77   11/04/20 87      Resp Readings from Last 3 Encounters:   01/13/21 16   12/07/20 16   11/04/20 18    SpO2 Readings from Last 3 Encounters:   01/13/21 100%   12/07/20 98%   11/04/20 97%      Temp Readings from Last 1 Encounters:   12/07/20 98.3  F (36.8  C) (Oral)    Ht Readings from Last 1 Encounters:   01/13/21 1.803 m (5' 11\")      Wt Readings from Last 1 Encounters:   01/13/21 62.6 kg (138 lb)    Estimated body mass index is 19.25 kg/m  as calculated from the following:    " "Height as of this encounter: 1.803 m (5' 11\").    Weight as of this encounter: 62.6 kg (138 lb).       ROS/MED HX    The complete review of systems is negative other than noted in the HPI or here.     ENT/Pulmonary:     (+)MARVIN risk factors hypertension, other ENT- raspy, weak voice, , . .   (-) tobacco use and recent URI   Neurologic:  - neg neurologic ROS     Cardiovascular:     (+) Dyslipidemia, hypertension--CAD, --stent,2011, 6/2019  6 Drug Eluting Stent .. Taking blood thinners Pt has received instructions: Instructions Given to patient: Will remain on apixaban. CHF Last EF: 40-45% date: 6/2020 . LYNCH, . :. dysrhythmias a-fib, valvular problems/murmurs type: AS mild to mod aortic stenosis:. Previous cardiac testing Echodate:6/2020results:date: results:ECG reviewed date:2/12/20 results:A fib with RVR date: results:         (-) orthopnea/PND   METS/Exercise Tolerance:  1 - Eating, dressing   Hematologic:     (+) History of Transfusion no previous transfusion reaction Other Hematologic Disorder-Blood antibody     (-) history of blood clots   Musculoskeletal:   (+)  other musculoskeletal- gout- no recent flares      GI/Hepatic: Comment: Weight loss has to eat carefully. Occ vomiting/regurgitation if overeats.     (+) GERD Other, Other GI/Hepatic alternates between constipation and diarrhea      Renal/Genitourinary:     (+) chronic renal disease, type: CRI, Pt does not require dialysis, Pt has no history of transplant, Other Renal/ Genitourinary, right hydronephrosis      Endo:  - neg endo ROS       Psychiatric:  - neg psychiatric ROS       Infectious Disease:  - neg infectious disease ROS      (-) Recent Fever   Malignancy:   (+) Malignancy History of Skin and Other  Skin CA Remission status post Surgery, Other CA cholangiocarcinoma with mets, peritoneal carcinomatosis Active status post Surgery and Chemo         Other:    (+) No chance of pregnancy C-spine cleared: N/A, no H/O Chronic Pain,no other significant " "disability              PHYSICAL EXAM:   Mental Status/Neuro: A/A/O; Age Appropriate   Airway: Facies: Feasible  Mallampati: I  Mouth/Opening: Full  TM distance: > 6 cm  Neck ROM: Full   Respiratory: Auscultation: CTAB     Resp. Rate: Normal     Resp. Effort: Normal      CV: Rhythm: Irregular  Heart: Normal Sounds  Edema: None   Comments:      Dental: Normal Dentition              BP: 110/75 Pulse: 87   Resp: 16 SpO2: 100 %         138 lbs 0 oz  5' 11\"   Body mass index is 19.25 kg/m .       Physical Exam  Constitutional: Awake, alert, cooperative, no apparent distress, and appears stated age. Thin.  Eyes: Pupils equal, round and reactive to light, extra ocular muscles intact, sclera clear, conjunctiva normal. Glasses on.  HENT: Normocephalic, oral pharynx with moist mucus membranes, good dentition. No goiter appreciated. Weak, raspy voice.  Respiratory: Clear to auscultation bilaterally, no crackles or wheezing. No cough or obvious dyspnea.  Cardiovascular: Regular rate and irregular rhythm, normal S1 and S2, and no murmur noted.  Carotids +2, no bruits. No edema. Palpable pulses to radial  DP and PT arteries.   GI: Normal bowel sounds, soft, non-distended, non-tender, no masses palpated. No deep palpation.  Lymph/Hematologic: No cervical lymphadenopathy and no supraclavicular lymphadenopathy.  Genitourinary: Deferred.   Skin: Warm and dry.    Musculoskeletal: Full ROM of neck. There is no redness, warmth, or swelling of the joints. Gross motor strength is weakened.   Neurologic: Awake, alert, oriented to name, place and time. Cranial nerves II-XII are grossly intact. Gait is normal.   Neuropsychiatric: Calm, cooperative. Normal affect.     EKG: Personally reviewed 2/12/20 Atrial fibrillation with RVR  Cardiac echo: 6/2020  1. The left ventricle is normal in size. Left ventricular systolic   performance is mild to moderately reduced. The ejection fraction is   estimated to be 40-45%.   2. There is mild to moderate " global reduction in left ventricular systolic   performance.   3. There is mild concentric increase in left ventricular wall thickness.   4. There is mild to moderate aortic stenosis.   5. Normal right ventricular size and systolic performance.   6. There is mild aortic root enlargement.    When compared to the prior real-time echocardiogram dated 27 September 2019, the findings are felt to be fairly similar in both examinations.  Coronary angiogram 5/6/19  Summary/Conclusions  PRESENTATION / INDICATIONS  * NonSTEMI  * Severe LV dysfunction - EF 31%  VASCULAR ACCESS  * Using ultrasound guidance and a percutaneous technique, the right radial artery was accessed. Ultrasound was used to confirm vessel patency, localizing needle into the lumen of the vessel. An image was saved for the medical record.  DIAGNOSTIC - CORONARY  * Right dominant coronary artery system with heavily calcified vessels.  * The left main artery has mild disease (<25%).  * The LAD has patent proximal and mid-distal vessel stents with mild ISR (25%) - the remainder of the LAD has mild disease - moderate sized ostial/proximal D1 with mild disease - jailed mid D2 with an ostial 99% stenosis (aneurysmal segment) - distal D3 with mild disease.  * The circumflex is heavily calcified with mild-moderate proximal-mid vessel disease - small proximal OM1 with mild disease - large mid vessel OM2 with a 99% proximal-mid stenosis - distal OM3 with minimal disease.  * The RCA has mild-moderate proximal-mid vessel disease followed by a 50-60% distal vessel stenosis - the rPDA has 50% ostial-proximal disease - rPLA with mild disease.  DIAGNOSTIC - FFR   * FFR was used to assess the mid-distal RCA using IV adenosine - the baseline FFR was 0.98 and dropped to 0.88 after achieving peak hyperemia   INTERVENTION  * PCI was attempted on the OM1 - wire able to be advanced into the distal OM2, however, unable to cross the stenosis with 1.20mm balloon - OTW Turnpike LP  unable to cross the stenosis in attempt to exchange wire for a rotawire - attempts made to direct wire with rotawire were also unsuccessful via Turnpike LP and potentially tracked subintimal - given desire to reduce high contrast load, no further intervention performed with recommendation to return in 2-4 weeks for elective PCI of the OM2  HEMODYNAMICS  * The LVEDP is within normal limits  12/11/20 CT CAP                                                                   IMPRESSION: In this patient with history of cholangiocarcinoma the  current scan shows:   1. Postsurgical changes of left hepatectomy and Carol-en-Y  hepaticojejunostomy, with recurrent soft tissue mass extending from  the left hepatectomy bed, involving the proximal biliary limb and  gastric  pylorus, not significantly changed compared to prior CT dated  9/11/2020.  2. Change in orientation of stent traversing through the Axios  gastrojejunostomy stent and gastroduodenal stent  with possible  discontinuity involving the proximal part of the gastroduodenal stent,  concerning for proximal gastroduodenal stent fracture.  3. Mild intrahepatic biliary ductal dilatation, unchanged compared to  prior CT  9/11/2020.  4. Mild gastric distention, not significantly changed from prior exam.  5. Patchy groundglass density with centrilobular nodularities in the  left lung lower lobe  and right lung lower lobe, which may be  secondary to infective/ inflammatory etiology. Recommend attention on  follow-up.  6. Partially exophytic right renal parapelvic cyst; repositioned right  ureteric stent with improved hydronephrosis compared to prior CT  9/11/2020.  7. Persistent indeterminate splenic hypodensities.  8. Stable pelvic soft tissue thickenings.        Imaging and cardiac testing reviewed by this provider    Outside records reviewed from: Care Everywhere    ASSESSMENT and PLAN  Girish Chauhan is a 65 year old male scheduled to undergo CYSTOSCOPY, WITH RETROGRADE  PYELOGRAM AND URETERAL STENT REPLACEMENT with Dr. Walker on 1/15/21. He has the following specific operative considerations:   - RCRI : 0.9% risk of major adverse cardiac event.   - Anesthesia considerations:  Refer to PAC assessment in anesthesia records  - VTE risk: 3%  - MARVIN # of risks 2/8 = Low risk  - If afib, [id number] score 3  Risk category High.    - Risk of PONV score = 2.  If > 2, anti-emetic intervention recommended.    --Hydronephrosis secondary to metastatic cholangiocarcinoma s/p ureteral stent with routine exchanges. Above procedure now planned. Patient prefers MAC and this has worked well recently.  --HLD. atorvastatin. Complex cardiac history as above. Denies cardiac symptoms. Chronic anticoagulation with Eliquis due to atrial fibrillation. Perioperative anticoagulation plan outlined by past stent exchanges is to continue apixaban uninterrupted. Metoprolol. Lasix every other day. All testing above. Last EF 40-45%, mild to moderate aortic stenosis. Able to walk some distance, but activity limited overall.  --Nonsmoker. Denies pulmonary symptoms.   --Gout allopurinol and colchicine prn  --Malnutrition Eating carefully to avoid overeating and regurgitation.  Not using Marinol.  --CRI Last Cr 1.08.  --Past history of blood transfusion.   --History of blood antibody  --Right upper chest port.     Arrival time, NPO, shower and medication instructions provided by nursing staff today.     I also spent 10 minutes providing verbal and written information regarding blood antibody as he asked what the significance was. Questions answered.     Patient is optimized and is acceptable candidate for the proposed procedure.  No further diagnostic evaluation is needed.       YO Carlton CNS  Preoperative Assessment Center  [facility]  Phone: [phone]  Fax: [phone]

## 2021-01-13 NOTE — ANESTHESIA PREPROCEDURE EVALUATION
Anesthesia Pre-Procedure Evaluation    Patient: Girish Chauhan   MRN:     [id number] Gender:   male   Age:    65 year old :      1955        Preoperative Diagnosis: Hydronephrosis of right kidney [N13.30]   Procedure(s):  CYSTOSCOPY, WITH RETROGRADE PYELOGRAM AND URETERAL STENT REPLACEMENT     LABS:  CBC:   Lab Results   Component Value Date    WBC 8.5 2021    WBC 7.5 2020    HGB 10.9 (L) 2021    HGB 11.6 (L) 2020    HCT 32.8 (L) 2021    HCT 35.3 (L) 2020     2021     2020     BMP:   Lab Results   Component Value Date     2021     2020    POTASSIUM 3.7 2021    POTASSIUM 4.2 2020    CHLORIDE 108 2021    CHLORIDE 106 2020    CO2 29 2021    CO2 27 2020    BUN 24 2021    BUN 29 2020    CR 1.08 2021    CR 1.37 (H) 2020     (H) 2021     (H) 2020     COAGS:   Lab Results   Component Value Date    PTT 33 2020    INR 1.37 (H) 2020    FIBR 404 2019     POC:   Lab Results   Component Value Date     (H) 2020     OTHER:   Lab Results   Component Value Date    PH 7.38 2016    LACT 1.4 07/15/2020    GARY 8.8 2021    PHOS 2.8 2020    MAG 2.0 2020    ALBUMIN 2.9 (L) 2021    PROTTOTAL 5.7 (L) 2021    ALT 62 2021    AST 52 (H) 2021    ALKPHOS 249 (H) 2021    BILITOTAL 0.7 2021    LIPASE 207 2020    AMYLASE 112 (H) 2020    TAYLOR 24 2020    TSH 2.34 07/15/2020    CRP 20.0 (H) 2020        Preop Vitals    BP Readings from Last 3 Encounters:   21 110/75   20 120/79   20 100/70    Pulse Readings from Last 3 Encounters:   21 87   20 77   20 87      Resp Readings from Last 3 Encounters:   21 16   20 16   20 18    SpO2 Readings from Last 3 Encounters:   21 100%   20 98%   20 97%     "  Temp Readings from Last 1 Encounters:   12/07/20 98.3  F (36.8  C) (Oral)    Ht Readings from Last 1 Encounters:   01/13/21 1.803 m (5' 11\")      Wt Readings from Last 1 Encounters:   01/13/21 62.6 kg (138 lb)    Estimated body mass index is 19.25 kg/m  as calculated from the following:    Height as of this encounter: 1.803 m (5' 11\").    Weight as of this encounter: 62.6 kg (138 lb).     LDA:  Port A Cath Single 12/08/17 Right Chest wall (Active)   Access Date 01/04/21 01/04/21 1300   Access Attempts 1 01/04/21 1300   Gauge Noncoring 90 degree bend;20 gauge;3/4 inch 01/04/21 1300   Site Assessment WDL 01/04/21 1300   Line Status Blood return noted;Heparin locked 01/04/21 1300   Extravasation? No 01/04/21 1300   Dressing Intervention Gauze 01/04/21 1300   Number of days: 1132        Past Medical History:   Diagnosis Date     Anemia      Aortic stenosis due to bicuspid aortic valve      Ascending aortic aneurysm (H)      Atrial fibrillation (H) 2006    hx of paroxysmal atrial fibrillation since approximately 2006. S/p cardioversion in 2013 with recurrent atrial fibrillation s/p repeat cardioversion 9/29/14.     Basal cell carcinoma 01/2016    right shoulder and right posterior calf s/p electrodessication and curretage 1/2016.     CAD (coronary artery disease) 12/2011    s/p angiogram 12/2011 s/p percutaneous intervention on the LAD with multiple drug-eluting stents complicated by a small perforation in the diagonal branch which sealed spontaneously.  Patient with significant Circumflex stenosis which is being treated conservatively.     Cholangiocarcinoma (H)      CKD (chronic kidney disease)      Gout     affects left foot 2-3 times per year     Hydronephrosis of right kidney      Hyperlipidemia      Hypertension      Liver disease      Melanoma (H) 09/2015    back s/p resection 9/2015     Red blood cell antibody positive      Squamous cell carcinoma     nose s/p excision      Past Surgical History:   Procedure " Laterality Date     ------------OTHER-------------      multiple skin cancer resections     CARDIOVERSION  2013, 9/2014     COMBINED CYSTOSCOPY, RETROGRADES, EXCHANGE STENT URETER(S) Right 11/11/2019    Procedure: Cystoscopy, Right Retrograde Pyelogram, Right Ureteral Stent Exchange;  Surgeon: Svian Walker MD;  [facility]     COMBINED CYSTOSCOPY, RETROGRADES, EXCHANGE STENT URETER(S) Right 6/8/2020    Procedure: CYSTOSCOPY, WITH RIGHT RETROGRADE PYELOGRAM AND RIGHT URETERAL STENT EXCHANGE;  Surgeon: Sivan Walker MD;  [facility]     COMBINED CYSTOSCOPY, RETROGRADES, EXCHANGE STENT URETER(S) Right 10/12/2020    Procedure: CYSTOSCOPY, WITH RETROGRADE PYELOGRAM AND RIGHT URETERAL STENT REPLACEMENT;  Surgeon: Sivan Walker MD;  [facility]     CYSTOSCOPY, RETROGRADES, INSERT STENT URETER(S), COMBINED N/A 9/16/2019    Procedure: Cystoscopy, Right Retrograde Pyelogram, Right Ureteral Stent Placement;  Surgeon: Sivan Walker MD;  [facility]     CYSTOSCOPY, RETROGRADES, INSERT STENT URETER(S), COMBINED Right 2/5/2020    Procedure: CYSTOSCOPY, WITH Right RETROGRADE PYELOGRAM AND Right URETERAL STENT INSERTION;  Surgeon: Sivan Walker MD;  [facility]     ENDOSCOPIC RETROGRADE CHOLANGIOPANCREATOGRAM N/A 2/26/2016    Procedure: COMBINED ENDOSCOPIC RETROGRADE CHOLANGIOPANCREATOGRAPHY, PLACE TUBE/STENT;  Surgeon: Rafa Andrade MD;  [facility]     ENDOSCOPIC RETROGRADE CHOLANGIOPANCREATOGRAPHY  2/2014     ENDOSCOPIC ULTRASOUND UPPER GASTROINTESTINAL TRACT (GI) N/A 6/7/2019    Procedure: ENDOSCOPIC ULTRASOUND, with hot axios stent placement;  Surgeon: Gabino Rodriguez MD;  [facility]     ENTEROSCOPY SMALL BOWEL N/A 6/7/2019    Procedure: ENTEROSCOPY;  Surgeon: Gabino Rodriguez MD;  [facility]     ESOPHAGOSCOPY, GASTROSCOPY, DUODENOSCOPY (EGD), COMBINED N/A 10/16/2019    Procedure: Upper Gastrointestinal Endoscopy;  Surgeon:  Gabino Rodriguez MD;  [facility]     ESOPHAGOSCOPY, GASTROSCOPY, DUODENOSCOPY (EGD), COMBINED N/A 8/25/2020    Procedure: ESOPHAGOGASTRODUODENOSCOPY (EGD) WIth  duodenal and jejunal stent placement;  Surgeon: Gabino Rodriguez MD;  [facility]     ESOPHAGOSCOPY, GASTROSCOPY, DUODENOSCOPY (EGD), DILATATION, COMBINED N/A 7/29/2019    Procedure: Esophagoscopy, gastroscopy, duodenoscopy (EGD), with stent exchange and dilation;  Surgeon: Gabino Rodriguez MD;  [facility]     EXPLORE COMMON BILE DUCT N/A 3/8/2016    Procedure: EXPLORE COMMON BILE DUCT;  Surgeon: Jose Eduardo Pinedo MD;  [facility]     HEPATECTOMY PARTIAL Left 3/8/2016    Procedure: HEPATECTOMY PARTIAL;  Surgeon: Jose Eduardo Pinedo MD;  [facility]     INSERT PORT VASCULAR ACCESS Right 12/8/2017    Procedure: INSERT PORT VASCULAR ACCESS;  Place single lumen venous chest port - right;  Surgeon: Drew Powell PA-C;  [facility]     STENT  12/2011    LAD with multiple SAM      Allergies   Allergen Reactions     Blood Transfusion Related (Informational Only) Other (See Comments)     Patient has a history of a clinically significant antibody against RBC antigens.  A delay in compatible RBCs may occur.        Anesthesia Evaluation     . Pt has had prior anesthetic. Type: General and MAC    No history of anesthetic complications          ROS/MED HX    ENT/Pulmonary:     (+)MARVIN risk factors hypertension, other ENT- raspy, weak voice, , . .   (-) tobacco use and recent URI   Neurologic:  - neg neurologic ROS     Cardiovascular:     (+) Dyslipidemia, hypertension--CAD, --stent,2011, 6/2019  6 Drug Eluting Stent .. Taking blood thinners Pt has received instructions: Instructions Given to patient: Will remain on apixaban. CHF Last EF: 40-45% date: 6/2020 . LYNCH, . :. dysrhythmias a-fib, valvular problems/murmurs type: AS mild to mod aortic stenosis:. Previous cardiac testing Echodate:6/2020results:date: results:ECG  reviewed date:2/12/20 results:A fib with RVR date: results:         (-) orthopnea/PND   METS/Exercise Tolerance:  1 - Eating, dressing   Hematologic:     (+) History of Transfusion no previous transfusion reaction Other Hematologic Disorder-Blood antibody     (-) history of blood clots   Musculoskeletal:   (+)  other musculoskeletal- gout- no recent flares      GI/Hepatic: Comment: Weight loss has to eat carefully. Occ vomiting/regurgitation if overeats.     (+) GERD Other, Other GI/Hepatic alternates between constipation and diarrhea      Renal/Genitourinary:     (+) chronic renal disease, type: CRI, Pt does not require dialysis, Pt has no history of transplant, Other Renal/ Genitourinary, right hydronephrosis      Endo:  - neg endo ROS       Psychiatric:  - neg psychiatric ROS       Infectious Disease:  - neg infectious disease ROS      (-) Recent Fever   Malignancy:   (+) Malignancy History of Skin and Other  Skin CA Remission status post Surgery, Other CA cholangiocarcinoma with mets, peritoneal carcinomatosis Active status post Surgery and Chemo         Other:    (+) No chance of pregnancy C-spine cleared: N/A, no H/O Chronic Pain,no other significant disability                        PHYSICAL EXAM:   Mental Status/Neuro: A/A/O; Age Appropriate   Airway: Facies: Feasible  Mallampati: II  Mouth/Opening: Full  TM distance: > 6 cm  Neck ROM: Full   Respiratory: Auscultation: CTAB     Resp. Rate: Normal     Resp. Effort: Normal      CV: Rhythm: Irregular  Heart: Normal Sounds  Edema: None   Comments:      Dental: Normal Dentition                Assessment:   ASA SCORE: 3    H&P: History and physical reviewed and following examination; no interval change.   Smoking Status:  Non-Smoker/Unknown   NPO Status: ELEVATED Aspiration Risk/Unknown     Plan:   Anes. Type:  MAC   Pre-Medication: None   Induction:  N/a   Airway: Native Airway   Access/Monitoring: PIV   Maintenance: Propofol Sedation     Postop Plan:   Postop  Pain: Opioids  Postop Sedation/Airway: Not planned  Disposition: Outpatient     PONV Management:   Adult Risk Factors:, Non-Smoker, Postop Opioids   Prevention:, Propofol     CONSENT: Direct conversation   Plan and risks discussed with: Patient   Blood Products: Consent Deferred (Minimal Blood Loss)       Comments for Plan/Consent:  Discussed risks and benefits of general ETT anesthesia with patient as well as MAC as he has had before with this procedure/.  Questions answered, patient states understanding and wishes to proceed with MAC anesthesia.                PAC Discussion and Assessment    ASA Classification: 3  Case is suitable for: [place]  Anesthetic techniques and relevant risks discussed: MAC with GA as backup  Invasive monitoring and risk discussed: No  Types:   Possibility and Risk of blood transfusion discussed: No  NPO instructions given:   Additional anesthetic preparation and risks discussed:   Needs early admission to pre-op area:   Other:     PAC Resident/NP Anesthesia Assessment:  Girish Chauhan is a 65 year old male scheduled to undergo CYSTOSCOPY, WITH RETROGRADE PYELOGRAM AND URETERAL STENT REPLACEMENT with Dr. Walker on 1/15/21. He has the following specific operative considerations:   - RCRI : 0.9% risk of major adverse cardiac event.   - VTE risk: 3%  - MARVIN # of risks 2/8 = Low risk  - If afib, [id number] score 3  Risk category High.    - Risk of PONV score = 2.  If > 2, anti-emetic intervention recommended.    --Hydronephrosis secondary to metastatic cholangiocarcinoma s/p ureteral stent with routine exchanges. Above procedure now planned. Patient prefers MAC and this has worked well recently.  --HLD. atorvastatin. Complex cardiac history as above. Denies cardiac symptoms. Chronic anticoagulation with Eliquis due to atrial fibrillation. Perioperative anticoagulation plan outlined by past stent exchanges is to continue apixaban uninterrupted. Metoprolol. Lasix every other day. All testing  above. Last EF 40-45%, mild to moderate aortic stenosis. Able to walk some distance, but activity limited overall.  --Nonsmoker. Denies pulmonary symptoms.   --Gout allopurinol and colchicine prn  --Malnutrition Eating carefully to avoid overeating and regurgitation.  Not using Marinol.  --CRI Last Cr 1.08.  --Past history of blood transfusion.   --History of blood antibody  --Right upper chest port.   --Will take morning meds.        Patient is optimized and is acceptable candidate for the proposed procedure.  No further diagnostic evaluation is needed.       Reviewed and Signed by PAC Mid-Level Provider/Resident  Mid-Level Provider/Resident: YO Jorge, CNS  Date: 1/13/21  Time: 10:29am    Attending Anesthesiologist Anesthesia Assessment:        Anesthesiologist:   Date:   Time:   Pass/Fail:   Disposition:     PAC Pharmacist Assessment:        Pharmacist:   Date:   Time:    YO Carlton

## 2021-01-14 NOTE — ORAL ONC MGMT
Oral Chemotherapy Monitoring Program    Patient placed call to [facility] oral chemo pharmacist with medication question. He started azasite eye drops x 2 weeks for eye infection. No drug interactions with capecitabine. Reports no other medication changes. Reports HFS is about the same and continues current OTC interventions.     Daniel Morales, PharmD  Hematology/Oncology Clinical Pharmacist  [facility]  [facility]

## 2021-01-15 NOTE — ANESTHESIA CARE TRANSFER NOTE
Patient: Girish Chauhan    Procedure(s):  CYSTOSCOPY, WITH RETROGRADE PYELOGRAM AND URETERAL STENT REPLACEMENT    Diagnosis: Hydronephrosis of right kidney [N13.30]  Diagnosis Additional Information: No value filed.    Anesthesia Type:   MAC     Note:  Airway :Room Air  Patient transferred to:Phase II  Handoff Report: Identifed the Patient, Identified the Reponsible Provider, Reviewed the pertinent medical history, Discussed the surgical course, Reviewed Intra-OP anesthesia mangement and issues during anesthesia, Set expectations for post-procedure period and Allowed opportunity for questions and acknowledgement of understanding      Vitals: (Last set prior to Anesthesia Care Transfer)    CRNA VITALS  1/15/2021 1602 - 1/15/2021 1642      1/15/2021             Pulse:  80    Ht Rate:  91    SpO2:  100 %                Electronically Signed By: YO Bonilla CRNA  January 15, 2021  4:42 PM

## 2021-01-15 NOTE — ANESTHESIA POSTPROCEDURE EVALUATION
Anesthesia POST Procedure Evaluation    Patient: Girish Chauhan   MRN:     [id number] Gender:   male   Age:    65 year old :      1955        Preoperative Diagnosis: Hydronephrosis of right kidney [N13.30]   Procedure(s):  CYSTOSCOPY, WITH RETROGRADE PYELOGRAM AND URETERAL STENT REPLACEMENT   Postop Comments: No value filed.     Anesthesia Type: MAC          Postop Pain Control: Uneventful            Sign Out: Well controlled pain   PONV: No   Neuro/Psych: Uneventful            Sign Out: Acceptable/Baseline neuro status   Airway/Respiratory: Uneventful            Sign Out: Acceptable/Baseline resp. status   CV/Hemodynamics: Uneventful            Sign Out: Acceptable CV status   Other NRE: NONE   DID A NON-ROUTINE EVENT OCCUR? No         Last Anesthesia Record Vitals:  CRNA VITALS  1/15/2021 1602 - 1/15/2021 1702      1/15/2021             NIBP:  93/71    Ht Rate:  75    SpO2:  100 %    EKG:  Atrial fibrillation          Last PACU Vitals:  Vitals Value Taken Time   BP 93/71 01/15/21 1636   Temp     Pulse 79 01/15/21 1636   Resp 18 01/15/21 1636   SpO2 100 % 01/15/21 1636   Temp src     NIBP 93/71 01/15/21 1636   Pulse     SpO2 100 % 01/15/21 1636   Resp     Temp     Ht Rate 75 01/15/21 1636   Temp 2           Electronically Signed By: Emma Varghese MD, January 15, 2021, 5:55 PM

## 2021-01-15 NOTE — DISCHARGE INSTRUCTIONS
[facility]  Same-Day Surgery   Adult Discharge Orders & Instructions     For 24 hours after surgery    1. Get plenty of rest.  A responsible adult must stay with you for at least 24 hours after you leave the hospital.   2. Do not drive or use heavy equipment.  If you have weakness or tingling, don't drive or use heavy equipment until this feeling goes away.  3. Do not drink alcohol.  4. Avoid strenuous or risky activities.  Ask for help when climbing stairs.   5. You may feel lightheaded.  IF so, sit for a few minutes before standing.  Have someone help you get up.   6. If you have nausea (feel sick to your stomach): Drink only clear liquids such as apple juice, ginger ale, broth or 7-Up.  Rest may also help.  Be sure to drink enough fluids.  Move to a regular diet as you feel able.  7. You may have a slight fever. Call the doctor if your fever is over 100 F (37.7 C) (taken under the tongue) or lasts longer than 24 hours.  8. You may have a dry mouth, a sore throat, muscle aches or trouble sleeping.  These should go away after 24 hours.  9. Do not make important or legal decisions.   Call your doctor for any of the followin.  Signs of infection (fever, growing tenderness at the surgery site, a large amount of drainage or bleeding, severe pain, foul-smelling drainage, redness, swelling).    2. It has been over 8 to 10 hours since surgery and you are still not able to urinate (pass water).    3.  Headache for over 24 hours.    To contact a doctor, call Doctor Walker's clinic at  [phone] or:=      [phone] and ask for the resident on call for Urology (answered 24 hours a day)      Emergency Department:    [facility]: [phone]       (TTY for hearing impaired: [phone])

## 2021-01-16 NOTE — PLAN OF CARE
"Pt Phase 2 stay extended due low temps. Best reading was 96.1 Axillary. Multiple RNs and thermometers tried. Bear Hugger, warm blankets, and pt jackets tried. MD Esquivel approved discharging with temp 96.1 axillary. During discharge instructions with wife, pt had phone on side table with water cup. Water was knocked off table and fell to floor along with pts phone. No water was visible on pts phone when picked up by writer, but pt requested Patient relations contact number to fill out form patient states that his phone is acting \"different\" at this time. Patient relations number provided to patient.   "

## 2021-01-16 NOTE — PLAN OF CARE
MD Esquivel called via phone. Pt still in Phase 2 area awaiting discharge pending temperature. 96.1 Axillary was best reading. mulitple RNs and thermometers tried. Is it ok to discharge pt? MD called back and said ok to discharge. Thanks!

## 2021-02-01 NOTE — PROGRESS NOTES
Chief Complaint   Patient presents with     Port Draw     Port accessed, labs drawn, flushed with heparin and deaccessed, VSS     Oliverio Lugo RN on 2/1/2021 at 11:51 AM

## 2021-02-01 NOTE — Clinical Note
2/1/2021         RE: Girish Chauhan  [address] [facility] [address]        Dear Colleague,    Thank you for referring your patient, Girish Chauhan, to the [facility]. Please see a copy of my visit note below.    Chief Complaint   Patient presents with     Port Draw     Port accessed, labs drawn, flushed with heparin and deaccessed, VSS     Oliverio Lugo RN on 2/1/2021 at 11:51 AM        Again, thank you for allowing me to participate in the care of your patient.        Sincerely,        [facility] Lab Draw

## 2021-02-01 NOTE — TELEPHONE ENCOUNTER
Oral Chemotherapy Monitoring Program    Subjective/Objective:  Girish Chauhan is a 65 year old male contacted by phone for a follow-up visit for oral chemotherapy.  Pt confirms taking the appropriate dose of capecitabine 1500 mg BID x14d on then 14d off. His next cycle is due to start 02/03. Denies new or worsening side effects, missed doses, and recent hospital or ED visits. Patient has not had any recent medication changes. Pt is still experiencing some HFS, which he manages with moisturizers 2-4 times daily.    Reviewed CBC/CMP labs from today.      ORAL CHEMOTHERAPY 12/7/2020 12/7/2020 12/30/2020 12/31/2020 1/4/2021 1/14/2021 2/1/2021   Assessment Type Lab Monitoring;Other Refill Refill Monthly Follow up Lab Monitoring Monthly Follow up;Other Monthly Follow up   Diagnosis Code Gallbladder Cancer Gallbladder Cancer Gallbladder Cancer Bile Duct Cancer Bile Duct Cancer Bile Duct Cancer Bile Duct Cancer   Providers Dr. Magali De Los Santos   [facility]   Drug Name Xeloda (Capecitabine) Xeloda (Capecitabine) Xeloda (Capecitabine) Xeloda (Capecitabine) Xeloda (Capecitabine) Xeloda (Capecitabine) Xeloda (Capecitabine)   Dose 1,500 mg 1,500 mg 1,500 mg 1,500 mg 1,500 mg 1,500 mg 1,500 mg   Current Schedule BID BID BID BID BID BID BID   Cycle Details 2 weeks on, 2 weeks off 2 weeks on, 2 weeks off 2 weeks on, 2 weeks off 2 weeks on, 2 weeks off 2 weeks on, 2 weeks off 2 weeks on, 2 weeks off 2 weeks on, 2 weeks off   Start Date of Last Cycle - - - - - - -   Planned next cycle start date - - - 1/5/2020 - - 2/3/2021   Doses missed in last 2 weeks - - - 0 - 0 0   Adherence Assessment - - - Adherent - Adherent Adherent   Adverse Effects - - - Palmar-plantar Erythrodysethesia Syndrome - Palmar-plantar Erythrodysethesia Syndrome Palmar-plantar Erythrodysethesia Syndrome   Palmar-plantar Erythrodysethesia  "syndrome[hand-foot syndrome] - - - Grade 1 - Grade 1 Grade 1   Pharmacist Intervention(Palmar-plantar) - - - Yes - No No   Intervention(s) - - - OTC recommendation - - -   Home BPs - - - - - - -   Any new drug interactions? - - - No - No No   Is the dose as ordered appropriate for the patient? - - - Yes - Yes Yes   Has the patient missed any days of school, work, or other routine activity? - - - No - - -   Since the last time we talked, have you been hospitalized or used the emergency room? - - - - - - No       Last PHQ-2 Score on record:   PHQ-2 ( 1999 [employer]) 1/13/2021 10/9/2020   Q1: Little interest or pleasure in doing things 0 0   Q2: Feeling down, depressed or hopeless 0 0   PHQ-2 Score 0 0       Vitals:  BP:   BP Readings from Last 1 Encounters:   02/01/21 108/75     Wt Readings from Last 1 Encounters:   02/01/21 61.6 kg (135 lb 14.4 oz)     Estimated body surface area is 1.76 meters squared as calculated from the following:    Height as of 1/15/21: 1.803 m (5' 11\").    Weight as of an earlier encounter on 2/1/21: 61.6 kg (135 lb 14.4 oz).    Labs:  _  Result Component Current Result Ref Range   Sodium 143 (2/1/2021) 133 - 144 mmol/L     _  Result Component Current Result Ref Range   Potassium 3.7 (2/1/2021) 3.4 - 5.3 mmol/L     _  Result Component Current Result Ref Range   Calcium 8.4 (L) (2/1/2021) 8.5 - 10.1 mg/dL     No results found for Mag within last 30 days.     No results found for Phos within last 30 days.     _  Result Component Current Result Ref Range   Albumin 2.7 (L) (2/1/2021) 3.4 - 5.0 g/dL     _  Result Component Current Result Ref Range   Urea Nitrogen 24 (2/1/2021) 7 - 30 mg/dL     _  Result Component Current Result Ref Range   Creatinine 1.07 (2/1/2021) 0.66 - 1.25 mg/dL     _  Result Component Current Result Ref Range   AST 55 (H) (2/1/2021) 0 - 45 U/L     _  Result Component Current Result Ref Range   ALT 59 (2/1/2021) 0 - 70 U/L     _  Result Component Current Result Ref Range "   Bilirubin Total 0.7 (2/1/2021) 0.2 - 1.3 mg/dL     _  Result Component Current Result Ref Range   WBC 9.0 (2/1/2021) 4.0 - 11.0 10e9/L     _  Result Component Current Result Ref Range   Hemoglobin 11.2 (L) (2/1/2021) 13.3 - 17.7 g/dL     _  Result Component Current Result Ref Range   Platelet Count 157 (2/1/2021) 150 - 450 10e9/L     _  Result Component Current Result Ref Range   Absolute Neutrophil 4.7 (2/1/2021) 1.6 - 8.3 10e9/L     Assessment/Plan:  Pt continues to tolerate Xeloda well, despite some mild HFS. Will continue to monitor.  Labs today are stable with no concerning abnormalities. Appropriate to continue therapy and start next cycle 02/03/21.    Follow-Up:  02/24: assessment and refill    Refill Due:  [facility] to deliver xeloda 02/02 for 02/03 cycle start.    Grace Myhre, PharmD  Hematology/Oncology Pharmacist  [facility]  [facility]  [phone]

## 2021-02-02 PROBLEM — G62.9 POLYNEUROPATHY: Status: ACTIVE | Noted: 2021-01-01

## 2021-02-02 PROBLEM — I50.9 CHRONIC HEART FAILURE, UNSPECIFIED HEART FAILURE TYPE (H): Status: ACTIVE | Noted: 2019-07-02

## 2021-02-02 PROBLEM — E78.5 DYSLIPIDEMIA: Status: ACTIVE | Noted: 2021-01-01

## 2021-02-02 PROBLEM — I48.20 CHRONIC ATRIAL FIBRILLATION (H): Status: ACTIVE | Noted: 2021-01-01

## 2021-02-02 PROBLEM — E46 MALNUTRITION, UNSPECIFIED TYPE (H): Status: ACTIVE | Noted: 2021-01-01

## 2021-02-02 PROBLEM — D63.8 ANEMIA IN OTHER CHRONIC DISEASES CLASSIFIED ELSEWHERE: Status: ACTIVE | Noted: 2021-01-01

## 2021-02-02 NOTE — PROGRESS NOTES
Pre charting:  Date of pre chartin/3/21  10:00 a.m.- 10:40  Total time 40 min  This effort is essential to preparing for upcoming service directly affecting ongoing primary care management and coordination of care for this patient for the same day office visit.  Person performing pre charting work:  Dr. Nobles  This non face-to-face clinical work included review/documentation of medical history, notes and test results outside our system (e.g.. CareEverywhere, paper copies), medications, need for refills, vital sign trends,  flow sheets such as PHQ-9 and ZO-7 questionnaires, hospital discharge summaries, routine health care maintenance, specialist notes, laboratory, procedures, imaging, etc.    Virtual visit    CC:  Establish care    Other health care team members:  Oncology Dr. De Los Santos  Urology Dr. Walker  [facility]  Dieticians (oncology)  Cardiology Dr. Rose ([facility])    HPI:  65 year old male  PMHx notable for  Metastatic Cholangiocarcinoma dx 2016, recurrence  Urinary bladder wall, peritoneal mets  Right hydronephrosis  Righter ureteral stent  Stenotic pylorus and proximal duodenum  Gastrojejunostpmy and gastroduodenal stent  Left hepatectomy and Carol-en-Y hepaticojejunostopmy  Malnutrition  Abdominal pain  Skin melanoma  Thrombocytopenia  Anemia  Neutropenia  Polyneuropathy  Dyslipidemia  Permanent atrial fibrillation on anticoagulation  Heart Failure with reduced EF  Gout  Hypertension  Bicuspid aortic valve  Aortic stenosis  Aortic root dilitation  ASCVD s/p multivessel stents , NSTEMI 2019 with re-stenting  CKD3? (2021 normal)  Possible left V1 Zoster in the past.    Most recently:  1/15/21 and 10/12/20 right ureteral stent exchange    Oncology:  On Xeloda 2 weeks on, 2 weeks off.  Dr. De Los Santos's most recent note reviewed.  Most recent CT results reviewed.  EGD report from 20 reviewed.    Cardiology:   Most recent note reviewed  Echo 20 LVF 40-45%    Labs:  Most recent  labs reviewed (see below)    HCM:  MIIC reviewed.  Flu shot up to date, last Td 2014, no record of prevnar or pneumovax or Shingrix    Today:  Would like to establish primary care, and consolidate care at [facility].    Regarding his current state of health.  Has difficulty keeping food down.  Hoarse voice from EGD's.  No Temp's, BP stable.  Has lost weight, one hundred pounds over last few years. Hard to get out and walk in the winter.  Has some decreased ability to recover from imbalance.  Takes drives and gets out most days.  Just started chemo round yesterday. Has acidic, spontaneous reflux/vomiting if eats too much, too fast, or in the evening.  Eating smaler meals throughout the day.  Has hunger, but has fear of throwing up.  Typically can vomit about three hours after eating.  Sometimes takes TUMS.  Drinking Protein Plus nutritional supplements up to 2/day. Drinking a lot of fluids.   Vocal cord irritation also affects his breathing while talking. No fevers.  Chronic, NP cough, x one to one and a half years.  States work up negative in the past.  Had been on Flonase for a while w/o improvement in cough. Has not tried inhalers Cough worse at night when lying down, improved if sleeps in . Has not tried PPI.  Describes his neuropathy as more of an annoyance than uncomfortable/painful.  Some fine motor, manual dexteriy difficulties  Discussed vaccination recommendations.  Will prioritize COVID, followed by Pneumovax, followed by Shingrix.    Current Outpatient Medications   Medication Sig Dispense Refill     acetaminophen (TYLENOL) 500 MG tablet Take 500 mg by mouth every 6 hours as needed for fever or pain       allopurinol (ZYLOPRIM) 100 MG tablet Take 100 mg by mouth every evening        apixaban ANTICOAGULANT (ELIQUIS ANTICOAGULANT) 5 MG tablet Take 5 mg by mouth 2 times daily        atorvastatin (LIPITOR) 40 MG tablet Take 40 mg by mouth every evening        AZASITE 1 % ophthalmic solution        calcium  "carbonate (TUMS) 500 MG chewable tablet Take 1-3 chew tab by mouth 4 times daily as needed for heartburn        capecitabine (XELODA) 500 MG tablet Take 3 tablets (1,500 mg) by mouth 2 times daily Days 1 through 14, then off for 14 days. Take within 30 mins after meal. 84 tablet 0     colchicine (COLCYRS) 0.6 MG tablet Take 0.6 mg by mouth 3 times daily as needed (gout flare)        digoxin (LANOXIN) 125 MCG tablet Take 125 mcg by mouth       furosemide (LASIX) 20 MG tablet Take 20 mg by mouth every other day        metoprolol tartrate (LOPRESSOR) 50 MG tablet Take 100 mg by mouth 2 times daily Pt states he is taking 100mg in AM and 100mg in PM       multivitamin w/minerals (THERA-VIT-M) tablet Take 1 tablet by mouth every morning        Nitroglycerin (NITROSTAT SL) Place 0.4 mg under the tongue every 5 minutes as needed for chest pain (Carries medication - has never used)        omeprazole (PRILOSEC) 40 MG DR capsule Take 1 capsule (40 mg) by mouth daily 90 capsule 0     ondansetron (ZOFRAN) 4 MG tablet Take 1 tablet (4 mg) by mouth every 8 hours as needed for nausea 30 tablet 0     polyethylene glycol (MIRALAX) 17 g packet Take 1 packet by mouth as needed for constipation       Allergies   Allergen Reactions     Blood Transfusion Related (Informational Only) Other (See Comments)     Patient has a history of a clinically significant antibody against RBC antigens.  A delay in compatible RBCs may occur.     BP Readings from Last 6 Encounters:   02/01/21 108/75   01/15/21 (!) 140/114   01/13/21 110/75   12/07/20 120/79   11/04/20 100/70   10/12/20 (!) 114/91     Wt Readings from Last 5 Encounters:   02/01/21 61.6 kg (135 lb 14.4 oz)   01/15/21 62.2 kg (137 lb 2 oz)   01/13/21 62.6 kg (138 lb)   12/07/20 64.9 kg (143 lb)   11/04/20 67.4 kg (148 lb 11.2 oz)     Estimated body mass index is 18.95 kg/m  as calculated from the following:    Height as of 1/15/21: 1.803 m (5' 11\").    Weight as of 2/1/21: 61.6 kg (135 lb " 14.4 oz).  Gen:  Krishna appears to be in good spirits today, and is actively engaged in conversation.  Has hoarse voice.  Breathing comfortably, no cough, SOB or wheezing during our visit.     Component      Latest Ref Rng & Units 2/1/2021   WBC      4.0 - 11.0 10e9/L 9.0   RBC Count      4.4 - 5.9 10e12/L 3.46 (L)   Hemoglobin      13.3 - 17.7 g/dL 11.2 (L)   Hematocrit      40.0 - 53.0 % 34.2 (L)   MCV      78 - 100 fl 99   MCH      26.5 - 33.0 pg 32.4   MCHC      31.5 - 36.5 g/dL 32.7   RDW      10.0 - 15.0 % 17.1 (H)   Platelet Count      150 - 450 10e9/L 157     Component      Latest Ref Rng & Units 2/1/2021   Absolute Neutrophil      1.6 - 8.3 10e9/L 4.7     Component      Latest Ref Rng & Units 2/1/2021   Sodium      133 - 144 mmol/L 143   Potassium      3.4 - 5.3 mmol/L 3.7   Chloride      94 - 109 mmol/L 109   Carbon Dioxide      20 - 32 mmol/L 27   Anion Gap      3 - 14 mmol/L 6   Glucose      70 - 99 mg/dL 97   Urea Nitrogen      7 - 30 mg/dL 24   Creatinine      0.66 - 1.25 mg/dL 1.07   GFR Estimate      >60 mL/min/1.73:m2 73     Component      Latest Ref Rng & Units 2/1/2021   Albumin      3.4 - 5.0 g/dL 2.7 (L)   Protein Total      6.8 - 8.8 g/dL 5.4 (L)   Alkaline Phosphatase      40 - 150 U/L 322 (H)   ALT      0 - 70 U/L 59   AST      0 - 45 U/L 55 (H)     Component      Latest Ref Rng & Units 1/4/2021   Cancer Antigen 19-9      0 - 37 U/mL 385 (H)     CT chest/abd/pelvis results from 12/11/20 reviewed (see report)    Girish was seen today for establish care.    Diagnoses and all orders for this visit:    Encounter to establish care with new doctor    Cholangiocarcinoma (H)    Malnutrition, unspecified type (H)    Polyneuropathy    Dyslipidemia    Chronic atrial fibrillation (H)    Chronic heart failure, unspecified heart failure type (H)    Benign essential hypertension    Aortic valve stenosis, etiology of cardiac valve disease unspecified    Bicuspid aortic valve    Aortic root dilatation  (H)    ASCVD (arteriosclerotic cardiovascular disease)    Anemia in other chronic diseases classified elsewhere    Need for vaccination  -     ZOSTER VACCINE RECOMBINANT ADJUVANTED IM NJX; Future  -     Pneumococcal vaccine 23 valent PPSV23  (Pneumovax) [98867]; Future  Discussed vaccination recommendations.  Will prioritize COVID, followed by Pneumovax, followed by Shingrix.      Chronic cough  -     omeprazole (PRILOSEC) 40 MG DR capsule; Take 1 capsule (40 mg) by mouth daily    Gastroesophageal reflux disease with esophagitis without hemorrhage  -     omeprazole (PRILOSEC) 40 MG DR capsule; Take 1 capsule (40 mg) by mouth daily        F/U 3-4 months.    Patricia Nobles M.D.  Internal Medicine  [facility]     Patients: if you have questions or concerns about this progress note, please discuss them with the provider at a future office visit.

## 2021-02-03 PROBLEM — R05.3 CHRONIC COUGH: Status: ACTIVE | Noted: 2021-01-01

## 2021-02-03 PROBLEM — K21.00 GASTROESOPHAGEAL REFLUX DISEASE WITH ESOPHAGITIS WITHOUT HEMORRHAGE: Status: ACTIVE | Noted: 2021-01-01

## 2021-02-03 NOTE — PROGRESS NOTES
"This patient is being evaluated via a billable video visit as an alternative to an in-person visit.       The patient has been notified of following:     \"This video visit will be conducted via a call between you and your physician/provider. We have found that certain health care needs can be provided without the need for an in-person physical exam.  This service lets us provide the care you need with a video conversation.  If a prescription is necessary we can send it directly to your pharmacy.  If lab work is needed we can place an order for that and you can then stop by our lab to have the test done at a later time. If during the course of the call the physician/provider feels a video visit is not appropriate, you will not be charged for this service.\"     Patient has given verbal consent for virtual video visit? Yes  Did patient initiate this virtual visit? Yes    Person spoken to:  Patient    This was a synchronous virtual visit  Location of patient: home  Location of physician:  home office  Department name:  Medicine  Mode of communication:  Video Conference via Doximity    Time video initiated:  1:03 pm  Time video ended:  1:43 pm  Total length of video visit:  40 min    Patricia Nobles M.D.  Internal Medicine  [facility]       Patients: if you have questions or concerns about this progress note, please discuss them with the provider at a future office visit.    "

## 2021-02-03 NOTE — NURSING NOTE
Chief Complaint   Patient presents with     Establish Care     Pt would like to establish care today      Lida Dao, EMT at 10:14 AM sign on 2/3/2021

## 2021-02-18 NOTE — PROGRESS NOTES
RN Care Coordination Note  Incoming Call:   Received call from patient stating he would like to further discuss his recently weight loss.     Outgoing Call:   Placed return call to patient. States he has continued to loss weight. He asks if there is anything else that can be done to help. Reviewed after last appointment with Dr De Los Santos, he reached out to [facility] and discussed with Dr Rodriguez. At that time they were planning to wait and see how things went with the nutritionist. Patient states he would be interested in seeing if stent revision would be possible. Writer will reach out to Dr Rodriguez and his team for further help. Will also ask for follow-up with RD as well. Patient had no further questions or concerns.              Kecia Loco RN, BSN, OCN   RN Care Coordinator   [facility]

## 2021-02-22 NOTE — TELEPHONE ENCOUNTER
Per Dr. Rodriguez  oral contrasted CT or an UGI/SBFT    Called to discuss with patient. Left message.    CT w/ oral contrast ordered.    ML

## 2021-02-24 NOTE — TELEPHONE ENCOUNTER
Returned call to patient to discuss recommendations from Dr. Rodriguez. Discussed need for CT, scheduling number given.    ML

## 2021-02-24 NOTE — TELEPHONE ENCOUNTER
"Oral Chemotherapy Monitoring Program    Subjective/Objective:  Girish Chauhan is a 66 year old male contacted by phone for a follow-up visit for oral chemotherapy.  Patient confirms taking xeloda 500 mg tab- 3 tablets by mouth twice daily for 2 weeks then 2 weeks off. Reports only medication change last month was addition of dixogin. Denies any recent hospitalization/ED visits. Reports no missed doses since last follow up assessment.     Upon prompting of his side effects he mentions his neuropathy is still ongoing and has been hurting more than last assessment. States it is affecting his manual dexterity. In addition, his HFS has not been pleasant, and reports that it feels like frostbite almost which is consistent throughout the day. Nothing relieves it much, despite applying lotion on his hands and feet a 2-3 times a day. Denies any bleeding or blistering, but mentions burning sensation when he makes a fist. He also mentions he lost his sense of taste, which is not new but feels like it has \"accelerated\".       ORAL CHEMOTHERAPY 12/30/2020 12/31/2020 1/4/2021 1/14/2021 2/1/2021 2/24/2021 2/24/2021   Assessment Type Refill Monthly Follow up Lab Monitoring Monthly Follow up;Other Monthly Follow up Monthly Follow up;Other Monthly Follow up   Diagnosis Code Gallbladder Cancer Bile Duct Cancer Bile Duct Cancer Bile Duct Cancer Bile Duct Cancer Bile Duct Cancer Bile Duct Cancer   Providers Dr. Magali De Los Santos   [facility]   Drug Name Xeloda (Capecitabine) Xeloda (Capecitabine) Xeloda (Capecitabine) Xeloda (Capecitabine) Xeloda (Capecitabine) Xeloda (Capecitabine) Xeloda (Capecitabine)   Dose 1,500 mg 1,500 mg 1,500 mg 1,500 mg 1,500 mg 1,500 mg 1,500 mg   Current Schedule BID BID BID BID BID BID BID   Cycle Details 2 weeks on, 2 weeks off 2 weeks on, 2 weeks off 2 weeks on, 2 weeks off 2 weeks on, " "2 weeks off 2 weeks on, 2 weeks off 2 weeks on, 2 weeks off 2 weeks on, 2 weeks off   Start Date of Last Cycle - - - - - - -   Planned next cycle start date - 1/5/2020 - - 2/3/2021 - -   Doses missed in last 2 weeks - 0 - 0 0 - 0   Adherence Assessment - Adherent - Adherent Adherent - Adherent   Adverse Effects - Palmar-plantar Erythrodysethesia Syndrome - Palmar-plantar Erythrodysethesia Syndrome Palmar-plantar Erythrodysethesia Syndrome - Palmar-plantar Erythrodysethesia Syndrome   Palmar-plantar Erythrodysethesia syndrome[hand-foot syndrome] - Grade 1 - Grade 1 Grade 1 - Grade 2   Pharmacist Intervention(Palmar-plantar) - Yes - No No - No   Intervention(s) - OTC recommendation - - - - -   Home BPs - - - - - - -   Any new drug interactions? - No - No No - No   Is the dose as ordered appropriate for the patient? - Yes - Yes Yes - Yes   Has the patient missed any days of school, work, or other routine activity? - No - - - - -   Since the last time we talked, have you been hospitalized or used the emergency room? - - - - No - -       Last PHQ-2 Score on record:   PHQ-2 ( 1999 Pfizer) 1/13/2021 10/9/2020   Q1: Little interest or pleasure in doing things 0 0   Q2: Feeling down, depressed or hopeless 0 0   PHQ-2 Score 0 0       Vitals:  BP:   BP Readings from Last 1 Encounters:   02/01/21 108/75     Wt Readings from Last 1 Encounters:   02/01/21 61.6 kg (135 lb 14.4 oz)     Estimated body surface area is 1.76 meters squared as calculated from the following:    Height as of 1/15/21: 1.803 m (5' 11\").    Weight as of 2/1/21: 61.6 kg (135 lb 14.4 oz).    Labs:  _  Result Component Current Result Ref Range   Sodium 143 (2/1/2021) 133 - 144 mmol/L     _  Result Component Current Result Ref Range   Potassium 3.7 (2/1/2021) 3.4 - 5.3 mmol/L     _  Result Component Current Result Ref Range   Calcium 8.4 (L) (2/1/2021) 8.5 - 10.1 mg/dL     No results found for Mag within last 30 days.     No results found for Phos within last 30 " days.     _  Result Component Current Result Ref Range   Albumin 2.7 (L) (2/1/2021) 3.4 - 5.0 g/dL     _  Result Component Current Result Ref Range   Urea Nitrogen 24 (2/1/2021) 7 - 30 mg/dL     _  Result Component Current Result Ref Range   Creatinine 1.07 (2/1/2021) 0.66 - 1.25 mg/dL     _  Result Component Current Result Ref Range   AST 55 (H) (2/1/2021) 0 - 45 U/L     _  Result Component Current Result Ref Range   ALT 59 (2/1/2021) 0 - 70 U/L     _  Result Component Current Result Ref Range   Bilirubin Total 0.7 (2/1/2021) 0.2 - 1.3 mg/dL     _  Result Component Current Result Ref Range   WBC 9.0 (2/1/2021) 4.0 - 11.0 10e9/L     _  Result Component Current Result Ref Range   Hemoglobin 11.2 (L) (2/1/2021) 13.3 - 17.7 g/dL     _  Result Component Current Result Ref Range   Platelet Count 157 (2/1/2021) 150 - 450 10e9/L     _  Result Component Current Result Ref Range   Absolute Neutrophil 4.7 (2/1/2021) 1.6 - 8.3 10e9/L         Assessment/Plan:  Patient is continuing to experience adverse events with xeloda with loss of taste, grade 2 neuropathy and grade 2 HFS that is impacting his dexterity and instrumental activities of daily living.     Of note, did also review lab results with patient from 2/1, which have been stable.  and CA 19.9: 393 elevated likely dt disease state. Also reviewed medication list upon addition of digoxin last month, and no drug interactions concerning.     He is currently on his off week of xeloda, and mentions that he was told he might be taken off medication pending provider appointment on 3/8. Will clarify with Dr. De Los Santos prior to setting up patient's next refill which is due to start next cycle ~3/2/21.     Follow-Up:  3/5: lab appt   3/8: Dr. De Los Santos appointment @ 4:30pm    Refill Due:  Last delivery 2/2 for 28 day supply. Will update & set up refill to start next cycle 3/2 pending clarification with Dr. De Los Santos.   Addendum update 2/26: will hold off on refill until provider  visit on 3/8 to reassess therapy, pt holding until next visit- per inbox by RNKecia.     Tara Roberts, PharmD  PGY2 Hematology/Oncology Pharmacy Resident  [facility]

## 2021-02-24 NOTE — TELEPHONE ENCOUNTER
Oral Chemotherapy Monitoring Program     Placed call to patient in follow up of oral chemotherapy. Left message requesting call back. No drug names were mentioned. Will update when response received.     Tara Roberts, PharmD  PGY2 Hematology/Oncology Pharmacy Resident  [facility]

## 2021-02-25 NOTE — DISCHARGE INSTRUCTIONS
[facility]  Same-Day Surgery   Adult Discharge Orders & Instructions     For 24 hours after surgery    1. Get plenty of rest.  A responsible adult must stay with you for at least 24 hours after you leave the hospital.   2. Do not drive or use heavy equipment.  If you have weakness or tingling, don't drive or use heavy equipment until this feeling goes away.  3. Do not drink alcohol.  4. Avoid strenuous or risky activities.  Ask for help when climbing stairs.   5. You may feel lightheaded.  IF so, sit for a few minutes before standing.  Have someone help you get up.   6. If you have nausea (feel sick to your stomach): Drink only clear liquids such as apple juice, ginger ale, broth or 7-Up.  Rest may also help.  Be sure to drink enough fluids.  Move to a regular diet as you feel able.  7. You may have a slight fever. Call the doctor if your fever is over 100 F (37.7 C) (taken under the tongue) or lasts longer than 24 hours.  8. You may have a dry mouth, a sore throat, muscle aches or trouble sleeping.  These should go away after 24 hours.  9. Do not make important or legal decisions.   Call your doctor for any of the followin.  Signs of infection (fever, growing tenderness at the surgery site, a large amount of drainage or bleeding, severe pain, foul-smelling drainage, redness, swelling).    2. It has been over 8 to 10 hours since surgery and you are still not able to urinate (pass water).    3.  Headache for over 24 hours.    To contact a doctor, call Dr Rodriguez at [phone] (clinic)    or:        [phone] and ask for the resident on call for GI (answered 24 hours a day)      Emergency Department:    [facility]: [phone]       (TTY for hearing impaired: [phone])       -3

## 2021-03-05 NOTE — NURSING NOTE
Chief Complaint   Patient presents with     Port Draw     labs drawn via port by RN in lab     /68   Pulse 91   Temp 98  F (36.7  C) (Oral)   Resp 18   Wt 61.8 kg (136 lb 3.2 oz)   SpO2 100%   BMI 19.00 kg/m      Port accessed by RN. Labs drawn and sent. Line flushed with NS and Heparin. Pt tolerated well. Checked in to next appointment.    Leanne Rutherford RN on 3/5/2021 at 2:29 PM

## 2021-03-08 PROBLEM — E43 SEVERE PROTEIN-CALORIE MALNUTRITION (H): Status: ACTIVE | Noted: 2021-01-01

## 2021-03-08 PROBLEM — R93.1 ABNORMAL CORONARY ANGIOGRAM: Status: ACTIVE | Noted: 2019-06-04

## 2021-03-08 NOTE — Clinical Note
"    3/8/2021         RE: Girish Chauhan  [address] [facility] [address]        Dear Colleague,    Thank you for referring your patient, Girish Chauhan, to the [facility]. Please see a copy of my visit note below.    Krishna is a 66 year old who is being evaluated via a billable video visit.      How would you like to obtain your AVS? MyChart  If the video visit is dropped, the invitation should be resent by: Send to e-mail at: [email or web address]  Will anyone else be joining your video visit? No  {If patient encounters technical issues they should call [phone] :[phone]}     I have reviewed and updated the patient's allergies and medication list.    Concerns: none  Refills: none     Vitals - Patient Reported  Weight (Patient Reported): 61.7 kg (136 lb)  Height (Patient Reported): 180.3 cm (5' 11\")  BMI (Based on Pt Reported Ht/Wt): 18.97  Pain Score: Severe Pain (7)  Pain Loc: Knee    Bonita Hilton, WILFREDO        Video Start Time: {video visit start/end time for provider to select:068835}  Video-Visit Details    Type of service:  Video Visit    Video End Time:{video visit start/end time for provider to select:076671}    Originating Location (pt. Location): {video visit patient location:877649::\"Home\"}    Distant Location (provider location):  [facility]     Platform used for Video Visit: {Virtual Visit Platforms:032178::\"[facility]\"}         I am seeing Krishna Chauhan today in follow-up of metastatic cholangiocarcinoma.    He originally had a curative intent resection in 2016 and then had an isolated recurrence within the wall of his urinary bladder.  He had an initial excellent response to gem/cis but had to stop both due to toxicity, neurotoxicity from the cisplatin and TTP related to his gemcitabine.  Eventually progressed within the abdominal cavity with peritoneal mets and was treated with capecitabine which was complicated by a myocardial infarction which " was subsequently determined to be unrelated to his treatment.  He has since been on infusional 5-FU and then back to Xeloda without any ongoing cardiac issues.  Over the last few months he has had less tolerance of his treatment and we have extended his treatment cycle so he is 2 weeks on and 2 weeks off.  He has been slowly losing weight with some symptoms suggesting poor gastric emptying.  He tells me he actually is gained about 5 pounds in the last few weeks, although that sounds like he is measuring from a time when he was having a particularly difficult time eating and had gotten dehydrated.  His wife is less clear that he is actually gained any lean body mass.  His neuropathy continues unchanged with modest difficulty with the numbness in his hands and feet.  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  His wife is now taking care of his feet for him with vigorous washing and lots of moisturizing which seems to be helping those to heal up.    On exam he is alert with a somewhat hoarse voice.  He appears cachectic.  He does not appear to have any respiratory distress.    I personally reviewed his CT scan and showed the images to them.  Does not have any clear evidence of tumor growth although is a little bit more ascites and he has some fat stranding and nodularity in the mesentery which has an appearance more of edema than tumor to me but it is difficult to tell.  Nonetheless it does not appear significantly worse his stomach is markedly dilated, much more so than previously.  The outlet of the stomach appears more tortuous with couple of stents in place.  His CA 19-9 level has gone up to more than 600 after being stable in the upper 300s over the last number of months.  His electrolytes and renal function are unremarkable.  His albumin has declined further now down to 2.3.  His bilirubin remains normal and he has only mild  elevation of his alkaline phosphatase.    Assessment/plan: Cholangiocarcinoma with peritoneal carcinomatosis.  He does not have clear radiographic evidence of progression but his tumor marker has gone up.  It is hard to know how much of that might be due to occult growth of his tumor versus problems of biliary obstruction but without his alkaline phosphatase going up I am more suspicious of the latter.  He is becoming more malnourished which may be a function of poor gastric emptying and I am also concerned that is also function of progressive disease.  We will have him follow-up with gastroenterology to evaluate his gastric emptying/stent function and see if some revision of that might not help with his oral intake.  We decided he needed a break from his Xeloda because of the progressively worsening hand-foot toxicity and to minimize any impact that is having on his nutrition as well.  Am unsure at present if his disease is progressing on this and we will see what evolves over the next couple months off treatment.  If he shows a continuous rise in his tumor marker while off treatment that would suggest is alone it has been ineffective and we cannot add back oxaliplatin due to his ongoing neuropathy.  Given his prior history of TTP related to the gemcitabine and I think it would be safe to return to that if we needed to do something different.  I cannot find in her records that we have done a molecular profile on his tumor and we will try and reorder that so we have those results by the time if to make a new treatment decision.    We will plan on rechecking his labs and have a visit in about a month to stay on top of his symptoms.  And then will repeat imaging for another response assessment in about 2 months.        Again, thank you for allowing me to participate in the care of your patient.        Sincerely,        Naresh De Los Santos MD

## 2021-03-08 NOTE — PROGRESS NOTES
"Krishna is a 66 year old who is being evaluated via a billable video visit.      How would you like to obtain your AVS? MyChart  If the video visit is dropped, the invitation should be resent by: Send to e-mail at: [email or web address]  Will anyone else be joining your video visit? No       I have reviewed and updated the patient's allergies and medication list.    Concerns: none  Refills: none     Vitals - Patient Reported  Weight (Patient Reported): 61.7 kg (136 lb)  Height (Patient Reported): 180.3 cm (5' 11\")  BMI (Based on Pt Reported Ht/Wt): 18.97  Pain Score: Severe Pain (7)  Pain Loc: Knee    Bonita Hilton CMA      Video-Visit Details    Type of service:  Video Visit    Originating Location (pt. Location): Home    Distant Location (provider location):  [facility]     Platform used for Video Visit: F[employer]al         I am seeing Krishna Chauhan today in follow-up of metastatic cholangiocarcinoma.    He originally had a curative intent resection in 2016 and then had an isolated recurrence within the wall of his urinary bladder.  He had an initial excellent response to gem/cis but had to stop both due to toxicity, neurotoxicity from the cisplatin and TTP related to his gemcitabine.  Eventually progressed within the abdominal cavity with peritoneal mets and was treated with capecitabine which was complicated by a myocardial infarction which was subsequently determined to be unrelated to his treatment.  He has since been on infusional 5-FU and then back to Xeloda without any ongoing cardiac issues.  Over the last few months he has had less tolerance of his treatment and we have extended his treatment cycle so he is 2 weeks on and 2 weeks off.  He has been slowly losing weight with some symptoms suggesting poor gastric emptying.  He tells me he actually is gained about 5 pounds in the last few weeks, although that sounds like he is measuring from a time when he was having a particularly difficult time " eating and had gotten dehydrated.  His wife is less clear that he is actually gained any lean body mass.  His neuropathy continues unchanged with modest difficulty with the numbness in his hands and feet.  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  His wife is now taking care of his feet for him with vigorous washing and lots of moisturizing which seems to be helping those to heal up.    On exam he is alert with a somewhat hoarse voice.  He appears cachectic.  He does not appear to have any respiratory distress.    I personally reviewed his CT scan and showed the images to them.  Does not have any clear evidence of tumor growth although is a little bit more ascites and he has some fat stranding and nodularity in the mesentery which has an appearance more of edema than tumor to me but it is difficult to tell.  Nonetheless it does not appear significantly worse his stomach is markedly dilated, much more so than previously.  The outlet of the stomach appears more tortuous with couple of stents in place.  His CA 19-9 level has gone up to more than 600 after being stable in the upper 300s over the last number of months.  His electrolytes and renal function are unremarkable.  His albumin has declined further now down to 2.3.  His bilirubin remains normal and he has only mild elevation of his alkaline phosphatase.    Assessment/plan: Cholangiocarcinoma with peritoneal carcinomatosis.  He does not have clear radiographic evidence of progression but his tumor marker has gone up.  It is hard to know how much of that might be due to occult growth of his tumor versus problems of biliary obstruction but without his alkaline phosphatase going up I am more suspicious of the latter.  He is becoming more malnourished which may be a function of poor gastric emptying and I am also concerned that is also function of progressive disease.  We will  have him follow-up with gastroenterology to evaluate his gastric emptying/stent function and see if some revision of that might not help with his oral intake.  We decided he needed a break from his Xeloda because of the progressively worsening hand-foot toxicity and to minimize any impact that is having on his nutrition as well.  Am unsure at present if his disease is progressing on this and we will see what evolves over the next couple months off treatment.  If he shows a continuous rise in his tumor marker while off treatment that would suggest is alone it has been ineffective and we cannot add back oxaliplatin due to his ongoing neuropathy.  Given his prior history of TTP related to the gemcitabine and I think it would be safe to return to that if we needed to do something different.  I cannot find in her records that we have done a molecular profile on his tumor and we will try and reorder that so we have those results by the time if to make a new treatment decision.    We will plan on rechecking his labs and have a visit in about a month to stay on top of his symptoms.  And then will repeat imaging for another response assessment in about 2 months.    Total time by me today was 90 minutes

## 2021-03-08 NOTE — LETTER
"    3/8/2021         RE: Girish Chauhan  [address] [facility] [address]      Krishna is a 66 year old who is being evaluated via a billable video visit.      How would you like to obtain your AVS? MyChart  If the video visit is dropped, the invitation should be resent by: Send to e-mail at: [email or web address]  Will anyone else be joining your video visit? No       I have reviewed and updated the patient's allergies and medication list.    Concerns: none  Refills: none     Vitals - Patient Reported  Weight (Patient Reported): 61.7 kg (136 lb)  Height (Patient Reported): 180.3 cm (5' 11\")  BMI (Based on Pt Reported Ht/Wt): 18.97  Pain Score: Severe Pain (7)  Pain Loc: Knee    Bonita Hilton CMA      Video-Visit Details    Type of service:  Video Visit    Originating Location (pt. Location): Home    Distant Location (provider location):  [facility]     Platform used for Video Visit: [employer]         I am seeing Krishna Chauhan today in follow-up of metastatic cholangiocarcinoma.    He originally had a curative intent resection in 2016 and then had an isolated recurrence within the wall of his urinary bladder.  He had an initial excellent response to gem/cis but had to stop both due to toxicity, neurotoxicity from the cisplatin and TTP related to his gemcitabine.  Eventually progressed within the abdominal cavity with peritoneal mets and was treated with capecitabine which was complicated by a myocardial infarction which was subsequently determined to be unrelated to his treatment.  He has since been on infusional 5-FU and then back to Xeloda without any ongoing cardiac issues.  Over the last few months he has had less tolerance of his treatment and we have extended his treatment cycle so he is 2 weeks on and 2 weeks off.  He has been slowly losing weight with some symptoms suggesting poor gastric emptying.  He tells me he actually is gained about 5 pounds in the last few weeks, although that " sounds like he is measuring from a time when he was having a particularly difficult time eating and had gotten dehydrated.  His wife is less clear that he is actually gained any lean body mass.  His neuropathy continues unchanged with modest difficulty with the numbness in his hands and feet.  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  His wife is now taking care of his feet for him with vigorous washing and lots of moisturizing which seems to be helping those to heal up.    On exam he is alert with a somewhat hoarse voice.  He appears cachectic.  He does not appear to have any respiratory distress.    I personally reviewed his CT scan and showed the images to them.  Does not have any clear evidence of tumor growth although is a little bit more ascites and he has some fat stranding and nodularity in the mesentery which has an appearance more of edema than tumor to me but it is difficult to tell.  Nonetheless it does not appear significantly worse his stomach is markedly dilated, much more so than previously.  The outlet of the stomach appears more tortuous with couple of stents in place.  His CA 19-9 level has gone up to more than 600 after being stable in the upper 300s over the last number of months.  His electrolytes and renal function are unremarkable.  His albumin has declined further now down to 2.3.  His bilirubin remains normal and he has only mild elevation of his alkaline phosphatase.    Assessment/plan: Cholangiocarcinoma with peritoneal carcinomatosis.  He does not have clear radiographic evidence of progression but his tumor marker has gone up.  It is hard to know how much of that might be due to occult growth of his tumor versus problems of biliary obstruction but without his alkaline phosphatase going up I am more suspicious of the latter.  He is becoming more malnourished which may be a function of poor gastric  emptying and I am also concerned that is also function of progressive disease.  We will have him follow-up with gastroenterology to evaluate his gastric emptying/stent function and see if some revision of that might not help with his oral intake.  We decided he needed a break from his Xeloda because of the progressively worsening hand-foot toxicity and to minimize any impact that is having on his nutrition as well.  Am unsure at present if his disease is progressing on this and we will see what evolves over the next couple months off treatment.  If he shows a continuous rise in his tumor marker while off treatment that would suggest is alone it has been ineffective and we cannot add back oxaliplatin due to his ongoing neuropathy.  Given his prior history of TTP related to the gemcitabine and I think it would be safe to return to that if we needed to do something different.  I cannot find in her records that we have done a molecular profile on his tumor and we will try and reorder that so we have those results by the time if to make a new treatment decision.    We will plan on rechecking his labs and have a visit in about a month to stay on top of his symptoms.  And then will repeat imaging for another response assessment in about 2 months.    Total time by me today was 90 minutes          Naresh De Los Santos MD

## 2021-03-11 NOTE — TELEPHONE ENCOUNTER
Per Dr. Rodriguez    Yes we can organize another upper endoscopy with stent revision; this will be complex but can be added on to the end of one of my days or done while attending   I will biopsy by cold forceps tumor overgrowth. How would Dr De Los Santos want me to process the tissue (in formalin? Requesting what molecular profile study)    Please assist in scheduling:     Procedure/Imaging/Clinic: EGD  Physician: Dr. Rodriguez  Timin21  Procedure length:50 min  Anesthesia:general  Dx: abd pain  Tier:3  Location: [facility]       Called to discuss with patient. Explained they can expect a call for date and time for procedure, will need a , someone to stay with them for 24 hours and should stay in town for 24 hours (within 45 min of Hospital) post procedure    Patient needs to get pre-op physical completed and will fax a copy to us along with bringing a hard copy with them. Fax number given. [phone] *If you do not get a preop physical, your procedure could be cancelled, patient voiced understanding*    Preop Plan: PCP will do, PCP verified  Patricia Nobles    Med Review    Blood thinner -  Eliquis, hold 2 days  ASA - no  Diabetic - no    COVID test discussed:knows to get 4 days prior    Patient Education r/t procedure:done    Does patient have any history of gastric bypass/gastric surgery/altered panc/bili anatomy?no    A pre-op nurse will call 1-2 days prior to the procedure. Is advised to be NPO/no solid food 8 hours before the procedure. Ok to drink clear liquids (Water, Apple Juice or Gatorade) up to 2 hours prior to procedure.     Other specific details/comments:none     Verbalized understanding of all instructions. All questions answered.                
English

## 2021-03-15 PROBLEM — R10.84 ABDOMINAL PAIN, GENERALIZED: Status: ACTIVE | Noted: 2019-10-07

## 2021-03-15 NOTE — TELEPHONE ENCOUNTER
Please let patient know that 3 months seems reasonable.  If it turns out booster COVID vaccinations are needed, then we'll put them off again.  Stay tuned to CDC news.  Thanks. SB     Pt called and aware.  Felicitas Jovel RN 8:33 AM on 3/15/2021.

## 2021-03-30 NOTE — TELEPHONE ENCOUNTER
Patient called and message left to get more information about what symptoms he is having . Farrah Torres, SUSANAN Staff Nurse

## 2021-03-30 NOTE — TELEPHONE ENCOUNTER
Patient called and having blood when he urinates on and off has had stent for over one year and never had this happen  He has no fever or pain but the blood is making him nervous  [facility] orders placed and patient was told to go to [facility] lab close to home Farrah Torres LPN Staff Nurse

## 2021-03-30 NOTE — TELEPHONE ENCOUNTER
[facility] Call Center    Phone Message    May a detailed message be left on voicemail: yes     Reason for Call: Other: Pt is needing to speak to a care team member asap as he stated he was told if he had blood in his urine to contact Dr. Aaron fernández     Action Taken: Message routed to:  [facility] Urology    Travel Screening: Not Applicable

## 2021-03-30 NOTE — TELEPHONE ENCOUNTER
[facility] Call Center    Phone Message    May a detailed message be left on voicemail: yes     Reason for Call: Other: Krishna calling to report that he accidentally hung up on Farrah and would really appreciate a call back from her.  He sincerely apologizes.  Thank you!     Action Taken: Message routed to:  [facility]  Urology    Travel Screening: Not Applicable

## 2021-04-01 NOTE — TELEPHONE ENCOUNTER
Left a message with the patient and confirmed that the urine culture was negative and he does not have an infection.    - Yesica Hooks,   [facility] Clinic Support

## 2021-04-02 NOTE — NURSING NOTE
Chief Complaint   Patient presents with     Port Draw     Labs drawn from port by RN in lab. Vitals taken.     Port accessed with 20 gauge gripper needle by RN in lab.  Port flushed with saline and heparin.  Vital signs taken.      Tamara Gusman RN

## 2021-04-05 NOTE — LETTER
4/5/2021         RE: Girish Chauhan  [address] [facility] [id number]        Dear Colleague,    Thank you for referring your patient, Girish Chauhan, to the [facility]. Please see a copy of my visit note below.    Krishna is a 66 year old who is being evaluated via a billable video visit.      How would you like to obtain your AVS? MyChart  If the video visit is dropped, the invitation should be resent by: Send to e-mail at: [email or web address]  Will anyone else be joining your video visit? No     Tammieelmer Lester BRYANT        Video Start Time: 132   Video-Visit Details    Type of service:  Video Visit    Video End Time:158      Originating Location (pt. Location): Home    Distant Location (provider location):  [facility]     Platform used for Video Visit: [employer]    Reason for Visit: seen in f/u of metastatic cholangiocarcinoma    Oncology HPI:     Girish Chauhan is a 66 year old year old gentleman with cholangiocarcinoma  Which was resected in 3/2016 (including partial liver resection) with negative margins and no LN involvement, but with perineural and lymphovascular invasion. No adjuvant chemotherapy given.   -Recurred in bladder 11/2017, bx c/w metastatic cholangiocarcinoma, started on cisplatin/gemcitabine 12/2017. Cisplatin held 6/2018 for poor tolerance. Gemcitabine discontinued 8/2018 for possible TTP, then went off treatment.  - In 4/2019 was found to have disease progression in the abdominal cavity anterior to the liver, just below the diaphragm. Started on capecitabine 4/30/19  but developed an NSTEMI s/p angiogram 5/6/19 (see below) and actually continued on the capecitabine for several days after NSTEMI without any ongoing cardiac symptoms or evidence of ongoing ischemia.      6/5/19 -6/9/19 admission with SBO with possible involvement of draining choledochojejunostomy loop, subjective fevers, and A fib with RVR. GI consulted, s/p small bowel  enteroscopy with stenting on 6/7 for obstructed biliary limb.       7/28/19 Hospitalization complicated by sepsis and enterococcus casseliflavus bacteremia. Was was found to have migrated gastrojejunal stent on CT A/P on admission. S/p ERCP with stent exchange 7/29 by Dr. Rodriguez. Discharged on Linezolid for 2 weeks.       -10/2019 CT CAP showed disease progression. He started on 5FU on 10/4/19 without recurrence of ischemia.     -2/2020 hospitalized with influenza A .      -3/2020: Switched from infusional 5FU to xeloda     -7/15/20-7/16/20: [facility] admit for UTI. +UA but no urine culture done. Completed 5 days cipro. Blood cultures negative.     -9/30/20 xeloda switched to 2 weeks on/2 weeks off schedule due to increased intolerance     -last xeloda cycle was on 3/2/21, on 3/8/21 met with . Treatment break recommended due to hand/foot toxicity, malnutrition    Interval history: Krishna is joined on the video visit by his wife. He is feeling some better since stopping the xeloda. His energy is a little better. Hands/feet are no longer sore. He continues to have some difficulty with vomiting after meals. Plans to work with Dr. Rodriguez this Friday for stent revision. Having hematuria for the past week, is the color of red wine. No pain, fevers/chills. Had a UC that was negative for infection but is wondering if there is additional intervention planned from urology. Last had his double J stents exchanged in January.  Remains on eliquis. No cp, palpitation, sob. No new bone aches/pains.     Current Outpatient Medications   Medication Sig Dispense Refill     acetaminophen (TYLENOL) 500 MG tablet Take 500 mg by mouth every 6 hours as needed for fever or pain       allopurinol (ZYLOPRIM) 100 MG tablet Take 100 mg by mouth every evening        apixaban ANTICOAGULANT (ELIQUIS ANTICOAGULANT) 5 MG tablet Take 5 mg by mouth 2 times daily        atorvastatin (LIPITOR) 40 MG tablet Take 40 mg by mouth every evening         AZASITE 1 % ophthalmic solution        calcium carbonate (TUMS) 500 MG chewable tablet Take 1-3 chew tab by mouth 4 times daily as needed for heartburn        capecitabine (XELODA) 500 MG tablet Take 3 tablets (1,500 mg) by mouth 2 times daily Days 1 through 14, then off for 14 days. Take within 30 mins after meal. 84 tablet 0     colchicine (COLCYRS) 0.6 MG tablet Take 0.6 mg by mouth 3 times daily as needed (gout flare)        digoxin (LANOXIN) 125 MCG tablet Take 125 mcg by mouth       furosemide (LASIX) 20 MG tablet Take 20 mg by mouth every other day        metoprolol tartrate (LOPRESSOR) 50 MG tablet Take 100 mg by mouth 2 times daily Pt states he is taking 100mg in AM and 100mg in PM       multivitamin w/minerals (THERA-VIT-M) tablet Take 1 tablet by mouth every morning        Nitroglycerin (NITROSTAT SL) Place 0.4 mg under the tongue every 5 minutes as needed for chest pain (Carries medication - has never used)        omeprazole (PRILOSEC) 40 MG DR capsule Take 1 capsule (40 mg) by mouth daily 90 capsule 0     ondansetron (ZOFRAN) 4 MG tablet Take 1 tablet (4 mg) by mouth every 8 hours as needed for nausea 30 tablet 0     polyethylene glycol (MIRALAX) 17 g packet Take 1 packet by mouth as needed for constipation       RESTASIS 0.05 % ophthalmic emulsion INSTILL 1 DROP IN EACH EYE EVERY 12 HOURS            Allergies   Allergen Reactions     Blood Transfusion Related (Informational Only) Other (See Comments)     Patient has a history of a clinically significant antibody against RBC antigens.  A delay in compatible RBCs may occur.         Video physical exam  General: Patient appears pale, chronically ill,in no acute distress.   Skin: No visualized rash or lesions on visualized skin  Eyes: EOMI, no erythema, sclera icterus or discharge noted  Resp: Appears to be breathing comfortably without accessory muscle usage, speaking in full sentences, no cough  MSK: Appears to have normal range of motion based on  visualized movements  Neurologic: No apparent tremors, facial movements symmetric. Speech slurred, unchanged from baseline  Psych: affect pleasant, alert and oriented    The rest of a comprehensive physical examination is deferred due to PHE (public health emergency) video restrictions     There were no vitals taken for this visit.  Wt Readings from Last 4 Encounters:   04/02/21 59.6 kg (131 lb 8 oz)   03/05/21 61.8 kg (136 lb 3.2 oz)   02/01/21 61.6 kg (135 lb 14.4 oz)   01/15/21 62.2 kg (137 lb 2 oz)         Labs: Results for JENNYFER MCGARRY (MRN [id number]) as of 4/5/2021 13:43   Ref. Range 4/2/2021 11:17   Sodium Latest Ref Range: 133 - 144 mmol/L 141   Potassium Latest Ref Range: 3.4 - 5.3 mmol/L 4.1   Chloride Latest Ref Range: 94 - 109 mmol/L 111 (H)   Carbon Dioxide Latest Ref Range: 20 - 32 mmol/L 26   Urea Nitrogen Latest Ref Range: 7 - 30 mg/dL 21   Creatinine Latest Ref Range: 0.66 - 1.25 mg/dL 0.98   GFR Estimate Latest Ref Range: >60 mL/min/1.73_m2 81   GFR Estimate If Black Latest Ref Range: >60 mL/min/1.73_m2 >90   Calcium Latest Ref Range: 8.5 - 10.1 mg/dL 8.3 (L)   Anion Gap Latest Ref Range: 3 - 14 mmol/L 5   Albumin Latest Ref Range: 3.4 - 5.0 g/dL 2.5 (L)   Protein Total Latest Ref Range: 6.8 - 8.8 g/dL 5.5 (L)   Bilirubin Total Latest Ref Range: 0.2 - 1.3 mg/dL 0.4   Alkaline Phosphatase Latest Ref Range: 40 - 150 U/L 192 (H)   ALT Latest Ref Range: 0 - 70 U/L 38   AST Latest Ref Range: 0 - 45 U/L 27   Cancer Antigen 19-9 Latest Ref Range: 0 - 37 U/mL 508 (H)   Glucose Latest Ref Range: 70 - 99 mg/dL 112 (H)   WBC Latest Ref Range: 4.0 - 11.0 10e9/L 9.7   Hemoglobin Latest Ref Range: 13.3 - 17.7 g/dL 10.2 (L)   Hematocrit Latest Ref Range: 40.0 - 53.0 % 31.8 (L)   Platelet Count Latest Ref Range: 150 - 450 10e9/L 170   RBC Count Latest Ref Range: 4.4 - 5.9 10e12/L 3.31 (L)   MCV Latest Ref Range: 78 - 100 fl 96   MCH Latest Ref Range: 26.5 - 33.0 pg 30.8   MCHC Latest Ref Range: 31.5 - 36.5 g/dL  32.1   RDW Latest Ref Range: 10.0 - 15.0 % 15.4 (H)   Diff Method Unknown Automated Method   % Neutrophils Latest Units: % 51.5   % Lymphocytes Latest Units: % 12.5   % Monocytes Latest Units: % 8.5   % Eosinophils Latest Units: % 26.9   % Basophils Latest Units: % 0.4   % Immature Granulocytes Latest Units: % 0.2   Nucleated RBCs Latest Ref Range: 0 /100 0   Absolute Neutrophil Latest Ref Range: 1.6 - 8.3 10e9/L 5.0   Absolute Lymphocytes Latest Ref Range: 0.8 - 5.3 10e9/L 1.2   Absolute Monocytes Latest Ref Range: 0.0 - 1.3 10e9/L 0.8   Absolute Eosinophils Latest Ref Range: 0.0 - 0.7 10e9/L 2.6 (H)   Absolute Basophils Latest Ref Range: 0.0 - 0.2 10e9/L 0.0   Abs Immature Granulocytes Latest Ref Range: 0 - 0.4 10e9/L 0.0   Absolute Nucleated RBC Unknown 0.0       Imaging: n/a    Impression/plan:   Metastatic cholangiocarcinoma with peritoneal carcinomatosis  -his last restaging in March showed a rising tumor marker but not clear radiographic progression of disease. He had been on xeloda, but was stopped due to increased intolerance. Is now being followed for symptom management, next plans for CT re-evaluation of his disease in 1 month  -recovering side effects of chemotherapy, hope to see continued improvement of nutrition/stamina after stent revision later this week  -f/u with Dr. De Los Santos in 1 month with restaging CT scans    Hx of biliary obstruction and duodenal obstruction  -has both biliary and duodenal stents. Working with Dr. Rodriguez on stent revision, planned procedure this Friday.     Cards: hx of HLD/HTN, atrial fibrillation, bicuspid aortic valve and moderate aortic stenosis, heart failure with reduced EF (42%), CAD with history of multivessel stenting in 2011.  - Developed NSTEMI, s/p LHC and angiogram 5/6/19 with 99% obstruction of OM2, unsuccessful PCI. He also developed afib with RVR during this time which necessitated increase of his metoprolol and starting on apixaban.  - Follows with cardiology  and last saw them 1/8/21 at which time digoxin 0.125 mg was added, metoprolol reduced to 100 mg bid. Plan f/u in June 2021       CKD, hx of R hydronephrosis x/t extrinsic compression, s/p ureteral stenting  -having hematuria x 1 week, UC neg. No fevers. Will message Dr. Walker to discuss whether additional work-up is needed. Hgb stable at this time.    40 minutes spent on the date of the encounter doing chart review, review of test results, interpretation of tests, patient visit, documentation and discussion with other provider(s)         Again, thank you for allowing me to participate in the care of your patient.        Sincerely,        YO Grubbs CNP

## 2021-04-05 NOTE — TELEPHONE ENCOUNTER
Called patient and told him that we would be doing a bladder biopsy at his next stent exchange and that he will have this done in the near future . Farrah Torres LPN Staff Nurse  ----- Message from Sivan Walker MD sent at 4/5/2021  2:37 PM CDT -----  Regarding: RE: hematuria  I think if he is having hematuria and the urine culture is negative that he needs to have bladder biopsy at the next time we exchange his stent given his history.  If hemoglobin is stable this does not need to happen urgently but will try to get this on the schedule sooner rather than later.  If he has not had any imaging recently a CT urogram would also be helpful    Thanks  ----- Message -----  From: Jennifer Jalloh APRN CNP  Sent: 4/5/2021   1:52 PM CDT  To: Kecia Loco RN, Sivan Walker MD, #  Subject: hematuria                                        Hi Dr. Walker,    I had a visit today with Krishna. He reports ongoing hematuria (the color of red wine), has been going on for a week.  No pain, fevers/chills. Had a UC last week that was negative. Hgb on 4/2 was stable.    He is wondering what your recommendations are for management. Does the ureteral stent require revision?  It was last changed on 1/15/21.    He has a GI procedure/bowel stent revision scheduled on Friday and wants to make sure you are in the loop on that in case you feel his hematuria needs to be addressed urgently.    Farrah, would you be able to follow up with Krishna on the plan?    Thank you!

## 2021-04-05 NOTE — PROGRESS NOTES
Krishna is a 66 year old who is being evaluated via a billable video visit.      How would you like to obtain your AVS? MyChart  If the video visit is dropped, the invitation should be resent by: Send to e-mail at: [email or web address]  Will anyone else be joining your video visit? Anna     Tammie HURTADO        Video Start Time: 132   Video-Visit Details    Type of service:  Video Visit    Video End Time:158      Originating Location (pt. Location): Home    Distant Location (provider location):  [facility]     Platform used for Video Visit: [employer]    Reason for Visit: seen in f/u of metastatic cholangiocarcinoma    Oncology HPI:     Girish Chauhan is a 66 year old year old gentleman with cholangiocarcinoma  Which was resected in 3/2016 (including partial liver resection) with negative margins and no LN involvement, but with perineural and lymphovascular invasion. No adjuvant chemotherapy given.   -Recurred in bladder 11/2017, bx c/w metastatic cholangiocarcinoma, started on cisplatin/gemcitabine 12/2017. Cisplatin held 6/2018 for poor tolerance. Gemcitabine discontinued 8/2018 for possible TTP, then went off treatment.  - In 4/2019 was found to have disease progression in the abdominal cavity anterior to the liver, just below the diaphragm. Started on capecitabine 4/30/19  but developed an NSTEMI s/p angiogram 5/6/19 (see below) and actually continued on the capecitabine for several days after NSTEMI without any ongoing cardiac symptoms or evidence of ongoing ischemia.      6/5/19 -6/9/19 admission with SBO with possible involvement of draining choledochojejunostomy loop, subjective fevers, and A fib with RVR. GI consulted, s/p small bowel enteroscopy with stenting on 6/7 for obstructed biliary limb.       7/28/19 Hospitalization complicated by sepsis and enterococcus casseliflavus bacteremia. Was was found to have migrated gastrojejunal stent on CT A/P on admission. S/p ERCP with stent exchange  7/29 by Dr. Rodriguez. Discharged on Linezolid for 2 weeks.       -10/2019 CT CAP showed disease progression. He started on 5FU on 10/4/19 without recurrence of ischemia.     -2/2020 hospitalized with influenza A .      -3/2020: Switched from infusional 5FU to xeloda     -7/15/20-7/16/20: [facility] admit for UTI. +UA but no urine culture done. Completed 5 days cipro. Blood cultures negative.     -9/30/20 xeloda switched to 2 weeks on/2 weeks off schedule due to increased intolerance     -last xeloda cycle was on 3/2/21, on 3/8/21 met with . Treatment break recommended due to hand/foot toxicity, malnutrition    Interval history: Krishna is joined on the video visit by his wife. He is feeling some better since stopping the xeloda. His energy is a little better. Hands/feet are no longer sore. He continues to have some difficulty with vomiting after meals. Plans to work with Dr. Rodriguez this Friday for stent revision. Having hematuria for the past week, is the color of red wine. No pain, fevers/chills. Had a UC that was negative for infection but is wondering if there is additional intervention planned from urology. Last had his double J stents exchanged in January.  Remains on eliquis. No cp, palpitation, sob. No new bone aches/pains.     Current Outpatient Medications   Medication Sig Dispense Refill     acetaminophen (TYLENOL) 500 MG tablet Take 500 mg by mouth every 6 hours as needed for fever or pain       allopurinol (ZYLOPRIM) 100 MG tablet Take 100 mg by mouth every evening        apixaban ANTICOAGULANT (ELIQUIS ANTICOAGULANT) 5 MG tablet Take 5 mg by mouth 2 times daily        atorvastatin (LIPITOR) 40 MG tablet Take 40 mg by mouth every evening        AZASITE 1 % ophthalmic solution        calcium carbonate (TUMS) 500 MG chewable tablet Take 1-3 chew tab by mouth 4 times daily as needed for heartburn        capecitabine (XELODA) 500 MG tablet Take 3 tablets (1,500 mg) by mouth 2 times daily Days 1 through 14,  then off for 14 days. Take within 30 mins after meal. 84 tablet 0     colchicine (COLCYRS) 0.6 MG tablet Take 0.6 mg by mouth 3 times daily as needed (gout flare)        digoxin (LANOXIN) 125 MCG tablet Take 125 mcg by mouth       furosemide (LASIX) 20 MG tablet Take 20 mg by mouth every other day        metoprolol tartrate (LOPRESSOR) 50 MG tablet Take 100 mg by mouth 2 times daily Pt states he is taking 100mg in AM and 100mg in PM       multivitamin w/minerals (THERA-VIT-M) tablet Take 1 tablet by mouth every morning        Nitroglycerin (NITROSTAT SL) Place 0.4 mg under the tongue every 5 minutes as needed for chest pain (Carries medication - has never used)        omeprazole (PRILOSEC) 40 MG DR capsule Take 1 capsule (40 mg) by mouth daily 90 capsule 0     ondansetron (ZOFRAN) 4 MG tablet Take 1 tablet (4 mg) by mouth every 8 hours as needed for nausea 30 tablet 0     polyethylene glycol (MIRALAX) 17 g packet Take 1 packet by mouth as needed for constipation       RESTASIS 0.05 % ophthalmic emulsion INSTILL 1 DROP IN EACH EYE EVERY 12 HOURS            Allergies   Allergen Reactions     Blood Transfusion Related (Informational Only) Other (See Comments)     Patient has a history of a clinically significant antibody against RBC antigens.  A delay in compatible RBCs may occur.         Video physical exam  General: Patient appears pale, chronically ill,in no acute distress.   Skin: No visualized rash or lesions on visualized skin  Eyes: EOMI, no erythema, sclera icterus or discharge noted  Resp: Appears to be breathing comfortably without accessory muscle usage, speaking in full sentences, no cough  MSK: Appears to have normal range of motion based on visualized movements  Neurologic: No apparent tremors, facial movements symmetric. Speech slurred, unchanged from baseline  Psych: affect pleasant, alert and oriented    The rest of a comprehensive physical examination is deferred due to PHE (public health emergency)  video restrictions     There were no vitals taken for this visit.  Wt Readings from Last 4 Encounters:   04/02/21 59.6 kg (131 lb 8 oz)   03/05/21 61.8 kg (136 lb 3.2 oz)   02/01/21 61.6 kg (135 lb 14.4 oz)   01/15/21 62.2 kg (137 lb 2 oz)         Labs: Results for JENNYFER MCGARRY (MRN [id number]) as of 4/5/2021 13:43   Ref. Range 4/2/2021 11:17   Sodium Latest Ref Range: 133 - 144 mmol/L 141   Potassium Latest Ref Range: 3.4 - 5.3 mmol/L 4.1   Chloride Latest Ref Range: 94 - 109 mmol/L 111 (H)   Carbon Dioxide Latest Ref Range: 20 - 32 mmol/L 26   Urea Nitrogen Latest Ref Range: 7 - 30 mg/dL 21   Creatinine Latest Ref Range: 0.66 - 1.25 mg/dL 0.98   GFR Estimate Latest Ref Range: >60 mL/min/1.73_m2 81   GFR Estimate If Black Latest Ref Range: >60 mL/min/1.73_m2 >90   Calcium Latest Ref Range: 8.5 - 10.1 mg/dL 8.3 (L)   Anion Gap Latest Ref Range: 3 - 14 mmol/L 5   Albumin Latest Ref Range: 3.4 - 5.0 g/dL 2.5 (L)   Protein Total Latest Ref Range: 6.8 - 8.8 g/dL 5.5 (L)   Bilirubin Total Latest Ref Range: 0.2 - 1.3 mg/dL 0.4   Alkaline Phosphatase Latest Ref Range: 40 - 150 U/L 192 (H)   ALT Latest Ref Range: 0 - 70 U/L 38   AST Latest Ref Range: 0 - 45 U/L 27   Cancer Antigen 19-9 Latest Ref Range: 0 - 37 U/mL 508 (H)   Glucose Latest Ref Range: 70 - 99 mg/dL 112 (H)   WBC Latest Ref Range: 4.0 - 11.0 10e9/L 9.7   Hemoglobin Latest Ref Range: 13.3 - 17.7 g/dL 10.2 (L)   Hematocrit Latest Ref Range: 40.0 - 53.0 % 31.8 (L)   Platelet Count Latest Ref Range: 150 - 450 10e9/L 170   RBC Count Latest Ref Range: 4.4 - 5.9 10e12/L 3.31 (L)   MCV Latest Ref Range: 78 - 100 fl 96   MCH Latest Ref Range: 26.5 - 33.0 pg 30.8   MCHC Latest Ref Range: 31.5 - 36.5 g/dL 32.1   RDW Latest Ref Range: 10.0 - 15.0 % 15.4 (H)   Diff Method Unknown Automated Method   % Neutrophils Latest Units: % 51.5   % Lymphocytes Latest Units: % 12.5   % Monocytes Latest Units: % 8.5   % Eosinophils Latest Units: % 26.9   % Basophils Latest Units: %  0.4   % Immature Granulocytes Latest Units: % 0.2   Nucleated RBCs Latest Ref Range: 0 /100 0   Absolute Neutrophil Latest Ref Range: 1.6 - 8.3 10e9/L 5.0   Absolute Lymphocytes Latest Ref Range: 0.8 - 5.3 10e9/L 1.2   Absolute Monocytes Latest Ref Range: 0.0 - 1.3 10e9/L 0.8   Absolute Eosinophils Latest Ref Range: 0.0 - 0.7 10e9/L 2.6 (H)   Absolute Basophils Latest Ref Range: 0.0 - 0.2 10e9/L 0.0   Abs Immature Granulocytes Latest Ref Range: 0 - 0.4 10e9/L 0.0   Absolute Nucleated RBC Unknown 0.0       Imaging: n/a    Impression/plan:   Metastatic cholangiocarcinoma with peritoneal carcinomatosis  -his last restaging in March showed a rising tumor marker but not clear radiographic progression of disease. He had been on xeloda, but was stopped due to increased intolerance. Is now being followed for symptom management, next plans for CT re-evaluation of his disease in 1 month  -recovering side effects of chemotherapy, hope to see continued improvement of nutrition/stamina after stent revision later this week  -f/u with Dr. De Los Santos in 1 month with restaging CT scans    Hx of biliary obstruction and duodenal obstruction  -has both biliary and duodenal stents. Working with Dr. Rodriguez on stent revision, planned procedure this Friday.     Cards: hx of HLD/HTN, atrial fibrillation, bicuspid aortic valve and moderate aortic stenosis, heart failure with reduced EF (42%), CAD with history of multivessel stenting in 2011.  - Developed NSTEMI, s/p LHC and angiogram 5/6/19 with 99% obstruction of OM2, unsuccessful PCI. He also developed afib with RVR during this time which necessitated increase of his metoprolol and starting on apixaban.  - Follows with cardiology and last saw them 1/8/21 at which time digoxin 0.125 mg was added, metoprolol reduced to 100 mg bid. Plan f/u in June 2021       CKD, hx of R hydronephrosis x/t extrinsic compression, s/p ureteral stenting  -having hematuria x 1 week, UC neg. No fevers. Will message  Dr. Walker to discuss whether additional work-up is needed. Hgb stable at this time.    40 minutes spent on the date of the encounter doing chart review, review of test results, interpretation of tests, patient visit, documentation and discussion with other provider(s)

## 2021-04-06 NOTE — LETTER
4/6/2021      RE: Girish Chauhan  [address] [facility] [id number]         PRIMARY CARE CENTER     Pre-Operative H & P   CC: Girish Chauhan presents for pre-operative evaluation as requested by Dr. Gabino Rodriguez prior to a scheduled ESOPHAGOGASTRODUODENOSCOPY.  Location of Surgery: [facility]  Date of Surgery: 4/9/21  Type of Anesthesia Anticipated: General    Patient with history of cholangiocarcinoma with peritoneal carcinomatosis, with concern for failure to thrive and significant ongoing weight loss. He also has had increased tumor markers. He has been stable on Xeloda, but has been taking a break from this due to progressively worsening hand/foot toxicity and to minimize any impact that this may be having on his nutrition     EGD is being done to evaluate his gastric emptying/stent function and to see if some revision of the stent might be able to help with his oral intake. He has had 4 EGDs in the past and has tolerated these procedures well.     Patient also has hydronephrosis related to metastatic cancer and is s/p right ureteral stent placement (last changed 1/15/21). Recently had gross hematuria x1 week; culture was negative. Hgb was stable. CT urogram was recommended.     His history is otherwise significant for HLD, chronic atrial fibrillation, anticoagulated, cardiomyopathy, EF 42%,  CAD, with history of multivessel stenting in 2011, NSTEMI in 5/2019, with stenting of OM on 6/17/19, bicuspid aortic valve with mild to moderate aortic stenosis, AAA, gout, peritoneal carcinomatosis, CKD, history of melanoma, and BCC.     Patient denies chest pain. He does note chronic exertional dyspnea, however this has been stable.  Symptoms that are most bothersome to him are GI related; he feels that he cannot keep food down and experiences chronic nausea, abdominal pain, constipation.  Further ROS as below.  He had no additional concerns.       Review of Systems     Constitutional:  Negative for fever, chills,  weight loss, weight gain, fatigue, decreased appetite, night sweats, recent stressors, height gain, height loss, post-operative complications, incisional pain, hallucinations, increased energy, hyperactivity and confused.   HENT:  Negative for ear pain, hearing loss, tinnitus, nosebleeds, trouble swallowing, hoarse voice, mouth sores, sore throat, ear discharge, tooth pain, gum tenderness, taste disturbance, smell disturbance, hearing aid, bleeding gums, dry mouth, sinus pain, sinus congestion and neck mass.    Eyes:  Positive for eye irritation. Negative for double vision, pain, redness, eye pain, decreased vision, eye bulging, eye dryness, flashing lights, spots, floaters, strabismus, tunnel vision and jaundice.   Respiratory:   Negative for cough, hemoptysis, sputum production, shortness of breath, wheezing, sleep disturbances due to breathing, snores loudly, respiratory pain, dyspnea on exertion, cough disturbing sleep and postural dyspnea.    Cardiovascular:  Negative for chest pain, dyspnea on exertion, palpitations, orthopnea, claudication, leg swelling, fingers/toes turn blue, hypertension, hypotension, syncope, history of heart murmur, chest pain on exertion, chest pain at rest, pacemaker, few scattered varicosities, leg pain, sleep disturbances due to breathing, tachycardia, light-headedness, exercise intolerance and edema.   Gastrointestinal:  Positive for heartburn, nausea, vomiting, abdominal pain and constipation. Negative for diarrhea, blood in stool, melena, rectal pain, bloating, bowel incontinence, jaundice, coffee ground emesis and change in stool.   Genitourinary:  Positive for hematuria and nocturia. Negative for bladder incontinence, dysuria, urgency, flank pain, difficulty urinating, voiding less frequently, scrotal pain, ulcerations, penile discharge, male genitourinary complaint and reduced libido.   Musculoskeletal:  Negative for myalgias, back pain, joint swelling, arthralgias, stiffness,  muscle cramps, neck pain, bone pain, muscle weakness and fracture.   Skin:  Negative for nail changes, itching, poor wound healing, rash, hair changes, skin changes, acne, warts, poor wound healing, scarring, flaky skin, Raynaud's phenomenon, sensitivity to sunlight and skin thickening.   Neurological:  Negative for dizziness, tingling, tremors, speech change, seizures, loss of consciousness, weakness, light-headedness, numbness, headaches, disturbances in coordination, extremity numbness, memory loss, difficulty walking and paralysis.   Endo/Heme:  Positive for bruises/bleeds easily. Negative for anemia and swollen glands.   Psychiatric/Behavioral:  Negative for depression, hallucinations, memory loss, decreased concentration, mood swings and panic attacks.    Endocrine:  Negative for altered temperature regulation, polyphagia, polydipsia, unwanted hair growth and change in facial hair.    I have personally reviewed and updated the complete ROS on the day of the visit.      MEDICAL HISTORY:                                                      Patient Active Problem List    Diagnosis Date Noted     Chronic cough 02/03/2021     Priority: Medium     Gastroesophageal reflux disease with esophagitis without hemorrhage 02/03/2021     Priority: Medium     Severe protein-calorie malnutrition (H) 02/02/2021     Priority: Medium     Polyneuropathy 02/02/2021     Priority: Medium     Dyslipidemia 02/02/2021     Priority: Medium     Chronic atrial fibrillation (H) 02/02/2021     Priority: Medium     Anemia in other chronic diseases classified elsewhere 02/02/2021     Priority: Medium     Chemotherapy-induced neutropenia (H) 12/15/2020     Priority: Medium     Drug-induced polyneuropathy (H) 12/15/2020     Priority: Medium     Gastric outlet obstruction 08/07/2020     Priority: Medium     Added automatically from request for surgery [id number]       Hydronephrosis, right 02/13/2020     Priority: Medium     Added automatically from  request for surgery [id number]       Influenza 02/12/2020     Priority: Medium     Hydronephrosis of right kidney 01/31/2020     Priority: Medium     Added automatically from request for surgery [id number]       Abdominal pain, generalized 10/07/2019     Priority: Medium     Added automatically from request for surgery [id number]       Cholangitis 07/28/2019     Priority: Medium     Thrombocytopenia (H) 07/28/2019     Priority: Medium     Nausea and vomiting, intractability of vomiting not specified, unspecified vomiting type 07/28/2019     Priority: Medium     Permanent atrial fibrillation (H) 07/28/2019     Priority: Medium     Chronic heart failure, unspecified heart failure type (H) 07/02/2019     Priority: Medium     SBO (small bowel obstruction) (H) 06/05/2019     Priority: Medium     Abnormal coronary angiogram 06/04/2019     Priority: Medium     Added automatically from request for surgery [id number]    Formatting of this note might be different from the original.  Added automatically from request for surgery [id number]       Peritoneal carcinomatosis (H) 04/22/2019     Priority: Medium     Heart failure with preserved ejection fraction (H) 02/25/2019     Priority: Medium     Cardiomyopathy (H) 02/04/2019     Priority: Medium     CKD (chronic kidney disease) stage 3, GFR 30-59 ml/min 02/04/2019     Priority: Medium     Bicuspid aortic valve 02/04/2019     Priority: Medium     Mixed hyperlipidemia 02/04/2019     Priority: Medium     Essential hypertension 11/27/2018     Priority: Medium     Anemia associated with chemotherapy 06/18/2018     Priority: Medium     Secondary malignant neoplasm of bladder (H) 06/18/2018     Priority: Medium     Cholangiocarcinoma (H) 03/08/2016     Priority: Medium     Malignant melanoma of skin (H) 09/17/2015     Priority: Medium     Overview:   ICD 10       Aortic root dilatation (H) 05/02/2013     Priority: Medium     Disorder of aorta (H) 01/10/2012     Priority: Medium     Overview:    Possible bicuspid valve.       ASCVD (arteriosclerotic cardiovascular disease) 01/10/2012     Priority: Medium     Overview:   12/11  1) Severe two vessel coronary artery disease of the left anterior descending artery and the mid left circumflex artery with chronic total occlusion of the mid LAD with the distal vessel filling via right to left collaterals.   2) Successful complex percutaneous coronary intervention (PCI) of the mid LAD with overlapping 2.5x32 and 2.75x20 Ion drug-eluting stents.   3) Successful angioplasty of second diagonal across the stent struts with kissing balloons.   4) Unsuccessful angioplasty of small first diagonal across the stent struts with dissection of the proximal D1 but with preserved flow. Vessel to small to stent.   5) Successful PCI of the proximal LAD with a single 4.0x12   6) Normal LV function   7) Mild aortic stenosis       Last Assessment & Plan:   No symptoms following recent PCI.  Overview:   12/11  1) Severe two vessel coronary artery disease of the left anterior descending artery and the mid left circumflex artery with chronic total occlusion of the mid LAD with the distal vessel filling via right to left collaterals.   2) Successful complex percutaneous coronary intervention (PCI) of the mid LAD with overlapping 2.5x32 and 2.75x20 Ion drug-eluting stents.   3) Successful angioplasty of second diagonal across the stent struts with kissing balloons.   4) Unsuccessful angioplasty of small first diagonal across the stent struts with dissection of the proximal D1 but with preserved flow. Vessel to small to stent.   5) Successful PCI of the proximal LAD with a single 4.0x12   6) Normal LV function   7) Mild aortic stenosis       Last Assessment & Plan:   No symptoms following recent PCI.       Aortic stenosis 01/10/2012     Priority: Medium     Overview:   Possible bicuspid valve.  ; Aortic stenosis - mild    Formatting of this note might be different from the  original.  Possible bicuspid valve.  ; Aortic stenosis - mild       Coronary atherosclerosis 01/10/2012     Priority: Medium     Formatting of this note might be different from the original.  12/11  1) Severe two vessel coronary artery disease of the left anterior descending artery and the mid left circumflex artery with chronic total occlusion of the mid LAD with the distal vessel filling via right to left collaterals.   2) Successful complex percutaneous coronary intervention (PCI) of the mid LAD with overlapping 2.5x32 and 2.75x20 Ion drug-eluting stents.   3) Successful angioplasty of second diagonal across the stent struts with kissing balloons.   4) Unsuccessful angioplasty of small first diagonal across the stent struts with dissection of the proximal D1 but with preserved flow. Vessel to small to stent.   5) Successful PCI of the proximal LAD with a single 4.0x12   6) Normal LV function   7) Mild aortic stenosis     Last Assessment & Plan:   No symptoms following recent PCI.       Paroxysmal atrial fibrillation (H) 10/03/2006     Priority: Medium     Overview:   Regions, spontaneously converted    Last Assessment & Plan:   No palpitations, symptoms for greater than one year.       Pure hypercholesterolemia 11/17/2005     Priority: Medium     Last Assessment & Plan:   On simvastatin 20 mg daily.  Repeat lipids pending.  Reviewed low saturated fat diet.    Formatting of this note might be different from the original.  Last Assessment & Plan:   On simvastatin 20 mg daily.  Repeat lipids pending.  Reviewed low saturated fat diet.       Gout 12/10/2001     Priority: Medium     Overview:   synovial fluid crystals present.  Epic     Formatting of this note might be different from the original.  synovial fluid crystals present.  Epic       Medications and allergies reviewed and updated.  Family and social history reviewed and updated    Hx of Blood transfusions/reactions: Yes in past. No known reactions but has  blood antibody.       Hx of abnormal bleeding or anti-platelet use: Anticoagulated on apixaban     Personal or FH with difficulty with Anesthesia:  Denies.    EXAM:                                                    BP 95/64 (BP Location: Right arm, Patient Position: Sitting, Cuff Size: Adult Regular)   Pulse 94   Wt 56.7 kg (125 lb)   SpO2 100%   BMI 17.43 kg/m    Constitutional: Awake, alert, cooperative, very thin white male in NAD  Eyes:  PERRL, EOMI, sclera clear, conjunctiva normal. Glasses and facemask on.  HENT:  NCAT, MMM, good dentition.  Thyroid normal  Respiratory: CTAB, no crackles or wheezing. No cough or obvious dyspnea.  Speaking full sentences comfortably on room air.  Cardiovascular: RRR, normal S1 and S2, and no murmur noted. No edema. Palpable pulses to radial  DP and PT arteries.   GI:  Abdomen thin,  non-distended, nontender to soft palpation, normal bowel sounds, no HSM  Lymph/Hematologic: No cervical lymphadenopathy    Skin: Warm and dry.    Musculoskeletal:  Grossly normal  Neurologic: Awake, alert, fully oriented. Cranial nerves II-XII are grossly intact. Gait is normal.   Neuropsychiatric: Calm, cooperative and pleasant. Normal affect.     DIAGNOSTICS:                                                    Recent labs reviewed (last on 4/2)    EKG obtained in clinic today which shows A. Fib (VR 78) with competing junctional pacemaker.  This EKG is improved from previous (A. fib is rate controlled.    IMPRESSION:                                                    Girish Chauhan is here for a pre-operative evaluation prior to planned EGD.     Pre-operative considerations:  1.  Cardiac:  Functional status- METS <4.  Patient reports that he can only walk approximately 1 block before he gets tired.  He stops because he gets tired not because he gets dyspneic.  He attributes this to his very poor nutritional status and inability to take adequate p.o., which is why he is undergoing EGD.  2.  Pulm:  Airway feasible.  MARVIN risk: intermediate.  Patient does note chronic exertional dyspnea.  He is followed by outside cardiology for all of the above cardiac conditions.    Last TTE June 2020 which showed mild - moderate aortic stenosis and EF 40-45%.  Aortic stenosis is being monitored. Ascending aorta noted 3.9cm on echo in 2019.  On eliquis for a-fib; patient was advised to discontinue Eliquis 2 days before procedure.  ChadsVasc = 3.  He has a total of 6 coronary stents.  The last was placed in June 2019.  He saw his cardiology provider last in June 2020.   Patient is on furosemide which she takes weekly 3 days.  In addition to holding Eliquis, advised patient to hold his Thursday dose of furosemide.       -Given that this is a low risk procedure, the risk of adverse cardiac events is low.       -Using the NSQIP surgical risk calculator, Girish Chauhan has a 0.1% risk of a major adverse cardiac event and 12.1% risk of any major complication with this surgery.   3. GI:  Risk of PONV score = 2.  If > 2, anti-emetic intervention recommended.  4. Renal:  +CKD - recent creatinine 0.98.   5. Access:  Right chest portacath  6. Heme:  + chronic anemia - recent hgb 10.2 (stable) in setting of recent hematuria     VTE risk: 3%     Patient is optimized and is acceptable candidate for the proposed procedure.  No further diagnostic evaluation is needed.       RECOMMENDATIONS:                                                    -Approval given to proceed with proposed procedure, without further diagnostic evaluation  --Patient is to take all scheduled medications, except: Eliquis (hold 2 days prior to procedure) and lasix (hold Thursday dose - patient takes q3 days)        Copy of this evaluation report is provided to requesting physician.    [facility] Pre[facility] Guidelines      Nona Olivas MD MPH, PGY-6  Internal Medicine  p:[phone]  April 6, 2021      Pt was seen and plan of care discussed with attending  physician Dr. ELIDA Youngblood     Attending Addendum:  Patient seen and examined with resident in clinic today.  Pertinent portions of history and exam were independently verified by myself.  I agree with the exam and plan as outlined above with the following modifications: abhi Youngblood MD  Internal Medicine          Surgeon (please enter first and last name): Dr. Gabino Rodriguez  Location of Surgery: [facility]  Date of Surgery: 4/9/21  Procedure: ESOPHAGOGASTRODUODENOSCOPY  History of reaction to anesthesia? No    Lyric Lara, EMT at 2:21 PM on 4/6/2021        Nona Olivas MD

## 2021-04-06 NOTE — NURSING NOTE
Chief Complaint   Patient presents with     Pre-Op Exam     DOS: 4/9/21 ESOPHAGOGASTRODUODENOSCOPY (EGD) with Dr. Amateau Kimberly Nissen, EMT at 2:15 PM on 4/6/2021

## 2021-04-06 NOTE — PROGRESS NOTES
Surgeon (please enter first and last name): Dr. Gabino Rodriguez  Location of Surgery: [facility]  Date of Surgery: 4/9/21  Procedure: ESOPHAGOGASTRODUODENOSCOPY  History of reaction to anesthesia? No    Lyric Lara, EMT at 2:21 PM on 4/6/2021

## 2021-04-06 NOTE — PROGRESS NOTES
[facility]     Pre-Operative H & P   CC: Girish Chauhan presents for pre-operative evaluation as requested by Dr. Gabino Rodriguez prior to a scheduled ESOPHAGOGASTRODUODENOSCOPY.  Location of Surgery: [facility]  Date of Surgery: 4/9/21  Type of Anesthesia Anticipated: General    Patient with history of cholangiocarcinoma with peritoneal carcinomatosis, with concern for failure to thrive and significant ongoing weight loss. He also has had increased tumor markers. He has been stable on Xeloda, but has been taking a break from this due to progressively worsening hand/foot toxicity and to minimize any impact that this may be having on his nutrition     EGD is being done to evaluate his gastric emptying/stent function and to see if some revision of the stent might be able to help with his oral intake. He has had 4 EGDs in the past and has tolerated these procedures well.     Patient also has hydronephrosis related to metastatic cancer and is s/p right ureteral stent placement (last changed 1/15/21). Recently had gross hematuria x1 week; culture was negative. Hgb was stable. CT urogram was recommended.     His history is otherwise significant for HLD, chronic atrial fibrillation, anticoagulated, cardiomyopathy, EF 42%,  CAD, with history of multivessel stenting in 2011, NSTEMI in 5/2019, with stenting of OM on 6/17/19, bicuspid aortic valve with mild to moderate aortic stenosis, AAA, gout, peritoneal carcinomatosis, CKD, history of melanoma, and BCC.     Patient denies chest pain. He does note chronic exertional dyspnea, however this has been stable.  Symptoms that are most bothersome to him are GI related; he feels that he cannot keep food down and experiences chronic nausea, abdominal pain, constipation.  Further ROS as below.  He had no additional concerns.       Review of Systems     Constitutional:  Negative for fever, chills, weight loss, weight gain, fatigue, decreased appetite, night sweats, recent  stressors, height gain, height loss, post-operative complications, incisional pain, hallucinations, increased energy, hyperactivity and confused.   HENT:  Negative for ear pain, hearing loss, tinnitus, nosebleeds, trouble swallowing, hoarse voice, mouth sores, sore throat, ear discharge, tooth pain, gum tenderness, taste disturbance, smell disturbance, hearing aid, bleeding gums, dry mouth, sinus pain, sinus congestion and neck mass.    Eyes:  Positive for eye irritation. Negative for double vision, pain, redness, eye pain, decreased vision, eye bulging, eye dryness, flashing lights, spots, floaters, strabismus, tunnel vision and jaundice.   Respiratory:   Negative for cough, hemoptysis, sputum production, shortness of breath, wheezing, sleep disturbances due to breathing, snores loudly, respiratory pain, dyspnea on exertion, cough disturbing sleep and postural dyspnea.    Cardiovascular:  Negative for chest pain, dyspnea on exertion, palpitations, orthopnea, claudication, leg swelling, fingers/toes turn blue, hypertension, hypotension, syncope, history of heart murmur, chest pain on exertion, chest pain at rest, pacemaker, few scattered varicosities, leg pain, sleep disturbances due to breathing, tachycardia, light-headedness, exercise intolerance and edema.   Gastrointestinal:  Positive for heartburn, nausea, vomiting, abdominal pain and constipation. Negative for diarrhea, blood in stool, melena, rectal pain, bloating, bowel incontinence, jaundice, coffee ground emesis and change in stool.   Genitourinary:  Positive for hematuria and nocturia. Negative for bladder incontinence, dysuria, urgency, flank pain, difficulty urinating, voiding less frequently, scrotal pain, ulcerations, penile discharge, male genitourinary complaint and reduced libido.   Musculoskeletal:  Negative for myalgias, back pain, joint swelling, arthralgias, stiffness, muscle cramps, neck pain, bone pain, muscle weakness and fracture.   Skin:   Negative for nail changes, itching, poor wound healing, rash, hair changes, skin changes, acne, warts, poor wound healing, scarring, flaky skin, Raynaud's phenomenon, sensitivity to sunlight and skin thickening.   Neurological:  Negative for dizziness, tingling, tremors, speech change, seizures, loss of consciousness, weakness, light-headedness, numbness, headaches, disturbances in coordination, extremity numbness, memory loss, difficulty walking and paralysis.   Endo/Heme:  Positive for bruises/bleeds easily. Negative for anemia and swollen glands.   Psychiatric/Behavioral:  Negative for depression, hallucinations, memory loss, decreased concentration, mood swings and panic attacks.    Endocrine:  Negative for altered temperature regulation, polyphagia, polydipsia, unwanted hair growth and change in facial hair.    I have personally reviewed and updated the complete ROS on the day of the visit.      MEDICAL HISTORY:                                                      Patient Active Problem List    Diagnosis Date Noted     Chronic cough 02/03/2021     Priority: Medium     Gastroesophageal reflux disease with esophagitis without hemorrhage 02/03/2021     Priority: Medium     Severe protein-calorie malnutrition (H) 02/02/2021     Priority: Medium     Polyneuropathy 02/02/2021     Priority: Medium     Dyslipidemia 02/02/2021     Priority: Medium     Chronic atrial fibrillation (H) 02/02/2021     Priority: Medium     Anemia in other chronic diseases classified elsewhere 02/02/2021     Priority: Medium     Chemotherapy-induced neutropenia (H) 12/15/2020     Priority: Medium     Drug-induced polyneuropathy (H) 12/15/2020     Priority: Medium     Gastric outlet obstruction 08/07/2020     Priority: Medium     Added automatically from request for surgery [id number]       Hydronephrosis, right 02/13/2020     Priority: Medium     Added automatically from request for surgery [id number]       Influenza 02/12/2020     Priority: Medium      Hydronephrosis of right kidney 01/31/2020     Priority: Medium     Added automatically from request for surgery [id number]       Abdominal pain, generalized 10/07/2019     Priority: Medium     Added automatically from request for surgery [id number]       Cholangitis 07/28/2019     Priority: Medium     Thrombocytopenia (H) 07/28/2019     Priority: Medium     Nausea and vomiting, intractability of vomiting not specified, unspecified vomiting type 07/28/2019     Priority: Medium     Permanent atrial fibrillation (H) 07/28/2019     Priority: Medium     Chronic heart failure, unspecified heart failure type (H) 07/02/2019     Priority: Medium     SBO (small bowel obstruction) (H) 06/05/2019     Priority: Medium     Abnormal coronary angiogram 06/04/2019     Priority: Medium     Added automatically from request for surgery [id number]    Formatting of this note might be different from the original.  Added automatically from request for surgery [id number]       Peritoneal carcinomatosis (H) 04/22/2019     Priority: Medium     Heart failure with preserved ejection fraction (H) 02/25/2019     Priority: Medium     Cardiomyopathy (H) 02/04/2019     Priority: Medium     CKD (chronic kidney disease) stage 3, GFR 30-59 ml/min 02/04/2019     Priority: Medium     Bicuspid aortic valve 02/04/2019     Priority: Medium     Mixed hyperlipidemia 02/04/2019     Priority: Medium     Essential hypertension 11/27/2018     Priority: Medium     Anemia associated with chemotherapy 06/18/2018     Priority: Medium     Secondary malignant neoplasm of bladder (H) 06/18/2018     Priority: Medium     Cholangiocarcinoma (H) 03/08/2016     Priority: Medium     Malignant melanoma of skin (H) 09/17/2015     Priority: Medium     Overview:   ICD 10       Aortic root dilatation (H) 05/02/2013     Priority: Medium     Disorder of aorta (H) 01/10/2012     Priority: Medium     Overview:   Possible bicuspid valve.       ASCVD (arteriosclerotic cardiovascular disease)  01/10/2012     Priority: Medium     Overview:   12/11  1) Severe two vessel coronary artery disease of the left anterior descending artery and the mid left circumflex artery with chronic total occlusion of the mid LAD with the distal vessel filling via right to left collaterals.   2) Successful complex percutaneous coronary intervention (PCI) of the mid LAD with overlapping 2.5x32 and 2.75x20 Ion drug-eluting stents.   3) Successful angioplasty of second diagonal across the stent struts with kissing balloons.   4) Unsuccessful angioplasty of small first diagonal across the stent struts with dissection of the proximal D1 but with preserved flow. Vessel to small to stent.   5) Successful PCI of the proximal LAD with a single 4.0x12   6) Normal LV function   7) Mild aortic stenosis       Last Assessment & Plan:   No symptoms following recent PCI.  Overview:   12/11  1) Severe two vessel coronary artery disease of the left anterior descending artery and the mid left circumflex artery with chronic total occlusion of the mid LAD with the distal vessel filling via right to left collaterals.   2) Successful complex percutaneous coronary intervention (PCI) of the mid LAD with overlapping 2.5x32 and 2.75x20 Ion drug-eluting stents.   3) Successful angioplasty of second diagonal across the stent struts with kissing balloons.   4) Unsuccessful angioplasty of small first diagonal across the stent struts with dissection of the proximal D1 but with preserved flow. Vessel to small to stent.   5) Successful PCI of the proximal LAD with a single 4.0x12   6) Normal LV function   7) Mild aortic stenosis       Last Assessment & Plan:   No symptoms following recent PCI.       Aortic stenosis 01/10/2012     Priority: Medium     Overview:   Possible bicuspid valve.  ; Aortic stenosis - mild    Formatting of this note might be different from the original.  Possible bicuspid valve.  ; Aortic stenosis - mild       Coronary atherosclerosis  01/10/2012     Priority: Medium     Formatting of this note might be different from the original.  12/11  1) Severe two vessel coronary artery disease of the left anterior descending artery and the mid left circumflex artery with chronic total occlusion of the mid LAD with the distal vessel filling via right to left collaterals.   2) Successful complex percutaneous coronary intervention (PCI) of the mid LAD with overlapping 2.5x32 and 2.75x20 Ion drug-eluting stents.   3) Successful angioplasty of second diagonal across the stent struts with kissing balloons.   4) Unsuccessful angioplasty of small first diagonal across the stent struts with dissection of the proximal D1 but with preserved flow. Vessel to small to stent.   5) Successful PCI of the proximal LAD with a single 4.0x12   6) Normal LV function   7) Mild aortic stenosis     Last Assessment & Plan:   No symptoms following recent PCI.       Paroxysmal atrial fibrillation (H) 10/03/2006     Priority: Medium     Overview:   Regions, spontaneously converted    Last Assessment & Plan:   No palpitations, symptoms for greater than one year.       Pure hypercholesterolemia 11/17/2005     Priority: Medium     Last Assessment & Plan:   On simvastatin 20 mg daily.  Repeat lipids pending.  Reviewed low saturated fat diet.    Formatting of this note might be different from the original.  Last Assessment & Plan:   On simvastatin 20 mg daily.  Repeat lipids pending.  Reviewed low saturated fat diet.       Gout 12/10/2001     Priority: Medium     Overview:   synovial fluid crystals present.  Epic     Formatting of this note might be different from the original.  synovial fluid crystals present.  Epic       Medications and allergies reviewed and updated.  Family and social history reviewed and updated    Hx of Blood transfusions/reactions: Yes in past. No known reactions but has blood antibody.       Hx of abnormal bleeding or anti-platelet use: Anticoagulated on  apixaban     Personal or FH with difficulty with Anesthesia:  Denies.    EXAM:                                                    BP 95/64 (BP Location: Right arm, Patient Position: Sitting, Cuff Size: Adult Regular)   Pulse 94   Wt 56.7 kg (125 lb)   SpO2 100%   BMI 17.43 kg/m    Constitutional: Awake, alert, cooperative, very thin white male in NAD  Eyes:  PERRL, EOMI, sclera clear, conjunctiva normal. Glasses and facemask on.  HENT:  NCAT, MMM, good dentition.  Thyroid normal  Respiratory: CTAB, no crackles or wheezing. No cough or obvious dyspnea.  Speaking full sentences comfortably on room air.  Cardiovascular: RRR, normal S1 and S2, and no murmur noted. No edema. Palpable pulses to radial  DP and PT arteries.   GI:  Abdomen thin,  non-distended, nontender to soft palpation, normal bowel sounds, no HSM  Lymph/Hematologic: No cervical lymphadenopathy    Skin: Warm and dry.    Musculoskeletal:  Grossly normal  Neurologic: Awake, alert, fully oriented. Cranial nerves II-XII are grossly intact. Gait is normal.   Neuropsychiatric: Calm, cooperative and pleasant. Normal affect.     DIAGNOSTICS:                                                    Recent labs reviewed (last on 4/2)    EKG obtained in clinic today which shows A. Fib (VR 78) with competing junctional pacemaker.  This EKG is improved from previous (A. fib is rate controlled.    IMPRESSION:                                                    Girish Chauhan is here for a pre-operative evaluation prior to planned EGD.     Pre-operative considerations:  1.  Cardiac:  Functional status- METS <4.  Patient reports that he can only walk approximately 1 block before he gets tired.  He stops because he gets tired not because he gets dyspneic.  He attributes this to his very poor nutritional status and inability to take adequate p.o., which is why he is undergoing EGD.  2. Pulm:  Airway feasible.  MARVIN risk: intermediate.  Patient does note chronic exertional  dyspnea.  He is followed by outside cardiology for all of the above cardiac conditions.    Last TTE June 2020 which showed mild - moderate aortic stenosis and EF 40-45%.  Aortic stenosis is being monitored. Ascending aorta noted 3.9cm on echo in 2019.  On eliquis for a-fib; patient was advised to discontinue Eliquis 2 days before procedure.  ChadsVasc = 3.  He has a total of 6 coronary stents.  The last was placed in June 2019.  He saw his cardiology provider last in June 2020.   Patient is on furosemide which she takes weekly 3 days.  In addition to holding Eliquis, advised patient to hold his Thursday dose of furosemide.       -Given that this is a low risk procedure, the risk of adverse cardiac events is low.       -Using the NSQIP surgical risk calculator, Girish Chauhan has a 0.1% risk of a major adverse cardiac event and 12.1% risk of any major complication with this surgery.   3. GI:  Risk of PONV score = 2.  If > 2, anti-emetic intervention recommended.  4. Renal:  +CKD - recent creatinine 0.98.   5. Access:  Right chest portacath  6. Heme:  + chronic anemia - recent hgb 10.2 (stable) in setting of recent hematuria     VTE risk: 3%     Patient is optimized and is acceptable candidate for the proposed procedure.  No further diagnostic evaluation is needed.       RECOMMENDATIONS:                                                    -Approval given to proceed with proposed procedure, without further diagnostic evaluation  --Patient is to take all scheduled medications, except: Eliquis (hold 2 days prior to procedure) and lasix (hold Thursday dose - patient takes q3 days)        Copy of this evaluation report is provided to requesting physician.    [facility] Preop Guidelines      Nona Olivas MD MPH, PGY-6  Internal Medicine  p:[phone]  April 6, 2021      Pt was seen and plan of care discussed with attending physician Dr. ELIDA Youngblood     Attending Addendum:  Patient seen and examined with resident in  clinic today.  Pertinent portions of history and exam were independently verified by myself.  I agree with the exam and plan as outlined above with the following modifications: abhi Youngblood MD  Internal Medicine

## 2021-04-09 NOTE — ANESTHESIA PREPROCEDURE EVALUATION
Anesthesia Pre-Procedure Evaluation    Patient: Girish Chauhan   MRN: [id number] : 1955        Preoperative Diagnosis: Abdominal pain, generalized [R10.84]   Procedure : Procedure(s):  ESOPHAGOGASTRODUODENOSCOPY (EGD), WITH STENT EXCHANGE AND PLACEMENT,  AND BIOPSY     Past Medical History:   Diagnosis Date     Anemia      Aortic stenosis due to bicuspid aortic valve      Ascending aortic aneurysm (H)      Atrial fibrillation (H)     hx of paroxysmal atrial fibrillation since approximately . S/p cardioversion in  with recurrent atrial fibrillation s/p repeat cardioversion 14.     Basal cell carcinoma 2016    right shoulder and right posterior calf s/p electrodessication and curretage 2016.     CAD (coronary artery disease) 2011    s/p angiogram 2011 s/p percutaneous intervention on the LAD with multiple drug-eluting stents complicated by a small perforation in the diagonal branch which sealed spontaneously.  Patient with significant Circumflex stenosis which is being treated conservatively.     Cholangiocarcinoma (H)      CKD (chronic kidney disease)      Gout     affects left foot 2-3 times per year     Hydronephrosis of right kidney      Hyperlipidemia      Hypertension      Liver disease      Melanoma (H) 2015    back s/p resection 2015     Red blood cell antibody positive      Squamous cell carcinoma     nose s/p excision      Past Surgical History:   Procedure Laterality Date     ------------OTHER-------------      multiple skin cancer resections     CARDIOVERSION  , 2014     COMBINED CYSTOSCOPY, RETROGRADES, EXCHANGE STENT URETER(S) Right 2019    Procedure: Cystoscopy, Right Retrograde Pyelogram, Right Ureteral Stent Exchange;  Surgeon: Sivan Walker MD;  Location: [facility]     COMBINED CYSTOSCOPY, RETROGRADES, EXCHANGE STENT URETER(S) Right 2020    Procedure: CYSTOSCOPY, WITH RIGHT RETROGRADE PYELOGRAM AND RIGHT URETERAL STENT EXCHANGE;  Surgeon:  Sivan Walker MD;  Location: [facility]     COMBINED CYSTOSCOPY, RETROGRADES, EXCHANGE STENT URETER(S) Right 10/12/2020    Procedure: CYSTOSCOPY, WITH RETROGRADE PYELOGRAM AND RIGHT URETERAL STENT REPLACEMENT;  Surgeon: Sivan Walker MD;  [facility]     COMBINED CYSTOSCOPY, RETROGRADES, EXCHANGE STENT URETER(S) Right 1/15/2021    Procedure: CYSTOSCOPY, WITH RETROGRADE PYELOGRAM AND URETERAL STENT REPLACEMENT;  Surgeon: Sivan Walker MD;  [facility]     CYSTOSCOPY, RETROGRADES, INSERT STENT URETER(S), COMBINED N/A 9/16/2019    Procedure: Cystoscopy, Right Retrograde Pyelogram, Right Ureteral Stent Placement;  Surgeon: Sivan Walker MD;  [facility]     CYSTOSCOPY, RETROGRADES, INSERT STENT URETER(S), COMBINED Right 2/5/2020    Procedure: CYSTOSCOPY, WITH Right RETROGRADE PYELOGRAM AND Right URETERAL STENT INSERTION;  Surgeon: Sivan Walker MD;  [facility]     ENDOSCOPIC RETROGRADE CHOLANGIOPANCREATOGRAM N/A 2/26/2016    Procedure: COMBINED ENDOSCOPIC RETROGRADE CHOLANGIOPANCREATOGRAPHY, PLACE TUBE/STENT;  Surgeon: Rafa Andrade MD;  [facility]     ENDOSCOPIC RETROGRADE CHOLANGIOPANCREATOGRAPHY  2/2014     ENDOSCOPIC ULTRASOUND UPPER GASTROINTESTINAL TRACT (GI) N/A 6/7/2019    Procedure: ENDOSCOPIC ULTRASOUND, with hot axios stent placement;  Surgeon: Gabino Rodriguez MD;  [facility]     ENTEROSCOPY SMALL BOWEL N/A 6/7/2019    Procedure: ENTEROSCOPY;  Surgeon: Gabino Rodriguez MD;  [facility]     ESOPHAGOSCOPY, GASTROSCOPY, DUODENOSCOPY (EGD), COMBINED N/A 10/16/2019    Procedure: Upper Gastrointestinal Endoscopy;  Surgeon: Gabino Rodriguez MD;  [facility]     ESOPHAGOSCOPY, GASTROSCOPY, DUODENOSCOPY (EGD), COMBINED N/A 8/25/2020    Procedure: ESOPHAGOGASTRODUODENOSCOPY (EGD) WIth  duodenal and jejunal stent placement;  Surgeon: Gabino Rodriguez MD;  [facility]     ESOPHAGOSCOPY, GASTROSCOPY,  DUODENOSCOPY (EGD), DILATATION, COMBINED N/A 7/29/2019    Procedure: Esophagoscopy, gastroscopy, duodenoscopy (EGD), with stent exchange and dilation;  Surgeon: Gabino Rodriguez MD;  [facility]     EXPLORE COMMON BILE DUCT N/A 3/8/2016    Procedure: EXPLORE COMMON BILE DUCT;  Surgeon: Jose Eduardo Pinedo MD;  [facility]     HEPATECTOMY PARTIAL Left 3/8/2016    Procedure: HEPATECTOMY PARTIAL;  Surgeon: Jose Eduardo Pinedo MD;  [facility]     INSERT PORT VASCULAR ACCESS Right 12/8/2017    Procedure: INSERT PORT VASCULAR ACCESS;  Place single lumen venous chest port - right;  Surgeon: Drew Powell PA-C;  [facility]: [facility]     STENT  12/2011    LAD with multiple SAM      Allergies   Allergen Reactions     Blood Transfusion Related (Informational Only) Other (See Comments)     Patient has a history of a clinically significant antibody against RBC antigens.  A delay in compatible RBCs may occur.      Social History     Tobacco Use     Smoking status: Never Smoker     Smokeless tobacco: Never Used   Substance Use Topics     Alcohol use: Not Currently     Alcohol/week: 2.0 standard drinks     Types: 2 Cans of beer per week      Wt Readings from Last 1 Encounters:   04/09/21 59.1 kg (130 lb 4.7 oz)        Anesthesia Evaluation            ROS/MED HX  ENT/Pulmonary:       Neurologic:       Cardiovascular:     (+) hypertension--CAD ---dysrhythmias, a-fib, Irregular Heartbeat/Palpitations,     METS/Exercise Tolerance:     Hematologic:       Musculoskeletal:       GI/Hepatic:     (+) liver disease,     Renal/Genitourinary:     (+) renal disease,     Endo:       Psychiatric/Substance Use:       Infectious Disease:       Malignancy:   (+) Malignancy,     Other:            Physical Exam    Airway  airway exam normal           Respiratory Devices and Support         Dental           Cardiovascular             Pulmonary                   OUTSIDE LABS:  CBC:   Lab Results   Component Value Date    WBC 9.7  04/02/2021    WBC 11.1 (H) 03/05/2021    HGB 10.2 (L) 04/02/2021    HGB 10.4 (L) 03/05/2021    HCT 31.8 (L) 04/02/2021    HCT 30.4 (L) 03/05/2021     04/02/2021     03/05/2021     BMP:   Lab Results   Component Value Date     04/02/2021     03/05/2021    POTASSIUM 4.1 04/02/2021    POTASSIUM 3.2 (L) 03/05/2021    CHLORIDE 111 (H) 04/02/2021    CHLORIDE 104 03/05/2021    CO2 26 04/02/2021    CO2 29 03/05/2021    BUN 21 04/02/2021    BUN 23 03/05/2021    CR 0.98 04/02/2021    CR 0.93 03/05/2021     (H) 04/02/2021     (H) 03/05/2021     COAGS:   Lab Results   Component Value Date    PTT 33 02/11/2020    INR 1.22 (H) 04/09/2021    FIBR 404 07/30/2019     POC:   Lab Results   Component Value Date    BGM 96 04/09/2021     HEPATIC:   Lab Results   Component Value Date    ALBUMIN 2.5 (L) 04/02/2021    PROTTOTAL 5.5 (L) 04/02/2021    ALT 38 04/02/2021    AST 27 04/02/2021    ALKPHOS 192 (H) 04/02/2021    BILITOTAL 0.4 04/02/2021    TAYLOR 24 02/11/2020     OTHER:   Lab Results   Component Value Date    PH 7.38 03/08/2016    LACT 1.4 07/15/2020    GARY 8.3 (L) 04/02/2021    PHOS 2.8 02/13/2020    MAG 2.0 02/13/2020    LIPASE 207 08/25/2020    AMYLASE 112 (H) 08/25/2020    TSH 2.34 07/15/2020    CRP 20.0 (H) 02/11/2020       Anesthesia Plan    ASA Status:  3   NPO Status:  NPO Appropriate    Anesthesia Type: General.     - Airway: ETT   Induction: Intravenous.   Maintenance: Balanced.        Consents    Anesthesia Plan(s) and associated risks, benefits, and realistic alternatives discussed. Questions answered and patient/representative(s) expressed understanding.     - Discussed with:  Patient         Postoperative Care       PONV prophylaxis: Ondansetron (or other 5HT-3), Dexamethasone or Solumedrol     Comments:                Thompson Campos MD

## 2021-04-09 NOTE — DISCHARGE INSTRUCTIONS
Diet: Start with full liquids and then can advance as tolerated    Meds: Resume all meds    [facility], [place]  Same-Day Surgery   Adult Discharge Orders & Instructions     For 24 hours after surgery    1. Get plenty of rest.  A responsible adult must stay with you for at least 24 hours after you leave the hospital.   2. Do not drive or use heavy equipment.  If you have weakness or tingling, don't drive or use heavy equipment until this feeling goes away.  3. Do not drink alcohol.  4. Avoid strenuous or risky activities.  Ask for help when climbing stairs.   5. You may feel lightheaded.  IF so, sit for a few minutes before standing.  Have someone help you get up.   6. If you have nausea (feel sick to your stomach): Drink only clear liquids such as apple juice, ginger ale, broth or 7-Up.  Rest may also help.  Be sure to drink enough fluids.  Move to a regular diet as you feel able.  7. You may have a slight fever. Call the doctor if your fever is over 100 F (37.7 C) (taken under the tongue) or lasts longer than 24 hours.  8. You may have a dry mouth, a sore throat, muscle aches or trouble sleeping.  These should go away after 24 hours.  9. Do not make important or legal decisions.   Call your doctor for any of the followin.  Signs of infection (fever, growing tenderness at the surgery site, a large amount of drainage or bleeding, severe pain, foul-smelling drainage, redness, swelling).    2. It has been over 8 to 10 hours since surgery and you are still not able to urinate (pass water).    3.  Headache for over 24 hours.    4.  Numbness, tingling or weakness the day after surgery (if you had spinal anesthesia).  To contact a doctor, call Dr Rodriguez at [phone] (clinic)  _ or:        [phone] and ask for the resident on call for   GI or Gastroenterology  (answered 24 hours a day)      Emergency Department:    [facility]: [phone]       (TTY for hearing impaired:  [phone])    [facility]: [phone]       (TTY for hearing impaired: [phone])

## 2021-04-09 NOTE — DISCHARGE SUMMARY
Discharge criteria met.  Pain free, tolerates oral fluids.  Home care instructions reviewed and and patient verbalizes intent to comply.  D-accessed med port after flush and heparin and dressing applied.  Released at 1210 to private vehicle.  Good condition upon Hospital departure.

## 2021-04-09 NOTE — ANESTHESIA PROCEDURE NOTES
Airway       Patient location during procedure: OR  Staff -        CRNA: Lesley Ogden APRN CRNA       Performed By: CRNA  Consent for Airway        Urgency: elective  Indications and Patient Condition       Indications for airway management: davis-procedural       Induction type:intravenous       Mask difficulty assessment: 1 - vent by mask    Final Airway Details       Final airway type: endotracheal airway       Successful airway: ETT - single  Endotracheal Airway Details        ETT size (mm): 7.5       Cuffed: yes       Successful intubation technique: direct laryngoscopy       DL Blade Type: Cedeno 2       Grade View of Cords: 1       Adjucts: stylet       Position: Right       Measured from: gums/teeth       Secured at (cm): 23       Bite Block used: endoscopy bite block.    Post intubation assessment        Placement verified by: capnometry, equal breath sounds and chest rise        Number of attempts at approach: 1       Number of other approaches attempted: 0       Secured with: pink tape       Ease of procedure: easy       Dentition: Intact and Unchanged    Medication(s) Administered   Medication Administration Time: 4/9/2021 7:43 AM

## 2021-04-09 NOTE — OP NOTE
Upper GI Endoscopy 04/09/2021  7:41 AM [facility], [address] [place] [address] [phone]     Endoscopy Department   _______________________________________________________________________________   Patient Name: Girish Chauhan           Procedure Date: 4/9/2021 7:41 AM   MRN: [id number]                       Account Number: [id number]   YOB: 1955              Admit Type: Outpatient   Age: 66                               Room: OR   Gender: Male                          Note Status: Finalized   Attending MD: Gabino Rodriguez MD   Total Sedation Time:   _______________________________________________________________________________       Procedure:           Upper GI endoscopy   Indications:         Malnutrition   Providers:           Gabino Rodriguez MD, Jim Hamilton MD   Patient Profile:     Mr Chauhan is a 65yo gentleman with recurrent or                        persistent cholangiocarcinoma status post left                        hepatectomy with RYHJ 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 This has been                        palliatively managed by coaxial duodenal stents and                        enteroenterostomy (biliary to duodenal bulb) with                        multuple metal stents for biliary limb drainage. With                        progressive, recurrent malnutrtion he returns for                        further optimization.   Referring MD:        Naresh De Los Santos MD   Requesting Provider: Jose Eduardo Pinedo MD   Medicines:           General Anesthesia   Complications:       No immediate complications.   _______________________________________________________________________________   Procedure:           Pre-Anesthesia Assessment:                        - Prior to the procedure, a History and Physical was                        performed, and patient medications and allergies  were                        reviewed. The patient is competent. The risks and                        benefits of the procedure and the sedation options and                        risks were discussed with the patient. All questions                        were answered and informed consent was obtained. Patient                        identification and proposed procedure were verified by                        the nurse in the pre-procedure area. Mental Status                        Examination: alert and oriented. Airway Examination:                        Mallampati Class II (the uvula but not tonsillar pillars                        visualized). Respiratory Examination: clear to                        auscultation. CV Examination: normal. ASA Grade                        Assessment: III - A patient with severe systemic                        disease. After reviewing the risks and benefits, the                        patient was deemed in satisfactory condition to undergo                        the procedure. The anesthesia plan was to use general                        anesthesia. Immediately prior to administration of                        medications, the patient was re-assessed for adequacy to                        receive sedatives. The heart rate, respiratory rate,                        oxygen saturations, blood pressure, adequacy of                        pulmonary ventilation, and response to care were                        monitored throughout the procedure. The physical status                        of the patient was re-assessed after the procedure.                        After obtaining informed consent, the endoscope was                        passed under direct vision. Throughout the procedure,                        the patient's blood pressure, pulse, and oxygen                        saturations were monitored continuously. The endoscopes                        were introduced through the  "mouth, and advanced to the                        second part of duodenum. The upper GI endoscopy was                        accomplished without difficulty. The patient tolerated                        the procedure well.                                                                                     Findings:        The patient was supine throughout and both a 1T and 140 duodenoscope        were utilized.  films demonstrated coaxial metal duodenal stents as        well as coaxial Axios and Viabil jejunoduodenal stents. In terms of        white light, the esophagus was unremarkable without significant        inflammation. The stomach was notable for the Viabil stent across the        pylorus while the Axios was not visible. The duoedenal stents were        coaxial and extended from mid bulb to the second portion. The Viabil was        occluded with debris and the duodenal stents had signficant tissue/tumor        ingrowth. This ingrowth was biopsied and sent for genetic analyses as        requested. The Viabil stent was then removed and the Axios cannulated        with a sphincterotome and an 0.025\" angled Visiglide wire. Contrast was        injected demonstrating a mostly patent Axios with free flow to the        biliary limb, though there was overgrowth on the duodenal side. A 7F 9cm        double pig tailed Zimmon was then deployed to allow ongoing passive        biliary drainage. A long 0.025\" Visiglide wire was then passed across        the duodenal stents and a [id number] uncovered metal stent was then        deployed spanning the pylorus, through the existing stents into the        second portion with excellent expansion. We then used 80W F0.8 argon        plasma coagulation to open the tines apposing the Axios stent. Biliary        drainage appeared sufficient and liquid effluxed downstream through the        duodenal stent.                                                                               "       Impression:          - Mod 22 for anatomy and complexity of procedure                        - Well postioned coaxial metal uncovered duodenal stents                        with moderate to severe tissue ingrowth and obstructoin                        - Well positioned Axios communicating with the biliary                        limb and bulb with a near completely occluded coaxial                        Viabil covered metal stent, further complicated by                        tissue ingrowth and overgrowth                        - Uncomplicated removal of the occluded Viabil and                        placement of a coxial double pig tail across the                        jejunoduodenostomy (Axios) stent to allow continued                        drainage of bile                        - Uncomplicated placement of a [id number] uncovered                        Hanarostent in coaxial fashion within the existing                        coaxial duodenal stents                        - Successful stent trim of the fresh duodenal stent to                        allow improved drainage of bile                        - Uncomplicated biopsy of tissue ingrowth which may                        represent malignancy or hypertrophy (no other site                        available for biopsy)   Recommendation:      - General anesthesia recovery with probable discharge                        home this morning                        - Diet may resume without delay; however the patient                        should begin with full liquids and slowly advance as                        tolerated to a low roughage, well chewed, high frequency                        small meal diet as pervious                        - All medications may resume without delay                        - Follow up with Dr De Los Santos as scheduled; biopsy results                        will be shared when available                        - Repeat EGD for stent  modificaton as clinical course                        dictates                        - The findings and recommendations were discussed with                        the patient and their family                                                                                       electronically signed by MEGHAN Rodriguez

## 2021-04-09 NOTE — ANESTHESIA POSTPROCEDURE EVALUATION
Patient: Girish Chauhan    Procedure(s):  ESOPHAGOGASTRODUODENOSCOPY (EGD), WITH STENT EXCHANGE AND PLACEMENT,  AND BIOPSY    Diagnosis:Abdominal pain, generalized [R10.84]  Diagnosis Additional Information: No value filed.    Anesthesia Type:  No value filed.    Note:  Disposition: Outpatient   Postop Pain Control: Uneventful            Sign Out: Well controlled pain   PONV: No   Neuro/Psych: Uneventful            Sign Out: Acceptable/Baseline neuro status   Airway/Respiratory: Uneventful            Sign Out: Acceptable/Baseline resp. status   CV/Hemodynamics: Uneventful            Sign Out: Acceptable CV status   Other NRE:    DID A NON-ROUTINE EVENT OCCUR?          Last vitals:  Vitals:    04/09/21 1115 04/09/21 1130 04/09/21 1145   BP: 118/78 113/82 105/79   Pulse: 97 85 82   Resp:      Temp:      SpO2:          Last vitals prior to Anesthesia Care Transfer:  CRNA VITALS  4/9/2021 0913 - 4/9/2021 1013      4/9/2021             Resp Rate (observed):  (!) 1          Electronically Signed By: Thompson Campos MD  April 9, 2021  1:46 PM

## 2021-04-12 NOTE — CONFIDENTIAL NOTE
Patient is scheduled for surgery with Dr. Walker    Spoke with: Girish    Date of Surgery: 4/19/21    Location: [facility] OR    Informed patient they will need an adult  yes    Pre-op with surgeon (if applicable): n/a    H&P: Scheduled with n/a    Pre-procedure COVID-19 Test: 4/15/21    Additional imaging/appointments: n/a    Surgery packet: to be sent via "[employer] 4/12/21     Additional comments: n/a

## 2021-04-12 NOTE — TELEPHONE ENCOUNTER
FUTURE VISIT INFORMATION      SURGERY INFORMATION:    Date: 21    Location: [facility] or    Surgeon:  Sivan Walker MD    Anesthesia Type:  choice    Procedure: CYSTOSCOPY, WITH RETROGRADE PYELOGRAM AND URETERAL STENT REPLACEMENT RIGHT    RECORDS REQUESTED FROM:       Primary Care Provider: Ptaricia Nobles MD- [facility]    Pertinent Medical History: Aortic root dilatation, ASCVD, Aortic stenosis, hypertension, bicuspid aortic valve    Most recent EKG+ Tracin21    [facility]    Most recent Cardiac Stress Test: 11- [facility]    Most recent Coronary Angiogram: 19- [facility]

## 2021-04-15 NOTE — TELEPHONE ENCOUNTER
Notified patient he needed to stop his Eliquis 3 days before his procedure on Monday. Patient verbalized understanding and will not take any tomorrow.    Tena Blanca RN   Care Coordinator Urology

## 2021-04-15 NOTE — TELEPHONE ENCOUNTER
----- Message from Tena Blanca RN sent at 4/8/2021  4:02 PM CDT -----  Regarding: FW: hematuria    ----- Message -----  From: Sivan Walker MD  Sent: 4/5/2021   2:37 PM CDT  To: YO Jasso CNP, Kecia Loco, RN, #  Subject: RE: hematuria                                    I think if he is having hematuria and the urine culture is negative that he needs to have bladder biopsy at the next time we exchange his stent given his history.  If hemoglobin is stable this does not need to happen urgently but will try to get this on the schedule sooner rather than later.  If he has not had any imaging recently a CT urogram would also be helpful    Thanks  ----- Message -----  From: Jennifer Jalloh APRN CNP  Sent: 4/5/2021   1:52 PM CDT  To: Kecia Loco RN, Sivan Walker MD, #  Subject: hematuria                                        Hi Dr. Walker,    I had a visit today with Krishna. He reports ongoing hematuria (the color of red wine), has been going on for a week.  No pain, fevers/chills. Had a UC last week that was negative. Hgb on 4/2 was stable.    He is wondering what your recommendations are for management. Does the ureteral stent require revision?  It was last changed on 1/15/21.    He has a GI procedure/bowel stent revision scheduled on Friday and wants to make sure you are in the loop on that in case you feel his hematuria needs to be addressed urgently.    Farrah, would you be able to follow up with Krishna on the plan?    Thank you!

## 2021-04-18 NOTE — ANESTHESIA PREPROCEDURE EVALUATION
Anesthesia Pre-Procedure Evaluation    Patient: Girish Chauhan   MRN: [id number] : 1955        Preoperative Diagnosis: Hydronephrosis of right kidney [N13.30]  Cholangiocarcinoma (H) [C22.1]   Procedure : Procedure(s):  CYSTOSCOPY WITH  POSSIBLE BLADDER BIOPSY, FULGURATION, WITH RETROGRADE PYELOGRAM AND URETERAL RIGHT  STENT     Past Medical History:   Diagnosis Date     Anemia      Aortic stenosis due to bicuspid aortic valve      Ascending aortic aneurysm (H)      Atrial fibrillation (H)     hx of paroxysmal atrial fibrillation since approximately . S/p cardioversion in  with recurrent atrial fibrillation s/p repeat cardioversion 14.     Basal cell carcinoma 2016    right shoulder and right posterior calf s/p electrodessication and curretage 2016.     CAD (coronary artery disease) 2011    s/p angiogram 2011 s/p percutaneous intervention on the LAD with multiple drug-eluting stents complicated by a small perforation in the diagonal branch which sealed spontaneously.  Patient with significant Circumflex stenosis which is being treated conservatively.     Cholangiocarcinoma (H)      CKD (chronic kidney disease)      Gout     affects left foot 2-3 times per year     Hydronephrosis of right kidney      Hyperlipidemia      Hypertension      Liver disease      Melanoma (H) 2015    back s/p resection 2015     Red blood cell antibody positive      Squamous cell carcinoma     nose s/p excision      Past Surgical History:   Procedure Laterality Date     ------------OTHER-------------      multiple skin cancer resections     CARDIOVERSION  , 2014     COMBINED CYSTOSCOPY, RETROGRADES, EXCHANGE STENT URETER(S) Right 2019    Procedure: Cystoscopy, Right Retrograde Pyelogram, Right Ureteral Stent Exchange;  Surgeon: Sivan Walker MD;  [facility]     COMBINED CYSTOSCOPY, RETROGRADES, EXCHANGE STENT URETER(S) Right 2020    Procedure: CYSTOSCOPY, WITH RIGHT  RETROGRADE PYELOGRAM AND RIGHT URETERAL STENT EXCHANGE;  Surgeon: Sivan Walker MD;  [facility]     COMBINED CYSTOSCOPY, RETROGRADES, EXCHANGE STENT URETER(S) Right 10/12/2020    Procedure: CYSTOSCOPY, WITH RETROGRADE PYELOGRAM AND RIGHT URETERAL STENT REPLACEMENT;  Surgeon: Sivan Walker MD;  [facility]     COMBINED CYSTOSCOPY, RETROGRADES, EXCHANGE STENT URETER(S) Right 1/15/2021    Procedure: CYSTOSCOPY, WITH RETROGRADE PYELOGRAM AND URETERAL STENT REPLACEMENT;  Surgeon: Sivan Walker MD;  [facility]     CYSTOSCOPY, RETROGRADES, INSERT STENT URETER(S), COMBINED N/A 9/16/2019    Procedure: Cystoscopy, Right Retrograde Pyelogram, Right Ureteral Stent Placement;  Surgeon: Sivan Walker MD;  [facility]     CYSTOSCOPY, RETROGRADES, INSERT STENT URETER(S), COMBINED Right 2/5/2020    Procedure: CYSTOSCOPY, WITH Right RETROGRADE PYELOGRAM AND Right URETERAL STENT INSERTION;  Surgeon: Sivan Walker MD;  [facility]     ENDOSCOPIC RETROGRADE CHOLANGIOPANCREATOGRAM N/A 2/26/2016    Procedure: COMBINED ENDOSCOPIC RETROGRADE CHOLANGIOPANCREATOGRAPHY, PLACE TUBE/STENT;  Surgeon: Rafa Andrade MD;  [facility]     ENDOSCOPIC RETROGRADE CHOLANGIOPANCREATOGRAPHY  2/2014     ENDOSCOPIC ULTRASOUND UPPER GASTROINTESTINAL TRACT (GI) N/A 6/7/2019    Procedure: ENDOSCOPIC ULTRASOUND, with hot axios stent placement;  Surgeon: Gabino Rodriguez MD;  [facility]     ENTEROSCOPY SMALL BOWEL N/A 6/7/2019    Procedure: ENTEROSCOPY;  Surgeon: Gabino Rodriguez MD;  [facility]     ESOPHAGOSCOPY, GASTROSCOPY, DUODENOSCOPY (EGD), COMBINED N/A 10/16/2019    Procedure: Upper Gastrointestinal Endoscopy;  Surgeon: Gabino Rodriguez MD;  [facility]     ESOPHAGOSCOPY, GASTROSCOPY, DUODENOSCOPY (EGD), COMBINED N/A 8/25/2020    Procedure: ESOPHAGOGASTRODUODENOSCOPY (EGD) WIth  duodenal and jejunal stent placement;  Surgeon: Gabino Rodriguez  MD Christopher;  [facility]     ESOPHAGOSCOPY, GASTROSCOPY, DUODENOSCOPY (EGD), COMBINED N/A 4/9/2021    Procedure: ESOPHAGOGASTRODUODENOSCOPY (EGD), WITH STENT EXCHANGE AND PLACEMENT,  AND BIOPSY;  Surgeon: Gabino Rodriguez MD;  [facility]     ESOPHAGOSCOPY, GASTROSCOPY, DUODENOSCOPY (EGD), DILATATION, COMBINED N/A 7/29/2019    Procedure: Esophagoscopy, gastroscopy, duodenoscopy (EGD), with stent exchange and dilation;  Surgeon: Gabino Rodriguez MD;  [facility]     EXPLORE COMMON BILE DUCT N/A 3/8/2016    Procedure: EXPLORE COMMON BILE DUCT;  Surgeon: Jose Eduardo Pinedo MD;  [facility]     HEPATECTOMY PARTIAL Left 3/8/2016    Procedure: HEPATECTOMY PARTIAL;  Surgeon: Jose Eduardo Pinedo MD;  [facility]     INSERT PORT VASCULAR ACCESS Right 12/8/2017    Procedure: INSERT PORT VASCULAR ACCESS;  Place single lumen venous chest port - right;  Surgeon: Drew Powell PA-C;  [facility]     STENT  12/2011    LAD with multiple SAM      Allergies   Allergen Reactions     Blood Transfusion Related (Informational Only) Other (See Comments)     Patient has a history of a clinically significant antibody against RBC antigens.  A delay in compatible RBCs may occur.      Social History     Tobacco Use     Smoking status: Never Smoker     Smokeless tobacco: Never Used   Substance Use Topics     Alcohol use: Not Currently     Alcohol/week: 2.0 standard drinks     Types: 2 Cans of beer per week      Wt Readings from Last 1 Encounters:   04/09/21 59.1 kg (130 lb 4.7 oz)        Anesthesia Evaluation            ROS/MED HX  ENT/Pulmonary:       Neurologic:       Cardiovascular: Comment: 5/2019 NSEMI    (+) --CAD -past MI -stent-2011. dysrhythmias, a-fib, Previous cardiac testing     METS/Exercise Tolerance:     Hematologic:       Musculoskeletal: Comment: gout  (+) arthritis,     GI/Hepatic:       Renal/Genitourinary:     (+) renal disease, type: CRI,     Endo:       Psychiatric/Substance  Use:       Infectious Disease:       Malignancy:       Other:            Physical Exam    Airway        Mallampati: II   TM distance: > 3 FB   Neck ROM: full   Mouth opening: > 3 cm    Respiratory Devices and Support         Dental  no notable dental history         Cardiovascular          Rhythm and rate: regular and normal     Pulmonary           breath sounds clear to auscultation           OUTSIDE LABS:  CBC:   Lab Results   Component Value Date    WBC 9.7 04/02/2021    WBC 11.1 (H) 03/05/2021    HGB 10.2 (L) 04/02/2021    HGB 10.4 (L) 03/05/2021    HCT 31.8 (L) 04/02/2021    HCT 30.4 (L) 03/05/2021     04/02/2021     03/05/2021     BMP:   Lab Results   Component Value Date     04/02/2021     03/05/2021    POTASSIUM 4.1 04/02/2021    POTASSIUM 3.2 (L) 03/05/2021    CHLORIDE 111 (H) 04/02/2021    CHLORIDE 104 03/05/2021    CO2 26 04/02/2021    CO2 29 03/05/2021    BUN 21 04/02/2021    BUN 23 03/05/2021    CR 0.98 04/02/2021    CR 0.93 03/05/2021     (H) 04/02/2021     (H) 03/05/2021     COAGS:   Lab Results   Component Value Date    PTT 33 02/11/2020    INR 1.22 (H) 04/09/2021    FIBR 404 07/30/2019     POC:   Lab Results   Component Value Date    BGM 96 04/09/2021     HEPATIC:   Lab Results   Component Value Date    ALBUMIN 2.5 (L) 04/02/2021    PROTTOTAL 5.5 (L) 04/02/2021    ALT 38 04/02/2021    AST 27 04/02/2021    ALKPHOS 192 (H) 04/02/2021    BILITOTAL 0.4 04/02/2021    TAYLOR 24 02/11/2020     OTHER:   Lab Results   Component Value Date    PH 7.38 03/08/2016    LACT 1.4 07/15/2020    GARY 8.3 (L) 04/02/2021    PHOS 2.8 02/13/2020    MAG 2.0 02/13/2020    LIPASE 207 08/25/2020    AMYLASE 112 (H) 08/25/2020    TSH 2.34 07/15/2020    CRP 20.0 (H) 02/11/2020       Anesthesia Plan    ASA Status:  3   NPO Status:  NPO Appropriate    Anesthesia Type: MAC.     - Reason for MAC: straight local not clinically adequate   Induction: Intravenous.           Consents    Anesthesia  Plan(s) and associated risks, benefits, and realistic alternatives discussed. Questions answered and patient/representative(s) expressed understanding.     - Discussed with:  Patient      - Extended Intubation/Ventilatory Support Discussed: No.      - Patient is DNR/DNI Status: No    Use of blood products discussed: No .     Postoperative Care    Pain management: Oral pain medications.   PONV prophylaxis: Ondansetron (or other 5HT-3)     Comments:    MAC  Standard monitors     H&P reviewed: Unable to attach H&P to encounter due to EHR limitations. H&P Update: appropriate H&P reviewed, patient examined. No interval changes since H&P (within 30 days).         You Whittaker MD

## 2021-04-19 NOTE — ANESTHESIA CARE TRANSFER NOTE
Patient: Girish Chauhan    Procedure(s):  CYSTOSCOPY WITH  BLADDER BIOPSY, FULGURATION, WITH RETROGRADE PYELOGRAM AND URETERAL RIGHT  STENT exchange    Diagnosis: Hydronephrosis of right kidney [N13.30]  Cholangiocarcinoma (H) [C22.1]  Diagnosis Additional Information: No value filed.    Anesthesia Type:   MAC     Note:    Oropharynx: oropharynx clear of all foreign objects  Level of Consciousness: awake  Oxygen Supplementation: room air    Independent Airway: airway patency satisfactory and stable  Dentition: dentition unchanged  Vital Signs Stable: post-procedure vital signs reviewed and stable  Report to RN Given: handoff report given  Patient transferred to: Phase II    Handoff Report: Identifed the Patient, Identified the Reponsible Provider, Reviewed the pertinent medical history, Discussed the surgical course, Reviewed Intra-OP anesthesia mangement and issues during anesthesia, Set expectations for post-procedure period and Allowed opportunity for questions and acknowledgement of understanding      Vitals: (Last set prior to Anesthesia Care Transfer)  CRNA VITALS  4/19/2021 1511 - 4/19/2021 1551      4/19/2021             EKG:  Atrial fibrillation        Electronically Signed By: YO Lam CRNA  April 19, 2021  3:51 PM

## 2021-04-19 NOTE — DISCHARGE INSTRUCTIONS
Same-Day Surgery   Adult Discharge Orders & Instructions     For 24 hours after surgery:  1. Get plenty of rest.  A responsible adult must stay with you for at least 24 hours after you leave the hospital.   2. Pain medication can slow your reflexes. Do not drive or use heavy equipment.  If you have weakness or tingling, don't drive or use heavy equipment until this feeling goes away.  3. Mixing alcohol and pain medication can cause dizziness and slow your breathing. It can even be fatal. Do not drink alcohol while taking pain medication.  4. Avoid strenuous or risky activities.  Ask for help when climbing stairs.   5. You may feel lightheaded.  If so, sit for a few minutes before standing.  Have someone help you get up.   6. If you have nausea (feel sick to your stomach), drink only clear liquids such as apple juice, ginger ale, broth or 7-Up.  Rest may also help.  Be sure to drink enough fluids.  Move to a regular diet as you feel able. Take pain medications with a small amount of solid food, such as toast or crackers, to avoid nausea.   7. A slight fever is normal. Call the doctor if your fever is over 100 F (37.7 C) (taken under the tongue) or lasts longer than 24 hours.  8. You may have a dry mouth, muscle aches, trouble sleeping or a sore throat.  These symptoms should go away after 24 hours.  9. Do not make important or legal decisions.   Pain Management:      1. Take pain medication (if prescribed) for pain as directed by your physician.        2. WARNING: If the pain medication you have been prescribed contains Tylenol  (acetaminophen), DO NOT take additional doses of Tylenol (acetaminophen).     Call your doctor for any of the followin.  Signs of infection (fever, growing tenderness at the surgery site, severe pain, a large amount of drainage or bleeding, foul-smelling drainage, redness, swelling).    2.  It has been over 8 to 10 hours since surgery and you are still not able to urinate (pee).    3.   Headache for over 24 hours.    4.  Numbness, tingling or weakness the day after surgery (if you had spinal anesthesia).  To contact a doctor, call _____________________________________ or:      [phone] and ask for the Resident On Call for:          __________________________________________ (answered 24 hours a day)      Emergency Department:  [facility]: [phone]  [facility]: [phone]               Rev. 10/2014

## 2021-04-19 NOTE — OP NOTE
DATE OF SURGERY: 4/19/2021     PREOPERATIVE DIAGNOSIS: Metastatic Cholangiocarcinoma with right hydronephrosis     POSTOPERATIVE DIAGNOSIS: Same    PROCEDURES PERFORMED:   1. Cystourethroscopy.   2. Right double-J indwelling ureteral stent exchange.   3. Retrograde pyelogram.  4. Intraoperative interpretation of fluoroscopic imaging.   5. Targeted Bladder Biopsy with fulguration    STAFF SURGEON: Sivan Walker MD    ASSISTANT: Drew Fenton MD     ANESTHESIA: MAC    ESTIMATED BLOOD LOSS: Minimal     IV FLUIDS: See Anesthesia Records    COMPLICATIONS: None.    SPECIMENS: Bladder biopsy     DRAINS: Right 7Fr x 26 Double J Ureteral Stent    SIGNIFICANT FINDINGS: Good curl in renal pelvis and bladder of new stent, mild hydronephrosis of right collecting system, excellent hemostasis of biopsied site on posterior bladder wall    BRIEF OPERATIVE INDICATIONS:  Girish Chauhan is a 66 year old male with a history of recurrent cholangiocarcinoma (with biopsy proven metasatic disease to bladder in the past), afib on apixaban, and chronic right hydronephrosis suspected to be 2/2 mass compression. He has been managed with indwelling right ureteral stent exchanges every 3 months, last exchanged January 2021 with 7 Fr x 26 cm Double J Stent. After discussion of all risks, benefits and alternatives, he elected to proceed with the above listed procedures.     DESCRIPTION OF PROCEDURE:    After full informed voluntary consent was obtained, the patient was transported to the operating room, placed supine on the table. After adequate anesthesia was induced, he was placed in dorsal lithotomy position in supportive yellowfin stirrups with care in neural safety in positioning of all four extremities. A timeout was taken to confirm correct patient, procedure and laterality. Appropriate pre-operative antibiotics were administered and pneumatic compression devices had been placed preoperatively.    The case was started by inserting a  22-Arabic rigid cystoscope sheath and 30-degree angle lens. The urethra was notable for an annular bulbar stricture, approximately 20 Fr, that was passively dilated with and easily traversed by the rigid cystoscope. The prostate was open. Once in the urinary bladder, media was clear.  There were no tumors or stones appreciated. The bladder was moderately trabeculated. There was an area of suspicious mucosa with some hypervascularity and edematous changes on the posterior bladder wall. Bilateral ureteral orifices were in their normal orthotopic positions.     The existing right ureteral stent was noted in place and was moderately encrusted. The stent was brought to the urethral meatus using a flexible stent grasper. It was cannulated with a Sensor Wire and advanced to the right renal pelvis under fluoroscopic guidance. The stent was removed and discarded and a 5Fr open ended catheter was advanced over the wire up to the right UPJ. A retrograde pyelogram demonstrated no filling defects and mild hydronephrosis. The Sensor wire was replaced and a 7 Arabic x 26 cm double-J stent was advanced over the guidewire with fluoroscopic guidance and direct visualization. Good curl was appreciated in the pelvis.  Stent passed up easily with no appreciable resistance. The guidewire was then removed and a good curl was appreciated in the renal pelvis as well as the bladder.     We then used the flexible cold cup biopsy forceps to biopsy the region on the posterior bladder wall that appeared suspicious. The sample was sent off for pathologic analysis. The bugbee electrocautery device was used to obtain meticulous hemostasis. The bladder was drained and the biopsy site re-examined under low pressure, with excellent hemostasis noted again. The bladder was drained a final time. The patient was returned to the supine position and awakened from anesthesia. Patient tolerated the procedure well.  No apparent complications. He was  transported to the postanesthesia care unit in stable condition.     PLAN:   1) PACU, then discharge to home  2) Return in 3 months for stent exchange    Drew Fenton MD  Urology Resident, PGY-2    Addendum:    I, Sivan Walker, was present for the entire case.  I agree with the note as above with changes made as needed.  I spoke to his wife Mily over the phone at the end of the case    Sivan Walker MD MPH   of Urology

## 2021-04-20 NOTE — ANESTHESIA POSTPROCEDURE EVALUATION
Patient: Girish Chauhan    Procedure(s):  CYSTOSCOPY WITH  BLADDER BIOPSY, FULGURATION, WITH RETROGRADE PYELOGRAM AND URETERAL RIGHT  STENT exchange    Diagnosis:Hydronephrosis of right kidney [N13.30]  Cholangiocarcinoma (H) [C22.1]  Diagnosis Additional Information: No value filed.    Anesthesia Type:  MAC    Note:  Disposition: Outpatient   Postop Pain Control: Uneventful            Sign Out: Well controlled pain   PONV: No   Neuro/Psych: Uneventful            Sign Out: Acceptable/Baseline neuro status   Airway/Respiratory: Uneventful            Sign Out: Acceptable/Baseline resp. status   CV/Hemodynamics: Uneventful            Sign Out: Acceptable CV status   Other NRE:    DID A NON-ROUTINE EVENT OCCUR?          Last vitals:  Vitals:    04/19/21 1545 04/19/21 1600 04/19/21 1700   BP: 93/68 111/76 112/77   Pulse: 73 67 70   Resp: 12  17   Temp: 36.9  C (98.4  F)  36.8  C (98.2  F)   SpO2:  99% 98%       Last vitals prior to Anesthesia Care Transfer:  CRNA VITALS  4/19/2021 1511 - 4/19/2021 1611      4/19/2021             EKG:  Atrial fibrillation          Electronically Signed By: You Whittaker MD  April 19, 2021  7:44 PM

## 2021-05-14 NOTE — NURSING NOTE
Chief Complaint   Patient presents with     Lab Only     Labs drawn from port by RN in lab.      Port accessed with 20 gauge flat needle by RN in lab.  Port flushed with saline and heparin.     Tamara Gusman RN

## 2021-05-17 NOTE — LETTER
"    5/17/2021         RE: Girish Chauhan  [address] [facility] [address]      Krishna is a 66 year old who is being evaluated via a billable video visit.      How would you like to obtain your AVS? MyChart  If the video visit is dropped, the invitation should be resent by: Text to cell phone: [phone]  Will anyone else be joining your video visit?                  Objective    Vitals - Patient Reported  Systolic (Patient Reported): 108  Diastolic (Patient Reported): 78  Weight (Patient Reported): 58.1 kg (128 lb)  Height (Patient Reported): 180.3 cm (5' 11\")  BMI (Based on Pt Reported Ht/Wt): 17.85  Temperature (Patient Reported): 96.6  F (35.9  C)         I am seeing Krishna Chauhan today in follow-up of metastatic cholangiocarcinoma.    His primary tumor was resected with curative intent in 2016, but he had a subsequent isolated recurrence within the wall of his urinary bladder.  He initially responded well to gemcitabine and cisplatin but had to drop the cisplatin early on due to neurotoxicity and then later had to drop the gemcitabine due to the development of TTP.  When he progressed further within the peritoneal cavity he received treatment with capecitabine with a long period of control, but over the past few months he developed worsening overall functional status with ongoing weight loss.  He had at that point no clear evidence of progression on his scans but his CA 19-9 was creeping up and we made a choice to discontinue therapy.  Now he returns for an assessment of his disease status.    Since being off the Xeloda his overall condition has improved.  His weight has increased by about 8 pounds and has been physically more active and has been well enough that he has been able to get out and do some errands.  He still sleeps much of the day, but his wife thinks he is up much of the night.  He overall feels like his condition is improved since discontinuing chemotherapy.  He had his duodenal stent revised " which has helped some with intake, but an attempt at getting more tissue for molecular testing did not return any thing with enough tumor cells.  He has also been having some hematuria and evaluation by urology showed recurrence of his tumor within the urinary bladder confirmed with biopsies.      Physical Exam   He appears quite cachectic, but improved from the last time I saw him  EYES: Eyes grossly normal to inspection.  No discharge or erythema, or obvious scleral/conjunctival abnormalities.  RESP: No audible wheeze, cough, or visible cyanosis.  No visible retractions or increased work of breathing.    SKIN: Visible skin clear. No significant rash, abnormal pigmentation or lesions.  NEURO: Cranial nerves grossly intact.  Mentation and speech appropriate for age.  PSYCH: Mentation appears normal, affect normal/bright, judgement and insight intact, normal speech and appearance well-groomed.      I personally reviewed his imaging studies which shows an increase in the soft tissue in his prior resection bed with some changes suggestive of increasing lesion in the liver and ongoing thickening in the posterior bladder wall.  These findings are difficult to quantify but to me the volume of disease appears increased.  His lab work is most notable for his CA 19-9 going from about 500 to about 1000.His electrolytes and renal function are normal.  His albumin is low but improved at 2.8.  His bilirubin is normal and his liver enzymes only slightly elevated.  He has stable normocytic anemia and otherwise unremarkable blood counts.    Assessment/plan: Cholangiocarcinoma with peritoneal metastases with improved clinical status but worsening tumor since discontinuing the Xeloda.  His tumor marker was already creeping up as we stopped the Xeloda and in any event he was so poorly tolerant of that I do not see value in returning to that treatment.  We talked about whether any other standard chemotherapy agents would be feasible at  this point, and I explained that while he was responding to both gemcitabine and cisplatin when he was last treated his prior toxicities make it infeasible to return to those.  I do not know that he would tolerate irinotecan or Abraxane which might be some other possibilities in this setting.  We tried to get molecular profiling done on the biopsies from his local recurrence but did not have enough tissue.  I am waiting to see if we can get testing done on the specimen retrieved from his bladder.  We talked about what research studies we might have available, I do not have anything that is very appealing for him and I am not sure his performance status is good enough to qualify at present.  They are very nervous about letting his cancer progress and still on to try and pursue active treatment.  We discussed that there is some value to giving little further time off treatment to try and improve his performance status will make him better candidate for further therapy.  For the moment I would told him I wanted to wait we get back his molecular profiling as I think that might reveal the best possibilities for him.  I will follow-up with him by phone once we have more clarity and if we can get the molecular profiling done.    Addendum: the molecular profile shows the tumor to be microsatelite stable with a typical KRAS muation and no other actiaonable abnormalities--but it is not clear to me the analysis included an assessment of IDH or FGF. We will follow up with the molecular lab to clarify.    Video-Visit Details    Type of service:  Video Visit    Originating Location (pt. Location): Home    Distant Location (provider location):  [facility]     Platform used for Video Visit: Faisal De Los Santos MD

## 2021-05-17 NOTE — PROGRESS NOTES
"Krishna is a 66 year old who is being evaluated via a billable video visit.      How would you like to obtain your AVS? MyChart  If the video visit is dropped, the invitation should be resent by: Text to cell phone: [phone]  Will anyone else be joining your video visit?                  Objective    Vitals - Patient Reported  Systolic (Patient Reported): 108  Diastolic (Patient Reported): 78  Weight (Patient Reported): 58.1 kg (128 lb)  Height (Patient Reported): 180.3 cm (5' 11\")  BMI (Based on Pt Reported Ht/Wt): 17.85  Temperature (Patient Reported): 96.6  F (35.9  C)         I am seeing Krishna Chauhan today in follow-up of metastatic cholangiocarcinoma.    His primary tumor was resected with curative intent in 2016, but he had a subsequent isolated recurrence within the wall of his urinary bladder.  He initially responded well to gemcitabine and cisplatin but had to drop the cisplatin early on due to neurotoxicity and then later had to drop the gemcitabine due to the development of TTP.  When he progressed further within the peritoneal cavity he received treatment with capecitabine with a long period of control, but over the past few months he developed worsening overall functional status with ongoing weight loss.  He had at that point no clear evidence of progression on his scans but his CA 19-9 was creeping up and we made a choice to discontinue therapy.  Now he returns for an assessment of his disease status.    Since being off the Xeloda his overall condition has improved.  His weight has increased by about 8 pounds and has been physically more active and has been well enough that he has been able to get out and do some errands.  He still sleeps much of the day, but his wife thinks he is up much of the night.  He overall feels like his condition is improved since discontinuing chemotherapy.  He had his duodenal stent revised which has helped some with intake, but an attempt at getting more tissue for molecular " testing did not return any thing with enough tumor cells.  He has also been having some hematuria and evaluation by urology showed recurrence of his tumor within the urinary bladder confirmed with biopsies.      Physical Exam   He appears quite cachectic, but improved from the last time I saw him  EYES: Eyes grossly normal to inspection.  No discharge or erythema, or obvious scleral/conjunctival abnormalities.  RESP: No audible wheeze, cough, or visible cyanosis.  No visible retractions or increased work of breathing.    SKIN: Visible skin clear. No significant rash, abnormal pigmentation or lesions.  NEURO: Cranial nerves grossly intact.  Mentation and speech appropriate for age.  PSYCH: Mentation appears normal, affect normal/bright, judgement and insight intact, normal speech and appearance well-groomed.      I personally reviewed his imaging studies which shows an increase in the soft tissue in his prior resection bed with some changes suggestive of increasing lesion in the liver and ongoing thickening in the posterior bladder wall.  These findings are difficult to quantify but to me the volume of disease appears increased.  His lab work is most notable for his CA 19-9 going from about 500 to about 1000.His electrolytes and renal function are normal.  His albumin is low but improved at 2.8.  His bilirubin is normal and his liver enzymes only slightly elevated.  He has stable normocytic anemia and otherwise unremarkable blood counts.    Assessment/plan: Cholangiocarcinoma with peritoneal metastases with improved clinical status but worsening tumor since discontinuing the Xeloda.  His tumor marker was already creeping up as we stopped the Xeloda and in any event he was so poorly tolerant of that I do not see value in returning to that treatment.  We talked about whether any other standard chemotherapy agents would be feasible at this point, and I explained that while he was responding to both gemcitabine and  cisplatin when he was last treated his prior toxicities make it infeasible to return to those.  I do not know that he would tolerate irinotecan or Abraxane which might be some other possibilities in this setting.  We tried to get molecular profiling done on the biopsies from his local recurrence but did not have enough tissue.  I am waiting to see if we can get testing done on the specimen retrieved from his bladder.  We talked about what research studies we might have available, I do not have anything that is very appealing for him and I am not sure his performance status is good enough to qualify at present.  They are very nervous about letting his cancer progress and still on to try and pursue active treatment.  We discussed that there is some value to giving little further time off treatment to try and improve his performance status will make him better candidate for further therapy.  For the moment I would told him I wanted to wait we get back his molecular profiling as I think that might reveal the best possibilities for him.  I will follow-up with him by phone once we have more clarity and if we can get the molecular profiling done.    Addendum: the molecular profile shows the tumor to be microsatelite stable with a typical KRAS muation and no other actiaonable abnormalities--but it is not clear to me the analysis included an assessment of IDH or FGF. We will follow up with the molecular lab to clarify.    Video-Visit Details    Type of service:  Video Visit    Originating Location (pt. Location): Home    Distant Location (provider location):  [facility]     Platform used for Video Visit: Faisal

## 2021-05-17 NOTE — Clinical Note
"    5/17/2021         RE: Girish Chauhan  [address] [id number]        Dear Colleague,    Thank you for referring your patient, Girish Chauhan, to the [facility]. Please see a copy of my visit note below.    Krishna is a 66 year old who is being evaluated via a billable video visit.      How would you like to obtain your AVS? MyChart  If the video visit is dropped, the invitation should be resent by: Text to cell phone: [phone]  Will anyone else be joining your video visit?   {If patient encounters technical issues they should call [phone]}               Objective    Vitals - Patient Reported  Systolic (Patient Reported): 108  Diastolic (Patient Reported): 78  Weight (Patient Reported): 58.1 kg (128 lb)  Height (Patient Reported): 180.3 cm (5' 11\")  BMI (Based on Pt Reported Ht/Wt): 17.85  Temperature (Patient Reported): 96.6  F (35.9  C)         I am seeing Krishna Chauhan today in follow-up of metastatic cholangiocarcinoma.    His primary tumor was resected with curative intent in 2016, but he had a subsequent isolated recurrence within the wall of his urinary bladder.  He initially responded well to gemcitabine and cisplatin but had to drop the cisplatin early on due to neurotoxicity and then later had to drop the gemcitabine due to the development of TTP.  When he progressed further within the peritoneal cavity he received treatment with capecitabine with a long period of control, but over the past few months he developed worsening overall functional status with ongoing weight loss.  He had at that point no clear evidence of progression on his scans but his CA 19-9 was creeping up and we made a choice to discontinue therapy.  Now he returns for an assessment of his disease status.    Since being off the Xeloda his overall condition has improved.  His weight has increased by about 8 pounds and has been physically more active and has been well enough that " he has been able to get out and do some errands.  He still sleeps much of the day, but his wife thinks he is up much of the night.  He overall feels like his condition is improved since discontinuing chemotherapy.  He had his duodenal stent revised which has helped some with intake, but an attempt at getting more tissue for molecular testing did not return any thing with enough tumor cells.  He has also been having some hematuria and evaluation by urology showed recurrence of his tumor within the urinary bladder confirmed with biopsies.      Physical Exam   He appears quite cachectic, but improved from the last time I saw him  EYES: Eyes grossly normal to inspection.  No discharge or erythema, or obvious scleral/conjunctival abnormalities.  RESP: No audible wheeze, cough, or visible cyanosis.  No visible retractions or increased work of breathing.    SKIN: Visible skin clear. No significant rash, abnormal pigmentation or lesions.  NEURO: Cranial nerves grossly intact.  Mentation and speech appropriate for age.  PSYCH: Mentation appears normal, affect normal/bright, judgement and insight intact, normal speech and appearance well-groomed.      I personally reviewed his imaging studies which shows an increase in the soft tissue in his prior resection bed with some changes suggestive of increasing lesion in the liver and ongoing thickening in the posterior bladder wall.  These findings are difficult to quantify but to me the volume of disease appears increased.  His lab work is most notable for his CA 19-9 going from about 500 to about 1000.His electrolytes and renal function are normal.  His albumin is low but improved at 2.8.  His bilirubin is normal and his liver enzymes only slightly elevated.  He has stable normocytic anemia and otherwise unremarkable blood counts.    Assessment/plan: Cholangiocarcinoma with peritoneal metastases with improved clinical status but worsening tumor since discontinuing the Xeloda.  His  tumor marker was already creeping up as we stopped the Xeloda and in any event he was so poorly tolerant of that I do not see value in returning to that treatment.  We talked about whether any other standard chemotherapy agents would be feasible at this point, and I explained that while he was responding to both gemcitabine and cisplatin when he was last treated his prior toxicities make it infeasible to return to those.  I do not know that he would tolerate irinotecan or Abraxane which might be some other possibilities in this setting.  We tried to get molecular profiling done on the biopsies from his local recurrence but did not have enough tissue.  I am waiting to see if we can get testing done on the specimen retrieved from his bladder.  We talked about what research studies we might have available, I do not have anything that is very appealing for him and I am not sure his performance status is good enough to qualify at present.  They are very nervous about letting his cancer progress and still on to try and pursue active treatment.  We discussed that there is some value to giving little further time off treatment to try and improve his performance status will make him better candidate for further therapy.  For the moment I would told him I wanted to wait we get back his molecular profiling as I think that might reveal the best possibilities for him.  I will follow-up with him by phone once we have more clarity and if we can get the molecular profiling done.    Addendum: the molecular profile shows the tumor to be microsatelite stable with a typical KRAS muation and no other actiaonable abnormalities--but it is not clear to me the analysis included an assessment of IDH or FGF. We will follow up with the molecular lab to clarify.    Video-Visit Details    Type of service:  Video Visit    Originating Location (pt. Location): Home    Distant Location (provider location):  [facility]      Platform used for Video Visit: Faisal      Again, thank you for allowing me to participate in the care of your patient.        Sincerely,        Naresh De Los Santos MD

## 2021-05-28 NOTE — TELEPHONE ENCOUNTER
Patient Returning Call  Reason for call:  Patient called back.  Information relayed to patient:  No encounter noted in chart as to why patient was called.  Please call back.  Patient has additional questions:  Yes  If YES, what are your questions/concerns:  As above  Okay to leave a detailed message?: Yes

## 2021-05-28 NOTE — TELEPHONE ENCOUNTER
Spoke with Dr. Michelle Ray, had attempted to call patient, will call patient back.    Teagan Pereira, CMA

## 2021-05-28 NOTE — PROGRESS NOTES
I talked to the patient about the elevated uric acid level and x-ray findings that show degenerative changes of the first MTP joint.  I recommended starting allopurinol 100 mg daily.  He is going to return to the clinic for a uric acid level in 2 months.

## 2021-05-28 NOTE — PROGRESS NOTES
Assessment / Impression     1. Hospital discharge follow-up     2. NSTEMI (non-ST elevated myocardial infarction) (H)     3. Cardiomyopathy, unspecified type (H)     4. Chronic gout with tophus, unspecified cause, unspecified site  Uric Acid    XR Toe Right 2 or More VWS   5. Nonrheumatic aortic valve stenosis     6. Bicuspid aortic valve     7. Essential hypertension     8. CKD (chronic kidney disease) stage 3, GFR 30-59 ml/min (H)     9. Cholangiocarcinoma (H)     10. Rash and nonspecific skin eruption           Plan:     He appears to be doing well since being discharged from the hospital.  I recommended he follow-up with cardiology as scheduled in a couple of weeks.  They are considering whether or not to do a repeat angiogram and possible angioplasty at that time.  He will continue Plavix, Eliquis, atorvastatin and his blood pressure medications as prescribed.  His blood pressure is under good control on the metoprolol 150 mg twice daily and losartan 50 mg daily.    I recommended we check an x-ray of the right great toe since he has chronic discomfort likely secondary to chronic gout.  We are also going to check a uric acid level.  Once these results are back we will consider starting a daily medication to help prevent gout flare such as allopurinol.    He will continue to monitor the rash on his face which is likely secondary to chemotherapy.    I personally reviewed the hospital discharge summary including the coronary angiogram results and lab results.    Subjective:      HPI: Girish Chauhan is a 64 y.o. male who since to the clinic for hospital follow-up visit.  He was admitted to [facility] from 5/3/2019-5/7/2019 for an end STEMI.  He has a past medical history significant for known coronary artery disease with a history of multiple stents placed in 2011, hyperlipidemia, chronic diastolic heart failure, aortic stenosis, cuspid aortic valve, atrial fibrillation, hypertension, chronic kidney disease  stage III and cholangiocarcinoma with metastasis to bladder and liver recently on chemotherapy.  He describes feeling chest tightness on the day of admission.  His work-up included troponins that were elevated he was admitted and started on a heparin infusion.  Cardiology was consulted.  He underwent a coronary angiogram on 5/6/2019 which showed a blockage of the proximal-mid OM2.  Stenting was attempted, but unsuccessful.  I recommended he start Plavix and continue Eliquis.  The metoprolol dose was increased to 150 mg twice daily because he was in A. fib with RVR during this hospitalization.  The losartan was decreased to 25 mg daily.  Since being discharged home he reports feeling better.  He is no longer having chest pain or heart palpitations.  He has a follow-up appointment with his [facility] cardiologist in 1-2 weeks.  They are planning to discuss whether or not a repeat coronary angiogram with possible stenting should be attempted again.    He is also concerned about swelling and discomfort over the right great toe.  He describes having history of gout which causes flares a few times a year.  He reports having chronic pain in the right great toe with overuse.  He is not having pain with rest.  He denies being in an acute flare at this time, but the skin appears discolored and red and he chronically has swelling.  He he points to the first MTP joint of the right foot as well as the right second toe when explaining where the symptoms are occurring.  He also describes having a rash on his face since being on chemotherapy.  He feels like it is related to that.  It does not seem to be getting worse.        Review of Systems  All other systems reviewed and are negative.     Social History     Tobacco Use   Smoking Status Never Smoker   Smokeless Tobacco Never Used       No family history on file.    Objective:     BP 92/70 (Patient Site: Left Arm, Patient Position: Sitting, Cuff Size: Adult Regular)   Pulse 76    "Temp 97.8  F (36.6  C) (Oral)   Resp 16   Ht 5' 10\" (1.778 m)   Wt 165 lb 5 oz (75 kg)   BMI 23.72 kg/m    Physical Examination: General appearance - alert, well appearing, and in no distress  Eyes: pupils equal and reactive, extraocular eye movements intact  Mouth: mucous membranes moist, pharynx normal without lesions  Neck: supple, no significant adenopathy  Lungs: clear to auscultation, no wheezes, rales or rhonchi, symmetric air entry  Heart: normal rate, regular rhythm, normal S1, S2, no murmurs, rubs, clicks or gallops  Abdomen: soft, nontender, nondistended, no masses or organomegaly  Neurological: alert, oriented, normal speech, no focal findings or movement disorder noted.    Extremities: There is swelling with erythema, but no significant warmth of the right first MTP joint and second toe.  Pedal pulse on the right is 2 out of 4.  Psychiatric: Normal affect. Does not appear anxious or depressed.  Skin: There is patchy erythema with peeling skin around his face.  No results found for this or any previous visit (from the past 168 hour(s)).    Current Outpatient Medications   Medication Sig     apixaban (ELIQUIS) 5 mg Tab tablet TAKE 1 TABLET BY MOUTH TWICE DAILY     atorvastatin (LIPITOR) 40 MG tablet TAKE 1 TABLET BY MOUTH DAILY     clopidogrel (PLAVIX) 75 mg tablet Take 75 mg by mouth daily.     colchicine 0.6 mg cap Take 0.6 mg by mouth as needed.            furosemide (LASIX) 20 MG tablet Take 2 tablets (40 mg total) by mouth daily.     losartan (COZAAR) 50 MG tablet Take 25 mg by mouth daily.            metoprolol tartrate (LOPRESSOR) 50 MG tablet 150mg BID     multivitamin with minerals (THERA M PLUS, FERROUS FUMARAT,) 9 mg iron-400 mcg Tab tablet Take 1 tablet by mouth.     nitroglycerin (NITROSTAT) 0.4 MG SL tablet DISSOLVE 1 TABLET UNDER THE TONGUE EVERY 5 MINUTES AS NEEDED FOR CHEST PAIN. IF NO RELIEF AFTER 5 MINUTES, CALL 911. MAX 3 TABLETS     "

## 2021-05-28 NOTE — PATIENT INSTRUCTIONS - HE
Girish Chauhan,    It was a pleasure to see you today at the [facility].     My recommendations after this visit include:    You can take metoprolol 100 mg a day and 50 mg in PM  echo    SIMIN Rose MD, FACC, GINGER

## 2021-05-28 NOTE — PROGRESS NOTES
"Cardiology Progress Note    Assessment:  Acute on chronic heart failure with preserved ejection fraction, improved volume status, euvolemic  Coronary artery disease with history of multivessel stenting in 2011, no angina  Bicuspid aortic valve with moderate aortic stenosis  Borderline to mildly depressed LV systolic function  Cholangiocarcinoma with distal metastases  Permanent atrial fibrillations on chronic anticoagulation, good rate control  Hypertension, good control      Plan:  Echo to reassess LV function and severity of aortic stenosis    Continue current medications    Follow-up in 6 months    Subjective:   This is 64 y.o. male who comes in today for follow-up visit.  I saw him back in February 2019 when he came to establish cardiology care at [facility].  He was grossly fluid overloaded.  He was started on higher dose of diuretics.  He has lost over 17 pounds.  Peripheral edema and exertional dyspnea has completely resolved.  He denies chest pains.  He is about to restart chemotherapy for his cholangiocarcinoma.    Review of Systems:   General: WNL  Eyes: WNL  Ears/Nose/Throat: WNL  Lungs: WNL  Heart: Irregular Heartbeat  Stomach: WNL  Bladder: Frequent Urination at Night  Muscle/Joints: WNL  Skin: WNL  Nervous System: WNL  Mental Health: WNL     Blood: WNL    Objective:   BP 90/58 (Patient Site: Left Arm, Patient Position: Sitting, Cuff Size: Adult Regular)   Pulse 72   Resp 16   Ht 5' 10.47\" (1.79 m)   Wt 174 lb (78.9 kg)   BMI 24.63 kg/m    Physical Exam:  GENERAL: no distress  NECK: No JVD  LUNGS: Clear to auscultation.  CARDIAC: regular rhythm, S1 & S2 normal.  No heaves, thrills, gallops 2/6 systolic ejection murmur at the aortic area  ABDOMEN: flat, negative hepatosplenomegaly, soft and non-tender.  EXTREMITIES: No evidence of cyanosis, clubbing or edema.    Current Outpatient Medications   Medication Sig Dispense Refill     apixaban (ELIQUIS) 5 mg Tab tablet TAKE 1 TABLET BY MOUTH TWICE DAILY   "     atorvastatin (LIPITOR) 40 MG tablet TAKE 1 TABLET BY MOUTH DAILY       capecitabine (XELODA) 500 MG tablet Take 1,500 mg by mouth.       furosemide (LASIX) 20 MG tablet Take 2 tablets (40 mg total) by mouth daily. 180 tablet 3     losartan (COZAAR) 50 MG tablet Take 100 mg by mouth daily.              metoprolol tartrate (LOPRESSOR) 50 MG tablet TK 1 AND 1/2 TS PO BID  3     multivitamin with minerals (THERA M PLUS, FERROUS FUMARAT,) 9 mg iron-400 mcg Tab tablet Take 1 tablet by mouth.       nitroglycerin (NITROSTAT) 0.4 MG SL tablet DISSOLVE 1 TABLET UNDER THE TONGUE EVERY 5 MINUTES AS NEEDED FOR CHEST PAIN. IF NO RELIEF AFTER 5 MINUTES, CALL 911. MAX 3 TABLETS       colchicine 0.6 mg cap Take 0.6 mg by mouth as needed.         3     No current facility-administered medications for this visit.        Cardiographics:      Holter: October 2018 atrial fibrillation with controlled ventricular response     Echocardiogram: October 2016 LVEF 45-50% moderate aortic stenosis bicuspid aortic valve     Stress Test: 2011 anterior ischemia prior to stenting of LAD     Coronary Angiogram: 2011  Multiple stents to proximal mid and distal LAD; balloon angioplasty diagonal branch, severe mid circumflex stenosis not treated.    Lab Results:       Lab Results   Component Value Date    CHOL 99 02/15/2019     Lab Results   Component Value Date    HDL 40 02/15/2019     Lab Results   Component Value Date    LDLCALC 49 02/15/2019     Lab Results   Component Value Date    TRIG 48 02/15/2019     BNP   Date Value Ref Range Status   02/18/2019 3,601 (H) 0 - 53 pg/mL Final       Ángel (Rc Rose MD

## 2021-05-29 NOTE — TELEPHONE ENCOUNTER
Spoke with Patient; 6/17/19 works for him. He requests that pre-procedure education be discussed next week as he was recently hospitalized for gastritis and is recovering from all of this. He will see his PMD tomorrow 6/5/19 and this will account for his H&P. Case request was previously placed. [facility] scheduling updated that this slot will be taken by him. [place]

## 2021-05-29 NOTE — TELEPHONE ENCOUNTER
KT Khan from  to clarify services  Faxed referral for 50 visits with [facility] oncology & 5 visits for CT

## 2021-05-29 NOTE — TELEPHONE ENCOUNTER
Spoke with Nurse at [facility] who asks for Dr Rose's plans and recommendations. Pt was started on Xeloda (Capecitabine) on 4/30/19 and on 5/3/2019 he was admitted at [place] with Chest tightness. Nurse states that this medication has been associated with coronary vasospasms and is asking for Dr. Rose's recommendations on if he feels it is safe to restart this drug. Nurse asks if there is plans for repeat angiogram. Drug can be held for another month if needed. According to 5/8 email from [facility] there are no plans to repeat angiogram at this time.        Dr. Rose,  See pt update above.  [facility] would like to restart Xeloda (Capecitabine). This drug has been associated with coronary vasospasms. Pt was hospitalized 3 days after starting Xeloda with chest pain at [place]. Nurse calling asking if you feel it is safe to restart? Pt has follow-up scheduled with you 7/2/2019.

## 2021-05-29 NOTE — TELEPHONE ENCOUNTER
Pt called in states he has abdominal pain.  The pain is in the middle of the abdomin.  The pain go in to right and left side.  The pain started today 3:30 PM.  Had vomited bile.  Th pain started sudden.  The pain is constant.  The pain is 7-8/10 on the scale.  No abdominal pain before.  The Pt states he didn't know the cause of the pain.  Pt able to walk but makes worse when he stand upright.  Pt vomited 2 times today.  The disposition is go to ED now.  Care advice given per protocol.  Patient agrees with care advice given.   Pt states he will go to ER now.  Agreed to call back if he has additional symptoms or questions.        Bernardo Light RN, [facility] Triage/Med Refill 5/30/2019 8:27 PM        Reason for Disposition    [1] SEVERE pain AND [2] age > 60    Protocols used: ABDOMINAL PAIN - MALE-A-

## 2021-05-29 NOTE — TELEPHONE ENCOUNTER
Left message for patient informing him of Dr. Rose's response that he can proceed with cor angio as scheduled and nurse would be calling to complete procedure teach this week - requested call back with any further questions/concerns.  mg

## 2021-05-29 NOTE — TELEPHONE ENCOUNTER
Retrieved voicemail from Dr. Rose's nurse's phone line from patient stating he was just discharged from hospital after abd surgery and questioned if he can still proceed with PCI scheduled for 6-17-19.    Return call to patient who stated he had last updated Dr. Rose via [employer] on 6-2-19 about hospitalization at end of May for abd pain which was determined to be gastritis but since then was readmitted to  [facility] 6-5-19 and subsequently had abd surgery and wanted to confirm that he can still proceed with PCI on 6-17-19 which was arranged after being hospitalized in May at [facility] for MI.      Patient stated he just resumed Plavix and Eliquis this am after they were stopped 6-4-19 - patient scheduled to f/u with oncologist tomorrow.    Informed patient that update would be forwarded to Dr. Rose with request to review hospital records and confirm he can still proceed with angiogram as scheduled - understanding verbalized.    Please review patient update and recent records in CareEverywhere - can patient proceed with CA poss PCI 6-17-19?  Any new orders?  mg

## 2021-05-29 NOTE — TELEPHONE ENCOUNTER
Notes recorded by Jany Ma RN on 5/21/2019 at 11:53 AM CDT  -- Message -----  From: Ángel Rose MD (Ted)  Sent: 5/21/2019  10:37 AM  To: Jany Ma RN    no  ------    Notes recorded by Jany Ma RN on 5/21/2019 at 9:18 AM CDT  Called patient this morning and we discussed results of echo. He verbalized understanding. He had discussed with Dr. Rose regarding his angio and if repeat attempt at revascularization was required. He denies any chest pains. He will see Dr. oRse in 7/2/19. He will call back if he develops any chest discomfort or seek out medical atttention in the ER.    Krishna Herbert will see you 7/2/19. Do you want to see him any sooner or is this acceptable? He denies any symptoms of chest pain.  ThanksJeffy  ------    Notes recorded by Jany Ma RN on 5/20/2019 at 3:47 PM CDT  -- Message -----  From: Ángel Rose MD (Ted)  Sent: 5/20/2019   2:30 PM  To: Jany Ma RN    We do not need stress test anymore.  ----- Message -----  From: Jany Ma RN  Sent: 5/20/2019   2:14 PM  To: Ángel Elizabeth) MD [place]    Will do- did you see that he was at [facility] on 5/3/19? He was admitted with chest pain and palps- positive trops. He had an angio 5/6 with unsuccessful attempt at intervention to 99% blockage to prox- OM2. Still want stress? It looks like they wanted him to get back in for intervention per what I can see in Care Everywhere.  Thanks  Jeffy   ----- Me        Called patient to discuss results and see if he was planning on following up with repeat intervention. LM for him to call me back. -[facility]  ------    Notes recorded by Ángel Rose MD (Ted) on 5/20/2019 at 9:41 AM CDT  Aortic valve disease appears unchanged.  LVEF was calculated lower than before.  We should give him pharmacological nuclear stress test to rule out progression of coronary artery disease

## 2021-05-29 NOTE — TELEPHONE ENCOUNTER
Received a voicemail from patient indicating that his nitroglycerin tablets had  back in 2017 and he wonders if he can have a refill. Medication is listed on medication list. Called patient back and let him know that he can have a refill as long as he keeps his appt with [facility]- went over nitro administration in voicemail. -[facility]

## 2021-05-29 NOTE — TELEPHONE ENCOUNTER
----- Message from Marielle Choi sent at 6/10/2019  4:14 PM CDT -----  Contact: [facility]   General phone call:    Caller: [facility]   Primary cardiologist: [place]  Detailed reason for call:  wanted [facility] to know pt was discharged 6/9/19 from [facility]- had abdominal stenting done- pt is sched w [facility] @  cath lab w Dr. Melchor on 6/17  New or active symptoms?   Best phone number: No call back needed  Best time to contact:   Ok to leave a detailed message?   Device? No    Additional Info:

## 2021-05-29 NOTE — TELEPHONE ENCOUNTER
Referral Request  Type of referral: Insurance Referral   (previous referral )     [facility]    Who s requesting: Bill from   Insurance calling for patient,  Why the request: Patient needs new referral - START DATE 19  to current or  his bills will not be paid.    Have you been seen for this request: Yes - many  Times   Does patient have a preference on a group/provider?  [facility] -  Okay to leave a detailed message?  Yes

## 2021-05-29 NOTE — TELEPHONE ENCOUNTER
----- Message from Julia Mayfield sent at 5/23/2019  8:38 AM CDT -----  Contact: luis felipe from [facility]  General phone call:    Caller: luis felipe nurse from [facility]  Primary cardiologist: dr molina  Detailed reason for call: wants to speak to dr molina about the next steps, patient is going to be starting chemo  New or active symptoms? n/a  Best phone number: pager: [phone], phone: [phone]  Best time to contact: any  Ok to leave a detailed message? yes  Device? no    Additional Info:

## 2021-05-29 NOTE — TELEPHONE ENCOUNTER
Girish BERGER Gissel  [address] [address]  [phone] (home)     Primary cardiologist:  Dr. Rose  PCP:  Dario Jain DO  Procedure:  Coronary angiogram with staged PCI   H&P completed by:  6/5/19 Reji Baptiste H&P Care everywhere from [facility] MD:  Dr. Melchor  Admit date and time:  6/17 0600  Case start time:  TBD  Ordering MD:  Dr. Rose  Diagnosis:  Abnormal coronary angiogram   Anticoagulation: yes- eliquis hold starting 6/14  CPAP: No  Bypass Grafts: No  Renal Issues: Yes  Allergies: No Known Allergies  Diabetic?: No  Device?: No  Type:     Angiogram Teaching    Reason for Visit:  Telephone call to discuss pre-procedure education in preparation for: coronary angiogram with staged pci    Procedure Prep:  Cardiologist note dated: 5/3/19  EKG results obtained, dated: on admission   Pertinent test results obtained - Viewable in Epic, dated: May 2019- [facility]. 2011  angiogram. Recent hospitalization 6/5 at [facility]  Hemogram results obtained: on admission   Basic Metabolic Panel results obtained: on admission   Lipid Profile results obtained: on admission     Patient Education  Risks to be discussed at [facility]    Pre-procedure instructions  Patient instructed to be NPO after midnight.  Patient instructed to arrange for transportation home following procedure.  Patient instructed to have a responsible adult with them for 24 hours post-procedure.  Post-procedure follow up process.  Conscious sedation discussed.  The patient was sent the pre-procedure letter (If requested) on n/a    Pre-procedure medication instructions  Patient instructed on antiplatelet medication.  Continue medications as scheduled, with a small amount of water on the day of the procedure unless indicated.  Patient instructed to take 325 mg of Aspirin am of procedure: Yes  Other medication: instructed to hold all medications except plavix, aspirin and metoprolol the a.m. of the  procedure. Eliquis hold x3 days begins 6/14.     *PATIENTS RECORDS AVAILABLE IN [facility] UNLESS OTHERWISE INDICATED*    *Order set was entered on this date: 6/11/19      Patient Active Problem List   Diagnosis     Mixed hyperlipidemia     Cholangiocarcinoma (H)     Essential hypertension     Bicuspid aortic valve     Aortic stenosis     Cardiomyopathy (H)     CKD (chronic kidney disease) stage 3, GFR 30-59 ml/min (H)     Anemia, unspecified type     Atrial fibrillation with RVR (H)     Abnormal coronary angiogram     SBO (small bowel obstruction) (H)       Current Outpatient Medications   Medication Sig Dispense Refill     allopurinol (ZYLOPRIM) 100 MG tablet Take 100 mg by mouth every evening.       apixaban (ELIQUIS) 5 mg Tab tablet TAKE 1 TABLET BY MOUTH TWICE DAILY       atorvastatin (LIPITOR) 40 MG tablet Take 40 mg by mouth at bedtime.       calcium, as carbonate, (TUMS) 200 mg calcium (500 mg) chewable tablet Chew 1 tablet (200 mg total) 4 (four) times a day as needed.  0     clopidogrel (PLAVIX) 75 mg tablet TAKE 1 TABLET BY MOUTH EVERY DAY 90 tablet 3     colchicine 0.6 mg cap Take 0.6 mg by mouth daily as needed.         3     furosemide (LASIX) 20 MG tablet Take 2 tablets (40 mg total) by mouth daily. 180 tablet 3     losartan (COZAAR) 25 MG tablet TAKE 1 TABLET BY MOUTH EVERY DAY 90 tablet 3     metoprolol tartrate (LOPRESSOR) 50 MG tablet Take 150 mg by mouth 2 (two) times a day.       multivitamin with minerals (THERA M PLUS, FERROUS FUMARAT,) 9 mg iron-400 mcg Tab tablet Take 1 tablet by mouth daily.              nitroglycerin (NITROSTAT) 0.4 MG SL tablet DISSOLVE 1 TABLET UNDER THE TONGUE EVERY 5 MINUTES AS NEEDED FOR CHEST PAIN. IF NO RELIEF AFTER 5 MINUTES, CALL 911. MAX 3 TABLETS       ondansetron (ZOFRAN) 4 MG tablet Take 1 tablet (4 mg total) by mouth every 4 (four) hours as needed. 10 tablet 0     pantoprazole (PROTONIX) 40 MG tablet Take 1 tablet (40 mg total) by mouth daily. 30 tablet 0     No  current facility-administered medications for this visit.        No Known Allergies    Plan  Patient's wife, Mily, will be his . Please recheck renal function- renal function impair as of last labs on file 6/5/19. Protocol ordered.   Patient ready for procedure    PEDRITO Ma

## 2021-05-29 NOTE — TELEPHONE ENCOUNTER
Spoke with interventionalist Dr. Melchor.  We should obtain coronary angiogram images from [facility] for his review prior to schedule intervention.  We should schedule elective PCI in 2 weeks with Dr. Melchor.  Hold off on chemotherapy until coronary intervention is performed

## 2021-05-29 NOTE — TELEPHONE ENCOUNTER
----- Message from Kristen Dasilva sent at 6/21/2019  1:44 PM CDT -----  Contact: Patient  Caller: Krishna    Primary cardiologist: Dr. Rose    Detailed reason for call: Krishna has questions regarding an upcoming surgery he has, please call back.     Best phone number: [phone]  Best time to contact: Any  Ok to leave a detailed message? Yes  Device? No

## 2021-05-29 NOTE — PROGRESS NOTES
Assessment / Impression     1. Bilateral foot pain  Ambulatory referral to Podiatry   2. History of gout     3. Pain of right upper arm     4. Cholangiocarcinoma (H)           Plan:     I recommended he try colchicine which has been helpful for him in the past with gout flares.  This may be was going on since his symptoms are now primarily in the left now.  He was also given a referral to see podiatry for further evaluation.  He will continue to follow-up with his oncology and cardiology team.      Subjective:      HPI: Girish Chauhan is a 64 y.o. male who since to the clinic complaining of bilateral foot pain symptoms he has had over the past few weeks.  I saw him for foot pain symptoms on the right on 5/14/2019.  At that time we checked an x-ray which showed minimal degenerative changes at the first MTP joint.  His uric acid level at that time was elevated at 11.9.  He took colchicine and once that acute episode improved he started allopurinol 100 mg daily.  He reports being off this medication recently due to hospitalizations. 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  He reports that the symptoms in the right foot started to improve, but then he developed similar symptoms in the left foot.  He describes this as a numbness and burning pain in the bottoms of his feet.  He notices some mild swelling.  He denies any specific injuries that may have caused this.  He also reports having some discomfort in the right upper arm.  He describes having short episodes of burning pain from the armpit to the elbow.  Symptoms usually only last a few seconds, less than a minute, before they resolve on their own.  He  "denies seeing a rash in this area.  His muscle strength feels normal in the upper extremities.          Review of Systems  All other systems reviewed and are negative.     Social History     Tobacco Use   Smoking Status Never Smoker   Smokeless Tobacco Never Used       No family history on file.    Objective:     BP 94/66 (Patient Site: Left Arm, Patient Position: Sitting, Cuff Size: Adult Regular)   Pulse 72   Temp 97.7  F (36.5  C) (Oral)   Resp 16   Ht 5' 10.5\" (1.791 m)   Wt 161 lb 2 oz (73.1 kg) Comment: with shoes per pt.  BMI 22.79 kg/m    Physical Examination: General appearance - alert, well appearing, and in no distress  Eyes: pupils equal and reactive, extraocular eye movements intact  Mouth: mucous membranes moist, pharynx normal without lesions  Neck: supple, no significant adenopathy  Lungs: clear to auscultation, no wheezes, rales or rhonchi, symmetric air entry  Heart: normal rate, regular rhythm, normal S1, S2, no murmurs, rubs, clicks or gallops  Neurological: alert, oriented, normal speech, no focal findings or movement disorder noted.  Spurling's compression test negative bilaterally  Extremities: There is mild swelling with trace erythema in both feet.  He is tender to palpation in the plantar aspect of his left foot, just proximal to the left second toe.  He is also tender over the left fifth metatarsal.  There is mild tenderness in the plantar aspect of his right foot, just proximal to the right second toe.  Muscle strength is 5 out of 5 in bilateral upper extremities.  Mead testing negative.    Skin: No skin rash  Psychiatric: Normal affect. Does not appear anxious or depressed.    No results found for this or any previous visit (from the past 168 hour(s)).    Current Outpatient Medications   Medication Sig     allopurinol (ZYLOPRIM) 100 MG tablet Take 100 mg by mouth every evening.     apixaban (ELIQUIS) 5 mg Tab tablet TAKE 1 TABLET BY MOUTH TWICE DAILY     atorvastatin (LIPITOR) " 40 MG tablet Take 40 mg by mouth at bedtime.     calcium, as carbonate, (TUMS) 200 mg calcium (500 mg) chewable tablet Chew 1 tablet (200 mg total) 4 (four) times a day as needed.     clopidogrel (PLAVIX) 75 mg tablet TAKE 1 TABLET BY MOUTH EVERY DAY     colchicine 0.6 mg cap Take 0.6 mg by mouth daily as needed.            furosemide (LASIX) 20 MG tablet Take 2 tablets (40 mg total) by mouth daily.     losartan (COZAAR) 25 MG tablet TAKE 1 TABLET BY MOUTH EVERY DAY     metoprolol tartrate (LOPRESSOR) 50 MG tablet Take 150 mg by mouth 2 (two) times a day.     multivitamin with minerals (THERA M PLUS, FERROUS FUMARAT,) 9 mg iron-400 mcg Tab tablet Take 1 tablet by mouth daily.            nitroglycerin (NITROSTAT) 0.4 MG SL tablet Place 1 tablet (0.4 mg total) under the tongue every 5 (five) minutes as needed for chest pain (x3 doses).     ondansetron (ZOFRAN) 4 MG tablet Take 1 tablet (4 mg total) by mouth every 4 (four) hours as needed.

## 2021-05-29 NOTE — TELEPHONE ENCOUNTER
RN cannot approve Refill Request    RN can NOT refill this medication historical medication requested.      Tanisha Orlando, [facility] Triage/Med Refill 6/4/2019    Requested Prescriptions   Pending Prescriptions Disp Refills     clopidogrel (PLAVIX) 75 mg tablet [Pharmacy Med Name: CLOPIDOGREL 75MG TABLETS] 90 tablet 0     Sig: TAKE 1 TABLET BY MOUTH EVERY DAY       Clopidogrel/Prasugrel/Ticagrelor Refill Protocol Passed - 6/3/2019 12:38 PM        Passed - PCP or prescribing provider visit in past 6 months       Last office visit with prescriber/PCP: 5/14/2019 OR same dept: 5/14/2019 Dario Jain DO OR same specialty: 5/14/2019 Dario Jain DO Last physical: 2/4/2019 Last MTM visit: Visit date not found     Next appt within 3 mo: Visit date not found  Next physical within 3 mo: Visit date not found  Prescriber OR PCP: Dario Ray DO  Last diagnosis associated with med order: There are no diagnoses linked to this encounter.  If protocol passes may refill for 6 months if within 3 months of last provider visit (or a total of 9 months).              Passed - Hemoglobin in past 12 months     Hemoglobin   Date Value Ref Range Status   05/30/2019 10.6 (L) 14.0 - 18.0 g/dL Final             losartan (COZAAR) 25 MG tablet [Pharmacy Med Name: LOSARTAN 25MG TABLETS] 90 tablet 0     Sig: TAKE 1 TABLET BY MOUTH EVERY DAY       Angiotensin Receptor Blocker Protocol Passed - 6/3/2019 12:38 PM        Passed - PCP or prescribing provider visit in past 12 months       Last office visit with prescriber/PCP: 5/14/2019 Dario Jain DO OR same dept: 5/14/2019 Dario Jain DO OR same specialty: 5/14/2019 Dario Jain DO  Last physical: 2/4/2019 Last MTM visit: Visit date not found   Next visit within 3 mo: Visit date not found  Next physical within 3 mo: Visit date not found  Prescriber OR PCP: Dario Ray DO  Last  diagnosis associated with med order: There are no diagnoses linked to this encounter.  If protocol passes may refill for 12 months if within 3 months of last provider visit (or a total of 15 months).             Passed - Serum potassium within the past 12 months     Lab Results   Component Value Date    Potassium 4.6 05/31/2019             Passed - Blood pressure filed in past 12 months     BP Readings from Last 1 Encounters:   05/31/19 138/88             Passed - Serum creatinine within the past 12 months     Creatinine   Date Value Ref Range Status   05/31/2019 1.50 (H) 0.70 - 1.30 mg/dL Final

## 2021-05-29 NOTE — TELEPHONE ENCOUNTER
Received call back from patient. He is agreeable to coronary angiogram and staged intervention. Msg was sent to HIS to get Dr. Melchor the imaging Friday. Case request placed. Pt is on Eliquis which will need to be held x3 days prior to procedure. Last OV with [facility] 5/3/19 and 5/14/19 with PMD.        Dr. Melchor and Dr. Rose,  Pt in the loop and agreeable to staged intervention. I have placed the case request- he will need a 3 day Eliquis hold. OK to schedule him into next week? I have requested [facility] to get imaging from [place].   Thanks,  Jany

## 2021-05-29 NOTE — TELEPHONE ENCOUNTER
Called Marta from the [facility]- updated to hold off on chemotherapy drug until intervention. Message sent to [facility] to obtain imaging and to route to Dr. Melchor. -[facility]

## 2021-05-29 NOTE — TELEPHONE ENCOUNTER
As long as antiplatelet therapy is not interrupted he should be able to undergo GI intervention safely

## 2021-05-29 NOTE — TELEPHONE ENCOUNTER
Pt is having an upper endoscopy 7/17 to place stents in his bowel. He asks if this should be rescheduled. He had recent stenting 6/17.       Dr. Rose. See pt concerns above, when can patient safely have procedure done?

## 2021-05-29 NOTE — TELEPHONE ENCOUNTER
Called patient and LM to call back to discuss angiogram. -[facility] [place]

## 2021-05-29 NOTE — TELEPHONE ENCOUNTER
From: Yfn Melchor MD  Sent: 6/4/2019   7:24 AM  To: Ángel Jeter (Deangelo) MD [place], *    Fine with me; I would check with Hope since she knows my schedule.    Thank you    MA

## 2021-05-30 NOTE — PATIENT INSTRUCTIONS - HE
Girish Chauhan,    It was a pleasure to see you today at the [facility].     My recommendations after this visit include:    Reduce furosemide to 20 mg  aday  Echo in 2 months    SIMIN Rose MD, FACC, [facility]

## 2021-05-30 NOTE — PROGRESS NOTES
FOOT AND ANKLE SURGERY/PODIATRY CONSULT NOTE         ASSESSMENT:   Pronation deformity both feet      TREATMENT:  I have recommended orthotics        HPI: I was asked to see Girish Chauhan today to evaluate and treat bilateral foot pain.  The patient stated that the pain is primarily in the arch of his right foot.  He has some pain in the arch of his left foot.  The pain is aggravated with weightbearing and ambulation.  He has no pain while resting.  2 weeks ago the patient stated he was having significant pain while resting.  He attributed it this to his chronic recurring gout.  That pain has been completely resolved.  He denies any recent trauma to his feet.  The pain is mild to moderate.  The patient was seen in consultation at the request of Dario Ray MD for evaluation treatment of bilateral foot pain.     Past Medical History:   Diagnosis Date     Anemia      Atrial fibrillation (H)      Cardiomyopathy (H)      Cholangiocarcinoma (H)      CKD (chronic kidney disease)      Coronary artery disease      Hyperlipidemia      Myocardial infarction (H)      Valvular disease        Past Surgical History:   Procedure Laterality Date     ABDOMINAL SURGERY       CHOLECYSTECTOMY       CORONARY STENT PLACEMENT       CV CORONARY ANGIOGRAM N/A 6/17/2019    Procedure: Coronary Angiogram;  Surgeon: Yfn Melchor MD;  Location: [facility];  Service: Cardiology       No Known Allergies      Current Outpatient Medications:      allopurinol (ZYLOPRIM) 100 MG tablet, Take 100 mg by mouth every evening., Disp: , Rfl:      apixaban (ELIQUIS) 5 mg Tab tablet, TAKE 1 TABLET BY MOUTH TWICE DAILY, Disp: , Rfl:      atorvastatin (LIPITOR) 40 MG tablet, Take 40 mg by mouth at bedtime., Disp: , Rfl:      calcium, as carbonate, (TUMS) 200 mg calcium (500 mg) chewable tablet, Chew 1 tablet (200 mg total) 4 (four) times a day as needed., Disp: , Rfl: 0     clopidogrel (PLAVIX) 75 mg tablet, TAKE 1 TABLET BY MOUTH EVERY  DAY, Disp: 90 tablet, Rfl: 3     colchicine 0.6 mg cap, Take 0.6 mg by mouth daily as needed.    , Disp: , Rfl: 3     furosemide (LASIX) 20 MG tablet, Take 1 tablet (20 mg total) by mouth 2 (two) times a day. (Patient taking differently: Take 20 mg by mouth daily.    ), Disp: 30 tablet, Rfl: 12     losartan (COZAAR) 25 MG tablet, TAKE 1 TABLET BY MOUTH EVERY DAY, Disp: 90 tablet, Rfl: 3     metoprolol tartrate (LOPRESSOR) 50 MG tablet, Take 150 mg by mouth 2 (two) times a day., Disp: , Rfl:      multivitamin with minerals (THERA M PLUS, FERROUS FUMARAT,) 9 mg iron-400 mcg Tab tablet, Take 1 tablet by mouth daily.    , Disp: , Rfl:      nitroglycerin (NITROSTAT) 0.4 MG SL tablet, Place 1 tablet (0.4 mg total) under the tongue every 5 (five) minutes as needed for chest pain (x3 doses)., Disp: 15 tablet, Rfl: 0     ondansetron (ZOFRAN) 4 MG tablet, Take 1 tablet (4 mg total) by mouth every 4 (four) hours as needed., Disp: 10 tablet, Rfl: 0    History reviewed. No pertinent family history.    Social History     Socioeconomic History     Marital status:      Spouse name: Not on file     Number of children: Not on file     Years of education: Not on file     Highest education level: Not on file   Occupational History     Not on file   Social Needs     Financial resource strain: Not on file     Food insecurity:     Worry: Not on file     Inability: Not on file     Transportation needs:     Medical: Not on file     Non-medical: Not on file   Tobacco Use     Smoking status: Never Smoker     Smokeless tobacco: Never Used   Substance and Sexual Activity     Alcohol use: Yes     Frequency: 2-4 times a month     Drinks per session: 1 or 2     Drug use: Never     Sexual activity: Not Currently     Partners: Female   Lifestyle     Physical activity:     Days per week: Not on file     Minutes per session: Not on file     Stress: Not on file   Relationships     Social connections:     Talks on phone: Not on file     Gets  together: Not on file     Attends Islam service: Not on file     Active member of club or organization: Not on file     Attends meetings of clubs or organizations: Not on file     Relationship status: Not on file     Intimate partner violence:     Fear of current or ex partner: Not on file     Emotionally abused: Not on file     Physically abused: Not on file     Forced sexual activity: Not on file   Other Topics Concern     Not on file   Social History Narrative     Not on file       Review of Systems - Patient denies fever, chills, rash, wound, stiffness, limping, numbness, weakness, heart burn, blood in stool, chest pain with activity, calf pain when walking, shortness of breath with activity, chronic cough, easy bleeding/bruising, swelling of ankles, excessive thirst, fatigue, depression, anxiety.  Patient admits to bilateral foot pain.      OBJECTIVE:  Appearance: alert, well appearing, and in no distress.    Vitals:    07/08/19 1442   BP: 100/62   Pulse: 60   Resp: 20   Temp: 97.7  F (36.5  C)       BMI= Body mass index is 22.12 kg/m .    General appearance: Patient is alert and fully cooperative with history & exam.  No sign of distress is noted during the visit.  Psychiatric: Affect is pleasant & appropriate.  Patient appears motivated to improve health.  Respiratory: Breathing is regular & unlabored while sitting.  HEENT: Hearing is intact to spoken word.  Speech is clear.  No gross evidence of visual impairment that would impact ambulation.    Vascular: Dorsalis pedis and posterior tibial pulses are palpable. There is no pedal hair growth bilaterally.  CFT < 3 sec from anterior tibial surface to distal digits bilaterally. There is no appreciable edema noted.  Dermatologic: Turgor and texture are within normal limits. No coloration or temperature changes. No primary or secondary lesions noted.  Neurologic: All epicritic and proprioceptive sensations are grossly intact bilaterally.  Musculoskeletal: All  active and passive ankle, subtalar, midtarsal, and 1st MPJ range of motion are grossly intact without pain or crepitus, with the exception of none. Manual muscle strength is within normal limits bilaterally. All dorsiflexors, plantarflexors, invertors, evertors are intact bilaterally. Tenderness present to medial longitudinal arch right foot on palpation.  No tenderness to bilateral feet or ankles with range of motion.  There is flattening of the medial longitudinal arch noted upon weightbearing.  Calf is soft/non-tender with/without warmth/induration    Imaging:     No results found.       TREATMENT:  I have recommended orthotics.  The patient is to return to the clinic as needed.    Hawk Pereyra; BALTAZAR  [facility] Foot & Ankle Surgery/Podiatry

## 2021-05-30 NOTE — PROGRESS NOTES
Cardiology Progress Note    Assessment:  Coronary artery disease with history of multivessel stenting in 2011/non-ST segment elevation myocardial infarction in May 2019, failed stenting of obtuse marginal at [place] and successful stenting of the same vessel at [facility] on June 17, 2019, no angina  Bicuspid aortic valve with moderate aortic stenosis  Heart failure with reduced ejection fraction(LVEF 35%), no fluid overload  Cholangiocarcinoma with distal metastases  Permanent atrial fibrillations on chronic anticoagulation, good rate control  Hypertension,  low blood pressure reading potentially related to volume contraction from the diuretic treatment    Plan:  I am concerned about his low blood pressure.  I do not think we can reduce the dose of metoprolol without risking sustained tachycardia.  Likewise low dose of losartan is beneficial for depressed LV systolic function.  I think we can decrease the dose of furosemide to 20 mg a day.  I asked him to continue to watch his weight and peripheral edema closely.    He is scheduled for additional GI interventions at the [facility].  He had recent myocardial infarction and drug-eluting stent to obtuse marginal.  Ideally Plavix should not be interrupted for 12 months.  If necessary Eliquis can be stopped 3 days before surgery and resumed afterwards.  He does not need bridging.    I will reassess LV systolic function with echo in 2 months.  Follow-up in 2 months      Subjective:   This is 64 y.o. male who comes in today for follow-up visit.  Earlier this may he was restarted on chemotherapy.  3 days into chemotherapy he developed chest pain.  He was admitted to [facility].  He had elevated troponin.  He underwent coronary angiogram.  He was found to have 99% stenosis obtuse marginal branch.  Attempt of percutaneous intervention was unsuccessful.  The patient was discharged.  He was readmitted for elective intervention to obtuse marginal at  "our [facility].  He received drug-eluting stents to obtuse marginal.  LV systolic function was moderately depressed in May 2019  Today he reports no chest pains.  He denies weight gain.  He denies PND and orthopnea.  He states that his heart rate is stays in 70s and 90s at rest.    Review of Systems:   General: WNL  Eyes: WNL  Ears/Nose/Throat: WNL  Lungs: WNL  Heart: WNL  Stomach: WNL  Bladder: WNL  Muscle/Joints: WNL  Skin: WNL  Nervous System: WNL  Mental Health: WNL     Blood: WNL    Objective:   BP 90/56 (Patient Site: Left Arm, Patient Position: Sitting, Cuff Size: Adult Small)   Pulse 60   Resp 16   Ht 5' 10.87\" (1.8 m)   Wt 158 lb (71.7 kg)   BMI 22.12 kg/m    Physical Exam:  GENERAL: no distress  NECK: No JVD  LUNGS: Clear to auscultation.  CARDIAC: irregular rhythm, S1 & S2 normal.  No heaves, thrills, gallops 2/6 systolic ejection murmur at aortic area   ABDOMEN: flat, negative hepatosplenomegaly, soft and non-tender.  EXTREMITIES: No evidence of cyanosis, clubbing or edema.    Current Outpatient Medications   Medication Sig Dispense Refill     allopurinol (ZYLOPRIM) 100 MG tablet Take 100 mg by mouth every evening.       apixaban (ELIQUIS) 5 mg Tab tablet TAKE 1 TABLET BY MOUTH TWICE DAILY       atorvastatin (LIPITOR) 40 MG tablet Take 40 mg by mouth at bedtime.       clopidogrel (PLAVIX) 75 mg tablet TAKE 1 TABLET BY MOUTH EVERY DAY 90 tablet 3     colchicine 0.6 mg cap Take 0.6 mg by mouth daily as needed.         3     losartan (COZAAR) 25 MG tablet TAKE 1 TABLET BY MOUTH EVERY DAY 90 tablet 3     metoprolol tartrate (LOPRESSOR) 50 MG tablet Take 150 mg by mouth 2 (two) times a day.       multivitamin with minerals (THERA M PLUS, FERROUS FUMARAT,) 9 mg iron-400 mcg Tab tablet Take 1 tablet by mouth daily.              nitroglycerin (NITROSTAT) 0.4 MG SL tablet Place 1 tablet (0.4 mg total) under the tongue every 5 (five) minutes as needed for chest pain (x3 doses). 15 tablet 0     " ondansetron (ZOFRAN) 4 MG tablet Take 1 tablet (4 mg total) by mouth every 4 (four) hours as needed. 10 tablet 0     calcium, as carbonate, (TUMS) 200 mg calcium (500 mg) chewable tablet Chew 1 tablet (200 mg total) 4 (four) times a day as needed.  0     furosemide (LASIX) 20 MG tablet Take 1 tablet (20 mg total) by mouth 2 (two) times a day. 30 tablet 12     No current facility-administered medications for this visit.        Cardiographics:    ECG: June 2019  Atrial fibrillation controlled ventricular response nonspecific ST-T abnormalities    Echocardiogram: May 2019    Mild concentric left ventricular hypertrophy. The estimated left ventricular ejection fraction is 35%.    Normal right ventricular size and systolic function.    Probable fusion of the right and non-coronary leaflets. Moderate aortic stenosis. Gradient suggests mild stenosis but visual impression is moderate stenosis.    The aortic root is mildly dilated. No evidence of coarctation.    Left atrial volume is moderately increased.    Coronary angio: June 2019  65 yo M with known CAD, remote PCI of LAD in 2011, presented in May 2019 to [facility] with a NSTEMI, with coronary angiography there showing patent LAD stents with a severe stenosis in OM1 that could not be successfully crossed with a wire.      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 June 2019 when he presented with an SBO)     Returns today for repeat coronary angiography with another attempt at the OM1 stenosis     LM minimal dz  LAD patent prox to distal stents  LCx 99% OM1 stenosis with disease extension for 10-15mm on either side of stenosis; mild dz elsewhere in Cx  RCA mild to moderate diffuse dz, 50-60% focal distal RCA lesion     Successful PCI of OM1 with PTCA followed by 2.5x24 Synergy SAM  0% residual, NITZA 3 flow post     OM1 lesion  essentially behaving as a ; requiring wire escalation, progressive PTCA and Guidezilla support for PCI  Lab Results:       Lab Results   Component Value Date    CHOL 136 06/17/2019    CHOL 99 02/15/2019     Lab Results   Component Value Date    HDL 43 06/17/2019    HDL 40 02/15/2019     Lab Results   Component Value Date    LDLCALC 81 06/17/2019    LDLCALC 49 02/15/2019     Lab Results   Component Value Date    TRIG 58 06/17/2019    TRIG 48 02/15/2019     BNP   Date Value Ref Range Status   02/18/2019 3,601 (H) 0 - 53 pg/mL Final       Ángel (Deangelo)  MD [place]

## 2021-05-30 NOTE — PATIENT INSTRUCTIONS - HE
Please call one of the [place] locations below to schedule an appointment. If you received a prescription please bring it with you to your appointment. Some locations are limited to what they carry.    Office Locations    [facility]  [address] [place] [address]  Home Medical Equipment, [address]   Phone: [phone]   Orthotics and Prosthetics, [address]   Phone: [phone]    [facility]  Home Medical Equipment  [address], [address], [place] [address]   Phone: [phone]    Orthotics and Prosthetics ([facility])    [address], [address], [place] [address]  Phone: [phone]    [facility] [place]  [address]  [address]   [place] [address]   Phone: [phone]    [facility]ld[facility]  [address]  [place] [address]  Phone: [phone]    [facility] Bld[facility]   [address]  [place] [address]  Phone: [phone]    [facility]  [address]  [place] [address]  Phone: [phone]    [facility]  [address]  [address]  [place] [address]  Phone: [phone]    [place]   [address] [place] [address]   Phone: [phone]

## 2021-05-31 NOTE — TELEPHONE ENCOUNTER
Returned pt's call. Pt states he was at [facility] for a replacement of a bowel stent and discharged 7/30. He has been holding Plavix, Eliquis, and Lasix since last Saturday. Pt asks about these medications and when he can restart taking them.     Instructed pt to call GI doctor for further instructions regarding restarting meds.  Advised him to call back if he has more specific questions for Dr. Rose.

## 2021-05-31 NOTE — TELEPHONE ENCOUNTER
----- Message from Julia Mayfield sent at 8/1/2019 11:29 AM CDT -----  Contact: JOSE C  General phone call:    Caller: jose c  Primary cardiologist: dr molina  Detailed reason for call: pt said that he had a bowel stent last week at the [facility]. They wanted him seen in 1 week with dr molina, who has no availability until the end of September. He needed to be seen to discuss his meds, he is on an antibiotic and he needed to see how long to hold his eliquis and plavix  New or active symptoms? n/a  Best phone number: [phone]  Best time to contact: any  Ok to leave a detailed message? yes  Device? no    Additional Info:

## 2021-06-01 NOTE — PROGRESS NOTES
Order received 6/17/19 for Phase II Cardiac Rehab. Contact made with patient 6/17, 6/28, and 7/31. Patient has not returned most recent call.  Order removed from scheduling work queue.

## 2021-06-03 NOTE — PROGRESS NOTES
"Cardiology Progress Note    Assessment:  Coronary artery disease with history of multivessel stenting in 2011/non-ST segment elevation myocardial infarction in May 2019, failed stenting of obtuse marginal at [place] and successful stenting of the same vessel at [facility] on June 17, 2019, no angina  Bicuspid aortic valve with mild to moderate aortic stenosis  Heart failure with reduced ejection fraction(LVEF 42%), no fluid overload  Cholangiocarcinoma with distal metastases  Permanent atrial fibrillations on chronic anticoagulation, good rate control  Hypertension, good control      Plan:  He appears to be well compensated from cardiac standpoint.  We will not need to make any medication changes today.  I encouraged him to continue to observe salt restrictions and not to drink water excessively.    With follow-up in 6 months    Subjective:   This is 64 y.o. male who comes in today follow-up visit.  He reports no weight changes.  He has not had peripheral edema.  He denies chest pains.  He has been compliant with all cardiac medications.   He underwent a number of endoscopic procedures to relieve obstruction of bile duct and urethra.    Review of Systems:   General: WNL  Eyes: WNL  Ears/Nose/Throat: WNL  Lungs: Cough  Heart: WNL  Stomach: WNL  Bladder: WNL  Muscle/Joints: WNL  Skin: WNL  Nervous System: WNL  Mental Health: WNL     Blood: WNL    Objective:   /70 (Patient Site: Left Arm, Patient Position: Sitting, Cuff Size: Adult Regular)   Pulse 72   Resp 16   Ht 5' 10.75\" (1.797 m)   Wt 165 lb (74.8 kg)   BMI 23.18 kg/m    Physical Exam:  GENERAL: no distress  NECK: No JVD  LUNGS: Clear to auscultation.  CARDIAC: irregular rhythm, S1 & S2 normal.  No heaves, thrills, gallops.  2/6 systolic ejection murmur at the aortic area  ABDOMEN: flat, negative hepatosplenomegaly, soft and non-tender.  EXTREMITIES: No evidence of cyanosis, clubbing or edema.    Current Outpatient Medications   Medication Sig " Dispense Refill     allopurinol (ZYLOPRIM) 100 MG tablet Take 100 mg by mouth every evening.       apixaban (ELIQUIS) 5 mg Tab tablet TAKE 1 TABLET BY MOUTH TWICE DAILY       atorvastatin (LIPITOR) 40 MG tablet Take 40 mg by mouth at bedtime.       calcium, as carbonate, (TUMS) 200 mg calcium (500 mg) chewable tablet Chew 1 tablet (200 mg total) 4 (four) times a day as needed.  0     clopidogrel (PLAVIX) 75 mg tablet TAKE 1 TABLET BY MOUTH EVERY DAY 90 tablet 3     colchicine 0.6 mg cap Take 0.6 mg by mouth 3 (three) times a day as needed.         3     furosemide (LASIX) 20 MG tablet Take 1 tablet (20 mg total) by mouth 2 (two) times a day. (Patient taking differently: Take 20 mg by mouth daily.       ) 30 tablet 12     losartan (COZAAR) 25 MG tablet TAKE 1 TABLET BY MOUTH EVERY DAY 90 tablet 3     metoprolol tartrate (LOPRESSOR) 50 MG tablet Take 150 mg by mouth 2 (two) times a day.       multivitamin with minerals (THERA M PLUS, FERROUS FUMARAT,) 9 mg iron-400 mcg Tab tablet Take 1 tablet by mouth daily.              nitroglycerin (NITROSTAT) 0.4 MG SL tablet Place 1 tablet (0.4 mg total) under the tongue every 5 (five) minutes as needed for chest pain (x3 doses). 15 tablet 0     ondansetron (ZOFRAN) 4 MG tablet Take 1 tablet (4 mg total) by mouth every 4 (four) hours as needed. 10 tablet 0     No current facility-administered medications for this visit.        Cardiographics:    ECG: June 2019  Atrial fibrillation controlled ventricular response nonspecific ST-T abnormalities     Echocardiogram: September 2019    Normal left ventricular size with borderline concentric hypertrophy.    Left ventricular ejection fraction is moderately decreased. The calculated left ventricular ejection fraction is 42%.    Normal right ventricular size and systolic function.    Mild aortic stenosis.    Left atrial volume is moderately increased.    When compared to the previous study dated 5/17/2019, the ejection fraction is  slightly higher.       Coronary angio: June 2019  63 yo M with known CAD, remote PCI of LAD in 2011, presented in May 2019 to [facility] with a NSTEMI, with coronary angiography there showing patent LAD stents with a severe stenosis in OM1 that could not be successfully crossed with a wire.      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 June 2019 when he presented with an SBO)     Returns today for repeat coronary angiography with another attempt at the OM1 stenosis     LM minimal dz  LAD patent prox to distal stents  LCx 99% OM1 stenosis with disease extension for 10-15mm on either side of stenosis; mild dz elsewhere in Cx  RCA mild to moderate diffuse dz, 50-60% focal distal RCA lesion     Successful PCI of OM1 with PTCA followed by 2.5x24 Synergy SAM  0% residual, NITZA 3 flow post     OM1 lesion essentially behaving as a ; requiring wire escalation, progressive PTCA and Guidezilla support for PCI    Lab Results:       Lab Results   Component Value Date    CHOL 136 06/17/2019    CHOL 99 02/15/2019     Lab Results   Component Value Date    HDL 43 06/17/2019    HDL 40 02/15/2019     Lab Results   Component Value Date    LDLCALC 81 06/17/2019    LDLCALC 49 02/15/2019     Lab Results   Component Value Date    TRIG 58 06/17/2019    TRIG 48 02/15/2019     BNP   Date Value Ref Range Status   02/18/2019 3,601 (H) 0 - 53 pg/mL Final       Ángel (Rc Rose MD

## 2021-06-03 NOTE — TELEPHONE ENCOUNTER
FUTURE VISIT INFORMATION      SURGERY INFORMATION:    Date: 21    [facility]: [facility]    Surgeon:  Sivan Walker MD    Anesthesia Type:  choice    Procedure: CYSTOSCOPY, WITH RETROGRADE PYELOGRAM AND RIGHT URETERAL STENT REPLACEMENT  RECORDS REQUESTED FROM:       Primary Care Provider: Patricia Nobles MD- [facility]     Pertinent Medical History: Aortic root dilatation, ASCVD, Aortic stenosis, hypertension, bicuspid aortic valve     Most recent EKG+ Tracin21     [facility]     Most recent Cardiac Stress Test: 11- [facility]     Most recent Coronary Angiogram: 19- [facility]

## 2021-06-03 NOTE — PROGRESS NOTES
Assessment / Impression     1. Shortness of breath  XR Chest 2 Views    BNP(B-type Natriuretic Peptide)    HM2(CBC w/o Differential)   2. Chronic systolic congestive heart failure (H)  XR Chest 2 Views   3. Cardiomyopathy, unspecified type (H)  XR Chest 2 Views   4. Atrial fibrillation with RVR (H)     5. Cholangiocarcinoma (H)     6. Anemia, unspecified type     7. Coronary artery disease involving native coronary artery of native heart, angina presence unspecified     8. CKD (chronic kidney disease) stage 3, GFR 30-59 ml/min (H)     9. Bicuspid aortic valve     10. Nonrheumatic aortic valve stenosis           Plan:     I recommended that we check a chest x-ray.  I personally reviewed the images with him and provided an initial interpretation.  No acute infiltrates or effusions present.  No significant cardiomegaly.  The formal radiology report is pending.  I recommended that we check a hemogram and BNP today.  We decided to hold off on any medication adjustments until these results are back.  It is reassuring that he reports feeling fine when he is upright and he has not noticed any significant change in his energy level at rest or with exertion.  It is also reassuring that he is not ill and he is afebrile.  He is in no respiratory distress during the appointment and his oxygen saturation is 100% on room air.  He will be following up closely for chemotherapy every other week with oncology.  I personally reviewed the cardiology consultation note from 7/2/2019 in the last oncology consultation note from 11/15/2019 including lab results going back to his hospitalization in May as well as the last 2 echocardiogram results.    Subjective:      HPI: Girish Chauhan is a 64 y.o. male who presents to the clinic to be evaluated for cough and difficulty breathing he is been experiencing when lying supine over the past couple of weeks.  He does not feel ill.  He is not having fevers.  He is not complaining of chest pain.  He  reports feeling fine when he is upright and throughout the day.  He has been sleeping on his recliner which helps.  He has not noticed a change in his energy with exertion.  He has a complex medical history significant for coronary artery disease with a history of multiple stents placed in 2011 (admitted to [facility] from 5/3/2019-5/7/2019 for an NSTEMI), hyperlipidemia, chronic diastolic heart failure, aortic stenosis, bicuspid aortic valve, atrial fibrillation, hypertension, chronic kidney disease stage III and cholangiocarcinoma with metastasis to bladder and liver currently on chemotherapy.  During the hospitalization in May He underwent a coronary angiogram on 5/6/2019 which showed a blockage of the proximal-mid OM2.  Stenting was attempted, but unsuccessful.    On 6/17/2019 this blockage was successfully stented.  Cardiology recommended he start Plavix and continue Eliquis.  He has been followed closely by cardiology and oncology.  He had an echocardiogram on 9/27/2019 which was stable and showed an EF of 42% the echocardiogram from 5/17/2019 showed an EF of 35%.  He reports decreasing the Lasix dose from 20 mg twice daily to 20 mg once daily this summer per cardiology's recommendation when he last saw him on 7/2/2019.  He has not noticed fluid retention or swelling symptoms since making this change.  On 11/15/2019 he had a CBC and CMP which were stable.  His hemoglobin was 10.4, white blood cell count 8.5, creatinine 1.49, GFR 57.  On 5/3/2019 he had a BNP of 435.    Echocardiogram 9/27/2019    Normal left ventricular size with borderline concentric hypertrophy.    Left ventricular ejection fraction is moderately decreased. The calculated left ventricular ejection fraction is 42%.    Normal right ventricular size and systolic function.    Mild aortic stenosis.    Left atrial volume is moderately increased.    When compared to the previous study dated 5/17/2019, the ejection fraction is slightly  "higher.    Echocardiogram from 5/17/2019 showed an EF of 35%      Review of Systems  All other systems reviewed and are negative.     Social History     Tobacco Use   Smoking Status Never Smoker   Smokeless Tobacco Never Used       No family history on file.    Objective:     /64 (Patient Site: Left Arm, Patient Position: Sitting, Cuff Size: Adult Regular)   Pulse 64   Temp 97.7  F (36.5  C) (Oral)   Resp 20   Ht 5' 10.75\" (1.797 m)   Wt 162 lb 6 oz (73.7 kg)   SpO2 100% Comment: RA  BMI 22.81 kg/m    Physical Examination: General appearance - alert, well appearing, and in no distress  Eyes: pupils equal and reactive, extraocular eye movements intact  Mouth: mucous membranes moist, pharynx normal without lesions  Neck: supple, no significant adenopathy  Lungs: clear to auscultation, no wheezes, crackles, rales or rhonchi, symmetric air entry.  Breathing is not labored.  Heart: normal rate, regular rhythm, normal S1, S2, no murmurs, rubs, clicks or gallops  Abdomen: soft, nontender, nondistended, no masses or organomegaly  Neurological: alert, oriented, normal speech, no focal findings or movement disorder noted.    Extremities: No edema, no clubbing or cyanosis  Psychiatric: Normal affect. Does not appear anxious or depressed.    No results found for this or any previous visit (from the past 168 hour(s)).    Current Outpatient Medications   Medication Sig     allopurinol (ZYLOPRIM) 100 MG tablet Take 100 mg by mouth every evening.     apixaban (ELIQUIS) 5 mg Tab tablet TAKE 1 TABLET BY MOUTH TWICE DAILY     atorvastatin (LIPITOR) 40 MG tablet TAKE 1 TABLET BY MOUTH DAILY     calcium, as carbonate, (TUMS) 200 mg calcium (500 mg) chewable tablet Chew 1 tablet (200 mg total) 4 (four) times a day as needed.     clopidogrel (PLAVIX) 75 mg tablet TAKE 1 TABLET BY MOUTH EVERY DAY     colchicine 0.6 mg cap Take 0.6 mg by mouth 3 (three) times a day as needed.            furosemide (LASIX) 20 MG tablet Take 1 " tablet (20 mg total) by mouth 2 (two) times a day. (Patient taking differently: Take 20 mg by mouth daily.       )     losartan (COZAAR) 25 MG tablet TAKE 1 TABLET BY MOUTH EVERY DAY     metoprolol tartrate (LOPRESSOR) 50 MG tablet Take 3 tablets (150 mg total) by mouth 2 (two) times a day.     multivitamin with minerals (THERA M PLUS, FERROUS FUMARAT,) 9 mg iron-400 mcg Tab tablet Take 1 tablet by mouth daily.            nitroglycerin (NITROSTAT) 0.4 MG SL tablet Place 1 tablet (0.4 mg total) under the tongue every 5 (five) minutes as needed for chest pain (x3 doses).     ondansetron (ZOFRAN) 4 MG tablet Take 1 tablet (4 mg total) by mouth every 4 (four) hours as needed.

## 2021-06-03 NOTE — PATIENT INSTRUCTIONS - HE
Girish Chauhan,    It was a pleasure to see you today at the [facility].     My recommendations after this visit include:    Same medications    SIMIN Rose MD, FACC, GINGER

## 2021-06-04 NOTE — TELEPHONE ENCOUNTER
RN cannot approve Refill Request    RN can NOT refill this medication med is not covered by policy/route to provider     . Last office visit: 11/19/2019 Dario Jain DO Last Physical: 2/4/2019 Last MTM visit: Visit date not found Last visit same specialty: 11/19/2019 Dario Jain DO.  Next visit within 3 mo: Visit date not found  Next physical within 3 mo: Visit date not found      Tanisha Orlando, [facility] Triage/Med Refill 12/18/2019    Requested Prescriptions   Pending Prescriptions Disp Refills     ondansetron (ZOFRAN) 4 MG tablet 10 tablet 0     Sig: Take 1 tablet (4 mg total) by mouth every 4 (four) hours as needed.       There is no refill protocol information for this order

## 2021-06-05 NOTE — PROGRESS NOTES
Assessment / Impression     1. Cough     2. PND (post-nasal drip)  fluticasone propionate (FLONASE ALLERGY RELIEF) 50 mcg/actuation nasal spray   3. Cholangiocarcinoma (H)     4. Chronic systolic congestive heart failure (H)     5. Cardiomyopathy, unspecified type (H)     6. Atrial fibrillation with RVR (H)           Plan:     I recommended that he try Flonase to help with the postnasal drainage symptoms which may be triggering the cough.  If symptoms do not improve over the next few weeks on this he may stop and switch to Pepcid as esophageal reflux could be causing this.  He reports not tolerating omeprazole well in the past.  If he still continues to struggle we may consider stopping losartan and/or getting him into a specialist if needed.  I personally reviewed the last oncology consultation note from 12/30/2019 as well as the unremarkable CT scan of his chest, abdomen and pelvis on 12/27/2019 and the unremarkable chest x-ray from 11/19/2019.  I also reviewed labs which have been stable going back through November 2019.  He will continue to follow-up closely with cardiology and oncology.  Did not recommend repeating imaging studies of his chest today since his CT scan was unremarkable just a few weeks ago.  Furthermore, his lungs are clear to auscultation and his oxygen saturation is 100% on room air in the office today.      Subjective:      HPI: Girish Chauhan is a 64 y.o. male who presents to the clinic to be evaluated for a chronic cough that he has been struggling with over the past couple of months.  Symptoms are worse when lying supine.  He does not feel ill.  He is not having fevers.  He is not complaining of chest pain or shortness of breath symptoms.  He reports feeling pretty good when he is upright and throughout the day, but he has noticed some coughing during the day as well.  He has been sleeping on his recliner which helps.  He has not noticed a change in his energy with exertion.  He denies  having allergies, but he has noticed some postnasal drainage.  He occasionally has heartburn symptoms as well.  These usually improve with Tums.  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  I saw him for similar symptoms back in November.  At that time we checked a chest x-ray which was clear.  He also had a CT scan of the chest, abdomen and pelvis through oncology on 12/27/2019 which showed no effusions or infiltrates in the lungs.      CT Chest, Abdomen and Pelvis 12/27/19 IMPRESSION:   1. In this patient with history of recurrent cholangiocarcinoma, no  evidence of disease progression.  2. Stable surgical changes of partial left hepatectomy,  hepaticojejunostomy, and gastrojejunostomy stent placement with  unchanged (since 11/22/2019) soft tissue thickening and prominent  lymph node along the anterior aspect of the hepatectomy resection  margin and jejunum. These findings appear slightly improved since  9/27/2019.  3. Stable scattered areas of ill-defined soft tissue thickening in the  pelvis, as described.  4. Stable dilation of the right renal pelvis and right upper pole  calyces with a nephroureteral stent in place.  5. Stable indeterminate splenic hypodensities.      Echocardiogram 9/27/2019    Normal left ventricular size with borderline concentric hypertrophy.    Left ventricular ejection fraction is moderately decreased. The calculated left ventricular ejection fraction is 42%.    Normal right ventricular size and systolic function.    Mild aortic stenosis.    Left atrial volume is moderately increased.    When compared to  "the previous study dated 5/17/2019, the ejection fraction is slightly higher.    Echocardiogram from 5/17/2019 showed an EF of 35%      Review of Systems  All other systems reviewed and are negative.     Social History     Tobacco Use   Smoking Status Never Smoker   Smokeless Tobacco Never Used       No family history on file.    Objective:     /66 (Patient Site: Left Arm, Patient Position: Sitting, Cuff Size: Adult Regular)   Pulse 80   Temp 97.7  F (36.5  C) (Oral)   Resp 16   Ht 5' 10.75\" (1.797 m)   Wt 169 lb 7 oz (76.9 kg)   SpO2 100% Comment: ARIC  BMI 23.80 kg/m    Physical Examination: General appearance - alert, well appearing, and in no distress  Eyes: pupils equal and reactive, extraocular eye movements intact  Mouth: mucous membranes moist, pharynx normal without lesions  Neck: supple, no significant adenopathy  Lungs: clear to auscultation, no wheezes, crackles, rales or rhonchi, symmetric air entry.  Breathing is not labored.  Heart: normal rate, regular rhythm, normal S1, S2, no murmurs, rubs, clicks or gallops  Abdomen: soft, nontender, nondistended, no masses or organomegaly  Neurological: alert, oriented, normal speech, no focal findings or movement disorder noted.    Extremities: No edema, no clubbing or cyanosis  Psychiatric: Normal affect. Does not appear anxious or depressed.    No results found for this or any previous visit (from the past 168 hour(s)).    Current Outpatient Medications   Medication Sig     allopurinol (ZYLOPRIM) 100 MG tablet Take 100 mg by mouth every evening.     apixaban (ELIQUIS) 5 mg Tab tablet Take 1 tablet (5 mg total) by mouth 2 (two) times a day.     atorvastatin (LIPITOR) 40 MG tablet TAKE 1 TABLET BY MOUTH DAILY     calcium, as carbonate, (TUMS) 200 mg calcium (500 mg) chewable tablet Chew 1 tablet (200 mg total) 4 (four) times a day as needed.     clopidogrel (PLAVIX) 75 mg tablet TAKE 1 TABLET BY MOUTH EVERY DAY     colchicine 0.6 mg cap Take 0.6 mg by " mouth 3 (three) times a day as needed.            furosemide (LASIX) 20 MG tablet Take 1 tablet (20 mg total) by mouth 2 (two) times a day. (Patient taking differently: Take 20 mg by mouth daily.       )     losartan (COZAAR) 25 MG tablet TAKE 1 TABLET BY MOUTH EVERY DAY     metoprolol tartrate (LOPRESSOR) 50 MG tablet Take 3 tablets (150 mg total) by mouth 2 (two) times a day.     multivitamin with minerals (THERA M PLUS, FERROUS FUMARAT,) 9 mg iron-400 mcg Tab tablet Take 1 tablet by mouth daily.            nitroglycerin (NITROSTAT) 0.4 MG SL tablet Place 1 tablet (0.4 mg total) under the tongue every 5 (five) minutes as needed for chest pain (x3 doses).     ondansetron (ZOFRAN) 4 MG tablet Take 1 tablet (4 mg total) by mouth every 4 (four) hours as needed.     senna (SENOKOT) 8.6 mg tablet Take 1 tablet by mouth.     fluticasone propionate (FLONASE ALLERGY RELIEF) 50 mcg/actuation nasal spray 1 spray into each nostril daily.

## 2021-06-05 NOTE — TELEPHONE ENCOUNTER
Received a call directly from patient requesting a refill of Eliquis 5 mg tablets- 1 tablet by mouth twice daily. Pt says it was sent over as request from [facility] but they never received authorization from us. Pt was last seen by [facility] 11/8/2019 so he is up to date with follow up. Per [facility] note:  Cardiology Progress Note     Assessment:  Coronary artery disease with history of multivessel stenting in 2011/non-ST segment elevation myocardial infarction in May 2019, failed stenting of obtuse marginal at [facility] and successful stenting of the same vessel at [facility] on June 17, 2019, no angina  Bicuspid aortic valve with mild to moderate aortic stenosis  Heart failure with reduced ejection fraction(LVEF 42%), no fluid overload  Cholangiocarcinoma with distal metastases  Permanent atrial fibrillations on chronic anticoagulation, good rate control  Hypertension, good control        Plan:  He appears to be well compensated from cardiac standpoint.  We will not need to make any medication changes today.  I encouraged him to continue to observe salt restrictions and not to drink water excessively.     With follow-up in 6 months      Dr. Rose,    Ok to send over refill for Eliquis under you? It shows up as a historical medication and I can't see the history of refills but can see that you reviewed it as him being on it at last appt. At any rate- ok to send refill under your?   Thanks,  Mal

## 2021-06-07 NOTE — TELEPHONE ENCOUNTER
"Call received from patient on direct line. Pt reports that about 2 weeks ago, his chemo infusions were changed to twice daily oral medication-capecitabine due to COVID-19. He noticed that he developed some orthostatic blood pressure changes since starting this medication- he believes. He states that if he gets up from his recliner or a chair outside, he will take a step and then feel very faint or like \"his blood pressure is zero\". He has actually fallen on a few occassions. He denied any injuries except to his pride. He states he has not completely blacked out, but he does feel very dizzy and woozy. He has not checked his blood pressure before or after these events and he usually feels better in a few minutes. He checks his blood pressure a couple times a week and he states that it averages around 100/80 and HR is irregular due to his chronic Afib but it is around 60-80. He denies any other cardiac symptoms. He said he last fell in his backyard on Tuesday afternoon getting up from a lawn chair when it was vijaya and almost 70 degrees out. Again, stated only his pride was hurt. Cautioned patient to go from sitting to standing very slowly, especially in the heat or sun. He is on Plavix and Eliquis and would not want him to hit his head. He verbalized understanding and has been getting up from his recliner/any chair, slowly and standing in front of it for about a minute before taking a step.    He confirms he is on the following blood-pressure lowering medications:  Lasix 20 mg daily  Losartan 25 mg daily  Lopressor 150 mg two times a day    Krishna was encouraged to discuss this with his oncologist as well just as an update to see what they would recommend. Informed Krishna that message will be sent to [facility] - he is out of the office but checking messages periodically. Krishna verbalized understanding and thanked [facility] for his time. -[facility]      Dr. Rose,  See above- any recs?  Thanks,  Mal     "

## 2021-06-07 NOTE — TELEPHONE ENCOUNTER
Called Krishna to get an update on how he has been doing since Losartan was discontinued. Pt reports that he has felt less dizzy when standing and his blood pressure has been averaging around 110/85 HR between 65-85. He has been doing a lot less movement due to the stay at home order and weather but overall feels that stopping losartan has helped a lot. Will send update to [facility].      Dr. Rose,  Update for you only - Krishna reports that he feels less dizzy and has not fallen or had similar episodes since stopping losartan.   Thanks,  Mal

## 2021-06-07 NOTE — TELEPHONE ENCOUNTER
Called patient and updated on response from [facility]DIR[facility]. He verbalized understanding and will stop his losartan. Will check in with patient next week to see how this has helped his symptoms. Losartan removed from medication list. -[facility]

## 2021-06-08 NOTE — TELEPHONE ENCOUNTER
Received a call back from Krishna this morning. He is agreeable to Echo prior to virtual follow up with JESSICA. Able to get patient in for echocardiogram tomorrow prior to Monday virtual appt. -berry

## 2021-06-08 NOTE — TELEPHONE ENCOUNTER
Received a VM from Krishna to arrange follow up visit with [facility]. He wonders if he needs an echocardiogram prior to follow up virtual visit. Transferred to scheduling to have virtual follow up with [facility]. Will inquire if OK for annual echo or if he would like sooner. -[facility]      Dr. Rose,  Krishna will be scheduled for his 6 month virtual follow up- he had an echo last in September 2019. He wonders if he should have one prior to visit with you or would you like to keep at annual echos?  Mal

## 2021-06-08 NOTE — TELEPHONE ENCOUNTER
Referral Request  Type of referral:  Retro referral for imaging CT Needs  a quantity of two referrals please   Who s requesting: [employer]  Why the request: off # [id number] ICD 10 C22.1  Have you been seen for this request: Yes  Does patient have a preference on a group/provider?  [facility]  Had a CT to thorax with contrast and CT with contrast to abd /pelvis.  Both were done on 3/19/2020. Please call Leanne when referral is placed in portal.   Okay to leave a detailed message?  Yes

## 2021-06-08 NOTE — PATIENT INSTRUCTIONS - HE
Girish Chauhan,    It was a pleasure to see you today at the [facility].     My recommendations after this visit include:    Discontinue clopidogrel  Continue regular walking program    SIMIN Rose MD, FACC, GINGER

## 2021-06-11 NOTE — TELEPHONE ENCOUNTER
RN cannot approve Refill Request    RN can NOT refill this medication med is not covered by policy/route to provider. Last office visit: 1/20/2020 Dario Jain DO Last Physical: 2/4/2019 Last MTM visit: Visit date not found Last visit same specialty: 1/20/2020 Dario Jain DO.  Next visit within 3 mo: Visit date not found  Next physical within 3 mo: Visit date not found      Tanisha Orlando, [facility] Triage/Med Refill 9/7/2020    Requested Prescriptions   Pending Prescriptions Disp Refills     allopurinoL (ZYLOPRIM) 100 MG tablet 90 tablet 0     Sig: Take 1 tablet (100 mg total) by mouth daily.       There is no refill protocol information for this order

## 2021-06-13 NOTE — ORAL ONC MGMT
[facility] Oral Chemotherapy Monitoring Program    Thank you for the opportunity to be a part in the care of this patient's oral chemotherapy. The oncology pharmacy will no longer be following this patient for oral chemotherapy. If there are any questions or the plan changes, feel free to contact us.    ORAL CHEMOTHERAPY 1/4/2021 1/14/2021 2/1/2021 2/24/2021 2/24/2021 4/7/2021 6/13/2021   Assessment Type Lab Monitoring Monthly Follow up;Other Monthly Follow up Monthly Follow up;Other Monthly Follow up Chart Review Discontinuation   Stop Date - - - - - - 5/17/2021   Reason for Discontinuation - - - - - - Disease progression   Diagnosis Code Bile Duct Cancer Bile Duct Cancer Bile Duct Cancer Bile Duct Cancer Bile Duct Cancer Bile Duct Cancer Bile Duct Cancer   Providers Dr. Magali De Los Santos   [facility]   Drug Name Xeloda (Capecitabine) Xeloda (Capecitabine) Xeloda (Capecitabine) Xeloda (Capecitabine) Xeloda (Capecitabine) Xeloda (capecitabine) Xeloda (capecitabine)   Dose 1,500 mg 1,500 mg 1,500 mg 1,500 mg 1,500 mg 1,500 mg 1,500 mg   Current Schedule BID BID BID BID BID BID BID   Cycle Details 2 weeks on, 2 weeks off 2 weeks on, 2 weeks off 2 weeks on, 2 weeks off 2 weeks on, 2 weeks off 2 weeks on, 2 weeks off Drug on Hold Drug on Hold   Start Date of Last Cycle - - - - - - -   Planned next cycle start date - - 2/3/2021 - - - -   Doses missed in last 2 weeks - 0 0 - 0 - -   Adherence Assessment - Adherent Adherent - Adherent - -   Adverse Effects - Palmar-plantar Erythrodysethesia Syndrome Palmar-plantar Erythrodysethesia Syndrome - Palmar-plantar Erythrodysethesia Syndrome - -   Palmar-plantar Erythrodysethesia syndrome[hand-foot syndrome] - Grade 1 Grade 1 - Grade 2 - -   Pharmacist Intervention(Palmar-plantar) - No No - No - -   Intervention(s) - - -  - - - -   Home BPs - - - - - - -   Any new drug interactions? - No No - No - -   Is the dose as ordered appropriate for the patient? - Yes Yes - Yes - -   Has the patient missed any days of school, work, or other routine activity? - - - - - - -   Since the last time we talked, have you been hospitalized or used the emergency room? - - No - - - -       Amy Peña  Pharmacy Intern  [facility]  [phone]

## 2021-06-14 NOTE — PATIENT INSTRUCTIONS - HE
Girish Chauhan,    It was a pleasure to see you today at the [facility].     My recommendations after this visit include:    Start digoxin daily  Reduce dose of metoprolol to 50 mg  2 tablets twice a day in 1 week after startin digoxin  holter in 1 month    SIMIN Rose MD, FACC, [facility]

## 2021-06-14 NOTE — TELEPHONE ENCOUNTER
Left another message with Krishna to make sure he is taking accurate dose of Metoprolol Tartrate. [place] [place]

## 2021-06-14 NOTE — TELEPHONE ENCOUNTER
Received a fax from [employer] requesting a refill for patient's Metoprolol. Refill request states that dose is 150 mg twice a day. Chart reviewed and based on most recent chart note from [facility], dose should be 100 mg two times a day.     Per 1/8/2021 OV note:  Start digoxin daily  Reduce dose of metoprolol to 50 mg  2 tablets twice a day in 1 week after startin digoxin  holter in 1 month        LM with Krishna to confirm this and will update [facility] once writer hears back regarding dose strength. [place] [place] [facility] [place],  I received a refill request for Dan's Metoprolol at 150 mg two times a day. Your last note mentions going down to 100 mg two times a day. I refilled as such- you will sign off on. I left a message with Krishna to make sure he is doing this 1 week after his digoxin start.  Thanks,  Mal

## 2021-06-15 NOTE — TELEPHONE ENCOUNTER
Confirmed procedure on 6/30       Called to discuss with patient. Explained they can expect a call from  for date and time of procedure, will need a , someone to stay with them for 24 hours and should stay in town for 24 hours (within 45 min of Hospital) post procedure    Patient needs to get pre-op physical completed. If outside  health system will need physical faxed to number [phone]   If you do not get a preop physical, your procedure could be cancelled, patient voiced understanding*    Preop Plan: PAC scheduled    Med Review    Blood thinner -  On Eliquis, will hold 2 days prior  ASA - no  Diabetic - no    COVID test discussed:knows to get 3-4 days prior    Patient Education r/t procedure:done    Does patient have any history of gastric bypass/gastric surgery/altered panc/bili anatomy? no    A pre-op nurse will call 1-2 days prior to the procedure. Is advised to be NPO/no solid food 8 hours before the procedure. Ok to drink clear liquids (Water, Apple Juice or Gatorade) up to 2 hours prior to procedure.     Other specific details/comments:none     Verbalized understanding of all instructions. All questions answered.

## 2021-06-15 NOTE — TELEPHONE ENCOUNTER
"Per oncology, pt wanting to message Dr. Rodriguez  Added to [facility], called to check on patient.     Per patient, can't eat very much, gets full quickly, not running a fever or having other symptoms. Appetite is fine, \"capacity is low\". Stops eating due to pain, per patient feels like his stent needs attention again. Sharp pains come and go.     Per EGD note from 4/9/21  Repeat EGD for stent modificaton as clinical course dictates     Please assist in scheduling:     Procedure/Imaging/Clinic: EGD  Physician: Dr. Rodriguez  Timing: next open  Procedure length  Anesthesia: gen  Dx: duodenal stricture  Tier:2  Location: [facility]     Orders placed.    ML  "

## 2021-06-16 NOTE — TELEPHONE ENCOUNTER
FUTURE VISIT INFORMATION        SURGERY INFORMATION:    Date: 21    Location: [facility]    Surgeon:  Sivan Walker MD    Anesthesia Type:  choice    Procedure: CYSTOSCOPY, WITH RETROGRADE PYELOGRAM AND RIGHT URETERAL STENT REPLACEMENT  RECORDS REQUESTED FROM:         Primary Care Provider: Patricia Nobles MD- [facility]     Pertinent Medical History: Aortic root dilatation, ASCVD, Aortic stenosis, hypertension, bicuspid aortic valve     Most recent EKG+ Tracin21     [facility]     Most recent Cardiac Stress Test: 11- [facility]     Most recent Coronary Angiogram: 19- [facility]

## 2021-06-17 NOTE — PATIENT INSTRUCTIONS - HE
Patient Instructions by Dario Jain DO at 2/4/2019  9:20 AM     Author: Dario Jain DO Service: -- Author Type: Physician    Filed: 2/4/2019 10:01 AM Encounter Date: 2/4/2019 Status: Signed    : Dario Jain DO (Physician)         Patient Education   Understanding USDA MyPlate  The USDA (US Department of Agriculture) has guidelines to help you make healthy food choices. These are called MyPlate. MyPlate shows the food groups that make up healthy meals using the image of a place setting. Before you eat, think about the healthiest choices for what to put onto your plate or into your cup or bowl. To learn more about building a healthy plate, visit www.chooseWHILLplate.gov.       The Food Groups    Fruits: Any fruit or 100% fruit juice counts as part of the Fruit Group. Fruits may be fresh, canned, frozen, or dried, and may be whole, cut-up, or pureed. Make half your plate fruits and vegetables.    Vegetables: Any vegetable or 100% vegetable juice counts as a member of the Vegetable Group. Vegetables may be fresh, frozen, canned, or dried. They can be served raw or cooked and may be whole, cut-up, or mashed. Make half your plate fruits and vegetables.     Grains: All foods made from grains are part of the Grains Group. These include wheat, rice, oats, cornmeal, and barley such as bread, pasta, oatmeal, cereal, tortillas, and grits. Grains should be no more than a quarter of your plate. At least half of your grains should be whole grains.    Protein: This group includes meat, poultry, seafood, beans and peas, eggs, processed soy products (like tofu), nuts (including nut butters), and seeds. Make protein choices no more than a quarter of your plate. Meat and poultry choices should be lean or low fat.    Dairy: All fluid milk products and foods made from milk that contain calcium, like yogurt and cheese are part of the Dairy Group. (Foods that have little calcium,  such as cream, butter, and cream cheese, are not part of the group.) Most dairy choices should be low-fat or fat-free.    Oils: These are fats that are liquid at room temperature. They include canola, corn, olive, soybean, and sunflower oil. Foods that are mainly oil include mayonnaise, certain salad dressings, and soft margarines. You should have only 5 to 7 teaspoons of oils a day. You probably already get this much from the food you eat.  Use [employer] to Help Build Your Meals  The [employer] can help you plan and track your meals and activity. You can look up individual foods to see or compare their nutritional value. You can get guidelines for what and how much you should eat. You can compare your food choices. And you can assess personal physical activities and see ways you can improve. Go to www.[employer].gov/supertracker/.    6446-0024 [employer], [employer]. [address] [place] [address], [place] [address]. All rights reserved. This information is not intended as a substitute for professional medical care. Always follow your healthcare professional's instructions.

## 2021-06-18 PROBLEM — K31.5 DUODENAL STRICTURE: Status: ACTIVE | Noted: 2021-01-01

## 2021-06-23 NOTE — PROGRESS NOTES
Anticoagulation Note - Preoperative Assessment Center (PAC) Pharmacist     Patient was seen in PAC clinci on June 23, 2021. Patient not directly interviewed by pharmacist due to scheduling conflict.  The purpose of this note is to document the perioperative anticoagulation plan outlined by the providers caring for Girish Chauhan.     Current Regimen  Anticoagulation Regimen as of June 23, 2021: apixaban (Eliquis) 5 mg PO BID  Indication: atrial fibrillation.   Prescriber:  Dr. Deangelo Rose  Expected Duration of therapy: indefinite  Creatinine   Date Value Ref Range Status   05/14/2021 0.98 0.66 - 1.25 mg/dL Final       Perioperative plan  Girish Chauhan is scheduled for EGD on 6/30/21 with Dr. Bhatia and the perioperative anticoagulation plan outlined by GI team is hold Eliquis x2 days pre op (Last dose 6/27 PM - see note from 6/15/21).        Resumption of anticoagulation after procedure will be based on surgery team assessment of bleeding risks and complications.  This plan may require re-assessment and modification by his primary team in the perioperative setting depending on patients clinical situation.        Jeff Ward RP  June 23, 2021  2:14 PM

## 2021-06-24 NOTE — TELEPHONE ENCOUNTER
He was severely undertreated for fluid overload.  Lasix 40 mg twice a day as common dosing.  He should get basic metabolic panel checked this week before he leaves out of town

## 2021-06-24 NOTE — PROGRESS NOTES
Krishna is a 66 year old who is being evaluated via a billable video visit.      How would you like to obtain your AVS? MyChart  If the video visit is dropped, the invitation should be resent by: Text to cell phone: [phone]      HPI         Review of Systems     Objective    Vitals - Patient Reported  Pain Score: Moderate Pain (4)  Pain Loc: Abdomen        Physical Exam

## 2021-06-24 NOTE — PATIENT INSTRUCTIONS - HE
Girish Chauhan,    It was a pleasure to see you today at the [facility].     My recommendations after this visit include:  - You will be called with the results of your labs.  We will discuss any medication changes and follow up once I have your lab results.    - Limit salt in your diet.   - Follow up with Dr. Rose in 3 months.     Lizzette Jacob CNP    What is the [facility]?     The [facility] is a heart failure specialty clinic within [facility].  You will work with your cardiologist, nurse practitioner, and nurses to carefully adjust medications and learn how to live with heart failure.  The Heart Failure Program will help you:      Better understand your chronic heart condition    Feel better and avoid hospital stays    Monitoring for Symptoms      Call the Heart Failure Phone Line ([phone]) if you have any of these symptoms:     Increased shortness of breath/shortness of breath at rest    Waking up at night with difficulty breathing    Unable to lie down for sleep due to symptoms or needing to sit upright for sleep    Weight gain of 2 pounds a day for 2 days in a row OR 5 pounds in 1 week    Increased swelling in your ankles or legs    Dizziness or lightheadedness    Medications       Take your medications as prescribed    Bring all your medications in their original bottles to every appointment    Avoid non-steroidal anti-inflammatory medications (Advil, Aleve, Ibuprofen, Naprosyn, Naproxen, Celebrex)    Do not stop taking your medications or begin taking over-the-counter or herbal medications without first talking to your doctor or nurse practitioner    Diet and Lifestyle       Limit sodium/salt to 2000 mg daily   o Read food labels for sodium content  o Do not add salt when cooking or add salt at the table    Weigh yourself every day and record in your daily weight log   o Call if you gain 2 pounds a day for 2 days in a row  OR 5 pounds in 1 week  o Bring daily weight log to every appointment    Stay active, pace yourself, listen to your body, and rest when tired    Elevate your legs if they are swollen. Ask about using compression/support stockings    Stop smoking    Lose weight if you are overweight    Avoid drinking alcohol or limit amount    Stay updated on your immunizations including flu and pneumonia vaccines

## 2021-06-24 NOTE — TELEPHONE ENCOUNTER
Called patient a second time to inform him of Dr. Rose's recommendations. Pt is currently out of town already and states he is feeling great and has lost a lot of weight since changing is Lasix dose. Pt confirmed Monday appointment with NP advised him to call back with any new/worsening symptoms before hand.

## 2021-06-24 NOTE — TELEPHONE ENCOUNTER
----- Message from Lizzette Jacob CNP sent at 2/26/2019  8:02 AM CST -----  Magnesium stable.  BMP results went to Dr. Rose but I can address since I saw patient yesterday.  Decrease lasix to 40 mg daily.  Call if any signs of fluid retention.  Follow up with me in 3 weeks.        Call placed to patient. Pt verbalized understanding of normal magnesium results and stable BMP results but slightly elevated creatinine. He will decrease his lasix to 40 mg daily. He repeated back instructions. He will follow up in 3 weeks with Lizzette in HF clinic. Msg sent to schedulers to call back patient to arrange this appt. -[facility] [place]

## 2021-06-24 NOTE — H&P
Pre-Operative H & P     CC:  Preoperative exam to assess for increased cardiopulmonary risk while undergoing surgery and anesthesia.    Date of Encounter: 2021  Primary Care Physician:  Patricia Nobles  Reason for visit: Duodenal stricture [K31.5]  HPI  Girish Chauhan is a 66 year old male who presents for pre-operative H & P in preparation for ESOPHAGOGASTRODUODENOSCOPY (EGD) with Dr. Rodriguez on 21 at [facility]. History is obtained from the patient, wife and medical records.     At the beginning of this visit patient ID was confirmed using name and .     Video-Visit Details    Type of service:  Video Visit    Patient verbally consented to video service today: YES      Video Start Time: 2:52pm  Video End Time (time video stopped): 3:13pm    Originating Location (pt. Location): Home    Distant Location (provider location):  [facility] PREOPERATIVE [facility]ESS[facility]    Mode of Communication:  Video Conference via [employer]    Patient with history of cholangiocarcinoma with peritoneal metastases with improved clinical status but worsening tumor since discontinuing Xeloda. He continues to be followed by Oncology. He has a duodenal stricture which has been managed by GI with a stent. Last EGD 21. The patient recently contacted the GI department reporting that he was unable to eat much, gets full quickly and stops eating due to pain. He was scheduled for above procedure for evaluation and possible stent revision.      His history is otherwise significant for HLD, chronic atrial fibrillation, anticoagulated, cardiomyopathy, EF 42%, CAD, with history of multivessel stenting in , NSTEMI in 2019, with stenting of OM on 19, bicuspid aortic valve with mild to moderate aortic stenosis, AAA, gout, CKD, history of melanoma, and BCC. For his cardiac issues he is followed by Dr. Milton BLUNT, last seen on 21 with plan for repeat Holter monitor to  reassess rate control. Holter on 2/8/21 was abnormal due to persistent atrial fibrillation with controlled ventricular rates and infrequent but complex ventricular ectopy. There were no prolonged pauses or high grade block. No reported symptoms.     Today patient denies fever, shortness of breath, or irregular HR. Last week he did have brief episodes of fever at night, he has a chronic cough that sometimes worsens when he lays down, confirmed unchanged, and he has pain around the sternum related to eating. His wife reports some new, but mild swelling at the top of his feet. He has had a lot of company and is feeling weak from not eating. He is walking in his home short distances but using his walker.       Past Medical History  Past Medical History:   Diagnosis Date     Anemia      Aortic stenosis due to bicuspid aortic valve      Ascending aortic aneurysm (H)      Atrial fibrillation (H) 2006    hx of paroxysmal atrial fibrillation since approximately 2006. S/p cardioversion in 2013 with recurrent atrial fibrillation s/p repeat cardioversion 9/29/14.     Basal cell carcinoma 01/2016    right shoulder and right posterior calf s/p electrodessication and curretage 1/2016.     CAD (coronary artery disease) 12/2011    s/p angiogram 12/2011 s/p percutaneous intervention on the LAD with multiple drug-eluting stents complicated by a small perforation in the diagonal branch which sealed spontaneously.  Patient with significant Circumflex stenosis which is being treated conservatively.     Cholangiocarcinoma (H)      CKD (chronic kidney disease)      Duodenal stricture      Gout     affects left foot 2-3 times per year     Hydronephrosis of right kidney      Hyperlipidemia      Hypertension      Liver disease      Melanoma (H) 09/2015    back s/p resection 9/2015     Red blood cell antibody positive      Squamous cell carcinoma     nose s/p excision       Past Surgical History  Past Surgical History:   Procedure Laterality  Date     ------------OTHER-------------      multiple skin cancer resections     CARDIOVERSION  2013, 9/2014     COMBINED CYSTOSCOPY, RETROGRADES, EXCHANGE STENT URETER(S) Right 11/11/2019    Procedure: Cystoscopy, Right Retrograde Pyelogram, Right Ureteral Stent Exchange;  Surgeon: Sivan Walker MD;  [facility]: [facility]     COMBINED CYSTOSCOPY, RETROGRADES, EXCHANGE STENT URETER(S) Right 6/8/2020    Procedure: CYSTOSCOPY, WITH RIGHT RETROGRADE PYELOGRAM AND RIGHT URETERAL STENT EXCHANGE;  Surgeon: Sivan Walker MD;  [facility]     COMBINED CYSTOSCOPY, RETROGRADES, EXCHANGE STENT URETER(S) Right 10/12/2020    Procedure: CYSTOSCOPY, WITH RETROGRADE PYELOGRAM AND RIGHT URETERAL STENT REPLACEMENT;  Surgeon: Sivan Walker MD;  [facility]     COMBINED CYSTOSCOPY, RETROGRADES, EXCHANGE STENT URETER(S) Right 1/15/2021    Procedure: CYSTOSCOPY, WITH RETROGRADE PYELOGRAM AND URETERAL STENT REPLACEMENT;  Surgeon: Sivan Walker MD;  [facility]     COMBINED CYSTOSCOPY, RETROGRADES, EXCHANGE STENT URETER(S) Right 4/19/2021    Procedure: CYSTOSCOPY WITH;  Surgeon: Sivan Walker MD;  [facility]     CYSTOSCOPY, FULGURATE BLADDER TUMOR, COMBINED N/A 4/19/2021    Procedure: BLADDER BIOPSY, FULGURATION, WITH RETROGRADE PYELOGRAM AND URETERAL RIGHT  STENT exchange;  Surgeon: Sivan Walker MD;  [facility]     CYSTOSCOPY, RETROGRADES, INSERT STENT URETER(S), COMBINED N/A 9/16/2019    Procedure: Cystoscopy, Right Retrograde Pyelogram, Right Ureteral Stent Placement;  Surgeon: Sivan Walker MD;  [facility]     CYSTOSCOPY, RETROGRADES, INSERT STENT URETER(S), COMBINED Right 2/5/2020    Procedure: CYSTOSCOPY, WITH Right RETROGRADE PYELOGRAM AND Right URETERAL STENT INSERTION;  Surgeon: Sivan Walker MD;  [facility]     ENDOSCOPIC RETROGRADE CHOLANGIOPANCREATOGRAM N/A 2/26/2016    Procedure: COMBINED ENDOSCOPIC RETROGRADE  CHOLANGIOPANCREATOGRAPHY, PLACE TUBE/STENT;  Surgeon: Rafa Andrade MD;  [facility]     ENDOSCOPIC RETROGRADE CHOLANGIOPANCREATOGRAPHY  2/2014     ENDOSCOPIC ULTRASOUND UPPER GASTROINTESTINAL TRACT (GI) N/A 6/7/2019    Procedure: ENDOSCOPIC ULTRASOUND, with hot axios stent placement;  Surgeon: Gabino Rodriguez MD;  [facility]     ENTEROSCOPY SMALL BOWEL N/A 6/7/2019    Procedure: ENTEROSCOPY;  Surgeon: Gabino Rodriguez MD;  [facility]     ESOPHAGOSCOPY, GASTROSCOPY, DUODENOSCOPY (EGD), COMBINED N/A 10/16/2019    Procedure: Upper Gastrointestinal Endoscopy;  Surgeon: Gabino Rodriguez MD;  [facility]     ESOPHAGOSCOPY, GASTROSCOPY, DUODENOSCOPY (EGD), COMBINED N/A 8/25/2020    Procedure: ESOPHAGOGASTRODUODENOSCOPY (EGD) WIth  duodenal and jejunal stent placement;  Surgeon: Gabino Rodriguez MD;  [facility]     ESOPHAGOSCOPY, GASTROSCOPY, DUODENOSCOPY (EGD), COMBINED N/A 4/9/2021    Procedure: ESOPHAGOGASTRODUODENOSCOPY (EGD), WITH STENT EXCHANGE AND PLACEMENT,  AND BIOPSY;  Surgeon: Gabino Rodriguez MD;  [facility]     ESOPHAGOSCOPY, GASTROSCOPY, DUODENOSCOPY (EGD), DILATATION, COMBINED N/A 7/29/2019    Procedure: Esophagoscopy, gastroscopy, duodenoscopy (EGD), with stent exchange and dilation;  Surgeon: Gabino Rodriguez MD;  [facility]     EXPLORE COMMON BILE DUCT N/A 3/8/2016    Procedure: EXPLORE COMMON BILE DUCT;  Surgeon: Jose Eduardo Pinedo MD;  [facility]     HEPATECTOMY PARTIAL Left 3/8/2016    Procedure: HEPATECTOMY PARTIAL;  Surgeon: Jose Eduardo Pinedo MD;  [facility]     INSERT PORT VASCULAR ACCESS Right 12/8/2017    Procedure: INSERT PORT VASCULAR ACCESS;  Place single lumen venous chest port - right;  Surgeon: Drew Powell PA-C;  [facility]     STENT  12/2011    LAD with multiple SAM       Hx of Blood transfusions/reactions: Yes in past. History of blood antibody.      Hx of abnormal bleeding or  anti-platelet use: Anticoagulated on Eliquis.    Menstrual history: No LMP for male patient.    Steroid use in the last year: Denies.    Personal or FH with difficulty with Anesthesia:  Denies.     Prior to Admission Medications  Current Outpatient Medications   Medication Sig Dispense Refill     acetaminophen (TYLENOL) 500 MG tablet Take 500 mg by mouth every 6 hours as needed for fever or pain       allopurinol (ZYLOPRIM) 100 MG tablet Take 100 mg by mouth every evening        apixaban ANTICOAGULANT (ELIQUIS ANTICOAGULANT) 5 MG tablet Take 5 mg by mouth 2 times daily        atorvastatin (LIPITOR) 40 MG tablet Take 40 mg by mouth every evening        AZASITE 1 % ophthalmic solution        calcium carbonate (TUMS) 500 MG chewable tablet Take 1-3 chew tab by mouth as needed for heartburn        colchicine (COLCYRS) 0.6 MG tablet Take 0.6 mg by mouth 3 times daily as needed (gout flare)        digoxin (LANOXIN) 125 MCG tablet Take 125 mcg by mouth every morning        furosemide (LASIX) 20 MG tablet Take 20 mg by mouth every other day        metoprolol tartrate (LOPRESSOR) 50 MG tablet Take 100 mg by mouth 2 times daily Pt states he is taking 100mg in AM and 100mg in PM       multivitamin w/minerals (THERA-VIT-M) tablet Take 1 tablet by mouth every morning        Nitroglycerin (NITROSTAT SL) Place 0.4 mg under the tongue as needed for chest pain (Carries medication - has never used)        ondansetron (ZOFRAN) 4 MG tablet Take 1 tablet (4 mg) by mouth every 8 hours as needed for nausea (Patient taking differently: Take 4 mg by mouth as needed for nausea ) 30 tablet 0     polyethylene glycol (MIRALAX) 17 g packet Take 1 packet by mouth as needed for constipation       RESTASIS 0.05 % ophthalmic emulsion INSTILL 1 DROP IN EACH EYE EVERY 12 HOURS       capecitabine (XELODA) 500 MG tablet Take 3 tablets (1,500 mg) by mouth 2 times daily Days 1 through 14, then off for 14 days. Take within 30 mins after meal. (Patient  taking differently: Take 800 mg/m2 by mouth 2 times daily Days 1 through 14, then off for 14 days. Take within 30 mins after meal.) 84 tablet 0     omeprazole (PRILOSEC) 40 MG DR capsule Take 1 capsule (40 mg) by mouth daily (Patient taking differently: Take 40 mg by mouth daily ) 90 capsule 0       Allergies  Allergies   Allergen Reactions     Blood Transfusion Related (Informational Only) Other (See Comments)     Patient has a history of a clinically significant antibody against RBC antigens.  A delay in compatible RBCs may occur.       Social History  Social History     Socioeconomic History     Marital status:      Spouse name: Not on file     Number of children: 1     Years of education: Not on file     Highest education level: Not on file   Occupational History     Occupation: retired   Social Needs     Financial resource strain: Not on file     Food insecurity     Worry: Not on file     Inability: Not on file     Transportation needs     Medical: Not on file     Non-medical: Not on file   Tobacco Use     Smoking status: Never Smoker     Smokeless tobacco: Never Used   Substance and Sexual Activity     Alcohol use: Not Currently     Alcohol/week: 2.0 standard drinks     Types: 2 Cans of beer per week     Drug use: No     Sexual activity: Not on file   Lifestyle     Physical activity     Days per week: Not on file     Minutes per session: Not on file     Stress: Not on file   Relationships     Social connections     Talks on phone: Not on file     Gets together: Not on file     Attends Voodoo service: Not on file     Active member of club or organization: Not on file     Attends meetings of clubs or organizations: Not on file     Relationship status: Not on file     Intimate partner violence     Fear of current or ex partner: Not on file     Emotionally abused: Not on file     Physically abused: Not on file     Forced sexual activity: Not on file   Other Topics Concern     Not on file   Social History  [place]    Works for [employer] with [employer].  Lives in [place].   with one grown daughter.  No tobacco, rare Etoh, no drug use.    Previously from [place] ([place], [place]       Family History  Family History   Problem Relation Age of Onset     Skin Cancer Mother      Other - See Comments Father         father passed away in his 60s post-op with an unknown bile duct obstruction.  no anesthesia complication.       Osteoarthritis Sister      Cerebrovascular Disease Brother      Other - See Comments Brother         prediabetes     Osteoarthritis Sister      No Known Problems Brother      Anesthesia Reaction No family hx of      Deep Vein Thrombosis No family hx of      ROS/MED HISTORY  The complete review of systems is negative other than noted in the HPI or here.    ENT/Pulmonary: Comment: Weak, raspy voice      Neurologic:  - neg neurologic ROS     Cardiovascular: Comment: 5/2019 NSTEMI    (+) Dyslipidemia hypertension--CAD -past MI -stent-2011, 2019. Drug Eluting Stent. Taking blood thinners Pt has received instructions: Instructions Given to patient: Last dose of Eliquis 6/27 pm. CHF Last EF: 40-45% date: 6/12/20 dysrhythmias, a-fib, Irregular Heartbeat/Palpitations, valvular problems/murmurs type: AS mild to mod. Previous cardiac testing   Echo: Date: 6/12/20 Results:    Stress Test: Date: Results:    ECG Reviewed: Date: 4/6/21 Results:  A fib with competing junctional pacer  Cath: Date: 2019 Results:      METS/Exercise Tolerance: 1 - Eating, dressing    Hematologic:     (+) history of blood transfusion, no previous transfusion reaction, - Hx of blood antibody,     Musculoskeletal: Comment: gout  (+) arthritis,     GI/Hepatic: Comment: Duodenal stricture with stent. History of revision. Now limited and painful eating. Taking meds orally.    (+) liver disease,     Renal/Genitourinary: Comment: History stent placement for hydronephrosis    (+) renal disease, type: CRI, Pt does not require  dialysis,     Endo:  - neg endo ROS     Psychiatric/Substance Use:  - neg psychiatric ROS     Infectious Disease:  - neg infectious disease ROS     Malignancy: Comment: cholangiocarcinoma with peritoneal metastases with improved clinical status but worsening tumor since discontinuing Xeloda  (+) Malignancy, History of Other.Other CA Active status post Chemo.    Other:  - neg other ROS            LABS: Personally reviewed  CBC:   Lab Results   Component Value Date    WBC 7.2 05/14/2021    WBC 9.7 04/02/2021    HGB 10.4 (L) 05/14/2021    HGB 10.2 (L) 04/02/2021    HCT 33.2 (L) 05/14/2021    HCT 31.8 (L) 04/02/2021     05/14/2021     04/02/2021     BMP:   Lab Results   Component Value Date     05/14/2021     04/02/2021    POTASSIUM 4.2 05/14/2021    POTASSIUM 4.3 04/19/2021    CHLORIDE 109 05/14/2021    CHLORIDE 111 (H) 04/02/2021    CO2 29 05/14/2021    CO2 26 04/02/2021    BUN 22 05/14/2021    BUN 21 04/02/2021    CR 0.98 05/14/2021    CR 0.98 04/02/2021     (H) 05/14/2021     (H) 04/02/2021     COAGS:   Lab Results   Component Value Date    PTT 33 02/11/2020    INR 1.22 (H) 04/09/2021    FIBR 404 07/30/2019     POC:   Lab Results   Component Value Date    BGM 75 04/19/2021     HEPATIC:   Lab Results   Component Value Date    ALBUMIN 2.8 (L) 05/14/2021    PROTTOTAL 5.9 (L) 05/14/2021    ALT 61 05/14/2021    AST 59 (H) 05/14/2021    ALKPHOS 404 (H) 05/14/2021    BILITOTAL 0.5 05/14/2021    TAYLOR 24 02/11/2020     OTHER:   Lab Results   Component Value Date    PH 7.38 03/08/2016    LACT 1.4 07/15/2020    GARY 8.4 (L) 05/14/2021    PHOS 2.8 02/13/2020    MAG 2.0 02/13/2020    LIPASE 207 08/25/2020    AMYLASE 112 (H) 08/25/2020    TSH 2.34 07/15/2020    CRP 20.0 (H) 02/11/2020       Physical Exam  Constitutional: Awake, alert, frail and weak appearing, no apparent distress. Voice is weak. Wife accompanies.   Respiratory: No cough or obvious dyspnea.  Neuropsychiatric: Calm,  cooperative. Normal affect.     Please refer to the physical examination documented by the anesthesiologist in the anesthesia record on the day of surgery    EKG: Personally reviewed 4/6/21 Atrial fibrillation with competing junctional pacemaker  Cardiac echo: 6/2020  1. The left ventricle is normal in size. Left ventricular systolic performance is mild to moderately reduced. The ejection fraction is estimated to be 40-45%.   2. There is mild to moderate global reduction in left ventricular systolic performance.   3. There is mild concentric increase in left ventricular wall thickness.   4. There is mild to moderate aortic stenosis.   5. Normal right ventricular size and systolic performance.   6. There is mild aortic root enlargement. When compared to the prior real-time echocardiogram dated 27 September 2019, the findings are felt to be fairly similar in both examinations.  2/8/21 Holter  Conclusion:       1. Abnormal 24-hour Holter Holter monitor due to persistent atrial fibrillation with controlled ventricular rates and infrequent but complex ventricular ectopy as detailed above.   2. No prolonged pauses or high-grade block.  3. No symptoms recorded.    Angiogram 2019  LM minimal dz  LAD patent prox to distal stents  LCx 99% OM1 stenosis with disease extension for 10-15mm on either side of stenosis; mild dz elsewhere in Cx  RCA mild to moderate diffuse dz, 50-60% focal distal RCA lesion     Successful PCI of OM1 with PTCA followed by 2.5x24 Synergy SAM  0% residual, NITZA 3 flow post     OM1 lesion essentially behaving as a ; requiring wire escalation, progressive PTCA and Guidezilla support for PCI    5/14/21 CT CAP  Impression:  1. Postsurgical changes of left hepatectomy, hepaticojejunostomy and  extensive bilioenteric stenting with a new double pigtail through the  jejunoduodenostomy. Overall distention of the biliary system is  relatively unchanged from 3/5/2021. There is persistent soft  tissue  thickening about the pylorus, proximal duodenum and in the bed of the  left hepatectomy consistent with recurrent tumor. A focal soft tissue  area extending into the abdominal wall has increased in size from  prior study.  2. Indeterminate new hypodense focus in the anteromedial hepatic  resection margin. Attention on follow-up.  3. Unchanged soft tissue thickening of the posterior bladder  suspicious for metastatic involvement.  4. Unchanged indeterminate splenic hypodensities.  5. Replaced double pigtail right ureteral stent with relatively  unchanged proximal hydronephrosis.  6. Unchanged generalized haziness in the central abdominal and pelvic  mesentery. Small nodule inferior to the liver in the right flank noted  possibly metastatic deposit. Trace ascites.  Imaging and cardiac testing reviewed by this provider      Outside records reviewed from: Care Everywhere    ASSESSMENT and PLAN  Girish Chauhan is a 66 year old male scheduled to undergo ESOPHAGOGASTRODUODENOSCOPY (EGD) with Dr. Rodriguez on 6/30/21. He has the following specific operative considerations:   - RCRI : 0.9% risk of major adverse cardiac event.   - Anesthesia considerations:  Refer to PAC assessment in anesthesia records  - VTE risk:3%  - MARVIN # of risks 2/8 = Low risk  - If afib, [id number] score 3.  Risk category High.    - Risk of PONV score = 2.  If > 2, anti-emetic intervention recommended.    --Duodenal stricture, managed with stent placement. Now difficult and painful eating. Above procedure planned.   --No history of problems with anesthesia.  --Hydronephrosis secondary to metastatic cholangiocarcinoma s/p ureteral stent with routine exchanges. Next planned for later in July.   --HLD. Atorvastatin at HS. Complex cardiac history as above. Denies cardiac symptoms. Chronic anticoagulation with Eliquis due to atrial fibrillation. Perioperative anticoagulation plan to hold Eliquis for two days prior to procedure, last dose  6/27/21. Will take metoprolol on DOS. Lasix every other day. All testing above. Last EF 40-45%, mild to moderate aortic stenosis. Able to walk some in home with walker, but activity limited overall due to weakness.  --Nonsmoker. Denies pulmonary symptoms.   --Gout allopurinol at HS, and colchicine prn  --CRI Last Cr 0.98.  --Past history of blood transfusion.   --History of blood antibody.  --Right upper chest port.      Arrival time, NPO, shower and medication instructions provided by nursing staff today. Will proceed to surgery.    YO Carlton CNS  Preoperative Assessment Center  [facility]  Phone: [phone]  Fax: [phone]

## 2021-06-24 NOTE — H&P (VIEW-ONLY)
Pre-Operative H & P     CC:  Preoperative exam to assess for increased cardiopulmonary risk while undergoing surgery and anesthesia.    Date of Encounter: 2021  Primary Care Physician:  Patricia Nobles  Reason for visit: Duodenal stricture [K31.5]  HPI  Girish Chauhan is a 66 year old male who presents for pre-operative H & P in preparation for ESOPHAGOGASTRODUODENOSCOPY (EGD) with Dr. Rodriguez on 21 at [facility]. History is obtained from the patient, wife and medical records.     At the beginning of this visit patient ID was confirmed using name and .     Video-Visit Details    Type of service:  Video Visit    Patient verbally consented to video service today: YES      Video Start Time: 2:52pm  Video End Time (time video stopped): 3:13pm    Originating Location (pt. Location): Home    Distant Location (provider location):  [facility]    Mode of Communication:  Video Conference via [employer]    Patient with history of cholangiocarcinoma with peritoneal metastases with improved clinical status but worsening tumor since discontinuing Xeloda. He continues to be followed by Oncology. He has a duodenal stricture which has been managed by GI with a stent. Last EGD 21. The patient recently contacted the GI department reporting that he was unable to eat much, gets full quickly and stops eating due to pain. He was scheduled for above procedure for evaluation and possible stent revision.      His history is otherwise significant for HLD, chronic atrial fibrillation, anticoagulated, cardiomyopathy, EF 42%, CAD, with history of multivessel stenting in , NSTEMI in 2019, with stenting of OM on 19, bicuspid aortic valve with mild to moderate aortic stenosis, AAA, gout, CKD, history of melanoma, and BCC. For his cardiac issues he is followed by Dr. Milton BLUNT, last seen on 21 with plan for repeat Holter monitor to  reassess rate control. Holter on 2/8/21 was abnormal due to persistent atrial fibrillation with controlled ventricular rates and infrequent but complex ventricular ectopy. There were no prolonged pauses or high grade block. No reported symptoms.     Today patient denies fever, shortness of breath, or irregular HR. Last week he did have brief episodes of fever at night, he has a chronic cough that sometimes worsens when he lays down, confirmed unchanged, and he has pain around the sternum related to eating. His wife reports some new, but mild swelling at the top of his feet. He has had a lot of company and is feeling weak from not eating. He is walking in his home short distances but using his walker.       Past Medical History  Past Medical History:   Diagnosis Date     Anemia      Aortic stenosis due to bicuspid aortic valve      Ascending aortic aneurysm (H)      Atrial fibrillation (H) 2006    hx of paroxysmal atrial fibrillation since approximately 2006. S/p cardioversion in 2013 with recurrent atrial fibrillation s/p repeat cardioversion 9/29/14.     Basal cell carcinoma 01/2016    right shoulder and right posterior calf s/p electrodessication and curretage 1/2016.     CAD (coronary artery disease) 12/2011    s/p angiogram 12/2011 s/p percutaneous intervention on the LAD with multiple drug-eluting stents complicated by a small perforation in the diagonal branch which sealed spontaneously.  Patient with significant Circumflex stenosis which is being treated conservatively.     Cholangiocarcinoma (H)      CKD (chronic kidney disease)      Duodenal stricture      Gout     affects left foot 2-3 times per year     Hydronephrosis of right kidney      Hyperlipidemia      Hypertension      Liver disease      Melanoma (H) 09/2015    back s/p resection 9/2015     Red blood cell antibody positive      Squamous cell carcinoma     nose s/p excision       Past Surgical History  Past Surgical History:   Procedure Laterality  Date     ------------OTHER-------------      multiple skin cancer resections     CARDIOVERSION  2013, 9/2014     COMBINED CYSTOSCOPY, RETROGRADES, EXCHANGE STENT URETER(S) Right 11/11/2019    Procedure: Cystoscopy, Right Retrograde Pyelogram, Right Ureteral Stent Exchange;  Surgeon: Sivan Walker MD;  [facility]     COMBINED CYSTOSCOPY, RETROGRADES, EXCHANGE STENT URETER(S) Right 6/8/2020    Procedure: CYSTOSCOPY, WITH RIGHT RETROGRADE PYELOGRAM AND RIGHT URETERAL STENT EXCHANGE;  Surgeon: Sivan Walker MD;  [facility]     COMBINED CYSTOSCOPY, RETROGRADES, EXCHANGE STENT URETER(S) Right 10/12/2020    Procedure: CYSTOSCOPY, WITH RETROGRADE PYELOGRAM AND RIGHT URETERAL STENT REPLACEMENT;  Surgeon: Sivan Walker MD;  [facility]     COMBINED CYSTOSCOPY, RETROGRADES, EXCHANGE STENT URETER(S) Right 1/15/2021    Procedure: CYSTOSCOPY, WITH RETROGRADE PYELOGRAM AND URETERAL STENT REPLACEMENT;  Surgeon: Sivan Walker MD;  [facility]     COMBINED CYSTOSCOPY, RETROGRADES, EXCHANGE STENT URETER(S) Right 4/19/2021    Procedure: CYSTOSCOPY WITH;  Surgeon: Sivan Walker MD;  [facility]     CYSTOSCOPY, FULGURATE BLADDER TUMOR, COMBINED N/A 4/19/2021    Procedure: BLADDER BIOPSY, FULGURATION, WITH RETROGRADE PYELOGRAM AND URETERAL RIGHT  STENT exchange;  Surgeon: Sivan Walker MD;  [facility]     CYSTOSCOPY, RETROGRADES, INSERT STENT URETER(S), COMBINED N/A 9/16/2019    Procedure: Cystoscopy, Right Retrograde Pyelogram, Right Ureteral Stent Placement;  Surgeon: Sivan Walker MD;  [facility]     CYSTOSCOPY, RETROGRADES, INSERT STENT URETER(S), COMBINED Right 2/5/2020    Procedure: CYSTOSCOPY, WITH Right RETROGRADE PYELOGRAM AND Right URETERAL STENT INSERTION;  Surgeon: Sivan Walker MD;  [facility]     ENDOSCOPIC RETROGRADE CHOLANGIOPANCREATOGRAM N/A 2/26/2016    Procedure: COMBINED ENDOSCOPIC RETROGRADE  CHOLANGIOPANCREATOGRAPHY, PLACE TUBE/STENT;  Surgeon: Rafa Andrade MD;  [facility]     ENDOSCOPIC RETROGRADE CHOLANGIOPANCREATOGRAPHY  2/2014     ENDOSCOPIC ULTRASOUND UPPER GASTROINTESTINAL TRACT (GI) N/A 6/7/2019    Procedure: ENDOSCOPIC ULTRASOUND, with hot axios stent placement;  Surgeon: Gabino Rodriguez MD;  [facility]     ENTEROSCOPY SMALL BOWEL N/A 6/7/2019    Procedure: ENTEROSCOPY;  Surgeon: Gabino Rodriguez MD;  [facility]     ESOPHAGOSCOPY, GASTROSCOPY, DUODENOSCOPY (EGD), COMBINED N/A 10/16/2019    Procedure: Upper Gastrointestinal Endoscopy;  Surgeon: Gabino Rodriguez MD;  [facility]     ESOPHAGOSCOPY, GASTROSCOPY, DUODENOSCOPY (EGD), COMBINED N/A 8/25/2020    Procedure: ESOPHAGOGASTRODUODENOSCOPY (EGD) WIth  duodenal and jejunal stent placement;  Surgeon: Gabino Rodriguez MD;  [facility]     ESOPHAGOSCOPY, GASTROSCOPY, DUODENOSCOPY (EGD), COMBINED N/A 4/9/2021    Procedure: ESOPHAGOGASTRODUODENOSCOPY (EGD), WITH STENT EXCHANGE AND PLACEMENT,  AND BIOPSY;  Surgeon: Gabino Rodriguez MD;  [facility]     ESOPHAGOSCOPY, GASTROSCOPY, DUODENOSCOPY (EGD), DILATATION, COMBINED N/A 7/29/2019    Procedure: Esophagoscopy, gastroscopy, duodenoscopy (EGD), with stent exchange and dilation;  Surgeon: Gabino Rodriguez MD;  [facility]     EXPLORE COMMON BILE DUCT N/A 3/8/2016    Procedure: EXPLORE COMMON BILE DUCT;  Surgeon: Jose Eduardo Pinedo MD;  [facility]     HEPATECTOMY PARTIAL Left 3/8/2016    Procedure: HEPATECTOMY PARTIAL;  Surgeon: Jose Eduardo Pinedo MD;  [facility]     INSERT PORT VASCULAR ACCESS Right 12/8/2017    Procedure: INSERT PORT VASCULAR ACCESS;  Place single lumen venous chest port - right;  Surgeon: Drew Powell PA-C;  [facility]     STENT  12/2011    LAD with multiple SAM       Hx of Blood transfusions/reactions: Yes in past. History of blood antibody.      Hx of abnormal bleeding or  anti-platelet use: Anticoagulated on Eliquis.    Menstrual history: No LMP for male patient.    Steroid use in the last year: Denies.    Personal or FH with difficulty with Anesthesia:  Denies.     Prior to Admission Medications  Current Outpatient Medications   Medication Sig Dispense Refill     acetaminophen (TYLENOL) 500 MG tablet Take 500 mg by mouth every 6 hours as needed for fever or pain       allopurinol (ZYLOPRIM) 100 MG tablet Take 100 mg by mouth every evening        apixaban ANTICOAGULANT (ELIQUIS ANTICOAGULANT) 5 MG tablet Take 5 mg by mouth 2 times daily        atorvastatin (LIPITOR) 40 MG tablet Take 40 mg by mouth every evening        AZASITE 1 % ophthalmic solution        calcium carbonate (TUMS) 500 MG chewable tablet Take 1-3 chew tab by mouth as needed for heartburn        colchicine (COLCYRS) 0.6 MG tablet Take 0.6 mg by mouth 3 times daily as needed (gout flare)        digoxin (LANOXIN) 125 MCG tablet Take 125 mcg by mouth every morning        furosemide (LASIX) 20 MG tablet Take 20 mg by mouth every other day        metoprolol tartrate (LOPRESSOR) 50 MG tablet Take 100 mg by mouth 2 times daily Pt states he is taking 100mg in AM and 100mg in PM       multivitamin w/minerals (THERA-VIT-M) tablet Take 1 tablet by mouth every morning        Nitroglycerin (NITROSTAT SL) Place 0.4 mg under the tongue as needed for chest pain (Carries medication - has never used)        ondansetron (ZOFRAN) 4 MG tablet Take 1 tablet (4 mg) by mouth every 8 hours as needed for nausea (Patient taking differently: Take 4 mg by mouth as needed for nausea ) 30 tablet 0     polyethylene glycol (MIRALAX) 17 g packet Take 1 packet by mouth as needed for constipation       RESTASIS 0.05 % ophthalmic emulsion INSTILL 1 DROP IN EACH EYE EVERY 12 HOURS       capecitabine (XELODA) 500 MG tablet Take 3 tablets (1,500 mg) by mouth 2 times daily Days 1 through 14, then off for 14 days. Take within 30 mins after meal. (Patient  taking differently: Take 800 mg/m2 by mouth 2 times daily Days 1 through 14, then off for 14 days. Take within 30 mins after meal.) 84 tablet 0     omeprazole (PRILOSEC) 40 MG DR capsule Take 1 capsule (40 mg) by mouth daily (Patient taking differently: Take 40 mg by mouth daily ) 90 capsule 0       Allergies  Allergies   Allergen Reactions     Blood Transfusion Related (Informational Only) Other (See Comments)     Patient has a history of a clinically significant antibody against RBC antigens.  A delay in compatible RBCs may occur.       Social History  Social History     Socioeconomic History     Marital status:      Spouse name: Not on file     Number of children: 1     Years of education: Not on file     Highest education level: Not on file   Occupational History     Occupation: retired   Social Needs     Financial resource strain: Not on file     Food insecurity     Worry: Not on file     Inability: Not on file     Transportation needs     Medical: Not on file     Non-medical: Not on file   Tobacco Use     Smoking status: Never Smoker     Smokeless tobacco: Never Used   Substance and Sexual Activity     Alcohol use: Not Currently     Alcohol/week: 2.0 standard drinks     Types: 2 Cans of beer per week     Drug use: No     Sexual activity: Not on file   Lifestyle     Physical activity     Days per week: Not on file     Minutes per session: Not on file     Stress: Not on file   Relationships     Social connections     Talks on phone: Not on file     Gets together: Not on file     Attends Religion service: Not on file     Active member of club or organization: Not on file     Attends meetings of clubs or organizations: Not on file     Relationship status: Not on file     Intimate partner violence     Fear of current or ex partner: Not on file     Emotionally abused: Not on file     Physically abused: Not on file     Forced sexual activity: Not on file   Other Topics Concern     Not on file   Social History  [place]    Works for [employer] with [employer] system.  Lives in [place].   with one grown daughter.  No tobacco, rare Etoh, no drug use.    Previously from [place] ([place], [place]       Family History  Family History   Problem Relation Age of Onset     Skin Cancer Mother      Other - See Comments Father         father passed away in his 60s post-op with an unknown bile duct obstruction.  no anesthesia complication.       Osteoarthritis Sister      Cerebrovascular Disease Brother      Other - See Comments Brother         prediabetes     Osteoarthritis Sister      No Known Problems Brother      Anesthesia Reaction No family hx of      Deep Vein Thrombosis No family hx of      ROS/MED HISTORY  The complete review of systems is negative other than noted in the HPI or here.    ENT/Pulmonary: Comment: Weak, raspy voice      Neurologic:  - neg neurologic ROS     Cardiovascular: Comment: 5/2019 NSTEMI    (+) Dyslipidemia hypertension--CAD -past MI -stent-2011, 2019. Drug Eluting Stent. Taking blood thinners Pt has received instructions: Instructions Given to patient: Last dose of Eliquis 6/27 pm. CHF Last EF: 40-45% date: 6/12/20 dysrhythmias, a-fib, Irregular Heartbeat/Palpitations, valvular problems/murmurs type: AS mild to mod. Previous cardiac testing   Echo: Date: 6/12/20 Results:    Stress Test: Date: Results:    ECG Reviewed: Date: 4/6/21 Results:  A fib with competing junctional pacer  Cath: Date: 2019 Results:      METS/Exercise Tolerance: 1 - Eating, dressing    Hematologic:     (+) history of blood transfusion, no previous transfusion reaction, - Hx of blood antibody,     Musculoskeletal: Comment: gout  (+) arthritis,     GI/Hepatic: Comment: Duodenal stricture with stent. History of revision. Now limited and painful eating. Taking meds orally.    (+) liver disease,     Renal/Genitourinary: Comment: History stent placement for hydronephrosis    (+) renal disease, type: CRI, Pt does not require  dialysis,     Endo:  - neg endo ROS     Psychiatric/Substance Use:  - neg psychiatric ROS     Infectious Disease:  - neg infectious disease ROS     Malignancy: Comment: cholangiocarcinoma with peritoneal metastases with improved clinical status but worsening tumor since discontinuing Xeloda  (+) Malignancy, History of Other.Other CA Active status post Chemo.    Other:  - neg other ROS            LABS: Personally reviewed  CBC:   Lab Results   Component Value Date    WBC 7.2 05/14/2021    WBC 9.7 04/02/2021    HGB 10.4 (L) 05/14/2021    HGB 10.2 (L) 04/02/2021    HCT 33.2 (L) 05/14/2021    HCT 31.8 (L) 04/02/2021     05/14/2021     04/02/2021     BMP:   Lab Results   Component Value Date     05/14/2021     04/02/2021    POTASSIUM 4.2 05/14/2021    POTASSIUM 4.3 04/19/2021    CHLORIDE 109 05/14/2021    CHLORIDE 111 (H) 04/02/2021    CO2 29 05/14/2021    CO2 26 04/02/2021    BUN 22 05/14/2021    BUN 21 04/02/2021    CR 0.98 05/14/2021    CR 0.98 04/02/2021     (H) 05/14/2021     (H) 04/02/2021     COAGS:   Lab Results   Component Value Date    PTT 33 02/11/2020    INR 1.22 (H) 04/09/2021    FIBR 404 07/30/2019     POC:   Lab Results   Component Value Date    BGM 75 04/19/2021     HEPATIC:   Lab Results   Component Value Date    ALBUMIN 2.8 (L) 05/14/2021    PROTTOTAL 5.9 (L) 05/14/2021    ALT 61 05/14/2021    AST 59 (H) 05/14/2021    ALKPHOS 404 (H) 05/14/2021    BILITOTAL 0.5 05/14/2021    TAYLOR 24 02/11/2020     OTHER:   Lab Results   Component Value Date    PH 7.38 03/08/2016    LACT 1.4 07/15/2020    GARY 8.4 (L) 05/14/2021    PHOS 2.8 02/13/2020    MAG 2.0 02/13/2020    LIPASE 207 08/25/2020    AMYLASE 112 (H) 08/25/2020    TSH 2.34 07/15/2020    CRP 20.0 (H) 02/11/2020       Physical Exam  Constitutional: Awake, alert, frail and weak appearing, no apparent distress. Voice is weak. Wife accompanies.   Respiratory: No cough or obvious dyspnea.  Neuropsychiatric: Calm,  cooperative. Normal affect.     Please refer to the physical examination documented by the anesthesiologist in the anesthesia record on the day of surgery    EKG: Personally reviewed 4/6/21 Atrial fibrillation with competing junctional pacemaker  Cardiac echo: 6/2020  1. The left ventricle is normal in size. Left ventricular systolic performance is mild to moderately reduced. The ejection fraction is estimated to be 40-45%.   2. There is mild to moderate global reduction in left ventricular systolic performance.   3. There is mild concentric increase in left ventricular wall thickness.   4. There is mild to moderate aortic stenosis.   5. Normal right ventricular size and systolic performance.   6. There is mild aortic root enlargement. When compared to the prior real-time echocardiogram dated 27 September 2019, the findings are felt to be fairly similar in both examinations.  2/8/21 Holter  Conclusion:       1. Abnormal 24-hour Holter Holter monitor due to persistent atrial fibrillation with controlled ventricular rates and infrequent but complex ventricular ectopy as detailed above.   2. No prolonged pauses or high-grade block.  3. No symptoms recorded.    Angiogram 2019  LM minimal dz  LAD patent prox to distal stents  LCx 99% OM1 stenosis with disease extension for 10-15mm on either side of stenosis; mild dz elsewhere in Cx  RCA mild to moderate diffuse dz, 50-60% focal distal RCA lesion     Successful PCI of OM1 with PTCA followed by 2.5x24 Synergy SAM  0% residual, NITZA 3 flow post     OM1 lesion essentially behaving as a ; requiring wire escalation, progressive PTCA and Guidezilla support for PCI    5/14/21 CT CAP  Impression:  1. Postsurgical changes of left hepatectomy, hepaticojejunostomy and  extensive bilioenteric stenting with a new double pigtail through the  jejunoduodenostomy. Overall distention of the biliary system is  relatively unchanged from 3/5/2021. There is persistent soft  tissue  thickening about the pylorus, proximal duodenum and in the bed of the  left hepatectomy consistent with recurrent tumor. A focal soft tissue  area extending into the abdominal wall has increased in size from  prior study.  2. Indeterminate new hypodense focus in the anteromedial hepatic  resection margin. Attention on follow-up.  3. Unchanged soft tissue thickening of the posterior bladder  suspicious for metastatic involvement.  4. Unchanged indeterminate splenic hypodensities.  5. Replaced double pigtail right ureteral stent with relatively  unchanged proximal hydronephrosis.  6. Unchanged generalized haziness in the central abdominal and pelvic  mesentery. Small nodule inferior to the liver in the right flank noted  possibly metastatic deposit. Trace ascites.  Imaging and cardiac testing reviewed by this provider      Outside records reviewed from: Care Everywhere    ASSESSMENT and PLAN  Girish Chauhan is a 66 year old male scheduled to undergo ESOPHAGOGASTRODUODENOSCOPY (EGD) with Dr. Rodriguez on 6/30/21. He has the following specific operative considerations:   - RCRI : 0.9% risk of major adverse cardiac event.   - Anesthesia considerations:  Refer to PAC assessment in anesthesia records  - VTE risk:3%  - MARVIN # of risks 2/8 = Low risk  - If afib, [id number] score 3.  Risk category High.    - Risk of PONV score = 2.  If > 2, anti-emetic intervention recommended.    --Duodenal stricture, managed with stent placement. Now difficult and painful eating. Above procedure planned.   --No history of problems with anesthesia.  --Hydronephrosis secondary to metastatic cholangiocarcinoma s/p ureteral stent with routine exchanges. Next planned for later in July.   --HLD. Atorvastatin at HS. Complex cardiac history as above. Denies cardiac symptoms. Chronic anticoagulation with Eliquis due to atrial fibrillation. Perioperative anticoagulation plan to hold Eliquis for two days prior to procedure, last dose  6/27/21. Will take metoprolol on DOS. Lasix every other day. All testing above. Last EF 40-45%, mild to moderate aortic stenosis. Able to walk some in home with walker, but activity limited overall due to weakness.  --Nonsmoker. Denies pulmonary symptoms.   --Gout allopurinol at HS, and colchicine prn  --CRI Last Cr 0.98.  --Past history of blood transfusion.   --History of blood antibody.  --Right upper chest port.      Arrival time, NPO, shower and medication instructions provided by nursing staff today. Will proceed to surgery.    YO Carlton CNS  Preoperative Assessment Center  [facility]  Phone: [phone]  Fax: [phone]

## 2021-06-24 NOTE — PROGRESS NOTES
Patient seen in clinic for HF education s/p recent consult with WTZ and furosemide increase.   Reviewed HF Binder that includes the  HF Sx Awareness & Action plan  handout and  A [employer] Pump  booklet and Weight log booklet highlighting :  __X_patient s type of heart failure _X__Na management in diet  __X_importance of daily wts  _X__Fluid Guidelines, if applicable  __X_medication review and importance of compliance     Instructed patient in signs and sx of heart failure, reiterated when to call clinic - reviewed HF hotline # [phone] and after hours call # [phone].  Majority of time was spent reviewing: [facility] HF program. We went over the HF hotline. We discussed all his HF medications and what they are for, common side effects and routine monitoring. Krishna had lost a lot of his taste buds with chemotherapy and unfortunately, salts and savory foods were the only foods that tasted for the patient. He will read labels and not add salt to any foods  Patient verbalized understanding of HF discussion.  Plan for f/u with continued HF education reviewed.  No formal f/u scheduled with nurse clinician for continued education - will continue to reinforce HF management education.  Follow up to be determined based on lab results. -[facility]

## 2021-06-24 NOTE — ANESTHESIA PREPROCEDURE EVALUATION
Anesthesia Pre-Procedure Evaluation    Patient: Girish Chauhan   MRN: [id number] : 1955        Preoperative Diagnosis: Duodenal stricture [K31.5]   Procedure : Procedure(s):  ESOPHAGOGASTRODUODENOSCOPY (EGD)     Past Medical History:   Diagnosis Date     Anemia      Aortic stenosis due to bicuspid aortic valve      Ascending aortic aneurysm (H)      Atrial fibrillation (H)     hx of paroxysmal atrial fibrillation since approximately . S/p cardioversion in  with recurrent atrial fibrillation s/p repeat cardioversion 14.     Basal cell carcinoma 2016    right shoulder and right posterior calf s/p electrodessication and curretage 2016.     CAD (coronary artery disease) 2011    s/p angiogram 2011 s/p percutaneous intervention on the LAD with multiple drug-eluting stents complicated by a small perforation in the diagonal branch which sealed spontaneously.  Patient with significant Circumflex stenosis which is being treated conservatively.     Cholangiocarcinoma (H)      CKD (chronic kidney disease)      Duodenal stricture      Gout     affects left foot 2-3 times per year     Hydronephrosis of right kidney      Hyperlipidemia      Hypertension      Liver disease      Melanoma (H) 2015    back s/p resection 2015     Red blood cell antibody positive      Squamous cell carcinoma     nose s/p excision      Past Surgical History:   Procedure Laterality Date     ------------OTHER-------------      multiple skin cancer resections     CARDIOVERSION  , 2014     COMBINED CYSTOSCOPY, RETROGRADES, EXCHANGE STENT URETER(S) Right 2019    Procedure: Cystoscopy, Right Retrograde Pyelogram, Right Ureteral Stent Exchange;  Surgeon: Sivan Walker MD;  [facility]     COMBINED CYSTOSCOPY, RETROGRADES, EXCHANGE STENT URETER(S) Right 2020    Procedure: CYSTOSCOPY, WITH RIGHT RETROGRADE PYELOGRAM AND RIGHT URETERAL STENT EXCHANGE;  Surgeon: Sivan Walker MD;   [facility]     COMBINED CYSTOSCOPY, RETROGRADES, EXCHANGE STENT URETER(S) Right 10/12/2020    Procedure: CYSTOSCOPY, WITH RETROGRADE PYELOGRAM AND RIGHT URETERAL STENT REPLACEMENT;  Surgeon: Sivan Walker MD;  [facility]     COMBINED CYSTOSCOPY, RETROGRADES, EXCHANGE STENT URETER(S) Right 1/15/2021    Procedure: CYSTOSCOPY, WITH RETROGRADE PYELOGRAM AND URETERAL STENT REPLACEMENT;  Surgeon: Sivan Walker MD;  [facility]     COMBINED CYSTOSCOPY, RETROGRADES, EXCHANGE STENT URETER(S) Right 4/19/2021    Procedure: CYSTOSCOPY WITH;  Surgeon: Sivan Walker MD;  [facility]     CYSTOSCOPY, FULGURATE BLADDER TUMOR, COMBINED N/A 4/19/2021    Procedure: BLADDER BIOPSY, FULGURATION, WITH RETROGRADE PYELOGRAM AND URETERAL RIGHT  STENT exchange;  Surgeon: Sivan Walker MD;  [facility]     CYSTOSCOPY, RETROGRADES, INSERT STENT URETER(S), COMBINED N/A 9/16/2019    Procedure: Cystoscopy, Right Retrograde Pyelogram, Right Ureteral Stent Placement;  Surgeon: Sivan Walker MD;  [facility]     CYSTOSCOPY, RETROGRADES, INSERT STENT URETER(S), COMBINED Right 2/5/2020    Procedure: CYSTOSCOPY, WITH Right RETROGRADE PYELOGRAM AND Right URETERAL STENT INSERTION;  Surgeon: Sivan Walker MD;  [facility]     ENDOSCOPIC RETROGRADE CHOLANGIOPANCREATOGRAM N/A 2/26/2016    Procedure: COMBINED ENDOSCOPIC RETROGRADE CHOLANGIOPANCREATOGRAPHY, PLACE TUBE/STENT;  Surgeon: Rafa Andrade MD;  [facility]     ENDOSCOPIC RETROGRADE CHOLANGIOPANCREATOGRAPHY  2/2014     ENDOSCOPIC ULTRASOUND UPPER GASTROINTESTINAL TRACT (GI) N/A 6/7/2019    Procedure: ENDOSCOPIC ULTRASOUND, with hot axios stent placement;  Surgeon: Gabino Rodriguez MD;  [facility]     ENTEROSCOPY SMALL BOWEL N/A 6/7/2019    Procedure: ENTEROSCOPY;  Surgeon: Gabino Rodriguez MD;  [facility]     ESOPHAGOSCOPY, GASTROSCOPY, DUODENOSCOPY (EGD), COMBINED N/A 10/16/2019     Procedure: Upper Gastrointestinal Endoscopy;  Surgeon: Gabino Rodriguez MD;  [facility]     ESOPHAGOSCOPY, GASTROSCOPY, DUODENOSCOPY (EGD), COMBINED N/A 8/25/2020    Procedure: ESOPHAGOGASTRODUODENOSCOPY (EGD) WIth  duodenal and jejunal stent placement;  Surgeon: Gabino Rodriguez MD;  [facility]     ESOPHAGOSCOPY, GASTROSCOPY, DUODENOSCOPY (EGD), COMBINED N/A 4/9/2021    Procedure: ESOPHAGOGASTRODUODENOSCOPY (EGD), WITH STENT EXCHANGE AND PLACEMENT,  AND BIOPSY;  Surgeon: Gabino Rodriguez MD;  [facility]     ESOPHAGOSCOPY, GASTROSCOPY, DUODENOSCOPY (EGD), DILATATION, COMBINED N/A 7/29/2019    Procedure: Esophagoscopy, gastroscopy, duodenoscopy (EGD), with stent exchange and dilation;  Surgeon: Gabino Rodriguez MD;  [facility]     EXPLORE COMMON BILE DUCT N/A 3/8/2016    Procedure: EXPLORE COMMON BILE DUCT;  Surgeon: Jose Eduardo Pinedo MD;  [facility]     HEPATECTOMY PARTIAL Left 3/8/2016    Procedure: HEPATECTOMY PARTIAL;  Surgeon: Jose Eduardo Pinedo MD;  [facility]     INSERT PORT VASCULAR ACCESS Right 12/8/2017    Procedure: INSERT PORT VASCULAR ACCESS;  Place single lumen venous chest port - right;  Surgeon: Drew Powell PA-C;  [facility]     STENT  12/2011    LAD with multiple SAM      Allergies   Allergen Reactions     Blood Transfusion Related (Informational Only) Other (See Comments)     Patient has a history of a clinically significant antibody against RBC antigens.  A delay in compatible RBCs may occur.      Social History     Tobacco Use     Smoking status: Never Smoker     Smokeless tobacco: Never Used   Substance Use Topics     Alcohol use: Not Currently     Alcohol/week: 2.0 standard drinks     Types: 2 Cans of beer per week      Wt Readings from Last 1 Encounters:   04/19/21 61.1 kg (134 lb 11.2 oz)        Anesthesia Evaluation   Pt has had prior anesthetic. Type: General and MAC.    No history of anesthetic complications        ROS/MED HX  ENT/Pulmonary: Comment: Weak, raspy voice      Neurologic:  - neg neurologic ROS     Cardiovascular: Comment: 5/2019 NSTEMI  AAA    (+) Dyslipidemia hypertension-Peripheral Vascular Disease-- Other. CAD -past MI -stent-2011, 2019. Drug Eluting Stent. Taking blood thinners Pt has received instructions: Instructions Given to patient: Last dose of Eliquis 6/27 pm. CHF Last EF: 40-45% date: 6/12/20 dysrhythmias, a-fib, Irregular Heartbeat/Palpitations, valvular problems/murmurs type: AS mild to mod. Previous cardiac testing   Echo: Date: 6/12/20 Results:    Stress Test: Date: Results:    ECG Reviewed: Date: 4/6/21 Results:  A fib with competing junctional pacer  Cath: Date: 2019 Results:      METS/Exercise Tolerance: 1 - Eating, dressing Comment: Pt reports ability to walk 2 blocks or more and climb multiple flights of stair asymptomatically prior to abdominal discomfort.   Hematologic:     (+) history of blood transfusion, no previous transfusion reaction, - Hx of blood antibody,     Musculoskeletal: Comment: gout  (+) arthritis,     GI/Hepatic: Comment: Duodenal stricture with stent. History of revision. Now limited and painful eating. Taking meds orally.    (+) liver disease,     Renal/Genitourinary: Comment: History stent placement for hydronephrosis    (+) renal disease, type: CRI, Pt does not require dialysis,     Endo:  - neg endo ROS     Psychiatric/Substance Use:  - neg psychiatric ROS     Infectious Disease:  - neg infectious disease ROS     Malignancy: Comment: cholangiocarcinoma with peritoneal metastases with improved clinical status but worsening tumor since discontinuing Xeloda  (+) Malignancy, History of Other and Skin.Skin CA Remission status post Surgery.  Other CA Active status post Chemo.    Other:  - neg other ROS          Physical Exam    Airway        Mallampati: I   TM distance: > 3 FB   Neck ROM: full   Mouth opening: > 3 cm    Respiratory Devices and Support         Dental  no  notable dental history         Cardiovascular          Rhythm and rate: irregular     Pulmonary   pulmonary exam normal            Other findings: Virtual visit    OUTSIDE LABS:  CBC:   Lab Results   Component Value Date    WBC 7.2 05/14/2021    WBC 9.7 04/02/2021    HGB 10.4 (L) 05/14/2021    HGB 10.2 (L) 04/02/2021    HCT 33.2 (L) 05/14/2021    HCT 31.8 (L) 04/02/2021     05/14/2021     04/02/2021     BMP:   Lab Results   Component Value Date     05/14/2021     04/02/2021    POTASSIUM 4.2 05/14/2021    POTASSIUM 4.3 04/19/2021    CHLORIDE 109 05/14/2021    CHLORIDE 111 (H) 04/02/2021    CO2 29 05/14/2021    CO2 26 04/02/2021    BUN 22 05/14/2021    BUN 21 04/02/2021    CR 0.98 05/14/2021    CR 0.98 04/02/2021     (H) 05/14/2021     (H) 04/02/2021     COAGS:   Lab Results   Component Value Date    PTT 33 02/11/2020    INR 1.22 (H) 04/09/2021    FIBR 404 07/30/2019     POC:   Lab Results   Component Value Date    BGM 75 04/19/2021     HEPATIC:   Lab Results   Component Value Date    ALBUMIN 2.8 (L) 05/14/2021    PROTTOTAL 5.9 (L) 05/14/2021    ALT 61 05/14/2021    AST 59 (H) 05/14/2021    ALKPHOS 404 (H) 05/14/2021    BILITOTAL 0.5 05/14/2021    TAYLOR 24 02/11/2020     OTHER:   Lab Results   Component Value Date    PH 7.38 03/08/2016    LACT 1.4 07/15/2020    GARY 8.4 (L) 05/14/2021    PHOS 2.8 02/13/2020    MAG 2.0 02/13/2020    LIPASE 207 08/25/2020    AMYLASE 112 (H) 08/25/2020    TSH 2.34 07/15/2020    CRP 20.0 (H) 02/11/2020       Anesthesia Plan    ASA Status:  4   NPO Status:  NPO Appropriate    Anesthesia Type: General.     - Airway: ETT   Induction: Intravenous.   Maintenance: Inhalation.        Consents    Anesthesia Plan(s) and associated risks, benefits, and realistic alternatives discussed. Questions answered and patient/representative(s) expressed understanding.     - Discussed with:  Patient      - Extended Intubation/Ventilatory Support Discussed: No.      -  Patient is DNR/DNI Status: No    Use of blood products discussed: No .     Postoperative Care    Pain management: IV analgesics, Oral pain medications, Multi-modal analgesia.   PONV prophylaxis: Ondansetron (or other 5HT-3), Dexamethasone or Solumedrol     Comments:              PAC Discussion and Assessment    ASA Classification: 3  Case is suitable for: [place]  Anesthetic techniques and relevant risks discussed: GA  Invasive monitoring and risk discussed: No    Possibility and Risk of blood transfusion discussed: No            PAC Resident/NP Anesthesia Assessment: Girish Chauhan is a 66 year old male scheduled to undergo ESOPHAGOGASTRODUODENOSCOPY (EGD) with Dr. Rodriguez on 6/30/21. He has the following specific operative considerations:   - RCRI : 0.9% risk of major adverse cardiac event.   - VTE risk:3%  - MARVIN # of risks 2/8 = Low risk  - If afib, [id number] score 3.  Risk category High.    - Risk of PONV score = 2.  If > 2, anti-emetic intervention recommended.    --Duodenal stricture, managed with stent placement. Now difficult and painful eating. Above procedure planned.   --No history of problems with anesthesia.  --Hydronephrosis secondary to metastatic cholangiocarcinoma s/p ureteral stent with routine exchanges. Next planned for later in July.   --HLD. Atorvastatin at HS. Complex cardiac history as above. Denies cardiac symptoms. Chronic anticoagulation with Eliquis due to atrial fibrillation. Perioperative anticoagulation plan to hold Eliquis for two days prior to procedure, last dose 6/27/21. Will take metoprolol on DOS. Lasix every other day. All testing above. Last EF 40-45%, mild to moderate aortic stenosis. Able to walk some in home with walker, but activity limited overall due to weakness.  --Nonsmoker. Denies pulmonary symptoms.   --Gout allopurinol at HS, and colchicine prn  --CRI Last Cr 0.98.  --Past history of blood transfusion.   --History of blood antibody.  --Right upper  chest port.          Reviewed and Signed by PAC Mid-Level Provider/Resident  Mid-Level Provider/Resident: YO Jorge, CNS  Date: 6/24/21  Time: 3:30pm                               YO Carlton CNS

## 2021-06-24 NOTE — TELEPHONE ENCOUNTER
Call back received from patient. He will increase his furosemide to 40 mg twice a day. He is going out of town this week and unable to see CHF NP until Monday 2/25/19. Will run by [facility] to make sure this is acceptable to get this dose through the weekend as the patient usually takes 20 mg every other day.      Dr. Rose,   Pt out of town and will see CHF NP Monday- ok to stay on 40 mg two times a day dosing through the week and weekend? Pt a little weary as he is used to 20 mg every other day. Did explain that BNP was quite elevated.  Thanks,  Mal

## 2021-06-24 NOTE — PATIENT INSTRUCTIONS
Preparing for Your Surgery      Name:  Girish Chauhan   MRN:  [id number]   :  1955   Today's Date:  2021       Arriving for surgery:  Surgery date:  21  Arrival time:  08:50 am    Restrictions due to COVID 19:       One visitor is allowed in the Pre Op area. When you go into surgery, one visitor is allowed to wait in the Surgery Waiting Room       (provided there is enough space to social distance).   After surgery- Two visitors are allowed at a time if you have a private room and one visitor is allowed for those in a semi-private room.   Every 4 days the visitor(s) can rotate. During the 4 day period, the visitor(s) must be consistent. No visitors under the age of 18 years old.   Visiting Hours: 8 am - 8:30 pm   No ill visitors.   All visitors must wear face mask.    [employer] parking is available for anyone with mobility limitations or disabilities.  ([place]  24 hours/ 7 days a week; [facility]  7 am- 3:30 pm, Mon- Fri)    Please come to:   [facility] [address]  [address] [place] [address]  -    Please proceed to [address] on the 3rd floor. [phone]?     - ?If you are in need of directions, wheelchair or escort please stop at the Information Desk in the lobby.  Inform the information person that you are here for surgery; a wheelchair and escort to [address] will be provided.?     What can I eat or drink?  -  You may eat and drink normally for up to 8 hours before your surgery.   -  You may have clear liquids until 2 hours before surgery.     Examples of clear liquids:  Water  Clear broth  Juices (apple, white grape, white cranberry  and cider) without pulp  Noncarbonated, powder based beverages  (lemonade and Grady-Aid)  Sodas (Sprite, 7-Up, ginger ale and seltzer)  Coffee or tea (without milk or cream)  Gatorade    -  No Alcohol for at least 24 hours before surgery     Which medicines can I take?  Hold Aspirin for 7 days  before surgery.   Hold Multivitamins for 7 days before surgery.  Hold Supplements for 7 days before surgery.  Hold Ibuprofen (Advil, Motrin) for 1 day before surgery--unless otherwise directed by surgeon.  Hold Naproxen (Aleve) for 4 days before surgery.  HOLD ELIQUIS (APIXABAN) X 2 DAYS BEFORE SURGERY - LAST DOSE 6/27/ PM.  -  PLEASE TAKE these medications the day of surgery:  Tylenol if needed; take all morning medications.    How do I prepare myself?  - Please take 2 showers before surgery using Scrubcare or Hibiclens soap.    Use this soap only from the neck to your toes.     Leave the soap on your skin for one minute--then rinse thoroughly.      You may use your own shampoo and conditioner; no other hair products.   - Please remove all jewelry and body piercings.  - No lotions, deodorants or fragrance.  - No makeup or fingernail polish.   - Bring your ID and insurance card.    - All patients are required to have a Covid-19 test within 4 days of surgery/procedure.      -Patients will be contacted by the [facility] scheduling team within 1 week of surgery to make an appointment.      - Patients may call the Scheduling team at [phone] if they have not been scheduled within 4 days of  surgery.      ALL PATIENTS GOING HOME THE SAME DAY OF SURGERY ARE REQUIRED TO HAVE A RESPONSIBLE ADULT TO DRIVE AND BE IN ATTENDANCE WITH THEM FOR 24 HOURS FOLLOWING SURGERY.    IF THE RESPONSIBLE ADULT IS REQUIRED FOR POST OP TEACHING THE POST OP RN WILL ASK THEM TO COME BACK TO THE RECOVERY AREA.    Questions or Concerns:    - For any questions regarding the day of surgery or your hospital stay, please contact the Pre Admission Nursing Office at [phone].       - If you have health changes between today and your surgery please call your surgeon.       For questions after surgery please call your surgeons office.

## 2021-06-24 NOTE — TELEPHONE ENCOUNTER
Called patient and left a voicemail with [facility] response. Will await call back regarding blood work. -[facility] [place]    Rito Gonzalez [facility]  8/28/2019  1:30 PM

## 2021-06-24 NOTE — TELEPHONE ENCOUNTER
----- Message from Ángel Rose MD (Ted) sent at 2/18/2019  3:33 PM CST -----  He should increase furosemide to 40 mg twice a day and be seen by CHF next practitioner at the end of the week.

## 2021-06-24 NOTE — PATIENT INSTRUCTIONS - HE
Girish Chauhan,    It was a pleasure to see you today at the [facility].     My recommendations after this visit include:    Blood work to adjust your water pills/ blood pressure medications  May need to do a stress test  You will need echocardiogram later this year    SIMIN Rose MD, FACC, [facility]

## 2021-06-27 NOTE — PROGRESS NOTES
Progress Notes by Dario Jain DO at 2/4/2019  9:20 AM     Author: Dario Jain DO Service: -- Author Type: Physician    Filed: 2/4/2019 10:20 AM Encounter Date: 2/4/2019 Status: Signed    : Dario Jain DO (Physician)       MALE PREVENTATIVE EXAM    Assessment and Plan:       1. Encounter for routine adult health examination with abnormal findings  I encouraged him to stay physically active and keep working on eating healthy foods.  He is going to return to the clinic for fasting cholesterol panel, CMP and a PSA.  He is also being scheduled for colonoscopy.    2. Olecranon bursitis of left elbow  His symptoms in the left elbow are consistent with olecranon bursitis.  I did not recommend any specific treatment at this time since it is not causing a significant problem for him.  He is not in pain and he is able to use his left arm without difficulty.  This should resolve with time.    3. Essential hypertension  Blood pressures under good control on metoprolol 75 mg daily and losartan 50 mg daily.  He will continue to monitor this at home.    4. Screening for prostate cancer  We are going to check a PSA.    5. Mixed hyperlipidemia  Continue atorvastatin 40 mg daily.    6. Cholangiocarcinoma (H)  He will continue to follow closely with his oncology team at the [facility].    7. Screen for colon cancer  - Ambulatory referral for Colonoscopy    8. Bicuspid aortic valve  He was given a referral to establish care with cardiology at [facility].    9. Nonrheumatic aortic valve stenosis   He was given a referral to establish care with cardiology at [facility].    10. Cardiomyopathy, unspecified type (H)  He was given a referral to establish care with cardiology at [facility].    11. CKD (chronic kidney disease) stage 3, GFR 30-59 ml/min (H)  We will be checking his kidney function through the CMP when he comes back for fasting labs.    12. Anemia, unspecified  type  Be following up with his oncology team soon.  They are planning to check blood work and are considering another Epoetin treatment according to the patient.    Next follow up:  No Follow-up on file.    Immunization Review  Adult Imm Review: No immunizations due today    I discussed the following with the patient:   Adult Healthy Living: Importance of regular exercise  Healthy nutrition        Subjective:   Chief Complaint: Girish Chauhan is an 63 y.o. male here for a preventative health visit.     HPI:   He has a history of cholangiocarcinoma (resected in March 2016), atrial fibrillation, bicuspid aortic valve, aortic valve stenosis, cardiomyopathy, hypertension, hyperlipidemia, anemia, lower extremity edema and chronic kidney disease stage III.  He follows closely with oncology and cardiology.  He had a recurrence of cancer in the bladder in November 2017.  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  On 12/13/18 his creatinine was 1.79, GFR 39.  On 1/9/19 his hemoglobin was 9.5.  His last echocardiogram was on 10/17/18 which showed EF of 45-50%.  He states that he lost quite a bit of weight from being on chemotherapy this past year.  Now that he has been off it for a few months he feels like his appetite and weight are improving.  He reports undergoing 3 blood transfusions in 1 Epoetin treatments.    He is concerned today about swelling in the left posterior elbow that started on 12/11/18.  He does not remember significant injury that may have caused this.  However, he was riding in a car and his elbow was leaning against armrest for a long time.  He thinks he may have bumped it when he was sleeping.  He denies having significant pain.  He is able to flex and extend at the elbow without difficulty.  His strength seems normal.  He does not have redness or warmth this area.  He is not having fevers.    Social history: , one grown daughter.  He stopped working at [employer] (retired).  He  used to work in IT.          Healthy Habits  Are you taking a daily aspirin? No  Do you typically exercising at least 40 min, 3-4 times per week?  NO  Do you usually eat at least 4 servings of fruit and vegetables a day, include whole grains and fiber and avoid regularly eating high fat foods? NO  Have you had an eye exam in the past two years? NO  Do you see a dentist twice per year? Yes  Do you have any concerns regarding sleep? No    Safety Screen  If you own firearms, are they secured in a locked gun cabinet or with trigger locks? The patient does not own any firearms  Do you feel you are safe where you are living?: Yes (2/4/2019  9:20 AM)  Do you feel you are safe in your relationship(s)?: Yes (2/4/2019  9:20 AM)      Review of Systems:  Please see above.  The rest of the review of systems are negative for all systems.     Cancer Screening       Status Date      COLONOSCOPY Overdue 2/23/2007               History     Not marked as reviewed during this visit.            Objective:   Vital Signs: There were no vitals taken for this visit.       PHYSICAL EXAM  General Appearance: Alert, cooperative, no distress, appears stated age  Head: Normocephalic, without obvious abnormality, atraumatic  Eyes: PERRL, conjunctiva/corneas clear, EOM's intact  Ears: Normal TM's and external ear canals, both ears  Nose: Nares normal, septum midline,mucosa normal, no drainage  Throat: Lips, mucosa, and tongue normal; teeth and gums normal  Neck: Supple, symmetrical, trachea midline, no adenopathy;  thyroid: not enlarged, symmetric, no tenderness/mass/nodules  Back: Symmetric, no curvature, ROM normal, no CVA tenderness  Lungs: Clear to auscultation bilaterally, respirations unlabored  Heart: Regular rate and rhythm, S1 and S2 normal, no murmur, rub, or gallop,  Abdomen: Soft, non-tender, bowel sounds active all four quadrants,  no masses, no organomegaly  Extremities: There is a large, well-defined area of swelling over the left  olecranon.  The area is nontender to palpation.  It is not warm or erythematous.  There is 1+ lower extremity edema.  Skin: Skin color, texture, turgor normal, no rashes or lesions  Lymph nodes: Cervical, supraclavicular, and axillary nodes normal  Neurologic: He is alert.  Normal speech.  No focal deficits.  Normal deep tendon reflexes.   Psychiatric: He has a normal mood and affect.                Medication List           Accurate as of 2/4/19 10:14 AM. If you have any questions, ask your nurse or doctor.               CONTINUE taking these medications    atorvastatin 40 MG tablet  Also known as:  LIPITOR  INSTRUCTIONS:  TAKE 1 TABLET BY MOUTH DAILY        colchicine 0.6 mg Cap        ELIQUIS 5 mg Tab tablet  INSTRUCTIONS:  TAKE 1 TABLET BY MOUTH TWICE DAILY  Generic drug:  apixaban        furosemide 20 MG tablet  Also known as:  LASIX  INSTRUCTIONS:  Take 20 mg by mouth.        losartan 50 MG tablet  Also known as:  COZAAR  INSTRUCTIONS:  Take 100 mg by mouth.        metoprolol tartrate 50 MG tablet  Also known as:  LOPRESSOR  INSTRUCTIONS:  TK 1 AND 1/2 TS PO BID        NITROSTAT 0.4 MG SL tablet  INSTRUCTIONS:  DISSOLVE 1 TABLET UNDER THE TONGUE EVERY 5 MINUTES AS NEEDED FOR CHEST PAIN. IF NO RELIEF AFTER 5 MINUTES, CALL 911. MAX 3 TABLETS  Generic drug:  nitroglycerin        THERA M PLUS (FERROUS FUMARAT) 9 mg iron-400 mcg Tab tablet  INSTRUCTIONS:  Take 1 tablet by mouth.  Generic drug:  multivitamin with minerals               Additional Screenings Completed Today:     The patient was counseled and encouraged to consider modifying their diet and eating habits. He was provided with information on recommended healthy diet options.

## 2021-06-27 NOTE — PROGRESS NOTES
Progress Notes by Ángel Rose MD (Ted) at 2/18/2019  2:10 PM     Author: Ángel Rose MD (Ted) Service: -- Author Type: Physician    Filed: 2/18/2019  2:47 PM Encounter Date: 2/18/2019 Status: Signed    : Ángel Rose MD (Ted) (Physician)           Click to link to [facility] NOTE    Thank you, Dr. Michelle Ray, for asking the [facility] to evaluate Mr. Girish Chauhan.      Assessment/Recommendations   Assessment:    Acute on chronic heart failure with preserved ejection fraction, fluid overload  Coronary artery disease with history of multivessel stenting in 2011, no angina  Bicuspid aortic valve with moderate aortic stenosis  Borderline to mildly depressed LV systolic function  Cholangiocarcinoma with distal metastases  Permanent atrial fibrillations on chronic anticoagulation, good rate control  Hypertension, suboptimal control    Plan:  Basic metabolic panel and BNP today.  We will adjust dose of diuretics accordingly.    He will need follow-up of moderate aortic stenosis with periodic echo.    If shortness of breath does not not improve with adjustment of diuretics, should repeat ischemic evaluation       History of Present Illness    Mr. Girish Chauhan is a 63 y.o. male who comes in for initial cardiac evaluation at [facility].  He has been patients at [facility] for a number of years.  His insurance changed and he was required to change to cardiology clinic.  He has a history of coronary artery disease.  He had multivessel stenting in 2011.  He is also known to have permanent atrial fibrillation.  He takes Eliquis.  He has bicuspid aortic valve with moderate aortic stenosis and mild dilatation of ascending aorta.  In 2016 he was diagnosed with cholangiocarcinoma.  He had tumor resection.  A year later he was diagnosed with distant metastases to bladder.  He has gotten chemotherapy.  He is  currently not receiving any active treatment.    He has chronic shortness of breath.  He also complains of lower extremity edema.  He takes furosemide every other day.  The most recent echocardiogram in October 2018 showed mild LV systolic dysfunction with LVEF of 45-50%.    Holter: October 2018 atrial fibrillation with controlled ventricular response    Echocardiogram: October 2016 LVEF 45-50% moderate aortic stenosis bicuspid aortic valve    Stress Test: 2011 anterior ischemia prior to stenting of LAD    Coronary Angiogram: 2011  Multiple stents to proximal mid and distal LAD; balloon angioplasty diagonal branch, severe mid circumflex stenosis not treated.     Physical Examination Review of Systems   Vitals:    02/18/19 1345   BP: (!) 140/100   Pulse: 64   Resp: 16     Body mass index is 25.69 kg/m .  Wt Readings from Last 3 Encounters:   02/18/19 177 lb 12.8 oz (80.6 kg)   02/04/19 178 lb 11.2 oz (81.1 kg)     General Appearance:   Alert, cooperative, no distress, appears stated age   Head/ENT: Normocephalic, without obvious abnormality. Membranes moist      EYES:  no scleral icterus, normal conjunctivae   Neck: Supple, symmetrical, trachea midline, no adenopathy, thyroid: not enlarged, symmetric, no carotid bruit or JVD   Chest/Lungs:    Decreased breath sounds at the bases   Cardiovascular:   Regular rhythm, S1, S2 normal, no murmur, rub or gallop.   Abdomen:  Soft, non-tender, bowel sounds active all four quadrants,  no masses, no organomegaly   Extremities: no cyanosis or clubbing.  2 plus edema   Skin: Skin color, texture, turgor normal, no rashes or lesions.    Psychiatric: Normal affect, calm   Neurologic: Alert and oriented x 3, moving all four extremities.     General: Weight Loss  Eyes: WNL  Ears/Nose/Throat: WNL  Lungs: Shortness of Breath  Heart: Shortness of Breath with activity, Leg Swelling, Irregular Heartbeat  Stomach: WNL  Bladder: WNL  Muscle/Joints: Muscle Weakness  Skin: WNL  Nervous System:  Daytime Sleepiness  Mental Health: WNL     Blood: WNL     Medical History  Surgical History Family History Social History   No past medical history on file. No past surgical history on file. no family history of premature coronary artery disease Social History     Socioeconomic History   ? Marital status:      Spouse name: Not on file   ? Number of children: Not on file   ? Years of education: Not on file   ? Highest education level: Not on file   Social Needs   ? Financial resource strain: Not on file   ? Food insecurity - worry: Not on file   ? Food insecurity - inability: Not on file   ? Transportation needs - medical: Not on file   ? Transportation needs - non-medical: Not on file   Occupational History   ? Not on file   Tobacco Use   ? Smoking status: Never Smoker   ? Smokeless tobacco: Never Used   Substance and Sexual Activity   ? Alcohol use: Yes     Frequency: 2-4 times a month     Drinks per session: 1 or 2   ? Drug use: Not on file   ? Sexual activity: Not on file   Other Topics Concern   ? Not on file   Social History Narrative   ? Not on file          Medications  Allergies   Current Outpatient Medications   Medication Sig Dispense Refill   ? apixaban (ELIQUIS) 5 mg Tab tablet TAKE 1 TABLET BY MOUTH TWICE DAILY     ? atorvastatin (LIPITOR) 40 MG tablet TAKE 1 TABLET BY MOUTH DAILY     ? colchicine 0.6 mg cap Take 0.6 mg by mouth as needed.         3   ? furosemide (LASIX) 20 MG tablet Take 20 mg by mouth every other day.            ? losartan (COZAAR) 50 MG tablet Take 100 mg by mouth daily.            ? metoprolol tartrate (LOPRESSOR) 50 MG tablet TK 1 AND 1/2 TS PO BID  3   ? multivitamin with minerals (THERA M PLUS, FERROUS FUMARAT,) 9 mg iron-400 mcg Tab tablet Take 1 tablet by mouth.     ? nitroglycerin (NITROSTAT) 0.4 MG SL tablet DISSOLVE 1 TABLET UNDER THE TONGUE EVERY 5 MINUTES AS NEEDED FOR CHEST PAIN. IF NO RELIEF AFTER 5 MINUTES, CALL 911. MAX 3 TABLETS       No current  facility-administered medications for this visit.       No Known Allergies      Lab Results    Chemistry/lipid CBC Cardiac Enzymes/BNP/TSH/INR   Lab Results   Component Value Date    CHOL 99 02/15/2019    HDL 40 02/15/2019    LDLCALC 49 02/15/2019    TRIG 48 02/15/2019    CREATININE 1.70 (H) 02/15/2019    BUN 40 (H) 02/15/2019    K 4.8 02/15/2019     02/15/2019     (H) 02/15/2019    CO2 21 (L) 02/15/2019    No results found for: WBC, HGB, HCT, MCV, PLT No results found for: CKTOTAL, CKMB, CKMBINDEX, TROPONINI, BNP, TSH, INR

## 2021-06-27 NOTE — PROGRESS NOTES
Progress Notes by Lizzette Jacob CNP at 2/25/2019  1:30 PM     Author: Lizzette Jacob CNP Service: -- Author Type: Nurse Practitioner    Filed: 2/25/2019  2:07 PM Encounter Date: 2/25/2019 Status: Signed    : Lizzette Jacob CNP (Nurse Practitioner)           Click to link to [facility] HEART CARE NOTE      Assessment/Recommendations   Assessment:    1.  Heart failure with preserved ejection fraction: He has lost 17 pounds since increasing Lasix 1 week ago.  He no longer has PND or shortness of breath.  He denies any symptoms of acute heart failure. We reviewed heart failure diagnosis, medications, treatment plan, low sodium diet, weight monitoring, and symptom monitoring.  He met with a heart failure nurse clinician to further discuss.    2.  Hypertension: Blood pressure 142/88 and 138/88.  He checks his blood pressure daily at home and is typically in the 130s/90.  He saw his primary care provider earlier this month who noted blood pressure is under control.  I recommended that Krishna continue to monitor his blood pressure daily.  If his blood pressure is consistently greater than 130/90 now that he is not retaining fluid, I recommend seeing his primary care provider.     Plan:  1. BMP, Mg pending.  Will adjust diuretics based on this.  2.  Low-sodium diet  3.  Continue daily weights and blood pressure monitoring    Krishna will follow-up with Dr. Rose in 3 months.     History of Present Illness    Mr. Girish Chauhan is a 64 y.o. male seen at [facility] heart failure clinic today for continued follow-up.  He has a history of heart failure with preserved ejection fraction, hypertension, coronary artery disease with PCI in 2011, permanent atrial fibrillation, bicuspid aortic valve, and chronic kidney disease.  Echocardiogram from October 2018 showed an ejection fraction of 50%, moderate aortic stenosis, mild mitral regurgitation, moderate tricuspid  regurgitation, and mildly dilated ascending aorta.    He was seen by Dr. Rose in February 18 with symptoms of acute heart failure.  His Lasix was increased.  Today, he comes into clinic for follow-up.  He no longer has shortness of breath or PND.  His weight has decreased 17 pounds in the past week.  He denies fatigue, lightheadedness, shortness of breath, dyspnea on exertion, orthopnea, chest pain, abdominal fullness/bloating and lower extremity edema.      His home weight is now around 160 pounds.  He would like to try to regain weight.  He estimates that he lost about 80 pounds with recent cancer and chemotherapy.  His appetite is improving.  He is not currently following a low-sodium diet.     Physical Examination Review of Systems   Vitals:    02/25/19 1336   BP: 138/88   Pulse:    Resp:      Body mass index is 23.12 kg/m .  Wt Readings from Last 3 Encounters:   02/25/19 160 lb (72.6 kg)   02/18/19 177 lb 12.8 oz (80.6 kg)   02/04/19 178 lb 11.2 oz (81.1 kg)       General Appearance:     Alert, cooperative and in no acute distress.   ENT/Mouth: membranes moist, no oral lesions or bleeding gums.      EYES:  no scleral icterus, normal conjunctivae   Chest/Lungs:   lungs are clear to auscultation, no rales or wheezing, respirations unlabored   Cardiovascular:    Irregularly irregular. Normal first and second heart sounds, no edema bilateral lower extremities    Abdomen:  Soft, nontender, nondistended, bowel sounds present   Extremities: no cyanosis or clubbing   Skin: warm, dry.    Neurologic: mood and affect are appropriate, alert and oriented x3      General: Weight Loss  Eyes: WNL  Ears/Nose/Throat: WNL  Lungs: WNL  Heart: WNL  Stomach: Diarrhea  Bladder: WNL  Muscle/Joints: WNL  Skin: WNL  Nervous System: WNL  Mental Health: WNL     Blood: WNL     Medical History  Surgical History Family History Social History   No past medical history on file. No past surgical history on file. No family history on file.  Social History     Socioeconomic History   ? Marital status:      Spouse name: Not on file   ? Number of children: Not on file   ? Years of education: Not on file   ? Highest education level: Not on file   Occupational History   ? Not on file   Social Needs   ? Financial resource strain: Not on file   ? Food insecurity:     Worry: Not on file     Inability: Not on file   ? Transportation needs:     Medical: Not on file     Non-medical: Not on file   Tobacco Use   ? Smoking status: Never Smoker   ? Smokeless tobacco: Never Used   Substance and Sexual Activity   ? Alcohol use: Yes     Frequency: 2-4 times a month     Drinks per session: 1 or 2   ? Drug use: Not on file   ? Sexual activity: Not on file   Lifestyle   ? Physical activity:     Days per week: Not on file     Minutes per session: Not on file   ? Stress: Not on file   Relationships   ? Social connections:     Talks on phone: Not on file     Gets together: Not on file     Attends Confucianism service: Not on file     Active member of club or organization: Not on file     Attends meetings of clubs or organizations: Not on file     Relationship status: Not on file   ? Intimate partner violence:     Fear of current or ex partner: Not on file     Emotionally abused: Not on file     Physically abused: Not on file     Forced sexual activity: Not on file   Other Topics Concern   ? Not on file   Social History Narrative   ? Not on file          Medications  Allergies   Current Outpatient Medications   Medication Sig Dispense Refill   ? apixaban (ELIQUIS) 5 mg Tab tablet TAKE 1 TABLET BY MOUTH TWICE DAILY     ? atorvastatin (LIPITOR) 40 MG tablet TAKE 1 TABLET BY MOUTH DAILY     ? colchicine 0.6 mg cap Take 0.6 mg by mouth as needed.         3   ? furosemide (LASIX) 20 MG tablet Take 2 tablets (40 mg total) by mouth 2 (two) times a day at 9am and 6pm. 360 tablet 3   ? losartan (COZAAR) 50 MG tablet Take 100 mg by mouth daily.            ? metoprolol tartrate  (LOPRESSOR) 50 MG tablet TK 1 AND 1/2 TS PO BID  3   ? multivitamin with minerals (THERA M PLUS, FERROUS FUMARAT,) 9 mg iron-400 mcg Tab tablet Take 1 tablet by mouth.     ? nitroglycerin (NITROSTAT) 0.4 MG SL tablet DISSOLVE 1 TABLET UNDER THE TONGUE EVERY 5 MINUTES AS NEEDED FOR CHEST PAIN. IF NO RELIEF AFTER 5 MINUTES, CALL 911. MAX 3 TABLETS       No current facility-administered medications for this visit.       No Known Allergies      Lab Results    Chemistry CBC BNP   Lab Results   Component Value Date    CREATININE 1.56 (H) 02/18/2019    BUN 37 (H) 02/18/2019     02/18/2019    K 4.8 02/18/2019     (H) 02/18/2019    CO2 21 (L) 02/18/2019     Creatinine (mg/dL)   Date Value   02/18/2019 1.56 (H)   02/15/2019 1.70 (H)    No results found for: WBC, HGB, HCT, MCV, PLT Lab Results   Component Value Date    BNP 3,601 (H) 02/18/2019     BNP (pg/mL)   Date Value   02/18/2019 3,601 (H)            Lizzette Jacob, [facility]   Heart Failure Clinic

## 2021-06-29 NOTE — PROGRESS NOTES
"Progress Notes by Ángel Rose MD (Ted) at 6/15/2020 12:50 PM     Author: Ángel Rose MD (Ted) Service: -- Author Type: Physician    Filed: 6/15/2020  1:08 PM Encounter Date: 6/15/2020 Status: Signed    : Ángel Rose MD (Ted) (Physician)           The patient has been notified of following:     \"This video visit will be conducted via a call between you and your physician/provider. We have found that certain health care needs can be provided without the need for an in-person physical exam.  This service lets us provide the care you need with a video conversation.  If a prescription is necessary we can send it directly to your pharmacy.  If lab work is needed we can place an order for that and you can then stop by our lab to have the test done at a later time.      Patient has given verbal consent to a Video visit? Yes    HEART CARE VIDEO ENCOUNTER        The patient has chosen to have the visit conducted as a video visit, to reduce risk of exposure given the current status of Coronavirus in our community. This video visit is being conducted via a call between the patient and physician/provider. Health care needs are being provided without a physical exam.     Assessment/Recommendations   Assessment/Plan:  Coronary artery disease with history of multivessel stenting in 2011/non-ST segment elevation myocardial infarction in May 2019, failed stenting of obtuse marginal at [place] [facility] and successful stenting of the same vessel at [facility] on June 17, 2019, no angina  Bicuspid aortic valve with mild to moderate aortic stenosis  Heart failure with reduced ejection fraction(LVEF 42%), no fluid overload  Cholangiocarcinoma with distal metastases  Permanent atrial fibrillations on chronic anticoagulation, good rate control  Hypertension, good control      We can discontinue Plavix 12 months past obtuse marginal stenting.  He will continue take Eliquis.  I will not " restart losartan as he continues to have mild orthostatic symptoms and there is no significant worsening of LV systolic dysfunction.      Follow Up Plan: 6 months  I have reviewed the note as documented.  This accurately captures the substance of my conversation with the patient.    Total time of video between patient and provider was 15 minutes   Start Time: 12:50 PM  Stop Time: 1:05 PM    Originating Location (pt. Location): Home    Distant Location (provider location):  [facility]     Mode of Communication:  Video Conference via [email or web address]       History of Present Illness/Subjective    Girish Chauhan is a 65 y.o. male who is being evaluated via a billable video visit and has consented to a video visit.   He reports no new cardiac symptoms today.  He started to exercise again.  He goes for daily walks.  He covers a little bit of a mile at a time.  He has not had weight gain, he denies PND and orthopnea.  He denies chest pains.  Few months back we had to stop losartan because of low blood pressure readings and lightheadedness.  His most recent blood pressure readings are much improved.  He still feels mildly lightheaded when he stands up abruptly.  He denies falls or loss of consciousness.      I have reviewed and updated the patient's Past Medical History, Social History, Family History and Medication List.     Physical Examination performed via live video encounter Review of Systems   General Appearance:   no distress, normal body habitus, upright.   ENT/Mouth: membranes moist, no nasal discharge or bleeding gums.  Normal head shape, no evidence of injury or laceration.     EYES:  no scleral icterus, normal conjunctivae   Neck: no evidence of thyromegaly.  Supple   Chest/Lungs:   No audible wheezing equal chest wall expansion. Non labored breathing.  No cough.   Cardiovascular:   No evidence of elevated jugular venous pressure.  No evidence of pitting edema bilaterally    Abdomen:  no evidence of  abdominal distention. No observe juandice.     Extremities: no cyanosis or clubbing noted.    Skin: no xanthelasma, normal skin color. No evidence of facial lacerations.      Neurologic: Normal arm motion bilateral, no tremors.  No evidence of focal defect.       Psychiatric: alert and oriented x3, calm                                               Medical History  Surgical History Family History Social History   Past Medical History:   Diagnosis Date   ? Anemia    ? Atrial fibrillation (H)    ? Cardiomyopathy (H)    ? Cholangiocarcinoma (H)    ? CKD (chronic kidney disease)    ? Coronary artery disease    ? Hyperlipidemia    ? Myocardial infarction (H)    ? SBO (small bowel obstruction) (H) 6/5/2019   ? Valvular disease     Past Surgical History:   Procedure Laterality Date   ? ABDOMINAL SURGERY     ? CHOLECYSTECTOMY     ? CORONARY STENT PLACEMENT     ? CV CORONARY ANGIOGRAM N/A 6/17/2019    Procedure: Coronary Angiogram;  Surgeon: Yfn Melchor MD;  Location: [facility];  Service: Cardiology    No family history on file.   Social History     Socioeconomic History   ? Marital status:      Spouse name: Not on file   ? Number of children: Not on file   ? Years of education: Not on file   ? Highest education level: Not on file   Occupational History     Employer: RETIRED   Social Needs   ? Financial resource strain: Not on file   ? Food insecurity     Worry: Not on file     Inability: Not on file   ? Transportation needs     Medical: Not on file     Non-medical: Not on file   Tobacco Use   ? Smoking status: Never Smoker   ? Smokeless tobacco: Never Used   Substance and Sexual Activity   ? Alcohol use: Yes     Frequency: 2-4 times a month     Drinks per session: 1 or 2   ? Drug use: Never   ? Sexual activity: Not Currently     Partners: Female   Lifestyle   ? Physical activity     Days per week: Not on file     Minutes per session: Not on file   ? Stress: Not on file   Relationships   ? Social  connections     Talks on phone: Not on file     Gets together: Not on file     Attends Shinto service: Not on file     Active member of club or organization: Not on file     Attends meetings of clubs or organizations: Not on file     Relationship status: Not on file   ? Intimate partner violence     Fear of current or ex partner: Not on file     Emotionally abused: Not on file     Physically abused: Not on file     Forced sexual activity: Not on file   Other Topics Concern   ? Not on file   Social History Narrative   ? Not on file          Medications  Allergies   Current Outpatient Medications   Medication Sig Dispense Refill   ? allopurinoL (ZYLOPRIM) 100 MG tablet TAKE 1 TABLET(100 MG) BY MOUTH DAILY 90 tablet 0   ? apixaban (ELIQUIS) 5 mg Tab tablet Take 1 tablet (5 mg total) by mouth 2 (two) times a day. 180 tablet 3   ? atorvastatin (LIPITOR) 40 MG tablet TAKE 1 TABLET BY MOUTH DAILY 90 tablet 2   ? calcium, as carbonate, (TUMS) 200 mg calcium (500 mg) chewable tablet Chew 1 tablet (200 mg total) 4 (four) times a day as needed.  0   ? capecitabine (XELODA) 500 MG tablet Take 1,500 mg by mouth see administration instructions.     ? colchicine 0.6 mg cap Take 0.6 mg by mouth 3 (three) times a day as needed.         3   ? fluticasone propionate (FLONASE ALLERGY RELIEF) 50 mcg/actuation nasal spray 1 spray into each nostril daily. 16 g 2   ? furosemide (LASIX) 20 MG tablet Take 1 tablet (20 mg total) by mouth 2 (two) times a day. (Patient taking differently: Take 20 mg by mouth every other day. ) 30 tablet 12   ? metoprolol tartrate (LOPRESSOR) 50 MG tablet TAKE 3 TABLETS(150 MG) BY MOUTH TWICE DAILY 360 tablet 1   ? multivitamin with minerals (THERA M PLUS, FERROUS FUMARAT,) 9 mg iron-400 mcg Tab tablet Take 1 tablet by mouth daily.            ? nitroglycerin (NITROSTAT) 0.4 MG SL tablet Place 1 tablet (0.4 mg total) under the tongue every 5 (five) minutes as needed for chest pain (x3 doses). 15 tablet 0   ?  ondansetron (ZOFRAN) 4 MG tablet Take 1 tablet (4 mg total) by mouth every 4 (four) hours as needed. 10 tablet 0   ? senna (SENOKOT) 8.6 mg tablet Take 1 tablet by mouth.       No current facility-administered medications for this visit.     No Known Allergies      Lab Results    Chemistry/lipid CBC Cardiac Enzymes/BNP/TSH/INR   Lab Results   Component Value Date    CHOL 136 06/17/2019    HDL 43 06/17/2019    LDLCALC 81 06/17/2019    TRIG 58 06/17/2019    CREATININE 1.85 (H) 06/17/2019    BUN 32 (H) 06/17/2019    K 4.7 06/17/2019     06/17/2019     06/17/2019    CO2 25 06/17/2019    Lab Results   Component Value Date    WBC 10.0 11/19/2019    HGB 11.4 (L) 11/19/2019    HCT 33.9 (L) 11/19/2019    MCV 89 11/19/2019     11/19/2019    Lab Results   Component Value Date    TROPONINI 0.05 06/05/2019     (H) 11/19/2019    INR 1.65 (H) 06/05/2019        Ángel Elizabeth)  [place]

## 2021-06-30 NOTE — ANESTHESIA PROCEDURE NOTES
Airway      Staff -        Anesthesiologist:  Jing Yanez MD       CRNA: Gwen Herrera APRN CRNA       Performed By: CRNA  Consent for Airway        Urgency: elective  Indications and Patient Condition       Indications for airway management: davis-procedural       Induction type:intravenous       Mask difficulty assessment: 1 - vent by mask    Final Airway Details       Final airway type: endotracheal airway       Successful airway: ETT - single  Endotracheal Airway Details        ETT size (mm): 7.5       Cuffed: yes       Successful intubation technique: direct laryngoscopy       DL Blade Type: MAC 3       Grade View of Cords: 1       Adjucts: stylet       Position: Right       Measured from: gums/teeth       Secured at (cm): 23       Bite block used: None    Post intubation assessment        Placement verified by: capnometry, equal breath sounds and chest rise        Number of attempts at approach: 1       Secured with: commercial tube titus       Ease of procedure: easy       Dentition: Dental injury

## 2021-06-30 NOTE — DISCHARGE INSTRUCTIONS
[facility]  Same-Day Surgery   Adult Discharge Orders & Instructions     For 24 hours after surgery    1. Get plenty of rest.  A responsible adult must stay with you for at least 24 hours after you leave the hospital.   2. Do not drive or use heavy equipment.  If you have weakness or tingling, don't drive or use heavy equipment until this feeling goes away.  3. Do not drink alcohol.  4. Avoid strenuous or risky activities.  Ask for help when climbing stairs.   5. You may feel lightheaded.  IF so, sit for a few minutes before standing.  Have someone help you get up.   6. If you have nausea (feel sick to your stomach): Drink only clear liquids such as apple juice, ginger ale, broth or 7-Up.  Rest may also help.  Be sure to drink enough fluids.  Move to a regular diet as you feel able.  7. You may have a slight fever. Call the doctor if your fever is over 100 F (37.7 C) (taken under the tongue) or lasts longer than 24 hours.  8. You may have a dry mouth, a sore throat, muscle aches or trouble sleeping.  These should go away after 24 hours.  9. Do not make important or legal decisions.   Call your doctor for any of the followin.  Signs of infection (fever, growing tenderness at the surgery site, a large amount of drainage or bleeding, severe pain, foul-smelling drainage, redness, swelling).    2. It has been over 8 to 10 hours since surgery and you are still not able to urinate (pass water).    3.  Headache for over 24 hours.    4.  Numbness, tingling or weakness the day after surgery (if you had spinal anesthesia).  To contact a doctor, call Dr Rodriguez at [phone] (General Surgery clinic) or:        [phone] and ask for the resident on call for gastroenterology (answered 24 hours a day)      Emergency Department:[facility]: [phone]

## 2021-06-30 NOTE — OP NOTE
ERCP 06/30/2021 10:58 AM [facility], [address] [place] [address] [phone]     Endoscopy Department   _______________________________________________________________________________   Patient Name: Girish Chauhan           Procedure Date: 6/30/2021 10:58 AM   MRN: [id number]                       Account Number: [id number]   YOB: 1955              Admit Type: Outpatient   Age: 66                                Gender: Male   Note Status: Finalized                Attending MD: Gabino Rodriguez MD   Pause for the Cause: pause for cause completed Total Sedation Time:   _______________________________________________________________________________       Procedure:           ERCP   Indications:         Jaundice, Failure to thrive   Providers:           Gabino Rodriguez MD, Jennifer Vanderheyden   Patient Profile:     Mr Chauhan is a 67yo gentleman with recurrent or                        persistent cholangiocarcinoma status post left                        hepatectomy with RYHJ 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 This has been                        palliatively managed by coaxial duodenal stents and                        enteroenterostomy (biliary to duodenal bulb) with                        multuple stents for biliary limb drainage. With                        progressive, recurrent malnutrtion despite treatment of                        the ingrowth by APC and deployment of another coaxial                        duodenal stent in April and evidence of jaundice on                        exam, he returns for further optimization.   Referring MD:        Naresh De Los Santos MD   Requesting Provider: Jose Eduardo Pinedo MD   Medicines:           General Anesthesia, Cipro 400 mg IV   Complications:       No immediate complications.    _______________________________________________________________________________   Procedure:           Pre-Anesthesia Assessment:                        - Prior to the procedure, a History and Physical was                        performed, and patient medications and allergies were                        reviewed. The patient is competent. The risks and                        benefits of the procedure and the sedation options and                        risks were discussed with the patient. All questions                        were answered and informed consent was obtained. Patient                        identification and proposed procedure were verified by                        the nurse in the pre-procedure area. Mental Status                        Examination: alert and oriented. Airway Examination:                        Mallampati Class II (the uvula but not tonsillar pillars                        visualized). Respiratory Examination: clear to                        auscultation. CV Examination: systolic murmur. ASA Grade                        Assessment: III - A patient with severe systemic                        disease. After reviewing the risks and benefits, the                        patient was deemed in satisfactory condition to undergo                        the procedure. The anesthesia plan was to use general                        anesthesia. Immediately prior to administration of                        medications, the patient was re-assessed for adequacy to                        receive sedatives. The heart rate, respiratory rate,                        oxygen saturations, blood pressure, adequacy of                        pulmonary ventilation, and response to care were                        monitored throughout the procedure. The physical status                        of the patient was re-assessed after the procedure.                        After obtaining informed consent, the scope  "was passed                        under direct vision. Throughout the procedure, the                        patient's blood pressure, pulse, and oxygen saturations                        were monitored continuously. The 1T gastroscope was                        introduced through the mouth, and advanced to the                        jejunum. After obtaining informed consent, the scope was                        passed under direct vision. Throughout the procedure,                        the patient's blood pressure, pulse, and oxygen                        saturations were monitored continuously.The ERCP was                        accomplished without difficulty. The patient tolerated                        the procedure well.                                                                                     Findings:        The patient was supine throughout.  films of the abdomen        demonstrated numerous coaxial duodenal stents, an Axios stent        (gastrojejunal) an a double pig tail through the Axios. A 1T was used        throughout. The esophagus was unremarkable as was the squamocolumnar        line at the gastroesophageal junction. The stomach was intact and at the        pylrous were the proximal ends of two coaxial stents, though which at        least another 1 or perhaps 2 coxial stents were found entending        downstream. The duodenal stents were narrowed with tissue or tumor        ingrowth. The Axios stent was fully covered by tissues and or tumor and        only the coaxial DPT plastic stent was seen. Using a sphincterotome a        long 0.025\" Visiglide wire was passed along the DPT stent through the        Axios and curled in the adjacent jejunum. The sphincterotome was then        passed and contrast injected. With jejunal outflow obstructed,        presumably by mass, the biliary system opacified likely across a widely        patent hepaticojejunostomy. I personally interpreted the " bile duct        images. Ductal flow of contrast was adequate, image quality was adequate        and contrast extended to the hepatic ducts. The biliary tree was        diffusely dilated and drained poorly. There were no overt filling        defects. Next a fresh 7F 7cm DPT plastic gastrojejunostomy stent was        deployed through the Axios. The existing GJ plastic DPT stent was then        removed with a rat toothed forceps. Next, we recannulated the jejunum        along the DPT stent and deployed a [id number] covered metal Viabil stent        through the Axios. With the wire through the Viabil a 5F 7cm SPT stent        was deployed with the tail at the proximal end. We then began management        of the tissue and tumor overgrowth within the coaxial duodenal stents.        Using 40W E2 F0.8 circumferential APC was used to treat the ingrowth.        Next the wire was passed deep into the fouth portion and a [id number]        uncovered Hanarostent was deployed with the proximal end acrosss the        pylorus and the distal end away from the ampulla (pancreatic orifice).                                                                                     Impression:          - Mod 22 for complex anatomy and technique                        - Complete obstruction of the jejunal limb with patent                        hepaticojejunotomy and diffusely dilated biliary system,                        drained with optimization of existing gastrojejunostomy                        Axios stent (which had complete tissue/tumor overgrowth)                        involving removal of the in situ coaxial DPT GJ stent                        and placement of a fresh 7F DPT plastic GJ stent through                        the Axios, a 10mm covered metal Viabil throgh the Axios,                        and a 5F SPT plastic stent through the Viabil                        - Significant recurrent tissue and tumor ingrowth of the                         duodenal stents managed by repeat therapy by                        circumferential APC and placement of another duodenal                        stent   Recommendation:      - General anesthesia recovery with probable discharge                        home this afternoon                        - Low residue diet may resume, though this should begin                        with a few small meals; all medications may resume                        without delay                        - With persistent icterus/jaundice, hepatic panel will                        be ordered                        - Future endoscopy to be determined based on clinical                        course and studies                        - The findings and recommendations were discussed with                        the patient and their family                                                                                       electronically signed by MEGHAN Rodriguez

## 2021-06-30 NOTE — ANESTHESIA POSTPROCEDURE EVALUATION
Patient: Girish Chauhan    Procedure(s):  Upper Endoscopy, ERCP, Tumor Ablation, Gastrojejunal Stent Revision, Gastrojejunal Stent Placement x 4    Diagnosis:Duodenal stricture [K31.5]  Diagnosis Additional Information: No value filed.    Anesthesia Type:  General    Note:  Disposition: Outpatient   Postop Pain Control: Uneventful            Sign Out: Well controlled pain   PONV: No   Neuro/Psych: Uneventful            Sign Out: Acceptable/Baseline neuro status   Airway/Respiratory: Uneventful            Sign Out: Acceptable/Baseline resp. status   CV/Hemodynamics: Uneventful            Sign Out: Acceptable CV status; No obvious hypovolemia; No obvious fluid overload   Other NRE: NONE   DID A NON-ROUTINE EVENT OCCUR? No           Last vitals:  Vitals:    06/30/21 0923 06/30/21 1247   BP: 94/68 113/71   Pulse:  82   Resp: 16 14   Temp: 36.4  C (97.6  F) 36.6  C (97.8  F)   SpO2: 100% 98%       Last vitals prior to Anesthesia Care Transfer:  CRNA VITALS  6/30/2021 1214 - 6/30/2021 1314      6/30/2021             Resp Rate (observed):  (!) 3          Electronically Signed By: LEXI DAVE MD  June 30, 2021  1:27 PM

## 2021-06-30 NOTE — ANESTHESIA CARE TRANSFER NOTE
Patient: Girish Chauhan    Procedure(s):  Upper Endoscopy, ERCP, Tumor Ablation, Gastrojejunal Stent Revision, Gastrojejunal Stent Placement x 4    Diagnosis: Duodenal stricture [K31.5]  Diagnosis Additional Information: No value filed.    Anesthesia Type:   General     Note:    Oropharynx: oropharynx clear of all foreign objects and spontaneously breathing  Level of Consciousness: awake  Oxygen Supplementation: face mask  Level of Supplemental Oxygen (L/min / FiO2): 6  Independent Airway: airway patency satisfactory and stable  Dentition: dentition unchanged  Vital Signs Stable: post-procedure vital signs reviewed and stable  Report to RN Given: handoff report given  Patient transferred to: PACU    Handoff Report: Identifed the Patient, Identified the Reponsible Provider, Reviewed the pertinent medical history, Discussed the surgical course, Reviewed Intra-OP anesthesia mangement and issues during anesthesia, Set expectations for post-procedure period and Allowed opportunity for questions and acknowledgement of understanding      Vitals: (Last set prior to Anesthesia Care Transfer)  CRNA VITALS  6/30/2021 1214 - 6/30/2021 1254      6/30/2021             Resp Rate (observed):  14        Electronically Signed By: YO Laws CRNA  June 30, 2021  12:54 PM

## 2021-06-30 NOTE — PROGRESS NOTES
Progress Notes by Ángel Rose MD (Ted) at 1/8/2021  2:30 PM     Author: Ángel Rose MD (Ted) Service: -- Author Type: Physician    Filed: 1/8/2021  3:04 PM Encounter Date: 1/8/2021 Status: Signed    : Ángel Rose MD (Ted) (Physician)           Cardiology Progress Note    Assessment:  Coronary artery disease with history of multivessel stenting in 2011/non-ST segment elevation myocardial infarction in May 2019, failed stenting of obtuse marginal at [place] and successful stenting of the same vessel at [facility] on June 17, 2019, no angina  Bicuspid aortic valve with mild to moderate aortic stenosis  Heart failure with reduced ejection fraction(LVEF 42%), no fluid overload  Cholangiocarcinoma with distal metastases  Permanent atrial fibrillations on chronic anticoagulation, good rate control  History of hypertension, now hypotensive      Plan:  He does not appear to be fluid overloaded.  We will continue current small dose of furosemide.  Low blood pressure is somewhat worrisome.  I am afraid that he may be subject to falls.  I would like to reduce dose of metoprolol with a goal of improving blood pressure control without increasing heart rate.  We will give him digoxin 0.125 mg a day and ask to reduce metoprolol to 100 mg twice a day.  Will obtain Holter monitor to reassess the rate control.    Routine follow-up in 6 months    Subjective:   This is 65 y.o. male who comes in today follow-up visit.  He denies heart palpitations or syncope.  He has been having low blood pressure readings but denies profound lightheadedness or falls.  He has not had chest pains.  His weight has been gradually declining.  He has not had peripheral edema.  He takes furosemide every other day    Review of Systems:   General: Weight Loss  Eyes: WNL  Ears/Nose/Throat: WNL  Lungs: WNL  Heart: WNL  Stomach: Constipation, Diarrhea  Bladder: WNL  Muscle/Joints: WNL  Skin: WNL  Nervous  "System: WN  Mental Health: WNL     Blood: WNL    Objective:   BP (!) 84/56 (Patient Site: Right Arm, Patient Position: Sitting, Cuff Size: Adult Regular)   Pulse 88   Resp 16   Ht 5' 11.1\" (1.806 m)   Wt 138 lb 12.8 oz (63 kg)   BMI 19.30 kg/m    Physical Exam:  GENERAL: no distress.  Very thin  NECK: No JVD  LUNGS: Clear to auscultation.  CARDIAC: irregular rhythm, S1 & S2 normal.  No heaves, thrills, gallops soft ejection murmur at the aortic area  ABDOMEN: flat, negative hepatosplenomegaly, soft and non-tender.  EXTREMITIES: No evidence of cyanosis, clubbing or edema.    Current Outpatient Medications   Medication Sig Dispense Refill   ? allopurinoL (ZYLOPRIM) 100 MG tablet Take 1 tablet (100 mg total) by mouth daily. 90 tablet 3   ? apixaban (ELIQUIS) 5 mg Tab tablet Take 1 tablet (5 mg total) by mouth 2 (two) times a day. 180 tablet 3   ? atorvastatin (LIPITOR) 40 MG tablet TAKE 1 TABLET BY MOUTH DAILY 90 tablet 0   ? calcium, as carbonate, (TUMS) 200 mg calcium (500 mg) chewable tablet Chew 1 tablet (200 mg total) 4 (four) times a day as needed.  0   ? colchicine 0.6 mg cap Take 0.6 mg by mouth 3 (three) times a day as needed.         3   ? furosemide (LASIX) 20 MG tablet Take 1 tablet (20 mg total) by mouth 2 (two) times a day. (Patient taking differently: Take 20 mg by mouth every other day. ) 30 tablet 12   ? metoprolol tartrate (LOPRESSOR) 50 MG tablet Take 3 tablets (150 mg total) by mouth 2 (two) times a day. 540 tablet 0   ? multivitamin with minerals (THERA M PLUS, FERROUS FUMARAT,) 9 mg iron-400 mcg Tab tablet Take 1 tablet by mouth daily.            ? nitroglycerin (NITROSTAT) 0.4 MG SL tablet DISSOLVE 1 TABLET UNDER THE TONGUE EVERY 5 MINUTES AS NEEDED FOR CHEST PAIN. MAX 3 DOSES. 2 Bottle 2   ? ondansetron (ZOFRAN) 4 MG tablet Take 1 tablet (4 mg total) by mouth every 4 (four) hours as needed. 10 tablet 0   ? digoxin (LANOXIN) 125 mcg (0.125 mg) tablet Take 1 tablet (125 mcg total) by mouth " daily. 30 tablet 12   ? fluticasone propionate (FLONASE ALLERGY RELIEF) 50 mcg/actuation nasal spray 1 spray into each nostril daily. 16 g 2   ? senna (SENOKOT) 8.6 mg tablet Take 1 tablet by mouth.       No current facility-administered medications for this visit.        Cardiographics:    ECG: June 2019  Atrial fibrillation controlled ventricular response nonspecific ST-T abnormalities     Echocardiogram:  In June 2020  1. The left ventricle is normal in size. Left ventricular systolic performance is mild to moderately reduced. The ejection fraction is estimated to be 40-45%.   2. There is mild to moderate global reduction in left ventricular systolic performance.   3. There is mild concentric increase in left ventricular wall thickness.   4. There is mild to moderate aortic stenosis.   5. Normal right ventricular size and systolic performance.   6. There is mild aortic root enlargement.         Coronary angio: June 2019  65 yo M with known CAD, remote PCI of LAD in 2011, presented in May 2019 to [facility] with a NSTEMI, with coronary angiography there showing patent LAD stents with a severe stenosis in OM1 that could not be successfully crossed with a wire.      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 June 2019 when he presented with an SBO)     Returns today for repeat coronary angiography with another attempt at the OM1 stenosis     LM minimal dz  LAD patent prox to distal stents  LCx 99% OM1 stenosis with disease extension for 10-15mm on either side of stenosis; mild dz elsewhere in Cx  RCA mild to moderate diffuse dz, 50-60% focal distal RCA lesion     Successful PCI of OM1 with PTCA followed by 2.5x24 Synergy SAM  0% residual, NITZA 3 flow post     OM1 lesion essentially behaving as a ; requiring wire escalation, progressive PTCA and Guidezilla support for  PCI    Lab Results:       Lab Results   Component Value Date    CHOL 136 06/17/2019    CHOL 99 02/15/2019     Lab Results   Component Value Date    HDL 43 06/17/2019    HDL 40 02/15/2019     Lab Results   Component Value Date    LDLCALC 81 06/17/2019    LDLCALC 49 02/15/2019     Lab Results   Component Value Date    TRIG 58 06/17/2019    TRIG 48 02/15/2019     BNP   Date Value Ref Range Status   11/19/2019 128 (H) 0 - 55 pg/mL Final       Ángel (Deangelo)  MD [place]

## 2021-07-04 NOTE — TELEPHONE ENCOUNTER
Brief GI note    I received a call from the patient's wife Mily in regards to constipation and back pain. She said that since ERCP 6/30/21 by Dr. Rodriguez, the patient did not have significant food intake because of constipation. Today, the patient developed acute back pain when he was in the bathroom and needed help to get up from the toilet. He took miralax once and is planning to take dulcolax suppositories. I informed the patient and wife that I cannot prescribe opioid therapy. I recommended contacting PCP to get prescription for pain medication if the pain cannot be controlled by acetaminophen and NSAIDs (the patient did not take any). The patient does not have history of chronic liver disease, PUD ro CKD. I recommended acetaminophen 650 mg TID alternating with ibuprofen 400 mg TID with F/U with PCP. I recommended ED visit if the pain becomes severe and cannot be controlled at home. In addition, I recommended Miralax 17 g BID to help with constipation with recommendations to stop it if diarrhea develops. Questions were answered and the patient expressed understanding.    Boyd Call MD  GI fellow

## 2021-07-04 NOTE — LETTER
Letter by Ángel Rose MD (Ted) at      Author: Ángel Rose MD (Ted) Service: -- Author Type: --    Filed:  Encounter Date: 6/10/2021 Status: (Other)         Girish Chauhan  [address] [facility] [address]      Jordyn 10, 2021      Dear Girish,    This letter is to remind you that you will be due for your follow up appointment with Dr. Deangelo Rose in July, 2021 . To help ensure you are in the best health possible, a regular follow-up with your cardiologist is essential.     Please call our Patient Scheduling Line at [phone] to schedule your appointment at your earliest convenience.  If you have recently scheduled an appointment, please disregard this letter.    We look forward to seeing you again. As always, we are available at the number  above for any questions or concerns you may have.      Sincerely,     The Physicians and Staff of [facility]

## 2021-07-06 NOTE — TELEPHONE ENCOUNTER
[facility] Telephone Triage Note    Assessment:   Mily (EC) reporting the following symptoms: Noticed very jaundiced, yellow of skin and eyes.   Pt is in [place] awaiting appt with [facility] tomorrow 7/7. It took pt/family a long time to get appt with [place] and doesn't want to miss CT Scan, Labs in the morning and Oncologist at 2:50pm.     [employer] message was sent to Dr. Rodriguez as well.     Pt is AxOx3, able to verbalize/answer conversation with Mily all day.     Recent constipation followed by diarrhea post surgery, LBM this morning was betty frosting texture.     Denies stomach/abdomen pain at rest, occasional pain when in car, abdomen still soft.     Urine came out darker, no blood, maldonado in color.     T97.6F    Denies  fevers/chills, cough, sore throat, SOB, n/v.      Has wheel chair and walker available to assist with safe transportation.    Pt has had two days of alternating IBUprofen and Tylenol as was told by resident on call for lower back pain management but at most 1000mg of Tylenol in a 24hr period which has been helping with pain.     This writer educated for pt to rest, encourage clear liquids/water, encourage food as tolerated, make sure peeing and pooping and monitor for changes in condition. Jaundice although appears frightening is indication of progression of cancer, however not in itself requires an Emergency Room visit unless other acute symptoms associated with condition.     Recommendations: Routed to care team with update.     6:36pm per Dr. Rodriguez, We are in contact with [place] and will be getting some labs and imaging to decide if there are any other endoscopic options     Follow-Up:  Instructed patient/Mily to seek care immediately for worsening symptoms, including: fever, chest pain, uncontrolled abdominal pain, shortness of breath, dizziness, changes in LOC, inability to pee, urinary retention, coughing up blood or urinating blood and go to local ER.

## 2021-07-14 PROBLEM — I50.30 HEART FAILURE WITH PRESERVED EJECTION FRACTION (H): Status: RESOLVED | Noted: 2019-02-25 | Resolved: 2019-05-14

## 2021-07-15 NOTE — PROGRESS NOTES
RN Care Coordination Note  Placed call to patient and his wife. He has been admitted to the hospital at [facility] for about the last week. Reports they have recommended hospice. Patients wife states they have been in touch with [facility]. They will be delivering equipment to the house today. Patient will be discharged and taken home via transport tomorrow. Patient and wife both give their thanks and appreciation to Dr De Los Santos and care team. Encouraged them call with any questions or concerns.          Kecia Loco RN, BSN, OCN   RN Care Coordinator   [facility]

## 2021-08-03 PROBLEM — K56.609 SBO (SMALL BOWEL OBSTRUCTION) (H): Status: RESOLVED | Noted: 2019-06-05 | Resolved: 2019-09-30

## 2022-02-21 NOTE — PROGRESS NOTES
BLE edema L > R. Patient noted edema is chronic and not any worse than usual. Wearing compression stockings.   Infusion Nursing Note:  Girish Chauhan presents today for leucovorin, fluorouracil push and pump connection.    Patient seen by provider today: No   present during visit today: Not Applicable.    Note: Pt states he has gout in his right foot, rates his pain a 6/10. It started Monday and he feels it may be due to him taking a long walk and pushing himself too hard. He took cochicine on Tuesday and takes daily allopurinol. He denies needing any interventions at this time. Feels it is getting better in the last two days. Pt denies any falls but does feel slightly unsteady walking, has a limp. No other concerns.  Julia Smith aware of patients gout and interventions, no changes needed.    Intravenous Access:  Implanted Port.    Treatment Conditions:  Lab Results   Component Value Date    HGB 10.3 11/01/2019     Lab Results   Component Value Date    WBC 7.6 11/01/2019      Lab Results   Component Value Date    ANEU 5.3 11/01/2019     Lab Results   Component Value Date     11/01/2019      Lab Results   Component Value Date     10/18/2019                   Lab Results   Component Value Date    POTASSIUM 3.9 10/18/2019           Lab Results   Component Value Date    MAG 1.8 10/04/2019            Lab Results   Component Value Date    CR 1.34 10/18/2019                   Lab Results   Component Value Date    GARY 8.2 10/18/2019                Lab Results   Component Value Date    BILITOTAL 0.3 10/18/2019           Lab Results   Component Value Date    ALBUMIN 3.2 10/18/2019                    Lab Results   Component Value Date    ALT 33 10/18/2019           Lab Results   Component Value Date    AST 30 10/18/2019       Results reviewed, labs MET treatment parameters, ok to proceed with treatment.      Post Infusion Assessment:  Patient tolerated infusion without incident.  Blood return noted pre and post infusion.  Site patent and intact, free from redness, edema or discomfort.  No evidence of  extravasations.  Prior to discharge: Port secured in place with tegaderm. Flushed with 10cc NS, positive blood return noted. C-series continuous home infusion connected, all connectors secured with tape. All clamps secured open with tape. Double check performed by Melody Ferrell RN/Akiko Nicole RN. Pt instructed to call our clinic or [facility] if there are any questions or concerns at home. Pt verbalized understanding. Pt set up for pump disconnect with [facility] on Sunday at 11am (one hour earlier than planned d/t time change). Confirmed with PEDRITO Morse from [facility]. Patient aware of date and time.     Discharge Plan:   Patient declined prescription refills.  Discharge instructions reviewed with: Patient.  Patient and/or family verbalized understanding of discharge instructions and all questions answered.  AVS to patient via [employer].  Patient will return 11/15/19 for next appointment.   Patient discharged in stable condition accompanied by: self.  Departure Mode: Ambulatory.    Francisca Bartholomew RN

## 2022-06-16 NOTE — MR AVS SNAPSHOT
After Visit Summary   10/10/2017    Girish Chauhan    MRN: [id number]           Patient Information     Date Of Birth          1955        Visit Information        Provider Department      10/10/2017 8:00 AM Jennifer Jalloh APRN CNP [facility]        Today's Diagnoses     Cholangiocarcinoma (H)    -  1    Bladder wall thickening           Follow-ups after your visit        Your next 10 appointments already scheduled     Jan 05, 2018  7:30 AM CST   LAB with  LAB   [facility] ([facility])    [address] [place] [id number] [phone]           Patient must bring picture ID. Patient should be prepared to give a urine specimen  Please do not eat 10-12 hours before your appointment if you are coming in fasting for labs on lipids, cholesterol, or glucose (sugar). Pregnant women should follow their Care Team instructions. Water with medications is okay. Do not drink coffee or other fluids. If you have concerns about taking  your medications, please ask at office or if scheduling via 2C2P, send a message by clicking on Secure Messaging, Message Your Care Team.            Jan 05, 2018  8:00 AM CST   (Arrive by 7:45 AM)   CT CHEST/ABDOMEN/PELVIS W CONTRAST with UCCT2   [facility] CT ([facility])    [address] [place] [address] [phone]           Please bring any scans or X-rays taken at other hospitals, if similar tests were done. Also bring a list of your medicines, including vitamins, minerals and over-the-counter drugs. It is safest to leave personal items at home.  Be sure to tell your doctor:   If you have any allergies.   If there s any chance you are pregnant.   If you are breastfeeding.   If you have any special needs.  You may have contrast for this exam. To prepare:   Do not eat or drink for 2 hours before your exam. If you need to take    Problem: Activity Restriction  Goal: Patient will understand activity restrictions  Intervention: Activity assessment per Cardiac Rehab  Note: Intervention Status  Done     Problem: Cardiac Risk Factor  Goal: Patient will identify and understand personal cardiac risk factors  Intervention: Smoking risk factor identification/risk reduction plan  Note: Intervention Status  Done  Intervention: Weight risk factor identification/risk reduction plan  Note: Intervention Status  Done  Intervention: Hypertension risk factor identification/risk reduction plan  Note: Intervention Status  Done  Intervention: Diabetes risk factor identification/risk reduction plan  Note: Intervention Status  Done  Intervention: Inactivity risk factor identification/risk reduction plan  Note: Intervention Status  Done  Intervention: Stress risk factor identification/risk reduction plan  Note: Intervention Status  Done  Intervention: Hyperlipidemia risk factor identification/risk reduction plan  Note: Intervention Status  Done     Problem: Discharge Information  Intervention: Home exercise program and self monitoring techniques  Note: Intervention Status  Done  Intervention: Home activity program  Note: Intervention Status  Done  Intervention: Sign and symptom recognition and plan  Note: Intervention Status  Done        Problem: Fluid Volume Excess, Risk for  Goal: # Absence of Rapid Weight Gain (no more than 2kg in 24 hours)  Description: FVE Risk Patients may gain weight (but not more than 2 kg) but may not require aggressive treatment if in the absence of dyspnea; FVE (actual) patients should be monitored to achieve no weight gain.   Outcome: Outcome Met, Continue evaluating goal progress toward completion  Note:   Weight    06/13/22 2320   Weight: 117.6 kg (259 lb 3.2 oz)           Patient ambulated 240 feet with stand by assist.     Problem: Activity/Exercise Intolerance  Goal: Patient will tolerate activity/exercise  Intervention: Progressive graded ambulation  Note: Intervention Status  Done  Intervention: Collaborate with nursing to ensure ambulation 4-6 times per day  Note: Intervention Status  Done  Intervention: Monitor and document progressive activity response  Note: Intervention Status  Done      Problem: Pain  Goal: #Acceptable pain level achieved/maintained at rest using NRS/Faces  Description: This goal is used for patients who can self-report.  Acceptable means the level is at or below the identified comfort/function goal.  6/16/2022 0237 by Gricel Russo RN  Outcome: Outcome Met, Continue evaluating goal progress toward completion  Note: Patient sleeping, no report of pain, appear to be resting comfortably, continue to monitor.   6/15/2022 2014 by Gricel Russo RN  Outcome: Outcome Met, Continue evaluating goal progress toward completion  Note: Administering Prescribed/PRN pain medication according to patient pain rating/behavior scale.  Will continue to assess pain.   Problem: Pressure Injury, Risk for  Goal: No new pressure injury (PI) development  6/16/2022 0237 by Gricel Russo RN  Outcome: Outcome Met, Continue evaluating goal progress toward completion  6/15/2022 2014 by Gricel Russo RN  Outcome: Outcome Met, Continue evaluating goal progress toward completion  Note: Patient is ambulating independently in room/bathroom using cane/walker.   Self-repositioning in bed while awake.   Problem: At Risk for Injury Due to Fall  Goal: # Takes action to control condition specific risks  6/16/2022 0237 by Gricel Russo RN  Outcome: Outcome Met, Continue evaluating goal progress toward completion  6/15/2022 2014 by Gricel Russo RN  Outcome: Outcome Met, Continue evaluating goal progress toward completion  Note: Non-skid socks on, call light and possessions within reach, bed in low position, and safety maintained.  Hourly rounding alternately RN/NA with 5 P's completed.         Problem: Pain  Goal: #Acceptable pain level achieved/maintained at rest using NRS/Faces  Description: This goal is used for patients who can self-report.  Acceptable means the level is at or below the identified comfort/function goal.  Outcome: Outcome Met, Continue evaluating goal progress toward completion  Note: Administering Prescribed/PRN pain medication according to patient pain rating/behavior scale.  Patient sleeping, no report of pain, appear to be resting comfortably, continue to monitor.    Problem: Pressure Injury, Risk for  Goal: No new pressure injury (PI) development  Outcome: Outcome Met, Continue evaluating goal progress toward completion  Note: Patient is ambulating independently in room/bathroom using cane/walker.  Self-repositioning in bed/chair while awake.    Problem: At Risk for Injury Due to Fall  Goal: # Takes action to control condition specific risks  Outcome: Outcome Met, Continue evaluating goal progress toward completion  Note: Non-skid socks on, call light and possessions within reach, bed in low position, and safety maintained.  Hourly rounding alternately RN/NA with 5 P's completed.         Problem: Pain  Goal: #Acceptable pain level achieved/maintained at rest using NRS/Faces  Description: This goal is used for patients who can self-report.  Acceptable means the level is at or below the identified comfort/function goal.  Outcome: Outcome Met, Continue evaluating goal progress toward completion  Note: Administering Prescribed/PRN pain medication according to patient pain rating/behavior scale.  Will continue to assess pain.   Problem: Pressure Injury, Risk for  Goal: No new pressure injury (PI) development  Outcome: Outcome Met, Continue evaluating goal progress toward completion  Note: Patient is ambulating independently in room/bathroom using cane/walker.   Self-repositioning in bed while awake.   Problem: At Risk for Injury Due to Fall  Goal: # Takes action to control condition specific risks  Outcome: Outcome Met, Continue evaluating goal progress toward completion  Note: Non-skid socks on, call light and possessions within reach, bed in low position, and safety maintained.  Hourly rounding alternately RN/NA with 5 P's completed.         S/O: Patient was admitted with CHF exac. VSS. Pt A&Ox4. Pt denies pain or discomfort. Patient remains free from falls during hospital stay. Glycemic balance maintained. Pt tolerates diet. Skin remains intact. Pt tolerates activity, remains mobile, moving safely about the room and under hospital staff supervision.   A: Goals met.   P: D/C plan of care. Pt will be discharged to home and have follow up with MD in the office.     medicine, you may take it with small sips of water. (We may ask you to take liquid medicine as well.)   The day before your exam, drink extra fluids at least six 8-ounce glasses (unless your doctor tells you to restrict your fluids).  Patients over 70 or patients with diabetes or kidney problems:   If you haven t had a blood test (creatinine test) within the last 30 days, go to your clinic or Diagnostic Imaging Department for this test.  If you have diabetes:   If your kidney function is normal, continue taking your metformin (Avandamet, Glucophage, Glucovance, Metaglip) on the day of your exam.   If your kidney function is abnormal, wait 48 hours before restarting this medicine.  You will have oral contrast for this exam:   You will drink the contrast at home. Get this from your clinic or Diagnostic Imaging Department. Please follow the directions given.  Please wear loose clothing, such as a sweat suit or jogging clothes. Avoid snaps, zippers and other metal. We may ask you to undress and put on a hospital gown.  If you have any questions, please call the Imaging Department where you will have your exam.            Jan 08, 2018  8:15 AM CST   (Arrive by 8:00 AM)   Return Visit with Naresh De Los Santos MD   [facility] ([facility])    [address] [place] [address] [phone]              Future tests that were ordered for you today     Open Future Orders        Priority Expected Expires Ordered    CBC with platelets differential Routine 1/10/2018 10/10/2018 10/10/2017    Comprehensive metabolic panel Routine 1/10/2018 10/10/2018 10/10/2017    CT Chest/Abdomen/Pelvis w Contrast Routine 1/10/2018 10/10/2018 10/10/2017    Cancer antigen 19-9 Routine 1/10/2018 10/10/2018 10/10/2017            Who to contact     If you have questions or need follow up information about today's clinic visit or your schedule please contact [facility] directly at [phone].  Normal or non-critical lab and imaging results will be communicated to you by MyChart, letter or phone within 4 business days after the clinic has received the results. If you do not hear from us within 7 days, please contact the clinic through Forsitechart or phone. If you have a critical or abnormal lab result, we will notify you by phone as soon as possible.  Submit refill requests through Responde Ai or call your pharmacy and they will forward the refill request to us. Please allow 3 business days for your refill to be completed.          Additional Information About Your Visit        ForsitecharEight19 Information     Resp[employer] gives you secure access to your electronic health record. If you see a primary care provider, you can also send messages to your care team and make appointments. If you have questions, please call your primary care clinic.  If you do not have a primary care provider, please call [phone] and they will assist you.        Care EveryWhere ID     This is your Care EveryWhere ID. This could be used by other organizations to access your [place] medical records  [id number]        Your Vitals Were     Pulse Temperature Respirations Height Pulse Oximetry BMI (Body Mass Index)    83 97.5  F (36.4  C) (Oral) 18 1.803 m (5' 10.98\") 96% 31.86 kg/m2       Blood Pressure from Last 3 Encounters:   10/10/17 129/87   07/10/17 131/78   04/10/17 134/88    Weight from Last 3 Encounters:   10/10/17 103.6 kg (228 lb 4.8 oz)   07/10/17 103.6 kg (228 lb 8 oz)   04/10/17 103.1 kg (227 lb 4.8 oz)               Primary Care Provider Office Phone # Fax #    Jim Kaplan [phone]       [address]   [facility] [address]        Equal Access to Services     MARIUSZ MADRIGAL : Robyn Brizuela, wasuly luqadaha, qaybta kaalankit valera. [facility] [phone].    ATENCIÓN: Si habla " español, tiene a flores disposición servicios gratuitos de asistencia lingüística. Angela chung [phone].    We comply with applicable federal civil rights laws and [place] laws. We do not discriminate on the basis of race, color, national origin, age, disability, sex, sexual orientation, or gender identity.            Thank you!     Thank you for choosing [facility]  for your care. Our goal is always to provide you with excellent care. Hearing back from our patients is one way we can continue to improve our services. Please take a few minutes to complete the written survey that you may receive in the mail after your visit with us. Thank you!             Your Updated Medication List - Protect others around you: Learn how to safely use, store and throw away your medicines at www.disposemymeds.org.          This list is accurate as of: 10/10/17  8:44 AM.  Always use your most recent med list.                   Brand Name Dispense Instructions for use Diagnosis    aspirin 81 MG tablet           atorvastatin 40 MG tablet    LIPITOR     TAKE 1 TABLET BY MOUTH DAILY        COLCRYS PO      Take 0.6 mg by mouth as needed for moderate pain        ELIQUIS PO      Take 5 mg by mouth 2 times daily        metoprolol 50 MG tablet    LOPRESSOR     50 mg 2 times daily        multivitamin, therapeutic with minerals Tabs tablet     30 tablet    Take 1 tablet by mouth daily    Mass of bile duct       NITROSTAT SL      Place 0.4 mg under the tongue every 5 minutes as needed for chest pain (Carries medication - has never used)        OMEGA-3 FISH OIL PO      Take 1,000 mg by mouth daily        valACYclovir 1000 mg tablet    VALTREX     Take 1 g by mouth 2 times daily           36.4

## 2022-12-15 NOTE — LETTER
Patient:  Girish Mcgarry  :   1955  MRN:     [id number]        Mr.Daniel CELINE Mcgarry  [address] [facility] [address]        2021    Dear ,    We are writing to inform you of your test results. In short, the tissues were collected for genetic evaluation were not sufficient as they did not contain malignancy. They represented the abnormal tissue that remains within reach of endoscopically sampling. Unfortunately, any further biopsy of malignant tissues is not feasible by an endoscopic approach due to the metal stents.      I have included the formal documtentation of the results below. It continues to be a pleasure participating in your care.  Please feel free to contact our clinic with any further questions.      Sincerely,    Gabino Vogt MD PhD UMANGG FIDEL FISHER  Director of Endoscopy  Associate Professor of Medicine, Surgery and Pediatrics  Interventional and Therapeutic Endoscopy    [facility]  Division of Gastroenterology and Hepatology  [facility] [address] [address]    New Consultations  [phone]  Procedure Scheduling [phone]  Clinical Nurse Coordinator [phone]  Clinical Fax   [phone]  Administrative   [phone]  Administrative Fax  [phone]        Resulted Orders   Surgical pathology exam   Result Value Ref Range    Copath Report       Patient Name: GIRISH MCGARRY  MR#: [id number]  Specimen #: [id number]  Collected: 2021  Received: 2021  Reported: 2021 11:44  Ordering Phy(s): GABINO VOGT    For improved result formatting, select 'View Enhanced Report Format' under   Linked Documents section.    SPECIMEN(S):  Duodenal mass    FINAL DIAGNOSIS:  Duodenum, Biopsy of Stent-Associated Tissue Ingrowth:  Granulation tissue with gastric foveolar metaplasia; negative for   malignancy  (See Comment)    COMMENT:  A malignancy is not identified with the sample comprising duodenal  "tissue   with extensive foveolar metaplasia,  in the context of prior Carol en Y anastomoses. However, given endoscopic   features of an obstructing mass, the  likelihood of underrepresentation of the actual mass should be considered   and if concerns for recurrent or de  owen tumor persists, followup with repeat biopsy attempt may be indicated.    I have personally reviewed all specimens and/or slides, including the   listed special  stains, and used them  with my medical judgement to determine or confirm the final diagnosis.    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    GROSS:  The specimen is received in formalin with proper patient identification,   labeled \"duodenal mass\".  The  specimen consists of six pieces of pink-tan soft tissue ranging in size   from 0.1-0.2 cm in greatest dimension,  which are entirely submitted in cassette A1. (Dictated by: Candice Walters   4/9/2021 10:40 AM)    MICROSCOPIC:  Microscopic examination has been performed    The technical component of this testing was completed at the [facility], with the professional component performed   at the [facility], [address] [place] [id number] ([phone])    CPT Codes:  A: 69533-LW8    COLLECTION SITE:  Client: [facility]  Location: [facility] (RITESH)               " No

## 2023-11-13 NOTE — TELEPHONE ENCOUNTER
Reason for visit: evaluate for stent     Relevant information: referred by Dr. De Los Santos    Records/imaging/labs/orders: records available    Pt called: message routed to RNCC to see if pt is scheduled with the correct provider    At Rooming: regular  
Never

## 2023-12-23 NOTE — BRIEF OP NOTE
[facility]    Brief Operative Note    Pre-operative diagnosis: Hydronephrosis of right kidney [N13.30]  Cholangiocarcinoma (H) [C22.1]  Post-operative diagnosis Same as pre-operative diagnosis    Procedure: Procedure(s):  CYSTOSCOPY WITH  BLADDER BIOPSY, FULGURATION, WITH RETROGRADE PYELOGRAM AND URETERAL RIGHT  STENT exchange  Surgeon: Surgeon(s) and Role:     * Sivan Walker MD - Primary     * Drew Fenton MD - Resident - Assisting  Anesthesia: Choice   Estimated blood loss: Minimal  Drains:  Right 7Fr x26 cm double J ureteral stent with good curl in renal pelvis and bladder  Specimens:   ID Type Source Tests Collected by Time Destination   A : Bladder biopsy Biopsy Bladder SURGICAL PATHOLOGY EXAM Sivan Walker MD 4/19/2021  3:26 PM      Findings:   Existing stent moderately encrusted. New stent with good position in renal pelvis and bladder. Abnormal mucosa on posterior bladder wall, biopsied and with excellent hemostasis.  Complications: None.  Implants:   Implant Name Type Inv. Item Serial No.  Lot No. LRB No. Used Action   STENT URETERAL PERCUFLEX PLUS [id number] Stent STENT URETERAL PERCUFLEX PLUS [id number]  [employer] [id number] Right 1 Implanted   STENT URETERAL PERCUFLEX PLUS [id number] Stent STENT URETERAL PERCUFLEX PLUS [id number]  [employer] [id number] Right 1 Explanted            No

## 2024-01-11 NOTE — LETTER
1/11/2024      RE: Javed Holguin  [address]  [facility] [address]     Dear Colleague,    Thank you for referring your patient, Javed Holguin, to the [facility]. Please see a copy of my visit note below.    Virtual Visit Details    Type of service:  Video Visit   Video Start Time: 11:24 AM  Video End Time: 11:35    Originating Location (pt. Location): Home  {PROVIDER LOCATION On-site should be selected for visits conducted from your clinic location or adjoining [facility] hospital, academic office, or other nearby [facility] building. Off-site should be selected for all other provider locations, including home:[id number]}  Distant Location (provider location):  On-site  Platform used for Video Visit: CrowdStrike      SUBJECTIVE:  Javed is a 16 year old 2 month old male, here with father, Lachman, for follow-up of developmental-behavioral problems. Today's visit was spent with family together.       As described below, today's Diagnostic ASSESSMENT and Diagnostic/Therapeutic PLAN were discussed with the patient and family, and I provided them with extensive counseling and eduction as follows:  1. Attention deficit hyperactivity disorder (ADHD), combined type    2. Autism    3. Fetal alcohol syndrome          Counseled Regarding:    guidance and education regarding multimodal, evidence-based interventions for ADHD and FREDY    Plan:     Javed requests to stay on current doses of medications as he feels that they are helpful and he is doing well at school and home.   He will stay on Concerta 54mg and sertraline 100 mg daily.   Follow up in 6 months.         I spent a total of 20 minutes on the day of the visit.   Time spent by me doing chart review, history and exam, documentation and further activities per the note  {Provider  Link to [facility] Help Grid :559871}         ___________________________________________________________________________________________      Interim History:    Javed reports  Letter by Ángel Rose MD (Ted) at      Author: Ángel Rose MD (Ted) Service: -- Author Type: --    Filed:  Encounter Date: 7/25/2019 Status: (Other)         Girish CELINE Chauhan  [address] [facility] [address]      July 25, 2019      Dear Girish,    This letter is to remind you that you will be due for your follow up appointment with Dr. Deangelo Rose  . To help ensure you are in the best health possible, a regular follow-up with your cardiologist is essential.     Please call our Patient Scheduling Line at [phone] to schedule your appointment at your earliest convenience.  If you have recently scheduled an appointment, please disregard this letter.    We look forward to seeing you again. As always, we are available at the number  above for any questions or concerns you may have.      Sincerely,     The Physicians and Staff of [facility]        "he is doing well in 8th grade. He is excited to be playing basketball on the school team and made the A team. He is having a hard time getting some assignments in on time but only a for certain classes where teachers are not as organized. He has to keep his grades up for basketball so that keeps him motivated. He also describes his mood as \"happy\" and \"friendly\". He can tell he is doing better with managing frustration as he has not gotten into any school fights this year. He has been able to tell kids when they are making him mad and then he is able to talk himself through walking away- he could not do that last year.     He takes his meds every day as prescribed. Dad is present and agrees that Javed is doing well.      Sleep: Falling asleep with ease and sleeping 10 hours most nights.     School:Currently in 8th grade at [employer] and no concerns.     Social Hx: no changes    Objective:  There were no vitals taken for this visit.   EXAM:     Observations:    Developmental and Behavioral: affect normal/bright and mood congruent  impulse control appropriate for context  activity level appropriate for context  attention span appropriate for context  social reciprocity appropriate for developmental age  joint attention appropriate for developmental age  no preoccupations, stereotypies, or atypical behavioral mannerisms  judgment and insight intact  mentation appears normal      Laura Contreras MD, MPH  [facility]  Developmental-Behavioral Pediatrics      Again, thank you for allowing me to participate in the care of your patient.      Sincerely,    Laura Contreras MD  "

## 2024-02-17 NOTE — NURSING NOTE
"Atiya Kelly is a 15 y.o. female who presents with Cough (5 days) and Pharyngitis (5 days )    PMH:  has no past medical history on file.  MEDS:   Current Outpatient Medications: tylenol  ALLERGIES: No Known Allergies  SURGHX: History reviewed. No pertinent surgical history.  SOCHX:  reports that she has never smoked. She has never used smokeless tobacco. She reports that she does not drink alcohol and does not use drugs.  FH: Reviewed with patient, not pertinent to this visit.           Patient presents with cough, sore throat, headache, body aches for the last 3 days, patient woke up with a fever this morning.  Patient has been given some over-the-counter Tylenol for her fever with good results.  Patient's father is here with similar symptoms and has tested positive for influenza A.  No other complaints.      Influenza  Associated symptoms include congestion, coughing, a fever, headaches, myalgias and a sore throat. Pertinent negatives include no chest pain, nausea or vomiting.       Review of Systems   Constitutional:  Positive for fever and malaise/fatigue.   HENT:  Positive for congestion, ear pain and sore throat.    Eyes:  Negative for discharge.   Respiratory:  Positive for cough. Negative for sputum production and shortness of breath.    Cardiovascular:  Negative for chest pain.   Gastrointestinal:  Negative for diarrhea, nausea and vomiting.   Musculoskeletal:  Positive for myalgias.   Neurological:  Positive for headaches.   All other systems reviewed and are negative.             Objective     BP 90/68   Pulse (!) 102   Temp 37 °C (98.6 °F) (Temporal)   Resp 20   Ht 1.315 m (4' 3.77\")   Wt 35.9 kg (79 lb 3.2 oz)   SpO2 100%   BMI 20.77 kg/m²      Physical Exam  Vitals and nursing note reviewed.   Constitutional:       General: She is not in acute distress.     Appearance: Normal appearance. She is well-developed. She is not toxic-appearing or diaphoretic.   HENT:      Head: " "Chief Complaint   Patient presents with     Port Draw     labs drawn from port by rn.  vs taken     Port accessed with 20 gauge 3/4\" gripper needle and labs drawn by rn.  Port flushed with NS and heparin.  Pt tolerated well.  VS taken.  Pt checked in for next appt.    Lashawn Gill RN      " Normocephalic and atraumatic.      Right Ear: Tympanic membrane and ear canal normal.      Left Ear: Tympanic membrane and ear canal normal.      Nose: Mucosal edema, congestion and rhinorrhea present.      Mouth/Throat:      Mouth: Mucous membranes are moist.      Pharynx: Uvula midline. Posterior oropharyngeal erythema present.      Tonsils: No tonsillar exudate.   Eyes:      General: Lids are normal.      Extraocular Movements: Extraocular movements intact.      Conjunctiva/sclera:      Right eye: Right conjunctiva is injected.      Left eye: Left conjunctiva is injected.      Pupils: Pupils are equal, round, and reactive to light.   Cardiovascular:      Rate and Rhythm: Regular rhythm. Tachycardia present.      Pulses: Normal pulses.      Heart sounds: Normal heart sounds.   Pulmonary:      Effort: Pulmonary effort is normal. No respiratory distress.      Breath sounds: No wheezing, rhonchi or rales.   Chest:      Chest wall: No tenderness.   Abdominal:      Palpations: Abdomen is soft.   Musculoskeletal:         General: Normal range of motion.      Cervical back: Normal range of motion and neck supple.   Skin:     General: Skin is warm and dry.      Capillary Refill: Capillary refill takes less than 2 seconds.   Neurological:      General: No focal deficit present.      Mental Status: She is alert and oriented to person, place, and time.      Gait: Gait normal.   Psychiatric:         Mood and Affect: Mood normal.         Behavior: Behavior is cooperative.                             Assessment & Plan             1. Influenza A  oseltamivir (TAMIFLU) 6 mg/mL Recon Susp      2. Sore throat  POCT CoV-2, Flu A/B, RSV by PCR    POCT CEPHEID GROUP A STREP - PCR    oseltamivir (TAMIFLU) 6 mg/mL Recon Susp      3. Fever, unspecified fever cause  POCT CoV-2, Flu A/B, RSV by PCR    POCT CEPHEID GROUP A STREP - PCR    oseltamivir (TAMIFLU) 6 mg/mL Recon Susp      4. Cough, unspecified type  oseltamivir (TAMIFLU) 6 mg/mL  Recon Susp          Influenza A positive    Negative: Influenza B, RSV, COVID, strep    Patient HPI physical exam and positive influenza test all support diagnosis of influenza A.  I will treat with Tamiflu twice daily x 5 days.    Patient can continue being given over-the-counter Tylenol and/or Motrin as needed for pain, fever body aches and headache.    PT can begin or continue OTC medications, increase fluids and rest until symptoms improve.     Differential diagnosis, supportive care, and indications for immediate follow-up discussed with patient.  Instructed to return to clinic or nearest emergency department for any change in condition, further concerns, or worsening of symptoms.    I personally reviewed prior external notes and test results pertinent to today's visit.  I have independently reviewed and interpreted all diagnostics ordered during this urgent care visit.    PT should follow up with PCP in 1-2 days for re-evaluation if symptoms have not improved.      Discussed red flags and reasons to return to UC or ED.      Pt and/or family verbalized understanding of diagnosis and follow up instructions and was offered informational handout on diagnosis.  PT discharged.     Please note that this dictation was created using voice recognition software. I have made every reasonable attempt to correct obvious errors, but I expect that there may be errors of grammar and possibly content that I did not discover before finalizing the note.

## 2024-06-01 NOTE — PATIENT INSTRUCTIONS - HE
Girish Chauhan,    It was a pleasure to see you today at the [facility].     My recommendations after this visit include:  - No changes to your medications.    - Continue to monitor for signs of retaining fluid (increasing weights, shortness of breath, swelling) and call with any concerns. [phone]  - You are due to see Dr. Rose in May, please schedule.     Lizzette Jacob, CNP        No

## 2024-06-06 NOTE — OP NOTE
"Weight Management Medical Nutrition Assessment  Rhianna is here for 8 week VLCD f/u. Current wt:  246.8 lbs. Loss of  4 lbs x 2 wks. Overall loss of   20.4# x ~7 weeks (avg 2.9  lbs/wk).  BP avg 102-137/64-84 mg/dl. Remains on lasix. Doing better with water intake. She has no concerns at this time. Upcoming anniversary dinner discussed. Will continue VLCD at this time.     Patient seen by Medical Provider in past 6 months:  yes  Requested to schedule appointment with Medical Provider: No    Anthropometric Measurements  Start Weight (#): 268.2 lbs 5/4/2022; start VLCD 267 lbs    Current Weight (#): 246.8#  TBW % Change from start weight: 7.6%  Ideal Body Weight (#): 125 65\" (159.3 lbs BMI 25)  Goal Weight (#):  lbs     Weight Loss History  Previous weight loss attempts: Commercial Programs (Weight Watchers, Affinio Endy, etc.)  Counseling with  MD  Exercise  Meal Replacements (Medifast, Slim Fast, etc.)  Self Created Diets (Portion Control, Healthy Food Choices, etc.)    Food and Nutrition Related History  Wake up: 8-9   Bed Time: 11    Food Recall  Breakfast:  10:00  replacement   Snack:    Lunch: 3:00:  replacement   Snack: carrots + 1 Tbsp peanut butter OR almonds OR hard boiled egg  Dinner: 6-7:00  replacement   Snack:  bar     Beverages: water  Volume of beverage intake:  68 oz +    Weekends: Same  Cravings: no specific identified   Trouble area of day: not identified     Frequency of Eating out: none currently  Food restrictions:  carbs <50 gm while on VLCD  Cooking: self   Food Shopping: self    Physical Activity Intake  Activity:  yardwork  Frequency: varies      Physical limitations/barriers to exercise: no intense exercise while on VLCD    Estimated Needs  Energy  [employer] Needs: BMR :  1640   1-2# loss weekly sedentary: 968 - 1468        1-2# loss weekly lightly active:  8860-0519  Maintenance calories for sedentary activity level:  1968  Protein: 72-90 gm      (1.2-1.5g/kg IBW)  Fluid: " DATE OF SURGERY: 1/15/2021    PREOPERATIVE DIAGNOSIS: Metastatic Cholangiocarcinoma with right hydroneprosis     POSTOPERATIVE DIAGNOSIS: Same    PROCEDURES PERFORMED:   1. Cystourethroscopy.   2. Right double-J indwelling ureteral stent exchange.   3. Retrograde pyelogram.  3. Intraoperative interpretation of fluoroscopic imaging.     STAFF SURGEON: Sivan Walker MD    ASSISTANT: Drew Fenton MD MS    ANESTHESIA: MAC    ESTIMATED BLOOD LOSS: 1 mL.     IV FLUIDS: See Anesthesia Records    COMPLICATIONS: None.     DRAINS: Right 7Fr x 26 Double J Ureteral Stent    SIGNIFICANT FINDINGS: Good curl in renal pelvis and bladder of new stent, mild hydronephrosis of right collecting system    BRIEF OPERATIVE INDICATIONS:  Girish Chauhan is 65 year old with history of recurrent cholangiocarcinoma (with biopsy proven metasatic disease to bladder in past), afib on apixaban, and chronic right hydronephrosis suspected to be 2/2 mass compression. He has been managed with indwelling right ureteral stent exchanged every 3 months, last exchanged October 2020 with 7 Fr x26cm Double J Stent. After discussion of all risks, benefits and alternatives, he elected to proceed with the above listed procedures.    DESCRIPTION OF PROCEDURE:    After full informed voluntary consent was obtained, the patient was transported to the operating room, placed supine on the table. After adequate anesthesia was induced, they were prepped and draped in the usual sterile fashion. A timeout was taken to confirm correct patient, procedure and laterality. Appropriate pre-operative antibiotics were administered    The case was started by inserting a 22-Indonesian rigid cystoscope sheath and 30-degree angle lens. The urethra was unremarkable. The prostate was open. Once in the urinary bladder, media was clear.  There were no tumors, stones or diverticuli appreciated, and the bilateral ureteral orifices were in their normal orthotopic positions. The existing  70 oz    (35mL/kg IBW)    Nutrition Diagnosis  Yes;    Overweight/obesity  related to Excess energy intake as evidenced by  BMI more than normative standard for age and sex (obesity-grade III 40+)       Nutrition Intervention    Nutrition Prescription  Calories:  760 calories  Protein: 96  gm    Meal Plan (Adrien/Pro)  Breakfast: 200, 27  Snack:  Lunch: 200, 27  Snack: 160, 15   Dinner: 200, 27  Snack:    Nutrition Education:     VLCD  Physical activity  hydration     Nutrition Counseling:  Strategies: as above      Monitoring and Evaluation:  Evaluation criteria:  Energy Intake  Meet protein needs  Maintain adequate hydration  Monitor weekly weight  Food logging/measuring  Physical activity   Meal planning    Barriers to learning:none  Readiness to change: Action:  (Changing behavior)    Comprehension: very good  Expected Compliance: good   right ureteral stent was noted in place with minimal encrustations. The stent was brought to the urethral meatus using a flexible stent grasper. It was cannulated with a Sensor Wire and advanced to the rigth renal pelvis under fluoroscopic guidance. The stent was removed and discarded and a 5Fr open ended catheter was advanced over the wire up to the right UPJ. A retrograde pyelogram demonstrated no filling defects and mild hydronephrosis. The Sensor wire was replaced and a 7 French x 26 cm double-J stent was advanced over the guidewire with fluoroscopic guidance.  Good curl was appreciated in the pelvis.  Stent passed up easily with no appreciable resistance.  The guidewire was then removed and a good curl was appreciated in the renal pelvis as well as the bladder.  The bladder was then drained. The patient was returned to the supine position and awakened from anesthesia. Patient tolerated the procedure well.  No apparent complications. He was transported to the postanesthesia care unit in stable condition.     PLAN: Follow up in 3 months for repeat stent exchange.  Patient to void in PACU prior to discharge to home    Addendum:    I, Sivan Walker, was present for the entire case.  I agree with the note as above with changes made as needed.  I spoke to the patient's wife Mily over the telephone at the end of the case    Sivan Walker MD MPH   of Urology

## 2025-01-10 NOTE — PROCEDURES
Interventional Radiology Brief Post Procedure Note    Procedure:  IR CHEST PORT PLACEMENT > 5 YRS OF AGE [[id number]]    Proceduralist: COLLEEN Fernandes PA-C    Assistant: None    Time Out: Prior to the start of the procedure and with procedural staff participation, I verbally confirmed the patient s identity using two indicators, relevant allergies, that the procedure was appropriate and matched the consent or emergent situation, and that the correct equipment/implants were available. Immediately prior to starting the procedure I conducted the Time Out with the procedural staff and re-confirmed the patient s name, procedure, and site/side. (The Joint Commission universal protocol was followed.)  Yes        Sedation: Monitored Anesthesia Care (MAC) administered and documented by Anesthesia Care Provider    Findings: Completed placement of an 8 Cymro 25.5 cm, Bard brand, single lumen venous chest  power-injectable port via RIJV. Tip lying in the right atrium. PORT is ready for immediate use. Dx: Adelita Powell. MAC <1    Estimated Blood Loss: Less than 10 ml    Fluoroscopy Time:  minute(s)    SPECIMENS: None    Complications: 1. None     Condition: Stable    Plan:     Comments: See dictated procedure note for full details.    Drew Powell PA-C           [facility]  PHYSICAL MEDICINE AND REHABILITATION CLINIC  PROGRESS NOTE      Reason for visit:  [facility] stay 08/07/2023-8/16/2023  Patient presents with s/p right MCA stroke, s/p mechanical thrombectomy     Cantonese  was utilized during this visit.       Referring physician:   Khadra Garzon CNP     History of Present Illness:  Patient is a 49 year old female with PMH of HTN, who presented to [facility] ED on 8/7 for evaluation of left-sided weakness, left facial droop, and right-sided gaze preference.  Per chart review, patient unable to get up after going to the bathroom and was incontinent.  Patient's symptoms began on 7 at 12:30 PM.  CT of the head without any acute abnormalities. CTA of the head and neck with short segment severe stenosis vs subocclusive thrombus of right MCA, with distal flow.  Patient is s/p TNK.  Neurosurgery was consulted.     On 8/7, patient underwent cerebral angiogram with mechanical thrombectomy of the right M1-M2 subocclusive thrombus, TICI 2B, via right femoral access.  Skye CTh without any hemorrhage.  Patient being monitored in the NCCU postprocedure.     Patient to maintain SBP less than 160 mmHg per neurosurgery.  Patient was cleared to start on ASA.  Neurology was consulted.  Per neurology, patient likely had a TIA earlier this year with contralateral symptoms and suspect an embolic etiology.  Chol 181, , A1C 5.7.  Anticoagulation is on hold at this time.  Stroke work-up in progress.     MRI of the brain revealed acute cortical and subcortical ischemic infarct within the right cerebral hemisphere and throughout the vascular territory of right middle cerebral artery.  There is no parenchymal hemorrhage.  There is probable thrombosis within M1 segment of right middle cerebral artery. TTE w/ EF 63%, no obvious shunt, normal LA. Patient was evaluated by neurology service and recommended SALVADOR and cardiac monitor upon discharge.      The rehabilitation service was asked to  evaluate the patient for rehabilitation needs.     Patient was noted to require significant assistance with her ADLs and mobility and a course of subacute rehab was recommended.      Interim History:1/16/25  Patient presents to PM&R clinic today to follow up on her last appointment April 2024  Patient was hospitalized in [facility] between the dates of 5/27/24-5/29/24 for seizures. This was patients first seizure.  Patient followed up with neurology 7/11/24.  Reason for seizure was patient ran out of [facility].                    CURRENT FUNCTIONAL STATUS:  Living situation/environment: Patient lives at home with spouse \"Ned\". There are 9 steps with rails to enter the house. Shower grab bars are available. Patient does not work. Patient's spouse works.they have 3 children ages 22,8 and 7.   and daughter provides  24/7 care.  Follows Cardiology and Hematology Oncology.      Patient able to walk in a cane at home without any help.   helps with showers.   Continent of bowel and bladder.   Would like to continue with more outpatient physical and occupational therapies at [facility].   Patient can ambulate with a quad cane.         Vocation: Patient was not working prior to hospitalization.  She was a caregiver.      DME:Left leg solid AFO, wheelchair     Diet:General/ thin liquids     Cognition: alert, able to answer pre screening questions      Mobility: Patient utilizes quad cane going to the bathroom      ADLs:Patient walks to the bathroom with a quad cane. She is continent of both bowel and bladder     Driving: not able to drive      Therapy:Patient has been discharged on December 16th 2024 from PT and OT  Review of Systems  Review of Systems    Past Medical History:  Past Medical History:   Diagnosis Date    Essential (primary) hypertension     Seizure disorder  (CMD)     Stroke  (CMD)        Past Surgical History:  No past surgical history on file.    Family History:  Family History    Problem Relation Age of Onset    Stroke Neg Hx     Hypertension Neg Hx        Social History:   Social History     Tobacco Use    Smoking status: Never     Passive exposure: Never    Smokeless tobacco: Never   Vaping Use    Vaping status: never used   Substance Use Topics    Alcohol use: Never    Drug use: Never     Social History     Social History Narrative    Not on file       Vitals:  There were no vitals filed for this visit.     PHYSICAL EXAM  Physical Exam  Constitutional:       General: She is not in acute distress.     Appearance: She is not ill-appearing.   HENT:      Head: Normocephalic.      Nose: Nose normal.      Mouth/Throat:      Pharynx: No oropharyngeal exudate.      Neck: Neck supple.   Eyes:      General: No scleral icterus.     Conjunctiva/sclera: Conjunctivae normal.   Pulmonary:      Effort: Pulmonary effort is normal. No respiratory distress.   Musculoskeletal:      Right lower leg: No edema.      Left lower leg: No edema.   Skin:     General: Skin is warm and dry.      Coloration: Skin is not jaundiced.   Neurological:      Mental Status: She is alert and oriented to person, place, and time.      Cranial Nerves: Cranial nerve deficit present.      Sensory: No sensory deficit.      Motor: Weakness present.      Comments: Speech fluent  Tongue midline  Mild Left-sided facial droop  Left shoulder at least antigravity  No movement LUE distally  Left fingers with increased tone  Left hip at least 3/5  Left DF/PF trace movement  She was issued an AFO  Sensation intact on the left side  R side with normal strength    01/24/2024  Speech is fluent  Has movement of the left shoulder at least 3/5  No active movement in the left elbow or left fingers  Has some moderately increased tone in the left fingers  Able to perform PROM without any discomfort  No active movement in the left wrist  Has at least 3+/5 movement in the left hip  Left DF/0/5  Trace movement in the left toes  Right upper and right  lower extremity strength is normal  No tone in the left lower extremity  Sensation is intact on the left side    4/17/2024  Speech is fluent  Has movement of the left shoulder at least 3/5  No active movement in the left elbow or left fingers  Has some moderately increased tone in the left fingers and left elbow  Able to perform PROM to left fingers and elbow without any discomfort  No active movement in the left wrist  Has at least 3+/5 movement in the left hip  Not able to test L DF/PF due to AFO and shoe  Right upper and right lower extremity strength is normal  No tone in the left lower extremity  Sensation is intact on the left side   Psychiatric:         Behavior: Behavior normal.              Assessment/Plan  No diagnosis found.              There are no Patient Instructions on file for this visit.  No follow-ups on file.    Time:  Time spent with patient care, chart review, review of records and charting *** minutes      {pmrdocsignature:[id number]}  Physical Medicine and Rehabilitation           Assessment/Plan  1. History of stroke with residual effects    2. Left spastic hemiparesis  (CMD)    3. Left foot drop          Patient Instructions   Continue with home exercise program    Follow-up with jennifer Alejandro in 3 months to be re-evaluated for possible re-initiation of therapies.  Our office will call you to make an appointment.  Daughter is the best person to contact.  No follow-ups on file.    Time:  Time spent with patient care, chart review, review of records and charting 40 minutes      Khadra Garzon CNP  Physical Medicine and Rehabilitation

## 2025-03-03 NOTE — PROGRESS NOTES
Progress Notes by Lizzette Jacob CNP at 3/20/2019  1:30 PM     Author: Lizzette Jacob CNP Service: -- Author Type: Nurse Practitioner    Filed: 3/20/2019  1:56 PM Encounter Date: 3/20/2019 Status: Signed    : Lizzette Jacob CNP (Nurse Practitioner)           Click to link to [facility] HEART CARE NOTE      Assessment/Recommendations   Assessment:    1.  Heart failure with preserved ejection fraction: He has no symptoms of acute heart failure.  His weight has increased about 4 to 5 pounds in the past 3 weeks but he attributes this to improved appetite.    2.  Hypertension: Blood pressure controlled today at 112/60.    3.  Permanent atrial fibrillation: Heart rate controlled today.  He continues to take Eliquis.    4.  Coronary artery disease: History of PCI in 2011.  He denies any chest pain.    Plan:  1.  Continue current medications  2.  Low-sodium diet  3.  Daily weight and blood pressure monitoring    Krishna will follow-up with Dr. Rose in May and in the heart failure clinic as needed.     History of Present Illness    Mr. Girish Chauhan is a 64 y.o. male seen at [facility] heart failure clinic today for continued follow-up.  He has a history of heart failure with preserved ejection fraction, hypertension, coronary artery disease with PCI in 2011, permanent atrial fibrillation, bicuspid aortic valve, and chronic kidney disease.  Echocardiogram from October 2018 showed an ejection fraction of 50%, moderate aortic stenosis, mild mitral regurgitation, moderate tricuspid regurgitation, and mildly dilated ascending aorta.    During the last clinic visit, he had lost about 17 pounds after Dr. Rose had increased his diuretic.  Lasix was decreased to 40 mg daily.  He has been feeling well on this decreased dose.  His weight has increased about 4 to 5 pounds but he is trying to gain weight.  Home weight is 164 pounds.  He estimates that he lost  about 70 pounds with recent cancer and chemotherapy.  He has no symptoms of acute heart failure.  He denies fatigue, lightheadedness, shortness of breath, dyspnea on exertion, chest pain and lower extremity edema.         Physical Examination Review of Systems   Vitals:    03/20/19 1317   BP: 112/60   Pulse: 97   Resp: 20     Body mass index is 24.57 kg/m .  Wt Readings from Last 3 Encounters:   03/20/19 170 lb (77.1 kg)   02/25/19 160 lb (72.6 kg)   02/18/19 177 lb 12.8 oz (80.6 kg)       General Appearance:     Alert, cooperative and in no acute distress.   ENT/Mouth: membranes moist, no oral lesions or bleeding gums.      EYES:  no scleral icterus, normal conjunctivae   Chest/Lungs:   lungs are clear to auscultation, no rales or wheezing, respirations unlabored   Cardiovascular:    Irregular. Normal first and second heart sounds, no edema bilateral lower extremities    Abdomen:  Soft, nontender, nondistended, bowel sounds present   Extremities: no cyanosis or clubbing   Skin: warm, dry.    Neurologic: mood and affect are appropriate, alert and oriented x3      General: WNL  Eyes: WNL  Ears/Nose/Throat: WNL  Lungs: WNL  Heart: WNL  Stomach: WNL  Bladder: WNL  Muscle/Joints: WNL  Skin: WNL  Nervous System: WNL        Blood: WNL     Medical History  Surgical History Family History Social History   No past medical history on file. No past surgical history on file. No family history on file. Social History     Socioeconomic History   ? Marital status:      Spouse name: Not on file   ? Number of children: Not on file   ? Years of education: Not on file   ? Highest education level: Not on file   Occupational History   ? Not on file   Social Needs   ? Financial resource strain: Not on file   ? Food insecurity:     Worry: Not on file     Inability: Not on file   ? Transportation needs:     Medical: Not on file     Non-medical: Not on file   Tobacco Use   ? Smoking status: Never Smoker   ? Smokeless tobacco: Never  Used   Substance and Sexual Activity   ? Alcohol use: Yes     Frequency: 2-4 times a month     Drinks per session: 1 or 2   ? Drug use: Not on file   ? Sexual activity: Not on file   Lifestyle   ? Physical activity:     Days per week: Not on file     Minutes per session: Not on file   ? Stress: Not on file   Relationships   ? Social connections:     Talks on phone: Not on file     Gets together: Not on file     Attends Bahai service: Not on file     Active member of club or organization: Not on file     Attends meetings of clubs or organizations: Not on file     Relationship status: Not on file   ? Intimate partner violence:     Fear of current or ex partner: Not on file     Emotionally abused: Not on file     Physically abused: Not on file     Forced sexual activity: Not on file   Other Topics Concern   ? Not on file   Social History Narrative   ? Not on file          Medications  Allergies   Current Outpatient Medications   Medication Sig Dispense Refill   ? apixaban (ELIQUIS) 5 mg Tab tablet TAKE 1 TABLET BY MOUTH TWICE DAILY     ? atorvastatin (LIPITOR) 40 MG tablet TAKE 1 TABLET BY MOUTH DAILY     ? colchicine 0.6 mg cap Take 0.6 mg by mouth as needed.         3   ? furosemide (LASIX) 20 MG tablet Take 2 tablets (40 mg total) by mouth daily. 180 tablet 3   ? losartan (COZAAR) 50 MG tablet Take 100 mg by mouth daily.            ? metoprolol tartrate (LOPRESSOR) 50 MG tablet TK 1 AND 1/2 TS PO BID  3   ? multivitamin with minerals (THERA M PLUS, FERROUS FUMARAT,) 9 mg iron-400 mcg Tab tablet Take 1 tablet by mouth.     ? nitroglycerin (NITROSTAT) 0.4 MG SL tablet DISSOLVE 1 TABLET UNDER THE TONGUE EVERY 5 MINUTES AS NEEDED FOR CHEST PAIN. IF NO RELIEF AFTER 5 MINUTES, CALL 911. MAX 3 TABLETS       No current facility-administered medications for this visit.       No Known Allergies      Lab Results    Chemistry CBC BNP   Lab Results   Component Value Date    CREATININE 1.65 (H) 02/25/2019    BUN 44 (H)  02/25/2019     02/25/2019    K 5.0 02/25/2019     02/25/2019    CO2 25 02/25/2019     Creatinine (mg/dL)   Date Value   02/25/2019 1.65 (H)   02/18/2019 1.56 (H)   02/15/2019 1.70 (H)    No results found for: WBC, HGB, HCT, MCV, PLT Lab Results   Component Value Date    BNP 3,601 (H) 02/18/2019     BNP (pg/mL)   Date Value   02/18/2019 3,601 (H)            Lizzette Jacob, [facility] [place] [facility]   Heart Failure Clinic                         HTN (hypertension)

## (undated) DEVICE — LINEN TOWEL PACK X5 5464

## (undated) DEVICE — SUCTION WATERBUG FLOOR PUDDLE VAC 9321

## (undated) DEVICE — SOL WATER IRRIG 1000ML BOTTLE 2F7114

## (undated) DEVICE — SOL NACL 0.9% IRRIG 3000ML BAG 2B7477

## (undated) DEVICE — SOL NACL 0.9% IRRIG 1000ML BOTTLE 2F7124

## (undated) DEVICE — STRAP KNEE/BODY 31143004

## (undated) DEVICE — SUCTION MANIFOLD DORNOCH ULTRA CART UL-CL500

## (undated) DEVICE — ENDO EXTRACTOR BALLOON 09-12MM 4690

## (undated) DEVICE — Device

## (undated) DEVICE — GLOVE PROTEXIS W/NEU-THERA 6.5  2D73TE65

## (undated) DEVICE — KIT ENDO FIRST STEP DISINFECTANT 200ML W/POUCH EP-4

## (undated) DEVICE — CATH BILIARY FUSION ERCP .035"X200CM DOME TIP

## (undated) DEVICE — DRAPE C-ARM W/STRAPS 42X72" 07-CA104

## (undated) DEVICE — GOWN XLG DISP 9545

## (undated) DEVICE — GUIDEWIRE SENSOR DUAL FLEX STR 0.035"X150CM M0066703080

## (undated) DEVICE — CATH URETERAL OPEN END 5FRX70CM M0064002010

## (undated) DEVICE — SOL WATER IRRIG 3000ML BAG 2B7117

## (undated) DEVICE — PACK ENDOSCOPY GI CUSTOM UMMC

## (undated) DEVICE — PAD CHUX UNDERPAD 23X24" 7136

## (undated) DEVICE — ENDO TUBING CO2 SMARTCAP STERILE DISP 100145CO2EXT

## (undated) DEVICE — ENDO BITE BLOCK ADULT OMNI-BLOC

## (undated) DEVICE — DRSG TELFA 3X8" 1238

## (undated) DEVICE — ENDO FORCEP ENDOJAW BIOPSY 2.8MMX230CM FB-220U

## (undated) DEVICE — SU DERMABOND ADVANCED .7ML DNX12

## (undated) DEVICE — LINEN GOWN X4 5410

## (undated) DEVICE — CONNECTOR WATER VALVE PERFUSION PACK STR 020272801

## (undated) DEVICE — GUIDEWIRE SENSOR DUAL FLEX ANG 0.035"X150CM M0066703010

## (undated) DEVICE — ENDO CAP AND TUBING STERILE FOR ENDOGATOR  100130

## (undated) DEVICE — SUCTION MANIFOLD NEPTUNE 2 SYS 4 PORT 0702-020-000

## (undated) DEVICE — WIRE GUIDE 0.025"X450CM STR VISIGLIDE G-240-2545S

## (undated) DEVICE — ENDO DEVICE LOCKING AND BIOPSY CAP M00545261

## (undated) DEVICE — SYR 10ML LL W/O NDL 302995

## (undated) DEVICE — SU VICRYL 3-0 SH 27" J316H

## (undated) DEVICE — ESU ERBE FIAPC PROBE 2.3MMX220CM

## (undated) DEVICE — KIT CONNECTOR FOR OLYMPUS ENDOSCOPES DEFENDO 100310

## (undated) DEVICE — CATH URETERAL OPEN END 05FR 70CM 020015

## (undated) DEVICE — INFLATION DEVICE BIG 60 ENDO-AN6012

## (undated) DEVICE — KNIFE HANDLE W/15 BLADE 371615

## (undated) DEVICE — CATH BALLOON ELATION ESOPH/PYLORIC 15-16.5-18MMX180CM EPB15

## (undated) DEVICE — ESU GROUND PAD ADULT W/CORD E7507

## (undated) DEVICE — BIOPSY VALVE BIOSHIELD 00711135

## (undated) DEVICE — PAD CHUX UNDERPAD 30X36" P3036C

## (undated) DEVICE — PACK CENTRAL LINE INSERTION SAN32CLFCG

## (undated) DEVICE — ENDO NDL ASPIRATION 19GA FLEX SLIMLINE EXPECT EUS M00555530

## (undated) DEVICE — ENDO FUSION OMNI-TOME G31903

## (undated) DEVICE — LABEL MEDICATION SYSTEM 3303-P

## (undated) DEVICE — ENDO PROBE COVER ULTRASOUND BALLOON LATEX  MAJ-249

## (undated) DEVICE — COVER ULTRASOUND PROBE W/GEL FLEXI-FEEL 6"X58" LF  25-FF658

## (undated) DEVICE — WIRE GUIDE 0.025"X270CM STR VISIGLIDE G-240-2527S

## (undated) DEVICE — ENDO ADPT CATH ROTATABLE SIDE ARM G22677

## (undated) DEVICE — JELLY LUBRICATING SURGILUBE 2OZ TUBE 0281-0205-02

## (undated) DEVICE — KIT INTRODUCER FLUENT MICRO 5FRX10CM ECHO TIP KIT-038-04

## (undated) DEVICE — GLOVE PROTEXIS POWDER FREE SMT 7.0  2D72PT70X

## (undated) DEVICE — CATH RETRIEVAL BALLOON EXTRACTOR PRO RX-S INJ ABOVE 9-12MM

## (undated) DEVICE — RAD RX ISOVUE 300 (50ML) 61% IOPAMIDOL CHARGE PER ML

## (undated) DEVICE — CATH ANGIO JB1 SOFT-VU 5FRX65CM MARINER 11734101

## (undated) DEVICE — STENT ESU AXIOS W/DEL SYS 15MMX10MM 10.8FR 138CM M00553650
Type: IMPLANTABLE DEVICE | Site: STOMACH | Status: NON-FUNCTIONAL
Removed: 2019-07-29

## (undated) DEVICE — PACK CYSTO UMMC CUSTOM

## (undated) DEVICE — SU VICRYL 4-0 P-3 18" UND  J494H

## (undated) DEVICE — WIRE GUIDE 0.025"X270CM ANG VISIGLIDE G-240-2527A

## (undated) DEVICE — SPECIMEN CONTAINER 3OZ W/FORMALIN 59901

## (undated) DEVICE — DECANTER BAG 2002S

## (undated) DEVICE — SYR 50ML LL W/O NDL 309653

## (undated) DEVICE — ESU PROBE CIRCUMFERENTIAL FIRE FLEX 2.3MMX220CM 20132-218

## (undated) DEVICE — SYR 20ML LL W/O NDL 302830

## (undated) RX ORDER — ESMOLOL HYDROCHLORIDE 10 MG/ML
INJECTION INTRAVENOUS
Status: DISPENSED
Start: 2019-07-17

## (undated) RX ORDER — HEPARIN SODIUM (PORCINE) LOCK FLUSH IV SOLN 100 UNIT/ML 100 UNIT/ML
SOLUTION INTRAVENOUS
Status: DISPENSED
Start: 2019-11-22

## (undated) RX ORDER — HEPARIN SODIUM (PORCINE) LOCK FLUSH IV SOLN 100 UNIT/ML 100 UNIT/ML
SOLUTION INTRAVENOUS
Status: DISPENSED
Start: 2018-06-15

## (undated) RX ORDER — FENTANYL CITRATE-0.9 % NACL/PF 10 MCG/ML
PLASTIC BAG, INJECTION (ML) INTRAVENOUS
Status: DISPENSED
Start: 2021-01-01

## (undated) RX ORDER — PROPOFOL 10 MG/ML
INJECTION, EMULSION INTRAVENOUS
Status: DISPENSED
Start: 2020-02-05

## (undated) RX ORDER — ONDANSETRON 2 MG/ML
INJECTION INTRAMUSCULAR; INTRAVENOUS
Status: DISPENSED
Start: 2021-01-01

## (undated) RX ORDER — HEPARIN SODIUM (PORCINE) LOCK FLUSH IV SOLN 100 UNIT/ML 100 UNIT/ML
SOLUTION INTRAVENOUS
Status: DISPENSED
Start: 2020-02-05

## (undated) RX ORDER — FENTANYL CITRATE 50 UG/ML
INJECTION, SOLUTION INTRAMUSCULAR; INTRAVENOUS
Status: DISPENSED
Start: 2020-01-01

## (undated) RX ORDER — LIDOCAINE HYDROCHLORIDE 20 MG/ML
INJECTION, SOLUTION EPIDURAL; INFILTRATION; INTRACAUDAL; PERINEURAL
Status: DISPENSED
Start: 2019-11-11

## (undated) RX ORDER — CEFAZOLIN SODIUM 1 G/3ML
INJECTION, POWDER, FOR SOLUTION INTRAMUSCULAR; INTRAVENOUS
Status: DISPENSED
Start: 2021-01-01

## (undated) RX ORDER — HEPARIN SODIUM (PORCINE) LOCK FLUSH IV SOLN 100 UNIT/ML 100 UNIT/ML
SOLUTION INTRAVENOUS
Status: DISPENSED
Start: 2018-07-12

## (undated) RX ORDER — HEPARIN SODIUM (PORCINE) LOCK FLUSH IV SOLN 100 UNIT/ML 100 UNIT/ML
SOLUTION INTRAVENOUS
Status: DISPENSED
Start: 2020-01-01

## (undated) RX ORDER — HEPARIN SODIUM (PORCINE) LOCK FLUSH IV SOLN 100 UNIT/ML 100 UNIT/ML
SOLUTION INTRAVENOUS
Status: DISPENSED
Start: 2019-04-19

## (undated) RX ORDER — PROPOFOL 10 MG/ML
INJECTION, EMULSION INTRAVENOUS
Status: DISPENSED
Start: 2019-09-16

## (undated) RX ORDER — PROPOFOL 10 MG/ML
INJECTION, EMULSION INTRAVENOUS
Status: DISPENSED
Start: 2019-10-16

## (undated) RX ORDER — DIPHENHYDRAMINE HYDROCHLORIDE 50 MG/ML
INJECTION INTRAMUSCULAR; INTRAVENOUS
Status: DISPENSED
Start: 2021-01-01

## (undated) RX ORDER — FENTANYL CITRATE 50 UG/ML
INJECTION, SOLUTION INTRAMUSCULAR; INTRAVENOUS
Status: DISPENSED
Start: 2021-01-01

## (undated) RX ORDER — CEFAZOLIN SODIUM 2 G/100ML
INJECTION, SOLUTION INTRAVENOUS
Status: DISPENSED
Start: 2020-02-05

## (undated) RX ORDER — GLYCOPYRROLATE 0.2 MG/ML
INJECTION, SOLUTION INTRAMUSCULAR; INTRAVENOUS
Status: DISPENSED
Start: 2021-01-01

## (undated) RX ORDER — FENTANYL CITRATE 50 UG/ML
INJECTION, SOLUTION INTRAMUSCULAR; INTRAVENOUS
Status: DISPENSED
Start: 2019-07-17

## (undated) RX ORDER — HEPARIN SODIUM,PORCINE 10 UNIT/ML
VIAL (ML) INTRAVENOUS
Status: DISPENSED
Start: 2017-12-08

## (undated) RX ORDER — HEPARIN SODIUM (PORCINE) LOCK FLUSH IV SOLN 100 UNIT/ML 100 UNIT/ML
SOLUTION INTRAVENOUS
Status: DISPENSED
Start: 2021-01-01

## (undated) RX ORDER — DEXAMETHASONE SODIUM PHOSPHATE 4 MG/ML
INJECTION, SOLUTION INTRA-ARTICULAR; INTRALESIONAL; INTRAMUSCULAR; INTRAVENOUS; SOFT TISSUE
Status: DISPENSED
Start: 2021-01-01

## (undated) RX ORDER — ONDANSETRON 2 MG/ML
INJECTION INTRAMUSCULAR; INTRAVENOUS
Status: DISPENSED
Start: 2019-09-16

## (undated) RX ORDER — LIDOCAINE HYDROCHLORIDE 20 MG/ML
JELLY TOPICAL
Status: DISPENSED
Start: 2021-01-01

## (undated) RX ORDER — HEPARIN SODIUM (PORCINE) LOCK FLUSH IV SOLN 100 UNIT/ML 100 UNIT/ML
SOLUTION INTRAVENOUS
Status: DISPENSED
Start: 2019-09-16

## (undated) RX ORDER — CEFAZOLIN SODIUM 2 G/100ML
INJECTION, SOLUTION INTRAVENOUS
Status: DISPENSED
Start: 2021-01-01

## (undated) RX ORDER — LIDOCAINE HYDROCHLORIDE 20 MG/ML
JELLY TOPICAL
Status: DISPENSED
Start: 2019-11-11

## (undated) RX ORDER — LIDOCAINE HYDROCHLORIDE 20 MG/ML
INJECTION, SOLUTION EPIDURAL; INFILTRATION; INTRACAUDAL; PERINEURAL
Status: DISPENSED
Start: 2019-07-29

## (undated) RX ORDER — ETOMIDATE 2 MG/ML
INJECTION INTRAVENOUS
Status: DISPENSED
Start: 2019-06-07

## (undated) RX ORDER — LIDOCAINE HYDROCHLORIDE 20 MG/ML
INJECTION, SOLUTION EPIDURAL; INFILTRATION; INTRACAUDAL; PERINEURAL
Status: DISPENSED
Start: 2021-01-01

## (undated) RX ORDER — ACETAMINOPHEN 325 MG/1
TABLET ORAL
Status: DISPENSED
Start: 2020-02-05

## (undated) RX ORDER — FENTANYL CITRATE 50 UG/ML
INJECTION, SOLUTION INTRAMUSCULAR; INTRAVENOUS
Status: DISPENSED
Start: 2019-09-16

## (undated) RX ORDER — SODIUM CHLORIDE 9 MG/ML
INJECTION, SOLUTION INTRAVENOUS
Status: DISPENSED
Start: 2019-07-29

## (undated) RX ORDER — PROPOFOL 10 MG/ML
INJECTION, EMULSION INTRAVENOUS
Status: DISPENSED
Start: 2019-07-17

## (undated) RX ORDER — FENTANYL CITRATE-0.9 % NACL/PF 10 MCG/ML
PLASTIC BAG, INJECTION (ML) INTRAVENOUS
Status: DISPENSED
Start: 2020-01-01

## (undated) RX ORDER — PHENYLEPHRINE HCL IN 0.9% NACL 1 MG/10 ML
SYRINGE (ML) INTRAVENOUS
Status: DISPENSED
Start: 2020-02-05

## (undated) RX ORDER — PHENYLEPHRINE HCL IN 0.9% NACL 1 MG/10 ML
SYRINGE (ML) INTRAVENOUS
Status: DISPENSED
Start: 2019-07-17

## (undated) RX ORDER — PHENYLEPHRINE HCL IN 0.9% NACL 1 MG/10 ML
SYRINGE (ML) INTRAVENOUS
Status: DISPENSED
Start: 2019-07-29

## (undated) RX ORDER — LIDOCAINE HYDROCHLORIDE 20 MG/ML
JELLY TOPICAL
Status: DISPENSED
Start: 2020-06-08

## (undated) RX ORDER — PROPOFOL 10 MG/ML
INJECTION, EMULSION INTRAVENOUS
Status: DISPENSED
Start: 2021-01-01

## (undated) RX ORDER — IOPAMIDOL 510 MG/ML
INJECTION, SOLUTION INTRAVASCULAR
Status: DISPENSED
Start: 2021-01-01

## (undated) RX ORDER — ONDANSETRON 2 MG/ML
INJECTION INTRAMUSCULAR; INTRAVENOUS
Status: DISPENSED
Start: 2019-07-29

## (undated) RX ORDER — PHENYLEPHRINE HCL IN 0.9% NACL 1 MG/10 ML
SYRINGE (ML) INTRAVENOUS
Status: DISPENSED
Start: 2019-11-11

## (undated) RX ORDER — ONDANSETRON 2 MG/ML
INJECTION INTRAMUSCULAR; INTRAVENOUS
Status: DISPENSED
Start: 2019-07-17

## (undated) RX ORDER — HEPARIN SODIUM (PORCINE) LOCK FLUSH IV SOLN 100 UNIT/ML 100 UNIT/ML
SOLUTION INTRAVENOUS
Status: DISPENSED
Start: 2019-09-27

## (undated) RX ORDER — EPHEDRINE SULFATE 50 MG/ML
INJECTION, SOLUTION INTRAMUSCULAR; INTRAVENOUS; SUBCUTANEOUS
Status: DISPENSED
Start: 2021-01-01

## (undated) RX ORDER — LIDOCAINE 40 MG/G
CREAM TOPICAL
Status: DISPENSED
Start: 2021-01-01

## (undated) RX ORDER — PHENYLEPHRINE HCL IN 0.9% NACL 1 MG/10 ML
SYRINGE (ML) INTRAVENOUS
Status: DISPENSED
Start: 2019-10-16

## (undated) RX ORDER — HEPARIN SODIUM (PORCINE) LOCK FLUSH IV SOLN 100 UNIT/ML 100 UNIT/ML
SOLUTION INTRAVENOUS
Status: DISPENSED
Start: 2018-11-09

## (undated) RX ORDER — GLYCOPYRROLATE 0.2 MG/ML
INJECTION INTRAMUSCULAR; INTRAVENOUS
Status: DISPENSED
Start: 2021-01-01

## (undated) RX ORDER — PROPOFOL 10 MG/ML
INJECTION, EMULSION INTRAVENOUS
Status: DISPENSED
Start: 2019-06-07

## (undated) RX ORDER — HEPARIN SODIUM (PORCINE) LOCK FLUSH IV SOLN 100 UNIT/ML 100 UNIT/ML
SOLUTION INTRAVENOUS
Status: DISPENSED
Start: 2019-02-08

## (undated) RX ORDER — ONDANSETRON 2 MG/ML
INJECTION INTRAMUSCULAR; INTRAVENOUS
Status: DISPENSED
Start: 2020-01-01

## (undated) RX ORDER — METOPROLOL TARTRATE 1 MG/ML
INJECTION, SOLUTION INTRAVENOUS
Status: DISPENSED
Start: 2019-07-29

## (undated) RX ORDER — FENTANYL CITRATE 50 UG/ML
INJECTION, SOLUTION INTRAMUSCULAR; INTRAVENOUS
Status: DISPENSED
Start: 2019-10-16

## (undated) RX ORDER — GLYCOPYRROLATE 0.2 MG/ML
INJECTION, SOLUTION INTRAMUSCULAR; INTRAVENOUS
Status: DISPENSED
Start: 2019-10-16

## (undated) RX ORDER — HEPARIN SODIUM (PORCINE) LOCK FLUSH IV SOLN 100 UNIT/ML 100 UNIT/ML
SOLUTION INTRAVENOUS
Status: DISPENSED
Start: 2020-06-12

## (undated) RX ORDER — CEFAZOLIN SODIUM 2 G/100ML
INJECTION, SOLUTION INTRAVENOUS
Status: DISPENSED
Start: 2020-01-01

## (undated) RX ORDER — PROPOFOL 10 MG/ML
INJECTION, EMULSION INTRAVENOUS
Status: DISPENSED
Start: 2019-07-29

## (undated) RX ORDER — FENTANYL CITRATE 50 UG/ML
INJECTION, SOLUTION INTRAMUSCULAR; INTRAVENOUS
Status: DISPENSED
Start: 2020-02-05

## (undated) RX ORDER — PHENYLEPHRINE HCL IN 0.9% NACL 1 MG/10 ML
SYRINGE (ML) INTRAVENOUS
Status: DISPENSED
Start: 2019-09-16

## (undated) RX ORDER — ACETAMINOPHEN 325 MG/1
TABLET ORAL
Status: DISPENSED
Start: 2019-09-16

## (undated) RX ORDER — HEPARIN SODIUM (PORCINE) LOCK FLUSH IV SOLN 100 UNIT/ML 100 UNIT/ML
SOLUTION INTRAVENOUS
Status: DISPENSED
Start: 2019-08-23

## (undated) RX ORDER — FENTANYL CITRATE 50 UG/ML
INJECTION, SOLUTION INTRAMUSCULAR; INTRAVENOUS
Status: DISPENSED
Start: 2020-06-08

## (undated) RX ORDER — HEPARIN SODIUM,PORCINE 10 UNIT/ML
VIAL (ML) INTRAVENOUS
Status: DISPENSED
Start: 2019-02-08

## (undated) RX ORDER — HEPARIN SODIUM (PORCINE) LOCK FLUSH IV SOLN 100 UNIT/ML 100 UNIT/ML
SOLUTION INTRAVENOUS
Status: DISPENSED
Start: 2018-01-26

## (undated) RX ORDER — LIDOCAINE HYDROCHLORIDE 20 MG/ML
JELLY TOPICAL
Status: DISPENSED
Start: 2019-09-16

## (undated) RX ORDER — LABETALOL 20 MG/4 ML (5 MG/ML) INTRAVENOUS SYRINGE
Status: DISPENSED
Start: 2019-07-29

## (undated) RX ORDER — HEPARIN SODIUM (PORCINE) LOCK FLUSH IV SOLN 100 UNIT/ML 100 UNIT/ML
SOLUTION INTRAVENOUS
Status: DISPENSED
Start: 2017-12-08

## (undated) RX ORDER — HEPARIN SODIUM (PORCINE) LOCK FLUSH IV SOLN 100 UNIT/ML 100 UNIT/ML
SOLUTION INTRAVENOUS
Status: DISPENSED
Start: 2018-03-23

## (undated) RX ORDER — GABAPENTIN 300 MG/1
CAPSULE ORAL
Status: DISPENSED
Start: 2017-12-08

## (undated) RX ORDER — ESMOLOL HYDROCHLORIDE 10 MG/ML
INJECTION INTRAVENOUS
Status: DISPENSED
Start: 2021-01-01

## (undated) RX ORDER — FENTANYL CITRATE 50 UG/ML
INJECTION, SOLUTION INTRAMUSCULAR; INTRAVENOUS
Status: DISPENSED
Start: 2019-11-11

## (undated) RX ORDER — ONDANSETRON 2 MG/ML
INJECTION INTRAMUSCULAR; INTRAVENOUS
Status: DISPENSED
Start: 2019-10-16

## (undated) RX ORDER — LABETALOL HYDROCHLORIDE 5 MG/ML
INJECTION, SOLUTION INTRAVENOUS
Status: DISPENSED
Start: 2019-07-29

## (undated) RX ORDER — FENTANYL CITRATE 50 UG/ML
INJECTION, SOLUTION INTRAMUSCULAR; INTRAVENOUS
Status: DISPENSED
Start: 2019-07-29

## (undated) RX ORDER — ACETAMINOPHEN 325 MG/1
TABLET ORAL
Status: DISPENSED
Start: 2019-11-11

## (undated) RX ORDER — ESMOLOL HYDROCHLORIDE 10 MG/ML
INJECTION INTRAVENOUS
Status: DISPENSED
Start: 2019-09-16

## (undated) RX ORDER — LIDOCAINE HYDROCHLORIDE 20 MG/ML
INJECTION, SOLUTION EPIDURAL; INFILTRATION; INTRACAUDAL; PERINEURAL
Status: DISPENSED
Start: 2019-06-07

## (undated) RX ORDER — HEPARIN SODIUM (PORCINE) LOCK FLUSH IV SOLN 100 UNIT/ML 100 UNIT/ML
SOLUTION INTRAVENOUS
Status: DISPENSED
Start: 2019-06-21

## (undated) RX ORDER — LIDOCAINE HYDROCHLORIDE 20 MG/ML
JELLY TOPICAL
Status: DISPENSED
Start: 2020-01-01

## (undated) RX ORDER — DEXAMETHASONE SODIUM PHOSPHATE 4 MG/ML
INJECTION, SOLUTION INTRA-ARTICULAR; INTRALESIONAL; INTRAMUSCULAR; INTRAVENOUS; SOFT TISSUE
Status: DISPENSED
Start: 2019-11-11

## (undated) RX ORDER — LIDOCAINE HYDROCHLORIDE 20 MG/ML
INJECTION, SOLUTION EPIDURAL; INFILTRATION; INTRACAUDAL; PERINEURAL
Status: DISPENSED
Start: 2019-10-16

## (undated) RX ORDER — ACETAMINOPHEN 325 MG/1
TABLET ORAL
Status: DISPENSED
Start: 2017-12-08

## (undated) RX ORDER — SIMETHICONE 40MG/0.6ML
SUSPENSION, DROPS(FINAL DOSAGE FORM)(ML) ORAL
Status: DISPENSED
Start: 2021-01-01

## (undated) RX ORDER — ONDANSETRON 2 MG/ML
INJECTION INTRAMUSCULAR; INTRAVENOUS
Status: DISPENSED
Start: 2019-11-11

## (undated) RX ORDER — CEFAZOLIN SODIUM 2 G/100ML
INJECTION, SOLUTION INTRAVENOUS
Status: DISPENSED
Start: 2019-11-11

## (undated) RX ORDER — HEPARIN SODIUM (PORCINE) LOCK FLUSH IV SOLN 100 UNIT/ML 100 UNIT/ML
SOLUTION INTRAVENOUS
Status: DISPENSED
Start: 2019-11-11

## (undated) RX ORDER — FENTANYL CITRATE 50 UG/ML
INJECTION, SOLUTION INTRAMUSCULAR; INTRAVENOUS
Status: DISPENSED
Start: 2019-06-07

## (undated) RX ORDER — CEFAZOLIN SODIUM 2 G/100ML
INJECTION, SOLUTION INTRAVENOUS
Status: DISPENSED
Start: 2019-09-16

## (undated) RX ORDER — CITRIC ACID/SODIUM CITRATE 334-500MG
SOLUTION, ORAL ORAL
Status: DISPENSED
Start: 2020-02-05

## (undated) RX ORDER — LIDOCAINE HYDROCHLORIDE 20 MG/ML
INJECTION, SOLUTION EPIDURAL; INFILTRATION; INTRACAUDAL; PERINEURAL
Status: DISPENSED
Start: 2019-09-16